# Patient Record
Sex: MALE | Race: WHITE | NOT HISPANIC OR LATINO | Employment: OTHER | URBAN - METROPOLITAN AREA
[De-identification: names, ages, dates, MRNs, and addresses within clinical notes are randomized per-mention and may not be internally consistent; named-entity substitution may affect disease eponyms.]

---

## 2017-01-02 ENCOUNTER — ANTI-COAG VISIT (OUTPATIENT)
Dept: CARDIOLOGY | Facility: CLINIC | Age: 82
End: 2017-01-02

## 2017-01-02 DIAGNOSIS — Z79.01 ANTICOAGULATION MONITORING BY PHARMACIST: ICD-10-CM

## 2017-01-02 LAB — INR PPP: 2.4

## 2017-01-16 ENCOUNTER — ANTI-COAG VISIT (OUTPATIENT)
Dept: CARDIOLOGY | Facility: CLINIC | Age: 82
End: 2017-01-16

## 2017-01-16 DIAGNOSIS — Z79.01 ANTICOAGULATION MONITORING BY PHARMACIST: ICD-10-CM

## 2017-01-16 LAB — INR PPP: 2.33

## 2017-01-26 ENCOUNTER — CLINICAL SUPPORT (OUTPATIENT)
Dept: ELECTROPHYSIOLOGY | Facility: CLINIC | Age: 82
End: 2017-01-26
Payer: MEDICARE

## 2017-01-26 DIAGNOSIS — Z95.0 CARDIAC PACEMAKER IN SITU: ICD-10-CM

## 2017-01-26 DIAGNOSIS — I48.91 ATRIAL FIBRILLATION: ICD-10-CM

## 2017-01-26 PROCEDURE — 93293 PM PHONE R-STRIP DEVICE EVAL: CPT | Mod: PBBFAC | Performed by: INTERNAL MEDICINE

## 2017-01-26 RX ORDER — WARFARIN 1 MG/1
TABLET ORAL
Qty: 60 TABLET | Refills: 4 | Status: SHIPPED | OUTPATIENT
Start: 2017-01-26 | End: 2017-01-31 | Stop reason: SDUPTHER

## 2017-01-31 DIAGNOSIS — I48.91 ATRIAL FIBRILLATION: ICD-10-CM

## 2017-02-01 RX ORDER — WARFARIN 1 MG/1
TABLET ORAL
Qty: 60 TABLET | Refills: 4 | Status: SHIPPED | OUTPATIENT
Start: 2017-02-01 | End: 2017-06-23 | Stop reason: SDUPTHER

## 2017-02-13 ENCOUNTER — ANTI-COAG VISIT (OUTPATIENT)
Dept: CARDIOLOGY | Facility: CLINIC | Age: 82
End: 2017-02-13

## 2017-02-13 DIAGNOSIS — Z79.01 ANTICOAGULATION MONITORING BY PHARMACIST: ICD-10-CM

## 2017-02-13 LAB — INR PPP: 2.08

## 2017-03-13 ENCOUNTER — ANTI-COAG VISIT (OUTPATIENT)
Dept: CARDIOLOGY | Facility: CLINIC | Age: 82
End: 2017-03-13

## 2017-03-13 DIAGNOSIS — Z79.01 ANTICOAGULATION MONITORING BY PHARMACIST: ICD-10-CM

## 2017-03-13 LAB — INR PPP: 2.08

## 2017-04-18 ENCOUNTER — ANTI-COAG VISIT (OUTPATIENT)
Dept: CARDIOLOGY | Facility: CLINIC | Age: 82
End: 2017-04-18

## 2017-04-18 DIAGNOSIS — Z79.01 ANTICOAGULATION MONITORING BY PHARMACIST: ICD-10-CM

## 2017-04-18 LAB — INR PPP: 1.84

## 2017-04-28 ENCOUNTER — CLINICAL SUPPORT (OUTPATIENT)
Dept: ELECTROPHYSIOLOGY | Facility: CLINIC | Age: 82
End: 2017-04-28
Payer: MEDICARE

## 2017-04-28 DIAGNOSIS — I48.91 ATRIAL FIBRILLATION: ICD-10-CM

## 2017-04-28 DIAGNOSIS — Z95.0 CARDIAC PACEMAKER IN SITU: ICD-10-CM

## 2017-04-28 PROCEDURE — 93293 PM PHONE R-STRIP DEVICE EVAL: CPT | Mod: S$GLB,,, | Performed by: INTERNAL MEDICINE

## 2017-05-17 DIAGNOSIS — E11.9 TYPE 2 DIABETES MELLITUS, CONTROLLED: ICD-10-CM

## 2017-05-17 RX ORDER — GLIMEPIRIDE 2 MG/1
TABLET ORAL
Qty: 30 TABLET | Refills: 4 | Status: SHIPPED | OUTPATIENT
Start: 2017-05-17 | End: 2018-07-09 | Stop reason: SDUPTHER

## 2017-06-01 ENCOUNTER — ANTI-COAG VISIT (OUTPATIENT)
Dept: CARDIOLOGY | Facility: CLINIC | Age: 82
End: 2017-06-01

## 2017-06-01 DIAGNOSIS — Z79.01 ANTICOAGULATION MONITORING BY PHARMACIST: ICD-10-CM

## 2017-06-01 RX ORDER — CLONIDINE HYDROCHLORIDE 0.1 MG/1
TABLET ORAL
Qty: 60 TABLET | Refills: 2 | Status: SHIPPED | OUTPATIENT
Start: 2017-06-01 | End: 2017-09-05 | Stop reason: SDUPTHER

## 2017-06-01 NOTE — PROGRESS NOTES
Patient reports he has transferred warfarin management to local cardiologist Dr. Rob Esposito in [facility] due to increasing difficulty with transportation as he ages. Discharged from our clinic at this time.

## 2017-06-22 ENCOUNTER — CLINICAL SUPPORT (OUTPATIENT)
Dept: INTERNAL MEDICINE | Facility: CLINIC | Age: 82
End: 2017-06-22
Payer: MEDICARE

## 2017-06-22 DIAGNOSIS — I10 ESSENTIAL HYPERTENSION: ICD-10-CM

## 2017-06-22 DIAGNOSIS — E11.40 TYPE 2 DIABETES MELLITUS WITH DIABETIC NEUROPATHY: ICD-10-CM

## 2017-06-22 DIAGNOSIS — E11.40 TYPE 2 DIABETES MELLITUS WITH DIABETIC NEUROPATHY, UNSPECIFIED LONG TERM INSULIN USE STATUS: ICD-10-CM

## 2017-06-22 DIAGNOSIS — I48.91 ATRIAL FIBRILLATION: ICD-10-CM

## 2017-06-22 DIAGNOSIS — E78.2 MIXED DYSLIPIDEMIA: ICD-10-CM

## 2017-06-22 DIAGNOSIS — E66.9 OBESITY (BMI 30.0-34.9): ICD-10-CM

## 2017-06-22 LAB
ALBUMIN SERPL BCP-MCNC: 3.6 G/DL
ALP SERPL-CCNC: 117 U/L
ALT SERPL W/O P-5'-P-CCNC: 12 U/L
ANION GAP SERPL CALC-SCNC: 8 MMOL/L
AST SERPL-CCNC: 31 U/L
BASOPHILS # BLD AUTO: 0.01 K/UL
BASOPHILS NFR BLD: 0.2 %
BILIRUB SERPL-MCNC: 0.7 MG/DL
BUN SERPL-MCNC: 33 MG/DL
CALCIUM SERPL-MCNC: 9.9 MG/DL
CHLORIDE SERPL-SCNC: 107 MMOL/L
CHOLEST/HDLC SERPL: 3.4 {RATIO}
CO2 SERPL-SCNC: 27 MMOL/L
CREAT SERPL-MCNC: 1.3 MG/DL
DIFFERENTIAL METHOD: ABNORMAL
EOSINOPHIL # BLD AUTO: 0.2 K/UL
EOSINOPHIL NFR BLD: 2.8 %
ERYTHROCYTE [DISTWIDTH] IN BLOOD BY AUTOMATED COUNT: 12.7 %
EST. GFR  (AFRICAN AMERICAN): 56 ML/MIN/1.73 M^2
EST. GFR  (NON AFRICAN AMERICAN): 49 ML/MIN/1.73 M^2
GLUCOSE SERPL-MCNC: 96 MG/DL
HCT VFR BLD AUTO: 37.1 %
HDL/CHOLESTEROL RATIO: 29 %
HDLC SERPL-MCNC: 124 MG/DL
HDLC SERPL-MCNC: 36 MG/DL
HGB BLD-MCNC: 12.1 G/DL
LDLC SERPL CALC-MCNC: 69.4 MG/DL
LYMPHOCYTES # BLD AUTO: 1.2 K/UL
LYMPHOCYTES NFR BLD: 21.3 %
MCH RBC QN AUTO: 33.3 PG
MCHC RBC AUTO-ENTMCNC: 32.6 %
MCV RBC AUTO: 102 FL
MONOCYTES # BLD AUTO: 0.7 K/UL
MONOCYTES NFR BLD: 12.4 %
NEUTROPHILS # BLD AUTO: 3.4 K/UL
NEUTROPHILS NFR BLD: 63.3 %
NONHDLC SERPL-MCNC: 88 MG/DL
PLATELET # BLD AUTO: 169 K/UL
PMV BLD AUTO: 9.9 FL
POTASSIUM SERPL-SCNC: 5.4 MMOL/L
PROT SERPL-MCNC: 7.4 G/DL
RBC # BLD AUTO: 3.63 M/UL
SODIUM SERPL-SCNC: 142 MMOL/L
TRIGL SERPL-MCNC: 93 MG/DL
TSH SERPL DL<=0.005 MIU/L-ACNC: 3.31 UIU/ML
WBC # BLD AUTO: 5.4 K/UL

## 2017-06-22 PROCEDURE — 83036 HEMOGLOBIN GLYCOSYLATED A1C: CPT

## 2017-06-22 PROCEDURE — 80061 LIPID PANEL: CPT

## 2017-06-22 PROCEDURE — 85025 COMPLETE CBC W/AUTO DIFF WBC: CPT

## 2017-06-22 PROCEDURE — 80053 COMPREHEN METABOLIC PANEL: CPT

## 2017-06-22 PROCEDURE — 36415 COLL VENOUS BLD VENIPUNCTURE: CPT | Mod: S$GLB,,, | Performed by: INTERNAL MEDICINE

## 2017-06-22 PROCEDURE — 84443 ASSAY THYROID STIM HORMONE: CPT

## 2017-06-22 RX ORDER — WARFARIN 1 MG/1
TABLET ORAL
Qty: 60 TABLET | Refills: 3 | Status: CANCELLED | OUTPATIENT
Start: 2017-06-22

## 2017-06-23 DIAGNOSIS — I48.21 PERMANENT ATRIAL FIBRILLATION: ICD-10-CM

## 2017-06-23 LAB
ESTIMATED AVG GLUCOSE: 128 MG/DL
HBA1C MFR BLD HPLC: 6.1 %

## 2017-06-23 RX ORDER — WARFARIN 1 MG/1
TABLET ORAL
Qty: 60 TABLET | Refills: 4 | Status: SHIPPED | OUTPATIENT
Start: 2017-06-23 | End: 2017-12-05

## 2017-06-29 ENCOUNTER — OFFICE VISIT (OUTPATIENT)
Dept: INTERNAL MEDICINE | Facility: CLINIC | Age: 82
End: 2017-06-29
Payer: MEDICARE

## 2017-06-29 VITALS
WEIGHT: 194.88 LBS | BODY MASS INDEX: 31.32 KG/M2 | HEIGHT: 66 IN | HEART RATE: 70 BPM | SYSTOLIC BLOOD PRESSURE: 102 MMHG | RESPIRATION RATE: 16 BRPM | DIASTOLIC BLOOD PRESSURE: 66 MMHG | OXYGEN SATURATION: 95 %

## 2017-06-29 DIAGNOSIS — E11.51 TYPE II DIABETES MELLITUS WITH PERIPHERAL CIRCULATORY DISORDER: Primary | ICD-10-CM

## 2017-06-29 DIAGNOSIS — I15.2 OBESITY, DIABETES, AND HYPERTENSION SYNDROME: ICD-10-CM

## 2017-06-29 DIAGNOSIS — I50.32 CHRONIC DIASTOLIC HEART FAILURE: ICD-10-CM

## 2017-06-29 DIAGNOSIS — E11.59 OBESITY, DIABETES, AND HYPERTENSION SYNDROME: ICD-10-CM

## 2017-06-29 DIAGNOSIS — I25.10 CORONARY ARTERY DISEASE INVOLVING NATIVE CORONARY ARTERY OF NATIVE HEART WITHOUT ANGINA PECTORIS: ICD-10-CM

## 2017-06-29 DIAGNOSIS — E66.9 OBESITY, DIABETES, AND HYPERTENSION SYNDROME: ICD-10-CM

## 2017-06-29 DIAGNOSIS — E11.69 OBESITY, DIABETES, AND HYPERTENSION SYNDROME: ICD-10-CM

## 2017-06-29 DIAGNOSIS — J43.9 PULMONARY EMPHYSEMA, UNSPECIFIED EMPHYSEMA TYPE: ICD-10-CM

## 2017-06-29 DIAGNOSIS — I10 ESSENTIAL HYPERTENSION: ICD-10-CM

## 2017-06-29 DIAGNOSIS — I45.2 RBBB (RIGHT BUNDLE BRANCH BLOCK WITH LEFT ANTERIOR FASCICULAR BLOCK): ICD-10-CM

## 2017-06-29 PROCEDURE — 99499 UNLISTED E&M SERVICE: CPT | Mod: S$GLB,,, | Performed by: INTERNAL MEDICINE

## 2017-06-29 PROCEDURE — 99999 PR PBB SHADOW E&M-EST. PATIENT-LVL III: CPT | Mod: PBBFAC,,, | Performed by: INTERNAL MEDICINE

## 2017-06-29 PROCEDURE — 99214 OFFICE O/P EST MOD 30 MIN: CPT | Mod: S$GLB,,, | Performed by: INTERNAL MEDICINE

## 2017-06-29 PROCEDURE — 1159F MED LIST DOCD IN RCRD: CPT | Mod: S$GLB,,, | Performed by: INTERNAL MEDICINE

## 2017-06-29 RX ORDER — AMLODIPINE BESYLATE 10 MG/1
10 TABLET ORAL DAILY
Qty: 30 TABLET | Refills: 6 | Status: SHIPPED | OUTPATIENT
Start: 2017-06-29 | End: 2018-05-08 | Stop reason: SDUPTHER

## 2017-06-29 RX ORDER — FUROSEMIDE 40 MG/1
TABLET ORAL
Qty: 30 TABLET | Refills: 6 | Status: ON HOLD | OUTPATIENT
Start: 2017-06-29 | End: 2017-12-04

## 2017-06-29 NOTE — PROGRESS NOTES
Subjective:       Patient ID: Eddie Parada Sr. is a 88 y.o. male.    Chief Complaint: Hyperlipidemia (follow up with lab review); Hypertension; COPD; Diabetes; Coronary Artery Disease; and Thyroid Problem    Eddie Parada Sr. is a 88 y.o. male who presents for Type II DM, Hypertension, and Hyperlipidemia follow up. Labs were reviewed with patient today.      Hyperlipidemia   This is a chronic problem. The current episode started more than 1 year ago. The problem is controlled. Recent lipid tests were reviewed and are low. Associated symptoms include shortness of breath. Current antihyperlipidemic treatment includes statins. The current treatment provides significant improvement of lipids.   Hypertension   This is a chronic problem. The current episode started more than 1 year ago. The problem is controlled. Associated symptoms include malaise/fatigue and shortness of breath. Pertinent negatives include no orthopnea. Risk factors for coronary artery disease include sedentary lifestyle, male gender, obesity, dyslipidemia and diabetes mellitus. Past treatments include beta blockers, calcium channel blockers and diuretics. The current treatment provides moderate improvement. Hypertensive end-organ damage includes kidney disease, CAD/MI and a thyroid problem.   COPD     Diabetes   He presents for his follow-up diabetic visit. He has type 2 diabetes mellitus. His disease course has been stable. Pertinent negatives for hypoglycemia include no dizziness, nervousness/anxiousness, pallor, seizures or speech difficulty. Associated symptoms include foot paresthesias. Pertinent negatives for hypoglycemia complications include no blackouts. Symptoms are improving. Pertinent negatives for diabetic complications include no autonomic neuropathy. Current diabetic treatment includes oral agent (dual therapy). His breakfast blood glucose range is generally 110-130 mg/dl. An ACE inhibitor/angiotensin II receptor blocker is  being taken.   Coronary Artery Disease   Symptoms include shortness of breath. Pertinent negatives include no chest tightness or dizziness. Risk factors include hyperlipidemia.   Thyroid Problem   Patient reports no anxiety. His past medical history is significant for hyperlipidemia.     Review of Systems   Constitutional: Positive for malaise/fatigue.   HENT: Negative for postnasal drip.    Eyes: Negative for photophobia.   Respiratory: Positive for shortness of breath. Negative for chest tightness.    Cardiovascular: Negative for orthopnea.   Gastrointestinal: Negative for abdominal distention and blood in stool.   Genitourinary: Negative for dysuria, flank pain and hematuria.   Musculoskeletal: Negative for back pain.        Foot neuropathic pain    Skin: Negative for pallor.   Neurological: Negative for dizziness, seizures, facial asymmetry and speech difficulty.   Hematological: Does not bruise/bleed easily.   Psychiatric/Behavioral: Negative for agitation and suicidal ideas. The patient is not nervous/anxious.        Objective:      Physical Exam   Constitutional: He is oriented to person, place, and time. He appears well-developed and well-nourished. No distress.   HENT:   Head: Normocephalic and atraumatic.   Right Ear: External ear normal.   Left Ear: External ear normal.   Eyes: EOM are normal. Right eye exhibits no discharge. Left eye exhibits no discharge.   Neck: Neck supple. No thyromegaly present.   Cardiovascular: Normal rate and regular rhythm.  Exam reveals no gallop and no friction rub.    No murmur heard.  Pulses:       Dorsalis pedis pulses are 1+ on the right side, and 1+ on the left side.        Posterior tibial pulses are 1+ on the right side, and 1+ on the left side.   Pulmonary/Chest: Effort normal and breath sounds normal. No respiratory distress. He has no wheezes. He has no rales.   Abdominal: Soft. Bowel sounds are normal. He exhibits no distension. There is no tenderness. There is no  rebound and no guarding.   Musculoskeletal: He exhibits no edema (but wearing compression stockings today).   Wearing compression stockings     Feet:   Right Foot:   Protective Sensation: 6 sites tested. 1 site sensed.  Skin Integrity: Negative for ulcer, skin breakdown, callus or dry skin.   Left Foot:   Protective Sensation: 6 sites tested. 2 sites sensed.   Skin Integrity: Negative for ulcer, skin breakdown, callus or dry skin.   Lymphadenopathy:     He has no cervical adenopathy.   Neurological: He is alert and oriented to person, place, and time. No cranial nerve deficit.   Skin: Skin is warm and dry.   Psychiatric: He has a normal mood and affect. His behavior is normal.   Vitals reviewed.      Assessment:       1. Type II diabetes mellitus with peripheral circulatory disorder    2. Pulmonary emphysema, unspecified emphysema type    3. Chronic diastolic heart failure    4. Coronary artery disease involving native coronary artery of native heart without angina pectoris    5. RBBB (right bundle branch block with left anterior fascicular block)    6. Obesity, diabetes, and hypertension syndrome        Plan:   Eddie was seen today for hyperlipidemia, hypertension, copd, diabetes, coronary artery disease and thyroid problem.    Diagnoses and all orders for this visit:    Type II diabetes mellitus with peripheral circulatory disorder  -     Microalbumin/creatinine urine ratio; Future  -     Hemoglobin A1c; Future  Patient has controlled Diabetes .  We discussed about diet ;low in calories. Avoid sweats, sodas.  Also increasing activity;walking 2-3 miles a day.   gave patient  instructions about adherence to plan.  Goal of  A1c  less than 7 % stressed.  Also goal of LDL less than 70 highlighted to patient.  Pulmonary emphysema, unspecified emphysema type  Well controlled.    Chronic diastolic heart failure  -     Brain natriuretic peptide; Future  We discussed about leg edema  And  weight gain issues with CHF.  I  educated patient to call  If he  Gains 5 lbs and more ,or legs start swelling or worsening shortness of breath , or inability to lie down.  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     # Eat  5 small meals a day.     # Cut out high carbohydrate  foods : bread, rice, pasta, potatoes.     # Exercise/walk 5x/week for at least 30-45  minutes.

## 2017-08-17 DIAGNOSIS — I10 ESSENTIAL HYPERTENSION: ICD-10-CM

## 2017-08-17 DIAGNOSIS — E78.2 MIXED DYSLIPIDEMIA: ICD-10-CM

## 2017-08-17 RX ORDER — CARVEDILOL 12.5 MG/1
12.5 TABLET ORAL 2 TIMES DAILY WITH MEALS
Qty: 60 TABLET | Refills: 5 | Status: SHIPPED | OUTPATIENT
Start: 2017-08-17 | End: 2018-10-22 | Stop reason: SDUPTHER

## 2017-08-17 RX ORDER — GEMFIBROZIL 600 MG/1
600 TABLET, FILM COATED ORAL
Qty: 60 TABLET | Refills: 4 | Status: SHIPPED | OUTPATIENT
Start: 2017-08-17 | End: 2018-02-28 | Stop reason: SDUPTHER

## 2017-09-05 RX ORDER — LISINOPRIL 40 MG/1
40 TABLET ORAL DAILY
Qty: 30 TABLET | Refills: 5 | Status: ON HOLD | OUTPATIENT
Start: 2017-09-05 | End: 2017-11-26

## 2017-09-05 RX ORDER — CLONIDINE HYDROCHLORIDE 0.1 MG/1
TABLET ORAL
Qty: 60 TABLET | Refills: 1 | Status: ON HOLD | OUTPATIENT
Start: 2017-09-05 | End: 2017-12-04 | Stop reason: HOSPADM

## 2017-09-25 ENCOUNTER — TELEPHONE (OUTPATIENT)
Dept: INTERNAL MEDICINE | Facility: CLINIC | Age: 82
End: 2017-09-25

## 2017-09-25 ENCOUNTER — OFFICE VISIT (OUTPATIENT)
Dept: INTERNAL MEDICINE | Facility: CLINIC | Age: 82
End: 2017-09-25
Payer: MEDICARE

## 2017-09-25 VITALS
HEIGHT: 66 IN | WEIGHT: 193.31 LBS | SYSTOLIC BLOOD PRESSURE: 86 MMHG | HEART RATE: 60 BPM | DIASTOLIC BLOOD PRESSURE: 52 MMHG | OXYGEN SATURATION: 95 % | BODY MASS INDEX: 31.07 KG/M2 | RESPIRATION RATE: 16 BRPM

## 2017-09-25 DIAGNOSIS — K29.70 GASTRITIS, PRESENCE OF BLEEDING UNSPECIFIED, UNSPECIFIED CHRONICITY, UNSPECIFIED GASTRITIS TYPE: ICD-10-CM

## 2017-09-25 DIAGNOSIS — L03.115 CELLULITIS OF RIGHT FOOT: Primary | ICD-10-CM

## 2017-09-25 PROCEDURE — 1159F MED LIST DOCD IN RCRD: CPT | Mod: S$GLB,,, | Performed by: INTERNAL MEDICINE

## 2017-09-25 PROCEDURE — 99999 PR PBB SHADOW E&M-EST. PATIENT-LVL III: CPT | Mod: PBBFAC,,, | Performed by: INTERNAL MEDICINE

## 2017-09-25 PROCEDURE — 3008F BODY MASS INDEX DOCD: CPT | Mod: S$GLB,,, | Performed by: INTERNAL MEDICINE

## 2017-09-25 PROCEDURE — 99499 UNLISTED E&M SERVICE: CPT | Mod: S$GLB,,, | Performed by: INTERNAL MEDICINE

## 2017-09-25 PROCEDURE — 99213 OFFICE O/P EST LOW 20 MIN: CPT | Mod: S$GLB,,, | Performed by: INTERNAL MEDICINE

## 2017-09-25 RX ORDER — CLINDAMYCIN HYDROCHLORIDE 300 MG/1
CAPSULE ORAL
Status: ON HOLD | COMMUNITY
Start: 2017-09-21 | End: 2017-11-26

## 2017-09-25 RX ORDER — CEPHALEXIN 500 MG/1
500 CAPSULE ORAL 3 TIMES DAILY
Qty: 30 CAPSULE | Refills: 0 | Status: SHIPPED | OUTPATIENT
Start: 2017-09-25 | End: 2017-10-02 | Stop reason: SDUPTHER

## 2017-09-25 NOTE — PROGRESS NOTES
Subjective:       Patient ID: Eddie Parada Sr. is a 88 y.o. male.    Chief Complaint: Contusion of left foot (ER FOLLOW UP); Heartburn; and Abdominal Pain    Eddie Parada Sr. is a 88 y.o. male  Here with multiple complaints .  He went to ER [facility] for R foot cellulitis .  And started on clindamycin . Since then getting a lot of side effects.  C/o stomach hurting ; heart burn.  Took nexium  Which helped.          Heartburn   He complains of abdominal pain and heartburn.   Abdominal Pain   Pertinent negatives include no dysuria or hematuria.     Review of Systems   HENT: Negative for postnasal drip.    Eyes: Negative for photophobia.   Respiratory: Negative for chest tightness.    Gastrointestinal: Positive for abdominal pain and heartburn. Negative for abdominal distention and blood in stool.   Genitourinary: Negative for dysuria, flank pain and hematuria.   Musculoskeletal: Negative for back pain.        Foot neuropathic pain    Skin: Positive for rash. Negative for pallor.   Neurological: Negative for dizziness, seizures, facial asymmetry and speech difficulty.   Hematological: Does not bruise/bleed easily.   Psychiatric/Behavioral: Negative for agitation and suicidal ideas. The patient is not nervous/anxious.        Objective:      Physical Exam   Constitutional: He is oriented to person, place, and time. He appears well-developed and well-nourished. No distress.   HENT:   Head: Normocephalic and atraumatic.   Right Ear: External ear normal.   Left Ear: External ear normal.   Eyes: EOM are normal. Right eye exhibits no discharge. Left eye exhibits no discharge.   Neck: Neck supple. No thyromegaly present.   Cardiovascular: Normal rate and regular rhythm.  Exam reveals no gallop and no friction rub.    No murmur heard.  Pulses:       Dorsalis pedis pulses are 1+ on the left side.   Pulmonary/Chest: Effort normal and breath sounds normal. No respiratory distress. He has no wheezes. He has no rales.  "  Abdominal: Soft. Bowel sounds are normal. He exhibits no distension. There is no tenderness. There is no rebound and no guarding.   Musculoskeletal: He exhibits no edema (but wearing compression stockings today).        Feet:    3 cm are which is hyperemic   And tender.     Lymphadenopathy:     He has no cervical adenopathy.   Neurological: He is alert and oriented to person, place, and time. No cranial nerve deficit.   Skin: Skin is warm and dry.   Psychiatric: He has a normal mood and affect. His behavior is normal.   Vitals reviewed.      Assessment:       1. Cellulitis of right foot    2. Gastritis, presence of bleeding unspecified, unspecified chronicity, unspecified gastritis type        Plan:   Eddie was seen today for contusion of left foot, heartburn and abdominal pain.    Diagnoses and all orders for this visit:    Cellulitis of right foot  -     cephALEXin (KEFLEX) 500 MG capsule; Take 1 capsule (500 mg total) by mouth 3 (three) times daily.    Gastritis, presence of bleeding unspecified, unspecified chronicity, unspecified gastritis type    take 2 OTC nexium   Daily  Tips to Control Acid Reflux  To control acid reflux, youll need to make some basic diet and lifestyle changes. The simple steps outlined below may be all youll need to relieve discomfort.  · Avoid fatty foods and spicy foods.  · Eat fewer acidic foods, such as citrus and tomato-based foods. These can increase symptoms.  · Limit drinking alcohol, caffeine, and fizzy beverages. All increase acid reflux.  · Try limiting chocolate, peppermint, and spearmint. These can worsen acid reflux in some people.  Watch When You Eat  · Avoid lying down for 3 hours after eating.  · Do not snack before going to bed.  Raise Your Head    Raising your head and upper body by 4" to 6" helps limit reflux when youre lying down. Put blocks under the head of the bed frame to raise it.    "

## 2017-09-25 NOTE — TELEPHONE ENCOUNTER
Patient requesting ER follow-up appt for cellulitis to right foot. Also c/o abdominal pain which he thinks is a side effect of the clindamycin he's been taking x 4 days. Appt scheduled for 1:00 pm today.

## 2017-09-25 NOTE — TELEPHONE ENCOUNTER
----- Message from Ellyn Barkley sent at 2017  8:04 AM CDT -----  Contact: self  Eddie Hannahlew .  MRN: [id number]  : 1929  PCP: Callum Roberts  Home Phone      [phone]  Work Phone      Not on file.  Mobile          Not on file.      MESSAGE: want appt for foot today-----[phone]

## 2017-10-02 ENCOUNTER — OFFICE VISIT (OUTPATIENT)
Dept: INTERNAL MEDICINE | Facility: CLINIC | Age: 82
End: 2017-10-02
Payer: MEDICARE

## 2017-10-02 VITALS
DIASTOLIC BLOOD PRESSURE: 60 MMHG | BODY MASS INDEX: 31.43 KG/M2 | HEIGHT: 66 IN | HEART RATE: 60 BPM | SYSTOLIC BLOOD PRESSURE: 108 MMHG | RESPIRATION RATE: 14 BRPM | OXYGEN SATURATION: 94 % | WEIGHT: 195.56 LBS

## 2017-10-02 DIAGNOSIS — L03.115 CELLULITIS OF RIGHT FOOT: ICD-10-CM

## 2017-10-02 PROCEDURE — 3008F BODY MASS INDEX DOCD: CPT | Mod: S$GLB,,, | Performed by: INTERNAL MEDICINE

## 2017-10-02 PROCEDURE — 1159F MED LIST DOCD IN RCRD: CPT | Mod: S$GLB,,, | Performed by: INTERNAL MEDICINE

## 2017-10-02 PROCEDURE — 99999 PR PBB SHADOW E&M-EST. PATIENT-LVL III: CPT | Mod: PBBFAC,,, | Performed by: INTERNAL MEDICINE

## 2017-10-02 PROCEDURE — 99213 OFFICE O/P EST LOW 20 MIN: CPT | Mod: S$GLB,,, | Performed by: INTERNAL MEDICINE

## 2017-10-02 RX ORDER — CEPHALEXIN 500 MG/1
500 CAPSULE ORAL 3 TIMES DAILY
Qty: 30 CAPSULE | Refills: 0 | Status: SHIPPED | OUTPATIENT
Start: 2017-10-02 | End: 2017-10-12

## 2017-10-02 NOTE — PROGRESS NOTES
Subjective:       Patient ID: Eddie Parada Sr. is a 88 y.o. male.    Chief Complaint: Cellulitis of right foot    Eddie Parada Sr. is a 88 y.o. male  Here with  Follow up for R foot cellulitis.  Reports no issues with keflex.  Foot looks better .        Heartburn   He complains of heartburn.   Abdominal Pain   Pertinent negatives include no dysuria or hematuria.     Review of Systems   HENT: Negative for postnasal drip.    Eyes: Negative for photophobia.   Respiratory: Negative for chest tightness.    Gastrointestinal: Positive for heartburn. Negative for abdominal distention and blood in stool.   Genitourinary: Negative for dysuria, flank pain and hematuria.   Musculoskeletal: Negative for back pain.        Foot neuropathic pain    Skin: Positive for rash. Negative for pallor.   Neurological: Negative for dizziness, seizures, facial asymmetry and speech difficulty.   Hematological: Does not bruise/bleed easily.   Psychiatric/Behavioral: Negative for agitation and suicidal ideas. The patient is not nervous/anxious.        Objective:      Physical Exam   Constitutional: He is oriented to person, place, and time. He appears well-developed and well-nourished. No distress.   HENT:   Head: Normocephalic and atraumatic.   Right Ear: External ear normal.   Left Ear: External ear normal.   Eyes: EOM are normal. Right eye exhibits no discharge. Left eye exhibits no discharge.   Neck: Neck supple. No thyromegaly present.   Cardiovascular: Normal rate and regular rhythm.  Exam reveals no gallop and no friction rub.    No murmur heard.  Pulses:       Dorsalis pedis pulses are 1+ on the left side.   Pulmonary/Chest: Effort normal and breath sounds normal. No respiratory distress. He has no wheezes. He has no rales.   Abdominal: Soft. Bowel sounds are normal. He exhibits no distension. There is no tenderness. There is no rebound and no guarding.   Musculoskeletal: He exhibits no edema (but wearing compression stockings  today).        Feet:    3 cm are which is hyperemic is lessening   less  Tender.  No blisters ; looks to be healing      Lymphadenopathy:     He has no cervical adenopathy.   Neurological: He is alert and oriented to person, place, and time. No cranial nerve deficit.   Skin: Skin is warm and dry.   Psychiatric: He has a normal mood and affect. His behavior is normal.   Vitals reviewed.      Assessment:       1. Cellulitis of right foot        Plan:   Eddie was seen today for cellulitis of right foot.    Diagnoses and all orders for this visit:    Cellulitis of right foot  -     cephALEXin (KEFLEX) 500 MG capsule; Take 1 capsule (500 mg total) by mouth 3 (three) times daily.    take aleeve as needed   If not better see me

## 2017-11-07 RX ORDER — LISINOPRIL 40 MG/1
40 TABLET ORAL DAILY
Qty: 30 TABLET | Refills: 5 | Status: ON HOLD | OUTPATIENT
Start: 2017-11-07 | End: 2017-11-30 | Stop reason: HOSPADM

## 2017-11-25 ENCOUNTER — LAB VISIT (OUTPATIENT)
Dept: LAB | Facility: HOSPITAL | Age: 82
End: 2017-11-25
Attending: INTERNAL MEDICINE
Payer: MEDICARE

## 2017-11-25 DIAGNOSIS — I10 HTN (HYPERTENSION): ICD-10-CM

## 2017-11-25 DIAGNOSIS — I48.91 ATRIAL FIBRILLATION: Primary | ICD-10-CM

## 2017-11-25 LAB
ANISOCYTOSIS BLD QL SMEAR: SLIGHT
BACTERIA #/AREA URNS HPF: ABNORMAL /HPF
BASOPHILS # BLD AUTO: ABNORMAL K/UL
BASOPHILS NFR BLD: 0 %
BILIRUB UR QL STRIP: NEGATIVE
CLARITY UR: ABNORMAL
COLOR UR: YELLOW
DIFFERENTIAL METHOD: ABNORMAL
EOSINOPHIL # BLD AUTO: ABNORMAL K/UL
EOSINOPHIL NFR BLD: 1 %
ERYTHROCYTE [DISTWIDTH] IN BLOOD BY AUTOMATED COUNT: 13.9 %
GLUCOSE UR QL STRIP: NEGATIVE
HCT VFR BLD AUTO: 28.9 %
HGB BLD-MCNC: 9.4 G/DL
HGB UR QL STRIP: ABNORMAL
HYALINE CASTS #/AREA URNS LPF: 0 /LPF
HYPOCHROMIA BLD QL SMEAR: ABNORMAL
INR PPP: 3.2
KETONES UR QL STRIP: NEGATIVE
LEUKOCYTE ESTERASE UR QL STRIP: ABNORMAL
LYMPHOCYTES # BLD AUTO: ABNORMAL K/UL
LYMPHOCYTES NFR BLD: 6 %
MCH RBC QN AUTO: 33.3 PG
MCHC RBC AUTO-ENTMCNC: 32.5 G/DL
MCV RBC AUTO: 103 FL
MICROSCOPIC COMMENT: ABNORMAL
MONOCYTES # BLD AUTO: ABNORMAL K/UL
MONOCYTES NFR BLD: 8 %
NEUTROPHILS # BLD AUTO: ABNORMAL K/UL
NEUTROPHILS NFR BLD: 84 %
NEUTS BAND NFR BLD MANUAL: 1 %
NITRITE UR QL STRIP: POSITIVE
PH UR STRIP: 5 [PH] (ref 5–8)
PLATELET # BLD AUTO: 280 K/UL
PLATELET BLD QL SMEAR: ABNORMAL
PMV BLD AUTO: 10.7 FL
PROT UR QL STRIP: ABNORMAL
PROTHROMBIN TIME: 33.3 SEC
RBC # BLD AUTO: 2.82 M/UL
RBC #/AREA URNS HPF: 0 /HPF (ref 0–4)
SP GR UR STRIP: 1.01 (ref 1–1.03)
URN SPEC COLLECT METH UR: ABNORMAL
UROBILINOGEN UR STRIP-ACNC: 1 EU/DL
WBC # BLD AUTO: 15.51 K/UL
WBC #/AREA URNS HPF: >100 /HPF (ref 0–5)

## 2017-11-25 PROCEDURE — 85007 BL SMEAR W/DIFF WBC COUNT: CPT

## 2017-11-25 PROCEDURE — 87086 URINE CULTURE/COLONY COUNT: CPT

## 2017-11-25 PROCEDURE — 85027 COMPLETE CBC AUTOMATED: CPT

## 2017-11-25 PROCEDURE — 81000 URINALYSIS NONAUTO W/SCOPE: CPT

## 2017-11-25 PROCEDURE — 87186 SC STD MICRODIL/AGAR DIL: CPT

## 2017-11-25 PROCEDURE — 87077 CULTURE AEROBIC IDENTIFY: CPT

## 2017-11-25 PROCEDURE — 85610 PROTHROMBIN TIME: CPT

## 2017-11-25 PROCEDURE — 87088 URINE BACTERIA CULTURE: CPT

## 2017-11-26 ENCOUNTER — HOSPITAL ENCOUNTER (INPATIENT)
Facility: HOSPITAL | Age: 82
LOS: 4 days | Discharge: NURSING, REHAB OR LONG-TERM CARE | DRG: 690 | End: 2017-12-04
Attending: SURGERY | Admitting: FAMILY MEDICINE
Payer: MEDICARE

## 2017-11-26 DIAGNOSIS — Z87.81 S/P ORIF (OPEN REDUCTION INTERNAL FIXATION) FRACTURE: ICD-10-CM

## 2017-11-26 DIAGNOSIS — Z98.890 S/P ORIF (OPEN REDUCTION INTERNAL FIXATION) FRACTURE: ICD-10-CM

## 2017-11-26 DIAGNOSIS — I49.9 ABNORMAL HEART RHYTHM: ICD-10-CM

## 2017-11-26 DIAGNOSIS — I10 ESSENTIAL HYPERTENSION: ICD-10-CM

## 2017-11-26 DIAGNOSIS — N39.0 UTI (URINARY TRACT INFECTION): ICD-10-CM

## 2017-11-26 DIAGNOSIS — B96.20 E. COLI UTI: ICD-10-CM

## 2017-11-26 DIAGNOSIS — N39.0 COMPLICATED UTI (URINARY TRACT INFECTION): Primary | ICD-10-CM

## 2017-11-26 DIAGNOSIS — I50.32 CHRONIC DIASTOLIC HEART FAILURE: ICD-10-CM

## 2017-11-26 DIAGNOSIS — R33.9 URINARY RETENTION: ICD-10-CM

## 2017-11-26 DIAGNOSIS — N39.0 E. COLI UTI: ICD-10-CM

## 2017-11-26 DIAGNOSIS — E86.0 DEHYDRATION: ICD-10-CM

## 2017-11-26 DIAGNOSIS — N30.01 ACUTE CYSTITIS WITH HEMATURIA: ICD-10-CM

## 2017-11-26 PROBLEM — T45.511A COUMADIN TOXICITY: Status: ACTIVE | Noted: 2017-11-26

## 2017-11-26 LAB
ALBUMIN SERPL BCP-MCNC: 2.5 G/DL
ALP SERPL-CCNC: 128 U/L
ALT SERPL W/O P-5'-P-CCNC: 18 U/L
ANION GAP SERPL CALC-SCNC: 10 MMOL/L
APTT BLDCRRT: 41.9 SEC
AST SERPL-CCNC: 26 U/L
BACTERIA #/AREA URNS HPF: ABNORMAL /HPF
BASOPHILS # BLD AUTO: 0.04 K/UL
BASOPHILS NFR BLD: 0.3 %
BILIRUB SERPL-MCNC: 0.8 MG/DL
BILIRUB UR QL STRIP: NEGATIVE
BUN SERPL-MCNC: 57 MG/DL
CALCIUM SERPL-MCNC: 9 MG/DL
CHLORIDE SERPL-SCNC: 103 MMOL/L
CLARITY UR: ABNORMAL
CO2 SERPL-SCNC: 23 MMOL/L
COLOR UR: YELLOW
CREAT SERPL-MCNC: 1.4 MG/DL
DIFFERENTIAL METHOD: ABNORMAL
EOSINOPHIL # BLD AUTO: 0 K/UL
EOSINOPHIL NFR BLD: 0.2 %
ERYTHROCYTE [DISTWIDTH] IN BLOOD BY AUTOMATED COUNT: 13.9 %
EST. GFR  (AFRICAN AMERICAN): 51 ML/MIN/1.73 M^2
EST. GFR  (NON AFRICAN AMERICAN): 45 ML/MIN/1.73 M^2
ESTIMATED AVG GLUCOSE: 114 MG/DL
GLUCOSE SERPL-MCNC: 210 MG/DL
GLUCOSE UR QL STRIP: NEGATIVE
HBA1C MFR BLD HPLC: 5.6 %
HCT VFR BLD AUTO: 30.6 %
HGB BLD-MCNC: 9.9 G/DL
HGB UR QL STRIP: ABNORMAL
HYALINE CASTS #/AREA URNS LPF: 0 /LPF
INR PPP: 3.9
KETONES UR QL STRIP: NEGATIVE
LACTATE SERPL-SCNC: 0.9 MMOL/L
LEUKOCYTE ESTERASE UR QL STRIP: ABNORMAL
LYMPHOCYTES # BLD AUTO: 1 K/UL
LYMPHOCYTES NFR BLD: 6.7 %
MCH RBC QN AUTO: 32.9 PG
MCHC RBC AUTO-ENTMCNC: 32.4 G/DL
MCV RBC AUTO: 102 FL
MICROSCOPIC COMMENT: ABNORMAL
MONOCYTES # BLD AUTO: 1.5 K/UL
MONOCYTES NFR BLD: 9.5 %
NEUTROPHILS # BLD AUTO: 13 K/UL
NEUTROPHILS NFR BLD: 83.3 %
NITRITE UR QL STRIP: POSITIVE
PH UR STRIP: 6 [PH] (ref 5–8)
PLATELET # BLD AUTO: 314 K/UL
PMV BLD AUTO: 10.3 FL
POCT GLUCOSE: 156 MG/DL (ref 70–110)
POCT GLUCOSE: 162 MG/DL (ref 70–110)
POCT GLUCOSE: 204 MG/DL (ref 70–110)
POTASSIUM SERPL-SCNC: 4.6 MMOL/L
PROT SERPL-MCNC: 6.4 G/DL
PROT UR QL STRIP: ABNORMAL
PROTHROMBIN TIME: 40.5 SEC
RBC # BLD AUTO: 3.01 M/UL
RBC #/AREA URNS HPF: 0 /HPF (ref 0–4)
SODIUM SERPL-SCNC: 136 MMOL/L
SP GR UR STRIP: 1.01 (ref 1–1.03)
URN SPEC COLLECT METH UR: ABNORMAL
UROBILINOGEN UR STRIP-ACNC: ABNORMAL EU/DL
WBC # BLD AUTO: 15.62 K/UL
WBC #/AREA URNS HPF: >100 /HPF (ref 0–5)

## 2017-11-26 PROCEDURE — 36415 COLL VENOUS BLD VENIPUNCTURE: CPT

## 2017-11-26 PROCEDURE — 99285 EMERGENCY DEPT VISIT HI MDM: CPT | Mod: 25

## 2017-11-26 PROCEDURE — 96361 HYDRATE IV INFUSION ADD-ON: CPT

## 2017-11-26 PROCEDURE — 94761 N-INVAS EAR/PLS OXIMETRY MLT: CPT

## 2017-11-26 PROCEDURE — 80053 COMPREHEN METABOLIC PANEL: CPT

## 2017-11-26 PROCEDURE — 63600175 PHARM REV CODE 636 W HCPCS: Performed by: SURGERY

## 2017-11-26 PROCEDURE — 81000 URINALYSIS NONAUTO W/SCOPE: CPT

## 2017-11-26 PROCEDURE — 87086 URINE CULTURE/COLONY COUNT: CPT

## 2017-11-26 PROCEDURE — 85025 COMPLETE CBC W/AUTO DIFF WBC: CPT

## 2017-11-26 PROCEDURE — 87186 SC STD MICRODIL/AGAR DIL: CPT

## 2017-11-26 PROCEDURE — 96365 THER/PROPH/DIAG IV INF INIT: CPT

## 2017-11-26 PROCEDURE — 11000001 HC ACUTE MED/SURG PRIVATE ROOM

## 2017-11-26 PROCEDURE — 83605 ASSAY OF LACTIC ACID: CPT

## 2017-11-26 PROCEDURE — 51702 INSERT TEMP BLADDER CATH: CPT

## 2017-11-26 PROCEDURE — G0378 HOSPITAL OBSERVATION PER HR: HCPCS

## 2017-11-26 PROCEDURE — 99219 PR INITIAL OBSERVATION CARE,LEVL II: CPT | Mod: ,,, | Performed by: FAMILY MEDICINE

## 2017-11-26 PROCEDURE — 87040 BLOOD CULTURE FOR BACTERIA: CPT

## 2017-11-26 PROCEDURE — 83036 HEMOGLOBIN GLYCOSYLATED A1C: CPT

## 2017-11-26 PROCEDURE — 85730 THROMBOPLASTIN TIME PARTIAL: CPT

## 2017-11-26 PROCEDURE — 94760 N-INVAS EAR/PLS OXIMETRY 1: CPT

## 2017-11-26 PROCEDURE — 85610 PROTHROMBIN TIME: CPT

## 2017-11-26 PROCEDURE — 96372 THER/PROPH/DIAG INJ SC/IM: CPT

## 2017-11-26 PROCEDURE — 96375 TX/PRO/DX INJ NEW DRUG ADDON: CPT

## 2017-11-26 PROCEDURE — 87088 URINE BACTERIA CULTURE: CPT

## 2017-11-26 PROCEDURE — 25000003 PHARM REV CODE 250: Performed by: SURGERY

## 2017-11-26 PROCEDURE — 87077 CULTURE AEROBIC IDENTIFY: CPT

## 2017-11-26 PROCEDURE — 25000003 PHARM REV CODE 250: Performed by: FAMILY MEDICINE

## 2017-11-26 RX ORDER — LEVOTHYROXINE SODIUM 50 UG/1
50 TABLET ORAL DAILY
Status: DISCONTINUED | OUTPATIENT
Start: 2017-11-26 | End: 2017-12-04 | Stop reason: HOSPADM

## 2017-11-26 RX ORDER — GLUCAGON 1 MG
1 KIT INJECTION
Status: DISCONTINUED | OUTPATIENT
Start: 2017-11-26 | End: 2017-12-04 | Stop reason: HOSPADM

## 2017-11-26 RX ORDER — INSULIN ASPART 100 [IU]/ML
7 INJECTION, SOLUTION INTRAVENOUS; SUBCUTANEOUS
Status: DISCONTINUED | OUTPATIENT
Start: 2017-11-26 | End: 2017-11-27

## 2017-11-26 RX ORDER — INSULIN ASPART 100 [IU]/ML
0-5 INJECTION, SOLUTION INTRAVENOUS; SUBCUTANEOUS
Status: DISCONTINUED | OUTPATIENT
Start: 2017-11-26 | End: 2017-12-04 | Stop reason: HOSPADM

## 2017-11-26 RX ORDER — IBUPROFEN 200 MG
24 TABLET ORAL
Status: DISCONTINUED | OUTPATIENT
Start: 2017-11-26 | End: 2017-12-04 | Stop reason: HOSPADM

## 2017-11-26 RX ORDER — ONDANSETRON 2 MG/ML
4 INJECTION INTRAMUSCULAR; INTRAVENOUS EVERY 8 HOURS PRN
Status: DISCONTINUED | OUTPATIENT
Start: 2017-11-26 | End: 2017-12-04 | Stop reason: HOSPADM

## 2017-11-26 RX ORDER — PANTOPRAZOLE SODIUM 40 MG/1
40 TABLET, DELAYED RELEASE ORAL DAILY
Status: DISCONTINUED | OUTPATIENT
Start: 2017-11-26 | End: 2017-12-04 | Stop reason: HOSPADM

## 2017-11-26 RX ORDER — IBUPROFEN 200 MG
16 TABLET ORAL
Status: DISCONTINUED | OUTPATIENT
Start: 2017-11-26 | End: 2017-12-04 | Stop reason: HOSPADM

## 2017-11-26 RX ORDER — CARVEDILOL 12.5 MG/1
12.5 TABLET ORAL 2 TIMES DAILY WITH MEALS
Status: DISCONTINUED | OUTPATIENT
Start: 2017-11-26 | End: 2017-12-04 | Stop reason: HOSPADM

## 2017-11-26 RX ORDER — SIMETHICONE 80 MG
1 TABLET,CHEWABLE ORAL 3 TIMES DAILY PRN
Status: DISCONTINUED | OUTPATIENT
Start: 2017-11-26 | End: 2017-12-04 | Stop reason: HOSPADM

## 2017-11-26 RX ORDER — ACETAMINOPHEN 325 MG/1
650 TABLET ORAL EVERY 8 HOURS PRN
Status: DISCONTINUED | OUTPATIENT
Start: 2017-11-26 | End: 2017-12-04 | Stop reason: HOSPADM

## 2017-11-26 RX ORDER — SODIUM CHLORIDE 9 MG/ML
INJECTION, SOLUTION INTRAVENOUS CONTINUOUS
Status: DISCONTINUED | OUTPATIENT
Start: 2017-11-26 | End: 2017-11-30

## 2017-11-26 RX ORDER — ASPIRIN 81 MG/1
81 TABLET ORAL DAILY
Status: DISCONTINUED | OUTPATIENT
Start: 2017-11-26 | End: 2017-12-04 | Stop reason: HOSPADM

## 2017-11-26 RX ADMIN — SODIUM CHLORIDE 500 ML: 900 INJECTION, SOLUTION INTRAVENOUS at 11:11

## 2017-11-26 RX ADMIN — PANTOPRAZOLE SODIUM 40 MG: 40 TABLET, DELAYED RELEASE ORAL at 01:11

## 2017-11-26 RX ADMIN — INSULIN DETEMIR 20 UNITS: 100 INJECTION, SOLUTION SUBCUTANEOUS at 08:11

## 2017-11-26 RX ADMIN — SODIUM CHLORIDE: 0.9 INJECTION, SOLUTION INTRAVENOUS at 12:11

## 2017-11-26 RX ADMIN — ACETAMINOPHEN 650 MG: 325 TABLET, FILM COATED ORAL at 01:11

## 2017-11-26 RX ADMIN — SODIUM CHLORIDE: 0.9 INJECTION, SOLUTION INTRAVENOUS at 08:11

## 2017-11-26 RX ADMIN — SIMETHICONE CHEW TAB 80 MG 80 MG: 80 TABLET ORAL at 06:11

## 2017-11-26 RX ADMIN — PROMETHAZINE HYDROCHLORIDE 12.5 MG: 25 INJECTION, SOLUTION INTRAMUSCULAR; INTRAVENOUS at 11:11

## 2017-11-26 RX ADMIN — ASPIRIN 81 MG: 81 TABLET, COATED ORAL at 01:11

## 2017-11-26 RX ADMIN — CEFTRIAXONE SODIUM 1 G: 1 INJECTION, POWDER, FOR SOLUTION INTRAMUSCULAR; INTRAVENOUS at 11:11

## 2017-11-26 RX ADMIN — INSULIN ASPART 1 UNITS: 100 INJECTION, SOLUTION INTRAVENOUS; SUBCUTANEOUS at 08:11

## 2017-11-26 RX ADMIN — CARVEDILOL 12.5 MG: 12.5 TABLET, FILM COATED ORAL at 05:11

## 2017-11-26 NOTE — ED PROVIDER NOTES
[facility]                                     November 26, 2017                   Chief Complaint  88 y.o. male with Dysuria and Urinary Retention    History of Present Illness  Eddie MILENA Parada Sr. presents to the emergency room with dysuria and urinary retention  Patient states that he had trouble urinating this week at the nursing home, Bishop placed   Patient had an in and out catheterization at the nursing home, culture sent by PCP then  Pt then had subjective fever, family brought him into the ER for urinary tract evaluation  Bishop placed in the ER with puslike urine, subjective fevers last night for staff at the NH    The history is provided by the patient    Past Medical History   -- Abdominal fibromatosis    -- Atrial fibrillation    -- Bradycardia    -- CAD (coronary artery disease)    -- Cancer    -- CHF (congestive heart failure)    -- COPD (chronic obstructive pulmonary disease)    -- Diabetes mellitus type II    -- Hyperlipidemia    -- Melanoma    -- Obesity (BMI 30.0-34.9)    -- Osteoporosis    -- Peripheral vascular disease    -- Stroke    -- TIA (transient ischemic attack)    -- Type II or unspecified type diabetes mellits    -- Unspecified essential hypertension      Past Surgical History   -- CARDIAC PACEMAKER PLACEMENT     -- CARDIAC VALVE REPLACEMENT     -- CHOLECYSTECTOMY     -- EYE SURGERY     -- HERNIA REPAIR        Review of patient's allergies   -- Levofloxacin    -- Lyrica [pregabalin]    -- Unable to assess       Review of Systems and Physical Exam     Review of Systems  -- Constitution - no fever, denies fatigue, no weakness, no chills  -- Eyes - no tearing or redness, no visual disturbance  -- Ear, Nose - no tinnitus or earache, no nasal congestion or discharge  -- Mouth,Throat - no sore throat, no toothache, normal voice, normal swallowing  -- Respiratory - denies cough and congestion, no shortness of breath, no AMAYA  -- Cardiovascular - denies chest pain, no  palpitations, denies claudication  -- Gastrointestinal - denies abdominal pain, nausea, vomiting, or diarrhea  -- Genitourinary - dysuria and hesitancy, no denies flank pain, no hematuria   -- Musculoskeletal - denies back pain, negative for myalgias and arthralgias   -- Neurological - no headache, denies weakness or seizure; no LOC  -- Skin - denies pallor, rash, or changes in skin. no hives or welts noted    Vital Signs  -- His blood pressure is 101/44 and his pulse is 62.   -- His respiration is 16 and oxygen saturation is 98%.      Physical Exam  -- Nursing note and vitals reviewed  -- Constitutional: Appears well-developed and well-nourished  -- Head: Atraumatic. Normocephalic. No obvious abnormality  -- Eyes: Pupils are equal and reactive to light. Normal conjunctiva and lids  -- Cardiac: Normal rate, regular rhythm and normal heart sounds  -- Pulmonary: Normal respiratory effort, breath sounds clear to auscultation  -- Abdominal: Soft, no tenderness. Normal bowel sounds. Normal liver edge  -- Genitourinary: no flank pain on exam, no suprapubic pain by palpation   -- Musculoskeletal: Normal range of motion, no effusions. Joints stable   -- Neurological: No focal deficits. Showed good interaction with staff  -- Vascular: Posterior tibial, dorsalis pedis and radial pulses 2+ bilaterally      Emergency Room Course     Labs  --    -- K 4.6   --    -- CO2 23   -- BUN 57 (H)   -- CREATININE 1.4   --  (H)   -- ALKPHOS 128   -- AST 26   -- ALT 18   -- BILITOT 0.8   -- ALBUMIN 2.5 (L)   -- PROT 6.4   -- WBC 15.62 (H)   -- HGB 9.9 (L)   -- HCT 30.6 (L)   --    -- INR 3.9 (H)   -- LACTATE 0.9   -- TSH 3.307     Urinalysis  -- Urinalysis performed during this ER visit showed signs of infection      Additional Work up  -- The urine today has been sent for lab culture, results pending  -- Blood cultures have also been drawn, results are pending    Medications Given  -- cefTRIAXone (ROCEPHIN) 1 g in  dextrose 5 % 50 mL IVPB    -- sodium chloride 0.9% bolus 500 mL (0 mLs Intravenous Stopped 11/26/17 1150)     Diagnosis  -- The primary encounter diagnosis was Complicated UTI (urinary tract infection).   -- A diagnosis of Dehydration was also pertinent to this visit.    Disposition and Plan  -- Disposition: observation  -- Condition: stable  -- Telemetry monitoring  -- Morning labs  -- SCD hoses  -- Home medications  -- Nausea medication when necessary  -- Pain medication when necessary  -- Intravenous fluids  -- Routine monitoring  -- Bed rest until otherwise stated  -- Low salt diet  -- Protonix for GERD prophylaxis  -- EKG in the morning  -- IV antibiotics  -- Blood cultures pending  -- Urine culture pending  -- 1800 diet  -- Hold home diabetes medications  -- [facility] insulin sliding scale     This note is dictated on Dragon Natural Speaking word recognition program.  There are word recognition mistakes that are occasionally missed on review.           Isauro Alcocer MD  11/26/17 5796

## 2017-11-27 ENCOUNTER — TELEPHONE (OUTPATIENT)
Dept: INTERNAL MEDICINE | Facility: CLINIC | Age: 82
End: 2017-11-27

## 2017-11-27 PROBLEM — T45.511A COUMADIN TOXICITY: Status: ACTIVE | Noted: 2017-11-27

## 2017-11-27 PROBLEM — Z98.890 S/P ORIF (OPEN REDUCTION INTERNAL FIXATION) FRACTURE: Status: ACTIVE | Noted: 2017-11-27

## 2017-11-27 PROBLEM — N30.01 ACUTE CYSTITIS WITH HEMATURIA: Status: ACTIVE | Noted: 2017-11-26

## 2017-11-27 PROBLEM — Z87.81 S/P ORIF (OPEN REDUCTION INTERNAL FIXATION) FRACTURE: Status: ACTIVE | Noted: 2017-11-27

## 2017-11-27 LAB
ALBUMIN SERPL BCP-MCNC: 2.2 G/DL
ALP SERPL-CCNC: 111 U/L
ALT SERPL W/O P-5'-P-CCNC: 14 U/L
ANION GAP SERPL CALC-SCNC: 8 MMOL/L
AST SERPL-CCNC: 20 U/L
BASOPHILS # BLD AUTO: 0.02 K/UL
BASOPHILS NFR BLD: 0.2 %
BILIRUB SERPL-MCNC: 0.6 MG/DL
BUN SERPL-MCNC: 42 MG/DL
CALCIUM SERPL-MCNC: 8.4 MG/DL
CHLORIDE SERPL-SCNC: 108 MMOL/L
CO2 SERPL-SCNC: 23 MMOL/L
CREAT SERPL-MCNC: 1 MG/DL
DIFFERENTIAL METHOD: ABNORMAL
EOSINOPHIL # BLD AUTO: 0.1 K/UL
EOSINOPHIL NFR BLD: 0.5 %
ERYTHROCYTE [DISTWIDTH] IN BLOOD BY AUTOMATED COUNT: 13.8 %
EST. GFR  (AFRICAN AMERICAN): >60 ML/MIN/1.73 M^2
EST. GFR  (NON AFRICAN AMERICAN): >60 ML/MIN/1.73 M^2
GLUCOSE SERPL-MCNC: 84 MG/DL
HCT VFR BLD AUTO: 29.1 %
HGB BLD-MCNC: 9.4 G/DL
INR PPP: 3.3
LYMPHOCYTES # BLD AUTO: 0.9 K/UL
LYMPHOCYTES NFR BLD: 6.6 %
MCH RBC QN AUTO: 32.8 PG
MCHC RBC AUTO-ENTMCNC: 32.3 G/DL
MCV RBC AUTO: 101 FL
MONOCYTES # BLD AUTO: 1.3 K/UL
MONOCYTES NFR BLD: 9.9 %
NEUTROPHILS # BLD AUTO: 10.8 K/UL
NEUTROPHILS NFR BLD: 82.8 %
PLATELET # BLD AUTO: 295 K/UL
PMV BLD AUTO: 10.3 FL
POCT GLUCOSE: 126 MG/DL (ref 70–110)
POCT GLUCOSE: 134 MG/DL (ref 70–110)
POCT GLUCOSE: 168 MG/DL (ref 70–110)
POCT GLUCOSE: 81 MG/DL (ref 70–110)
POTASSIUM SERPL-SCNC: 3.4 MMOL/L
PROT SERPL-MCNC: 5.7 G/DL
PROTHROMBIN TIME: 35.1 SEC
RBC # BLD AUTO: 2.87 M/UL
SODIUM SERPL-SCNC: 139 MMOL/L
WBC # BLD AUTO: 13.01 K/UL

## 2017-11-27 PROCEDURE — 25000003 PHARM REV CODE 250: Performed by: INTERNAL MEDICINE

## 2017-11-27 PROCEDURE — G8988 SELF CARE GOAL STATUS: HCPCS | Mod: CK

## 2017-11-27 PROCEDURE — 97161 PT EVAL LOW COMPLEX 20 MIN: CPT

## 2017-11-27 PROCEDURE — 97165 OT EVAL LOW COMPLEX 30 MIN: CPT

## 2017-11-27 PROCEDURE — 85025 COMPLETE CBC W/AUTO DIFF WBC: CPT

## 2017-11-27 PROCEDURE — 99232 SBSQ HOSP IP/OBS MODERATE 35: CPT | Mod: ,,, | Performed by: INTERNAL MEDICINE

## 2017-11-27 PROCEDURE — 25000003 PHARM REV CODE 250: Performed by: SURGERY

## 2017-11-27 PROCEDURE — 96376 TX/PRO/DX INJ SAME DRUG ADON: CPT

## 2017-11-27 PROCEDURE — 94760 N-INVAS EAR/PLS OXIMETRY 1: CPT

## 2017-11-27 PROCEDURE — G0378 HOSPITAL OBSERVATION PER HR: HCPCS

## 2017-11-27 PROCEDURE — 94761 N-INVAS EAR/PLS OXIMETRY MLT: CPT

## 2017-11-27 PROCEDURE — 96361 HYDRATE IV INFUSION ADD-ON: CPT

## 2017-11-27 PROCEDURE — 11000001 HC ACUTE MED/SURG PRIVATE ROOM

## 2017-11-27 PROCEDURE — 63600175 PHARM REV CODE 636 W HCPCS: Performed by: SURGERY

## 2017-11-27 PROCEDURE — G8979 MOBILITY GOAL STATUS: HCPCS | Mod: CJ

## 2017-11-27 PROCEDURE — 80053 COMPREHEN METABOLIC PANEL: CPT

## 2017-11-27 PROCEDURE — 85610 PROTHROMBIN TIME: CPT

## 2017-11-27 PROCEDURE — 36415 COLL VENOUS BLD VENIPUNCTURE: CPT

## 2017-11-27 PROCEDURE — 82962 GLUCOSE BLOOD TEST: CPT | Mod: 91

## 2017-11-27 PROCEDURE — G8978 MOBILITY CURRENT STATUS: HCPCS | Mod: CK

## 2017-11-27 PROCEDURE — 97116 GAIT TRAINING THERAPY: CPT

## 2017-11-27 PROCEDURE — G8987 SELF CARE CURRENT STATUS: HCPCS | Mod: CL

## 2017-11-27 PROCEDURE — 51798 US URINE CAPACITY MEASURE: CPT

## 2017-11-27 RX ORDER — GLIMEPIRIDE 2 MG/1
2 TABLET ORAL
Status: DISCONTINUED | OUTPATIENT
Start: 2017-11-28 | End: 2017-12-04 | Stop reason: HOSPADM

## 2017-11-27 RX ORDER — PIOGLITAZONEHYDROCHLORIDE 15 MG/1
15 TABLET ORAL DAILY
Status: DISCONTINUED | OUTPATIENT
Start: 2017-11-27 | End: 2017-12-02

## 2017-11-27 RX ORDER — PRAVASTATIN SODIUM 20 MG/1
20 TABLET ORAL DAILY
Status: DISCONTINUED | OUTPATIENT
Start: 2017-11-28 | End: 2017-12-04 | Stop reason: HOSPADM

## 2017-11-27 RX ADMIN — LEVOTHYROXINE SODIUM 50 MCG: 50 TABLET ORAL at 08:11

## 2017-11-27 RX ADMIN — CEFTRIAXONE SODIUM 1 G: 1 INJECTION, POWDER, FOR SOLUTION INTRAMUSCULAR; INTRAVENOUS at 11:11

## 2017-11-27 RX ADMIN — PANTOPRAZOLE SODIUM 40 MG: 40 TABLET, DELAYED RELEASE ORAL at 08:11

## 2017-11-27 RX ADMIN — SODIUM CHLORIDE: 0.9 INJECTION, SOLUTION INTRAVENOUS at 11:11

## 2017-11-27 RX ADMIN — CARVEDILOL 12.5 MG: 12.5 TABLET, FILM COATED ORAL at 08:11

## 2017-11-27 RX ADMIN — ASPIRIN 81 MG: 81 TABLET, COATED ORAL at 08:11

## 2017-11-27 RX ADMIN — CARVEDILOL 12.5 MG: 12.5 TABLET, FILM COATED ORAL at 04:11

## 2017-11-27 RX ADMIN — PIOGLITAZONE 15 MG: 15 TABLET ORAL at 11:11

## 2017-11-27 NOTE — H&P
[facility] Medicine  History & Physical    Patient Name: Eddie Parada Sr.  MRN: [id number]  Admission Date: 11/26/2017  Attending Physician: Jorge Luis Mccarty MD   Primary Care Provider: Callum Roberts MD         Patient information was obtained from patient and ER records.     Subjective:     Principal Problem:Complicated UTI (urinary tract infection)    Chief Complaint:   Chief Complaint   Patient presents with    Dysuria    Urinary Retention        HPI: Eddie Parada Sr. presents to the emergency room with dysuria and urinary retention  Patient states that he had trouble urinating this week at the nursing home, Bishop placed   Patient had an in and out catheterization at the nursing home, culture sent by PCP then  Pt then had subjective fever, family brought him into the ER for urinary tract evaluation  Bishop placed in the ER with puslike urine, subjective fevers last night for staff at the NH  Patient has a history of a recent right hip fracture requiring ORIF.  He is still working with therapy.  Patient has a history of heart valve transplant.    Past Medical History:   Diagnosis Date    Abdominal fibromatosis     Atrial fibrillation     Bradycardia     CAD (coronary artery disease)     Cancer     basal cell on nose and melanoma on back    CHF (congestive heart failure)     COPD (chronic obstructive pulmonary disease)     Diabetes mellitus type II     Hyperlipidemia     Melanoma     Obesity (BMI 30.0-34.9) 9/13/2016    Osteoporosis     Peripheral vascular disease     Stroke     TIA (transient ischemic attack)     Type II or unspecified type diabetes mellitus without mention of complication, not stated as uncontrolled     Unspecified essential hypertension        Past Surgical History:   Procedure Laterality Date    CARDIAC PACEMAKER PLACEMENT  9/28/2012    CARDIAC VALVE REPLACEMENT  11/17/2011    aortic valve-tissue    CHOLECYSTECTOMY      EYE  SURGERY Bilateral     cataract [facility] lens [facility]    HERNIA REPAIR      abdominal       Review of patient's allergies indicates:   Allergen Reactions    Levofloxacin Swelling    Lyrica [pregabalin] Swelling    Unable to assess Other (See Comments)     Soft shell crabs       No current facility-administered medications on file prior to encounter.      Current Outpatient Prescriptions on File Prior to Encounter   Medication Sig    amlodipine (NORVASC) 10 MG tablet Take 1 tablet (10 mg total) by mouth once daily.    aspirin (ECOTRIN) 81 MG EC tablet Take 81 mg by mouth. 1 Tablet, Delayed Release (E.C.) Oral Every day    carvedilol (COREG) 12.5 MG tablet TAKE 1 TABLET (12.5 MG TOTAL) BY MOUTH 2 (TWO) TIMES DAILY WITH MEALS.    cloNIDine (CATAPRES) 0.1 MG tablet TAKE 1 TABLET BY MOUTH TWICE DAILY    FOLIC ACID/MULTIVIT-MIN/LUTEIN (CENTRUM SILVER ORAL) Take by mouth.    furosemide (LASIX) 40 MG tablet Twice a week    gemfibrozil (LOPID) 600 MG tablet TAKE 1 TABLET (600 MG TOTAL) BY MOUTH 2 (TWO) TIMES DAILY BEFORE MEALS.    glimepiride (AMARYL) 2 MG tablet TAKE 1 TABLET BY MOUTH EVERY MORNING WITH BREAKFAST    levothyroxine (SYNTHROID) 50 MCG tablet Take 1 tablet (50 mcg total) by mouth once daily.    lisinopril (PRINIVIL,ZESTRIL) 40 MG tablet TAKE 1 TABLET (40 MG TOTAL) BY MOUTH ONCE DAILY.    pioglitazone (ACTOS) 15 MG tablet ONE TABLET ONE TIME A DAY    pravastatin (PRAVACHOL) 20 MG tablet Take 1 tablet (20 mg total) by mouth once daily.    warfarin (COUMADIN) 1 MG tablet 2 OR 3 TABLETS DAILY AS DIRECTED (Patient taking differently: 2 mg Daily. )    [DISCONTINUED] lisinopril (PRINIVIL,ZESTRIL) 40 MG tablet TAKE 1 TABLET (40 MG TOTAL) BY MOUTH ONCE DAILY.    blood sugar diagnostic (ONETOUCH ULTRA TEST) Strp Inject 1 strip into the skin 2 (two) times daily.    ONETOUCH DELICA LANCETS 33 gauge Misc     ONETOUCH ULTRA2 kit     [DISCONTINUED] clindamycin (CLEOCIN) 300 MG capsule       Family History     Problem Relation (Age of Onset)    Alcohol abuse Father    COPD Sister, Brother    Cancer Brother    Diabetes Daughter    Heart disease Brother    Hypertension Father    No Known Problems Son, Daughter, Son    Stroke Father        Social History Main Topics    Smoking status: Former Smoker     Quit date: 9/20/1996    Smokeless tobacco: Never Used      Comment: cigar smoker    Alcohol use No      Comment: none since 2006    Drug use: No    Sexual activity: Not Currently     Review of Systems   Constitutional: Negative for activity change, chills, fatigue, fever and unexpected weight change.   HENT: Negative for sore throat and trouble swallowing.    Respiratory: Positive for shortness of breath. Negative for cough, chest tightness and wheezing.    Cardiovascular: Negative for chest pain and leg swelling.   Gastrointestinal: Negative for abdominal pain.   Endocrine: Negative for cold intolerance and heat intolerance.   Genitourinary: Positive for decreased urine volume, difficulty urinating and dysuria.   Musculoskeletal: Negative for back pain and joint swelling.   Skin: Negative for rash.   Neurological: Negative for numbness.   Hematological: Negative for adenopathy.   Psychiatric/Behavioral: Negative for decreased concentration.     Objective:     Vital Signs (Most Recent):  Temp: 96.2 °F (35.7 °C) (11/26/17 1908)  Pulse: 60 (11/26/17 1908)  Resp: 16 (11/26/17 1908)  BP: (!) 101/54 (11/26/17 1908)  SpO2: 95 % (11/26/17 1908) Vital Signs (24h Range):  Temp:  [96.2 °F (35.7 °C)-98 °F (36.7 °C)] 96.2 °F (35.7 °C)  Pulse:  [58-63] 60  Resp:  [16-18] 16  SpO2:  [95 %-98 %] 95 %  BP: (101-124)/(44-59) 101/54     Weight: 87.5 kg (192 lb 14.4 oz)  Body mass index is 31.14 kg/m².    Physical Exam   Constitutional: He is oriented to person, place, and time. He appears well-developed and well-nourished.   HENT:   Head: Normocephalic and atraumatic.   Right Ear: Tympanic membrane, external ear and  ear canal normal.   Left Ear: Tympanic membrane, external ear and ear canal normal.   Nose: Nose normal.   Mouth/Throat: Oropharynx is clear and moist.   Eyes: Conjunctivae and EOM are normal. Pupils are equal, round, and reactive to light.   Neck: Normal range of motion. Neck supple. No tracheal deviation present. No thyromegaly present.   Cardiovascular: Normal rate, regular rhythm, normal heart sounds and intact distal pulses.  Exam reveals no gallop.    No murmur heard.  Pulmonary/Chest: Effort normal and breath sounds normal.   Abdominal: Soft. Bowel sounds are normal. He exhibits no distension and no mass. There is no tenderness. There is no rebound and no guarding. Hernia confirmed negative in the right inguinal area and confirmed negative in the left inguinal area.   Genitourinary: Rectum normal.   Genitourinary Comments: Bishop in place   Musculoskeletal: Normal range of motion.   Neurological: He is alert and oriented to person, place, and time. He has normal reflexes.   Skin: Skin is warm and dry.   Psychiatric: He has a normal mood and affect. His behavior is normal. Judgment and thought content normal.        Significant Labs:   A1C:   Recent Labs  Lab 06/22/17  0828   HGBA1C 6.1*     CBC:   Recent Labs  Lab 11/25/17  0815 11/26/17  1009   WBC 15.51* 15.62*   HGB 9.4* 9.9*   HCT 28.9* 30.6*    314     CMP:   Recent Labs  Lab 11/26/17  1009      K 4.6      CO2 23   *   BUN 57*   CREATININE 1.4   CALCIUM 9.0   PROT 6.4   ALBUMIN 2.5*   BILITOT 0.8   ALKPHOS 128   AST 26   ALT 18   ANIONGAP 10   EGFRNONAA 45*     Lab Results   Component Value Date    INR 3.9 (H) 11/26/2017    INR 3.2 (H) 11/25/2017    INR 1.84 04/18/2017       Significant Imaging: I have reviewed all pertinent imaging results/findings within the past 24 hours.    Assessment/Plan:     * Complicated UTI (urinary tract infection)    Bishop catheter in place  Rocephin 1 g IV piggyback every 24 hours  Urine culture and  blood culture in process          Coumadin toxicity    Hold Coumadin until INR is back to therapeutic range then restart.          (HFpEF) heart failure with preserved ejection fraction    Continue carvedilol 12.5 mg by mouth twice a day  Continue aspirin 81 mg daily          COPD (chronic obstructive pulmonary disease)    No treatment necessary for now          Type 2 diabetes mellitus with neurologic complication    Levemir 20 units at night  Humalog 70 units before each meal  Monitor blood sugar before each meal and bedtime  Insulin sliding scale ordered          Cardiac pacemaker in situ    Aspirin 81 mg daily          CAD (coronary artery disease)    Aspirin 81 mg daily          Atrial fibrillation    Hold Coumadin for now.  Restart when INR is in normal range            VTE Risk Mitigation         Ordered     Medium Risk of VTE  Once      11/26/17 1234     Place sequential compression device  Until discontinued      11/26/17 1234             Jorge Luis Mccarty MD  Department of Hospital Medicine   [facility]

## 2017-11-27 NOTE — HPI
Eddie Parada Sr. presents to the emergency room with dysuria and urinary retention  Patient states that he had trouble urinating this week at the nursing home, Bishop placed   Patient had an in and out catheterization at the nursing home, culture sent by PCP then  Pt then had subjective fever, family brought him into the ER for urinary tract evaluation  Bishop placed in the ER with puslike urine, subjective fevers last night for staff at the NH  Patient has a history of a recent right hip fracture requiring ORIF.  He is still working with therapy.  Patient has a history of heart valve transplant.

## 2017-11-27 NOTE — PT/OT/SLP EVAL
"Physical Therapy  Evaluation    Eddie Parada Sr.   MRN: [id number]   Admitting Diagnosis: Complicated UTI (urinary tract infection)    PT Received On: 11/27/17  PT Start Time: 0935     PT Stop Time: 1000    PT Total Time (min): 25 min       Billable Minutes:  Evaluation 15 minutes and Gait [id number] minutes    Diagnosis: Complicated UTI (urinary tract infection)    Past Medical History:   Diagnosis Date    Abdominal fibromatosis     Atrial fibrillation     Bradycardia     CAD (coronary artery disease)     Cancer     basal cell on nose and melanoma on back    CHF (congestive heart failure)     COPD (chronic obstructive pulmonary disease)     Diabetes mellitus type II     Hyperlipidemia     Melanoma     Obesity (BMI 30.0-34.9) 9/13/2016    Osteoporosis     Peripheral vascular disease     Stroke     TIA (transient ischemic attack)     Type II or unspecified type diabetes mellitus without mention of complication, not stated as uncontrolled     Unspecified essential hypertension       Past Surgical History:   Procedure Laterality Date    CARDIAC PACEMAKER PLACEMENT  9/28/2012    CARDIAC VALVE REPLACEMENT  11/17/2011    aortic valve-tissue    CHOLECYSTECTOMY      EYE SURGERY Bilateral     cataract with lens by  at [facility]    HERNIA REPAIR      abdominal       Referring physician: Dr. JOLEEN Ann  Date referred to PT: 11/27/2017    General Precautions: Standard, fall  Orthopedic Precautions: RLE weight bearing as tolerated     Patient History:  Lives With: spouse  Living Arrangements: house  Equipment Currently Used at Home: none  DME owned (not currently used): none    Previous Level of Function:  Ambulation Skills: independent  Transfer Skills: independent  ADL Skills: independent  Work/Leisure Activity: independent    Subjective:  Communicated with patient and spouse prior to session.    Chief Complaint: "I need to walk better"  Patient goals: "To get stronger"    Pain/Comfort  Pain " Rating 1: 0/10      Objective:   Patient found with: peripheral IV, bed alarm     Cognitive Exam:  Oriented to: Person, Place, Time and Situation    Follows Commands/attention: Follows multistep  commands  Communication: clear/fluent  Safety awareness/insight to disability: intact    Physical Exam:  Skin integrity: Visible skin intact  Edema: None noted     Sensation:   Intact    Upper Extremity Range of Motion:  Right Upper Extremity: WFL  Left Upper Extremity: WFL    Upper Extremity Strength:  Right Upper Extremity: WFL  Left Upper Extremity: WFL    Lower Extremity Range of Motion:  Right Lower Extremity: WFL except right hip following ORIF  Left Lower Extremity: WFL    Lower Extremity Strength:  Right Lower Extremity: WFL except RLE grossly 3+/5  Left Lower Extremity: WFL     Fine motor coordination:  Intact    Gross motor coordination: WFL    Functional Mobility:  Bed Mobility:  Rolling/Turning to Left: Stand by assistance  Rolling/Turning Right: Stand by assistance  Scooting/Bridging: Stand by Assistance  Supine to Sit: Stand by Assistance  Sit to Supine: Stand by Assistance    Transfers:  Sit <> Stand Assistance: Contact Guard Assistance  Sit <> Stand Assistive Device: Rolling Walker  Bed <> Chair Technique: Stand Pivot  Bed <> Chair Assistance: Contact Guard Assistance  Bed <> Chair Assistive Device: Rolling Walker    Gait:   Gait Distance: 15 feet x 2 trials with rest break in between  Assistance 1: Contact Guard Assistance  Gait Assistive Device: Rolling walker  Gait Pattern: swing-to gait  Gait Deviation(s): decreased velocity of limb motion, decreased step length, decreased toe-to-floor clearance, decreased weight-shifting ability    Balance:   Static Sit: GOOD-: Takes MODERATE challenges from all directions but inconsistently  Dynamic Sit: GOOD-: Maintains balance through MODERATE excursions of active trunk movement,     Static Stand: FAIR: Maintains without assist but unable to take challenges  Dynamic  stand: POOR: N/A    AM-PAC 6 CLICK MOBILITY  How much help from another person does this patient currently need?   1 = Unable, Total/Dependent Assistance  2 = A lot, Maximum/Moderate Assistance  3 = A little, Minimum/Contact Guard/Supervision  4 = None, Modified [place]/Independent    Turning over in bed (including adjusting bedclothes, sheets and blankets)?: 3  Sitting down on and standing up from a chair with arms (e.g., wheelchair, bedside commode, etc.): 3  Moving from lying on back to sitting on the side of the bed?: 3  Moving to and from a bed to a chair (including a wheelchair)?: 3  Need to walk in hospital room?: 2  Climbing 3-5 steps with a railing?: 1  Total Score: 15     AM-PAC Raw Score CMS G-Code Modifier Level of Impairment Assistance   6 % Total / Unable   7 - 9 CM 80 - 100% Maximal Assist   10 - 14 CL 60 - 80% Moderate Assist   15 - 19 CK 40 - 60% Moderate Assist   20 - 22 CJ 20 - 40% Minimal Assist   23 CI 1-20% SBA / CGA   24 CH 0% Independent/ Mod I     Patient left up in chair with all lines intact, call button in reach, NSG notified and spouse present.    Assessment:       History  Co-morbidities and personal factors that may impact the plan of care Examination  Body Structures and Functions, activity limitations and participation restrictions that may impact the plan of care Clinical Presentation   Decision Making/ Complexity Score   Co-morbidities:   [] Time since onset of injury / illness / exacerbation  [x] Status of current condition  []Patient's cognitive status and safety concerns    [] Multiple Medical Problems (see med hx)  Personal Factors:   [x] Patient's age  [] Prior Level of function   [] Patient's home situation (environment and family support)  [] Patient's level of motivation  [] Expected progression of patient      HISTORY:(criteria)    [] 99856 - no personal factors/history    [x] 68349 - has 1-2 personal factor/comorbidity     [] 74354 - has >3 personal  factor/comorbidity     Body Regions:  [] Objective examination findings  [] Head     []  Neck  [] Trunk   [] Upper Extremity  [x] Lower Extremity    Body Systems:  [] For communication ability, affect, cognition, language, and learning style: the assessment of the ability to make needs known, consciousness, orientation (person, place, and time), expected emotional /behavioral responses, and learning preferences (eg, learning barriers, education  needs)  [] For the neuromuscular system: a general assessment of gross coordinated movement (eg, balance, gait, locomotion, transfers, and transitions) and motor function  (motor control and motor learning)  [x] For the musculoskeletal system: the assessment of gross symmetry, gross range of motion, gross strength, height, and weight  [] For the integumentary system: the assessment of pliability(texture), presence of scar formation, skin color, and skin integrity  [] For cardiovascular/pulmonary system: the assessment of heart rate, respiratory rate, blood pressure, and edema     Activity limitations:    [] Patient's cognitive status and saf ety concerns          [x] Status of current condition      [] Weight bearing restriction  [] Cardiopulmunary Restriction    Participation Restrictions:   [] Goals and goal agreement with the patient     [] Rehab potential (prognosis) and probable outcome      Examination of Body System: (criteria)    [x] 03070 - addressing 1-2 elements    [] 86148 - addressing a total of 3 or more elements     [] 42798 -  Addressing a total of 4 or more elements         Clinical Presentation: (criteria)  Stable - 45180     On examination of body system using standardized tests and measures patient presents with 1-2 elements from any of the following: body structures and functions, activity limitations, and/or participation restrictions.  Leading to a clinical presentation that is considered stable and/or  uncomplicated                              Clinical Decision Making  (Eval Complexity):  Low- 78970       Eddie Parada Sr. is a 88 y.o. male with a medical diagnosis of Complicated UTI (urinary tract infection) and presents with fall x 2 weeks ago with hip fx and resulting ORIF of RLE. Pt currently in NH for skilled rehab. Pt with decline from independent PLOF. Pt requires increased assistance with all gross mobility skills. Pt with increased fall risk as well.    Rehab identified problem list/impairments: Rehab identified problem list/impairments: weakness, impaired self care skills, impaired functional mobilty, gait instability, impaired balance, impaired endurance, decreased lower extremity function    Rehab potential is good.    Activity tolerance: Good    Discharge recommendations: Discharge Facility/Level Of Care Needs: nursing facility, skilled     Barriers to discharge: Barriers to Discharge: None    Equipment recommendations: Equipment Needed After Discharge: none     GOALS:    Physical Therapy Goals        Problem: Physical Therapy Goal    Goal Priority Disciplines Outcome Goal Variances Interventions   Physical Therapy Goal     PT/OT, PT      Description:  Goals to be met by: upon D/C from facility     Patient will increase functional independence with mobility by performin. Supine to sit with [place]  2. Sit to supine with [place]  3. Sit to stand transfer with Standby Assistance with Rolling Walker  4. Bed to chair transfer with Supervision using Rolling Walker  5. Gait  x 50 feet with Contact Guard Assistance using Rolling Walker.                       PLAN:    Patient to be seen  (1-2x/day Monday-Friday; PRN Weekends/Holidays) to address the above listed problems via gait training, therapeutic activities, therapeutic exercises  Plan of Care expires:  (upon D/C from facility)  Plan of Care reviewed with: patient, spouse          Cindy Florentino, PT  2017

## 2017-11-27 NOTE — ASSESSMENT & PLAN NOTE
Continue carvedilol 12.5 mg by mouth twice a day  Continue aspirin 81 mg daily  No signs of acute exacerbation here

## 2017-11-27 NOTE — PLAN OF CARE
Problem: Patient Care Overview  Goal: Plan of Care Review  Outcome: Ongoing (interventions implemented as appropriate)  Patient remains stable. Fall precautions maintained. Patient remains free from falls/injuries. IV fluids and ABX administered. Patient working with PT and OT. Catheter removed. Dr Ann will remove staples this evening. Plan of care reviewed with patient and spouse; both agree with plan of care.

## 2017-11-27 NOTE — PT/OT/SLP EVAL
Occupational Therapy  Evaluation    Eddie Parada Sr.   MRN: [id number]   Admitting Diagnosis: Complicated UTI (urinary tract infection)    OT Date of Treatment: 11/27/17   OT Start Time: 1330  OT Stop Time: 1400  OT Total Time (min): 30 min    Billable Minutes:  Evaluation 30 min    Diagnosis: Complicated UTI (urinary tract infection) and previous right hip ORIF to which he was undergoing rehab at [facility] when he developed a UTI    Past Medical History:   Diagnosis Date    Abdominal fibromatosis     Atrial fibrillation     Bradycardia     CAD (coronary artery disease)     Cancer     basal cell on nose and melanoma on back    CHF (congestive heart failure)     COPD (chronic obstructive pulmonary disease)     Diabetes mellitus type II     Hyperlipidemia     Melanoma     Obesity (BMI 30.0-34.9) 9/13/2016    Osteoporosis     Peripheral vascular disease     Stroke     TIA (transient ischemic attack)     Type II or unspecified type diabetes mellitus without mention of complication, not stated as uncontrolled     Unspecified essential hypertension       Past Surgical History:   Procedure Laterality Date    CARDIAC PACEMAKER PLACEMENT  9/28/2012    CARDIAC VALVE REPLACEMENT  11/17/2011    aortic valve-tissue    CHOLECYSTECTOMY      EYE SURGERY Bilateral     cataract [facility] lens by  at [facility]    HERNIA REPAIR      abdominal       Referring physician: Georgia Ann MD   Date referred to OT: 11/27/2017    General Precautions: Standard  Orthopedic Precautions: none noted    Do you have any cultural, spiritual, Mu-ism conflicts, given your current situation?:  (none verbalized)     Patient History:  Living Environment  Lives With: spouse    Prior level of function:   Bed Mobility/Transfers:  (Patient reported was independent with all ADL's prior to fractruing right hip and having an ORIF, has been working with therapy in the nursing home)    Subjective:  Communicated with nursing,  "patient, and patient's spouse prior to session.  Nursing reported she just took his catheter out but he is alright to participate in therapy now.    Chief Complaint: "I was hurting, but I am feeling much better now" per pt  Patient/Family stated goals: "To be able to work to get home." per pt    Pain/Comfort  Pain Rating 1: 0/10    Objective:  Patient found with: peripheral IV, bed alarm    Cognitive Exam:  Oriented to: Person, Place, Time and Situation  Follows Commands/attention: Follows two-step commands  Patient hard of hearing    Physical Exam:  Postural examination/scapula alignment: WFL    Sensation:   Intact bilateral UE    Upper Extremity Range of Motion:  Right Upper Extremity: WFL  Left Upper Extremity: WFL    Upper Extremity Strength:  Right Upper Extremity: WFL  Left Upper Extremity: WFL   Strength: good    Functional Mobility:  Bed Mobility:  Sit to Supine: Minimum Assistance, With side rail    Transfers:  Side stepping to head of bed: CGA/min A with rolling walker    Activities of Daily Living:  UE Dressing Level of Assistance:  (simulated due to IV with patient performing with no difficulties)  LE Dressing Level of Assistance:  (simulated- needing much assistance- pt to have clothes tomorrow)  Grooming Level of Assistance:  (set up assistance)    AM-PAC 6 CLICK ADL  How much help from another person does this patient currently need?  1 = Unable, Total/Dependent Assistance  2 = A lot, Maximum/Moderate Assistance  3 = A little, Minimum/Contact Guard/Supervision  4 = None, Modified Williams/Independent    Putting on and taking off regular lower body clothing? : 1  Bathing (including washing, rinsing, drying)?: 1  Toileting, which includes using toilet, bedpan, or urinal? : 1  Putting on and taking off regular upper body clothing?: 3  Taking care of personal grooming such as brushing teeth?: 3  Eating meals?: 1 (not assessed)  Total Score: 10    AM-PAC Raw Score CMS "G-Code Modifier Level of " Impairment Assistance   6 % Total / Unable   7 - 9 CM 80 - 100% Maximal Assist   10-14 CL 60 - 80% Moderate Assist   15 - 19 CK 40 - 60% Moderate Assist   20 - 22 CJ 20 - 40% Minimal Assist   23 CI 1-20% SBA / CGA   24 CH 0% Independent/ Mod I       Patient left supine with all lines intact, call button in reach and bed alarm on    Assessment:  Eddie Parada Sr. is a 88 y.o. male with a medical diagnosis of Complicated UTI (urinary tract infection) and presents with decreased ability to complete ADL's independently.    Rehab identified problem list/impairments: Rehab identified problem list/impairments: impaired self care skills, impaired functional mobilty    Rehab potential is excellent.    Activity tolerance: Excellent    Discharge recommendations: Discharge Facility/Level Of Care Needs:  (continued therapy with skilled in nursing home)     Equipment recommendations: bedside commode (possible tub bench)     GOALS:    Occupational Therapy Goals        Problem: Occupational Therapy Goal    Goal Priority Disciplines Outcome Interventions   Occupational Therapy Goal     OT, PT/OT     Description:  Goals to be met by: discharge from facility     Patient will increase functional independence with ADLs by performing:    LE Dressing with Minimal Assistance and adaptive devices as needed  Toileting to be assessed  Toilet transfer to be assessed                      PLAN:  Patient to be seen  (3 to 5 x week) to address the above listed problems via self-care/home management, therapeutic activities, therapeutic exercises  Plan of Care expires:  (upon discharge from facility)  Plan of Care reviewed with: patient, spouse    Criteria for OT evaluation code:     Medical Background and History:    Brief History - 29422  Occupational Performance and Deficits:  identifies 1 - 3 performance deficits - 65417  Clinical Decision Making (Complexity):  limited # of tx options - 43303     Evaluation Code (Complexity):  Low -  92037 -       After brief  review of medical and/or therapy records relating to the presenting problem and on examination of body system using standardized tests and measures patient presents with 1 - 3 elements from any of the following: body structures and functions, activity limitations, and/or participation restrictions.  Leading to a clinical consideration of a  limited number of treatment options that may lead to minimal to moderate modification of tasks or assistance necessary to enable completion of evaluation component.      SHWETA Lopez  11/27/2017

## 2017-11-27 NOTE — ASSESSMENT & PLAN NOTE
Levemir 20 units at night  Humalog 70 units before each meal  Monitor blood sugar before each meal and bedtime  Insulin sliding scale ordered

## 2017-11-27 NOTE — ASSESSMENT & PLAN NOTE
Aspirin 81 mg daily  Coreg  Pravastatin    Was on lisinopril prior to admit. BP normal off lisinopril. Have not restarted

## 2017-11-27 NOTE — PLAN OF CARE
11/27/17 1411   Discharge Assessment   Assessment Type Discharge Planning Assessment   Confirmed/corrected address and phone number on facesheet? Yes   Assessment information obtained from? Patient;Caregiver;Medical Record   Expected Length of Stay (days) 2   Communicated expected length of stay with patient/caregiver yes   Prior to hospitilization cognitive status: Alert/Oriented   Prior to hospitalization functional status: Independent   Current cognitive status: Alert/Oriented   Current Functional Status: Independent   Lives With spouse   Able to Return to Prior Arrangements unable to determine at this time (comments)   Is patient able to care for self after discharge? Unable to determine at this time (comments)   Who are your caregiver(s) and their phone number(s)? Jessica Parada [phone]   Patient's perception of discharge disposition home or selfcare   Readmission Within The Last 30 Days no previous admission in last 30 days   Patient currently being followed by outpatient case management? No   Patient currently receives any other outside agency services? No   Equipment Currently Used at Home none   Do you have any problems affording any of your prescribed medications? No   Is the patient taking medications as prescribed? yes   Does the patient have transportation home? Yes   Transportation Available family or friend will provide   Does the patient receive services at the [facility]mad[facility] Clinic? No   Discharge Plan A Return to nursing home   Discharge Plan B Return to Nursing Home   Patient/Family In Agreement With Plan yes     The pt is a 88 year old male who was admitted with dehydration. The pt is SNF resident at the [facility]. Pt recently broke and had surgery two weeks ago. The pt has not gotten out of bed and was recently d/c to the nursing home. The pt does not have a living will but is interested in an advanced directive. The pt has no other SW needs noted at this time. SW will continue to follow and  offer support as needed.    Harlan Chaidez LMSW

## 2017-11-27 NOTE — PROGRESS NOTES
[facility] Hospital Medicine  Progress Note    Patient Name: Eddie Parada Sr.  MRN: [id number]  Patient Class: OP- Observation   Admission Date: 11/26/2017  Length of Stay: 0 days  Attending Physician: Jorge Luis Mccarty MD  Primary Care Provider: Callum Roberts MD        Subjective:     Principal Problem:Acute cystitis with hematuria    HPI:  Eddie Parada Sr. presents to the emergency room with dysuria and urinary retention  Patient states that he had trouble urinating this week at the nursing home, Shields placed   Patient had an in and out catheterization at the nursing home, culture sent by PCP then  Pt then had subjective fever, family brought him into the ER for urinary tract evaluation  Shields placed in the ER with puslike urine, subjective fevers last night for staff at the NH  Patient has a history of a recent right hip fracture requiring ORIF.  He is still working with therapy.  Patient has a history of heart valve transplant.    Hospital Course:  Patient admitted to the third floor on telemetry.  He received his first dose of Rocephin.  He has no complaints this evening.    11/27--feeling better. No fevers or leukocytosis. Still have shields in placed, placed due to urinary retention on admit. Remains on rocephin    Interval History: no acute events. Shields still in place. Feeling better    Review of Systems   Constitutional: Negative for activity change, fatigue, fever and unexpected weight change.   HENT: Negative for congestion, ear pain, hearing loss, rhinorrhea and sore throat.    Eyes: Negative for pain, redness and visual disturbance.   Respiratory: Negative for cough, shortness of breath and wheezing.    Cardiovascular: Negative for chest pain, palpitations and leg swelling.   Gastrointestinal: Positive for blood in stool. Negative for abdominal pain, constipation, diarrhea, nausea and vomiting.   Genitourinary: Negative for decreased urine volume, dysuria, frequency and  urgency.   Musculoskeletal: Positive for arthralgias (right hip pain s/p surgery). Negative for back pain, joint swelling and neck pain.   Skin: Negative for color change, rash and wound.   Neurological: Negative for dizziness, tremors, weakness, light-headedness and headaches.     Objective:     Vital Signs (Most Recent):  Temp: 96.4 °F (35.8 °C) (11/27/17 1512)  Pulse: 60 (11/27/17 1512)  Resp: 20 (11/27/17 0817)  BP: (!) 113/56 (11/27/17 1512)  SpO2: 96 % (11/27/17 1512) Vital Signs (24h Range):  Temp:  [96.2 °F (35.7 °C)-97.7 °F (36.5 °C)] 96.4 °F (35.8 °C)  Pulse:  [59-66] 60  Resp:  [16-20] 20  SpO2:  [94 %-97 %] 96 %  BP: (101-131)/(54-77) 113/56     Weight: 87.5 kg (192 lb 14.4 oz)  Body mass index is 31.14 kg/m².    Intake/Output Summary (Last 24 hours) at 11/27/17 1649  Last data filed at 11/27/17 1320   Gross per 24 hour   Intake          2214.17 ml   Output              775 ml   Net          1439.17 ml      Physical Exam   Constitutional: He is oriented to person, place, and time. He appears well-developed and well-nourished. No distress.   HENT:   Head: Normocephalic and atraumatic.   Right Ear: External ear normal.   Left Ear: External ear normal.   Eyes: Conjunctivae and EOM are normal. Pupils are equal, round, and reactive to light. Right eye exhibits no discharge. Left eye exhibits no discharge.   Neck: Neck supple. No tracheal deviation present.   Cardiovascular: Normal rate and regular rhythm.    No murmur heard.  Pulmonary/Chest: Effort normal and breath sounds normal. No respiratory distress. He has no wheezes.   Abdominal: Soft. Bowel sounds are normal. He exhibits no distension. There is no tenderness. There is no rebound and no guarding.   Genitourinary:   Genitourinary Comments: Bishop in place   Neurological: He is alert and oriented to person, place, and time. No cranial nerve deficit.   Skin: Skin is warm and dry.   Stapes in place to right hip surgical wounds, healing well. No signs of  infection     Psychiatric: He has a normal mood and affect. His behavior is normal.   Nursing note and vitals reviewed.      Significant Labs:   CBC:   Recent Labs  Lab 11/26/17  1009 11/27/17  0604   WBC 15.62* 13.01*   HGB 9.9* 9.4*   HCT 30.6* 29.1*    295     CMP:   Recent Labs  Lab 11/26/17  1009 11/27/17  0604    139   K 4.6 3.4*    108   CO2 23 23   * 84   BUN 57* 42*   CREATININE 1.4 1.0   CALCIUM 9.0 8.4*   PROT 6.4 5.7*   ALBUMIN 2.5* 2.2*   BILITOT 0.8 0.6   ALKPHOS 128 111   AST 26 20   ALT 18 14   ANIONGAP 10 8   EGFRNONAA 45* >60     All pertinent labs within the past 24 hours have been reviewed.    Significant Imaging: I have reviewed all pertinent imaging results/findings within the past 24 hours.    Assessment/Plan:      * Acute cystitis with hematuria    Bishop catheter in place due to urinary retention on admit. D/c Bishop 11/27 for voiding trial. This is what is keeping him inpatient today  Rocephin 1 g IV piggyback every 24 hours  Urine culture and blood culture in process--urine shows presumptive E. coli          S/P ORIF (open reduction internal fixation) fracture    On right  Surgery 2 weeks ago  Was rehabing at [facility], should return at discharge  PT/OR ordered here today    Staples removed 11/27          Coumadin toxicity    Hold Coumadin until INR is back to therapeutic range then restart.          (HFpEF) heart failure with preserved ejection fraction    Continue carvedilol 12.5 mg by mouth twice a day  Continue aspirin 81 mg daily  No signs of acute exacerbation here          COPD (chronic obstructive pulmonary disease)    No signs of acute exacerbation  Was not on maintenance inhalers prior to admit        Type 2 diabetes mellitus with neurologic complication    Restarted home amaryl and actos 11/27  amaryl  Insulin sliding scale ordered          Cardiac pacemaker in situ    Aspirin 81 mg daily          CAD (coronary artery disease)    Aspirin 81 mg  daily  Coreg  Pravastatin    Was on lisinopril prior to admit. BP normal off lisinopril. Have not restarted            Atrial fibrillation    Hold Coumadin for now.  Restart when INR is in normal range  Still > 3  No active bleeding            VTE Risk Mitigation         Ordered     Medium Risk of VTE  Once      11/26/17 1234     Place sequential compression device  Until discontinued      11/26/17 1234              Georgia Ann MD  Department of Hospital Medicine   [facility]

## 2017-11-27 NOTE — SUBJECTIVE & OBJECTIVE
Interval History: no acute events. Bishop still in place. Feeling better    Review of Systems   Constitutional: Negative for activity change, fatigue, fever and unexpected weight change.   HENT: Negative for congestion, ear pain, hearing loss, rhinorrhea and sore throat.    Eyes: Negative for pain, redness and visual disturbance.   Respiratory: Negative for cough, shortness of breath and wheezing.    Cardiovascular: Negative for chest pain, palpitations and leg swelling.   Gastrointestinal: Positive for blood in stool. Negative for abdominal pain, constipation, diarrhea, nausea and vomiting.   Genitourinary: Negative for decreased urine volume, dysuria, frequency and urgency.   Musculoskeletal: Positive for arthralgias (right hip pain s/p surgery). Negative for back pain, joint swelling and neck pain.   Skin: Negative for color change, rash and wound.   Neurological: Negative for dizziness, tremors, weakness, light-headedness and headaches.     Objective:     Vital Signs (Most Recent):  Temp: 96.4 °F (35.8 °C) (11/27/17 1512)  Pulse: 60 (11/27/17 1512)  Resp: 20 (11/27/17 0817)  BP: (!) 113/56 (11/27/17 1512)  SpO2: 96 % (11/27/17 1512) Vital Signs (24h Range):  Temp:  [96.2 °F (35.7 °C)-97.7 °F (36.5 °C)] 96.4 °F (35.8 °C)  Pulse:  [59-66] 60  Resp:  [16-20] 20  SpO2:  [94 %-97 %] 96 %  BP: (101-131)/(54-77) 113/56     Weight: 87.5 kg (192 lb 14.4 oz)  Body mass index is 31.14 kg/m².    Intake/Output Summary (Last 24 hours) at 11/27/17 1649  Last data filed at 11/27/17 1320   Gross per 24 hour   Intake          2214.17 ml   Output              775 ml   Net          1439.17 ml      Physical Exam   Constitutional: He is oriented to person, place, and time. He appears well-developed and well-nourished. No distress.   HENT:   Head: Normocephalic and atraumatic.   Right Ear: External ear normal.   Left Ear: External ear normal.   Eyes: Conjunctivae and EOM are normal. Pupils are equal, round, and reactive to light. Right  eye exhibits no discharge. Left eye exhibits no discharge.   Neck: Neck supple. No tracheal deviation present.   Cardiovascular: Normal rate and regular rhythm.    No murmur heard.  Pulmonary/Chest: Effort normal and breath sounds normal. No respiratory distress. He has no wheezes.   Abdominal: Soft. Bowel sounds are normal. He exhibits no distension. There is no tenderness. There is no rebound and no guarding.   Genitourinary:   Genitourinary Comments: Bishop in place   Neurological: He is alert and oriented to person, place, and time. No cranial nerve deficit.   Skin: Skin is warm and dry.   Stapes in place to right hip surgical wounds, healing well. No signs of infection     Psychiatric: He has a normal mood and affect. His behavior is normal.   Nursing note and vitals reviewed.      Significant Labs:   CBC:   Recent Labs  Lab 11/26/17  1009 11/27/17  0604   WBC 15.62* 13.01*   HGB 9.9* 9.4*   HCT 30.6* 29.1*    295     CMP:   Recent Labs  Lab 11/26/17  1009 11/27/17  0604    139   K 4.6 3.4*    108   CO2 23 23   * 84   BUN 57* 42*   CREATININE 1.4 1.0   CALCIUM 9.0 8.4*   PROT 6.4 5.7*   ALBUMIN 2.5* 2.2*   BILITOT 0.8 0.6   ALKPHOS 128 111   AST 26 20   ALT 18 14   ANIONGAP 10 8   EGFRNONAA 45* >60     All pertinent labs within the past 24 hours have been reviewed.    Significant Imaging: I have reviewed all pertinent imaging results/findings within the past 24 hours.

## 2017-11-27 NOTE — HOSPITAL COURSE
Patient admitted to the third floor on telemetry.  He received his first dose of Rocephin.  He has no complaints this evening.    11/27--feeling better. No fevers or leukocytosis. Still have shields in placed, placed due to urinary retention on admit. Remains on rocephin    11/28/17  Plan was to hopefully send him to NH   He ran 3 beat V tach   Asymptomatic   K borderline normal   Added KCL and magnesium  CIS consult done   Keep on telemetry ;  Hopefully home in am   Had to be place don foleys catheter ; unable to urinate   Will need to see urology outpatient       11/29/17  No fever   Urine culture pending   3 beat run of vtac o/b. Lytes and mg ok.  Coumadin resumed last night after being supratherapeutic upon admit      11/30  This morning, he has no complaints.  Shields still in place.  No burning.  His wife is most concerned about his bowels. He has had some incontinence, however, he says that he can feel his BMs now. He has a 'large BM' this morning reported.    Pt has now progressive SOB and is showing signs of fluid overload with leg edema nad groin scrotal edema with subjective SOB and  PND from appears to be CHF.    Breathing is improved this am, but is very constipated this am: SS enema orderd    12/4/17: doing great. Constipation resolved s/p disimpaction. No SOB, LE edema. Still with some groin edema. He is ambulating with PT. + bladder spasms from shields. Failed voiding trial. Today is day 7 of Cefepime for UTI. No fevers or leukocytosis.

## 2017-11-27 NOTE — NURSING
Report received. Patient resting in bed. Vitals stable.  Wife at bedside. Bed locked and low. Call bell within reach.

## 2017-11-27 NOTE — ASSESSMENT & PLAN NOTE
On right  Surgery 2 weeks ago  Was rehabing at [place], should return at discharge  PT/OR ordered here today    Staples removed 11/27

## 2017-11-27 NOTE — PLAN OF CARE
11/27/17 1100   STOLL Message   Medicare Outpatient and Observation Notification regarding financial responsibility Given to patient/caregiver;Explained to patient/caregiver;Signed/date by patient/caregiver   Date STOLL was signed 11/20/17   Time STOLL was signed 1100

## 2017-11-27 NOTE — SUBJECTIVE & OBJECTIVE
Past Medical History:   Diagnosis Date    Abdominal fibromatosis     Atrial fibrillation     Bradycardia     CAD (coronary artery disease)     Cancer     basal cell on nose and melanoma on back    CHF (congestive heart failure)     COPD (chronic obstructive pulmonary disease)     Diabetes mellitus type II     Hyperlipidemia     Melanoma     Obesity (BMI 30.0-34.9) 9/13/2016    Osteoporosis     Peripheral vascular disease     Stroke     TIA (transient ischemic attack)     Type II or unspecified type diabetes mellitus without mention of complication, not stated as uncontrolled     Unspecified essential hypertension        Past Surgical History:   Procedure Laterality Date    CARDIAC PACEMAKER PLACEMENT  9/28/2012    CARDIAC VALVE REPLACEMENT  11/17/2011    aortic valve-tissue    CHOLECYSTECTOMY      EYE SURGERY Bilateral     cataract [facility] lens by  at [facility]    HERNIA REPAIR      abdominal       Review of patient's allergies indicates:   Allergen Reactions    Levofloxacin Swelling    Lyrica [pregabalin] Swelling    Unable to assess Other (See Comments)     Soft shell crabs       No current facility-administered medications on file prior to encounter.      Current Outpatient Prescriptions on File Prior to Encounter   Medication Sig    amlodipine (NORVASC) 10 MG tablet Take 1 tablet (10 mg total) by mouth once daily.    aspirin (ECOTRIN) 81 MG EC tablet Take 81 mg by mouth. 1 Tablet, Delayed Release (E.C.) Oral Every day    carvedilol (COREG) 12.5 MG tablet TAKE 1 TABLET (12.5 MG TOTAL) BY MOUTH 2 (TWO) TIMES DAILY WITH MEALS.    cloNIDine (CATAPRES) 0.1 MG tablet TAKE 1 TABLET BY MOUTH TWICE DAILY    FOLIC ACID/MULTIVIT-MIN/LUTEIN (CENTRUM SILVER ORAL) Take by mouth.    furosemide (LASIX) 40 MG tablet Twice a week    gemfibrozil (LOPID) 600 MG tablet TAKE 1 TABLET (600 MG TOTAL) BY MOUTH 2 (TWO) TIMES DAILY BEFORE MEALS.    glimepiride (AMARYL) 2 MG tablet TAKE 1 TABLET BY  MOUTH EVERY MORNING WITH BREAKFAST    levothyroxine (SYNTHROID) 50 MCG tablet Take 1 tablet (50 mcg total) by mouth once daily.    lisinopril (PRINIVIL,ZESTRIL) 40 MG tablet TAKE 1 TABLET (40 MG TOTAL) BY MOUTH ONCE DAILY.    pioglitazone (ACTOS) 15 MG tablet ONE TABLET ONE TIME A DAY    pravastatin (PRAVACHOL) 20 MG tablet Take 1 tablet (20 mg total) by mouth once daily.    warfarin (COUMADIN) 1 MG tablet 2 OR 3 TABLETS DAILY AS DIRECTED (Patient taking differently: 2 mg Daily. )    [DISCONTINUED] lisinopril (PRINIVIL,ZESTRIL) 40 MG tablet TAKE 1 TABLET (40 MG TOTAL) BY MOUTH ONCE DAILY.    blood sugar diagnostic (ONETOUCH ULTRA TEST) Strp Inject 1 strip into the skin 2 (two) times daily.    ONETOUCH DELICA LANCETS 33 gauge Misc     ONETOUCH ULTRA2 kit     [DISCONTINUED] clindamycin (CLEOCIN) 300 MG capsule      Family History     Problem Relation (Age of Onset)    Alcohol abuse Father    COPD Sister, Brother    Cancer Brother    Diabetes Daughter    Heart disease Brother    Hypertension Father    No Known Problems Son, Daughter, Son    Stroke Father        Social History Main Topics    Smoking status: Former Smoker     Quit date: 9/20/1996    Smokeless tobacco: Never Used      Comment: cigar smoker    Alcohol use No      Comment: none since 2006    Drug use: No    Sexual activity: Not Currently     Review of Systems   Constitutional: Negative for activity change, chills, fatigue, fever and unexpected weight change.   HENT: Negative for sore throat and trouble swallowing.    Respiratory: Positive for shortness of breath. Negative for cough, chest tightness and wheezing.    Cardiovascular: Negative for chest pain and leg swelling.   Gastrointestinal: Negative for abdominal pain.   Endocrine: Negative for cold intolerance and heat intolerance.   Genitourinary: Positive for decreased urine volume, difficulty urinating and dysuria.   Musculoskeletal: Negative for back pain and joint swelling.   Skin:  Negative for rash.   Neurological: Negative for numbness.   Hematological: Negative for adenopathy.   Psychiatric/Behavioral: Negative for decreased concentration.     Objective:     Vital Signs (Most Recent):  Temp: 96.2 °F (35.7 °C) (11/26/17 1908)  Pulse: 60 (11/26/17 1908)  Resp: 16 (11/26/17 1908)  BP: (!) 101/54 (11/26/17 1908)  SpO2: 95 % (11/26/17 1908) Vital Signs (24h Range):  Temp:  [96.2 °F (35.7 °C)-98 °F (36.7 °C)] 96.2 °F (35.7 °C)  Pulse:  [58-63] 60  Resp:  [16-18] 16  SpO2:  [95 %-98 %] 95 %  BP: (101-124)/(44-59) 101/54     Weight: 87.5 kg (192 lb 14.4 oz)  Body mass index is 31.14 kg/m².    Physical Exam   Constitutional: He is oriented to person, place, and time. He appears well-developed and well-nourished.   HENT:   Head: Normocephalic and atraumatic.   Right Ear: Tympanic membrane, external ear and ear canal normal.   Left Ear: Tympanic membrane, external ear and ear canal normal.   Nose: Nose normal.   Mouth/Throat: Oropharynx is clear and moist.   Eyes: Conjunctivae and EOM are normal. Pupils are equal, round, and reactive to light.   Neck: Normal range of motion. Neck supple. No tracheal deviation present. No thyromegaly present.   Cardiovascular: Normal rate, regular rhythm, normal heart sounds and intact distal pulses.  Exam reveals no gallop.    No murmur heard.  Pulmonary/Chest: Effort normal and breath sounds normal.   Abdominal: Soft. Bowel sounds are normal. He exhibits no distension and no mass. There is no tenderness. There is no rebound and no guarding. Hernia confirmed negative in the right inguinal area and confirmed negative in the left inguinal area.   Genitourinary: Rectum normal.   Genitourinary Comments: Bishop in place   Musculoskeletal: Normal range of motion.   Neurological: He is alert and oriented to person, place, and time. He has normal reflexes.   Skin: Skin is warm and dry.   Psychiatric: He has a normal mood and affect. His behavior is normal. Judgment and thought  content normal.        Significant Labs:   A1C:   Recent Labs  Lab 06/22/17  0828   HGBA1C 6.1*     CBC:   Recent Labs  Lab 11/25/17  0815 11/26/17  1009   WBC 15.51* 15.62*   HGB 9.4* 9.9*   HCT 28.9* 30.6*    314     CMP:   Recent Labs  Lab 11/26/17  1009      K 4.6      CO2 23   *   BUN 57*   CREATININE 1.4   CALCIUM 9.0   PROT 6.4   ALBUMIN 2.5*   BILITOT 0.8   ALKPHOS 128   AST 26   ALT 18   ANIONGAP 10   EGFRNONAA 45*     Lab Results   Component Value Date    INR 3.9 (H) 11/26/2017    INR 3.2 (H) 11/25/2017    INR 1.84 04/18/2017       Significant Imaging: I have reviewed all pertinent imaging results/findings within the past 24 hours.

## 2017-11-27 NOTE — ASSESSMENT & PLAN NOTE
Bishop catheter in place due to urinary retention on admit. D/c Bishop 11/27 for voiding trial. This is what is keeping him inpatient today  Rocephin 1 g IV piggyback every 24 hours  Urine culture and blood culture in process--urine shows presumptive E. coli

## 2017-11-28 PROBLEM — I47.20 V-TACH: Status: ACTIVE | Noted: 2017-11-28

## 2017-11-28 LAB
ANION GAP SERPL CALC-SCNC: 8 MMOL/L
BACTERIA UR CULT: NORMAL
BASOPHILS # BLD AUTO: 0.03 K/UL
BASOPHILS NFR BLD: 0.3 %
BUN SERPL-MCNC: 29 MG/DL
CALCIUM SERPL-MCNC: 8.6 MG/DL
CHLORIDE SERPL-SCNC: 110 MMOL/L
CO2 SERPL-SCNC: 21 MMOL/L
CREAT SERPL-MCNC: 0.9 MG/DL
DIFFERENTIAL METHOD: ABNORMAL
EOSINOPHIL # BLD AUTO: 0.1 K/UL
EOSINOPHIL NFR BLD: 0.9 %
ERYTHROCYTE [DISTWIDTH] IN BLOOD BY AUTOMATED COUNT: 14.1 %
EST. GFR  (AFRICAN AMERICAN): >60 ML/MIN/1.73 M^2
EST. GFR  (NON AFRICAN AMERICAN): >60 ML/MIN/1.73 M^2
GLUCOSE SERPL-MCNC: 130 MG/DL
HCT VFR BLD AUTO: 29.1 %
HGB BLD-MCNC: 9.3 G/DL
INR PPP: 1.9
LYMPHOCYTES # BLD AUTO: 1.1 K/UL
LYMPHOCYTES NFR BLD: 11.5 %
MCH RBC QN AUTO: 32.9 PG
MCHC RBC AUTO-ENTMCNC: 32 G/DL
MCV RBC AUTO: 103 FL
MONOCYTES # BLD AUTO: 1 K/UL
MONOCYTES NFR BLD: 10.4 %
NEUTROPHILS # BLD AUTO: 7.1 K/UL
NEUTROPHILS NFR BLD: 76.9 %
PLATELET # BLD AUTO: 308 K/UL
PMV BLD AUTO: 9.8 FL
POCT GLUCOSE: 119 MG/DL (ref 70–110)
POCT GLUCOSE: 183 MG/DL (ref 70–110)
POCT GLUCOSE: 192 MG/DL (ref 70–110)
POCT GLUCOSE: 247 MG/DL (ref 70–110)
POTASSIUM SERPL-SCNC: 3.5 MMOL/L
PROTHROMBIN TIME: 19.9 SEC
RBC # BLD AUTO: 2.83 M/UL
SODIUM SERPL-SCNC: 139 MMOL/L
WBC # BLD AUTO: 9.2 K/UL

## 2017-11-28 PROCEDURE — 25000003 PHARM REV CODE 250: Performed by: INTERNAL MEDICINE

## 2017-11-28 PROCEDURE — 85610 PROTHROMBIN TIME: CPT

## 2017-11-28 PROCEDURE — 36415 COLL VENOUS BLD VENIPUNCTURE: CPT

## 2017-11-28 PROCEDURE — 25000003 PHARM REV CODE 250: Performed by: NURSE PRACTITIONER

## 2017-11-28 PROCEDURE — 82962 GLUCOSE BLOOD TEST: CPT

## 2017-11-28 PROCEDURE — 96376 TX/PRO/DX INJ SAME DRUG ADON: CPT

## 2017-11-28 PROCEDURE — 85025 COMPLETE CBC W/AUTO DIFF WBC: CPT

## 2017-11-28 PROCEDURE — 80048 BASIC METABOLIC PNL TOTAL CA: CPT

## 2017-11-28 PROCEDURE — 25000003 PHARM REV CODE 250: Performed by: SURGERY

## 2017-11-28 PROCEDURE — G0378 HOSPITAL OBSERVATION PER HR: HCPCS

## 2017-11-28 PROCEDURE — 99233 SBSQ HOSP IP/OBS HIGH 50: CPT | Mod: ,,, | Performed by: INTERNAL MEDICINE

## 2017-11-28 PROCEDURE — 63600175 PHARM REV CODE 636 W HCPCS: Performed by: INTERNAL MEDICINE

## 2017-11-28 PROCEDURE — 97530 THERAPEUTIC ACTIVITIES: CPT

## 2017-11-28 PROCEDURE — 97116 GAIT TRAINING THERAPY: CPT

## 2017-11-28 PROCEDURE — 11000001 HC ACUTE MED/SURG PRIVATE ROOM

## 2017-11-28 PROCEDURE — 96367 TX/PROPH/DG ADDL SEQ IV INF: CPT

## 2017-11-28 PROCEDURE — 96361 HYDRATE IV INFUSION ADD-ON: CPT

## 2017-11-28 PROCEDURE — 94761 N-INVAS EAR/PLS OXIMETRY MLT: CPT

## 2017-11-28 PROCEDURE — 94760 N-INVAS EAR/PLS OXIMETRY 1: CPT

## 2017-11-28 PROCEDURE — 63600175 PHARM REV CODE 636 W HCPCS: Performed by: SURGERY

## 2017-11-28 PROCEDURE — 96372 THER/PROPH/DIAG INJ SC/IM: CPT

## 2017-11-28 PROCEDURE — 97802 MEDICAL NUTRITION INDIV IN: CPT

## 2017-11-28 RX ORDER — POLYETHYLENE GLYCOL 3350 17 G/17G
17 POWDER, FOR SOLUTION ORAL DAILY PRN
Status: DISCONTINUED | OUTPATIENT
Start: 2017-11-28 | End: 2017-12-04 | Stop reason: HOSPADM

## 2017-11-28 RX ORDER — LANOLIN ALCOHOL/MO/W.PET/CERES
400 CREAM (GRAM) TOPICAL 2 TIMES DAILY
Status: DISCONTINUED | OUTPATIENT
Start: 2017-11-28 | End: 2017-12-04 | Stop reason: HOSPADM

## 2017-11-28 RX ORDER — DOCUSATE SODIUM 100 MG/1
100 CAPSULE, LIQUID FILLED ORAL DAILY
Status: DISCONTINUED | OUTPATIENT
Start: 2017-11-28 | End: 2017-11-28

## 2017-11-28 RX ORDER — POLYETHYLENE GLYCOL 3350 17 G/17G
17 POWDER, FOR SOLUTION ORAL DAILY
Status: DISCONTINUED | OUTPATIENT
Start: 2017-11-28 | End: 2017-11-28

## 2017-11-28 RX ORDER — LISINOPRIL 10 MG/1
10 TABLET ORAL DAILY
Status: DISCONTINUED | OUTPATIENT
Start: 2017-11-28 | End: 2017-12-04 | Stop reason: HOSPADM

## 2017-11-28 RX ORDER — WARFARIN 2 MG/1
2 TABLET ORAL DAILY
Status: DISCONTINUED | OUTPATIENT
Start: 2017-11-28 | End: 2017-12-04 | Stop reason: HOSPADM

## 2017-11-28 RX ORDER — POTASSIUM CHLORIDE 20 MEQ/1
40 TABLET, EXTENDED RELEASE ORAL 2 TIMES DAILY
Status: DISCONTINUED | OUTPATIENT
Start: 2017-11-28 | End: 2017-11-29

## 2017-11-28 RX ORDER — DOCUSATE SODIUM 100 MG/1
100 CAPSULE, LIQUID FILLED ORAL DAILY PRN
Status: DISCONTINUED | OUTPATIENT
Start: 2017-11-28 | End: 2017-12-04 | Stop reason: HOSPADM

## 2017-11-28 RX ADMIN — Medication 400 MG: at 08:11

## 2017-11-28 RX ADMIN — SODIUM CHLORIDE: 0.9 INJECTION, SOLUTION INTRAVENOUS at 07:11

## 2017-11-28 RX ADMIN — CEFEPIME 500 MG: 1 INJECTION, POWDER, FOR SOLUTION INTRAMUSCULAR; INTRAVENOUS at 01:11

## 2017-11-28 RX ADMIN — CARVEDILOL 12.5 MG: 12.5 TABLET, FILM COATED ORAL at 04:11

## 2017-11-28 RX ADMIN — LEVOTHYROXINE SODIUM 50 MCG: 50 TABLET ORAL at 08:11

## 2017-11-28 RX ADMIN — POTASSIUM CHLORIDE 40 MEQ: 1500 TABLET, EXTENDED RELEASE ORAL at 10:11

## 2017-11-28 RX ADMIN — INSULIN ASPART 2 UNITS: 100 INJECTION, SOLUTION INTRAVENOUS; SUBCUTANEOUS at 05:11

## 2017-11-28 RX ADMIN — PANTOPRAZOLE SODIUM 40 MG: 40 TABLET, DELAYED RELEASE ORAL at 08:11

## 2017-11-28 RX ADMIN — CARVEDILOL 12.5 MG: 12.5 TABLET, FILM COATED ORAL at 08:11

## 2017-11-28 RX ADMIN — PRAVASTATIN SODIUM 20 MG: 20 TABLET ORAL at 08:11

## 2017-11-28 RX ADMIN — CEFTRIAXONE SODIUM 1 G: 1 INJECTION, POWDER, FOR SOLUTION INTRAMUSCULAR; INTRAVENOUS at 10:11

## 2017-11-28 RX ADMIN — SODIUM CHLORIDE: 0.9 INJECTION, SOLUTION INTRAVENOUS at 06:11

## 2017-11-28 RX ADMIN — Medication 400 MG: at 10:11

## 2017-11-28 RX ADMIN — POTASSIUM CHLORIDE 40 MEQ: 1500 TABLET, EXTENDED RELEASE ORAL at 08:11

## 2017-11-28 RX ADMIN — LISINOPRIL 10 MG: 10 TABLET ORAL at 10:11

## 2017-11-28 RX ADMIN — PIOGLITAZONE 15 MG: 15 TABLET ORAL at 08:11

## 2017-11-28 RX ADMIN — ASPIRIN 81 MG: 81 TABLET, COATED ORAL at 08:11

## 2017-11-28 RX ADMIN — WARFARIN SODIUM 2 MG: 2 TABLET ORAL at 04:11

## 2017-11-28 RX ADMIN — GLIMEPIRIDE 2 MG: 2 TABLET ORAL at 08:11

## 2017-11-28 NOTE — PLAN OF CARE
Problem: Patient Care Overview  Goal: Plan of Care Review  Goals:   1. Cont. 1800 kcal diabetic diet   2. Increase PO intake to >75% to meet EEN/EPN   3. Cont. Boost Plus between meals to promote healing  4. Increase understanding of Diabetic diet and Food/Drug interaction   Nutrition Goal Status: new  Communication of RD Recs: reviewed with RN  1. Continue 1800 kcal Diabetic Diet     2. Increase PO intake to >75%   3. Encourage adhereance to Diabetic diet   4. Increase Understanding of diabetic diet and food and drug interactions w/warfarin

## 2017-11-28 NOTE — PLAN OF CARE
Problem: Diabetes, Type 2 (Adult)  Goal: Signs and Symptoms of Listed Potential Problems Will be Absent, Minimized or Managed (Diabetes, Type 2)  Signs and symptoms of listed potential problems will be absent, minimized or managed by discharge/transition of care (reference Diabetes, Type 2 (Adult) CPG).   Outcome: Ongoing (interventions implemented as appropriate)  Accuchecks continue ac&hs. No supplemental insulin given this shift.    Problem: Fall Risk (Adult)  Goal: Absence of Falls  Patient will demonstrate the desired outcomes by discharge/transition of care.   Outcome: Ongoing (interventions implemented as appropriate)  Fall precautions maintained. Bed alarm engaged at all times.     Problem: Patient Care Overview  Goal: Plan of Care Review  Outcome: Ongoing (interventions implemented as appropriate)  Plan of care reviewed with patient and he agrees with the plan of care. IV fluids continue. Telemetry monitoring continues. No acute distress noted.     Problem: Urinary Tract Infection (Adult)  Goal: Signs and Symptoms of Listed Potential Problems Will be Absent, Minimized or Managed (Urinary Tract Infection)  Signs and symptoms of listed potential problems will be absent, minimized or managed by discharge/transition of care (reference Urinary Tract Infection (Adult) CPG).   Outcome: Outcome(s) achieved Date Met: 11/28/17  Afebrile. IV antibiotics continue without any adverse reactions. Bishop catheter patency maintained. IV fluids continue.

## 2017-11-28 NOTE — PROGRESS NOTES
[facility]  Adult Nutrition  Consult Note    SUMMARY     Recommendations  Recommendation/Intervention:   1. Cont. 1800kcal diabetic diet.   2. Encourage PO intake to >75%   3. Provide Boost Plus between meals   4. Increase knowledge of diabetic diet and food/drug interation     Goals:   1. Increase PO intake to >75% to meet EEN/EPN   2. Provide Boost Plus between meals to promote healing  3. Increase understanding of Diabetic diet and Food/Drug interaction   Nutrition Goal Status: new  Communication of RD Recs: reviewed with RN    Discharge planning  1. Continue 1800 kcal Diabetic Diet     2. Increase PO intake to >75%   3. Encourage adhereance to Diabetic diet   4. Increase Understanding of diabetic diet and food and drug interactions w/warfarin   Reason for Assessment    Reason for Assessment: physician consult  Diagnosis: infection/sepsis (Complicated UTI)  Relevent Medical History: HTN, CAD, T2DM, COPD, CHF, HLD, Stroke, Cancer, SOB, Complicated UTI     General Information Comments: PO intake is poor at 50%, pt had recent ORIF for hip and admitted for complicated UTI, as per nurse patient is eating well   I spoke with pt about food/drug interaction in regard to Warfarin and provided diabetic diet education. Pt stated he understood nutrition information      Nutrition Prescription Ordered    Current Diet Order: 1800kcal Diabetic Diet     Evaluation of Received Nutrients/Fluid Intake  % Intake of Estimated Energy Needs: 50 - 75 %  % Meal Intake: 50%     Nutrition Risk Screen     Nutrition Risk Screen: no indicators present    Nutrition/Diet History    Food Preferences: No cultural/relgious pref at this time       Labs/Tests/Procedures/Meds    Diagnostic Test/Procedure Review: reviewed, pertinent (ORIF)  Pertinent Labs Reviewed: reviewed, pertinent  Pertinent Labs Comments: 29 BUN, Glucose 130, Ca 8.6, Coumadin 1.9, POCT glucose 119, RBC 2.83, Hemoglobin 9.3, A1C 6.1  Pertinent Medications  "Reviewed: reviewed, pertinent  Pertinent Medications Comments: Warfarin, Insulin aspart, insulin Detemir magnesium       Anthropometrics    Temp: 97.8 °F (36.6 °C)     Height: 5' 6" (167.6 cm)  Weight Method: Bed Scale  Weight: 87.5 kg (192 lb 14.4 oz)  Ideal Body Weight (IBW), Male: 142 lb     % Ideal Body Weight, Male (lb): 135.85 lb     BMI (Calculated): 31.2  BMI Grade: 30 - 34.9- obesity - grade I     Estimated/Assessed Needs    Weight Used For Calorie Calculations: 87.5 kg (192 lb 14.4 oz)   Height (cm): 167.6 cm  Energy Calorie Requirements (kcal): 2081 (AF 1.4)  Energy Need Method: [place]-St Jeor  RMR ([place]-St. Jeor Equation): 1487.75  Weight Used For Protein Calculations: 87.5 kg (192 lb 14.4 oz)  Protein Requirements:  (1.1-1.3g)  Fluid Requirements (mL): 2081  Fluid Need Method: RDA Method  RDA Method (mL): 2081      Assessment and Plan  Nutrition Problem  Increased protein/calorie needs    Related to (etiology):   Wound healing and UTI     Signs and Symptoms (as evidenced by):   RDA recommendations    Interventions/Recommendations (treatment strategy):  See RD recs above    Nutrition Diagnosis Status:   New      Nutrition Problem  Inadequate Oral Intake     Related to (etiology):   Inability to consume adequate nutrition     Signs and Symptoms (as evidenced by):   PO intake of 50%    Interventions/Recommendations (treatment strategy):  See RD recs above    Nutrition Diagnosis Status:   New        Monitor and Evaluation    Food and Nutrient Intake: energy intake, food and beverage intake        Physical Activity and Function: nutrition-related ADLs and IADLs  Anthropometric Measurements: weight, weight change, body mass index  Biochemical Data, Medical Tests and Procedures: electrolyte and renal panel, gastrointestinal profile, glucose/endocrine profile, inflammatory profile, lipid profile  Nutrition-Focused Physical Findings: overall appearance    Nutrition Risk    Level of Risk: " moderate    Nutrition Follow-Up    RD Follow-up?: Yes

## 2017-11-28 NOTE — PROGRESS NOTES
[facility] Hospital Medicine  Progress Note    Patient Name: Eddie Parada Sr.  MRN: [id number]  Patient Class: OP- Observation   Admission Date: 11/26/2017  Length of Stay: 0 days  Attending Physician: Jorge Luis Mccarty MD  Primary Care Provider: Callum Roberts MD        Subjective:     Principal Problem:Acute cystitis with hematuria    HPI:  Eddie Parada Sr. presents to the emergency room with dysuria and urinary retention  Patient states that he had trouble urinating this week at the nursing home, Shields placed   Patient had an in and out catheterization at the nursing home, culture sent by PCP then  Pt then had subjective fever, family brought him into the ER for urinary tract evaluation  Shields placed in the ER with puslike urine, subjective fevers last night for staff at the NH  Patient has a history of a recent right hip fracture requiring ORIF.  He is still working with therapy.  Patient has a history of heart valve transplant.    Hospital Course:  Patient admitted to the third floor on telemetry.  He received his first dose of Rocephin.  He has no complaints this evening.    11/27--feeling better. No fevers or leukocytosis. Still have shields in placed, placed due to urinary retention on admit. Remains on rocephin    11/28/17  Plan was to hopefully send him to NH   He ran 3 beat V tach   Asymptomatic   K borderline normal   Added KCL and magnesium  CIS consult done   Keep on telemetry ;  Hopefully home in am   Had to be place don foleys catheter ; unable to urinate   Will need to see urology outpatient       Interval History: see above    Review of Systems   Constitutional: Negative for activity change, fatigue, fever and unexpected weight change.   HENT: Negative for congestion, ear pain, hearing loss, rhinorrhea and sore throat.    Eyes: Negative for pain, redness and visual disturbance.   Respiratory: Negative for cough, shortness of breath and wheezing.    Cardiovascular:  Negative for chest pain, palpitations and leg swelling.   Gastrointestinal: Positive for blood in stool. Negative for abdominal pain, constipation, diarrhea, nausea and vomiting.   Genitourinary: Negative for decreased urine volume, dysuria, frequency and urgency.   Musculoskeletal: Positive for arthralgias (right hip pain s/p surgery). Negative for back pain, joint swelling and neck pain.   Skin: Negative for color change, rash and wound.   Neurological: Negative for dizziness, tremors, weakness, light-headedness and headaches.     Objective:     Vital Signs (Most Recent):  Temp: 97.8 °F (36.6 °C) (11/28/17 0653)  Pulse: (!) 59 (11/28/17 0809)  Resp: 20 (11/28/17 0653)  BP: 129/63 (11/28/17 0653)  SpO2: 96 % (11/28/17 0716) Vital Signs (24h Range):  Temp:  [96.4 °F (35.8 °C)-97.8 °F (36.6 °C)] 97.8 °F (36.6 °C)  Pulse:  [59-74] 59  Resp:  [20] 20  SpO2:  [93 %-96 %] 96 %  BP: (105-152)/(56-77) 129/63     Weight: 87.5 kg (192 lb 14.4 oz)  Body mass index is 31.14 kg/m².    Intake/Output Summary (Last 24 hours) at 11/28/17 1011  Last data filed at 11/28/17 0800   Gross per 24 hour   Intake             1980 ml   Output             1375 ml   Net              605 ml      Physical Exam   Constitutional: He is oriented to person, place, and time. He appears well-developed and well-nourished. No distress.   HENT:   Head: Normocephalic and atraumatic.   Right Ear: External ear normal.   Left Ear: External ear normal.   Eyes: Conjunctivae and EOM are normal. Pupils are equal, round, and reactive to light. Right eye exhibits no discharge. Left eye exhibits no discharge.   Neck: Neck supple. No tracheal deviation present.   Cardiovascular: Normal rate and regular rhythm.    No murmur heard.  Pulmonary/Chest: Effort normal and breath sounds normal. No respiratory distress. He has no wheezes.   Abdominal: Soft. Bowel sounds are normal. He exhibits no distension. There is no tenderness. There is no rebound and no guarding.    Genitourinary:   Genitourinary Comments: Bishop in place   Neurological: He is alert and oriented to person, place, and time. No cranial nerve deficit.   Skin: Skin is warm and dry.   Stapes in place to right hip surgical wounds, healing well. No signs of infection     Psychiatric: He has a normal mood and affect. His behavior is normal.   Nursing note and vitals reviewed.      Significant Labs:   CBC:   Recent Labs  Lab 11/27/17  0604 11/28/17  0503   WBC 13.01* 9.20   HGB 9.4* 9.3*   HCT 29.1* 29.1*    308     CMP:   Recent Labs  Lab 11/27/17  0604 11/28/17  0503    139   K 3.4* 3.5    110   CO2 23 21*   GLU 84 130*   BUN 42* 29*   CREATININE 1.0 0.9   CALCIUM 8.4* 8.6*   PROT 5.7*  --    ALBUMIN 2.2*  --    BILITOT 0.6  --    ALKPHOS 111  --    AST 20  --    ALT 14  --    ANIONGAP 8 8   EGFRNONAA >60 >60     Magnesium: No results for input(s): MG in the last 48 hours.        Assessment/Plan:      * Acute cystitis with hematuria    Bishop catheter in place due to urinary retention on admit. D/c Bishop 11/27 for voiding trial. This is what is keeping him inpatient today  Rocephin 1 g IV piggyback every 24 hours  Urine culture and blood culture in process--urine shows presumptive E. coli          S/P ORIF (open reduction internal fixation) fracture    On right  Surgery 2 weeks ago  Was rehabing at [facility], should return at discharge  PT/OR ordered here today    Staples removed 11/27          Coumadin toxicity    Held Coumadin until   INR is in range  Resumed coumadin today 11/28/17        (HFpEF) heart failure with preserved ejection fraction    Continue carvedilol 12.5 mg by mouth twice a day  Continue aspirin 81 mg daily  No signs of acute exacerbation here          COPD (chronic obstructive pulmonary disease)    No signs of acute exacerbation  Was not on maintenance inhalers prior to admit        Type 2 diabetes mellitus with neurologic complication    Restarted home amaryl and actos  11/27  amaryl  Insulin sliding scale ordered          Cardiac pacemaker in situ    Aspirin 81 mg daily  Continue telemetry; coreg  Resume coumadin          CAD (coronary artery disease)    Aspirin 81 mg daily  Coreg  Pravastatin    Was on lisinopril prior to admit. BP normal off lisinopril. Have not restarted            Atrial fibrillation    Resume Coumadin for now.  INR 1.9           VTE Risk Mitigation         Ordered     warfarin (COUMADIN) tablet 2 mg  Daily     Route:  Oral        11/28/17 0850     Medium Risk of VTE  Once      11/26/17 1234     Place sequential compression device  Until discontinued      11/26/17 1234              Callum Roberts MD  Department of Hospital Medicine   [facility]

## 2017-11-28 NOTE — NURSING
Report received. Patient resting in bed. Bed locked and low. Call bell within reach. Patient instructed to call with needs/concerns.

## 2017-11-28 NOTE — CONSULTS
[facility]  Cardiology  Consult Note    Patient Name: Eddie Parada Sr.  MRN: [id number]  Admission Date: 11/26/2017  Hospital Length of Stay: 0 days  Code Status: Full Code   Attending Provider: Jorge Luis Mccarty MD   Consulting Provider: Ebony Johnston NP  Primary Care Physician: Callum Roberts MD  Principal Problem:Acute cystitis with hematuria    Patient information was obtained from patient, past medical records and ER records.     Consults  Subjective:     Chief Complaint:  fever     HPI: 87 yo [facility] resident recently admitted to [facility] for hip fx, underwent ORIF. Hx CAD/bio-AVR/AF on coumadin, D-CHF.  Admitted with UTI.  He was preparing for DC today when he was noted to have three beat ventricular brandon.  He was asymptomatic.   Last EF 60% in 7/16  Last nuc 5/17 normal, patent old stents LAD/Cfx by [facility] 2008  K+ 3.5 today, mag 2.2  INR 3.9 on arrival now 1.9    Past Medical History:   Diagnosis Date    Abdominal fibromatosis     Atrial fibrillation     Bradycardia     CAD (coronary artery disease)     Cancer     basal cell on nose and melanoma on back    CHF (congestive heart failure)     COPD (chronic obstructive pulmonary disease)     Diabetes mellitus type II     Hyperlipidemia     Melanoma     Obesity (BMI 30.0-34.9) 9/13/2016    Osteoporosis     Peripheral vascular disease     Stroke     TIA (transient ischemic attack)     Type II or unspecified type diabetes mellitus without mention of complication, not stated as uncontrolled     Unspecified essential hypertension        Past Surgical History:   Procedure Laterality Date    CARDIAC PACEMAKER PLACEMENT  9/28/2012    CARDIAC VALVE REPLACEMENT  11/17/2011    aortic valve-tissue    CHOLECYSTECTOMY      EYE SURGERY Bilateral     cataract [facility] lens [facility]    HERNIA REPAIR      abdominal       Review of patient's allergies indicates:   Allergen Reactions    Levofloxacin Swelling     Lyrica [pregabalin] Swelling    Unable to assess Other (See Comments)     Soft shell crabs       No current facility-administered medications on file prior to encounter.      Current Outpatient Prescriptions on File Prior to Encounter   Medication Sig    amlodipine (NORVASC) 10 MG tablet Take 1 tablet (10 mg total) by mouth once daily.    carvedilol (COREG) 12.5 MG tablet TAKE 1 TABLET (12.5 MG TOTAL) BY MOUTH 2 (TWO) TIMES DAILY WITH MEALS.    cloNIDine (CATAPRES) 0.1 MG tablet TAKE 1 TABLET BY MOUTH TWICE DAILY    FOLIC ACID/MULTIVIT-MIN/LUTEIN (CENTRUM SILVER ORAL) Take by mouth.    furosemide (LASIX) 40 MG tablet Twice a week    gemfibrozil (LOPID) 600 MG tablet TAKE 1 TABLET (600 MG TOTAL) BY MOUTH 2 (TWO) TIMES DAILY BEFORE MEALS.    glimepiride (AMARYL) 2 MG tablet TAKE 1 TABLET BY MOUTH EVERY MORNING WITH BREAKFAST    levothyroxine (SYNTHROID) 50 MCG tablet Take 1 tablet (50 mcg total) by mouth once daily.    lisinopril (PRINIVIL,ZESTRIL) 40 MG tablet TAKE 1 TABLET (40 MG TOTAL) BY MOUTH ONCE DAILY.    pioglitazone (ACTOS) 15 MG tablet ONE TABLET ONE TIME A DAY    pravastatin (PRAVACHOL) 20 MG tablet Take 1 tablet (20 mg total) by mouth once daily.    warfarin (COUMADIN) 1 MG tablet 2 OR 3 TABLETS DAILY AS DIRECTED (Patient taking differently: 2 mg Daily. )    ONETOUCH DELICA LANCETS 33 gauge Misc     ONETOUCH ULTRA2 kit      Current Facility-Administered Medications   Medication    0.9%  NaCl infusion    acetaminophen tablet 650 mg    aspirin EC tablet 81 mg    carvedilol tablet 12.5 mg    cefTRIAXone (ROCEPHIN) 1 g in dextrose 5 % 50 mL IVPB    dextrose 50% injection 12.5 g    dextrose 50% injection 25 g    glimepiride tablet 2 mg    glucagon (human recombinant) injection 1 mg    glucose chewable tablet 16 g    glucose chewable tablet 24 g    influenza (FLUZONE HIGH-DOSE) vaccine 0.5 mL    insulin aspart pen 0-5 Units    levothyroxine tablet 50 mcg    magnesium oxide  tablet 400 mg    ondansetron injection 4 mg    pantoprazole EC tablet 40 mg    pioglitazone tablet 15 mg    pneumoc 13-meg conj-dip cr(PF) 0.5 mL    potassium chloride SA CR tablet 40 mEq    pravastatin tablet 20 mg    promethazine (PHENERGAN) 12.5 mg in dextrose 5 % 50 mL IVPB    simethicone chewable tablet 80 mg    warfarin (COUMADIN) tablet 2 mg     Family History     Problem Relation (Age of Onset)    Alcohol abuse Father    COPD Sister, Brother    Cancer Brother    Diabetes Daughter    Heart disease Brother    Hypertension Father    No Known Problems Son, Daughter, Son    Stroke Father        Social History Main Topics    Smoking status: Former Smoker     Quit date: 9/20/1996    Smokeless tobacco: Never Used      Comment: cigar smoker    Alcohol use No      Comment: none since 2006    Drug use: No    Sexual activity: Not Currently     Review of Systems   Constitution: Positive for fever and weakness.   HENT: Negative.    Eyes: Negative.    Cardiovascular: Negative.    Respiratory: Negative.    Endocrine: Negative.    Hematologic/Lymphatic: Negative.    Skin: Negative.    Musculoskeletal: Positive for muscle weakness.   Gastrointestinal: Negative.    Genitourinary: Positive for dysuria.   Psychiatric/Behavioral: Negative.    Allergic/Immunologic: Negative.      Objective:     Vital Signs (Most Recent):  Temp: 97.8 °F (36.6 °C) (11/28/17 0653)  Pulse: 60 (11/28/17 0653)  Resp: 20 (11/28/17 0653)  BP: 129/63 (11/28/17 0653)  SpO2: 96 % (11/28/17 0716) Vital Signs (24h Range):  Temp:  [96.4 °F (35.8 °C)-97.8 °F (36.6 °C)] 97.8 °F (36.6 °C)  Pulse:  [59-74] 60  Resp:  [20] 20  SpO2:  [93 %-96 %] 96 %  BP: (105-152)/(56-77) 129/63     Weight: 87.5 kg (192 lb 14.4 oz)  Body mass index is 31.14 kg/m².    SpO2: 96 %  O2 Device (Oxygen Therapy): room air      Intake/Output Summary (Last 24 hours) at 11/28/17 0903  Last data filed at 11/28/17 0800   Gross per 24 hour   Intake             1980 ml   Output              1375 ml   Net              605 ml       Lines/Drains/Airways     Peripheral Intravenous Line                 Peripheral IV - Single Lumen 11/27/17 0940 Right Forearm less than 1 day                Physical Exam   Constitutional: He is oriented to person, place, and time. He appears well-developed and well-nourished.   HENT:   Head: Normocephalic.   Cardiovascular: Normal rate.    Murmur heard.  irreg     Pulmonary/Chest: Effort normal and breath sounds normal.   Abdominal: Soft. Bowel sounds are normal.   Musculoskeletal: Normal range of motion.   Neurological: He is alert and oriented to person, place, and time.   Skin: Skin is warm and dry.   Psychiatric: He has a normal mood and affect. His behavior is normal.   Nursing note and vitals reviewed.      Significant Labs:   BMP:   Recent Labs  Lab 11/26/17  1009 11/27/17  0604 11/28/17  0503   * 84 130*    139 139   K 4.6 3.4* 3.5    108 110   CO2 23 23 21*   BUN 57* 42* 29*   CREATININE 1.4 1.0 0.9   CALCIUM 9.0 8.4* 8.6*   , CBC   Recent Labs  Lab 11/26/17  1009 11/27/17  0604 11/28/17  0503   WBC 15.62* 13.01* 9.20   HGB 9.9* 9.4* 9.3*   HCT 30.6* 29.1* 29.1*    295 308   , INR   Recent Labs  Lab 11/26/17  1009 11/27/17  0604 11/28/17  0503   INR 3.9* 3.3* 1.9*    and Troponin No results for input(s): TROPONINI in the last 48 hours.    Significant Imaging: EKG: atrial fib  Assessment and Plan:     Ventricular brandon/3 beat NSVT non sustained asymtomatic  Hypokalemia  UTI  Normal EF 7/16  Nl perf 5/17  Chronic AF on coumadin--INR 1.9 today  CAD hx remote PCI LAD/CFx  S/p bioprosthetic AVR  HTN  Hx TIA  DM  Chronic mild anemia    Plan:  Replace K+  Pt otherwise stable from CV standpoint without evidence of ischemia or ACS.   outpt HM monitoring  Check TSH  Resume coumadin, INR end of the week  Continue ASA/coreg 12.5, resume lisinopril/lasix/pravastatin. May hold amlodipine for now due to marginal BP at times  May go ahead with  discharge planning        Active Diagnoses:    Diagnosis Date Noted POA    PRINCIPAL PROBLEM:  Acute cystitis with hematuria [N30.01] 11/26/2017 Yes    Coumadin toxicity [T45.511A] 11/27/2017 Yes    S/P ORIF (open reduction internal fixation) fracture [Z96.7, Z87.81] 11/27/2017 Not Applicable    (HFpEF) heart failure with preserved ejection fraction [I50.30] 06/17/2015 Yes    Type 2 diabetes mellitus with neurologic complication [E11.49] 10/17/2012 Yes    COPD (chronic obstructive pulmonary disease) [J44.9] 10/17/2012 Yes    Cardiac pacemaker in situ [Z95.0] 10/12/2012 Yes    CAD (coronary artery disease) [I25.10] 09/20/2012 Yes    Atrial fibrillation [I48.91] 06/29/2012 Yes      Problems Resolved During this Admission:    Diagnosis Date Noted Date Resolved POA       VTE Risk Mitigation         Ordered     warfarin (COUMADIN) tablet 2 mg  Daily     Route:  Oral        11/28/17 0850     Medium Risk of VTE  Once      11/26/17 1234     Place sequential compression device  Until discontinued      11/26/17 1234          Thank you for your consult. I will follow-up with patient. Please contact us if you have any additional questions.    Ebony Johnston NP  Cardiology   [facility]    I attest that I have personally seen and examined this patient. I have reviewed and discussed the management in detail as outlined above.

## 2017-11-28 NOTE — PLAN OF CARE
Problem: Patient Care Overview  Goal: Plan of Care Review  Goals:   1. Increase PO intake to >75% to meet EEN/EPN   2. Provide Boost Plus between meals to promote healing  3. Increase understanding of Diabetic diet and Food/Drug interaction   Nutrition Goal Status: new  Communication of RD Recs: reviewed with RN    Discharge planning  1. Continue 1800 kcal Diabetic Diet     2. Increase PO intake to >75%   3. Encourage adhereance to Diabetic diet   4. Increase Understanding of diabetic diet and food and drug interactions w/warfarin

## 2017-11-28 NOTE — NURSING
Patient bladder scanned. Noted 390 ml urine on bladder scan. 16 fr shields catheter inserted using sterile technique. Noted 400ml norbert colored urine in  bag. Patient tolerated procedure well.

## 2017-11-28 NOTE — PLAN OF CARE
Problem: Patient Care Overview  Goal: Plan of Care Review  Outcome: Ongoing (interventions implemented as appropriate)  Fall precautions maintained. Patient remains free from falls/injuries. IV fluids and ABX administered. Catheter patent and draining. PT worked with patient. Glycemic monitoring. Plan of care reviewed with patient and spouse; both agree with plan of care.

## 2017-11-28 NOTE — ASSESSMENT & PLAN NOTE
On right  Surgery 2 weeks ago  Was rehabing at [place], should return at discharge  PT/OR ordered here today    Staples removed 11/27

## 2017-11-28 NOTE — PT/OT/SLP PROGRESS
Occupational Therapy      Eddie Parada Sr.  MRN: [id number]    Patient not seen today secondary to Nursing care, Other (Comment) (Pt getting a bath and afterwards, nursing to try and insert IV). Will follow-up 11/29/2017.    Zakiya Suresh OT  11/28/2017

## 2017-11-28 NOTE — ASSESSMENT & PLAN NOTE
Nutrition Problem  Increased protein/calorie needs     Related to (etiology):   Wound healing and UTI      Signs and Symptoms (as evidenced by):   RDA recommendations     Interventions/Recommendations (treatment strategy):  See RD recs 12/01/17     Nutrition Diagnosis Status:   Continues        Nutrition Problem  Inadequate Oral Intake      Related to (etiology):   Inability to consume adequate nutrition      Signs and Symptoms (as evidenced by):   PO intake of 50%     Interventions/Recommendations (treatment strategy):  See RD recs 12/01/17     Nutrition Diagnosis Status:   Continues

## 2017-11-28 NOTE — PT/OT/SLP PROGRESS
Physical Therapy  Treatment    Eddie Parada Sr.   MRN: [id number]   Admitting Diagnosis: Acute cystitis with hematuria    PT Received On: 11/28/17  PT Start Time: 1027     PT Stop Time: 1057    PT Total Time (min): 30 min       Billable Minutes:  Gait [id number] minutes and Therapeutic Activity 13 minutes    Treatment Type: Treatment  PT/PTA: PT             General Precautions: Standard, fall  Orthopedic Precautions: RLE weight bearing as tolerated   Braces:           Subjective:  Communicated with patient prior to session.      Pain/Comfort  Pain Rating 1: 0/10  Pain Rating Post-Intervention 1: 0/10    Objective:   Patient found with: shields catheter, peripheral IV    Functional Mobility:  Bed Mobility:   Rolling/Turning to Left: Stand by assistance  Rolling/Turning Right: Stand by assistance  Scooting/Bridging: Stand by Assistance  Supine to Sit: Stand by Assistance  Sit to Supine: Stand by Assistance    Transfers:  Sit <> Stand Assistance: Contact Guard Assistance, 3 bouts  Sit <> Stand Assistive Device: Rolling Walker  Bed <> Chair Technique: Stand Pivot  Bed <> Chair Assistance: Contact Guard Assistance  Bed <> Chair Assistive Device: Rolling Walker    Gait:   Gait Distance: 25 feet  Assistance 1: Contact Guard Assistance  Gait Assistive Device: Rolling walker  Gait Pattern: swing-to gait  Gait Deviation(s): decreased velocity of limb motion, decreased step length, decreased toe-to-floor clearance, decreased weight-shifting ability  Gait Training:  Patient ambulated 25 feet with Contact Guard Assistance with Rolling Walker.  Patient given verbal cues for step placement and postural reminders.      Balance:   Static Sit: FAIR+: Able to take MINIMAL challenges from all directions  Dynamic Sit: FAIR+: Maintains balance through MINIMAL excursions of active trunk motion  Static Stand: FAIR+: Takes MINIMAL challenges from all directions  Dynamic stand: FAIR: Needs CONTACT GUARD during gait     Therapeutic Activities  and Exercises:  Therapeutic Activity:   Transfer Training:  Pt. Transferred sit<>stand with RW, CGA, x 3 bouts.  Pt. Transferred EOB<>W/C with RW, CGA, stand pivot.  Verbal cues, manual guidance and tactile cues given to pt. To perform tasks.  Trunk control tasks performed with min. A. while pt. standing and seated EOB.    AM-PAC 6 CLICK MOBILITY  How much help from another person does this patient currently need?   1 = Unable, Total/Dependent Assistance  2 = A lot, Maximum/Moderate Assistance  3 = A little, Minimum/Contact Guard/Supervision  4 = None, Modified [place]/Independent    Turning over in bed (including adjusting bedclothes, sheets and blankets)?: 3  Sitting down on and standing up from a chair with arms (e.g., wheelchair, bedside commode, etc.): 3  Moving from lying on back to sitting on the side of the bed?: 3  Moving to and from a bed to a chair (including a wheelchair)?: 3  Need to walk in hospital room?: 2  Climbing 3-5 steps with a railing?: 1  Total Score: 15    AM-PAC Raw Score CMS G-Code Modifier Level of Impairment Assistance   6 % Total / Unable   7 - 9 CM 80 - 100% Maximal Assist   10 - 14 CL 60 - 80% Moderate Assist   15 - 19 CK 40 - 60% Moderate Assist   20 - 22 CJ 20 - 40% Minimal Assist   23 CI 1-20% SBA / CGA   24 CH 0% Independent/ Mod I     Patient left up in chair with all lines intact, call button in reach, RN notified and family present.    Assessment:  Eddie MILENA Parada SrMonse is a 88 y.o. male with a medical diagnosis of Acute cystitis with hematuria and presents with increased distance with gait training.  Pt. Would benefit from continued PT to increase strength and improve function.    Rehab identified problem list/impairments: Rehab identified problem list/impairments: weakness, impaired endurance, impaired self care skills, gait instability, impaired functional mobilty, impaired balance, decreased lower extremity function    Rehab potential is fair.    Activity  tolerance: Fair    Discharge recommendations: Discharge Facility/Level Of Care Needs: nursing facility, skilled     Barriers to discharge: Barriers to Discharge: None    Equipment recommendations: Equipment Needed After Discharge: none     GOALS:    Physical Therapy Goals        Problem: Physical Therapy Goal    Goal Priority Disciplines Outcome Goal Variances Interventions   Physical Therapy Goal     PT/OT, PT      Description:  Goals to be met by: upon D/C from facility     Patient will increase functional independence with mobility by performin. Supine to sit with [place]  2. Sit to supine with [place]  3. Sit to stand transfer with Standby Assistance with Rolling Walker  4. Bed to chair transfer with Supervision using Rolling Walker  5. Gait  x 50 feet with Contact Guard Assistance using Rolling Walker.                       PLAN:    Patient to be seen  (1-2x/day(Weekdays); PRN(Sat.,Sun., Holidays))  to address the above listed problems via gait training, therapeutic activities, therapeutic exercises  Plan of Care expires:  (Upon Discharge from facility)  Plan of Care reviewed with: patient, family         Indra Mena, PT  2017

## 2017-11-28 NOTE — SUBJECTIVE & OBJECTIVE
Interval History: see above    Review of Systems   Constitutional: Negative for activity change, fatigue, fever and unexpected weight change.   HENT: Negative for congestion, ear pain, hearing loss, rhinorrhea and sore throat.    Eyes: Negative for pain, redness and visual disturbance.   Respiratory: Negative for cough, shortness of breath and wheezing.    Cardiovascular: Negative for chest pain, palpitations and leg swelling.   Gastrointestinal: Positive for blood in stool. Negative for abdominal pain, constipation, diarrhea, nausea and vomiting.   Genitourinary: Negative for decreased urine volume, dysuria, frequency and urgency.   Musculoskeletal: Positive for arthralgias (right hip pain s/p surgery). Negative for back pain, joint swelling and neck pain.   Skin: Negative for color change, rash and wound.   Neurological: Negative for dizziness, tremors, weakness, light-headedness and headaches.     Objective:     Vital Signs (Most Recent):  Temp: 97.8 °F (36.6 °C) (11/28/17 0653)  Pulse: (!) 59 (11/28/17 0809)  Resp: 20 (11/28/17 0653)  BP: 129/63 (11/28/17 0653)  SpO2: 96 % (11/28/17 0716) Vital Signs (24h Range):  Temp:  [96.4 °F (35.8 °C)-97.8 °F (36.6 °C)] 97.8 °F (36.6 °C)  Pulse:  [59-74] 59  Resp:  [20] 20  SpO2:  [93 %-96 %] 96 %  BP: (105-152)/(56-77) 129/63     Weight: 87.5 kg (192 lb 14.4 oz)  Body mass index is 31.14 kg/m².    Intake/Output Summary (Last 24 hours) at 11/28/17 1011  Last data filed at 11/28/17 0800   Gross per 24 hour   Intake             1980 ml   Output             1375 ml   Net              605 ml      Physical Exam   Constitutional: He is oriented to person, place, and time. He appears well-developed and well-nourished. No distress.   HENT:   Head: Normocephalic and atraumatic.   Right Ear: External ear normal.   Left Ear: External ear normal.   Eyes: Conjunctivae and EOM are normal. Pupils are equal, round, and reactive to light. Right eye exhibits no discharge. Left eye exhibits  no discharge.   Neck: Neck supple. No tracheal deviation present.   Cardiovascular: Normal rate and regular rhythm.    No murmur heard.  Pulmonary/Chest: Effort normal and breath sounds normal. No respiratory distress. He has no wheezes.   Abdominal: Soft. Bowel sounds are normal. He exhibits no distension. There is no tenderness. There is no rebound and no guarding.   Genitourinary:   Genitourinary Comments: Bishop in place   Neurological: He is alert and oriented to person, place, and time. No cranial nerve deficit.   Skin: Skin is warm and dry.   Stapes in place to right hip surgical wounds, healing well. No signs of infection     Psychiatric: He has a normal mood and affect. His behavior is normal.   Nursing note and vitals reviewed.      Significant Labs:   CBC:   Recent Labs  Lab 11/27/17  0604 11/28/17  0503   WBC 13.01* 9.20   HGB 9.4* 9.3*   HCT 29.1* 29.1*    308     CMP:   Recent Labs  Lab 11/27/17 0604 11/28/17  0503    139   K 3.4* 3.5    110   CO2 23 21*   GLU 84 130*   BUN 42* 29*   CREATININE 1.0 0.9   CALCIUM 8.4* 8.6*   PROT 5.7*  --    ALBUMIN 2.2*  --    BILITOT 0.6  --    ALKPHOS 111  --    AST 20  --    ALT 14  --    ANIONGAP 8 8   EGFRNONAA >60 >60     Magnesium: No results for input(s): MG in the last 48 hours.

## 2017-11-28 NOTE — PT/OT/SLP PROGRESS
Physical Therapy      Eddie Parada Sr.  MRN: [id number]    Patient not seen 11/28/2017 p.m. secondary to Nursing care. Will follow-up 11/29/2017.  RN aware of pt. status.    Jamaal Mena, PT

## 2017-11-28 NOTE — PHARMACY MED REC
Mr. Morgan is knowledgable about his medications.  He wanted me to speak to his wife but she was not present. I advised him ot call when she arrived & we could go back & talk to her.    His main concern was that he was on statins (has been for several years. He has recently had aches & pains which he thinks may be associated with his pravastatin. (a retail pharmacist told him was this was a possiblity).    I told him that the weakness was probably due to his UTI. Still asked that  Discuss it with his doctor.  I spoke with his nurse. She will relay to his physician.

## 2017-11-29 LAB
ANION GAP SERPL CALC-SCNC: 9 MMOL/L
BACTERIA UR CULT: NORMAL
BASOPHILS # BLD AUTO: 0.03 K/UL
BASOPHILS NFR BLD: 0.4 %
BUN SERPL-MCNC: 19 MG/DL
CALCIUM SERPL-MCNC: 8.7 MG/DL
CHLORIDE SERPL-SCNC: 113 MMOL/L
CO2 SERPL-SCNC: 18 MMOL/L
CREAT SERPL-MCNC: 0.8 MG/DL
DIFFERENTIAL METHOD: ABNORMAL
EOSINOPHIL # BLD AUTO: 0.1 K/UL
EOSINOPHIL NFR BLD: 0.8 %
ERYTHROCYTE [DISTWIDTH] IN BLOOD BY AUTOMATED COUNT: 14 %
EST. GFR  (AFRICAN AMERICAN): >60 ML/MIN/1.73 M^2
EST. GFR  (NON AFRICAN AMERICAN): >60 ML/MIN/1.73 M^2
GLUCOSE SERPL-MCNC: 143 MG/DL
HCT VFR BLD AUTO: 29.6 %
HGB BLD-MCNC: 9.4 G/DL
INR PPP: 1.6
LYMPHOCYTES # BLD AUTO: 0.8 K/UL
LYMPHOCYTES NFR BLD: 10.1 %
MAGNESIUM SERPL-MCNC: 1.9 MG/DL
MCH RBC QN AUTO: 32.8 PG
MCHC RBC AUTO-ENTMCNC: 31.8 G/DL
MCV RBC AUTO: 103 FL
MONOCYTES # BLD AUTO: 0.9 K/UL
MONOCYTES NFR BLD: 11.2 %
NEUTROPHILS # BLD AUTO: 6.5 K/UL
NEUTROPHILS NFR BLD: 77.5 %
PLATELET # BLD AUTO: 324 K/UL
PMV BLD AUTO: 9.9 FL
POCT GLUCOSE: 151 MG/DL (ref 70–110)
POCT GLUCOSE: 154 MG/DL (ref 70–110)
POCT GLUCOSE: 155 MG/DL (ref 70–110)
POCT GLUCOSE: 221 MG/DL (ref 70–110)
POTASSIUM SERPL-SCNC: 4.7 MMOL/L
PROTHROMBIN TIME: 17.1 SEC
RBC # BLD AUTO: 2.87 M/UL
SODIUM SERPL-SCNC: 140 MMOL/L
WBC # BLD AUTO: 8.33 K/UL

## 2017-11-29 PROCEDURE — 83735 ASSAY OF MAGNESIUM: CPT

## 2017-11-29 PROCEDURE — 85025 COMPLETE CBC W/AUTO DIFF WBC: CPT

## 2017-11-29 PROCEDURE — 96376 TX/PRO/DX INJ SAME DRUG ADON: CPT

## 2017-11-29 PROCEDURE — 97530 THERAPEUTIC ACTIVITIES: CPT

## 2017-11-29 PROCEDURE — G0378 HOSPITAL OBSERVATION PER HR: HCPCS

## 2017-11-29 PROCEDURE — 25000003 PHARM REV CODE 250: Performed by: INTERNAL MEDICINE

## 2017-11-29 PROCEDURE — 94761 N-INVAS EAR/PLS OXIMETRY MLT: CPT

## 2017-11-29 PROCEDURE — 97110 THERAPEUTIC EXERCISES: CPT

## 2017-11-29 PROCEDURE — 82962 GLUCOSE BLOOD TEST: CPT

## 2017-11-29 PROCEDURE — 96361 HYDRATE IV INFUSION ADD-ON: CPT

## 2017-11-29 PROCEDURE — 96366 THER/PROPH/DIAG IV INF ADDON: CPT

## 2017-11-29 PROCEDURE — 25000003 PHARM REV CODE 250: Performed by: FAMILY MEDICINE

## 2017-11-29 PROCEDURE — 11000001 HC ACUTE MED/SURG PRIVATE ROOM

## 2017-11-29 PROCEDURE — 99226 PR SUBSEQUENT OBSERVATION CARE,LEVEL III: CPT | Mod: ,,, | Performed by: FAMILY MEDICINE

## 2017-11-29 PROCEDURE — 36415 COLL VENOUS BLD VENIPUNCTURE: CPT

## 2017-11-29 PROCEDURE — 80048 BASIC METABOLIC PNL TOTAL CA: CPT

## 2017-11-29 PROCEDURE — 25000003 PHARM REV CODE 250: Performed by: NURSE PRACTITIONER

## 2017-11-29 PROCEDURE — 85610 PROTHROMBIN TIME: CPT

## 2017-11-29 PROCEDURE — 25000003 PHARM REV CODE 250: Performed by: SURGERY

## 2017-11-29 PROCEDURE — 63600175 PHARM REV CODE 636 W HCPCS: Performed by: INTERNAL MEDICINE

## 2017-11-29 PROCEDURE — 97116 GAIT TRAINING THERAPY: CPT

## 2017-11-29 RX ORDER — RAMELTEON 8 MG/1
8 TABLET ORAL NIGHTLY PRN
Status: DISCONTINUED | OUTPATIENT
Start: 2017-11-29 | End: 2017-12-04 | Stop reason: HOSPADM

## 2017-11-29 RX ORDER — TAMSULOSIN HYDROCHLORIDE 0.4 MG/1
0.4 CAPSULE ORAL DAILY
Status: DISCONTINUED | OUTPATIENT
Start: 2017-11-29 | End: 2017-12-04 | Stop reason: HOSPADM

## 2017-11-29 RX ORDER — POTASSIUM CHLORIDE 20 MEQ/1
40 TABLET, EXTENDED RELEASE ORAL DAILY
Status: DISCONTINUED | OUTPATIENT
Start: 2017-11-30 | End: 2017-12-04 | Stop reason: HOSPADM

## 2017-11-29 RX ADMIN — CARVEDILOL 12.5 MG: 12.5 TABLET, FILM COATED ORAL at 05:11

## 2017-11-29 RX ADMIN — GLIMEPIRIDE 2 MG: 2 TABLET ORAL at 08:11

## 2017-11-29 RX ADMIN — CARVEDILOL 12.5 MG: 12.5 TABLET, FILM COATED ORAL at 08:11

## 2017-11-29 RX ADMIN — PIOGLITAZONE 15 MG: 15 TABLET ORAL at 09:11

## 2017-11-29 RX ADMIN — INSULIN ASPART 2 UNITS: 100 INJECTION, SOLUTION INTRAVENOUS; SUBCUTANEOUS at 12:11

## 2017-11-29 RX ADMIN — Medication 400 MG: at 09:11

## 2017-11-29 RX ADMIN — SODIUM CHLORIDE 1000 ML: 0.9 INJECTION, SOLUTION INTRAVENOUS at 09:11

## 2017-11-29 RX ADMIN — CEFEPIME 500 MG: 1 INJECTION, POWDER, FOR SOLUTION INTRAMUSCULAR; INTRAVENOUS at 12:11

## 2017-11-29 RX ADMIN — WARFARIN SODIUM 2 MG: 2 TABLET ORAL at 05:11

## 2017-11-29 RX ADMIN — ASPIRIN 81 MG: 81 TABLET, COATED ORAL at 09:11

## 2017-11-29 RX ADMIN — TAMSULOSIN HYDROCHLORIDE 0.4 MG: 0.4 CAPSULE ORAL at 12:11

## 2017-11-29 RX ADMIN — PRAVASTATIN SODIUM 20 MG: 20 TABLET ORAL at 09:11

## 2017-11-29 RX ADMIN — LEVOTHYROXINE SODIUM 50 MCG: 50 TABLET ORAL at 09:11

## 2017-11-29 RX ADMIN — RAMELTEON 8 MG: 8 TABLET, FILM COATED ORAL at 08:11

## 2017-11-29 RX ADMIN — POTASSIUM CHLORIDE 40 MEQ: 1500 TABLET, EXTENDED RELEASE ORAL at 09:11

## 2017-11-29 RX ADMIN — LISINOPRIL 10 MG: 10 TABLET ORAL at 09:11

## 2017-11-29 RX ADMIN — Medication 400 MG: at 08:11

## 2017-11-29 RX ADMIN — PANTOPRAZOLE SODIUM 40 MG: 40 TABLET, DELAYED RELEASE ORAL at 09:11

## 2017-11-29 NOTE — SUBJECTIVE & OBJECTIVE
Interval History: see      Review of Systems   Constitutional: Negative for activity change, fatigue, fever and unexpected weight change.   HENT: Negative for congestion, ear pain, hearing loss, rhinorrhea and sore throat.    Eyes: Negative for pain, redness and visual disturbance.   Respiratory: Negative for cough, shortness of breath and wheezing.    Cardiovascular: Negative for chest pain, palpitations and leg swelling.   Gastrointestinal: Negative for abdominal pain, blood in stool, constipation, diarrhea, nausea and vomiting.   Genitourinary: Negative for decreased urine volume, dysuria, frequency and urgency.   Musculoskeletal: Positive for arthralgias (right hip pain s/p surgery). Negative for back pain, joint swelling and neck pain.   Skin: Negative for color change, rash and wound.   Neurological: Negative for dizziness, tremors, weakness, light-headedness and headaches.     Objective:     Vital Signs (Most Recent):  Temp: 96.2 °F (35.7 °C) (11/29/17 0748)  Pulse: 73 (11/29/17 0748)  Resp: 18 (11/29/17 0748)  BP: (!) 149/70 (11/29/17 0748)  SpO2: 97 % (11/29/17 0748) Vital Signs (24h Range):  Temp:  [96.2 °F (35.7 °C)-98.6 °F (37 °C)] 96.2 °F (35.7 °C)  Pulse:  [59-73] 73  Resp:  [18-20] 18  SpO2:  [95 %-98 %] 97 %  BP: (113-149)/(58-79) 149/70     Weight: 87.5 kg (192 lb 14.4 oz)  Body mass index is 31.14 kg/m².    Intake/Output Summary (Last 24 hours) at 11/29/17 1025  Last data filed at 11/29/17 0900   Gross per 24 hour   Intake              480 ml   Output             2400 ml   Net            -1920 ml      Physical Exam   Constitutional: He is oriented to person, place, and time. He appears well-developed and well-nourished. No distress.   HENT:   Head: Normocephalic and atraumatic.   Right Ear: External ear normal.   Left Ear: External ear normal.   Eyes: Conjunctivae and EOM are normal. Pupils are equal, round, and reactive to light. Right eye exhibits no discharge. Left eye exhibits no discharge.    Neck: Neck supple. No tracheal deviation present.   Cardiovascular: Normal rate and regular rhythm.    No murmur heard.  Pulmonary/Chest: Effort normal and breath sounds normal. No respiratory distress. He has no wheezes.   Abdominal: Soft. Bowel sounds are normal. He exhibits no distension. There is no tenderness. There is no rebound and no guarding.   Genitourinary:   Genitourinary Comments: Bishop in place  Dark norbert urine    Neurological: He is alert and oriented to person, place, and time. No cranial nerve deficit.   Skin: Skin is warm and dry.   Stapes in place to right hip surgical wounds, healing well. No signs of infection     Psychiatric: He has a normal mood and affect. His behavior is normal.   Nursing note and vitals reviewed.      Significant Labs:   CBC:   Recent Labs  Lab 11/28/17 0503 11/29/17 0522   WBC 9.20 8.33   HGB 9.3* 9.4*   HCT 29.1* 29.6*    324     CMP:   Recent Labs  Lab 11/28/17 0503 11/29/17 0522    140   K 3.5 4.7    113*   CO2 21* 18*   * 143*   BUN 29* 19   CREATININE 0.9 0.8   CALCIUM 8.6* 8.7   ANIONGAP 8 9   EGFRNONAA >60 >60     Magnesium:   Recent Labs  Lab 11/29/17 0522   MG 1.9       Lab Results   Component Value Date    INR 1.6 (H) 11/29/2017    INR 1.9 (H) 11/28/2017    INR 3.3 (H) 11/27/2017         Significant Imaging: I have reviewed and interpreted all pertinent imaging results/findings within the past 24 hours.

## 2017-11-29 NOTE — PROGRESS NOTES
[facility]  Cardiology  Progress Note    Patient Name: Eddie Parada Sr.  MRN: [id number]  Admission Date: 11/26/2017  Hospital Length of Stay: 0 days  Code Status: Full Code   Attending Physician: Jorge Luis Mccarty MD   Primary Care Physician: Callum Roberts MD  Expected Discharge Date:   Principal Problem:Acute cystitis with hematuria    Subjective:     Interval History: no further [place].    BP within an acceptable range off of amlodipine  Rate is controlled  INR 1.6 and coumadin has been resumed  Noted plans for DC home    Review of Systems   Constitution: Negative.   HENT: Negative.    Eyes: Negative.    Cardiovascular: Negative.    Respiratory: Negative.    Endocrine: Negative.    Skin: Negative.    Musculoskeletal: Negative.    Gastrointestinal: Negative.    Genitourinary: Negative.    Neurological: Negative.    Psychiatric/Behavioral: Negative.    Allergic/Immunologic: Negative.      Objective:     Vital Signs (Most Recent):  Temp: 96.2 °F (35.7 °C) (11/29/17 0748)  Pulse: 73 (11/29/17 0748)  Resp: 18 (11/29/17 0748)  BP: (!) 149/70 (11/29/17 0748)  SpO2: 97 % (11/29/17 0748) Vital Signs (24h Range):  Temp:  [96.2 °F (35.7 °C)-98.6 °F (37 °C)] 96.2 °F (35.7 °C)  Pulse:  [59-73] 73  Resp:  [18-20] 18  SpO2:  [95 %-98 %] 97 %  BP: (113-149)/(58-79) 149/70     Weight: 87.5 kg (192 lb 14.4 oz)  Body mass index is 31.14 kg/m².    SpO2: 97 %  O2 Device (Oxygen Therapy): room air      Intake/Output Summary (Last 24 hours) at 11/29/17 0923  Last data filed at 11/29/17 0900   Gross per 24 hour   Intake              480 ml   Output             2400 ml   Net            -1920 ml       Lines/Drains/Airways     Drain                 Urethral Catheter 11/27/17 2200 1 day          Peripheral Intravenous Line                 Peripheral IV - Single Lumen 11/28/17 Right Hand 1 day              Current Facility-Administered Medications   Medication    0.9%  NaCl infusion    acetaminophen tablet 650 mg     aspirin EC tablet 81 mg    carvedilol tablet 12.5 mg    ceFEPIme (MAXIPIME) 500 mg in dextrose 5 % 50 mL IVPB    dextrose 50% injection 12.5 g    dextrose 50% injection 25 g    docusate sodium capsule 100 mg    glimepiride tablet 2 mg    glucagon (human recombinant) injection 1 mg    glucose chewable tablet 16 g    glucose chewable tablet 24 g    influenza (FLUZONE HIGH-DOSE) vaccine 0.5 mL    insulin aspart pen 0-5 Units    levothyroxine tablet 50 mcg    lisinopril tablet 10 mg    magnesium oxide tablet 400 mg    ondansetron injection 4 mg    pantoprazole EC tablet 40 mg    pioglitazone tablet 15 mg    pneumoc 13-meg conj-dip cr(PF) 0.5 mL    polyethylene glycol packet 17 g    potassium chloride SA CR tablet 40 mEq    pravastatin tablet 20 mg    promethazine (PHENERGAN) 12.5 mg in dextrose 5 % 50 mL IVPB    simethicone chewable tablet 80 mg    warfarin (COUMADIN) tablet 2 mg       Physical Exam   Constitutional: He is oriented to person, place, and time. He appears well-developed and well-nourished.   HENT:   Head: Normocephalic.   Cardiovascular:   irreg   Pulmonary/Chest: Effort normal and breath sounds normal.   Abdominal: Soft.   Musculoskeletal: Normal range of motion.   Neurological: He is alert and oriented to person, place, and time.   Psychiatric: He has a normal mood and affect.   Nursing note and vitals reviewed.      Significant Labs:   BMP:   Recent Labs  Lab 11/28/17 0503 11/29/17 0522   * 143*    140   K 3.5 4.7    113*   CO2 21* 18*   BUN 29* 19   CREATININE 0.9 0.8   CALCIUM 8.6* 8.7   MG  --  1.9   , CBC   Recent Labs  Lab 11/28/17 0503 11/29/17 0522   WBC 9.20 8.33   HGB 9.3* 9.4*   HCT 29.1* 29.6*    324    and INR   Recent Labs  Lab 11/28/17  0503 11/29/17  0522   INR 1.9* 1.6*       Significant Imaging: EKG   Assessment and Plan:     Ventricular brandon/3 beat NSVT non sustained asymtomatic  Hypokalemia  UTI  Normal EF 7/16  Nl perf  5/17  Chronic AF on coumadin--INR 1.9 today  CAD hx remote PCI LAD/CFx  S/p bioprosthetic AVR  HTN  Hx TIA  DM  Chronic mild anemia    Plan:  DC home today with f/u in the office with PT/INR 1 week  Continue ASA/coreg/lisinopril/KCL/pravastatin/warfarin     Active Diagnoses:    Diagnosis Date Noted POA    PRINCIPAL PROBLEM:  Acute cystitis with hematuria [N30.01] 11/26/2017 Yes    V-tach [I47.2] 11/28/2017 No    Coumadin toxicity [T45.511A] 11/27/2017 Yes    S/P ORIF (open reduction internal fixation) fracture [Z96.7, Z87.81] 11/27/2017 Not Applicable    (HFpEF) heart failure with preserved ejection fraction [I50.30] 06/17/2015 Yes    Type 2 diabetes mellitus with neurologic complication [E11.49] 10/17/2012 Yes    COPD (chronic obstructive pulmonary disease) [J44.9] 10/17/2012 Yes    Cardiac pacemaker in situ [Z95.0] 10/12/2012 Yes    CAD (coronary artery disease) [I25.10] 09/20/2012 Yes    Atrial fibrillation [I48.91] 06/29/2012 Yes      Problems Resolved During this Admission:    Diagnosis Date Noted Date Resolved POA       VTE Risk Mitigation         Ordered     warfarin (COUMADIN) tablet 2 mg  Daily     Route:  Oral        11/28/17 0850     Medium Risk of VTE  Once      11/26/17 1234     Place sequential compression device  Until discontinued      11/26/17 1234          Ebony Johnston NP  Cardiology  [facility]    I attest that I have personally seen and examined this patient. I have reviewed and discussed the management in detail as outlined above.

## 2017-11-29 NOTE — PLAN OF CARE
Problem: Patient Care Overview  Goal: Plan of Care Review  Outcome: Ongoing (interventions implemented as appropriate)  Pt doing well. No question/concerns at this time. Agrees with poc. No pain/falls. Iv abx and fluids. Should go back to the [facility] tomorrow if culture is back.

## 2017-11-29 NOTE — ASSESSMENT & PLAN NOTE
Held Coumadin until   INR is   Lab Results   Component Value Date    INR 1.6 (H) 11/29/2017    INR 1.9 (H) 11/28/2017    INR 3.3 (H) 11/27/2017     Resumed coumadin today 11/28/17

## 2017-11-29 NOTE — ASSESSMENT & PLAN NOTE
Bishop catheter in place due to urinary retention on admit. D/c Bishop 11/27 for voiding trial, failed   Starting flomax 11/29   Rocephin 1 g IV piggyback every 24 hours day 4  Urine culture and blood culture in process--urine shows presumptive E. Coli, but no sens yet.

## 2017-11-29 NOTE — PT/OT/SLP PROGRESS
Physical Therapy  Treatment    Eddie Parada Sr.   MRN: [id number]   Admitting Diagnosis: Acute cystitis with hematuria    PT Received On: 11/29/17  PT Start Time: 0800     PT Stop Time: 0825    PT Total Time (min): 25 min       Billable Minutes:  Gait [id number] minutes and Therapeutic Exercise 10 minutes    Treatment Type: Treatment  PT/PTA: PT             General Precautions: Standard, fall  Orthopedic Precautions: RLE weight bearing as tolerated   Braces:           Subjective:  Communicated with patient and spouse prior to session.    Pain/Comfort  Pain Rating 1: 0/10    Objective:   Patient found with: peripheral IV, shields catheter    Functional Mobility:  Bed Mobility:   Rolling/Turning to Left: Stand by assistance  Rolling/Turning Right: Stand by assistance  Scooting/Bridging: Stand by Assistance  Supine to Sit: Stand by Assistance  Sit to Supine: Stand by Assistance    Transfers:  Sit <> Stand Assistance: Contact Guard Assistance  Sit <> Stand Assistive Device: Rolling Walker  Bed <> Chair Technique: Stand Pivot  Bed <> Chair Assistance: Contact Guard Assistance  Bed <> Chair Assistive Device: Rolling Walker    Gait:   Gait Distance: 25 feet  Assistance 1: Contact Guard Assistance  Gait Assistive Device: Rolling walker  Gait Pattern: swing-to gait  Gait Deviation(s): decreased velocity of limb motion, decreased step length, decreased toe-to-floor clearance, decreased weight-shifting ability    Balance:   Static Sit: GOOD: Takes MODERATE challenges from all directions  Dynamic Sit: GOOD-: Maintains balance through MODERATE excursions of active trunk movement,     Static Stand: FAIR+: Takes MINIMAL challenges from all directions  Dynamic stand: FAIR: Needs CONTACT GUARD during gait     Therapeutic Activities and Exercises:  Pt performed BLE seated therapeutic exercises at all joints in available planes of motion with rest breaks as needed to increase strength and endurance with all gross mobility skills.       AM-PAC 6 CLICK MOBILITY  How much help from another person does this patient currently need?   1 = Unable, Total/Dependent Assistance  2 = A lot, Maximum/Moderate Assistance  3 = A little, Minimum/Contact Guard/Supervision  4 = None, Modified Nye/Independent    Turning over in bed (including adjusting bedclothes, sheets and blankets)?: 3  Sitting down on and standing up from a chair with arms (e.g., wheelchair, bedside commode, etc.): 3  Moving from lying on back to sitting on the side of the bed?: 3  Moving to and from a bed to a chair (including a wheelchair)?: 3  Need to walk in hospital room?: 3  Climbing 3-5 steps with a railing?: 1  Total Score: 16    AM-PAC Raw Score CMS G-Code Modifier Level of Impairment Assistance   6 % Total / Unable   7 - 9 CM 80 - 100% Maximal Assist   10 - 14 CL 60 - 80% Moderate Assist   15 - 19 CK 40 - 60% Moderate Assist   20 - 22 CJ 20 - 40% Minimal Assist   23 CI 1-20% SBA / CGA   24 CH 0% Independent/ Mod I     Patient left up in chair with all lines intact, call button in reach, NSG notified and spouse present.    Assessment:  Eddie Parada Sr. is a 88 y.o. male with a medical diagnosis of Acute cystitis with hematuria and presents with good progress with POC goals. Pt motivated with PT.    Rehab identified problem list/impairments: Rehab identified problem list/impairments: weakness, impaired endurance, impaired self care skills, impaired functional mobilty, gait instability, impaired balance, decreased lower extremity function, decreased safety awareness    Rehab potential is good.    Activity tolerance: Good    Discharge recommendations: Discharge Facility/Level Of Care Needs: nursing facility, skilled     Barriers to discharge: Barriers to Discharge: None    Equipment recommendations: Equipment Needed After Discharge: none     GOALS:    Physical Therapy Goals        Problem: Physical Therapy Goal    Goal Priority Disciplines Outcome Goal Variances  Interventions   Physical Therapy Goal     PT/OT, PT      Description:  Goals to be met by: upon D/C from facility     Patient will increase functional independence with mobility by performin. Supine to sit with [place]man  2. Sit to supine with Tillman  3. Sit to stand transfer with Standby Assistance with Rolling Walker  4. Bed to chair transfer with Supervision using Rolling Walker  5. Gait  x 50 feet with Contact Guard Assistance using Rolling Walker.                       PLAN:    Patient to be seen  (1-2x/day Monday-Friday; PRN Weekends/Holidays)  to address the above listed problems via gait training, therapeutic activities, therapeutic exercises  Plan of Care expires:  (upon D/C from facility)  Plan of Care reviewed with: patient, spouse         Cindy Mishel, PT  2017

## 2017-11-29 NOTE — PT/OT/SLP PROGRESS
"Occupational Therapy      Eddie MILENA Tal Sr.  MRN: [id number]    Patient not seen today secondary to  (Pt refused. Asking "can you come back tomorrow" pt reported he had physical therapy twice today and he just got back in bed and was tired from today. Reported he "is just now catching his breath" per pt.) OT attempted to offer other activities such as grooming and UE strengthening while in bed because he didn't want to get up after just getting back to bed, however patient continued to ask, "can you just come back tomorrow" per pt Will follow-up 11/30/2017.    SHWETA Lopez  11/29/2017  "

## 2017-11-29 NOTE — PROGRESS NOTES
[facility] Hospital Medicine  Progress Note    Patient Name: Eddie Parada Sr.  MRN: [id number]  Patient Class: OP- Observation   Admission Date: 11/26/2017  Length of Stay: 0 days  Attending Physician: Jorge Luis Mccarty MD  Primary Care Provider: Callum Roberts MD        Subjective:     Principal Problem:Acute cystitis with hematuria    HPI:  Eddie Parada Sr. presents to the emergency room with dysuria and urinary retention  Patient states that he had trouble urinating this week at the nursing home, Shields placed   Patient had an in and out catheterization at the nursing home, culture sent by PCP then  Pt then had subjective fever, family brought him into the ER for urinary tract evaluation  Shields placed in the ER with puslike urine, subjective fevers last night for staff at the NH  Patient has a history of a recent right hip fracture requiring ORIF.  He is still working with therapy.  Patient has a history of heart valve transplant.    Hospital Course:  Patient admitted to the third floor on telemetry.  He received his first dose of Rocephin.  He has no complaints this evening.    11/27--feeling better. No fevers or leukocytosis. Still have shields in placed, placed due to urinary retention on admit. Remains on rocephin    11/28/17  Plan was to hopefully send him to NH   He ran 3 beat V tach   Asymptomatic   K borderline normal   Added KCL and magnesium  CIS consult done   Keep on telemetry ;  Hopefully home in am   Had to be place don foleys catheter ; unable to urinate   Will need to see urology outpatient       11/29/17  No fever   Urine culture pending   3 beat run of [facility] o/b. Lytes and mg ok.  Coumadin resumed last night after being supratherapeutic upon admit        Interval History: see HC     Review of Systems   Constitutional: Negative for activity change, fatigue, fever and unexpected weight change.   HENT: Negative for congestion, ear pain, hearing loss, rhinorrhea and  sore throat.    Eyes: Negative for pain, redness and visual disturbance.   Respiratory: Negative for cough, shortness of breath and wheezing.    Cardiovascular: Negative for chest pain, palpitations and leg swelling.   Gastrointestinal: Negative for abdominal pain, blood in stool, constipation, diarrhea, nausea and vomiting.   Genitourinary: Negative for decreased urine volume, dysuria, frequency and urgency.   Musculoskeletal: Positive for arthralgias (right hip pain s/p surgery). Negative for back pain, joint swelling and neck pain.   Skin: Negative for color change, rash and wound.   Neurological: Negative for dizziness, tremors, weakness, light-headedness and headaches.     Objective:     Vital Signs (Most Recent):  Temp: 96.2 °F (35.7 °C) (11/29/17 0748)  Pulse: 73 (11/29/17 0748)  Resp: 18 (11/29/17 0748)  BP: (!) 149/70 (11/29/17 0748)  SpO2: 97 % (11/29/17 0748) Vital Signs (24h Range):  Temp:  [96.2 °F (35.7 °C)-98.6 °F (37 °C)] 96.2 °F (35.7 °C)  Pulse:  [59-73] 73  Resp:  [18-20] 18  SpO2:  [95 %-98 %] 97 %  BP: (113-149)/(58-79) 149/70     Weight: 87.5 kg (192 lb 14.4 oz)  Body mass index is 31.14 kg/m².    Intake/Output Summary (Last 24 hours) at 11/29/17 1025  Last data filed at 11/29/17 0900   Gross per 24 hour   Intake              480 ml   Output             2400 ml   Net            -1920 ml      Physical Exam   Constitutional: He is oriented to person, place, and time. He appears well-developed and well-nourished. No distress.   HENT:   Head: Normocephalic and atraumatic.   Right Ear: External ear normal.   Left Ear: External ear normal.   Eyes: Conjunctivae and EOM are normal. Pupils are equal, round, and reactive to light. Right eye exhibits no discharge. Left eye exhibits no discharge.   Neck: Neck supple. No tracheal deviation present.   Cardiovascular: Normal rate and regular rhythm.    No murmur heard.  Pulmonary/Chest: Effort normal and breath sounds normal. No respiratory distress. He has  no wheezes.   Abdominal: Soft. Bowel sounds are normal. He exhibits no distension. There is no tenderness. There is no rebound and no guarding.   Genitourinary:   Genitourinary Comments: Bishop in place  Dark norbert urine    Neurological: He is alert and oriented to person, place, and time. No cranial nerve deficit.   Skin: Skin is warm and dry.   Stapes in place to right hip surgical wounds, healing well. No signs of infection     Psychiatric: He has a normal mood and affect. His behavior is normal.   Nursing note and vitals reviewed.      Significant Labs:   CBC:   Recent Labs  Lab 11/28/17 0503 11/29/17 0522   WBC 9.20 8.33   HGB 9.3* 9.4*   HCT 29.1* 29.6*    324     CMP:   Recent Labs  Lab 11/28/17 0503 11/29/17 0522    140   K 3.5 4.7    113*   CO2 21* 18*   * 143*   BUN 29* 19   CREATININE 0.9 0.8   CALCIUM 8.6* 8.7   ANIONGAP 8 9   EGFRNONAA >60 >60     Magnesium:   Recent Labs  Lab 11/29/17 0522   MG 1.9       Lab Results   Component Value Date    INR 1.6 (H) 11/29/2017    INR 1.9 (H) 11/28/2017    INR 3.3 (H) 11/27/2017         Significant Imaging: I have reviewed and interpreted all pertinent imaging results/findings within the past 24 hours.    Assessment/Plan:      * Acute cystitis with hematuria    Bishop catheter in place due to urinary retention on admit. D/c Bishop 11/27 for voiding trial, failed   Starting flomax 11/29   Rocephin 1 g IV piggyback every 24 hours day 4  Urine culture and blood culture in process--urine shows presumptive E. Coli, but no sens yet.           V-tach    Isolated event. Lytes and mg normal           S/P ORIF (open reduction internal fixation) fracture    On right  Surgery 2 weeks ago  Was rehabing at [place], should return at discharge  PT/OR ordered here today    Staples removed 11/27          Coumadin toxicity    Held Coumadin until   INR is   Lab Results   Component Value Date    INR 1.6 (H) 11/29/2017    INR 1.9 (H) 11/28/2017    INR 3.3 (H)  11/27/2017     Resumed coumadin today 11/28/17        (HFpEF) heart failure with preserved ejection fraction    Continue carvedilol 12.5 mg by mouth twice a day  Continue aspirin 81 mg daily  No signs of acute exacerbation here          Angina pectoris              COPD (chronic obstructive pulmonary disease)    No signs of acute exacerbation  Was not on maintenance inhalers prior to admit        Type 2 diabetes mellitus with neurologic complication    Restarted home amaryl and actos 11/27  amaryl  Insulin sliding scale ordered          Cardiac pacemaker in situ    Aspirin 81 mg daily  Continue telemetry; coreg  Resume coumadin          CAD (coronary artery disease)    Aspirin 81 mg daily  Coreg  Pravastatin    Was on lisinopril prior to admit. BP normal off lisinopril. Have not restarted            Atrial fibrillation    Resume Coumadin for now.  INR 1.6  Resumed yesterday           VTE Risk Mitigation         Ordered     warfarin (COUMADIN) tablet 2 mg  Daily     Route:  Oral        11/28/17 0850     Medium Risk of VTE  Once      11/26/17 1234     Place sequential compression device  Until discontinued      11/26/17 1234              Joseph Redmond MD  Department of Hospital Medicine   [facility]

## 2017-11-29 NOTE — PLAN OF CARE
Problem: Fall Risk (Adult)  Goal: Absence of Falls  Patient will demonstrate the desired outcomes by discharge/transition of care.   Outcome: Ongoing (interventions implemented as appropriate)  Pt free of falls/incidents. Fall precautions maintained.    Problem: Patient Care Overview  Goal: Plan of Care Review  Outcome: Ongoing (interventions implemented as appropriate)  Pt aware of plan of care. No questions or concerns at this time.

## 2017-11-29 NOTE — ASSESSMENT & PLAN NOTE
On right  Surgery 2 weeks ago  Was rehabing at [place], should return at discharge  PT/OR ordered here today    Staples removed 11/27

## 2017-11-29 NOTE — PROGRESS NOTES
spoke with patient about abx and answered his questions about flomax. Patient didn't have questions about blood thinner or any uncontrolled pain. Patient has questions about his days at [place] being  covered. Told him I can get the  to speak with him.

## 2017-11-29 NOTE — PT/OT/SLP PROGRESS
Physical Therapy  Treatment    Eddie aPrada Sr.   MRN: [id number]   Admitting Diagnosis: Acute cystitis with hematuria    PT Received On: 11/29/17  PT Start Time: 1324     PT Stop Time: 1349    PT Total Time (min): 25 min       Billable Minutes:  Gait [id number] minutes and Therapeutic Activity 10 minutes    Treatment Type: Treatment  PT/PTA: PT             General Precautions: Standard, fall  Orthopedic Precautions: RLE weight bearing as tolerated   Braces:           Subjective:  Communicated with patient prior to session.    Pain/Comfort  Pain Rating 1: 0/10    Objective:   Patient found with: shields catheter, peripheral IV    Functional Mobility:  Bed Mobility:   Rolling/Turning to Left: Stand by assistance  Rolling/Turning Right: Stand by assistance  Scooting/Bridging: Stand by Assistance  Supine to Sit: Minimum Assistance  Sit to Supine: Stand by Assistance    Transfers:  Sit <> Stand Assistance: Contact Guard Assistance  Sit <> Stand Assistive Device: Rolling Walker  Bed <> Chair Technique: Stand Pivot  Bed <> Chair Assistance: Contact Guard Assistance  Bed <> Chair Assistive Device: Rolling Walker    Gait:   Gait Distance: 35 feet  Assistance 1: Contact Guard Assistance  Gait Assistive Device: Rolling walker  Gait Pattern: swing-to gait  Gait Deviation(s): decreased velocity of limb motion, decreased step length, decreased toe-to-floor clearance, decreased weight-shifting ability    Balance:   Static Sit: GOOD: Takes MODERATE challenges from all directions  Dynamic Sit: GOOD: Maintains balance through MODERATE excursions of active trunk movement  Static Stand: FAIR+: Takes MINIMAL challenges from all directions  Dynamic stand: FAIR: Needs CONTACT GUARD during gait     AM-PAC 6 CLICK MOBILITY  How much help from another person does this patient currently need?   1 = Unable, Total/Dependent Assistance  2 = A lot, Maximum/Moderate Assistance  3 = A little, Minimum/Contact Guard/Supervision  4 = None, Modified  [place]/Independent    Turning over in bed (including adjusting bedclothes, sheets and blankets)?: 3  Sitting down on and standing up from a chair with arms (e.g., wheelchair, bedside commode, etc.): 3  Moving from lying on back to sitting on the side of the bed?: 3  Moving to and from a bed to a chair (including a wheelchair)?: 3  Need to walk in hospital room?: 3  Climbing 3-5 steps with a railing?: 1  Total Score: 16    AM-PAC Raw Score CMS G-Code Modifier Level of Impairment Assistance   6 % Total / Unable   7 - 9 CM 80 - 100% Maximal Assist   10 - 14 CL 60 - 80% Moderate Assist   15 - 19 CK 40 - 60% Moderate Assist   20 - 22 CJ 20 - 40% Minimal Assist   23 CI 1-20% SBA / CGA   24 CH 0% Independent/ Mod I     Patient left supine with all lines intact, call button in reach and NSG notified.    Assessment:  Eddie Parada Sr. is a 88 y.o. male with a medical diagnosis of Acute cystitis with hematuria and presents with increased distance with ambulation. Progressing well with POC goals.    Rehab identified problem list/impairments: Rehab identified problem list/impairments: weakness, impaired endurance, impaired self care skills, impaired functional mobilty, gait instability, impaired balance, decreased lower extremity function, decreased safety awareness    Rehab potential is good.    Activity tolerance: Good    Discharge recommendations: Discharge Facility/Level Of Care Needs: nursing facility, skilled     Barriers to discharge: Barriers to Discharge: None    Equipment recommendations: Equipment Needed After Discharge: none     GOALS:    Physical Therapy Goals        Problem: Physical Therapy Goal    Goal Priority Disciplines Outcome Goal Variances Interventions   Physical Therapy Goal     PT/OT, PT      Description:  Goals to be met by: upon D/C from facility     Patient will increase functional independence with mobility by performin. Supine to sit with [place]  2. Sit to supine with  [place]  3. Sit to stand transfer with Standby Assistance with Rolling Walker  4. Bed to chair transfer with Supervision using Rolling Walker  5. Gait  x 50 feet with Contact Guard Assistance using Rolling Walker.                       PLAN:    Patient to be seen  (1-2x/day Monday-Friday; PRN Weekends/Holidays)  to address the above listed problems via gait training, therapeutic activities, therapeutic exercises  Plan of Care expires:  (upon D/C from facility\)  Plan of Care reviewed with: patient, spouse         Cindy Mishel, PT  11/29/2017

## 2017-11-30 PROBLEM — B96.20 E. COLI UTI: Status: ACTIVE | Noted: 2017-11-26

## 2017-11-30 LAB
ANION GAP SERPL CALC-SCNC: 8 MMOL/L
BASOPHILS # BLD AUTO: 0.04 K/UL
BASOPHILS NFR BLD: 0.5 %
BUN SERPL-MCNC: 16 MG/DL
CALCIUM SERPL-MCNC: 8.5 MG/DL
CHLORIDE SERPL-SCNC: 112 MMOL/L
CO2 SERPL-SCNC: 19 MMOL/L
CREAT SERPL-MCNC: 0.8 MG/DL
DIFFERENTIAL METHOD: ABNORMAL
EOSINOPHIL # BLD AUTO: 0.1 K/UL
EOSINOPHIL NFR BLD: 0.8 %
ERYTHROCYTE [DISTWIDTH] IN BLOOD BY AUTOMATED COUNT: 14 %
EST. GFR  (AFRICAN AMERICAN): >60 ML/MIN/1.73 M^2
EST. GFR  (NON AFRICAN AMERICAN): >60 ML/MIN/1.73 M^2
GLUCOSE SERPL-MCNC: 138 MG/DL
HCT VFR BLD AUTO: 28.4 %
HGB BLD-MCNC: 9.2 G/DL
INR PPP: 1.7
LYMPHOCYTES # BLD AUTO: 0.9 K/UL
LYMPHOCYTES NFR BLD: 11 %
MCH RBC QN AUTO: 33.2 PG
MCHC RBC AUTO-ENTMCNC: 32.4 G/DL
MCV RBC AUTO: 103 FL
MONOCYTES # BLD AUTO: 0.8 K/UL
MONOCYTES NFR BLD: 9.7 %
NEUTROPHILS # BLD AUTO: 6.5 K/UL
NEUTROPHILS NFR BLD: 78 %
PLATELET # BLD AUTO: 273 K/UL
PMV BLD AUTO: 9.8 FL
POCT GLUCOSE: 142 MG/DL (ref 70–110)
POCT GLUCOSE: 150 MG/DL (ref 70–110)
POCT GLUCOSE: 154 MG/DL (ref 70–110)
POCT GLUCOSE: 167 MG/DL (ref 70–110)
POTASSIUM SERPL-SCNC: 4.2 MMOL/L
PROTHROMBIN TIME: 18 SEC
RBC # BLD AUTO: 2.77 M/UL
SODIUM SERPL-SCNC: 139 MMOL/L
WBC # BLD AUTO: 8.37 K/UL

## 2017-11-30 PROCEDURE — 85610 PROTHROMBIN TIME: CPT

## 2017-11-30 PROCEDURE — 25000003 PHARM REV CODE 250: Performed by: INTERNAL MEDICINE

## 2017-11-30 PROCEDURE — 94760 N-INVAS EAR/PLS OXIMETRY 1: CPT

## 2017-11-30 PROCEDURE — G8979 MOBILITY GOAL STATUS: HCPCS | Mod: CJ

## 2017-11-30 PROCEDURE — 93005 ELECTROCARDIOGRAM TRACING: CPT

## 2017-11-30 PROCEDURE — 97530 THERAPEUTIC ACTIVITIES: CPT

## 2017-11-30 PROCEDURE — 25000003 PHARM REV CODE 250: Performed by: SURGERY

## 2017-11-30 PROCEDURE — 11000001 HC ACUTE MED/SURG PRIVATE ROOM

## 2017-11-30 PROCEDURE — G8978 MOBILITY CURRENT STATUS: HCPCS | Mod: CK

## 2017-11-30 PROCEDURE — 97116 GAIT TRAINING THERAPY: CPT

## 2017-11-30 PROCEDURE — 80048 BASIC METABOLIC PNL TOTAL CA: CPT

## 2017-11-30 PROCEDURE — 96361 HYDRATE IV INFUSION ADD-ON: CPT

## 2017-11-30 PROCEDURE — 82962 GLUCOSE BLOOD TEST: CPT

## 2017-11-30 PROCEDURE — 25000003 PHARM REV CODE 250: Performed by: NURSE PRACTITIONER

## 2017-11-30 PROCEDURE — 36415 COLL VENOUS BLD VENIPUNCTURE: CPT

## 2017-11-30 PROCEDURE — G8980 MOBILITY D/C STATUS: HCPCS | Mod: CK

## 2017-11-30 PROCEDURE — 25000003 PHARM REV CODE 250: Performed by: FAMILY MEDICINE

## 2017-11-30 PROCEDURE — 63600175 PHARM REV CODE 636 W HCPCS: Performed by: INTERNAL MEDICINE

## 2017-11-30 PROCEDURE — 96366 THER/PROPH/DIAG IV INF ADDON: CPT

## 2017-11-30 PROCEDURE — 85025 COMPLETE CBC W/AUTO DIFF WBC: CPT

## 2017-11-30 PROCEDURE — 94761 N-INVAS EAR/PLS OXIMETRY MLT: CPT

## 2017-11-30 PROCEDURE — 99239 HOSP IP/OBS DSCHRG MGMT >30: CPT | Mod: ,,, | Performed by: FAMILY MEDICINE

## 2017-11-30 RX ORDER — LISINOPRIL 10 MG/1
10 TABLET ORAL DAILY
Qty: 90 TABLET | Refills: 3 | Status: SHIPPED | OUTPATIENT
Start: 2017-12-01 | End: 2018-12-10 | Stop reason: SDUPTHER

## 2017-11-30 RX ORDER — SULFAMETHOXAZOLE AND TRIMETHOPRIM 800; 160 MG/1; MG/1
1 TABLET ORAL 2 TIMES DAILY
Qty: 8 TABLET | Refills: 0 | Status: SHIPPED | OUTPATIENT
Start: 2017-11-30 | End: 2017-12-04 | Stop reason: HOSPADM

## 2017-11-30 RX ORDER — TAMSULOSIN HYDROCHLORIDE 0.4 MG/1
0.4 CAPSULE ORAL DAILY
Qty: 30 CAPSULE | Refills: 11 | Status: SHIPPED | OUTPATIENT
Start: 2017-12-01 | End: 2019-01-21 | Stop reason: SDUPTHER

## 2017-11-30 RX ADMIN — Medication 400 MG: at 10:11

## 2017-11-30 RX ADMIN — WARFARIN SODIUM 2 MG: 2 TABLET ORAL at 06:11

## 2017-11-30 RX ADMIN — Medication 400 MG: at 09:11

## 2017-11-30 RX ADMIN — CEFEPIME 500 MG: 1 INJECTION, POWDER, FOR SOLUTION INTRAMUSCULAR; INTRAVENOUS at 02:11

## 2017-11-30 RX ADMIN — GLIMEPIRIDE 2 MG: 2 TABLET ORAL at 08:11

## 2017-11-30 RX ADMIN — PRAVASTATIN SODIUM 20 MG: 20 TABLET ORAL at 10:11

## 2017-11-30 RX ADMIN — TAMSULOSIN HYDROCHLORIDE 0.4 MG: 0.4 CAPSULE ORAL at 10:11

## 2017-11-30 RX ADMIN — SODIUM CHLORIDE: 0.9 INJECTION, SOLUTION INTRAVENOUS at 02:11

## 2017-11-30 RX ADMIN — LEVOTHYROXINE SODIUM 50 MCG: 50 TABLET ORAL at 10:11

## 2017-11-30 RX ADMIN — POTASSIUM CHLORIDE 40 MEQ: 1500 TABLET, EXTENDED RELEASE ORAL at 10:11

## 2017-11-30 RX ADMIN — LISINOPRIL 10 MG: 10 TABLET ORAL at 10:11

## 2017-11-30 RX ADMIN — CARVEDILOL 12.5 MG: 12.5 TABLET, FILM COATED ORAL at 10:11

## 2017-11-30 RX ADMIN — CARVEDILOL 12.5 MG: 12.5 TABLET, FILM COATED ORAL at 06:11

## 2017-11-30 RX ADMIN — PANTOPRAZOLE SODIUM 40 MG: 40 TABLET, DELAYED RELEASE ORAL at 10:11

## 2017-11-30 RX ADMIN — CEFEPIME 500 MG: 1 INJECTION, POWDER, FOR SOLUTION INTRAMUSCULAR; INTRAVENOUS at 01:11

## 2017-11-30 RX ADMIN — PIOGLITAZONE 15 MG: 15 TABLET ORAL at 10:11

## 2017-11-30 RX ADMIN — SODIUM CHLORIDE 1000 ML: 0.9 INJECTION, SOLUTION INTRAVENOUS at 06:11

## 2017-11-30 RX ADMIN — ASPIRIN 81 MG: 81 TABLET, COATED ORAL at 10:11

## 2017-11-30 NOTE — PROGRESS NOTES
[facility] Hospital Medicine  Progress Note    Patient Name: Eddie Parada Sr.  MRN: [id number]  Patient Class: OP- Observation   Admission Date: 11/26/2017  Length of Stay: 0 days  Attending Physician: Jorge Luis Mccarty MD  Primary Care Provider: Callum Roberts MD        Subjective:     Principal Problem:E. coli UTI    HPI:  Eddie Parada Sr. presents to the emergency room with dysuria and urinary retention  Patient states that he had trouble urinating this week at the nursing home, Shields placed   Patient had an in and out catheterization at the nursing home, culture sent by PCP then  Pt then had subjective fever, family brought him into the ER for urinary tract evaluation  Shields placed in the ER with puslike urine, subjective fevers last night for staff at the NH  Patient has a history of a recent right hip fracture requiring ORIF.  He is still working with therapy.  Patient has a history of heart valve transplant.    Hospital Course:  Patient admitted to the third floor on telemetry.  He received his first dose of Rocephin.  He has no complaints this evening.    11/27--feeling better. No fevers or leukocytosis. Still have shields in placed, placed due to urinary retention on admit. Remains on rocephin    11/28/17  Plan was to hopefully send him to NH   He ran 3 beat V tach   Asymptomatic   K borderline normal   Added KCL and magnesium  CIS consult done   Keep on telemetry ;  Hopefully home in am   Had to be place don foleys catheter ; unable to urinate   Will need to see urology outpatient       11/29/17  No fever   Urine culture pending   3 beat run of [facility] o/b. Lytes and mg ok.  Coumadin resumed last night after being supratherapeutic upon admit      11/30  This morning, he has no complaints.  Shields still in place.  No burning.  His wife is most concerned about his bowels. He has had some incontinence, however, he says that he can feel his BMs now. He has a 'large BM' this morning  reported.    Interval History: see      Review of Systems   Constitutional: Negative for activity change, fatigue, fever and unexpected weight change.   HENT: Negative for congestion, ear pain, hearing loss, rhinorrhea and sore throat.    Eyes: Negative for pain, redness and visual disturbance.   Respiratory: Negative for cough, shortness of breath and wheezing.    Cardiovascular: Negative for chest pain, palpitations and leg swelling.   Gastrointestinal: Positive for constipation and diarrhea. Negative for abdominal pain, blood in stool, nausea and vomiting.   Genitourinary: Negative for decreased urine volume, dysuria, frequency and urgency.        No more dysuria   Musculoskeletal: Positive for arthralgias (right hip pain s/p surgery). Negative for back pain, joint swelling and neck pain.   Skin: Negative for color change, rash and wound.   Neurological: Negative for dizziness, tremors, weakness, light-headedness and headaches.     Objective:     Vital Signs (Most Recent):  Temp: 96.2 °F (35.7 °C) (11/30/17 1139)  Pulse: 61 (11/30/17 1139)  Resp: 18 (11/30/17 1139)  BP: 115/60 (11/30/17 1139)  SpO2: 97 % (11/30/17 1139) Vital Signs (24h Range):  Temp:  [96.2 °F (35.7 °C)-97.5 °F (36.4 °C)] 96.2 °F (35.7 °C)  Pulse:  [59-71] 61  Resp:  [18-20] 18  SpO2:  [94 %-98 %] 97 %  BP: (115-150)/(60-72) 115/60     Weight: 87.5 kg (192 lb 14.4 oz)  Body mass index is 31.14 kg/m².    Intake/Output Summary (Last 24 hours) at 11/30/17 1217  Last data filed at 11/30/17 1000   Gross per 24 hour   Intake           6617.5 ml   Output             2501 ml   Net           4116.5 ml      Physical Exam   Constitutional: He is oriented to person, place, and time. He appears well-developed and well-nourished. No distress.   HENT:   Head: Normocephalic and atraumatic.   Right Ear: External ear normal.   Left Ear: External ear normal.   Eyes: Conjunctivae and EOM are normal. Pupils are equal, round, and reactive to light. Right eye  exhibits no discharge. Left eye exhibits no discharge.   Neck: Neck supple. No tracheal deviation present.   Cardiovascular: Normal rate and regular rhythm.    No murmur heard.  Pulmonary/Chest: Effort normal and breath sounds normal. No respiratory distress. He has no wheezes.   Abdominal: Soft. Bowel sounds are normal. He exhibits no distension. There is no tenderness. There is no rebound and no guarding.   Genitourinary:   Genitourinary Comments: Bishop in place  Dark norbert urine    Neurological: He is alert and oriented to person, place, and time. No cranial nerve deficit.   Skin: Skin is warm and dry.   Stapes in place to right hip surgical wounds, healing well. No signs of infection     Psychiatric: He has a normal mood and affect. His behavior is normal.   Nursing note and vitals reviewed.      Significant Labs:   CBC:     Recent Labs  Lab 11/29/17 0522 11/30/17  0640   WBC 8.33 8.37   HGB 9.4* 9.2*   HCT 29.6* 28.4*    273     CMP:     Recent Labs  Lab 11/29/17 0522 11/30/17  0640    139   K 4.7 4.2   * 112*   CO2 18* 19*   * 138*   BUN 19 16   CREATININE 0.8 0.8   CALCIUM 8.7 8.5*   ANIONGAP 9 8   EGFRNONAA >60 >60     Magnesium:     Recent Labs  Lab 11/29/17  0522   MG 1.9       Lab Results   Component Value Date    INR 1.7 (H) 11/30/2017    INR 1.6 (H) 11/29/2017    INR 1.9 (H) 11/28/2017         Significant Imaging: I have reviewed and interpreted all pertinent imaging results/findings within the past 24 hours.    Assessment/Plan:      * E. coli UTI    Bishop catheter in place due to urinary retention on admit. D/c Bishop 11/27 for voiding trial, failed   Starting flomax 11/29   Rocephin 1 g IV piggyback every 24 hours day 4  Urine culture and blood culture in process--urine shows e coli - bactrim sens.          V-tach    Isolated event. Lytes and mg normal           S/P ORIF (open reduction internal fixation) fracture    On right  Surgery 2 weeks ago  Was rehabing at [facility],  should return at discharge  PT/OR ordered here today    Staples removed 11/27          Coumadin toxicity    Held Coumadin until   INR is   Lab Results   Component Value Date    INR 1.7 (H) 11/30/2017    INR 1.6 (H) 11/29/2017    INR 1.9 (H) 11/28/2017     Resumed coumadin 11/28/17        (HFpEF) heart failure with preserved ejection fraction    Continue carvedilol 12.5 mg by mouth twice a day  Continue aspirin 81 mg daily  No signs of acute exacerbation here          Angina pectoris              COPD (chronic obstructive pulmonary disease)    No signs of acute exacerbation  Was not on maintenance inhalers prior to admit        Type 2 diabetes mellitus with neurologic complication    Restarted home amaryl and actos 11/27  amaryl  Insulin sliding scale ordered - bs very good.          Cardiac pacemaker in situ    Aspirin 81 mg daily  Continue telemetry; coreg  Resume coumadin    No further arrhyhtmias        CAD (coronary artery disease)    Aspirin 81 mg daily  Coreg  Pravastatin    Was on lisinopril prior to admit. BP normal off lisinopril. Have not restarted and will continue to hold.            Atrial fibrillation    Resume Coumadin for now.  INR subtherapeutic will need to be monitored.            VTE Risk Mitigation         Ordered     warfarin (COUMADIN) tablet 2 mg  Daily     Route:  Oral        11/28/17 0850     Medium Risk of VTE  Once      11/26/17 1234     Place sequential compression device  Until discontinued      11/26/17 1234              Linda Greco MD  Department of Hospital Medicine   [facility]

## 2017-11-30 NOTE — PT/OT/SLP PROGRESS
"Physical Therapy  Treatment    Eddie Parada Sr.   MRN: [id number]   Admitting Diagnosis: Acute cystitis with hematuria    PT Received On: 11/30/17  PT Start Time: 1053     PT Stop Time: 1123    PT Total Time (min): 30 min       Billable Minutes:  Gait [id number] minutes and Therapeutic Activity 12 minutes    Treatment Type: Treatment  PT/PTA: PT             General Precautions: Standard, fall  Orthopedic Precautions: RLE weight bearing as tolerated   Braces:           Subjective:  Communicated with patient prior to session.  "It would feel good to get out of bed."  C/O intermittent SOB.    Pain/Comfort  Pain Rating 1: 0/10  Pain Rating Post-Intervention 1: 0/10    Objective:   Patient found with: peripheral IV    Functional Mobility:  Bed Mobility:   Rolling/Turning to Left: Stand by assistance  Rolling/Turning Right: Stand by assistance  Scooting/Bridging: Stand by Assistance  Supine to Sit: Minimum Assistance  Sit to Supine: Stand by Assistance    Transfers:  Sit <> Stand Assistance: Contact Guard Assistance, 3 bouts  Sit <> Stand Assistive Device: Rolling Walker  Bed <> Chair Technique: Stand Pivot  Bed <> Chair Assistance: Contact Guard Assistance  Bed <> Chair Assistive Device: Rolling Walker    Gait:   Gait Distance: 35 feet x 1 bout, 15 feet x 1 bout  Assistance 1: Contact Guard Assistance  Gait Assistive Device: Rolling walker  Gait Pattern: swing-to gait  Gait Deviation(s): decreased velocity of limb motion, decreased step length, decreased toe-to-floor clearance, decreased weight-shifting ability  Gait Training:  Pt. amb. 35' x 1 bout, 15' x 1 bout, with RW, CGA.  VCs given to pt. for step placement and A.D. placement.      Balance:   Static Sit: FAIR+: Able to take MINIMAL challenges from all directions  Dynamic Sit: FAIR+: Maintains balance through MINIMAL excursions of active trunk motion  Static Stand: FAIR: Maintains without assist but unable to take challenges  Dynamic stand: FAIR: Needs CONTACT " GUARD during gait     Therapeutic Activities and Exercises:   There. Act.:  Transfer Training performed with assistance as noted.  VC's and manual guidance given to pt. for sequencing.  Weight shifting and weight acceptance tasks performed in standing with min. A.  Trunk control tasks performed with min. A. while pt. seated in bedside chair.  Seated scooting performed by pt. with CGA, in bedside chair, and EOB.    AM-PAC 6 CLICK MOBILITY  How much help from another person does this patient currently need?   1 = Unable, Total/Dependent Assistance  2 = A lot, Maximum/Moderate Assistance  3 = A little, Minimum/Contact Guard/Supervision  4 = None, Modified [place]/Independent    Turning over in bed (including adjusting bedclothes, sheets and blankets)?: 3  Sitting down on and standing up from a chair with arms (e.g., wheelchair, bedside commode, etc.): 3  Moving from lying on back to sitting on the side of the bed?: 3  Moving to and from a bed to a chair (including a wheelchair)?: 3  Need to walk in hospital room?: 3  Climbing 3-5 steps with a railing?: 1  Total Score: 16    AM-PAC Raw Score CMS G-Code Modifier Level of Impairment Assistance   6 % Total / Unable   7 - 9 CM 80 - 100% Maximal Assist   10 - 14 CL 60 - 80% Moderate Assist   15 - 19 CK 40 - 60% Moderate Assist   20 - 22 CJ 20 - 40% Minimal Assist   23 CI 1-20% SBA / CGA   24 CH 0% Independent/ Mod I     Patient left up in chair with all lines intact, call button in reach, RN notified and family present.    Assessment:  Eddie Parada Sr. is a 88 y.o. male with a medical diagnosis of Acute cystitis with hematuria and presents with increased endurance.  Pt. Anticipates D/C to Nursing Facility 11/30/2017.  Please see PT discharge note.    Rehab identified problem list/impairments: Rehab identified problem list/impairments: weakness, gait instability, impaired balance, impaired endurance, impaired self care skills, impaired functional mobilty,  decreased lower extremity function, decreased safety awareness    Rehab potential is fair.    Activity tolerance: Fair    Discharge recommendations: Discharge Facility/Level Of Care Needs: nursing facility, skilled     Barriers to discharge: Barriers to Discharge: None    Equipment recommendations: Equipment Needed After Discharge: none     GOALS:    Physical Therapy Goals        Problem: Physical Therapy Goal    Goal Priority Disciplines Outcome Goal Variances Interventions   Physical Therapy Goal     PT/OT, PT      Description:  Goals to be met by: upon D/C from facility     Patient will increase functional independence with mobility by performin. Supine to sit with [place]  2. Sit to supine with [place]  3. Sit to stand transfer with Standby Assistance with Rolling Walker  4. Bed to chair transfer with Supervision using Rolling Walker  5. Gait  x 50 feet with Contact Guard Assistance using Rolling Walker.                       PLAN:    Patient to be seen  (1-2x/day(Weekdays); PRN(Sat.,Sun., Holidays))  to address the above listed problems via gait training, therapeutic activities, therapeutic exercises  Plan of Care expires: 17 (following a.m. PT tx. session.)  Plan of Care reviewed with: patient, family         Jamaal Mena, PT  2017

## 2017-11-30 NOTE — PT/OT/SLP DISCHARGE
Physical Therapy Discharge Summary    Eddie Parada Sr.  MRN: [id number]   Acute cystitis with hematuria   Patient Discharged from acute Physical Therapy on 2017 following a.m. PT tx. session.  Please refer to prior PT noted date on 2017 for functional status.     Assessment:   Patient appropriate for care in another setting.  GOALS:    Physical Therapy Goals        Problem: Physical Therapy Goal    Goal Priority Disciplines Outcome Goal Variances Interventions   Physical Therapy Goal     PT/OT, PT      Description:  Goals to be met by: upon D/C from facility     Patient will increase functional independence with mobility by performin. Supine to sit with [place]  2. Sit to supine with [place]  3. Sit to stand transfer with Standby Assistance with Rolling Walker  4. Bed to chair transfer with Supervision using Rolling Walker  5. Gait  x 50 feet with Contact Guard Assistance using Rolling Walker.                     Reasons for Discontinuation of Therapy Services  Transfer to alternate level of care.      Plan:  Patient Discharged to: Skilled Nursing Facility.

## 2017-11-30 NOTE — ASSESSMENT & PLAN NOTE
Aspirin 81 mg daily  Coreg  Pravastatin    Was on lisinopril prior to admit. BP normal off lisinopril. Have not restarted and will continue to hold.

## 2017-11-30 NOTE — ASSESSMENT & PLAN NOTE
Held Coumadin until   INR is   Lab Results   Component Value Date    INR 1.7 (H) 11/30/2017    INR 1.6 (H) 11/29/2017    INR 1.9 (H) 11/28/2017     Resumed coumadin 11/28/17

## 2017-11-30 NOTE — PLAN OF CARE
Problem: Patient Care Overview  Goal: Plan of Care Review  Quiet shift. IV restarted X 2 (one for C/O pain). Afebrile. Tolerating IV fluids and antibiotics. Turning self. Legs elevated on pillows. Call bell in reach and plan of care discussed and verbalizes understanding. Healing wound to right lateral hip

## 2017-11-30 NOTE — SUBJECTIVE & OBJECTIVE
Interval History: see      Review of Systems   Constitutional: Negative for activity change, fatigue, fever and unexpected weight change.   HENT: Negative for congestion, ear pain, hearing loss, rhinorrhea and sore throat.    Eyes: Negative for pain, redness and visual disturbance.   Respiratory: Negative for cough, shortness of breath and wheezing.    Cardiovascular: Negative for chest pain, palpitations and leg swelling.   Gastrointestinal: Positive for constipation and diarrhea. Negative for abdominal pain, blood in stool, nausea and vomiting.   Genitourinary: Negative for decreased urine volume, dysuria, frequency and urgency.        No more dysuria   Musculoskeletal: Positive for arthralgias (right hip pain s/p surgery). Negative for back pain, joint swelling and neck pain.   Skin: Negative for color change, rash and wound.   Neurological: Negative for dizziness, tremors, weakness, light-headedness and headaches.     Objective:     Vital Signs (Most Recent):  Temp: 96.2 °F (35.7 °C) (11/30/17 1139)  Pulse: 61 (11/30/17 1139)  Resp: 18 (11/30/17 1139)  BP: 115/60 (11/30/17 1139)  SpO2: 97 % (11/30/17 1139) Vital Signs (24h Range):  Temp:  [96.2 °F (35.7 °C)-97.5 °F (36.4 °C)] 96.2 °F (35.7 °C)  Pulse:  [59-71] 61  Resp:  [18-20] 18  SpO2:  [94 %-98 %] 97 %  BP: (115-150)/(60-72) 115/60     Weight: 87.5 kg (192 lb 14.4 oz)  Body mass index is 31.14 kg/m².    Intake/Output Summary (Last 24 hours) at 11/30/17 1217  Last data filed at 11/30/17 1000   Gross per 24 hour   Intake           6617.5 ml   Output             2501 ml   Net           4116.5 ml      Physical Exam   Constitutional: He is oriented to person, place, and time. He appears well-developed and well-nourished. No distress.   HENT:   Head: Normocephalic and atraumatic.   Right Ear: External ear normal.   Left Ear: External ear normal.   Eyes: Conjunctivae and EOM are normal. Pupils are equal, round, and reactive to light. Right eye exhibits no  discharge. Left eye exhibits no discharge.   Neck: Neck supple. No tracheal deviation present.   Cardiovascular: Normal rate and regular rhythm.    No murmur heard.  Pulmonary/Chest: Effort normal and breath sounds normal. No respiratory distress. He has no wheezes.   Abdominal: Soft. Bowel sounds are normal. He exhibits no distension. There is no tenderness. There is no rebound and no guarding.   Genitourinary:   Genitourinary Comments: Bishop in place  Dark norbert urine    Neurological: He is alert and oriented to person, place, and time. No cranial nerve deficit.   Skin: Skin is warm and dry.   Stapes in place to right hip surgical wounds, healing well. No signs of infection     Psychiatric: He has a normal mood and affect. His behavior is normal.   Nursing note and vitals reviewed.      Significant Labs:   CBC:     Recent Labs  Lab 11/29/17 0522 11/30/17  0640   WBC 8.33 8.37   HGB 9.4* 9.2*   HCT 29.6* 28.4*    273     CMP:     Recent Labs  Lab 11/29/17 0522 11/30/17  0640    139   K 4.7 4.2   * 112*   CO2 18* 19*   * 138*   BUN 19 16   CREATININE 0.8 0.8   CALCIUM 8.7 8.5*   ANIONGAP 9 8   EGFRNONAA >60 >60     Magnesium:     Recent Labs  Lab 11/29/17  0522   MG 1.9       Lab Results   Component Value Date    INR 1.7 (H) 11/30/2017    INR 1.6 (H) 11/29/2017    INR 1.9 (H) 11/28/2017         Significant Imaging: I have reviewed and interpreted all pertinent imaging results/findings within the past 24 hours.

## 2017-11-30 NOTE — ASSESSMENT & PLAN NOTE
Bishop catheter in place due to urinary retention on admit. D/c Bishop 11/27 for voiding trial, failed   Starting flomax 11/29   Rocephin 1 g IV piggyback every 24 hours day 4  Urine culture and blood culture in process--urine shows e coli - bactrim sens.

## 2017-11-30 NOTE — ASSESSMENT & PLAN NOTE
On right  Surgery 2 weeks ago  Was rehabing at [place], should return at discharge  PT/OR ordered here today    Staples removed 11/27

## 2017-12-01 LAB
ANION GAP SERPL CALC-SCNC: 9 MMOL/L
BACTERIA BLD CULT: NORMAL
BACTERIA BLD CULT: NORMAL
BASOPHILS # BLD AUTO: 0.03 K/UL
BASOPHILS NFR BLD: 0.3 %
BUN SERPL-MCNC: 15 MG/DL
CALCIUM SERPL-MCNC: 8.3 MG/DL
CHLORIDE SERPL-SCNC: 111 MMOL/L
CO2 SERPL-SCNC: 17 MMOL/L
CREAT SERPL-MCNC: 0.8 MG/DL
DIFFERENTIAL METHOD: ABNORMAL
EOSINOPHIL # BLD AUTO: 0.1 K/UL
EOSINOPHIL NFR BLD: 1 %
ERYTHROCYTE [DISTWIDTH] IN BLOOD BY AUTOMATED COUNT: 13.9 %
EST. GFR  (AFRICAN AMERICAN): >60 ML/MIN/1.73 M^2
EST. GFR  (NON AFRICAN AMERICAN): >60 ML/MIN/1.73 M^2
GLUCOSE SERPL-MCNC: 146 MG/DL
HCT VFR BLD AUTO: 28.3 %
HGB BLD-MCNC: 8.9 G/DL
INR PPP: 1.7
LYMPHOCYTES # BLD AUTO: 0.9 K/UL
LYMPHOCYTES NFR BLD: 9 %
MCH RBC QN AUTO: 32.2 PG
MCHC RBC AUTO-ENTMCNC: 31.4 G/DL
MCV RBC AUTO: 103 FL
MONOCYTES # BLD AUTO: 0.8 K/UL
MONOCYTES NFR BLD: 8.4 %
NEUTROPHILS # BLD AUTO: 7.8 K/UL
NEUTROPHILS NFR BLD: 81.3 %
PLATELET # BLD AUTO: 284 K/UL
PMV BLD AUTO: 9.6 FL
POCT GLUCOSE: 108 MG/DL (ref 70–110)
POCT GLUCOSE: 129 MG/DL (ref 70–110)
POCT GLUCOSE: 168 MG/DL (ref 70–110)
POCT GLUCOSE: 214 MG/DL (ref 70–110)
POTASSIUM SERPL-SCNC: 4.3 MMOL/L
PROTHROMBIN TIME: 18.5 SEC
RBC # BLD AUTO: 2.76 M/UL
SODIUM SERPL-SCNC: 137 MMOL/L
WBC # BLD AUTO: 9.64 K/UL

## 2017-12-01 PROCEDURE — 36415 COLL VENOUS BLD VENIPUNCTURE: CPT

## 2017-12-01 PROCEDURE — 80048 BASIC METABOLIC PNL TOTAL CA: CPT

## 2017-12-01 PROCEDURE — 85025 COMPLETE CBC W/AUTO DIFF WBC: CPT

## 2017-12-01 PROCEDURE — 97803 MED NUTRITION INDIV SUBSEQ: CPT

## 2017-12-01 PROCEDURE — 25000003 PHARM REV CODE 250: Performed by: SURGERY

## 2017-12-01 PROCEDURE — 97530 THERAPEUTIC ACTIVITIES: CPT

## 2017-12-01 PROCEDURE — 63600175 PHARM REV CODE 636 W HCPCS: Performed by: INTERNAL MEDICINE

## 2017-12-01 PROCEDURE — 25000003 PHARM REV CODE 250: Performed by: INTERNAL MEDICINE

## 2017-12-01 PROCEDURE — 11000001 HC ACUTE MED/SURG PRIVATE ROOM

## 2017-12-01 PROCEDURE — 99233 SBSQ HOSP IP/OBS HIGH 50: CPT | Mod: ,,, | Performed by: FAMILY MEDICINE

## 2017-12-01 PROCEDURE — 82962 GLUCOSE BLOOD TEST: CPT

## 2017-12-01 PROCEDURE — 97116 GAIT TRAINING THERAPY: CPT

## 2017-12-01 PROCEDURE — 85610 PROTHROMBIN TIME: CPT

## 2017-12-01 PROCEDURE — 25000003 PHARM REV CODE 250: Performed by: NURSE PRACTITIONER

## 2017-12-01 PROCEDURE — 63600175 PHARM REV CODE 636 W HCPCS: Performed by: FAMILY MEDICINE

## 2017-12-01 PROCEDURE — 25000003 PHARM REV CODE 250: Performed by: FAMILY MEDICINE

## 2017-12-01 PROCEDURE — 94761 N-INVAS EAR/PLS OXIMETRY MLT: CPT

## 2017-12-01 RX ORDER — FUROSEMIDE 10 MG/ML
20 INJECTION INTRAMUSCULAR; INTRAVENOUS ONCE
Status: COMPLETED | OUTPATIENT
Start: 2017-12-01 | End: 2017-12-01

## 2017-12-01 RX ADMIN — CARVEDILOL 12.5 MG: 12.5 TABLET, FILM COATED ORAL at 05:12

## 2017-12-01 RX ADMIN — TAMSULOSIN HYDROCHLORIDE 0.4 MG: 0.4 CAPSULE ORAL at 08:12

## 2017-12-01 RX ADMIN — RAMELTEON 8 MG: 8 TABLET, FILM COATED ORAL at 01:12

## 2017-12-01 RX ADMIN — LISINOPRIL 10 MG: 10 TABLET ORAL at 08:12

## 2017-12-01 RX ADMIN — LEVOTHYROXINE SODIUM 50 MCG: 50 TABLET ORAL at 08:12

## 2017-12-01 RX ADMIN — PIOGLITAZONE 15 MG: 15 TABLET ORAL at 08:12

## 2017-12-01 RX ADMIN — FUROSEMIDE 20 MG: 10 INJECTION, SOLUTION INTRAMUSCULAR; INTRAVENOUS at 12:12

## 2017-12-01 RX ADMIN — Medication 400 MG: at 08:12

## 2017-12-01 RX ADMIN — Medication 400 MG: at 09:12

## 2017-12-01 RX ADMIN — GLIMEPIRIDE 2 MG: 2 TABLET ORAL at 08:12

## 2017-12-01 RX ADMIN — PANTOPRAZOLE SODIUM 40 MG: 40 TABLET, DELAYED RELEASE ORAL at 08:12

## 2017-12-01 RX ADMIN — POTASSIUM CHLORIDE 40 MEQ: 1500 TABLET, EXTENDED RELEASE ORAL at 08:12

## 2017-12-01 RX ADMIN — CARVEDILOL 12.5 MG: 12.5 TABLET, FILM COATED ORAL at 08:12

## 2017-12-01 RX ADMIN — CEFEPIME 500 MG: 1 INJECTION, POWDER, FOR SOLUTION INTRAMUSCULAR; INTRAVENOUS at 01:12

## 2017-12-01 RX ADMIN — CEFEPIME 500 MG: 1 INJECTION, POWDER, FOR SOLUTION INTRAMUSCULAR; INTRAVENOUS at 02:12

## 2017-12-01 RX ADMIN — PRAVASTATIN SODIUM 20 MG: 20 TABLET ORAL at 08:12

## 2017-12-01 RX ADMIN — WARFARIN SODIUM 2 MG: 2 TABLET ORAL at 05:12

## 2017-12-01 RX ADMIN — ASPIRIN 81 MG: 81 TABLET, COATED ORAL at 08:12

## 2017-12-01 NOTE — PLAN OF CARE
Problem: Patient Care Overview  Goal: Plan of Care Review  Outcome: Ongoing (interventions implemented as appropriate)  Restless evening with intermittent cat naps even after Rozerem administered. Call miracle rodgers reach and family at bedside. Legs elevated on pillows. Saline lock patent and intact. Plan of care discussed and verbalizes understanding. Bishop patent and clearing and tolerating well. Scrotum and penis edematous.

## 2017-12-01 NOTE — ASSESSMENT & PLAN NOTE
Pt needs further diuresing for his edema & CHF while recovering from his hip surgery and UTI issues

## 2017-12-01 NOTE — PLAN OF CARE
Call received from Olga Reynoso with [facility], stating the patient cannot return to the [facility] until the following takes place:  1. The [facility] must accept the patient again.  Upon his leaving and coming here is authorization was revoked by [facility].  2. Order for Skilled care must be written by the MD  3. A NEW authorization must be obtained from [facility].   4. A physician must be assigned to this case to follow him at the [facility].    THIS PATIENT WILL REMAIN HERE UNTIL Monday WHEN WE CAN WORK ON THIS .  Iveth Jules CM.

## 2017-12-01 NOTE — PROGRESS NOTES
After starting flomax and clearing patient's bowels, shields was attempted to be d/c'd. Cultures have returned. It was the plan to dc to nh today. However, after 6 hours, no urination. Cath replaced after 300 residual produced. D/c in morning to NH with shields in place and f/u with urology as well as abx to complete 1 week course. Continued PT.  mh

## 2017-12-01 NOTE — ASSESSMENT & PLAN NOTE
Continue carvedilol 12.5 mg by mouth twice a day  Continue aspirin 81 mg daily  Showing signs of acute exacerbation today

## 2017-12-01 NOTE — PROGRESS NOTES
[facility] Hospital Medicine  Progress Note    Patient Name: Eddie Parada Sr.  MRN: [id number]  Patient Class: IP- Inpatient   Admission Date: 11/26/2017  Length of Stay: 1 days  Attending Physician: Jorge Luis Mccarty MD  Primary Care Provider: Callum Roberts MD        Subjective:     Principal Problem:E. coli UTI & CHF now    HPI:  Eddie Parada Sr. presents to the emergency room with dysuria and urinary retention  Patient states that he had trouble urinating this week at the nursing home, Shields placed   Patient had an in and out catheterization at the nursing home, culture sent by PCP then  Pt then had subjective fever, family brought him into the ER for urinary tract evaluation  Shields placed in the ER with puslike urine, subjective fevers last night for staff at the NH  Patient has a history of a recent right hip fracture requiring ORIF.  He is still working with therapy.  Patient has a history of heart valve transplant.    Hospital Course:  Patient admitted to the third floor on telemetry.  He received his first dose of Rocephin.  He has no complaints this evening.    11/27--feeling better. No fevers or leukocytosis. Still have shields in placed, placed due to urinary retention on admit. Remains on rocephin    11/28/17  Plan was to hopefully send him to NH   He ran 3 beat V tach   Asymptomatic   K borderline normal   Added KCL and magnesium  CIS consult done   Keep on telemetry ;  Hopefully home in am   Had to be place don foleys catheter ; unable to urinate   Will need to see urology outpatient       11/29/17  No fever   Urine culture pending   3 beat run of [facility] o/b. Lytes and mg ok.  Coumadin resumed last night after being supratherapeutic upon admit      11/30  This morning, he has no complaints.  Shields still in place.  No burning.  His wife is most concerned about his bowels. He has had some incontinence, however, he says that he can feel his BMs now. He has a 'large BM' this  morning reported.    Pt has now progressive SOB and is showing signs of fluid overload with leg edema nad groin scrotal edema with subjective SOB and  PND from appears to be CHF.    Interval History: AMAYA/ SOB and CHF    Review of Systems   Constitutional: Positive for activity change and fatigue.        Bed confined   Respiratory: Positive for cough and shortness of breath.    Cardiovascular: Positive for leg swelling.   Genitourinary:        Scrotal edema and penile edema; urinary retention with needing and indwelling shields to relieve bladder retention; has  uti     Objective:     Vital Signs (Most Recent):  Temp: 96.3 °F (35.7 °C) (12/01/17 1557)  Pulse: 60 (12/01/17 1557)  Resp: 18 (12/01/17 1557)  BP: (!) 125/56 (12/01/17 1557)  SpO2: 95 % (12/01/17 1557) Vital Signs (24h Range):  Temp:  [96 °F (35.6 °C)-98.5 °F (36.9 °C)] 96.3 °F (35.7 °C)  Pulse:  [59-72] 60  Resp:  [18-20] 18  SpO2:  [93 %-98 %] 95 %  BP: (125-155)/(56-80) 125/56     Weight: 87.5 kg (192 lb 14.4 oz)  Body mass index is 31.14 kg/m².    Intake/Output Summary (Last 24 hours) at 12/01/17 1558  Last data filed at 12/01/17 1142   Gross per 24 hour   Intake          1518.75 ml   Output             1775 ml   Net          -256.25 ml      Physical Exam   Constitutional: He is oriented to person, place, and time. He appears well-developed and well-nourished.   HENT:   Head: Normocephalic.   Eyes: Pupils are equal, round, and reactive to light.   Neck: Normal range of motion. Neck supple. No thyromegaly present.   Cardiovascular: Normal rate and regular rhythm.  Exam reveals no friction rub.    No murmur heard.  Pulmonary/Chest: He is in respiratory distress. He has no wheezes. He has rales.   Abdominal: There is no tenderness. There is no rebound and no guarding.   Genitourinary:   Genitourinary Comments: Penile and scrotal edema 2++; indwelling shields cath inplace   Musculoskeletal: Normal range of motion. He exhibits edema. He exhibits no tenderness.    Lymphadenopathy:     He has no cervical adenopathy.   Neurological: He is alert and oriented to person, place, and time. He has normal reflexes. No cranial nerve deficit.   Skin: Skin is warm and dry.   Psychiatric: He has a normal mood and affect. Judgment and thought content normal.       Significant Labs:   BMP:   Recent Labs  Lab 12/01/17 0359   *      K 4.3   *   CO2 17*   BUN 15   CREATININE 0.8   CALCIUM 8.3*     CBC:   Recent Labs  Lab 11/30/17 0640 12/01/17 0359   WBC 8.37 9.64   HGB 9.2* 8.9*   HCT 28.4* 28.3*    284     CMP:   Recent Labs  Lab 11/30/17 0640 12/01/17 0359    137   K 4.2 4.3   * 111*   CO2 19* 17*   * 146*   BUN 16 15   CREATININE 0.8 0.8   CALCIUM 8.5* 8.3*   ANIONGAP 8 9   EGFRNONAA >60 >60         Assessment/Plan:      * E. coli UTI    Bishop catheter in place due to urinary retention on admit. D/c Bishop 11/27 for voiding trial, failed   Starting flomax 11/29   Rocephin 1 g IV piggyback every 24 hours day 4  Urine culture and blood culture in process--urine shows e coli - bactrim sens.          V-tach    Isolated event. Lytes and mg normal           S/P ORIF (open reduction internal fixation) fracture    On right  Surgery 2 weeks ago  Was rehabing at [facility], should return at discharge  PT/OR ordered here today    Staples removed 11/27          Coumadin toxicity    Held Coumadin until   INR is   Lab Results   Component Value Date    INR 1.7 (H) 12/01/2017    INR 1.7 (H) 11/30/2017    INR 1.6 (H) 11/29/2017     Resumed coumadin 11/28/17        (HFpEF) heart failure with preserved ejection fraction    Continue carvedilol 12.5 mg by mouth twice a day  Continue aspirin 81 mg daily  Showing signs of acute exacerbation today          Pedal edema    Pt needs further diuresing for his edema & CHF while recovering from his hip surgery and UTI issues        Angina pectoris              COPD (chronic obstructive pulmonary disease)    No signs of  acute exacerbation  Was not on maintenance inhalers prior to admit        Type 2 diabetes mellitus with neurologic complication    Restarted home amaryl and actos 11/27  amaryl  Insulin sliding scale ordered - bs very good.          Cardiac pacemaker in situ    Aspirin 81 mg daily  Continue telemetry; coreg  Resume coumadin    No further arrhyhtmias        CAD (coronary artery disease)    Aspirin 81 mg daily  Coreg  Pravastatin    Was on lisinopril prior to admit. BP normal off lisinopril. Have not restarted and will continue to hold.            Atrial fibrillation    Resume Coumadin for now.  INR subtherapeutic will need to be monitored.            VTE Risk Mitigation         Ordered     warfarin (COUMADIN) tablet 2 mg  Daily     Route:  Oral        11/28/17 0850     Medium Risk of VTE  Once      11/26/17 1234     Place sequential compression device  Until discontinued      11/26/17 1234              Montana Mondragon MD  Department of Hospital Medicine   [facility]

## 2017-12-01 NOTE — PLAN OF CARE
Problem: Patient Care Overview  Goal: Plan of Care Review  Outcome: Ongoing (interventions implemented as appropriate)  Pt doing well. No question/concerns at this time. Agrees with poc. No pain/falls. Iv fluid and abx on board.  Bishop taken out today only to be put back in after 6 hours of no urination. Pt was suppose to go back to the nursing home today but not urinating  Held him today. Flomax was started yesterday for patient  To be able  To urinate. This did not work yet. Should go back to NH tomorrow

## 2017-12-01 NOTE — PLAN OF CARE
Problem: Patient Care Overview  Goal: Plan of Care Review  Goals:   1. Increase PO intake to >75%  2. Prevent further wt. loss  Nutrition Goal Status: progressing towards goal  Communication of RD Recs: reviewed with RN     Nutrition Discharge Plannin. Liberlize diet to regular diet to promote increased PO intake, Provide Boost Plus BID only if PO intake drops to <75%   2. Increase PO intake to >75% to prevent malnutrition  3. Prevent further wt loss

## 2017-12-01 NOTE — PT/OT/SLP DISCHARGE
Occupational Therapy Discharge Summary    Eddie Parada Sr.  MRN: [id number]   E. coli UTI   Patient Discharged from acute Occupational Therapy on 11/30/2017.  Please refer to prior OT note dated on 11/29/2017 for functional status.     Assessment:   Patient appropriate for care in another setting.  GOALS:    Occupational Therapy Goals        Problem: Occupational Therapy Goal    Goal Priority Disciplines Outcome Interventions   Occupational Therapy Goal     OT, PT/OT     Description:  Goals to be met by: discharge from facility     Patient will increase functional independence with ADLs by performing:    LE Dressing with Minimal Assistance and adaptive devices as needed  Toileting to be assessed  Toilet transfer to be assessed                    Reasons for Discontinuation of Therapy Services  Transfer to alternate level of care.      Plan:  Patient Discharged to: Skilled Nursing Facility.    SHWTEA Lopez

## 2017-12-01 NOTE — ASSESSMENT & PLAN NOTE
Held Coumadin until   INR is   Lab Results   Component Value Date    INR 1.7 (H) 12/01/2017    INR 1.7 (H) 11/30/2017    INR 1.6 (H) 11/29/2017     Resumed coumadin 11/28/17

## 2017-12-01 NOTE — PLAN OF CARE
12/01/17 0939   Discharge Reassessment   Assessment Type Discharge Planning Reassessment     SW contacted the [facility] and informed the pt's nurse that the pt will be d/c today.

## 2017-12-01 NOTE — PT/OT/SLP PROGRESS
Physical Therapy  Treatment    Eddie Parada Sr.   MRN: [id number]   Admitting Diagnosis: E. coli UTI    PT Received On: 12/01/17  PT Start Time: 1417     PT Stop Time: 1440    PT Total Time (min): 23 min       Billable Minutes:  Gait [id number] minutes and Therapeutic Activity 10 minutes    Treatment Type: Treatment  PT/PTA: PT             General Precautions: Standard, fall  Orthopedic Precautions: RLE weight bearing as tolerated   Braces:           Subjective:  Communicated with patient prior to session.    Pain/Comfort  Pain Rating 1: 0/10    Objective:   Patient found with: shields catheter, peripheral IV    Functional Mobility:  Bed Mobility:   Rolling/Turning to Left: Stand by assistance  Rolling/Turning Right: Stand by assistance  Scooting/Bridging: Stand by Assistance  Supine to Sit: Stand by Assistance  Sit to Supine: Stand by Assistance    Transfers:  Sit <> Stand Assistance: Contact Guard Assistance  Sit <> Stand Assistive Device: Rolling Walker  Bed <> Chair Technique: Stand Pivot  Bed <> Chair Assistance: Contact Guard Assistance    Gait:   Gait Distance: 35 feet  Assistance 1: Contact Guard Assistance  Gait Assistive Device: Rolling walker  Gait Pattern: swing-to gait  Gait Deviation(s): decreased velocity of limb motion, decreased step length, decreased toe-to-floor clearance, decreased weight-shifting ability    Balance:   Static Sit: GOOD: Takes MODERATE challenges from all directions  Dynamic Sit: GOOD: Maintains balance through MODERATE excursions of active trunk movement  Static Stand: FAIR+: Takes MINIMAL challenges from all directions  Dynamic stand: FAIR: Needs CONTACT GUARD during gait       AM-PAC 6 CLICK MOBILITY  How much help from another person does this patient currently need?   1 = Unable, Total/Dependent Assistance  2 = A lot, Maximum/Moderate Assistance  3 = A little, Minimum/Contact Guard/Supervision  4 = None, Modified [place]/Independent    Turning over in bed (including  adjusting bedclothes, sheets and blankets)?: 3  Sitting down on and standing up from a chair with arms (e.g., wheelchair, bedside commode, etc.): 3  Moving from lying on back to sitting on the side of the bed?: 3  Moving to and from a bed to a chair (including a wheelchair)?: 3  Need to walk in hospital room?: 3  Climbing 3-5 steps with a railing?: 1  Total Score: 16    AM-PAC Raw Score CMS G-Code Modifier Level of Impairment Assistance   6 % Total / Unable   7 - 9 CM 80 - 100% Maximal Assist   10 - 14 CL 60 - 80% Moderate Assist   15 - 19 CK 40 - 60% Moderate Assist   20 - 22 CJ 20 - 40% Minimal Assist   23 CI 1-20% SBA / CGA   24 CH 0% Independent/ Mod I     Patient left supine with all lines intact, call button in reach, NSG notified and spouse present.    Assessment:  Eddie Parada Sr. is a 88 y.o. male with a medical diagnosis of E. coli UTI and presents with increased fatigue and SOB with activity today.    Rehab identified problem list/impairments: Rehab identified problem list/impairments: impaired endurance, impaired self care skills, impaired functional mobilty, weakness, gait instability, impaired balance, decreased lower extremity function    Rehab potential is good.    Activity tolerance: Fair    Discharge recommendations: Discharge Facility/Level Of Care Needs: rehabilitation facility, nursing facility, skilled     Barriers to discharge: Barriers to Discharge: None    Equipment recommendations: Equipment Needed After Discharge: none     GOALS:    Physical Therapy Goals        Problem: Physical Therapy Goal    Goal Priority Disciplines Outcome Goal Variances Interventions   Physical Therapy Goal     PT/OT, PT      Description:  Goals to be met by: upon D/C from facility     Patient will increase functional independence with mobility by performin. Supine to sit with [place]  2. Sit to supine with [place]  3. Sit to stand transfer with Standby Assistance with Rolling  Walker  4. Bed to chair transfer with Supervision using Rolling Walker  5. Gait  x 50 feet with Contact Guard Assistance using Rolling Walker.                       PLAN:    Patient to be seen  (1-2x/day Monday-Friday; PRN Weekends/Holidays)  to address the above listed problems via gait training, therapeutic activities, therapeutic exercises  Plan of Care expires:  (upon D/C from facility)  Plan of Care reviewed with: patient, spouse         Cindy Florentino, PT  12/01/2017

## 2017-12-01 NOTE — SUBJECTIVE & OBJECTIVE
Interval History: AMAYA/ SOB and CHF    Review of Systems   Constitutional: Positive for activity change and fatigue.        Bed confined   Respiratory: Positive for cough and shortness of breath.    Cardiovascular: Positive for leg swelling.   Genitourinary:        Scrotal edema and penile edema; urinary retention with needing and indwelling shields to relieve bladder retention; has  uti     Objective:     Vital Signs (Most Recent):  Temp: 96.3 °F (35.7 °C) (12/01/17 1557)  Pulse: 60 (12/01/17 1557)  Resp: 18 (12/01/17 1557)  BP: (!) 125/56 (12/01/17 1557)  SpO2: 95 % (12/01/17 1557) Vital Signs (24h Range):  Temp:  [96 °F (35.6 °C)-98.5 °F (36.9 °C)] 96.3 °F (35.7 °C)  Pulse:  [59-72] 60  Resp:  [18-20] 18  SpO2:  [93 %-98 %] 95 %  BP: (125-155)/(56-80) 125/56     Weight: 87.5 kg (192 lb 14.4 oz)  Body mass index is 31.14 kg/m².    Intake/Output Summary (Last 24 hours) at 12/01/17 1558  Last data filed at 12/01/17 1142   Gross per 24 hour   Intake          1518.75 ml   Output             1775 ml   Net          -256.25 ml      Physical Exam   Constitutional: He is oriented to person, place, and time. He appears well-developed and well-nourished.   HENT:   Head: Normocephalic.   Eyes: Pupils are equal, round, and reactive to light.   Neck: Normal range of motion. Neck supple. No thyromegaly present.   Cardiovascular: Normal rate and regular rhythm.  Exam reveals no friction rub.    No murmur heard.  Pulmonary/Chest: He is in respiratory distress. He has no wheezes. He has rales.   Abdominal: There is no tenderness. There is no rebound and no guarding.   Genitourinary:   Genitourinary Comments: Penile and scrotal edema 2++; indwelling shields cath inplace   Musculoskeletal: Normal range of motion. He exhibits edema. He exhibits no tenderness.   Lymphadenopathy:     He has no cervical adenopathy.   Neurological: He is alert and oriented to person, place, and time. He has normal reflexes. No cranial nerve deficit.   Skin:  Skin is warm and dry.   Psychiatric: He has a normal mood and affect. Judgment and thought content normal.       Significant Labs:   BMP:   Recent Labs  Lab 12/01/17  0359   *      K 4.3   *   CO2 17*   BUN 15   CREATININE 0.8   CALCIUM 8.3*     CBC:   Recent Labs  Lab 11/30/17  0640 12/01/17  0359   WBC 8.37 9.64   HGB 9.2* 8.9*   HCT 28.4* 28.3*    284     CMP:   Recent Labs  Lab 11/30/17  0640 12/01/17  0359    137   K 4.2 4.3   * 111*   CO2 19* 17*   * 146*   BUN 16 15   CREATININE 0.8 0.8   CALCIUM 8.5* 8.3*   ANIONGAP 8 9   EGFRNONAA >60 >60

## 2017-12-01 NOTE — PROGRESS NOTES
"[facility]  Adult Nutrition  Progress Note    SUMMARY     Recommendations    Recommendation/Intervention:  1. Liberalize diet to regular diet to increase PO intake w/Boost Plus BID  2. Encourage PO intake to >75% contsistently   3. Prevent further wt loss or malnutrition  4. Consisder appetite stimulant    Goals:   1. Increase PO intake to >75%  2. Prevent further wt. loss  Nutrition Goal Status: progressing towards goal  Communication of RD Recs: reviewed with RN    Nutrition Discharge Plannin. Liberlize diet to regular diet to promote increased PO intake, Provide Boost Plus BID only if PO intake drops to <75%   2. Increase PO intake to >75% to prevent malnutrition  3. Prevent further wt loss       Reason for Assessment    Reason for Assessment: RD follow-up  Diagnosis: infection/sepsis  Relevent Medical History: HTN, CAD, T2DM, COPD, CHF, HLD, Stroke, Cancer, SOB, Complicated UTI         General Information Comments: PO Intake as of 17 was 100%    Nutrition Prescription Ordered    Current Diet Order: 1800kcal Diet      Oral Nutrition Supplement: Boost Glucose control BID      Evaluation of Received Nutrients/Fluid Intake     % Intake of Estimated Energy Needs: 50-75 %  % Meal Intake: 50%     Nutrition Risk Screen     Nutrition Risk Screen: no indicators present    Nutrition/Diet History       Food Preferences: No cultural/relgious pref at this time       Labs/Tests/Procedures/Meds    Diagnostic Test/Procedure Review: reviewed, pertinent (ORIF)  Pertinent Labs Reviewed: reviewed, pertinent  Pertinent Labs Comments: 29 BUN, Glucose 130, Ca 8.6, Coumadin 1.9, POCT glucose 119, RBC 2.83, Hemoglobin 9.3, A1C 6.1  Pertinent Medications Reviewed: reviewed, pertinent  Pertinent Medications Comments: Warfarin, Insulin aspart, insulin Detemir magnesium     Physical Findings    Overall Physical Appearance: weak       Anthropometrics    Temp: 96.9 °F (36.1 °C)     Height: 5' 6" (167.6 " cm)  Weight Method: Bed Scale  Weight: 87.5 kg (192 lb 14.4 oz)  Ideal Body Weight (IBW), Male: 142 lb     % Ideal Body Weight, Male (lb): 135.85 lb     BMI (Calculated): 31.2  BMI Grade: 30 - 34.9- obesity - grade I       Estimated/Assessed Needs    Weight Used For Calorie Calculations: 87.5 kg (192 lb 14.4 oz)   Height (cm): 167.6 cm  Energy Calorie Requirements (kcal): 2081 (AF 1.4)  Energy Need Method: Fertile-St Jeor  RMR (Fertile-St. Jeor Equation): 1487.75  Weight Used For Protein Calculations: 87.5 kg (192 lb 14.4 oz)  Protein Requirements:  (1.1-1.3g)  Fluid Requirements (mL): 2081  Fluid Need Method: RDA Method     RDA Method (mL): 2081             Assessment and Plan  Nutrition Problem  Inadequate Oral Intake     Related to (etiology):   Inability to consume adequate Oral intake    Signs and Symptoms (as evidenced by):    PO intake range of %, pt reports 47lbs wt loss in five months    Interventions/Recommendations (treatment strategy):  See RD recs above    Nutrition Diagnosis Status:   Continues        Monitor and Evaluation    Food and Nutrient Intake: energy intake, food and beverage intake  Food and Nutrient Adminstration: diet order     Physical Activity and Function: nutrition-related ADLs and IADLs  Anthropometric Measurements: weight, weight change, body mass index  Biochemical Data, Medical Tests and Procedures: electrolyte and renal panel, gastrointestinal profile, glucose/endocrine profile, inflammatory profile, lipid profile  Nutrition-Focused Physical Findings: overall appearance    Nutrition Risk    Level of Risk: moderate    Nutrition Follow-Up    RD Follow-up?: Yes

## 2017-12-02 LAB
ANION GAP SERPL CALC-SCNC: 11 MMOL/L
BASOPHILS # BLD AUTO: 0.03 K/UL
BASOPHILS NFR BLD: 0.3 %
BUN SERPL-MCNC: 14 MG/DL
CALCIUM SERPL-MCNC: 8.5 MG/DL
CHLORIDE SERPL-SCNC: 107 MMOL/L
CO2 SERPL-SCNC: 19 MMOL/L
CREAT SERPL-MCNC: 0.8 MG/DL
DIFFERENTIAL METHOD: ABNORMAL
EOSINOPHIL # BLD AUTO: 0.1 K/UL
EOSINOPHIL NFR BLD: 0.5 %
ERYTHROCYTE [DISTWIDTH] IN BLOOD BY AUTOMATED COUNT: 14 %
EST. GFR  (AFRICAN AMERICAN): >60 ML/MIN/1.73 M^2
EST. GFR  (NON AFRICAN AMERICAN): >60 ML/MIN/1.73 M^2
GLUCOSE SERPL-MCNC: 156 MG/DL
HCT VFR BLD AUTO: 28.9 %
HGB BLD-MCNC: 9.4 G/DL
INR PPP: 1.9
LYMPHOCYTES # BLD AUTO: 0.9 K/UL
LYMPHOCYTES NFR BLD: 7.5 %
MCH RBC QN AUTO: 33.1 PG
MCHC RBC AUTO-ENTMCNC: 32.5 G/DL
MCV RBC AUTO: 102 FL
MONOCYTES # BLD AUTO: 1.2 K/UL
MONOCYTES NFR BLD: 9.7 %
NEUTROPHILS # BLD AUTO: 9.8 K/UL
NEUTROPHILS NFR BLD: 82 %
PLATELET # BLD AUTO: 271 K/UL
PMV BLD AUTO: 9.5 FL
POCT GLUCOSE: 146 MG/DL (ref 70–110)
POCT GLUCOSE: 147 MG/DL (ref 70–110)
POCT GLUCOSE: 160 MG/DL (ref 70–110)
POCT GLUCOSE: 203 MG/DL (ref 70–110)
POTASSIUM SERPL-SCNC: 4.1 MMOL/L
PROTHROMBIN TIME: 20 SEC
RBC # BLD AUTO: 2.84 M/UL
SODIUM SERPL-SCNC: 137 MMOL/L
WBC # BLD AUTO: 11.91 K/UL

## 2017-12-02 PROCEDURE — 25000003 PHARM REV CODE 250: Performed by: INTERNAL MEDICINE

## 2017-12-02 PROCEDURE — 97110 THERAPEUTIC EXERCISES: CPT

## 2017-12-02 PROCEDURE — 85025 COMPLETE CBC W/AUTO DIFF WBC: CPT

## 2017-12-02 PROCEDURE — 85610 PROTHROMBIN TIME: CPT

## 2017-12-02 PROCEDURE — 25000003 PHARM REV CODE 250: Performed by: SURGERY

## 2017-12-02 PROCEDURE — 97116 GAIT TRAINING THERAPY: CPT

## 2017-12-02 PROCEDURE — 99232 SBSQ HOSP IP/OBS MODERATE 35: CPT | Mod: ,,, | Performed by: FAMILY MEDICINE

## 2017-12-02 PROCEDURE — 94760 N-INVAS EAR/PLS OXIMETRY 1: CPT

## 2017-12-02 PROCEDURE — 36415 COLL VENOUS BLD VENIPUNCTURE: CPT

## 2017-12-02 PROCEDURE — 82962 GLUCOSE BLOOD TEST: CPT

## 2017-12-02 PROCEDURE — 80048 BASIC METABOLIC PNL TOTAL CA: CPT

## 2017-12-02 PROCEDURE — 25000003 PHARM REV CODE 250: Performed by: NURSE PRACTITIONER

## 2017-12-02 PROCEDURE — 63600175 PHARM REV CODE 636 W HCPCS: Performed by: INTERNAL MEDICINE

## 2017-12-02 PROCEDURE — 11000001 HC ACUTE MED/SURG PRIVATE ROOM

## 2017-12-02 PROCEDURE — 25000003 PHARM REV CODE 250: Performed by: FAMILY MEDICINE

## 2017-12-02 PROCEDURE — 63600175 PHARM REV CODE 636 W HCPCS: Performed by: FAMILY MEDICINE

## 2017-12-02 RX ORDER — FUROSEMIDE 10 MG/ML
20 INJECTION INTRAMUSCULAR; INTRAVENOUS DAILY
Status: DISCONTINUED | OUTPATIENT
Start: 2017-12-02 | End: 2017-12-04 | Stop reason: HOSPADM

## 2017-12-02 RX ADMIN — LISINOPRIL 10 MG: 10 TABLET ORAL at 08:12

## 2017-12-02 RX ADMIN — Medication 400 MG: at 08:12

## 2017-12-02 RX ADMIN — GLIMEPIRIDE 2 MG: 2 TABLET ORAL at 08:12

## 2017-12-02 RX ADMIN — TAMSULOSIN HYDROCHLORIDE 0.4 MG: 0.4 CAPSULE ORAL at 08:12

## 2017-12-02 RX ADMIN — PRAVASTATIN SODIUM 20 MG: 20 TABLET ORAL at 08:12

## 2017-12-02 RX ADMIN — ASPIRIN 81 MG: 81 TABLET, COATED ORAL at 08:12

## 2017-12-02 RX ADMIN — PIOGLITAZONE 15 MG: 15 TABLET ORAL at 08:12

## 2017-12-02 RX ADMIN — RAMELTEON 8 MG: 8 TABLET, FILM COATED ORAL at 08:12

## 2017-12-02 RX ADMIN — PANTOPRAZOLE SODIUM 40 MG: 40 TABLET, DELAYED RELEASE ORAL at 08:12

## 2017-12-02 RX ADMIN — LEVOTHYROXINE SODIUM 50 MCG: 50 TABLET ORAL at 08:12

## 2017-12-02 RX ADMIN — CARVEDILOL 12.5 MG: 12.5 TABLET, FILM COATED ORAL at 04:12

## 2017-12-02 RX ADMIN — POTASSIUM CHLORIDE 40 MEQ: 1500 TABLET, EXTENDED RELEASE ORAL at 08:12

## 2017-12-02 RX ADMIN — CEFEPIME 500 MG: 1 INJECTION, POWDER, FOR SOLUTION INTRAMUSCULAR; INTRAVENOUS at 02:12

## 2017-12-02 RX ADMIN — FUROSEMIDE 20 MG: 10 INJECTION, SOLUTION INTRAMUSCULAR; INTRAVENOUS at 03:12

## 2017-12-02 RX ADMIN — ACETAMINOPHEN 650 MG: 325 TABLET, FILM COATED ORAL at 08:12

## 2017-12-02 RX ADMIN — CARVEDILOL 12.5 MG: 12.5 TABLET, FILM COATED ORAL at 08:12

## 2017-12-02 RX ADMIN — WARFARIN SODIUM 2 MG: 2 TABLET ORAL at 04:12

## 2017-12-02 RX ADMIN — CEFEPIME 500 MG: 1 INJECTION, POWDER, FOR SOLUTION INTRAMUSCULAR; INTRAVENOUS at 01:12

## 2017-12-02 NOTE — PLAN OF CARE
Problem: Diabetes, Type 2 (Adult)  Goal: Signs and Symptoms of Listed Potential Problems Will be Absent, Minimized or Managed (Diabetes, Type 2)  Signs and symptoms of listed potential problems will be absent, minimized or managed by discharge/transition of care (reference Diabetes, Type 2 (Adult) CPG).   Outcome: Ongoing (interventions implemented as appropriate)  Accuchecks continue ac&hs. No supplemental insulin given this shift.      Problem: Fall Risk (Adult)  Goal: Absence of Falls  Patient will demonstrate the desired outcomes by discharge/transition of care.   Outcome: Ongoing (interventions implemented as appropriate)  Fall precautions maintained. Bed alarm engaged at all times. Family at bedside.     Problem: Patient Care Overview  Goal: Plan of Care Review  Outcome: Ongoing (interventions implemented as appropriate)  Plan of care reviewed with patient and he agrees with the plan of care. Telemetry monitoring continues; however, patient removed telemetry several times this shift.  No acute distress noted.       Problem: Urinary Tract Infection (Adult)  Goal: Signs and Symptoms of Listed Potential Problems Will be Absent, Minimized or Managed (Urinary Tract Infection)  Signs and symptoms of listed potential problems will be absent, minimized or managed by discharge/transition of care (reference Urinary Tract Infection (Adult) CPG).   Outcome: Ongoing (interventions implemented as appropriate)  Afebrile this shift. IV antibiotics continue without any adverse reactions. Catheter care given. Catheter patency maintained.     Problem: Pressure Ulcer Risk (Ronaldo Scale) (Adult,Obstetrics,Pediatric)  Goal: Skin Integrity  Patient will demonstrate the desired outcomes by discharge/transition of care.   Outcome: Ongoing (interventions implemented as appropriate)  Patient turned and repositioned every 2 hours.

## 2017-12-02 NOTE — ASSESSMENT & PLAN NOTE
Pt needs further diuresing for his edema & CHF while recovering from his hip surgery and UTI issues-continue the same for today

## 2017-12-02 NOTE — PROGRESS NOTES
[facility] Medicine  Progress Note    Patient Name: Eddie Parada Sr.  MRN: [id number]  Patient Class: IP- Inpatient   Admission Date: 11/26/2017  Length of Stay: 2 days  Attending Physician: Jorge Luis Mccarty MD  Primary Care Provider: Callum Roberts MD        Subjective:     Principal Problem:E. coli UTI    HPI:  Eddie Parada Sr. presents to the emergency room with dysuria and urinary retention  Patient states that he had trouble urinating this week at the nursing home, Shields placed   Patient had an in and out catheterization at the nursing home, culture sent by PCP then  Pt then had subjective fever, family brought him into the ER for urinary tract evaluation  Shields placed in the ER with puslike urine, subjective fevers last night for staff at the NH  Patient has a history of a recent right hip fracture requiring ORIF.  He is still working with therapy.  Patient has a history of heart valve transplant.    Hospital Course:  Patient admitted to the third floor on telemetry.  He received his first dose of Rocephin.  He has no complaints this evening.    11/27--feeling better. No fevers or leukocytosis. Still have shields in placed, placed due to urinary retention on admit. Remains on rocephin    11/28/17  Plan was to hopefully send him to NH   He ran 3 beat V tach   Asymptomatic   K borderline normal   Added KCL and magnesium  CIS consult done   Keep on telemetry ;  Hopefully home in am   Had to be place don foleys catheter ; unable to urinate   Will need to see urology outpatient       11/29/17  No fever   Urine culture pending   3 beat run of [facility] o/b. Lytes and mg ok.  Coumadin resumed last night after being supratherapeutic upon admit      11/30  This morning, he has no complaints.  Shields still in place.  No burning.  His wife is most concerned about his bowels. He has had some incontinence, however, he says that he can feel his BMs now. He has a 'large BM' this morning  reported.    Pt has now progressive SOB and is showing signs of fluid overload with leg edema nad groin scrotal edema with subjective SOB and  PND from appears to be CHF.    Interval History: Fluid retention from CHF and being Rxed fro a refractory UTI-has an indwelling Shields at this time    Review of Systems   Constitutional: Positive for activity change. Negative for appetite change.   HENT: Negative for congestion, ear pain, sneezing and sore throat.    Eyes: Negative for redness and visual disturbance.   Respiratory: Negative for cough, chest tightness and stridor.    Cardiovascular: Negative for chest pain.   Gastrointestinal: Negative for abdominal pain, blood in stool, diarrhea, nausea and vomiting.   Genitourinary: Negative for difficulty urinating, dysuria and hematuria.   Musculoskeletal: Negative for arthralgias, back pain, joint swelling, myalgias and neck pain.   Skin: Negative for rash.   Neurological: Negative for dizziness.   Psychiatric/Behavioral: Negative for agitation. The patient is not nervous/anxious.      Objective:     Vital Signs (Most Recent):  Temp: 99.5 °F (37.5 °C) (12/02/17 0839)  Pulse: 60 (12/02/17 1204)  Resp: 18 (12/02/17 0839)  BP: 136/65 (12/02/17 0839)  SpO2: 96 % (12/02/17 0839) Vital Signs (24h Range):  Temp:  [96.3 °F (35.7 °C)-99.5 °F (37.5 °C)] 99.5 °F (37.5 °C)  Pulse:  [59-84] 60  Resp:  [18] 18  SpO2:  [94 %-97 %] 96 %  BP: (122-153)/(56-73) 136/65     Weight: 87.5 kg (192 lb 14.4 oz)  Body mass index is 31.14 kg/m².    Intake/Output Summary (Last 24 hours) at 12/02/17 1320  Last data filed at 12/02/17 0839   Gross per 24 hour   Intake              500 ml   Output             1600 ml   Net            -1100 ml      Physical Exam   Constitutional: He is oriented to person, place, and time. He appears distressed.   Large 87 y/o W M in bed with a shields cath   HENT:   Head: Normocephalic.   Eyes: Pupils are equal, round, and reactive to light.   Neck: Normal range of motion.  Neck supple. No thyromegaly present.   Cardiovascular: Normal rate and regular rhythm.  Exam reveals no friction rub.    No murmur heard.  Pulmonary/Chest: Effort normal. No respiratory distress. He has no wheezes.   Abdominal: There is no tenderness. There is no rebound and no guarding.   Musculoskeletal: He exhibits edema and tenderness.   1+ edema; was on Rehab and developed some edema issues in his groin and lower legs   Lymphadenopathy:     He has no cervical adenopathy.   Neurological: He is alert and oriented to person, place, and time. He has normal reflexes. No cranial nerve deficit.   Skin: Skin is warm and dry.   Psychiatric: He has a normal mood and affect. Judgment and thought content normal.       Significant Labs:   CBC:   Recent Labs  Lab 12/01/17 0359 12/02/17  0325   WBC 9.64 11.91   HGB 8.9* 9.4*   HCT 28.3* 28.9*    271     CMP:   Recent Labs  Lab 12/01/17 0359 12/02/17  0325    137   K 4.3 4.1   * 107   CO2 17* 19*   * 156*   BUN 15 14   CREATININE 0.8 0.8   CALCIUM 8.3* 8.5*   ANIONGAP 9 11   EGFRNONAA >60 >60       Significant Imaging: Being treated for CHF    Assessment/Plan:      * E. coli UTI    Bishop catheter in place due to urinary retention on admit. D/c Bishop 11/27 for voiding trial, failed   Starting flomax 11/29   Rocephin 1 g IV piggyback every 24 hours day 4  Urine culture and blood culture in process--urine shows e coli - bactrim sens.          V-tach    Isolated event. Lytes and mg normal           S/P ORIF (open reduction internal fixation) fracture    On right  Surgery 2 weeks ago  Was rehabing at [place], should return at discharge  PT/OR ordered here today    Staples removed 11/27          Coumadin toxicity    Held Coumadin until   INR is   Lab Results   Component Value Date    INR 1.7 (H) 12/01/2017    INR 1.7 (H) 11/30/2017    INR 1.6 (H) 11/29/2017     Resumed coumadin 11/28/17        (HFpEF) heart failure with preserved ejection fraction     Continue carvedilol 12.5 mg by mouth twice a day  Continue aspirin 81 mg daily  Showing signs of acute exacerbation today          Pedal edema    Pt needs further diuresing for his edema & CHF while recovering from his hip surgery and UTI issues-continue the same for today        Angina pectoris              COPD (chronic obstructive pulmonary disease)    No signs of acute exacerbation  Was not on maintenance inhalers prior to admit        Type 2 diabetes mellitus with neurologic complication    Restarted home amaryl and actos 11/27  amaryl  Insulin sliding scale ordered - bs very good.          Cardiac pacemaker in situ    Aspirin 81 mg daily  Continue telemetry; coreg  Resume coumadin    No further arrhyhtmias        CAD (coronary artery disease)    Aspirin 81 mg daily  Coreg  Pravastatin    Was on lisinopril prior to admit. BP normal off lisinopril. Have not restarted and will continue to hold.            Atrial fibrillation    Resume Coumadin for now.  INR subtherapeutic will need to be monitored.            VTE Risk Mitigation         Ordered     warfarin (COUMADIN) tablet 2 mg  Daily     Route:  Oral        11/28/17 0850     Medium Risk of VTE  Once      11/26/17 1234     Place sequential compression device  Until discontinued      11/26/17 1234              Montana Mondragon MD  Department of Hospital Medicine   [facility]

## 2017-12-02 NOTE — PT/OT/SLP PROGRESS
Physical Therapy  Treatment    Eddie Parada .   MRN: [id number]   Admitting Diagnosis: E. coli UTI    PT Received On: 12/02/17  PT Start Time: 0915     PT Stop Time: 0940    PT Total Time (min): 25 min       Billable Minutes:  Gait [id number] min and Therapeutic Exercise 10 min    Treatment Type: Treatment  PT/PTA: PT             General Precautions: Standard, fall  Orthopedic Precautions: RLE weight bearing as tolerated   Braces:           Subjective:  Communicated with patient and daughter prior to session.  He has been given lasix ... Usually uses that at home as well         Objective:        Functional Mobility:  Bed Mobility:        Transfers:       Gait:   Gait Distance: 35 ft    Stairs:      Balance:   Static Sit: GOOD+: Takes MAXIMAL challenges from all directions.    Dynamic Sit: GOOD+: Maintains balance through MAXIMAL excursions of active trunk motion  Static Stand: GOOD-: Takes MODERATE challenges from all directions inconsistently  Dynamic stand: FAIR+: Needs CLOSE SUPERVISION during gait and is able to right self with minor LOB     Therapeutic Activities and Exercises:  Multiple exercises for all four extremities designed to strengthen and  increase available Motoin of both hips and legs     AM-PAC 6 CLICK MOBILITY  How much help from another person does this patient currently need?   1 = Unable, Total/Dependent Assistance  2 = A lot, Maximum/Moderate Assistance  3 = A little, Minimum/Contact Guard/Supervision  4 = None, Modified [place]/Independent         AM-PAC Raw Score CMS G-Code Modifier Level of Impairment Assistance   6 % Total / Unable   7 - 9 CM 80 - 100% Maximal Assist   10 - 14 CL 60 - 80% Moderate Assist   15 - 19 CK 40 - 60% Moderate Assist   20 - 22 CJ 20 - 40% Minimal Assist   23 CI 1-20% SBA / CGA   24 CH 0% Independent/ Mod I     Patient left up in chair with all lines intact, call button in reach, RN staff notified and Daughter present.    Assessment:  Eddie ABBOTT  Tal Jernigan is a 88 y.o. male with a medical diagnosis of E. coli UTI and presents with weakness and decrease ability to walk efficently.    Rehab identified problem list/impairments: Rehab identified problem list/impairments: weakness, impaired endurance, impaired self care skills, gait instability, impaired functional mobilty, impaired balance, decreased lower extremity function    Rehab potential is fair.    Activity tolerance: Fair    Discharge recommendations: Discharge Facility/Level Of Care Needs: nursing facility, skilled     Barriers to discharge: Barriers to Discharge: None    Equipment recommendations: Equipment Needed After Discharge: none     GOALS:    Physical Therapy Goals        Problem: Physical Therapy Goal    Goal Priority Disciplines Outcome Goal Variances Interventions   Physical Therapy Goal     PT/OT, PT      Description:  Goals to be met by: upon D/C from facility     Patient will increase functional independence with mobility by performin. Supine to sit with [place]  2. Sit to supine with [place]  3. Sit to stand transfer with Standby Assistance with Rolling Walker  4. Bed to chair transfer with Supervision using Rolling Walker  5. Gait  x 50 feet with Contact Guard Assistance using Rolling Walker.                       PLAN:    Patient to be seen  (1-2x/day(Weekdays); PRN(Sat.,Sun., Holidays))  to address the above listed problems via gait training, therapeutic activities, therapeutic exercises  Plan of Care expires: 17 (following a.m. PT tx. session.)  Plan of Care reviewed with: patient, daughter         Indra Mena, PT  2017

## 2017-12-02 NOTE — SUBJECTIVE & OBJECTIVE
Interval History: Fluid retention from CHF and being Rxed fro a refractory UTI-has an indwelling Shields at this time    Review of Systems   Constitutional: Positive for activity change. Negative for appetite change.   HENT: Negative for congestion, ear pain, sneezing and sore throat.    Eyes: Negative for redness and visual disturbance.   Respiratory: Negative for cough, chest tightness and stridor.    Cardiovascular: Negative for chest pain.   Gastrointestinal: Negative for abdominal pain, blood in stool, diarrhea, nausea and vomiting.   Genitourinary: Negative for difficulty urinating, dysuria and hematuria.   Musculoskeletal: Negative for arthralgias, back pain, joint swelling, myalgias and neck pain.   Skin: Negative for rash.   Neurological: Negative for dizziness.   Psychiatric/Behavioral: Negative for agitation. The patient is not nervous/anxious.      Objective:     Vital Signs (Most Recent):  Temp: 99.5 °F (37.5 °C) (12/02/17 0839)  Pulse: 60 (12/02/17 1204)  Resp: 18 (12/02/17 0839)  BP: 136/65 (12/02/17 0839)  SpO2: 96 % (12/02/17 0839) Vital Signs (24h Range):  Temp:  [96.3 °F (35.7 °C)-99.5 °F (37.5 °C)] 99.5 °F (37.5 °C)  Pulse:  [59-84] 60  Resp:  [18] 18  SpO2:  [94 %-97 %] 96 %  BP: (122-153)/(56-73) 136/65     Weight: 87.5 kg (192 lb 14.4 oz)  Body mass index is 31.14 kg/m².    Intake/Output Summary (Last 24 hours) at 12/02/17 1320  Last data filed at 12/02/17 0839   Gross per 24 hour   Intake              500 ml   Output             1600 ml   Net            -1100 ml      Physical Exam   Constitutional: He is oriented to person, place, and time. He appears distressed.   Large 87 y/o W M in bed with a shields cath   HENT:   Head: Normocephalic.   Eyes: Pupils are equal, round, and reactive to light.   Neck: Normal range of motion. Neck supple. No thyromegaly present.   Cardiovascular: Normal rate and regular rhythm.  Exam reveals no friction rub.    No murmur heard.  Pulmonary/Chest: Effort normal. No  respiratory distress. He has no wheezes.   Abdominal: There is no tenderness. There is no rebound and no guarding.   Musculoskeletal: He exhibits edema and tenderness.   1+ edema; was on Rehab and developed some edema issues in his groin and lower legs   Lymphadenopathy:     He has no cervical adenopathy.   Neurological: He is alert and oriented to person, place, and time. He has normal reflexes. No cranial nerve deficit.   Skin: Skin is warm and dry.   Psychiatric: He has a normal mood and affect. Judgment and thought content normal.       Significant Labs:   CBC:   Recent Labs  Lab 12/01/17 0359 12/02/17  0325   WBC 9.64 11.91   HGB 8.9* 9.4*   HCT 28.3* 28.9*    271     CMP:   Recent Labs  Lab 12/01/17 0359 12/02/17 0325    137   K 4.3 4.1   * 107   CO2 17* 19*   * 156*   BUN 15 14   CREATININE 0.8 0.8   CALCIUM 8.3* 8.5*   ANIONGAP 9 11   EGFRNONAA >60 >60       Significant Imaging: Being treated for CHF

## 2017-12-02 NOTE — PLAN OF CARE
Problem: Fall Risk (Adult)  Goal: Absence of Falls  Patient will demonstrate the desired outcomes by discharge/transition of care.   Outcome: Ongoing (interventions implemented as appropriate)  Pt free of falls/incidents. Fall precautions maintained.    Problem: Patient Care Overview  Goal: Plan of Care Review  Outcome: Ongoing (interventions implemented as appropriate)  IV abx maintained. Pt & SO aware of plan of care. No questions or concerns at this time.

## 2017-12-02 NOTE — PLAN OF CARE
Problem: Fall Risk (Adult)  Goal: Absence of Falls  Patient will demonstrate the desired outcomes by discharge/transition of care.   Outcome: Ongoing (interventions implemented as appropriate)  Pt free of falls/incidents. Fall precautions maintained.    Problem: Patient Care Overview  Goal: Plan of Care Review  Outcome: Ongoing (interventions implemented as appropriate)  IV abx maintained. Bishop to gravity maintained. IV lasix Daily added. Pt & spouse aware of plan of care. No questions or concerns at this time.

## 2017-12-03 LAB
ANION GAP SERPL CALC-SCNC: 6 MMOL/L
BASOPHILS # BLD AUTO: 0.02 K/UL
BASOPHILS NFR BLD: 0.2 %
BUN SERPL-MCNC: 16 MG/DL
CALCIUM SERPL-MCNC: 8.7 MG/DL
CHLORIDE SERPL-SCNC: 105 MMOL/L
CO2 SERPL-SCNC: 27 MMOL/L
CREAT SERPL-MCNC: 0.9 MG/DL
DIFFERENTIAL METHOD: ABNORMAL
EOSINOPHIL # BLD AUTO: 0.1 K/UL
EOSINOPHIL NFR BLD: 1.3 %
ERYTHROCYTE [DISTWIDTH] IN BLOOD BY AUTOMATED COUNT: 14.3 %
EST. GFR  (AFRICAN AMERICAN): >60 ML/MIN/1.73 M^2
EST. GFR  (NON AFRICAN AMERICAN): >60 ML/MIN/1.73 M^2
GLUCOSE SERPL-MCNC: 131 MG/DL
HCT VFR BLD AUTO: 29.8 %
HGB BLD-MCNC: 9.7 G/DL
INR PPP: 1.7
LYMPHOCYTES # BLD AUTO: 1.1 K/UL
LYMPHOCYTES NFR BLD: 10.8 %
MCH RBC QN AUTO: 33.3 PG
MCHC RBC AUTO-ENTMCNC: 32.6 G/DL
MCV RBC AUTO: 102 FL
MONOCYTES # BLD AUTO: 1.2 K/UL
MONOCYTES NFR BLD: 11.6 %
NEUTROPHILS # BLD AUTO: 8 K/UL
NEUTROPHILS NFR BLD: 76.1 %
PLATELET # BLD AUTO: 238 K/UL
PMV BLD AUTO: 9.6 FL
POCT GLUCOSE: 101 MG/DL (ref 70–110)
POCT GLUCOSE: 143 MG/DL (ref 70–110)
POCT GLUCOSE: 155 MG/DL (ref 70–110)
POCT GLUCOSE: 183 MG/DL (ref 70–110)
POTASSIUM SERPL-SCNC: 4.5 MMOL/L
PROTHROMBIN TIME: 18.1 SEC
RBC # BLD AUTO: 2.91 M/UL
SODIUM SERPL-SCNC: 138 MMOL/L
WBC # BLD AUTO: 10.54 K/UL

## 2017-12-03 PROCEDURE — 25000003 PHARM REV CODE 250: Performed by: FAMILY MEDICINE

## 2017-12-03 PROCEDURE — 11000001 HC ACUTE MED/SURG PRIVATE ROOM

## 2017-12-03 PROCEDURE — 99232 SBSQ HOSP IP/OBS MODERATE 35: CPT | Mod: ,,, | Performed by: FAMILY MEDICINE

## 2017-12-03 PROCEDURE — 85610 PROTHROMBIN TIME: CPT

## 2017-12-03 PROCEDURE — 25000003 PHARM REV CODE 250: Performed by: INTERNAL MEDICINE

## 2017-12-03 PROCEDURE — 25000003 PHARM REV CODE 250: Performed by: NURSE PRACTITIONER

## 2017-12-03 PROCEDURE — 80048 BASIC METABOLIC PNL TOTAL CA: CPT

## 2017-12-03 PROCEDURE — 97116 GAIT TRAINING THERAPY: CPT

## 2017-12-03 PROCEDURE — 36415 COLL VENOUS BLD VENIPUNCTURE: CPT

## 2017-12-03 PROCEDURE — 82962 GLUCOSE BLOOD TEST: CPT

## 2017-12-03 PROCEDURE — 97110 THERAPEUTIC EXERCISES: CPT

## 2017-12-03 PROCEDURE — 25000003 PHARM REV CODE 250: Performed by: SURGERY

## 2017-12-03 PROCEDURE — 94760 N-INVAS EAR/PLS OXIMETRY 1: CPT

## 2017-12-03 PROCEDURE — 63600175 PHARM REV CODE 636 W HCPCS: Performed by: FAMILY MEDICINE

## 2017-12-03 PROCEDURE — 85025 COMPLETE CBC W/AUTO DIFF WBC: CPT

## 2017-12-03 PROCEDURE — 63600175 PHARM REV CODE 636 W HCPCS: Performed by: INTERNAL MEDICINE

## 2017-12-03 RX ORDER — HYDROCORTISONE 25 MG/G
CREAM TOPICAL 2 TIMES DAILY
Status: DISCONTINUED | OUTPATIENT
Start: 2017-12-03 | End: 2017-12-04 | Stop reason: HOSPADM

## 2017-12-03 RX ADMIN — CARVEDILOL 12.5 MG: 12.5 TABLET, FILM COATED ORAL at 04:12

## 2017-12-03 RX ADMIN — LEVOTHYROXINE SODIUM 50 MCG: 50 TABLET ORAL at 08:12

## 2017-12-03 RX ADMIN — CEFEPIME 500 MG: 1 INJECTION, POWDER, FOR SOLUTION INTRAMUSCULAR; INTRAVENOUS at 01:12

## 2017-12-03 RX ADMIN — ENEMA 1 ENEMA: 19; 7 ENEMA RECTAL at 10:12

## 2017-12-03 RX ADMIN — CARVEDILOL 12.5 MG: 12.5 TABLET, FILM COATED ORAL at 08:12

## 2017-12-03 RX ADMIN — LISINOPRIL 10 MG: 10 TABLET ORAL at 08:12

## 2017-12-03 RX ADMIN — ACETAMINOPHEN 650 MG: 325 TABLET, FILM COATED ORAL at 08:12

## 2017-12-03 RX ADMIN — RAMELTEON 8 MG: 8 TABLET, FILM COATED ORAL at 08:12

## 2017-12-03 RX ADMIN — PRAVASTATIN SODIUM 20 MG: 20 TABLET ORAL at 08:12

## 2017-12-03 RX ADMIN — PANTOPRAZOLE SODIUM 40 MG: 40 TABLET, DELAYED RELEASE ORAL at 08:12

## 2017-12-03 RX ADMIN — TAMSULOSIN HYDROCHLORIDE 0.4 MG: 0.4 CAPSULE ORAL at 08:12

## 2017-12-03 RX ADMIN — ASPIRIN 81 MG: 81 TABLET, COATED ORAL at 08:12

## 2017-12-03 RX ADMIN — Medication 400 MG: at 08:12

## 2017-12-03 RX ADMIN — GLIMEPIRIDE 2 MG: 2 TABLET ORAL at 08:12

## 2017-12-03 RX ADMIN — FUROSEMIDE 20 MG: 10 INJECTION, SOLUTION INTRAMUSCULAR; INTRAVENOUS at 08:12

## 2017-12-03 RX ADMIN — WARFARIN SODIUM 2 MG: 2 TABLET ORAL at 04:12

## 2017-12-03 RX ADMIN — HYDROCORTISONE 2.5%: 25 CREAM TOPICAL at 08:12

## 2017-12-03 RX ADMIN — POLYETHYLENE GLYCOL 3350 17 G: 17 POWDER, FOR SOLUTION ORAL at 08:12

## 2017-12-03 RX ADMIN — DOCUSATE SODIUM 100 MG: 100 CAPSULE, LIQUID FILLED ORAL at 08:12

## 2017-12-03 RX ADMIN — POTASSIUM CHLORIDE 40 MEQ: 1500 TABLET, EXTENDED RELEASE ORAL at 08:12

## 2017-12-03 NOTE — PT/OT/SLP PROGRESS
Physical Therapy  Treatment    Eddie Parada Sr.   MRN: [id number]   Admitting Diagnosis: E. coli UTI    PT Received On: 12/03/17  PT Start Time: 0820     PT Stop Time: 0845    PT Total Time (min): 25 min       Billable Minutes:  Gait [id number] min and Therapeutic Exercise 10 min    Treatment Type: Treatment  PT/PTA: PT             General Precautions: Standard, fall  Orthopedic Precautions: RLE weight bearing as tolerated   Braces:           Subjective:  Communicated with patient and spouse prior to session.  Awaits enema no BM for greater than  21 days  Lasix decreased swelling  I am ready to try to walk             Objective:   Patient found with: telemetry, shields catheter    Functional Mobility:  Bed Mobility:        Transfers:       Gait:   Gait Distance: 60 ft about room to tolerance  Assistance 1: Contact Guard Assistance  Gait Assistive Device: Rolling walker  Gait Pattern: swing-to gait  Gait Deviation(s): decreased velocity of limb motion, decreased step length, decreased stride length, decreased weight-shifting ability    Stairs:    Balance:   Static Sit: GOOD+: Takes MAXIMAL challenges from all directions.    Dynamic Sit: GOOD+: Maintains balance through MAXIMAL excursions of active trunk motion  Static Stand: GOOD-: Takes MODERATE challenges from all directions inconsistently  Dynamic stand: FAIR+: Needs CLOSE SUPERVISION during gait and is able to right self with minor LOB     Therapeutic Activities and Exercises:  Repetitive active and assistive exercises for bilateral lower  extremities to promote  motoin, strength and functionality following MAYURI  Bed mobility tasks for increased self help and positioning  Transfer training to sit -stand and vice versa  Wt bearing activities to promote increased wt bearing onto bilateral lower extremities      AM-PAC 6 CLICK MOBILITY  How much help from another person does this patient currently need?   1 = Unable, Total/Dependent Assistance  2 = A lot,  Maximum/Moderate Assistance  3 = A little, Minimum/Contact Guard/Supervision  4 = None, Modified [place]/Independent         AM-PAC Raw Score CMS G-Code Modifier Level of Impairment Assistance   6 % Total / Unable   7 - 9 CM 80 - 100% Maximal Assist   10 - 14 CL 60 - 80% Moderate Assist   15 - 19 CK 40 - 60% Moderate Assist   20 - 22 CJ 20 - 40% Minimal Assist   23 CI 1-20% SBA / CGA   24 CH 0% Independent/ Mod I     Patient left supine with all lines intact, call button in reach, RN staff notified and spouse present.    Assessment:  Eddie Parada Sr. is a 88 y.o. male with a medical diagnosis of E. coli UTI and presents with weakness and decreased walking tolerance with RW.    Rehab identified problem list/impairments: Rehab identified problem list/impairments: weakness, impaired endurance, impaired self care skills, gait instability, impaired functional mobilty, impaired balance, decreased lower extremity function    Rehab potential is good.    Activity tolerance: Good    Discharge recommendations: Discharge Facility/Level Of Care Needs: nursing facility, skilled     Barriers to discharge: Barriers to Discharge: None    Equipment recommendations: Equipment Needed After Discharge: none     GOALS:    Physical Therapy Goals        Problem: Physical Therapy Goal    Goal Priority Disciplines Outcome Goal Variances Interventions   Physical Therapy Goal     PT/OT, PT      Description:  Goals to be met by: upon D/C from facility     Patient will increase functional independence with mobility by performin. Supine to sit with [place]  2. Sit to supine with [place]  3. Sit to stand transfer with Standby Assistance with Rolling Walker  4. Bed to chair transfer with Supervision using Rolling Walker  5. Gait  x 50 feet with Contact Guard Assistance using Rolling Walker.                       PLAN:    Patient to be seen  (1-2x/day(Weekdays); PRN(Sat.,Sun., Holidays))  to address the above listed  problems via gait training, therapeutic activities, therapeutic exercises  Plan of Care expires: 11/30/17 (following a.m. PT tx. session.)  Plan of Care reviewed with: patient, spouse         Indra Mena, PT  12/03/2017

## 2017-12-03 NOTE — SUBJECTIVE & OBJECTIVE
Interval History: some scrotal edema and constipation this am; Rehab for some hip surgery    Review of Systems   Constitutional: Negative for appetite change.   HENT: Negative for congestion, ear pain, sneezing and sore throat.    Eyes: Negative for redness and visual disturbance.   Respiratory: Negative for cough, chest tightness and stridor.    Cardiovascular: Negative for chest pain.   Gastrointestinal: Positive for constipation. Negative for abdominal pain, blood in stool, diarrhea, nausea and vomiting.        Some anal bright red blood   Genitourinary: Negative for difficulty urinating, dysuria and hematuria.        Scrotal and penile edema   Musculoskeletal: Negative for arthralgias, back pain, joint swelling, myalgias and neck pain.   Skin: Negative for rash.   Neurological: Negative for dizziness.   Psychiatric/Behavioral: Negative for agitation. The patient is not nervous/anxious.      Objective:     Vital Signs (Most Recent):  Temp: 97.3 °F (36.3 °C) (12/03/17 1127)  Pulse: 60 (12/03/17 1127)  Resp: 16 (12/03/17 0738)  BP: 125/66 (12/03/17 1127)  SpO2: 95 % (12/03/17 1127) Vital Signs (24h Range):  Temp:  [96.1 °F (35.6 °C)-98.8 °F (37.1 °C)] 97.3 °F (36.3 °C)  Pulse:  [59-76] 60  Resp:  [16-19] 16  SpO2:  [94 %-97 %] 95 %  BP: (108-153)/(55-75) 125/66     Weight: 87.5 kg (192 lb 14.4 oz)  Body mass index is 31.14 kg/m².    Intake/Output Summary (Last 24 hours) at 12/03/17 1143  Last data filed at 12/03/17 0600   Gross per 24 hour   Intake               50 ml   Output             2550 ml   Net            -2500 ml      Physical Exam   Constitutional: He is oriented to person, place, and time. He appears well-developed and well-nourished. He appears distressed.   Elderly 89y/o W M  With less edema this am   HENT:   Head: Normocephalic.   Eyes: Pupils are equal, round, and reactive to light.   Neck: Normal range of motion. Neck supple. No thyromegaly present.   Cardiovascular: Normal rate and regular rhythm.   Exam reveals no friction rub.    No murmur heard.  Pulmonary/Chest: Effort normal. No respiratory distress. He has no wheezes.   Abdominal: There is no tenderness. There is no rebound and no guarding.   Musculoskeletal: Normal range of motion. He exhibits no edema or tenderness.   Lymphadenopathy:     He has no cervical adenopathy.   Neurological: He is alert and oriented to person, place, and time. He has normal reflexes. No cranial nerve deficit.   Skin: Skin is warm and dry.   Psychiatric: He has a normal mood and affect. Judgment and thought content normal.       Significant Labs:   CBC:   Recent Labs  Lab 12/02/17  0325 12/03/17  0553   WBC 11.91 10.54   HGB 9.4* 9.7*   HCT 28.9* 29.8*    238     CMP:   Recent Labs  Lab 12/02/17  0325 12/03/17  0553    138   K 4.1 4.5    105   CO2 19* 27   * 131*   BUN 14 16   CREATININE 0.8 0.9   CALCIUM 8.5* 8.7   ANIONGAP 11 6*   EGFRNONAA >60 >60       Significant Imaging: I have reviewed all pertinent imaging results/findings within the past 24 hours.

## 2017-12-03 NOTE — ASSESSMENT & PLAN NOTE
Continue carvedilol 12.5 mg by mouth twice a day  Continue aspirin 81 mg daily  Showing signs of acute exacerbation today   Less edema and breathing is improved

## 2017-12-03 NOTE — PROGRESS NOTES
[facility] Hospital Medicine  Progress Note    Patient Name: Eddie Parada Sr.  MRN: [id number]  Patient Class: IP- Inpatient   Admission Date: 11/26/2017  Length of Stay: 3 days  Attending Physician: Jorge Luis Mccarty MD  Primary Care Provider: Callum Roberts MD        Subjective:     Principal Problem:E. coli UTI    HPI:  Eddie Parada Sr. presents to the emergency room with dysuria and urinary retention  Patient states that he had trouble urinating this week at the nursing home, Shields placed   Patient had an in and out catheterization at the nursing home, culture sent by PCP then  Pt then had subjective fever, family brought him into the ER for urinary tract evaluation  Shields placed in the ER with puslike urine, subjective fevers last night for staff at the NH  Patient has a history of a recent right hip fracture requiring ORIF.  He is still working with therapy.  Patient has a history of heart valve transplant.    Hospital Course:  Patient admitted to the third floor on telemetry.  He received his first dose of Rocephin.  He has no complaints this evening.    11/27--feeling better. No fevers or leukocytosis. Still have shields in placed, placed due to urinary retention on admit. Remains on rocephin    11/28/17  Plan was to hopefully send him to NH   He ran 3 beat V tach   Asymptomatic   K borderline normal   Added KCL and magnesium  CIS consult done   Keep on telemetry ;  Hopefully home in am   Had to be place don foleys catheter ; unable to urinate   Will need to see urology outpatient       11/29/17  No fever   Urine culture pending   3 beat run of [facility] o/b. Lytes and mg ok.  Coumadin resumed last night after being supratherapeutic upon admit      11/30  This morning, he has no complaints.  Shields still in place.  No burning.  His wife is most concerned about his bowels. He has had some incontinence, however, he says that he can feel his BMs now. He has a 'large BM' this morning  reported.    Pt has now progressive SOB and is showing signs of fluid overload with leg edema nad groin scrotal edema with subjective SOB and  PND from appears to be CHF.    Breathing is improved this am, but is very constipated this am: SS enema orderd    Interval History: some scrotal edema and constipation this am; Rehab for some hip surgery    Review of Systems   Constitutional: Negative for appetite change.   HENT: Negative for congestion, ear pain, sneezing and sore throat.    Eyes: Negative for redness and visual disturbance.   Respiratory: Negative for cough, chest tightness and stridor.    Cardiovascular: Negative for chest pain.   Gastrointestinal: Positive for constipation. Negative for abdominal pain, blood in stool, diarrhea, nausea and vomiting.        Some anal bright red blood   Genitourinary: Negative for difficulty urinating, dysuria and hematuria.        Scrotal and penile edema   Musculoskeletal: Negative for arthralgias, back pain, joint swelling, myalgias and neck pain.   Skin: Negative for rash.   Neurological: Negative for dizziness.   Psychiatric/Behavioral: Negative for agitation. The patient is not nervous/anxious.      Objective:     Vital Signs (Most Recent):  Temp: 97.3 °F (36.3 °C) (12/03/17 1127)  Pulse: 60 (12/03/17 1127)  Resp: 16 (12/03/17 0738)  BP: 125/66 (12/03/17 1127)  SpO2: 95 % (12/03/17 1127) Vital Signs (24h Range):  Temp:  [96.1 °F (35.6 °C)-98.8 °F (37.1 °C)] 97.3 °F (36.3 °C)  Pulse:  [59-76] 60  Resp:  [16-19] 16  SpO2:  [94 %-97 %] 95 %  BP: (108-153)/(55-75) 125/66     Weight: 87.5 kg (192 lb 14.4 oz)  Body mass index is 31.14 kg/m².    Intake/Output Summary (Last 24 hours) at 12/03/17 1143  Last data filed at 12/03/17 0600   Gross per 24 hour   Intake               50 ml   Output             2550 ml   Net            -2500 ml      Physical Exam   Constitutional: He is oriented to person, place, and time. He appears well-developed and well-nourished. He appears  distressed.   Elderly 87y/o W M  With less edema this am   HENT:   Head: Normocephalic.   Eyes: Pupils are equal, round, and reactive to light.   Neck: Normal range of motion. Neck supple. No thyromegaly present.   Cardiovascular: Normal rate and regular rhythm.  Exam reveals no friction rub.    No murmur heard.  Pulmonary/Chest: Effort normal. No respiratory distress. He has no wheezes.   Abdominal: There is no tenderness. There is no rebound and no guarding.   Musculoskeletal: Normal range of motion. He exhibits no edema or tenderness.   Lymphadenopathy:     He has no cervical adenopathy.   Neurological: He is alert and oriented to person, place, and time. He has normal reflexes. No cranial nerve deficit.   Skin: Skin is warm and dry.   Psychiatric: He has a normal mood and affect. Judgment and thought content normal.       Significant Labs:   CBC:   Recent Labs  Lab 12/02/17 0325 12/03/17  0553   WBC 11.91 10.54   HGB 9.4* 9.7*   HCT 28.9* 29.8*    238     CMP:   Recent Labs  Lab 12/02/17 0325 12/03/17  0553    138   K 4.1 4.5    105   CO2 19* 27   * 131*   BUN 14 16   CREATININE 0.8 0.9   CALCIUM 8.5* 8.7   ANIONGAP 11 6*   EGFRNONAA >60 >60       Significant Imaging: I have reviewed all pertinent imaging results/findings within the past 24 hours.    Assessment/Plan:      * E. coli UTI    Bishop catheter in place due to urinary retention on admit. D/c Bishop 11/27 for voiding trial, failed   Starting flomax 11/29   Rocephin 1 g IV piggyback every 24 hours day 4  Urine culture and blood culture in process--urine shows e coli - bactrim sens.          V-tach    Isolated event. Lytes and mg normal           S/P ORIF (open reduction internal fixation) fracture    On right  Surgery 2 weeks ago  Was rehabing at [place], should return at discharge  PT/OR ordered here today    Staples removed 11/27          Coumadin toxicity    Held Coumadin until   INR is   Lab Results   Component Value Date     INR 1.7 (H) 12/01/2017    INR 1.7 (H) 11/30/2017    INR 1.6 (H) 11/29/2017     Resumed coumadin 11/28/17        (HFpEF) heart failure with preserved ejection fraction    Continue carvedilol 12.5 mg by mouth twice a day  Continue aspirin 81 mg daily  Showing signs of acute exacerbation today   Less edema and breathing is improved        Pedal edema    Pt needs further diuresing for his edema & CHF while recovering from his hip surgery and UTI issues-continue the same for today        Angina pectoris              COPD (chronic obstructive pulmonary disease)    No signs of acute exacerbation  Was not on maintenance inhalers prior to admit        Type 2 diabetes mellitus with neurologic complication    Restarted home amaryl and actos 11/27  amaryl  Insulin sliding scale ordered - bs very good.          Cardiac pacemaker in situ    Aspirin 81 mg daily  Continue telemetry; coreg  Resume coumadin    No further arrhyhtmias        CAD (coronary artery disease)    Aspirin 81 mg daily  Coreg  Pravastatin    Was on lisinopril prior to admit. BP normal off lisinopril. Have not restarted and will continue to hold.            HTN (hypertension)              Atrial fibrillation    Resume Coumadin for now.  INR subtherapeutic will need to be monitored.            VTE Risk Mitigation         Ordered     warfarin (COUMADIN) tablet 2 mg  Daily     Route:  Oral        11/28/17 0850     Medium Risk of VTE  Once      11/26/17 1234     Place sequential compression device  Until discontinued      11/26/17 1234              Montana Mondragon MD  Department of Hospital Medicine   [facility]

## 2017-12-03 NOTE — ASSESSMENT & PLAN NOTE
On right  Surgery 2 weeks ago  Was rehabing at [place], should return at discharge  PT/OR ordered here today    Staples removed 11/27

## 2017-12-03 NOTE — PLAN OF CARE
Problem: Diabetes, Type 2 (Adult)  Goal: Signs and Symptoms of Listed Potential Problems Will be Absent, Minimized or Managed (Diabetes, Type 2)  Signs and symptoms of listed potential problems will be absent, minimized or managed by discharge/transition of care (reference Diabetes, Type 2 (Adult) CPG).   Outcome: Ongoing (interventions implemented as appropriate)  accuchecks continue ac&hs. No supplemental insulin given this shift.     Problem: Fall Risk (Adult)  Goal: Absence of Falls  Patient will demonstrate the desired outcomes by discharge/transition of care.   Outcome: Ongoing (interventions implemented as appropriate)  Fall precautions maintained. Bed alarm enagaged at all times. Daughter at bedside.     Problem: Patient Care Overview  Goal: Plan of Care Review  Outcome: Ongoing (interventions implemented as appropriate)  Plan of care reviewed with patient and he agrees with the plan of care. Telemetry monitoring continues. C/o back pain which was relieved by acetaminophen. C/o insomnia Rozerem given and was effective. No acute distress noted.     Problem: Urinary Tract Infection (Adult)  Goal: Signs and Symptoms of Listed Potential Problems Will be Absent, Minimized or Managed (Urinary Tract Infection)  Signs and symptoms of listed potential problems will be absent, minimized or managed by discharge/transition of care (reference Urinary Tract Infection (Adult) CPG).    Outcome: Ongoing (interventions implemented as appropriate)  Afebrile. Catheter intact. IV antibiotics continue without any adverse reactions.     Problem: Pressure Ulcer Risk (Ronaldo Scale) (Adult,Obstetrics,Pediatric)  Goal: Skin Integrity  Patient will demonstrate the desired outcomes by discharge/transition of care.   Outcome: Ongoing (interventions implemented as appropriate)  Patient turned and repositioned every 2 hours.

## 2017-12-04 VITALS
HEIGHT: 66 IN | OXYGEN SATURATION: 96 % | RESPIRATION RATE: 18 BRPM | BODY MASS INDEX: 31 KG/M2 | HEART RATE: 60 BPM | WEIGHT: 192.88 LBS | SYSTOLIC BLOOD PRESSURE: 138 MMHG | DIASTOLIC BLOOD PRESSURE: 69 MMHG | TEMPERATURE: 98 F

## 2017-12-04 PROBLEM — R33.9 URINARY RETENTION: Status: ACTIVE | Noted: 2017-12-04

## 2017-12-04 LAB
ANION GAP SERPL CALC-SCNC: 9 MMOL/L
BASOPHILS # BLD AUTO: 0.03 K/UL
BASOPHILS # BLD AUTO: 0.03 K/UL
BASOPHILS NFR BLD: 0.3 %
BASOPHILS NFR BLD: 0.3 %
BUN SERPL-MCNC: 15 MG/DL
CALCIUM SERPL-MCNC: 9 MG/DL
CHLORIDE SERPL-SCNC: 102 MMOL/L
CO2 SERPL-SCNC: 28 MMOL/L
CREAT SERPL-MCNC: 0.9 MG/DL
DIFFERENTIAL METHOD: ABNORMAL
DIFFERENTIAL METHOD: ABNORMAL
EOSINOPHIL # BLD AUTO: 0.1 K/UL
EOSINOPHIL # BLD AUTO: 0.1 K/UL
EOSINOPHIL NFR BLD: 1.1 %
EOSINOPHIL NFR BLD: 1.1 %
ERYTHROCYTE [DISTWIDTH] IN BLOOD BY AUTOMATED COUNT: 14.5 %
ERYTHROCYTE [DISTWIDTH] IN BLOOD BY AUTOMATED COUNT: 14.5 %
EST. GFR  (AFRICAN AMERICAN): >60 ML/MIN/1.73 M^2
EST. GFR  (NON AFRICAN AMERICAN): >60 ML/MIN/1.73 M^2
GLUCOSE SERPL-MCNC: 154 MG/DL
HCT VFR BLD AUTO: 32 %
HCT VFR BLD AUTO: 32 %
HGB BLD-MCNC: 10.1 G/DL
HGB BLD-MCNC: 10.1 G/DL
INR PPP: 1.7
LYMPHOCYTES # BLD AUTO: 1.1 K/UL
LYMPHOCYTES # BLD AUTO: 1.1 K/UL
LYMPHOCYTES NFR BLD: 9.9 %
LYMPHOCYTES NFR BLD: 9.9 %
MCH RBC QN AUTO: 32.7 PG
MCH RBC QN AUTO: 32.7 PG
MCHC RBC AUTO-ENTMCNC: 31.6 G/DL
MCHC RBC AUTO-ENTMCNC: 31.6 G/DL
MCV RBC AUTO: 104 FL
MCV RBC AUTO: 104 FL
MONOCYTES # BLD AUTO: 1.1 K/UL
MONOCYTES # BLD AUTO: 1.1 K/UL
MONOCYTES NFR BLD: 10.2 %
MONOCYTES NFR BLD: 10.2 %
NEUTROPHILS # BLD AUTO: 8.5 K/UL
NEUTROPHILS # BLD AUTO: 8.5 K/UL
NEUTROPHILS NFR BLD: 78.5 %
NEUTROPHILS NFR BLD: 78.5 %
PLATELET # BLD AUTO: 253 K/UL
PLATELET # BLD AUTO: 253 K/UL
PMV BLD AUTO: 9.9 FL
PMV BLD AUTO: 9.9 FL
POCT GLUCOSE: 158 MG/DL (ref 70–110)
POCT GLUCOSE: 185 MG/DL (ref 70–110)
POTASSIUM SERPL-SCNC: 4.9 MMOL/L
PROTHROMBIN TIME: 18.5 SEC
RBC # BLD AUTO: 3.09 M/UL
RBC # BLD AUTO: 3.09 M/UL
SODIUM SERPL-SCNC: 139 MMOL/L
WBC # BLD AUTO: 10.83 K/UL
WBC # BLD AUTO: 10.83 K/UL

## 2017-12-04 PROCEDURE — 25000003 PHARM REV CODE 250: Performed by: INTERNAL MEDICINE

## 2017-12-04 PROCEDURE — 99233 SBSQ HOSP IP/OBS HIGH 50: CPT | Mod: ,,, | Performed by: INTERNAL MEDICINE

## 2017-12-04 PROCEDURE — 25000003 PHARM REV CODE 250: Performed by: FAMILY MEDICINE

## 2017-12-04 PROCEDURE — 90670 PCV13 VACCINE IM: CPT | Performed by: FAMILY MEDICINE

## 2017-12-04 PROCEDURE — 63600175 PHARM REV CODE 636 W HCPCS: Performed by: FAMILY MEDICINE

## 2017-12-04 PROCEDURE — 97116 GAIT TRAINING THERAPY: CPT | Mod: 59

## 2017-12-04 PROCEDURE — 94760 N-INVAS EAR/PLS OXIMETRY 1: CPT

## 2017-12-04 PROCEDURE — 25000003 PHARM REV CODE 250: Performed by: SURGERY

## 2017-12-04 PROCEDURE — G0009 ADMIN PNEUMOCOCCAL VACCINE: HCPCS | Performed by: FAMILY MEDICINE

## 2017-12-04 PROCEDURE — 90662 IIV NO PRSV INCREASED AG IM: CPT | Performed by: FAMILY MEDICINE

## 2017-12-04 PROCEDURE — 85025 COMPLETE CBC W/AUTO DIFF WBC: CPT

## 2017-12-04 PROCEDURE — G8980 MOBILITY D/C STATUS: HCPCS | Mod: CK

## 2017-12-04 PROCEDURE — 25000003 PHARM REV CODE 250: Performed by: NURSE PRACTITIONER

## 2017-12-04 PROCEDURE — 36415 COLL VENOUS BLD VENIPUNCTURE: CPT

## 2017-12-04 PROCEDURE — G0008 ADMIN INFLUENZA VIRUS VAC: HCPCS | Performed by: FAMILY MEDICINE

## 2017-12-04 PROCEDURE — 97530 THERAPEUTIC ACTIVITIES: CPT

## 2017-12-04 PROCEDURE — 90472 IMMUNIZATION ADMIN EACH ADD: CPT | Performed by: FAMILY MEDICINE

## 2017-12-04 PROCEDURE — 63600175 PHARM REV CODE 636 W HCPCS: Performed by: INTERNAL MEDICINE

## 2017-12-04 PROCEDURE — 90471 IMMUNIZATION ADMIN: CPT | Performed by: FAMILY MEDICINE

## 2017-12-04 PROCEDURE — 85610 PROTHROMBIN TIME: CPT

## 2017-12-04 PROCEDURE — 80048 BASIC METABOLIC PNL TOTAL CA: CPT

## 2017-12-04 PROCEDURE — G8979 MOBILITY GOAL STATUS: HCPCS | Mod: CJ

## 2017-12-04 RX ORDER — OXYBUTYNIN CHLORIDE 5 MG/1
5 TABLET ORAL 3 TIMES DAILY
Qty: 90 TABLET | Refills: 11 | Status: SHIPPED | OUTPATIENT
Start: 2017-12-04 | End: 2018-01-16 | Stop reason: ALTCHOICE

## 2017-12-04 RX ORDER — FUROSEMIDE 40 MG/1
TABLET ORAL
Qty: 30 TABLET | Refills: 6 | Status: SHIPPED | OUTPATIENT
Start: 2017-12-04 | End: 2019-01-10 | Stop reason: SDUPTHER

## 2017-12-04 RX ORDER — OXYBUTYNIN CHLORIDE 5 MG/1
5 TABLET ORAL 3 TIMES DAILY
Status: DISCONTINUED | OUTPATIENT
Start: 2017-12-04 | End: 2017-12-04 | Stop reason: HOSPADM

## 2017-12-04 RX ADMIN — LISINOPRIL 10 MG: 10 TABLET ORAL at 09:12

## 2017-12-04 RX ADMIN — CEFEPIME 500 MG: 1 INJECTION, POWDER, FOR SOLUTION INTRAMUSCULAR; INTRAVENOUS at 01:12

## 2017-12-04 RX ADMIN — WARFARIN SODIUM 2 MG: 2 TABLET ORAL at 04:12

## 2017-12-04 RX ADMIN — POTASSIUM CHLORIDE 40 MEQ: 1500 TABLET, EXTENDED RELEASE ORAL at 09:12

## 2017-12-04 RX ADMIN — LEVOTHYROXINE SODIUM 50 MCG: 50 TABLET ORAL at 09:12

## 2017-12-04 RX ADMIN — CARVEDILOL 12.5 MG: 12.5 TABLET, FILM COATED ORAL at 08:12

## 2017-12-04 RX ADMIN — INFLUENZA A VIRUSA/MICHIGAN/45/2015 X-275 (H1N1) ANTIGEN (FORMALDEHYDE INACTIVATED), INFLUENZA A VIRUS A/HONG KONG/4801/2014 X-263B (H3N2) ANTIGEN (FORMALDEHYDE INACTIVATED), AND INFLUENZA B VIRUS B/BRISBANE/60/2008 ANTIGEN (FORMALDEHYDE INACTIVATED) 0.5 ML: 60; 60; 60 INJECTION, SUSPENSION INTRAMUSCULAR at 04:12

## 2017-12-04 RX ADMIN — Medication 400 MG: at 09:12

## 2017-12-04 RX ADMIN — ASPIRIN 81 MG: 81 TABLET, COATED ORAL at 09:12

## 2017-12-04 RX ADMIN — CARVEDILOL 12.5 MG: 12.5 TABLET, FILM COATED ORAL at 04:12

## 2017-12-04 RX ADMIN — SIMETHICONE CHEW TAB 80 MG 80 MG: 80 TABLET ORAL at 01:12

## 2017-12-04 RX ADMIN — OXYBUTYNIN CHLORIDE 5 MG: 5 TABLET ORAL at 12:12

## 2017-12-04 RX ADMIN — PRAVASTATIN SODIUM 20 MG: 20 TABLET ORAL at 09:12

## 2017-12-04 RX ADMIN — GLIMEPIRIDE 2 MG: 2 TABLET ORAL at 08:12

## 2017-12-04 RX ADMIN — PANTOPRAZOLE SODIUM 40 MG: 40 TABLET, DELAYED RELEASE ORAL at 09:12

## 2017-12-04 RX ADMIN — HYDROCORTISONE 2.5%: 25 CREAM TOPICAL at 09:12

## 2017-12-04 RX ADMIN — TAMSULOSIN HYDROCHLORIDE 0.4 MG: 0.4 CAPSULE ORAL at 09:12

## 2017-12-04 RX ADMIN — PNEUMOCOCCAL 13-VALENT CONJUGATE VACCINE 0.5 ML: 2.2; 2.2; 2.2; 2.2; 2.2; 4.4; 2.2; 2.2; 2.2; 2.2; 2.2; 2.2; 2.2 INJECTION, SUSPENSION INTRAMUSCULAR at 04:12

## 2017-12-04 RX ADMIN — FUROSEMIDE 20 MG: 10 INJECTION, SOLUTION INTRAMUSCULAR; INTRAVENOUS at 09:12

## 2017-12-04 NOTE — PLAN OF CARE
12/04/17 1548   Discharge Reassessment   Assessment Type Final Discharge Note     SW contacted [facility] and the pt will be transported back via nursing home van.

## 2017-12-04 NOTE — PLAN OF CARE
12/04/17 1112   Discharge Assessment   Assessment Type Discharge Planning Reassessment   spoke to Hermelinda at [facility] to inform her of patient's return today after auth for skilled is secured.

## 2017-12-04 NOTE — DISCHARGE SUMMARY
[facility] Medicine  Discharge Summary      Patient Name: Eddie Parada Sr.  MRN: [id number]  Admission Date: 11/26/2017  Hospital Length of Stay: 4 days  Discharge Date and Time:  12/04/2017 11:06 AM  Attending Physician: Jorge Luis Mccarty MD   Discharging Provider: Georgia Ann MD  Primary Care Provider: Callum Roberts MD      HPI:   Eddie Parada Sr. presents to the emergency room with dysuria and urinary retention  Patient states that he had trouble urinating this week at the nursing home, Shields placed   Patient had an in and out catheterization at the nursing home, culture sent by PCP then  Pt then had subjective fever, family brought him into the ER for urinary tract evaluation  Shields placed in the ER with puslike urine, subjective fevers last night for staff at the NH  Patient has a history of a recent right hip fracture requiring ORIF.  He is still working with therapy.  Patient has a history of heart valve transplant.    * No surgery found *      Hospital Course:   Patient admitted to the third floor on telemetry.  He received his first dose of Rocephin.  He has no complaints this evening.    11/27--feeling better. No fevers or leukocytosis. Still have shields in placed, placed due to urinary retention on admit. Remains on rocephin    11/28/17  Plan was to hopefully send him to NH   He ran 3 beat V tach   Asymptomatic   K borderline normal   Added KCL and magnesium  CIS consult done   Keep on telemetry ;  Hopefully home in am   Had to be place don foleys catheter ; unable to urinate   Will need to see urology outpatient       11/29/17  No fever   Urine culture pending   3 beat run of [facility] o/b. Lytes and mg ok.  Coumadin resumed last night after being supratherapeutic upon admit      11/30  This morning, he has no complaints.  Shields still in place.  No burning.  His wife is most concerned about his bowels. He has had some incontinence, however, he says that he  can feel his BMs now. He has a 'large BM' this morning reported.    Pt has now progressive SOB and is showing signs of fluid overload with leg edema nad groin scrotal edema with subjective SOB and  PND from appears to be CHF.    Breathing is improved this am, but is very constipated this am: SS enema orderd    12/4/17: doing great. Constipation resolved s/p disimpaction. No SOB, LE edema. Still with some groin edema. He is ambulating with PT. + bladder spasms from shields. Failed voiding trial. Today is day 7 of Cefepime for UTI. No fevers or leukocytosis.      Consults:   Consults         Status Ordering Provider     Inpatient consult to Cardiology-CIS  Once     Provider:  (Not yet assigned)    Acknowledged GABRIEL STALEY     Nutrition Services Referral  Once     Provider:  (Not yet assigned)    Completed VAL MICHAELS          * E. coli UTI    Shields catheter in place due to urinary retention on admit. D/c Shields 11/27 for voiding trial, failed   Starting flomax 11/29   Cefepime 1 g IV piggyback every 24 hours day 7  Urine culture and blood culture in process--urine shows e coli - cefepime sensitive  Today is day 7 of cefepime, no further antibx needed          Urinary retention    Likely due to UTI  Failed voiding trial  Will d/c with shields to see urology as outpatient'  Start oxybutynin for bladder aspsms, may resolve once shields removed  folmax started this admisison    Started ditropan for bladder spasms          V-tach    Isolated event. Lytes and mg normal   Telemetry has been normal overnight. Stable for discharge. Cont BB          S/P ORIF (open reduction internal fixation) fracture    On right  Surgery 2 weeks ago  Was rehabing at [place], should return at discharge  PT/OR ordered here today    Staples removed 11/27          Coumadin toxicity    Held Coumadin until   INR is   Lab Results   Component Value Date    INR 1.7 (H) 12/04/2017    INR 1.7 (H) 12/03/2017    INR 1.9 (H) 12/02/2017     Resumed  coumadin 11/28/17. Will need good outpatient follow up. Near therapeutic range at discharge today        (HFpEF) heart failure with preserved ejection fraction    Continue carvedilol 12.5 mg by mouth twice a day  Continue aspirin 81 mg daily  Lasix PRN  No signs of exacerbation        Pedal edema    Resolved. Was on IVF Lasix, will resume home PRN dosing at discharge  Likely more due to immobility due to ORIF        COPD (chronic obstructive pulmonary disease)    No signs of acute exacerbation  Was not on maintenance inhalers prior to admit        Type 2 diabetes mellitus with neurologic complication    Restarted home amaryl and actos 11/27  amaryl  Insulin sliding scale ordered - bs controlled.          Cardiac pacemaker in situ    Aspirin 81 mg daily  Continue telemetry; coreg  Resume coumadin, will need outpatient monitoring    No further arrhyhtmias        CAD (coronary artery disease)    Aspirin 81 mg daily  Coreg, lisinopril  Pravastatin              HTN (hypertension)    Cont coreg, lisinopril  Clonidine stopped at discharge as not requiring here  BP stable          Anticoagulation monitoring by pharmacist              Atrial fibrillation    Resumed Coumadin; INR 1.7. Was supratherapeutic on admit.    Will need outpatient INR monitoring. No need to bridge.           Final Active Diagnoses:    Diagnosis Date Noted POA    PRINCIPAL PROBLEM:  E. coli UTI [N39.0, B96.20] 11/26/2017 Yes    Urinary retention [R33.9] 12/04/2017 Yes    V-tach [I47.2] 11/28/2017 No    Coumadin toxicity [T45.511A] 11/27/2017 Yes    S/P ORIF (open reduction internal fixation) fracture [Z96.7, Z87.81] 11/27/2017 Not Applicable    (HFpEF) heart failure with preserved ejection fraction [I50.30] 06/17/2015 Yes    Pedal edema [R60.0] 05/21/2014 No    Type 2 diabetes mellitus with neurologic complication [E11.49] 10/17/2012 Yes    COPD (chronic obstructive pulmonary disease) [J44.9] 10/17/2012 Yes    Cardiac pacemaker in situ  [Z95.0] 10/12/2012 Yes    CAD (coronary artery disease) [I25.10] 09/20/2012 Yes    HTN (hypertension) [I10] 09/20/2012 Yes    Atrial fibrillation [I48.91] 06/29/2012 Yes      Problems Resolved During this Admission:    Diagnosis Date Noted Date Resolved POA       Discharged Condition: good    Disposition: Skilled Nursing Facility    Follow Up:  Follow-up Information     Callum Roberts MD In 1 week.    Specialty:  Internal Medicine  Why:  outpatient services  Contact information:  [address] [address]  [phone]             Abdirahman Orozco MD In 3 days.    Specialty:  Urology  Contact information:  [facility] [address]  [phone]                 Patient Instructions:     Ambulatory Referral to Physical/Occupational Therapy   Referral Priority: Routine Referral Type: Physical Medicine   Referral Reason: Specialty Services Required    Requested Specialty: Physical Therapy    Number of Visits Requested: 1      Diet Diabetic 1800 Calories     Activity as tolerated         Significant Diagnostic Studies: Labs:   CMP   Recent Labs  Lab 12/03/17  0553 12/04/17  0535    139   K 4.5 4.9    102   CO2 27 28   * 154*   BUN 16 15   CREATININE 0.9 0.9   CALCIUM 8.7 9.0   ANIONGAP 6* 9   ESTGFRAFRICA >60 >60   EGFRNONAA >60 >60       Pending Diagnostic Studies:     None         Medications:  Reconciled Home Medications:   Current Discharge Medication List      START taking these medications    Details   oxybutynin (DITROPAN) 5 MG Tab Take 1 tablet (5 mg total) by mouth 3 (three) times daily.  Qty: 90 tablet, Refills: 11      tamsulosin (FLOMAX) 0.4 mg Cp24 Take 1 capsule (0.4 mg total) by mouth once daily.  Qty: 30 capsule, Refills: 11         CONTINUE these medications which have CHANGED    Details   furosemide (LASIX) 40 MG tablet Three times a week PRN swelling  Qty: 30 tablet, Refills: 6    Associated Diagnoses: Chronic diastolic heart failure; Essential hypertension       lisinopril 10 MG tablet Take 1 tablet (10 mg total) by mouth once daily.  Qty: 90 tablet, Refills: 3         CONTINUE these medications which have NOT CHANGED    Details   amlodipine (NORVASC) 10 MG tablet Take 1 tablet (10 mg total) by mouth once daily.  Qty: 30 tablet, Refills: 6    Associated Diagnoses: Essential hypertension      carvedilol (COREG) 12.5 MG tablet TAKE 1 TABLET (12.5 MG TOTAL) BY MOUTH 2 (TWO) TIMES DAILY WITH MEALS.  Qty: 60 tablet, Refills: 5    Associated Diagnoses: Essential hypertension      FOLIC ACID/MULTIVIT-MIN/LUTEIN (CENTRUM SILVER ORAL) Take by mouth.      gemfibrozil (LOPID) 600 MG tablet TAKE 1 TABLET (600 MG TOTAL) BY MOUTH 2 (TWO) TIMES DAILY BEFORE MEALS.  Qty: 60 tablet, Refills: 4    Associated Diagnoses: Mixed dyslipidemia      glimepiride (AMARYL) 2 MG tablet TAKE 1 TABLET BY MOUTH EVERY MORNING WITH BREAKFAST  Qty: 30 tablet, Refills: 4    Associated Diagnoses: Type 2 diabetes mellitus, controlled      levothyroxine (SYNTHROID) 50 MCG tablet Take 1 tablet (50 mcg total) by mouth once daily.  Qty: 30 tablet, Refills: 11      pioglitazone (ACTOS) 15 MG tablet ONE TABLET ONE TIME A DAY  Qty: 30 tablet, Refills: 11      pravastatin (PRAVACHOL) 20 MG tablet Take 1 tablet (20 mg total) by mouth once daily.  Qty: 30 tablet, Refills: 5    Associated Diagnoses: Mixed dyslipidemia      warfarin (COUMADIN) 1 MG tablet 2 OR 3 TABLETS DAILY AS DIRECTED  Qty: 60 tablet, Refills: 4    Associated Diagnoses: Permanent atrial fibrillation      ONETOUCH DELICA LANCETS 33 gauge Misc       ONETOUCH ULTRA2 kit          STOP taking these medications       aspirin (ECOTRIN) 81 MG EC tablet Comments:   Reason for Stopping:         cloNIDine (CATAPRES) 0.1 MG tablet Comments:   Reason for Stopping:         clindamycin (CLEOCIN) 300 MG capsule Comments:   Reason for Stopping:               Indwelling Lines/Drains at time of discharge:   Lines/Drains/Airways     Drain                 Urethral  Catheter 11/30/17 1758 Latex 3 days                Time spent on the discharge of patient: 35 minutes  Patient was seen and examined on the date of discharge and determined to be suitable for discharge.         Georgia Ann MD  Department of Hospital Medicine  [facility]

## 2017-12-04 NOTE — ASSESSMENT & PLAN NOTE
Bishop catheter in place due to urinary retention on admit. D/c Bishop 11/27 for voiding trial, failed   Starting flomax 11/29   Cefepime 1 g IV piggyback every 24 hours day 7  Urine culture and blood culture in process--urine shows e coli - cefepime sensitive  Today is day 7 of cefepime, no further antibx needed

## 2017-12-04 NOTE — ASSESSMENT & PLAN NOTE
Resumed Coumadin; INR 1.7. Was supratherapeutic on admit.    Will need outpatient INR monitoring. No need to bridge.

## 2017-12-04 NOTE — ASSESSMENT & PLAN NOTE
Likely due to UTI  Failed voiding trial  Will d/c with shields to see urology as outpatient'  Start oxybutynin for bladder aspsms, may resolve once shields removed  folmax started this admisison    Started ditropan for bladder spasms

## 2017-12-04 NOTE — SUBJECTIVE & OBJECTIVE
Interval History: no acute events. Overall doing well today other than bladder spasms from Bishop    Review of Systems   Constitutional: Negative for activity change, fatigue, fever and unexpected weight change.   HENT: Negative for congestion, ear pain, hearing loss, rhinorrhea and sore throat.    Eyes: Negative for pain, redness and visual disturbance.   Respiratory: Negative for cough, shortness of breath and wheezing.    Cardiovascular: Negative for chest pain, palpitations and leg swelling.   Gastrointestinal: Negative for abdominal pain, constipation, diarrhea, nausea and vomiting.   Genitourinary: Negative for decreased urine volume, dysuria, frequency and urgency.        Bladder spasms   Musculoskeletal: Negative for back pain, joint swelling and neck pain.   Skin: Negative for color change, rash and wound.   Neurological: Negative for dizziness, tremors, weakness (s/p right ORIF, slowly improving), light-headedness and headaches.     Objective:     Vital Signs (Most Recent):  Temp: 98.8 °F (37.1 °C) (12/04/17 0715)  Pulse: 60 (12/04/17 1011)  Resp: 18 (12/04/17 0715)  BP: (!) 115/56 (12/04/17 0715)  SpO2: 97 % (12/04/17 0728) Vital Signs (24h Range):  Temp:  [96.4 °F (35.8 °C)-98.8 °F (37.1 °C)] 98.8 °F (37.1 °C)  Pulse:  [60-63] 60  Resp:  [16-18] 18  SpO2:  [94 %-97 %] 97 %  BP: (115-148)/(55-66) 115/56     Weight: 87.5 kg (192 lb 14.4 oz)  Body mass index is 31.14 kg/m².    Intake/Output Summary (Last 24 hours) at 12/04/17 1039  Last data filed at 12/04/17 0700   Gross per 24 hour   Intake               50 ml   Output             2400 ml   Net            -2350 ml      Physical Exam   Constitutional: He is oriented to person, place, and time. He appears well-developed and well-nourished. No distress.   HENT:   Head: Normocephalic and atraumatic.   Right Ear: External ear normal.   Left Ear: External ear normal.   Eyes: Conjunctivae and EOM are normal. Pupils are equal, round, and reactive to light. Right  eye exhibits no discharge. Left eye exhibits no discharge.   Neck: Neck supple. No tracheal deviation present.   Cardiovascular: Normal rate and regular rhythm.  Exam reveals no friction rub.    No murmur heard.  Pulmonary/Chest: Effort normal and breath sounds normal. No respiratory distress. He has no wheezes. He has no rales.   Abdominal: Soft. Bowel sounds are normal. He exhibits no distension. There is no tenderness.   Musculoskeletal: He exhibits edema (right shin, trace. this is the ORIF leg).   Neurological: He is alert and oriented to person, place, and time. No cranial nerve deficit.   Skin: Skin is warm and dry.   Psychiatric: He has a normal mood and affect. His behavior is normal.   Nursing note and vitals reviewed.      Significant Labs:   CBC:   Recent Labs  Lab 12/03/17  0553 12/04/17  0536   WBC 10.54 10.83  10.83   HGB 9.7* 10.1*  10.1*   HCT 29.8* 32.0*  32.0*    253  253     CMP:   Recent Labs  Lab 12/03/17  0553 12/04/17  0535    139   K 4.5 4.9    102   CO2 27 28   * 154*   BUN 16 15   CREATININE 0.9 0.9   CALCIUM 8.7 9.0   ANIONGAP 6* 9   EGFRNONAA >60 >60     All pertinent labs within the past 24 hours have been reviewed.    Significant Imaging: I have reviewed all pertinent imaging results/findings within the past 24 hours.

## 2017-12-04 NOTE — ASSESSMENT & PLAN NOTE
Aspirin 81 mg daily  Continue telemetry; coreg  Resume coumadin, will need outpatient monitoring    No further arrhyhtmias

## 2017-12-04 NOTE — ASSESSMENT & PLAN NOTE
Resolved. Was on IVF Lasix, will resume home PRN dosing at discharge  Likely more due to immobility due to ORIF

## 2017-12-04 NOTE — ASSESSMENT & PLAN NOTE
Continue carvedilol 12.5 mg by mouth twice a day  Continue aspirin 81 mg daily  Lasix PRN  No signs of exacerbation

## 2017-12-04 NOTE — NURSING
Patient complained of some pressure in bladder area, states feels like a burning sensation at times, states increased discomfort with palpation. Bishop catheter is draining well. No distention noted to area. While examining patient he stated relief and felt like he had to have a bowel movement, assisted to bedside commode, wife at side.

## 2017-12-04 NOTE — PLAN OF CARE
12/04/17 1548   Discharge Assessment   Assessment Type Discharge Planning Reassessment   call received from [facility] with approved authorization for skilled nursing at [facility] today. Authorization number is  [id number].

## 2017-12-04 NOTE — PT/OT/SLP PROGRESS
"Physical Therapy Treatment    Patient Name:  Eddie Parada Sr.   MRN:  [id number]    Recommendations:     Discharge Recommendations:  nursing facility, skilled   Discharge Equipment Recommendations: none   Barriers to discharge: None    Assessment:     Eddie Parada Sr. is a 88 y.o. male admitted with a medical diagnosis of E. coli UTI.  He presents with the following impairments/functional limitations:  weakness, gait instability, impaired balance, impaired endurance, impaired functional mobilty, decreased lower extremity function .  Pt. Has achived PT POC goals and anticipates D/C to SNF today.  Please see PT discharge note.    Rehab Prognosis:  Fair; patient would benefit from acute skilled PT services to address these deficits and reach maximum level of function.      Recent Surgery: * No surgery found *      Plan:     During this hospitalization, patient to be seen  (anticipated D/C to SNF 12/4/2017) to address the above listed problems via gait training, therapeutic activities, therapeutic exercises  · Plan of Care Expires:   (following a.m. PT tx. session.)   Plan of Care Reviewed with: daughter, patient    Subjective     Communicated with patient prior to session.  Patient found supine in bed, awake, upon PT entry to room, agreeable to treatment.      Chief Complaint: B/B concerns  Patient comments/goals: "I want to get a little stronger."  Pain/Comfort:  · Pain Rating 1: 0/10  · Pain Rating Post-Intervention 1: 0/10    Patients cultural, spiritual, Roman Catholic conflicts given the current situation: none voiced    Objective:     Patient found with: telemetry, shields catheter     General Precautions: Standard, fall   Orthopedic Precautions:RLE weight bearing as tolerated   Braces: N/A     Functional Mobility:  · Bed Mobility:     · Rolling Left:  independence  · Rolling Right: independence  · Scooting: independence  · Bridging: independence  · Supine to Sit: independence  · Sit to Supine: " independence  · Transfers:     · Sit to Stand:  stand by assistance with rolling walker, x 4 bouts  · Gait: Gait Training: Pt. amb. 50' x 1 bout, 20 feet x 3 bouts, with RW, CGA.  VC's given to pt. for step placement and A.D. placement.  Pt. demonstrated decreased velocity of gait mechanics.  · Balance: Good:  Static and Dynamic Sitting.   Fair:  Dynamic Standing.  Fair + :  Static Standing.      AM-PAC 6 CLICK MOBILITY  Turning over in bed (including adjusting bedclothes, sheets and blankets)?: 4  Sitting down on and standing up from a chair with arms (e.g., wheelchair, bedside commode, etc.): 3  Moving from lying on back to sitting on the side of the bed?: 4  Moving to and from a bed to a chair (including a wheelchair)?: 3  Need to walk in hospital room?: 3  Climbing 3-5 steps with a railing?: 1  Total Score: 18       Therapeutic Activities and Exercises:   There. Act.:  Transfer Training performed with assistance as noted.  VC's and manual guidance given to pt. for sequencing.  Weight shifting and weight acceptance tasks performed in standing with min. A.  Trunk control tasks performed with min. A. while pt. seated in bedside chair.  Seated scooting performed by pt. with SBA, while pt. seated EOB.       Patient left HOB elevated with all lines intact, call button in reach, RN notified and nursing staff present..    GOALS:    Physical Therapy Goals     Not on file          Multidisciplinary Problems (Resolved)        Problem: Physical Therapy Goal    Goal Priority Disciplines Outcome Goal Variances Interventions   Physical Therapy Goal   (Resolved)     PT/OT, PT Outcome(s) achieved     Description:  Goals to be met by: upon D/C from facility     Patient will increase functional independence with mobility by performin. Supine to sit with [place]  2. Sit to supine with [place]  3. Sit to stand transfer with Standby Assistance with Rolling Walker  4. Bed to chair transfer with Supervision using  Rolling Walker  5. Gait  x 50 feet with Contact Guard Assistance using Rolling Walker.                       Time Tracking:     PT Received On: 12/04/17  PT Start Time: 1020     PT Stop Time: 1050  PT Total Time (min): 30 min     Billable Minutes: Gait Training 20 minutes and Therapeutic Activity 10 minutes    Treatment Type: Treatment  PT/PTA: PT           Jamaal Mena, PT  12/04/2017

## 2017-12-04 NOTE — ASSESSMENT & PLAN NOTE
On right  Surgery 2 weeks ago  Was rehabing at [place], should return at discharge  PT/OR ordered here today    Staples removed 11/27

## 2017-12-04 NOTE — PT/OT/SLP DISCHARGE
Physical Therapy Discharge Summary    Name: Eddie Parada Sr.  MRN: [id number]   Principal Problem: E. coli UTI     Patient Discharged from acute Physical Therapy on 2017 following a.m. PT tx. session.  Please refer to prior PT noted date on 2017 for functional status.     Assessment:     Patient appropriate for care in another setting. Pt. has met PT POC goals.    Objective:     GOALS:    Physical Therapy Goals     Not on file          Multidisciplinary Problems (Resolved)        Problem: Physical Therapy Goal    Goal Priority Disciplines Outcome Goal Variances Interventions   Physical Therapy Goal   (Resolved)     PT/OT, PT Outcome(s) achieved     Description:  Goals to be met by: upon D/C from facility     Patient will increase functional independence with mobility by performin. Supine to sit with [place]  2. Sit to supine with [place]  3. Sit to stand transfer with Standby Assistance with Rolling Walker  4. Bed to chair transfer with Supervision using Rolling Walker  5. Gait  x 50 feet with Contact Guard Assistance using Rolling Walker.                       Reasons for Discontinuation of Therapy Services  Transfer to alternate level of care. and Satisfactory goal achievement.      Plan:     Patient Discharged to: Skilled Nursing Facility.    Jamaal Mena, PT  2017

## 2017-12-04 NOTE — PROGRESS NOTES
[facility] Hospital Medicine  Progress Note    Patient Name: Eddie Parada Sr.  MRN: [id number]  Patient Class: IP- Inpatient   Admission Date: 11/26/2017  Length of Stay: 4 days  Attending Physician: Jorge Luis Mccarty MD  Primary Care Provider: Callum Roberts MD        Subjective:     Principal Problem:E. coli UTI    HPI:  Eddie Parada Sr. presents to the emergency room with dysuria and urinary retention  Patient states that he had trouble urinating this week at the nursing home, Shields placed   Patient had an in and out catheterization at the nursing home, culture sent by PCP then  Pt then had subjective fever, family brought him into the ER for urinary tract evaluation  Shields placed in the ER with puslike urine, subjective fevers last night for staff at the NH  Patient has a history of a recent right hip fracture requiring ORIF.  He is still working with therapy.  Patient has a history of heart valve transplant.    Hospital Course:  Patient admitted to the third floor on telemetry.  He received his first dose of Rocephin.  He has no complaints this evening.    11/27--feeling better. No fevers or leukocytosis. Still have shields in placed, placed due to urinary retention on admit. Remains on rocephin    11/28/17  Plan was to hopefully send him to NH   He ran 3 beat V tach   Asymptomatic   K borderline normal   Added KCL and magnesium  CIS consult done   Keep on telemetry ;  Hopefully home in am   Had to be place don foleys catheter ; unable to urinate   Will need to see urology outpatient       11/29/17  No fever   Urine culture pending   3 beat run of [facility] o/b. Lytes and mg ok.  Coumadin resumed last night after being supratherapeutic upon admit      11/30  This morning, he has no complaints.  Shields still in place.  No burning.  His wife is most concerned about his bowels. He has had some incontinence, however, he says that he can feel his BMs now. He has a 'large BM' this morning  reported.    Pt has now progressive SOB and is showing signs of fluid overload with leg edema nad groin scrotal edema with subjective SOB and  PND from appears to be CHF.    Breathing is improved this am, but is very constipated this am: SS enema orderd    12/4/17: doing great. Constipation resolved s/p disimpaction. No SOB, LE edema. Still with some groin edema. He is ambulating with PT. + bladder spasms from shields. Failed voiding trial. Today is day 7 of Cefepime for UTI. No fevers or leukocytosis.     Interval History: no acute events. Overall doing well today other than bladder spasms from Shields    Review of Systems   Constitutional: Negative for activity change, fatigue, fever and unexpected weight change.   HENT: Negative for congestion, ear pain, hearing loss, rhinorrhea and sore throat.    Eyes: Negative for pain, redness and visual disturbance.   Respiratory: Negative for cough, shortness of breath and wheezing.    Cardiovascular: Negative for chest pain, palpitations and leg swelling.   Gastrointestinal: Negative for abdominal pain, constipation, diarrhea, nausea and vomiting.   Genitourinary: Negative for decreased urine volume, dysuria, frequency and urgency.        Bladder spasms   Musculoskeletal: Negative for back pain, joint swelling and neck pain.   Skin: Negative for color change, rash and wound.   Neurological: Negative for dizziness, tremors, weakness (s/p right ORIF, slowly improving), light-headedness and headaches.     Objective:     Vital Signs (Most Recent):  Temp: 98.8 °F (37.1 °C) (12/04/17 0715)  Pulse: 60 (12/04/17 1011)  Resp: 18 (12/04/17 0715)  BP: (!) 115/56 (12/04/17 0715)  SpO2: 97 % (12/04/17 0728) Vital Signs (24h Range):  Temp:  [96.4 °F (35.8 °C)-98.8 °F (37.1 °C)] 98.8 °F (37.1 °C)  Pulse:  [60-63] 60  Resp:  [16-18] 18  SpO2:  [94 %-97 %] 97 %  BP: (115-148)/(55-66) 115/56     Weight: 87.5 kg (192 lb 14.4 oz)  Body mass index is 31.14 kg/m².    Intake/Output Summary (Last 24  hours) at 12/04/17 1039  Last data filed at 12/04/17 0700   Gross per 24 hour   Intake               50 ml   Output             2400 ml   Net            -2350 ml      Physical Exam   Constitutional: He is oriented to person, place, and time. He appears well-developed and well-nourished. No distress.   HENT:   Head: Normocephalic and atraumatic.   Right Ear: External ear normal.   Left Ear: External ear normal.   Eyes: Conjunctivae and EOM are normal. Pupils are equal, round, and reactive to light. Right eye exhibits no discharge. Left eye exhibits no discharge.   Neck: Neck supple. No tracheal deviation present.   Cardiovascular: Normal rate and regular rhythm.  Exam reveals no friction rub.    No murmur heard.  Pulmonary/Chest: Effort normal and breath sounds normal. No respiratory distress. He has no wheezes. He has no rales.   Abdominal: Soft. Bowel sounds are normal. He exhibits no distension. There is no tenderness.   Musculoskeletal: He exhibits edema (right shin, trace. this is the ORIF leg).   Neurological: He is alert and oriented to person, place, and time. No cranial nerve deficit.   Skin: Skin is warm and dry.   Psychiatric: He has a normal mood and affect. His behavior is normal.   Nursing note and vitals reviewed.      Significant Labs:   CBC:   Recent Labs  Lab 12/03/17  0553 12/04/17  0536   WBC 10.54 10.83  10.83   HGB 9.7* 10.1*  10.1*   HCT 29.8* 32.0*  32.0*    253  253     CMP:   Recent Labs  Lab 12/03/17  0553 12/04/17  0535    139   K 4.5 4.9    102   CO2 27 28   * 154*   BUN 16 15   CREATININE 0.9 0.9   CALCIUM 8.7 9.0   ANIONGAP 6* 9   EGFRNONAA >60 >60     All pertinent labs within the past 24 hours have been reviewed.    Significant Imaging: I have reviewed all pertinent imaging results/findings within the past 24 hours.    Assessment/Plan:      * E. coli UTI    Bishop catheter in place due to urinary retention on admit. D/c Bishop 11/27 for voiding trial,  failed   Starting flomax 11/29   Cefepime 1 g IV piggyback every 24 hours day 7  Urine culture and blood culture in process--urine shows e coli - cefepime sensitive  Today is day 7 of cefepime, no further antibx needed          Urinary retention    Likely due to UTI  Failed voiding trial  Will d/c with shields to see urology as outpatient'  Start oxybutynin for bladder aspsms, may resolve once shields removed  folmax started this admisison    Started for bladder spasms          V-tach    Isolated event. Lytes and mg normal   Telemetry has been normal overnight. Stable for discharge. Cont BB          S/P ORIF (open reduction internal fixation) fracture    On right  Surgery 2 weeks ago  Was rehabing at [place], should return at discharge  PT/OR ordered here today    Staples removed 11/27          Coumadin toxicity    Held Coumadin until   INR is   Lab Results   Component Value Date    INR 1.7 (H) 12/04/2017    INR 1.7 (H) 12/03/2017    INR 1.9 (H) 12/02/2017     Resumed coumadin 11/28/17. Will need good outpatient follow up. Near therapeutic range at discharge today        (HFpEF) heart failure with preserved ejection fraction    Continue carvedilol 12.5 mg by mouth twice a day  Continue aspirin 81 mg daily  Lasix PRN  No signs of exacerbation        Pedal edema    Resolved. Was on IVF Lasix, will resume home PRN dosing at discharge  Likely more due to immobility due to ORIF        Angina pectoris              COPD (chronic obstructive pulmonary disease)    No signs of acute exacerbation  Was not on maintenance inhalers prior to admit        Type 2 diabetes mellitus with neurologic complication    Restarted home amaryl and actos 11/27  amaryl  Insulin sliding scale ordered - bs controlled.          Cardiac pacemaker in situ    Aspirin 81 mg daily  Continue telemetry; coreg  Resume coumadin, will need outpatient monitoring    No further arrhyhtmias        CAD (coronary artery disease)    Aspirin 81 mg daily  Coreg,  lisinopril  Pravastatin              HTN (hypertension)    Cont coreg, lisinopril  BP stable          Atrial fibrillation    Resumed Coumadin; INR 1.7. Was supratherapeutic on admit.    Will need outpatient INR monitoring. No need to bridge.           VTE Risk Mitigation         Ordered     warfarin (COUMADIN) tablet 2 mg  Daily     Route:  Oral        11/28/17 0850     Medium Risk of VTE  Once      11/26/17 1234     Place sequential compression device  Until discontinued      11/26/17 1234              Georgia Ann MD  Department of Hospital Medicine   [facility]

## 2017-12-04 NOTE — ASSESSMENT & PLAN NOTE
Isolated event. Lytes and mg normal   Telemetry has been normal overnight. Stable for discharge. Cont BB

## 2017-12-04 NOTE — ASSESSMENT & PLAN NOTE
On right  Surgery 2 weeks ago  Was rehabing at [place], should return at discharge  PT/OR ordered here today    Staples removed 11/27

## 2017-12-04 NOTE — NURSING
Patient and wife educated on discharge instructions, awaiting [facility] for transportation at this time. Blood Glucose 184mg/dl. Denies current needs.

## 2017-12-04 NOTE — PLAN OF CARE
Problem: Physical Therapy Goal  Goal: Physical Therapy Goal  Goals to be met by: upon D/C from facility     Patient will increase functional independence with mobility by performin. Supine to sit with [place]  2. Sit to supine with [place]  3. Sit to stand transfer with Standby Assistance with Rolling Walker  4. Bed to chair transfer with Supervision using Rolling Walker  5. Gait  x 50 feet with Contact Guard Assistance using Rolling Walker.      Outcome: Outcome(s) achieved Date Met: 17  PT goals achieved.  Anticipated pt. D/C 2017 following a.m. PT tx. session.    Comments: PT goals achieved.  Anticipated pt. D/C 2017 following a.m. PT tx. session.

## 2017-12-04 NOTE — PLAN OF CARE
Problem: Diabetes, Type 2 (Adult)  Goal: Signs and Symptoms of Listed Potential Problems Will be Absent, Minimized or Managed (Diabetes, Type 2)  Signs and symptoms of listed potential problems will be absent, minimized or managed by discharge/transition of care (reference Diabetes, Type 2 (Adult) CPG).   Outcome: Ongoing (interventions implemented as appropriate)  Accuchecks continue ac&hs. No supplemental insulin given this shift.     Problem: Fall Risk (Adult)  Goal: Absence of Falls  Patient will demonstrate the desired outcomes by discharge/transition of care.   Outcome: Ongoing (interventions implemented as appropriate)  Fall precautions maintained. Bed alarm engaged at all times. Family at bedside.     Problem: Patient Care Overview  Goal: Plan of Care Review  Outcome: Ongoing (interventions implemented as appropriate)  Plan of care reviewed with patient and he agrees with the plan of care.  Telemetry monitoring continues. C/o pain to back. Acetaminophen effective. C/o insomnia Rozerem given and was effecitive. Daughter at bedside.     Problem: Urinary Tract Infection (Adult)  Goal: Signs and Symptoms of Listed Potential Problems Will be Absent, Minimized or Managed (Urinary Tract Infection)  Signs and symptoms of listed potential problems will be absent, minimized or managed by discharge/transition of care (reference Urinary Tract Infection (Adult) CPG).    Outcome: Ongoing (interventions implemented as appropriate)  Afebrile. Cefepime continues without any adverse reactions. Catheter patency maintained.     Problem: Pressure Ulcer Risk (Ronaldo Scale) (Adult,Obstetrics,Pediatric)  Goal: Skin Integrity  Patient will demonstrate the desired outcomes by discharge/transition of care.   Outcome: Ongoing (interventions implemented as appropriate)  Patient turned and repositioned every 2 hours.

## 2017-12-04 NOTE — PLAN OF CARE
12/04/17 1112   Discharge Assessment   Assessment Type Discharge Planning Reassessment   spoke to Mary Ann at [facility] to secure authorization for patient to return to [facility] for continuation of skilled nursing for PT and OT. Order to resume skilled with PT and OT faxed to [facility] along with therapy notes, discharge summary and [facility] form.

## 2017-12-04 NOTE — ASSESSMENT & PLAN NOTE
Held Coumadin until   INR is   Lab Results   Component Value Date    INR 1.7 (H) 12/04/2017    INR 1.7 (H) 12/03/2017    INR 1.9 (H) 12/02/2017     Resumed coumadin 11/28/17. Will need good outpatient follow up. Near therapeutic range at discharge today

## 2017-12-04 NOTE — ASSESSMENT & PLAN NOTE
Likely due to UTI  Failed voiding trial  Will d/c with shields to see urology as outpatient'  Start oxybutynin for bladder aspsms, may resolve once shields removed  folmax started this admisison    Started for bladder spasms

## 2017-12-05 ENCOUNTER — OFFICE VISIT (OUTPATIENT)
Dept: INTERNAL MEDICINE | Facility: CLINIC | Age: 82
End: 2017-12-05
Payer: MEDICARE

## 2017-12-05 ENCOUNTER — PATIENT OUTREACH (OUTPATIENT)
Dept: ADMINISTRATIVE | Facility: CLINIC | Age: 82
End: 2017-12-05

## 2017-12-05 ENCOUNTER — TELEPHONE (OUTPATIENT)
Dept: INTERNAL MEDICINE | Facility: CLINIC | Age: 82
End: 2017-12-05

## 2017-12-05 DIAGNOSIS — Z86.73 HISTORY OF CVA (CEREBROVASCULAR ACCIDENT): ICD-10-CM

## 2017-12-05 DIAGNOSIS — E66.9 OBESITY (BMI 30.0-34.9): ICD-10-CM

## 2017-12-05 DIAGNOSIS — E11.59 OBESITY, DIABETES, AND HYPERTENSION SYNDROME: ICD-10-CM

## 2017-12-05 DIAGNOSIS — J43.9 PULMONARY EMPHYSEMA, UNSPECIFIED EMPHYSEMA TYPE: ICD-10-CM

## 2017-12-05 DIAGNOSIS — E11.69 OBESITY, DIABETES, AND HYPERTENSION SYNDROME: ICD-10-CM

## 2017-12-05 DIAGNOSIS — I45.2 RBBB (RIGHT BUNDLE BRANCH BLOCK WITH LEFT ANTERIOR FASCICULAR BLOCK): ICD-10-CM

## 2017-12-05 DIAGNOSIS — Z87.81 S/P ORIF (OPEN REDUCTION INTERNAL FIXATION) FRACTURE: ICD-10-CM

## 2017-12-05 DIAGNOSIS — E66.9 OBESITY, DIABETES, AND HYPERTENSION SYNDROME: ICD-10-CM

## 2017-12-05 DIAGNOSIS — I25.10 CORONARY ARTERY DISEASE INVOLVING NATIVE CORONARY ARTERY OF NATIVE HEART WITHOUT ANGINA PECTORIS: ICD-10-CM

## 2017-12-05 DIAGNOSIS — Z79.01 LONG TERM CURRENT USE OF ANTICOAGULANT THERAPY: ICD-10-CM

## 2017-12-05 DIAGNOSIS — I10 ESSENTIAL HYPERTENSION: ICD-10-CM

## 2017-12-05 DIAGNOSIS — R00.1 BRADYCARDIA: ICD-10-CM

## 2017-12-05 DIAGNOSIS — Z79.4 TYPE 2 DIABETES MELLITUS WITH OTHER NEUROLOGIC COMPLICATION, WITH LONG-TERM CURRENT USE OF INSULIN: ICD-10-CM

## 2017-12-05 DIAGNOSIS — I48.20 CHRONIC ATRIAL FIBRILLATION: ICD-10-CM

## 2017-12-05 DIAGNOSIS — I15.2 OBESITY, DIABETES, AND HYPERTENSION SYNDROME: ICD-10-CM

## 2017-12-05 DIAGNOSIS — B96.20 E-COLI UTI: Primary | ICD-10-CM

## 2017-12-05 DIAGNOSIS — E11.49 TYPE 2 DIABETES MELLITUS WITH OTHER NEUROLOGIC COMPLICATION, WITH LONG-TERM CURRENT USE OF INSULIN: ICD-10-CM

## 2017-12-05 DIAGNOSIS — N39.0 E-COLI UTI: Primary | ICD-10-CM

## 2017-12-05 DIAGNOSIS — I35.0 AORTIC VALVE STENOSIS, ETIOLOGY OF CARDIAC VALVE DISEASE UNSPECIFIED: ICD-10-CM

## 2017-12-05 DIAGNOSIS — I73.9 PERIPHERAL VASCULAR DISEASE: ICD-10-CM

## 2017-12-05 DIAGNOSIS — E78.2 MIXED DYSLIPIDEMIA: ICD-10-CM

## 2017-12-05 DIAGNOSIS — Z95.0 CARDIAC PACEMAKER IN SITU: ICD-10-CM

## 2017-12-05 DIAGNOSIS — I50.42 CHRONIC COMBINED SYSTOLIC AND DIASTOLIC CONGESTIVE HEART FAILURE: ICD-10-CM

## 2017-12-05 DIAGNOSIS — E11.51 TYPE II DIABETES MELLITUS WITH PERIPHERAL CIRCULATORY DISORDER: ICD-10-CM

## 2017-12-05 DIAGNOSIS — M81.0 OSTEOPOROSIS, UNSPECIFIED OSTEOPOROSIS TYPE, UNSPECIFIED PATHOLOGICAL FRACTURE PRESENCE: ICD-10-CM

## 2017-12-05 DIAGNOSIS — Z98.890 S/P ORIF (OPEN REDUCTION INTERNAL FIXATION) FRACTURE: ICD-10-CM

## 2017-12-05 DIAGNOSIS — R33.9 URINARY RETENTION: ICD-10-CM

## 2017-12-05 DIAGNOSIS — G60.9 HEREDITARY AND IDIOPATHIC PERIPHERAL NEUROPATHY: ICD-10-CM

## 2017-12-05 PROCEDURE — 99309 SBSQ NF CARE MODERATE MDM 30: CPT | Mod: ,,, | Performed by: NURSE PRACTITIONER

## 2017-12-05 PROCEDURE — 99999 PR PBB SHADOW E&M-EST. PATIENT-LVL III: CPT | Mod: PBBFAC,,, | Performed by: NURSE PRACTITIONER

## 2017-12-05 PROCEDURE — 99499 UNLISTED E&M SERVICE: CPT | Mod: S$GLB,,, | Performed by: NURSE PRACTITIONER

## 2017-12-05 RX ORDER — WARFARIN 2 MG/1
2 TABLET ORAL NIGHTLY
Status: ON HOLD | COMMUNITY
End: 2018-01-15 | Stop reason: HOSPADM

## 2017-12-05 NOTE — PLAN OF CARE
12/05/17 1533   Final Note   Assessment Type Final Discharge Note   Discharge Disposition SNF   What phone number can be called within the next 1-3 days to see how you are doing after discharge? [phone]   Hospital Follow Up  Appt(s) scheduled? Yes   Discharge plans and expectations educations in teach back method with documentation complete? Yes   Right Care Referral Info   Post Acute Recommendation SNF / Sub-Acute Rehab   Referral Type SNF   Facility Name [address]

## 2017-12-05 NOTE — PLAN OF CARE
12/04/17 1500   Medicare Message   Important Message from Medicare regarding Discharge Appeal Rights Given to patient/caregiver;Explained to patient/caregiver;Signed/date by patient/caregiver   Date IMM was signed 12/04/17   Time IMM was signed 1500

## 2017-12-06 ENCOUNTER — LAB VISIT (OUTPATIENT)
Dept: LAB | Facility: HOSPITAL | Age: 82
End: 2017-12-06
Attending: INTERNAL MEDICINE
Payer: MEDICARE

## 2017-12-06 DIAGNOSIS — I48.20 CHRONIC ATRIAL FIBRILLATION: Primary | ICD-10-CM

## 2017-12-06 LAB
INR PPP: 2.1
PROTHROMBIN TIME: 22.3 SEC

## 2017-12-06 PROCEDURE — 36415 COLL VENOUS BLD VENIPUNCTURE: CPT

## 2017-12-06 PROCEDURE — 85610 PROTHROMBIN TIME: CPT

## 2017-12-13 ENCOUNTER — OFFICE VISIT (OUTPATIENT)
Dept: UROLOGY | Facility: CLINIC | Age: 82
End: 2017-12-13
Payer: MEDICARE

## 2017-12-13 ENCOUNTER — TELEPHONE (OUTPATIENT)
Dept: UROLOGY | Facility: CLINIC | Age: 82
End: 2017-12-13

## 2017-12-13 VITALS
HEART RATE: 66 BPM | WEIGHT: 192 LBS | SYSTOLIC BLOOD PRESSURE: 86 MMHG | DIASTOLIC BLOOD PRESSURE: 54 MMHG | HEIGHT: 66 IN | BODY MASS INDEX: 30.86 KG/M2

## 2017-12-13 DIAGNOSIS — N40.1 BPH WITH OBSTRUCTION/LOWER URINARY TRACT SYMPTOMS: ICD-10-CM

## 2017-12-13 DIAGNOSIS — R39.89 ABNORMAL PROSTATE EXAM: ICD-10-CM

## 2017-12-13 DIAGNOSIS — I95.9 HYPOTENSION, UNSPECIFIED HYPOTENSION TYPE: ICD-10-CM

## 2017-12-13 DIAGNOSIS — N13.8 BPH WITH OBSTRUCTION/LOWER URINARY TRACT SYMPTOMS: ICD-10-CM

## 2017-12-13 DIAGNOSIS — Z86.73 HISTORY OF CVA (CEREBROVASCULAR ACCIDENT): ICD-10-CM

## 2017-12-13 DIAGNOSIS — R33.8 ACUTE URINARY RETENTION: Primary | ICD-10-CM

## 2017-12-13 PROCEDURE — 99999 PR PBB SHADOW E&M-EST. PATIENT-LVL III: CPT | Mod: PBBFAC,,, | Performed by: UROLOGY

## 2017-12-13 PROCEDURE — 51702 INSERT TEMP BLADDER CATH: CPT | Mod: S$GLB,,, | Performed by: UROLOGY

## 2017-12-13 PROCEDURE — 99215 OFFICE O/P EST HI 40 MIN: CPT | Mod: 25,S$GLB,, | Performed by: UROLOGY

## 2017-12-13 NOTE — LETTER
December 13, 2017      Flor Blanca, KOREY  [address] [address]           [facility]  [address] [address]  Phone: [phone]          Patient: Eddie Parada Sr.   MR Number: [id number]   YOB: 1929   Date of Visit: 12/13/2017       Dear Flor Blanca:    Thank you for referring Eddie Parada to me for evaluation. Attached you will find relevant portions of my assessment and plan of care.    If you have questions, please do not hesitate to call me. I look forward to following Eddie Parada along with you.    Sincerely,    Cristian Montoya MD    Enclosure  CC:  No Recipients    If you would like to receive this communication electronically, please contact [email or web address] or [phone] to request more information on [facility] Link access.    For providers and/or their staff who would like to refer a patient to [facility], please contact us through our one-stop-shop provider referral line, [facility], at [phone].    If you feel you have received this communication in error or would no longer like to receive these types of communications, please e-mail [email or web address]

## 2017-12-13 NOTE — TELEPHONE ENCOUNTER
Returned call to , spoke with daughter, Diana.  Confirmed patient's appointment for today with Dr Montoya for further evaluation.

## 2017-12-13 NOTE — PROGRESS NOTES
HPI:  Eddie Parada Sr. is a 88 y.o. year old male that  presents with No chief complaint on file.  .  Patient referred by his PCP for urinary retention.  Patient is new to me however [facility] December 2015 was note is reviewed.  This shows a history of an abnormal digital rectal exam with prior history of 3 or 4 negative prostate biopsies.  Plan was for follow-up in 1 year    Patient underwent hip surgery mid November of this year and had postoperative urinary retention.  He was admitted to the hospital a week or so after hip surgery with retention in urinary tract infection.  According to the wife patient failed a couple of voiding trials.     his voiding pattern is in okay strength of stream with nocturia ×1-2 and no straining to void.  Previously had not been on prostate medication.  Patient is in a Facility and was started on Flomax as well as oxybutynin for bladder spasms.  He states he is not currently taking the oxybutynin and has no problems with bladder spasms.  Patient voices no specific complaints concerning the indwelling catheter    The chart list the history of stroke over patient was recently prior to his surgery was ambulating without difficulty and was having no voiding issues    There is no family history of prostate cancer and the patient is never had urologic surgery other than the prostate biopsies.  There is no family history of kidney or bladder cancer    Further chart review shows no from his PCP from October this year showing cellulitis right foot.  In December of last year patient was seen by cardiology showing history of severe aortic stenosis status post aortic valve replacement, patient in atrial fibrillation on chronic anticoagulation.  Note also made of tachybradycardia syndrome    Patient has no dizziness and is able to stand with assistance      Past Medical History:   Diagnosis Date    Abdominal fibromatosis     Atrial fibrillation     Bradycardia     CAD  (coronary artery disease)     Cancer     basal cell on nose and melanoma on back    CHF (congestive heart failure)     COPD (chronic obstructive pulmonary disease)     Diabetes mellitus type II     Hyperlipidemia     Melanoma     Obesity (BMI 30.0-34.9) 9/13/2016    Osteoporosis     Peripheral vascular disease     Stroke     TIA (transient ischemic attack)     Type II or unspecified type diabetes mellitus without mention of complication, not stated as uncontrolled     Unspecified essential hypertension      Social History     Social History    Marital status:      Spouse name: N/A    Number of children: N/A    Years of education: N/A     Occupational History    Not on file.     Social History Main Topics    Smoking status: Former Smoker     Quit date: 9/20/1996    Smokeless tobacco: Never Used      Comment: cigar smoker    Alcohol use No      Comment: none since 2006    Drug use: No    Sexual activity: Not Currently     Other Topics Concern    Not on file     Social History Narrative    No narrative on file     Past Surgical History:   Procedure Laterality Date    CARDIAC PACEMAKER PLACEMENT  9/28/2012    CARDIAC VALVE REPLACEMENT  11/17/2011    aortic valve-tissue    CHOLECYSTECTOMY      EYE SURGERY Bilateral     cataract with lens by  at [facility]    HERNIA REPAIR      abdominal     Family History   Problem Relation Age of Onset    Hypertension Father     Stroke Father     Alcohol abuse Father     Heart disease Brother     COPD Sister     Cancer Brother      Lung    Diabetes Daughter     No Known Problems Son     COPD Brother     No Known Problems Daughter     No Known Problems Son     Prostate cancer Neg Hx     Kidney disease Neg Hx            Review of Systems  The patient has no chest pains.  The patient has no shortness of breath  Patient wears        glasses.  All other review of systems are negative.      Physical Exam:  BP (!) 86/54   Pulse 66   Ht  "5' 6" (1.676 m)   Wt 87.1 kg (192 lb)   BMI 30.99 kg/m²   General appearance: alert, cooperative, no distress.  Patient wheelchair  Constitutional:Oriented to person, place, and time.appears well-developed and well-nourished.   HEENT: Normocephalic, atraumatic, neck symmetrical, no nasal discharge   Eyes: conjunctivae/corneas clear, PERRL, EOM's intact  Lungs: clear to auscultation bilaterally, no dullness to percussion bilaterally  Heart: regular rate and rhythm without rub; no displacement of the PMI   Abdomen: soft, non-tender; bowel sounds normoactive; no organomegaly  :Penis/perineum without lesions, scrotum without rash/cysts, epididymis nontender bilaterally, urethral meatus in normal location normal size, no penile plaques palpated, prostate:   Enlarged with firm areas on left consistent with prior documented exams                    seminal vesicles not palpated.  No rectal masses, sphincter tone normal.  Testes equal in size without masses.  Bishop catheter draining clear urine  Extremities: extremities symmetric; no clubbing, cyanosis, or edema  Integument: Skin color, texture, turgor normal; no rashes; hair distrubution normal  Neurologic: Alert and oriented X 3, normal strength, normal coordination and gait  Psychiatric: no pressured speech; normal affect; no evidence of impaired cognition     LABS:    Complete Blood Count  Lab Results   Component Value Date    RBC 3.09 (L) 12/04/2017    RBC 3.09 (L) 12/04/2017    HGB 10.1 (L) 12/04/2017    HGB 10.1 (L) 12/04/2017    HCT 32.0 (L) 12/04/2017    HCT 32.0 (L) 12/04/2017     (H) 12/04/2017     (H) 12/04/2017    MCH 32.7 (H) 12/04/2017    MCH 32.7 (H) 12/04/2017    MCHC 31.6 (L) 12/04/2017    MCHC 31.6 (L) 12/04/2017    RDW 14.5 12/04/2017    RDW 14.5 12/04/2017     12/04/2017     12/04/2017    MPV 9.9 12/04/2017    MPV 9.9 12/04/2017    GRAN 8.5 (H) 12/04/2017    GRAN 78.5 (H) 12/04/2017    GRAN 8.5 (H) 12/04/2017    GRAN 78.5 " (H) 12/04/2017    LYMPH 1.1 12/04/2017    LYMPH 9.9 (L) 12/04/2017    LYMPH 1.1 12/04/2017    LYMPH 9.9 (L) 12/04/2017    MONO 1.1 (H) 12/04/2017    MONO 10.2 12/04/2017    MONO 1.1 (H) 12/04/2017    MONO 10.2 12/04/2017    EOS 0.1 12/04/2017    EOS 0.1 12/04/2017    BASO 0.03 12/04/2017    BASO 0.03 12/04/2017    EOSINOPHIL 1.1 12/04/2017    EOSINOPHIL 1.1 12/04/2017    BASOPHIL 0.3 12/04/2017    BASOPHIL 0.3 12/04/2017    DIFFMETHOD Automated 12/04/2017    DIFFMETHOD Automated 12/04/2017       Comprehensive Metabolic Panel  Lab Results   Component Value Date     (H) 12/04/2017    BUN 15 12/04/2017    CREATININE 0.9 12/04/2017     12/04/2017    K 4.9 12/04/2017     12/04/2017    PROT 5.7 (L) 11/27/2017    ALBUMIN 2.2 (L) 11/27/2017    BILITOT 0.6 11/27/2017    AST 20 11/27/2017    ALKPHOS 111 11/27/2017    CO2 28 12/04/2017    ALT 14 11/27/2017    ANIONGAP 9 12/04/2017    EGFRNONAA >60 12/04/2017    ESTGFRAFRICA >60 12/04/2017       PSA  Lab Results   Component Value Date    PSA 2.1 12/29/2016         Assessment:    ICD-10-CM ICD-9-CM    1. Acute urinary retention R33.8 788.29    2. BPH with obstruction/lower urinary tract symptoms N40.1 600.01     N13.8 599.69    3. Abnormal prostate exam R39.89 793.5    4. History of CVA (cerebrovascular accident) Z86.73 V12.54    5. Hypotension, unspecified hypotension type I95.9 458.9      The primary encounter diagnosis was Acute urinary retention. Diagnoses of BPH with obstruction/lower urinary tract symptoms, Abnormal prostate exam, History of CVA (cerebrovascular accident), and Hypotension, unspecified hypotension type were also pertinent to this visit.      Plan: 1 and 2.  Acute urinary retention and BPH.  Plan.  I discussed at length in detail with the patient his wife and daughter that most likely his urinary retention is related to immobility due to recent hip surgery.  I recommend continuing the Flomax and stopping the oxybutynin.  Bishop catheter is  changed today under sterile technique.  Nursing staff unable to pass a catheter however I am able to pass a 16 Somali coudé.  This is attached to a leg bag and family is instructed on its use.  Note written for home health to change the catheter in 1 month.  Patient to follow-up with me in 2 months at which time we will give him a voiding trial in the office.  Hopefully by that time The patient will be fully ambulatory.    3.  Abnormal prostate exam.  Plan.  Once current episode has resolved, patient will need repeat serum PSA.    4.  History of stroke.  Plan.  Patient appears to have no residual from this prior stroke and he states he was voiding satisfactorily prior to his hip surgery, therefore I do not feel that this history will impact his ability to void    5.  Hypotension. The patient asymptomatic.  Referred back to PCP concerning this.  If patient develops dizziness he should go to the emergency room  No orders of the defined types were placed in this encounter.          Cristian Montoya MD    PLEASE NOTE:  Please be advised that portions of this note were dictated using voice recognition software and may contain dictation related errors in spelling/grammar/appropriate pronouns/syntax or other errors that might have not been found and or corrected on text review.

## 2017-12-13 NOTE — TELEPHONE ENCOUNTER
----- Message from Deja Cortes sent at 12/13/2017  9:35 AM CST -----  Contact: Diana (daughter)/ [phone]  Patients daughter called in to speak with you about patients catheter. She said is it ok to leave a catheter in for long time and patient not able to use the restroom. Patients next appointment is until 12/22.     Please call and advise.

## 2017-12-15 VITALS
HEART RATE: 69 BPM | SYSTOLIC BLOOD PRESSURE: 137 MMHG | TEMPERATURE: 98 F | DIASTOLIC BLOOD PRESSURE: 72 MMHG | RESPIRATION RATE: 20 BRPM

## 2017-12-15 NOTE — PROGRESS NOTES
Eddie Parada Monse 5/12/1929 Seen at Nursing home today.    Standing orders:  12/4/2017 wbc 10, h.h 10/32, plt 253   Na 139, k 4.9, BUN/creat 15/0.9, glucose 154   INR 1.7    Chief complaint: Nursing home follow-up    Vitals:    12/05/17 1036   BP: 137/72   Pulse: 69   Resp: 20   Temp: 98 °F (36.7 °C)       Past Medical History:   Diagnosis Date    Abdominal fibromatosis     Atrial fibrillation     Bradycardia     CAD (coronary artery disease)     Cancer     basal cell on nose and melanoma on back    CHF (congestive heart failure)     COPD (chronic obstructive pulmonary disease)     Diabetes mellitus type II     Hyperlipidemia     Melanoma     Obesity (BMI 30.0-34.9) 9/13/2016    Osteoporosis     Peripheral vascular disease     Stroke     TIA (transient ischemic attack)     Type II or unspecified type diabetes mellitus without mention of complication, not stated as uncontrolled     Unspecified essential hypertension        Past Surgical History:   Procedure Laterality Date    CARDIAC PACEMAKER PLACEMENT  9/28/2012    CARDIAC VALVE REPLACEMENT  11/17/2011    aortic valve-tissue    CHOLECYSTECTOMY      EYE SURGERY Bilateral     cataract with lens by  at [facility]    HERNIA REPAIR      abdominal       Review of patient's allergies indicates:   Allergen Reactions    Levofloxacin Swelling    Lyrica [pregabalin] Swelling    Unable to assess Other (See Comments)     Soft shell crabs       Current Outpatient Prescriptions on File Prior to Visit   Medication Sig Dispense Refill    amlodipine (NORVASC) 10 MG tablet Take 1 tablet (10 mg total) by mouth once daily. 30 tablet 6    carvedilol (COREG) 12.5 MG tablet TAKE 1 TABLET (12.5 MG TOTAL) BY MOUTH 2 (TWO) TIMES DAILY WITH MEALS. 60 tablet 5    furosemide (LASIX) 40 MG tablet Three times a week PRN swelling 30 tablet 6    gemfibrozil (LOPID) 600 MG tablet TAKE 1 TABLET (600 MG TOTAL) BY MOUTH 2 (TWO) TIMES DAILY BEFORE MEALS. 60 tablet  4    glimepiride (AMARYL) 2 MG tablet TAKE 1 TABLET BY MOUTH EVERY MORNING WITH BREAKFAST 30 tablet 4    levothyroxine (SYNTHROID) 50 MCG tablet Take 1 tablet (50 mcg total) by mouth once daily. 30 tablet 11    lisinopril 10 MG tablet Take 1 tablet (10 mg total) by mouth once daily. 90 tablet 3    ONETOUCH DELICA LANCETS 33 gauge Misc       ONETOUCH ULTRA2 kit       oxybutynin (DITROPAN) 5 MG Tab Take 1 tablet (5 mg total) by mouth 3 (three) times daily. 90 tablet 11    pioglitazone (ACTOS) 15 MG tablet ONE TABLET ONE TIME A DAY 30 tablet 11    pravastatin (PRAVACHOL) 20 MG tablet Take 1 tablet (20 mg total) by mouth once daily. 30 tablet 5    tamsulosin (FLOMAX) 0.4 mg Cp24 Take 1 capsule (0.4 mg total) by mouth once daily. 30 capsule 11     No current facility-administered medications on file prior to visit.        HPI: 88 y.o. male resident of nursing home. He is new to me here. He is a patient of Dr Roberts that was treated for fall and s/p ORIF right hip sent here for SKILL. He was admitted a week or so ago but was sent back to ER due to urinary retension. He was d/ervin from the UTI/urinary retension 12/4. The shields was removed after rocephin treatment but he was still unable to urinate. He now still has shields, is on flomax, s/p cefepime x 7 days for  ecoli sensitive UTI.     ROS: Significant for no complaints . Pt denies any chest pain, SOB, bowel or bladder discomfort. Pt reports eating and drinking well. Sleeping well. No complaints of pain.    PE:   APPEARANCE: Well nourished, well developed, in no acute distress.   HEAD: Normocephalic, atraumatic.  EENT: PERRLA & EOMI bilaterally. TM's intact and non-hyperemic. Nasal mucosa pink.   MOUTH & THROAT: No tonsillar enlargement. No pharyngeal erythema or exudate. No stridor.  CHEST: Lungs clear to auscultation.  CARDIOVASCULAR: reg rate, click audible   ABDOMEN: Bowel sounds normal. Not distended. Soft. No tenderness or masses. Shields draining clear yellow  urine to gravity  MUSCULOSKELETAL: Unremarkable. No edema  SKIN: Warm and dry.      DX:  1. E-coli UTI  Ambulatory Referral to Urology   2. Urinary retention  Ambulatory Referral to Urology   3. Chronic atrial fibrillation     4. RBBB (right bundle branch block with left anterior fascicular block)     5. Essential hypertension     6. Coronary artery disease involving native coronary artery of native heart without angina pectoris     7. History of CVA (cerebrovascular accident)     8. Bradycardia     9. Cardiac pacemaker in situ     10. Type 2 diabetes mellitus with other neurologic complication, with long-term current use of insulin     11. Hereditary and idiopathic peripheral neuropathy     12. Chronic combined systolic and diastolic congestive heart failure     13. Pulmonary emphysema, unspecified emphysema type     14. Type II diabetes mellitus with peripheral circulatory disorder     15. Mixed dyslipidemia     16. Aortic valve stenosis, etiology of cardiac valve disease unspecified     17. Peripheral vascular disease     18. Osteoporosis, unspecified osteoporosis type, unspecified pathological fracture presence     19. Long term current use of anticoagulant therapy     20. Obesity (BMI 30.0-34.9)     21. Obesity, diabetes, and hypertension syndrome     22. S/P ORIF (open reduction internal fixation) fracture       Past Medical History:   Diagnosis Date    Abdominal fibromatosis     Atrial fibrillation     Bradycardia     CAD (coronary artery disease)     Cancer     basal cell on nose and melanoma on back    CHF (congestive heart failure)     COPD (chronic obstructive pulmonary disease)     Diabetes mellitus type II     Hyperlipidemia     Melanoma     Obesity (BMI 30.0-34.9) 9/13/2016    Osteoporosis     Peripheral vascular disease     Stroke     TIA (transient ischemic attack)     Type II or unspecified type diabetes mellitus without mention of complication, not stated as uncontrolled      Unspecified essential hypertension        Medical decision making and plan:  I placed referral today for Dr Breaux f/u. .   Will need to monitor INR, too low today but just returning back from hospital. He reorts that he is on what he usual dosing was at home    Cont same other meds and orders

## 2017-12-19 ENCOUNTER — LAB VISIT (OUTPATIENT)
Dept: LAB | Facility: HOSPITAL | Age: 82
End: 2017-12-19
Attending: INTERNAL MEDICINE
Payer: MEDICARE

## 2017-12-19 DIAGNOSIS — I50.32 CHRONIC DIASTOLIC HEART FAILURE: ICD-10-CM

## 2017-12-19 DIAGNOSIS — E11.59 OBESITY, DIABETES, AND HYPERTENSION SYNDROME: ICD-10-CM

## 2017-12-19 DIAGNOSIS — E66.9 OBESITY, DIABETES, AND HYPERTENSION SYNDROME: ICD-10-CM

## 2017-12-19 DIAGNOSIS — E11.69 OBESITY, DIABETES, AND HYPERTENSION SYNDROME: ICD-10-CM

## 2017-12-19 DIAGNOSIS — I25.10 CORONARY ARTERY DISEASE INVOLVING NATIVE CORONARY ARTERY OF NATIVE HEART WITHOUT ANGINA PECTORIS: ICD-10-CM

## 2017-12-19 DIAGNOSIS — I15.2 OBESITY, DIABETES, AND HYPERTENSION SYNDROME: ICD-10-CM

## 2017-12-19 DIAGNOSIS — E11.51 TYPE II DIABETES MELLITUS WITH PERIPHERAL CIRCULATORY DISORDER: ICD-10-CM

## 2017-12-19 LAB
ALBUMIN SERPL BCP-MCNC: 2.8 G/DL
ALP SERPL-CCNC: 134 U/L
ALT SERPL W/O P-5'-P-CCNC: 11 U/L
ANION GAP SERPL CALC-SCNC: 9 MMOL/L
AST SERPL-CCNC: 22 U/L
BASOPHILS # BLD AUTO: 0.02 K/UL
BASOPHILS NFR BLD: 0.2 %
BILIRUB SERPL-MCNC: 0.8 MG/DL
BNP SERPL-MCNC: 301 PG/ML
BUN SERPL-MCNC: 17 MG/DL
CALCIUM SERPL-MCNC: 9.2 MG/DL
CHLORIDE SERPL-SCNC: 104 MMOL/L
CHOLEST SERPL-MCNC: 124 MG/DL
CHOLEST/HDLC SERPL: 4.6 {RATIO}
CO2 SERPL-SCNC: 26 MMOL/L
CREAT SERPL-MCNC: 0.9 MG/DL
CREAT UR-MCNC: 82 MG/DL
DIFFERENTIAL METHOD: ABNORMAL
EOSINOPHIL # BLD AUTO: 0.1 K/UL
EOSINOPHIL NFR BLD: 1 %
ERYTHROCYTE [DISTWIDTH] IN BLOOD BY AUTOMATED COUNT: 14.3 %
EST. GFR  (AFRICAN AMERICAN): >60 ML/MIN/1.73 M^2
EST. GFR  (NON AFRICAN AMERICAN): >60 ML/MIN/1.73 M^2
ESTIMATED AVG GLUCOSE: 114 MG/DL
GLUCOSE SERPL-MCNC: 140 MG/DL
HBA1C MFR BLD HPLC: 5.6 %
HCT VFR BLD AUTO: 35.3 %
HDLC SERPL-MCNC: 27 MG/DL
HDLC SERPL: 21.8 %
HGB BLD-MCNC: 11.4 G/DL
LDLC SERPL CALC-MCNC: 70 MG/DL
LYMPHOCYTES # BLD AUTO: 1.5 K/UL
LYMPHOCYTES NFR BLD: 18.1 %
MCH RBC QN AUTO: 32.8 PG
MCHC RBC AUTO-ENTMCNC: 32.3 G/DL
MCV RBC AUTO: 101 FL
MICROALBUMIN UR DL<=1MG/L-MCNC: 263 UG/ML
MICROALBUMIN/CREATININE RATIO: 320.7 UG/MG
MONOCYTES # BLD AUTO: 0.9 K/UL
MONOCYTES NFR BLD: 10.4 %
NEUTROPHILS # BLD AUTO: 5.8 K/UL
NEUTROPHILS NFR BLD: 70.3 %
NONHDLC SERPL-MCNC: 97 MG/DL
PLATELET # BLD AUTO: 307 K/UL
PMV BLD AUTO: 9.3 FL
POTASSIUM SERPL-SCNC: 4.6 MMOL/L
PROT SERPL-MCNC: 6.6 G/DL
RBC # BLD AUTO: 3.48 M/UL
SODIUM SERPL-SCNC: 139 MMOL/L
TRIGL SERPL-MCNC: 135 MG/DL
TSH SERPL DL<=0.005 MIU/L-ACNC: 3.45 UIU/ML
WBC # BLD AUTO: 8.21 K/UL

## 2017-12-19 PROCEDURE — 82570 ASSAY OF URINE CREATININE: CPT

## 2017-12-19 PROCEDURE — 80053 COMPREHEN METABOLIC PANEL: CPT

## 2017-12-19 PROCEDURE — 80061 LIPID PANEL: CPT

## 2017-12-19 PROCEDURE — 83880 ASSAY OF NATRIURETIC PEPTIDE: CPT

## 2017-12-19 PROCEDURE — 85025 COMPLETE CBC W/AUTO DIFF WBC: CPT

## 2017-12-19 PROCEDURE — 84443 ASSAY THYROID STIM HORMONE: CPT

## 2017-12-19 PROCEDURE — 36415 COLL VENOUS BLD VENIPUNCTURE: CPT

## 2017-12-19 PROCEDURE — 83036 HEMOGLOBIN GLYCOSYLATED A1C: CPT

## 2017-12-20 ENCOUNTER — TELEPHONE (OUTPATIENT)
Dept: INTERNAL MEDICINE | Facility: CLINIC | Age: 82
End: 2017-12-20

## 2017-12-20 ENCOUNTER — PATIENT OUTREACH (OUTPATIENT)
Dept: ADMINISTRATIVE | Facility: CLINIC | Age: 82
End: 2017-12-20

## 2017-12-20 DIAGNOSIS — R39.89 URINE DISCOLORATION: Primary | ICD-10-CM

## 2017-12-20 NOTE — TELEPHONE ENCOUNTER
----- Message from Rayne Melo sent at 2017  2:18 PM CST -----  Contact: [facility]  Eddie Parada Sr.  MRN: [id number]  : 1929  PCP: Callum Roberts  Home Phone      [phone]  Work Phone      Not on file.  Mobile          [phone]      MESSAGE: STATED THAT PT WAS RECENTLY AT THE [facility] FOR REHAB AND HAS BEEN RELEASED. STILL HAS CATHETER IN AND WILL HAVE UNTIL February. THERE IS A DARK COLOR AND ODOR TO URINE. SAID THAT HE WAS GIVEN A RX OF BACTRIM BACK IN November. CAN HE TAKE THIS NOW? PLEASE CALL    PHONE: [phone]

## 2017-12-20 NOTE — TELEPHONE ENCOUNTER
Nurse Pati states that patient was discharged to home health from [facility] yesterday and that U/A was done there prior to discharge. I spoke to the nurse @ [facility] and she denies that a U/A was done there. I instructed Pati to do U/A and C/S tomorrow. Also that patient may start Bactrim

## 2017-12-21 ENCOUNTER — PATIENT OUTREACH (OUTPATIENT)
Dept: ADMINISTRATIVE | Facility: CLINIC | Age: 82
End: 2017-12-21

## 2017-12-21 ENCOUNTER — TELEPHONE (OUTPATIENT)
Dept: UROLOGY | Facility: CLINIC | Age: 82
End: 2017-12-21

## 2017-12-21 RX ORDER — CEPHALEXIN 500 MG/1
500 CAPSULE ORAL 3 TIMES DAILY
COMMUNITY
End: 2017-12-27

## 2017-12-21 NOTE — TELEPHONE ENCOUNTER
Returned call, spoke with Pati.  Informed catheter is 16 Bishop coudee.  Questioning if patient is to start/resume oxybutynin.  Informed on date of visit with Dr Montoya 12/13/17, patient informed him he was not taking oxybutynin at current time as he was not experiencing bladder spasms, deferred to nursing facility MD or NP.  Voiced understanding.

## 2017-12-21 NOTE — PATIENT INSTRUCTIONS
Bishop Catheter Care    A Bishop catheter is a rubber tube that is placed through the urethra (opening where urine comes out) and into the bladder. This helps drain urine from the bladder. There is a small balloon on the end of the tube that is inflated after insertion. This keeps the catheter from sliding out of the bladder.  A Bishop catheter is used to treat urinary retention (unable to pass urine). It is also used when there is incontinence (loss of bladder control).  Home care  · Finish taking any prescribed antibiotic even if you are feeling better before then.  · It is important to keep bacteria from getting into the collection bag. Do not disconnect the catheter from the collection bag.  · Use a leg band to secure the drainage tube, so it does not pull on the catheter. Drain the collection bag when it becomes full using the drain spout at the bottom of the bag.  · Do not try to pull or remove your catheter. This will injure your urethra. It must be removed by your healthcare provider or nurse.  Follow-up care  Follow up with your healthcare provider as advised for repeat urine testing and catheter removal or replacement.  When to seek medical advice  Call your healthcare provider right away if any of these occur:  · Fever of 100.4ºF (38ºC) or higher, or as directed by your healthcare provider  · Bladder pain or fullness  · Abdominal swelling, nausea or vomiting, or back pain  · Blood or urine leakage around the catheter  · Bloody urine coming from the catheter (if a new symptom)  · Catheter falls out  · Catheter stops draining for 6 hours  · Weakness, dizziness, or fainting  Date Last Reviewed: 10/1/2016  © 4803-7436 [employer]. [address] [place] [address], [place] [address]. All rights reserved. This information is not intended as a substitute for professional medical care. Always follow your healthcare professional's instructions.

## 2017-12-21 NOTE — TELEPHONE ENCOUNTER
----- Message from Yajaira Evangelista sent at 12/21/2017 10:13 AM CST -----  Pati Carrasco of the [facility] called.   No. [phone]    Is the catheter a coudee.   Please call.

## 2017-12-23 ENCOUNTER — HOSPITAL ENCOUNTER (EMERGENCY)
Facility: HOSPITAL | Age: 82
Discharge: HOME | End: 2017-12-23
Attending: SURGERY
Payer: MEDICARE

## 2017-12-23 VITALS
HEIGHT: 66 IN | OXYGEN SATURATION: 100 % | SYSTOLIC BLOOD PRESSURE: 101 MMHG | WEIGHT: 186 LBS | HEART RATE: 67 BPM | TEMPERATURE: 96 F | RESPIRATION RATE: 16 BRPM | DIASTOLIC BLOOD PRESSURE: 60 MMHG | BODY MASS INDEX: 29.89 KG/M2

## 2017-12-23 DIAGNOSIS — T83.511A URINARY TRACT INFECTION ASSOCIATED WITH INDWELLING URETHRAL CATHETER, INITIAL ENCOUNTER: Primary | ICD-10-CM

## 2017-12-23 DIAGNOSIS — N39.0 URINARY TRACT INFECTION ASSOCIATED WITH INDWELLING URETHRAL CATHETER, INITIAL ENCOUNTER: Primary | ICD-10-CM

## 2017-12-23 LAB
ALBUMIN SERPL BCP-MCNC: 2.7 G/DL
ALP SERPL-CCNC: 123 U/L
ALT SERPL W/O P-5'-P-CCNC: 8 U/L
ANION GAP SERPL CALC-SCNC: 9 MMOL/L
AST SERPL-CCNC: 20 U/L
BACTERIA #/AREA URNS HPF: ABNORMAL /HPF
BASOPHILS # BLD AUTO: 0.02 K/UL
BASOPHILS NFR BLD: 0.3 %
BILIRUB SERPL-MCNC: 0.7 MG/DL
BILIRUB UR QL STRIP: ABNORMAL
BUN SERPL-MCNC: 21 MG/DL
CALCIUM SERPL-MCNC: 8.9 MG/DL
CAOX CRY URNS QL MICRO: ABNORMAL
CHLORIDE SERPL-SCNC: 104 MMOL/L
CLARITY UR: CLEAR
CO2 SERPL-SCNC: 23 MMOL/L
COLOR UR: YELLOW
CREAT SERPL-MCNC: 1.6 MG/DL
DIFFERENTIAL METHOD: ABNORMAL
EOSINOPHIL # BLD AUTO: 0 K/UL
EOSINOPHIL NFR BLD: 0.5 %
ERYTHROCYTE [DISTWIDTH] IN BLOOD BY AUTOMATED COUNT: 14.6 %
EST. GFR  (AFRICAN AMERICAN): 44 ML/MIN/1.73 M^2
EST. GFR  (NON AFRICAN AMERICAN): 38 ML/MIN/1.73 M^2
GLUCOSE SERPL-MCNC: 135 MG/DL
GLUCOSE UR QL STRIP: NEGATIVE
HCT VFR BLD AUTO: 32.3 %
HGB BLD-MCNC: 10.5 G/DL
HGB UR QL STRIP: ABNORMAL
HYALINE CASTS #/AREA URNS LPF: ABNORMAL /LPF
KETONES UR QL STRIP: ABNORMAL
LEUKOCYTE ESTERASE UR QL STRIP: ABNORMAL
LYMPHOCYTES # BLD AUTO: 1.6 K/UL
LYMPHOCYTES NFR BLD: 21.8 %
MCH RBC QN AUTO: 32.3 PG
MCHC RBC AUTO-ENTMCNC: 32.5 G/DL
MCV RBC AUTO: 99 FL
MICROSCOPIC COMMENT: ABNORMAL
MONOCYTES # BLD AUTO: 1 K/UL
MONOCYTES NFR BLD: 13.4 %
NEUTROPHILS # BLD AUTO: 4.8 K/UL
NEUTROPHILS NFR BLD: 64 %
NITRITE UR QL STRIP: NEGATIVE
PH UR STRIP: 7 [PH] (ref 5–8)
PLATELET # BLD AUTO: 264 K/UL
PMV BLD AUTO: 9.4 FL
POTASSIUM SERPL-SCNC: 5.2 MMOL/L
PROT SERPL-MCNC: 6.4 G/DL
PROT UR QL STRIP: ABNORMAL
RBC # BLD AUTO: 3.25 M/UL
RBC #/AREA URNS HPF: 10 /HPF (ref 0–4)
SODIUM SERPL-SCNC: 136 MMOL/L
SP GR UR STRIP: 1.02 (ref 1–1.03)
URN SPEC COLLECT METH UR: ABNORMAL
UROBILINOGEN UR STRIP-ACNC: >=8 EU/DL
WBC # BLD AUTO: 7.47 K/UL
WBC #/AREA URNS HPF: >100 /HPF (ref 0–5)
YEAST URNS QL MICRO: ABNORMAL

## 2017-12-23 PROCEDURE — 87186 SC STD MICRODIL/AGAR DIL: CPT

## 2017-12-23 PROCEDURE — 87086 URINE CULTURE/COLONY COUNT: CPT

## 2017-12-23 PROCEDURE — 99283 EMERGENCY DEPT VISIT LOW MDM: CPT

## 2017-12-23 PROCEDURE — 36415 COLL VENOUS BLD VENIPUNCTURE: CPT

## 2017-12-23 PROCEDURE — 87077 CULTURE AEROBIC IDENTIFY: CPT

## 2017-12-23 PROCEDURE — 85025 COMPLETE CBC W/AUTO DIFF WBC: CPT

## 2017-12-23 PROCEDURE — 87088 URINE BACTERIA CULTURE: CPT

## 2017-12-23 PROCEDURE — 80053 COMPREHEN METABOLIC PANEL: CPT

## 2017-12-23 PROCEDURE — 81000 URINALYSIS NONAUTO W/SCOPE: CPT

## 2017-12-23 RX ORDER — NITROFURANTOIN 25; 75 MG/1; MG/1
100 CAPSULE ORAL 2 TIMES DAILY
Qty: 14 CAPSULE | Refills: 0 | Status: SHIPPED | OUTPATIENT
Start: 2017-12-23 | End: 2017-12-23

## 2017-12-23 RX ORDER — AMOXICILLIN AND CLAVULANATE POTASSIUM 875; 125 MG/1; MG/1
1 TABLET, FILM COATED ORAL 2 TIMES DAILY
Qty: 20 TABLET | Refills: 0 | Status: SHIPPED | OUTPATIENT
Start: 2017-12-23 | End: 2018-01-02

## 2017-12-23 RX ORDER — NITROFURANTOIN 25; 75 MG/1; MG/1
100 CAPSULE ORAL 2 TIMES DAILY
Qty: 14 CAPSULE | Refills: 0 | Status: SHIPPED | OUTPATIENT
Start: 2017-12-23 | End: 2017-12-30

## 2017-12-23 RX ORDER — AMOXICILLIN AND CLAVULANATE POTASSIUM 875; 125 MG/1; MG/1
1 TABLET, FILM COATED ORAL 2 TIMES DAILY
Qty: 20 TABLET | Refills: 0 | Status: SHIPPED | OUTPATIENT
Start: 2017-12-23 | End: 2017-12-23

## 2017-12-23 NOTE — ED NOTES
Discharge instructions/escript instructions given to patient and family, they voiced understanding.  Discharged to home in stable condition/transported out of ER to POV via wheelchair, NAD.

## 2017-12-24 NOTE — ED PROVIDER NOTES
[facility]                                       December 23, 2017                   Chief Complaint  88 y.o. male with Urinary Tract Infection (called to come in )    History of Present Illness  Eddie Parada Sr. presents to the emergency room with a urinary tract infection  Patient was on Bactrim, patient had urine cultures performed by home health this week  Urine culture showed 3 different bacterias, Bactrim resistance was noted on the culture  Patient has a Bishop catheter, came in for a change of antibiotics by the ER M.D. today  After reviewing culture reports, patient to be on Macrobid and Augmentin for treatment    The history is provided by the patient     Past Medical History   -- Abdominal fibromatosis     -- Atrial fibrillation     -- Bradycardia     -- CAD (coronary artery disease)     -- Cancer     -- CHF (congestive heart failure)     -- COPD (chronic obstructive pulmonary disease)     -- Diabetes mellitus type II     -- Hyperlipidemia     -- Melanoma     -- Obesity (BMI 30.0-34.9)     -- Osteoporosis     -- Peripheral vascular disease     -- Stroke     -- TIA (transient ischemic attack)     -- Type II or unspecified type diabetes mellits     -- Unspecified essential hypertension        Past Surgical History   -- CARDIAC PACEMAKER PLACEMENT       -- CARDIAC VALVE REPLACEMENT       -- CHOLECYSTECTOMY       -- EYE SURGERY       -- HERNIA REPAIR          Review of patient's allergies   -- Levofloxacin     -- Lyrica [pregabalin]     -- Unable to assess      Review of Systems and Physical Exam     Review of Systems  -- Constitution - no fever, denies fatigue, no weakness, no chills  -- Eyes - no tearing or redness, no visual disturbance  -- Ear, Nose - no tinnitus or earache, no nasal congestion or discharge  -- Mouth,Throat - no sore throat, no toothache, normal voice, normal swallowing  -- Respiratory - denies cough and congestion, no shortness of breath, no AMAYA  --  Cardiovascular - denies chest pain, no palpitations, denies claudication  -- Gastrointestinal - denies abdominal pain, nausea, vomiting, or diarrhea  -- Genitourinary - UTI, no dysuria, no denies flank pain, no hematuria or frequency   -- Musculoskeletal - denies back pain, negative for myalgias and arthralgias   -- Neurological - no headache, denies weakness or seizure; no LOC  -- Skin - denies pallor, rash, or changes in skin. no hives or welts noted    Vital Signs  -- His temperature is 96.1 °F (35.6 °C).   -- His blood pressure is 101/60 and his pulse is 67.   -- His respiration is 16 and oxygen saturation is 100%.      Physical Exam  -- Nursing note and vitals reviewed  -- Head: Atraumatic. Normocephalic. No obvious abnormality  -- Eyes: Pupils are equal and reactive to light. Normal conjunctiva and lids  -- Neck: Normal range of motion. Neck supple. No masses, trachea midline  -- Cardiac: Normal rate, regular rhythm and normal heart sounds  -- Pulmonary: Normal respiratory effort, breath sounds clear to auscultation  -- Abdominal: Soft, no tenderness. Normal bowel sounds. Normal liver edge  -- Genitourinary:   -- Musculoskeletal: Normal range of motion, no effusions. Joints stable   -- Neurological: No focal deficits. Showed good interaction with staff  -- Vascular: Posterior tibial, dorsalis pedis and radial pulses 2+ bilaterally      Emergency Room Course     Labs  --    -- K 5.2 (H)   --    -- CO2 23   -- BUN 21   -- CREATININE 1.6 (H)   --  (H)   -- ALKPHOS 123   -- AST 20   -- ALT 8 (L)   -- BILITOT 0.7   -- ALBUMIN 2.7 (L)   -- PROT 6.4   -- WBC 7.47   -- HGB 10.5 (L)   -- HCT 32.3 (L)   --    -- INR 2.1 (H)   --  (H)   -- LACTATE 0.9   -- MG 1.9   -- TSH 3.451     Urinalysis  -- Urinalysis performed during this ER visit showed signs of infection   -- The urine today has been sent for lab culture, results pending     Diagnosis  -- Urinary tract infection associated with  indwelling urethral catheter, initial encounter.    Disposition and Plan  -- Disposition: home  -- Condition: stable  -- Follow-up: Patient to follow up with Callum Roberts MD in 1-2 days.  -- I advised the patient that we have found no life threatening condition today  -- At this time, I believe the patient is clinically stable for discharge.   -- The patient acknowledges that close follow up with a MD is required   -- Patient agrees to comply with all instruction and direction given in the ER    This note is dictated on Dragon Natural Speaking word recognition program.  There are word recognition mistakes that are occasionally missed on review.           Isauro Alcocer MD  12/23/17 4323

## 2017-12-26 LAB — BACTERIA UR CULT: NORMAL

## 2017-12-27 ENCOUNTER — OFFICE VISIT (OUTPATIENT)
Dept: INTERNAL MEDICINE | Facility: CLINIC | Age: 82
End: 2017-12-27
Payer: MEDICARE

## 2017-12-27 VITALS
BODY MASS INDEX: 29.89 KG/M2 | WEIGHT: 186 LBS | DIASTOLIC BLOOD PRESSURE: 64 MMHG | TEMPERATURE: 97 F | RESPIRATION RATE: 16 BRPM | HEIGHT: 66 IN | HEART RATE: 78 BPM | SYSTOLIC BLOOD PRESSURE: 102 MMHG

## 2017-12-27 DIAGNOSIS — E78.2 MIXED DYSLIPIDEMIA: ICD-10-CM

## 2017-12-27 DIAGNOSIS — N30.01 ACUTE CYSTITIS WITH HEMATURIA: Primary | ICD-10-CM

## 2017-12-27 LAB
ALBUMIN SERPL BCP-MCNC: 3.1 G/DL
ALP SERPL-CCNC: 138 U/L
ALT SERPL W/O P-5'-P-CCNC: 10 U/L
ANION GAP SERPL CALC-SCNC: 10 MMOL/L
AST SERPL-CCNC: 30 U/L
BASOPHILS # BLD AUTO: 0.03 K/UL
BASOPHILS NFR BLD: 0.3 %
BILIRUB SERPL-MCNC: 0.9 MG/DL
BUN SERPL-MCNC: 21 MG/DL
CALCIUM SERPL-MCNC: 9.4 MG/DL
CHLORIDE SERPL-SCNC: 104 MMOL/L
CO2 SERPL-SCNC: 23 MMOL/L
CREAT SERPL-MCNC: 1.4 MG/DL
DIFFERENTIAL METHOD: ABNORMAL
EOSINOPHIL # BLD AUTO: 0.1 K/UL
EOSINOPHIL NFR BLD: 0.7 %
ERYTHROCYTE [DISTWIDTH] IN BLOOD BY AUTOMATED COUNT: 14.6 %
EST. GFR  (AFRICAN AMERICAN): 51 ML/MIN/1.73 M^2
EST. GFR  (NON AFRICAN AMERICAN): 45 ML/MIN/1.73 M^2
GLUCOSE SERPL-MCNC: 158 MG/DL
HCT VFR BLD AUTO: 35.5 %
HGB BLD-MCNC: 11.5 G/DL
LYMPHOCYTES # BLD AUTO: 1.8 K/UL
LYMPHOCYTES NFR BLD: 20.3 %
MCH RBC QN AUTO: 32.3 PG
MCHC RBC AUTO-ENTMCNC: 32.4 G/DL
MCV RBC AUTO: 100 FL
MONOCYTES # BLD AUTO: 1 K/UL
MONOCYTES NFR BLD: 10.9 %
NEUTROPHILS # BLD AUTO: 5.9 K/UL
NEUTROPHILS NFR BLD: 67.8 %
PLATELET # BLD AUTO: 242 K/UL
PMV BLD AUTO: 9.4 FL
POTASSIUM SERPL-SCNC: 5.4 MMOL/L
PROT SERPL-MCNC: 7 G/DL
RBC # BLD AUTO: 3.56 M/UL
SODIUM SERPL-SCNC: 137 MMOL/L
WBC # BLD AUTO: 8.77 K/UL

## 2017-12-27 PROCEDURE — 87086 URINE CULTURE/COLONY COUNT: CPT

## 2017-12-27 PROCEDURE — 99214 OFFICE O/P EST MOD 30 MIN: CPT | Mod: S$GLB,,, | Performed by: NURSE PRACTITIONER

## 2017-12-27 PROCEDURE — 87088 URINE BACTERIA CULTURE: CPT

## 2017-12-27 PROCEDURE — 80053 COMPREHEN METABOLIC PANEL: CPT

## 2017-12-27 PROCEDURE — 99499 UNLISTED E&M SERVICE: CPT | Mod: S$GLB,,, | Performed by: NURSE PRACTITIONER

## 2017-12-27 PROCEDURE — 85025 COMPLETE CBC W/AUTO DIFF WBC: CPT

## 2017-12-27 PROCEDURE — 87077 CULTURE AEROBIC IDENTIFY: CPT

## 2017-12-27 PROCEDURE — 99999 PR PBB SHADOW E&M-EST. PATIENT-LVL IV: CPT | Mod: PBBFAC,,, | Performed by: NURSE PRACTITIONER

## 2017-12-27 PROCEDURE — 87186 SC STD MICRODIL/AGAR DIL: CPT

## 2017-12-27 RX ORDER — GEMFIBROZIL 600 MG/1
600 TABLET, FILM COATED ORAL
Qty: 60 TABLET | Refills: 4 | Status: CANCELLED | OUTPATIENT
Start: 2017-12-27

## 2017-12-27 RX ORDER — PRAVASTATIN SODIUM 20 MG/1
20 TABLET ORAL DAILY
Qty: 30 TABLET | Refills: 5 | Status: CANCELLED | OUTPATIENT
Start: 2017-12-27

## 2017-12-27 RX ORDER — PIOGLITAZONEHYDROCHLORIDE 15 MG/1
TABLET ORAL
Qty: 30 TABLET | Refills: 11 | Status: CANCELLED | OUTPATIENT
Start: 2017-12-27

## 2017-12-27 NOTE — PROGRESS NOTES
"Subjective:       Patient ID: Eddie Parada Sr. is a 88 y.o. male.    Chief Complaint: Follow-up (UTI)    HPI: Pt presents to clinic today known to me with c/o UTI. He is here with his wife. She reports that she was sent from [place] 11/26 for complicated UTI. He was d/ervin from the hospital with  UTI/urinary retension 12/4. The shields was removed after rocephin treatment but he was still unable to urinate prior to d/c. He was d/ervin with the shields and still has it on flomax, s/p cefepime x 7 days for  ecoli sensitive UTI.  He had a f/u with Dr Montoya urology 12/13- he changed the catheter and did prostate exam. D/ervin the oxybutynin and cont the flomax. Went back to ER for "dark colored urine from home and diagnosed with UTI again 12/23 and treated with macrobid and augmentin. He is now home with home health. Has orders to change the shields 1/13. No fever    BMP  Lab Results   Component Value Date     12/23/2017    K 5.2 (H) 12/23/2017     12/23/2017    CO2 23 12/23/2017    BUN 21 12/23/2017    CREATININE 1.6 (H) 12/23/2017    CALCIUM 8.9 12/23/2017    ANIONGAP 9 12/23/2017    ESTGFRAFRICA 44 (A) 12/23/2017    EGFRNONAA 38 (A) 12/23/2017   U/a >100WBC 3+ leuko  Urine Culture, Routine PSEUDOMONAS AERUGINOSA   >100,000 cfu/ml        Resulting Agency [facility]   Susceptibility      Pseudomonas aeruginosa     CULTURE, URINE     Amikacin <=16 "><=16  Sensitive     Cefepime <=8 "><=8  Sensitive     Ciprofloxacin <=1 "><=1  Sensitive     Gentamicin <=4 "><=4  Sensitive     Meropenem <=4 "><=4  Sensitive     Piperacillin/Tazo <=16 "><=16  Sensitive     Tobramycin <=4 "><=4  Sensitive         He has reaction to levaquin. It causes extreme leg weakness and pain     Review of Systems   Constitutional: Negative for chills and fever.   HENT: Negative for congestion, postnasal drip and sore throat.    Eyes: Negative for photophobia.   Respiratory: Negative for chest tightness and shortness of breath.    Cardiovascular: " Negative for chest pain.   Gastrointestinal: Negative for abdominal distention, abdominal pain, blood in stool and vomiting.   Genitourinary: Positive for difficulty urinating. Negative for dysuria, flank pain and hematuria.        Has shields with dark yellow urine   Musculoskeletal: Negative for back pain.   Skin: Negative for pallor.   Neurological: Negative for dizziness, seizures, facial asymmetry, speech difficulty and numbness.   Hematological: Does not bruise/bleed easily.   Psychiatric/Behavioral: Negative for agitation and suicidal ideas. The patient is not nervous/anxious.        Objective:      Physical Exam   Constitutional: He is oriented to person, place, and time. He appears well-developed and well-nourished.   HENT:   Head: Normocephalic and atraumatic.   Neck: No JVD present.   Cardiovascular: Normal rate, regular rhythm and normal heart sounds.    No murmur heard.  Pulmonary/Chest: Effort normal and breath sounds normal. No respiratory distress. He has no wheezes. He has no rales.   Abdominal: Soft. Bowel sounds are normal. There is no tenderness.   Musculoskeletal: He exhibits no edema.   Neurological: He is alert and oriented to person, place, and time.   Skin: Skin is warm and dry.   Psychiatric: He has a normal mood and affect. His behavior is normal. Judgment and thought content normal.   Nursing note and vitals reviewed.      Assessment:       1. Acute cystitis with hematuria    2. Mixed dyslipidemia        Plan:   Eddie was seen today for follow-up.    Diagnoses and all orders for this visit:    Acute cystitis with hematuria  -     Urine culture  -     Urinalysis; Future  -     CBC auto differential; Future  -     Comprehensive metabolic panel; Future  I would like to give him cipro because it is listed as sensitive but with his hx oif reaction to levaquin unsure if we wan to go that route Will check the urine and culture to see if macrobid and augmentin did anything. If not he will need  cipro or IV abx in patient.

## 2017-12-29 LAB — BACTERIA UR CULT: NORMAL

## 2017-12-29 RX ORDER — PIOGLITAZONEHYDROCHLORIDE 15 MG/1
TABLET ORAL
Qty: 30 TABLET | Refills: 10 | Status: SHIPPED | OUTPATIENT
Start: 2017-12-29 | End: 2018-12-10 | Stop reason: SDUPTHER

## 2017-12-29 RX ORDER — LEVOTHYROXINE SODIUM 50 UG/1
50 TABLET ORAL DAILY
Qty: 30 TABLET | Refills: 10 | Status: SHIPPED | OUTPATIENT
Start: 2017-12-29 | End: 2018-12-28 | Stop reason: SDUPTHER

## 2018-01-05 ENCOUNTER — NURSE TRIAGE (OUTPATIENT)
Dept: ADMINISTRATIVE | Facility: CLINIC | Age: 83
End: 2018-01-05

## 2018-01-05 ENCOUNTER — TELEPHONE (OUTPATIENT)
Dept: INTERNAL MEDICINE | Facility: CLINIC | Age: 83
End: 2018-01-05

## 2018-01-05 ENCOUNTER — HOSPITAL ENCOUNTER (EMERGENCY)
Facility: HOSPITAL | Age: 83
Discharge: HOME | End: 2018-01-05
Attending: SURGERY
Payer: MEDICARE

## 2018-01-05 VITALS
SYSTOLIC BLOOD PRESSURE: 95 MMHG | RESPIRATION RATE: 20 BRPM | HEART RATE: 60 BPM | DIASTOLIC BLOOD PRESSURE: 50 MMHG | WEIGHT: 186 LBS | BODY MASS INDEX: 30.02 KG/M2 | TEMPERATURE: 96 F

## 2018-01-05 DIAGNOSIS — N39.0 URINARY TRACT INFECTION ASSOCIATED WITH INDWELLING URETHRAL CATHETER, INITIAL ENCOUNTER: Primary | ICD-10-CM

## 2018-01-05 DIAGNOSIS — T83.511A URINARY TRACT INFECTION ASSOCIATED WITH INDWELLING URETHRAL CATHETER, INITIAL ENCOUNTER: Primary | ICD-10-CM

## 2018-01-05 LAB
ALBUMIN SERPL BCP-MCNC: 2.8 G/DL
ALP SERPL-CCNC: 135 U/L
ALT SERPL W/O P-5'-P-CCNC: 10 U/L
ANION GAP SERPL CALC-SCNC: 12 MMOL/L
AST SERPL-CCNC: 25 U/L
BACTERIA #/AREA URNS HPF: ABNORMAL /HPF
BASOPHILS # BLD AUTO: 0.01 K/UL
BASOPHILS NFR BLD: 0.1 %
BILIRUB SERPL-MCNC: 0.4 MG/DL
BILIRUB UR QL STRIP: NEGATIVE
BUN SERPL-MCNC: 35 MG/DL
CALCIUM SERPL-MCNC: 9.2 MG/DL
CAOX CRY URNS QL MICRO: ABNORMAL
CHLORIDE SERPL-SCNC: 103 MMOL/L
CLARITY UR: ABNORMAL
CO2 SERPL-SCNC: 21 MMOL/L
COLOR UR: YELLOW
CREAT SERPL-MCNC: 1.4 MG/DL
DIFFERENTIAL METHOD: ABNORMAL
EOSINOPHIL # BLD AUTO: 0.1 K/UL
EOSINOPHIL NFR BLD: 1.5 %
ERYTHROCYTE [DISTWIDTH] IN BLOOD BY AUTOMATED COUNT: 14.9 %
EST. GFR  (AFRICAN AMERICAN): 51 ML/MIN/1.73 M^2
EST. GFR  (NON AFRICAN AMERICAN): 45 ML/MIN/1.73 M^2
GLUCOSE SERPL-MCNC: 188 MG/DL
GLUCOSE UR QL STRIP: NEGATIVE
HCT VFR BLD AUTO: 33.1 %
HGB BLD-MCNC: 10.6 G/DL
HGB UR QL STRIP: ABNORMAL
HYALINE CASTS #/AREA URNS LPF: 0 /LPF
KETONES UR QL STRIP: ABNORMAL
LACTATE SERPL-SCNC: 1.1 MMOL/L
LEUKOCYTE ESTERASE UR QL STRIP: ABNORMAL
LYMPHOCYTES # BLD AUTO: 1.7 K/UL
LYMPHOCYTES NFR BLD: 25.4 %
MCH RBC QN AUTO: 31.7 PG
MCHC RBC AUTO-ENTMCNC: 32 G/DL
MCV RBC AUTO: 99 FL
MICROSCOPIC COMMENT: ABNORMAL
MONOCYTES # BLD AUTO: 0.8 K/UL
MONOCYTES NFR BLD: 11.6 %
NEUTROPHILS # BLD AUTO: 4.1 K/UL
NEUTROPHILS NFR BLD: 61.4 %
NITRITE UR QL STRIP: POSITIVE
PH UR STRIP: 6 [PH] (ref 5–8)
PLATELET # BLD AUTO: 187 K/UL
PMV BLD AUTO: 9.1 FL
POTASSIUM SERPL-SCNC: 4.2 MMOL/L
PROT SERPL-MCNC: 6.5 G/DL
PROT UR QL STRIP: ABNORMAL
RBC # BLD AUTO: 3.34 M/UL
RBC #/AREA URNS HPF: >100 /HPF (ref 0–4)
SODIUM SERPL-SCNC: 136 MMOL/L
SP GR UR STRIP: >=1.03 (ref 1–1.03)
URN SPEC COLLECT METH UR: ABNORMAL
UROBILINOGEN UR STRIP-ACNC: NEGATIVE EU/DL
WBC # BLD AUTO: 6.74 K/UL
WBC #/AREA URNS HPF: >100 /HPF (ref 0–5)
YEAST URNS QL MICRO: ABNORMAL

## 2018-01-05 PROCEDURE — 87086 URINE CULTURE/COLONY COUNT: CPT

## 2018-01-05 PROCEDURE — 25000003 PHARM REV CODE 250: Performed by: SURGERY

## 2018-01-05 PROCEDURE — 85025 COMPLETE CBC W/AUTO DIFF WBC: CPT

## 2018-01-05 PROCEDURE — 87088 URINE BACTERIA CULTURE: CPT

## 2018-01-05 PROCEDURE — 87040 BLOOD CULTURE FOR BACTERIA: CPT | Mod: 59

## 2018-01-05 PROCEDURE — 36415 COLL VENOUS BLD VENIPUNCTURE: CPT

## 2018-01-05 PROCEDURE — 83605 ASSAY OF LACTIC ACID: CPT

## 2018-01-05 PROCEDURE — 80053 COMPREHEN METABOLIC PANEL: CPT

## 2018-01-05 PROCEDURE — 87186 SC STD MICRODIL/AGAR DIL: CPT

## 2018-01-05 PROCEDURE — 87077 CULTURE AEROBIC IDENTIFY: CPT

## 2018-01-05 PROCEDURE — 63600175 PHARM REV CODE 636 W HCPCS: Performed by: SURGERY

## 2018-01-05 PROCEDURE — 96372 THER/PROPH/DIAG INJ SC/IM: CPT

## 2018-01-05 PROCEDURE — 99283 EMERGENCY DEPT VISIT LOW MDM: CPT | Mod: 25

## 2018-01-05 PROCEDURE — 81000 URINALYSIS NONAUTO W/SCOPE: CPT

## 2018-01-05 RX ORDER — AMOXICILLIN AND CLAVULANATE POTASSIUM 875; 125 MG/1; MG/1
1 TABLET, FILM COATED ORAL 2 TIMES DAILY
Qty: 20 TABLET | Refills: 0 | Status: ON HOLD | OUTPATIENT
Start: 2018-01-05 | End: 2018-01-15 | Stop reason: HOSPADM

## 2018-01-05 RX ORDER — CIPROFLOXACIN 500 MG/1
500 TABLET ORAL EVERY 12 HOURS
Qty: 20 TABLET | Refills: 0 | Status: SHIPPED | OUTPATIENT
Start: 2018-01-05 | End: 2018-01-08

## 2018-01-05 RX ORDER — NITROFURANTOIN 25; 75 MG/1; MG/1
100 CAPSULE ORAL 2 TIMES DAILY
Qty: 14 CAPSULE | Refills: 0 | Status: ON HOLD | OUTPATIENT
Start: 2018-01-05 | End: 2018-01-15 | Stop reason: HOSPADM

## 2018-01-05 RX ORDER — CEFTRIAXONE 1 G/1
1 INJECTION, POWDER, FOR SOLUTION INTRAMUSCULAR; INTRAVENOUS ONCE
Status: COMPLETED | OUTPATIENT
Start: 2018-01-05 | End: 2018-01-05

## 2018-01-05 RX ORDER — LIDOCAINE HYDROCHLORIDE 10 MG/ML
2.1 INJECTION INFILTRATION; PERINEURAL ONCE
Status: COMPLETED | OUTPATIENT
Start: 2018-01-05 | End: 2018-01-05

## 2018-01-05 RX ADMIN — LIDOCAINE HYDROCHLORIDE 2.1 ML: 10 INJECTION, SOLUTION INFILTRATION; PERINEURAL at 08:01

## 2018-01-05 RX ADMIN — CEFTRIAXONE 1 G: 1 INJECTION, POWDER, FOR SOLUTION INTRAMUSCULAR; INTRAVENOUS at 08:01

## 2018-01-05 NOTE — TELEPHONE ENCOUNTER
NP his culture is growing pseudomonas bacteria ;only two PO medications he is sensitive :  he is allergic to Levaquin : we can try cipro  500 mg  BID ; if any swelling or allergy  To cipro ; then only option be IV antibiotics ; he will have to go to ER and hospital treatment .  Schedule to see me on Monday

## 2018-01-05 NOTE — TELEPHONE ENCOUNTER
Reason for Disposition   Caller has NON-URGENT medication question about med that PCP prescribed and triager unable to answer question    Protocols used: ST MEDICATION QUESTION CALL-A-AH    The home health nurse Tiesha called for a replacement antibiotic for the cipro because he has an allergy. Dr. Roberts's nurse spoke to Tiesha's supervisior Ms. Jenna today and made appt with the patient this upcoming Monday. However, Tiesha was informed to have the patient go to the ED for IV antibiotics per Dr. Roberts's notes. Tiesha verbalized understanding.

## 2018-01-05 NOTE — TELEPHONE ENCOUNTER
Orders called to Jenna @ [facility]. Appt scheduled with Dr. Roberts for 2:45 pm. She will notify patient.

## 2018-01-06 ENCOUNTER — NURSE TRIAGE (OUTPATIENT)
Dept: ADMINISTRATIVE | Facility: CLINIC | Age: 83
End: 2018-01-06

## 2018-01-06 NOTE — TELEPHONE ENCOUNTER
Reason for Disposition   Tea-colored or red-tinged urine    Protocols used: ST URINARY CATHETER - [place] - COUDE-A-

## 2018-01-06 NOTE — ED NOTES
Discharged to home/self care.    - Condition at discharge: Good  - Mode of Discharge: Wheel Chair  - The patient left the ED accompanied by a family member.  - The discharge instructions were discussed with the patient.  - They state an understanding of the discharge instructions.  - Walked pt to the discharge station.

## 2018-01-06 NOTE — ED TRIAGE NOTES
88 y.o. male presents to ER   Chief Complaint   Patient presents with    Urinary Tract Infection   Pt sent to ER by PCP for admit for antibiotics for recurrent UTI. No acute distress noted.

## 2018-01-06 NOTE — ED PROVIDER NOTES
[facility]                                         January 5, 2018                   Chief Complaint  88 y.o. male with Urinary Tract Infection    History of Present Illness  Eddie MILENA Parada Sr. presents to the emergency room with a recurrent UTI  Patient has been on antibiotics with an indwelling Bishop catheter for months  Patient had a recent urine culture results that showed a Pseudomonas UTI  Was prescribed Cipro but is ALLERGIC to Cipro, came to the ER tonight  Was previously on Augmentin and Macrobid, wife wife says it worked well  Wife wants another prescription of this antibiotic, culture sensitivity unknown    The history is provided by the patient     Past Medical History   -- Abdominal fibromatosis     -- Atrial fibrillation     -- Bradycardia     -- CAD (coronary artery disease)     -- Cancer     -- CHF (congestive heart failure)     -- COPD (chronic obstructive pulmonary disease)     -- Diabetes mellitus type II     -- Hyperlipidemia     -- Melanoma     -- Obesity (BMI 30.0-34.9)     -- Osteoporosis     -- Peripheral vascular disease     -- Stroke     -- TIA (transient ischemic attack)     -- Type II or unspecified type diabetes mellits     -- Unspecified essential hypertension        Past Surgical History   -- CARDIAC PACEMAKER PLACEMENT       -- CARDIAC VALVE REPLACEMENT       -- CHOLECYSTECTOMY       -- EYE SURGERY       -- HERNIA REPAIR          Review of patient's allergies   -- Levofloxacin     -- Lyrica [pregabalin]     -- Unable to assess      Review of Systems and Physical Exam     Review of Systems  -- Constitution - no fever, denies fatigue, no weakness, no chills  -- Eyes - no tearing or redness, no visual disturbance  -- Ear, Nose - no tinnitus or earache, no nasal congestion or discharge  -- Mouth,Throat - no sore throat, no toothache, normal voice, normal swallowing  -- Respiratory - denies cough and congestion, no shortness of breath, no AMAYA  -- Cardiovascular -  denies chest pain, no palpitations, denies claudication  -- Gastrointestinal - denies abdominal pain, nausea, vomiting, or diarrhea  -- Genitourinary - dysuria, no denies flank pain, no hematuria or frequency   -- Musculoskeletal - denies back pain, negative for myalgias and arthralgias   -- Neurological - no headache, denies weakness or seizure; no LOC  -- Skin - denies pallor, rash, or changes in skin. no hives or welts noted    Vital Signs  -- His oral temperature is 96.3 °F (35.7 °C).   -- His blood pressure is 95/50 and his pulse is 60.  -- His respiration is 20.      Physical Exam  -- Nursing note and vitals reviewed  -- Head: Atraumatic. Normocephalic. No obvious abnormality  -- Eyes: Pupils are equal and reactive to light. Normal conjunctiva and lids  -- Cardiac: Normal rate, regular rhythm and normal heart sounds  -- Pulmonary: Normal respiratory effort, breath sounds clear to auscultation  -- Abdominal: Soft, no tenderness. Normal bowel sounds. Normal liver edge  -- Genitourinary: Chronic indwelling Bishop noted in the ER  -- Musculoskeletal: Normal range of motion, no effusions. Joints stable   -- Neurological: No focal deficits. Showed good interaction with staff  -- Vascular: Posterior tibial, dorsalis pedis and radial pulses 2+ bilaterally      Emergency Room Course     Labs     K 4.2      CO2 21 (L)   BUN 35 (H)   CREATININE 1.4    (H)   ALKPHOS 135   AST 25   ALT 10   BILITOT 0.4   ALBUMIN 2.8 (L)   PROT 6.5   WBC 6.74   HGB 10.6 (L)   HCT 33.1 (L)      INR 2.1 (H)    (H)   LACTATE 1.1   MG 1.9   TSH 3.451     Urinalysis  -- Urinalysis performed during this ER visit showed no signs of infection   -- The urine today has been sent for lab culture, results pending   -- Bishop was changed in the ER by the RN      Medications Given  -- cefTRIAXone injection 1 g (1 g Intramuscular Given 1/5/18 2033)   -- lidocaine HCL 10 mg/ml (1%) injection 2.1 mL (2.1 mLs Intramuscular  Given 1/5/18 2033)     ED Physician Notes  -- 9:18 PM: Bishop change in the emergency room, patient is afebrile with a normal lactate  -- Does not want to stay in the hospital, wife wants a prescription of Augmentin and Macrobid  -- IM Rocephin given in the ER, patient will follow-up with his PCP on Monday morning    Diagnosis  -- Urinary tract infection associated with indwelling urethral catheter, initial encounter.    Disposition and Plan  -- Disposition: home  -- Condition: stable  -- Follow-up: Patient to follow up with Callum Roberts MD in 1-2 days.  -- I advised the patient that we have found no life threatening condition today  -- At this time, I believe the patient is clinically stable for discharge.   -- The patient acknowledges that close follow up with a MD is required   -- Patient agrees to comply with all instruction and direction given in the ER    This note is dictated on Dragon Natural Speaking word recognition program.  There are word recognition mistakes that are occasionally missed on review.           Isauro Alcocer MD  01/05/18 2120

## 2018-01-06 NOTE — TELEPHONE ENCOUNTER
"  Answer Assessment - Initial Assessment Questions  1. SYMPTOMS: "What symptoms are you concerned about?"      Red colored urine    2. ONSET:  "When did the symptoms start?"      This morning   3. FEVER: "Is there a fever?" If so, ask: "What is the temperature, how was it measured, and when did it start?"      No   4. ABDOMINAL PAIN: "Is there any abdominal pain?" (e.g., Scale 1-10; or mild, moderate, severe)      No   5. URINE COLOR: "What color is the urine?"  "Is there blood present in the urine?" (e.g., clear, yellow, cloudy, tea-colored, blood streaks, bright red)      Red   6. ONSET: "When was the catheter inserted?"      This morning   7. OTHER SYMPTOMS: "Do you have any other symptoms?" (e.g., back pain, bad urine odor)       No   8. PREGNANCY: "Is there any chance you are pregnant?" "When was your last menstrual period?"      N/A    Protocols used:  [facility]    "

## 2018-01-08 ENCOUNTER — TELEPHONE (OUTPATIENT)
Dept: INTERNAL MEDICINE | Facility: CLINIC | Age: 83
End: 2018-01-08

## 2018-01-08 ENCOUNTER — HOSPITAL ENCOUNTER (INPATIENT)
Facility: HOSPITAL | Age: 83
LOS: 5 days | Discharge: HOME HEALTH | DRG: 699 | End: 2018-01-15
Attending: SURGERY | Admitting: INTERNAL MEDICINE
Payer: MEDICARE

## 2018-01-08 ENCOUNTER — OFFICE VISIT (OUTPATIENT)
Dept: INTERNAL MEDICINE | Facility: CLINIC | Age: 83
End: 2018-01-08
Payer: MEDICARE

## 2018-01-08 VITALS
SYSTOLIC BLOOD PRESSURE: 82 MMHG | DIASTOLIC BLOOD PRESSURE: 52 MMHG | HEART RATE: 61 BPM | OXYGEN SATURATION: 96 % | BODY MASS INDEX: 29.9 KG/M2 | HEIGHT: 66 IN | WEIGHT: 186.06 LBS | RESPIRATION RATE: 16 BRPM

## 2018-01-08 DIAGNOSIS — B96.5 PSEUDOMONAS URINARY TRACT INFECTION: ICD-10-CM

## 2018-01-08 DIAGNOSIS — T83.511S URINARY TRACT INFECTION ASSOCIATED WITH INDWELLING URETHRAL CATHETER, SEQUELA: Primary | ICD-10-CM

## 2018-01-08 DIAGNOSIS — N39.0 PSEUDOMONAS URINARY TRACT INFECTION: ICD-10-CM

## 2018-01-08 DIAGNOSIS — I48.91 A-FIB: ICD-10-CM

## 2018-01-08 DIAGNOSIS — N39.0 COMPLICATED UTI (URINARY TRACT INFECTION): Primary | ICD-10-CM

## 2018-01-08 DIAGNOSIS — A49.8 PSEUDOMONAS AERUGINOSA INFECTION: ICD-10-CM

## 2018-01-08 DIAGNOSIS — N39.0 URINARY TRACT INFECTION ASSOCIATED WITH INDWELLING URETHRAL CATHETER, SEQUELA: Primary | ICD-10-CM

## 2018-01-08 DIAGNOSIS — R30.9 PAINFUL MICTURITION: ICD-10-CM

## 2018-01-08 LAB
ALBUMIN SERPL BCP-MCNC: 2.9 G/DL
ALP SERPL-CCNC: 115 U/L
ALT SERPL W/O P-5'-P-CCNC: 9 U/L
AMORPH CRY URNS QL MICRO: ABNORMAL
ANION GAP SERPL CALC-SCNC: 13 MMOL/L
AST SERPL-CCNC: 26 U/L
BACTERIA #/AREA URNS HPF: ABNORMAL /HPF
BACTERIA UR CULT: NORMAL
BASOPHILS # BLD AUTO: 0.01 K/UL
BASOPHILS NFR BLD: 0.1 %
BILIRUB SERPL-MCNC: 0.6 MG/DL
BILIRUB UR QL STRIP: ABNORMAL
BUN SERPL-MCNC: 54 MG/DL
CALCIUM SERPL-MCNC: 9.7 MG/DL
CHLORIDE SERPL-SCNC: 100 MMOL/L
CLARITY UR: ABNORMAL
CO2 SERPL-SCNC: 22 MMOL/L
COLOR UR: ABNORMAL
CREAT SERPL-MCNC: 1.4 MG/DL
DIFFERENTIAL METHOD: ABNORMAL
EOSINOPHIL # BLD AUTO: 0.1 K/UL
EOSINOPHIL NFR BLD: 0.4 %
ERYTHROCYTE [DISTWIDTH] IN BLOOD BY AUTOMATED COUNT: 14.8 %
EST. GFR  (AFRICAN AMERICAN): 51 ML/MIN/1.73 M^2
EST. GFR  (NON AFRICAN AMERICAN): 45 ML/MIN/1.73 M^2
GLUCOSE SERPL-MCNC: 118 MG/DL
GLUCOSE UR QL STRIP: NEGATIVE
HCT VFR BLD AUTO: 28.1 %
HGB BLD-MCNC: 9.3 G/DL
HGB UR QL STRIP: ABNORMAL
HYALINE CASTS #/AREA URNS LPF: 0 /LPF
KETONES UR QL STRIP: NEGATIVE
LACTATE SERPL-SCNC: 1.4 MMOL/L
LEUKOCYTE ESTERASE UR QL STRIP: ABNORMAL
LYMPHOCYTES # BLD AUTO: 1.2 K/UL
LYMPHOCYTES NFR BLD: 9.2 %
MCH RBC QN AUTO: 32.7 PG
MCHC RBC AUTO-ENTMCNC: 33.1 G/DL
MCV RBC AUTO: 99 FL
MICROSCOPIC COMMENT: ABNORMAL
MONOCYTES # BLD AUTO: 1.1 K/UL
MONOCYTES NFR BLD: 8.2 %
NEUTROPHILS # BLD AUTO: 11 K/UL
NEUTROPHILS NFR BLD: 82.1 %
NITRITE UR QL STRIP: POSITIVE
PH UR STRIP: 6 [PH] (ref 5–8)
PLATELET # BLD AUTO: 174 K/UL
PMV BLD AUTO: 10.1 FL
POTASSIUM SERPL-SCNC: 4.5 MMOL/L
PROT SERPL-MCNC: 7 G/DL
PROT UR QL STRIP: ABNORMAL
RBC # BLD AUTO: 2.84 M/UL
RBC #/AREA URNS HPF: 100 /HPF (ref 0–4)
SODIUM SERPL-SCNC: 135 MMOL/L
SP GR UR STRIP: 1.01 (ref 1–1.03)
URN SPEC COLLECT METH UR: ABNORMAL
UROBILINOGEN UR STRIP-ACNC: NEGATIVE EU/DL
WBC # BLD AUTO: 13.41 K/UL
WBC #/AREA URNS HPF: 75 /HPF (ref 0–5)
YEAST URNS QL MICRO: ABNORMAL

## 2018-01-08 PROCEDURE — G0378 HOSPITAL OBSERVATION PER HR: HCPCS

## 2018-01-08 PROCEDURE — 99213 OFFICE O/P EST LOW 20 MIN: CPT | Mod: 25,S$GLB,, | Performed by: INTERNAL MEDICINE

## 2018-01-08 PROCEDURE — 96376 TX/PRO/DX INJ SAME DRUG ADON: CPT

## 2018-01-08 PROCEDURE — 96366 THER/PROPH/DIAG IV INF ADDON: CPT

## 2018-01-08 PROCEDURE — 96365 THER/PROPH/DIAG IV INF INIT: CPT

## 2018-01-08 PROCEDURE — 25000003 PHARM REV CODE 250: Performed by: SURGERY

## 2018-01-08 PROCEDURE — 36415 COLL VENOUS BLD VENIPUNCTURE: CPT

## 2018-01-08 PROCEDURE — 83605 ASSAY OF LACTIC ACID: CPT

## 2018-01-08 PROCEDURE — 99284 EMERGENCY DEPT VISIT MOD MDM: CPT

## 2018-01-08 PROCEDURE — 87040 BLOOD CULTURE FOR BACTERIA: CPT

## 2018-01-08 PROCEDURE — 96361 HYDRATE IV INFUSION ADD-ON: CPT

## 2018-01-08 PROCEDURE — 96372 THER/PROPH/DIAG INJ SC/IM: CPT | Mod: S$GLB,,, | Performed by: INTERNAL MEDICINE

## 2018-01-08 PROCEDURE — 99999 PR PBB SHADOW E&M-EST. PATIENT-LVL IV: CPT | Mod: PBBFAC,,, | Performed by: INTERNAL MEDICINE

## 2018-01-08 PROCEDURE — 96375 TX/PRO/DX INJ NEW DRUG ADDON: CPT

## 2018-01-08 PROCEDURE — 85025 COMPLETE CBC W/AUTO DIFF WBC: CPT

## 2018-01-08 PROCEDURE — 81000 URINALYSIS NONAUTO W/SCOPE: CPT

## 2018-01-08 PROCEDURE — 11000001 HC ACUTE MED/SURG PRIVATE ROOM

## 2018-01-08 PROCEDURE — 80053 COMPREHEN METABOLIC PANEL: CPT

## 2018-01-08 PROCEDURE — 63600175 PHARM REV CODE 636 W HCPCS: Performed by: SURGERY

## 2018-01-08 RX ORDER — LEVOTHYROXINE SODIUM 50 UG/1
50 TABLET ORAL DAILY
Status: DISCONTINUED | OUTPATIENT
Start: 2018-01-09 | End: 2018-01-15 | Stop reason: HOSPADM

## 2018-01-08 RX ORDER — CARVEDILOL 12.5 MG/1
12.5 TABLET ORAL 2 TIMES DAILY WITH MEALS
Status: DISCONTINUED | OUTPATIENT
Start: 2018-01-08 | End: 2018-01-09

## 2018-01-08 RX ORDER — HYDROCODONE BITARTRATE AND ACETAMINOPHEN 5; 325 MG/1; MG/1
1 TABLET ORAL EVERY 4 HOURS PRN
Status: DISCONTINUED | OUTPATIENT
Start: 2018-01-08 | End: 2018-01-15 | Stop reason: HOSPADM

## 2018-01-08 RX ORDER — TAMSULOSIN HYDROCHLORIDE 0.4 MG/1
0.4 CAPSULE ORAL DAILY
Status: DISCONTINUED | OUTPATIENT
Start: 2018-01-09 | End: 2018-01-15 | Stop reason: HOSPADM

## 2018-01-08 RX ORDER — PANTOPRAZOLE SODIUM 40 MG/1
40 TABLET, DELAYED RELEASE ORAL DAILY
Status: DISCONTINUED | OUTPATIENT
Start: 2018-01-09 | End: 2018-01-15 | Stop reason: HOSPADM

## 2018-01-08 RX ORDER — ONDANSETRON 2 MG/ML
4 INJECTION INTRAMUSCULAR; INTRAVENOUS EVERY 8 HOURS PRN
Status: DISCONTINUED | OUTPATIENT
Start: 2018-01-08 | End: 2018-01-15 | Stop reason: HOSPADM

## 2018-01-08 RX ORDER — ACETAMINOPHEN 325 MG/1
650 TABLET ORAL EVERY 8 HOURS PRN
Status: DISCONTINUED | OUTPATIENT
Start: 2018-01-08 | End: 2018-01-15 | Stop reason: HOSPADM

## 2018-01-08 RX ORDER — WARFARIN 2 MG/1
2 TABLET ORAL DAILY
Status: DISCONTINUED | OUTPATIENT
Start: 2018-01-09 | End: 2018-01-10

## 2018-01-08 RX ORDER — KETOROLAC TROMETHAMINE 30 MG/ML
15 INJECTION, SOLUTION INTRAMUSCULAR; INTRAVENOUS
Status: DISCONTINUED | OUTPATIENT
Start: 2018-01-08 | End: 2018-01-08 | Stop reason: HOSPADM

## 2018-01-08 RX ORDER — PIOGLITAZONEHYDROCHLORIDE 15 MG/1
15 TABLET ORAL DAILY
Status: DISCONTINUED | OUTPATIENT
Start: 2018-01-09 | End: 2018-01-09

## 2018-01-08 RX ORDER — GLIMEPIRIDE 2 MG/1
2 TABLET ORAL
Status: DISCONTINUED | OUTPATIENT
Start: 2018-01-09 | End: 2018-01-09

## 2018-01-08 RX ORDER — KETOROLAC TROMETHAMINE 30 MG/ML
15 INJECTION, SOLUTION INTRAMUSCULAR; INTRAVENOUS
Status: DISCONTINUED | OUTPATIENT
Start: 2018-01-08 | End: 2018-01-08

## 2018-01-08 RX ADMIN — KETOROLAC TROMETHAMINE 15 MG: 30 INJECTION, SOLUTION INTRAMUSCULAR; INTRAVENOUS at 03:01

## 2018-01-08 RX ADMIN — PIPERACILLIN AND TAZOBACTAM 4.5 G: 4; .5 INJECTION, POWDER, FOR SOLUTION INTRAVENOUS at 07:01

## 2018-01-08 NOTE — TELEPHONE ENCOUNTER
----- Message from Diana Mccarty sent at 2018  8:09 AM CST -----  Contact: Pt's wife Jessica Parada Sr.  MRN: [id number]  : 1929  PCP: Callum Roberts  Home Phone      [phone]  Work Phone      Not on file.  Mobile          [phone]      MESSAGE:  Pt went to hospital Saturday and  for unresponsiveness. Yesterday his blood sugar was down to 45. Jessica wants to know if she should give pt his diabetic medication this morning. Please advise.  PHONE:  [phone]

## 2018-01-08 NOTE — TELEPHONE ENCOUNTER
Patient scheduled to see Dr. Roberts today. Will advise that patient is allergic to Cipro so he can discuss alternatives with patient.

## 2018-01-08 NOTE — TELEPHONE ENCOUNTER
----- Message from Diana Mccarty sent at 2018  9:23 AM CST -----  Contact: Nuris from \[facility]\"" pharmacy  Eddie MILENA Tal Toussaint.  MRN: [id number]  : 1929  PCP: Callum Roberts  Home Phone      [phone]  Work Phone      Not on file.  Mobile          [phone]      MESSAGE:  Pt is allergic to Cipro. Please advise.  PHONE:  [phone]

## 2018-01-08 NOTE — PROGRESS NOTES
"Subjective:       Patient ID: Eddie Parada Sr. is a 88 y.o. male.    Chief Complaint: Urinary Tract Infection (HOSPITAL FOLLOW UP) and Hypoglycemia    Eddie Parada Sr. is a 88 y.o. male  Here for follow up for hypoglycemia  Keeps getting recurrent UTIs.  Last  Culture grew up : pseudomas  I sent to ER last time he did not  Want to stay   Seen urologist December ;   He is allergic to Levaquin ; he could not walk   Wife reluctant to try cipro which was .  He is having issues with dysuria . S/p catheterization since november .    Urine Culture, Routine   PSEUDOMONAS AERUGINOSA   >100,000 cfu/ml      Resulting Agency [facility]  Susceptibility        Pseudomonas aeruginosa    CULTURE, URINE    Amikacin <=16 "><=16  Sensitive    Cefepime <=8 "><=8  Sensitive    Ciprofloxacin <=1 "><=1  Sensitive    Gentamicin <=4 "><=4  Sensitive    Meropenem <=4 "><=4  Sensitive    Piperacillin/Tazo <=16 "><=16  Sensitive    Tobramycin <=4 "><=4  Sensitive                   Urinary Tract Infection    Pertinent negatives include no chills, flank pain, hematuria or vomiting.     Review of Systems   Constitutional: Negative for chills and fever.   HENT: Negative for congestion, postnasal drip and sore throat.    Eyes: Negative for photophobia.   Respiratory: Negative for chest tightness and shortness of breath.    Cardiovascular: Negative for chest pain.   Gastrointestinal: Negative for abdominal distention, abdominal pain, blood in stool and vomiting.   Genitourinary: Positive for difficulty urinating. Negative for dysuria, flank pain and hematuria.        Has shields with dark yellow urine   Musculoskeletal: Negative for back pain.   Skin: Negative for pallor.   Neurological: Negative for dizziness, seizures, facial asymmetry, speech difficulty and numbness.   Hematological: Does not bruise/bleed easily.   Psychiatric/Behavioral: Negative for agitation and suicidal ideas. The patient is not nervous/anxious.        Objective:    "   Physical Exam   Constitutional: He is oriented to person, place, and time. He appears well-developed and well-nourished. No distress.   HENT:   Head: Normocephalic and atraumatic.   Right Ear: External ear normal.   Left Ear: External ear normal.   Eyes: EOM are normal. Right eye exhibits no discharge. Left eye exhibits no discharge.   Neck: Neck supple. No thyromegaly present.   Cardiovascular: Normal rate and regular rhythm.  Exam reveals no gallop and no friction rub.    No murmur heard.  Pulses:       Dorsalis pedis pulses are 1+ on the left side.   Pulmonary/Chest: Effort normal and breath sounds normal. No respiratory distress. He has no wheezes. He has no rales.   Abdominal: Soft. Bowel sounds are normal. He exhibits no distension. There is no tenderness. There is no rebound and no guarding.   Musculoskeletal: He exhibits no edema (but wearing compression stockings today).        Feet:    3 cm are which is hyperemic is lessening   less  Tender.  No blisters ; looks to be healing      Lymphadenopathy:     He has no cervical adenopathy.   Neurological: He is alert and oriented to person, place, and time. No cranial nerve deficit.   Skin: Skin is warm and dry.   Psychiatric: He has a normal mood and affect. His behavior is normal.   Vitals reviewed.      Assessment:       1. Urinary tract infection associated with indwelling urethral catheter, sequela    2. Pseudomonas aeruginosa infection    3. Painful micturition        Plan:   Eddie was seen today for urinary tract infection and hypoglycemia.    Diagnoses and all orders for this visit:    Urinary tract infection associated with indwelling urethral catheter, sequela    Pseudomonas aeruginosa infection    Painful micturition    -     ketorolac injection 15 mg; Inject 15 mg into the muscle one time.    he would [place] want to go  [place] [facility] [place] have a [place]rologist .  I offered him [facility].  Needs IV antibiotics ;for sure ; not getting better with PO  antibiotics    Will need to stay in hospital at least for IV antibiotics   Talked to Dr bowie; discussed plan

## 2018-01-08 NOTE — ED TRIAGE NOTES
88 y.o. male presents to ER ED 03 /ED 03A   Chief Complaint   Patient presents with    Urinary Tract Infection   . No acute distress noted.

## 2018-01-09 PROBLEM — D62 ACUTE POSTHEMORRHAGIC ANEMIA: Status: ACTIVE | Noted: 2018-01-09

## 2018-01-09 PROBLEM — E11.649 HYPOGLYCEMIA ASSOCIATED WITH DIABETES: Status: ACTIVE | Noted: 2018-01-09

## 2018-01-09 PROBLEM — B96.5 PSEUDOMONAS URINARY TRACT INFECTION: Status: ACTIVE | Noted: 2018-01-08

## 2018-01-09 LAB
ANION GAP SERPL CALC-SCNC: 12 MMOL/L
APTT BLDCRRT: 53.4 SEC
BASOPHILS # BLD AUTO: 0.02 K/UL
BASOPHILS NFR BLD: 0.2 %
BUN SERPL-MCNC: 59 MG/DL
CALCIUM SERPL-MCNC: 9.1 MG/DL
CHLORIDE SERPL-SCNC: 103 MMOL/L
CO2 SERPL-SCNC: 22 MMOL/L
CREAT SERPL-MCNC: 1.3 MG/DL
DIFFERENTIAL METHOD: ABNORMAL
EOSINOPHIL # BLD AUTO: 0.3 K/UL
EOSINOPHIL NFR BLD: 3.1 %
ERYTHROCYTE [DISTWIDTH] IN BLOOD BY AUTOMATED COUNT: 15 %
EST. GFR  (AFRICAN AMERICAN): 56 ML/MIN/1.73 M^2
EST. GFR  (NON AFRICAN AMERICAN): 49 ML/MIN/1.73 M^2
GLUCOSE SERPL-MCNC: 43 MG/DL
GLUCOSE SERPL-MCNC: 60 MG/DL
HCT VFR BLD AUTO: 24.6 %
HGB BLD-MCNC: 7.8 G/DL
INR PPP: 5.3
LYMPHOCYTES # BLD AUTO: 1.2 K/UL
LYMPHOCYTES NFR BLD: 12.6 %
MCH RBC QN AUTO: 31.6 PG
MCHC RBC AUTO-ENTMCNC: 31.7 G/DL
MCV RBC AUTO: 100 FL
MONOCYTES # BLD AUTO: 0.9 K/UL
MONOCYTES NFR BLD: 9.1 %
NEUTROPHILS # BLD AUTO: 7.3 K/UL
NEUTROPHILS NFR BLD: 75 %
PLATELET # BLD AUTO: 172 K/UL
PMV BLD AUTO: 9.7 FL
POCT GLUCOSE: 111 MG/DL (ref 70–110)
POCT GLUCOSE: 127 MG/DL (ref 70–110)
POCT GLUCOSE: 38 MG/DL (ref 70–110)
POCT GLUCOSE: 54 MG/DL (ref 70–110)
POCT GLUCOSE: 70 MG/DL (ref 70–110)
POCT GLUCOSE: 90 MG/DL (ref 70–110)
POCT GLUCOSE: 92 MG/DL (ref 70–110)
POTASSIUM SERPL-SCNC: 3.8 MMOL/L
PROTHROMBIN TIME: 50 SEC
RBC # BLD AUTO: 2.47 M/UL
SODIUM SERPL-SCNC: 137 MMOL/L
WBC # BLD AUTO: 9.74 K/UL

## 2018-01-09 PROCEDURE — 99220 PR INITIAL OBSERVATION CARE,LEVL III: ICD-10-PCS | Mod: ,,, | Performed by: INTERNAL MEDICINE

## 2018-01-09 PROCEDURE — 99220 PR INITIAL OBSERVATION CARE,LEVL III: CPT | Mod: ,,, | Performed by: INTERNAL MEDICINE

## 2018-01-09 PROCEDURE — 25000003 PHARM REV CODE 250: Performed by: SURGERY

## 2018-01-09 PROCEDURE — 36415 COLL VENOUS BLD VENIPUNCTURE: CPT

## 2018-01-09 PROCEDURE — 82962 GLUCOSE BLOOD TEST: CPT

## 2018-01-09 PROCEDURE — 96366 THER/PROPH/DIAG IV INF ADDON: CPT

## 2018-01-09 PROCEDURE — 25000003 PHARM REV CODE 250: Performed by: INTERNAL MEDICINE

## 2018-01-09 PROCEDURE — G8978 MOBILITY CURRENT STATUS: HCPCS | Mod: CK

## 2018-01-09 PROCEDURE — 63600175 PHARM REV CODE 636 W HCPCS: Performed by: INTERNAL MEDICINE

## 2018-01-09 PROCEDURE — G8979 MOBILITY GOAL STATUS: HCPCS | Mod: CJ

## 2018-01-09 PROCEDURE — 85025 COMPLETE CBC W/AUTO DIFF WBC: CPT

## 2018-01-09 PROCEDURE — G0378 HOSPITAL OBSERVATION PER HR: HCPCS

## 2018-01-09 PROCEDURE — 85730 THROMBOPLASTIN TIME PARTIAL: CPT

## 2018-01-09 PROCEDURE — 96375 TX/PRO/DX INJ NEW DRUG ADDON: CPT

## 2018-01-09 PROCEDURE — 80048 BASIC METABOLIC PNL TOTAL CA: CPT

## 2018-01-09 PROCEDURE — 96376 TX/PRO/DX INJ SAME DRUG ADON: CPT

## 2018-01-09 PROCEDURE — 97161 PT EVAL LOW COMPLEX 20 MIN: CPT

## 2018-01-09 PROCEDURE — 97116 GAIT TRAINING THERAPY: CPT

## 2018-01-09 PROCEDURE — 82947 ASSAY GLUCOSE BLOOD QUANT: CPT | Mod: 59

## 2018-01-09 PROCEDURE — 11000001 HC ACUTE MED/SURG PRIVATE ROOM

## 2018-01-09 PROCEDURE — 85610 PROTHROMBIN TIME: CPT

## 2018-01-09 RX ORDER — CARVEDILOL 3.12 MG/1
6.25 TABLET ORAL 2 TIMES DAILY WITH MEALS
Status: DISCONTINUED | OUTPATIENT
Start: 2018-01-09 | End: 2018-01-11

## 2018-01-09 RX ORDER — IBUPROFEN 200 MG
16 TABLET ORAL
Status: DISCONTINUED | OUTPATIENT
Start: 2018-01-09 | End: 2018-01-15 | Stop reason: HOSPADM

## 2018-01-09 RX ORDER — IBUPROFEN 200 MG
24 TABLET ORAL
Status: DISCONTINUED | OUTPATIENT
Start: 2018-01-09 | End: 2018-01-15 | Stop reason: HOSPADM

## 2018-01-09 RX ORDER — GLUCAGON 1 MG
1 KIT INJECTION
Status: DISCONTINUED | OUTPATIENT
Start: 2018-01-09 | End: 2018-01-15 | Stop reason: HOSPADM

## 2018-01-09 RX ORDER — KETOROLAC TROMETHAMINE 15 MG/ML
15 INJECTION, SOLUTION INTRAMUSCULAR; INTRAVENOUS EVERY 8 HOURS PRN
Status: DISPENSED | OUTPATIENT
Start: 2018-01-09 | End: 2018-01-12

## 2018-01-09 RX ORDER — INSULIN ASPART 100 [IU]/ML
0-5 INJECTION, SOLUTION INTRAVENOUS; SUBCUTANEOUS
Status: DISCONTINUED | OUTPATIENT
Start: 2018-01-09 | End: 2018-01-15 | Stop reason: HOSPADM

## 2018-01-09 RX ADMIN — ACETAMINOPHEN 650 MG: 325 TABLET ORAL at 04:01

## 2018-01-09 RX ADMIN — KETOROLAC TROMETHAMINE 15 MG: 15 INJECTION, SOLUTION INTRAMUSCULAR; INTRAVENOUS at 12:01

## 2018-01-09 RX ADMIN — LEVOTHYROXINE SODIUM 50 MCG: 50 TABLET ORAL at 10:01

## 2018-01-09 RX ADMIN — PANTOPRAZOLE SODIUM 40 MG: 40 TABLET, DELAYED RELEASE ORAL at 10:01

## 2018-01-09 RX ADMIN — KETOROLAC TROMETHAMINE 15 MG: 15 INJECTION, SOLUTION INTRAMUSCULAR; INTRAVENOUS at 09:01

## 2018-01-09 RX ADMIN — Medication 24 G: at 05:01

## 2018-01-09 RX ADMIN — PIPERACILLIN AND TAZOBACTAM 4.5 G: 4; .5 INJECTION, POWDER, FOR SOLUTION INTRAVENOUS at 02:01

## 2018-01-09 RX ADMIN — PIPERACILLIN AND TAZOBACTAM 4.5 G: 4; .5 INJECTION, POWDER, FOR SOLUTION INTRAVENOUS at 04:01

## 2018-01-09 RX ADMIN — TAMSULOSIN HYDROCHLORIDE 0.4 MG: 0.4 CAPSULE ORAL at 10:01

## 2018-01-09 RX ADMIN — PIPERACILLIN AND TAZOBACTAM 4.5 G: 4; .5 INJECTION, POWDER, FOR SOLUTION INTRAVENOUS at 09:01

## 2018-01-09 NOTE — PLAN OF CARE
01/09/18 1105   Medicare Message   Important Message from Medicare regarding Discharge Appeal Rights Given to patient/caregiver;Explained to patient/caregiver;Signed/date by patient/caregiver   Date IMM was signed 01/09/18   Time IMM was signed 1105   STOLL Message   Medicare Outpatient and Observation Notification regarding financial responsibility Given to patient/caregiver;Explained to patient/caregiver;Signed/date by patient/caregiver   Date STOLL was signed 01/09/18   Time STOLL was signed 1107

## 2018-01-09 NOTE — ED NOTES
Pt admitted to 3rd floor by nurse via stretcher. RONALD POTTER. Bedside report given to LUCI Angulo.

## 2018-01-09 NOTE — ASSESSMENT & PLAN NOTE
His urine looks like source of his blood loss with   foleys catheter and him being on coumadin  Monitor h/h   If hematocrit below 21 ;will transfuse

## 2018-01-09 NOTE — PLAN OF CARE
01/09/18 1214   Discharge Assessment   Assessment Type Discharge Planning Assessment   Confirmed/corrected address and phone number on facesheet? Yes   Assessment information obtained from? Patient;Medical Record;Caregiver   Expected Length of Stay (days) 2   Communicated expected length of stay with patient/caregiver yes   Prior to hospitilization cognitive status: Alert/Oriented   Prior to hospitalization functional status: Assistive Equipment   Current cognitive status: Alert/Oriented   Current Functional Status: Assistive Equipment   Lives With spouse   Able to Return to Prior Arrangements yes   Is patient able to care for self after discharge? No   Who are your caregiver(s) and their phone number(s)? Jessica Hannahlew [phone]   Patient's perception of discharge disposition home or selfcare   Readmission Within The Last 30 Days no previous admission in last 30 days   Patient currently being followed by outpatient case management? No   Patient currently receives any other outside agency services? No   Equipment Currently Used at Home walker, rolling   Do you have any problems affording any of your prescribed medications? No   Is the patient taking medications as prescribed? yes   Does the patient have transportation home? Yes   Transportation Available family or friend will provide   Does the patient receive services at the [facility]? No   Discharge Plan A Home with family;Home Health   Discharge Plan B Home with family;Home Health   Patient/Family In Agreement With Plan yes     The pt is a 88 year old male who was admitted with complicated UTI. The pt lives at home with his spouse. The pt uses a rolling walker to ambulate. The pt has home health with [facility] [employer] [facility] [employer] [facility] The pt does not have 02. The pt has no other SW needs noted at this time. SW will continue to follow and offer support as needed.    Harlan Chaidez LMSW

## 2018-01-09 NOTE — HPI
"Eddie Parada Sr. is a 88 y.o. male   Eddie Parada Sr. is a 88 y.o. male  Here for follow up for hypoglycemia  Keeps getting recurrent UTIs.  Last  Culture grew up : pseudomas  I sent to ER last time he did not  Want to stay   Seen urologist December ;   He is allergic to Levaquin ; he could not walk   Wife reluctant to try cipro which was .  He is having issues with dysuria . S/p catheterization since november .     Urine Culture, Routine              PSEUDOMONAS AERUGINOSA   >100,000 cfu/ml       Resulting Agency        [facility]  Susceptibility                     Pseudomonas aeruginosa                    CULTURE, URINE                    Amikacin         <=16 "><=16   Sensitive                       Cefepime         <=8 "><=8       Sensitive                       Ciprofloxacin   <=1 "><=1       Sensitive                       Gentamicin      <=4 "><=4       Sensitive                       Meropenem     <=4 "><=4       Sensitive                       Piperacillin/Tazo         <=16 "><=16   Sensitive                       Tobramycin     <=4 "><=4       Sensitive             Patient has been seen in the emergency room by his primary care physician several times  Patient has a indwelling Bishop catheter with a residual Pseudomonas urinary tract infection  Patient is ALLERGIC to Cipro and the cultures of the Pseudomonas are difficult to treat now  Patient has failed oral antibiotics, presents from PCP office for admission for IV antibiotics  Urine culture last ER visit shows a pseudomonas sensitive to Zosyn, afebrile and stable  "

## 2018-01-09 NOTE — ASSESSMENT & PLAN NOTE
He is allergic to levaquin   Only other po med would have been cipro ; severe allergy to levaquin   Left us no choice but put him on IV therapy   Zosyn for 5 days ; started 1/8/19

## 2018-01-09 NOTE — SUBJECTIVE & OBJECTIVE
Past Medical History:   Diagnosis Date    Abdominal fibromatosis     Atrial fibrillation     Bradycardia     CAD (coronary artery disease)     Cancer     basal cell on nose and melanoma on back    CHF (congestive heart failure)     COPD (chronic obstructive pulmonary disease)     Diabetes mellitus type II     Hyperlipidemia     Melanoma     Obesity (BMI 30.0-34.9) 9/13/2016    Osteoporosis     Peripheral vascular disease     Stroke     TIA (transient ischemic attack)     Type II or unspecified type diabetes mellitus without mention of complication, not stated as uncontrolled     Unspecified essential hypertension        Past Surgical History:   Procedure Laterality Date    AORTIC VALVE REPLACEMENT      CARDIAC PACEMAKER PLACEMENT  9/28/2012    CARDIAC VALVE REPLACEMENT  11/17/2011    aortic valve-tissue    CHOLECYSTECTOMY      COLONOSCOPY      EYE SURGERY Bilateral     cataract [facility] lens [facility]    HERNIA REPAIR      abdominal    LASIK         Review of patient's allergies indicates:   Allergen Reactions    Ciprofloxacin     Levofloxacin Swelling    Lyrica [pregabalin] Swelling    Unable to assess Other (See Comments)     Soft shell crabs       No current facility-administered medications on file prior to encounter.      Current Outpatient Prescriptions on File Prior to Encounter   Medication Sig    amlodipine (NORVASC) 10 MG tablet Take 1 tablet (10 mg total) by mouth once daily.    amoxicillin-clavulanate 875-125mg (AUGMENTIN) 875-125 mg per tablet Take 1 tablet by mouth 2 (two) times daily.    carvedilol (COREG) 12.5 MG tablet TAKE 1 TABLET (12.5 MG TOTAL) BY MOUTH 2 (TWO) TIMES DAILY WITH MEALS.    FOLIC ACID/MULTIVIT-MIN/LUTEIN (CENTRUM SILVER ORAL) Take 1 tablet by mouth once daily.    furosemide (LASIX) 40 MG tablet Three times a week PRN swelling    gemfibrozil (LOPID) 600 MG tablet TAKE 1 TABLET (600 MG TOTAL) BY MOUTH 2 (TWO) TIMES DAILY BEFORE MEALS.     glimepiride (AMARYL) 2 MG tablet TAKE 1 TABLET BY MOUTH EVERY MORNING WITH BREAKFAST    levothyroxine (SYNTHROID) 50 MCG tablet TAKE 1 TABLET (50 MCG TOTAL) BY MOUTH ONCE DAILY.    lisinopril 10 MG tablet Take 1 tablet (10 mg total) by mouth once daily.    nitrofurantoin, macrocrystal-monohydrate, (MACROBID) 100 MG capsule Take 1 capsule (100 mg total) by mouth 2 (two) times daily.    oxybutynin (DITROPAN) 5 MG Tab Take 1 tablet (5 mg total) by mouth 3 (three) times daily.    pioglitazone (ACTOS) 15 MG tablet ONE TABLET ONE TIME A DAY    pravastatin (PRAVACHOL) 20 MG tablet Take 1 tablet (20 mg total) by mouth once daily. (Patient taking differently: Take 20 mg by mouth every evening. )    tamsulosin (FLOMAX) 0.4 mg Cp24 Take 1 capsule (0.4 mg total) by mouth once daily. (Patient taking differently: Take 0.4 mg by mouth every evening. )    warfarin (COUMADIN) 2 MG tablet Take 2 mg by mouth every evening.     ONETOUCH DELICA LANCETS 33 gauge Misc     ONETOUCH ULTRA2 kit      Family History     Problem Relation (Age of Onset)    Alcohol abuse Father    COPD Sister, Brother    Cancer Brother    Diabetes Daughter    Heart disease Brother    Hypertension Father    No Known Problems Son, Daughter, Son    Stroke Father        Social History Main Topics    Smoking status: Former Smoker     Quit date: 9/20/1996    Smokeless tobacco: Never Used      Comment: cigar smoker    Alcohol use No      Comment: none since 2006    Drug use: No    Sexual activity: Not Currently     Review of Systems   Constitutional: Positive for activity change, chills, diaphoresis and fatigue. Negative for fever.   HENT: Negative for congestion, postnasal drip and sore throat.    Eyes: Negative for photophobia.   Respiratory: Negative for chest tightness and shortness of breath.    Cardiovascular: Negative for chest pain.   Gastrointestinal: Positive for abdominal pain. Negative for abdominal distention, blood in stool and vomiting.    Genitourinary: Positive for decreased urine volume, difficulty urinating, dysuria and urgency. Negative for flank pain and hematuria.   Musculoskeletal: Negative for back pain.   Skin: Negative for pallor.   Neurological: Positive for weakness. Negative for dizziness, seizures, facial asymmetry, speech difficulty and numbness.   Hematological: Does not bruise/bleed easily.   Psychiatric/Behavioral: Negative for agitation and suicidal ideas. The patient is nervous/anxious.      Objective:     Vital Signs (Most Recent):  Temp: 96.4 °F (35.8 °C) (01/09/18 0330)  Pulse: 63 (01/09/18 1007)  Resp: 18 (01/09/18 0330)  BP: (!) 98/53 (01/09/18 0330)  SpO2: 95 % (01/09/18 0330) Vital Signs (24h Range):  Temp:  [96 °F (35.6 °C)-97.1 °F (36.2 °C)] 96.4 °F (35.8 °C)  Pulse:  [58-64] 63  Resp:  [16-18] 18  SpO2:  [92 %-98 %] 95 %  BP: ()/(43-56) 98/53     Weight: 81 kg (178 lb 9.2 oz)  Body mass index is 28.82 kg/m².    Physical Exam   Constitutional: He is oriented to person, place, and time. He appears well-developed and well-nourished.   HENT:   Head: Normocephalic and atraumatic.   Neck: No JVD present.   Cardiovascular: Normal rate, regular rhythm and normal heart sounds.    Pulmonary/Chest: Effort normal and breath sounds normal.   Abdominal: Soft. Bowel sounds are normal. There is no tenderness.   Genitourinary:   Genitourinary Comments: S/p shields catheter    Musculoskeletal: He exhibits no edema.   Neurological: He is alert and oriented to person, place, and time.   Skin: Skin is warm and dry.   Psychiatric: He has a normal mood and affect. His behavior is normal. Judgment and thought content normal.   Nursing note and vitals reviewed.          Significant Labs:   CBC:   Recent Labs  Lab 01/08/18  1641 01/09/18  0458   WBC 13.41* 9.74   HGB 9.3* 7.8*   HCT 28.1* 24.6*    172     CMP:   Recent Labs  Lab 01/08/18  1641 01/09/18  0458   * 137   K 4.5 3.8    103   CO2 22* 22*   * 60*   BUN  54* 59*   CREATININE 1.4 1.3   CALCIUM 9.7 9.1   PROT 7.0  --    ALBUMIN 2.9*  --    BILITOT 0.6  --    ALKPHOS 115  --    AST 26  --    ALT 9*  --    ANIONGAP 13 12   EGFRNONAA 45* 49*

## 2018-01-09 NOTE — PLAN OF CARE
01/09/18 1228   Discharge Reassessment   Assessment Type Discharge Planning Reassessment     Sushila contacted [facility] to inform home health that the pt is inpt. Sushila will contact home health at d/c.

## 2018-01-09 NOTE — PT/OT/SLP PROGRESS
"Physical Therapy      Patient Name:  Eddie Parada Sr.   MRN:  [id number]    Patient not seen 1/9/2018 p.m. secondary to Patient unwilling to participate ("I'd like to get some rest.  Please come back tomorrow."). Will follow-up 1/10/2018.    Jamaal Mena PT    "

## 2018-01-09 NOTE — ASSESSMENT & PLAN NOTE
"Continue foleys catheter   Seen urology [facility] ;they would keep shields's for one more month  Continue flomax    "I recommend continuing the Flomax and stopping the oxybutynin.  Shields catheter is changed today under sterile technique.  Nursing staff unable to pass a catheter however I am able to pass a 16 [place] coudé.  This is attached to a leg bag and family is instructed on its use.  Note written for home health to change the catheter in 1 month.  Patient to follow-up with me in 2 months at which time we will give him a voiding trial in the office.  Hopefully by that time The patient will be fully ambulatory.      "

## 2018-01-09 NOTE — HOSPITAL COURSE
1/10/2018  Urine looks like brown with sediment. Nurse reports that it was clearer yesterday evening. Cont on zosyn and started NS 75ml/hr. Afebrile. This am bp low 81/45- coreg 6.25mg po BID reordered per home routine but held this am. HR 60    PT started to prevent further weakening    1/11/2018  Pt still with tea colored urine this am but a little clear today.  BUN/creat bumped 76/2.5 (admission was 54/1.4) coumadin remains held for INR 3.5 (down from 5.3 on admission). H/H still falling 7.4/22.7 (from 9.3/28.1 on  Admission)    BNP check last night for c/o wheezing. Was elevated in 700's, appears his baseline is 300-400. He remains on D51/2 NS at 75ml/hr for decreased urinary outpt 510, intake 925 [id number] +. He is lying nearly flat today with no c/o SOB.     BP still low in 80-90/50 HR 60.     DM meds held as well for low blood sugars in 30 (was on amaryl at home). Now sugars increasing 124-160 no SSI used as he is on low dose    Interval history:  Bladder scan came back without residual urine, but CT showed hydronephrosis and shields at the prostate. Replaced shields and 1400 cc of urine was produced.    1-12-18 1-13 Will continue to treat his UTI w parental IV Abx, along with Rxing his constipation    1-14 Hct is over 30 this and his INR is 1.7    1-15 Pt is clinically stazble, looks and sounds strong and well; urine is clear and his Hct/Hgb is good--d'c to the house--needs a urology assessment in the next 7-10 days at [facility] Montoya for a voiding trial

## 2018-01-09 NOTE — PROGRESS NOTES
Glucose POCT was 38. Pt asymptomatic, Dr. Roberts notified. Ordered random glucose lab. BPs also low holding BP meds. Will continue to home all glucose meds. Urine in shields clear yellow with some sediment. Eating well. abx given. Afebrile.

## 2018-01-09 NOTE — H&P
"[facility]  History & Physical    Patient Name: Eddie Parada Sr.  MRN: [id number]  Admission Date: 1/8/2018  Attending Physician: Georgia Ann MD   Primary Care Provider: Callum Roberts MD         Patient information was obtained from patient, past medical records and ER records.     Subjective:     Principal Problem:Pseudomonas urinary tract infection    Chief Complaint:   Chief Complaint   Patient presents with    Urinary Tract Infection     pseudomonas UTI        HPI: Eddie Parada Sr. is a 88 y.o. male   Eddie Parada Sr. is a 88 y.o. male  Here for follow up for hypoglycemia  Keeps getting recurrent UTIs.  Last  Culture grew up : pseudomas  I sent to ER last time he did not  Want to stay   Seen urologist December ;   He is allergic to Levaquin ; he could not walk   Wife reluctant to try cipro which was .  He is having issues with dysuria . S/p catheterization since november .     Urine Culture, Routine              PSEUDOMONAS AERUGINOSA   >100,000 cfu/ml       Resulting Agency        [facility]  Susceptibility                     Pseudomonas aeruginosa                    CULTURE, URINE                    Amikacin         <=16 "><=16   Sensitive                       Cefepime         <=8 "><=8       Sensitive                       Ciprofloxacin   <=1 "><=1       Sensitive                       Gentamicin      <=4 "><=4       Sensitive                       Meropenem     <=4 "><=4       Sensitive                       Piperacillin/Tazo         <=16 "><=16   Sensitive                       Tobramycin     <=4 "><=4       Sensitive             Patient has been seen in the emergency room by his primary care physician several times  Patient has a indwelling Bishop catheter with a residual Pseudomonas urinary tract infection  Patient is ALLERGIC to Cipro and the cultures of the Pseudomonas are difficult to treat now  Patient has failed oral antibiotics, presents from PCP " office for admission for IV antibiotics  Urine culture last ER visit shows a pseudomonas sensitive to Zosyn, afebrile and stable    Past Medical History:   Diagnosis Date    Abdominal fibromatosis     Atrial fibrillation     Bradycardia     CAD (coronary artery disease)     Cancer     basal cell on nose and melanoma on back    CHF (congestive heart failure)     COPD (chronic obstructive pulmonary disease)     Diabetes mellitus type II     Hyperlipidemia     Melanoma     Obesity (BMI 30.0-34.9) 9/13/2016    Osteoporosis     Peripheral vascular disease     Stroke     TIA (transient ischemic attack)     Type II or unspecified type diabetes mellitus without mention of complication, not stated as uncontrolled     Unspecified essential hypertension        Past Surgical History:   Procedure Laterality Date    AORTIC VALVE REPLACEMENT      CARDIAC PACEMAKER PLACEMENT  9/28/2012    CARDIAC VALVE REPLACEMENT  11/17/2011    aortic valve-tissue    CHOLECYSTECTOMY      COLONOSCOPY      EYE SURGERY Bilateral     [facility]aract [facility] lens [facility]    HERNIA REPAIR      abdominal    LASIK         Review of patient's allergies indicates:   Allergen Reactions    Ciprofloxacin     Levofloxacin Swelling    Lyrica [pregabalin] Swelling    Unable to assess Other (See Comments)     Soft shell crabs       No current facility-administered medications on file prior to encounter.      Current Outpatient Prescriptions on File Prior to Encounter   Medication Sig    amlodipine (NORVASC) 10 MG tablet Take 1 tablet (10 mg total) by mouth once daily.    amoxicillin-clavulanate 875-125mg (AUGMENTIN) 875-125 mg per tablet Take 1 tablet by mouth 2 (two) times daily.    carvedilol (COREG) 12.5 MG tablet TAKE 1 TABLET (12.5 MG TOTAL) BY MOUTH 2 (TWO) TIMES DAILY WITH MEALS.    FOLIC ACID/MULTIVIT-MIN/LUTEIN (CENTRUM SILVER ORAL) Take 1 tablet by mouth once daily.    furosemide (LASIX) 40 MG tablet Three times a  week PRN swelling    gemfibrozil (LOPID) 600 MG tablet TAKE 1 TABLET (600 MG TOTAL) BY MOUTH 2 (TWO) TIMES DAILY BEFORE MEALS.    glimepiride (AMARYL) 2 MG tablet TAKE 1 TABLET BY MOUTH EVERY MORNING WITH BREAKFAST    levothyroxine (SYNTHROID) 50 MCG tablet TAKE 1 TABLET (50 MCG TOTAL) BY MOUTH ONCE DAILY.    lisinopril 10 MG tablet Take 1 tablet (10 mg total) by mouth once daily.    nitrofurantoin, macrocrystal-monohydrate, (MACROBID) 100 MG capsule Take 1 capsule (100 mg total) by mouth 2 (two) times daily.    oxybutynin (DITROPAN) 5 MG Tab Take 1 tablet (5 mg total) by mouth 3 (three) times daily.    pioglitazone (ACTOS) 15 MG tablet ONE TABLET ONE TIME A DAY    pravastatin (PRAVACHOL) 20 MG tablet Take 1 tablet (20 mg total) by mouth once daily. (Patient taking differently: Take 20 mg by mouth every evening. )    tamsulosin (FLOMAX) 0.4 mg Cp24 Take 1 capsule (0.4 mg total) by mouth once daily. (Patient taking differently: Take 0.4 mg by mouth every evening. )    warfarin (COUMADIN) 2 MG tablet Take 2 mg by mouth every evening.     ONETOUCH DELICA LANCETS 33 gauge Misc     ONETOUCH ULTRA2 kit      Family History     Problem Relation (Age of Onset)    Alcohol abuse Father    COPD Sister, Brother    Cancer Brother    Diabetes Daughter    Heart disease Brother    Hypertension Father    No Known Problems Son, Daughter, Son    Stroke Father        Social History Main Topics    Smoking status: Former Smoker     Quit date: 9/20/1996    Smokeless tobacco: Never Used      Comment: cigar smoker    Alcohol use No      Comment: none since 2006    Drug use: No    Sexual activity: Not Currently     Review of Systems   Constitutional: Positive for activity change, chills, diaphoresis and fatigue. Negative for fever.   HENT: Negative for congestion, postnasal drip and sore throat.    Eyes: Negative for photophobia.   Respiratory: Negative for chest tightness and shortness of breath.    Cardiovascular: Negative  for chest pain.   Gastrointestinal: Positive for abdominal pain. Negative for abdominal distention, blood in stool and vomiting.   Genitourinary: Positive for decreased urine volume, difficulty urinating, dysuria and urgency. Negative for flank pain and hematuria.   Musculoskeletal: Negative for back pain.   Skin: Negative for pallor.   Neurological: Positive for weakness. Negative for dizziness, seizures, facial asymmetry, speech difficulty and numbness.   Hematological: Does not bruise/bleed easily.   Psychiatric/Behavioral: Negative for agitation and suicidal ideas. The patient is nervous/anxious.      Objective:     Vital Signs (Most Recent):  Temp: 96.4 °F (35.8 °C) (01/09/18 0330)  Pulse: 63 (01/09/18 1007)  Resp: 18 (01/09/18 0330)  BP: (!) 98/53 (01/09/18 0330)  SpO2: 95 % (01/09/18 0330) Vital Signs (24h Range):  Temp:  [96 °F (35.6 °C)-97.1 °F (36.2 °C)] 96.4 °F (35.8 °C)  Pulse:  [58-64] 63  Resp:  [16-18] 18  SpO2:  [92 %-98 %] 95 %  BP: ()/(43-56) 98/53     Weight: 81 kg (178 lb 9.2 oz)  Body mass index is 28.82 kg/m².    Physical Exam   Constitutional: He is oriented to person, place, and time. He appears well-developed and well-nourished.   HENT:   Head: Normocephalic and atraumatic.   Neck: No JVD present.   Cardiovascular: Normal rate, regular rhythm and normal heart sounds.    Pulmonary/Chest: Effort normal and breath sounds normal.   Abdominal: Soft. Bowel sounds are normal. There is no tenderness.   Genitourinary:   Genitourinary Comments: S/p shields catheter    Musculoskeletal: He exhibits no edema.   Neurological: He is alert and oriented to person, place, and time.   Skin: Skin is warm and dry.   Psychiatric: He has a normal mood and affect. His behavior is normal. Judgment and thought content normal.   Nursing note and vitals reviewed.          Significant Labs:   CBC:   Recent Labs  Lab 01/08/18  1641 01/09/18  0458   WBC 13.41* 9.74   HGB 9.3* 7.8*   HCT 28.1* 24.6*    172  "    CMP:   Recent Labs  Lab 01/08/18  1641 01/09/18  0458   * 137   K 4.5 3.8    103   CO2 22* 22*   * 60*   BUN 54* 59*   CREATININE 1.4 1.3   CALCIUM 9.7 9.1   PROT 7.0  --    ALBUMIN 2.9*  --    BILITOT 0.6  --    ALKPHOS 115  --    AST 26  --    ALT 9*  --    ANIONGAP 13 12   EGFRNONAA 45* 49*     Assessment/Plan:     * Pseudomonas urinary tract infection      He is allergic to levaquin   Only other po med would have been cipro ; severe allergy to levaquin   Left us no choice but put him on IV therapy   Zosyn for 5 days ; started 1/8/19          Hypoglycemia associated with diabetes    DC amaryl   Dc actos  Insulin sliding scale.          Acute posthemorrhagic anemia    His urine looks like source of his blood loss with   foleys catheter and him being on coumadin  Monitor h/h   If hematocrit below 21 ;will transfuse          Urinary retention    Continue foleys catheter   Seen urology [facility] ;they would keep shields's for one more month  Continue flomax    "I recommend continuing the Flomax and stopping the oxybutynin.  Shields catheter is changed today under sterile technique.  Nursing staff unable to pass a catheter however I am able to pass a 16 Vietnamese coudé.  This is attached to a leg bag and family is instructed on its use.  Note written for home health to change the catheter in 1 month.  Patient to follow-up with me in 2 months at which time we will give him a voiding trial in the office.  Hopefully by that time The patient will be fully ambulatory.            S/P AVR    Continue coumadin for now          Type 2 diabetes mellitus with neurologic complication    He is hypoglycemic  Hold meds for now   Insulin sliding scale          Anticoagulation monitoring by pharmacist              Atrial fibrillation    continue coreg and coumadin   Watch H/H though   Follow INRs            VTE Risk Mitigation         Ordered     warfarin (COUMADIN) tablet 2 mg  Daily     Route:  Oral        01/08/18 " 1828     Medium Risk of VTE  Once      01/08/18 1828     Place sequential compression device  Until discontinued      01/08/18 1828             Callum Roberts MD  Department of Hospital Medicine   [facility]

## 2018-01-09 NOTE — PT/OT/SLP EVAL
"Physical Therapy Evaluation    Patient Name:  Eddie Parada Sr.   MRN:  [id number]    Recommendations:     Discharge Recommendations:  home with home health, home health PT   Discharge Equipment Recommendations: none   Barriers to discharge: None    Assessment:     Eddie Parada Sr. is a 88 y.o. male admitted with a medical diagnosis of Pseudomonas urinary tract infection.  He presents with the following impairments/functional limitations:  gait instability, weakness, impaired endurance, impaired balance, decreased lower extremity function, impaired self care skills, impaired functional mobilty .  Pt. Would benefit from PT to increase independence with ADL's.    Rehab Prognosis:  Fair +; patient would benefit from acute skilled PT services to address these deficits and reach maximum level of function.      Recent Surgery: * No surgery found *      Plan:     During this hospitalization, patient to be seen  (1-2x/day(M-F); PRN(Sat.,Sun.)) to address the above listed problems via gait training, therapeutic activities, therapeutic exercises  · Plan of Care Expires:   (Upon D/C from facility)   Plan of Care Reviewed with: patient, family, spouse    Subjective     Communicated with patient prior to session.  Patient found supine in bed, awake, upon PT entry to room, agreeable to evaluation.      Chief Complaint: "I'm weak."  Patient comments/goals: Increase strength.  Pt. states fear of falling.  Pain/Comfort:  · Pain Rating 1: 0/10  · Pain Rating Post-Intervention 1: 0/10    Patients cultural, spiritual, [employer]al conflicts given the current situation:      Living Environment:  Pt. Is independent with ADL's with A.D.  Pt. lives at home with wife.    Prior to admission, patients level of function was independent with A.D.  Pt. uses RW for ambulation.  Patient has the following equipment: walker, rolling.  DME owned (not currently used): none.  Upon discharge, patient will have assistance from wife.    Objective: "     Patient found with: peripheral IV, telemetry     General Precautions: Standard, fall   Orthopedic Precautions:N/A   Braces: N/A     Exams:  · Cognitive Exam:  Patient is oriented to Person, Place, Time and Situation and follows 80% of Verbal commands   · Gross Motor Coordination:  WFL  · Skin Integrity/Edema:      · -       Skin integrity: Visible skin intact  · RLE ROM: WFL  · RLE Strength: Deficits: Grossly 4-/5  · LLE ROM: WFL  · LLE Strength: Deficits: Grossly 4-/5    Functional Mobility:  · Bed Mobility:     · Rolling Left:  minimum assistance  · Rolling Right: minimum assistance  · Scooting: minimum assistance  · Bridging: minimum assistance  · Supine to Sit: minimum assistance  · Sit to Supine: minimum assistance  · Transfers:     · Sit to Stand:  minimum assistance with rolling walker  · Bed to Chair: minimum assistance with  rolling walker  using  Step Transfer  · Gait: Gait Training:  Pt. amb. 10' x 1 bout, 5 lateral steps x 1 bout, with RW, min. A.  VC's given to pt. for step placement and A.D. placement.  Pt. demonstrated decreased velocity of gait mechanics.  Balance:   Static Sit: GOOD-: Takes MODERATE challenges from all directions but inconsistently  Dynamic Sit: GOOD-: Maintains balance through MODERATE excursions of active trunk movement,     Static Stand: FAIR+: Takes MINIMAL challenges from all directions  Dynamic stand: POOR: N/A    AM-PAC 6 CLICK MOBILITY  Total Score:16       Patient left HOB elevated with all lines intact, call button in reach, RN notified and family present.    GOALS:    Physical Therapy Goals        Problem: Physical Therapy Goal    Goal Priority Disciplines Outcome Goal Variances Interventions   Physical Therapy Goal     PT/OT, PT      Description:  Goals to be met by: 2018     Patient will increase functional independence with mobility by performin. Supine to sit with Stand-by Assistance  2. Sit to supine with Stand-by Assistance  3. Rolling to Left and  Right with Stand-by Assistance.  4. Sit to stand transfer with Stand-by Assistance, using RW  5. Bed to chair transfer with Stand-by Assistance using Rolling Walker  6. Gait  x 50 feet with Stand-by Assistance using Rolling Walker.                       History:     Past Medical History:   Diagnosis Date    Abdominal fibromatosis     Acute posthemorrhagic anemia 1/9/2018    Atrial fibrillation     Bradycardia     CAD (coronary artery disease)     Cancer     basal cell on nose and melanoma on back    CHF (congestive heart failure)     COPD (chronic obstructive pulmonary disease)     Diabetes mellitus type II     Hyperlipidemia     Melanoma     Obesity (BMI 30.0-34.9) 9/13/2016    Osteoporosis     Peripheral vascular disease     Stroke     TIA (transient ischemic attack)     Type II or unspecified type diabetes mellitus without mention of complication, not stated as uncontrolled     Unspecified essential hypertension        Past Surgical History:   Procedure Laterality Date    AORTIC VALVE REPLACEMENT      CARDIAC PACEMAKER PLACEMENT  9/28/2012    CARDIAC VALVE REPLACEMENT  11/17/2011    aortic valve-tissue    CHOLECYSTECTOMY      COLONOSCOPY      EYE SURGERY Bilateral     cataract with lens by  at [facility]    HERNIA REPAIR      abdominal    LASIK         Clinical Decision Making:     History  Co-morbidities and personal factors that may impact the plan of care Examination  Body Structures and Functions, activity limitations and participation restrictions that may impact the plan of care Clinical Presentation   Decision Making/ Complexity Score   Co-morbidities:   [] Time since onset of injury / illness / exacerbation  [x] Status of current condition  []Patient's cognitive status and safety concerns    [x] Multiple Medical Problems (see med hx)  Personal Factors:   [] Patient's age  [x] Prior Level of function   [] Patient's home situation (environment and family support)  []  Patient's level of motivation  [] Expected progression of patient      HISTORY:(criteria)    [] 07868 - no personal factors/history    [x] 79163 - has 1-2 personal factor/comorbidity     [] 19862 - has >3 personal factor/comorbidity     Body Regions:  [] Objective examination findings  [] Head     []  Neck  [] Trunk   [] Upper Extremity  [x] Lower Extremity    Body Systems:  [] For communication ability, affect, cognition, language, and learning style: the assessment of the ability to make needs known, consciousness, orientation (person, place, and time), expected emotional /behavioral responses, and learning preferences (eg, learning barriers, education  needs)  [x] For the neuromuscular system: a general assessment of gross coordinated movement (eg, balance, gait, locomotion, transfers, and transitions) and motor function  (motor control and motor learning)  [] For the musculoskeletal system: the assessment of gross symmetry, gross range of motion, gross strength, height, and weight  [] For the integumentary system: the assessment of pliability(texture), presence of scar formation, skin color, and skin integrity  [] For cardiovascular/pulmonary system: the assessment of heart rate, respiratory rate, blood pressure, and edema     Activity limitations:    [] Patient's cognitive status and saf ety concerns          [] Status of current condition      [] Weight bearing restriction  [] Cardiopulmunary Restriction    Participation Restrictions:   [] Goals and goal agreement with the patient     [] Rehab potential (prognosis) and probable outcome      Examination of Body System: (criteria)    [x] 97347 - addressing 1-2 elements    [] 57003 - addressing a total of 3 or more elements     [] 84434 -  Addressing a total of 4 or more elements         Clinical Presentation: (criteria)  Stable - 64593     On examination of body system using standardized tests and measures patient presents with 1-2 elements from any of the  following: body structures and functions, activity limitations, and/or participation restrictions.  Leading to a clinical presentation that is considered stable and/or uncomplicated                              Clinical Decision Making  (Eval Complexity):  Low- 29449     Time Tracking:     PT Received On: 01/09/18  PT Start Time: 0930     PT Stop Time: 1000  PT Total Time (min): 30 min     Billable Minutes: Evaluation 15 minutes and Gait Training 15 minutes      Jamaal Mena, PT  01/09/2018

## 2018-01-09 NOTE — ED PROVIDER NOTES
[facility]                                         January 9, 2018                   Chief Complaint  88 y.o. male with Urinary Tract Infection    History of Present Illness  Eddie ABBOTT Tal Jernigan presents to the emergency room with a chronic urinary tract infection  Patient has been seen in the emergency room by his primary care physician several times  Patient has a indwelling Bishop catheter with a residual Pseudomonas urinary tract infection  Patient is ALLERGIC to Cipro and the cultures of the Pseudomonas are difficult to treat now  Patient has failed oral antibiotics, presents from PCP office for admission for IV antibiotics  Urine culture last ER visit shows a pseudomonas sensitive to Zosyn, afebrile and stable    The history is provided by the patient     Past Medical History   -- Abdominal fibromatosis     -- Atrial fibrillation     -- Bradycardia     -- CAD (coronary artery disease)     -- Cancer     -- CHF (congestive heart failure)     -- COPD (chronic obstructive pulmonary disease)     -- Diabetes mellitus type II     -- Hyperlipidemia     -- Melanoma     -- Obesity (BMI 30.0-34.9)     -- Osteoporosis     -- Peripheral vascular disease     -- Stroke     -- TIA (transient ischemic attack)     -- Type II or unspecified type diabetes mellits     -- Unspecified essential hypertension        Past Surgical History   -- CARDIAC PACEMAKER PLACEMENT       -- CARDIAC VALVE REPLACEMENT       -- CHOLECYSTECTOMY       -- EYE SURGERY       -- HERNIA REPAIR          Review of patient's allergies   -- Levofloxacin     -- Lyrica [pregabalin]     -- Unable to assess      Review of Systems and Physical Exam     Review of Systems  -- Constitution - no fever, denies fatigue, no weakness, no chills  -- Eyes - no tearing or redness, no visual disturbance  -- Ear, Nose - no tinnitus or earache, no nasal congestion or discharge  -- Mouth,Throat - no sore throat, no toothache, normal voice, normal  swallowing  -- Respiratory - denies cough and congestion, no shortness of breath, no AMAYA  -- Cardiovascular - denies chest pain, no palpitations, denies claudication  -- Gastrointestinal - denies abdominal pain, nausea, vomiting, or diarrhea  -- Genitourinary - continued urinary tract infection  -- Musculoskeletal - denies back pain, negative for myalgias and arthralgias   -- Neurological - no headache, denies weakness or seizure; no LOC  -- Skin - denies pallor, rash, or changes in skin. no hives or welts noted    Vital Signs  -- His oral temperature is 96.4 °F (35.8 °C).   -- His blood pressure is 98/53 and his pulse is 64.   -- His respiration is 18 and oxygen saturation is 95%.      Physical Exam  -- Nursing note and vitals reviewed  -- Head: Atraumatic. Normocephalic. No obvious abnormality  -- Eyes: Pupils are equal and reactive to light. Normal conjunctiva and lids  -- Cardiac: Normal rate, regular rhythm and normal heart sounds  -- Pulmonary: Normal respiratory effort, breath sounds clear to auscultation  -- Abdominal: Soft, no tenderness. Normal bowel sounds. Normal liver edge  -- Genitourinary: Chronic indwelling Bishop noted   -- Musculoskeletal: Normal range of motion, no effusions. Joints stable   -- Neurological: No focal deficits. Showed good interaction with staff  -- Vascular: Posterior tibial, dorsalis pedis and radial pulses 2+ bilaterally      Emergency Room Course     Labs   (L)   K 4.5      CO2 22 (L)   BUN 54 (H)   CREATININE 1.4    (H)   ALKPHOS 115   AST 26   ALT 9 (L)   BILITOT 0.6   ALBUMIN 2.9 (L)   PROT 7.0   WBC 9.74   HGB 7.8 (L)   HCT 24.6 (L)      INR 2.1 (H)    (H)   LACTATE 1.4   MG 1.9   TSH 3.451     Urinalysis  -- Urinalysis performed during this ER visit showed signs of infection      Additional Work up  -- Blood cultures have also been drawn, results are pending  -- The urine today has been sent for lab culture, results pending    Medications  Given  -- piperacillin-tazobactam 4.5 g in dextrose 5 % 100 mL IVPB (ready to mix system)      Diagnosis  -- The encounter diagnosis was Complicated UTI (urinary tract infection).    Disposition and Plan  -- Disposition: observation  -- Condition: stable  -- Telemetry monitoring  -- Morning labs  -- SCD hoses  -- Home medications  -- Nausea medication when necessary  -- Pain medication when necessary  -- Intravenous fluids  -- Routine monitoring  -- Bed rest until otherwise stated  -- Low salt diet  -- Protonix for GERD prophylaxis  -- IV antibiotics  -- Blood cultures pending  -- Urine culture pending     This note is dictated on Dragon Natural Speaking word recognition program.  There are word recognition mistakes that are occasionally missed on review.           Isauro Alcocer MD  01/09/18 0700

## 2018-01-09 NOTE — PLAN OF CARE
01/09/18 1105   Medicare Message   Important Message from Medicare regarding Discharge Appeal Rights Given to patient/caregiver;Explained to patient/caregiver;Signed/date by patient/caregiver   Date IMM was signed 01/09/18   Time IMM was signed 1102

## 2018-01-10 LAB
ANION GAP SERPL CALC-SCNC: 12 MMOL/L
APTT BLDCRRT: 52.8 SEC
BASOPHILS # BLD AUTO: 0.02 K/UL
BASOPHILS NFR BLD: 0.2 %
BUN SERPL-MCNC: 68 MG/DL
CALCIUM SERPL-MCNC: 9 MG/DL
CHLORIDE SERPL-SCNC: 103 MMOL/L
CO2 SERPL-SCNC: 21 MMOL/L
CREAT SERPL-MCNC: 1.6 MG/DL
DIFFERENTIAL METHOD: ABNORMAL
EOSINOPHIL # BLD AUTO: 0.3 K/UL
EOSINOPHIL NFR BLD: 3.2 %
ERYTHROCYTE [DISTWIDTH] IN BLOOD BY AUTOMATED COUNT: 14.9 %
EST. GFR  (AFRICAN AMERICAN): 44 ML/MIN/1.73 M^2
EST. GFR  (NON AFRICAN AMERICAN): 38 ML/MIN/1.73 M^2
GLUCOSE SERPL-MCNC: 124 MG/DL (ref 70–110)
GLUCOSE SERPL-MCNC: 34 MG/DL
HCT VFR BLD AUTO: 24.4 %
HGB BLD-MCNC: 7.9 G/DL
INR PPP: 4
LYMPHOCYTES # BLD AUTO: 1.5 K/UL
LYMPHOCYTES NFR BLD: 14.3 %
MCH RBC QN AUTO: 32.2 PG
MCHC RBC AUTO-ENTMCNC: 32.4 G/DL
MCV RBC AUTO: 100 FL
MONOCYTES # BLD AUTO: 1.1 K/UL
MONOCYTES NFR BLD: 10.5 %
NEUTROPHILS # BLD AUTO: 7.7 K/UL
NEUTROPHILS NFR BLD: 71.8 %
PLATELET # BLD AUTO: 183 K/UL
PMV BLD AUTO: 9.8 FL
POCT GLUCOSE: 102 MG/DL (ref 70–110)
POCT GLUCOSE: 56 MG/DL (ref 70–110)
POCT GLUCOSE: 91 MG/DL (ref 70–110)
POTASSIUM SERPL-SCNC: 3.7 MMOL/L
PROTHROMBIN TIME: 37.7 SEC
RBC # BLD AUTO: 2.45 M/UL
SODIUM SERPL-SCNC: 136 MMOL/L
WBC # BLD AUTO: 10.69 K/UL

## 2018-01-10 PROCEDURE — 63600175 PHARM REV CODE 636 W HCPCS: Performed by: INTERNAL MEDICINE

## 2018-01-10 PROCEDURE — 99232 PR SUBSEQUENT HOSPITAL CARE,LEVL II: ICD-10-PCS | Mod: ,,, | Performed by: FAMILY MEDICINE

## 2018-01-10 PROCEDURE — 96365 THER/PROPH/DIAG IV INF INIT: CPT

## 2018-01-10 PROCEDURE — 96366 THER/PROPH/DIAG IV INF ADDON: CPT

## 2018-01-10 PROCEDURE — 96376 TX/PRO/DX INJ SAME DRUG ADON: CPT

## 2018-01-10 PROCEDURE — 99232 SBSQ HOSP IP/OBS MODERATE 35: CPT | Mod: ,,, | Performed by: FAMILY MEDICINE

## 2018-01-10 PROCEDURE — 25000003 PHARM REV CODE 250: Performed by: SURGERY

## 2018-01-10 PROCEDURE — 25000003 PHARM REV CODE 250: Performed by: NURSE PRACTITIONER

## 2018-01-10 PROCEDURE — S5010 5% DEXTROSE AND 0.45% SALINE: HCPCS | Performed by: NURSE PRACTITIONER

## 2018-01-10 PROCEDURE — 80048 BASIC METABOLIC PNL TOTAL CA: CPT

## 2018-01-10 PROCEDURE — 85730 THROMBOPLASTIN TIME PARTIAL: CPT

## 2018-01-10 PROCEDURE — 96361 HYDRATE IV INFUSION ADD-ON: CPT

## 2018-01-10 PROCEDURE — 36415 COLL VENOUS BLD VENIPUNCTURE: CPT

## 2018-01-10 PROCEDURE — 11000001 HC ACUTE MED/SURG PRIVATE ROOM

## 2018-01-10 PROCEDURE — 85610 PROTHROMBIN TIME: CPT

## 2018-01-10 PROCEDURE — 97116 GAIT TRAINING THERAPY: CPT

## 2018-01-10 PROCEDURE — 97530 THERAPEUTIC ACTIVITIES: CPT

## 2018-01-10 PROCEDURE — 85025 COMPLETE CBC W/AUTO DIFF WBC: CPT

## 2018-01-10 RX ORDER — SODIUM CHLORIDE 9 MG/ML
INJECTION, SOLUTION INTRAVENOUS CONTINUOUS
Status: DISCONTINUED | OUTPATIENT
Start: 2018-01-10 | End: 2018-01-10

## 2018-01-10 RX ORDER — DEXTROSE MONOHYDRATE AND SODIUM CHLORIDE 5; .45 G/100ML; G/100ML
INJECTION, SOLUTION INTRAVENOUS CONTINUOUS
Status: DISCONTINUED | OUTPATIENT
Start: 2018-01-10 | End: 2018-01-11

## 2018-01-10 RX ADMIN — PIPERACILLIN AND TAZOBACTAM 4.5 G: 4; .5 INJECTION, POWDER, FOR SOLUTION INTRAVENOUS at 12:01

## 2018-01-10 RX ADMIN — TAMSULOSIN HYDROCHLORIDE 0.4 MG: 0.4 CAPSULE ORAL at 10:01

## 2018-01-10 RX ADMIN — PANTOPRAZOLE SODIUM 40 MG: 40 TABLET, DELAYED RELEASE ORAL at 10:01

## 2018-01-10 RX ADMIN — KETOROLAC TROMETHAMINE 15 MG: 15 INJECTION, SOLUTION INTRAMUSCULAR; INTRAVENOUS at 11:01

## 2018-01-10 RX ADMIN — SODIUM CHLORIDE: 0.9 INJECTION, SOLUTION INTRAVENOUS at 10:01

## 2018-01-10 RX ADMIN — PIPERACILLIN AND TAZOBACTAM 4.5 G: 4; .5 INJECTION, POWDER, FOR SOLUTION INTRAVENOUS at 08:01

## 2018-01-10 RX ADMIN — DEXTROSE MONOHYDRATE AND SODIUM CHLORIDE: 5; .45 INJECTION, SOLUTION INTRAVENOUS at 11:01

## 2018-01-10 RX ADMIN — PIPERACILLIN AND TAZOBACTAM 4.5 G: 4; .5 INJECTION, POWDER, FOR SOLUTION INTRAVENOUS at 04:01

## 2018-01-10 RX ADMIN — LEVOTHYROXINE SODIUM 50 MCG: 50 TABLET ORAL at 10:01

## 2018-01-10 NOTE — PROGRESS NOTES
Blood sugar 56. Patient asymptomatic. Orange juice with 4 packets of sugar given. Early breakfast tray ordered. Patient encouraged to eat. States understanding.

## 2018-01-10 NOTE — ASSESSMENT & PLAN NOTE
"Continue foleys catheter   Seen urology [facility] ;they would keep shields's for one more month  Continue flomax    "I recommend continuing the Flomax and stopping the oxybutynin.  Shields catheter is changed today under sterile technique.  Nursing staff unable to pass a catheter however I am able to pass a 16 Ukrainian coudé.  This is attached to a leg bag and family is instructed on its use.  Note written for home health to change the catheter in 1 month.  Patient to follow-up with me in 2 months at which time we will give him a voiding trial in the office.  Hopefully by that time The patient will be fully ambulatory.      "

## 2018-01-10 NOTE — PT/OT/SLP PROGRESS
"Physical Therapy      Patient Name:  Eddie Parada Sr.   MRN:  [id number]    Patient not seen 1/10/2018 p.m. secondary to Patient unwilling to participate ("I'd like to try to sleep.  Please come back again tomorrow."). Will follow-up 1/11/2018.  RN aware of pt. status.  Family present.    Jamaal Mena, PT    "

## 2018-01-10 NOTE — PROGRESS NOTES
Report received from LUCI Angulo. Spouse at side. Dario continued. Denies needs. No distress noted.

## 2018-01-10 NOTE — PROGRESS NOTES
"[facility]  Progress Note    Patient Name: Eddie Parada Sr.  MRN: [id number]  Patient Class: OP- Observation   Admission Date: 1/8/2018  Length of Stay: 0 days  Attending Physician: Georgia Ann MD  Primary Care Provider: Callum Roberts MD        Subjective:     Principal Problem:Pseudomonas urinary tract infection    HPI:  Eddie Parada Sr. is a 88 y.o. male   Eddie Parada Sr. is a 88 y.o. male  Here for follow up for hypoglycemia  Keeps getting recurrent UTIs.  Last  Culture grew up : pseudomas  I sent to ER last time he did not  Want to stay   Seen urologist December ;   He is allergic to Levaquin ; he could not walk   Wife reluctant to try cipro which was .  He is having issues with dysuria . S/p catheterization since november .     Urine Culture, Routine              PSEUDOMONAS AERUGINOSA   >100,000 cfu/ml       Resulting Agency        [facility]  Susceptibility                     Pseudomonas aeruginosa                    CULTURE, URINE                    Amikacin         <=16 "><=16   Sensitive                       Cefepime         <=8 "><=8       Sensitive                       Ciprofloxacin   <=1 "><=1       Sensitive                       Gentamicin      <=4 "><=4       Sensitive                       Meropenem     <=4 "><=4       Sensitive                       Piperacillin/Tazo         <=16 "><=16   Sensitive                       Tobramycin     <=4 "><=4       Sensitive             Patient has been seen in the emergency room by his primary care physician several times  Patient has a indwelling Bishop catheter with a residual Pseudomonas urinary tract infection  Patient is ALLERGIC to Cipro and the cultures of the Pseudomonas are difficult to treat now  Patient has failed oral antibiotics, presents from PCP office for admission for IV antibiotics  Urine culture last ER visit shows a pseudomonas sensitive to Zosyn, afebrile and stable    Hospital " Course:  Urine looks like brown with sediment. Nurse reports that it was clearer yesterday evening. Cont on zosyn and started NS 75ml/hr. Afebrile. This am bp low 81/45- coreg 6.25mg po BID reordered per home routine but held this am. HR 60    Pt started to prevent further weakening    Review of Systems   Constitutional: Positive for activity change, chills, diaphoresis and fatigue. Negative for fever.   HENT: Negative for congestion, postnasal drip and sore throat.    Eyes: Negative for photophobia.   Respiratory: Negative for chest tightness and shortness of breath.    Cardiovascular: Negative for chest pain.   Gastrointestinal: Positive for abdominal pain. Negative for abdominal distention, blood in stool and vomiting.   Genitourinary: Positive for decreased urine volume, difficulty urinating, dysuria and urgency. Negative for flank pain and hematuria.   Musculoskeletal: Negative for back pain.   Skin: Negative for pallor.   Neurological: Positive for weakness. Negative for dizziness, seizures, facial asymmetry, speech difficulty and numbness.   Hematological: Does not bruise/bleed easily.   Psychiatric/Behavioral: Negative for agitation and suicidal ideas. The patient is nervous/anxious.      Objective:     Vital Signs (Most Recent):  Temp: 97.1 °F (36.2 °C) (01/10/18 0805)  Pulse: 60 (01/10/18 1016)  Resp: 16 (01/10/18 0805)  BP: (!) 81/45 (01/10/18 0805)  SpO2: 96 % (01/10/18 0805) Vital Signs (24h Range):  Temp:  [96.5 °F (35.8 °C)-98.6 °F (37 °C)] 97.1 °F (36.2 °C)  Pulse:  [59-87] 60  Resp:  [16-20] 16  SpO2:  [93 %-99 %] 96 %  BP: ()/(40-56) 81/45     Weight: 81 kg (178 lb 9.2 oz)  Body mass index is 28.82 kg/m².    Physical Exam   Constitutional: He is oriented to person, place, and time. He appears well-developed and well-nourished.   HENT:   Head: Normocephalic and atraumatic.   Neck: No JVD present.   Cardiovascular: Normal rate, regular rhythm and normal heart sounds.    Pulmonary/Chest: Effort  "normal and breath sounds normal.   Abdominal: Soft. Bowel sounds are normal. There is no tenderness.   Genitourinary:   Genitourinary Comments: S/p shields catheter  Milky tea colored urine in bag     Musculoskeletal: He exhibits no edema.   Neurological: He is alert and oriented to person, place, and time.   Skin: Skin is warm and dry.   Psychiatric: He has a normal mood and affect. His behavior is normal. Judgment and thought content normal.   Nursing note and vitals reviewed.          Significant Labs:   CBC:     Recent Labs  Lab 01/08/18  1641 01/09/18  0458 01/10/18  0511   WBC 13.41* 9.74 10.69   HGB 9.3* 7.8* 7.9*   HCT 28.1* 24.6* 24.4*    172 183     CMP:     Recent Labs  Lab 01/08/18  1641 01/09/18 0458 01/09/18  1737 01/10/18  0511   * 137  --  136   K 4.5 3.8  --  3.7    103  --  103   CO2 22* 22*  --  21*   * 60* 43* 34*   BUN 54* 59*  --  68*   CREATININE 1.4 1.3  --  1.6*   CALCIUM 9.7 9.1  --  9.0   PROT 7.0  --   --   --    ALBUMIN 2.9*  --   --   --    BILITOT 0.6  --   --   --    ALKPHOS 115  --   --   --    AST 26  --   --   --    ALT 9*  --   --   --    ANIONGAP 13 12  --  12   EGFRNONAA 45* 49*  --  38*     Lactate 1.4  INR 5.3>>4.0  Blood culture NGTD x 1    Urine culture from 1/5/18  Urine Culture, Routine PSEUDOMONAS AERUGINOSA   >100,000 cfu/ml        Resulting Agency [facility]   Susceptibility      Pseudomonas aeruginosa     CULTURE, URINE     Amikacin <=16 "><=16  Sensitive     Cefepime <=8 "><=8  Sensitive     Ciprofloxacin <=1 "><=1  Sensitive     Gentamicin <=4 "><=4  Sensitive     Meropenem <=4 "><=4  Sensitive     Piperacillin/Tazo <=16 "><=16  Sensitive     Tobramycin <=4 "><=4  Sensitive             Urinalysis brown and cloudy 3+ leuko + nitrite and 1+ leuko, 100RBC, 75 WBC    Assessment/Plan:      * Pseudomonas urinary tract infection      He is allergic to levaquin   Only other po med would have been cipro ; severe allergy to levaquin   Left us no choice but " "put him on IV therapy   Zosyn for 5 days ; started 1/8/19          Hypoglycemia associated with diabetes    DC amaryl   Dc actos  Insulin sliding scale.  Change fluids to D5          Acute posthemorrhagic anemia    His urine looks like source of his blood loss with   foleys catheter and him being on coumadin with elevated INR Monitor h/h   If hematocrit below 21 ;will transfuse          Urinary retention    Continue foleys catheter   Seen [facility]rology [facility] ;they would keep shields's for one more month  Continue flomax    "I recommend continuing the Flomax and stopping the oxybutynin.  Shields catheter is changed today under sterile technique.  Nursing staff unable to pass a catheter however I am able to pass a 16 Amharic coudé.  This is attached to a leg bag and family is instructed on its use.  Note written for home health to change the catheter in 1 month.  Patient to follow-up with me in 2 months at which time we will give him a voiding trial in the office.  Hopefully by that time The patient will be fully ambulatory.            S/P AVR    Continue coumadin once INR down          Type 2 diabetes mellitus with neurologic complication    He is hypoglycemic- changhed NS to D51/2 cont at 75ml/hr  Hold meds for now (sulfonuria at home, none here)  Insulin sliding scale          Anticoagulation monitoring by pharmacist              Atrial fibrillation    continue coreg and coumadin - hold coreg today  Watch H/H daily though type and screen today   Follow INRs- holding coumadin (INR 5.3>>4.0)            VTE Risk Mitigation         Ordered     warfarin (COUMADIN) tablet 2 mg  Daily     Route:  Oral        01/08/18 1828     Medium Risk of VTE  Once      01/08/18 1828     Place sequential compression device  Until discontinued      01/08/18 1828              Joseph Redmond MD  Department of Hospital Medicine   [facility]  "

## 2018-01-10 NOTE — ASSESSMENT & PLAN NOTE
continue coreg and coumadin - hold coreg today  Watch H/H daily though type and screen today   Follow INRs- holding coumadin (INR 5.3>>4.0)

## 2018-01-10 NOTE — SUBJECTIVE & OBJECTIVE
Review of Systems   Constitutional: Positive for activity change, chills, diaphoresis and fatigue. Negative for fever.   HENT: Negative for congestion, postnasal drip and sore throat.    Eyes: Negative for photophobia.   Respiratory: Negative for chest tightness and shortness of breath.    Cardiovascular: Negative for chest pain.   Gastrointestinal: Positive for abdominal pain. Negative for abdominal distention, blood in stool and vomiting.   Genitourinary: Positive for decreased urine volume, difficulty urinating, dysuria and urgency. Negative for flank pain and hematuria.   Musculoskeletal: Negative for back pain.   Skin: Negative for pallor.   Neurological: Positive for weakness. Negative for dizziness, seizures, facial asymmetry, speech difficulty and numbness.   Hematological: Does not bruise/bleed easily.   Psychiatric/Behavioral: Negative for agitation and suicidal ideas. The patient is nervous/anxious.      Objective:     Vital Signs (Most Recent):  Temp: 97.1 °F (36.2 °C) (01/10/18 0805)  Pulse: 60 (01/10/18 1016)  Resp: 16 (01/10/18 0805)  BP: (!) 81/45 (01/10/18 0805)  SpO2: 96 % (01/10/18 0805) Vital Signs (24h Range):  Temp:  [96.5 °F (35.8 °C)-98.6 °F (37 °C)] 97.1 °F (36.2 °C)  Pulse:  [59-87] 60  Resp:  [16-20] 16  SpO2:  [93 %-99 %] 96 %  BP: ()/(40-56) 81/45     Weight: 81 kg (178 lb 9.2 oz)  Body mass index is 28.82 kg/m².    Physical Exam   Constitutional: He is oriented to person, place, and time. He appears well-developed and well-nourished.   HENT:   Head: Normocephalic and atraumatic.   Neck: No JVD present.   Cardiovascular: Normal rate, regular rhythm and normal heart sounds.    Pulmonary/Chest: Effort normal and breath sounds normal.   Abdominal: Soft. Bowel sounds are normal. There is no tenderness.   Genitourinary:   Genitourinary Comments: S/p shields catheter  Milky tea colored urine in bag     Musculoskeletal: He exhibits no edema.   Neurological: He is alert and oriented to  "person, place, and time.   Skin: Skin is warm and dry.   Psychiatric: He has a normal mood and affect. His behavior is normal. Judgment and thought content normal.   Nursing note and vitals reviewed.          Significant Labs:   CBC:     Recent Labs  Lab 01/08/18  1641 01/09/18 0458 01/10/18  0511   WBC 13.41* 9.74 10.69   HGB 9.3* 7.8* 7.9*   HCT 28.1* 24.6* 24.4*    172 183     CMP:     Recent Labs  Lab 01/08/18  1641 01/09/18 0458 01/09/18  1737 01/10/18  0511   * 137  --  136   K 4.5 3.8  --  3.7    103  --  103   CO2 22* 22*  --  21*   * 60* 43* 34*   BUN 54* 59*  --  68*   CREATININE 1.4 1.3  --  1.6*   CALCIUM 9.7 9.1  --  9.0   PROT 7.0  --   --   --    ALBUMIN 2.9*  --   --   --    BILITOT 0.6  --   --   --    ALKPHOS 115  --   --   --    AST 26  --   --   --    ALT 9*  --   --   --    ANIONGAP 13 12  --  12   EGFRNONAA 45* 49*  --  38*     Lactate 1.4  INR 5.3>>4.0  Blood culture NGTD x 1    Urine culture from 1/5/18  Urine Culture, Routine PSEUDOMONAS AERUGINOSA   >100,000 cfu/ml        Resulting Agency [facility]   Susceptibility      Pseudomonas aeruginosa     CULTURE, URINE     Amikacin <=16 "><=16  Sensitive     Cefepime <=8 "><=8  Sensitive     Ciprofloxacin <=1 "><=1  Sensitive     Gentamicin <=4 "><=4  Sensitive     Meropenem <=4 "><=4  Sensitive     Piperacillin/Tazo <=16 "><=16  Sensitive     Tobramycin <=4 "><=4  Sensitive             Urinalysis brown and cloudy 3+ leuko + nitrite and 1+ leuko, 100RBC, 75 WBC  "

## 2018-01-10 NOTE — ASSESSMENT & PLAN NOTE
His urine looks like source of his blood loss with   foleys catheter and him being on coumadin with elevated INR Monitor h/h   If hematocrit below 21 ;will transfuse

## 2018-01-10 NOTE — PLAN OF CARE
Problem: Patient Care Overview  Goal: Plan of Care Review  Outcome: Ongoing (interventions implemented as appropriate)  Afebrile. IV zosyn continued. Bishop intact. Bishop care complete. Accu checks. Telemetry, Paced. Hypotension, asymptomatic. Bed alarm engaged. Free of falls/injury. Rested well. Frequent weight shift encouraged. Patient turned independently. Spouse at side. Patient and spouse understand plan of care and agrees.

## 2018-01-10 NOTE — ASSESSMENT & PLAN NOTE
He is hypoglycemic- changhed NS to D51/2 cont at 75ml/hr  Hold meds for now (sulfonuria at home, none here)  Insulin sliding scale

## 2018-01-10 NOTE — PT/OT/SLP PROGRESS
"Physical Therapy Treatment    Patient Name:  Eddie Parada Sr.   MRN:  [id number]    Recommendations:     Discharge Recommendations:  home with home health, home health PT   Discharge Equipment Recommendations: none   Barriers to discharge: None    Assessment:     Eddie Parada Sr. is a 88 y.o. male admitted with a medical diagnosis of Pseudomonas urinary tract infection.  He presents with the following impairments/functional limitations:  weakness, gait instability, impaired balance, impaired endurance, impaired self care skills, impaired functional mobilty, decreased safety awareness .  Pt. Demonstrates increased distance with gait training.  Pt. is progressing towards PT POC goals.    Rehab Prognosis:  Fair +; patient would benefit from acute skilled PT services to address these deficits and reach maximum level of function.      Recent Surgery: * No surgery found *      Plan:     During this hospitalization, patient to be seen  (1-2x/day(M-F); PRN(Sat.,Sun.)) to address the above listed problems via therapeutic exercises, therapeutic activities, gait training  · Plan of Care Expires:   (Upon D/C from facility)   Plan of Care Reviewed with: patient    Subjective     Communicated with patient prior to session.  Patient found supine in bed, awake, upon PT entry to room, agreeable to treatment.        Patient comments/goals: "I'd like to try to walk a little."  Pain/Comfort:  · Pain Rating 1: 0/10  · Pain Rating Post-Intervention 1: 0/10    Patients cultural, spiritual, Faith conflicts given the current situation:      Objective:     Patient found with: peripheral IV, telemetry     General Precautions: Standard, fall   Orthopedic Precautions:N/A   Braces: N/A     Functional Mobility:  · Bed Mobility:     · Rolling Left:  minimum assistance  · Rolling Right: minimum assistance  · Scooting: minimum assistance  · Bridging: minimum assistance  · Supine to Sit: minimum assistance  · Sit to Supine: minimum " assistance  · Transfers:     · Sit to Stand:  minimum assistance with rolling walker, x 4 bouts  · Bed to Chair: minimum assistance with  rolling walker  using  Step Transfer  · Gait: Gait Training:  Pt. amb. 30' x 1 bout, 20' x 1 bout, with RW, min. A.  VC's given to pt. for step placement and A.D. placement.  Pt. demonstrated decreased velocity of gait mechanics.  Balance:   Static Sit: GOOD-: Takes MODERATE challenges from all directions but inconsistently  Dynamic Sit: GOOD-: Maintains balance through MODERATE excursions of active trunk movement,     Static Stand: FAIR+: Takes MINIMAL challenges from all directions  · Dynamic stand: POOR: N/A  ·       AM-PAC 6 CLICK MOBILITY  Turning over in bed (including adjusting bedclothes, sheets and blankets)?: 3  Sitting down on and standing up from a chair with arms (e.g., wheelchair, bedside commode, etc.): 3  Moving from lying on back to sitting on the side of the bed?: 3  Moving to and from a bed to a chair (including a wheelchair)?: 3  Need to walk in hospital room?: 3  Climbing 3-5 steps with a railing?: 1  Total Score: 16       Therapeutic Activities and Exercises:   There. Act.:  Weight shifting and weight acceptance tasks performed with min. A. while pt. standing.  Trunk control tasks performed with pt. seated EOB with min. A.  Seated scooting performed with pt. seated EOB with min. A.  Transfer Training performed with assistance as noted.  VC's given to pt. for sequencing.    Patient left HOB elevated with all lines intact, call button in reach, RN notified and family present..    GOALS:    Physical Therapy Goals        Problem: Physical Therapy Goal    Goal Priority Disciplines Outcome Goal Variances Interventions   Physical Therapy Goal     PT/OT, PT Ongoing (interventions implemented as appropriate)     Description:  Goals to be met by: 2018     Patient will increase functional independence with mobility by performin. Supine to sit with Stand-by  Assistance  2. Sit to supine with Stand-by Assistance  3. Rolling to Left and Right with Stand-by Assistance.  4. Sit to stand transfer with Stand-by Assistance, using RW  5. Bed to chair transfer with Stand-by Assistance using Rolling Walker  6. Gait  x 50 feet with Stand-by Assistance using Rolling Walker.                       Time Tracking:     PT Received On: 01/10/18  PT Start Time: 0945     PT Stop Time: 1015  PT Total Time (min): 30 min     Billable Minutes: Gait Training 18 minutes and Therapeutic Activity 12 minutes    Treatment Type: Treatment  PT/PTA: PT           Jamaal Mena, PT  01/10/2018

## 2018-01-11 PROBLEM — B96.20 E. COLI UTI: Status: RESOLVED | Noted: 2017-11-26 | Resolved: 2018-01-11

## 2018-01-11 PROBLEM — N17.9 AKI (ACUTE KIDNEY INJURY): Status: ACTIVE | Noted: 2018-01-11

## 2018-01-11 PROBLEM — I95.9 HYPOTENSION: Status: ACTIVE | Noted: 2018-01-11

## 2018-01-11 PROBLEM — T45.511A COUMADIN TOXICITY: Status: RESOLVED | Noted: 2017-11-27 | Resolved: 2018-01-11

## 2018-01-11 PROBLEM — N39.0 E. COLI UTI: Status: RESOLVED | Noted: 2017-11-26 | Resolved: 2018-01-11

## 2018-01-11 LAB
ANION GAP SERPL CALC-SCNC: 14 MMOL/L
APTT BLDCRRT: 51 SEC
BACTERIA BLD CULT: NORMAL
BACTERIA BLD CULT: NORMAL
BASOPHILS # BLD AUTO: 0.01 K/UL
BASOPHILS NFR BLD: 0.1 %
BNP SERPL-MCNC: 783 PG/ML
BUN SERPL-MCNC: 76 MG/DL
CALCIUM SERPL-MCNC: 8.3 MG/DL
CHLORIDE SERPL-SCNC: 99 MMOL/L
CO2 SERPL-SCNC: 18 MMOL/L
CREAT SERPL-MCNC: 2.5 MG/DL
DIFFERENTIAL METHOD: ABNORMAL
EOSINOPHIL # BLD AUTO: 0.1 K/UL
EOSINOPHIL NFR BLD: 1 %
ERYTHROCYTE [DISTWIDTH] IN BLOOD BY AUTOMATED COUNT: 14.9 %
EST. GFR  (AFRICAN AMERICAN): 26 ML/MIN/1.73 M^2
EST. GFR  (NON AFRICAN AMERICAN): 22 ML/MIN/1.73 M^2
GLUCOSE SERPL-MCNC: 138 MG/DL
HCT VFR BLD AUTO: 22.7 %
HGB BLD-MCNC: 7.4 G/DL
INR PPP: 3.5
LYMPHOCYTES # BLD AUTO: 1.8 K/UL
LYMPHOCYTES NFR BLD: 13.9 %
MCH RBC QN AUTO: 32.2 PG
MCHC RBC AUTO-ENTMCNC: 32.6 G/DL
MCV RBC AUTO: 99 FL
MONOCYTES # BLD AUTO: 1.3 K/UL
MONOCYTES NFR BLD: 10 %
NEUTROPHILS # BLD AUTO: 9.8 K/UL
NEUTROPHILS NFR BLD: 75 %
PLATELET # BLD AUTO: 190 K/UL
PMV BLD AUTO: 10.3 FL
POCT GLUCOSE: 160 MG/DL (ref 70–110)
POCT GLUCOSE: 175 MG/DL (ref 70–110)
POCT GLUCOSE: 199 MG/DL (ref 70–110)
POCT GLUCOSE: 202 MG/DL (ref 70–110)
POTASSIUM SERPL-SCNC: 3.5 MMOL/L
PROTHROMBIN TIME: 33.7 SEC
RBC # BLD AUTO: 2.3 M/UL
SODIUM SERPL-SCNC: 131 MMOL/L
WBC # BLD AUTO: 13.04 K/UL

## 2018-01-11 PROCEDURE — 83880 ASSAY OF NATRIURETIC PEPTIDE: CPT

## 2018-01-11 PROCEDURE — 25000003 PHARM REV CODE 250: Performed by: SURGERY

## 2018-01-11 PROCEDURE — 96376 TX/PRO/DX INJ SAME DRUG ADON: CPT

## 2018-01-11 PROCEDURE — 25000003 PHARM REV CODE 250: Performed by: FAMILY MEDICINE

## 2018-01-11 PROCEDURE — 25000003 PHARM REV CODE 250: Performed by: NURSE PRACTITIONER

## 2018-01-11 PROCEDURE — 63600175 PHARM REV CODE 636 W HCPCS: Performed by: INTERNAL MEDICINE

## 2018-01-11 PROCEDURE — 96366 THER/PROPH/DIAG IV INF ADDON: CPT

## 2018-01-11 PROCEDURE — 99232 PR SUBSEQUENT HOSPITAL CARE,LEVL II: ICD-10-PCS | Mod: ,,, | Performed by: FAMILY MEDICINE

## 2018-01-11 PROCEDURE — 85610 PROTHROMBIN TIME: CPT

## 2018-01-11 PROCEDURE — 96372 THER/PROPH/DIAG INJ SC/IM: CPT

## 2018-01-11 PROCEDURE — 97116 GAIT TRAINING THERAPY: CPT

## 2018-01-11 PROCEDURE — 97530 THERAPEUTIC ACTIVITIES: CPT

## 2018-01-11 PROCEDURE — 63600175 PHARM REV CODE 636 W HCPCS: Performed by: NURSE PRACTITIONER

## 2018-01-11 PROCEDURE — 85025 COMPLETE CBC W/AUTO DIFF WBC: CPT

## 2018-01-11 PROCEDURE — 11000001 HC ACUTE MED/SURG PRIVATE ROOM

## 2018-01-11 PROCEDURE — 80048 BASIC METABOLIC PNL TOTAL CA: CPT

## 2018-01-11 PROCEDURE — 96361 HYDRATE IV INFUSION ADD-ON: CPT

## 2018-01-11 PROCEDURE — 99232 SBSQ HOSP IP/OBS MODERATE 35: CPT | Mod: ,,, | Performed by: FAMILY MEDICINE

## 2018-01-11 PROCEDURE — 36415 COLL VENOUS BLD VENIPUNCTURE: CPT

## 2018-01-11 PROCEDURE — 85730 THROMBOPLASTIN TIME PARTIAL: CPT

## 2018-01-11 PROCEDURE — S5010 5% DEXTROSE AND 0.45% SALINE: HCPCS | Performed by: FAMILY MEDICINE

## 2018-01-11 RX ORDER — SODIUM CHLORIDE 9 MG/ML
INJECTION, SOLUTION INTRAVENOUS CONTINUOUS
Status: DISCONTINUED | OUTPATIENT
Start: 2018-01-11 | End: 2018-01-11

## 2018-01-11 RX ADMIN — PIPERACILLIN AND TAZOBACTAM 2.25 G: 2; .25 INJECTION, POWDER, LYOPHILIZED, FOR SOLUTION INTRAVENOUS; PARENTERAL at 11:01

## 2018-01-11 RX ADMIN — SODIUM CHLORIDE: 0.9 INJECTION, SOLUTION INTRAVENOUS at 09:01

## 2018-01-11 RX ADMIN — LEVOTHYROXINE SODIUM 50 MCG: 50 TABLET ORAL at 08:01

## 2018-01-11 RX ADMIN — INSULIN ASPART 2 UNITS: 100 INJECTION, SOLUTION INTRAVENOUS; SUBCUTANEOUS at 11:01

## 2018-01-11 RX ADMIN — TAMSULOSIN HYDROCHLORIDE 0.4 MG: 0.4 CAPSULE ORAL at 08:01

## 2018-01-11 RX ADMIN — PIPERACILLIN AND TAZOBACTAM 4.5 G: 4; .5 INJECTION, POWDER, FOR SOLUTION INTRAVENOUS at 12:01

## 2018-01-11 RX ADMIN — HYDROCODONE BITARTRATE AND ACETAMINOPHEN 1 TABLET: 5; 325 TABLET ORAL at 11:01

## 2018-01-11 RX ADMIN — PANTOPRAZOLE SODIUM 40 MG: 40 TABLET, DELAYED RELEASE ORAL at 08:01

## 2018-01-11 RX ADMIN — PIPERACILLIN AND TAZOBACTAM 4.5 G: 4; .5 INJECTION, POWDER, FOR SOLUTION INTRAVENOUS at 03:01

## 2018-01-11 RX ADMIN — DEXTROSE MONOHYDRATE AND SODIUM CHLORIDE 1000 ML: 5; .45 INJECTION, SOLUTION INTRAVENOUS at 02:01

## 2018-01-11 NOTE — ASSESSMENT & PLAN NOTE
Thought to be from UTI, treating with abx. Check CT pelvis today to r/o obstructive  Uropathy.  This is an outlet obstruction NIK. Should resolve with correction of shields placement.  BMP in am.

## 2018-01-11 NOTE — PT/OT/SLP PROGRESS
Physical Therapy      Patient Name:  Eddie Maysamson Jernigan   MRN:  [id number]    Patient not seen 1/11/2018 p.m. secondary to Nursing care. Will follow-up 1/12/2018.    Jamaal Mena, PT

## 2018-01-11 NOTE — ASSESSMENT & PLAN NOTE
"Continue foleys catheter --> this was removed and replaced on 1/11. See above h/c  Seen urology [facility] ;they would keep shields's for one more month  Continue flomax    "I recommend continuing the Flomax and stopping the oxybutynin.  Shields catheter is changed today under sterile technique.  Nursing staff unable to pass a catheter however I am able to pass a 16 [place] coudé.  This is attached to a leg bag and family is instructed on its use.  Note written for home health to change the catheter in 1 month.  Patient to follow-up with me in 2 months at which time we will give him a voiding trial in the office.  Hopefully by that time The patient will be fully ambulatory.      "

## 2018-01-11 NOTE — ASSESSMENT & PLAN NOTE
Was on fluids last couple days. BP stable although low, holding this am  To r/o obstructive uropathy this am.   BNP elevated but no wheezing or c/o SOB

## 2018-01-11 NOTE — ASSESSMENT & PLAN NOTE
He is hypoglycemic- changhed NS to D51/2 cont at 75ml/hr- now bs elevating, will change back to NS this am, slow due to elevated BNP and wheezing  Hold meds for now (sulfonuria at home, none here)  Insulin sliding scale low dose

## 2018-01-11 NOTE — PLAN OF CARE
Problem: Patient Care Overview  Goal: Plan of Care Review  Outcome: Ongoing (interventions implemented as appropriate)  Quiet shift. Family at bedside. SR up X 3. HOB elevated and IV patent and tolerating well. Bishop patent with sediment and tea colored urine in small amount. BNP elevated during night and MD notified. Call bell in reach and use. Plan of care discussed and verbalizes understanding.

## 2018-01-11 NOTE — ASSESSMENT & PLAN NOTE
"  He is allergic to levaquin and cipro  Only other po med would have been cipro ; severe allergy to levaquin   Left us no choice but put him on IV therapy   Zosyn for 5 days ; started 1/8/19 but now with elevated creat and cont h/h drop, check renal stone study today - shields not in place  Susceptibility      Pseudomonas aeruginosa     CULTURE, URINE     Amikacin <=16 "><=16  Sensitive     Cefepime <=8 "><=8  Sensitive     Ciprofloxacin <=1 "><=1  Sensitive     Gentamicin <=4 "><=4  Sensitive     Meropenem <=4 "><=4  Sensitive     Piperacillin/Tazo <=16 "><=16  Sensitive     Tobramycin <=4 "><=4  Sensitive          "

## 2018-01-11 NOTE — PT/OT/SLP PROGRESS
"Physical Therapy Treatment    Patient Name:  Eddie Parada Sr.   MRN:  [id number]    Recommendations:     Discharge Recommendations:  home with home health, home health PT   Discharge Equipment Recommendations: none   Barriers to discharge: None    Assessment:     Eddie Parada Sr. is a 88 y.o. male admitted with a medical diagnosis of Pseudomonas urinary tract infection.  He presents with the following impairments/functional limitations:  weakness, gait instability, impaired balance, impaired endurance, impaired functional mobilty, decreased safety awareness, decreased lower extremity function .  Pt. Is progressing towards PT POC goals.  Pt. Demonstrates increased distance with gait training.    Rehab Prognosis:  Fair; patient would benefit from acute skilled PT services to address these deficits and reach maximum level of function.      Recent Surgery: * No surgery found *      Plan:     During this hospitalization, patient to be seen  (1-2x/day(M-F); PRN(Sat.,Sun.)) to address the above listed problems via gait training, therapeutic activities, therapeutic exercises  · Plan of Care Expires:   (Upon D/C from facility)   Plan of Care Reviewed with: patient    Subjective     Communicated with patient prior to session.  Patient found supine in bed, awake, upon PT entry to room, agreeable to treatment.      Chief Complaint: Pt. Had c/o fatigue.  Patient comments/goals: "I'm supposed to be going down for a test soon."  Pain/Comfort:  · Pain Rating 1: 0/10  · Pain Rating Post-Intervention 1: 0/10    Patients cultural, spiritual, Baptism conflicts given the current situation:      Objective:     Patient found with: peripheral IV, telemetry     General Precautions: Standard, fall   Orthopedic Precautions:N/A   Braces: N/A     Functional Mobility:  · Bed Mobility:     · Rolling Left:  minimum assistance  · Rolling Right: minimum assistance  · Scooting: minimum assistance  · Bridging: minimum assistance  · Supine " to Sit: minimum assistance  · Sit to Supine: minimum assistance  · Transfers:     · Sit to Stand:  minimum assistance with rolling walker, x 2 bouts  · Bed to Chair: minimum assistance with  rolling walker  using  Step Transfer  · Gait: Gait Training:  Pt. amb. 40' x 1 bout, 30' x 1 bout, with RW, min. A.  VC's given to pt. for step placement and A.D. placement.  Pt. demonstrated decreased velocity of gait mechanics.  Balance: Balance:   Static Sit: GOOD-: Takes MODERATE challenges from all directions but inconsistently  Dynamic Sit: GOOD-: Maintains balance through MODERATE excursions of active trunk movement,     Static Stand: FAIR+: Takes MINIMAL challenges from all directions  · Dynamic stand: POOR: N/A  ·       AM-PAC 6 CLICK MOBILITY  Turning over in bed (including adjusting bedclothes, sheets and blankets)?: 3  Sitting down on and standing up from a chair with arms (e.g., wheelchair, bedside commode, etc.): 3  Moving from lying on back to sitting on the side of the bed?: 3  Moving to and from a bed to a chair (including a wheelchair)?: 3  Need to walk in hospital room?: 3  Climbing 3-5 steps with a railing?: 1  Total Score: 16       Therapeutic Activities and Exercises:  There. Act.:  Weight shifting and weight acceptance tasks performed with min. A. while pt. standing.  Trunk control tasks performed with pt. seated EOB and in bedside chair, with min. A.  Seated scooting performed with pt. seated EOB with CGA.  Transfer Training performed with assistance as noted.  VC's given to pt. for sequencing.       Patient left up in chair with all lines intact, call button in reach, RN notified and multiple family members present..    GOALS:    Physical Therapy Goals        Problem: Physical Therapy Goal    Goal Priority Disciplines Outcome Goal Variances Interventions   Physical Therapy Goal     PT/OT, PT Ongoing (interventions implemented as appropriate)     Description:  Goals to be met by: 1/23/2018     Patient  will increase functional independence with mobility by performin. Supine to sit with Stand-by Assistance  2. Sit to supine with Stand-by Assistance  3. Rolling to Left and Right with Stand-by Assistance.  4. Sit to stand transfer with Stand-by Assistance, using RW  5. Bed to chair transfer with Stand-by Assistance using Rolling Walker  6. Gait  x 50 feet with Stand-by Assistance using Rolling Walker.                       Time Tracking:     PT Received On: 18  PT Start Time: 1052     PT Stop Time: 1119  PT Total Time (min): 27 min     Billable Minutes: Gait Training 17 minutes and Therapeutic Activity 10 minutes    Treatment Type: Treatment  PT/PTA: PT           Jamaal Mena, PT  2018

## 2018-01-11 NOTE — SUBJECTIVE & OBJECTIVE
Review of Systems   Constitutional: Positive for fatigue. Negative for activity change, chills, diaphoresis and fever.   HENT: Negative for congestion, postnasal drip and sore throat.    Eyes: Negative for photophobia.   Respiratory: Negative for chest tightness and shortness of breath.    Cardiovascular: Negative for chest pain.   Gastrointestinal: Positive for abdominal pain and constipation. Negative for abdominal distention, blood in stool and vomiting.   Genitourinary: Positive for decreased urine volume, difficulty urinating, dysuria and urgency. Negative for flank pain and hematuria.        Bishop in place   Musculoskeletal: Negative for back pain.   Skin: Negative for pallor.   Neurological: Positive for weakness. Negative for dizziness, seizures, facial asymmetry, speech difficulty and numbness.   Hematological: Does not bruise/bleed easily.   Psychiatric/Behavioral: Negative for agitation and suicidal ideas. The patient is nervous/anxious.      Objective:     Vital Signs (Most Recent):  Temp: 97.2 °F (36.2 °C) (01/11/18 0348)  Pulse: 60 (01/11/18 0600)  Resp: 16 (01/11/18 0348)  BP: (!) 98/52 (01/11/18 0348)  SpO2: (!) 81 % (01/11/18 0348) Vital Signs (24h Range):  Temp:  [96 °F (35.6 °C)-99.3 °F (37.4 °C)] 97.2 °F (36.2 °C)  Pulse:  [58-87] 60  Resp:  [16-20] 16  SpO2:  [81 %-96 %] 81 %  BP: (81-98)/(45-52) 98/52     Weight: 81 kg (178 lb 9.2 oz)  Body mass index is 28.82 kg/m².    Physical Exam   Constitutional: He is oriented to person, place, and time. He appears well-developed and well-nourished.   HENT:   Head: Normocephalic and atraumatic.   Neck: No JVD present.   Cardiovascular: Normal rate and normal heart sounds.    Pulmonary/Chest: Effort normal and breath sounds normal.   Abdominal: Soft. Bowel sounds are normal. There is tenderness (+suprapubic tenderness with percussion of bladder to umbilicus).   Genitourinary:   Genitourinary Comments: Bishop in place  Milky tea colored urine in bag    "  Musculoskeletal: He exhibits no edema.   Neurological: He is alert and oriented to person, place, and time.   Skin: Skin is warm and dry.   Psychiatric: He has a normal mood and affect. His behavior is normal. Judgment and thought content normal.   Nursing note and vitals reviewed.          Significant Labs:   CBC:     Recent Labs  Lab 01/10/18  0511 01/11/18  0515   WBC 10.69 13.04*   HGB 7.9* 7.4*   HCT 24.4* 22.7*    190     CMP:     Recent Labs  Lab 01/09/18  1737 01/10/18  0511 01/11/18  0515   NA  --  136 131*   K  --  3.7 3.5   CL  --  103 99   CO2  --  21* 18*   GLU 43* 34* 138*   BUN  --  68* 76*   CREATININE  --  1.6* 2.5*   CALCIUM  --  9.0 8.3*   ANIONGAP  --  12 14   EGFRNONAA  --  38* 22*     Lactate 1.4  INR 5.3>>4.0>>3.5  Blood culture NGTD x 1    Urine culture from 1/5/18  Urine Culture, Routine PSEUDOMONAS AERUGINOSA   >100,000 cfu/ml        Resulting Agency [facility]   Susceptibility      Pseudomonas aeruginosa     CULTURE, URINE     Amikacin <=16 "><=16  Sensitive     Cefepime <=8 "><=8  Sensitive     Ciprofloxacin <=1 "><=1  Sensitive     Gentamicin <=4 "><=4  Sensitive     Meropenem <=4 "><=4  Sensitive     Piperacillin/Tazo <=16 "><=16  Sensitive     Tobramycin <=4 "><=4  Sensitive             Urinalysis brown and cloudy 3+ leuko + nitrite and 1+ leuko, 100RBC, 75 WBC  "

## 2018-01-11 NOTE — PLAN OF CARE
Problem: Patient Care Overview  Goal: Plan of Care Review  Outcome: Ongoing (interventions implemented as appropriate)  Vitals remained stable, afebrile. Complained of abd cramping, gave Toradol. Ambulated to bathroom this AM.urinary output decreased. Added fluids for elevated kidney function.  BP still low and glucose is better today but was low this AM. Held all BP and glucose meds.

## 2018-01-11 NOTE — ASSESSMENT & PLAN NOTE
continue coreg and coumadin - holding coreg again today  Watch H/H daily though type and screen today   Follow INRs- holding coumadin (INR 5.3>>4.0>>3.5)

## 2018-01-11 NOTE — ASSESSMENT & PLAN NOTE
His urine looks like source of his blood loss with   foleys catheter and him being on coumadin with elevated INR Monitor h/h   If hematocrit below 21 ;will transfuse type and matched yesterday

## 2018-01-11 NOTE — PROGRESS NOTES
The patient, his wife and I discussed his current meds and his home meds. We reviewed what they are for, how they are given, and side effects of each. He reports no side effects or pain at this time, but reports he's constipated. I notified the MD.

## 2018-01-12 LAB
ABO + RH BLD: NORMAL
ANION GAP SERPL CALC-SCNC: 12 MMOL/L
APTT BLDCRRT: 49.9 SEC
BASOPHILS # BLD AUTO: 0.02 K/UL
BASOPHILS NFR BLD: 0.2 %
BLD GP AB SCN CELLS X3 SERPL QL: NORMAL
BLD PROD TYP BPU: NORMAL
BLD PROD TYP BPU: NORMAL
BLOOD UNIT EXPIRATION DATE: NORMAL
BLOOD UNIT EXPIRATION DATE: NORMAL
BLOOD UNIT TYPE CODE: 6200
BLOOD UNIT TYPE CODE: 6200
BLOOD UNIT TYPE: NORMAL
BLOOD UNIT TYPE: NORMAL
BUN SERPL-MCNC: 72 MG/DL
CALCIUM SERPL-MCNC: 8.4 MG/DL
CHLORIDE SERPL-SCNC: 100 MMOL/L
CO2 SERPL-SCNC: 20 MMOL/L
CODING SYSTEM: NORMAL
CODING SYSTEM: NORMAL
CREAT SERPL-MCNC: 2.2 MG/DL
DIFFERENTIAL METHOD: ABNORMAL
DISPENSE STATUS: NORMAL
DISPENSE STATUS: NORMAL
EOSINOPHIL # BLD AUTO: 0.2 K/UL
EOSINOPHIL NFR BLD: 2 %
ERYTHROCYTE [DISTWIDTH] IN BLOOD BY AUTOMATED COUNT: 14.8 %
EST. GFR  (AFRICAN AMERICAN): 30 ML/MIN/1.73 M^2
EST. GFR  (NON AFRICAN AMERICAN): 26 ML/MIN/1.73 M^2
GLUCOSE SERPL-MCNC: 104 MG/DL
HCT VFR BLD AUTO: 21.4 %
HGB BLD-MCNC: 7 G/DL
INR PPP: 3.2
LYMPHOCYTES # BLD AUTO: 1.8 K/UL
LYMPHOCYTES NFR BLD: 15.8 %
MCH RBC QN AUTO: 32.1 PG
MCHC RBC AUTO-ENTMCNC: 32.7 G/DL
MCV RBC AUTO: 98 FL
MONOCYTES # BLD AUTO: 1.2 K/UL
MONOCYTES NFR BLD: 10.9 %
NEUTROPHILS # BLD AUTO: 8 K/UL
NEUTROPHILS NFR BLD: 71.1 %
PLATELET # BLD AUTO: 205 K/UL
PMV BLD AUTO: 10.1 FL
POCT GLUCOSE: 131 MG/DL (ref 70–110)
POCT GLUCOSE: 132 MG/DL (ref 70–110)
POCT GLUCOSE: 145 MG/DL (ref 70–110)
POCT GLUCOSE: 94 MG/DL (ref 70–110)
POTASSIUM SERPL-SCNC: 3.5 MMOL/L
PROTHROMBIN TIME: 30.8 SEC
RBC # BLD AUTO: 2.18 M/UL
SODIUM SERPL-SCNC: 132 MMOL/L
TRANS ERYTHROCYTES VOL PATIENT: NORMAL ML
TRANS ERYTHROCYTES VOL PATIENT: NORMAL ML
WBC # BLD AUTO: 11.19 K/UL

## 2018-01-12 PROCEDURE — 85025 COMPLETE CBC W/AUTO DIFF WBC: CPT

## 2018-01-12 PROCEDURE — 36415 COLL VENOUS BLD VENIPUNCTURE: CPT

## 2018-01-12 PROCEDURE — 82962 GLUCOSE BLOOD TEST: CPT

## 2018-01-12 PROCEDURE — 36430 TRANSFUSION BLD/BLD COMPNT: CPT

## 2018-01-12 PROCEDURE — 85730 THROMBOPLASTIN TIME PARTIAL: CPT

## 2018-01-12 PROCEDURE — 25000003 PHARM REV CODE 250: Performed by: FAMILY MEDICINE

## 2018-01-12 PROCEDURE — 25000003 PHARM REV CODE 250: Performed by: NURSE PRACTITIONER

## 2018-01-12 PROCEDURE — 86901 BLOOD TYPING SEROLOGIC RH(D): CPT

## 2018-01-12 PROCEDURE — 99232 SBSQ HOSP IP/OBS MODERATE 35: CPT | Mod: ,,, | Performed by: FAMILY MEDICINE

## 2018-01-12 PROCEDURE — 86920 COMPATIBILITY TEST SPIN: CPT

## 2018-01-12 PROCEDURE — 97110 THERAPEUTIC EXERCISES: CPT

## 2018-01-12 PROCEDURE — 63600175 PHARM REV CODE 636 W HCPCS: Performed by: NURSE PRACTITIONER

## 2018-01-12 PROCEDURE — 11000001 HC ACUTE MED/SURG PRIVATE ROOM

## 2018-01-12 PROCEDURE — 85610 PROTHROMBIN TIME: CPT

## 2018-01-12 PROCEDURE — P9021 RED BLOOD CELLS UNIT: HCPCS

## 2018-01-12 PROCEDURE — 25000003 PHARM REV CODE 250: Performed by: SURGERY

## 2018-01-12 PROCEDURE — 96376 TX/PRO/DX INJ SAME DRUG ADON: CPT

## 2018-01-12 PROCEDURE — 80048 BASIC METABOLIC PNL TOTAL CA: CPT

## 2018-01-12 PROCEDURE — 99232 PR SUBSEQUENT HOSPITAL CARE,LEVL II: ICD-10-PCS | Mod: ,,, | Performed by: FAMILY MEDICINE

## 2018-01-12 RX ORDER — HYDROCODONE BITARTRATE AND ACETAMINOPHEN 500; 5 MG/1; MG/1
TABLET ORAL
Status: DISCONTINUED | OUTPATIENT
Start: 2018-01-12 | End: 2018-01-15 | Stop reason: HOSPADM

## 2018-01-12 RX ORDER — LUBIPROSTONE 24 UG/1
24 CAPSULE ORAL 2 TIMES DAILY WITH MEALS
Status: DISCONTINUED | OUTPATIENT
Start: 2018-01-12 | End: 2018-01-15 | Stop reason: HOSPADM

## 2018-01-12 RX ADMIN — PIPERACILLIN AND TAZOBACTAM 2.25 G: 2; .25 INJECTION, POWDER, LYOPHILIZED, FOR SOLUTION INTRAVENOUS; PARENTERAL at 04:01

## 2018-01-12 RX ADMIN — TAMSULOSIN HYDROCHLORIDE 0.4 MG: 0.4 CAPSULE ORAL at 09:01

## 2018-01-12 RX ADMIN — PIPERACILLIN AND TAZOBACTAM 2.25 G: 2; .25 INJECTION, POWDER, LYOPHILIZED, FOR SOLUTION INTRAVENOUS; PARENTERAL at 11:01

## 2018-01-12 RX ADMIN — LUBIPROSTONE 24 MCG: 24 CAPSULE, GELATIN COATED ORAL at 05:01

## 2018-01-12 RX ADMIN — LEVOTHYROXINE SODIUM 50 MCG: 50 TABLET ORAL at 09:01

## 2018-01-12 RX ADMIN — PIPERACILLIN AND TAZOBACTAM 2.25 G: 2; .25 INJECTION, POWDER, LYOPHILIZED, FOR SOLUTION INTRAVENOUS; PARENTERAL at 05:01

## 2018-01-12 RX ADMIN — PIPERACILLIN AND TAZOBACTAM 2.25 G: 2; .25 INJECTION, POWDER, LYOPHILIZED, FOR SOLUTION INTRAVENOUS; PARENTERAL at 10:01

## 2018-01-12 RX ADMIN — PANTOPRAZOLE SODIUM 40 MG: 40 TABLET, DELAYED RELEASE ORAL at 09:01

## 2018-01-12 NOTE — PT/OT/SLP PROGRESS
"Physical Therapy Treatment    Patient Name:  Eddie Parada Sr.   MRN:  [id number]    Recommendations:     Discharge Recommendations:  home with home health, home health PT   Discharge Equipment Recommendations: none   Barriers to discharge: None    Assessment:     Eddie Parada Sr. is a 88 y.o. male admitted with a medical diagnosis of Pseudomonas urinary tract infection.  He presents with the following impairments/functional limitations:  weakness, impaired endurance, impaired self care skills, impaired functional mobilty, gait instability, impaired balance, decreased lower extremity function, decreased safety awareness . Pt c/o not feeling well this AM. Pt refused to attempt to get OOB. Pt awaiting 2 units of blood today. Pt tolerated supine therapeutic exercises well with rest breaks as needed.    Rehab Prognosis:  Good; patient would benefit from acute skilled PT services to address these deficits and reach maximum level of function.      Recent Surgery: * No surgery found *      Plan:     During this hospitalization, patient to be seen  (1-2x/day Monday-Friday; PRN Weekends/Holidays) to address the above listed problems via gait training, therapeutic activities, therapeutic exercises  · Plan of Care Expires:   (upon D/C from facility)   Plan of Care Reviewed with: patient    Subjective     Communicated with patient and NSG prior to session.  Patient found supine in bed upon PT entry to room, agreeable to treatment.      Chief Complaint: Pt c/o not feeling well.  Patient comments/goals: "Get stronger"  Pain/Comfort:  · Pain Rating 1: 0/10    Patients cultural, spiritual, Nondenominational conflicts given the current situation:      Objective:     Patient found with: telemetry, shields catheter     General Precautions: Standard, fall   Orthopedic Precautions:N/A     AM-PAC 6 CLICK MOBILITY  Turning over in bed (including adjusting bedclothes, sheets and blankets)?: 1  Sitting down on and standing up from a chair " with arms (e.g., wheelchair, bedside commode, etc.): 1  Moving from lying on back to sitting on the side of the bed?: 1  Moving to and from a bed to a chair (including a wheelchair)?: 1  Need to walk in hospital room?: 1  Climbing 3-5 steps with a railing?: 1  Total Score: 6       Therapeutic Activities and Exercises:   Therapeutic exercises performed on BLE supine in bed at all joints in available planes of motion with rest breaks as needed to increase strength and endurance with all gross mobility skills.    Patient left supine with all lines intact, call button in reach and NSG notified..    GOALS:    Physical Therapy Goals        Problem: Physical Therapy Goal    Goal Priority Disciplines Outcome Goal Variances Interventions   Physical Therapy Goal     PT/OT, PT Ongoing (interventions implemented as appropriate)     Description:  Goals to be met by: 2018     Patient will increase functional independence with mobility by performin. Supine to sit with Stand-by Assistance  2. Sit to supine with Stand-by Assistance  3. Rolling to Left and Right with Stand-by Assistance.  4. Sit to stand transfer with Stand-by Assistance, using RW  5. Bed to chair transfer with Stand-by Assistance using Rolling Walker  6. Gait  x 50 feet with Stand-by Assistance using Rolling Walker.                       Time Tracking:     PT Received On: 18  PT Start Time: 1030     PT Stop Time: 1045  PT Total Time (min): 15 min     Billable Minutes: Therapeutic Exercise 15 minutes    Treatment Type: Treatment  PT/PTA: PT           Cindy Florentino, PT  2018

## 2018-01-12 NOTE — PLAN OF CARE
Problem: Patient Care Overview  Goal: Plan of Care Review  Outcome: Ongoing (interventions implemented as appropriate)  CT renal stone study showed patient's shields was placed in the prostatic urethra, causing obstruction. A new shields was placed via Ann GORDON RN. Patient tolerated well. 1400 UOP obtained from new shields. Patient stable. No reports of pain since new shields placement. IV Zosyn. Telemetry monitoring. Accuchecks AC/HS. Plan of care reviewed with patient & family. They voiced understanding.

## 2018-01-12 NOTE — ASSESSMENT & PLAN NOTE
His urine looks like source of his blood loss with   foleys catheter and him being on coumadin with elevated INR Monitor h/h   If hematocrit below 21, so I will transfuse with 2 units PRBCs on 1-12-18

## 2018-01-12 NOTE — PT/OT/SLP PROGRESS
Physical Therapy      Patient Name:  Eddie Maysamson Toussaint.   MRN:  [id number]    Patient not seen today secondary to Patient unwilling to participate. Pt c/o not feeling well. Will follow-up 01/13/2018.    Cindy Floerntino, PT  /

## 2018-01-12 NOTE — PLAN OF CARE
Problem: Patient Care Overview  Goal: Plan of Care Review  Outcome: Ongoing (interventions implemented as appropriate)  Quiet shift. Afebrile. Denies pain. Able to turn self. HOB elevated. Saline lock intact and patent. Tolerating IV antibiotics well. Bishop patent to urimeter draining bloody urine. Spouse at bedside. Call bell vishal reach and use. Plan of care discussed with wife and patient and verbalizes understanding.

## 2018-01-12 NOTE — SUBJECTIVE & OBJECTIVE
Review of Systems   Constitutional: Positive for fatigue. Negative for activity change, appetite change, chills, diaphoresis and fever.   HENT: Negative for congestion, ear pain, postnasal drip, sneezing and sore throat.    Eyes: Negative for photophobia, redness and visual disturbance.   Respiratory: Negative for cough, chest tightness, shortness of breath and stridor.    Cardiovascular: Negative for chest pain.   Gastrointestinal: Positive for constipation. Negative for abdominal distention, abdominal pain, blood in stool, diarrhea, nausea and vomiting.   Genitourinary: Positive for decreased urine volume and urgency. Negative for difficulty urinating, dysuria, flank pain and hematuria.        INdwellling shields in place and draining well   Musculoskeletal: Negative for arthralgias, back pain, joint swelling, myalgias and neck pain.   Skin: Negative for pallor and rash.   Neurological: Positive for weakness. Negative for dizziness, seizures, facial asymmetry, speech difficulty and numbness.   Hematological: Does not bruise/bleed easily.   Psychiatric/Behavioral: Negative for agitation and suicidal ideas. The patient is not nervous/anxious.      Objective:     Vital Signs (Most Recent):  Temp: 96.2 °F (35.7 °C) (01/12/18 0815)  Pulse: 60 (01/12/18 0815)  Resp: 18 (01/12/18 0815)  BP: (!) 113/56 (01/12/18 0815)  SpO2: 96 % (01/12/18 0815) Vital Signs (24h Range):  Temp:  [96.1 °F (35.6 °C)-97.8 °F (36.6 °C)] 96.2 °F (35.7 °C)  Pulse:  [58-67] 60  Resp:  [18-20] 18  SpO2:  [95 %-97 %] 96 %  BP: ()/(49-56) 113/56     Weight: 81 kg (178 lb 9.2 oz)  Body mass index is 28.82 kg/m².    Physical Exam   Constitutional: He is oriented to person, place, and time. He appears well-developed and well-nourished.   HENT:   Head: Normocephalic and atraumatic.   Neck: No JVD present.   Cardiovascular: Normal rate.  Exam reveals friction rub.    Pulmonary/Chest: Effort normal and breath sounds normal.   Abdominal: Soft. Bowel  "sounds are normal. There is tenderness (+suprapubic tenderness with percussion of bladder to umbilicus).   Genitourinary:   Genitourinary Comments: Bishop in place  Milky tea colored urine in bag     Musculoskeletal: He exhibits no edema.   Neurological: He is alert and oriented to person, place, and time.   Skin: Skin is warm and dry.   Psychiatric: He has a normal mood and affect. His behavior is normal. Judgment and thought content normal.   Nursing note and vitals reviewed.          Significant Labs:   CBC:     Recent Labs  Lab 01/11/18 0515 01/12/18  0517   WBC 13.04* 11.19   HGB 7.4* 7.0*   HCT 22.7* 21.4*    205     CMP:     Recent Labs  Lab 01/11/18 0515 01/12/18 0517   * 132*   K 3.5 3.5   CL 99 100   CO2 18* 20*   * 104   BUN 76* 72*   CREATININE 2.5* 2.2*   CALCIUM 8.3* 8.4*   ANIONGAP 14 12   EGFRNONAA 22* 26*     Lactate 1.4  INR 5.3>>4.0>>3.5  Blood culture NGTD x 1    Urine culture from 1/5/18  Urine Culture, Routine PSEUDOMONAS AERUGINOSA   >100,000 cfu/ml        Resulting Agency [facility]   Susceptibility      Pseudomonas aeruginosa     CULTURE, URINE     Amikacin <=16 "><=16  Sensitive     Cefepime <=8 "><=8  Sensitive     Ciprofloxacin <=1 "><=1  Sensitive     Gentamicin <=4 "><=4  Sensitive     Meropenem <=4 "><=4  Sensitive     Piperacillin/Tazo <=16 "><=16  Sensitive     Tobramycin <=4 "><=4  Sensitive             Urinalysis brown and cloudy 3+ leuko + nitrite and 1+ leuko, 100RBC, 75 WBC  "

## 2018-01-12 NOTE — PLAN OF CARE
Discussed plan of care with patient and Dr Mondragon. Two more days IV antibiotic therapy needed. All agree with this plan,

## 2018-01-12 NOTE — PROGRESS NOTES
Report received from LUCI Johnson. Patient lying in bed. Spouse at side. Hematuria noted to catheter. Patient denies pain or pressure. No distress noted.

## 2018-01-12 NOTE — ASSESSMENT & PLAN NOTE
"Continue foleys catheter --> this was removed and replaced on 1/11. See above h/c  Seen urology [facility] ;they would keep shields's for one more month  Continue flomax.   Pt's shields is draining well now.    "I recommend continuing the Flomax and stopping the oxybutynin.  Shields catheter is changed today under sterile technique.  Nursing staff unable to pass a catheter however I am able to pass a 16 Indian coudé.  This is attached to a leg bag and family is instructed on its use.  Note written for home health to change the catheter in 1 month.  Patient to follow-up with me in 2 months at which time we will give him a voiding trial in the office.  Hopefully by that time The patient will be fully ambulatory.      "

## 2018-01-12 NOTE — PROGRESS NOTES
"[facility] Hospital Medicine  Progress Note    Patient Name: Eddie Parada Sr.  MRN: [id number]  Patient Class: IP- Inpatient   Admission Date: 1/8/2018  Length of Stay: 2 days  Attending Physician: Georgia Ann MD  Primary Care Provider: Callum Roberts MD        Subjective:     Principal Problem:Pseudomonas urinary tract infection    HPI:  Eddie Parada Sr. is a 88 y.o. male   Eddie Parada Sr. is a 88 y.o. male  Here for follow up for hypoglycemia  Keeps getting recurrent UTIs.  Last  Culture grew up : pseudomas  I sent to ER last time he did not  Want to stay   Seen urologist December ;   He is allergic to Levaquin ; he could not walk   Wife reluctant to try cipro which was .  He is having issues with dysuria . S/p catheterization since november .     Urine Culture, Routine              PSEUDOMONAS AERUGINOSA   >100,000 cfu/ml       Resulting Agency        [facility]  Susceptibility                     Pseudomonas aeruginosa                    CULTURE, URINE                    Amikacin         <=16 "><=16   Sensitive                       Cefepime         <=8 "><=8       Sensitive                       Ciprofloxacin   <=1 "><=1       Sensitive                       Gentamicin      <=4 "><=4       Sensitive                       Meropenem     <=4 "><=4       Sensitive                       Piperacillin/Tazo         <=16 "><=16   Sensitive                       Tobramycin     <=4 "><=4       Sensitive             Patient has been seen in the emergency room by his primary care physician several times  Patient has a indwelling Bishop catheter with a residual Pseudomonas urinary tract infection  Patient is ALLERGIC to Cipro and the cultures of the Pseudomonas are difficult to treat now  Patient has failed oral antibiotics, presents from PCP office for admission for IV antibiotics  Urine culture last ER visit shows a pseudomonas sensitive to Zosyn, afebrile and stable    Hospital " Course:  1/10/2018  Urine looks like brown with sediment. Nurse reports that it was clearer yesterday evening. Cont on zosyn and started NS 75ml/hr. Afebrile. This am bp low 81/45- coreg 6.25mg po BID reordered per home routine but held this am. HR 60    PT started to prevent further weakening    1/11/2018  Pt still with tea colored urine this am but a little clear today.  BUN/creat bumped 76/2.5 (admission was 54/1.4) coumadin remains held for INR 3.5 (down from 5.3 on admission). H/H still falling 7.4/22.7 (from 9.3/28.1 on  Admission)    BNP check last night for c/o wheezing. Was elevated in 700's, appears his baseline is 300-400. He remains on D51/2 NS at 75ml/hr for decreased urinary outpt 510, intake 925 [id number]=270 +. He is lying nearly flat today with no c/o SOB.     BP still low in 80-90/50 HR 60.     DM meds held as well for low blood sugars in 30 (was on amaryl at home). Now sugars increasing 124-160 no SSI used as he is on low dose    Interval history:  Bladder scan came back without residual urine, but CT showed hydronephrosis and shields at the prostate. Replaced shields and 1400 cc of urine was produced.    1-12-18 Will continue to treat his UTI w parental IV Abx    Review of Systems   Constitutional: Positive for fatigue. Negative for activity change, appetite change, chills, diaphoresis and fever.   HENT: Negative for congestion, ear pain, postnasal drip, sneezing and sore throat.    Eyes: Negative for photophobia, redness and visual disturbance.   Respiratory: Negative for cough, chest tightness, shortness of breath and stridor.    Cardiovascular: Negative for chest pain.   Gastrointestinal: Positive for constipation. Negative for abdominal distention, abdominal pain, blood in stool, diarrhea, nausea and vomiting.   Genitourinary: Positive for decreased urine volume and urgency. Negative for difficulty urinating, dysuria, flank pain and hematuria.        INdwellling shields in place and draining well    Musculoskeletal: Negative for arthralgias, back pain, joint swelling, myalgias and neck pain.   Skin: Negative for pallor and rash.   Neurological: Positive for weakness. Negative for dizziness, seizures, facial asymmetry, speech difficulty and numbness.   Hematological: Does not bruise/bleed easily.   Psychiatric/Behavioral: Negative for agitation and suicidal ideas. The patient is not nervous/anxious.      Objective:     Vital Signs (Most Recent):  Temp: 96.2 °F (35.7 °C) (01/12/18 0815)  Pulse: 60 (01/12/18 0815)  Resp: 18 (01/12/18 0815)  BP: (!) 113/56 (01/12/18 0815)  SpO2: 96 % (01/12/18 0815) Vital Signs (24h Range):  Temp:  [96.1 °F (35.6 °C)-97.8 °F (36.6 °C)] 96.2 °F (35.7 °C)  Pulse:  [58-67] 60  Resp:  [18-20] 18  SpO2:  [95 %-97 %] 96 %  BP: ()/(49-56) 113/56     Weight: 81 kg (178 lb 9.2 oz)  Body mass index is 28.82 kg/m².    Physical Exam   Constitutional: He is oriented to person, place, and time. He appears well-developed and well-nourished.   HENT:   Head: Normocephalic and atraumatic.   Neck: No JVD present.   Cardiovascular: Normal rate.  Exam reveals friction rub.    Pulmonary/Chest: Effort normal and breath sounds normal.   Abdominal: Soft. Bowel sounds are normal. There is tenderness (+suprapubic tenderness with percussion of bladder to umbilicus).   Genitourinary:   Genitourinary Comments: Bishop in place  Milky tea colored urine in bag     Musculoskeletal: He exhibits no edema.   Neurological: He is alert and oriented to person, place, and time.   Skin: Skin is warm and dry.   Psychiatric: He has a normal mood and affect. His behavior is normal. Judgment and thought content normal.   Nursing note and vitals reviewed.          Significant Labs:   CBC:     Recent Labs  Lab 01/11/18  0515 01/12/18  0517   WBC 13.04* 11.19   HGB 7.4* 7.0*   HCT 22.7* 21.4*    205     CMP:     Recent Labs  Lab 01/11/18  0515 01/12/18  0517   * 132*   K 3.5 3.5   CL 99 100   CO2 18* 20*   GLU  "138* 104   BUN 76* 72*   CREATININE 2.5* 2.2*   CALCIUM 8.3* 8.4*   ANIONGAP 14 12   EGFRNONAA 22* 26*     Lactate 1.4  INR 5.3>>4.0>>3.5  Blood culture NGTD x 1    Urine culture from 1/5/18  Urine Culture, Routine PSEUDOMONAS AERUGINOSA   >100,000 cfu/ml        Resulting Agency [facility]   Susceptibility      Pseudomonas aeruginosa     CULTURE, URINE     Amikacin <=16 "><=16  Sensitive     Cefepime <=8 "><=8  Sensitive     Ciprofloxacin <=1 "><=1  Sensitive     Gentamicin <=4 "><=4  Sensitive     Meropenem <=4 "><=4  Sensitive     Piperacillin/Tazo <=16 "><=16  Sensitive     Tobramycin <=4 "><=4  Sensitive             Urinalysis brown and cloudy 3+ leuko + nitrite and 1+ leuko, 100RBC, 75 WBC    Assessment/Plan:      * Pseudomonas urinary tract infection      He is allergic to levaquin and cipro  Only other po med would have been cipro ; severe allergy to levaquin   Left us no choice but put him on IV therapy   Zosyn for 5 days ; started 1/8/19 but now with elevated creat and cont h/h drop, check renal stone study today - shields not in place  Susceptibility     Cr is improving, and will continue present Rx     Pseudomonas aeruginosa     CULTURE, URINE     Amikacin <=16 "><=16  Sensitive     Cefepime <=8 "><=8  Sensitive     Ciprofloxacin <=1 "><=1  Sensitive     Gentamicin <=4 "><=4  Sensitive     Meropenem <=4 "><=4  Sensitive     Piperacillin/Tazo <=16 "><=16  Sensitive     Tobramycin <=4 "><=4  Sensitive                Hypotension      Was on fluids last couple days. BP stable although low, holding this am  To r/o obstructive uropathy this am.   BNP elevated but no wheezing or c/o SOB        NIK (acute kidney injury)    Thought to be from UTI, treating with abx. Check CT pelvis today to r/o obstructive  Uropathy.  This is an outlet obstruction NIK. Should resolve with correction of shields placement.  BMP in am.        Hypoglycemia associated with diabetes    DC amaryl   Dc actos  Insulin sliding scale.  Change fluids " "to D5- hold today as blood sugars better          Acute posthemorrhagic anemia    His urine looks like source of his blood loss with   foleys catheter and him being on coumadin with elevated INR Monitor h/h   If hematocrit below 21, so I will transfuse with 2 units PRBCs on 1-12-18          Urinary retention    Continue foleys catheter --> this was removed and replaced on 1/11. See above h/c  Seen urology [facility] ;they would keep shields's for one more month  Continue flomax.   Pt's shields is draining well now.    "I recommend continuing the Flomax and stopping the oxybutynin.  Shields catheter is changed today under sterile technique.  Nursing staff unable to pass a catheter however I am able to pass a 16 Maltese coudé.  This is attached to a leg bag and family is instructed on its use.  Note written for home health to change the catheter in 1 month.  Patient to follow-up with me in 2 months at which time we will give him a voiding trial in the office.  Hopefully by that time The patient will be fully ambulatory.            S/P AVR    Continue coumadin once INR down          Type 2 diabetes mellitus with neurologic complication    He is hypoglycemic- changhed NS to D51/2 cont at 75ml/hr- now bs elevating, will change back to NS this am, slow due to elevated BNP and wheezing  Hold meds for now (sulfonuria at home, none here)  Insulin sliding scale low dose          Anticoagulation monitoring by pharmacist              Atrial fibrillation    continue coreg and coumadin - holding coreg again today  Watch H/H daily though type and screen today   Follow INRs- holding coumadin (INR 5.3>>4.0>>3.5)            VTE Risk Mitigation         Ordered     Medium Risk of VTE  Once      01/08/18 1828     Place sequential compression device  Until discontinued      01/08/18 1828              Montana Mondragon MD  Department of Hospital Medicine   [facility]  "

## 2018-01-12 NOTE — PROGRESS NOTES
"[facility] Medicine  Progress Note    Patient Name: Eddie Parada Sr.  MRN: [id number]  Patient Class: IP- Inpatient   Admission Date: 1/8/2018  Length of Stay: 1 days  Attending Physician: Georgia Ann MD  Primary Care Provider: Callum Roberts MD        Subjective:     Principal Problem:Pseudomonas urinary tract infection    HPI:  Eddie Parada Sr. is a 88 y.o. male   Eddie Parada Sr. is a 88 y.o. male  Here for follow up for hypoglycemia  Keeps getting recurrent UTIs.  Last  Culture grew up : pseudomas  I sent to ER last time he did not  Want to stay   Seen urologist December ;   He is allergic to Levaquin ; he could not walk   Wife reluctant to try cipro which was .  He is having issues with dysuria . S/p catheterization since november .     Urine Culture, Routine              PSEUDOMONAS AERUGINOSA   >100,000 cfu/ml       Resulting Agency        [facility]  Susceptibility                     Pseudomonas aeruginosa                    CULTURE, URINE                    Amikacin         <=16 "><=16   Sensitive                       Cefepime         <=8 "><=8       Sensitive                       Ciprofloxacin   <=1 "><=1       Sensitive                       Gentamicin      <=4 "><=4       Sensitive                       Meropenem     <=4 "><=4       Sensitive                       Piperacillin/Tazo         <=16 "><=16   Sensitive                       Tobramycin     <=4 "><=4       Sensitive             Patient has been seen in the emergency room by his primary care physician several times  Patient has a indwelling Bishop catheter with a residual Pseudomonas urinary tract infection  Patient is ALLERGIC to Cipro and the cultures of the Pseudomonas are difficult to treat now  Patient has failed oral antibiotics, presents from PCP office for admission for IV antibiotics  Urine culture last ER visit shows a pseudomonas sensitive to Zosyn, afebrile and stable    Hospital " Course:  1/10/2018  Urine looks like brown with sediment. Nurse reports that it was clearer yesterday evening. Cont on zosyn and started NS 75ml/hr. Afebrile. This am bp low 81/45- coreg 6.25mg po BID reordered per home routine but held this am. HR 60    PT started to prevent further weakening    1/11/2018  Pt still with tea colored urine this am but a little clear today.  BUN/creat bumped 76/2.5 (admission was 54/1.4) coumadin remains held for INR 3.5 (down from 5.3 on admission). H/H still falling 7.4/22.7 (from 9.3/28.1 on  Admission)    BNP check last night for c/o wheezing. Was elevated in 700's, appears his baseline is 300-400. He remains on D51/2 NS at 75ml/hr for decreased urinary outpt 510, intake 925 [id number] +. He is lying nearly flat today with no c/o SOB.     BP still low in 80-90/50 HR 60.     DM meds held as well for low blood sugars in 30 (was on amaryl at home). Now sugars increasing 124-160 no SSI used as he is on low dose    Interval history:  Bladder scan came back without residual urine, but CT showed hydronephrosis and shields at the prostate. Replaced shields and 1400 cc of urine was produced.    Review of Systems   Constitutional: Positive for fatigue. Negative for activity change, chills, diaphoresis and fever.   HENT: Negative for congestion, postnasal drip and sore throat.    Eyes: Negative for photophobia.   Respiratory: Negative for chest tightness and shortness of breath.    Cardiovascular: Negative for chest pain.   Gastrointestinal: Positive for abdominal pain and constipation. Negative for abdominal distention, blood in stool and vomiting.   Genitourinary: Positive for decreased urine volume, difficulty urinating, dysuria and urgency. Negative for flank pain and hematuria.        Shields in place   Musculoskeletal: Negative for back pain.   Skin: Negative for pallor.   Neurological: Positive for weakness. Negative for dizziness, seizures, facial asymmetry, speech difficulty and  numbness.   Hematological: Does not bruise/bleed easily.   Psychiatric/Behavioral: Negative for agitation and suicidal ideas. The patient is nervous/anxious.      Objective:     Vital Signs (Most Recent):  Temp: 97.2 °F (36.2 °C) (01/11/18 0348)  Pulse: 60 (01/11/18 0600)  Resp: 16 (01/11/18 0348)  BP: (!) 98/52 (01/11/18 0348)  SpO2: (!) 81 % (01/11/18 0348) Vital Signs (24h Range):  Temp:  [96 °F (35.6 °C)-99.3 °F (37.4 °C)] 97.2 °F (36.2 °C)  Pulse:  [58-87] 60  Resp:  [16-20] 16  SpO2:  [81 %-96 %] 81 %  BP: (81-98)/(45-52) 98/52     Weight: 81 kg (178 lb 9.2 oz)  Body mass index is 28.82 kg/m².    Physical Exam   Constitutional: He is oriented to person, place, and time. He appears well-developed and well-nourished.   HENT:   Head: Normocephalic and atraumatic.   Neck: No JVD present.   Cardiovascular: Normal rate and normal heart sounds.    Pulmonary/Chest: Effort normal and breath sounds normal.   Abdominal: Soft. Bowel sounds are normal. There is tenderness (+suprapubic tenderness with percussion of bladder to umbilicus).   Genitourinary:   Genitourinary Comments: Bishop in place  Milky tea colored urine in bag     Musculoskeletal: He exhibits no edema.   Neurological: He is alert and oriented to person, place, and time.   Skin: Skin is warm and dry.   Psychiatric: He has a normal mood and affect. His behavior is normal. Judgment and thought content normal.   Nursing note and vitals reviewed.          Significant Labs:   CBC:     Recent Labs  Lab 01/10/18  0511 01/11/18  0515   WBC 10.69 13.04*   HGB 7.9* 7.4*   HCT 24.4* 22.7*    190     CMP:     Recent Labs  Lab 01/09/18  1737 01/10/18  0511 01/11/18  0515   NA  --  136 131*   K  --  3.7 3.5   CL  --  103 99   CO2  --  21* 18*   GLU 43* 34* 138*   BUN  --  68* 76*   CREATININE  --  1.6* 2.5*   CALCIUM  --  9.0 8.3*   ANIONGAP  --  12 14   EGFRNONAA  --  38* 22*     Lactate 1.4  INR 5.3>>4.0>>3.5  Blood culture NGTD x 1    Urine culture from  "1/5/18  Urine Culture, Routine PSEUDOMONAS AERUGINOSA   >100,000 cfu/ml        Resulting Agency [facility]   Susceptibility      Pseudomonas aeruginosa     CULTURE, URINE     Amikacin <=16 "><=16  Sensitive     Cefepime <=8 "><=8  Sensitive     Ciprofloxacin <=1 "><=1  Sensitive     Gentamicin <=4 "><=4  Sensitive     Meropenem <=4 "><=4  Sensitive     Piperacillin/Tazo <=16 "><=16  Sensitive     Tobramycin <=4 "><=4  Sensitive             Urinalysis brown and cloudy 3+ leuko + nitrite and 1+ leuko, 100RBC, 75 WBC    Assessment/Plan:      * Pseudomonas urinary tract infection      He is allergic to levaquin and cipro  Only other po med would have been cipro ; severe allergy to levaquin   Left us no choice but put him on IV therapy   Zosyn for 5 days ; started 1/8/19 but now with elevated creat and cont h/h drop, check renal stone study today - shields not in place  Susceptibility      Pseudomonas aeruginosa     CULTURE, URINE     Amikacin <=16 "><=16  Sensitive     Cefepime <=8 "><=8  Sensitive     Ciprofloxacin <=1 "><=1  Sensitive     Gentamicin <=4 "><=4  Sensitive     Meropenem <=4 "><=4  Sensitive     Piperacillin/Tazo <=16 "><=16  Sensitive     Tobramycin <=4 "><=4  Sensitive                Hypotension      Was on fluids last couple days. BP stable although low, holding this am  To r/o obstructive uropathy this am.   BNP elevated but no wheezing or c/o SOB        NIK (acute kidney injury)    Thought to be from UTI, treating with abx. Check CT pelvis today to r/o obstructive  Uropathy.  This is an outlet obstruction NIK. Should resolve with correction of shields placement.  BMP in am.        Hypoglycemia associated with diabetes    DC amaryl   Dc actos  Insulin sliding scale.  Change fluids to D5- hold today as blood sugars better          Acute posthemorrhagic anemia    His urine looks like source of his blood loss with   foleys catheter and him being on coumadin with elevated INR Monitor h/h   If hematocrit below 21 " ";will transfuse type and matched yesterday          Urinary retention    Continue foleys catheter --> this was removed and replaced on 1/11. See above h/c  Seen urology [facility] ;they would keep shields's for one more month  Continue flomax    "I recommend continuing the Flomax and stopping the oxybutynin.  Shields catheter is changed today under sterile technique.  Nursing staff unable to pass a catheter however I am able to pass a 16 Divehi coudé.  This is attached to a leg bag and family is instructed on its use.  Note written for home health to change the catheter in 1 month.  Patient to follow-up with me in 2 months at which time we will give him a voiding trial in the office.  Hopefully by that time The patient will be fully ambulatory.            S/P AVR    Continue coumadin once INR down          Type 2 diabetes mellitus with neurologic complication    He is hypoglycemic- changhed NS to D51/2 cont at 75ml/hr- now bs elevating, will change back to NS this am, slow due to elevated BNP and wheezing  Hold meds for now (sulfonuria at home, none here)  Insulin sliding scale low dose          Anticoagulation monitoring by pharmacist              Atrial fibrillation    continue coreg and coumadin - holding coreg again today  Watch H/H daily though type and screen today   Follow INRs- holding coumadin (INR 5.3>>4.0>>3.5)            VTE Risk Mitigation         Ordered     Medium Risk of VTE  Once      01/08/18 1828     Place sequential compression device  Until discontinued      01/08/18 1828              Linda Greco MD  Department of Hospital Medicine   [facility]  "

## 2018-01-12 NOTE — ASSESSMENT & PLAN NOTE
"  He is allergic to levaquin and cipro  Only other po med would have been cipro ; severe allergy to levaquin   Left us no choice but put him on IV therapy   Zosyn for 5 days ; started 1/8/19 but now with elevated creat and cont h/h drop, check renal stone study today - shields not in place  Susceptibility     Cr is improving, and will continue present Rx     Pseudomonas aeruginosa     CULTURE, URINE     Amikacin <=16 "><=16  Sensitive     Cefepime <=8 "><=8  Sensitive     Ciprofloxacin <=1 "><=1  Sensitive     Gentamicin <=4 "><=4  Sensitive     Meropenem <=4 "><=4  Sensitive     Piperacillin/Tazo <=16 "><=16  Sensitive     Tobramycin <=4 "><=4  Sensitive          "

## 2018-01-13 LAB
ANION GAP SERPL CALC-SCNC: 12 MMOL/L
APTT BLDCRRT: 40.2 SEC
BASOPHILS # BLD AUTO: ABNORMAL K/UL
BASOPHILS NFR BLD: 0 %
BUN SERPL-MCNC: 48 MG/DL
CALCIUM SERPL-MCNC: 8.6 MG/DL
CHLORIDE SERPL-SCNC: 103 MMOL/L
CO2 SERPL-SCNC: 21 MMOL/L
CREAT SERPL-MCNC: 1.3 MG/DL
DIFFERENTIAL METHOD: ABNORMAL
EOSINOPHIL # BLD AUTO: ABNORMAL K/UL
EOSINOPHIL NFR BLD: 1 %
ERYTHROCYTE [DISTWIDTH] IN BLOOD BY AUTOMATED COUNT: 15.4 %
EST. GFR  (AFRICAN AMERICAN): 56 ML/MIN/1.73 M^2
EST. GFR  (NON AFRICAN AMERICAN): 49 ML/MIN/1.73 M^2
GLUCOSE SERPL-MCNC: 124 MG/DL
HCT VFR BLD AUTO: 28.5 %
HGB BLD-MCNC: 9.5 G/DL
INR PPP: 2.1
LYMPHOCYTES # BLD AUTO: ABNORMAL K/UL
LYMPHOCYTES NFR BLD: 16 %
MAGNESIUM SERPL-MCNC: 1.5 MG/DL
MCH RBC QN AUTO: 31.4 PG
MCHC RBC AUTO-ENTMCNC: 33.3 G/DL
MCV RBC AUTO: 94 FL
METAMYELOCYTES NFR BLD MANUAL: 2 %
MONOCYTES # BLD AUTO: ABNORMAL K/UL
MONOCYTES NFR BLD: 3 %
NEUTROPHILS # BLD AUTO: ABNORMAL K/UL
NEUTROPHILS NFR BLD: 75 %
NEUTS BAND NFR BLD MANUAL: 3 %
PLATELET # BLD AUTO: 212 K/UL
PMV BLD AUTO: 10 FL
POCT GLUCOSE: 129 MG/DL (ref 70–110)
POCT GLUCOSE: 141 MG/DL (ref 70–110)
POCT GLUCOSE: 163 MG/DL (ref 70–110)
POCT GLUCOSE: 169 MG/DL (ref 70–110)
POIKILOCYTOSIS BLD QL SMEAR: SLIGHT
POTASSIUM SERPL-SCNC: 3.5 MMOL/L
PROTHROMBIN TIME: 20.7 SEC
RBC # BLD AUTO: 3.03 M/UL
SODIUM SERPL-SCNC: 136 MMOL/L
WBC # BLD AUTO: 8.76 K/UL

## 2018-01-13 PROCEDURE — 99232 PR SUBSEQUENT HOSPITAL CARE,LEVL II: ICD-10-PCS | Mod: ,,, | Performed by: FAMILY MEDICINE

## 2018-01-13 PROCEDURE — 93005 ELECTROCARDIOGRAM TRACING: CPT

## 2018-01-13 PROCEDURE — 25000003 PHARM REV CODE 250: Performed by: INTERNAL MEDICINE

## 2018-01-13 PROCEDURE — 85027 COMPLETE CBC AUTOMATED: CPT

## 2018-01-13 PROCEDURE — 63600175 PHARM REV CODE 636 W HCPCS: Performed by: NURSE PRACTITIONER

## 2018-01-13 PROCEDURE — 93010 EKG 12-LEAD: ICD-10-PCS | Mod: ,,, | Performed by: INTERNAL MEDICINE

## 2018-01-13 PROCEDURE — 96375 TX/PRO/DX INJ NEW DRUG ADDON: CPT

## 2018-01-13 PROCEDURE — 99232 SBSQ HOSP IP/OBS MODERATE 35: CPT | Mod: ,,, | Performed by: FAMILY MEDICINE

## 2018-01-13 PROCEDURE — 36415 COLL VENOUS BLD VENIPUNCTURE: CPT

## 2018-01-13 PROCEDURE — 96376 TX/PRO/DX INJ SAME DRUG ADON: CPT

## 2018-01-13 PROCEDURE — 25000003 PHARM REV CODE 250: Performed by: SURGERY

## 2018-01-13 PROCEDURE — 85007 BL SMEAR W/DIFF WBC COUNT: CPT

## 2018-01-13 PROCEDURE — 82962 GLUCOSE BLOOD TEST: CPT

## 2018-01-13 PROCEDURE — 63600175 PHARM REV CODE 636 W HCPCS: Performed by: INTERNAL MEDICINE

## 2018-01-13 PROCEDURE — 25000003 PHARM REV CODE 250: Performed by: NURSE PRACTITIONER

## 2018-01-13 PROCEDURE — 80048 BASIC METABOLIC PNL TOTAL CA: CPT

## 2018-01-13 PROCEDURE — 93010 ELECTROCARDIOGRAM REPORT: CPT | Mod: ,,, | Performed by: INTERNAL MEDICINE

## 2018-01-13 PROCEDURE — 97116 GAIT TRAINING THERAPY: CPT

## 2018-01-13 PROCEDURE — 25000003 PHARM REV CODE 250: Performed by: FAMILY MEDICINE

## 2018-01-13 PROCEDURE — 85730 THROMBOPLASTIN TIME PARTIAL: CPT

## 2018-01-13 PROCEDURE — 11000001 HC ACUTE MED/SURG PRIVATE ROOM

## 2018-01-13 PROCEDURE — 97110 THERAPEUTIC EXERCISES: CPT

## 2018-01-13 PROCEDURE — 83735 ASSAY OF MAGNESIUM: CPT

## 2018-01-13 PROCEDURE — 85610 PROTHROMBIN TIME: CPT

## 2018-01-13 RX ORDER — POTASSIUM CHLORIDE 7.45 MG/ML
20 INJECTION INTRAVENOUS ONCE
Status: COMPLETED | OUTPATIENT
Start: 2018-01-13 | End: 2018-01-13

## 2018-01-13 RX ORDER — POTASSIUM CHLORIDE 14.9 MG/ML
20 INJECTION INTRAVENOUS ONCE
Status: DISCONTINUED | OUTPATIENT
Start: 2018-01-13 | End: 2018-01-13

## 2018-01-13 RX ORDER — METOPROLOL SUCCINATE 25 MG/1
25 TABLET, EXTENDED RELEASE ORAL DAILY
Status: DISCONTINUED | OUTPATIENT
Start: 2018-01-13 | End: 2018-01-15 | Stop reason: HOSPADM

## 2018-01-13 RX ORDER — PRAVASTATIN SODIUM 20 MG/1
20 TABLET ORAL NIGHTLY
Status: DISCONTINUED | OUTPATIENT
Start: 2018-01-13 | End: 2018-01-15 | Stop reason: HOSPADM

## 2018-01-13 RX ADMIN — LUBIPROSTONE 24 MCG: 24 CAPSULE, GELATIN COATED ORAL at 09:01

## 2018-01-13 RX ADMIN — PRAVASTATIN SODIUM 20 MG: 20 TABLET ORAL at 09:01

## 2018-01-13 RX ADMIN — LEVOTHYROXINE SODIUM 50 MCG: 50 TABLET ORAL at 09:01

## 2018-01-13 RX ADMIN — PANTOPRAZOLE SODIUM 40 MG: 40 TABLET, DELAYED RELEASE ORAL at 09:01

## 2018-01-13 RX ADMIN — TAMSULOSIN HYDROCHLORIDE 0.4 MG: 0.4 CAPSULE ORAL at 09:01

## 2018-01-13 RX ADMIN — METOPROLOL SUCCINATE 25 MG: 25 TABLET, EXTENDED RELEASE ORAL at 09:01

## 2018-01-13 RX ADMIN — POTASSIUM CHLORIDE 20 MEQ: 7.46 INJECTION, SOLUTION INTRAVENOUS at 03:01

## 2018-01-13 RX ADMIN — PIPERACILLIN AND TAZOBACTAM 2.25 G: 2; .25 INJECTION, POWDER, LYOPHILIZED, FOR SOLUTION INTRAVENOUS; PARENTERAL at 05:01

## 2018-01-13 RX ADMIN — MAGNESIUM SULFATE HEPTAHYDRATE 3 G: 500 INJECTION, SOLUTION INTRAMUSCULAR; INTRAVENOUS at 10:01

## 2018-01-13 RX ADMIN — PIPERACILLIN AND TAZOBACTAM 2.25 G: 2; .25 INJECTION, POWDER, LYOPHILIZED, FOR SOLUTION INTRAVENOUS; PARENTERAL at 10:01

## 2018-01-13 RX ADMIN — PIPERACILLIN AND TAZOBACTAM 2.25 G: 2; .25 INJECTION, POWDER, LYOPHILIZED, FOR SOLUTION INTRAVENOUS; PARENTERAL at 09:01

## 2018-01-13 NOTE — PLAN OF CARE
Problem: Diabetes, Type 2 (Adult)  Goal: Signs and Symptoms of Listed Potential Problems Will be Absent, Minimized or Managed (Diabetes, Type 2)  Signs and symptoms of listed potential problems will be absent, minimized or managed by discharge/transition of care (reference Diabetes, Type 2 (Adult) CPG).   Outcome: Ongoing (interventions implemented as appropriate)  Accuchecks ac&Hs no supplemental insulin given this shift.     Problem: Patient Care Overview  Goal: Plan of Care Review  Outcome: Ongoing (interventions implemented as appropriate)  Plan of care reviewed with patient and he agrees with the plan of care. Telemetry monitoring continues. 1 unit of blood infused this shift. Patient tolerated well. SCDs in use. Wife at bedside. No acute distress noted.     Problem: Fall Risk (Adult)  Goal: Absence of Falls  Patient will demonstrate the desired outcomes by discharge/transition of care.   Fall precautions maintained. Bed alarm engaged at all times. Wife at bedside.     Problem: Pressure Ulcer Risk (Ronaldo Scale) (Adult,Obstetrics,Pediatric)  Goal: Skin Integrity  Patient will demonstrate the desired outcomes by discharge/transition of care.   Outcome: Ongoing (interventions implemented as appropriate)  Patient encouraged to turn and reposition himself every two hours.     Problem: Urinary Tract Infection (Adult)  Goal: Signs and Symptoms of Listed Potential Problems Will be Absent, Minimized or Managed (Urinary Tract Infection)  Signs and symptoms of listed potential problems will be absent, minimized or managed by discharge/transition of care (reference Urinary Tract Infection (Adult) CPG).   Outcome: Ongoing (interventions implemented as appropriate)  Catheter patent draining bloody urine to gu bag. Afebrile. Zosyn continues without any adverse reactions.

## 2018-01-13 NOTE — PROGRESS NOTES
Decrease urinary output noted to shields bag. Patient feels no urge to urinate and denies bladder pressure. Shields irrigated with normal saline with removal of a visible blood clot. 350mL red urine flowed into bag following. Will continue to monitor.

## 2018-01-13 NOTE — PROGRESS NOTES
"[facility] Medicine  Progress Note    Patient Name: Eddie Parada Sr.  MRN: [id number]  Patient Class: IP- Inpatient   Admission Date: 1/8/2018  Length of Stay: 3 days  Attending Physician: Georgia Ann MD  Primary Care Provider: Callum Roberts MD        Subjective:     Principal Problem:Pseudomonas urinary tract infection    HPI:  Eddie Parada Sr. is a 88 y.o. male   Eddie Parada Sr. is a 88 y.o. male  Here for follow up for hypoglycemia  Keeps getting recurrent UTIs.  Last  Culture grew up : pseudomas  I sent to ER last time he did not  Want to stay   Seen urologist December ;   He is allergic to Levaquin ; he could not walk   Wife reluctant to try cipro which was .  He is having issues with dysuria . S/p catheterization since november .     Urine Culture, Routine              PSEUDOMONAS AERUGINOSA   >100,000 cfu/ml       Resulting Agency        [facility]  Susceptibility                     Pseudomonas aeruginosa                    CULTURE, URINE                    Amikacin         <=16 "><=16   Sensitive                       Cefepime         <=8 "><=8       Sensitive                       Ciprofloxacin   <=1 "><=1       Sensitive                       Gentamicin      <=4 "><=4       Sensitive                       Meropenem     <=4 "><=4       Sensitive                       Piperacillin/Tazo         <=16 "><=16   Sensitive                       Tobramycin     <=4 "><=4       Sensitive             Patient has been seen in the emergency room by his primary care physician several times  Patient has a indwelling Bishop catheter with a residual Pseudomonas urinary tract infection  Patient is ALLERGIC to Cipro and the cultures of the Pseudomonas are difficult to treat now  Patient has failed oral antibiotics, presents from PCP office for admission for IV antibiotics  Urine culture last ER visit shows a pseudomonas sensitive to Zosyn, afebrile and stable    Hospital " Course:  1/10/2018  Urine looks like brown with sediment. Nurse reports that it was clearer yesterday evening. Cont on zosyn and started NS 75ml/hr. Afebrile. This am bp low 81/45- coreg 6.25mg po BID reordered per home routine but held this am. HR 60    PT started to prevent further weakening    1/11/2018  Pt still with tea colored urine this am but a little clear today.  BUN/creat bumped 76/2.5 (admission was 54/1.4) coumadin remains held for INR 3.5 (down from 5.3 on admission). H/H still falling 7.4/22.7 (from 9.3/28.1 on  Admission)    BNP check last night for c/o wheezing. Was elevated in 700's, appears his baseline is 300-400. He remains on D51/2 NS at 75ml/hr for decreased urinary outpt 510, intake 925 [id number] +. He is lying nearly flat today with no c/o SOB.     BP still low in 80-90/50 HR 60.     DM meds held as well for low blood sugars in 30 (was on amaryl at home). Now sugars increasing 124-160 no SSI used as he is on low dose    Interval history:  Bladder scan came back without residual urine, but CT showed hydronephrosis and shields at the prostate. Replaced shields and 1400 cc of urine was produced.    1-12-18 1-13 Will continue to treat his UTI w parental IV Abx, along with Rxing his constipation    Review of Systems   Constitutional: Positive for fatigue. Negative for activity change, appetite change, chills, diaphoresis and fever.   HENT: Negative for congestion, ear pain, postnasal drip, sneezing and sore throat.    Eyes: Negative for photophobia, redness and visual disturbance.   Respiratory: Negative for cough, chest tightness, shortness of breath and stridor.    Cardiovascular: Negative for chest pain.   Gastrointestinal: Positive for constipation. Negative for abdominal distention, abdominal pain, blood in stool, diarrhea, nausea and vomiting.        Given amitiza last evening   Genitourinary: Positive for decreased urine volume and urgency. Negative for difficulty urinating, dysuria,  flank pain and hematuria.        INdwellling shields in place and draining well   Musculoskeletal: Negative for arthralgias, back pain, joint swelling, myalgias and neck pain.   Skin: Negative for pallor and rash.   Neurological: Positive for weakness. Negative for dizziness, seizures, facial asymmetry, speech difficulty and numbness.   Hematological: Does not bruise/bleed easily.   Psychiatric/Behavioral: Negative for agitation and suicidal ideas. The patient is not nervous/anxious.      Objective:     Vital Signs (Most Recent):  Temp: 96.5 °F (35.8 °C) (01/13/18 0312)  Pulse: 60 (01/13/18 0600)  Resp: 18 (01/13/18 0312)  BP: 129/63 (01/13/18 0312)  SpO2: 96 % (01/13/18 0312) Vital Signs (24h Range):  Temp:  [96.2 °F (35.7 °C)-98.7 °F (37.1 °C)] 96.5 °F (35.8 °C)  Pulse:  [57-70] 60  Resp:  [18] 18  SpO2:  [94 %-97 %] 96 %  BP: (101-129)/(51-63) 129/63     Weight: 81 kg (178 lb 9.2 oz)  Body mass index is 28.82 kg/m².    Physical Exam   Constitutional: He is oriented to person, place, and time. He appears well-developed and well-nourished.   HENT:   Head: Normocephalic and atraumatic.   Neck: No JVD present.   Cardiovascular: Normal rate.  Exam reveals friction rub.    Pulmonary/Chest: Effort normal and breath sounds normal.   Abdominal: Soft. Bowel sounds are normal. There is tenderness (+suprapubic tenderness with percussion of bladder to umbilicus).   Genitourinary:   Genitourinary Comments: Shields in place  Milky tea colored urine in bag     Musculoskeletal: He exhibits no edema.   Neurological: He is alert and oriented to person, place, and time.   Skin: Skin is warm and dry.   Psychiatric: He has a normal mood and affect. His behavior is normal. Judgment and thought content normal.   Nursing note and vitals reviewed.          Significant Labs:   CBC:     Recent Labs  Lab 01/12/18  0517 01/13/18  0611   WBC 11.19 8.76   HGB 7.0* 9.5*   HCT 21.4* 28.5*    212     CMP:     Recent Labs  Lab 01/12/18  0517  "01/13/18  0612   * 136   K 3.5 3.5    103   CO2 20* 21*    124*   BUN 72* 48*   CREATININE 2.2* 1.3   CALCIUM 8.4* 8.6*   ANIONGAP 12 12   EGFRNONAA 26* 49*     Lactate 1.4  INR 5.3>>4.0>>3.5  Blood culture NGTD x 1    Urine culture from 1/5/18  Urine Culture, Routine PSEUDOMONAS AERUGINOSA   >100,000 cfu/ml        Resulting Agency [facility]   Susceptibility      Pseudomonas aeruginosa     CULTURE, URINE     Amikacin <=16 "><=16  Sensitive     Cefepime <=8 "><=8  Sensitive     Ciprofloxacin <=1 "><=1  Sensitive     Gentamicin <=4 "><=4  Sensitive     Meropenem <=4 "><=4  Sensitive     Piperacillin/Tazo <=16 "><=16  Sensitive     Tobramycin <=4 "><=4  Sensitive             Urinalysis brown and cloudy 3+ leuko + nitrite and 1+ leuko, 100RBC, 75 WBC  Review of Systems   Constitutional: Positive for fatigue. Negative for activity change, appetite change, chills, diaphoresis and fever.   HENT: Negative for congestion, ear pain, postnasal drip, sneezing and sore throat.    Eyes: Negative for photophobia, redness and visual disturbance.   Respiratory: Negative for cough, chest tightness, shortness of breath and stridor.    Cardiovascular: Negative for chest pain.   Gastrointestinal: Positive for constipation. Negative for abdominal distention, abdominal pain, blood in stool, diarrhea, nausea and vomiting.   Genitourinary: Positive for decreased urine volume and urgency. Negative for difficulty urinating, dysuria, flank pain and hematuria.        INdwellling shields in place and draining well   Musculoskeletal: Negative for arthralgias, back pain, joint swelling, myalgias and neck pain.   Skin: Negative for pallor and rash.   Neurological: Positive for weakness. Negative for dizziness, seizures, facial asymmetry, speech difficulty and numbness.   Hematological: Does not bruise/bleed easily.   Psychiatric/Behavioral: Negative for agitation and suicidal ideas. The patient is not nervous/anxious.      Objective: " "    Vital Signs (Most Recent):  Temp: 96.5 °F (35.8 °C) (01/13/18 0312)  Pulse: 60 (01/13/18 0600)  Resp: 18 (01/13/18 0312)  BP: 129/63 (01/13/18 0312)  SpO2: 96 % (01/13/18 0312) Vital Signs (24h Range):  Temp:  [96.2 °F (35.7 °C)-98.7 °F (37.1 °C)] 96.5 °F (35.8 °C)  Pulse:  [57-70] 60  Resp:  [18] 18  SpO2:  [94 %-97 %] 96 %  BP: (101-129)/(51-63) 129/63     Weight: 81 kg (178 lb 9.2 oz)  Body mass index is 28.82 kg/m².    Physical Exam   Constitutional: He is oriented to person, place, and time. He appears well-developed and well-nourished.   HENT:   Head: Normocephalic and atraumatic.   Neck: No JVD present.   Cardiovascular: Normal rate.  Exam reveals friction rub.    Pulmonary/Chest: Effort normal and breath sounds normal.   Abdominal: Soft. Bowel sounds are normal. There is tenderness (+suprapubic tenderness with percussion of bladder to umbilicus).   Genitourinary:   Genitourinary Comments: Bishop in place  Milky tea colored urine in bag     Musculoskeletal: He exhibits no edema.   Neurological: He is alert and oriented to person, place, and time.   Skin: Skin is warm and dry.   Psychiatric: He has a normal mood and affect. His behavior is normal. Judgment and thought content normal.   Nursing note and vitals reviewed.          Significant Labs:   CBC:     Recent Labs  Lab 01/12/18  0517 01/13/18  0611   WBC 11.19 8.76   HGB 7.0* 9.5*   HCT 21.4* 28.5*    212     CMP:     Recent Labs  Lab 01/12/18  0517 01/13/18  0612   * 136   K 3.5 3.5    103   CO2 20* 21*    124*   BUN 72* 48*   CREATININE 2.2* 1.3   CALCIUM 8.4* 8.6*   ANIONGAP 12 12   EGFRNONAA 26* 49*     Lactate 1.4  INR 5.3>>4.0>>3.5  Blood culture NGTD x 1    Urine culture from 1/5/18  Urine Culture, Routine PSEUDOMONAS AERUGINOSA   >100,000 cfu/ml        Resulting Agency [facility]   Susceptibility      Pseudomonas aeruginosa     CULTURE, URINE     Amikacin <=16 "><=16  Sensitive     Cefepime <=8 "><=8  Sensitive     " "Ciprofloxacin <=1 "><=1  Sensitive     Gentamicin <=4 "><=4  Sensitive     Meropenem <=4 "><=4  Sensitive     Piperacillin/Tazo <=16 "><=16  Sensitive     Tobramycin <=4 "><=4  Sensitive             Urinalysis brown and cloudy 3+ leuko + nitrite and 1+ leuko, 100RBC, 75 WBC    Assessment/Plan:      * Pseudomonas urinary tract infection      He is allergic to levaquin and cipro  Only other po med would have been cipro ; severe allergy to levaquin   Left us no choice but put him on IV therapy   Zosyn for 5 days ; started 1/8/19 but now with elevated creat and cont h/h drop, check renal stone study today - shields not in place  Susceptibility     Cr is improving, and will continue present Rx    1-13 IV Pipercillin     Pseudomonas aeruginosa     CULTURE, URINE     Amikacin <=16 "><=16  Sensitive     Cefepime <=8 "><=8  Sensitive     Ciprofloxacin <=1 "><=1  Sensitive     Gentamicin <=4 "><=4  Sensitive     Meropenem <=4 "><=4  Sensitive     Piperacillin/Tazo <=16 "><=16  Sensitive     Tobramycin <=4 "><=4  Sensitive                Hypotension      Was on fluids last couple days. BP stable although low, holding this am  To r/o obstructive uropathy this am.   BNP elevated but no wheezing or c/o SOB        NIK (acute kidney injury)    Thought to be from UTI, treating with abx. Check CT pelvis today to r/o obstructive  Uropathy.  This is an outlet obstruction NIK. Should resolve with correction of shields placement.  BMP in am.        Hypoglycemia associated with diabetes    DC amaryl   Dc actos  Insulin sliding scale.  Change fluids to D5- hold today as blood sugars better          Acute posthemorrhagic anemia    His urine looks like source of his blood loss with   foleys catheter and him being on coumadin with elevated INR Monitor h/h   If hematocrit below 21, so I will transfuse with 2 units PRBCs on 1-12-18          Urinary retention    Continue foleys catheter --> this was removed and replaced on 1/11. See above h/c  Seen " "urology [facility] ;they would keep shields's for one more month  Continue flomax.   Pt's shields is draining well now.    "I recommend continuing the Flomax and stopping the oxybutynin.  Shields catheter is changed today under sterile technique.  Nursing staff unable to pass a catheter however I am able to pass a 16 [place] coudé.  This is attached to a leg bag and family is instructed on its use.  Note written for home health to change the catheter in 1 month.  Patient to follow-up with me in 2 months at which time we will give him a voiding trial in the office.  Hopefully by that time The patient will be fully ambulatory.            S/P AVR    Continue coumadin once INR down          Type 2 diabetes mellitus with neurologic complication    He is hypoglycemic- changhed NS to D51/2 cont at 75ml/hr- now bs elevating, will change back to NS this am, slow due to elevated BNP and wheezing  Hold meds for now (sulfonuria at home, none here)  Insulin sliding scale low dose          Anticoagulation monitoring by pharmacist              Atrial fibrillation    continue coreg and coumadin - holding coreg again today  Watch H/H daily though type and screen today   Follow INRs- holding coumadin (INR 5.3>>4.0>>3.5)            VTE Risk Mitigation         Ordered     Medium Risk of VTE  Once      01/08/18 1828     Place sequential compression device  Until discontinued      01/08/18 1828              Montana Mondragon MD  Department of Hospital Medicine   [facility]  "

## 2018-01-13 NOTE — SUBJECTIVE & OBJECTIVE
Review of Systems   Constitutional: Positive for fatigue. Negative for activity change, appetite change, chills, diaphoresis and fever.   HENT: Negative for congestion, ear pain, postnasal drip, sneezing and sore throat.    Eyes: Negative for photophobia, redness and visual disturbance.   Respiratory: Negative for cough, chest tightness, shortness of breath and stridor.    Cardiovascular: Negative for chest pain.   Gastrointestinal: Positive for constipation. Negative for abdominal distention, abdominal pain, blood in stool, diarrhea, nausea and vomiting.        Given amitiza last evening   Genitourinary: Positive for decreased urine volume and urgency. Negative for difficulty urinating, dysuria, flank pain and hematuria.        INdwellling shields in place and draining well   Musculoskeletal: Negative for arthralgias, back pain, joint swelling, myalgias and neck pain.   Skin: Negative for pallor and rash.   Neurological: Positive for weakness. Negative for dizziness, seizures, facial asymmetry, speech difficulty and numbness.   Hematological: Does not bruise/bleed easily.   Psychiatric/Behavioral: Negative for agitation and suicidal ideas. The patient is not nervous/anxious.      Objective:     Vital Signs (Most Recent):  Temp: 96.5 °F (35.8 °C) (01/13/18 0312)  Pulse: 60 (01/13/18 0600)  Resp: 18 (01/13/18 0312)  BP: 129/63 (01/13/18 0312)  SpO2: 96 % (01/13/18 0312) Vital Signs (24h Range):  Temp:  [96.2 °F (35.7 °C)-98.7 °F (37.1 °C)] 96.5 °F (35.8 °C)  Pulse:  [57-70] 60  Resp:  [18] 18  SpO2:  [94 %-97 %] 96 %  BP: (101-129)/(51-63) 129/63     Weight: 81 kg (178 lb 9.2 oz)  Body mass index is 28.82 kg/m².    Physical Exam   Constitutional: He is oriented to person, place, and time. He appears well-developed and well-nourished.   HENT:   Head: Normocephalic and atraumatic.   Neck: No JVD present.   Cardiovascular: Normal rate.  Exam reveals friction rub.    Pulmonary/Chest: Effort normal and breath sounds normal.  "  Abdominal: Soft. Bowel sounds are normal. There is tenderness (+suprapubic tenderness with percussion of bladder to umbilicus).   Genitourinary:   Genitourinary Comments: Bishop in place  Milky tea colored urine in bag     Musculoskeletal: He exhibits no edema.   Neurological: He is alert and oriented to person, place, and time.   Skin: Skin is warm and dry.   Psychiatric: He has a normal mood and affect. His behavior is normal. Judgment and thought content normal.   Nursing note and vitals reviewed.          Significant Labs:   CBC:     Recent Labs  Lab 01/12/18  0517 01/13/18  0611   WBC 11.19 8.76   HGB 7.0* 9.5*   HCT 21.4* 28.5*    212     CMP:     Recent Labs  Lab 01/12/18  0517 01/13/18  0612   * 136   K 3.5 3.5    103   CO2 20* 21*    124*   BUN 72* 48*   CREATININE 2.2* 1.3   CALCIUM 8.4* 8.6*   ANIONGAP 12 12   EGFRNONAA 26* 49*     Lactate 1.4  INR 5.3>>4.0>>3.5  Blood culture NGTD x 1    Urine culture from 1/5/18  Urine Culture, Routine PSEUDOMONAS AERUGINOSA   >100,000 cfu/ml        Resulting Agency [facility]   Susceptibility      Pseudomonas aeruginosa     CULTURE, URINE     Amikacin <=16 "><=16  Sensitive     Cefepime <=8 "><=8  Sensitive     Ciprofloxacin <=1 "><=1  Sensitive     Gentamicin <=4 "><=4  Sensitive     Meropenem <=4 "><=4  Sensitive     Piperacillin/Tazo <=16 "><=16  Sensitive     Tobramycin <=4 "><=4  Sensitive             Urinalysis brown and cloudy 3+ leuko + nitrite and 1+ leuko, 100RBC, 75 WBC  Review of Systems   Constitutional: Positive for fatigue. Negative for activity change, appetite change, chills, diaphoresis and fever.   HENT: Negative for congestion, ear pain, postnasal drip, sneezing and sore throat.    Eyes: Negative for photophobia, redness and visual disturbance.   Respiratory: Negative for cough, chest tightness, shortness of breath and stridor.    Cardiovascular: Negative for chest pain.   Gastrointestinal: Positive for constipation. Negative " for abdominal distention, abdominal pain, blood in stool, diarrhea, nausea and vomiting.   Genitourinary: Positive for decreased urine volume and urgency. Negative for difficulty urinating, dysuria, flank pain and hematuria.        INdwellling shields in place and draining well   Musculoskeletal: Negative for arthralgias, back pain, joint swelling, myalgias and neck pain.   Skin: Negative for pallor and rash.   Neurological: Positive for weakness. Negative for dizziness, seizures, facial asymmetry, speech difficulty and numbness.   Hematological: Does not bruise/bleed easily.   Psychiatric/Behavioral: Negative for agitation and suicidal ideas. The patient is not nervous/anxious.      Objective:     Vital Signs (Most Recent):  Temp: 96.5 °F (35.8 °C) (01/13/18 0312)  Pulse: 60 (01/13/18 0600)  Resp: 18 (01/13/18 0312)  BP: 129/63 (01/13/18 0312)  SpO2: 96 % (01/13/18 0312) Vital Signs (24h Range):  Temp:  [96.2 °F (35.7 °C)-98.7 °F (37.1 °C)] 96.5 °F (35.8 °C)  Pulse:  [57-70] 60  Resp:  [18] 18  SpO2:  [94 %-97 %] 96 %  BP: (101-129)/(51-63) 129/63     Weight: 81 kg (178 lb 9.2 oz)  Body mass index is 28.82 kg/m².    Physical Exam   Constitutional: He is oriented to person, place, and time. He appears well-developed and well-nourished.   HENT:   Head: Normocephalic and atraumatic.   Neck: No JVD present.   Cardiovascular: Normal rate.  Exam reveals friction rub.    Pulmonary/Chest: Effort normal and breath sounds normal.   Abdominal: Soft. Bowel sounds are normal. There is tenderness (+suprapubic tenderness with percussion of bladder to umbilicus).   Genitourinary:   Genitourinary Comments: Shields in place  Milky tea colored urine in bag     Musculoskeletal: He exhibits no edema.   Neurological: He is alert and oriented to person, place, and time.   Skin: Skin is warm and dry.   Psychiatric: He has a normal mood and affect. His behavior is normal. Judgment and thought content normal.   Nursing note and vitals  "reviewed.          Significant Labs:   CBC:     Recent Labs  Lab 01/12/18  0517 01/13/18  0611   WBC 11.19 8.76   HGB 7.0* 9.5*   HCT 21.4* 28.5*    212     CMP:     Recent Labs  Lab 01/12/18  0517 01/13/18  0612   * 136   K 3.5 3.5    103   CO2 20* 21*    124*   BUN 72* 48*   CREATININE 2.2* 1.3   CALCIUM 8.4* 8.6*   ANIONGAP 12 12   EGFRNONAA 26* 49*     Lactate 1.4  INR 5.3>>4.0>>3.5  Blood culture NGTD x 1    Urine culture from 1/5/18  Urine Culture, Routine PSEUDOMONAS AERUGINOSA   >100,000 cfu/ml        Resulting Agency [facility]   Susceptibility      Pseudomonas aeruginosa     CULTURE, URINE     Amikacin <=16 "><=16  Sensitive     Cefepime <=8 "><=8  Sensitive     Ciprofloxacin <=1 "><=1  Sensitive     Gentamicin <=4 "><=4  Sensitive     Meropenem <=4 "><=4  Sensitive     Piperacillin/Tazo <=16 "><=16  Sensitive     Tobramycin <=4 "><=4  Sensitive             Urinalysis brown and cloudy 3+ leuko + nitrite and 1+ leuko, 100RBC, 75 WBC  "

## 2018-01-13 NOTE — PROGRESS NOTES
Report received from LUCI Rdz. Patient lying in bed. Bishop intact draining red urine. Patient denies needs. Denies sob. Spouse  At side. No distress noted.

## 2018-01-13 NOTE — PT/OT/SLP PROGRESS
Physical Therapy Treatment    Patient Name:  Eddie Parada Sr.   MRN:  [id number]    Recommendations:     Discharge Recommendations:      Discharge Equipment Recommendations:     Barriers to discharge: None    Assessment:     Eddie Parada Sr. is a 88 y.o. male admitted with a medical diagnosis of Pseudomonas urinary tract infection.  He presents with the following impairments/functional limitations:    weakness, UTI.    Rehab Prognosis:  good; patient would benefit from acute skilled PT services to address these deficits and reach maximum level of function.      Recent Surgery: * No surgery found *      Plan:     During this hospitalization, patient to be seen  (1-2x/day(M-F); PRN(Sat.,Sun.)) to address the above listed problems via gait training, therapeutic activities, therapeutic exercises  · Plan of Care Expires:   (Upon D/C from facility)   Plan of Care Reviewed with: patient, spouse    Subjective     Communicated with patient and spouse  prior to session.  Patient found spine upon PT entry to room, agreeable to treatment.      Chief Complaint  UTI  Patient comments/goals: to get better and return home as soon as able  Pain/Comfort:  · Pain Rating 1: 0/10  · Pain Rating Post-Intervention 1: 0/10    Patients cultural, spiritual, Restorationist conflicts given the current situation:      Objective:     Patient found with:       General Precautions: Standard, fall   Orthopedic Precautions:N/A   Braces:       Functional Mobility:  · Bed Mobility:     · Rolling Left:  contact guard assistance  · Rolling Right: contact guard assistance  · Scooting: contact guard assistance  · Bridging: contact guard assistance  · Supine to Sit: contact guard assistance  · Sit to Supine: contact guard assistance  · Gait: Walker use to complete 100 ft of gait training this date: RW and contact guard assist. Decreased velocity of halie      AM-PAC 6 CLICK MOBILITY  Turning over in bed (including adjusting bedclothes, sheets and  blankets)?: 3  Sitting down on and standing up from a chair with arms (e.g., wheelchair, bedside commode, etc.): 3  Moving from lying on back to sitting on the side of the bed?: 3  Moving to and from a bed to a chair (including a wheelchair)?: 3  Need to walk in hospital room?: 3  Climbing 3-5 steps with a railing?: 1  Total Score: 16       Therapeutic Activities and Exercises:   Multiple repetitive active and assistive exercises for all four extremities performed supine as well as seated at bed side.to all joints in available planes of motion- to increase strength and endurance     Patient left supine with all lines intact, call button in reach, RN notified and spouse present..    GOALS:    Physical Therapy Goals        Problem: Physical Therapy Goal    Goal Priority Disciplines Outcome Goal Variances Interventions   Physical Therapy Goal     PT/OT, PT Ongoing (interventions implemented as appropriate)     Description:  Goals to be met by: 2018     Patient will increase functional independence with mobility by performin. Supine to sit with Stand-by Assistance  2. Sit to supine with Stand-by Assistance  3. Rolling to Left and Right with Stand-by Assistance.  4. Sit to stand transfer with Stand-by Assistance, using RW  5. Bed to chair transfer with Stand-by Assistance using Rolling Walker  6. Gait  x 50 feet with Stand-by Assistance using Rolling Walker.              Problem: Physical Therapy Goal    Goal Priority Disciplines Outcome Goal Variances Interventions   Physical Therapy Goal     PT/OT, PT      Description:  1. Continuing  Plan of care to achieve anticipated goals                    Time Tracking:     PT Received On: 18  PT Start Time: 0830     PT Stop Time: 0855  PT Total Time (min): 25 min     Billable Minutes: Gait Training 15 min, Therapeutic Exercise 10 min and Total Time 25 min    Treatment Type: Treatment  PT/PTA: PT           Indra Mena, PT  2018

## 2018-01-13 NOTE — ASSESSMENT & PLAN NOTE
"  He is allergic to levaquin and cipro  Only other po med would have been cipro ; severe allergy to levaquin   Left us no choice but put him on IV therapy   Zosyn for 5 days ; started 1/8/19 but now with elevated creat and cont h/h drop, check renal stone study today - shields not in place  Susceptibility     Cr is improving, and will continue present Rx    1-13 IV Pipercillin     Pseudomonas aeruginosa     CULTURE, URINE     Amikacin <=16 "><=16  Sensitive     Cefepime <=8 "><=8  Sensitive     Ciprofloxacin <=1 "><=1  Sensitive     Gentamicin <=4 "><=4  Sensitive     Meropenem <=4 "><=4  Sensitive     Piperacillin/Tazo <=16 "><=16  Sensitive     Tobramycin <=4 "><=4  Sensitive          "

## 2018-01-13 NOTE — ASSESSMENT & PLAN NOTE
His urine looks like source of his blood loss with   foleys catheter and him being on coumadin with elevated INR Monitor h/h   If hematocrit below 21, so I will transfuse with 2 units PRBCs on 1-12-18 1-13 Given 2 units PRBCs and his Hct is 28 this AM   1-14 Hct is over 30 today

## 2018-01-13 NOTE — PLAN OF CARE
Problem: Patient Care Overview  Goal: Plan of Care Review  Outcome: Ongoing (interventions implemented as appropriate)  Bishop patency maintained. Red urine continued with some small blood clots. H&H improved. BP improved. CIS consulted for frequent PVC's. Potassium and magnesium replaced. Metoprolol added. Telemetry maintained. Ambulating in ceron with PT. IV zosyn continued. Accu checks. No insulin needed. Patient and wife understand plan of care and agree.

## 2018-01-13 NOTE — PLAN OF CARE
Problem: Patient Care Overview  Goal: Plan of Care Review  Outcome: Ongoing (interventions implemented as appropriate)  Hematuria to catheter. Patency maintained. INR improved. 1 unit of blood transfused. 1 more to go. Repeat labs in AM. Turned and repositioned independently. Free of falls/injury. Accu checks. No insulin needed. IV zosyn continued. Afebrile. PT consulted. Spouse at side. Patient and spouse understand plan of care and agree.

## 2018-01-14 LAB
ANION GAP SERPL CALC-SCNC: 11 MMOL/L
ANISOCYTOSIS BLD QL SMEAR: SLIGHT
APTT BLDCRRT: 27.3 SEC
BACTERIA BLD CULT: NORMAL
BASOPHILS # BLD AUTO: ABNORMAL K/UL
BASOPHILS NFR BLD: 0 %
BUN SERPL-MCNC: 30 MG/DL
CALCIUM SERPL-MCNC: 8.8 MG/DL
CHLORIDE SERPL-SCNC: 106 MMOL/L
CO2 SERPL-SCNC: 23 MMOL/L
CREAT SERPL-MCNC: 0.9 MG/DL
DIFFERENTIAL METHOD: ABNORMAL
EOSINOPHIL # BLD AUTO: ABNORMAL K/UL
EOSINOPHIL NFR BLD: 5 %
ERYTHROCYTE [DISTWIDTH] IN BLOOD BY AUTOMATED COUNT: 15.5 %
EST. GFR  (AFRICAN AMERICAN): >60 ML/MIN/1.73 M^2
EST. GFR  (NON AFRICAN AMERICAN): >60 ML/MIN/1.73 M^2
GIANT PLATELETS BLD QL SMEAR: PRESENT
GLUCOSE SERPL-MCNC: 122 MG/DL
HCT VFR BLD AUTO: 30.7 %
HGB BLD-MCNC: 10 G/DL
INR PPP: 1.7
LYMPHOCYTES # BLD AUTO: ABNORMAL K/UL
LYMPHOCYTES NFR BLD: 22 %
MAGNESIUM SERPL-MCNC: 1.8 MG/DL
MCH RBC QN AUTO: 31 PG
MCHC RBC AUTO-ENTMCNC: 32.6 G/DL
MCV RBC AUTO: 95 FL
MONOCYTES # BLD AUTO: ABNORMAL K/UL
MONOCYTES NFR BLD: 8 %
NEUTROPHILS NFR BLD: 63 %
NEUTS BAND NFR BLD MANUAL: 2 %
OVALOCYTES BLD QL SMEAR: ABNORMAL
PLATELET # BLD AUTO: 267 K/UL
PMV BLD AUTO: 9.7 FL
POCT GLUCOSE: 123 MG/DL (ref 70–110)
POCT GLUCOSE: 145 MG/DL (ref 70–110)
POCT GLUCOSE: 158 MG/DL (ref 70–110)
POCT GLUCOSE: 223 MG/DL (ref 70–110)
POIKILOCYTOSIS BLD QL SMEAR: SLIGHT
POLYCHROMASIA BLD QL SMEAR: ABNORMAL
POTASSIUM SERPL-SCNC: 3.7 MMOL/L
PROTHROMBIN TIME: 16.7 SEC
RBC # BLD AUTO: 3.23 M/UL
SODIUM SERPL-SCNC: 140 MMOL/L
WBC # BLD AUTO: 8.87 K/UL

## 2018-01-14 PROCEDURE — 85610 PROTHROMBIN TIME: CPT

## 2018-01-14 PROCEDURE — 99232 SBSQ HOSP IP/OBS MODERATE 35: CPT | Mod: ,,, | Performed by: FAMILY MEDICINE

## 2018-01-14 PROCEDURE — 85007 BL SMEAR W/DIFF WBC COUNT: CPT

## 2018-01-14 PROCEDURE — 96376 TX/PRO/DX INJ SAME DRUG ADON: CPT

## 2018-01-14 PROCEDURE — 25000003 PHARM REV CODE 250: Performed by: SURGERY

## 2018-01-14 PROCEDURE — 85730 THROMBOPLASTIN TIME PARTIAL: CPT

## 2018-01-14 PROCEDURE — 25000003 PHARM REV CODE 250: Performed by: FAMILY MEDICINE

## 2018-01-14 PROCEDURE — 97116 GAIT TRAINING THERAPY: CPT

## 2018-01-14 PROCEDURE — 82962 GLUCOSE BLOOD TEST: CPT

## 2018-01-14 PROCEDURE — 25000003 PHARM REV CODE 250: Performed by: INTERNAL MEDICINE

## 2018-01-14 PROCEDURE — 96372 THER/PROPH/DIAG INJ SC/IM: CPT | Mod: 59

## 2018-01-14 PROCEDURE — 97110 THERAPEUTIC EXERCISES: CPT

## 2018-01-14 PROCEDURE — 11000001 HC ACUTE MED/SURG PRIVATE ROOM

## 2018-01-14 PROCEDURE — 83735 ASSAY OF MAGNESIUM: CPT

## 2018-01-14 PROCEDURE — 99232 PR SUBSEQUENT HOSPITAL CARE,LEVL II: ICD-10-PCS | Mod: ,,, | Performed by: FAMILY MEDICINE

## 2018-01-14 PROCEDURE — 63600175 PHARM REV CODE 636 W HCPCS: Performed by: NURSE PRACTITIONER

## 2018-01-14 PROCEDURE — 85027 COMPLETE CBC AUTOMATED: CPT

## 2018-01-14 PROCEDURE — 25000003 PHARM REV CODE 250: Performed by: NURSE PRACTITIONER

## 2018-01-14 PROCEDURE — 36415 COLL VENOUS BLD VENIPUNCTURE: CPT

## 2018-01-14 PROCEDURE — 80048 BASIC METABOLIC PNL TOTAL CA: CPT

## 2018-01-14 RX ORDER — WARFARIN 2.5 MG/1
5 TABLET ORAL DAILY
Status: DISCONTINUED | OUTPATIENT
Start: 2018-01-14 | End: 2018-01-15 | Stop reason: HOSPADM

## 2018-01-14 RX ADMIN — PIPERACILLIN AND TAZOBACTAM 2.25 G: 2; .25 INJECTION, POWDER, LYOPHILIZED, FOR SOLUTION INTRAVENOUS; PARENTERAL at 11:01

## 2018-01-14 RX ADMIN — PANTOPRAZOLE SODIUM 40 MG: 40 TABLET, DELAYED RELEASE ORAL at 08:01

## 2018-01-14 RX ADMIN — TAMSULOSIN HYDROCHLORIDE 0.4 MG: 0.4 CAPSULE ORAL at 08:01

## 2018-01-14 RX ADMIN — METOPROLOL SUCCINATE 25 MG: 25 TABLET, EXTENDED RELEASE ORAL at 08:01

## 2018-01-14 RX ADMIN — LEVOTHYROXINE SODIUM 50 MCG: 50 TABLET ORAL at 08:01

## 2018-01-14 RX ADMIN — PIPERACILLIN AND TAZOBACTAM 2.25 G: 2; .25 INJECTION, POWDER, LYOPHILIZED, FOR SOLUTION INTRAVENOUS; PARENTERAL at 04:01

## 2018-01-14 RX ADMIN — INSULIN ASPART 2 UNITS: 100 INJECTION, SOLUTION INTRAVENOUS; SUBCUTANEOUS at 11:01

## 2018-01-14 RX ADMIN — PRAVASTATIN SODIUM 20 MG: 20 TABLET ORAL at 09:01

## 2018-01-14 RX ADMIN — WARFARIN SODIUM 5 MG: 2.5 TABLET ORAL at 04:01

## 2018-01-14 RX ADMIN — PIPERACILLIN AND TAZOBACTAM 2.25 G: 2; .25 INJECTION, POWDER, LYOPHILIZED, FOR SOLUTION INTRAVENOUS; PARENTERAL at 09:01

## 2018-01-14 RX ADMIN — PIPERACILLIN AND TAZOBACTAM 2.25 G: 2; .25 INJECTION, POWDER, LYOPHILIZED, FOR SOLUTION INTRAVENOUS; PARENTERAL at 05:01

## 2018-01-14 NOTE — PLAN OF CARE
Problem: Diabetes, Type 2 (Adult)  Goal: Signs and Symptoms of Listed Potential Problems Will be Absent, Minimized or Managed (Diabetes, Type 2)  Signs and symptoms of listed potential problems will be absent, minimized or managed by discharge/transition of care (reference Diabetes, Type 2 (Adult) CPG).   Outcome: Ongoing (interventions implemented as appropriate)  accuchecks continue ac&hs. No supplemental insulin given this shift.     Problem: Patient Care Overview  Goal: Plan of Care Review  Outcome: Ongoing (interventions implemented as appropriate)  Plan of care reviewed with patient and he agrees with the plan of care. Telemetry monitoring continues. Uneventful night patient rested well. Wife at bedside.     Problem: Urinary Tract Infection (Adult)  Goal: Signs and Symptoms of Listed Potential Problems Will be Absent, Minimized or Managed (Urinary Tract Infection)  Signs and symptoms of listed potential problems will be absent, minimized or managed by discharge/transition of care (reference Urinary Tract Infection (Adult) CPG).   Outcome: Ongoing (interventions implemented as appropriate)  Afebrile. Denies pain. Catheter patency maintained. Brown urine noted. No foul odor noted. Zosyn continues without any adverse reactions.

## 2018-01-14 NOTE — ASSESSMENT & PLAN NOTE
He is hypoglycemic- changhed NS to D51/2 cont at 75ml/hr- now bs elevating, will change back to NS this am, slow due to elevated BNP and wheezing  Hold meds for now (sulfonuria at home, none here)  Insulin sliding scale low dose    1-14 good FBS this ma

## 2018-01-14 NOTE — PROGRESS NOTES
"[facility] Medicine  Progress Note    Patient Name: Eddie Parada Sr.  MRN: [id number]  Patient Class: IP- Inpatient   Admission Date: 1/8/2018  Length of Stay: 4 days  Attending Physician: Georgia Ann MD  Primary Care Provider: Callum Roberts MD        Subjective:     Principal Problem:Pseudomonas urinary tract infection    HPI:  Eddie Parada Sr. is a 88 y.o. male   Eddie Parada Sr. is a 88 y.o. male  Here for follow up for hypoglycemia  Keeps getting recurrent UTIs.  Last  Culture grew up : pseudomas  I sent to ER last time he did not  Want to stay   Seen urologist December ;   He is allergic to Levaquin ; he could not walk   Wife reluctant to try cipro which was .  He is having issues with dysuria . S/p catheterization since november .     Urine Culture, Routine              PSEUDOMONAS AERUGINOSA   >100,000 cfu/ml       Resulting Agency        [facility]  Susceptibility                     Pseudomonas aeruginosa                    CULTURE, URINE                    Amikacin         <=16 "><=16   Sensitive                       Cefepime         <=8 "><=8       Sensitive                       Ciprofloxacin   <=1 "><=1       Sensitive                       Gentamicin      <=4 "><=4       Sensitive                       Meropenem     <=4 "><=4       Sensitive                       Piperacillin/Tazo         <=16 "><=16   Sensitive                       Tobramycin     <=4 "><=4       Sensitive             Patient has been seen in the emergency room by his primary care physician several times  Patient has a indwelling Bishop catheter with a residual Pseudomonas urinary tract infection  Patient is ALLERGIC to Cipro and the cultures of the Pseudomonas are difficult to treat now  Patient has failed oral antibiotics, presents from PCP office for admission for IV antibiotics  Urine culture last ER visit shows a pseudomonas sensitive to Zosyn, afebrile and stable    Hospital " Course:  1/10/2018  Urine looks like brown with sediment. Nurse reports that it was clearer yesterday evening. Cont on zosyn and started NS 75ml/hr. Afebrile. This am bp low 81/45- coreg 6.25mg po BID reordered per home routine but held this am. HR 60    PT started to prevent further weakening    1/11/2018  Pt still with tea colored urine this am but a little clear today.  BUN/creat bumped 76/2.5 (admission was 54/1.4) coumadin remains held for INR 3.5 (down from 5.3 on admission). H/H still falling 7.4/22.7 (from 9.3/28.1 on  Admission)    BNP check last night for c/o wheezing. Was elevated in 700's, appears his baseline is 300-400. He remains on D51/2 NS at 75ml/hr for decreased urinary outpt 510, intake 925 [id number] +. He is lying nearly flat today with no c/o SOB.     BP still low in 80-90/50 HR 60.     DM meds held as well for low blood sugars in 30 (was on amaryl at home). Now sugars increasing 124-160 no SSI used as he is on low dose    Interval history:  Bladder scan came back without residual urine, but CT showed hydronephrosis and shields at the prostate. Replaced shields and 1400 cc of urine was produced.    1-12-18 1-13 Will continue to treat his UTI w parental IV Abx, along with Rxing his constipation    1-14 Hct is over 30 this and his INR is 1.7    Review of Systems   Constitutional: Positive for fatigue. Negative for activity change, appetite change, chills, diaphoresis and fever.   HENT: Negative for congestion, ear pain, postnasal drip, sneezing and sore throat.    Eyes: Negative for photophobia, redness and visual disturbance.   Respiratory: Negative for cough, chest tightness, shortness of breath and stridor.    Cardiovascular: Negative for chest pain.        CIS handling his cardiac irreg   Gastrointestinal: Positive for constipation. Negative for abdominal distention, abdominal pain, blood in stool, diarrhea, nausea and vomiting.        Given amitiza last evening, and having good eBMs    Genitourinary: Positive for decreased urine volume and urgency. Negative for difficulty urinating, dysuria, flank pain and hematuria.        Indwellling shields in place and draining well; less blood    Musculoskeletal: Negative for arthralgias, back pain, joint swelling, myalgias and neck pain.   Skin: Negative for pallor and rash.   Neurological: Positive for weakness. Negative for dizziness, seizures, facial asymmetry, speech difficulty and numbness.   Hematological: Does not bruise/bleed easily.        Hct is over 30 this am   Psychiatric/Behavioral: Negative for agitation and suicidal ideas. The patient is not nervous/anxious.      Objective:     Vital Signs (Most Recent):  Temp: 96.5 °F (35.8 °C) (01/14/18 0710)  Pulse: 72 (01/14/18 1004)  Resp: 16 (01/14/18 0710)  BP: (!) 156/74 (01/14/18 0710)  SpO2: 95 % (01/14/18 0710) Vital Signs (24h Range):  Temp:  [96 °F (35.6 °C)-97.7 °F (36.5 °C)] 96.5 °F (35.8 °C)  Pulse:  [60-76] 72  Resp:  [16-18] 16  SpO2:  [93 %-97 %] 95 %  BP: (118-156)/(59-74) 156/74     Weight: 81 kg (178 lb 9.2 oz)  Body mass index is 28.82 kg/m².    Physical Exam   Constitutional: He is oriented to person, place, and time. He appears well-developed and well-nourished.   HENT:   Head: Normocephalic and atraumatic.   Neck: No JVD present.   Cardiovascular: Normal rate.  Exam reveals friction rub.    Pulmonary/Chest: Effort normal and breath sounds normal.   Abdominal: Soft. Bowel sounds are normal. There is no tenderness (+suprapubic tenderness with percussion of bladder to umbilicus).   Genitourinary:   Genitourinary Comments: Shields in place  Milky tea colored urine in bag     Musculoskeletal: He exhibits no edema.   Neurological: He is alert and oriented to person, place, and time.   Skin: Skin is warm and dry.   Psychiatric: He has a normal mood and affect. His behavior is normal. Judgment and thought content normal.   Nursing note and vitals reviewed.          Significant Labs:   CBC:  "    Recent Labs  Lab 01/13/18  0611 01/14/18  0600   WBC 8.76 8.87   HGB 9.5* 10.0*   HCT 28.5* 30.7*    267     CMP:     Recent Labs  Lab 01/13/18  0612 01/14/18  0600    140   K 3.5 3.7    106   CO2 21* 23   * 122*   BUN 48* 30*   CREATININE 1.3 0.9   CALCIUM 8.6* 8.8   ANIONGAP 12 11   EGFRNONAA 49* >60     Lactate 1.4  INR 5.3>>4.0>>3.5  Blood culture NGTD x 1    Urine culture from 1/5/18  Urine Culture, Routine PSEUDOMONAS AERUGINOSA   >100,000 cfu/ml        Resulting Agency [facility]   Susceptibility      Pseudomonas aeruginosa     CULTURE, URINE     Amikacin <=16 "><=16  Sensitive     Cefepime <=8 "><=8  Sensitive     Ciprofloxacin <=1 "><=1  Sensitive     Gentamicin <=4 "><=4  Sensitive     Meropenem <=4 "><=4  Sensitive     Piperacillin/Tazo <=16 "><=16  Sensitive     Tobramycin <=4 "><=4  Sensitive             Urinalysis brown and cloudy 3+ leuko + nitrite and 1+ leuko, 100RBC, 75 WBC  Review of Systems   Constitutional: Positive for fatigue. Negative for activity change, appetite change, chills, diaphoresis and fever.   HENT: Negative for congestion, ear pain, postnasal drip, sneezing and sore throat.    Eyes: Negative for photophobia, redness and visual disturbance.   Respiratory: Negative for cough, chest tightness, shortness of breath and stridor.    Cardiovascular: Negative for chest pain.   Gastrointestinal: Positive for constipation. Negative for abdominal distention, abdominal pain, blood in stool, diarrhea, nausea and vomiting.   Genitourinary: Positive for decreased urine volume and urgency. Negative for difficulty urinating, dysuria, flank pain and hematuria.        INdwellling shields in place and draining well   Musculoskeletal: Negative for arthralgias, back pain, joint swelling, myalgias and neck pain.   Skin: Negative for pallor and rash.   Neurological: Positive for weakness. Negative for dizziness, seizures, facial asymmetry, speech difficulty and numbness. "   Hematological: Does not bruise/bleed easily.   Psychiatric/Behavioral: Negative for agitation and suicidal ideas. The patient is not nervous/anxious.      Objective:     Vital Signs (Most Recent):  Temp: 96.5 °F (35.8 °C) (01/14/18 0710)  Pulse: 72 (01/14/18 1004)  Resp: 16 (01/14/18 0710)  BP: (!) 156/74 (01/14/18 0710)  SpO2: 95 % (01/14/18 0710) Vital Signs (24h Range):  Temp:  [96 °F (35.6 °C)-97.7 °F (36.5 °C)] 96.5 °F (35.8 °C)  Pulse:  [60-76] 72  Resp:  [16-18] 16  SpO2:  [93 %-97 %] 95 %  BP: (118-156)/(59-74) 156/74     Weight: 81 kg (178 lb 9.2 oz)  Body mass index is 28.82 kg/m².    Physical Exam   Constitutional: He is oriented to person, place, and time. He appears well-developed and well-nourished.   HENT:   Head: Normocephalic and atraumatic.   Neck: No JVD present.   Cardiovascular: Normal rate.  Exam reveals friction rub.    Pulmonary/Chest: Effort normal and breath sounds normal.   Abdominal: Soft. Bowel sounds are normal. There is tenderness (+suprapubic tenderness with percussion of bladder to umbilicus).   Genitourinary:   Genitourinary Comments: Bishop in place  Milky tea colored urine in bag     Musculoskeletal: He exhibits no edema.   Neurological: He is alert and oriented to person, place, and time.   Skin: Skin is warm and dry.   Psychiatric: He has a normal mood and affect. His behavior is normal. Judgment and thought content normal.   Nursing note and vitals reviewed.          Significant Labs:   CBC:     Recent Labs  Lab 01/13/18  0611 01/14/18  0600   WBC 8.76 8.87   HGB 9.5* 10.0*   HCT 28.5* 30.7*    267     CMP:     Recent Labs  Lab 01/13/18  0612 01/14/18  0600    140   K 3.5 3.7    106   CO2 21* 23   * 122*   BUN 48* 30*   CREATININE 1.3 0.9   CALCIUM 8.6* 8.8   ANIONGAP 12 11   EGFRNONAA 49* >60     Lactate 1.4  INR 5.3>>4.0>>3.5  Blood culture NGTD x 1    Urine culture from 1/5/18  Urine Culture, Routine PSEUDOMONAS AERUGINOSA   >100,000 cfu/ml       "  Resulting Agency [facility]   Susceptibility      Pseudomonas aeruginosa     CULTURE, URINE     Amikacin <=16 "><=16  Sensitive     Cefepime <=8 "><=8  Sensitive     Ciprofloxacin <=1 "><=1  Sensitive     Gentamicin <=4 "><=4  Sensitive     Meropenem <=4 "><=4  Sensitive     Piperacillin/Tazo <=16 "><=16  Sensitive     Tobramycin <=4 "><=4  Sensitive             Urinalysis brown and cloudy 3+ leuko + nitrite and 1+ leuko, 100RBC, 75 WBCReview of Systems   Constitutional: Positive for fatigue. Negative for activity change, appetite change, chills, diaphoresis and fever.   HENT: Negative for congestion, ear pain, postnasal drip, sneezing and sore throat.    Eyes: Negative for photophobia, redness and visual disturbance.   Respiratory: Negative for cough, chest tightness, shortness of breath and stridor.    Cardiovascular: Negative for chest pain.   Gastrointestinal: Positive for constipation. Negative for abdominal distention, abdominal pain, blood in stool, diarrhea, nausea and vomiting.        Given amitiza last evening   Genitourinary: Positive for decreased urine volume and urgency. Negative for difficulty urinating, dysuria, flank pain and hematuria.        INdwellling shields in place and draining well   Musculoskeletal: Negative for arthralgias, back pain, joint swelling, myalgias and neck pain.   Skin: Negative for pallor and rash.   Neurological: Positive for weakness. Negative for dizziness, seizures, facial asymmetry, speech difficulty and numbness.   Hematological: Does not bruise/bleed easily.   Psychiatric/Behavioral: Negative for agitation and suicidal ideas. The patient is not nervous/anxious.      Objective:     Vital Signs (Most Recent):  Temp: 96.5 °F (35.8 °C) (01/13/18 0312)  Pulse: 60 (01/13/18 0600)  Resp: 18 (01/13/18 0312)  BP: 129/63 (01/13/18 0312)  SpO2: 96 % (01/13/18 0312) Vital Signs (24h Range):  Temp:  [96.2 °F (35.7 °C)-98.7 °F (37.1 °C)] 96.5 °F (35.8 °C)  Pulse:  [57-70] 60  Resp:  [18] " "18  SpO2:  [94 %-97 %] 96 %  BP: (101-129)/(51-63) 129/63     Weight: 81 kg (178 lb 9.2 oz)  Body mass index is 28.82 kg/m².    Physical Exam   Constitutional: He is oriented to person, place, and time. He appears well-developed and well-nourished.   HENT:   Head: Normocephalic and atraumatic.   Neck: No JVD present.   Cardiovascular: Normal rate.  Exam reveals friction rub.    Pulmonary/Chest: Effort normal and breath sounds normal.   Abdominal: Soft. Bowel sounds are normal. There is tenderness (+suprapubic tenderness with percussion of bladder to umbilicus).   Genitourinary:   Genitourinary Comments: Bishop in place  Milky tea colored urine in bag     Musculoskeletal: He exhibits no edema.   Neurological: He is alert and oriented to person, place, and time.   Skin: Skin is warm and dry.   Psychiatric: He has a normal mood and affect. His behavior is normal. Judgment and thought content normal.   Nursing note and vitals reviewed.          Significant Labs:   CBC:     Recent Labs  Lab 01/12/18  0517 01/13/18  0611   WBC 11.19 8.76   HGB 7.0* 9.5*   HCT 21.4* 28.5*    212     CMP:     Recent Labs  Lab 01/12/18  0517 01/13/18  0612   * 136   K 3.5 3.5    103   CO2 20* 21*    124*   BUN 72* 48*   CREATININE 2.2* 1.3   CALCIUM 8.4* 8.6*   ANIONGAP 12 12   EGFRNONAA 26* 49*     Lactate 1.4  INR 5.3>>4.0>>3.5  Blood culture NGTD x 1    Urine culture from 1/5/18  Urine Culture, Routine PSEUDOMONAS AERUGINOSA   >100,000 cfu/ml        Resulting Agency [facility]   Susceptibility      Pseudomonas aeruginosa     CULTURE, URINE     Amikacin <=16 "><=16  Sensitive     Cefepime <=8 "><=8  Sensitive     Ciprofloxacin <=1 "><=1  Sensitive     Gentamicin <=4 "><=4  Sensitive     Meropenem <=4 "><=4  Sensitive     Piperacillin/Tazo <=16 "><=16  Sensitive     Tobramycin <=4 "><=4  Sensitive             Urinalysis brown and cloudy 3+ leuko + nitrite and 1+ leuko, 100RBC, 75 WBC  Review of Systems   Constitutional: " Positive for fatigue. Negative for activity change, appetite change, chills, diaphoresis and fever.   HENT: Negative for congestion, ear pain, postnasal drip, sneezing and sore throat.    Eyes: Negative for photophobia, redness and visual disturbance.   Respiratory: Negative for cough, chest tightness, shortness of breath and stridor.    Cardiovascular: Negative for chest pain.   Gastrointestinal: Positive for constipation. Negative for abdominal distention, abdominal pain, blood in stool, diarrhea, nausea and vomiting.   Genitourinary: Positive for decreased urine volume and urgency. Negative for difficulty urinating, dysuria, flank pain and hematuria.        INdwellling shields in place and draining well   Musculoskeletal: Negative for arthralgias, back pain, joint swelling, myalgias and neck pain.   Skin: Negative for pallor and rash.   Neurological: Positive for weakness. Negative for dizziness, seizures, facial asymmetry, speech difficulty and numbness.   Hematological: Does not bruise/bleed easily.   Psychiatric/Behavioral: Negative for agitation and suicidal ideas. The patient is not nervous/anxious.      Objective:     Vital Signs (Most Recent):  Temp: 96.5 °F (35.8 °C) (01/13/18 0312)  Pulse: 60 (01/13/18 0600)  Resp: 18 (01/13/18 0312)  BP: 129/63 (01/13/18 0312)  SpO2: 96 % (01/13/18 0312) Vital Signs (24h Range):  Temp:  [96.2 °F (35.7 °C)-98.7 °F (37.1 °C)] 96.5 °F (35.8 °C)  Pulse:  [57-70] 60  Resp:  [18] 18  SpO2:  [94 %-97 %] 96 %  BP: (101-129)/(51-63) 129/63     Weight: 81 kg (178 lb 9.2 oz)  Body mass index is 28.82 kg/m².    Physical Exam   Constitutional: He is oriented to person, place, and time. He appears well-developed and well-nourished.   HENT:   Head: Normocephalic and atraumatic.   Neck: No JVD present.   Cardiovascular: Normal rate.  Exam reveals friction rub.    Pulmonary/Chest: Effort normal and breath sounds normal.   Abdominal: Soft. Bowel sounds are normal. There is tenderness  "(+suprapubic tenderness with percussion of bladder to umbilicus).   Genitourinary:   Genitourinary Comments: Shields in place  Milky tea colored urine in bag     Musculoskeletal: He exhibits no edema.   Neurological: He is alert and oriented to person, place, and time.   Skin: Skin is warm and dry.   Psychiatric: He has a normal mood and affect. His behavior is normal. Judgment and thought content normal.   Nursing note and vitals reviewed.          Significant Labs:   CBC:     Recent Labs  Lab 01/12/18  0517 01/13/18  0611   WBC 11.19 8.76   HGB 7.0* 9.5*   HCT 21.4* 28.5*    212     CMP:     Recent Labs  Lab 01/12/18  0517 01/13/18  0612   * 136   K 3.5 3.5    103   CO2 20* 21*    124*   BUN 72* 48*   CREATININE 2.2* 1.3   CALCIUM 8.4* 8.6*   ANIONGAP 12 12   EGFRNONAA 26* 49*     Lactate 1.4  INR 5.3>>4.0>>3.5  Blood culture NGTD x 1    Urine culture from 1/5/18  Urine Culture, Routine PSEUDOMONAS AERUGINOSA   >100,000 cfu/ml        Resulting Agency [facility]   Susceptibility      Pseudomonas aeruginosa     CULTURE, URINE     Amikacin <=16 "><=16  Sensitive     Cefepime <=8 "><=8  Sensitive     Ciprofloxacin <=1 "><=1  Sensitive     Gentamicin <=4 "><=4  Sensitive     Meropenem <=4 "><=4  Sensitive     Piperacillin/Tazo <=16 "><=16  Sensitive     Tobramycin <=4 "><=4  Sensitive             Urinalysis brown and cloudy 3+ leuko + nitrite and 1+ leuko, 100RBC, 75 WBC    Assessment/Plan:      * Pseudomonas urinary tract infection      He is allergic to levaquin and cipro  Only other po med would have been cipro ; severe allergy to levaquin   Left us no choice but put him on IV therapy   Zosyn for 5 days ; started 1/8/19 but now with elevated creat and cont h/h drop, check renal stone study today - shields not in place  Susceptibility     Cr is improving, and will continue present Rx    1-13 IV Pipercillin    1-14 Cr is wnl and WBC s in wnl range     Pseudomonas aeruginosa     CULTURE, URINE     " "Amikacin <=16 "><=16  Sensitive     Cefepime <=8 "><=8  Sensitive     Ciprofloxacin <=1 "><=1  Sensitive     Gentamicin <=4 "><=4  Sensitive     Meropenem <=4 "><=4  Sensitive     Piperacillin/Tazo <=16 "><=16  Sensitive     Tobramycin <=4 "><=4  Sensitive                Hypotension      Was on fluids last couple days. BP stable although low, holding this am  To r/o obstructive uropathy this am.   BNP elevated but no wheezing or c/o SOB        NIK (acute kidney injury)    Thought to be from UTI, treating with abx. Check CT pelvis today to r/o obstructive  Uropathy.  This is an outlet obstruction NIK. Should resolve with correction of shields placement.  BMP in am.        Hypoglycemia associated with diabetes    DC amaryl   Dc actos  Insulin sliding scale.  Change fluids to D5- hold today as blood sugars better          Acute blood loss anemia    His urine looks like source of his blood loss with   foleys catheter and him being on coumadin with elevated INR Monitor h/h   If hematocrit below 21, so I will transfuse with 2 units PRBCs on 1-12-18 1-13 Given 2 units PRBCs and his Hct is 28 this AM   1-14 Hct is over 30 today        Urinary retention    Continue foleys catheter --> this was removed and replaced on 1/11. See above h/c  Seen urology [facility] ;they would keep shields's for one more month  Continue flomax.   Pt's shields is draining well now.    "I recommend continuing the Flomax and stopping the oxybutynin.  Shields catheter is changed today under sterile technique.  Nursing staff unable to pass a catheter however I am able to pass a 16 Citizen of [place] coudé.  This is attached to a leg bag and family is instructed on its use.  Note written for home health to change the catheter in 1 month.  Patient to follow-up with me in 2 months at which time we will give him a voiding trial in the office.  Hopefully by that time The patient will be fully ambulatory.            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    He is hypoglycemic- changhed NS to D51/2 cont at 75ml/hr- now bs elevating, will change back to NS this am, slow due to elevated BNP and wheezing  Hold meds for now (sulfonuria at home, none here)  Insulin sliding scale low dose    1-14 good FBS this ma          Anticoagulation monitoring by pharmacist              Atrial fibrillation    continue coreg and coumadin - holding coreg again today  Watch H/H daily though type and screen today   Follow INRs- holding coumadin (INR 5.3>>4.0>>3.5)            VTE Risk Mitigation         Ordered     Medium Risk of VTE  Once      01/08/18 1828     Place sequential compression device  Until discontinued      01/08/18 1828              Montana Mondragon MD  Department of Hospital Medicine   [facility]

## 2018-01-14 NOTE — ASSESSMENT & PLAN NOTE
"  He is allergic to levaquin and cipro  Only other po med would have been cipro ; severe allergy to levaquin   Left us no choice but put him on IV therapy   Zosyn for 5 days ; started 1/8/19 but now with elevated creat and cont h/h drop, check renal stone study today - shields not in place  Susceptibility     Cr is improving, and will continue present Rx    1-13 IV Pipercillin    1-14 Cr is wnl and WBC s in wnl range     Pseudomonas aeruginosa     CULTURE, URINE     Amikacin <=16 "><=16  Sensitive     Cefepime <=8 "><=8  Sensitive     Ciprofloxacin <=1 "><=1  Sensitive     Gentamicin <=4 "><=4  Sensitive     Meropenem <=4 "><=4  Sensitive     Piperacillin/Tazo <=16 "><=16  Sensitive     Tobramycin <=4 "><=4  Sensitive          "

## 2018-01-14 NOTE — PLAN OF CARE
Problem: Fall Risk (Adult)  Goal: Absence of Falls  Patient will demonstrate the desired outcomes by discharge/transition of care.    Outcome: Ongoing (interventions implemented as appropriate)  Fall precautions maintained. Bed alarm engaged at all times. Family at bedside.

## 2018-01-14 NOTE — SUBJECTIVE & OBJECTIVE
Review of Systems   Constitutional: Positive for fatigue. Negative for activity change, appetite change, chills, diaphoresis and fever.   HENT: Negative for congestion, ear pain, postnasal drip, sneezing and sore throat.    Eyes: Negative for photophobia, redness and visual disturbance.   Respiratory: Negative for cough, chest tightness, shortness of breath and stridor.    Cardiovascular: Negative for chest pain.        CIS handling his cardiac irreg   Gastrointestinal: Positive for constipation. Negative for abdominal distention, abdominal pain, blood in stool, diarrhea, nausea and vomiting.        Given amitiza last evening, and having good eBMs   Genitourinary: Positive for decreased urine volume and urgency. Negative for difficulty urinating, dysuria, flank pain and hematuria.        Indwellling shields in place and draining well; less blood    Musculoskeletal: Negative for arthralgias, back pain, joint swelling, myalgias and neck pain.   Skin: Negative for pallor and rash.   Neurological: Positive for weakness. Negative for dizziness, seizures, facial asymmetry, speech difficulty and numbness.   Hematological: Does not bruise/bleed easily.        Hct is over 30 this am   Psychiatric/Behavioral: Negative for agitation and suicidal ideas. The patient is not nervous/anxious.      Objective:     Vital Signs (Most Recent):  Temp: 96.5 °F (35.8 °C) (01/14/18 0710)  Pulse: 72 (01/14/18 1004)  Resp: 16 (01/14/18 0710)  BP: (!) 156/74 (01/14/18 0710)  SpO2: 95 % (01/14/18 0710) Vital Signs (24h Range):  Temp:  [96 °F (35.6 °C)-97.7 °F (36.5 °C)] 96.5 °F (35.8 °C)  Pulse:  [60-76] 72  Resp:  [16-18] 16  SpO2:  [93 %-97 %] 95 %  BP: (118-156)/(59-74) 156/74     Weight: 81 kg (178 lb 9.2 oz)  Body mass index is 28.82 kg/m².    Physical Exam   Constitutional: He is oriented to person, place, and time. He appears well-developed and well-nourished.   HENT:   Head: Normocephalic and atraumatic.   Neck: No JVD present.  "  Cardiovascular: Normal rate.  Exam reveals friction rub.    Pulmonary/Chest: Effort normal and breath sounds normal.   Abdominal: Soft. Bowel sounds are normal. There is no tenderness (+suprapubic tenderness with percussion of bladder to umbilicus).   Genitourinary:   Genitourinary Comments: Bishop in place  Milky tea colored urine in bag     Musculoskeletal: He exhibits no edema.   Neurological: He is alert and oriented to person, place, and time.   Skin: Skin is warm and dry.   Psychiatric: He has a normal mood and affect. His behavior is normal. Judgment and thought content normal.   Nursing note and vitals reviewed.          Significant Labs:   CBC:     Recent Labs  Lab 01/13/18  0611 01/14/18  0600   WBC 8.76 8.87   HGB 9.5* 10.0*   HCT 28.5* 30.7*    267     CMP:     Recent Labs  Lab 01/13/18  0612 01/14/18  0600    140   K 3.5 3.7    106   CO2 21* 23   * 122*   BUN 48* 30*   CREATININE 1.3 0.9   CALCIUM 8.6* 8.8   ANIONGAP 12 11   EGFRNONAA 49* >60     Lactate 1.4  INR 5.3>>4.0>>3.5  Blood culture NGTD x 1    Urine culture from 1/5/18  Urine Culture, Routine PSEUDOMONAS AERUGINOSA   >100,000 cfu/ml        Resulting Agency [facility]   Susceptibility      Pseudomonas aeruginosa     CULTURE, URINE     Amikacin <=16 "><=16  Sensitive     Cefepime <=8 "><=8  Sensitive     Ciprofloxacin <=1 "><=1  Sensitive     Gentamicin <=4 "><=4  Sensitive     Meropenem <=4 "><=4  Sensitive     Piperacillin/Tazo <=16 "><=16  Sensitive     Tobramycin <=4 "><=4  Sensitive             Urinalysis brown and cloudy 3+ leuko + nitrite and 1+ leuko, 100RBC, 75 WBC  Review of Systems   Constitutional: Positive for fatigue. Negative for activity change, appetite change, chills, diaphoresis and fever.   HENT: Negative for congestion, ear pain, postnasal drip, sneezing and sore throat.    Eyes: Negative for photophobia, redness and visual disturbance.   Respiratory: Negative for cough, chest tightness, shortness of " breath and stridor.    Cardiovascular: Negative for chest pain.   Gastrointestinal: Positive for constipation. Negative for abdominal distention, abdominal pain, blood in stool, diarrhea, nausea and vomiting.   Genitourinary: Positive for decreased urine volume and urgency. Negative for difficulty urinating, dysuria, flank pain and hematuria.        INdwellling shields in place and draining well   Musculoskeletal: Negative for arthralgias, back pain, joint swelling, myalgias and neck pain.   Skin: Negative for pallor and rash.   Neurological: Positive for weakness. Negative for dizziness, seizures, facial asymmetry, speech difficulty and numbness.   Hematological: Does not bruise/bleed easily.   Psychiatric/Behavioral: Negative for agitation and suicidal ideas. The patient is not nervous/anxious.      Objective:     Vital Signs (Most Recent):  Temp: 96.5 °F (35.8 °C) (01/14/18 0710)  Pulse: 72 (01/14/18 1004)  Resp: 16 (01/14/18 0710)  BP: (!) 156/74 (01/14/18 0710)  SpO2: 95 % (01/14/18 0710) Vital Signs (24h Range):  Temp:  [96 °F (35.6 °C)-97.7 °F (36.5 °C)] 96.5 °F (35.8 °C)  Pulse:  [60-76] 72  Resp:  [16-18] 16  SpO2:  [93 %-97 %] 95 %  BP: (118-156)/(59-74) 156/74     Weight: 81 kg (178 lb 9.2 oz)  Body mass index is 28.82 kg/m².    Physical Exam   Constitutional: He is oriented to person, place, and time. He appears well-developed and well-nourished.   HENT:   Head: Normocephalic and atraumatic.   Neck: No JVD present.   Cardiovascular: Normal rate.  Exam reveals friction rub.    Pulmonary/Chest: Effort normal and breath sounds normal.   Abdominal: Soft. Bowel sounds are normal. There is tenderness (+suprapubic tenderness with percussion of bladder to umbilicus).   Genitourinary:   Genitourinary Comments: Shields in place  Milky tea colored urine in bag     Musculoskeletal: He exhibits no edema.   Neurological: He is alert and oriented to person, place, and time.   Skin: Skin is warm and dry.   Psychiatric: He  "has a normal mood and affect. His behavior is normal. Judgment and thought content normal.   Nursing note and vitals reviewed.          Significant Labs:   CBC:     Recent Labs  Lab 01/13/18  0611 01/14/18  0600   WBC 8.76 8.87   HGB 9.5* 10.0*   HCT 28.5* 30.7*    267     CMP:     Recent Labs  Lab 01/13/18  0612 01/14/18  0600    140   K 3.5 3.7    106   CO2 21* 23   * 122*   BUN 48* 30*   CREATININE 1.3 0.9   CALCIUM 8.6* 8.8   ANIONGAP 12 11   EGFRNONAA 49* >60     Lactate 1.4  INR 5.3>>4.0>>3.5  Blood culture NGTD x 1    Urine culture from 1/5/18  Urine Culture, Routine PSEUDOMONAS AERUGINOSA   >100,000 cfu/ml        Resulting Agency [facility]   Susceptibility      Pseudomonas aeruginosa     CULTURE, URINE     Amikacin <=16 "><=16  Sensitive     Cefepime <=8 "><=8  Sensitive     Ciprofloxacin <=1 "><=1  Sensitive     Gentamicin <=4 "><=4  Sensitive     Meropenem <=4 "><=4  Sensitive     Piperacillin/Tazo <=16 "><=16  Sensitive     Tobramycin <=4 "><=4  Sensitive             Urinalysis brown and cloudy 3+ leuko + nitrite and 1+ leuko, 100RBC, 75 WBCReview of Systems   Constitutional: Positive for fatigue. Negative for activity change, appetite change, chills, diaphoresis and fever.   HENT: Negative for congestion, ear pain, postnasal drip, sneezing and sore throat.    Eyes: Negative for photophobia, redness and visual disturbance.   Respiratory: Negative for cough, chest tightness, shortness of breath and stridor.    Cardiovascular: Negative for chest pain.   Gastrointestinal: Positive for constipation. Negative for abdominal distention, abdominal pain, blood in stool, diarrhea, nausea and vomiting.        Given amitiza last evening   Genitourinary: Positive for decreased urine volume and urgency. Negative for difficulty urinating, dysuria, flank pain and hematuria.        INdwellling shields in place and draining well   Musculoskeletal: Negative for arthralgias, back pain, joint swelling, " myalgias and neck pain.   Skin: Negative for pallor and rash.   Neurological: Positive for weakness. Negative for dizziness, seizures, facial asymmetry, speech difficulty and numbness.   Hematological: Does not bruise/bleed easily.   Psychiatric/Behavioral: Negative for agitation and suicidal ideas. The patient is not nervous/anxious.      Objective:     Vital Signs (Most Recent):  Temp: 96.5 °F (35.8 °C) (01/13/18 0312)  Pulse: 60 (01/13/18 0600)  Resp: 18 (01/13/18 0312)  BP: 129/63 (01/13/18 0312)  SpO2: 96 % (01/13/18 0312) Vital Signs (24h Range):  Temp:  [96.2 °F (35.7 °C)-98.7 °F (37.1 °C)] 96.5 °F (35.8 °C)  Pulse:  [57-70] 60  Resp:  [18] 18  SpO2:  [94 %-97 %] 96 %  BP: (101-129)/(51-63) 129/63     Weight: 81 kg (178 lb 9.2 oz)  Body mass index is 28.82 kg/m².    Physical Exam   Constitutional: He is oriented to person, place, and time. He appears well-developed and well-nourished.   HENT:   Head: Normocephalic and atraumatic.   Neck: No JVD present.   Cardiovascular: Normal rate.  Exam reveals friction rub.    Pulmonary/Chest: Effort normal and breath sounds normal.   Abdominal: Soft. Bowel sounds are normal. There is tenderness (+suprapubic tenderness with percussion of bladder to umbilicus).   Genitourinary:   Genitourinary Comments: Bishop in place  Milky tea colored urine in bag     Musculoskeletal: He exhibits no edema.   Neurological: He is alert and oriented to person, place, and time.   Skin: Skin is warm and dry.   Psychiatric: He has a normal mood and affect. His behavior is normal. Judgment and thought content normal.   Nursing note and vitals reviewed.          Significant Labs:   CBC:     Recent Labs  Lab 01/12/18  0517 01/13/18  0611   WBC 11.19 8.76   HGB 7.0* 9.5*   HCT 21.4* 28.5*    212     CMP:     Recent Labs  Lab 01/12/18  0517 01/13/18  0612   * 136   K 3.5 3.5    103   CO2 20* 21*    124*   BUN 72* 48*   CREATININE 2.2* 1.3   CALCIUM 8.4* 8.6*   ANIONGAP 12  "12   EGFRNONAA 26* 49*     Lactate 1.4  INR 5.3>>4.0>>3.5  Blood culture NGTD x 1    Urine culture from 1/5/18  Urine Culture, Routine PSEUDOMONAS AERUGINOSA   >100,000 cfu/ml        Resulting Agency [facility]   Susceptibility      Pseudomonas aeruginosa     CULTURE, URINE     Amikacin <=16 "><=16  Sensitive     Cefepime <=8 "><=8  Sensitive     Ciprofloxacin <=1 "><=1  Sensitive     Gentamicin <=4 "><=4  Sensitive     Meropenem <=4 "><=4  Sensitive     Piperacillin/Tazo <=16 "><=16  Sensitive     Tobramycin <=4 "><=4  Sensitive             Urinalysis brown and cloudy 3+ leuko + nitrite and 1+ leuko, 100RBC, 75 WBC  Review of Systems   Constitutional: Positive for fatigue. Negative for activity change, appetite change, chills, diaphoresis and fever.   HENT: Negative for congestion, ear pain, postnasal drip, sneezing and sore throat.    Eyes: Negative for photophobia, redness and visual disturbance.   Respiratory: Negative for cough, chest tightness, shortness of breath and stridor.    Cardiovascular: Negative for chest pain.   Gastrointestinal: Positive for constipation. Negative for abdominal distention, abdominal pain, blood in stool, diarrhea, nausea and vomiting.   Genitourinary: Positive for decreased urine volume and urgency. Negative for difficulty urinating, dysuria, flank pain and hematuria.        INdwellling shields in place and draining well   Musculoskeletal: Negative for arthralgias, back pain, joint swelling, myalgias and neck pain.   Skin: Negative for pallor and rash.   Neurological: Positive for weakness. Negative for dizziness, seizures, facial asymmetry, speech difficulty and numbness.   Hematological: Does not bruise/bleed easily.   Psychiatric/Behavioral: Negative for agitation and suicidal ideas. The patient is not nervous/anxious.      Objective:     Vital Signs (Most Recent):  Temp: 96.5 °F (35.8 °C) (01/13/18 0312)  Pulse: 60 (01/13/18 0600)  Resp: 18 (01/13/18 0312)  BP: 129/63 (01/13/18 " "0312)  SpO2: 96 % (01/13/18 0312) Vital Signs (24h Range):  Temp:  [96.2 °F (35.7 °C)-98.7 °F (37.1 °C)] 96.5 °F (35.8 °C)  Pulse:  [57-70] 60  Resp:  [18] 18  SpO2:  [94 %-97 %] 96 %  BP: (101-129)/(51-63) 129/63     Weight: 81 kg (178 lb 9.2 oz)  Body mass index is 28.82 kg/m².    Physical Exam   Constitutional: He is oriented to person, place, and time. He appears well-developed and well-nourished.   HENT:   Head: Normocephalic and atraumatic.   Neck: No JVD present.   Cardiovascular: Normal rate.  Exam reveals friction rub.    Pulmonary/Chest: Effort normal and breath sounds normal.   Abdominal: Soft. Bowel sounds are normal. There is tenderness (+suprapubic tenderness with percussion of bladder to umbilicus).   Genitourinary:   Genitourinary Comments: Bishop in place  Milky tea colored urine in bag     Musculoskeletal: He exhibits no edema.   Neurological: He is alert and oriented to person, place, and time.   Skin: Skin is warm and dry.   Psychiatric: He has a normal mood and affect. His behavior is normal. Judgment and thought content normal.   Nursing note and vitals reviewed.          Significant Labs:   CBC:     Recent Labs  Lab 01/12/18  0517 01/13/18  0611   WBC 11.19 8.76   HGB 7.0* 9.5*   HCT 21.4* 28.5*    212     CMP:     Recent Labs  Lab 01/12/18  0517 01/13/18  0612   * 136   K 3.5 3.5    103   CO2 20* 21*    124*   BUN 72* 48*   CREATININE 2.2* 1.3   CALCIUM 8.4* 8.6*   ANIONGAP 12 12   EGFRNONAA 26* 49*     Lactate 1.4  INR 5.3>>4.0>>3.5  Blood culture NGTD x 1    Urine culture from 1/5/18  Urine Culture, Routine PSEUDOMONAS AERUGINOSA   >100,000 cfu/ml        Resulting Agency [facility]   Susceptibility      Pseudomonas aeruginosa     CULTURE, URINE     Amikacin <=16 "><=16  Sensitive     Cefepime <=8 "><=8  Sensitive     Ciprofloxacin <=1 "><=1  Sensitive     Gentamicin <=4 "><=4  Sensitive     Meropenem <=4 "><=4  Sensitive     Piperacillin/Tazo <=16 "><=16  Sensitive     " "Tobramycin <=4 "><=4  Sensitive             Urinalysis brown and cloudy 3+ leuko + nitrite and 1+ leuko, 100RBC, 75 WBC  "

## 2018-01-14 NOTE — PLAN OF CARE
Problem: Patient Care Overview  Goal: Plan of Care Review  Outcome: Ongoing (interventions implemented as appropriate)  Bishop intact. Draining clearer urine still with some blood clots. IV zosyn continued. Coumadin restarted. Ambulated in ceron with PT. Free of falls/injury. Afebrile. Accu checks continued.No distress this shift. Patient and spouse understand plan of care and agree.

## 2018-01-14 NOTE — PROGRESS NOTES
Bedside report received from LUCI Rdz. Patient lying in bed. Bishop intact. Denies needs. Spouse at side. No distress noted.

## 2018-01-14 NOTE — PT/OT/SLP PROGRESS
Physical Therapy Treatment    Patient Name:  Eddie Parada Sr.   MRN:  [id number]    Recommendations:     Discharge Recommendations:      Discharge Equipment Recommendations:     Barriers to discharge: None    Assessment:     Eddie Parada Sr. is a 88 y.o. male admitted with a medical diagnosis of Pseudomonas urinary tract infection.  He presents with the following impairments/functional limitations:  impaired endurance, gait instability .    Rehab Prognosis:  good; patient would benefit from acute skilled PT services to address these deficits and reach maximum level of function.      Recent Surgery: * No surgery found *      Plan:     During this hospitalization, patient to be seen  (1-2x/day(M-F); PRN(Sat.,Sun.)) to address the above listed problems via gait training, therapeutic activities, therapeutic exercises  · Plan of Care Expires:   (Upon D/C from facility)   Plan of Care Reviewed with: patient, spouse    Subjective     Communicated with patient and spouse prior to session.  Patient found supine upon PT entry to room, agreeable to treatment.      Chief Complaint:   Urine discoloration     Patient comments/goals:  To be able to go home soon  Pain/Comfort:  · Pain Rating 1: 0/10  · Pain Rating Post-Intervention 1: 0/10    Patients cultural, spiritual, Taoist conflicts given the current situation:      Objective:     Patient found with: shields catheter     General Precautions: Standard, fall   Orthopedic Precautions:N/A   Braces:       Functional Mobility:  · Gait: 110 ft with RW and conrtacrt guard assistance      AM-PAC 6 CLICK MOBILITY  Turning over in bed (including adjusting bedclothes, sheets and blankets)?: 4  Sitting down on and standing up from a chair with arms (e.g., wheelchair, bedside commode, etc.): 3  Moving from lying on back to sitting on the side of the bed?: 4  Moving to and from a bed to a chair (including a wheelchair)?: 3  Need to walk in hospital room?: 3  Climbing 3-5 steps  with a railing?: 1  Total Score: 18       Therapeutic Activities and Exercises:   Initiated assistive Hamstring flexibility tasks to assist with upright posture and gait traiining.  Continuing active and assistive exercises to all four extremities in all major planes of motion for strengthening as tolerated by patient  Encouraging increased out of bed sitting durations    Patient left supine with all lines intact, call button in reach, NSG  notified and spouse present..    GOALS:    Physical Therapy Goals        Problem: Physical Therapy Goal    Goal Priority Disciplines Outcome Goal Variances Interventions   Physical Therapy Goal     PT/OT, PT Ongoing (interventions implemented as appropriate)     Description:  Goals to be met by: 2018     Patient will increase functional independence with mobility by performin. Supine to sit with Stand-by Assistance  2. Sit to supine with Stand-by Assistance  3. Rolling to Left and Right with Stand-by Assistance.  4. Sit to stand transfer with Stand-by Assistance, using RW  5. Bed to chair transfer with Stand-by Assistance using Rolling Walker  6. Gait  x 50 feet with Stand-by Assistance using Rolling Walker.              Problem: Physical Therapy Goal    Goal Priority Disciplines Outcome Goal Variances Interventions   Physical Therapy Goal     PT/OT, PT      Description:  1. Continuing  Plan of care to achieve anticipated goals           Problem: Physical Therapy Goal    Goal Priority Disciplines Outcome Goal Variances Interventions   Physical Therapy Goal     PT/OT, PT      Description:  1.contunuing efforts to achieve anticipated goals                    Time Tracking:     PT Received On: 18  PT Start Time: 0815     PT Stop Time: 0840  PT Total Time (min): 25 min     Billable Minutes: Gait Training 15 min, Therapeutic Exercise 10 min and Total Time 25 min    Treatment Type: Treatment  PT/PTA: PT           Indra Mena, PT  2018

## 2018-01-15 VITALS
RESPIRATION RATE: 18 BRPM | BODY MASS INDEX: 28.7 KG/M2 | HEART RATE: 73 BPM | HEIGHT: 66 IN | SYSTOLIC BLOOD PRESSURE: 161 MMHG | WEIGHT: 178.56 LBS | OXYGEN SATURATION: 97 % | TEMPERATURE: 96 F | DIASTOLIC BLOOD PRESSURE: 74 MMHG

## 2018-01-15 LAB
ANION GAP SERPL CALC-SCNC: 10 MMOL/L
ANISOCYTOSIS BLD QL SMEAR: SLIGHT
APTT BLDCRRT: 33.6 SEC
BASOPHILS # BLD AUTO: ABNORMAL K/UL
BASOPHILS NFR BLD: 2 %
BUN SERPL-MCNC: 18 MG/DL
CALCIUM SERPL-MCNC: 8.6 MG/DL
CHLORIDE SERPL-SCNC: 107 MMOL/L
CO2 SERPL-SCNC: 23 MMOL/L
CREAT SERPL-MCNC: 0.8 MG/DL
DIFFERENTIAL METHOD: ABNORMAL
EOSINOPHIL # BLD AUTO: ABNORMAL K/UL
EOSINOPHIL NFR BLD: 4 %
ERYTHROCYTE [DISTWIDTH] IN BLOOD BY AUTOMATED COUNT: 15.4 %
EST. GFR  (AFRICAN AMERICAN): >60 ML/MIN/1.73 M^2
EST. GFR  (NON AFRICAN AMERICAN): >60 ML/MIN/1.73 M^2
GIANT PLATELETS BLD QL SMEAR: PRESENT
GLUCOSE SERPL-MCNC: 121 MG/DL
HCT VFR BLD AUTO: 30.9 %
HGB BLD-MCNC: 10.1 G/DL
INR PPP: 1.6
LYMPHOCYTES # BLD AUTO: ABNORMAL K/UL
LYMPHOCYTES NFR BLD: 17 %
MCH RBC QN AUTO: 31.5 PG
MCHC RBC AUTO-ENTMCNC: 32.7 G/DL
MCV RBC AUTO: 96 FL
MONOCYTES # BLD AUTO: ABNORMAL K/UL
MONOCYTES NFR BLD: 6 %
MYELOCYTES NFR BLD MANUAL: 2 %
NEUTROPHILS NFR BLD: 67 %
NEUTS BAND NFR BLD MANUAL: 2 %
OVALOCYTES BLD QL SMEAR: ABNORMAL
PLATELET # BLD AUTO: 268 K/UL
PMV BLD AUTO: 9.6 FL
POCT GLUCOSE: 114 MG/DL (ref 70–110)
POIKILOCYTOSIS BLD QL SMEAR: SLIGHT
POLYCHROMASIA BLD QL SMEAR: ABNORMAL
POTASSIUM SERPL-SCNC: 3.8 MMOL/L
PROTHROMBIN TIME: 16 SEC
RBC # BLD AUTO: 3.21 M/UL
SODIUM SERPL-SCNC: 140 MMOL/L
WBC # BLD AUTO: 9.03 K/UL

## 2018-01-15 PROCEDURE — G8979 MOBILITY GOAL STATUS: HCPCS | Mod: CJ

## 2018-01-15 PROCEDURE — 63600175 PHARM REV CODE 636 W HCPCS: Performed by: NURSE PRACTITIONER

## 2018-01-15 PROCEDURE — 80048 BASIC METABOLIC PNL TOTAL CA: CPT

## 2018-01-15 PROCEDURE — 36415 COLL VENOUS BLD VENIPUNCTURE: CPT

## 2018-01-15 PROCEDURE — 99239 HOSP IP/OBS DSCHRG MGMT >30: CPT | Mod: ,,, | Performed by: FAMILY MEDICINE

## 2018-01-15 PROCEDURE — 85027 COMPLETE CBC AUTOMATED: CPT

## 2018-01-15 PROCEDURE — 85007 BL SMEAR W/DIFF WBC COUNT: CPT

## 2018-01-15 PROCEDURE — 96376 TX/PRO/DX INJ SAME DRUG ADON: CPT

## 2018-01-15 PROCEDURE — G8980 MOBILITY D/C STATUS: HCPCS | Mod: CK

## 2018-01-15 PROCEDURE — 99239 PR HOSPITAL DISCHARGE DAY,>30 MIN: ICD-10-PCS | Mod: ,,, | Performed by: FAMILY MEDICINE

## 2018-01-15 PROCEDURE — 85730 THROMBOPLASTIN TIME PARTIAL: CPT

## 2018-01-15 PROCEDURE — 85610 PROTHROMBIN TIME: CPT

## 2018-01-15 PROCEDURE — 25000003 PHARM REV CODE 250: Performed by: NURSE PRACTITIONER

## 2018-01-15 PROCEDURE — 82962 GLUCOSE BLOOD TEST: CPT

## 2018-01-15 RX ORDER — PANTOPRAZOLE SODIUM 40 MG/1
40 TABLET, DELAYED RELEASE ORAL DAILY
Qty: 30 TABLET | Refills: 11 | Status: SHIPPED | OUTPATIENT
Start: 2018-01-15 | End: 2019-01-10 | Stop reason: SDUPTHER

## 2018-01-15 RX ORDER — LUBIPROSTONE 24 UG/1
24 CAPSULE ORAL
Qty: 30 CAPSULE | Refills: 5 | Status: SHIPPED | OUTPATIENT
Start: 2018-01-15 | End: 2021-03-02

## 2018-01-15 RX ORDER — WARFARIN SODIUM 5 MG/1
5 TABLET ORAL DAILY
Qty: 30 TABLET | Refills: 11 | Status: SHIPPED | OUTPATIENT
Start: 2018-01-15 | End: 2018-04-11 | Stop reason: DRUGHIGH

## 2018-01-15 RX ORDER — CLINDAMYCIN HYDROCHLORIDE 150 MG/1
150 CAPSULE ORAL 2 TIMES DAILY
Qty: 30 CAPSULE | Refills: 0 | Status: SHIPPED | OUTPATIENT
Start: 2018-01-15 | End: 2018-01-25

## 2018-01-15 RX ADMIN — PIPERACILLIN AND TAZOBACTAM 2.25 G: 2; .25 INJECTION, POWDER, LYOPHILIZED, FOR SOLUTION INTRAVENOUS; PARENTERAL at 05:01

## 2018-01-15 NOTE — SUBJECTIVE & OBJECTIVE
Review of Systems   Constitutional: Positive for fatigue. Negative for activity change, appetite change, chills, diaphoresis and fever.   HENT: Negative for congestion, ear pain, postnasal drip, sneezing and sore throat.    Eyes: Negative for photophobia, redness and visual disturbance.   Respiratory: Negative for cough, chest tightness, shortness of breath and stridor (Cardiac arrhythemia).    Cardiovascular: Negative for chest pain.        CIS handling his cardiac irreg   Gastrointestinal: Positive for constipation. Negative for abdominal distention, abdominal pain, blood in stool, diarrhea, nausea and vomiting.        Given amitiza last evening, and having good eBMs   Genitourinary: Positive for decreased urine volume, difficulty urinating, hematuria and urgency. Negative for dysuria and flank pain.        Chronic indwelling shields catheter   Musculoskeletal: Negative for arthralgias, back pain, joint swelling, myalgias and neck pain.   Skin: Negative for pallor and rash.   Neurological: Positive for weakness. Negative for dizziness, seizures, facial asymmetry, speech difficulty and numbness.   Hematological: Does not bruise/bleed easily.        Hct is 30.9 this am   Psychiatric/Behavioral: Negative for agitation and suicidal ideas. The patient is not nervous/anxious.      Objective:     Vital Signs (Most Recent):  Temp: 96.4 °F (35.8 °C) (01/15/18 0834)  Pulse: 82 (01/15/18 0901)  Resp: 18 (01/15/18 0834)  BP: (!) 161/74 (01/15/18 0834)  SpO2: 97 % (01/15/18 0834) Vital Signs (24h Range):  Temp:  [96.1 °F (35.6 °C)-96.5 °F (35.8 °C)] 96.4 °F (35.8 °C)  Pulse:  [60-86] 82  Resp:  [18] 18  SpO2:  [95 %-97 %] 97 %  BP: (134-162)/(65-76) 161/74     Weight: 81 kg (178 lb 9.2 oz)  Body mass index is 28.82 kg/m².    Physical Exam   Constitutional: He is oriented to person, place, and time. He appears well-developed and well-nourished.   HENT:   Head: Normocephalic and atraumatic.   Neck: No JVD present.  "  Cardiovascular: Normal rate.  Exam reveals friction rub.    Pulmonary/Chest: Effort normal and breath sounds normal.   Abdominal: Soft. Bowel sounds are normal. There is no tenderness (+suprapubic tenderness with percussion of bladder to umbilicus).   Genitourinary:   Genitourinary Comments: Bishop in place  Urine is yellow & clear this am   Musculoskeletal: He exhibits no edema.   Neurological: He is alert and oriented to person, place, and time.   Skin: Skin is warm and dry.   Psychiatric: He has a normal mood and affect. His behavior is normal. Judgment and thought content normal.   Nursing note and vitals reviewed.          Significant Labs:   CBC:     Recent Labs  Lab 01/14/18  0600 01/15/18  0521   WBC 8.87 9.03   HGB 10.0* 10.1*   HCT 30.7* 30.9*    268     CMP:     Recent Labs  Lab 01/14/18  0600 01/15/18  0521    140   K 3.7 3.8    107   CO2 23 23   * 121*   BUN 30* 18   CREATININE 0.9 0.8   CALCIUM 8.8 8.6*   ANIONGAP 11 10   EGFRNONAA >60 >60     Lactate 1.4  INR 5.3>>4.0>>3.5  Blood culture NGTD x 1    Urine culture from 1/5/18  Urine Culture, Routine PSEUDOMONAS AERUGINOSA   >100,000 cfu/ml        Resulting Agency [facility]   Susceptibility      Pseudomonas aeruginosa     CULTURE, URINE     Amikacin <=16 "><=16  Sensitive     Cefepime <=8 "><=8  Sensitive     Ciprofloxacin <=1 "><=1  Sensitive     Gentamicin <=4 "><=4  Sensitive     Meropenem <=4 "><=4  Sensitive     Piperacillin/Tazo <=16 "><=16  Sensitive     Tobramycin <=4 "><=4  Sensitive             Urinalysis brown and cloudy 3+ leuko + nitrite and 1+ leuko, 100RBC, 75 WBC  Review of Systems   Constitutional: Positive for fatigue. Negative for activity change, appetite change, chills, diaphoresis and fever.   HENT: Negative for congestion, ear pain, postnasal drip, sneezing and sore throat.    Eyes: Negative for photophobia, redness and visual disturbance.   Respiratory: Negative for cough, chest tightness, shortness of " breath and stridor.    Cardiovascular: Negative for chest pain.   Gastrointestinal: Positive for constipation. Negative for abdominal distention, abdominal pain, blood in stool, diarrhea, nausea and vomiting.   Genitourinary: Positive for decreased urine volume and urgency. Negative for difficulty urinating, dysuria, flank pain and hematuria.        INdwellling shields in place and draining well   Musculoskeletal: Negative for arthralgias, back pain, joint swelling, myalgias and neck pain.   Skin: Negative for pallor and rash.   Neurological: Positive for weakness. Negative for dizziness, seizures, facial asymmetry, speech difficulty and numbness.   Hematological: Does not bruise/bleed easily.   Psychiatric/Behavioral: Negative for agitation and suicidal ideas. The patient is not nervous/anxious.      Objective:     Vital Signs (Most Recent):  Temp: 96.4 °F (35.8 °C) (01/15/18 0834)  Pulse: 82 (01/15/18 0901)  Resp: 18 (01/15/18 0834)  BP: (!) 161/74 (01/15/18 0834)  SpO2: 97 % (01/15/18 0834) Vital Signs (24h Range):  Temp:  [96.1 °F (35.6 °C)-96.5 °F (35.8 °C)] 96.4 °F (35.8 °C)  Pulse:  [60-86] 82  Resp:  [18] 18  SpO2:  [95 %-97 %] 97 %  BP: (134-162)/(65-76) 161/74     Weight: 81 kg (178 lb 9.2 oz)  Body mass index is 28.82 kg/m².    Physical Exam   Constitutional: He is oriented to person, place, and time. He appears well-developed and well-nourished.   HENT:   Head: Normocephalic and atraumatic.   Neck: No JVD present.   Cardiovascular: Normal rate.  Exam reveals friction rub.    Pulmonary/Chest: Effort normal and breath sounds normal.   Abdominal: Soft. Bowel sounds are normal. There is tenderness (+suprapubic tenderness with percussion of bladder to umbilicus).   Genitourinary:   Genitourinary Comments: Shields in place  Milky tea colored urine in bag     Musculoskeletal: He exhibits no edema.   Neurological: He is alert and oriented to person, place, and time.   Skin: Skin is warm and dry.   Psychiatric: He  "has a normal mood and affect. His behavior is normal. Judgment and thought content normal.   Nursing note and vitals reviewed.          Significant Labs:   CBC:     Recent Labs  Lab 01/14/18  0600 01/15/18  0521   WBC 8.87 9.03   HGB 10.0* 10.1*   HCT 30.7* 30.9*    268     CMP:     Recent Labs  Lab 01/14/18  0600 01/15/18  0521    140   K 3.7 3.8    107   CO2 23 23   * 121*   BUN 30* 18   CREATININE 0.9 0.8   CALCIUM 8.8 8.6*   ANIONGAP 11 10   EGFRNONAA >60 >60     Lactate 1.4  INR 5.3>>4.0>>3.5  Blood culture NGTD x 1    Urine culture from 1/5/18  Urine Culture, Routine PSEUDOMONAS AERUGINOSA   >100,000 cfu/ml        Resulting Agency [facility]   Susceptibility      Pseudomonas aeruginosa     CULTURE, URINE     Amikacin <=16 "><=16  Sensitive     Cefepime <=8 "><=8  Sensitive     Ciprofloxacin <=1 "><=1  Sensitive     Gentamicin <=4 "><=4  Sensitive     Meropenem <=4 "><=4  Sensitive     Piperacillin/Tazo <=16 "><=16  Sensitive     Tobramycin <=4 "><=4  Sensitive             Urinalysis brown and cloudy 3+ leuko + nitrite and 1+ leuko, 100RBC, 75 WBCReview of Systems   Constitutional: Positive for fatigue. Negative for activity change, appetite change, chills, diaphoresis and fever.   HENT: Negative for congestion, ear pain, postnasal drip, sneezing and sore throat.    Eyes: Negative for photophobia, redness and visual disturbance.   Respiratory: Negative for cough, chest tightness, shortness of breath and stridor.    Cardiovascular: Negative for chest pain.   Gastrointestinal: Positive for constipation. Negative for abdominal distention, abdominal pain, blood in stool, diarrhea, nausea and vomiting.        Given amitiza last evening   Genitourinary: Positive for decreased urine volume and urgency. Negative for difficulty urinating, dysuria, flank pain and hematuria.        INdwellling shields in place and draining well   Musculoskeletal: Negative for arthralgias, back pain, joint swelling, " myalgias and neck pain.   Skin: Negative for pallor and rash.   Neurological: Positive for weakness. Negative for dizziness, seizures, facial asymmetry, speech difficulty and numbness.   Hematological: Does not bruise/bleed easily.   Psychiatric/Behavioral: Negative for agitation and suicidal ideas. The patient is not nervous/anxious.      Objective:     Vital Signs (Most Recent):  Temp: 96.5 °F (35.8 °C) (01/13/18 0312)  Pulse: 60 (01/13/18 0600)  Resp: 18 (01/13/18 0312)  BP: 129/63 (01/13/18 0312)  SpO2: 96 % (01/13/18 0312) Vital Signs (24h Range):  Temp:  [96.2 °F (35.7 °C)-98.7 °F (37.1 °C)] 96.5 °F (35.8 °C)  Pulse:  [57-70] 60  Resp:  [18] 18  SpO2:  [94 %-97 %] 96 %  BP: (101-129)/(51-63) 129/63     Weight: 81 kg (178 lb 9.2 oz)  Body mass index is 28.82 kg/m².    Physical Exam   Constitutional: He is oriented to person, place, and time. He appears well-developed and well-nourished.   HENT:   Head: Normocephalic and atraumatic.   Neck: No JVD present.   Cardiovascular: Normal rate.  Exam reveals friction rub.    Pulmonary/Chest: Effort normal and breath sounds normal.   Abdominal: Soft. Bowel sounds are normal. There is tenderness (+suprapubic tenderness with percussion of bladder to umbilicus).   Genitourinary:   Genitourinary Comments: Bishop in place  Milky tea colored urine in bag     Musculoskeletal: He exhibits no edema.   Neurological: He is alert and oriented to person, place, and time.   Skin: Skin is warm and dry.   Psychiatric: He has a normal mood and affect. His behavior is normal. Judgment and thought content normal.   Nursing note and vitals reviewed.          Significant Labs:   CBC:     Recent Labs  Lab 01/12/18  0517 01/13/18  0611   WBC 11.19 8.76   HGB 7.0* 9.5*   HCT 21.4* 28.5*    212     CMP:     Recent Labs  Lab 01/12/18  0517 01/13/18  0612   * 136   K 3.5 3.5    103   CO2 20* 21*    124*   BUN 72* 48*   CREATININE 2.2* 1.3   CALCIUM 8.4* 8.6*   ANIONGAP 12  "12   EGFRNONAA 26* 49*     Lactate 1.4  INR 5.3>>4.0>>3.5  Blood culture NGTD x 1    Urine culture from 1/5/18  Urine Culture, Routine PSEUDOMONAS AERUGINOSA   >100,000 cfu/ml        Resulting Agency [facility]   Susceptibility      Pseudomonas aeruginosa     CULTURE, URINE     Amikacin <=16 "><=16  Sensitive     Cefepime <=8 "><=8  Sensitive     Ciprofloxacin <=1 "><=1  Sensitive     Gentamicin <=4 "><=4  Sensitive     Meropenem <=4 "><=4  Sensitive     Piperacillin/Tazo <=16 "><=16  Sensitive     Tobramycin <=4 "><=4  Sensitive             Urinalysis brown and cloudy 3+ leuko + nitrite and 1+ leuko, 100RBC, 75 WBC  Review of Systems   Constitutional: Positive for fatigue. Negative for activity change, appetite change, chills, diaphoresis and fever.   HENT: Negative for congestion, ear pain, postnasal drip, sneezing and sore throat.    Eyes: Negative for photophobia, redness and visual disturbance.   Respiratory: Negative for cough, chest tightness, shortness of breath and stridor.    Cardiovascular: Negative for chest pain.   Gastrointestinal: Positive for constipation. Negative for abdominal distention, abdominal pain, blood in stool, diarrhea, nausea and vomiting.   Genitourinary: Positive for decreased urine volume and urgency. Negative for difficulty urinating, dysuria, flank pain and hematuria.        INdwellling shields in place and draining well   Musculoskeletal: Negative for arthralgias, back pain, joint swelling, myalgias and neck pain.   Skin: Negative for pallor and rash.   Neurological: Positive for weakness. Negative for dizziness, seizures, facial asymmetry, speech difficulty and numbness.   Hematological: Does not bruise/bleed easily.   Psychiatric/Behavioral: Negative for agitation and suicidal ideas. The patient is not nervous/anxious.      Objective:     Vital Signs (Most Recent):  Temp: 96.5 °F (35.8 °C) (01/13/18 0312)  Pulse: 60 (01/13/18 0600)  Resp: 18 (01/13/18 0312)  BP: 129/63 (01/13/18 " "0312)  SpO2: 96 % (01/13/18 0312) Vital Signs (24h Range):  Temp:  [96.2 °F (35.7 °C)-98.7 °F (37.1 °C)] 96.5 °F (35.8 °C)  Pulse:  [57-70] 60  Resp:  [18] 18  SpO2:  [94 %-97 %] 96 %  BP: (101-129)/(51-63) 129/63     Weight: 81 kg (178 lb 9.2 oz)  Body mass index is 28.82 kg/m².    Physical Exam   Constitutional: He is oriented to person, place, and time. He appears well-developed and well-nourished.   HENT:   Head: Normocephalic and atraumatic.   Neck: No JVD present.   Cardiovascular: Normal rate.  Exam reveals friction rub.    Pulmonary/Chest: Effort normal and breath sounds normal.   Abdominal: Soft. Bowel sounds are normal. There is tenderness (+suprapubic tenderness with percussion of bladder to umbilicus).   Genitourinary:   Genitourinary Comments: Bishop in place  Milky tea colored urine in bag     Musculoskeletal: He exhibits no edema.   Neurological: He is alert and oriented to person, place, and time.   Skin: Skin is warm and dry.   Psychiatric: He has a normal mood and affect. His behavior is normal. Judgment and thought content normal.   Nursing note and vitals reviewed.          Significant Labs:   CBC:     Recent Labs  Lab 01/12/18  0517 01/13/18  0611   WBC 11.19 8.76   HGB 7.0* 9.5*   HCT 21.4* 28.5*    212     CMP:     Recent Labs  Lab 01/12/18  0517 01/13/18  0612   * 136   K 3.5 3.5    103   CO2 20* 21*    124*   BUN 72* 48*   CREATININE 2.2* 1.3   CALCIUM 8.4* 8.6*   ANIONGAP 12 12   EGFRNONAA 26* 49*     Lactate 1.4  INR 5.3>>4.0>>3.5  Blood culture NGTD x 1    Urine culture from 1/5/18  Urine Culture, Routine PSEUDOMONAS AERUGINOSA   >100,000 cfu/ml        Resulting Agency [facility]   Susceptibility      Pseudomonas aeruginosa     CULTURE, URINE     Amikacin <=16 "><=16  Sensitive     Cefepime <=8 "><=8  Sensitive     Ciprofloxacin <=1 "><=1  Sensitive     Gentamicin <=4 "><=4  Sensitive     Meropenem <=4 "><=4  Sensitive     Piperacillin/Tazo <=16 "><=16  Sensitive     " "Tobramycin <=4 "><=4  Sensitive             Urinalysis brown and cloudy 3+ leuko + nitrite and 1+ leuko, 100RBC, 75 WBC  "

## 2018-01-15 NOTE — SUBJECTIVE & OBJECTIVE
Past Medical History:   Diagnosis Date    Abdominal fibromatosis     Acute posthemorrhagic anemia 1/9/2018    NIK (acute kidney injury) 1/11/2018    Atrial fibrillation     Bradycardia     CAD (coronary artery disease)     Cancer     basal cell on nose and melanoma on back    CHF (congestive heart failure)     COPD (chronic obstructive pulmonary disease)     Diabetes mellitus type II     Hyperlipidemia     Melanoma     Obesity (BMI 30.0-34.9) 9/13/2016    Osteoporosis     Peripheral vascular disease     Stroke     TIA (transient ischemic attack)     Type II or unspecified type diabetes mellitus without mention of complication, not stated as uncontrolled     Unspecified essential hypertension        Past Surgical History:   Procedure Laterality Date    AORTIC VALVE REPLACEMENT      CARDIAC PACEMAKER PLACEMENT  9/28/2012    CARDIAC VALVE REPLACEMENT  11/17/2011    aortic valve-tissue    CHOLECYSTECTOMY      COLONOSCOPY      EYE SURGERY Bilateral     catar[facility]    HERNIA REPAIR      abdominal    LASIK         Review of patient's allergies indicates:   Allergen Reactions    Ciprofloxacin     Levofloxacin Swelling    Lyrica [pregabalin] Swelling    Unable to assess Other (See Comments)     Soft shell crabs       No current facility-administered medications on file prior to encounter.      Current Outpatient Prescriptions on File Prior to Encounter   Medication Sig    amlodipine (NORVASC) 10 MG tablet Take 1 tablet (10 mg total) by mouth once daily.    carvedilol (COREG) 12.5 MG tablet TAKE 1 TABLET (12.5 MG TOTAL) BY MOUTH 2 (TWO) TIMES DAILY WITH MEALS.    FOLIC ACID/MULTIVIT-MIN/LUTEIN (CENTRUM SILVER ORAL) Take 1 tablet by mouth once daily.    furosemide (LASIX) 40 MG tablet Three times a week PRN swelling    gemfibrozil (LOPID) 600 MG tablet TAKE 1 TABLET (600 MG TOTAL) BY MOUTH 2 (TWO) TIMES DAILY BEFORE MEALS.    glimepiride (AMARYL) 2 MG tablet  TAKE 1 TABLET BY MOUTH EVERY MORNING WITH BREAKFAST    levothyroxine (SYNTHROID) 50 MCG tablet TAKE 1 TABLET (50 MCG TOTAL) BY MOUTH ONCE DAILY.    lisinopril 10 MG tablet Take 1 tablet (10 mg total) by mouth once daily.    oxybutynin (DITROPAN) 5 MG Tab Take 1 tablet (5 mg total) by mouth 3 (three) times daily.    pioglitazone (ACTOS) 15 MG tablet ONE TABLET ONE TIME A DAY    pravastatin (PRAVACHOL) 20 MG tablet Take 1 tablet (20 mg total) by mouth once daily. (Patient taking differently: Take 20 mg by mouth every evening. )    tamsulosin (FLOMAX) 0.4 mg Cp24 Take 1 capsule (0.4 mg total) by mouth once daily. (Patient taking differently: Take 0.4 mg by mouth every evening. )    [DISCONTINUED] amoxicillin-clavulanate 875-125mg (AUGMENTIN) 875-125 mg per tablet Take 1 tablet by mouth 2 (two) times daily.    [DISCONTINUED] nitrofurantoin, macrocrystal-monohydrate, (MACROBID) 100 MG capsule Take 1 capsule (100 mg total) by mouth 2 (two) times daily.    [DISCONTINUED] warfarin (COUMADIN) 2 MG tablet Take 2 mg by mouth every evening.     ONETOUCH DELICA LANCETS 33 gauge Misc     ONETOUCH ULTRA2 kit      Family History     Problem Relation (Age of Onset)    Alcohol abuse Father    COPD Sister, Brother    Cancer Brother    Diabetes Daughter    Heart disease Brother    Hypertension Father    No Known Problems Son, Daughter, Son    Stroke Father        Social History Main Topics    Smoking status: Former Smoker     Quit date: 9/20/1996    Smokeless tobacco: Never Used      Comment: cigar smoker    Alcohol use No      Comment: none since 2006    Drug use: No    Sexual activity: Not Currently     Review of Systems   Constitution: Negative.   HENT: Negative.    Eyes: Negative.    Cardiovascular: Positive for palpitations. Negative for chest pain, claudication, cyanosis, dyspnea on exertion, irregular heartbeat, leg swelling, near-syncope, orthopnea, paroxysmal nocturnal dyspnea and syncope.   Endocrine:  Negative.    Hematologic/Lymphatic: Negative.    Skin: Negative.    Musculoskeletal: Negative.    Gastrointestinal: Negative.    Genitourinary:        Chronic UTIs   Neurological: Negative.    Psychiatric/Behavioral: Negative.    Allergic/Immunologic: Negative.      Objective:     Vital Signs (Most Recent):  Temp: 96.4 °F (35.8 °C) (01/15/18 0834)  Pulse: 82 (01/15/18 0901)  Resp: 18 (01/15/18 0834)  BP: (!) 161/74 (01/15/18 0834)  SpO2: 97 % (01/15/18 0834) Vital Signs (24h Range):  Temp:  [96.1 °F (35.6 °C)-96.5 °F (35.8 °C)] 96.4 °F (35.8 °C)  Pulse:  [60-86] 82  Resp:  [18] 18  SpO2:  [95 %-97 %] 97 %  BP: (134-162)/(65-76) 161/74     Weight: 81 kg (178 lb 9.2 oz)  Body mass index is 28.82 kg/m².    SpO2: 97 %  O2 Device (Oxygen Therapy): nasal cannula      Intake/Output Summary (Last 24 hours) at 01/15/18 0953  Last data filed at 01/15/18 0900   Gross per 24 hour   Intake              920 ml   Output             1300 ml   Net             -380 ml       Lines/Drains/Airways     Drain                 Urethral Catheter 01/11/18 1353 16 Fr. 3 days          Peripheral Intravenous Line                 Peripheral IV - Single Lumen 01/08/18 1645 Right Antecubital 6 days                Physical Exam   Constitutional: He is oriented to person, place, and time. He appears well-developed and well-nourished. No distress.   HENT:   Head: Normocephalic and atraumatic.   Eyes: Conjunctivae and EOM are normal. Pupils are equal, round, and reactive to light.   Neck: Normal range of motion. Neck supple. No JVD present. No tracheal deviation present. No thyromegaly present.   Cardiovascular: Normal rate, regular rhythm, normal heart sounds and intact distal pulses.  Exam reveals no gallop and no friction rub.    No murmur heard.  Pulmonary/Chest: Effort normal and breath sounds normal. No stridor. No respiratory distress. He has no wheezes. He has no rales. He exhibits no tenderness.   Abdominal: Soft. Bowel sounds are normal. He  exhibits no distension and no mass. There is no tenderness. There is no rebound and no guarding.   Musculoskeletal: Normal range of motion. He exhibits no edema or tenderness.   chronic indwelling shields   Neurological: He is alert and oriented to person, place, and time.   Skin: Skin is warm and dry. No rash noted. He is not diaphoretic. No erythema. No pallor.   Psychiatric: He has a normal mood and affect. His behavior is normal. Judgment and thought content normal.       Significant Labs:   ABG: No results for input(s): PH, PCO2, HCO3, POCSATURATED, BE in the last 48 hours., Blood Culture: No results for input(s): LABBLOO in the last 48 hours., BMP:   Recent Labs  Lab 01/14/18  0600 01/15/18  0521   * 121*    140   K 3.7 3.8    107   CO2 23 23   BUN 30* 18   CREATININE 0.9 0.8   CALCIUM 8.8 8.6*   MG 1.8  --    , CMP   Recent Labs  Lab 01/14/18  0600 01/15/18  0521    140   K 3.7 3.8    107   CO2 23 23   * 121*   BUN 30* 18   CREATININE 0.9 0.8   CALCIUM 8.8 8.6*   ANIONGAP 11 10   ESTGFRAFRICA >60 >60   EGFRNONAA >60 >60   , CBC   Recent Labs  Lab 01/14/18  0600 01/15/18  0521   WBC 8.87 9.03   HGB 10.0* 10.1*   HCT 30.7* 30.9*    268   , INR   Recent Labs  Lab 01/14/18  0600 01/15/18  0521   INR 1.7* 1.6*   , Lipid Panel No results for input(s): CHOL, HDL, LDLCALC, TRIG, CHOLHDL in the last 48 hours.,   Pathology Results  (Last 10 years)    None      , Troponin No results for input(s): TROPONINI in the last 48 hours., All pertinent lab results from the last 24 hours have been reviewed. and   Recent Lab Results       01/15/18  0551 01/15/18  0521 01/14/18  2150 01/14/18  1631 01/14/18  1121      Anion Gap  10        Aniso  Slight        aPTT  33.6  Comment:  aPTT therapeutic range = 39-69 seconds(H)        BANDS  2.0        Baso #  CANCELED  Comment:  Result canceled by the ancillary        Basophil%  2.0(H)        BUN, Bld  18        Calcium  8.6(L)        Chloride   107        CO2  23        Creatinine  0.8        Differential Method  Manual        eGFR if   >60        eGFR if non   >60  Comment:  Calculation used to obtain the estimated glomerular filtration  rate (eGFR) is the CKD-EPI equation.           Eos #  CANCELED  Comment:  Result canceled by the ancillary        Eosinophil%  4.0        Large/Giant Platelets  Present        Glucose  121(H)        Gran%  67.0        Hematocrit  30.9(L)        Hemoglobin  10.1(L)        Coumadin Monitoring INR  1.6  Comment:  Coumadin Therapy:  2.0 - 3.0 for INR for all indicators except mechanical heart valves  and antiphospholipid syndromes which should use 2.5 - 3.5.  (H)        Lymph #  CANCELED  Comment:  Result canceled by the ancillary        Lymph%  17.0(L)        MCH  31.5(H)        MCHC  32.7        MCV  96        Mono #  CANCELED  Comment:  Result canceled by the ancillary        Mono%  6.0        MPV  9.6        Myelocytes  2.0        Ovalocytes  Occasional        Platelets  268        POCT Glucose 114(H)  145(H) 158(H) 223(H)     Poik  Slight        Poly  Occasional        Potassium  3.8        Protime  16.0(H)        RBC  3.21(L)        RDW  15.4(H)        Sodium  140        WBC  9.03                        Significant Imaging: CT scan: CT ABDOMEN PELVIS WITH CONTRAST: No results found for this visit on 01/08/18. and CT ABDOMEN PELVIS WITHOUT CONTRAST: No results found for this visit on 01/08/18., Echocardiogram:   2D echo with color flow doppler:   Results for orders placed or performed in visit on 07/06/16   2D Echo w/ Color Flow Doppler   Result Value Ref Range    EF 60 55 - 65    Mitral Valve Regurgitation TRIVIAL     Diastolic Dysfunction Yes (A)     Aortic Valve Regurgitation TRIVIAL     Est. PA Systolic Pressure 45 (A)     Tricuspid Valve Regurgitation MILD TO MODERATE     and X-Ray: CXR: X-Ray Chest 1 View (CXR): No results found for this visit on 01/08/18. and X-Ray Chest PA and  Lateral (CXR): No results found for this visit on 01/08/18. and KUB: X-Ray Abdomen AP 1 View (KUB): No results found for this visit on 01/08/18.

## 2018-01-15 NOTE — PLAN OF CARE
Problem: Diabetes, Type 2 (Adult)  Goal: Signs and Symptoms of Listed Potential Problems Will be Absent, Minimized or Managed (Diabetes, Type 2)  Signs and symptoms of listed potential problems will be absent, minimized or managed by discharge/transition of care (reference Diabetes, Type 2 (Adult) CPG).   Outcome: Ongoing (interventions implemented as appropriate)  Accuchecks continue ac&hs. No supplemental insulin given this shift.     Problem: Patient Care Overview  Goal: Plan of Care Review  Outcome: Ongoing (interventions implemented as appropriate)  Plan of care reviewed with patient and he agrees with the plan of care. Telemetry monitoring continues. Uneventful night patient rested well.     Problem: Fall Risk (Adult)  Goal: Absence of Falls  Patient will demonstrate the desired outcomes by discharge/transition of care.    Outcome: Ongoing (interventions implemented as appropriate)  Fall precautions maintained. Bed alarm engaged at all times. Wife at bedside.     Problem: Urinary Tract Infection (Adult)  Goal: Signs and Symptoms of Listed Potential Problems Will be Absent, Minimized or Managed (Urinary Tract Infection)  Signs and symptoms of listed potential problems will be absent, minimized or managed by discharge/transition of care (reference Urinary Tract Infection (Adult) CPG).   Outcome: Ongoing (interventions implemented as appropriate)  Afebrile. Zosyn continues without any adverse reactions. Catheter patency maintained. Brown urine noted.

## 2018-01-15 NOTE — ASSESSMENT & PLAN NOTE
His urine looks like source of his blood loss with   foleys catheter and him being on coumadin with elevated INR Monitor h/h   If hematocrit below 21, so I will transfuse with 2 units PRBCs on 1-12-18 1-13 Given 2 units PRBCs and his Hct is 28 this AM   1-14 Hct is over 30 today    1-15 Hct Has been stable >30

## 2018-01-15 NOTE — PROGRESS NOTES
"[facility] Medicine  Progress Note    Patient Name: Eddie Parada Sr.  MRN: [id number]  Patient Class: IP- Inpatient   Admission Date: 1/8/2018  Length of Stay: 5 days  Attending Physician: Georgia Ann MD  Primary Care Provider: Callum Roberts MD        Subjective:     Principal Problem:Pseudomonas urinary tract infection    HPI:  Eddie Parada Sr. is a 88 y.o. male   Eddie Parada Sr. is a 88 y.o. male  Here for follow up for hypoglycemia  Keeps getting recurrent UTIs.  Last  Culture grew up : pseudomas  I sent to ER last time he did not  Want to stay   Seen urologist December ;   He is allergic to Levaquin ; he could not walk   Wife reluctant to try cipro which was .  He is having issues with dysuria . S/p catheterization since november .     Urine Culture, Routine              PSEUDOMONAS AERUGINOSA   >100,000 cfu/ml       Resulting Agency        [facility]  Susceptibility                     Pseudomonas aeruginosa                    CULTURE, URINE                    Amikacin         <=16 "><=16   Sensitive                       Cefepime         <=8 "><=8       Sensitive                       Ciprofloxacin   <=1 "><=1       Sensitive                       Gentamicin      <=4 "><=4       Sensitive                       Meropenem     <=4 "><=4       Sensitive                       Piperacillin/Tazo         <=16 "><=16   Sensitive                       Tobramycin     <=4 "><=4       Sensitive             Patient has been seen in the emergency room by his primary care physician several times  Patient has a indwelling Bishop catheter with a residual Pseudomonas urinary tract infection  Patient is ALLERGIC to Cipro and the cultures of the Pseudomonas are difficult to treat now  Patient has failed oral antibiotics, presents from PCP office for admission for IV antibiotics  Urine culture last ER visit shows a pseudomonas sensitive to Zosyn, afebrile and stable    Hospital " Course:  1/10/2018  Urine looks like brown with sediment. Nurse reports that it was clearer yesterday evening. Cont on zosyn and started NS 75ml/hr. Afebrile. This am bp low 81/45- coreg 6.25mg po BID reordered per home routine but held this am. HR 60    PT started to prevent further weakening    1/11/2018  Pt still with tea colored urine this am but a little clear today.  BUN/creat bumped 76/2.5 (admission was 54/1.4) coumadin remains held for INR 3.5 (down from 5.3 on admission). H/H still falling 7.4/22.7 (from 9.3/28.1 on  Admission)    BNP check last night for c/o wheezing. Was elevated in 700's, appears his baseline is 300-400. He remains on D51/2 NS at 75ml/hr for decreased urinary outpt 510, intake 925 [id number] +. He is lying nearly flat today with no c/o SOB.     BP still low in 80-90/50 HR 60.     DM meds held as well for low blood sugars in 30 (was on amaryl at home). Now sugars increasing 124-160 no SSI used as he is on low dose    Interval history:  Bladder scan came back without residual urine, but CT showed hydronephrosis and shields at the prostate. Replaced shields and 1400 cc of urine was produced.    1-12-18 1-13 Will continue to treat his UTI w parental IV Abx, along with Rxing his constipation    1-14 Hct is over 30 this and his INR is 1.7    1-15 Pt is clinically stazble, looks and sounds strong and well; urine is clear and his Hct/Hgb is good--d'c to the house--needs a urology assessment in the next 7-10 days at [facility]    Review of Systems   Constitutional: Positive for fatigue. Negative for activity change, appetite change, chills, diaphoresis and fever.   HENT: Negative for congestion, ear pain, postnasal drip, sneezing and sore throat.    Eyes: Negative for photophobia, redness and visual disturbance.   Respiratory: Negative for cough, chest tightness, shortness of breath and stridor (Cardiac arrhythemia).    Cardiovascular: Negative for chest pain.        CIS handling his cardiac  irreg   Gastrointestinal: Positive for constipation. Negative for abdominal distention, abdominal pain, blood in stool, diarrhea, nausea and vomiting.        Given amitiza last evening, and having good eBMs   Genitourinary: Positive for decreased urine volume, difficulty urinating, hematuria and urgency. Negative for dysuria and flank pain.        Chronic indwelling shields catheter   Musculoskeletal: Negative for arthralgias, back pain, joint swelling, myalgias and neck pain.   Skin: Negative for pallor and rash.   Neurological: Positive for weakness. Negative for dizziness, seizures, facial asymmetry, speech difficulty and numbness.   Hematological: Does not bruise/bleed easily.        Hct is 30.9 this am   Psychiatric/Behavioral: Negative for agitation and suicidal ideas. The patient is not nervous/anxious.      Objective:     Vital Signs (Most Recent):  Temp: 96.4 °F (35.8 °C) (01/15/18 0834)  Pulse: 82 (01/15/18 0901)  Resp: 18 (01/15/18 0834)  BP: (!) 161/74 (01/15/18 0834)  SpO2: 97 % (01/15/18 0834) Vital Signs (24h Range):  Temp:  [96.1 °F (35.6 °C)-96.5 °F (35.8 °C)] 96.4 °F (35.8 °C)  Pulse:  [60-86] 82  Resp:  [18] 18  SpO2:  [95 %-97 %] 97 %  BP: (134-162)/(65-76) 161/74     Weight: 81 kg (178 lb 9.2 oz)  Body mass index is 28.82 kg/m².    Physical Exam   Constitutional: He is oriented to person, place, and time. He appears well-developed and well-nourished.   HENT:   Head: Normocephalic and atraumatic.   Neck: No JVD present.   Cardiovascular: Normal rate.  Exam reveals friction rub.    Pulmonary/Chest: Effort normal and breath sounds normal.   Abdominal: Soft. Bowel sounds are normal. There is no tenderness (+suprapubic tenderness with percussion of bladder to umbilicus).   Genitourinary:   Genitourinary Comments: Shields in place  Urine is yellow & clear this am   Musculoskeletal: He exhibits no edema.   Neurological: He is alert and oriented to person, place, and time.   Skin: Skin is warm and dry.  "  Psychiatric: He has a normal mood and affect. His behavior is normal. Judgment and thought content normal.   Nursing note and vitals reviewed.          Significant Labs:   CBC:     Recent Labs  Lab 01/14/18  0600 01/15/18  0521   WBC 8.87 9.03   HGB 10.0* 10.1*   HCT 30.7* 30.9*    268     CMP:     Recent Labs  Lab 01/14/18  0600 01/15/18  0521    140   K 3.7 3.8    107   CO2 23 23   * 121*   BUN 30* 18   CREATININE 0.9 0.8   CALCIUM 8.8 8.6*   ANIONGAP 11 10   EGFRNONAA >60 >60     Lactate 1.4  INR 5.3>>4.0>>3.5  Blood culture NGTD x 1    Urine culture from 1/5/18  Urine Culture, Routine PSEUDOMONAS AERUGINOSA   >100,000 cfu/ml        Resulting Agency [facility]   Susceptibility      Pseudomonas aeruginosa     CULTURE, URINE     Amikacin <=16 "><=16  Sensitive     Cefepime <=8 "><=8  Sensitive     Ciprofloxacin <=1 "><=1  Sensitive     Gentamicin <=4 "><=4  Sensitive     Meropenem <=4 "><=4  Sensitive     Piperacillin/Tazo <=16 "><=16  Sensitive     Tobramycin <=4 "><=4  Sensitive             Urinalysis brown and cloudy 3+ leuko + nitrite and 1+ leuko, 100RBC, 75 WBC  Review of Systems   Constitutional: Positive for fatigue. Negative for activity change, appetite change, chills, diaphoresis and fever.   HENT: Negative for congestion, ear pain, postnasal drip, sneezing and sore throat.    Eyes: Negative for photophobia, redness and visual disturbance.   Respiratory: Negative for cough, chest tightness, shortness of breath and stridor.    Cardiovascular: Negative for chest pain.   Gastrointestinal: Positive for constipation. Negative for abdominal distention, abdominal pain, blood in stool, diarrhea, nausea and vomiting.   Genitourinary: Positive for decreased urine volume and urgency. Negative for difficulty urinating, dysuria, flank pain and hematuria.        INdwellling shields in place and draining well   Musculoskeletal: Negative for arthralgias, back pain, joint swelling, myalgias and neck " pain.   Skin: Negative for pallor and rash.   Neurological: Positive for weakness. Negative for dizziness, seizures, facial asymmetry, speech difficulty and numbness.   Hematological: Does not bruise/bleed easily.   Psychiatric/Behavioral: Negative for agitation and suicidal ideas. The patient is not nervous/anxious.      Objective:     Vital Signs (Most Recent):  Temp: 96.4 °F (35.8 °C) (01/15/18 0834)  Pulse: 82 (01/15/18 0901)  Resp: 18 (01/15/18 0834)  BP: (!) 161/74 (01/15/18 0834)  SpO2: 97 % (01/15/18 0834) Vital Signs (24h Range):  Temp:  [96.1 °F (35.6 °C)-96.5 °F (35.8 °C)] 96.4 °F (35.8 °C)  Pulse:  [60-86] 82  Resp:  [18] 18  SpO2:  [95 %-97 %] 97 %  BP: (134-162)/(65-76) 161/74     Weight: 81 kg (178 lb 9.2 oz)  Body mass index is 28.82 kg/m².    Physical Exam   Constitutional: He is oriented to person, place, and time. He appears well-developed and well-nourished.   HENT:   Head: Normocephalic and atraumatic.   Neck: No JVD present.   Cardiovascular: Normal rate.  Exam reveals friction rub.    Pulmonary/Chest: Effort normal and breath sounds normal.   Abdominal: Soft. Bowel sounds are normal. There is tenderness (+suprapubic tenderness with percussion of bladder to umbilicus).   Genitourinary:   Genitourinary Comments: Bishop in place  Milky tea colored urine in bag     Musculoskeletal: He exhibits no edema.   Neurological: He is alert and oriented to person, place, and time.   Skin: Skin is warm and dry.   Psychiatric: He has a normal mood and affect. His behavior is normal. Judgment and thought content normal.   Nursing note and vitals reviewed.          Significant Labs:   CBC:     Recent Labs  Lab 01/14/18  0600 01/15/18  0521   WBC 8.87 9.03   HGB 10.0* 10.1*   HCT 30.7* 30.9*    268     CMP:     Recent Labs  Lab 01/14/18  0600 01/15/18  0521    140   K 3.7 3.8    107   CO2 23 23   * 121*   BUN 30* 18   CREATININE 0.9 0.8   CALCIUM 8.8 8.6*   ANIONGAP 11 10   EGFRNONAA >60  ">60     Lactate 1.4  INR 5.3>>4.0>>3.5  Blood culture NGTD x 1    Urine culture from 1/5/18  Urine Culture, Routine PSEUDOMONAS AERUGINOSA   >100,000 cfu/ml        Resulting Agency [facility]   Susceptibility      Pseudomonas aeruginosa     CULTURE, URINE     Amikacin <=16 "><=16  Sensitive     Cefepime <=8 "><=8  Sensitive     Ciprofloxacin <=1 "><=1  Sensitive     Gentamicin <=4 "><=4  Sensitive     Meropenem <=4 "><=4  Sensitive     Piperacillin/Tazo <=16 "><=16  Sensitive     Tobramycin <=4 "><=4  Sensitive             Urinalysis brown and cloudy 3+ leuko + nitrite and 1+ leuko, 100RBC, 75 WBCReview of Systems   Constitutional: Positive for fatigue. Negative for activity change, appetite change, chills, diaphoresis and fever.   HENT: Negative for congestion, ear pain, postnasal drip, sneezing and sore throat.    Eyes: Negative for photophobia, redness and visual disturbance.   Respiratory: Negative for cough, chest tightness, shortness of breath and stridor.    Cardiovascular: Negative for chest pain.   Gastrointestinal: Positive for constipation. Negative for abdominal distention, abdominal pain, blood in stool, diarrhea, nausea and vomiting.        Given amitiza last evening   Genitourinary: Positive for decreased urine volume and urgency. Negative for difficulty urinating, dysuria, flank pain and hematuria.        INdwellling shields in place and draining well   Musculoskeletal: Negative for arthralgias, back pain, joint swelling, myalgias and neck pain.   Skin: Negative for pallor and rash.   Neurological: Positive for weakness. Negative for dizziness, seizures, facial asymmetry, speech difficulty and numbness.   Hematological: Does not bruise/bleed easily.   Psychiatric/Behavioral: Negative for agitation and suicidal ideas. The patient is not nervous/anxious.      Objective:     Vital Signs (Most Recent):  Temp: 96.5 °F (35.8 °C) (01/13/18 0312)  Pulse: 60 (01/13/18 0600)  Resp: 18 (01/13/18 0312)  BP: 129/63 " "(01/13/18 0312)  SpO2: 96 % (01/13/18 0312) Vital Signs (24h Range):  Temp:  [96.2 °F (35.7 °C)-98.7 °F (37.1 °C)] 96.5 °F (35.8 °C)  Pulse:  [57-70] 60  Resp:  [18] 18  SpO2:  [94 %-97 %] 96 %  BP: (101-129)/(51-63) 129/63     Weight: 81 kg (178 lb 9.2 oz)  Body mass index is 28.82 kg/m².    Physical Exam   Constitutional: He is oriented to person, place, and time. He appears well-developed and well-nourished.   HENT:   Head: Normocephalic and atraumatic.   Neck: No JVD present.   Cardiovascular: Normal rate.  Exam reveals friction rub.    Pulmonary/Chest: Effort normal and breath sounds normal.   Abdominal: Soft. Bowel sounds are normal. There is tenderness (+suprapubic tenderness with percussion of bladder to umbilicus).   Genitourinary:   Genitourinary Comments: Bishop in place  Milky tea colored urine in bag     Musculoskeletal: He exhibits no edema.   Neurological: He is alert and oriented to person, place, and time.   Skin: Skin is warm and dry.   Psychiatric: He has a normal mood and affect. His behavior is normal. Judgment and thought content normal.   Nursing note and vitals reviewed.          Significant Labs:   CBC:     Recent Labs  Lab 01/12/18  0517 01/13/18  0611   WBC 11.19 8.76   HGB 7.0* 9.5*   HCT 21.4* 28.5*    212     CMP:     Recent Labs  Lab 01/12/18  0517 01/13/18  0612   * 136   K 3.5 3.5    103   CO2 20* 21*    124*   BUN 72* 48*   CREATININE 2.2* 1.3   CALCIUM 8.4* 8.6*   ANIONGAP 12 12   EGFRNONAA 26* 49*     Lactate 1.4  INR 5.3>>4.0>>3.5  Blood culture NGTD x 1    Urine culture from 1/5/18  Urine Culture, Routine PSEUDOMONAS AERUGINOSA   >100,000 cfu/ml        Resulting Agency [facility]   Susceptibility      Pseudomonas aeruginosa     CULTURE, URINE     Amikacin <=16 "><=16  Sensitive     Cefepime <=8 "><=8  Sensitive     Ciprofloxacin <=1 "><=1  Sensitive     Gentamicin <=4 "><=4  Sensitive     Meropenem <=4 "><=4  Sensitive     Piperacillin/Tazo <=16 "><=16  " "Sensitive     Tobramycin <=4 "><=4  Sensitive             Urinalysis brown and cloudy 3+ leuko + nitrite and 1+ leuko, 100RBC, 75 WBC  Review of Systems   Constitutional: Positive for fatigue. Negative for activity change, appetite change, chills, diaphoresis and fever.   HENT: Negative for congestion, ear pain, postnasal drip, sneezing and sore throat.    Eyes: Negative for photophobia, redness and visual disturbance.   Respiratory: Negative for cough, chest tightness, shortness of breath and stridor.    Cardiovascular: Negative for chest pain.   Gastrointestinal: Positive for constipation. Negative for abdominal distention, abdominal pain, blood in stool, diarrhea, nausea and vomiting.   Genitourinary: Positive for decreased urine volume and urgency. Negative for difficulty urinating, dysuria, flank pain and hematuria.        INdwellling shields in place and draining well   Musculoskeletal: Negative for arthralgias, back pain, joint swelling, myalgias and neck pain.   Skin: Negative for pallor and rash.   Neurological: Positive for weakness. Negative for dizziness, seizures, facial asymmetry, speech difficulty and numbness.   Hematological: Does not bruise/bleed easily.   Psychiatric/Behavioral: Negative for agitation and suicidal ideas. The patient is not nervous/anxious.      Objective:     Vital Signs (Most Recent):  Temp: 96.5 °F (35.8 °C) (01/13/18 0312)  Pulse: 60 (01/13/18 0600)  Resp: 18 (01/13/18 0312)  BP: 129/63 (01/13/18 0312)  SpO2: 96 % (01/13/18 0312) Vital Signs (24h Range):  Temp:  [96.2 °F (35.7 °C)-98.7 °F (37.1 °C)] 96.5 °F (35.8 °C)  Pulse:  [57-70] 60  Resp:  [18] 18  SpO2:  [94 %-97 %] 96 %  BP: (101-129)/(51-63) 129/63     Weight: 81 kg (178 lb 9.2 oz)  Body mass index is 28.82 kg/m².    Physical Exam   Constitutional: He is oriented to person, place, and time. He appears well-developed and well-nourished.   HENT:   Head: Normocephalic and atraumatic.   Neck: No JVD present.   Cardiovascular: " "Normal rate.  Exam reveals friction rub.    Pulmonary/Chest: Effort normal and breath sounds normal.   Abdominal: Soft. Bowel sounds are normal. There is tenderness (+suprapubic tenderness with percussion of bladder to umbilicus).   Genitourinary:   Genitourinary Comments: Shields in place  Milky tea colored urine in bag     Musculoskeletal: He exhibits no edema.   Neurological: He is alert and oriented to person, place, and time.   Skin: Skin is warm and dry.   Psychiatric: He has a normal mood and affect. His behavior is normal. Judgment and thought content normal.   Nursing note and vitals reviewed.          Significant Labs:   CBC:     Recent Labs  Lab 01/12/18  0517 01/13/18  0611   WBC 11.19 8.76   HGB 7.0* 9.5*   HCT 21.4* 28.5*    212     CMP:     Recent Labs  Lab 01/12/18  0517 01/13/18  0612   * 136   K 3.5 3.5    103   CO2 20* 21*    124*   BUN 72* 48*   CREATININE 2.2* 1.3   CALCIUM 8.4* 8.6*   ANIONGAP 12 12   EGFRNONAA 26* 49*     Lactate 1.4  INR 5.3>>4.0>>3.5  Blood culture NGTD x 1    Urine culture from 1/5/18  Urine Culture, Routine PSEUDOMONAS AERUGINOSA   >100,000 cfu/ml        Resulting Agency [facility]   Susceptibility      Pseudomonas aeruginosa     CULTURE, URINE     Amikacin <=16 "><=16  Sensitive     Cefepime <=8 "><=8  Sensitive     Ciprofloxacin <=1 "><=1  Sensitive     Gentamicin <=4 "><=4  Sensitive     Meropenem <=4 "><=4  Sensitive     Piperacillin/Tazo <=16 "><=16  Sensitive     Tobramycin <=4 "><=4  Sensitive             Urinalysis brown and cloudy 3+ leuko + nitrite and 1+ leuko, 100RBC, 75 WBC    Assessment/Plan:      * Pseudomonas urinary tract infection      He is allergic to levaquin and cipro  Only other po med would have been cipro ; severe allergy to levaquin   Left us no choice but put him on IV therapy   Zosyn for 5 days ; started 1/8/19 but now with elevated creat and cont h/h drop, check renal stone study today - shields not in place  Susceptibility " "    Cr is improving, and will continue present Rx    1-13 IV Pipercillin    1-14 Cr is wnl and WBC s in wnl range    1-15 Will d'c home on oral Cleocin 150 mg bid, chronic cath crainage and needs to be seen by Urology [facility] in the next 7-1- days;     Pseudomonas aeruginosa     CULTURE, URINE     Amikacin <=16 "><=16  Sensitive     Cefepime <=8 "><=8  Sensitive     Ciprofloxacin <=1 "><=1  Sensitive     Gentamicin <=4 "><=4  Sensitive     Meropenem <=4 "><=4  Sensitive     Piperacillin/Tazo <=16 "><=16  Sensitive     Tobramycin <=4 "><=4  Sensitive                Hypotension      Was on fluids last couple days. BP stable although low, holding this am  To r/o obstructive uropathy this am.   BNP elevated but no wheezing or c/o SOB        NIK (acute kidney injury)    Thought to be from UTI, treating with abx. Check CT pelvis today to r/o obstructive  Uropathy.  This is an outlet obstruction NIK. Should resolve with correction of shields placement.  BMP in am.        Hypoglycemia associated with diabetes    DC amaryl   Dc actos  Insulin sliding scale.  Change fluids to D5- hold today as blood sugars better          Acute blood loss anemia    His urine looks like source of his blood loss with   foleys catheter and him being on coumadin with elevated INR Monitor h/h   If hematocrit below 21, so I will transfuse with 2 units PRBCs on 1-12-18    1-13 Given 2 units PRBCs and his Hct is 28 this AM   1-14 Hct is over 30 today    1-15 Hct Has been stable >30        Urinary retention    Continue foleys catheter --> this was removed and replaced on 1/11. See above h/c  Seen urology [facility] ;they would keep shields's for one more month  Continue flomax.   Pt's shields is draining well now.    "I recommend continuing the Flomax and stopping the oxybutynin.  Shields catheter is changed today under sterile technique.  Nursing staff unable to pass a catheter however I am able to pass a 16 Swedish coudé.  This is attached to a leg bag " and family is instructed on its use.  Note written for home health to change the catheter in 1 month.  Patient to follow-up with me in 2 months at which time we will give him a voiding trial in the office.  Hopefully by that time The patient will be fully ambulatory.            S/P AVR    Continue coumadin once INR down          Type 2 diabetes mellitus with neurologic complication    He is hypoglycemic- changhed NS to D51/2 cont at 75ml/hr- now bs elevating, will change back to NS this am, slow due to elevated BNP and wheezing  Hold meds for now (sulfonuria at home, none here)  Insulin sliding scale low dose    1-14 good FBS this am    1-15 Coumadin 5 mg q d (low dose)          Anticoagulation monitoring by pharmacist    Home on coumadin 5 mg qd        Atrial fibrillation    continue coreg and coumadin - holding coreg again today  Watch H/H daily though type and screen today   Follow INRs- holding coumadin (INR 5.3>>4.0>>3.5)            VTE Risk Mitigation         Ordered     warfarin (COUMADIN) tablet 5 mg  Daily     Route:  Oral        01/14/18 1446     Medium Risk of VTE  Once      01/08/18 1828     Place sequential compression device  Until discontinued      01/08/18 1828              Montana Mondragon MD  Department of Hospital Medicine   [facility]

## 2018-01-15 NOTE — PT/OT/SLP DISCHARGE
Physical Therapy Discharge Summary    Name: Eddie Parada Sr.  MRN: [id number]   Principal Problem: Pseudomonas urinary tract infection     Patient Discharged from acute Physical Therapy on 01/15/2018.  Please refer to prior PT noted date on 2018 for functional status.     Assessment:     Goals partially met.    Objective:     GOALS:    Physical Therapy Goals     Not on file          Multidisciplinary Problems (Resolved)        Problem: Physical Therapy Goal    Goal Priority Disciplines Outcome Goal Variances Interventions   Physical Therapy Goal   (Resolved)     PT/OT, PT Outcome(s) achieved     Description:  Goals to be met by: 2018     Patient will increase functional independence with mobility by performin. Supine to sit with Stand-by Assistance-- NOT MET  2. Sit to supine with Stand-by Assistance -- NOT MET  3. Rolling to Left and Right with Stand-by Assistance. GOAL MET  4. Sit to stand transfer with Stand-by Assistance, using RW -- NOT MET  5. Bed to chair transfer with Stand-by Assistance using Rolling Walker -- NOT MET  6. Gait  x 50 feet with Stand-by Assistance using Rolling Walker.  -- NOT MET              Problem: Physical Therapy Goal    Goal Priority Disciplines Outcome Goal Variances Interventions   Physical Therapy Goal   (Resolved)     PT/OT, PT Outcome(s) achieved     Description:  1. Continuing  Plan of care to achieve anticipated goals -- GOAL MET             Problem: Physical Therapy Goal    Goal Priority Disciplines Outcome Goal Variances Interventions   Physical Therapy Goal   (Resolved)     PT/OT, PT Outcome(s) achieved     Description:  1.contunuing efforts to achieve anticipated goals -- GOAL MET                    Reasons for Discontinuation of Therapy Services  Transfer to alternate level of care.      Plan:     Patient Discharged to: Home with Home Health Service.    Cindy Florentino, PT  1/15/2018

## 2018-01-15 NOTE — ASSESSMENT & PLAN NOTE
"  He is allergic to levaquin and cipro  Only other po med would have been cipro ; severe allergy to levaquin   Left us no choice but put him on IV therapy   Zosyn for 5 days ; started 1/8/19 but now with elevated creat and cont h/h drop, check renal stone study today - shields not in place  Susceptibility     Cr is improving, and will continue present Rx    1-13 IV Pipercillin    1-14 Cr is wnl and WBC s in wnl range    1-15 Will d'c home on oral Cleocin 150 mg bid, chronic cath crainage and needs to be seen by [facility]rology [facility] in the next 7-1- days;     Pseudomonas aeruginosa     CULTURE, URINE     Amikacin <=16 "><=16  Sensitive     Cefepime <=8 "><=8  Sensitive     Ciprofloxacin <=1 "><=1  Sensitive     Gentamicin <=4 "><=4  Sensitive     Meropenem <=4 "><=4  Sensitive     Piperacillin/Tazo <=16 "><=16  Sensitive     Tobramycin <=4 "><=4  Sensitive          "

## 2018-01-15 NOTE — PLAN OF CARE
01/15/18 1020   Medicare Message   Important Message from Medicare regarding Discharge Appeal Rights Given to patient/caregiver;Explained to patient/caregiver;Signed/date by patient/caregiver   Date IMM was signed 01/15/18   Time IMM was signed 1020

## 2018-01-15 NOTE — HPI
Eddie Parada Sr. presented to the emergency room with a chronic urinary tract infection. Patient has been seen in the emergency room by his primary care physician several times. Patient has a indwelling Bishop catheter with a residual Pseudomonas urinary tract infection  Patient is ALLERGIC to Cipro and the cultures of the Pseudomonas are difficult to treat now. Patient has failed oral antibiotics, presents from PCP office for admission for IV antibiotics. Urine culture last ER visit shows a pseudomonas sensitive to Zosyn, afebrile and stable. CIS consulted for frequent PVCs.

## 2018-01-15 NOTE — CONSULTS
[facility]  Cardiology  Consult Note    Patient Name: Eddie Parada Sr.  MRN: [id number]  Admission Date: 1/8/2018  Hospital Length of Stay: 5 days  Code Status: Full Code   Attending Provider: Georgia Ann MD   Consulting Provider: Sadi Sosa NP  Primary Care Physician: Callum Roberts MD  Principal Problem:Pseudomonas urinary tract infection    Patient information was obtained from patient, past medical records and ER records.     Consults  Subjective:     Chief Complaint:  UTI     HPI:   Eddie Parada Sr. present ed to the emergency room with a chronic urinary tract infection. Patient has been seen in the emergency room by his primary care physician several times. Patient has a indwelling Bishop catheter with a residual Pseudomonas urinary tract infection  Patient is ALLERGIC to Cipro and the cultures of the Pseudomonas are difficult to treat now. Patient has failed oral antibiotics, presents from PCP office for admission for IV antibiotics. Urine culture last ER visit shows a pseudomonas sensitive to Zosyn, afebrile and stable. CIS consulted for frequent PVCs.     Past Medical History:   Diagnosis Date    Abdominal fibromatosis     Acute posthemorrhagic anemia 1/9/2018    NIK (acute kidney injury) 1/11/2018    Atrial fibrillation     Bradycardia     CAD (coronary artery disease)     Cancer     basal cell on nose and melanoma on back    CHF (congestive heart failure)     COPD (chronic obstructive pulmonary disease)     Diabetes mellitus type II     Hyperlipidemia     Melanoma     Obesity (BMI 30.0-34.9) 9/13/2016    Osteoporosis     Peripheral vascular disease     Stroke     TIA (transient ischemic attack)     Type II or unspecified type diabetes mellitus without mention of complication, not stated as uncontrolled     Unspecified essential hypertension        Past Surgical History:   Procedure Laterality Date    AORTIC VALVE REPLACEMENT      CARDIAC PACEMAKER  PLACEMENT  9/28/2012    CARDIAC VALVE REPLACEMENT  11/17/2011    aortic valve-tissue    CHOLECYSTECTOMY      COLONOSCOPY      EYE SURGERY Bilateral     cataract [facility] lens [facility]    HERNIA REPAIR      abdominal    LASIK         Review of patient's allergies indicates:   Allergen Reactions    Ciprofloxacin     Levofloxacin Swelling    Lyrica [pregabalin] Swelling    Unable to assess Other (See Comments)     Soft shell crabs       No current facility-administered medications on file prior to encounter.      Current Outpatient Prescriptions on File Prior to Encounter   Medication Sig    amlodipine (NORVASC) 10 MG tablet Take 1 tablet (10 mg total) by mouth once daily.    carvedilol (COREG) 12.5 MG tablet TAKE 1 TABLET (12.5 MG TOTAL) BY MOUTH 2 (TWO) TIMES DAILY WITH MEALS.    FOLIC ACID/MULTIVIT-MIN/LUTEIN (CENTRUM SILVER ORAL) Take 1 tablet by mouth once daily.    furosemide (LASIX) 40 MG tablet Three times a week PRN swelling    gemfibrozil (LOPID) 600 MG tablet TAKE 1 TABLET (600 MG TOTAL) BY MOUTH 2 (TWO) TIMES DAILY BEFORE MEALS.    glimepiride (AMARYL) 2 MG tablet TAKE 1 TABLET BY MOUTH EVERY MORNING WITH BREAKFAST    levothyroxine (SYNTHROID) 50 MCG tablet TAKE 1 TABLET (50 MCG TOTAL) BY MOUTH ONCE DAILY.    lisinopril 10 MG tablet Take 1 tablet (10 mg total) by mouth once daily.    oxybutynin (DITROPAN) 5 MG Tab Take 1 tablet (5 mg total) by mouth 3 (three) times daily.    pioglitazone (ACTOS) 15 MG tablet ONE TABLET ONE TIME A DAY    pravastatin (PRAVACHOL) 20 MG tablet Take 1 tablet (20 mg total) by mouth once daily. (Patient taking differently: Take 20 mg by mouth every evening. )    tamsulosin (FLOMAX) 0.4 mg Cp24 Take 1 capsule (0.4 mg total) by mouth once daily. (Patient taking differently: Take 0.4 mg by mouth every evening. )    [DISCONTINUED] amoxicillin-clavulanate 875-125mg (AUGMENTIN) 875-125 mg per tablet Take 1 tablet by mouth 2 (two) times daily.     [DISCONTINUED] nitrofurantoin, macrocrystal-monohydrate, (MACROBID) 100 MG capsule Take 1 capsule (100 mg total) by mouth 2 (two) times daily.    [DISCONTINUED] warfarin (COUMADIN) 2 MG tablet Take 2 mg by mouth every evening.     ONETOUCH DELICA LANCETS 33 gauge Misc     ONETOUCH ULTRA2 kit      Family History     Problem Relation (Age of Onset)    Alcohol abuse Father    COPD Sister, Brother    Cancer Brother    Diabetes Daughter    Heart disease Brother    Hypertension Father    No Known Problems Son, Daughter, Son    Stroke Father        Social History Main Topics    Smoking status: Former Smoker     Quit date: 9/20/1996    Smokeless tobacco: Never Used      Comment: cigar smoker    Alcohol use No      Comment: none since 2006    Drug use: No    Sexual activity: Not Currently     Review of Systems   Constitution: Negative.   HENT: Negative.    Eyes: Negative.    Cardiovascular: Positive for palpitations. Negative for chest pain, claudication, cyanosis, dyspnea on exertion, irregular heartbeat, leg swelling, near-syncope, orthopnea, paroxysmal nocturnal dyspnea and syncope.   Endocrine: Negative.    Hematologic/Lymphatic: Negative.    Skin: Negative.    Musculoskeletal: Negative.    Gastrointestinal: Negative.    Genitourinary:        Chronic UTIs   Neurological: Negative.    Psychiatric/Behavioral: Negative.    Allergic/Immunologic: Negative.      Objective:     Vital Signs (Most Recent):  Temp: 96.4 °F (35.8 °C) (01/15/18 0834)  Pulse: 82 (01/15/18 0901)  Resp: 18 (01/15/18 0834)  BP: (!) 161/74 (01/15/18 0834)  SpO2: 97 % (01/15/18 0834) Vital Signs (24h Range):  Temp:  [96.1 °F (35.6 °C)-96.5 °F (35.8 °C)] 96.4 °F (35.8 °C)  Pulse:  [60-86] 82  Resp:  [18] 18  SpO2:  [95 %-97 %] 97 %  BP: (134-162)/(65-76) 161/74     Weight: 81 kg (178 lb 9.2 oz)  Body mass index is 28.82 kg/m².    SpO2: 97 %  O2 Device (Oxygen Therapy): nasal cannula      Intake/Output Summary (Last 24 hours) at 01/15/18 0953  Last  data filed at 01/15/18 0900   Gross per 24 hour   Intake              920 ml   Output             1300 ml   Net             -380 ml       Lines/Drains/Airways     Drain                 Urethral Catheter 01/11/18 1353 16 Fr. 3 days          Peripheral Intravenous Line                 Peripheral IV - Single Lumen 01/08/18 1645 Right Antecubital 6 days                Physical Exam   Constitutional: He is oriented to person, place, and time. He appears well-developed and well-nourished. No distress.   HENT:   Head: Normocephalic and atraumatic.   Eyes: Conjunctivae and EOM are normal. Pupils are equal, round, and reactive to light.   Neck: Normal range of motion. Neck supple. No JVD present. No tracheal deviation present. No thyromegaly present.   Cardiovascular: Normal rate, regular rhythm, normal heart sounds and intact distal pulses.  Exam reveals no gallop and no friction rub.    No murmur heard.  Pulmonary/Chest: Effort normal and breath sounds normal. No stridor. No respiratory distress. He has no wheezes. He has no rales. He exhibits no tenderness.   Abdominal: Soft. Bowel sounds are normal. He exhibits no distension and no mass. There is no tenderness. There is no rebound and no guarding.   Musculoskeletal: Normal range of motion. He exhibits no edema or tenderness.   chronic indwelling shields   Neurological: He is alert and oriented to person, place, and time.   Skin: Skin is warm and dry. No rash noted. He is not diaphoretic. No erythema. No pallor.   Psychiatric: He has a normal mood and affect. His behavior is normal. Judgment and thought content normal.       Significant Labs:   ABG: No results for input(s): PH, PCO2, HCO3, POCSATURATED, BE in the last 48 hours., Blood Culture: No results for input(s): LABBLOO in the last 48 hours., BMP:   Recent Labs  Lab 01/14/18  0600 01/15/18  0521   * 121*    140   K 3.7 3.8    107   CO2 23 23   BUN 30* 18   CREATININE 0.9 0.8   CALCIUM 8.8 8.6*   MG  1.8  --    , CMP   Recent Labs  Lab 01/14/18  0600 01/15/18  0521    140   K 3.7 3.8    107   CO2 23 23   * 121*   BUN 30* 18   CREATININE 0.9 0.8   CALCIUM 8.8 8.6*   ANIONGAP 11 10   ESTGFRAFRICA >60 >60   EGFRNONAA >60 >60   , CBC   Recent Labs  Lab 01/14/18  0600 01/15/18  0521   WBC 8.87 9.03   HGB 10.0* 10.1*   HCT 30.7* 30.9*    268   , INR   Recent Labs  Lab 01/14/18  0600 01/15/18  0521   INR 1.7* 1.6*   , Lipid Panel No results for input(s): CHOL, HDL, LDLCALC, TRIG, CHOLHDL in the last 48 hours.,   Pathology Results  (Last 10 years)    None      , Troponin No results for input(s): TROPONINI in the last 48 hours., All pertinent lab results from the last 24 hours have been reviewed. and   Recent Lab Results       01/15/18  0551 01/15/18  0521 01/14/18  2150 01/14/18  1631 01/14/18  1121      Anion Gap  10        Aniso  Slight        aPTT  33.6  Comment:  aPTT therapeutic range = 39-69 seconds(H)        BANDS  2.0        Baso #  CANCELED  Comment:  Result canceled by the ancillary        Basophil%  2.0(H)        BUN, Bld  18        Calcium  8.6(L)        Chloride  107        CO2  23        Creatinine  0.8        Differential Method  Manual        eGFR if   >60        eGFR if non   >60  Comment:  Calculation used to obtain the estimated glomerular filtration  rate (eGFR) is the CKD-EPI equation.           Eos #  CANCELED  Comment:  Result canceled by the ancillary        Eosinophil%  4.0        Large/Giant Platelets  Present        Glucose  121(H)        Gran%  67.0        Hematocrit  30.9(L)        Hemoglobin  10.1(L)        Coumadin Monitoring INR  1.6  Comment:  Coumadin Therapy:  2.0 - 3.0 for INR for all indicators except mechanical heart valves  and antiphospholipid syndromes which should use 2.5 - 3.5.  (H)        Lymph #  CANCELED  Comment:  Result canceled by the ancillary        Lymph%  17.0(L)        MCH  31.5(H)        MCHC  32.7        MCV   96        Mono #  CANCELED  Comment:  Result canceled by the ancillary        Mono%  6.0        MPV  9.6        Myelocytes  2.0        Ovalocytes  Occasional        Platelets  268        POCT Glucose 114(H)  145(H) 158(H) 223(H)     Poik  Slight        Poly  Occasional        Potassium  3.8        Protime  16.0(H)        RBC  3.21(L)        RDW  15.4(H)        Sodium  140        WBC  9.03                        Significant Imaging: CT scan: CT ABDOMEN PELVIS WITH CONTRAST: No results found for this visit on 01/08/18. and CT ABDOMEN PELVIS WITHOUT CONTRAST: No results found for this visit on 01/08/18., Echocardiogram:   2D echo with color flow doppler:   Results for orders placed or performed in visit on 07/06/16   2D Echo w/ Color Flow Doppler   Result Value Ref Range    EF 60 55 - 65    Mitral Valve Regurgitation TRIVIAL     Diastolic Dysfunction Yes (A)     Aortic Valve Regurgitation TRIVIAL     Est. PA Systolic Pressure 45 (A)     Tricuspid Valve Regurgitation MILD TO MODERATE     and X-Ray: CXR: X-Ray Chest 1 View (CXR): No results found for this visit on 01/08/18. and X-Ray Chest PA and Lateral (CXR): No results found for this visit on 01/08/18. and KUB: X-Ray Abdomen AP 1 View (KUB): No results found for this visit on 01/08/18.    Assessment and Plan:     No notes have been filed under this hospital service.  Service: Cardiology      VTE Risk Mitigation         Ordered     warfarin (COUMADIN) tablet 5 mg  Daily     Route:  Oral        01/14/18 1446     Medium Risk of VTE  Once      01/08/18 1828     Place sequential compression device  Until discontinued      01/08/18 1828        Acute cystits, recurrent with oc hematuria  CAF s/p PPM 2012, currently AFIB with frequent PVCs, LAD, RBBB  AS s/p bioprosthetic AVR 2011, chronic anticoagulation with warfarin  Ectatic CAD with prior PCI, No evidence of ischemia at this time, MPI 5/2017, Normal perfusion study with no perfusion defects noted.   Chronic diastolic  CHF, Echo 7/2016 LVEF 60%, trivial AR,MR, mild to moderate MR, PA systolic pressure 45 mmHg  DM2    Plan: Adjust warfarin for goal INR 2.5-3.5  May DC from the CV standpoint and f/u in clinic  Current Facility-Administered Medications   Medication    0.9%  NaCl infusion (for blood administration)    acetaminophen tablet 650 mg    dextrose 50% injection 12.5 g    dextrose 50% injection 25 g    glucagon (human recombinant) injection 1 mg    glucose chewable tablet 16 g    glucose chewable tablet 24 g    hydrocodone-acetaminophen 5-325mg per tablet 1 tablet    insulin aspart pen 0-5 Units    levothyroxine tablet 50 mcg    lubiprostone capsule 24 mcg    metoprolol succinate (TOPROL-XL) 24 hr tablet 25 mg    ondansetron injection 4 mg    pantoprazole EC tablet 40 mg    piperacillin-tazobactam 2.25 g in dextrose 5 % 50 mL IVPB (ready to mix system)    pravastatin tablet 20 mg    promethazine (PHENERGAN) 12.5 mg in dextrose 5 % 50 mL IVPB    tamsulosin 24 hr capsule 0.4 mg    warfarin (COUMADIN) tablet 5 mg     Current Outpatient Prescriptions   Medication Sig    amlodipine (NORVASC) 10 MG tablet Take 1 tablet (10 mg total) by mouth once daily.    carvedilol (COREG) 12.5 MG tablet TAKE 1 TABLET (12.5 MG TOTAL) BY MOUTH 2 (TWO) TIMES DAILY WITH MEALS.    FOLIC ACID/MULTIVIT-MIN/LUTEIN (CENTRUM SILVER ORAL) Take 1 tablet by mouth once daily.    furosemide (LASIX) 40 MG tablet Three times a week PRN swelling    gemfibrozil (LOPID) 600 MG tablet TAKE 1 TABLET (600 MG TOTAL) BY MOUTH 2 (TWO) TIMES DAILY BEFORE MEALS.    glimepiride (AMARYL) 2 MG tablet TAKE 1 TABLET BY MOUTH EVERY MORNING WITH BREAKFAST    levothyroxine (SYNTHROID) 50 MCG tablet TAKE 1 TABLET (50 MCG TOTAL) BY MOUTH ONCE DAILY.    lisinopril 10 MG tablet Take 1 tablet (10 mg total) by mouth once daily.    oxybutynin (DITROPAN) 5 MG Tab Take 1 tablet (5 mg total) by mouth 3 (three) times daily.    pioglitazone (ACTOS) 15 MG tablet  ONE TABLET ONE TIME A DAY    pravastatin (PRAVACHOL) 20 MG tablet Take 1 tablet (20 mg total) by mouth once daily. (Patient taking differently: Take 20 mg by mouth every evening. )    tamsulosin (FLOMAX) 0.4 mg Cp24 Take 1 capsule (0.4 mg total) by mouth once daily. (Patient taking differently: Take 0.4 mg by mouth every evening. )    blood sugar diagnostic Strp Check blood sugar twice a day    clindamycin (CLEOCIN) 150 MG capsule Take 1 capsule (150 mg total) by mouth 2 (two) times daily.    lubiprostone (AMITIZA) 24 MCG Cap Take 1 capsule (24 mcg total) by mouth daily with breakfast.    ONETOUCH DELICA LANCETS 33 gauge Misc     ONETOUCH ULTRA2 kit     pantoprazole (PROTONIX) 40 MG tablet Take 1 tablet (40 mg total) by mouth once daily.    warfarin (COUMADIN) 5 MG tablet Take 1 tablet (5 mg total) by mouth Daily.           Thank you for your consult. I will follow-up with patient. Please contact us if you have any additional questions.    Transcribed by  Sadi Sosa NP for Dr JUVENAL Monterroso  Cardiology   [facility]  I attest that I have personally seen and examined this patient. I have reviewed and discussed the management in detail as outlined above.

## 2018-01-15 NOTE — ASSESSMENT & PLAN NOTE
He is hypoglycemic- changhed NS to D51/2 cont at 75ml/hr- now bs elevating, will change back to NS this am, slow due to elevated BNP and wheezing  Hold meds for now (sulfonuria at home, none here)  Insulin sliding scale low dose    1-14 good FBS this am    1-15 Coumadin 5 mg q d (low dose)

## 2018-01-15 NOTE — PROGRESS NOTES
Pt is discharged instructions and education done. Scripts are  sent. appt made. Home health ordered

## 2018-01-15 NOTE — DISCHARGE SUMMARY
"[facility] Medicine  Discharge Summary      Patient Name: Eddie Parada Sr.  MRN: [id number]  Admission Date: 1/8/2018  Hospital Length of Stay: 5 days  Discharge Date and Time:  01/15/2018 9:41 AM  Attending Physician: Georgia Ann MD   Discharging Provider: Montana Mondragon MD  Primary Care Provider: Callum Roberts MD      HPI:   Eddie Parada Sr. is a 88 y.o. male   Eddie Parada Sr. is a 88 y.o. male  Here for follow up for hypoglycemia  Keeps getting recurrent UTIs.  Last  Culture grew up : pseudomas  I sent to ER last time he did not  Want to stay   Seen urologist December ;   He is allergic to Levaquin ; he could not walk   Wife reluctant to try cipro which was .  He is having issues with dysuria . S/p catheterization since november .     Urine Culture, Routine              PSEUDOMONAS AERUGINOSA   >100,000 cfu/ml       Resulting Agency        [facility]  Susceptibility                     Pseudomonas aeruginosa                    CULTURE, URINE                    Amikacin         <=16 "><=16   Sensitive                       Cefepime         <=8 "><=8       Sensitive                       Ciprofloxacin   <=1 "><=1       Sensitive                       Gentamicin      <=4 "><=4       Sensitive                       Meropenem     <=4 "><=4       Sensitive                       Piperacillin/Tazo         <=16 "><=16   Sensitive                       Tobramycin     <=4 "><=4       Sensitive             Patient has been seen in the emergency room by his primary care physician several times  Patient has a indwelling Bishop catheter with a residual Pseudomonas urinary tract infection  Patient is ALLERGIC to Cipro and the cultures of the Pseudomonas are difficult to treat now  Patient has failed oral antibiotics, presents from PCP office for admission for IV antibiotics  Urine culture last ER visit shows a pseudomonas sensitive to Zosyn, afebrile and stable    * No surgery found " *      Hospital Course:   1/10/2018  Urine looks like brown with sediment. Nurse reports that it was clearer yesterday evening. Cont on zosyn and started NS 75ml/hr. Afebrile. This am bp low 81/45- coreg 6.25mg po BID reordered per home routine but held this am. HR 60    PT started to prevent further weakening    1/11/2018  Pt still with tea colored urine this am but a little clear today.  BUN/creat bumped 76/2.5 (admission was 54/1.4) coumadin remains held for INR 3.5 (down from 5.3 on admission). H/H still falling 7.4/22.7 (from 9.3/28.1 on  Admission)    BNP check last night for c/o wheezing. Was elevated in 700's, appears his baseline is 300-400. He remains on D51/2 NS at 75ml/hr for decreased urinary outpt 510, intake 925 gkjaqgdby=558 +. He is lying nearly flat today with no c/o SOB.     BP still low in 80-90/50 HR 60.     DM meds held as well for low blood sugars in 30 (was on amaryl at home). Now sugars increasing 124-160 no SSI used as he is on low dose    Interval history:  Bladder scan came back without residual urine, but CT showed hydronephrosis and shields at the prostate. Replaced shields and 1400 cc of urine was produced.    1-12-18 1-13 Will continue to treat his UTI w parental IV Abx, along with Rxing his constipation    1-14 Hct is over 30 this and his INR is 1.7    1-15 Pt is clinically stazble, looks and sounds strong and well; urine is clear and his Hct/Hgb is good--d'c to the house--needs a urology assessment in the next 7-10 days at [facility] for a voiding trial     Consults:   Consults         Status Ordering Provider     Inpatient consult to [facility]  Once     Provider:  Ramin Monterroso MD    Acknowledged FLORES HENDERSON          No new Assessment & Plan notes have been filed under this hospital service since the last note was generated.  Service: Hospital Medicine    Final Active Diagnoses:    Diagnosis Date Noted POA    PRINCIPAL PROBLEM:  Pseudomonas urinary  tract infection [N39.0, B96.5] 01/08/2018 Yes    INK (acute kidney injury) [N17.9] 01/11/2018 Yes    Hypotension [I95.9] 01/11/2018 Yes    Acute blood loss anemia [D62] 01/09/2018 Yes    Hypoglycemia associated with diabetes [E11.649] 01/09/2018 Yes    Urinary retention [R33.9] 12/04/2017 Yes    S/P AVR [Z95.2] 02/25/2014 Not Applicable    Type 2 diabetes mellitus with neurologic complication [E11.49] 10/17/2012 Yes    Atrial fibrillation [I48.91] 06/29/2012 Yes      Problems Resolved During this Admission:    Diagnosis Date Noted Date Resolved POA    Melanoma [C43.9] 12/14/2015 01/09/2018 Yes       Discharged Condition: stable    Disposition:     Follow Up:  Follow-up Information     Callum Roberts MD.    Specialty:  Internal Medicine  Why:  outpatient services  Contact information:  [address] [facility] [address]  [facility] [address]  [phone]             Callum Roberts MD In 4 days.    Specialty:  Internal Medicine  Contact information:  [address] [address]  [phone]             Cristian Montoya MD In 1 week.    Specialty:  Urology  Contact information:  [address] [place] [address] [phone]                 Patient Instructions:   No discharge procedures on file.    Significant Diagnostic Studies: Labs:   BMP:   Recent Labs  Lab 01/14/18  0600 01/15/18  0521   * 121*    140   K 3.7 3.8    107   CO2 23 23   BUN 30* 18   CREATININE 0.9 0.8   CALCIUM 8.8 8.6*   MG 1.8  --    , CMP   Recent Labs  Lab 01/14/18  0600 01/15/18  0521    140   K 3.7 3.8    107   CO2 23 23   * 121*   BUN 30* 18   CREATININE 0.9 0.8   CALCIUM 8.8 8.6*   ANIONGAP 11 10   ESTGFRAFRICA >60 >60   EGFRNONAA >60 >60    and CBC   Recent Labs  Lab 01/14/18  0600 01/15/18  0521   WBC 8.87 9.03   HGB 10.0* 10.1*   HCT 30.7* 30.9*    268     Microbiology:   Blood Culture   Lab Results   Component Value Date    LABBLOO No growth after 5 days. 01/08/2018   , Sputum  Culture No results found for: GSRESP, RESPIRATORYC and Urine Culture    Lab Results   Component Value Date    LABURIN PSEUDOMONAS AERUGINOSA  >100,000 cfu/ml   01/05/2018     Radiology: X-Ray: CXR: X-Ray Chest PA and Lateral (CXR): No results found for this visit on 01/08/18.    Pending Diagnostic Studies:     None         Medications:  Reconciled Home Medications:   Current Discharge Medication List      START taking these medications    Details   clindamycin (CLEOCIN) 150 MG capsule Take 1 capsule (150 mg total) by mouth 2 (two) times daily.  Qty: 30 capsule, Refills: 0      lubiprostone (AMITIZA) 24 MCG Cap Take 1 capsule (24 mcg total) by mouth daily with breakfast.  Qty: 30 capsule, Refills: 5      pantoprazole (PROTONIX) 40 MG tablet Take 1 tablet (40 mg total) by mouth once daily.  Qty: 30 tablet, Refills: 11         CONTINUE these medications which have CHANGED    Details   warfarin (COUMADIN) 5 MG tablet Take 1 tablet (5 mg total) by mouth Daily.  Qty: 30 tablet, Refills: 11         CONTINUE these medications which have NOT CHANGED    Details   amlodipine (NORVASC) 10 MG tablet Take 1 tablet (10 mg total) by mouth once daily.  Qty: 30 tablet, Refills: 6    Associated Diagnoses: Essential hypertension      carvedilol (COREG) 12.5 MG tablet TAKE 1 TABLET (12.5 MG TOTAL) BY MOUTH 2 (TWO) TIMES DAILY WITH MEALS.  Qty: 60 tablet, Refills: 5    Associated Diagnoses: Essential hypertension      FOLIC ACID/MULTIVIT-MIN/LUTEIN (CENTRUM SILVER ORAL) Take 1 tablet by mouth once daily.      furosemide (LASIX) 40 MG tablet Three times a week PRN swelling  Qty: 30 tablet, Refills: 6    Associated Diagnoses: Chronic diastolic heart failure; Essential hypertension      gemfibrozil (LOPID) 600 MG tablet TAKE 1 TABLET (600 MG TOTAL) BY MOUTH 2 (TWO) TIMES DAILY BEFORE MEALS.  Qty: 60 tablet, Refills: 4    Associated Diagnoses: Mixed dyslipidemia      glimepiride (AMARYL) 2 MG tablet TAKE 1 TABLET BY MOUTH EVERY MORNING WITH  BREAKFAST  Qty: 30 tablet, Refills: 4    Associated Diagnoses: Type 2 diabetes mellitus, controlled      levothyroxine (SYNTHROID) 50 MCG tablet TAKE 1 TABLET (50 MCG TOTAL) BY MOUTH ONCE DAILY.  Qty: 30 tablet, Refills: 10      lisinopril 10 MG tablet Take 1 tablet (10 mg total) by mouth once daily.  Qty: 90 tablet, Refills: 3      oxybutynin (DITROPAN) 5 MG Tab Take 1 tablet (5 mg total) by mouth 3 (three) times daily.  Qty: 90 tablet, Refills: 11      pioglitazone (ACTOS) 15 MG tablet ONE TABLET ONE TIME A DAY  Qty: 30 tablet, Refills: 10      pravastatin (PRAVACHOL) 20 MG tablet Take 1 tablet (20 mg total) by mouth once daily.  Qty: 30 tablet, Refills: 5    Associated Diagnoses: Mixed dyslipidemia      tamsulosin (FLOMAX) 0.4 mg Cp24 Take 1 capsule (0.4 mg total) by mouth once daily.  Qty: 30 capsule, Refills: 11      ONETOUCH DELICA LANCETS 33 gauge [facility]       ONETOUCH ULTRA2 kit          STOP taking these medications       amoxicillin-clavulanate 875-125mg (AUGMENTIN) 875-125 mg per tablet Comments:   Reason for Stopping:         nitrofurantoin, macrocrystal-monohydrate, (MACROBID) 100 MG capsule Comments:   Reason for Stopping:               Indwelling Lines/Drains at time of discharge:   Lines/Drains/Airways     Drain                 Urethral Catheter 01/11/18 1353 16 Fr. 3 days                Time spent on the discharge of patient: 35 minutes  Patient was seen and examined on the date of discharge and determined to be suitable for discharge.         Montana Mondragon MD  Department of Hospital Medicine  [facility]

## 2018-01-15 NOTE — PLAN OF CARE
01/15/18 1101   Discharge Reassessment   Assessment Type Discharge Planning Reassessment     Sushila [facility]ed [facility].

## 2018-01-16 ENCOUNTER — OFFICE VISIT (OUTPATIENT)
Dept: UROLOGY | Facility: CLINIC | Age: 83
End: 2018-01-16
Payer: MEDICARE

## 2018-01-16 ENCOUNTER — TELEPHONE (OUTPATIENT)
Dept: UROLOGY | Facility: CLINIC | Age: 83
End: 2018-01-16

## 2018-01-16 VITALS
HEIGHT: 66 IN | SYSTOLIC BLOOD PRESSURE: 111 MMHG | HEART RATE: 78 BPM | DIASTOLIC BLOOD PRESSURE: 68 MMHG | WEIGHT: 178 LBS | BODY MASS INDEX: 28.61 KG/M2

## 2018-01-16 DIAGNOSIS — N40.1 BPH WITH OBSTRUCTION/LOWER URINARY TRACT SYMPTOMS: ICD-10-CM

## 2018-01-16 DIAGNOSIS — R33.8 ACUTE URINARY RETENTION: Primary | ICD-10-CM

## 2018-01-16 DIAGNOSIS — N13.8 BPH WITH OBSTRUCTION/LOWER URINARY TRACT SYMPTOMS: ICD-10-CM

## 2018-01-16 DIAGNOSIS — R39.89 ABNORMAL PROSTATE EXAM: ICD-10-CM

## 2018-01-16 PROCEDURE — 99999 PR PBB SHADOW E&M-EST. PATIENT-LVL III: CPT | Mod: PBBFAC,,, | Performed by: UROLOGY

## 2018-01-16 PROCEDURE — 99214 OFFICE O/P EST MOD 30 MIN: CPT | Mod: S$GLB,,, | Performed by: UROLOGY

## 2018-01-16 NOTE — TELEPHONE ENCOUNTER
Returned call, spoke with Pati.  Patient discharged from hospital last night.  Last urine output was 500 cc.  No output overnight.  Pati irrigated catheter with 100 cc of sterile water, with no output.  She states patient is passing a good amount of blood clots, while on the phone, she was able to get 700 cc of urine out.  Requesting an appointment sooner than Friday.  Appointment scheduled for today, wife can bring patient in at 1345.  Confirmed.

## 2018-01-16 NOTE — PROGRESS NOTES
"This patient was last seen by me December 13, 2017 in follow-up for acute urinary retention felt due to be caused by recent hip surgery.  Plan was to continue indwelling catheter for couple months and follow-up for voiding trial.    Patient also has history of abnormal prostate exam and is status post 4 negative prostate biopsies in the past.    She now comes in follow-up having recently been admitted to [facility] for urinary tract infection for which she was treated with IV antibiotics.  Patient still has an indwelling catheter and has had recent gross hematuria which is resolving.  Urine in the Bishop catheter is clear today.  Patient is on anticoagulation due to aortic valve replacement    Patient is currently ambulating much better.  He lives at home and uses a walker.    Physical exam reveals reveals a well-developed well-nourished patient  in no acute distress.  Patient is alert and oriented ×3 with normal mood and affect.  Respiratory effort is normal and there is no peripheral edema.  Skin is normal to inspection and palpation.  Bishop catheter draining clear urine    /68   Pulse 78   Ht 5' 6" (1.676 m)   Wt 80.7 kg (178 lb)   BMI 28.73 kg/m²   Review of Systems  General ROS: negative for chills, fever or weight loss  Respiratory ROS: no cough, shortness of breath, or wheezing  Cardiovascular ROS: no chest pain or dyspnea on exertion  Musculoskeletal ROS: negative for gait disturbance or muscular weakness    Family History   Problem Relation Age of Onset    Hypertension Father     Stroke Father     Alcohol abuse Father     Heart disease Brother     COPD Sister     Cancer Brother      Lung    Diabetes Daughter     No Known Problems Son     COPD Brother     No Known Problems Daughter     No Known Problems Son     Prostate cancer Neg Hx     Kidney disease Neg Hx      Past Medical History:   Diagnosis Date    Abdominal fibromatosis     Acute posthemorrhagic anemia 1/9/2018    NIK " (acute kidney injury) 1/11/2018    Atrial fibrillation     Bradycardia     CAD (coronary artery disease)     Cancer     basal cell on nose and melanoma on back    CHF (congestive heart failure)     COPD (chronic obstructive pulmonary disease)     Diabetes mellitus type II     Hyperlipidemia     Melanoma     Obesity (BMI 30.0-34.9) 9/13/2016    Osteoporosis     Peripheral vascular disease     Stroke     TIA (transient ischemic attack)     Type II or unspecified type diabetes mellitus without mention of complication, not stated as uncontrolled     Unspecified essential hypertension      Family History   Problem Relation Age of Onset    Hypertension Father     Stroke Father     Alcohol abuse Father     Heart disease Brother     COPD Sister     Cancer Brother      Lung    Diabetes Daughter     No Known Problems Son     COPD Brother     No Known Problems Daughter     No Known Problems Son     Prostate cancer Neg Hx     Kidney disease Neg Hx      Social History   Substance Use Topics    Smoking status: Former Smoker     Quit date: 9/20/1996    Smokeless tobacco: Never Used      Comment: cigar smoker    Alcohol use No      Comment: none since 2006       Impression:      ICD-10-CM ICD-9-CM    1. Acute urinary retention R33.8 788.29    2. BPH with obstruction/lower urinary tract symptoms N40.1 600.01     N13.8 599.69    3. Abnormal prostate exam R39.89 793.5        Plan:    #1 and 2.  Acute urinary retention and BPH.  Plan.  Patient instructed to continue the Flomax which I explained is an indefinite medication.  Patient instructed to stop his oxybutynin.  Bishop catheter is removed and patient instructed to return to emergency room if unable to void for catheter reinsertion.  Note also given for home health to insert catheter if needed.  Follow-up one week for recheck    #3.  Abnormal prostate exam status post 4 negative prostate biopsies.  Plan.  Once current episode is resolved, patient will  need recheck of serum PSA    Today I spent 25 minutes with the patient, more than 50% of which was spent counseling and coordinating care concerning treatment of issues as detailed above.    PLEASE NOTE:  Please be advised that portions of this note were dictated using voice recognition software and may contain dictation related errors in spelling/grammar/appropriate pronouns/syntax or other errors that might have not been found and or corrected on text review.

## 2018-01-16 NOTE — TELEPHONE ENCOUNTER
----- Message from Jinny Tinoco sent at 1/16/2018  9:16 AM CST -----  Contact[facility] [phone]  Pati hobbs [facility] is requesting to speak with you. She is at the patient's home and he has no urine draining from his catheter. She flushed him with 100 cc's of sterile water and she isn't getting the water back either. Please advise.

## 2018-01-16 NOTE — LETTER
January 16, 2018      Georgia Ann MD  [address] [address]           [facility]  [address] [place] [id number]  Phone: [phone]          Patient: Eddie Parada Sr.   MR Number: [id number]   YOB: 1929   Date of Visit: 1/16/2018       Dear Dr. Georgia Ann:    Thank you for referring Eddie Parada to me for evaluation. Attached you will find relevant portions of my assessment and plan of care.    If you have questions, please do not hesitate to call me. I look forward to following Eddie Parada along with you.    Sincerely,    Cristian Montoya MD    Enclosure  CC:  No Recipients    If you would like to receive this communication electronically, please contact [email or web address] or [phone] to request more information on [employer] [facility] [employer] access.    For providers and/or their staff who would like to refer a patient to [facility], please contact us through our one-stop-shop provider referral line, [facility] [place], at [phone].    If you feel you have received this communication in error or would no longer like to receive these types of communications, please e-mail [email or web address]

## 2018-01-17 ENCOUNTER — PATIENT OUTREACH (OUTPATIENT)
Dept: ADMINISTRATIVE | Facility: CLINIC | Age: 83
End: 2018-01-17

## 2018-01-17 NOTE — PATIENT INSTRUCTIONS
Bladder Infection, Male (Adult)    You have a bladder infection.  Urine is normally free of bacteria. But bacteria can get into the urinary tract from the skin around the rectum or it may travel in the blood from elsewhere in the body.  This is called a urinary tract infection (UTI). An infection can occur anywhere in the urinary tract. It could be in a kidney (pyelonephritis)or in the bladder (cystitis) and urethra (urethritis). The urethra is the tube that drains the urine from the bladder through the tip of the penis.  The most common place for a UTI is in the bladder. This is called a bladder infection. Most bladder infections are easily treated. They are not serious unless the infection spreads up to the kidney.  The terms bladder infection, UTI, and cystitis are often used to describe the same thing, but they arent always the same. Cystitis is an inflammation of the bladder. The most common cause of cystitis is an infection.   Keep in mind:  · Infections in the urine are called UTIs.  · Cystitis is usually caused by a UTI.  · Not all UTIs and cases of cystitis are bladder infections.  · Bladder infections are the most common type of cystitis.  Symptoms of a bladder infection  The infection causes inflammation in the urethra and bladder. This inflammation causes many of the symptoms. The most common symptoms of a bladder infection are:  · Pain or burning when urinating  · Having to go more often than usual  · Feeling like you need to go right away  · Only a small amount comes out  · Blood in urine  · Discomfort in your belly (abdomen), usually in the lower abdomen, above the pubic bone  · Cloudy, strong, or bad smelling urine  · Unable to urinate (retention)  · Urinary incontinence  · Fever  · Loss of appetite  Older adults may also feel confused.  Causes of a bladder infection  Bladder infections are not contagious. You can't get one from someone else, from a toilet seat, or from sharing a bath.  The most  common cause of bladder infections is bacteria from the bowels. The bacteria get onto the skin around the opening of the urethra. From there they can get into the urine and travel up to the bladder. This causes inflammation and an infection. This usually happens because of:  · An enlarged prostate  · Poor cleaning of the genitals  · Procedures that put a tube in your bladder, like a Bishop catheter  · Bowel incontinence  · Older age  · Not emptying your bladder (The urine stays there, giving the bacteria a chance to grow.)  · Dehydration (This allows urine to stay in the bladder longer.)  · Constipation (This can cause the bowels to push on the bladder or urethra and keep the bladder from emptying.)  Treatment  Bladder infections are treated with antibiotics. They usually clear up quickly without complications. Treatment helps prevent a more serious kidney infection.  Medicines  Medicines can help in the treatment of a bladder infection:  · You may have been given phenazopyridine to ease burning when you urinate. It will cause your urine to be bright orange. It can stain clothing.  · You may have been prescribed antibiotics. Take this medicine until you have finished it, even if you feel better. Taking all of the medicine will make sure the infection has cleared.  You can use acetaminophen or ibuprofen for pain, fever, or discomfort, unless another medicine was prescribed. You can also alternate them, or use both together. They work differently and are a different class of medicines, so taking them together is not an overdose. If you have chronic liver or kidney disease, talk with your healthcare provider before using these medicines. Also talk with your provider if youve had a stomach ulcer or GI bleeding or are taking blood thinner medicines.  Home care  Here are some guidelines to help you care for yourself at home:  · Drink plenty of fluids, unless your healthcare provider told you not to. Fluids will prevent  dehydration and flush out your bladder.  · Use good personal hygiene. Wipe from front to back after using the toilet, and clean your penis regularly. If you arent circumcised, retract the foreskin when cleaning.  · Urinate more frequently, and dont try to hold it in for long periods of time, if possible.  · Wear loose-fitting clothes and cotton underwear. Avoid tight-fitting pants. This helps keep you clean and dry.  · Change your diet to prevent constipation. This means eating more fresh foods and more fiber, and less junk and fatty foods.  · Avoid sex until your symptoms are gone.  · Avoid caffeine, alcohol, and spicy foods. These can irritate the bladder.  Follow-up care  Follow up with your healthcare provider, or as advised if all symptoms have not cleared up within 5 days. It is important to keep your follow-up appointment. You can talk with your provider to see if you need more tests of the urinary tract. This is especially important if you have infections that keep coming back.  If a culture was done, you will be told if your treatment needs to be changed. If directed, you can call to find out the results.  If X-rays were taken, you will be told of any findings that may affect your care.  Call 911  Call 911 if any of these occur:  · Trouble breathing  · Difficulty waking up  · Feeling confused  · Fainting or loss of consciousness  · Rapid heart rate  When to seek medical advice  Call your healthcare provider right away if any of these occur:  · Fever of 100.4ºF (38ºC) or higher, or as directed by your healthcare provider  · Your symptoms dont improve after 2 days of treatment  · Back or abdominal pain that gets worse  · Repeated vomiting, or you arent able to keep medicine down  · Weakness or dizziness  Date Last Reviewed: 10/1/2016  © 5792-7223 [employer]. [address] [place] [address], [place] [address]. All rights reserved. This information is not intended as a substitute for professional  medical care. Always follow your healthcare professional's instructions.

## 2018-01-17 NOTE — PLAN OF CARE
01/17/18 1358   Final Note   Assessment Type Final Discharge Note   Discharge Disposition Home   What phone number can be called within the next 1-3 days to see how you are doing after discharge? [phone]   Hospital Follow Up  Appt(s) scheduled? Yes   Discharge plans and expectations educations in teach back method with documentation complete? Yes   Right Care Referral Info   Post Acute Recommendation Home-care   Referral Type Home-Care   Facility Name [facility]

## 2018-01-18 ENCOUNTER — OFFICE VISIT (OUTPATIENT)
Dept: INTERNAL MEDICINE | Facility: CLINIC | Age: 83
End: 2018-01-18
Payer: MEDICARE

## 2018-01-18 VITALS
SYSTOLIC BLOOD PRESSURE: 104 MMHG | RESPIRATION RATE: 14 BRPM | WEIGHT: 182.13 LBS | DIASTOLIC BLOOD PRESSURE: 72 MMHG | HEART RATE: 60 BPM | HEIGHT: 66 IN | BODY MASS INDEX: 29.27 KG/M2 | OXYGEN SATURATION: 96 %

## 2018-01-18 DIAGNOSIS — B96.5 PSEUDOMONAS URINARY TRACT INFECTION: Primary | ICD-10-CM

## 2018-01-18 DIAGNOSIS — N39.0 PSEUDOMONAS URINARY TRACT INFECTION: Primary | ICD-10-CM

## 2018-01-18 PROBLEM — R33.9 URINARY RETENTION: Status: RESOLVED | Noted: 2017-12-04 | Resolved: 2018-01-18

## 2018-01-18 PROBLEM — D62 ACUTE BLOOD LOSS ANEMIA: Status: RESOLVED | Noted: 2018-01-09 | Resolved: 2018-01-18

## 2018-01-18 PROBLEM — E11.649 HYPOGLYCEMIA ASSOCIATED WITH DIABETES: Status: RESOLVED | Noted: 2018-01-09 | Resolved: 2018-01-18

## 2018-01-18 PROCEDURE — 99999 PR PBB SHADOW E&M-EST. PATIENT-LVL III: CPT | Mod: PBBFAC,,, | Performed by: INTERNAL MEDICINE

## 2018-01-18 PROCEDURE — 99213 OFFICE O/P EST LOW 20 MIN: CPT | Mod: S$GLB,,, | Performed by: INTERNAL MEDICINE

## 2018-01-18 NOTE — PROGRESS NOTES
Subjective:       Patient ID: Eddie Parada Sr. is a 88 y.o. male.    Chief Complaint: Complicated UTI (HOSPITAL FOLLOW UP)    Eddie Parada Sr. is a 88 y.o. male  Here for follow up for recent admit for pseudomonas UTI.  Treated with IV zosyn. His catheter was obstructing in hospital was changed;   Dr Montoya  Took out cathter ;doing well.  Eating well. No issues.      Review of Systems   Constitutional: Negative for chills and fever.   HENT: Negative for congestion, postnasal drip and sore throat.    Eyes: Negative for photophobia.   Respiratory: Negative for chest tightness and shortness of breath.    Cardiovascular: Negative for chest pain.   Gastrointestinal: Negative for abdominal distention, abdominal pain, blood in stool and vomiting.   Genitourinary: Negative for dysuria, flank pain and hematuria.   Musculoskeletal: Negative for back pain.   Skin: Negative for pallor.   Neurological: Negative for dizziness, seizures, facial asymmetry, speech difficulty and numbness.   Hematological: Does not bruise/bleed easily.   Psychiatric/Behavioral: Negative for agitation and suicidal ideas. The patient is not nervous/anxious.        Objective:      Physical Exam   Constitutional: He is oriented to person, place, and time. He appears well-developed and well-nourished. No distress.   HENT:   Head: Normocephalic and atraumatic.   Right Ear: External ear normal.   Left Ear: External ear normal.   Eyes: EOM are normal. Right eye exhibits no discharge. Left eye exhibits no discharge.   Neck: Neck supple. No thyromegaly present.   Cardiovascular: Normal rate and regular rhythm.  Exam reveals no gallop and no friction rub.    No murmur heard.  Pulses:       Dorsalis pedis pulses are 1+ on the left side.   Pulmonary/Chest: Effort normal and breath sounds normal. No respiratory distress. He has no wheezes. He has no rales.   Abdominal: Soft. Bowel sounds are normal. He exhibits no distension. There is no tenderness.  There is no rebound and no guarding.   Musculoskeletal: He exhibits no edema (but wearing compression stockings today).   Lymphadenopathy:     He has no cervical adenopathy.   Neurological: He is alert and oriented to person, place, and time. No cranial nerve deficit.   Skin: Skin is warm and dry.   Psychiatric: He has a normal mood and affect. His behavior is normal.   Vitals reviewed.      Assessment:       1. Pseudomonas urinary tract infection        Plan:   Eddie was seen today for complicated uti.    Diagnoses and all orders for this visit:    Pseudomonas urinary tract infection    urinary retention resolved.  Finish cleocin  Received 2 units of PRBcs ;most likely bleeding in urine ;he is  On coumadin

## 2018-01-19 ENCOUNTER — TELEPHONE (OUTPATIENT)
Dept: UROLOGY | Facility: CLINIC | Age: 83
End: 2018-01-19

## 2018-01-19 NOTE — PHYSICIAN QUERY
PT Name: Eddie Parada Sr.  MR #: [id number]    Physician Query Form - Consultant Diagnosis Clarification     CDS/: DAVID Collins, RN, CCDS               Contact information: [phone]  This form is a permanent document in the medical record.     Query Date: January 19, 2018      By submitting this query, we are merely seeking further clarification of documentation.  Please utilize your independent clinical judgment when addressing the question(s) below.      The Medical record contains the following:   Diagnosis Supporting Clinical Information Location in Medical Record     Acute cystits, recurrent with oc hematuria         Pseudomonas urinary tract infection    Keeps getting recurrent UTIs.  Last  Culture grew up : pseudomas    Bladder scan came back without residual urine, but CT showed hydronephrosis and shields at the prostate. Replaced shields and 1400 cc of urine was produced.   Cards Consult: 1/15      DC Summary: 1/15         Do you agree with the Consultants diagnosis of ___Acute cystits_______________________?                 [ x  ]   Yes               [   ]   Yes, but it resolved prior to my assessment of the patient               [   ]   No                [   ]   Clinically insignificant               [   ]   Clinically undetermined               [   ]   Other/Clarification of findings: ___________________________________________

## 2018-01-19 NOTE — TELEPHONE ENCOUNTER
Update on patient.  Spoke with Mrs. Lopez patient's wife, patient has been voiding on his own since 1/17/18 at 0400.  She wanted to say thanks for treating patient.

## 2018-01-19 NOTE — PHYSICIAN QUERY
PT Name: Eddie Parada Sr.  MR #: [id number]    Physician Query Form -Present on Admission (POA) Diagnosis Clarification     CDS/: DAVID Collins, RN, CCDS               Contact information:[phone]     This form is a permanent document in the medical record.     Query Date: January 19, 2018    By submitting this query, we are merely seeking further clarification of documentation. Please utilize your independent clinical judgment when addressing the question(s) below.       The Medical record contains the following:    Diagnosis      Supporting Clinical Information   Location in Medical Record     NIK (acute kidney injury)           still with tea colored urine this am but a little clear today.  BUN/creat bumped 76/2.5 (admission was 54/1.4) coumadin remains held for INR 3.5 (down from 5.3 on admission).     This is an outlet obstruction NIK. Should resolve with correction of shields placement.    Urinary retention---keep shields's for one more month  Continue flomax    BUN: 54, 59, 68, 76  CR: 1.4, 1.3, 1.6, 2.5   GFR: 45, 49, 38, 22          Progress Note: 1/11                          H & P: 1/9        Lab: 1/8, 1/9, 1/10, 1/11          Doctor, Please specify Present On Admission (POA) status of ___AKI (acute kidney injury)_____________________.    [ x ] Present on Admission   [  ] Not Present on Admission  [  ] Clinically undetermined

## 2018-01-22 ENCOUNTER — LAB VISIT (OUTPATIENT)
Dept: LAB | Facility: HOSPITAL | Age: 83
End: 2018-01-22
Attending: INTERNAL MEDICINE
Payer: MEDICARE

## 2018-01-22 DIAGNOSIS — I48.91 ATRIAL FIBRILLATION: Primary | ICD-10-CM

## 2018-01-22 LAB
INR PPP: >10
PROTHROMBIN TIME: >100 SEC

## 2018-01-22 PROCEDURE — 85610 PROTHROMBIN TIME: CPT

## 2018-01-22 PROCEDURE — 36415 COLL VENOUS BLD VENIPUNCTURE: CPT

## 2018-01-23 ENCOUNTER — OFFICE VISIT (OUTPATIENT)
Dept: UROLOGY | Facility: CLINIC | Age: 83
End: 2018-01-23
Payer: MEDICARE

## 2018-01-23 VITALS
BODY MASS INDEX: 29.25 KG/M2 | WEIGHT: 182 LBS | SYSTOLIC BLOOD PRESSURE: 116 MMHG | HEIGHT: 66 IN | HEART RATE: 60 BPM | DIASTOLIC BLOOD PRESSURE: 65 MMHG

## 2018-01-23 DIAGNOSIS — N13.8 BPH WITH OBSTRUCTION/LOWER URINARY TRACT SYMPTOMS: ICD-10-CM

## 2018-01-23 DIAGNOSIS — R82.81 PYURIA: ICD-10-CM

## 2018-01-23 DIAGNOSIS — N40.1 BPH WITH OBSTRUCTION/LOWER URINARY TRACT SYMPTOMS: ICD-10-CM

## 2018-01-23 DIAGNOSIS — R33.9 URINARY RETENTION: Primary | ICD-10-CM

## 2018-01-23 DIAGNOSIS — R39.89 ABNORMAL PROSTATE EXAM: ICD-10-CM

## 2018-01-23 LAB
BILIRUB SERPL-MCNC: NORMAL MG/DL
BLOOD URINE, POC: 250
COLOR, POC UA: NORMAL
GLUCOSE UR QL STRIP: NORMAL
KETONES UR QL STRIP: NORMAL
LEUKOCYTE ESTERASE URINE, POC: NORMAL
NITRITE, POC UA: NORMAL
PH, POC UA: 5
POC RESIDUAL URINE VOLUME: 200 ML (ref 0–100)
PROTEIN, POC: NORMAL
SPECIFIC GRAVITY, POC UA: 1.01
UROBILINOGEN, POC UA: NORMAL

## 2018-01-23 PROCEDURE — 87086 URINE CULTURE/COLONY COUNT: CPT

## 2018-01-23 PROCEDURE — 99999 PR PBB SHADOW E&M-EST. PATIENT-LVL III: CPT | Mod: PBBFAC,,, | Performed by: UROLOGY

## 2018-01-23 PROCEDURE — 81002 URINALYSIS NONAUTO W/O SCOPE: CPT | Mod: S$GLB,,, | Performed by: UROLOGY

## 2018-01-23 PROCEDURE — 87077 CULTURE AEROBIC IDENTIFY: CPT

## 2018-01-23 PROCEDURE — 87088 URINE BACTERIA CULTURE: CPT

## 2018-01-23 PROCEDURE — 51798 US URINE CAPACITY MEASURE: CPT | Mod: S$GLB,,, | Performed by: UROLOGY

## 2018-01-23 PROCEDURE — 87186 SC STD MICRODIL/AGAR DIL: CPT

## 2018-01-23 PROCEDURE — 99214 OFFICE O/P EST MOD 30 MIN: CPT | Mod: 25,S$GLB,, | Performed by: UROLOGY

## 2018-01-23 NOTE — PROGRESS NOTES
"This patient was last seen by me January 16, 2018 in follow-up for urinary retention status post hip surgery.  A catheter is removed and he now comes in follow-up.  Continues on his Flomax and is not taking oxybutynin anymore.  The graft he has an okay strength of stream and does not strain to void.  He has no dysuria.  He is currently on a course of antibiotics    Bladder scan postvoid residual in the office today is okay at approximately 200 cc    Physical exam reveals reveals a well-developed well-nourished patient  in no acute distress.  Patient is alert and oriented ×3 with normal mood and affect.  Respiratory effort is normal and there is no peripheral edema.  Skin is normal to inspection and palpation    /65   Pulse 60   Ht 5' 6" (1.676 m)   Wt 82.6 kg (182 lb)   BMI 29.38 kg/m²   Review of Systems  General ROS: negative for chills, fever or weight loss  Respiratory ROS: no cough, shortness of breath, or wheezing  Cardiovascular ROS: no chest pain or dyspnea on exertion  Musculoskeletal ROS: negative for gait disturbance or muscular weakness    Family History   Problem Relation Age of Onset    Hypertension Father     Stroke Father     Alcohol abuse Father     Heart disease Brother     COPD Sister     Cancer Brother      Lung    Diabetes Daughter     No Known Problems Son     COPD Brother     No Known Problems Daughter     No Known Problems Son     Prostate cancer Neg Hx     Kidney disease Neg Hx      Past Medical History:   Diagnosis Date    Abdominal fibromatosis     Acute posthemorrhagic anemia 1/9/2018    NIK (acute kidney injury) 1/11/2018    Atrial fibrillation     Bradycardia     CAD (coronary artery disease)     Cancer     basal cell on nose and melanoma on back    CHF (congestive heart failure)     COPD (chronic obstructive pulmonary disease)     Diabetes mellitus type II     Hyperlipidemia     Melanoma     Obesity (BMI 30.0-34.9) 9/13/2016    Osteoporosis     " Peripheral vascular disease     Stroke     TIA (transient ischemic attack)     Type II or unspecified type diabetes mellitus without mention of complication, not stated as uncontrolled     Unspecified essential hypertension      Family History   Problem Relation Age of Onset    Hypertension Father     Stroke Father     Alcohol abuse Father     Heart disease Brother     COPD Sister     Cancer Brother      Lung    Diabetes Daughter     No Known Problems Son     COPD Brother     No Known Problems Daughter     No Known Problems Son     Prostate cancer Neg Hx     Kidney disease Neg Hx      Social History   Substance Use Topics    Smoking status: Former Smoker     Quit date: 9/20/1996    Smokeless tobacco: Never Used      Comment: cigar smoker    Alcohol use No      Comment: none since 2006       Impression:      ICD-10-CM ICD-9-CM    1. Urinary retention R33.9 788.20 POCT URINE DIPSTICK WITHOUT MICROSCOPE   2. BPH with obstruction/lower urinary tract symptoms N40.1 600.01 POCT Bladder Scan    N13.8 599.69    3. Abnormal prostate exam R39.89 793.5    4. Pyuria N39.0 791.9 Urine culture       Plan:    #1 and 2.  Postop urinary retention which has resolved and BPH.  Plan.  Patient voiding satisfactorily with an okay postvoid residual.  Continue Flomax.  Follow-up 3 months     #3.  Abnormal prostate exam status post 4 negative prostate biopsies.  Plan.  At some point we will recheck PSA    #4.Pyuria.  Urine will be cultured and we'll contact the patient if antibiotics are indicated    Today I spent 25 minutes with the patient, more than 50% of which was spent counseling and coordinating care concerning treatment of issues as detailed above.    PLEASE NOTE:  Please be advised that portions of this note were dictated using voice recognition software and may contain dictation related errors in spelling/grammar/appropriate pronouns/syntax or other errors that might have not been found and or corrected on text  review.

## 2018-01-24 NOTE — TELEPHONE ENCOUNTER
----- Message from Rayne Melo sent at 2018  9:07 AM CST -----  Contact: FABRICE / WIFE   Eddie ABBOTT Tal Toussaint.  MRN: [id number]  : 1929  PCP: Callum Roberts  Home Phone      [phone]  Work Phone      Not on file.  Mobile          [phone]      MESSAGE: NEEDS TEST STRIPS CALLED IN. ONE TOUCH ULTRA TWO.     PHONE: [phone]    [facility]Y: [facility]

## 2018-01-26 LAB — BACTERIA UR CULT: NORMAL

## 2018-01-26 RX ORDER — CEFDINIR 300 MG/1
300 CAPSULE ORAL 2 TIMES DAILY
Qty: 20 CAPSULE | Refills: 0 | Status: SHIPPED | OUTPATIENT
Start: 2018-01-26 | End: 2018-02-05

## 2018-01-29 ENCOUNTER — TELEPHONE (OUTPATIENT)
Dept: UROLOGY | Facility: CLINIC | Age: 83
End: 2018-01-29

## 2018-01-29 NOTE — TELEPHONE ENCOUNTER
----- Message from Rayne Melo sent at 2018  9:03 AM CST -----  Contact: FABRICE / WIFE  Eddie Parada Sr.  MRN: [id number]  : 1929  PCP: Callum Roberts  Home Phone      [phone]  Work Phone      Not on file.  Mobile          [phone]      MESSAGE: SAID THEY CALLED LAST WEEK AND REQUESTED PT'S TEST STRIPS. STATED THAT FOR INSURANCE TO COVER, IT MUST SAY ONE TOUCH ULTRA TWO. PLEASE RE-SEND.     PHONE: [phone]    PHARMACY: [facility]

## 2018-01-29 NOTE — TELEPHONE ENCOUNTER
----- Message from Cristian Montoya MD sent at 1/26/2018  5:02 PM CST -----  Please inform patient that the urine culture came back positive and that I have written a prescription for Cefdinir.  Keep scheduled follow-up appointment.

## 2018-02-19 NOTE — TELEPHONE ENCOUNTER
Requested Prescriptions     Pending Prescriptions Disp Refills    blood sugar diagnostic Strp 100 strip 6     Sig: ONETOUCH ULTRA TWO TESTING STRIPS. USE TO TEST BLOOD GLUCOSE TWICE A DAY.   Patient requesting refill on test strips to be sent to [facility]. Denies need for any other medication.

## 2018-02-19 NOTE — TELEPHONE ENCOUNTER
----- Message from Rayne Melo sent at 2018  8:41 AM CST -----  Contact: SELF  Eddie Parada Sr.  MRN: [id number]  : 1929  PCP: Callum Roberts  Home Phone      [phone]  Work Phone      Not on file.  Mobile          [phone]      MESSAGE: WANTS TO SPEAK WITH NURSE REGARDING MEDICATIONS. SAYS HE'S BEEN OUT OF THE HOSPITAL FOR A MONTH AND STILL HAS NOT GOTTEN ALL MEDICATIONS THAT HE'S NEEDED. SAID THEY HAVE TRIED ON MULTIPLE ATTEMPTS.     PHONE: [phone]

## 2018-02-28 DIAGNOSIS — E78.2 MIXED DYSLIPIDEMIA: ICD-10-CM

## 2018-02-28 RX ORDER — GEMFIBROZIL 600 MG/1
600 TABLET, FILM COATED ORAL
Qty: 60 TABLET | Refills: 4 | Status: SHIPPED | OUTPATIENT
Start: 2018-02-28 | End: 2018-07-09 | Stop reason: SDUPTHER

## 2018-03-06 DIAGNOSIS — E78.2 MIXED DYSLIPIDEMIA: ICD-10-CM

## 2018-03-06 RX ORDER — PRAVASTATIN SODIUM 20 MG/1
20 TABLET ORAL DAILY
Qty: 30 TABLET | Refills: 5 | Status: SHIPPED | OUTPATIENT
Start: 2018-03-06 | End: 2018-09-06 | Stop reason: SDUPTHER

## 2018-04-11 ENCOUNTER — OFFICE VISIT (OUTPATIENT)
Dept: INTERNAL MEDICINE | Facility: CLINIC | Age: 83
End: 2018-04-11
Payer: MEDICARE

## 2018-04-11 VITALS
HEART RATE: 60 BPM | WEIGHT: 179.44 LBS | RESPIRATION RATE: 16 BRPM | BODY MASS INDEX: 28.84 KG/M2 | DIASTOLIC BLOOD PRESSURE: 68 MMHG | OXYGEN SATURATION: 98 % | HEIGHT: 66 IN | SYSTOLIC BLOOD PRESSURE: 118 MMHG

## 2018-04-11 DIAGNOSIS — E11.49 CONTROLLED TYPE 2 DIABETES MELLITUS WITH OTHER NEUROLOGIC COMPLICATION, WITHOUT LONG-TERM CURRENT USE OF INSULIN: ICD-10-CM

## 2018-04-11 DIAGNOSIS — I10 ESSENTIAL HYPERTENSION: Primary | ICD-10-CM

## 2018-04-11 DIAGNOSIS — E78.2 MIXED DYSLIPIDEMIA: ICD-10-CM

## 2018-04-11 PROCEDURE — 99999 PR PBB SHADOW E&M-EST. PATIENT-LVL III: CPT | Mod: PBBFAC,,, | Performed by: INTERNAL MEDICINE

## 2018-04-11 PROCEDURE — 99214 OFFICE O/P EST MOD 30 MIN: CPT | Mod: S$GLB,,, | Performed by: INTERNAL MEDICINE

## 2018-04-11 RX ORDER — WARFARIN 1 MG/1
TABLET ORAL
COMMUNITY
Start: 2018-04-02 | End: 2018-07-09 | Stop reason: SDUPTHER

## 2018-04-11 NOTE — PROGRESS NOTES
Subjective:       Patient ID: Eddie Parada Sr. is a 88 y.o. male.    Chief Complaint: Follow-up (3 month follow up); Hypertension; Hyperlipidemia; and Diabetes    Eddie Parada Sr. is a 88 y.o. male who presents for Type II DM, Hypertension, and Hyperlipidemia follow up. Labs were reviewed with patient today.      Hypertension   This is a chronic problem. The current episode started more than 1 year ago. The problem is controlled. Associated symptoms include malaise/fatigue and shortness of breath. Pertinent negatives include no orthopnea. Risk factors for coronary artery disease include sedentary lifestyle, male gender, obesity, dyslipidemia and diabetes mellitus. Past treatments include beta blockers, calcium channel blockers and diuretics. The current treatment provides moderate improvement. Hypertensive end-organ damage includes kidney disease and CAD/MI. Identifiable causes of hypertension include a thyroid problem.   Hyperlipidemia   This is a chronic problem. The current episode started more than 1 year ago. The problem is controlled. Recent lipid tests were reviewed and are low. Associated symptoms include shortness of breath. Current antihyperlipidemic treatment includes statins. The current treatment provides significant improvement of lipids.   Diabetes   He presents for his follow-up diabetic visit. He has type 2 diabetes mellitus. His disease course has been stable. Pertinent negatives for hypoglycemia include no dizziness, nervousness/anxiousness, pallor, seizures or speech difficulty. Associated symptoms include foot paresthesias. Pertinent negatives for hypoglycemia complications include no blackouts. Symptoms are improving. Pertinent negatives for diabetic complications include no autonomic neuropathy. Current diabetic treatment includes oral agent (dual therapy). His breakfast blood glucose range is generally 110-130 mg/dl. An ACE inhibitor/angiotensin II receptor blocker is being taken.    COPD     Coronary Artery Disease   Symptoms include shortness of breath. Pertinent negatives include no chest tightness or dizziness. Risk factors include hyperlipidemia.   Thyroid Problem   Patient reports no anxiety. His past medical history is significant for hyperlipidemia.     Review of Systems   Constitutional: Positive for malaise/fatigue.   HENT: Negative for postnasal drip.    Eyes: Negative for photophobia.   Respiratory: Positive for shortness of breath. Negative for chest tightness.    Cardiovascular: Negative for orthopnea.   Gastrointestinal: Negative for abdominal distention and blood in stool.   Genitourinary: Negative for dysuria, flank pain and hematuria.   Musculoskeletal: Negative for back pain.        Foot neuropathic pain    Skin: Negative for pallor.   Neurological: Negative for dizziness, seizures, facial asymmetry and speech difficulty.   Hematological: Does not bruise/bleed easily.   Psychiatric/Behavioral: Negative for agitation and suicidal ideas. The patient is not nervous/anxious.        Objective:      Physical Exam   Constitutional: He is oriented to person, place, and time. He appears well-developed and well-nourished. No distress.   HENT:   Head: Normocephalic and atraumatic.   Right Ear: External ear normal.   Left Ear: External ear normal.   Eyes: EOM are normal. Right eye exhibits no discharge. Left eye exhibits no discharge.   Neck: Neck supple. No thyromegaly present.   Cardiovascular: Normal rate and regular rhythm.  Exam reveals no gallop and no friction rub.    No murmur heard.  Pulmonary/Chest: Effort normal and breath sounds normal. No respiratory distress. He has no wheezes. He has no rales.   Abdominal: Soft. Bowel sounds are normal. He exhibits no distension. There is no tenderness. There is no rebound and no guarding.   Musculoskeletal: He exhibits no edema (but wearing compression stockings today).   Wearing compression stockings     Lymphadenopathy:     He has no  cervical adenopathy.   Neurological: He is alert and oriented to person, place, and time. No cranial nerve deficit.   Skin: Skin is warm and dry.   Psychiatric: He has a normal mood and affect. His behavior is normal.   Vitals reviewed.      Assessment:       1. Essential hypertension    2. Mixed dyslipidemia    3. 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    Diagnoses and all orders for this visit:    Essential hypertension  -     Lipid panel; Future  -     CBC auto differential; Future    Well controlled.  Continue same medication and dose.  1. Keep weight close to ideal body weight.   2.   Avoid high salt foods (olives, pickles, smoked meats, salted potato chips, etc.).   Do not add salt to your food at the table.   Use only small amounts of salt when cooking.   3. Begin an exercise program. Discuss with your doctor what type of exercise program would be best for you. It doesn't have to be difficult. Even brisk walking for 20 minutes three times a week is a good form of exercise.   4. Avoid medicines which contain heart stimulants. This includes many cold and sinus decongestant pills and sprays as well as diet pills. Check the warnings about hypertension on the label. Stimulants such as amphetamine or cocaine could be lethal for someone with hypertension. Never take these.    Mixed dyslipidemia  -     Comprehensive metabolic panel; Future  -     TSH; Future  NP cholesterol is high. Low cholesterol diet is suggested. 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  We discussed about diet ;low in calories. Avoid sweats, sodas.  Also increasing  activity;walking 2-3 miles a day.  I also adjusted medications and gave patient  instructions about adherence to plan.  Goal of  A1c  less than 7 % stressed.  Also goal of LDL less than 70 highlighted to patient.

## 2018-04-24 ENCOUNTER — OFFICE VISIT (OUTPATIENT)
Dept: UROLOGY | Facility: CLINIC | Age: 83
End: 2018-04-24
Payer: MEDICARE

## 2018-04-24 VITALS
WEIGHT: 179 LBS | HEART RATE: 59 BPM | SYSTOLIC BLOOD PRESSURE: 139 MMHG | HEIGHT: 66 IN | DIASTOLIC BLOOD PRESSURE: 60 MMHG | BODY MASS INDEX: 28.77 KG/M2

## 2018-04-24 DIAGNOSIS — N13.8 BPH WITH OBSTRUCTION/LOWER URINARY TRACT SYMPTOMS: Primary | ICD-10-CM

## 2018-04-24 DIAGNOSIS — R30.0 DYSURIA: ICD-10-CM

## 2018-04-24 DIAGNOSIS — R39.89 ABNORMAL PROSTATE EXAM: ICD-10-CM

## 2018-04-24 DIAGNOSIS — N40.1 BPH WITH OBSTRUCTION/LOWER URINARY TRACT SYMPTOMS: Primary | ICD-10-CM

## 2018-04-24 DIAGNOSIS — Z87.440 HISTORY OF UTI: ICD-10-CM

## 2018-04-24 LAB
BILIRUB SERPL-MCNC: NORMAL MG/DL
BLOOD URINE, POC: NORMAL
COLOR, POC UA: YELLOW
GLUCOSE UR QL STRIP: NORMAL
KETONES UR QL STRIP: NORMAL
LEUKOCYTE ESTERASE URINE, POC: NORMAL
NITRITE, POC UA: NORMAL
PH, POC UA: 5
POC RESIDUAL URINE VOLUME: 30 ML (ref 0–100)
PROTEIN, POC: NORMAL
SPECIFIC GRAVITY, POC UA: 1.01
UROBILINOGEN, POC UA: NORMAL

## 2018-04-24 PROCEDURE — 99999 PR PBB SHADOW E&M-EST. PATIENT-LVL III: CPT | Mod: PBBFAC,,, | Performed by: UROLOGY

## 2018-04-24 PROCEDURE — 99214 OFFICE O/P EST MOD 30 MIN: CPT | Mod: 25,S$GLB,, | Performed by: UROLOGY

## 2018-04-24 PROCEDURE — 87086 URINE CULTURE/COLONY COUNT: CPT

## 2018-04-24 PROCEDURE — 51798 US URINE CAPACITY MEASURE: CPT | Mod: S$GLB,,, | Performed by: UROLOGY

## 2018-04-24 PROCEDURE — 81002 URINALYSIS NONAUTO W/O SCOPE: CPT | Mod: S$GLB,,, | Performed by: UROLOGY

## 2018-04-24 NOTE — PROGRESS NOTES
"This patient was last seen by me January 2018 in follow-up for postop urinary retention which resolved on medical management 9 patient has a history of suspicious prostate with 4 prior negative prostate biopsies    Since states he has no K strength of stream with nocturia ×1-2 and no straining to void and no dysuria.  Bladder scan postvoid residualokay at approximately 30 cc    Physical exam reveals reveals a well-developed well-nourished patient  in no acute distress.  Patient is alert and oriented ×3 with normal mood and affect.  Respiratory effort is normal and there is no peripheral edema.  Skin is normal to inspection and palpation. Penis/perineum without lesions, scrotum without rash/cysts, epididymis nontender bilaterally, urethral meatus in normal location normal size, no penile plaques palpated, prostate:    Somewhat firm on left unchanged from before                   seminal vesicles not palpated.  No rectal masses, sphincter tone normal.  Testes equal in size without masses    /60 (BP Location: Left arm, Patient Position: Sitting, BP Method: Large (Automatic))   Pulse (!) 59   Ht 5' 6" (1.676 m)   Wt 81.2 kg (179 lb)   BMI 28.89 kg/m²   Review of Systems  General ROS: negative for chills, fever or weight loss  Respiratory ROS: no cough, shortness of breath, or wheezing  Cardiovascular ROS: no chest pain or dyspnea on exertion  Musculoskeletal ROS: negative for gait disturbance or muscular weakness    Family History   Problem Relation Age of Onset    Hypertension Father     Stroke Father     Alcohol abuse Father     Heart disease Brother     COPD Sister     Cancer Brother      Lung    Diabetes Daughter     No Known Problems Son     COPD Brother     No Known Problems Daughter     No Known Problems Son     Prostate cancer Neg Hx     Kidney disease Neg Hx      Past Medical History:   Diagnosis Date    Abdominal fibromatosis     Acute posthemorrhagic anemia 1/9/2018    NIK (acute " kidney injury) 1/11/2018    Atrial fibrillation     Bradycardia     CAD (coronary artery disease)     Cancer     basal cell on nose and melanoma on back    CHF (congestive heart failure)     COPD (chronic obstructive pulmonary disease)     Diabetes mellitus type II     Hyperlipidemia     Melanoma     Obesity (BMI 30.0-34.9) 9/13/2016    Osteoporosis     Peripheral vascular disease     Stroke     TIA (transient ischemic attack)     Type II or unspecified type diabetes mellitus without mention of complication, not stated as uncontrolled     Unspecified essential hypertension      Family History   Problem Relation Age of Onset    Hypertension Father     Stroke Father     Alcohol abuse Father     Heart disease Brother     COPD Sister     Cancer Brother      Lung    Diabetes Daughter     No Known Problems Son     COPD Brother     No Known Problems Daughter     No Known Problems Son     Prostate cancer Neg Hx     Kidney disease Neg Hx      Social History   Substance Use Topics    Smoking status: Former Smoker     Quit date: 9/20/1996    Smokeless tobacco: Never Used      Comment: cigar smoker    Alcohol use No      Comment: none since 2006       Impression:      ICD-10-CM ICD-9-CM    1. BPH with obstruction/lower urinary tract symptoms N40.1 600.01 POCT URINE DIPSTICK WITHOUT MICROSCOPE    N13.8 599.69 POCT Bladder Scan   2. Abnormal prostate exam R39.89 793.5 Prostate Specific Antigen, Diagnostic   3. Dysuria R30.0 788.1 Urine culture   4. History of UTI Z87.440 V13.02        Plan:    #1 1.  BPH.  Plan.  Patient voiding satisfactorily on Flomax which I discussed is an indefinite medication    #2.  Firm prostate with 4 prior negative prostate biopsies.  Plan.  Repeat biopsy not needed.  We'll check PSA today and plan on recheck of PSA on a every 6 month basis    Numbers 3 and 4.  Minimal dysuria and history of urinary tract infection.  As patient is currently minimally symptomatic, we will  obtain urine for informational purposes only.  Patient to contact the office if he has significant dysuria or fever or chills.  Patient wife voice understanding    Follow-up 3 months for recheck of urine and voiding pattern    PLEASE NOTE:  Please be advised that portions of this note were dictated using voice recognition software and may contain dictation related errors in spelling/grammar/appropriate pronouns/syntax or other errors that might have not been found and or corrected on text review.

## 2018-04-25 LAB — BACTERIA UR CULT: NORMAL

## 2018-04-26 ENCOUNTER — TELEPHONE (OUTPATIENT)
Dept: UROLOGY | Facility: CLINIC | Age: 83
End: 2018-04-26

## 2018-04-26 NOTE — TELEPHONE ENCOUNTER
----- Message from Cristian Montoya MD sent at 4/26/2018  1:53 PM CDT -----  Please contact the patient and let the patient know that urine culture showed no infection.

## 2018-05-02 ENCOUNTER — TELEPHONE (OUTPATIENT)
Dept: INTERNAL MEDICINE | Facility: CLINIC | Age: 83
End: 2018-05-02

## 2018-05-02 NOTE — TELEPHONE ENCOUNTER
----- Message from Diana Mccarty sent at 2018  7:58 AM CDT -----  Contact: ZURDO with [employer] in [place]  Eddie MILENA Tal Toussaint.  MRN: [id number]  : 1929  PCP: Callum Roberts  Home Phone      [phone]  Work Phone      Not on file.  Mobile          [phone]      MESSAGE:  ZURDO left message on out voicemail. Says we didn't fill out pt's [facility] form completely and they cannot add or fill anything out on their end. Asking if we can fill it out all the way and fax it back to them. Please advise.  PHONE:  [phone]

## 2018-05-08 DIAGNOSIS — I10 ESSENTIAL HYPERTENSION: ICD-10-CM

## 2018-05-08 RX ORDER — AMLODIPINE BESYLATE 10 MG/1
TABLET ORAL
Qty: 30 TABLET | Refills: 6 | Status: SHIPPED | OUTPATIENT
Start: 2018-05-08 | End: 2019-01-10 | Stop reason: SDUPTHER

## 2018-07-09 DIAGNOSIS — E11.9 TYPE 2 DIABETES MELLITUS, CONTROLLED: ICD-10-CM

## 2018-07-09 DIAGNOSIS — E78.2 MIXED DYSLIPIDEMIA: ICD-10-CM

## 2018-07-09 RX ORDER — WARFARIN 1 MG/1
TABLET ORAL
Qty: 60 TABLET | Refills: 5 | Status: SHIPPED | OUTPATIENT
Start: 2018-07-09 | End: 2018-12-28 | Stop reason: SDUPTHER

## 2018-07-09 RX ORDER — GLIMEPIRIDE 2 MG/1
TABLET ORAL
Qty: 30 TABLET | Refills: 5 | Status: SHIPPED | OUTPATIENT
Start: 2018-07-09 | End: 2019-07-29 | Stop reason: SDUPTHER

## 2018-07-09 RX ORDER — GEMFIBROZIL 600 MG/1
TABLET, FILM COATED ORAL
Qty: 60 TABLET | Refills: 4 | Status: SHIPPED | OUTPATIENT
Start: 2018-07-09 | End: 2018-10-29

## 2018-07-13 ENCOUNTER — CLINICAL SUPPORT (OUTPATIENT)
Dept: INTERNAL MEDICINE | Facility: CLINIC | Age: 83
End: 2018-07-13
Payer: MEDICARE

## 2018-07-13 DIAGNOSIS — I10 ESSENTIAL HYPERTENSION: ICD-10-CM

## 2018-07-13 DIAGNOSIS — E11.49 CONTROLLED TYPE 2 DIABETES MELLITUS WITH OTHER NEUROLOGIC COMPLICATION, WITHOUT LONG-TERM CURRENT USE OF INSULIN: ICD-10-CM

## 2018-07-13 DIAGNOSIS — E78.2 MIXED DYSLIPIDEMIA: ICD-10-CM

## 2018-07-13 DIAGNOSIS — R39.89 ABNORMAL PROSTATE EXAM: ICD-10-CM

## 2018-07-13 LAB
ALBUMIN SERPL BCP-MCNC: 3.6 G/DL
ALP SERPL-CCNC: 98 U/L
ALT SERPL W/O P-5'-P-CCNC: 8 U/L
ANION GAP SERPL CALC-SCNC: 9 MMOL/L
AST SERPL-CCNC: 24 U/L
BASOPHILS # BLD AUTO: 0.01 K/UL
BASOPHILS NFR BLD: 0.2 %
BILIRUB SERPL-MCNC: 0.7 MG/DL
BUN SERPL-MCNC: 50 MG/DL
CALCIUM SERPL-MCNC: 10 MG/DL
CHLORIDE SERPL-SCNC: 102 MMOL/L
CHOLEST SERPL-MCNC: 137 MG/DL
CHOLEST/HDLC SERPL: 3.5 {RATIO}
CO2 SERPL-SCNC: 31 MMOL/L
COMPLEXED PSA SERPL-MCNC: 2.2 NG/ML
CREAT SERPL-MCNC: 1.8 MG/DL
CREAT UR-MCNC: 63.1 MG/DL
DIFFERENTIAL METHOD: ABNORMAL
EOSINOPHIL # BLD AUTO: 0.1 K/UL
EOSINOPHIL NFR BLD: 1 %
ERYTHROCYTE [DISTWIDTH] IN BLOOD BY AUTOMATED COUNT: 13.7 %
EST. GFR  (AFRICAN AMERICAN): 38 ML/MIN/1.73 M^2
EST. GFR  (NON AFRICAN AMERICAN): 33 ML/MIN/1.73 M^2
ESTIMATED AVG GLUCOSE: 146 MG/DL
GLUCOSE SERPL-MCNC: 115 MG/DL
HBA1C MFR BLD HPLC: 6.7 %
HCT VFR BLD AUTO: 37.2 %
HDLC SERPL-MCNC: 39 MG/DL
HDLC SERPL: 28.5 %
HGB BLD-MCNC: 12 G/DL
LDLC SERPL CALC-MCNC: 80 MG/DL
LYMPHOCYTES # BLD AUTO: 1.3 K/UL
LYMPHOCYTES NFR BLD: 26.3 %
MCH RBC QN AUTO: 32.3 PG
MCHC RBC AUTO-ENTMCNC: 32.3 G/DL
MCV RBC AUTO: 100 FL
MICROALBUMIN UR DL<=1MG/L-MCNC: 32 UG/ML
MICROALBUMIN/CREATININE RATIO: 50.7 UG/MG
MONOCYTES # BLD AUTO: 0.7 K/UL
MONOCYTES NFR BLD: 15.1 %
NEUTROPHILS # BLD AUTO: 2.8 K/UL
NEUTROPHILS NFR BLD: 57.4 %
NONHDLC SERPL-MCNC: 98 MG/DL
PLATELET # BLD AUTO: 166 K/UL
PMV BLD AUTO: 10 FL
POTASSIUM SERPL-SCNC: 5 MMOL/L
PROT SERPL-MCNC: 7.6 G/DL
RBC # BLD AUTO: 3.71 M/UL
SODIUM SERPL-SCNC: 142 MMOL/L
TRIGL SERPL-MCNC: 90 MG/DL
TSH SERPL DL<=0.005 MIU/L-ACNC: 3.42 UIU/ML
WBC # BLD AUTO: 4.83 K/UL

## 2018-07-13 PROCEDURE — 80061 LIPID PANEL: CPT

## 2018-07-13 PROCEDURE — 84153 ASSAY OF PSA TOTAL: CPT

## 2018-07-13 PROCEDURE — 83036 HEMOGLOBIN GLYCOSYLATED A1C: CPT

## 2018-07-13 PROCEDURE — 84443 ASSAY THYROID STIM HORMONE: CPT

## 2018-07-13 PROCEDURE — 82043 UR ALBUMIN QUANTITATIVE: CPT

## 2018-07-13 PROCEDURE — 99999 PR PBB SHADOW E&M-EST. PATIENT-LVL I: CPT | Mod: PBBFAC,,,

## 2018-07-13 PROCEDURE — 85025 COMPLETE CBC W/AUTO DIFF WBC: CPT

## 2018-07-13 PROCEDURE — 36415 COLL VENOUS BLD VENIPUNCTURE: CPT | Mod: S$GLB,,, | Performed by: INTERNAL MEDICINE

## 2018-07-13 PROCEDURE — 80053 COMPREHEN METABOLIC PANEL: CPT

## 2018-07-19 ENCOUNTER — OFFICE VISIT (OUTPATIENT)
Dept: INTERNAL MEDICINE | Facility: CLINIC | Age: 83
End: 2018-07-19
Payer: MEDICARE

## 2018-07-19 VITALS
SYSTOLIC BLOOD PRESSURE: 116 MMHG | OXYGEN SATURATION: 96 % | HEIGHT: 66 IN | WEIGHT: 179.88 LBS | RESPIRATION RATE: 16 BRPM | HEART RATE: 60 BPM | DIASTOLIC BLOOD PRESSURE: 68 MMHG | BODY MASS INDEX: 28.91 KG/M2

## 2018-07-19 DIAGNOSIS — I10 ESSENTIAL HYPERTENSION: ICD-10-CM

## 2018-07-19 DIAGNOSIS — E11.51 TYPE II DIABETES MELLITUS WITH PERIPHERAL CIRCULATORY DISORDER: ICD-10-CM

## 2018-07-19 DIAGNOSIS — I50.42 CHRONIC COMBINED SYSTOLIC AND DIASTOLIC CONGESTIVE HEART FAILURE: Primary | ICD-10-CM

## 2018-07-19 DIAGNOSIS — E78.2 MIXED DYSLIPIDEMIA: ICD-10-CM

## 2018-07-19 PROCEDURE — 99214 OFFICE O/P EST MOD 30 MIN: CPT | Mod: S$GLB,,, | Performed by: INTERNAL MEDICINE

## 2018-07-19 PROCEDURE — 99499 UNLISTED E&M SERVICE: CPT | Mod: S$GLB,,, | Performed by: INTERNAL MEDICINE

## 2018-07-19 PROCEDURE — 99999 PR PBB SHADOW E&M-EST. PATIENT-LVL III: CPT | Mod: PBBFAC,,, | Performed by: INTERNAL MEDICINE

## 2018-07-19 NOTE — PROGRESS NOTES
Subjective:       Patient ID: Eddie Parada Sr. is a 89 y.o. male.    Chief Complaint: Hypertension (FOLLOW UP WITH LAB REVIEW); Hyperlipidemia; COPD; Anemia; and Diabetes    Eddie Parada Sr. is a 89 y.o. male who presents for Type II DM, Hypertension, and Hyperlipidemia follow up. Labs were reviewed with patient today.      Hypertension   This is a chronic problem. The current episode started more than 1 year ago. The problem is controlled. Associated symptoms include malaise/fatigue and shortness of breath. Pertinent negatives include no orthopnea. Risk factors for coronary artery disease include sedentary lifestyle, male gender, obesity, dyslipidemia and diabetes mellitus. Past treatments include beta blockers, calcium channel blockers and diuretics. The current treatment provides moderate improvement. Hypertensive end-organ damage includes kidney disease and CAD/MI. Identifiable causes of hypertension include a thyroid problem.   Hyperlipidemia   This is a chronic problem. The current episode started more than 1 year ago. The problem is controlled. Recent lipid tests were reviewed and are low. Associated symptoms include shortness of breath. Current antihyperlipidemic treatment includes statins. The current treatment provides significant improvement of lipids.   COPD     Anemia   Symptoms include malaise/fatigue. There has been no bruising/bleeding easily or pallor.   Diabetes   He presents for his follow-up diabetic visit. He has type 2 diabetes mellitus. His disease course has been stable. Pertinent negatives for hypoglycemia include no dizziness, nervousness/anxiousness, pallor, seizures or speech difficulty. Associated symptoms include foot paresthesias. Pertinent negatives for hypoglycemia complications include no blackouts. Symptoms are improving. Pertinent negatives for diabetic complications include no autonomic neuropathy. Current diabetic treatment includes oral agent (dual therapy). His  breakfast blood glucose range is generally 110-130 mg/dl. An ACE inhibitor/angiotensin II receptor blocker is being taken.   Coronary Artery Disease   Symptoms include shortness of breath. Pertinent negatives include no chest tightness or dizziness. Risk factors include hyperlipidemia.   Thyroid Problem   Patient reports no anxiety. His past medical history is significant for hyperlipidemia.     Review of Systems   Constitutional: Positive for malaise/fatigue.   HENT: Negative for postnasal drip.    Eyes: Negative for photophobia.   Respiratory: Positive for shortness of breath. Negative for chest tightness.    Cardiovascular: Negative for orthopnea.   Gastrointestinal: Negative for abdominal distention and blood in stool.   Genitourinary: Negative for dysuria, flank pain and hematuria.   Musculoskeletal: Negative for back pain.        Foot neuropathic pain    Skin: Negative for pallor.   Neurological: Negative for dizziness, seizures, facial asymmetry and speech difficulty.   Hematological: Does not bruise/bleed easily.   Psychiatric/Behavioral: Negative for agitation and suicidal ideas. The patient is not nervous/anxious.        Objective:      Physical Exam   Constitutional: He is oriented to person, place, and time. He appears well-developed and well-nourished. No distress.   HENT:   Head: Normocephalic and atraumatic.   Right Ear: External ear normal.   Left Ear: External ear normal.   Eyes: EOM are normal. Right eye exhibits no discharge. Left eye exhibits no discharge.   Neck: Neck supple. No thyromegaly present.   Cardiovascular: Normal rate and regular rhythm.  Exam reveals no gallop and no friction rub.    No murmur heard.  Pulses:       Dorsalis pedis pulses are 1+ on the right side, and 1+ on the left side.        Posterior tibial pulses are 1+ on the right side, and 1+ on the left side.   Pulmonary/Chest: Effort normal and breath sounds normal. No respiratory distress. He has no wheezes. He has no  rales.   Abdominal: Soft. Bowel sounds are normal. He exhibits no distension. There is no tenderness. There is no rebound and no guarding.   Musculoskeletal: He exhibits no edema (but wearing compression stockings today).   Wearing compression stockings     Feet:   Right Foot:   Protective Sensation: 6 sites tested. 1 site sensed.  Skin Integrity: Negative for ulcer, skin breakdown, callus or dry skin.   Left Foot:   Protective Sensation: 6 sites tested. 2 sites sensed.   Skin Integrity: Negative for ulcer, skin breakdown, callus or dry skin.   Lymphadenopathy:     He has no cervical adenopathy.   Neurological: He is alert and oriented to person, place, and time. No cranial nerve deficit.   Skin: Skin is warm and dry.   Psychiatric: He has a normal mood and affect. His behavior is normal.   Vitals reviewed.      Assessment:       1. Chronic combined systolic and diastolic congestive heart failure    2. Essential hypertension    3. Mixed dyslipidemia    4. Type II diabetes mellitus with peripheral circulatory disorder        Plan:   Eddie was seen today for hypertension, hyperlipidemia, copd, anemia and diabetes.    Diagnoses and all orders for this visit:    Chronic combined systolic and diastolic congestive heart failure  His creatinine is getting better .  Decrease lasix from daily to 4 times a week.    Essential hypertension  -     CBC auto differential; Future  -     Comprehensive metabolic panel; Future    Well controlled.  Continue same medication and dose.  1. Keep weight close to ideal body weight.   2.   Avoid high salt foods (olives, pickles, smoked meats, salted potato chips, etc.).   Do not add salt to your food at the table.   Use only small amounts of salt when cooking.   3. Begin an exercise program. Discuss with your doctor what type of exercise program would be best for you. It doesn't have to be difficult. Even brisk walking for 20 minutes three times a week is a good form of exercise.   4. Avoid  medicines which contain heart stimulants. This includes many cold and sinus decongestant pills and sprays as well as diet pills. Check the warnings about hypertension on the label. Stimulants such as amphetamine or cocaine could be lethal for someone with hypertension. Never take these.    Mixed dyslipidemia  -     Lipid panel; Future  -     TSH; Future  Limit the cholesterol in your diet to less than 300 mg per day.   Fats should contribute no more than 20 to 35% of your daily calories.   Less than 7 to 10% of your calories should come from saturated fat.   Avoid saturated fat products e.g., Butter, some oils, meat, and poultry fat contain a lot of saturated fat.   Check food labels for fat and cholesterol content. Choose the foods with less fat per serving.   Limit the amount of butter and margarine you eat.   Use salad dressings and margarine made with polyunsaturated and monounsaturated fats.   Use egg whites or egg substitutes rather than whole eggs.   Replace whole-milk dairy products with nonfat or low-fat milk, cheese, spreads, and yogurt.   Eat skinless chicken, turkey, fish, and meatless entrees more often than red meat.   Choose lean cuts of meat and trim off all visible fat. Keep portion sizes moderate.   Avoid fatty desserts such as ice cream, cream-filled cakes, and cheesecakes. Choose fresh fruits, nonfat frozen yogurt, Popsicles, etc.   Reduce the amount of fried foods, vending machine food, and fast food you eat.   Eat fruits and vegetables (especially fresh fruits and leafy vegetables), beans, and whole grains daily. The fiber in these foods helps lower cholesterol.   Look for low-fat or nonfat varieties of the foods you like to eat, or look for substitutes.   You may need to exercise 60 minutes a day to prevent weight gain and 90 minutes a day to lose weight.  Type II diabetes mellitus with peripheral circulatory disorder  -     Hemoglobin A1c; Future  -     Microalbumin/creatinine urine ratio;  Future  Patient has controlled Diabetes .  We discussed about diet ;low in calories. Avoid sweats, sodas.  Also increasing activity;walking 2-3 miles a day.  gave patient  instructions about adherence to plan.  Goal of  A1c  less than 7 % stressed.  Also goal of LDL less than 70 highlighted to patient.

## 2018-07-25 ENCOUNTER — OFFICE VISIT (OUTPATIENT)
Dept: UROLOGY | Facility: CLINIC | Age: 83
End: 2018-07-25
Payer: MEDICARE

## 2018-07-25 VITALS
HEIGHT: 66 IN | SYSTOLIC BLOOD PRESSURE: 131 MMHG | DIASTOLIC BLOOD PRESSURE: 65 MMHG | TEMPERATURE: 98 F | HEART RATE: 60 BPM

## 2018-07-25 DIAGNOSIS — N40.1 BPH WITH OBSTRUCTION/LOWER URINARY TRACT SYMPTOMS: ICD-10-CM

## 2018-07-25 DIAGNOSIS — N39.0 URINARY TRACT INFECTION WITHOUT HEMATURIA, SITE UNSPECIFIED: ICD-10-CM

## 2018-07-25 DIAGNOSIS — R39.89 ABNORMAL PROSTATE EXAM: Primary | ICD-10-CM

## 2018-07-25 DIAGNOSIS — N13.8 BPH WITH OBSTRUCTION/LOWER URINARY TRACT SYMPTOMS: ICD-10-CM

## 2018-07-25 DIAGNOSIS — Z12.5 PROSTATE CANCER SCREENING: ICD-10-CM

## 2018-07-25 LAB
BILIRUB SERPL-MCNC: NORMAL MG/DL
BLOOD URINE, POC: NORMAL
COLOR, POC UA: YELLOW
GLUCOSE UR QL STRIP: NORMAL
KETONES UR QL STRIP: NORMAL
LEUKOCYTE ESTERASE URINE, POC: NORMAL
NITRITE, POC UA: NORMAL
PH, POC UA: 5
PROTEIN, POC: NORMAL
SPECIFIC GRAVITY, POC UA: 1
UROBILINOGEN, POC UA: NORMAL

## 2018-07-25 PROCEDURE — 99214 OFFICE O/P EST MOD 30 MIN: CPT | Mod: 25,S$GLB,, | Performed by: UROLOGY

## 2018-07-25 PROCEDURE — 87086 URINE CULTURE/COLONY COUNT: CPT

## 2018-07-25 PROCEDURE — 99999 PR PBB SHADOW E&M-EST. PATIENT-LVL IV: CPT | Mod: PBBFAC,,, | Performed by: UROLOGY

## 2018-07-25 PROCEDURE — 81002 URINALYSIS NONAUTO W/O SCOPE: CPT | Mod: S$GLB,,, | Performed by: UROLOGY

## 2018-07-26 NOTE — PROGRESS NOTES
"This patient was last seen by me April of this year in follow-up for BPH for which the patient was continued on Flomax.    Patient has history of from prostate with normal PSAs and 4 prior negative biopsies.  PSA this month is stable at 2.2    As last office visit patient had minimal dysuria and urine culture obtained which came back negative.  Patient currently having no dysuria.  There is a prior history of a symptomatic urinary tract infection    Patient states that he is voiding with a good stream with minimal nocturia and no straining to void    Physical exam reveals reveals a well-developed well-nourished patient  in no acute distress.  Patient is alert and oriented ×3 with normal mood and affect.  Respiratory effort is normal and there is no peripheral edema.  Skin is normal to inspection and palpation    /65 (BP Location: Right arm, Patient Position: Sitting)   Pulse 60   Temp 97.9 °F (36.6 °C) (Oral)   Ht 5' 6" (1.676 m)   Review of Systems  General ROS: negative for chills, fever or weight loss  Respiratory ROS: no cough, shortness of breath, or wheezing  Cardiovascular ROS: no chest pain or dyspnea on exertion  Musculoskeletal ROS: negative for gait disturbance or muscular weakness    Family History   Problem Relation Age of Onset    Hypertension Father     Stroke Father     Alcohol abuse Father     Heart disease Brother     COPD Sister     Cancer Brother         Lung    Diabetes Daughter     No Known Problems Son     COPD Brother     No Known Problems Daughter     No Known Problems Son     Prostate cancer Neg Hx     Kidney disease Neg Hx      Past Medical History:   Diagnosis Date    Abdominal fibromatosis     Acute posthemorrhagic anemia 1/9/2018    NIK (acute kidney injury) 1/11/2018    Atrial fibrillation     Bradycardia     CAD (coronary artery disease)     Cancer     basal cell on nose and melanoma on back    CHF (congestive heart failure)     COPD (chronic obstructive " pulmonary disease)     Diabetes mellitus type II     Hyperlipidemia     Melanoma     Obesity (BMI 30.0-34.9) 9/13/2016    Osteoporosis     Peripheral vascular disease     Stroke     TIA (transient ischemic attack)     Type II or unspecified type diabetes mellitus without mention of complication, not stated as uncontrolled     Unspecified essential hypertension      Family History   Problem Relation Age of Onset    Hypertension Father     Stroke Father     Alcohol abuse Father     Heart disease Brother     COPD Sister     Cancer Brother         Lung    Diabetes Daughter     No Known Problems Son     COPD Brother     No Known Problems Daughter     No Known Problems Son     Prostate cancer Neg Hx     Kidney disease Neg Hx      Social History   Substance Use Topics    Smoking status: Former Smoker     Quit date: 9/20/1996    Smokeless tobacco: Never Used      Comment: cigar smoker    Alcohol use No      Comment: none since 2006       Impression:      ICD-10-CM ICD-9-CM    1. Abnormal prostate exam R39.89 793.5    2. BPH with obstruction/lower urinary tract symptoms N40.1 600.01 POCT URINE DIPSTICK WITHOUT MICROSCOPE    N13.8 599.69    3. Urinary tract infection without hematuria, site unspecified N39.0 599.0 Urine culture      POCT URINE DIPSTICK WITHOUT MICROSCOPE   4. Prostate cancer screening Z12.5 V76.44 PSA, Screening       Plan:    #1.  Firm prostate with normal PSA.  Plan.  Patient has had multiple negative biopsies and repeat biopsy not indicated.  Follow-up 6 months with recheck PSA    2.  BPH.  Plan.  Continue Flomax    3.  Asymptomatic urinary tract infection.  Urine will be sent for culture and sensitivity for informational purposes only.  Therapy will be instituted only if the patient develops symptoms consistent with urinary tract infection.  Patient instructed to contact the office if she develops such symptoms.    4.  Prostate cancer screening.  As per 1.    Today I spent 25  minutes with the patient, more than 50% of which was spent counseling and coordinating care concerning treatment of issues as detailed above.    PLEASE NOTE:  Please be advised that portions of this note were dictated using voice recognition software and may contain dictation related errors in spelling/grammar/appropriate pronouns/syntax or other errors that might have not been found and or corrected on text review.

## 2018-07-27 ENCOUNTER — TELEPHONE (OUTPATIENT)
Dept: UROLOGY | Facility: CLINIC | Age: 83
End: 2018-07-27

## 2018-07-27 LAB — BACTERIA UR CULT: NO GROWTH

## 2018-07-27 NOTE — TELEPHONE ENCOUNTER
----- Message from Cristian Montoya MD sent at 7/27/2018 11:21 AM CDT -----  Please contact the patient and let the patient know that urine culture showed no infection.

## 2018-08-07 ENCOUNTER — TELEPHONE (OUTPATIENT)
Dept: INTERNAL MEDICINE | Facility: CLINIC | Age: 83
End: 2018-08-07

## 2018-08-07 NOTE — TELEPHONE ENCOUNTER
Patient's wife states that patient is being charged excessive amounts per [facility] and Dr. Monterroso for coumadin monitoring. She would like to have his labs done per [facility] due to cheaper cost. I advised that she discuss this with Dr. Monterroso

## 2018-08-07 NOTE — TELEPHONE ENCOUNTER
----- Message from Rayne Melo sent at 2018  9:42 AM CDT -----  Contact: FABRICE / WIFE   Eddie ABBOTT Tal Toussaint.  MRN: [id number]  : 1929  PCP: Callum Staley  Home Phone      [phone]  Work Phone      Not on file.  Mobile          [phone]      MESSAGE: WANTS TO SPEAK WITH DR STALEY NURSE REGARDING PT'S COUMADIN. PLEASE CALL    PHONE: [phone]

## 2018-09-06 DIAGNOSIS — E78.2 MIXED DYSLIPIDEMIA: ICD-10-CM

## 2018-09-06 RX ORDER — PRAVASTATIN SODIUM 20 MG/1
TABLET ORAL
Qty: 30 TABLET | Refills: 5 | Status: SHIPPED | OUTPATIENT
Start: 2018-09-06 | End: 2019-04-03

## 2018-10-22 DIAGNOSIS — I10 ESSENTIAL HYPERTENSION: ICD-10-CM

## 2018-10-22 RX ORDER — CARVEDILOL 12.5 MG/1
12.5 TABLET ORAL 2 TIMES DAILY WITH MEALS
Qty: 60 TABLET | Refills: 5 | Status: SHIPPED | OUTPATIENT
Start: 2018-10-22 | End: 2019-04-23 | Stop reason: SDUPTHER

## 2018-10-25 ENCOUNTER — TELEPHONE (OUTPATIENT)
Dept: INTERNAL MEDICINE | Facility: CLINIC | Age: 83
End: 2018-10-25

## 2018-10-25 NOTE — TELEPHONE ENCOUNTER
Fax received from [facility] pharmacy dept indicating that Gemfibrozil is not covered. Please prescribe an alternative. Thanks.

## 2018-10-29 RX ORDER — FENOFIBRATE 160 MG/1
160 TABLET ORAL DAILY
Qty: 90 TABLET | Refills: 3 | Status: SHIPPED | OUTPATIENT
Start: 2018-10-29 | End: 2019-04-03

## 2018-11-01 ENCOUNTER — TELEPHONE (OUTPATIENT)
Dept: INTERNAL MEDICINE | Facility: CLINIC | Age: 83
End: 2018-11-01

## 2018-11-01 NOTE — TELEPHONE ENCOUNTER
----- Message from Shira Pittman sent at 2018 10:59 AM CDT -----  Contact: [facility]  Eddie ABBOTT Tal Toussaint.  MRN: [id number]  : 1929  PCP: Callum Roberts  Home Phone      [phone]  Work Phone      Not on file.  Mobile          [phone]     Message received from voicemail:    MESSAGE:   Would like to speak to nurse regarding RX fenofibrate 160 MG Tab that was ordered for patient on 10-29-18.  Patient stated that he was unaware of any medication changes and he has not taken this before. Please call.    Phone: [phone]

## 2018-11-01 NOTE — TELEPHONE ENCOUNTER
Discussed medication change with patient and Brian at [facility] pharmacy. Patient and Pharmacy verbalized understanding.

## 2018-12-06 NOTE — TELEPHONE ENCOUNTER
----- Message from Shira Pittman sent at 2018 10:10 AM CST -----  Contact: Self  Eddie Parada Sr.  MRN: [id number]  : 1929  PCP: Callum Roberts  Home Phone      [phone]  Work Phone      Not on file.  Mobile          [phone]    MESSAGE:     Would like to speak to nurse about his diabetes test strips.   His insurance was previously not paying for these, and he is at [facility] and they are telling him that his insurance should cover this. Please call patient to discuss and advise.    Pharmacy: [facility] in [place]    Phone: [phone]

## 2018-12-07 ENCOUNTER — TELEPHONE (OUTPATIENT)
Dept: INTERNAL MEDICINE | Facility: CLINIC | Age: 83
End: 2018-12-07

## 2018-12-07 NOTE — TELEPHONE ENCOUNTER
Notified Nhung that the pended order has not been signed yet, but to inform the patient that he can come over to the clinic and get a sample meter to hold him over until the order is complete.

## 2018-12-07 NOTE — TELEPHONE ENCOUNTER
----- Message from Jinny Montes sent at 12/7/2018 10:40 AM CST -----  Contact: Nhung with [facility]   Rx refill for testing strips for glucometer. One Touch Ultra Machine.   Pt is at the pharmacy at this time.     Phone # [phone]    [facility] - [place], [place]

## 2018-12-10 RX ORDER — INSULIN PUMP SYRINGE, 3 ML
EACH MISCELLANEOUS
Qty: 1 EACH | Refills: 0 | Status: SHIPPED | OUTPATIENT
Start: 2018-12-10 | End: 2018-12-17 | Stop reason: CLARIF

## 2018-12-10 RX ORDER — LANCETS
EACH MISCELLANEOUS
Qty: 200 EACH | Refills: 3 | Status: SHIPPED | OUTPATIENT
Start: 2018-12-10 | End: 2018-12-17 | Stop reason: CLARIF

## 2018-12-11 RX ORDER — LISINOPRIL 10 MG/1
10 TABLET ORAL DAILY
Qty: 90 TABLET | Refills: 3 | Status: SHIPPED | OUTPATIENT
Start: 2018-12-11 | End: 2019-12-09 | Stop reason: SDUPTHER

## 2018-12-12 RX ORDER — PIOGLITAZONEHYDROCHLORIDE 15 MG/1
TABLET ORAL
Qty: 30 TABLET | Refills: 10 | Status: SHIPPED | OUTPATIENT
Start: 2018-12-12 | End: 2019-11-11 | Stop reason: SDUPTHER

## 2018-12-21 DIAGNOSIS — Z79.4 TYPE 2 DIABETES MELLITUS WITH OTHER NEUROLOGIC COMPLICATION, WITH LONG-TERM CURRENT USE OF INSULIN: Primary | ICD-10-CM

## 2018-12-21 DIAGNOSIS — E11.49 TYPE 2 DIABETES MELLITUS WITH OTHER NEUROLOGIC COMPLICATION, WITH LONG-TERM CURRENT USE OF INSULIN: Primary | ICD-10-CM

## 2018-12-21 RX ORDER — DEXTROSE 4 G
TABLET,CHEWABLE ORAL
COMMUNITY
End: 2018-12-21 | Stop reason: SDUPTHER

## 2018-12-21 RX ORDER — LANCETS
EACH MISCELLANEOUS
Qty: 200 EACH | Refills: 3 | Status: SHIPPED | OUTPATIENT
Start: 2018-12-21 | End: 2020-09-02 | Stop reason: SDUPTHER

## 2018-12-21 RX ORDER — DEXTROSE 4 G
TABLET,CHEWABLE ORAL
Qty: 1 EACH | Refills: 0 | Status: SHIPPED | OUTPATIENT
Start: 2018-12-21 | End: 2020-09-02 | Stop reason: SDUPTHER

## 2018-12-27 RX ORDER — WARFARIN 1 MG/1
TABLET ORAL
Qty: 60 TABLET | Refills: 5 | Status: CANCELLED | OUTPATIENT
Start: 2018-12-27

## 2018-12-27 RX ORDER — LEVOTHYROXINE SODIUM 50 UG/1
TABLET ORAL
Qty: 30 TABLET | Refills: 10 | Status: CANCELLED | OUTPATIENT
Start: 2018-12-27

## 2018-12-28 NOTE — TELEPHONE ENCOUNTER
Requested Prescriptions     Pending Prescriptions Disp Refills    levothyroxine (SYNTHROID) 50 MCG tablet 90 tablet 1     Sig: Take 1 tablet (50 mcg total) by mouth once daily.    warfarin (COUMADIN) 1 MG tablet 180 tablet 1     Sig: TAKE 2 TO 3 TABLETS BY MOUTH DAILY AS DIRECTED   90 day supply of both meds phoned in to [facility] as patient needs refill today. Scripts pended for documentation only. Thanks.

## 2018-12-28 NOTE — TELEPHONE ENCOUNTER
----- Message from Montse Pereira MA sent at 2018 10:00 AM CST -----  Contact: Self  Eddie Parada Sr.  MRN: [id number]  : 1929  PCP: Callum Roberts  Home Phone      [phone]  Work Phone      Not on file.  Mobile          [phone]      MESSAGE: Patient is completely out of levothyroxine (SYNTHROID) 50 MCG tablet and warfarin (COUMADIN) 1 MG tablet. Requesting a medication refill to be sent to [facility] [address]. Pharmacy advise that 90 tablets be dispensed for the Warfarin to prevent the patient from running out of medication. Last office visit on 2018. Please advise.    Phone number: [phone]

## 2018-12-31 RX ORDER — WARFARIN 1 MG/1
TABLET ORAL
Qty: 180 TABLET | Refills: 1 | Status: SHIPPED | OUTPATIENT
Start: 2018-12-31 | End: 2019-09-17

## 2018-12-31 RX ORDER — LEVOTHYROXINE SODIUM 50 UG/1
50 TABLET ORAL DAILY
Qty: 90 TABLET | Refills: 1 | Status: SHIPPED | OUTPATIENT
Start: 2018-12-31 | End: 2019-12-09 | Stop reason: SDUPTHER

## 2019-01-08 ENCOUNTER — CLINICAL SUPPORT (OUTPATIENT)
Dept: INTERNAL MEDICINE | Facility: CLINIC | Age: 84
End: 2019-01-08
Payer: MEDICARE

## 2019-01-08 DIAGNOSIS — E78.2 MIXED DYSLIPIDEMIA: ICD-10-CM

## 2019-01-08 DIAGNOSIS — E11.51 TYPE II DIABETES MELLITUS WITH PERIPHERAL CIRCULATORY DISORDER: ICD-10-CM

## 2019-01-08 DIAGNOSIS — I10 ESSENTIAL HYPERTENSION: ICD-10-CM

## 2019-01-08 LAB
ALBUMIN SERPL BCP-MCNC: 4.1 G/DL
ALBUMIN/CREAT UR: 108.1 UG/MG
ALP SERPL-CCNC: 82 U/L
ALT SERPL W/O P-5'-P-CCNC: 11 U/L
ANION GAP SERPL CALC-SCNC: 9 MMOL/L
AST SERPL-CCNC: 27 U/L
BASOPHILS # BLD AUTO: 0.02 K/UL
BASOPHILS NFR BLD: 0.3 %
BILIRUB SERPL-MCNC: 0.9 MG/DL
BUN SERPL-MCNC: 28 MG/DL
CALCIUM SERPL-MCNC: 9.7 MG/DL
CHLORIDE SERPL-SCNC: 107 MMOL/L
CHOLEST SERPL-MCNC: 126 MG/DL
CHOLEST/HDLC SERPL: 3.3 {RATIO}
CO2 SERPL-SCNC: 26 MMOL/L
CREAT SERPL-MCNC: 1.6 MG/DL
CREAT UR-MCNC: 77.7 MG/DL
DIFFERENTIAL METHOD: ABNORMAL
EOSINOPHIL # BLD AUTO: 0.1 K/UL
EOSINOPHIL NFR BLD: 1.6 %
ERYTHROCYTE [DISTWIDTH] IN BLOOD BY AUTOMATED COUNT: 12.8 %
EST. GFR  (AFRICAN AMERICAN): 44 ML/MIN/1.73 M^2
EST. GFR  (NON AFRICAN AMERICAN): 38 ML/MIN/1.73 M^2
ESTIMATED AVG GLUCOSE: 128 MG/DL
GLUCOSE SERPL-MCNC: 119 MG/DL
HBA1C MFR BLD HPLC: 6.1 %
HCT VFR BLD AUTO: 39 %
HDLC SERPL-MCNC: 38 MG/DL
HDLC SERPL: 30.2 %
HGB BLD-MCNC: 12.7 G/DL
LDLC SERPL CALC-MCNC: 68.4 MG/DL
LYMPHOCYTES # BLD AUTO: 1.2 K/UL
LYMPHOCYTES NFR BLD: 19 %
MCH RBC QN AUTO: 33.2 PG
MCHC RBC AUTO-ENTMCNC: 32.6 G/DL
MCV RBC AUTO: 102 FL
MICROALBUMIN UR DL<=1MG/L-MCNC: 84 UG/ML
MONOCYTES # BLD AUTO: 0.8 K/UL
MONOCYTES NFR BLD: 13 %
NEUTROPHILS # BLD AUTO: 4.1 K/UL
NEUTROPHILS NFR BLD: 66.1 %
NONHDLC SERPL-MCNC: 88 MG/DL
PLATELET # BLD AUTO: 168 K/UL
PMV BLD AUTO: 10.4 FL
POTASSIUM SERPL-SCNC: 4.2 MMOL/L
PROT SERPL-MCNC: 7.4 G/DL
RBC # BLD AUTO: 3.83 M/UL
SODIUM SERPL-SCNC: 142 MMOL/L
T4 FREE SERPL-MCNC: 1.15 NG/DL
TRIGL SERPL-MCNC: 98 MG/DL
TSH SERPL DL<=0.005 MIU/L-ACNC: 4.01 UIU/ML
WBC # BLD AUTO: 6.17 K/UL

## 2019-01-08 PROCEDURE — 85025 COMPLETE CBC W/AUTO DIFF WBC: CPT

## 2019-01-08 PROCEDURE — 84439 ASSAY OF FREE THYROXINE: CPT

## 2019-01-08 PROCEDURE — 80053 COMPREHEN METABOLIC PANEL: CPT

## 2019-01-08 PROCEDURE — 36415 PR COLLECTION VENOUS BLOOD,VENIPUNCTURE: ICD-10-PCS | Mod: S$GLB,,, | Performed by: INTERNAL MEDICINE

## 2019-01-08 PROCEDURE — 82043 UR ALBUMIN QUANTITATIVE: CPT

## 2019-01-08 PROCEDURE — 84443 ASSAY THYROID STIM HORMONE: CPT

## 2019-01-08 PROCEDURE — 80061 LIPID PANEL: CPT

## 2019-01-08 PROCEDURE — 99999 PR PBB SHADOW E&M-EST. PATIENT-LVL I: ICD-10-PCS | Mod: PBBFAC,,,

## 2019-01-08 PROCEDURE — 83036 HEMOGLOBIN GLYCOSYLATED A1C: CPT

## 2019-01-08 PROCEDURE — 99999 PR PBB SHADOW E&M-EST. PATIENT-LVL I: CPT | Mod: PBBFAC,,,

## 2019-01-08 PROCEDURE — 36415 COLL VENOUS BLD VENIPUNCTURE: CPT | Mod: S$GLB,,, | Performed by: INTERNAL MEDICINE

## 2019-01-10 DIAGNOSIS — I10 ESSENTIAL HYPERTENSION: ICD-10-CM

## 2019-01-10 DIAGNOSIS — I50.32 CHRONIC DIASTOLIC HEART FAILURE: ICD-10-CM

## 2019-01-10 RX ORDER — PANTOPRAZOLE SODIUM 40 MG/1
TABLET, DELAYED RELEASE ORAL
Qty: 30 TABLET | Refills: 5 | Status: SHIPPED | OUTPATIENT
Start: 2019-01-10 | End: 2019-07-18 | Stop reason: SDUPTHER

## 2019-01-10 RX ORDER — AMLODIPINE BESYLATE 10 MG/1
TABLET ORAL
Qty: 30 TABLET | Refills: 6 | Status: SHIPPED | OUTPATIENT
Start: 2019-01-10 | End: 2019-08-12 | Stop reason: SDUPTHER

## 2019-01-16 RX ORDER — FUROSEMIDE 40 MG/1
TABLET ORAL
Qty: 30 TABLET | Refills: 6 | Status: SHIPPED | OUTPATIENT
Start: 2019-01-16 | End: 2020-03-03

## 2019-01-17 ENCOUNTER — OFFICE VISIT (OUTPATIENT)
Dept: INTERNAL MEDICINE | Facility: CLINIC | Age: 84
End: 2019-01-17
Payer: MEDICARE

## 2019-01-17 VITALS
SYSTOLIC BLOOD PRESSURE: 110 MMHG | HEART RATE: 60 BPM | WEIGHT: 187.63 LBS | DIASTOLIC BLOOD PRESSURE: 68 MMHG | OXYGEN SATURATION: 97 % | RESPIRATION RATE: 14 BRPM | BODY MASS INDEX: 30.16 KG/M2 | HEIGHT: 66 IN

## 2019-01-17 DIAGNOSIS — I10 ESSENTIAL HYPERTENSION: Primary | ICD-10-CM

## 2019-01-17 DIAGNOSIS — E11.49 TYPE 2 DIABETES MELLITUS WITH OTHER NEUROLOGIC COMPLICATION, WITH LONG-TERM CURRENT USE OF INSULIN: ICD-10-CM

## 2019-01-17 DIAGNOSIS — E66.9 OBESITY, DIABETES, AND HYPERTENSION SYNDROME: ICD-10-CM

## 2019-01-17 DIAGNOSIS — I25.10 CORONARY ARTERY DISEASE INVOLVING NATIVE CORONARY ARTERY OF NATIVE HEART WITHOUT ANGINA PECTORIS: ICD-10-CM

## 2019-01-17 DIAGNOSIS — E11.69 OBESITY, DIABETES, AND HYPERTENSION SYNDROME: ICD-10-CM

## 2019-01-17 DIAGNOSIS — E78.2 MIXED DYSLIPIDEMIA: ICD-10-CM

## 2019-01-17 DIAGNOSIS — Z79.4 TYPE 2 DIABETES MELLITUS WITH OTHER NEUROLOGIC COMPLICATION, WITH LONG-TERM CURRENT USE OF INSULIN: ICD-10-CM

## 2019-01-17 DIAGNOSIS — E11.59 OBESITY, DIABETES, AND HYPERTENSION SYNDROME: ICD-10-CM

## 2019-01-17 DIAGNOSIS — I50.42 CHRONIC COMBINED SYSTOLIC AND DIASTOLIC CONGESTIVE HEART FAILURE: ICD-10-CM

## 2019-01-17 DIAGNOSIS — I15.2 OBESITY, DIABETES, AND HYPERTENSION SYNDROME: ICD-10-CM

## 2019-01-17 PROBLEM — N17.9 AKI (ACUTE KIDNEY INJURY): Status: RESOLVED | Noted: 2018-01-11 | Resolved: 2019-01-17

## 2019-01-17 PROCEDURE — 99999 PR PBB SHADOW E&M-EST. PATIENT-LVL III: ICD-10-PCS | Mod: PBBFAC,,, | Performed by: INTERNAL MEDICINE

## 2019-01-17 PROCEDURE — 99499 UNLISTED E&M SERVICE: CPT | Mod: S$GLB,,, | Performed by: INTERNAL MEDICINE

## 2019-01-17 PROCEDURE — 99214 OFFICE O/P EST MOD 30 MIN: CPT | Mod: S$GLB,,, | Performed by: INTERNAL MEDICINE

## 2019-01-17 PROCEDURE — 99499 RISK ADDL DX/OHS AUDIT: ICD-10-PCS | Mod: S$GLB,,, | Performed by: INTERNAL MEDICINE

## 2019-01-17 PROCEDURE — 99999 PR PBB SHADOW E&M-EST. PATIENT-LVL III: CPT | Mod: PBBFAC,,, | Performed by: INTERNAL MEDICINE

## 2019-01-17 PROCEDURE — 99214 PR OFFICE/OUTPT VISIT, EST, LEVL IV, 30-39 MIN: ICD-10-PCS | Mod: S$GLB,,, | Performed by: INTERNAL MEDICINE

## 2019-01-17 PROCEDURE — 1101F PR PT FALLS ASSESS DOC 0-1 FALLS W/OUT INJ PAST YR: ICD-10-PCS | Mod: CPTII,S$GLB,, | Performed by: INTERNAL MEDICINE

## 2019-01-17 PROCEDURE — 1101F PT FALLS ASSESS-DOCD LE1/YR: CPT | Mod: CPTII,S$GLB,, | Performed by: INTERNAL MEDICINE

## 2019-01-17 NOTE — PROGRESS NOTES
Subjective:       Patient ID: Eddie Parada Sr. is a 89 y.o. male.    Chief Complaint: Hyperlipidemia (follow up with lab review); Hypertension; Diabetes; Anemia; and COPD    Eddie Parada Sr. is a 89 y.o. male who presents for Type II DM,  CHF, Hypertension, and Hyperlipidemia follow up. Labs were reviewed with patient today.      Hypertension   This is a chronic problem. The current episode started more than 1 year ago. The problem is controlled. Associated symptoms include malaise/fatigue and shortness of breath. Pertinent negatives include no orthopnea. Risk factors for coronary artery disease include sedentary lifestyle, male gender, obesity, dyslipidemia and diabetes mellitus. Past treatments include beta blockers, calcium channel blockers and diuretics. The current treatment provides moderate improvement. Hypertensive end-organ damage includes kidney disease and CAD/MI. Identifiable causes of hypertension include a thyroid problem.   Hyperlipidemia   This is a chronic problem. The current episode started more than 1 year ago. The problem is controlled. Recent lipid tests were reviewed and are low. Associated symptoms include shortness of breath. Current antihyperlipidemic treatment includes statins. The current treatment provides significant improvement of lipids.   COPD     Anemia   Symptoms include malaise/fatigue. There has been no bruising/bleeding easily or pallor.   Diabetes   He presents for his follow-up diabetic visit. He has type 2 diabetes mellitus. His disease course has been stable. Pertinent negatives for hypoglycemia include no dizziness, nervousness/anxiousness, pallor, seizures or speech difficulty. Associated symptoms include foot paresthesias. Pertinent negatives for hypoglycemia complications include no blackouts. Symptoms are improving. Pertinent negatives for diabetic complications include no autonomic neuropathy. Current diabetic treatment includes oral agent (dual therapy). His  breakfast blood glucose range is generally 110-130 mg/dl. An ACE inhibitor/angiotensin II receptor blocker is being taken.   Coronary Artery Disease   Symptoms include shortness of breath. Pertinent negatives include no chest tightness or dizziness. Risk factors include hyperlipidemia.   Thyroid Problem   Patient reports no anxiety. His past medical history is significant for hyperlipidemia.     Review of Systems   Constitutional: Positive for malaise/fatigue.   HENT: Negative for postnasal drip.    Eyes: Negative for photophobia.   Respiratory: Positive for shortness of breath. Negative for chest tightness.         SOB about the same    Cardiovascular: Negative for orthopnea.   Gastrointestinal: Negative for abdominal distention and blood in stool.   Genitourinary: Negative for dysuria, flank pain and hematuria.   Musculoskeletal: Negative for back pain.        Foot neuropathic pain    Skin: Negative for pallor.   Neurological: Negative for dizziness, seizures, facial asymmetry and speech difficulty.   Hematological: Does not bruise/bleed easily.   Psychiatric/Behavioral: Negative for agitation and suicidal ideas. The patient is not nervous/anxious.        Objective:      Physical Exam   Constitutional: He is oriented to person, place, and time. He appears well-developed and well-nourished. No distress.   HENT:   Head: Normocephalic and atraumatic.   Right Ear: External ear normal.   Left Ear: External ear normal.   Eyes: EOM are normal. Right eye exhibits no discharge. Left eye exhibits no discharge.   Neck: Neck supple. No thyromegaly present.   Cardiovascular: Normal rate and regular rhythm. Exam reveals no gallop and no friction rub.   No murmur heard.  Pulses:       Dorsalis pedis pulses are 1+ on the right side, and 1+ on the left side.        Posterior tibial pulses are 1+ on the right side, and 1+ on the left side.   Pulmonary/Chest: Effort normal and breath sounds normal. No respiratory distress. He has no  wheezes. He has no rales.   Abdominal: Soft. Bowel sounds are normal. He exhibits no distension. There is no tenderness. There is no rebound and no guarding.   Musculoskeletal: He exhibits no edema (but wearing compression stockings today).   Wearing compression stockings     Feet:   Right Foot:   Protective Sensation: 6 sites tested. 1 site sensed.  Skin Integrity: Negative for ulcer, skin breakdown, callus or dry skin.   Left Foot:   Protective Sensation: 6 sites tested. 2 sites sensed.   Skin Integrity: Negative for ulcer, skin breakdown, callus or dry skin.   Lymphadenopathy:     He has no cervical adenopathy.   Neurological: He is alert and oriented to person, place, and time. No cranial nerve deficit.   Skin: Skin is warm and dry.   Psychiatric: He has a normal mood and affect. His behavior is normal.   Vitals reviewed.      Assessment:       1. Essential hypertension    2. Type 2 diabetes mellitus with other neurologic complication, with long-term current use of insulin    3. Obesity, diabetes, and hypertension syndrome    4. Coronary artery disease involving native coronary artery of native heart without angina pectoris    5. Chronic combined systolic and diastolic congestive heart failure    6. Mixed dyslipidemia        Plan:   Eddie was seen today for hyperlipidemia, hypertension, diabetes, anemia and copd.    Diagnoses and all orders for this visit:    Essential hypertension  -     CBC auto differential; Future  -     Comprehensive metabolic panel; Future    Well controlled.  Continue same medication and dose.  1. Keep weight close to ideal body weight.   2.   Avoid high salt foods (olives, pickles, smoked meats, salted potato chips, etc.).   Do not add salt to your food at the table.   Use only small amounts of salt when cooking.   3. Begin an exercise program. Discuss with your doctor what type of exercise program would be best for you. It doesn't have to be difficult. Even brisk walking for 20 minutes  three times a week is a good form of exercise.   4. Avoid medicines which contain heart stimulants. This includes many cold and sinus decongestant pills and sprays as well as diet pills. Check the warnings about hypertension on the label. Stimulants such as amphetamine or cocaine could be lethal for someone with hypertension. Never take these.    Type 2 diabetes mellitus with other neurologic complication, with long-term current use of insulin  -     Hemoglobin A1c; Future  -     Microalbumin/creatinine urine ratio; Future  Patient has controlled Diabetes .  We discussed about diet ;low in calories. Avoid sweats, sodas.  Also increasing activity;walking 2-3 miles a day.  I also adjusted medications and gave patient  instructions about adherence to plan.  Goal of  A1c  less than 7 % stressed.  Also goal of LDL less than 70 highlighted to patient.  Obesity, diabetes, and hypertension syndrome  -     TSH; Future    # The patient is asked to make an attempt to improve diet and exercise patterns to aid in medical management of this problem.     # Eat  5 small meals a day.     # Cut out high carbohydrate  foods : bread, rice, pasta, potatoes.     # Exercise/walk 5x/week for at least 30-45  minutes.        Coronary artery disease involving native coronary artery of native heart without angina pectoris  -     Lipid panel; Future    # The patient is asked to make an attempt to improve diet and exercise patterns to aid in medical management of this problem.     # Eat  5 small meals a day.     # Cut out high carbohydrate  foods : bread, rice, pasta, potatoes.     # Exercise/walk 5x/week for at least 30-45  minutes.        Chronic combined systolic and diastolic congestive heart failure  -     Comprehensive metabolic panel; Future  We discussed about leg edema  And  weight gain issues with CHF.  I educated patient to call  If he  Gains 5 lbs and more ,or legs start swelling or worsening shortness of breath , or inability to lie  down.  If breathing is worse go to emergency Room      Mixed dyslipidemia  -     Lipid panel; Future    Continue pravastatin and fenofibrate     Problem List Items Addressed This Visit     HTN (hypertension) - Primary    Relevant Orders    CBC auto differential    Comprehensive metabolic panel    CAD (coronary artery disease)    Relevant Orders    Lipid panel    Type 2 diabetes mellitus with neurologic complication    Relevant Orders    Hemoglobin A1c    Microalbumin/creatinine urine ratio    Chronic combined systolic and diastolic congestive heart failure    Relevant Orders    Comprehensive metabolic panel    Mixed dyslipidemia    Relevant Orders    Lipid panel    Obesity, diabetes, and hypertension syndrome    Relevant Orders    TSH

## 2019-01-17 NOTE — PROGRESS NOTES
Patient, Eddie Parada Sr. (MRN #[id number]), presented with a recent Estimated Glumerular Filtration Rate (EGFR) between 30 and 245 consistent with the definition of chronic kidney disease stage 4 (ICD10 - N18.3).    eGFR if non    Date Value Ref Range Status   01/08/2019 38 (A) >60 mL/min/1.73 m^2 Final     Comment:     Calculation used to obtain the estimated glomerular filtration  rate (eGFR) is the CKD-EPI equation.          The patient's chronic kidney disease stage 4 was monitored, evaluated, addressed and/or treated. This addendum to the medical record is made on 01/17/2019.

## 2019-01-22 RX ORDER — TAMSULOSIN HYDROCHLORIDE 0.4 MG/1
0.4 CAPSULE ORAL DAILY
Qty: 30 CAPSULE | Refills: 11 | Status: SHIPPED | OUTPATIENT
Start: 2019-01-22 | End: 2020-02-17

## 2019-02-18 ENCOUNTER — TELEPHONE (OUTPATIENT)
Dept: INTERNAL MEDICINE | Facility: CLINIC | Age: 84
End: 2019-02-18

## 2019-02-18 NOTE — TELEPHONE ENCOUNTER
----- Message from Shira Pittman sent at 2019  8:30 AM CST -----  Contact: Jessica/Wife  Eddie Parada Sr.  MRN: [id number]  : 1929  PCP: Callum Roberts  Home Phone      [phone]  Work Phone      Not on file.  Mobile          [phone]    MESSAGE:   Would like to speak to nurse about RX pravastatin (PRAVACHOL) 20 MG tablet and the side effects of it.  He is having a lot of problems with pain in his bones, and she believes that this is being caused by medications.  Please call to advise.      Phone: [phone]

## 2019-02-19 NOTE — TELEPHONE ENCOUNTER
----- Message from Jinny Montes sent at 2019  8:14 AM CST -----  Contact: Jessica (wife)  Eddie Parada Sr.  MRN: [id number]  : 1929  PCP: Callum Roberts  Home Phone      [phone]  Work Phone      Not on file.  Mobile          [phone]    MESSAGE:   She is requesting to speak to nurse about the pt's cholesterol medications.   pravastatin (PRAVACHOL) 20 MG tablet  senofibrate 160 mg  The pt is having pain to left leg. Pt pain became so severe he could not walk on Saturday.   The symptoms began one week ago.   The pt discontinued the medication  - pravastatin on Friday. She contacted the [facility] which instructed to contact the PCP and also notified the medication could cause the leg pain.    The pt dc the senofibrate 160 mg on Monday.   The pt stated the symptoms are improving since dc both medications. Please advise.     Phone # [phone]    [facility] - [place], [place] - [address] [facility]

## 2019-02-19 NOTE — TELEPHONE ENCOUNTER
Wife called stating patient discontinued Pravastatin and Fenofibrate due to severe leg cramps. Patient was unable to walk this weekend and since stopping the medication, he's doing better.

## 2019-02-19 NOTE — TELEPHONE ENCOUNTER
Wait for one week and resume pravastatin ;hold off fenfibrtae and see if he can tolerate at least one medication . If he becomes week the he should stop

## 2019-03-12 ENCOUNTER — TELEPHONE (OUTPATIENT)
Dept: INTERNAL MEDICINE | Facility: CLINIC | Age: 84
End: 2019-03-12

## 2019-03-12 NOTE — TELEPHONE ENCOUNTER
----- Message from Jinny Montes sent at 3/12/2019 12:22 PM CDT -----  Contact: Jessica (wife)  Eddie Parada Sr.  MRN: [id number]  : 1929  PCP: Callum Roberts  Home Phone      [phone]  Work Phone      Not on file.  Mobile          [phone]    MESSAGE:   Pt c/o pain to left leg. The pharmacist stated the cholesterol mediation could be causing the symptoms. She spoke to nurse and dc both medications.  The pt restated the pravastatin (PRAVACHOL) 20 MG tablet for two weeks, and has no pain.   Please advise.     Phone # [phone]    [facility] [address] [facility]

## 2019-03-25 ENCOUNTER — TELEPHONE (OUTPATIENT)
Dept: INTERNAL MEDICINE | Facility: CLINIC | Age: 84
End: 2019-03-25

## 2019-03-25 ENCOUNTER — OFFICE VISIT (OUTPATIENT)
Dept: INTERNAL MEDICINE | Facility: CLINIC | Age: 84
End: 2019-03-25
Payer: MEDICARE

## 2019-03-25 VITALS
SYSTOLIC BLOOD PRESSURE: 122 MMHG | BODY MASS INDEX: 30.18 KG/M2 | RESPIRATION RATE: 14 BRPM | WEIGHT: 187.81 LBS | DIASTOLIC BLOOD PRESSURE: 72 MMHG | OXYGEN SATURATION: 98 % | HEIGHT: 66 IN | HEART RATE: 60 BPM

## 2019-03-25 DIAGNOSIS — M79.662 PAIN OF LEFT LOWER LEG: Primary | ICD-10-CM

## 2019-03-25 PROCEDURE — 1100F PTFALLS ASSESS-DOCD GE2>/YR: CPT | Mod: CPTII,S$GLB,, | Performed by: INTERNAL MEDICINE

## 2019-03-25 PROCEDURE — 3288F PR FALLS RISK ASSESSMENT DOCUMENTED: ICD-10-PCS | Mod: CPTII,S$GLB,, | Performed by: INTERNAL MEDICINE

## 2019-03-25 PROCEDURE — 99213 PR OFFICE/OUTPT VISIT, EST, LEVL III, 20-29 MIN: ICD-10-PCS | Mod: S$GLB,,, | Performed by: INTERNAL MEDICINE

## 2019-03-25 PROCEDURE — 99999 PR PBB SHADOW E&M-EST. PATIENT-LVL IV: ICD-10-PCS | Mod: PBBFAC,,, | Performed by: INTERNAL MEDICINE

## 2019-03-25 PROCEDURE — 3288F FALL RISK ASSESSMENT DOCD: CPT | Mod: CPTII,S$GLB,, | Performed by: INTERNAL MEDICINE

## 2019-03-25 PROCEDURE — 1100F PR PT FALLS ASSESS DOC 2+ FALLS/FALL W/INJURY/YR: ICD-10-PCS | Mod: CPTII,S$GLB,, | Performed by: INTERNAL MEDICINE

## 2019-03-25 PROCEDURE — 99999 PR PBB SHADOW E&M-EST. PATIENT-LVL IV: CPT | Mod: PBBFAC,,, | Performed by: INTERNAL MEDICINE

## 2019-03-25 PROCEDURE — 99213 OFFICE O/P EST LOW 20 MIN: CPT | Mod: S$GLB,,, | Performed by: INTERNAL MEDICINE

## 2019-03-25 NOTE — TELEPHONE ENCOUNTER
----- Message from Shira Pittman sent at 3/25/2019  8:06 AM CDT -----  Contact: Self  Eddie Parada Sr.  MRN: [id number]  : 1929  PCP: Callum Roberts  Home Phone      [phone]  Work Phone      Not on file.  Mobile          [phone]    MESSAGE:   Was attacked by two dogs about a week ago and was seen at [facility].  He hasn't been able to take his Coumadin blood work since and he needs to get this done.  Please call to advise.    Phone: [phone]

## 2019-03-25 NOTE — PROGRESS NOTES
Subjective:       Patient ID: Eddie Parada Sr. is a 89 y.o. male.    Chief Complaint: Leg Pain (URGENT CARE FOLLOW UP) and Fall    Eddie Parada Sr. Is a 89yo man who presents with L leg pain.  He got knocked over by 2 pitbulls last week; started having pain in L leg and knee; went to [facility] ER and XR was negative.  He is taking Tylenol every 4 hours for the pain.  The pain is traveling down the lateral aspect of the L leg to the ankle, the pain feels like a tightness.  He has noticed tingling and numbness in the L foot.  There has been chronic weakness in both legs.  He is 2 weeks behind in his coumadin monitoring.  Last measure was 1.9 (goal 2-3).  He previously had to stop statins due to pain and weakness.    Review of Systems   HENT: Negative for postnasal drip.    Eyes: Negative for photophobia.   Respiratory: Positive for shortness of breath. Negative for chest tightness.         SOB about the same    Gastrointestinal: Negative for abdominal distention and blood in stool.   Genitourinary: Negative for dysuria, flank pain and hematuria.   Musculoskeletal: Positive for arthralgias, gait problem and myalgias. Negative for back pain.        Foot neuropathic pain    Skin: Negative for pallor.   Neurological: Positive for weakness and numbness. Negative for dizziness, seizures, facial asymmetry and speech difficulty.   Hematological: Does not bruise/bleed easily.   Psychiatric/Behavioral: Negative for agitation and suicidal ideas. The patient is not nervous/anxious.        Objective:      Physical Exam   Constitutional: He is oriented to person, place, and time. No distress.   HENT:   Head: Normocephalic and atraumatic.   Eyes: Pupils are equal, round, and reactive to light.   Neck: Normal range of motion. Neck supple.   Cardiovascular: Normal rate. An irregularly irregular rhythm present.   Pulmonary/Chest: Effort normal and breath sounds normal.   Abdominal: Soft. Bowel sounds are normal. He exhibits no  distension. There is no tenderness.   Musculoskeletal: He exhibits edema.        Left knee: He exhibits no swelling, no ecchymosis, no erythema and no bony tenderness. No tenderness found.        Left lower leg: He exhibits tenderness, swelling and edema.        Legs:  Left calf Orders done contusion but calf feels firm   And tender on palpation   He is walking with difficulty    Neurological: He is alert and oriented to person, place, and time.   Skin: Skin is warm and dry. No erythema.       Assessment:       1. Pain of left lower leg        Plan:   Eddie was seen today for leg pain and fall.    Diagnoses and all orders for this visit:    Pain of left lower leg  Calf pain and swelling .  On coumadin .  unkown INR ; will check inr       -     US Lower Extremity Veins Left; Future  -     Protime-INR; Future  - Will do US to rule out DVT  -  Advised of signs to watch for and to return if symptoms worsen  Tylenol for pain   Avoid nsaids    RTC 1 week         Problem List Items Addressed This Visit     None      Visit Diagnoses     Pain of left lower leg    -  Primary    Relevant Orders    US Lower Extremity Veins Left    Protime-INR

## 2019-03-25 NOTE — TELEPHONE ENCOUNTER
Patient c/o sevre left leg pain since being knocked down by dogs 2 weeks ago. He states that he was seen @ [facility] ER and x-rays were normal. I suggested that he see Dr. Roberts for evaluation. Appt scheduled for today @ 10:00 am. I also instructed him to contact his cardiologist (Dr. Monterroso) for orders for PT/INR monitoring.

## 2019-03-26 ENCOUNTER — TELEPHONE (OUTPATIENT)
Dept: INTERNAL MEDICINE | Facility: CLINIC | Age: 84
End: 2019-03-26

## 2019-04-03 ENCOUNTER — OFFICE VISIT (OUTPATIENT)
Dept: INTERNAL MEDICINE | Facility: CLINIC | Age: 84
End: 2019-04-03
Payer: MEDICARE

## 2019-04-03 VITALS
BODY MASS INDEX: 29.87 KG/M2 | HEART RATE: 60 BPM | HEIGHT: 66 IN | RESPIRATION RATE: 12 BRPM | WEIGHT: 185.88 LBS | SYSTOLIC BLOOD PRESSURE: 110 MMHG | OXYGEN SATURATION: 95 % | DIASTOLIC BLOOD PRESSURE: 66 MMHG

## 2019-04-03 DIAGNOSIS — M79.662 PAIN OF LEFT LOWER LEG: Primary | ICD-10-CM

## 2019-04-03 PROCEDURE — 1101F PR PT FALLS ASSESS DOC 0-1 FALLS W/OUT INJ PAST YR: ICD-10-PCS | Mod: CPTII,S$GLB,, | Performed by: INTERNAL MEDICINE

## 2019-04-03 PROCEDURE — 1101F PT FALLS ASSESS-DOCD LE1/YR: CPT | Mod: CPTII,S$GLB,, | Performed by: INTERNAL MEDICINE

## 2019-04-03 PROCEDURE — 99213 OFFICE O/P EST LOW 20 MIN: CPT | Mod: S$GLB,,, | Performed by: INTERNAL MEDICINE

## 2019-04-03 PROCEDURE — 99999 PR PBB SHADOW E&M-EST. PATIENT-LVL III: ICD-10-PCS | Mod: PBBFAC,,, | Performed by: INTERNAL MEDICINE

## 2019-04-03 PROCEDURE — 99999 PR PBB SHADOW E&M-EST. PATIENT-LVL III: CPT | Mod: PBBFAC,,, | Performed by: INTERNAL MEDICINE

## 2019-04-03 PROCEDURE — 99213 PR OFFICE/OUTPT VISIT, EST, LEVL III, 20-29 MIN: ICD-10-PCS | Mod: S$GLB,,, | Performed by: INTERNAL MEDICINE

## 2019-04-03 NOTE — PROGRESS NOTES
Subjective:       Patient ID: Eddie Parada Sr. is a 89 y.o. male.    Chief Complaint: Leg Pain (follow up)    Eddie Parada Sr. Is a 88yo man who presents with L leg pain follow-up.  He got knocked over by 2 pitbulls last week; started having pain in L leg and knee; went to [facility] ER and XR was negative.  He is taking Tylenol every 4 hours for the pain.  The pain is traveling down the lateral aspect of the L leg to the ankle, the pain feels like a tightness.  He has noticed tingling and numbness in the L foot.  There has been chronic weakness in both legs.  His last INR was 1.9.  4/3 He is walking better.  Pain is controlled with Tylenol.  U/S showed no clots.  Lasix is working well to removed leg edema.  Patient stopped taking his statin on Friday PM.    Leg Pain    Associated symptoms include numbness.     Review of Systems   HENT: Negative for postnasal drip.    Eyes: Negative for photophobia.   Respiratory: Positive for shortness of breath. Negative for chest tightness.         SOB about the same    Gastrointestinal: Negative for abdominal distention and blood in stool.   Genitourinary: Negative for dysuria, flank pain and hematuria.   Musculoskeletal: Positive for arthralgias, gait problem and myalgias. Negative for back pain.        Foot neuropathic pain    Skin: Negative for pallor.   Neurological: Positive for weakness and numbness. Negative for dizziness, seizures, facial asymmetry and speech difficulty.   Hematological: Does not bruise/bleed easily.   Psychiatric/Behavioral: Negative for agitation and suicidal ideas. The patient is not nervous/anxious.        Objective:      Physical Exam   Constitutional: He is oriented to person, place, and time. He appears well-developed and well-nourished.   HENT:   Head: Normocephalic and atraumatic.   Nose: Nose normal.   Mouth/Throat: Oropharynx is clear and moist.   Eyes: Pupils are equal, round, and reactive to light. Conjunctivae and EOM are normal.   Neck:  Normal range of motion. Neck supple. No JVD present. No thyromegaly present.   Cardiovascular: Normal rate, normal heart sounds and intact distal pulses. An irregularly irregular rhythm present.   Pulmonary/Chest: Effort normal and breath sounds normal.   Abdominal: Soft. Bowel sounds are normal. He exhibits no distension. There is no tenderness.   Musculoskeletal: Normal range of motion. He exhibits edema.   Lymphadenopathy:     He has no cervical adenopathy.   Neurological: He is alert and oriented to person, place, and time. He has normal reflexes. No cranial nerve deficit.   Skin: Skin is warm and dry. No rash noted. No pallor.   Psychiatric: He has a normal mood and affect.   Nursing note and vitals reviewed.      Assessment:       1. Pain of left lower leg        Plan:   Eddie was seen today for leg pain.    Diagnoses and all orders for this visit:    Pain of left lower leg  - Continue Tylenol PRN  - Continuing coumadin; followed by Dr. Monterroso  - Advised of signs to watch for and to return if symptoms worsen    Problem List Items Addressed This Visit     None      Visit Diagnoses     Pain of left lower leg    -  Primary

## 2019-04-23 DIAGNOSIS — I10 ESSENTIAL HYPERTENSION: ICD-10-CM

## 2019-04-23 RX ORDER — CARVEDILOL 12.5 MG/1
12.5 TABLET ORAL 2 TIMES DAILY WITH MEALS
Qty: 60 TABLET | Refills: 5 | Status: SHIPPED | OUTPATIENT
Start: 2019-04-23 | End: 2019-10-21 | Stop reason: SDUPTHER

## 2019-05-02 DIAGNOSIS — E78.2 MIXED DYSLIPIDEMIA: ICD-10-CM

## 2019-05-03 RX ORDER — PRAVASTATIN SODIUM 20 MG/1
TABLET ORAL
Qty: 30 TABLET | Refills: 5 | Status: SHIPPED | OUTPATIENT
Start: 2019-05-03 | End: 2019-11-11 | Stop reason: SDUPTHER

## 2019-07-15 RX ORDER — PANTOPRAZOLE SODIUM 40 MG/1
TABLET, DELAYED RELEASE ORAL
Qty: 30 TABLET | Refills: 5 | OUTPATIENT
Start: 2019-07-15

## 2019-07-18 ENCOUNTER — CLINICAL SUPPORT (OUTPATIENT)
Dept: INTERNAL MEDICINE | Facility: CLINIC | Age: 84
End: 2019-07-18
Payer: MEDICARE

## 2019-07-18 ENCOUNTER — TELEPHONE (OUTPATIENT)
Dept: INTERNAL MEDICINE | Facility: CLINIC | Age: 84
End: 2019-07-18

## 2019-07-18 ENCOUNTER — OFFICE VISIT (OUTPATIENT)
Dept: INTERNAL MEDICINE | Facility: CLINIC | Age: 84
End: 2019-07-18
Payer: MEDICARE

## 2019-07-18 VITALS
WEIGHT: 185 LBS | DIASTOLIC BLOOD PRESSURE: 70 MMHG | HEIGHT: 66 IN | HEART RATE: 60 BPM | BODY MASS INDEX: 29.73 KG/M2 | RESPIRATION RATE: 16 BRPM | SYSTOLIC BLOOD PRESSURE: 136 MMHG | OXYGEN SATURATION: 97 %

## 2019-07-18 DIAGNOSIS — E11.59 OBESITY, DIABETES, AND HYPERTENSION SYNDROME: ICD-10-CM

## 2019-07-18 DIAGNOSIS — Z79.4 TYPE 2 DIABETES MELLITUS WITH OTHER NEUROLOGIC COMPLICATION, WITH LONG-TERM CURRENT USE OF INSULIN: ICD-10-CM

## 2019-07-18 DIAGNOSIS — I15.2 OBESITY, DIABETES, AND HYPERTENSION SYNDROME: ICD-10-CM

## 2019-07-18 DIAGNOSIS — E11.69 OBESITY, DIABETES, AND HYPERTENSION SYNDROME: ICD-10-CM

## 2019-07-18 DIAGNOSIS — E11.49 TYPE 2 DIABETES MELLITUS WITH OTHER NEUROLOGIC COMPLICATION, WITH LONG-TERM CURRENT USE OF INSULIN: ICD-10-CM

## 2019-07-18 DIAGNOSIS — I10 ESSENTIAL HYPERTENSION: ICD-10-CM

## 2019-07-18 DIAGNOSIS — E78.2 MIXED DYSLIPIDEMIA: ICD-10-CM

## 2019-07-18 DIAGNOSIS — I25.10 CORONARY ARTERY DISEASE INVOLVING NATIVE CORONARY ARTERY OF NATIVE HEART WITHOUT ANGINA PECTORIS: ICD-10-CM

## 2019-07-18 DIAGNOSIS — M20.62 ACQUIRED DEFORMITY OF JOINT OF BIG TOE OF LEFT FOOT: ICD-10-CM

## 2019-07-18 DIAGNOSIS — I50.42 CHRONIC COMBINED SYSTOLIC AND DIASTOLIC CONGESTIVE HEART FAILURE: ICD-10-CM

## 2019-07-18 DIAGNOSIS — E66.9 OBESITY, DIABETES, AND HYPERTENSION SYNDROME: ICD-10-CM

## 2019-07-18 DIAGNOSIS — M20.62 ACQUIRED DEFORMITY OF JOINT OF BIG TOE OF LEFT FOOT: Primary | ICD-10-CM

## 2019-07-18 LAB
ALBUMIN SERPL BCP-MCNC: 3.7 G/DL (ref 3.5–5.2)
ALBUMIN/CREAT UR: 89.4 UG/MG (ref 0–30)
ALP SERPL-CCNC: 142 U/L (ref 55–135)
ALT SERPL W/O P-5'-P-CCNC: 12 U/L (ref 10–44)
ANION GAP SERPL CALC-SCNC: 10 MMOL/L (ref 8–16)
AST SERPL-CCNC: 24 U/L (ref 10–40)
BASOPHILS # BLD AUTO: 0.04 K/UL (ref 0–0.2)
BASOPHILS NFR BLD: 0.6 % (ref 0–1.9)
BILIRUB SERPL-MCNC: 0.7 MG/DL (ref 0.1–1)
BUN SERPL-MCNC: 24 MG/DL (ref 8–23)
CALCIUM SERPL-MCNC: 9.7 MG/DL (ref 8.7–10.5)
CHLORIDE SERPL-SCNC: 105 MMOL/L (ref 95–110)
CHOLEST SERPL-MCNC: 138 MG/DL (ref 120–199)
CHOLEST/HDLC SERPL: 4.2 {RATIO} (ref 2–5)
CO2 SERPL-SCNC: 26 MMOL/L (ref 23–29)
CREAT SERPL-MCNC: 1.2 MG/DL (ref 0.5–1.4)
CREAT UR-MCNC: 90.6 MG/DL (ref 23–375)
DIFFERENTIAL METHOD: ABNORMAL
EOSINOPHIL # BLD AUTO: 0.2 K/UL (ref 0–0.5)
EOSINOPHIL NFR BLD: 2.9 % (ref 0–8)
ERYTHROCYTE [DISTWIDTH] IN BLOOD BY AUTOMATED COUNT: 12 % (ref 11.5–14.5)
EST. GFR  (AFRICAN AMERICAN): >60 ML/MIN/1.73 M^2
EST. GFR  (NON AFRICAN AMERICAN): 53 ML/MIN/1.73 M^2
ESTIMATED AVG GLUCOSE: 143 MG/DL (ref 68–131)
GLUCOSE SERPL-MCNC: 134 MG/DL (ref 70–110)
HBA1C MFR BLD HPLC: 6.6 % (ref 4–5.6)
HCT VFR BLD AUTO: 40.1 % (ref 40–54)
HDLC SERPL-MCNC: 33 MG/DL (ref 40–75)
HDLC SERPL: 23.9 % (ref 20–50)
HGB BLD-MCNC: 13.6 G/DL (ref 14–18)
IMM GRANULOCYTES # BLD AUTO: 0.06 K/UL (ref 0–0.04)
IMM GRANULOCYTES NFR BLD AUTO: 0.8 % (ref 0–0.5)
LDLC SERPL CALC-MCNC: 83.6 MG/DL (ref 63–159)
LYMPHOCYTES # BLD AUTO: 1.3 K/UL (ref 1–4.8)
LYMPHOCYTES NFR BLD: 18.2 % (ref 18–48)
MCH RBC QN AUTO: 32.2 PG (ref 27–31)
MCHC RBC AUTO-ENTMCNC: 33.9 G/DL (ref 32–36)
MCV RBC AUTO: 95 FL (ref 82–98)
MICROALBUMIN UR DL<=1MG/L-MCNC: 81 UG/ML
MONOCYTES # BLD AUTO: 0.8 K/UL (ref 0.3–1)
MONOCYTES NFR BLD: 11.7 % (ref 4–15)
NEUTROPHILS # BLD AUTO: 4.7 K/UL (ref 1.8–7.7)
NEUTROPHILS NFR BLD: 65.8 % (ref 38–73)
NONHDLC SERPL-MCNC: 105 MG/DL
NRBC BLD-RTO: 0 /100 WBC
PLATELET # BLD AUTO: 162 K/UL (ref 150–350)
PMV BLD AUTO: 10 FL (ref 9.2–12.9)
POTASSIUM SERPL-SCNC: 4.3 MMOL/L (ref 3.5–5.1)
PROT SERPL-MCNC: 7.4 G/DL (ref 6–8.4)
RBC # BLD AUTO: 4.22 M/UL (ref 4.6–6.2)
SODIUM SERPL-SCNC: 141 MMOL/L (ref 136–145)
TRIGL SERPL-MCNC: 107 MG/DL (ref 30–150)
TSH SERPL DL<=0.005 MIU/L-ACNC: 3.39 UIU/ML (ref 0.4–4)
URATE SERPL-MCNC: 7.8 MG/DL (ref 3.4–7)
WBC # BLD AUTO: 7.19 K/UL (ref 3.9–12.7)

## 2019-07-18 PROCEDURE — 84550 ASSAY OF BLOOD/URIC ACID: CPT

## 2019-07-18 PROCEDURE — 1101F PT FALLS ASSESS-DOCD LE1/YR: CPT | Mod: CPTII,S$GLB,, | Performed by: INTERNAL MEDICINE

## 2019-07-18 PROCEDURE — 99213 OFFICE O/P EST LOW 20 MIN: CPT | Mod: S$GLB,,, | Performed by: INTERNAL MEDICINE

## 2019-07-18 PROCEDURE — 80053 COMPREHEN METABOLIC PANEL: CPT

## 2019-07-18 PROCEDURE — 1101F PR PT FALLS ASSESS DOC 0-1 FALLS W/OUT INJ PAST YR: ICD-10-PCS | Mod: CPTII,S$GLB,, | Performed by: INTERNAL MEDICINE

## 2019-07-18 PROCEDURE — 84443 ASSAY THYROID STIM HORMONE: CPT

## 2019-07-18 PROCEDURE — 99213 PR OFFICE/OUTPT VISIT, EST, LEVL III, 20-29 MIN: ICD-10-PCS | Mod: S$GLB,,, | Performed by: INTERNAL MEDICINE

## 2019-07-18 PROCEDURE — 99999 PR PBB SHADOW E&M-EST. PATIENT-LVL III: ICD-10-PCS | Mod: PBBFAC,,, | Performed by: INTERNAL MEDICINE

## 2019-07-18 PROCEDURE — 82043 UR ALBUMIN QUANTITATIVE: CPT

## 2019-07-18 PROCEDURE — 99999 PR PBB SHADOW E&M-EST. PATIENT-LVL III: CPT | Mod: PBBFAC,,, | Performed by: INTERNAL MEDICINE

## 2019-07-18 PROCEDURE — 85025 COMPLETE CBC W/AUTO DIFF WBC: CPT

## 2019-07-18 PROCEDURE — 36415 PR COLLECTION VENOUS BLOOD,VENIPUNCTURE: ICD-10-PCS | Mod: S$GLB,,, | Performed by: INTERNAL MEDICINE

## 2019-07-18 PROCEDURE — 80061 LIPID PANEL: CPT

## 2019-07-18 PROCEDURE — 83036 HEMOGLOBIN GLYCOSYLATED A1C: CPT

## 2019-07-18 PROCEDURE — 36415 COLL VENOUS BLD VENIPUNCTURE: CPT | Mod: S$GLB,,, | Performed by: INTERNAL MEDICINE

## 2019-07-18 RX ORDER — PANTOPRAZOLE SODIUM 40 MG/1
TABLET, DELAYED RELEASE ORAL
Qty: 30 TABLET | Refills: 5 | Status: SHIPPED | OUTPATIENT
Start: 2019-07-18 | End: 2019-07-22 | Stop reason: SDUPTHER

## 2019-07-18 RX ORDER — METHYLPREDNISOLONE 4 MG/1
TABLET ORAL
Qty: 1 PACKAGE | Refills: 0 | Status: SHIPPED | OUTPATIENT
Start: 2019-07-18 | End: 2019-07-31

## 2019-07-18 RX ORDER — CEPHALEXIN 500 MG/1
500 CAPSULE ORAL 3 TIMES DAILY
Qty: 30 CAPSULE | Refills: 0 | Status: SHIPPED | OUTPATIENT
Start: 2019-07-18 | End: 2019-07-28

## 2019-07-18 NOTE — PROGRESS NOTES
Subjective:       Patient ID: Eddie Parada Sr. is a 89 y.o. male.    Chief Complaint: Toe Redness (Redness to left great toe x 3 days)    Eddie Parada Sr. is a 89 y.o. male  With 3 days history of redness of left big toe.  Reports no pain ;no tenderness, no trauma   He has oncho mycotic nails and has CKD .      Review of Systems   Skin: Positive for color change and rash.       Objective:      Physical Exam   Musculoskeletal:        Feet:    Big toe nail oncho mycotic.  Hyperemia of ist toe till MTP joint   Non tender .          Assessment:       1. 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

## 2019-07-18 NOTE — TELEPHONE ENCOUNTER
Eddie desire refill of Protonix   Patient Active Problem List   Diagnosis    Atrial fibrillation    Anticoagulation monitoring by pharmacist    Atrial flutter    RBBB (right bundle branch block with left anterior fascicular block)    HTN (hypertension)    CAD (coronary artery disease)    History of CVA (cerebrovascular accident)    Sinoatrial node dysfunction    Cardiac pacemaker in situ    Type 2 diabetes mellitus with neurologic complication    Hereditary and idiopathic peripheral neuropathy    Chronic combined systolic and diastolic congestive heart failure    COPD (chronic obstructive pulmonary disease)    Type II diabetes mellitus with peripheral circulatory disorder    Hereditary and idiopathic peripheral neuropathy    Angina pectoris    Mixed dyslipidemia    Rectal bleeding    Aortic stenosis    S/P AVR    Tachy-ann syndrome    Pedal edema    Type 2 diabetes mellitus, controlled    Peripheral vascular disease    (HFpEF) heart failure with preserved ejection fraction    Osteoporosis    Non-melanoma skin cancer    Cancer    Long term current use of anticoagulant therapy    Obesity, diabetes, and hypertension syndrome    S/P ORIF (open reduction internal fixation) fracture    V-tach    Pseudomonas urinary tract infection    Hypotension     Prior to Admission medications    Medication Sig Start Date End Date Taking? Authorizing Provider   amLODIPine (NORVASC) 10 MG tablet TAKE 1 TABLET (10 MG TOTAL) BY MOUTH ONCE DAILY. FOR BLOOD PRESSURE 1/10/19   Callum Roberts MD   blood sugar diagnostic (BLOOD GLUCOSE TEST) [facility] [place] ULTRA TESTING STRIPS. USE TO TEST BLOOD GLUCOSE TWICE DAILY. DX CODE: E11.51 12/21/18   Callum Roberts MD   blood-glucose meter Misc ONETOUCH ULTRA GLUCOSE METER. USE AS DIRECTED TO TEST BLOOD GLUCOSE. DX CODE: E11.51 12/21/18   Callum Roberts MD   carvedilol (COREG) 12.5 MG tablet TAKE 1 TABLET (12.5 MG TOTAL) BY MOUTH 2 (TWO) TIMES DAILY WITH  MEALS. 4/23/19   Callum Roberts MD   cephALEXin (KEFLEX) 500 MG capsule Take 1 capsule (500 mg total) by mouth 3 (three) times daily. for 10 days 7/18/19 7/28/19  Callum Roberts MD   FOLIC ACID/MULTIVIT-MIN/LUTEIN (CENTRUM SILVER ORAL) Take 1 tablet by mouth once daily.    Historical Provider, MD   furosemide (LASIX) 40 MG tablet ONE TABLET 3 TIMES A WEEK AS NEEDED FOR SWELLING 1/16/19   Callum Roberts MD   glimepiride (AMARYL) 2 MG tablet TAKE 1 TABLET BY MOUTH EVERY MORNING WITH BREAKFAST FOR DIABETES 7/9/18   Callum Roberts MD   lancets (ONETOUCH ULTRASOFT LANCETS) Misc USE TO TEST BLOOD GLUCOSE TWICE DAILY. DX CODE: E11.51 12/21/18   Callum Roberts MD   levothyroxine (SYNTHROID) 50 MCG tablet Take 1 tablet (50 mcg total) by mouth once daily. 12/31/18 12/31/19  Callum Roberts MD   lisinopril 10 MG tablet TAKE 1 TABLET (10 MG TOTAL) BY MOUTH ONCE DAILY. 12/11/18 12/11/19  Linda Greco MD   lubiprostone (AMITIZA) 24 MCG Cap Take 1 capsule (24 mcg total) by mouth daily with breakfast. 1/15/18   Montana Mondragon MD   methylPREDNISolone (MEDROL DOSEPACK) 4 mg tablet use as directed 7/18/19   Callum Roberts MD   pantoprazole (PROTONIX) 40 MG tablet ONE TABLET ONE TIME A DAY FOR STOMACH PROBLEMS 1/10/19   Montana Mondragon MD   pioglitazone (ACTOS) 15 MG tablet ONE TABLET ONE TIME A DAY FOR DIABETES 12/12/18   Callum Roberts MD   pravastatin (PRAVACHOL) 20 MG tablet TAKE 1 TABLET BY MOUTH DAILY FOR CHOLESTROL 5/3/19   Callum Roberts MD   tamsulosin (FLOMAX) 0.4 mg Cap TAKE 1 CAPSULE (0.4 MG TOTAL) BY MOUTH ONCE DAILY. 1/22/19 1/22/20  Callum Roberts MD   warfarin (COUMADIN) 1 MG tablet TAKE 2 TO 3 TABLETS BY MOUTH DAILY AS DIRECTED 12/31/18   Callum Roberts MD

## 2019-07-18 NOTE — TELEPHONE ENCOUNTER
----- Message from Jinny Montes sent at 2019  7:42 AM CDT -----  Contact: self   Eddie Parada Sr.  MRN: [id number]  : 1929  PCP: Callum Roberts  Home Phone      [phone]  Work Phone      Not on file.  Mobile          [phone]    MESSAGE:   Pt requests same day appointment with Dr. Roberts due to redness to greater toe left foot. He stated he is a diabetic patient and is concerned about his symptoms. Pt declined appointment with another provider.  Pt stated he would also like to speak to the nurse regarding medication prescribed per another physician.     Phone # [phone]    [facility] ([place], [place]) - [facility] [address] [facility] (formerly [facility])

## 2019-07-19 ENCOUNTER — TELEPHONE (OUTPATIENT)
Dept: INTERNAL MEDICINE | Facility: CLINIC | Age: 84
End: 2019-07-19

## 2019-07-19 NOTE — TELEPHONE ENCOUNTER
Advised patient that uric level is elevated. He is not scheduled to see Dr. Armando green 7/31, yet he is concerned about his lab results. Appt R/S to Monday, 7/22 @ 1:00 pm. I reassured the patient that he should not worry about this over the weekend; OK to wait until Monday.

## 2019-07-19 NOTE — TELEPHONE ENCOUNTER
----- Message from Shira Pittman sent at 2019  1:17 PM CDT -----  Contact: Self  Eddie Parada Sr.  MRN: [id number]  : 1929  PCP: Callum Roberts  Home Phone      [phone]  Work Phone      Not on file.  Mobile          [phone]    MESSAGE:     Calling to see if results were in yet on the labs that were taken yesterday at time of his visit concerning problems with his toe.  He is concerned about this and wanted to make sure everything is okay.  Please call to advise.    Phone: [phone]

## 2019-07-22 ENCOUNTER — OFFICE VISIT (OUTPATIENT)
Dept: INTERNAL MEDICINE | Facility: CLINIC | Age: 84
End: 2019-07-22
Payer: MEDICARE

## 2019-07-22 VITALS
BODY MASS INDEX: 30.82 KG/M2 | HEART RATE: 56 BPM | DIASTOLIC BLOOD PRESSURE: 80 MMHG | SYSTOLIC BLOOD PRESSURE: 128 MMHG | RESPIRATION RATE: 20 BRPM | WEIGHT: 191.81 LBS | HEIGHT: 66 IN

## 2019-07-22 DIAGNOSIS — E11.49 TYPE 2 DIABETES MELLITUS WITH OTHER NEUROLOGIC COMPLICATION, WITH LONG-TERM CURRENT USE OF INSULIN: ICD-10-CM

## 2019-07-22 DIAGNOSIS — I10 ESSENTIAL HYPERTENSION: Primary | ICD-10-CM

## 2019-07-22 DIAGNOSIS — I50.30 (HFPEF) HEART FAILURE WITH PRESERVED EJECTION FRACTION: ICD-10-CM

## 2019-07-22 DIAGNOSIS — K21.9 GASTROESOPHAGEAL REFLUX DISEASE, ESOPHAGITIS PRESENCE NOT SPECIFIED: ICD-10-CM

## 2019-07-22 DIAGNOSIS — Z79.4 TYPE 2 DIABETES MELLITUS WITH OTHER NEUROLOGIC COMPLICATION, WITH LONG-TERM CURRENT USE OF INSULIN: ICD-10-CM

## 2019-07-22 DIAGNOSIS — E78.2 MIXED DYSLIPIDEMIA: ICD-10-CM

## 2019-07-22 PROCEDURE — 99214 PR OFFICE/OUTPT VISIT, EST, LEVL IV, 30-39 MIN: ICD-10-PCS | Mod: S$GLB,,, | Performed by: INTERNAL MEDICINE

## 2019-07-22 PROCEDURE — 99999 PR PBB SHADOW E&M-EST. PATIENT-LVL III: CPT | Mod: PBBFAC,,, | Performed by: INTERNAL MEDICINE

## 2019-07-22 PROCEDURE — 99214 OFFICE O/P EST MOD 30 MIN: CPT | Mod: S$GLB,,, | Performed by: INTERNAL MEDICINE

## 2019-07-22 PROCEDURE — 1101F PR PT FALLS ASSESS DOC 0-1 FALLS W/OUT INJ PAST YR: ICD-10-PCS | Mod: CPTII,S$GLB,, | Performed by: INTERNAL MEDICINE

## 2019-07-22 PROCEDURE — 99999 PR PBB SHADOW E&M-EST. PATIENT-LVL III: ICD-10-PCS | Mod: PBBFAC,,, | Performed by: INTERNAL MEDICINE

## 2019-07-22 PROCEDURE — 1101F PT FALLS ASSESS-DOCD LE1/YR: CPT | Mod: CPTII,S$GLB,, | Performed by: INTERNAL MEDICINE

## 2019-07-22 RX ORDER — PANTOPRAZOLE SODIUM 40 MG/1
TABLET, DELAYED RELEASE ORAL
Qty: 30 TABLET | Refills: 5 | Status: SHIPPED | OUTPATIENT
Start: 2019-07-22 | End: 2020-01-28

## 2019-07-22 NOTE — PROGRESS NOTES
Subjective:       Patient ID: Eddie Parada Sr. is a 89 y.o. male.    Chief Complaint: Follow-up; discolored toe; Hypertension; Hyperlipidemia; and Diabetes    Eddie Parada Sr. is a  89 y.o. male who presents for Type II DM,  CHF, Hypertension, and Hyperlipidemia follow up. Labs were reviewed with patient today.    Hypertension   This is a chronic problem. The current episode started more than 1 year ago. The problem is controlled. Associated symptoms include shortness of breath. Pertinent negatives include no orthopnea. Risk factors for coronary artery disease include sedentary lifestyle, male gender, obesity, dyslipidemia and diabetes mellitus. Past treatments include beta blockers, calcium channel blockers and diuretics. The current treatment provides moderate improvement. Hypertensive end-organ damage includes kidney disease and CAD/MI. Identifiable causes of hypertension include a thyroid problem.   Hyperlipidemia   This is a chronic problem. The current episode started more than 1 year ago. The problem is controlled. Recent lipid tests were reviewed and are low. Associated symptoms include shortness of breath. Current antihyperlipidemic treatment includes statins. The current treatment provides significant improvement of lipids.   Diabetes   He presents for his follow-up diabetic visit. He has type 2 diabetes mellitus. His disease course has been stable. Pertinent negatives for hypoglycemia include no dizziness, nervousness/anxiousness, pallor, seizures or speech difficulty. Associated symptoms include foot paresthesias. Pertinent negatives for hypoglycemia complications include no blackouts. Symptoms are improving. Pertinent negatives for diabetic complications include no autonomic neuropathy. Current diabetic treatment includes oral agent (dual therapy). His breakfast blood glucose range is generally 110-130 mg/dl. An ACE inhibitor/angiotensin II receptor blocker is being taken.   Thyroid Problem    Patient reports no anxiety.     Review of Systems   HENT: Negative for postnasal drip.    Eyes: Negative for photophobia.   Respiratory: Positive for shortness of breath. Negative for chest tightness.         SOB about the same    Cardiovascular: Negative for orthopnea.   Gastrointestinal: Negative for abdominal distention and blood in stool.   Genitourinary: Negative for dysuria, flank pain and hematuria.   Musculoskeletal: Negative for back pain.        Foot neuropathic pain    Skin: Positive for color change. Negative for pallor.   Neurological: Negative for dizziness, seizures, facial asymmetry and speech difficulty.   Hematological: Does not bruise/bleed easily.   Psychiatric/Behavioral: Negative for agitation and suicidal ideas. The patient is not nervous/anxious.        Objective:      Physical Exam   Constitutional: He is oriented to person, place, and time. No distress.   HENT:   Head: Normocephalic and atraumatic.   Eyes: Pupils are equal, round, and reactive to light.   Neck: Normal range of motion. Neck supple.   Cardiovascular: Normal rate. An irregularly irregular rhythm present.   Pulmonary/Chest: Effort normal and breath sounds normal.   Abdominal: Soft. Bowel sounds are normal. He exhibits no distension. There is no tenderness.   Musculoskeletal: He exhibits edema.        Left knee: He exhibits no swelling, no ecchymosis, no erythema and no bony tenderness. No tenderness found.        Left lower leg: He exhibits tenderness, swelling and edema.        Legs:       Feet:    Big toe nail oncho mycotic.  Hyperemia of ist toe till MTP joint   Non tender .      Neurological: He is alert and oriented to person, place, and time.   Skin: Skin is warm and dry. No erythema.       Assessment:       1. Essential hypertension    2. Mixed dyslipidemia    3. Type 2 diabetes mellitus with other neurologic complication, with long-term current use of insulin    4. (HFpEF) heart failure with preserved ejection  fraction    5. Gastroesophageal reflux disease, esophagitis presence not specified        Plan:   Eddie was seen today for follow-up, discolored toe, hypertension, hyperlipidemia and diabetes.    Diagnoses and all orders for this visit:    Essential hypertension  -     CBC auto differential; Future  -     Comprehensive metabolic panel; Future    Well controlled.  Continue same medication and dose.  1. Keep weight close to ideal body weight.   2.   Avoid high salt foods (olives, pickles, smoked meats, salted potato chips, etc.).   Do not add salt to your food at the table.   Use only small amounts of salt when cooking.   3. Begin an exercise program. Discuss with your doctor what type of exercise program would be best for you. It doesn't have to be difficult. Even brisk walking for 20 minutes three times a week is a good form of exercise.   4. Avoid medicines which contain heart stimulants. This includes many cold and sinus decongestant pills and sprays as well as diet pills. Check the warnings about hypertension on the label. Stimulants such as amphetamine or cocaine could be lethal for someone with hypertension. Never take these.    Mixed dyslipidemia  -     Lipid panel; Future  -     TSH; Future  Well controlled.  Continue same medication and dose.  Type 2 diabetes mellitus with other neurologic complication, with long-term current use of insulin  -     Hemoglobin A1c; Future  -     Microalbumin/creatinine urine ratio; Future  Patient has controlled Diabetes .  We discussed about diet ;low in calories. Avoid sweats, sodas.  Also increasing activity;walking 2-3 miles a day.  Continue  medications and gave patient  instructions about adherence to plan.  Goal of  A1c  less than 7 % stressed.  Also goal of LDL less than 70 highlighted to patient.  (HFpEF) heart failure with preserved ejection fraction  -     Brain natriuretic peptide; Future  We discussed about leg edema  And  weight gain issues with CHF.  I educated  "patient to call  If he  Gains 5 lbs and more ,or legs start swelling or worsening shortness of breath , or inability to lie down.  If breathing is worse go to emergency Room      Gastroesophageal reflux disease, esophagitis presence not specified  -     pantoprazole (PROTONIX) 40 MG tablet; ONE TABLET ONE TIME A DAY FOR STOMACH PROBLEMS    Tips to Control Acid Reflux  To control acid reflux, youll need to make some basic diet and lifestyle changes. The simple steps outlined below may be all youll need to relieve discomfort.  · Avoid fatty foods and spicy foods.  · Eat fewer acidic foods, such as citrus and tomato-based foods. These can increase symptoms.  · Limit drinking alcohol, caffeine, and fizzy beverages. All increase acid reflux.  · Try limiting chocolate, peppermint, and spearmint. These can worsen acid reflux in some people.  Watch When You Eat  · Avoid lying down for 3 hours after eating.  · Do not snack before going to bed.  Raise Your Head    Raising your head and upper body by 4" to 6" helps limit reflux when youre lying down. Put blocks under the head of the bed frame to raise it.    Problem List Items Addressed This Visit     HTN (hypertension) - Primary    Relevant Orders    CBC auto differential    Comprehensive metabolic panel    Type 2 diabetes mellitus with neurologic complication    Relevant Orders    Hemoglobin A1c    Microalbumin/creatinine urine ratio    Mixed dyslipidemia    Relevant Orders    Lipid panel    TSH    (HFpEF) heart failure with preserved ejection fraction    Relevant Orders    Brain natriuretic peptide      Other Visit Diagnoses     Gastroesophageal reflux disease, esophagitis presence not specified        Relevant Medications    pantoprazole (PROTONIX) 40 MG tablet        "

## 2019-07-29 DIAGNOSIS — E11.9 TYPE 2 DIABETES MELLITUS, CONTROLLED: ICD-10-CM

## 2019-07-29 RX ORDER — GLIMEPIRIDE 2 MG/1
TABLET ORAL
Qty: 30 TABLET | Refills: 5 | Status: ON HOLD | OUTPATIENT
Start: 2019-07-29 | End: 2019-09-21 | Stop reason: HOSPADM

## 2019-07-30 ENCOUNTER — TELEPHONE (OUTPATIENT)
Dept: INTERNAL MEDICINE | Facility: CLINIC | Age: 84
End: 2019-07-30

## 2019-07-30 NOTE — TELEPHONE ENCOUNTER
Please see message from patient and advise. I noticed that his uric acid level was elevated, but didn't see any meds prescribed for gout. Please advise. Thanks.

## 2019-07-30 NOTE — TELEPHONE ENCOUNTER
----- Message from Shira Pittman sent at 2019  9:20 AM CDT -----  Contact: Self  Eddie Parada Sr.  MRN: [id number]  : 1929  PCP: Callum Roberts  Home Phone      [phone]  Work Phone      Not on file.  Mobile          [phone]    MESSAGE:   Big toe on left foot is no better and he has finished medications that were given on his last visit. Please call to advise.    Phone: [phone]

## 2019-07-31 ENCOUNTER — OFFICE VISIT (OUTPATIENT)
Dept: INTERNAL MEDICINE | Facility: CLINIC | Age: 84
End: 2019-07-31
Payer: MEDICARE

## 2019-07-31 VITALS
DIASTOLIC BLOOD PRESSURE: 70 MMHG | RESPIRATION RATE: 16 BRPM | HEIGHT: 66 IN | SYSTOLIC BLOOD PRESSURE: 118 MMHG | BODY MASS INDEX: 30.47 KG/M2 | OXYGEN SATURATION: 96 % | HEART RATE: 60 BPM | WEIGHT: 189.63 LBS

## 2019-07-31 DIAGNOSIS — M1A.3720 CHRONIC GOUT OF LEFT FOOT DUE TO RENAL IMPAIRMENT WITHOUT TOPHUS: Primary | ICD-10-CM

## 2019-07-31 PROCEDURE — 99213 OFFICE O/P EST LOW 20 MIN: CPT | Mod: 25,S$GLB,, | Performed by: INTERNAL MEDICINE

## 2019-07-31 PROCEDURE — 96372 PR INJECTION,THERAP/PROPH/DIAG2ST, IM OR SUBCUT: ICD-10-PCS | Mod: S$GLB,,, | Performed by: INTERNAL MEDICINE

## 2019-07-31 PROCEDURE — 1101F PR PT FALLS ASSESS DOC 0-1 FALLS W/OUT INJ PAST YR: ICD-10-PCS | Mod: CPTII,S$GLB,, | Performed by: INTERNAL MEDICINE

## 2019-07-31 PROCEDURE — 96372 THER/PROPH/DIAG INJ SC/IM: CPT | Mod: S$GLB,,, | Performed by: INTERNAL MEDICINE

## 2019-07-31 PROCEDURE — 99213 PR OFFICE/OUTPT VISIT, EST, LEVL III, 20-29 MIN: ICD-10-PCS | Mod: 25,S$GLB,, | Performed by: INTERNAL MEDICINE

## 2019-07-31 PROCEDURE — 99999 PR PBB SHADOW E&M-EST. PATIENT-LVL III: ICD-10-PCS | Mod: PBBFAC,,, | Performed by: INTERNAL MEDICINE

## 2019-07-31 PROCEDURE — 99999 PR PBB SHADOW E&M-EST. PATIENT-LVL III: CPT | Mod: PBBFAC,,, | Performed by: INTERNAL MEDICINE

## 2019-07-31 PROCEDURE — 1101F PT FALLS ASSESS-DOCD LE1/YR: CPT | Mod: CPTII,S$GLB,, | Performed by: INTERNAL MEDICINE

## 2019-07-31 RX ORDER — METHYLPREDNISOLONE ACETATE 80 MG/ML
80 INJECTION, SUSPENSION INTRA-ARTICULAR; INTRALESIONAL; INTRAMUSCULAR; SOFT TISSUE ONCE
Status: COMPLETED | OUTPATIENT
Start: 2019-07-31 | End: 2019-07-31

## 2019-07-31 RX ORDER — METHYLPREDNISOLONE 4 MG/1
TABLET ORAL
Qty: 1 PACKAGE | Refills: 0 | Status: SHIPPED | OUTPATIENT
Start: 2019-07-31 | End: 2019-09-17

## 2019-07-31 RX ADMIN — METHYLPREDNISOLONE ACETATE 80 MG: 80 INJECTION, SUSPENSION INTRA-ARTICULAR; INTRALESIONAL; INTRAMUSCULAR; SOFT TISSUE at 08:07

## 2019-07-31 NOTE — PROGRESS NOTES
Subjective:       Patient ID: Eddie Parada Sr. is a 89 y.o. male.    Chief Complaint: Follow-up (left big toe.)    Eddie Parada Sr. is a 89 y.o. male  Here with follow up for left big toe swelling .  His uric acid is high .   I treated him with medrol and po antibiotics  Still some swelling remians.    Review of Systems   HENT: Negative for postnasal drip.    Eyes: Negative for photophobia.   Respiratory: Positive for shortness of breath. Negative for chest tightness.         SOB about the same    Gastrointestinal: Negative for abdominal distention and blood in stool.   Genitourinary: Negative for dysuria, flank pain and hematuria.   Musculoskeletal: Negative for back pain.        Foot neuropathic pain    Skin: Positive for color change and rash. Negative for pallor and wound.   Neurological: Negative for dizziness, seizures, facial asymmetry and speech difficulty.   Hematological: Does not bruise/bleed easily.   Psychiatric/Behavioral: Negative for agitation and suicidal ideas. The patient is not nervous/anxious.        Objective:      Physical Exam   Constitutional: He is oriented to person, place, and time. No distress.   HENT:   Head: Normocephalic and atraumatic.   Eyes: Pupils are equal, round, and reactive to light.   Neck: Normal range of motion. Neck supple.   Cardiovascular: Normal rate. An irregularly irregular rhythm present.   Pulmonary/Chest: Effort normal and breath sounds normal.   Abdominal: Soft. Bowel sounds are normal. He exhibits no distension. There is no tenderness.   Musculoskeletal: He exhibits edema.        Left knee: He exhibits no swelling, no ecchymosis, no erythema and no bony tenderness. No tenderness found.        Left lower leg: He exhibits tenderness, swelling and edema.        Legs:       Feet:    Big toe nail oncho mycotic.  Hyperemia of ist toe till MTP joint   Non tender .      Neurological: He is alert and oriented to person, place, and time.   Skin: Skin is warm and  dry. No erythema.       Assessment:       1. 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    Problem List Items Addressed This Visit     None

## 2019-08-12 DIAGNOSIS — I10 ESSENTIAL HYPERTENSION: ICD-10-CM

## 2019-08-12 RX ORDER — AMLODIPINE BESYLATE 10 MG/1
TABLET ORAL
Qty: 30 TABLET | Refills: 6 | Status: SHIPPED | OUTPATIENT
Start: 2019-08-12 | End: 2020-03-10

## 2019-09-17 ENCOUNTER — OFFICE VISIT (OUTPATIENT)
Dept: INTERNAL MEDICINE | Facility: CLINIC | Age: 84
End: 2019-09-17
Payer: MEDICARE

## 2019-09-17 VITALS
WEIGHT: 181.69 LBS | BODY MASS INDEX: 29.2 KG/M2 | OXYGEN SATURATION: 95 % | HEIGHT: 66 IN | DIASTOLIC BLOOD PRESSURE: 62 MMHG | RESPIRATION RATE: 18 BRPM | SYSTOLIC BLOOD PRESSURE: 110 MMHG | HEART RATE: 60 BPM

## 2019-09-17 DIAGNOSIS — Z79.4 TYPE 2 DIABETES MELLITUS WITH OTHER NEUROLOGIC COMPLICATION, WITH LONG-TERM CURRENT USE OF INSULIN: ICD-10-CM

## 2019-09-17 DIAGNOSIS — E11.49 TYPE 2 DIABETES MELLITUS WITH OTHER NEUROLOGIC COMPLICATION, WITH LONG-TERM CURRENT USE OF INSULIN: ICD-10-CM

## 2019-09-17 DIAGNOSIS — T14.8XXA FRICTION BLISTER: ICD-10-CM

## 2019-09-17 DIAGNOSIS — E86.0 DEHYDRATION: ICD-10-CM

## 2019-09-17 DIAGNOSIS — I10 ESSENTIAL HYPERTENSION: Primary | ICD-10-CM
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
## 2019-09-17 DIAGNOSIS — R39.89 ABNORMAL URINE COLOR: ICD-10-CM

## 2019-09-17 DIAGNOSIS — I50.30 (HFPEF) HEART FAILURE WITH PRESERVED EJECTION FRACTION: ICD-10-CM

## 2019-09-17 DIAGNOSIS — L89.622 PRESSURE INJURY OF LEFT HEEL, STAGE 2: ICD-10-CM

## 2019-09-17 LAB
BILIRUB SERPL-MCNC: ABNORMAL MG/DL
BLOOD URINE, POC: 250
COLOR, POC UA: ABNORMAL
GLUCOSE UR QL STRIP: ABNORMAL
KETONES UR QL STRIP: ABNORMAL
LEUKOCYTE ESTERASE URINE, POC: ABNORMAL
NITRITE, POC UA: ABNORMAL
PH, POC UA: 5
PROTEIN, POC: 1
SPECIFIC GRAVITY, POC UA: 1.02
UROBILINOGEN, POC UA: 12

## 2019-09-17 PROCEDURE — 99999 PR PBB SHADOW E&M-EST. PATIENT-LVL V: CPT | Mod: PBBFAC,,, | Performed by: NURSE PRACTITIONER

## 2019-09-17 PROCEDURE — 99214 OFFICE O/P EST MOD 30 MIN: CPT | Mod: 25,S$GLB,, | Performed by: NURSE PRACTITIONER

## 2019-09-17 PROCEDURE — 81002 POCT URINE DIPSTICK WITHOUT MICROSCOPE: ICD-10-PCS | Mod: S$GLB,,, | Performed by: NURSE PRACTITIONER

## 2019-09-17 PROCEDURE — 99999 PR PBB SHADOW E&M-EST. PATIENT-LVL V: ICD-10-PCS | Mod: PBBFAC,,, | Performed by: NURSE PRACTITIONER

## 2019-09-17 PROCEDURE — 99214 PR OFFICE/OUTPT VISIT, EST, LEVL IV, 30-39 MIN: ICD-10-PCS | Mod: 25,S$GLB,, | Performed by: NURSE PRACTITIONER

## 2019-09-17 PROCEDURE — 1101F PT FALLS ASSESS-DOCD LE1/YR: CPT | Mod: CPTII,S$GLB,, | Performed by: NURSE PRACTITIONER

## 2019-09-17 PROCEDURE — 1101F PR PT FALLS ASSESS DOC 0-1 FALLS W/OUT INJ PAST YR: ICD-10-PCS | Mod: CPTII,S$GLB,, | Performed by: NURSE PRACTITIONER

## 2019-09-17 PROCEDURE — 81002 URINALYSIS NONAUTO W/O SCOPE: CPT | Mod: S$GLB,,, | Performed by: NURSE PRACTITIONER

## 2019-09-17 RX ORDER — GEMFIBROZIL 600 MG/1
TABLET, FILM COATED ORAL
Status: ON HOLD | COMMUNITY
End: 2022-04-30 | Stop reason: CLARIF

## 2019-09-17 RX ORDER — MUPIROCIN 20 MG/G
OINTMENT TOPICAL 2 TIMES DAILY
Qty: 30 G | Refills: 0 | Status: SHIPPED | OUTPATIENT
Start: 2019-09-17 | End: 2020-07-20

## 2019-09-17 NOTE — PROGRESS NOTES
[facility] Note    Chief Complaint      Chief Complaint   Patient presents with    blister on heel     History of Present Illness      Eddie Parada Sr. is a 89 y.o. male who presents today with left heel blister.  Patient comes to appointment for ER follow up; noting he fell last Monday and was seen at [facility] ER; told he was dehydrated; family notes urine was brown;      Problem List Items Addressed This Visit     None          Health Maintenance   Topic Date Due    Pneumococcal Vaccine (65+ High/Highest Risk) (2 of 2 - PPSV23) 01/29/2018    Eye Exam  05/27/2018    Foot Exam  01/17/2020    Hemoglobin A1c  01/18/2020    Lipid Panel  07/18/2020    TETANUS VACCINE  09/16/2028       Past Medical History:   Diagnosis Date    Abdominal fibromatosis     Acute posthemorrhagic anemia 1/9/2018    NIK (acute kidney injury) 1/11/2018    Atrial fibrillation     Bradycardia     Broken arm     CAD (coronary artery disease)     Cancer     basal cell on nose and melanoma on back    CHF (congestive heart failure)     COPD (chronic obstructive pulmonary disease)     Diabetes mellitus type II     Hyperlipidemia     Melanoma     Obesity (BMI 30.0-34.9) 9/13/2016    Osteoporosis     Peripheral vascular disease     Stroke     TIA (transient ischemic attack)     Type II diabetes mellitus with peripheral circulatory disorder     Type II or unspecified type diabetes mellitus without mention of complication, not stated as uncontrolled     Unspecified essential hypertension        Past Surgical History:   Procedure Laterality Date    AORTIC VALVE REPLACEMENT      CARDIAC PACEMAKER PLACEMENT  9/28/2012    CARDIAC VALVE REPLACEMENT  11/17/2011    aortic valve-tissue    CHOLECYSTECTOMY      COLONOSCOPY      EYE SURGERY Bilateral     cataract [facility] lens [facility]    HERNIA REPAIR      abdominal    LASIK         family history includes Alcohol abuse in his  father; COPD in his brother and sister; Cancer in his brother; Diabetes in his daughter; Heart disease in his brother; Hypertension in his father; No Known Problems in his daughter, son, and son; Stroke in his father.    Social History     Tobacco Use    Smoking status: Former Smoker     Last attempt to quit: 1996     Years since quittin.0    Smokeless tobacco: Never Used    Tobacco comment: cigar smoker   Substance Use Topics    Alcohol use: No     Comment: none since     Drug use: No       HPI    Review of Systems     Outpatient Encounter Medications as of 2019   Medication Sig Dispense Refill    amLODIPine (NORVASC) 10 MG tablet TAKE 1 TABLET BY MOUTH DAILY FOR BLOOD PRESSURE 30 tablet 6    blood sugar diagnostic (BLOOD GLUCOSE TEST) [facility] [employer]TOUCH ULTRA TESTING STRIPS. USE TO TEST BLOOD GLUCOSE TWICE DAILY. DX CODE: E11.51 200 strip 3    blood-glucose meter Misc ONETOUCH ULTRA GLUCOSE METER. USE AS DIRECTED TO TEST BLOOD GLUCOSE. DX CODE: E11.51 1 each 0    carvedilol (COREG) 12.5 MG tablet TAKE 1 TABLET (12.5 MG TOTAL) BY MOUTH 2 (TWO) TIMES DAILY WITH MEALS. 60 tablet 5    furosemide (LASIX) 40 MG tablet ONE TABLET 3 TIMES A WEEK AS NEEDED FOR SWELLING 30 tablet 6    gemfibrozil (LOPID) 600 MG tablet gemfibrozil 600 mg tablet      glimepiride (AMARYL) 2 MG tablet TAKE 1 TABLET BY MOUTH EVERY MORNING WITH BREAKFAST FOR DIABETES 30 tablet 5    lancets (ONETOUCH ULTRASOFT LANCETS) Misc USE TO TEST BLOOD GLUCOSE TWICE DAILY. DX CODE: E11.51 200 each 3    levothyroxine (SYNTHROID) 50 MCG tablet Take 1 tablet (50 mcg total) by mouth once daily. 90 tablet 1    lisinopril 10 MG tablet TAKE 1 TABLET (10 MG TOTAL) BY MOUTH ONCE DAILY. 90 tablet 3    lubiprostone (AMITIZA) 24 MCG Cap Take 1 capsule (24 mcg total) by mouth daily with breakfast. 30 capsule 5    pantoprazole (PROTONIX) 40 MG tablet ONE TABLET ONE TIME A DAY FOR STOMACH PROBLEMS 30 tablet 5    pioglitazone (ACTOS) 15 MG  "tablet ONE TABLET ONE TIME A DAY FOR DIABETES 30 tablet 10    pravastatin (PRAVACHOL) 20 MG tablet TAKE 1 TABLET BY MOUTH DAILY FOR CHOLESTROL 30 tablet 5    rivaroxaban (XARELTO) 20 mg Tab Xarelto 20 mg tablet      tamsulosin (FLOMAX) 0.4 mg Cap TAKE 1 CAPSULE (0.4 MG TOTAL) BY MOUTH ONCE DAILY. 30 capsule 11    [DISCONTINUED] FOLIC ACID/MULTIVIT-MIN/LUTEIN (CENTRUM SILVER ORAL) Take 1 tablet by mouth once daily.      [DISCONTINUED] methylPREDNISolone (MEDROL DOSEPACK) 4 mg tablet use as directed 1 Package 0    [DISCONTINUED] warfarin (COUMADIN) 1 MG tablet TAKE 2 TO 3 TABLETS BY MOUTH DAILY AS DIRECTED 180 tablet 1     No facility-administered encounter medications on file as of 9/17/2019.         Review of patient's allergies indicates:   Allergen Reactions    Ciprofloxacin     Levofloxacin Swelling    Lyrica [pregabalin] Swelling    Unable to assess Other (See Comments)     Soft shell crabs       Physical Exam      Vital Signs  Pulse: 60  Resp: 18  SpO2: 95 %  BP: 110/62  Pain Score: 0-No pain  Height and Weight  Height: 5' 6" (167.6 cm)  Weight: 82.4 kg (181 lb 10.5 oz)  BSA (Calculated - sq m): 1.96 sq meters  BMI (Calculated): 29.4  Weight in (lb) to have BMI = 25: 154.6]    @Physical Exam            Laboratory:  CBC:  Recent Labs   Lab Result Units 07/18/19  0859   WBC K/uL 7.19   RBC M/uL 4.22*   Hemoglobin g/dL 13.6*   Hematocrit % 40.1   Platelets K/uL 162   Mean Corpuscular Volume fL 95   Mean Corpuscular Hemoglobin pg 32.2*   Mean Corpuscular Hemoglobin Conc g/dL 33.9     CMP:  Recent Labs   Lab Result Units 07/18/19  0859   Glucose mg/dL 134*   Calcium mg/dL 9.7   Albumin g/dL 3.7   Total Protein g/dL 7.4   Sodium mmol/L 141   Potassium mmol/L 4.3   CO2 mmol/L 26   Chloride mmol/L 105   BUN, Bld mg/dL 24*   Alkaline Phosphatase U/L 142*   ALT U/L 12   AST U/L 24   Total Bilirubin mg/dL 0.7     URINALYSIS:  No results for input(s): COLORU, CLARITYU, SPECGRAV, PHUR, PROTEINUA, GLUCOSEU, " BILIRUBINCON, BLOODU, WBCU, RBCU, BACTERIA, MUCUS, NITRITE, LEUKOCYTESUR, UROBILINOGEN, HYALINECASTS in the last 2160 hours.   LIPIDS:  Recent Labs   Lab Result Units 07/18/19  0859   TSH uIU/mL 3.394   HDL mg/dL 33*   Cholesterol mg/dL 138   Triglycerides mg/dL 107   LDL Cholesterol mg/dL 83.6   Hdl/Cholesterol Ratio % 23.9   Non-HDL Cholesterol mg/dL 105   Total Cholesterol/HDL Ratio  4.2     TSH:  Recent Labs   Lab Result Units 07/18/19  0859   TSH uIU/mL 3.394     A1C:  Recent Labs   Lab Result Units 07/18/19  0859   Hemoglobin A1C % 6.6*       Radiology:      Assessment/Plan     Eddie Parada Sr. is a 89 y.o.male with:    There are no diagnoses linked to this encounter.    -Continue current medications and maintain follow up with specialists.  Return to clinic in 7-10 days        Sandy Resendez MD  [facility] [place]

## 2019-09-17 NOTE — PATIENT INSTRUCTIONS
Treating Pressure Ulcers: Cleaning and Dressing  Its important that pressure ulcers be kept clean, moist, and covered. This helps reduce the risk of infection and speeds up the healing process. To promote healing, clean pressure ulcers at each dressing change. Take care to choose the most appropriate type of cleanser and dressing.    Caution  · Do not use heat lamps or drying agents, such as alcohol. They dry out wounds and can kill fragile new tissue.  · Do not use antiseptic agents, such as povidone-iodine and hydrogen peroxide, because they are toxic to new cells.  · Be aware that if dressings become dry, they may fuse with new cells, causing loss of new tissue when dressings are removed. Remove or change dressings before they dry out.  · Silver-based dressings are very effective for killing bacteria, and they are safe for newly developing tissues.   Wound irrigation  An irrigating catheter or syringe and saline may be used to flush the ulcer free of debris. Wound cleansers may also be used to loosen up and clean out debris. The amount of pressure used during irrigation should be enough to clean the wound without damaging it. Follow your facilitys guidelines regarding irrigation.  Adding moisture  Maintaining a clean, moist wound bed is essential for promoting healing. Certain dressings help keep ulcers moist. Be sure to fill spaces loosely with dressings to prevent fluid and bacteria from building up. Hydrogels can also help retain moisture.  Types of dressings  Many kinds of dressings are available. Be sure to follow the s instructions for the specific dressing used. If a wound doesnt respond to one type of dressing, consider changing the treatment plan.  · Moist gauze helps keep the wound moist and absorbs excess fluid. Gauze should be damp--not wet--with saline. Too-wet gauze can weaken surrounding tissue.  · Transparent films are thin and flexible and help protect wounds from water and  bacteria.   · Hydrocolloids absorb exudate, forming a nonadhesive gel. This helps maintain a moist wound environment. Hydrocolloids also protect the wound from water and bacteria.  · Hydrogels are water-based gels and dressing sheets that keep wounds moist. They are also soothing and can help ease pain.  · Alginates are highly absorptive dressings made from seaweed. When combined with wound exudate the dressing may form a gel that helps maintain a moist wound bed.  · Foams absorb exudate and keep the wound moist. They are used to cover or fill wounds.  · Collagens absorb exudate and help maintain a moist wound environment. They may also promote new tissue growth.  · Antimicrobials help prevent and treat infection. These dressings come in many forms.  Negative pressure wound therapy  Negative pressure wound therapy (NPWT)--also called vacuum-assisted closure--removes exudate, helps reduce bacterial growth, and promotes blood flow and granulation formation. First, a foam dressing is placed in the wound and the wound is covered with an occlusive dressing. Then tubing is attached to a pump, which creates subatmospheric pressure in the wound. Keep in mind that patients may require analgesia as dressing changes can be painful.  Date Last Reviewed: 1/24/2016  © 7460-4449 [employer] [address] [place] [address], [place] [address]. All rights reserved. This information is not intended as a substitute for professional medical care. Always follow your healthcare professional's instructions.        HOLD Lasix x 3 days- then resume - half dose if urine still dark

## 2019-09-20 ENCOUNTER — TELEPHONE (OUTPATIENT)
Dept: INTERNAL MEDICINE | Facility: CLINIC | Age: 84
End: 2019-09-20

## 2019-09-20 PROBLEM — T14.8XXA BRUISING: Status: ACTIVE | Noted: 2019-09-20

## 2019-09-20 NOTE — TELEPHONE ENCOUNTER
Refaxed order to fax number given . Jessica states wound care says it may be awhile until he can be seen since insurance has to approve it first and advised going to ER. Jessica states she will bring him to ER.

## 2019-09-20 NOTE — TELEPHONE ENCOUNTER
----- Message from Shira Pittman sent at 2019  8:15 AM CDT -----  Contact: Jessica/Wife  Eddie Parada Sr.  MRN: [id number]  : 1929  PCP: Callum Roberts  Home Phone      [phone]  Work Phone      Not on file.  Mobile          [phone]    MESSAGE LEFT ON VOICEMAIL:    MESSAGE:   Calling to speak to nurse about getting the number to the wound clinic.  States that someone was supposed to contact them to set up appointment but they have not heard anything yet. Would like to call herself to set up. Please call to advise.    Phone: [phone]

## 2019-09-20 NOTE — TELEPHONE ENCOUNTER
I phoned wound clinic to verify that they received the referral faxed on 9/17/19. LM for someone to return call.

## 2019-09-20 NOTE — TELEPHONE ENCOUNTER
----- Message from Shira Pittman sent at 2019 10:36 AM CDT -----  Contact: Jessica/Wife  Eddie Parada Sr.  MRN: [id number]  : 1929  PCP: Callum Roberts  Home Phone      [phone]  Work Phone      Not on file.  Mobile          [phone]    MESSAGE:   She spoke to someone at [facility]ound care and they need a signed doctors order and clinicals on patient sent to them along with referral faxed to [phone] Attn: Rox.    Phone: [phone]

## 2019-09-30 ENCOUNTER — OFFICE VISIT (OUTPATIENT)
Dept: WOUND CARE | Facility: HOSPITAL | Age: 84
End: 2019-09-30
Attending: SURGERY
Payer: MEDICARE

## 2019-09-30 VITALS
TEMPERATURE: 98 F | RESPIRATION RATE: 20 BRPM | DIASTOLIC BLOOD PRESSURE: 65 MMHG | HEART RATE: 60 BPM | SYSTOLIC BLOOD PRESSURE: 142 MMHG

## 2019-09-30 DIAGNOSIS — E11.49 CONTROLLED TYPE 2 DIABETES MELLITUS WITH OTHER NEUROLOGIC COMPLICATION, WITHOUT LONG-TERM CURRENT USE OF INSULIN: Primary | ICD-10-CM

## 2019-09-30 DIAGNOSIS — L89.620 PRESSURE ULCER OF LEFT HEEL, UNSTAGEABLE: ICD-10-CM

## 2019-09-30 PROCEDURE — 97598 DBRDMT OPN WND ADDL 20CM/<: CPT

## 2019-09-30 PROCEDURE — 99214 OFFICE O/P EST MOD 30 MIN: CPT

## 2019-09-30 PROCEDURE — 99499 UNLISTED E&M SERVICE: CPT | Mod: ,,, | Performed by: SURGERY

## 2019-09-30 PROCEDURE — 97597 DBRDMT OPN WND 1ST 20 CM/<: CPT

## 2019-09-30 PROCEDURE — 99499 NO LOS: ICD-10-PCS | Mod: ,,, | Performed by: SURGERY

## 2019-09-30 NOTE — H&P
[facility]  Wound Care  History and Physical    Problem List Items Addressed This Visit        Derm    Pressure ulcer of left medial heel, unstageable    Overview     Patient complains of a 3 to four-week history of a left heel wound. Patient thinks it occurred when he struck his left heel on a [place] knee 3-4 weeks prior to his presentation to wound clinic on 09/30/2019.  Following that episode.  The patient did fall and developed a fracture of his left arm in an unrelated event.  He has not had any extended periods laying in bed. He complains of pain at the left heel with walking.  He has not been able to have his normal activity.  He has primarily been staying off of his foot.  Normally he is very active.  He was seen in the emergency room at [facility].  He denies any fever or chills.         Current Assessment & Plan     Pressure ulcer of left heel that is currently unstageable.  Offloading with heel wedge healing shoe.  Patient was given instruction to obtain the offloading shoe.  Initiate Santyl.  Sharp debridement when tissue softens.            Endocrine    Type 2 diabetes mellitus, controlled - Primary            History:    Past Medical History:   Diagnosis Date    Abdominal fibromatosis     Acute posthemorrhagic anemia 1/9/2018    NIK (acute kidney injury) 1/11/2018    Atrial fibrillation     Bradycardia     Broken arm     CAD (coronary artery disease)     Cancer     basal cell on nose and melanoma on back    CHF (congestive heart failure)     COPD (chronic obstructive pulmonary disease)     Diabetes mellitus type II     Hyperlipidemia     Melanoma     Obesity (BMI 30.0-34.9) 9/13/2016    Osteoporosis     Peripheral vascular disease     Stroke     TIA (transient ischemic attack)     Type II diabetes mellitus with peripheral circulatory disorder     Type II or unspecified type diabetes mellitus without mention of complication, not stated as  uncontrolled     Unspecified essential hypertension        Past Surgical History:   Procedure Laterality Date    AORTIC VALVE REPLACEMENT      CARDIAC PACEMAKER PLACEMENT  9/28/2012    CARDIAC VALVE REPLACEMENT  11/17/2011    aortic valve-tissue    CHOLECYSTECTOMY      COLONOSCOPY      EYE SURGERY Bilateral     catar[facility]    HERNIA REPAIR      abdominal    LASIK         Family History   Problem Relation Age of Onset    Hypertension Father     Stroke Father     Alcohol abuse Father     Heart disease Brother     COPD Sister     Cancer Brother         Lung    Diabetes Daughter     No Known Problems Son     COPD Brother     No Known Problems Daughter     No Known Problems Son     Prostate cancer Neg Hx     Kidney disease Neg Hx         reports that he quit smoking about 23 years ago. He has never used smokeless tobacco. He reports that he does not drink alcohol or use drugs.    has a current medication list which includes the following prescription(s): amlodipine, blood sugar diagnostic, blood-glucose meter, carvedilol, furosemide, gemfibrozil, hydrocolloid dressing, lancets, levothyroxine, lisinopril, lubiprostone, mupirocin, pantoprazole, pioglitazone, pravastatin, rivaroxaban, and tamsulosin.    Allergies:  Ciprofloxacin; Levofloxacin; Lyrica [pregabalin]; and Unable to assess    Review of Systems:  Review of Systems   Constitutional: Negative for chills, diaphoresis, fever, malaise/fatigue and weight loss.   HENT: Negative for ear pain, nosebleeds and tinnitus.    Eyes: Negative for blurred vision and photophobia.   Respiratory: Negative for cough, hemoptysis and stridor.    Cardiovascular: Negative for chest pain, palpitations, orthopnea and leg swelling.   Gastrointestinal: Negative for heartburn, nausea and vomiting.   Genitourinary: Negative for dysuria and hematuria.   Musculoskeletal: Negative for joint pain and neck pain.   Skin: Negative for itching and  rash.   Neurological: Negative for speech change, focal weakness and seizures.   Endo/Heme/Allergies: Negative for polydipsia. Bruises/bleeds easily.   Psychiatric/Behavioral: Negative for depression and suicidal ideas.         There were no vitals filed for this visit.      BMI:  There is no height or weight on file to calculate BMI.    Physical Exam:  Physical Exam   Constitutional: He is oriented to person, place, and time. He appears well-developed and well-nourished. No distress.   Appears younger than stated age   HENT:   Head: Normocephalic and atraumatic.   Eyes: Pupils are equal, round, and reactive to light. Conjunctivae and EOM are normal.   Neck: Normal range of motion. Neck supple. No JVD present. No tracheal deviation present. No thyromegaly present.   Cardiovascular: Normal rate and normal heart sounds.   Median sternotomy scar   Pulmonary/Chest: Effort normal and breath sounds normal. No stridor. No respiratory distress.   Abdominal: Soft. He exhibits no distension. There is no tenderness.   Musculoskeletal: He exhibits deformity (Left arm in sling). He exhibits no edema.   See wound progress note for detailed wound description.     Lymphadenopathy:     He has no cervical adenopathy.   Neurological: He is alert and oriented to person, place, and time.   Skin: Skin is warm and dry.       A1C:  Recent Labs   Lab Result Units 07/18/19  0859 09/21/19  0544   Hemoglobin A1C % 6.6* 7.1*     BMP:  Recent Labs   Lab Result Units 09/21/19  0637   Glucose mg/dL 143*   Sodium mmol/L 136   Potassium mmol/L 4.0   Chloride mmol/L 104   CO2 mmol/L 28   BUN, Bld mg/dL 15   Creatinine mg/dL 0.90   Calcium mg/dL 8.9      CBC:  Recent Labs   Lab Result Units 07/18/19  0859 09/20/19  1513 09/21/19  0544   WBC K/uL 7.19 10.90 8.40   RBC M/uL 4.22* 3.85* 3.63*   Hemoglobin g/dL 13.6* 12.9* 12.0*   Hematocrit % 40.1 37.7* 36.9*   Platelets K/uL 162 281 248   Mean Corpuscular Volume fL 95 98 102*   Mean Corpuscular  Hemoglobin pg 32.2* 33.5* 33.1*   Mean Corpuscular Hemoglobin Conc g/dL 33.9 34.2 32.6     CMP:  Recent Labs   Lab Result Units 07/18/19  0859 09/20/19  1513 09/21/19  0637   Glucose mg/dL 134* 226* 143*   Calcium mg/dL 9.7 9.1 8.9   Albumin g/dL 3.7 3.9 3.4*   Total Protein g/dL 7.4 7.4 6.5   Sodium mmol/L 141 137 136   Potassium mmol/L 4.3 4.1 4.0   CO2 mmol/L 26 22* 28   Chloride mmol/L 105 104 104   BUN, Bld mg/dL 24* 19 15   Alkaline Phosphatase U/L 142* 253* 215*   ALT U/L 12 28 26   AST U/L 24 36 39   Total Bilirubin mg/dL 0.7 1.4* 1.1     PREALBUMIN:  No results for input(s): PREALBUMIN in the last 2160 hours.  WOUND CULTURES:  No results for input(s): LABAERO in the last 2160 hours.        Plan:  See Wound Docs note for plan and follow up.        Terrance SCHAFER Hebert [facility]

## 2019-09-30 NOTE — PROGRESS NOTES
[facility]  Wound Care  Progress Note    Problem List Items Addressed This Visit        Derm    Pressure ulcer of left medial heel, unstageable    Overview     Patient complains of a 3 to four-week history of a left heel wound. Patient thinks it occurred when he struck his left heel on a [place] knee 3-4 weeks prior to his presentation to wound clinic on 09/30/2019.  Following that episode.  The patient did fall and developed a fracture of his left arm in an unrelated event.  He has not had any extended periods laying in bed. He complains of pain at the left heel with walking.  He has not been able to have his normal activity.  He has primarily been staying off of his foot.  Normally he is very active.  He was seen in the emergency room at [facility].  He denies any fever or chills.         Current Assessment & Plan     Pressure ulcer of left heel that is currently unstageable.  Offloading with heel wedge healing shoe.  Patient was given instruction to obtain the offloading shoe.  Initiate Santyl.  Sharp debridement when tissue softens.            Endocrine    Type 2 diabetes mellitus, controlled - Primary          See wound doc progress notes. Documents will be scanned.        Terrance Sosa  [facility]

## 2019-09-30 NOTE — ASSESSMENT & PLAN NOTE
Pressure ulcer of left heel that is currently unstageable.  Offloading with heel wedge healing shoe.  Patient was given instruction to obtain the offloading shoe.  Initiate Santyl.  Sharp debridement when tissue softens.

## 2019-10-07 ENCOUNTER — OFFICE VISIT (OUTPATIENT)
Dept: WOUND CARE | Facility: HOSPITAL | Age: 84
End: 2019-10-07
Attending: SURGERY
Payer: MEDICARE

## 2019-10-07 VITALS
RESPIRATION RATE: 18 BRPM | DIASTOLIC BLOOD PRESSURE: 72 MMHG | HEART RATE: 64 BPM | TEMPERATURE: 98 F | SYSTOLIC BLOOD PRESSURE: 140 MMHG

## 2019-10-07 DIAGNOSIS — L89.620 PRESSURE ULCER OF LEFT HEEL, UNSTAGEABLE: ICD-10-CM

## 2019-10-07 PROCEDURE — 99499 RISK ADDL DX/OHS AUDIT: ICD-10-PCS | Mod: ,,, | Performed by: INTERNAL MEDICINE

## 2019-10-07 PROCEDURE — 99499 UNLISTED E&M SERVICE: CPT | Mod: ,,, | Performed by: SURGERY

## 2019-10-07 PROCEDURE — 11042 DBRDMT SUBQ TIS 1ST 20SQCM/<: CPT

## 2019-10-07 PROCEDURE — 99499 UNLISTED E&M SERVICE: CPT | Mod: ,,, | Performed by: INTERNAL MEDICINE

## 2019-10-07 PROCEDURE — 99499 NO LOS: ICD-10-PCS | Mod: ,,, | Performed by: SURGERY

## 2019-10-07 PROCEDURE — 27201912 HC WOUND CARE DEBRIDEMENT SUPPLIES

## 2019-10-07 NOTE — PROGRESS NOTES
[facility]  Wound Care  Progress Note    Problem List Items Addressed This Visit     Pressure ulcer of left medial heel, unstageable    Overview     Patient complains of a 3 to four-week history of a left heel wound. Patient thinks it occurred when he struck his left heel on a [place] knee 3-4 weeks prior to his presentation to wound clinic on 09/30/2019.  Following that episode.  The patient did fall and developed a fracture of his left arm in an unrelated event.  He has not had any extended periods laying in bed. He complains of pain at the left heel with walking.  He has not been able to have his normal activity.  He has primarily been staying off of his foot.  Normally he is very active.  He was seen in the emergency room at [facility].  He denies any fever or chills.  Patient was prescribed Santyl.  Patient unable to obtain Santyl.  Patient is applying Betadine.  The area around the eschar is clean and granulating.  There remains the eschar at the center of the heel ulcer.  There is no sign of infection.  The eschar has been cross-lawson.  Patient given Santyl today.         Current Assessment & Plan     Continue offloading and Santyl.  Debridement is needed  Pre albumin planned for today.               See wound doc progress notes. Documents will be scanned.        Aneesh Fraga  [facility]

## 2019-10-14 ENCOUNTER — OFFICE VISIT (OUTPATIENT)
Dept: WOUND CARE | Facility: HOSPITAL | Age: 84
End: 2019-10-14
Attending: SURGERY
Payer: MEDICARE

## 2019-10-14 VITALS — SYSTOLIC BLOOD PRESSURE: 152 MMHG | DIASTOLIC BLOOD PRESSURE: 70 MMHG | HEART RATE: 63 BPM

## 2019-10-14 DIAGNOSIS — L89.623 PRESSURE ULCER OF LEFT HEEL, STAGE 3: ICD-10-CM

## 2019-10-14 PROCEDURE — 11042 DBRDMT SUBQ TIS 1ST 20SQCM/<: CPT

## 2019-10-14 PROCEDURE — 99499 NO LOS: ICD-10-PCS | Mod: ,,, | Performed by: SURGERY

## 2019-10-14 PROCEDURE — 27201912 HC WOUND CARE DEBRIDEMENT SUPPLIES

## 2019-10-14 PROCEDURE — 99499 UNLISTED E&M SERVICE: CPT | Mod: ,,, | Performed by: SURGERY

## 2019-10-14 NOTE — ASSESSMENT & PLAN NOTE
Level of tissue necrosis now identifiable to the subcutaneous tissue. Necrotic dermis and subcutaneous tissue was removed with a scalpel.  Continue Santyl and offloading.

## 2019-10-14 NOTE — PROGRESS NOTES
[facility]  Wound Care  Progress Note    Problem List Items Addressed This Visit        Derm    Pressure ulcer of left heel, stage 3    Overview     Patient complains of a 3 to four-week history of a left heel wound. Patient thinks it occurred when he struck his left heel on a [place] knee 3-4 weeks prior to his presentation to wound clinic on 09/30/2019.  Following that episode.  The patient did fall and developed a fracture of his left arm in an unrelated event.  He has not had any extended periods laying in bed. He complains of pain at the left heel with walking.  He has not been able to have his normal activity.  He has primarily been staying off of his foot.  Normally he is very active.  He was seen in the emergency room at [facility].  He denies any fever or chills.  Patient was prescribed Santyl.  Patient initially was unable to obtain Santyl. Patient given Santyl for use on 09/07/2019.  Patient denies any pain.         Current Assessment & Plan     Level of tissue necrosis now identifiable to the subcutaneous tissue. Necrotic dermis and subcutaneous tissue was removed with a scalpel.  Continue Santyl and offloading.               See wound doc progress notes. Documents will be scanned.        Terrance Sosa  [facility]

## 2019-10-21 ENCOUNTER — TELEPHONE (OUTPATIENT)
Dept: INTERNAL MEDICINE | Facility: CLINIC | Age: 84
End: 2019-10-21

## 2019-10-21 ENCOUNTER — OFFICE VISIT (OUTPATIENT)
Dept: WOUND CARE | Facility: HOSPITAL | Age: 84
End: 2019-10-21
Attending: SURGERY
Payer: MEDICARE

## 2019-10-21 VITALS
RESPIRATION RATE: 20 BRPM | DIASTOLIC BLOOD PRESSURE: 80 MMHG | TEMPERATURE: 98 F | HEART RATE: 60 BPM | SYSTOLIC BLOOD PRESSURE: 137 MMHG

## 2019-10-21 DIAGNOSIS — L89.623 PRESSURE ULCER OF LEFT HEEL, STAGE 3: ICD-10-CM

## 2019-10-21 DIAGNOSIS — I10 ESSENTIAL HYPERTENSION: ICD-10-CM

## 2019-10-21 PROCEDURE — 99499 UNLISTED E&M SERVICE: CPT | Mod: ,,, | Performed by: SURGERY

## 2019-10-21 PROCEDURE — 11042 DBRDMT SUBQ TIS 1ST 20SQCM/<: CPT

## 2019-10-21 PROCEDURE — 99499 NO LOS: ICD-10-PCS | Mod: ,,, | Performed by: SURGERY

## 2019-10-21 RX ORDER — CARVEDILOL 12.5 MG/1
12.5 TABLET ORAL 2 TIMES DAILY WITH MEALS
Qty: 60 TABLET | Refills: 5 | Status: SHIPPED | OUTPATIENT
Start: 2019-10-21 | End: 2020-04-27

## 2019-10-21 NOTE — ASSESSMENT & PLAN NOTE
Wound remains with significant nonviable tissue. There is some granulation tissue at the wound edges.  There is no sign of infection.  Continue sharp debridement is needed.  Continue Santyl for enzymatic debridement.  Continue offloading.

## 2019-10-21 NOTE — TELEPHONE ENCOUNTER
----- Message from Jinny Montes sent at 10/21/2019  1:01 PM CDT -----  Contact: Jessica (wife)  Eddie Parada Sr.  MRN: [id number]  : 1929  PCP: Callum Roberts  Home Phone      [phone]  Work Phone      Not on file.  Mobile          [phone]    MESSAGE:   Request date of last influenza vaccine.     Phone # [phone]    Pharmacy - [facility] - [place], [place] - [address] [facility]]

## 2019-10-22 ENCOUNTER — IMMUNIZATION (OUTPATIENT)
Dept: INTERNAL MEDICINE | Facility: CLINIC | Age: 84
End: 2019-10-22
Payer: MEDICARE

## 2019-10-22 PROCEDURE — 90662 FLU VACCINE - HIGH DOSE (65+) PRESERVATIVE FREE IM: ICD-10-PCS | Mod: S$GLB,,, | Performed by: INTERNAL MEDICINE

## 2019-10-22 PROCEDURE — G0008 FLU VACCINE - HIGH DOSE (65+) PRESERVATIVE FREE IM: ICD-10-PCS | Mod: S$GLB,,, | Performed by: INTERNAL MEDICINE

## 2019-10-22 PROCEDURE — 90662 IIV NO PRSV INCREASED AG IM: CPT | Mod: S$GLB,,, | Performed by: INTERNAL MEDICINE

## 2019-10-22 PROCEDURE — G0008 ADMIN INFLUENZA VIRUS VAC: HCPCS | Mod: S$GLB,,, | Performed by: INTERNAL MEDICINE

## 2019-10-28 ENCOUNTER — OFFICE VISIT (OUTPATIENT)
Dept: WOUND CARE | Facility: HOSPITAL | Age: 84
End: 2019-10-28
Attending: SURGERY
Payer: MEDICARE

## 2019-10-28 VITALS
HEART RATE: 74 BPM | DIASTOLIC BLOOD PRESSURE: 92 MMHG | SYSTOLIC BLOOD PRESSURE: 128 MMHG | TEMPERATURE: 97 F | RESPIRATION RATE: 18 BRPM

## 2019-10-28 DIAGNOSIS — L89.623 PRESSURE ULCER OF LEFT HEEL, STAGE 3: ICD-10-CM

## 2019-10-28 PROCEDURE — 11042 DBRDMT SUBQ TIS 1ST 20SQCM/<: CPT

## 2019-10-28 PROCEDURE — 99499 UNLISTED E&M SERVICE: CPT | Mod: ,,, | Performed by: SURGERY

## 2019-10-28 PROCEDURE — 27201912 HC WOUND CARE DEBRIDEMENT SUPPLIES

## 2019-10-28 PROCEDURE — 99499 NO LOS: ICD-10-PCS | Mod: ,,, | Performed by: SURGERY

## 2019-10-28 NOTE — PROGRESS NOTES
[facility]  Wound Care  Progress Note    Problem List Items Addressed This Visit        Derm    Pressure ulcer of left heel, stage 3    Overview     Patient complains of a 3 to four-week history of a left heel wound. Patient thinks it occurred when he struck his left heel on a [place] knee 3-4 weeks prior to his presentation to wound clinic on 09/30/2019.  Following that episode.  The patient did fall and developed a fracture of his left arm in an unrelated event.  He has not had any extended periods laying in bed. He complains of pain at the left heel with walking.  He has not been able to have his normal activity.  He has primarily been staying off of his foot.  Normally he is very active.  He was seen in the emergency room at [facility].  He denies any fever or chills.  Patient was prescribed Santyl.  Patient initially was unable to obtain Santyl. Patient given Santyl for use on 09/07/2019.  Patient denies any pain.         Current Assessment & Plan     Wound is improving.  Continue debridement to remove nonviable tissue.  Continue Santyl.  Limit walking to only necessary steps for essential activities of daily living.  Patient is utilizing heel offloading shoe.               See wound doc progress notes. Documents will be scanned.        Terrance Sosa  [facility]

## 2019-10-28 NOTE — ASSESSMENT & PLAN NOTE
Wound is improving.  Continue debridement to remove nonviable tissue.  Continue Santyl.  Limit walking to only necessary steps for essential activities of daily living.  Patient is utilizing heel offloading shoe.

## 2019-10-30 ENCOUNTER — TELEPHONE (OUTPATIENT)
Dept: INTERNAL MEDICINE | Facility: CLINIC | Age: 84
End: 2019-10-30

## 2019-10-30 NOTE — TELEPHONE ENCOUNTER
Jessica states that the patient had 2 near syncopal episodes during the night and is c/o extreme weakness today. She denies hypoglycemia or hypotension. No fever or other c/o voiced. I advised ER since symptoms are not resolving. She will take him to LOS.

## 2019-10-30 NOTE — TELEPHONE ENCOUNTER
----- Message from Shira Pittman sent at 10/30/2019  1:10 PM CDT -----  Contact: Jessica/Wife  Eddie Parada Sr.  MRN: [id number]  : 1929  PCP: Callum Roberts  Home Phone      [phone]  Work Phone      Not on file.  Mobile          [phone]    MESSAGE:     Having problems with weakness since the middle of the night last night.  Stated that he's not dizzy, but feels weak when he stands. Please call to advise.      Phone: [phone]

## 2019-11-04 ENCOUNTER — OFFICE VISIT (OUTPATIENT)
Dept: WOUND CARE | Facility: HOSPITAL | Age: 84
End: 2019-11-04
Attending: SURGERY
Payer: MEDICARE

## 2019-11-04 VITALS
DIASTOLIC BLOOD PRESSURE: 70 MMHG | RESPIRATION RATE: 20 BRPM | TEMPERATURE: 97 F | SYSTOLIC BLOOD PRESSURE: 155 MMHG | HEART RATE: 88 BPM

## 2019-11-04 DIAGNOSIS — L89.623 PRESSURE ULCER OF LEFT HEEL, STAGE 3: ICD-10-CM

## 2019-11-04 PROCEDURE — 99499 NO LOS: ICD-10-PCS | Mod: ,,, | Performed by: SURGERY

## 2019-11-04 PROCEDURE — 27201912 HC WOUND CARE DEBRIDEMENT SUPPLIES

## 2019-11-04 PROCEDURE — 11042 DBRDMT SUBQ TIS 1ST 20SQCM/<: CPT

## 2019-11-04 PROCEDURE — 99499 UNLISTED E&M SERVICE: CPT | Mod: ,,, | Performed by: SURGERY

## 2019-11-04 NOTE — PROGRESS NOTES
[facility]  Wound Care  Progress Note    Problem List Items Addressed This Visit     Pressure ulcer of left heel, stage 3    Overview     Patient complains of a 3 to four-week history of a left heel wound. Patient thinks it occurred when he struck his left heel on a [place] knee 3-4 weeks prior to his presentation to wound clinic on 09/30/2019.  Following that episode.  The patient did fall and developed a fracture of his left arm in an unrelated event.  He has not had any extended periods laying in bed. He complains of pain at the left heel with walking.  He has not been able to have his normal activity.  He has primarily been staying off of his foot.  Normally he is very active.  He was seen in the emergency room at [facility].  He denies any fever or chills.  Patient was prescribed Santyl.  Patient initially was unable to obtain Santyl. Patient given Santyl for use on 09/07/2019.  Patient denies any pain.         Current Assessment & Plan     Wounds with significantly less necrotic tissue.  There are islands of granulation tissue.  There is no sign of soft tissue infection.  Patient cannot afford a refill on Santyl.  We will change to Mesalt.  Continue sharp debridement is needed.  Continue offloading.               See wound doc progress notes. Documents will be scanned.        Aneesh Fraga  [facility]

## 2019-11-04 NOTE — ASSESSMENT & PLAN NOTE
Wounds with significantly less necrotic tissue.  There are islands of granulation tissue.  There is no sign of soft tissue infection.  Patient cannot afford a refill on Santyl.  We will change to Mesalt.  Continue sharp debridement is needed.  Continue offloading.

## 2019-11-11 ENCOUNTER — OFFICE VISIT (OUTPATIENT)
Dept: WOUND CARE | Facility: HOSPITAL | Age: 84
End: 2019-11-11
Attending: SURGERY
Payer: MEDICARE

## 2019-11-11 VITALS — SYSTOLIC BLOOD PRESSURE: 126 MMHG | DIASTOLIC BLOOD PRESSURE: 66 MMHG | RESPIRATION RATE: 20 BRPM | HEART RATE: 84 BPM

## 2019-11-11 DIAGNOSIS — L89.623 PRESSURE ULCER OF LEFT HEEL, STAGE 3: Primary | ICD-10-CM

## 2019-11-11 DIAGNOSIS — E78.2 MIXED DYSLIPIDEMIA: ICD-10-CM

## 2019-11-11 PROCEDURE — 99499 RISK ADDL DX/OHS AUDIT: ICD-10-PCS | Mod: ,,, | Performed by: SURGERY

## 2019-11-11 PROCEDURE — 11042 DBRDMT SUBQ TIS 1ST 20SQCM/<: CPT

## 2019-11-11 PROCEDURE — 99499 UNLISTED E&M SERVICE: CPT | Mod: ,,, | Performed by: SURGERY

## 2019-11-11 PROCEDURE — 27201912 HC WOUND CARE DEBRIDEMENT SUPPLIES

## 2019-11-11 RX ORDER — PRAVASTATIN SODIUM 20 MG/1
TABLET ORAL
Qty: 30 TABLET | Refills: 5 | Status: SHIPPED | OUTPATIENT
Start: 2019-11-11 | End: 2020-05-11

## 2019-11-11 RX ORDER — PIOGLITAZONEHYDROCHLORIDE 15 MG/1
TABLET ORAL
Qty: 30 TABLET | Refills: 10 | Status: SHIPPED | OUTPATIENT
Start: 2019-11-11 | End: 2020-09-25

## 2019-11-11 NOTE — ASSESSMENT & PLAN NOTE
Wound improving.  Additional nonviable tissue was excised.  Continue offloading with heel wedge offloading shoe.

## 2019-11-11 NOTE — PROGRESS NOTES
[facility]  Wound Care  Progress Note    Problem List Items Addressed This Visit        Derm    Pressure ulcer of left heel, stage 3 - Primary    Overview     Patient complains of a 3 to four-week history of a left heel wound. Patient thinks it occurred when he struck his left heel on a [place] knee 3-4 weeks prior to his presentation to wound clinic on 09/30/2019.  Following that episode.  The patient did fall and developed a fracture of his left arm in an unrelated event.  He has not had any extended periods laying in bed. He complains of pain at the left heel with walking.  He has not been able to have his normal activity.  He has primarily been staying off of his foot.  Normally he is very active.  He was seen in the emergency room at [facility].  He denies any fever or chills.  Patient was prescribed Santyl.  Patient was unable to obtain Santyl due to cost.  Patient denies any pain.         Current Assessment & Plan     Wound improving.  Additional nonviable tissue was excised.  Continue offloading with heel wedge offloading shoe.               See wound doc progress notes. Documents will be scanned.        Terrance Sosa  [facility]

## 2019-11-18 ENCOUNTER — OFFICE VISIT (OUTPATIENT)
Dept: WOUND CARE | Facility: HOSPITAL | Age: 84
End: 2019-11-18
Attending: SURGERY
Payer: MEDICARE

## 2019-11-18 VITALS
HEART RATE: 62 BPM | RESPIRATION RATE: 18 BRPM | TEMPERATURE: 98 F | SYSTOLIC BLOOD PRESSURE: 167 MMHG | DIASTOLIC BLOOD PRESSURE: 88 MMHG

## 2019-11-18 DIAGNOSIS — L89.623 PRESSURE ULCER OF LEFT HEEL, STAGE 3: ICD-10-CM

## 2019-11-18 PROCEDURE — 99499 NO LOS: ICD-10-PCS | Mod: ,,, | Performed by: SURGERY

## 2019-11-18 PROCEDURE — 99499 UNLISTED E&M SERVICE: CPT | Mod: ,,, | Performed by: SURGERY

## 2019-11-18 PROCEDURE — 17250 CHEM CAUT OF GRANLTJ TISSUE: CPT

## 2019-11-18 NOTE — PROGRESS NOTES
[facility]  Wound Care  Progress Note    Problem List Items Addressed This Visit     Pressure ulcer of left heel, stage 3    Overview     Patient complains of a 3 to four-week history of a left heel wound. Patient thinks it occurred when he struck his left heel on a [place] knee 3-4 weeks prior to his presentation to wound clinic on 09/30/2019.  Following that episode.  The patient did fall and developed a fracture of his left arm in an unrelated event.  He has not had any extended periods laying in bed. He complains of pain at the left heel with walking.  He has not been able to have his normal activity.  He has primarily been staying off of his foot.  Normally he is very active.  He was seen in the emergency room at [facility].  He denies any fever or chills.  Patient was prescribed Santyl.  Patient was unable to obtain Santyl due to cost.  Patient denies any pain.         Current Assessment & Plan     Wound is hypergranulating.  There is decreased amount of slough present.  There is no sign of infection.  Continue debridement is needed.  Continue offloading               See wound doc progress notes. Documents will be scanned.        Aneesh Fraga  [facility]

## 2019-11-18 NOTE — PROGRESS NOTES
[facility]  Wound Care  Progress Note    Problem List Items Addressed This Visit     Pressure ulcer of left heel, stage 3    Overview     Patient complains of a 3 to four-week history of a left heel wound. Patient thinks it occurred when he struck his left heel on a [place] knee 3-4 weeks prior to his presentation to wound clinic on 09/30/2019.  Following that episode.  The patient did fall and developed a fracture of his left arm in an unrelated event.  He has not had any extended periods laying in bed. He complains of pain at the left heel with walking.  He has not been able to have his normal activity.  He has primarily been staying off of his foot.  Normally he is very active.  He was seen in the emergency room at [facility].  He denies any fever or chills.  Patient was prescribed Santyl.  Patient was unable to obtain Santyl due to cost.  Patient denies any pain.         Current Assessment & Plan     Wound remains with significant nonviable tissue. There is some granulation tissue at the wound edges.  There is no sign of infection.  Continue sharp debridement is needed.  Continue Santyl for enzymatic debridement.  Continue offloading.               See wound doc progress notes. Documents will be scanned.        Aneesh Fraga  [facility]

## 2019-11-18 NOTE — ASSESSMENT & PLAN NOTE
Wound is hypergranulating.  There is decreased amount of slough present.  There is no sign of infection.  Continue debridement is needed.  Continue offloading

## 2019-11-25 ENCOUNTER — OFFICE VISIT (OUTPATIENT)
Dept: WOUND CARE | Facility: HOSPITAL | Age: 84
End: 2019-11-25
Attending: SURGERY
Payer: MEDICARE

## 2019-11-25 VITALS
RESPIRATION RATE: 20 BRPM | SYSTOLIC BLOOD PRESSURE: 122 MMHG | TEMPERATURE: 98 F | HEART RATE: 60 BPM | DIASTOLIC BLOOD PRESSURE: 78 MMHG

## 2019-11-25 DIAGNOSIS — L89.623 PRESSURE ULCER OF LEFT HEEL, STAGE 3: Primary | ICD-10-CM

## 2019-11-25 PROCEDURE — 99499 NO LOS: ICD-10-PCS | Mod: ,,, | Performed by: SURGERY

## 2019-11-25 PROCEDURE — 11042 DBRDMT SUBQ TIS 1ST 20SQCM/<: CPT

## 2019-11-25 PROCEDURE — 27201912 HC WOUND CARE DEBRIDEMENT SUPPLIES

## 2019-11-25 PROCEDURE — 99499 UNLISTED E&M SERVICE: CPT | Mod: ,,, | Performed by: SURGERY

## 2019-11-25 NOTE — PROGRESS NOTES
[facility]  Wound Care  Progress Note    Problem List Items Addressed This Visit        Derm    Pressure ulcer of left heel, stage 3 - Primary    Overview     Patient complains of a 3 to four-week history of a left heel wound. Patient thinks it occurred when he struck his left heel on a [place] knee 3-4 weeks prior to his presentation to wound clinic on 09/30/2019.  Following that episode.  The patient did fall and developed a fracture of his left arm in an unrelated event.  He has not had any extended periods laying in bed. He complains of pain at the left heel with walking.  He has not been able to have his normal activity.  He has primarily been staying off of his foot.  Normally he is very active.  He was seen in the emergency room at [facility].  He denies any fever or chills.  Patient was prescribed Santyl.  Patient was unable to obtain Santyl due to cost.  Mesalt was utilized.  Patient denies any pain.  As of 11/25/2019, promogran was initiated.         Current Assessment & Plan     Wound is doing extremely well.  Continue debridement to remove nonviable tissue and maintain active phase wound healing.  Initiate promogran.  Offload.               See wound doc progress notes. Documents will be scanned.        Terrance Sosa  [facility]

## 2019-11-25 NOTE — ASSESSMENT & PLAN NOTE
Wound is doing extremely well.  Continue debridement to remove nonviable tissue and maintain active phase wound healing.  Initiate promogran.  Offload.

## 2019-12-02 ENCOUNTER — OFFICE VISIT (OUTPATIENT)
Dept: WOUND CARE | Facility: HOSPITAL | Age: 84
End: 2019-12-02
Attending: SURGERY
Payer: MEDICARE

## 2019-12-02 VITALS
HEART RATE: 60 BPM | TEMPERATURE: 97 F | SYSTOLIC BLOOD PRESSURE: 121 MMHG | DIASTOLIC BLOOD PRESSURE: 71 MMHG | RESPIRATION RATE: 20 BRPM

## 2019-12-02 DIAGNOSIS — L89.623 PRESSURE ULCER OF LEFT HEEL, STAGE 3: ICD-10-CM

## 2019-12-02 PROCEDURE — 99499 NO LOS: ICD-10-PCS | Mod: ,,, | Performed by: SURGERY

## 2019-12-02 PROCEDURE — 27201912 HC WOUND CARE DEBRIDEMENT SUPPLIES

## 2019-12-02 PROCEDURE — 99499 UNLISTED E&M SERVICE: CPT | Mod: ,,, | Performed by: SURGERY

## 2019-12-02 PROCEDURE — 11042 DBRDMT SUBQ TIS 1ST 20SQCM/<: CPT

## 2019-12-02 NOTE — PROGRESS NOTES
[facility]  Wound Care  Progress Note    Problem List Items Addressed This Visit     Pressure ulcer of left heel, stage 3    Overview     Patient complains of a 3 to four-week history of a left heel wound. Patient thinks it occurred when he struck his left heel on a [place] knee 3-4 weeks prior to his presentation to wound clinic on 09/30/2019.  Following that episode.  The patient did fall and developed a fracture of his left arm in an unrelated event.  He has not had any extended periods laying in bed. He complains of pain at the left heel with walking.  He has not been able to have his normal activity.  He has primarily been staying off of his foot.  Normally he is very active.  He was seen in the emergency room at [facility].  He denies any fever or chills.  Patient was prescribed Santyl.  Patient was unable to obtain Santyl due to cost.  Mesalt was utilized.  Patient denies any pain.  As of 11/25/2019, promogran was initiated.         Current Assessment & Plan     Wound remains clean and granulating.  There is no sign of soft tissue infection.  Patient was switched to collagen but unable to obtain.  He is still using Mesalt.  Awaiting delivery of collagen dressing               See wound doc progress notes. Documents will be scanned.        Aneesh Fraga  [facility]

## 2019-12-09 ENCOUNTER — OFFICE VISIT (OUTPATIENT)
Dept: WOUND CARE | Facility: HOSPITAL | Age: 84
End: 2019-12-09
Attending: SURGERY
Payer: MEDICARE

## 2019-12-09 VITALS — HEART RATE: 80 BPM | RESPIRATION RATE: 20 BRPM | DIASTOLIC BLOOD PRESSURE: 67 MMHG | SYSTOLIC BLOOD PRESSURE: 131 MMHG

## 2019-12-09 DIAGNOSIS — L89.623 PRESSURE ULCER OF LEFT HEEL, STAGE 3: Primary | ICD-10-CM

## 2019-12-09 PROCEDURE — 11042 DBRDMT SUBQ TIS 1ST 20SQCM/<: CPT

## 2019-12-09 PROCEDURE — 99499 NO LOS: ICD-10-PCS | Mod: ,,, | Performed by: SURGERY

## 2019-12-09 PROCEDURE — 27201912 HC WOUND CARE DEBRIDEMENT SUPPLIES

## 2019-12-09 PROCEDURE — 99499 UNLISTED E&M SERVICE: CPT | Mod: ,,, | Performed by: SURGERY

## 2019-12-09 RX ORDER — LEVOTHYROXINE SODIUM 50 UG/1
TABLET ORAL
Qty: 30 TABLET | Refills: 10 | Status: SHIPPED | OUTPATIENT
Start: 2019-12-09 | End: 2020-01-20

## 2019-12-09 NOTE — ASSESSMENT & PLAN NOTE
Wound is improving significantly.  Continue debridement to maintain active phase wound healing.  Continue offloading.

## 2019-12-09 NOTE — PROGRESS NOTES
[facility]  Wound Care  Progress Note    Problem List Items Addressed This Visit        Derm    Pressure ulcer of left heel, stage 3 - Primary    Overview     Patient complains of a 3 to four-week history of a left heel wound. Patient thinks it occurred when he struck his left heel on a [place] knee 3-4 weeks prior to his presentation to wound clinic on 09/30/2019.  Following that episode.  The patient did fall and developed a fracture of his left arm in an unrelated event.  He has not had any extended periods laying in bed. He complains of pain at the left heel with walking.  He has not been able to have his normal activity.  He has primarily been staying off of his foot.  Normally he is very active.  He was seen in the emergency room at [facility].  He denies any fever or chills.  Patient was prescribed Santyl.  Patient was unable to obtain Santyl due to cost.  Mesalt was utilized.  Patient denies any pain.  As of 11/25/2019, promogran was initiated.         Current Assessment & Plan     Wound is improving significantly.  Continue debridement to maintain active phase wound healing.  Continue offloading.               See wound doc progress notes. Documents will be scanned.        Terrance Sosa  [facility]

## 2019-12-10 RX ORDER — LISINOPRIL 10 MG/1
10 TABLET ORAL DAILY
Qty: 90 TABLET | Refills: 3 | Status: SHIPPED | OUTPATIENT
Start: 2019-12-10 | End: 2021-03-02

## 2019-12-23 ENCOUNTER — OFFICE VISIT (OUTPATIENT)
Dept: WOUND CARE | Facility: HOSPITAL | Age: 84
End: 2019-12-23
Attending: SURGERY
Payer: MEDICARE

## 2019-12-23 VITALS
RESPIRATION RATE: 18 BRPM | SYSTOLIC BLOOD PRESSURE: 116 MMHG | HEART RATE: 60 BPM | DIASTOLIC BLOOD PRESSURE: 66 MMHG | TEMPERATURE: 98 F

## 2019-12-23 DIAGNOSIS — L89.623 PRESSURE ULCER OF LEFT HEEL, STAGE 3: Primary | ICD-10-CM

## 2019-12-23 PROCEDURE — 27201912 HC WOUND CARE DEBRIDEMENT SUPPLIES

## 2019-12-23 PROCEDURE — 99499 NO LOS: ICD-10-PCS | Mod: ,,, | Performed by: SURGERY

## 2019-12-23 PROCEDURE — 11042 DBRDMT SUBQ TIS 1ST 20SQCM/<: CPT

## 2019-12-23 PROCEDURE — 99499 UNLISTED E&M SERVICE: CPT | Mod: ,,, | Performed by: SURGERY

## 2019-12-23 NOTE — ASSESSMENT & PLAN NOTE
Wound is making excellent progress.  Continue debridement to maintain active phase wound healing.  Continue offloading.

## 2019-12-23 NOTE — PROGRESS NOTES
[facility]  Wound Care  Progress Note    Problem List Items Addressed This Visit        Derm    Pressure ulcer of left heel, stage 3 - Primary    Overview     Patient complains of a 3 to four-week history of a left heel wound. Patient thinks it occurred when he struck his left heel on a [place] knee 3-4 weeks prior to his presentation to wound clinic on 09/30/2019.  Following that episode.  The patient did fall and developed a fracture of his left arm in an unrelated event.  He has not had any extended periods laying in bed. He complains of pain at the left heel with walking.  He has not been able to have his normal activity.  He has primarily been staying off of his foot.  Normally he is very active.  He was seen in the emergency room at [facility].  He denies any fever or chills.  Patient was prescribed Santyl.  Patient was unable to obtain Santyl due to cost.  Mesalt was utilized.  Patient denies any pain.  As of 11/25/2019, promogran was initiated.         Current Assessment & Plan     Wound is making excellent progress.  Continue debridement to maintain active phase wound healing.  Continue offloading.               See wound doc progress notes. Documents will be scanned.        Terrance Sosa  [facility]

## 2020-01-06 ENCOUNTER — OFFICE VISIT (OUTPATIENT)
Dept: WOUND CARE | Facility: HOSPITAL | Age: 85
End: 2020-01-06
Attending: SURGERY
Payer: MEDICARE

## 2020-01-06 VITALS — HEART RATE: 60 BPM | DIASTOLIC BLOOD PRESSURE: 63 MMHG | SYSTOLIC BLOOD PRESSURE: 144 MMHG

## 2020-01-06 DIAGNOSIS — L89.623 PRESSURE ULCER OF LEFT HEEL, STAGE 3: Primary | ICD-10-CM

## 2020-01-06 PROCEDURE — 99499 NO LOS: ICD-10-PCS | Mod: ,,, | Performed by: SURGERY

## 2020-01-06 PROCEDURE — 11042 DBRDMT SUBQ TIS 1ST 20SQCM/<: CPT

## 2020-01-06 PROCEDURE — 99499 UNLISTED E&M SERVICE: CPT | Mod: ,,, | Performed by: SURGERY

## 2020-01-06 PROCEDURE — 27201912 HC WOUND CARE DEBRIDEMENT SUPPLIES

## 2020-01-06 NOTE — PROGRESS NOTES
[facility]  Wound Care  Progress Note    Problem List Items Addressed This Visit        Derm    Pressure ulcer of left heel, stage 3 - Primary    Overview     Patient complains of a 3 to four-week history of a left heel wound. Patient thinks it occurred when he struck his left heel on a [place] knee 3-4 weeks prior to his presentation to wound clinic on 09/30/2019.  Following that episode.  The patient did fall and developed a fracture of his left arm in an unrelated event.  He has not had any extended periods laying in bed. He complains of pain at the left heel with walking.  He has not been able to have his normal activity.  He has primarily been staying off of his foot.  Normally he is very active.  He was seen in the emergency room at [facility].  He denies any fever or chills.  Patient was prescribed Santyl.  Patient was unable to obtain Santyl due to cost.  Mesalt was utilized.  Patient denies any pain.  As of 11/25/2019, promogran was initiated.         Current Assessment & Plan     Wound is closing rapidly.  Continue debridement to maintain active phase wound healing.  Continue offloading.  Patient continues to utilize a wheelchair.               See wound doc progress notes. Documents will be scanned.        Terrance Sosa  [facility]

## 2020-01-06 NOTE — ASSESSMENT & PLAN NOTE
Wound is closing rapidly.  Continue debridement to maintain active phase wound healing.  Continue offloading.  Patient continues to utilize a wheelchair.

## 2020-01-13 ENCOUNTER — CLINICAL SUPPORT (OUTPATIENT)
Dept: INTERNAL MEDICINE | Facility: CLINIC | Age: 85
End: 2020-01-13
Payer: MEDICARE

## 2020-01-13 DIAGNOSIS — E78.2 MIXED DYSLIPIDEMIA: ICD-10-CM

## 2020-01-13 DIAGNOSIS — I50.30 HEART FAILURE WITH PRESERVED EJECTION FRACTION, UNSPECIFIED HF CHRONICITY: ICD-10-CM

## 2020-01-13 DIAGNOSIS — Z79.4 TYPE 2 DIABETES MELLITUS WITH OTHER NEUROLOGIC COMPLICATION, WITH LONG-TERM CURRENT USE OF INSULIN: ICD-10-CM

## 2020-01-13 DIAGNOSIS — I50.30 (HFPEF) HEART FAILURE WITH PRESERVED EJECTION FRACTION: ICD-10-CM

## 2020-01-13 DIAGNOSIS — E11.49 TYPE 2 DIABETES MELLITUS WITH OTHER NEUROLOGIC COMPLICATION, WITH LONG-TERM CURRENT USE OF INSULIN: ICD-10-CM

## 2020-01-13 DIAGNOSIS — I10 ESSENTIAL HYPERTENSION: ICD-10-CM

## 2020-01-13 LAB
ALBUMIN SERPL BCP-MCNC: 3.9 G/DL (ref 3.5–5.2)
ALBUMIN/CREAT UR: 105.2 UG/MG (ref 0–30)
ALP SERPL-CCNC: 148 U/L (ref 55–135)
ALT SERPL W/O P-5'-P-CCNC: 9 U/L (ref 10–44)
ANION GAP SERPL CALC-SCNC: 10 MMOL/L (ref 8–16)
AST SERPL-CCNC: 18 U/L (ref 10–40)
BASOPHILS # BLD AUTO: 0.03 K/UL (ref 0–0.2)
BASOPHILS NFR BLD: 0.4 % (ref 0–1.9)
BILIRUB SERPL-MCNC: 0.9 MG/DL (ref 0.1–1)
BNP SERPL-MCNC: 696 PG/ML (ref 0–99)
BUN SERPL-MCNC: 15 MG/DL (ref 8–23)
CALCIUM SERPL-MCNC: 9.6 MG/DL (ref 8.7–10.5)
CHLORIDE SERPL-SCNC: 106 MMOL/L (ref 95–110)
CHOLEST SERPL-MCNC: 120 MG/DL (ref 120–199)
CHOLEST/HDLC SERPL: 3.9 {RATIO} (ref 2–5)
CO2 SERPL-SCNC: 27 MMOL/L (ref 23–29)
CREAT SERPL-MCNC: 1.1 MG/DL (ref 0.5–1.4)
CREAT UR-MCNC: 129.3 MG/DL (ref 23–375)
DIFFERENTIAL METHOD: ABNORMAL
EOSINOPHIL # BLD AUTO: 0.1 K/UL (ref 0–0.5)
EOSINOPHIL NFR BLD: 1.3 % (ref 0–8)
ERYTHROCYTE [DISTWIDTH] IN BLOOD BY AUTOMATED COUNT: 12.6 % (ref 11.5–14.5)
EST. GFR  (AFRICAN AMERICAN): >60 ML/MIN/1.73 M^2
EST. GFR  (NON AFRICAN AMERICAN): 59 ML/MIN/1.73 M^2
ESTIMATED AVG GLUCOSE: 140 MG/DL (ref 68–131)
GLUCOSE SERPL-MCNC: 139 MG/DL (ref 70–110)
HBA1C MFR BLD HPLC: 6.5 % (ref 4–5.6)
HCT VFR BLD AUTO: 38.8 % (ref 40–54)
HDLC SERPL-MCNC: 31 MG/DL (ref 40–75)
HDLC SERPL: 25.8 % (ref 20–50)
HGB BLD-MCNC: 12.7 G/DL (ref 14–18)
IMM GRANULOCYTES # BLD AUTO: 0.03 K/UL (ref 0–0.04)
IMM GRANULOCYTES NFR BLD AUTO: 0.4 % (ref 0–0.5)
LDLC SERPL CALC-MCNC: 68 MG/DL (ref 63–159)
LYMPHOCYTES # BLD AUTO: 1.4 K/UL (ref 1–4.8)
LYMPHOCYTES NFR BLD: 19.4 % (ref 18–48)
MCH RBC QN AUTO: 32.6 PG (ref 27–31)
MCHC RBC AUTO-ENTMCNC: 32.7 G/DL (ref 32–36)
MCV RBC AUTO: 100 FL (ref 82–98)
MICROALBUMIN UR DL<=1MG/L-MCNC: 136 UG/ML
MONOCYTES # BLD AUTO: 0.8 K/UL (ref 0.3–1)
MONOCYTES NFR BLD: 10.5 % (ref 4–15)
NEUTROPHILS # BLD AUTO: 4.8 K/UL (ref 1.8–7.7)
NEUTROPHILS NFR BLD: 68 % (ref 38–73)
NONHDLC SERPL-MCNC: 89 MG/DL
NRBC BLD-RTO: 0 /100 WBC
PLATELET # BLD AUTO: 185 K/UL (ref 150–350)
PMV BLD AUTO: 9.9 FL (ref 9.2–12.9)
POTASSIUM SERPL-SCNC: 4.1 MMOL/L (ref 3.5–5.1)
PROT SERPL-MCNC: 7.5 G/DL (ref 6–8.4)
RBC # BLD AUTO: 3.9 M/UL (ref 4.6–6.2)
SODIUM SERPL-SCNC: 143 MMOL/L (ref 136–145)
T4 FREE SERPL-MCNC: 1.14 NG/DL (ref 0.71–1.51)
TRIGL SERPL-MCNC: 105 MG/DL (ref 30–150)
TSH SERPL DL<=0.005 MIU/L-ACNC: 5.62 UIU/ML (ref 0.4–4)
WBC # BLD AUTO: 7.12 K/UL (ref 3.9–12.7)

## 2020-01-13 PROCEDURE — 80053 COMPREHEN METABOLIC PANEL: CPT

## 2020-01-13 PROCEDURE — 83880 ASSAY OF NATRIURETIC PEPTIDE: CPT

## 2020-01-13 PROCEDURE — 80061 LIPID PANEL: CPT

## 2020-01-13 PROCEDURE — 84439 ASSAY OF FREE THYROXINE: CPT

## 2020-01-13 PROCEDURE — 83036 HEMOGLOBIN GLYCOSYLATED A1C: CPT

## 2020-01-13 PROCEDURE — 82043 UR ALBUMIN QUANTITATIVE: CPT

## 2020-01-13 PROCEDURE — 85025 COMPLETE CBC W/AUTO DIFF WBC: CPT

## 2020-01-13 PROCEDURE — 84443 ASSAY THYROID STIM HORMONE: CPT

## 2020-01-13 PROCEDURE — 36415 COLL VENOUS BLD VENIPUNCTURE: CPT | Mod: S$GLB,,, | Performed by: INTERNAL MEDICINE

## 2020-01-13 PROCEDURE — 36415 PR COLLECTION VENOUS BLOOD,VENIPUNCTURE: ICD-10-PCS | Mod: S$GLB,,, | Performed by: INTERNAL MEDICINE

## 2020-01-20 ENCOUNTER — OFFICE VISIT (OUTPATIENT)
Dept: WOUND CARE | Facility: HOSPITAL | Age: 85
End: 2020-01-20
Attending: SURGERY
Payer: MEDICARE

## 2020-01-20 ENCOUNTER — OFFICE VISIT (OUTPATIENT)
Dept: INTERNAL MEDICINE | Facility: CLINIC | Age: 85
End: 2020-01-20
Payer: MEDICARE

## 2020-01-20 VITALS
SYSTOLIC BLOOD PRESSURE: 138 MMHG | RESPIRATION RATE: 18 BRPM | BODY MASS INDEX: 29.58 KG/M2 | HEIGHT: 66 IN | OXYGEN SATURATION: 98 % | WEIGHT: 184.06 LBS | DIASTOLIC BLOOD PRESSURE: 68 MMHG | HEART RATE: 60 BPM

## 2020-01-20 VITALS
TEMPERATURE: 98 F | SYSTOLIC BLOOD PRESSURE: 147 MMHG | HEART RATE: 60 BPM | RESPIRATION RATE: 20 BRPM | DIASTOLIC BLOOD PRESSURE: 78 MMHG

## 2020-01-20 DIAGNOSIS — E11.51 TYPE II DIABETES MELLITUS WITH PERIPHERAL CIRCULATORY DISORDER: ICD-10-CM

## 2020-01-20 DIAGNOSIS — L89.623 PRESSURE ULCER OF LEFT HEEL, STAGE 3: Primary | ICD-10-CM

## 2020-01-20 DIAGNOSIS — E03.4 HYPOTHYROIDISM DUE TO ACQUIRED ATROPHY OF THYROID: ICD-10-CM

## 2020-01-20 DIAGNOSIS — I50.30 HEART FAILURE WITH PRESERVED EJECTION FRACTION, UNSPECIFIED HF CHRONICITY: ICD-10-CM

## 2020-01-20 DIAGNOSIS — E11.49 CONTROLLED TYPE 2 DIABETES MELLITUS WITH OTHER NEUROLOGIC COMPLICATION, WITHOUT LONG-TERM CURRENT USE OF INSULIN: ICD-10-CM

## 2020-01-20 DIAGNOSIS — E11.49 TYPE 2 DIABETES MELLITUS WITH OTHER NEUROLOGIC COMPLICATION, WITHOUT LONG-TERM CURRENT USE OF INSULIN: ICD-10-CM

## 2020-01-20 DIAGNOSIS — I48.92 ATRIAL FLUTTER, UNSPECIFIED TYPE: ICD-10-CM

## 2020-01-20 DIAGNOSIS — I10 ESSENTIAL HYPERTENSION: Primary | ICD-10-CM

## 2020-01-20 DIAGNOSIS — I48.21 PERMANENT ATRIAL FIBRILLATION: ICD-10-CM

## 2020-01-20 DIAGNOSIS — E78.2 MIXED DYSLIPIDEMIA: ICD-10-CM

## 2020-01-20 PROBLEM — I47.20 V-TACH: Status: RESOLVED | Noted: 2017-11-28 | Resolved: 2020-01-20

## 2020-01-20 PROCEDURE — 1159F PR MEDICATION LIST DOCUMENTED IN MEDICAL RECORD: ICD-10-PCS | Mod: S$GLB,,, | Performed by: INTERNAL MEDICINE

## 2020-01-20 PROCEDURE — 99999 PR PBB SHADOW E&M-EST. PATIENT-LVL III: CPT | Mod: PBBFAC,,, | Performed by: INTERNAL MEDICINE

## 2020-01-20 PROCEDURE — 1101F PR PT FALLS ASSESS DOC 0-1 FALLS W/OUT INJ PAST YR: ICD-10-PCS | Mod: CPTII,S$GLB,, | Performed by: INTERNAL MEDICINE

## 2020-01-20 PROCEDURE — 99499 RISK ADDL DX/OHS AUDIT: ICD-10-PCS | Mod: ,,, | Performed by: INTERNAL MEDICINE

## 2020-01-20 PROCEDURE — 99999 PR PBB SHADOW E&M-EST. PATIENT-LVL III: ICD-10-PCS | Mod: PBBFAC,,, | Performed by: INTERNAL MEDICINE

## 2020-01-20 PROCEDURE — 99499 UNLISTED E&M SERVICE: CPT | Mod: ,,, | Performed by: INTERNAL MEDICINE

## 2020-01-20 PROCEDURE — 1101F PT FALLS ASSESS-DOCD LE1/YR: CPT | Mod: CPTII,S$GLB,, | Performed by: INTERNAL MEDICINE

## 2020-01-20 PROCEDURE — 99499 UNLISTED E&M SERVICE: CPT | Mod: S$GLB,,, | Performed by: INTERNAL MEDICINE

## 2020-01-20 PROCEDURE — 1126F AMNT PAIN NOTED NONE PRSNT: CPT | Mod: S$GLB,,, | Performed by: INTERNAL MEDICINE

## 2020-01-20 PROCEDURE — 99499 NO LOS: ICD-10-PCS | Mod: ,,, | Performed by: SURGERY

## 2020-01-20 PROCEDURE — 99214 OFFICE O/P EST MOD 30 MIN: CPT | Mod: S$GLB,,, | Performed by: INTERNAL MEDICINE

## 2020-01-20 PROCEDURE — 99214 OFFICE O/P EST MOD 30 MIN: CPT

## 2020-01-20 PROCEDURE — 1126F PR PAIN SEVERITY QUANTIFIED, NO PAIN PRESENT: ICD-10-PCS | Mod: S$GLB,,, | Performed by: INTERNAL MEDICINE

## 2020-01-20 PROCEDURE — 99214 PR OFFICE/OUTPT VISIT, EST, LEVL IV, 30-39 MIN: ICD-10-PCS | Mod: S$GLB,,, | Performed by: INTERNAL MEDICINE

## 2020-01-20 PROCEDURE — 1159F MED LIST DOCD IN RCRD: CPT | Mod: S$GLB,,, | Performed by: INTERNAL MEDICINE

## 2020-01-20 PROCEDURE — 99499 UNLISTED E&M SERVICE: CPT | Mod: ,,, | Performed by: SURGERY

## 2020-01-20 PROCEDURE — 99499 RISK ADDL DX/OHS AUDIT: ICD-10-PCS | Mod: S$GLB,,, | Performed by: INTERNAL MEDICINE

## 2020-01-20 RX ORDER — LEVOTHYROXINE SODIUM 75 UG/1
75 TABLET ORAL
Qty: 30 TABLET | Refills: 11 | Status: SHIPPED | OUTPATIENT
Start: 2020-01-20 | End: 2020-07-20 | Stop reason: SDUPTHER

## 2020-01-20 NOTE — PROGRESS NOTES
[facility]  Wound Care  Progress Note    Problem List Items Addressed This Visit        Derm    Pressure ulcer of left heel, stage 3 - Primary    Overview     Patient complains of a 3 to four-week history of a left heel wound. Patient thinks it occurred when he struck his left heel on a [place] knee 3-4 weeks prior to his presentation to wound clinic on 09/30/2019.  Following that episode.  The patient did fall and developed a fracture of his left arm in an unrelated event.  He has not had any extended periods laying in bed. He complains of pain at the left heel with walking.  He has not been able to have his normal activity.  He has primarily been staying off of his foot.  Normally he is very active.  He was seen in the emergency room at [facility].  He denies any fever or chills.  Patient was prescribed Santyl.  Patient was unable to obtain Santyl due to cost.  Mesalt was utilized.  Patient denies any pain.  As of 11/25/2019, promogran was initiated.  01/20/2019, wound is healed.         Current Assessment & Plan     Wound appears re-epithelialization.  Wound debrided of nonviable epidermis.  Discharged from wound clinic.  Vaseline daily for 4 more weeks.               See wound doc progress notes. Documents will be scanned.        Terrance Sosa  [facility]

## 2020-01-20 NOTE — PROGRESS NOTES
Subjective:       Patient ID: Eddie Parada Sr. is a 89 y.o. male.    Chief Complaint: Follow-up (no complaints at present ); Hypertension (a fib); Hyperlipidemia; and Diabetes    Eddie Parada Sr. is a  89 y.o. male who presents for Type II DM,  CHF, Hypertension, and Hyperlipidemia follow up. Labs were reviewed with patient today.    Left foot ulcer healing nicely     Hypertension   This is a chronic problem. The current episode started more than 1 year ago. The problem is controlled. Associated symptoms include shortness of breath. Pertinent negatives include no orthopnea. Risk factors for coronary artery disease include sedentary lifestyle, male gender, obesity, dyslipidemia and diabetes mellitus. Past treatments include beta blockers, calcium channel blockers and diuretics. The current treatment provides moderate improvement. Hypertensive end-organ damage includes kidney disease and CAD/MI. Identifiable causes of hypertension include a thyroid problem.   Hyperlipidemia   This is a chronic problem. The current episode started more than 1 year ago. The problem is controlled. Recent lipid tests were reviewed and are low. Associated symptoms include shortness of breath. Current antihyperlipidemic treatment includes statins. The current treatment provides significant improvement of lipids.   Diabetes   He presents for his follow-up diabetic visit. He has type 2 diabetes mellitus. His disease course has been stable. Pertinent negatives for hypoglycemia include no dizziness, nervousness/anxiousness, pallor, seizures or speech difficulty. Associated symptoms include foot paresthesias. Pertinent negatives for hypoglycemia complications include no blackouts. Symptoms are improving. Pertinent negatives for diabetic complications include no autonomic neuropathy. Current diabetic treatment includes oral agent (dual therapy). His breakfast blood glucose range is generally 110-130 mg/dl. An ACE inhibitor/angiotensin  II receptor blocker is being taken.   Thyroid Problem   Patient reports no anxiety. His past medical history is significant for hyperlipidemia.     Review of Systems   HENT: Negative for postnasal drip.    Eyes: Negative for photophobia.   Respiratory: Positive for shortness of breath. Negative for chest tightness.         SOB about the same    Cardiovascular: Negative for orthopnea.   Gastrointestinal: Negative for abdominal distention and blood in stool.   Genitourinary: Negative for dysuria, flank pain and hematuria.   Musculoskeletal: Negative for back pain.        Foot neuropathic pain    Skin: Negative for pallor.   Neurological: Negative for dizziness, seizures, facial asymmetry and speech difficulty.   Hematological: Does not bruise/bleed easily.   Psychiatric/Behavioral: Negative for agitation and suicidal ideas. The patient is not nervous/anxious.        Objective:      Physical Exam   Constitutional: He is oriented to person, place, and time. He appears well-developed and well-nourished. No distress.   HENT:   Head: Normocephalic and atraumatic.   Right Ear: External ear normal.   Left Ear: External ear normal.   Eyes: EOM are normal. Right eye exhibits no discharge. Left eye exhibits no discharge.   Neck: Neck supple. No thyromegaly present.   Cardiovascular: Normal rate and regular rhythm. Exam reveals no gallop and no friction rub.   No murmur heard.  Pulmonary/Chest: Effort normal and breath sounds normal. No respiratory distress. He has no wheezes. He has no rales.   Abdominal: Soft. Bowel sounds are normal. He exhibits no distension. There is no tenderness. There is no rebound and no guarding.   Musculoskeletal: He exhibits no edema (but wearing compression stockings today).   Wearing compression stockings     Lymphadenopathy:     He has no cervical adenopathy.   Neurological: He is alert and oriented to person, place, and time. No cranial nerve deficit.   Skin: Skin is warm and dry.   Psychiatric:  He has a normal mood and affect. His behavior is normal.   Vitals reviewed.      Assessment:       1. Essential hypertension    2. Mixed dyslipidemia    3. Permanent atrial fibrillation    4. Heart failure with preserved ejection fraction, unspecified HF chronicity    5. 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 Atrial flutter, unspecified type    7. Type II diabetes mellitus with peripheral circulatory disorder    8. Type 2 diabetes mellitus with other neurologic complication, without long-term current use of insulin    9. Hypothyroidism due to acquired atrophy of thyroid        Plan:   Eddie was seen today for follow-up, hypertension, hyperlipidemia and diabetes.    Diagnoses and all orders for this visit:    Essential hypertension  -     CBC auto differential; Future  -     Comprehensive metabolic panel; Future    Well controlled.  Continue same medication and dose.  1. Keep weight close to ideal body weight.   2.   Avoid high salt foods (olives, pickles, smoked meats, salted potato chips, etc.).   Do not add salt to your food at the table.   Use only small amounts of salt when cooking.   3. Begin an exercise program. Discuss with your doctor what type of exercise program would be best for you. It doesn't have to be difficult. Even brisk walking for 20 minutes three times a week is a good form of exercise.   4. Avoid medicines which contain heart stimulants. This includes many cold and sinus decongestant pills and sprays as well as diet pills. Check the warnings about hypertension on the label. Stimulants such as amphetamine or cocaine could be lethal for someone with hypertension. Never take these.    Mixed dyslipidemia  -     Lipid panel; Future  Well controlled.  Continue same medication and dose.  Permanent atrial fibrillation  Stable    Heart failure with preserved ejection fraction, unspecified HF chronicity  -     TSH; Future  -      Brain natriuretic peptide; Future  Continue lasix.    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  Well controlled.  Continue same medication and dose.    Problem List Items Addressed This Visit     Atrial fibrillation    Atrial flutter    HTN (hypertension) - Primary    Relevant Orders    CBC auto differential    Comprehensive metabolic panel    Type 2 diabetes mellitus with neurologic complication    Relevant Orders    Hemoglobin A1c    Microalbumin/creatinine urine ratio    Type II diabetes mellitus with peripheral circulatory disorder    Relevant Orders    Hemoglobin A1c    Microalbumin/creatinine urine ratio    Mixed dyslipidemia    Relevant Orders    Lipid panel    Type 2 diabetes mellitus, controlled    Relevant Orders    TSH    (HFpEF) heart failure with preserved ejection fraction    Relevant Orders    TSH    Brain natriuretic peptide

## 2020-01-20 NOTE — ASSESSMENT & PLAN NOTE
Wound appears re-epithelialization.  Wound debrided of nonviable epidermis.  Discharged from wound clinic.  Vaseline daily for 4 more weeks.

## 2020-01-27 DIAGNOSIS — K21.9 GASTROESOPHAGEAL REFLUX DISEASE, ESOPHAGITIS PRESENCE NOT SPECIFIED: ICD-10-CM

## 2020-01-28 RX ORDER — PANTOPRAZOLE SODIUM 40 MG/1
40 TABLET, DELAYED RELEASE ORAL DAILY
Qty: 90 TABLET | Refills: 1 | Status: SHIPPED | OUTPATIENT
Start: 2020-01-28 | End: 2020-07-30

## 2020-02-03 DIAGNOSIS — E11.9 TYPE 2 DIABETES MELLITUS, CONTROLLED: ICD-10-CM

## 2020-02-03 RX ORDER — GLIMEPIRIDE 2 MG/1
TABLET ORAL
Qty: 30 TABLET | Refills: 5 | Status: SHIPPED | OUTPATIENT
Start: 2020-02-03 | End: 2020-07-30

## 2020-02-17 RX ORDER — TAMSULOSIN HYDROCHLORIDE 0.4 MG/1
0.4 CAPSULE ORAL DAILY
Qty: 30 CAPSULE | Refills: 11 | Status: SHIPPED | OUTPATIENT
Start: 2020-02-17 | End: 2021-02-08

## 2020-03-03 DIAGNOSIS — I10 ESSENTIAL HYPERTENSION: ICD-10-CM

## 2020-03-03 DIAGNOSIS — I50.32 CHRONIC DIASTOLIC HEART FAILURE: ICD-10-CM

## 2020-03-03 RX ORDER — FUROSEMIDE 40 MG/1
TABLET ORAL
Qty: 30 TABLET | Refills: 6 | Status: SHIPPED | OUTPATIENT
Start: 2020-03-03 | End: 2020-07-29 | Stop reason: DRUGHIGH

## 2020-03-09 DIAGNOSIS — I10 ESSENTIAL HYPERTENSION: ICD-10-CM

## 2020-03-10 RX ORDER — AMLODIPINE BESYLATE 10 MG/1
TABLET ORAL
Qty: 30 TABLET | Refills: 6 | Status: SHIPPED | OUTPATIENT
Start: 2020-03-10 | End: 2020-10-05

## 2020-03-12 ENCOUNTER — TELEPHONE (OUTPATIENT)
Dept: INTERNAL MEDICINE | Facility: CLINIC | Age: 85
End: 2020-03-12

## 2020-03-12 NOTE — TELEPHONE ENCOUNTER
----- Message from Linda Fontaine sent at 3/12/2020 12:25 PM CDT -----  Contact: wife  Eddie Parada Sr.  MRN: [id number]  : 1929  PCP: Callum Roberts  Home Phone      [phone]  Work Phone      Not on file.  Mobile          [phone]      MESSAGE:     pt had a nose bleed this morning, he woke up to it bleeding, BP is 134/57. He said he feel earlier this week outside, hit his face. Worried he may have did something to his nose.    [phone]

## 2020-03-12 NOTE — TELEPHONE ENCOUNTER
Patient's nose has been bleeding for 30 minutes without stopping. Patient already on his way to ER

## 2020-04-27 DIAGNOSIS — I10 ESSENTIAL HYPERTENSION: ICD-10-CM

## 2020-04-27 RX ORDER — CARVEDILOL 12.5 MG/1
12.5 TABLET ORAL 2 TIMES DAILY WITH MEALS
Qty: 60 TABLET | Refills: 5 | Status: SHIPPED | OUTPATIENT
Start: 2020-04-27 | End: 2020-11-23

## 2020-05-11 DIAGNOSIS — E78.2 MIXED DYSLIPIDEMIA: ICD-10-CM

## 2020-05-11 RX ORDER — PRAVASTATIN SODIUM 20 MG/1
TABLET ORAL
Qty: 30 TABLET | Refills: 5 | Status: SHIPPED | OUTPATIENT
Start: 2020-05-11 | End: 2020-11-04

## 2020-07-17 ENCOUNTER — CLINICAL SUPPORT (OUTPATIENT)
Dept: INTERNAL MEDICINE | Facility: CLINIC | Age: 85
End: 2020-07-17
Payer: MEDICARE

## 2020-07-17 DIAGNOSIS — E11.49 TYPE 2 DIABETES MELLITUS WITH OTHER NEUROLOGIC COMPLICATION, WITHOUT LONG-TERM CURRENT USE OF INSULIN: ICD-10-CM

## 2020-07-17 DIAGNOSIS — I50.30 HEART FAILURE WITH PRESERVED EJECTION FRACTION, UNSPECIFIED HF CHRONICITY: ICD-10-CM

## 2020-07-17 DIAGNOSIS — I10 ESSENTIAL HYPERTENSION: ICD-10-CM

## 2020-07-17 DIAGNOSIS — E11.49 CONTROLLED TYPE 2 DIABETES MELLITUS WITH OTHER NEUROLOGIC COMPLICATION, WITHOUT LONG-TERM CURRENT USE OF INSULIN: ICD-10-CM

## 2020-07-17 DIAGNOSIS — E11.51 TYPE II DIABETES MELLITUS WITH PERIPHERAL CIRCULATORY DISORDER: ICD-10-CM

## 2020-07-17 DIAGNOSIS — E78.2 MIXED DYSLIPIDEMIA: ICD-10-CM

## 2020-07-17 LAB
ALBUMIN SERPL BCP-MCNC: 4.2 G/DL (ref 3.5–5.2)
ALBUMIN/CREAT UR: 15.5 UG/MG (ref 0–30)
ALP SERPL-CCNC: 126 U/L (ref 55–135)
ALT SERPL W/O P-5'-P-CCNC: 14 U/L (ref 10–44)
ANION GAP SERPL CALC-SCNC: 13 MMOL/L (ref 8–16)
AST SERPL-CCNC: 24 U/L (ref 10–40)
BASOPHILS # BLD AUTO: 0.03 K/UL (ref 0–0.2)
BASOPHILS NFR BLD: 0.4 % (ref 0–1.9)
BILIRUB SERPL-MCNC: 0.8 MG/DL (ref 0.1–1)
BNP SERPL-MCNC: 230 PG/ML (ref 0–99)
BUN SERPL-MCNC: 43 MG/DL (ref 10–30)
CALCIUM SERPL-MCNC: 10.1 MG/DL (ref 8.7–10.5)
CHLORIDE SERPL-SCNC: 102 MMOL/L (ref 95–110)
CHOLEST SERPL-MCNC: 136 MG/DL (ref 120–199)
CHOLEST/HDLC SERPL: 3.8 {RATIO} (ref 2–5)
CO2 SERPL-SCNC: 28 MMOL/L (ref 23–29)
CREAT SERPL-MCNC: 1.9 MG/DL (ref 0.5–1.4)
CREAT UR-MCNC: 58 MG/DL (ref 23–375)
DIFFERENTIAL METHOD: ABNORMAL
EOSINOPHIL # BLD AUTO: 0.1 K/UL (ref 0–0.5)
EOSINOPHIL NFR BLD: 1.3 % (ref 0–8)
ERYTHROCYTE [DISTWIDTH] IN BLOOD BY AUTOMATED COUNT: 12.2 % (ref 11.5–14.5)
EST. GFR  (AFRICAN AMERICAN): 35 ML/MIN/1.73 M^2
EST. GFR  (NON AFRICAN AMERICAN): 30 ML/MIN/1.73 M^2
GLUCOSE SERPL-MCNC: 134 MG/DL (ref 70–110)
HCT VFR BLD AUTO: 39.5 % (ref 40–54)
HDLC SERPL-MCNC: 36 MG/DL (ref 40–75)
HDLC SERPL: 26.5 % (ref 20–50)
HGB BLD-MCNC: 12.8 G/DL (ref 14–18)
IMM GRANULOCYTES # BLD AUTO: 0.07 K/UL (ref 0–0.04)
IMM GRANULOCYTES NFR BLD AUTO: 0.8 % (ref 0–0.5)
LDLC SERPL CALC-MCNC: 73.6 MG/DL (ref 63–159)
LYMPHOCYTES # BLD AUTO: 1.4 K/UL (ref 1–4.8)
LYMPHOCYTES NFR BLD: 16.1 % (ref 18–48)
MCH RBC QN AUTO: 31.8 PG (ref 27–31)
MCHC RBC AUTO-ENTMCNC: 32.4 G/DL (ref 32–36)
MCV RBC AUTO: 98 FL (ref 82–98)
MICROALBUMIN UR DL<=1MG/L-MCNC: 9 UG/ML
MONOCYTES # BLD AUTO: 0.9 K/UL (ref 0.3–1)
MONOCYTES NFR BLD: 10.3 % (ref 4–15)
NEUTROPHILS # BLD AUTO: 6.1 K/UL (ref 1.8–7.7)
NEUTROPHILS NFR BLD: 71.1 % (ref 38–73)
NONHDLC SERPL-MCNC: 100 MG/DL
NRBC BLD-RTO: 0 /100 WBC
PLATELET # BLD AUTO: 162 K/UL (ref 150–350)
PMV BLD AUTO: 10.1 FL (ref 9.2–12.9)
POTASSIUM SERPL-SCNC: 5.7 MMOL/L (ref 3.5–5.1)
PROT SERPL-MCNC: 7.9 G/DL (ref 6–8.4)
RBC # BLD AUTO: 4.02 M/UL (ref 4.6–6.2)
SODIUM SERPL-SCNC: 143 MMOL/L (ref 136–145)
T4 FREE SERPL-MCNC: 0.93 NG/DL (ref 0.71–1.51)
TRIGL SERPL-MCNC: 132 MG/DL (ref 30–150)
TSH SERPL DL<=0.005 MIU/L-ACNC: 8.33 UIU/ML (ref 0.4–4)
WBC # BLD AUTO: 8.55 K/UL (ref 3.9–12.7)

## 2020-07-17 PROCEDURE — 82043 UR ALBUMIN QUANTITATIVE: CPT

## 2020-07-17 PROCEDURE — 84439 ASSAY OF FREE THYROXINE: CPT

## 2020-07-17 PROCEDURE — 80061 LIPID PANEL: CPT

## 2020-07-17 PROCEDURE — 83036 HEMOGLOBIN GLYCOSYLATED A1C: CPT

## 2020-07-17 PROCEDURE — 36415 PR COLLECTION VENOUS BLOOD,VENIPUNCTURE: ICD-10-PCS | Mod: S$GLB,,, | Performed by: INTERNAL MEDICINE

## 2020-07-17 PROCEDURE — 80053 COMPREHEN METABOLIC PANEL: CPT

## 2020-07-17 PROCEDURE — 85025 COMPLETE CBC W/AUTO DIFF WBC: CPT

## 2020-07-17 PROCEDURE — 83880 ASSAY OF NATRIURETIC PEPTIDE: CPT

## 2020-07-17 PROCEDURE — 36415 COLL VENOUS BLD VENIPUNCTURE: CPT | Mod: S$GLB,,, | Performed by: INTERNAL MEDICINE

## 2020-07-17 PROCEDURE — 84443 ASSAY THYROID STIM HORMONE: CPT

## 2020-07-17 NOTE — PROGRESS NOTES
Venipuncture Collected.     Number of Sticks: 2  Site #1: Right Antecubital  Site #2: Left Antecubital  Site #3: N/A    Complications? Blown  Additional Comments:

## 2020-07-18 LAB
ESTIMATED AVG GLUCOSE: 137 MG/DL (ref 68–131)
HBA1C MFR BLD HPLC: 6.4 % (ref 4–5.6)

## 2020-07-20 ENCOUNTER — OFFICE VISIT (OUTPATIENT)
Dept: INTERNAL MEDICINE | Facility: CLINIC | Age: 85
End: 2020-07-20
Payer: MEDICARE

## 2020-07-20 VITALS
TEMPERATURE: 98 F | HEIGHT: 66 IN | DIASTOLIC BLOOD PRESSURE: 60 MMHG | BODY MASS INDEX: 30.44 KG/M2 | OXYGEN SATURATION: 98 % | WEIGHT: 189.38 LBS | HEART RATE: 60 BPM | RESPIRATION RATE: 16 BRPM | SYSTOLIC BLOOD PRESSURE: 138 MMHG

## 2020-07-20 DIAGNOSIS — E87.5 HYPERKALEMIA: Primary | ICD-10-CM

## 2020-07-20 DIAGNOSIS — N18.4 CHRONIC KIDNEY DISEASE, STAGE 4 (SEVERE): ICD-10-CM

## 2020-07-20 DIAGNOSIS — I48.21 PERMANENT ATRIAL FIBRILLATION: ICD-10-CM

## 2020-07-20 DIAGNOSIS — J44.9 CHRONIC OBSTRUCTIVE PULMONARY DISEASE, UNSPECIFIED COPD TYPE: ICD-10-CM

## 2020-07-20 DIAGNOSIS — E03.4 HYPOTHYROIDISM DUE TO ACQUIRED ATROPHY OF THYROID: ICD-10-CM

## 2020-07-20 PROCEDURE — 99214 PR OFFICE/OUTPT VISIT, EST, LEVL IV, 30-39 MIN: ICD-10-PCS | Mod: S$GLB,,, | Performed by: INTERNAL MEDICINE

## 2020-07-20 PROCEDURE — 99499 UNLISTED E&M SERVICE: CPT | Mod: S$GLB,,, | Performed by: INTERNAL MEDICINE

## 2020-07-20 PROCEDURE — 1159F PR MEDICATION LIST DOCUMENTED IN MEDICAL RECORD: ICD-10-PCS | Mod: S$GLB,,, | Performed by: INTERNAL MEDICINE

## 2020-07-20 PROCEDURE — 1126F PR PAIN SEVERITY QUANTIFIED, NO PAIN PRESENT: ICD-10-PCS | Mod: S$GLB,,, | Performed by: INTERNAL MEDICINE

## 2020-07-20 PROCEDURE — 99999 PR PBB SHADOW E&M-EST. PATIENT-LVL IV: ICD-10-PCS | Mod: PBBFAC,,, | Performed by: INTERNAL MEDICINE

## 2020-07-20 PROCEDURE — 1159F MED LIST DOCD IN RCRD: CPT | Mod: S$GLB,,, | Performed by: INTERNAL MEDICINE

## 2020-07-20 PROCEDURE — 99999 PR PBB SHADOW E&M-EST. PATIENT-LVL IV: CPT | Mod: PBBFAC,,, | Performed by: INTERNAL MEDICINE

## 2020-07-20 PROCEDURE — 1126F AMNT PAIN NOTED NONE PRSNT: CPT | Mod: S$GLB,,, | Performed by: INTERNAL MEDICINE

## 2020-07-20 PROCEDURE — 1101F PR PT FALLS ASSESS DOC 0-1 FALLS W/OUT INJ PAST YR: ICD-10-PCS | Mod: CPTII,S$GLB,, | Performed by: INTERNAL MEDICINE

## 2020-07-20 PROCEDURE — 99499 RISK ADDL DX/OHS AUDIT: ICD-10-PCS | Mod: S$GLB,,, | Performed by: INTERNAL MEDICINE

## 2020-07-20 PROCEDURE — 99214 OFFICE O/P EST MOD 30 MIN: CPT | Mod: S$GLB,,, | Performed by: INTERNAL MEDICINE

## 2020-07-20 PROCEDURE — 1101F PT FALLS ASSESS-DOCD LE1/YR: CPT | Mod: CPTII,S$GLB,, | Performed by: INTERNAL MEDICINE

## 2020-07-20 RX ORDER — LEVOTHYROXINE SODIUM 75 UG/1
75 TABLET ORAL
Qty: 30 TABLET | Refills: 11 | Status: SHIPPED | OUTPATIENT
Start: 2020-07-20 | End: 2021-07-26

## 2020-07-20 RX ORDER — GABAPENTIN 100 MG/1
CAPSULE ORAL
COMMUNITY
Start: 2020-06-30 | End: 2021-01-05 | Stop reason: SDUPTHER

## 2020-07-20 RX ORDER — LEVOTHYROXINE SODIUM 88 UG/1
88 TABLET ORAL
Qty: 30 TABLET | Refills: 11 | Status: CANCELLED | OUTPATIENT
Start: 2020-07-20 | End: 2021-07-20

## 2020-07-20 NOTE — PROGRESS NOTES
Subjective:       Patient ID: Eddie Parada Sr. is a 89 y.o. male.    Chief Complaint: Follow-up, Hypertension, Hyperlipidemia, and Diabetes    Eddie Parada Sr. is a  89 y.o. male who presents for Type II DM,  CHF, Hypertension, and Hyperlipidemia follow up. Labs were reviewed with patient today.        Hypertension  This is a chronic problem. The current episode started more than 1 year ago. The problem is controlled. Associated symptoms include shortness of breath. Pertinent negatives include no orthopnea. Risk factors for coronary artery disease include sedentary lifestyle, male gender, obesity, dyslipidemia and diabetes mellitus. Past treatments include beta blockers, calcium channel blockers and diuretics. The current treatment provides moderate improvement. Hypertensive end-organ damage includes kidney disease and CAD/MI. Identifiable causes of hypertension include a thyroid problem.   Hyperlipidemia  This is a chronic problem. The current episode started more than 1 year ago. The problem is controlled. Recent lipid tests were reviewed and are low. Associated symptoms include shortness of breath. Current antihyperlipidemic treatment includes statins. The current treatment provides significant improvement of lipids.   Diabetes  He presents for his follow-up diabetic visit. He has type 2 diabetes mellitus. His disease course has been stable. Pertinent negatives for hypoglycemia include no dizziness, nervousness/anxiousness, pallor, seizures or speech difficulty. Associated symptoms include foot paresthesias. Pertinent negatives for hypoglycemia complications include no blackouts. Symptoms are improving. Pertinent negatives for diabetic complications include no autonomic neuropathy. Current diabetic treatment includes oral agent (dual therapy). His breakfast blood glucose range is generally 110-130 mg/dl. An ACE inhibitor/angiotensin II receptor blocker is being taken.   Thyroid Problem  Patient reports  no anxiety. His past medical history is significant for hyperlipidemia.     Review of Systems   HENT: Negative for postnasal drip.    Eyes: Negative for photophobia.   Respiratory: Positive for shortness of breath. Negative for chest tightness.         SOB about the same    Cardiovascular: Negative for orthopnea.   Gastrointestinal: Negative for abdominal distention and blood in stool.   Genitourinary: Negative for dysuria, flank pain and hematuria.   Musculoskeletal: Negative for back pain.        Foot neuropathic pain    Skin: Negative for pallor.   Neurological: Negative for dizziness, seizures, facial asymmetry and speech difficulty.   Hematological: Does not bruise/bleed easily.   Psychiatric/Behavioral: Negative for agitation and suicidal ideas. The patient is not nervous/anxious.        Objective:      Physical Exam  Vitals signs reviewed.   Constitutional:       General: He is not in acute distress.     Appearance: He is well-developed.   HENT:      Head: Normocephalic and atraumatic.      Right Ear: External ear normal.      Left Ear: External ear normal.   Eyes:      General:         Right eye: No discharge.         Left eye: No discharge.   Neck:      Musculoskeletal: Neck supple.      Thyroid: No thyromegaly.   Cardiovascular:      Rate and Rhythm: Normal rate and regular rhythm.      Heart sounds: No murmur. No friction rub. No gallop.    Pulmonary:      Effort: Pulmonary effort is normal. No respiratory distress.      Breath sounds: Normal breath sounds. No wheezing or rales.   Abdominal:      General: Bowel sounds are normal. There is no distension.      Palpations: Abdomen is soft.      Tenderness: There is no abdominal tenderness. There is no guarding or rebound.   Musculoskeletal:      Comments: Wearing compression stockings     Lymphadenopathy:      Cervical: No cervical adenopathy.   Skin:     General: Skin is warm and dry.   Neurological:      Mental Status: He is alert and oriented to person,  place, and time.      Cranial Nerves: No cranial nerve deficit.   Psychiatric:         Behavior: Behavior normal.         Assessment:       1. Hyperkalemia    2. Chronic obstructive pulmonary disease, unspecified COPD type    3. Chronic kidney disease, stage 4 (severe)    4. Permanent atrial fibrillation    5. Hypothyroidism due to acquired atrophy of thyroid        Plan:   Eddie was seen today for follow-up, hypertension, hyperlipidemia and diabetes.    Diagnoses and all orders for this visit:    Hyperkalemia  -     Basic metabolic panel; Future  Hold off lasix for one week   check BMP in 1 week .  No OJ ; no Banana       Chronic obstructive pulmonary disease, unspecified COPD type  Well controlled.  Continue same medication and dose.  Chronic kidney disease, stage 4 (severe)  -     Basic metabolic panel; Future    Permanent atrial fibrillation  Well controlled.  Continue same medication and dose.  Hypothyroidism due to acquired atrophy of thyroid  -     levothyroxine (SYNTHROID) 75 MCG tablet; Take 1 tablet (75 mcg total) by mouth before breakfast.  He is not taking any levothyroxine         Problem List Items Addressed This Visit     Atrial fibrillation    COPD (chronic obstructive pulmonary disease)    Hypothyroidism due to acquired atrophy of thyroid    Relevant Medications    levothyroxine (SYNTHROID) 75 MCG tablet    Chronic kidney disease, stage 4 (severe)    Relevant Orders    Basic metabolic panel      Other Visit Diagnoses     Hyperkalemia    -  Primary    Relevant Orders    Basic metabolic panel

## 2020-07-27 ENCOUNTER — CLINICAL SUPPORT (OUTPATIENT)
Dept: INTERNAL MEDICINE | Facility: CLINIC | Age: 85
End: 2020-07-27
Payer: MEDICARE

## 2020-07-27 DIAGNOSIS — N18.4 CHRONIC KIDNEY DISEASE, STAGE 4 (SEVERE): ICD-10-CM

## 2020-07-27 DIAGNOSIS — E87.5 HYPERKALEMIA: ICD-10-CM

## 2020-07-27 LAB
ANION GAP SERPL CALC-SCNC: 9 MMOL/L (ref 8–16)
BUN SERPL-MCNC: 24 MG/DL (ref 10–30)
CALCIUM SERPL-MCNC: 9.4 MG/DL (ref 8.7–10.5)
CHLORIDE SERPL-SCNC: 106 MMOL/L (ref 95–110)
CO2 SERPL-SCNC: 26 MMOL/L (ref 23–29)
CREAT SERPL-MCNC: 1.4 MG/DL (ref 0.5–1.4)
EST. GFR  (AFRICAN AMERICAN): 50 ML/MIN/1.73 M^2
EST. GFR  (NON AFRICAN AMERICAN): 44 ML/MIN/1.73 M^2
GLUCOSE SERPL-MCNC: 111 MG/DL (ref 70–110)
POTASSIUM SERPL-SCNC: 4.7 MMOL/L (ref 3.5–5.1)
SODIUM SERPL-SCNC: 141 MMOL/L (ref 136–145)

## 2020-07-27 PROCEDURE — 80048 BASIC METABOLIC PNL TOTAL CA: CPT

## 2020-07-27 PROCEDURE — 36415 COLL VENOUS BLD VENIPUNCTURE: CPT | Mod: S$GLB,,, | Performed by: INTERNAL MEDICINE

## 2020-07-27 PROCEDURE — 36415 PR COLLECTION VENOUS BLOOD,VENIPUNCTURE: ICD-10-PCS | Mod: S$GLB,,, | Performed by: INTERNAL MEDICINE

## 2020-07-28 ENCOUNTER — OFFICE VISIT (OUTPATIENT)
Dept: INTERNAL MEDICINE | Facility: CLINIC | Age: 85
End: 2020-07-28
Payer: MEDICARE

## 2020-07-28 VITALS
SYSTOLIC BLOOD PRESSURE: 128 MMHG | RESPIRATION RATE: 16 BRPM | HEART RATE: 60 BPM | DIASTOLIC BLOOD PRESSURE: 64 MMHG | WEIGHT: 189 LBS | OXYGEN SATURATION: 97 % | HEIGHT: 66 IN | BODY MASS INDEX: 30.37 KG/M2

## 2020-07-28 DIAGNOSIS — N18.4 CHRONIC KIDNEY DISEASE, STAGE 4 (SEVERE): Primary | ICD-10-CM

## 2020-07-28 DIAGNOSIS — I50.42 CHRONIC COMBINED SYSTOLIC AND DIASTOLIC CONGESTIVE HEART FAILURE: ICD-10-CM

## 2020-07-28 DIAGNOSIS — E03.4 HYPOTHYROIDISM DUE TO ACQUIRED ATROPHY OF THYROID: ICD-10-CM

## 2020-07-28 PROCEDURE — 99999 PR PBB SHADOW E&M-EST. PATIENT-LVL IV: ICD-10-PCS | Mod: PBBFAC,,, | Performed by: INTERNAL MEDICINE

## 2020-07-28 PROCEDURE — 1159F MED LIST DOCD IN RCRD: CPT | Mod: S$GLB,,, | Performed by: INTERNAL MEDICINE

## 2020-07-28 PROCEDURE — 1101F PT FALLS ASSESS-DOCD LE1/YR: CPT | Mod: CPTII,S$GLB,, | Performed by: INTERNAL MEDICINE

## 2020-07-28 PROCEDURE — 1126F AMNT PAIN NOTED NONE PRSNT: CPT | Mod: S$GLB,,, | Performed by: INTERNAL MEDICINE

## 2020-07-28 PROCEDURE — 1126F PR PAIN SEVERITY QUANTIFIED, NO PAIN PRESENT: ICD-10-PCS | Mod: S$GLB,,, | Performed by: INTERNAL MEDICINE

## 2020-07-28 PROCEDURE — 99999 PR PBB SHADOW E&M-EST. PATIENT-LVL IV: CPT | Mod: PBBFAC,,, | Performed by: INTERNAL MEDICINE

## 2020-07-28 PROCEDURE — 1159F PR MEDICATION LIST DOCUMENTED IN MEDICAL RECORD: ICD-10-PCS | Mod: S$GLB,,, | Performed by: INTERNAL MEDICINE

## 2020-07-28 PROCEDURE — 99213 PR OFFICE/OUTPT VISIT, EST, LEVL III, 20-29 MIN: ICD-10-PCS | Mod: S$GLB,,, | Performed by: INTERNAL MEDICINE

## 2020-07-28 PROCEDURE — 1101F PR PT FALLS ASSESS DOC 0-1 FALLS W/OUT INJ PAST YR: ICD-10-PCS | Mod: CPTII,S$GLB,, | Performed by: INTERNAL MEDICINE

## 2020-07-28 PROCEDURE — 99213 OFFICE O/P EST LOW 20 MIN: CPT | Mod: S$GLB,,, | Performed by: INTERNAL MEDICINE

## 2020-07-28 NOTE — PROGRESS NOTES
Subjective:       Patient ID: Eddie Parada Sr. is a 89 y.o. male.    Chief Complaint: Follow-up (renal insufficiency )    Eddie Parada Sr. is a 89 y.o. male  Here with concern for renal insufficiency   He was seen 1 week ago and his creatinine was higher than normal ; he was taking lasix every other day .  It was stopped and BMP was repeated.  BMP  Lab Results       Component                Value               Date                       NA                       141                 07/27/2020                 K                        4.7                 07/27/2020                 CL                       106                 07/27/2020                 CO2                      26                  07/27/2020                 BUN                      24                  07/27/2020                 CREATININE               1.4                 07/27/2020                 CALCIUM                  9.4                 07/27/2020                 ANIONGAP                 9                   07/27/2020                 ESTGFRAFRICA             50 (A)              07/27/2020                 EGFRNONAA                44 (A)              07/27/2020                Review of Systems   HENT: Negative for postnasal drip.    Eyes: Negative for photophobia.   Respiratory: Positive for shortness of breath. Negative for chest tightness.         SOB about the same    Gastrointestinal: Negative for abdominal distention and blood in stool.   Genitourinary: Negative for dysuria, flank pain and hematuria.   Musculoskeletal: Negative for back pain.        Foot neuropathic pain    Skin: Negative for pallor.   Neurological: Negative for dizziness, seizures, facial asymmetry and speech difficulty.   Hematological: Does not bruise/bleed easily.   Psychiatric/Behavioral: Negative for agitation and suicidal ideas. The patient is not nervous/anxious.        Objective:      Physical Exam  Vitals signs reviewed.   Constitutional:       General: He is  not in acute distress.     Appearance: He is well-developed.   HENT:      Head: Normocephalic and atraumatic.      Right Ear: External ear normal.      Left Ear: External ear normal.   Eyes:      General:         Right eye: No discharge.         Left eye: No discharge.   Neck:      Musculoskeletal: Neck supple.      Thyroid: No thyromegaly.   Cardiovascular:      Rate and Rhythm: Normal rate and regular rhythm.      Pulses:           Dorsalis pedis pulses are 1+ on the right side and 1+ on the left side.        Posterior tibial pulses are 1+ on the right side and 1+ on the left side.      Heart sounds: No murmur. No friction rub. No gallop.    Pulmonary:      Effort: Pulmonary effort is normal. No respiratory distress.      Breath sounds: Normal breath sounds. No wheezing or rales.   Abdominal:      General: Bowel sounds are normal. There is no distension.      Palpations: Abdomen is soft.      Tenderness: There is no abdominal tenderness. There is no guarding or rebound.   Musculoskeletal:      Comments: Wearing compression stockings     Feet:      Right foot:      Protective Sensation: 6 sites tested. 1 site sensed.     Skin integrity: No ulcer, skin breakdown, callus or dry skin.      Left foot:      Protective Sensation: 6 sites tested. 2 sites sensed.      Skin integrity: No ulcer, skin breakdown, callus or dry skin.   Lymphadenopathy:      Cervical: No cervical adenopathy.   Skin:     General: Skin is warm and dry.   Neurological:      Mental Status: He is alert and oriented to person, place, and time.      Cranial Nerves: No cranial nerve deficit.   Psychiatric:         Behavior: Behavior normal.         Assessment:       1. Chronic kidney disease, stage 4 (severe)    2. Chronic combined systolic and diastolic congestive heart failure    3. Hypothyroidism due to acquired atrophy of thyroid        Plan:   Eddie was seen today for follow-up.    Diagnoses and all orders for this visit:    Chronic kidney disease,  stage 4 (severe)  -     Basic metabolic panel; Future  Improved with decreasing lasix .    Chronic combined systolic and diastolic congestive heart failure  -     Basic metabolic panel; Future    Hypothyroidism due to acquired atrophy of thyroid  -     TSH; Future    Wear compression stocking.  He has been taking lasix 40 mg every other day .  Will reduce to 20 mg every other day .    We discussed about leg edema  And  weight gain issues with CHF.  I educated patient to call  If he  Gains 5 lbs and more ,or legs start swelling or worsening shortness of breath , or inability to lie down.  If breathing is worse go to emergency Room.    Problem List Items Addressed This Visit     None

## 2020-07-29 DIAGNOSIS — I10 ESSENTIAL HYPERTENSION: ICD-10-CM

## 2020-07-29 DIAGNOSIS — I50.32 CHRONIC DIASTOLIC HEART FAILURE: ICD-10-CM

## 2020-07-29 RX ORDER — FUROSEMIDE 20 MG/1
TABLET ORAL
Qty: 45 TABLET | Refills: 1 | Status: SHIPPED | OUTPATIENT
Start: 2020-07-29 | End: 2021-03-01

## 2020-07-29 NOTE — TELEPHONE ENCOUNTER
Requested Prescriptions     Pending Prescriptions Disp Refills    furosemide (LASIX) 20 MG tablet 45 tablet 1     Si mg every other day   Rx pended. Thanks.

## 2020-07-29 NOTE — TELEPHONE ENCOUNTER
----- Message from Shira Pittman sent at 2020  9:51 AM CDT -----  Contact: Jessica/Wife  Eddie Parada Sr.  MRN: [id number]  : 1929  PCP: Callum Roberts  Home Phone      [phone]  Work Phone      Not on file.  Mobile          [phone]      MESSAGE:   Was seen yesterday and Dr. Roberts was supposed to decrease RX furosemide (LASIX) 40 MG tablet to 20 mg and call in to pharmacy. Please send to pharmacy with the new dosage.    [facility] [address] [facility]    Phone: [phone]

## 2020-08-24 ENCOUNTER — CLINICAL SUPPORT (OUTPATIENT)
Dept: INTERNAL MEDICINE | Facility: CLINIC | Age: 85
End: 2020-08-24
Payer: MEDICARE

## 2020-08-24 DIAGNOSIS — N18.4 CHRONIC KIDNEY DISEASE, STAGE 4 (SEVERE): ICD-10-CM

## 2020-08-24 DIAGNOSIS — I50.42 CHRONIC COMBINED SYSTOLIC AND DIASTOLIC CONGESTIVE HEART FAILURE: ICD-10-CM

## 2020-08-24 DIAGNOSIS — E03.4 HYPOTHYROIDISM DUE TO ACQUIRED ATROPHY OF THYROID: ICD-10-CM

## 2020-08-24 LAB
ANION GAP SERPL CALC-SCNC: 8 MMOL/L (ref 8–16)
BUN SERPL-MCNC: 22 MG/DL (ref 10–30)
CALCIUM SERPL-MCNC: 9.5 MG/DL (ref 8.7–10.5)
CHLORIDE SERPL-SCNC: 106 MMOL/L (ref 95–110)
CO2 SERPL-SCNC: 27 MMOL/L (ref 23–29)
CREAT SERPL-MCNC: 1.3 MG/DL (ref 0.5–1.4)
EST. GFR  (AFRICAN AMERICAN): 55 ML/MIN/1.73 M^2
EST. GFR  (NON AFRICAN AMERICAN): 48 ML/MIN/1.73 M^2
GLUCOSE SERPL-MCNC: 121 MG/DL (ref 70–110)
POTASSIUM SERPL-SCNC: 5.2 MMOL/L (ref 3.5–5.1)
SODIUM SERPL-SCNC: 141 MMOL/L (ref 136–145)
TSH SERPL DL<=0.005 MIU/L-ACNC: 3.4 UIU/ML (ref 0.4–4)

## 2020-08-24 PROCEDURE — 84443 ASSAY THYROID STIM HORMONE: CPT

## 2020-08-24 PROCEDURE — 80048 BASIC METABOLIC PNL TOTAL CA: CPT

## 2020-08-24 PROCEDURE — 36415 COLL VENOUS BLD VENIPUNCTURE: CPT | Mod: S$GLB,,, | Performed by: INTERNAL MEDICINE

## 2020-08-24 PROCEDURE — 36415 PR COLLECTION VENOUS BLOOD,VENIPUNCTURE: ICD-10-PCS | Mod: S$GLB,,, | Performed by: INTERNAL MEDICINE

## 2020-08-25 ENCOUNTER — OFFICE VISIT (OUTPATIENT)
Dept: INTERNAL MEDICINE | Facility: CLINIC | Age: 85
End: 2020-08-25
Payer: MEDICARE

## 2020-08-25 VITALS
WEIGHT: 189 LBS | RESPIRATION RATE: 16 BRPM | DIASTOLIC BLOOD PRESSURE: 60 MMHG | HEIGHT: 66 IN | SYSTOLIC BLOOD PRESSURE: 110 MMHG | OXYGEN SATURATION: 96 % | HEART RATE: 58 BPM | BODY MASS INDEX: 30.37 KG/M2

## 2020-08-25 DIAGNOSIS — E11.49 CONTROLLED TYPE 2 DIABETES MELLITUS WITH OTHER NEUROLOGIC COMPLICATION, WITHOUT LONG-TERM CURRENT USE OF INSULIN: ICD-10-CM

## 2020-08-25 DIAGNOSIS — N18.30 CKD (CHRONIC KIDNEY DISEASE) STAGE 3, GFR 30-59 ML/MIN: Primary | ICD-10-CM

## 2020-08-25 DIAGNOSIS — I50.30 HEART FAILURE WITH PRESERVED EJECTION FRACTION, UNSPECIFIED HF CHRONICITY: ICD-10-CM

## 2020-08-25 DIAGNOSIS — E03.4 HYPOTHYROIDISM DUE TO ACQUIRED ATROPHY OF THYROID: ICD-10-CM

## 2020-08-25 PROCEDURE — 1159F MED LIST DOCD IN RCRD: CPT | Mod: S$GLB,,, | Performed by: INTERNAL MEDICINE

## 2020-08-25 PROCEDURE — 1126F AMNT PAIN NOTED NONE PRSNT: CPT | Mod: S$GLB,,, | Performed by: INTERNAL MEDICINE

## 2020-08-25 PROCEDURE — 99499 RISK ADDL DX/OHS AUDIT: ICD-10-PCS | Mod: S$GLB,,, | Performed by: INTERNAL MEDICINE

## 2020-08-25 PROCEDURE — 99499 UNLISTED E&M SERVICE: CPT | Mod: S$GLB,,, | Performed by: INTERNAL MEDICINE

## 2020-08-25 PROCEDURE — 1101F PR PT FALLS ASSESS DOC 0-1 FALLS W/OUT INJ PAST YR: ICD-10-PCS | Mod: CPTII,S$GLB,, | Performed by: INTERNAL MEDICINE

## 2020-08-25 PROCEDURE — 99214 PR OFFICE/OUTPT VISIT, EST, LEVL IV, 30-39 MIN: ICD-10-PCS | Mod: S$GLB,,, | Performed by: INTERNAL MEDICINE

## 2020-08-25 PROCEDURE — 99999 PR PBB SHADOW E&M-EST. PATIENT-LVL IV: CPT | Mod: PBBFAC,,, | Performed by: INTERNAL MEDICINE

## 2020-08-25 PROCEDURE — 1101F PT FALLS ASSESS-DOCD LE1/YR: CPT | Mod: CPTII,S$GLB,, | Performed by: INTERNAL MEDICINE

## 2020-08-25 PROCEDURE — 1126F PR PAIN SEVERITY QUANTIFIED, NO PAIN PRESENT: ICD-10-PCS | Mod: S$GLB,,, | Performed by: INTERNAL MEDICINE

## 2020-08-25 PROCEDURE — 99214 OFFICE O/P EST MOD 30 MIN: CPT | Mod: S$GLB,,, | Performed by: INTERNAL MEDICINE

## 2020-08-25 PROCEDURE — 99999 PR PBB SHADOW E&M-EST. PATIENT-LVL IV: ICD-10-PCS | Mod: PBBFAC,,, | Performed by: INTERNAL MEDICINE

## 2020-08-25 PROCEDURE — 1159F PR MEDICATION LIST DOCUMENTED IN MEDICAL RECORD: ICD-10-PCS | Mod: S$GLB,,, | Performed by: INTERNAL MEDICINE

## 2020-08-25 NOTE — PROGRESS NOTES
Subjective:       Patient ID: Eddie Parada Sr. is a 89 y.o. male.    Chief Complaint: Follow-up (CHF; CKD; hypothyroidism )    Eddie Parada Sr. is a 89 y.o. male  Here for  Follow up .  4 weeks ago his creatinine had bumped  And his tsh was high   So I lowered his lasix and resumed his thyroid meds.       His bmp is improved   His TSH is normal now     Follow-up      Review of Systems   HENT: Negative for postnasal drip.    Eyes: Negative for photophobia.   Respiratory: Positive for shortness of breath. Negative for chest tightness.         SOB about the same    Gastrointestinal: Negative for abdominal distention and blood in stool.   Genitourinary: Negative for dysuria, flank pain and hematuria.   Musculoskeletal: Negative for back pain.        Foot neuropathic pain    Skin: Negative for pallor.   Neurological: Negative for dizziness, seizures, facial asymmetry and speech difficulty.   Hematological: Does not bruise/bleed easily.   Psychiatric/Behavioral: Negative for agitation and suicidal ideas. The patient is not nervous/anxious.        Objective:      Physical Exam  Vitals signs reviewed.   Constitutional:       General: He is not in acute distress.     Appearance: He is well-developed.   HENT:      Head: Normocephalic and atraumatic.      Right Ear: External ear normal.      Left Ear: External ear normal.   Eyes:      General:         Right eye: No discharge.         Left eye: No discharge.   Neck:      Musculoskeletal: Neck supple.      Thyroid: No thyromegaly.   Cardiovascular:      Rate and Rhythm: Normal rate and regular rhythm.      Pulses:           Dorsalis pedis pulses are 1+ on the right side and 1+ on the left side.        Posterior tibial pulses are 1+ on the right side and 1+ on the left side.      Heart sounds: No murmur. No friction rub. No gallop.    Pulmonary:      Effort: Pulmonary effort is normal. No respiratory distress.      Breath sounds: Normal breath sounds. No wheezing or  rales.   Abdominal:      General: Bowel sounds are normal. There is no distension.      Palpations: Abdomen is soft.      Tenderness: There is no abdominal tenderness. There is no guarding or rebound.   Musculoskeletal:      Comments: Wearing compression stockings     Feet:      Right foot:      Protective Sensation: 6 sites tested. 1 site sensed.     Skin integrity: No ulcer, skin breakdown, callus or dry skin.      Left foot:      Protective Sensation: 6 sites tested. 2 sites sensed.      Skin integrity: No ulcer, skin breakdown, callus or dry skin.   Lymphadenopathy:      Cervical: No cervical adenopathy.   Skin:     General: Skin is warm and dry.   Neurological:      Mental Status: He is alert and oriented to person, place, and time.      Cranial Nerves: No cranial nerve deficit.   Psychiatric:         Behavior: Behavior normal.         Assessment:       1. CKD (chronic kidney disease) stage 3, GFR 30-59 ml/min    2. Hypothyroidism due to acquired atrophy of thyroid    3. Heart failure with preserved ejection fraction, unspecified HF chronicity    4. 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    Diagnoses and all orders for this visit:    CKD (chronic kidney disease) stage 3, GFR 30-59 ml/min  -     Comprehensive metabolic panel; Future  Continue reduced dose of lasix     Hypothyroidism due to acquired atrophy of thyroid  -     TSH; Future  Well controlled.  Continue same medication and dose.  Heart failure with preserved ejection fraction, unspecified HF chronicity  -     CBC auto differential; Future  -     Lipid Panel; Future  We discussed about leg edema  And  weight gain issues with CHF.  I educated patient to call  If he  Gains 5 lbs and more ,or legs start swelling or worsening shortness of breath , or inability to lie down.  If breathing is worse go to emergency Room      Controlled  type 2 diabetes mellitus with other neurologic complication, without long-term current use of insulin  -     Hemoglobin A1C; Future  -     Microalbumin/creatinine urine ratio; Future  Lab Results   Component Value Date    HGBA1C 6.4 (H) 07/17/2020     Patient has controlled Diabetes .  We discussed about diet ;low in calories. Avoid sweats, sodas.  Also increasing activity;walking 2-3 miles a day.  Goal of  A1c  less than 7 % stressed.  Also goal of LDL less than 70 highlighted to patient.    Problem List Items Addressed This Visit     Type 2 diabetes mellitus, controlled    Relevant Orders    Hemoglobin A1C    Microalbumin/creatinine urine ratio    (HFpEF) heart failure with preserved ejection fraction    Relevant Orders    CBC auto differential    Lipid Panel    Hypothyroidism due to acquired atrophy of thyroid    Relevant Orders    TSH      Other Visit Diagnoses     CKD (chronic kidney disease) stage 3, GFR 30-59 ml/min    -  Primary    Relevant Orders    Comprehensive metabolic panel

## 2020-09-02 DIAGNOSIS — Z79.4 TYPE 2 DIABETES MELLITUS WITH OTHER NEUROLOGIC COMPLICATION, WITH LONG-TERM CURRENT USE OF INSULIN: ICD-10-CM

## 2020-09-02 DIAGNOSIS — E11.49 TYPE 2 DIABETES MELLITUS WITH OTHER NEUROLOGIC COMPLICATION, WITH LONG-TERM CURRENT USE OF INSULIN: ICD-10-CM

## 2020-09-02 RX ORDER — LANCETS
EACH MISCELLANEOUS
Qty: 200 EACH | Refills: 3 | Status: ON HOLD | OUTPATIENT
Start: 2020-09-02 | End: 2022-04-30 | Stop reason: CLARIF

## 2020-09-02 RX ORDER — DEXTROSE 4 G
TABLET,CHEWABLE ORAL
Qty: 1 EACH | Refills: 0 | Status: ON HOLD | OUTPATIENT
Start: 2020-09-02 | End: 2022-04-30 | Stop reason: CLARIF

## 2020-09-02 RX ORDER — IBUPROFEN 200 MG
CAPSULE ORAL
Qty: 200 STRIP | Refills: 3 | Status: ON HOLD | OUTPATIENT
Start: 2020-09-02 | End: 2022-04-30 | Stop reason: CLARIF

## 2020-09-02 NOTE — TELEPHONE ENCOUNTER
----- Message from Jinny Montes sent at 2020 12:37 PM CDT -----  Regarding: Rx Requested  Contact: Lorin (daughter)  Eddie Parada Sr.  MRN: [id number]  : 1929  PCP: Callum Roberts  Home Phone      [phone]  Work Phone      Not on file.  Mobile          [phone]      MESSAGE:    Request Rx for a new glucometer, test strips, and lancets. The patient may receive supplies at no cost thru his insurance with the mail off pharmacy.   90 day Rx supply requested     Phone # [phone]    Pharmacy - [facility]   Phone # [phone]   Fax # [phone]

## 2020-11-02 ENCOUNTER — OFFICE VISIT (OUTPATIENT)
Dept: INTERNAL MEDICINE | Facility: CLINIC | Age: 85
End: 2020-11-02
Payer: MEDICARE

## 2020-11-02 ENCOUNTER — HOSPITAL ENCOUNTER (OUTPATIENT)
Dept: RADIOLOGY | Facility: HOSPITAL | Age: 85
Discharge: HOME | End: 2020-11-02
Attending: INTERNAL MEDICINE
Payer: MEDICARE

## 2020-11-02 ENCOUNTER — TELEPHONE (OUTPATIENT)
Dept: INTERNAL MEDICINE | Facility: CLINIC | Age: 85
End: 2020-11-02

## 2020-11-02 VITALS
BODY MASS INDEX: 30.37 KG/M2 | DIASTOLIC BLOOD PRESSURE: 78 MMHG | WEIGHT: 189 LBS | HEIGHT: 66 IN | RESPIRATION RATE: 16 BRPM | HEART RATE: 60 BPM | OXYGEN SATURATION: 98 % | SYSTOLIC BLOOD PRESSURE: 108 MMHG

## 2020-11-02 DIAGNOSIS — M25.552 PAIN OF LEFT HIP JOINT: ICD-10-CM

## 2020-11-02 DIAGNOSIS — M25.552 PAIN OF LEFT HIP JOINT: Primary | ICD-10-CM

## 2020-11-02 DIAGNOSIS — M25.562 ACUTE PAIN OF LEFT KNEE: ICD-10-CM

## 2020-11-02 DIAGNOSIS — M25.559 HIP PAIN: Primary | ICD-10-CM

## 2020-11-02 PROCEDURE — 1100F PR PT FALLS ASSESS DOC 2+ FALLS/FALL W/INJURY/YR: ICD-10-PCS | Mod: CPTII,S$GLB,, | Performed by: INTERNAL MEDICINE

## 2020-11-02 PROCEDURE — 73502 X-RAY EXAM HIP UNI 2-3 VIEWS: CPT | Mod: TC,LT

## 2020-11-02 PROCEDURE — 3288F PR FALLS RISK ASSESSMENT DOCUMENTED: ICD-10-PCS | Mod: CPTII,S$GLB,, | Performed by: INTERNAL MEDICINE

## 2020-11-02 PROCEDURE — 99999 PR PBB SHADOW E&M-EST. PATIENT-LVL V: CPT | Mod: PBBFAC,,, | Performed by: INTERNAL MEDICINE

## 2020-11-02 PROCEDURE — 73502 X-RAY EXAM HIP UNI 2-3 VIEWS: CPT | Mod: 26,LT,, | Performed by: RADIOLOGY

## 2020-11-02 PROCEDURE — 1125F AMNT PAIN NOTED PAIN PRSNT: CPT | Mod: S$GLB,,, | Performed by: INTERNAL MEDICINE

## 2020-11-02 PROCEDURE — 1100F PTFALLS ASSESS-DOCD GE2>/YR: CPT | Mod: CPTII,S$GLB,, | Performed by: INTERNAL MEDICINE

## 2020-11-02 PROCEDURE — 99213 OFFICE O/P EST LOW 20 MIN: CPT | Mod: 25,S$GLB,, | Performed by: INTERNAL MEDICINE

## 2020-11-02 PROCEDURE — 96372 PR INJECTION,THERAP/PROPH/DIAG2ST, IM OR SUBCUT: ICD-10-PCS | Mod: S$GLB,,, | Performed by: INTERNAL MEDICINE

## 2020-11-02 PROCEDURE — 3288F FALL RISK ASSESSMENT DOCD: CPT | Mod: CPTII,S$GLB,, | Performed by: INTERNAL MEDICINE

## 2020-11-02 PROCEDURE — 1159F PR MEDICATION LIST DOCUMENTED IN MEDICAL RECORD: ICD-10-PCS | Mod: S$GLB,,, | Performed by: INTERNAL MEDICINE

## 2020-11-02 PROCEDURE — 99213 PR OFFICE/OUTPT VISIT, EST, LEVL III, 20-29 MIN: ICD-10-PCS | Mod: 25,S$GLB,, | Performed by: INTERNAL MEDICINE

## 2020-11-02 PROCEDURE — 96372 THER/PROPH/DIAG INJ SC/IM: CPT | Mod: S$GLB,,, | Performed by: INTERNAL MEDICINE

## 2020-11-02 PROCEDURE — 99499 RISK ADDL DX/OHS AUDIT: ICD-10-PCS | Mod: S$GLB,,, | Performed by: INTERNAL MEDICINE

## 2020-11-02 PROCEDURE — 99499 UNLISTED E&M SERVICE: CPT | Mod: S$GLB,,, | Performed by: INTERNAL MEDICINE

## 2020-11-02 PROCEDURE — 99999 PR PBB SHADOW E&M-EST. PATIENT-LVL V: ICD-10-PCS | Mod: PBBFAC,,, | Performed by: INTERNAL MEDICINE

## 2020-11-02 PROCEDURE — 1159F MED LIST DOCD IN RCRD: CPT | Mod: S$GLB,,, | Performed by: INTERNAL MEDICINE

## 2020-11-02 PROCEDURE — 73502 XR HIP 2 VIEW LEFT: ICD-10-PCS | Mod: 26,LT,, | Performed by: RADIOLOGY

## 2020-11-02 PROCEDURE — 1125F PR PAIN SEVERITY QUANTIFIED, PAIN PRESENT: ICD-10-PCS | Mod: S$GLB,,, | Performed by: INTERNAL MEDICINE

## 2020-11-02 RX ORDER — METHYLPREDNISOLONE ACETATE 80 MG/ML
80 INJECTION, SUSPENSION INTRA-ARTICULAR; INTRALESIONAL; INTRAMUSCULAR; SOFT TISSUE
Status: COMPLETED | OUTPATIENT
Start: 2020-11-02 | End: 2020-11-02

## 2020-11-02 RX ORDER — MELOXICAM 7.5 MG/1
7.5 TABLET ORAL DAILY
Qty: 30 TABLET | Refills: 0 | Status: SHIPPED | OUTPATIENT
Start: 2020-11-02 | End: 2021-03-02

## 2020-11-02 RX ADMIN — METHYLPREDNISOLONE ACETATE 80 MG: 80 INJECTION, SUSPENSION INTRA-ARTICULAR; INTRALESIONAL; INTRAMUSCULAR; SOFT TISSUE at 01:11

## 2020-11-02 NOTE — TELEPHONE ENCOUNTER
I sent him meloxicam 7.5 mg . Only take if severe pain   But watch for leg swelling /CHF . If any swelling worsening stop this medication

## 2020-11-02 NOTE — TELEPHONE ENCOUNTER
----- Message from Dayanna Ramos MA sent at 11/2/2020  3:12 PM CST -----  Patient's wife is asking if you're going to send an anti inflammatory in for him so he is not taking tylenol all the time. Please advise

## 2020-11-02 NOTE — PROGRESS NOTES
Subjective:       Patient ID: Eddie Parada Sr. is a 89 y.o. male.    Chief Complaint: Hip Pain, Knee Pain, and Foot Pain    Eddie Parada Sr. is a 89 y.o. male  Started with left hip ; left knee and left foot and ankle.      Hip Pain   Incident onset: left hip pain ; s/p surgery in past  There was no injury mechanism. The pain is present in the left hip and left ankle (left knee ). The pain is at a severity of 5/10. The pain is moderate. The pain has been improving since onset. He has tried acetaminophen for the symptoms. The treatment provided moderate relief.   Knee Pain     Foot Pain  Associated symptoms include arthralgias.     Review of Systems   HENT: Negative for postnasal drip.    Eyes: Negative for photophobia.   Respiratory: Positive for shortness of breath. Negative for chest tightness.         SOB about the same    Gastrointestinal: Negative for abdominal distention and blood in stool.   Genitourinary: Negative for dysuria, flank pain and hematuria.   Musculoskeletal: Positive for arthralgias. Negative for back pain.        Foot neuropathic pain    Skin: Negative for pallor.   Neurological: Negative for dizziness, seizures, facial asymmetry and speech difficulty.   Hematological: Does not bruise/bleed easily.   Psychiatric/Behavioral: Negative for agitation and suicidal ideas. The patient is not nervous/anxious.        Objective:      Physical Exam  Musculoskeletal:      Left hip: He exhibits tenderness and bony tenderness. He exhibits normal range of motion and no swelling.      Left knee: He exhibits normal range of motion and no swelling. No tenderness found. No medial joint line tenderness noted.      Left ankle: He exhibits normal range of motion, no swelling and no ecchymosis. Tenderness.         Assessment:       1. Pain of left hip joint    2. Acute pain of left knee        Plan:   Eddie was seen today for hip pain, knee pain and foot pain.    Diagnoses and all orders for this  visit:    Pain of left hip joint  -     methylPREDNISolone acetate injection 80 mg  -     X-Ray Hip 2 or 3 views Left; Future    Acute pain of left knee  -     methylPREDNISolone acetate injection 80 mg    with CKD stay away from NSAIDS   Tylenol ok ; no more than 4 a day   If pain not better will send to orthopedics.    Problem List Items Addressed This Visit     None

## 2020-12-14 ENCOUNTER — TELEPHONE (OUTPATIENT)
Dept: INTERNAL MEDICINE | Facility: CLINIC | Age: 85
End: 2020-12-14

## 2020-12-14 DIAGNOSIS — M25.562 LEFT KNEE PAIN, UNSPECIFIED CHRONICITY: Primary | ICD-10-CM

## 2020-12-14 NOTE — TELEPHONE ENCOUNTER
Wife notified of Dr. Roberts' recommendations. Appt scheduled with GUILLERMINA Thomson for tomorrow @ 2:30 pm.

## 2020-12-14 NOTE — TELEPHONE ENCOUNTER
Spoke with pt's wife and she states that he gets the pain in the back of the knee and that it moves. That it is not always in the same place. Pt said that the steroid shot he was given helped for a month. Pt wants to know if he can get another one. Did see that you put in the last clinic note that if pt was still experiencing the pain that you would send him to ortho.     Please advise.

## 2020-12-14 NOTE — TELEPHONE ENCOUNTER
----- Message from Jinny Montes sent at 2020  8:38 AM CST -----  Regarding: Request to speak to a nurse  Contact: self  Eddie Parada Sr.  MRN: [id number]  : 1929  PCP: Callum Roberts  Home Phone      [phone]  Work Phone      Not on file.  Mobile          [phone]      MESSAGE:   Patient request to speak to a nurse regarding symptoms c/o pain to left leg, and questions about a steroid injection.   Declined appointment     Phone # [phone] / [phone]    Pharmacy - [facility] [address] [facility]

## 2020-12-15 ENCOUNTER — OFFICE VISIT (OUTPATIENT)
Dept: ORTHOPEDICS | Facility: CLINIC | Age: 85
End: 2020-12-15
Payer: MEDICARE

## 2020-12-15 ENCOUNTER — PATIENT OUTREACH (OUTPATIENT)
Dept: ADMINISTRATIVE | Facility: OTHER | Age: 85
End: 2020-12-15

## 2020-12-15 VITALS
DIASTOLIC BLOOD PRESSURE: 62 MMHG | BODY MASS INDEX: 33.27 KG/M2 | WEIGHT: 194.88 LBS | HEART RATE: 70 BPM | SYSTOLIC BLOOD PRESSURE: 108 MMHG | HEIGHT: 64 IN | TEMPERATURE: 98 F | RESPIRATION RATE: 18 BRPM

## 2020-12-15 DIAGNOSIS — M25.562 LEFT KNEE PAIN, UNSPECIFIED CHRONICITY: ICD-10-CM

## 2020-12-15 DIAGNOSIS — M25.562 LEFT KNEE PAIN, UNSPECIFIED CHRONICITY: Primary | ICD-10-CM

## 2020-12-15 DIAGNOSIS — G89.29 CHRONIC MIDLINE LOW BACK PAIN WITH LEFT-SIDED SCIATICA: Primary | ICD-10-CM

## 2020-12-15 DIAGNOSIS — M54.42 CHRONIC MIDLINE LOW BACK PAIN WITH LEFT-SIDED SCIATICA: Primary | ICD-10-CM

## 2020-12-15 DIAGNOSIS — E11.9 DIABETES MELLITUS WITHOUT COMPLICATION: Primary | ICD-10-CM

## 2020-12-15 PROCEDURE — 3288F FALL RISK ASSESSMENT DOCD: CPT | Mod: CPTII,S$GLB,, | Performed by: PHYSICIAN ASSISTANT

## 2020-12-15 PROCEDURE — 1159F MED LIST DOCD IN RCRD: CPT | Mod: S$GLB,,, | Performed by: PHYSICIAN ASSISTANT

## 2020-12-15 PROCEDURE — 1101F PT FALLS ASSESS-DOCD LE1/YR: CPT | Mod: CPTII,S$GLB,, | Performed by: PHYSICIAN ASSISTANT

## 2020-12-15 PROCEDURE — 99999 PR PBB SHADOW E&M-EST. PATIENT-LVL V: CPT | Mod: PBBFAC,,, | Performed by: PHYSICIAN ASSISTANT

## 2020-12-15 PROCEDURE — 99999 PR PBB SHADOW E&M-EST. PATIENT-LVL V: ICD-10-PCS | Mod: PBBFAC,,, | Performed by: PHYSICIAN ASSISTANT

## 2020-12-15 PROCEDURE — 1159F PR MEDICATION LIST DOCUMENTED IN MEDICAL RECORD: ICD-10-PCS | Mod: S$GLB,,, | Performed by: PHYSICIAN ASSISTANT

## 2020-12-15 PROCEDURE — 99203 PR OFFICE/OUTPT VISIT, NEW, LEVL III, 30-44 MIN: ICD-10-PCS | Mod: S$GLB,,, | Performed by: PHYSICIAN ASSISTANT

## 2020-12-15 PROCEDURE — 3288F PR FALLS RISK ASSESSMENT DOCUMENTED: ICD-10-PCS | Mod: CPTII,S$GLB,, | Performed by: PHYSICIAN ASSISTANT

## 2020-12-15 PROCEDURE — 1125F AMNT PAIN NOTED PAIN PRSNT: CPT | Mod: S$GLB,,, | Performed by: PHYSICIAN ASSISTANT

## 2020-12-15 PROCEDURE — 1101F PR PT FALLS ASSESS DOC 0-1 FALLS W/OUT INJ PAST YR: ICD-10-PCS | Mod: CPTII,S$GLB,, | Performed by: PHYSICIAN ASSISTANT

## 2020-12-15 PROCEDURE — 99203 OFFICE O/P NEW LOW 30 MIN: CPT | Mod: S$GLB,,, | Performed by: PHYSICIAN ASSISTANT

## 2020-12-15 PROCEDURE — 1125F PR PAIN SEVERITY QUANTIFIED, PAIN PRESENT: ICD-10-PCS | Mod: S$GLB,,, | Performed by: PHYSICIAN ASSISTANT

## 2020-12-15 NOTE — LETTER
December 17, 2020      Callum Roberts MD  [address]  [facility] [address]           [facility]  [address] [facility] [address]  Phone: [phone]          Patient: Eddie Parada Sr.   MR Number: [id number]   YOB: 1929   Date of Visit: 12/15/2020       Dear Dr. Callum Roberts:    Thank you for referring Eddie Parada to me for evaluation. Attached you will find relevant portions of my assessment and plan of care.    If you have questions, please do not hesitate to call me. I look forward to following Eddie Parada along with you.    Sincerely,    Cydney Thomson PA-C    Enclosure  CC:  No Recipients    If you would like to receive this communication electronically, please contact [email or web address] or [phone] to request more information on [employer] access.    For providers and/or their staff who would like to refer a patient to [facility], please contact us through our one-stop-shop provider referral line, [facility] [place], at [phone].    If you feel you have received this communication in error or would no longer like to receive these types of communications, please e-mail [email or web address]

## 2020-12-15 NOTE — PROGRESS NOTES
Updates were requested from care everywhere.  Chart was reviewed for overdue Proactive [facility] Encounters (KHADRA) topics (CRS, Breast Cancer Screening, Eye exam)  Health Maintenance has been updated.  LINKS not responding.  Order placed for diabetic eye screening photo.

## 2020-12-17 NOTE — PROGRESS NOTES
Subjective:      Patient ID: Eddie Parada Sr. is a 89 y.o. male.    Chief Complaint: Leg Problem (pain back of left leg)    Review of patient's allergies indicates:   Allergen Reactions    Ciprofloxacin     Levofloxacin Swelling    Lyrica [pregabalin] Swelling    Unable to assess Other (See Comments)     Soft shell crabs      89 yo M presents to clinic with c/o left leg pain x few months.  He states that he had similar symptoms over a year ago when he was pushed down by a dog in his neighborhood.  Pain starts to posterior hip and radiates down back of his leg into foot, described as sharp.  He saw Dr. Roberts for this about a month ago and was prescribed Mobic with complete relief of pain.  He states that as long as he takes Mobic he does not have any pain, he is worried about side effects of Mobic and does not want to take it regularly.  He does have history of left hip arthritis but denies any groin pain today.  He also c/o chronic low back pain.  No numbness or tingling.        Review of Systems   Constitution: Negative for chills, diaphoresis and fever.   HENT: Negative for congestion, ear discharge and ear pain.    Eyes: Negative for blurred vision, discharge, double vision and pain.   Cardiovascular: Negative for chest pain, claudication and cyanosis.   Respiratory: Negative for cough, hemoptysis and shortness of breath.    Endocrine: Negative for cold intolerance and heat intolerance.   Skin: Negative for color change, dry skin, itching and rash.   Musculoskeletal: Positive for joint pain. Negative for arthritis, back pain, falls, gout, joint swelling, muscle weakness and neck pain.   Gastrointestinal: Negative for abdominal pain and change in bowel habit.   Neurological: Negative for brief paralysis, disturbances in coordination and dizziness.   Psychiatric/Behavioral: Negative for altered mental status and depression.         Objective:          General    Constitutional: He is oriented to person,  place, and time. He appears well-developed and well-nourished. No distress.   HENT:   Head: Atraumatic.   Eyes: EOM are normal. Right eye exhibits no discharge. Left eye exhibits no discharge.   Cardiovascular: Normal rate.    Pulmonary/Chest: Effort normal. No respiratory distress.   Abdominal: Soft.   Neurological: He is alert and oriented to person, place, and time.   Psychiatric: He has a normal mood and affect. His behavior is normal.           Right Knee Exam     Inspection   Erythema: absent  Scars: absent  Swelling: absent  Effusion: absent  Deformity: absent  Bruising: absent    Range of Motion   Extension: 0   Flexion: 130     Tests   Ligament Examination   MCL - Valgus: normal (0 to 2mm)  LCL - Varus: normal    Comments:  No tenderness    Left Knee Exam     Inspection   Erythema: absent  Scars: absent  Swelling: absent  Effusion: absent  Deformity: absent  Bruising: absent    Range of Motion   Extension: 0   Flexion: 130     Tests   Stability   MCL - Valgus: normal (0 to 2mm)  LCL - Varus: normal (0 to 2mm)    Comments:  No tenderness    SLR positive    Vascular Exam     Right Pulses  Dorsalis Pedis:      2+          Left Pulses  Dorsalis Pedis:      2+          Edema  Right Lower Leg: absent  Left Lower Leg: absent              Assessment:         Xray Left Hip 11/2/20:  The bones are osteopenic.  There are moderate degenerative changes of the bilateral sacroiliac joints, hip joints and pubic symphysis.  No fracture or dislocation is seen.  Right femoral intramedullary lyudmila with a dynamic compression screw through the femoral neck.  No hardware complication visualized.  Extensive vascular calcifications.    Encounter Diagnoses   Name Primary?    Left knee pain, unspecified chronicity     Chronic midline low back pain with left-sided sciatica Yes    Chronic midline low back pain with left-sided sciatica  -     Ambulatory referral/consult to Pain Clinic; Future; Expected date: 12/22/2020    Left knee  pain, unspecified chronicity  -     Ambulatory referral/consult to Orthopedics               Plan:         Discussed possible diagnoses with patient. He would like referral to pain management for further evaluation of back pain.    1. Xray left knee today.  2. Continue Mobic as prescribed by PCP as needed (if ok with PCP).  3. Referral to pain management.  4. Ice compresses as directed as needed.  5. RTC as needed    Patient voices understanding of and agreement with treatment plan. All of the patient's questions were answered and the patient will contact us if he has any questions or concerns in the interim.

## 2020-12-18 ENCOUNTER — TELEPHONE (OUTPATIENT)
Dept: PAIN MEDICINE | Facility: CLINIC | Age: 85
End: 2020-12-18

## 2020-12-18 NOTE — TELEPHONE ENCOUNTER
----- Message from Smiley Jackson LPN sent at 12/18/2020  1:18 PM CST -----  Contact: [phone]  No, we are booked here.   Thank you  ----- Message -----  From: Kaia Naidu LPN  Sent: 12/18/2020   1:04 PM CST  To: [facility] Staff Pain Mgmt    Does Babar have anything sooner? If not let me know I can get him in [place].  ----- Message -----  From: Jyoti Sandoval  Sent: 12/18/2020  12:00 PM CST  To: [facility] Staff    Who Called: PT  Regarding: appt    Would the patient rather a call back or a response via [facility]? Call back  Best Call Back Number: [phone]  Additional Information: pt has an appt but really needs to be seen before 2/1/21 at any location

## 2021-01-05 ENCOUNTER — TELEPHONE (OUTPATIENT)
Dept: PAIN MEDICINE | Facility: CLINIC | Age: 86
End: 2021-01-05

## 2021-01-05 ENCOUNTER — OFFICE VISIT (OUTPATIENT)
Dept: PAIN MEDICINE | Facility: CLINIC | Age: 86
End: 2021-01-05
Payer: MEDICARE

## 2021-01-05 VITALS
SYSTOLIC BLOOD PRESSURE: 155 MMHG | BODY MASS INDEX: 33.45 KG/M2 | HEART RATE: 60 BPM | DIASTOLIC BLOOD PRESSURE: 68 MMHG | WEIGHT: 194.88 LBS

## 2021-01-05 DIAGNOSIS — M53.86 DECREASED RANGE OF MOTION OF LUMBAR SPINE: ICD-10-CM

## 2021-01-05 DIAGNOSIS — M54.16 LUMBAR RADICULOPATHY: Primary | ICD-10-CM

## 2021-01-05 DIAGNOSIS — M51.36 DDD (DEGENERATIVE DISC DISEASE), LUMBAR: ICD-10-CM

## 2021-01-05 DIAGNOSIS — R26.89 IMPAIRED GAIT AND MOBILITY: ICD-10-CM

## 2021-01-05 DIAGNOSIS — M48.062 SPINAL STENOSIS OF LUMBAR REGION WITH NEUROGENIC CLAUDICATION: ICD-10-CM

## 2021-01-05 DIAGNOSIS — M47.816 LUMBAR SPONDYLOSIS: ICD-10-CM

## 2021-01-05 PROCEDURE — 99999 PR PBB SHADOW E&M-EST. PATIENT-LVL IV: ICD-10-PCS | Mod: PBBFAC,,, | Performed by: PHYSICAL MEDICINE & REHABILITATION

## 2021-01-05 PROCEDURE — 99999 PR PBB SHADOW E&M-EST. PATIENT-LVL IV: CPT | Mod: PBBFAC,,, | Performed by: PHYSICAL MEDICINE & REHABILITATION

## 2021-01-05 PROCEDURE — 99204 PR OFFICE/OUTPT VISIT, NEW, LEVL IV, 45-59 MIN: ICD-10-PCS | Mod: S$GLB,,, | Performed by: PHYSICAL MEDICINE & REHABILITATION

## 2021-01-05 PROCEDURE — 1159F PR MEDICATION LIST DOCUMENTED IN MEDICAL RECORD: ICD-10-PCS | Mod: S$GLB,,, | Performed by: PHYSICAL MEDICINE & REHABILITATION

## 2021-01-05 PROCEDURE — 1125F AMNT PAIN NOTED PAIN PRSNT: CPT | Mod: S$GLB,,, | Performed by: PHYSICAL MEDICINE & REHABILITATION

## 2021-01-05 PROCEDURE — 1125F PR PAIN SEVERITY QUANTIFIED, PAIN PRESENT: ICD-10-PCS | Mod: S$GLB,,, | Performed by: PHYSICAL MEDICINE & REHABILITATION

## 2021-01-05 PROCEDURE — 99204 OFFICE O/P NEW MOD 45 MIN: CPT | Mod: S$GLB,,, | Performed by: PHYSICAL MEDICINE & REHABILITATION

## 2021-01-05 PROCEDURE — 1159F MED LIST DOCD IN RCRD: CPT | Mod: S$GLB,,, | Performed by: PHYSICAL MEDICINE & REHABILITATION

## 2021-01-05 RX ORDER — GABAPENTIN 100 MG/1
200 CAPSULE ORAL NIGHTLY
Qty: 60 CAPSULE | Refills: 11 | Status: SHIPPED | OUTPATIENT
Start: 2021-01-05 | End: 2021-05-24 | Stop reason: SDUPTHER

## 2021-01-05 RX ORDER — ACETAMINOPHEN 500 MG
500 TABLET ORAL EVERY 6 HOURS PRN
COMMUNITY

## 2021-01-06 ENCOUNTER — HOSPITAL ENCOUNTER (OUTPATIENT)
Dept: RADIOLOGY | Facility: HOSPITAL | Age: 86
Discharge: HOME | End: 2021-01-06
Attending: PHYSICAL MEDICINE & REHABILITATION
Payer: MEDICARE

## 2021-01-06 DIAGNOSIS — M54.16 LUMBAR RADICULOPATHY: ICD-10-CM

## 2021-01-06 PROCEDURE — 72110 X-RAY EXAM L-2 SPINE 4/>VWS: CPT | Mod: TC

## 2021-01-06 PROCEDURE — 72110 XR LUMBAR SPINE 5 VIEW WITH FLEX AND EXT: ICD-10-PCS | Mod: 26,,, | Performed by: RADIOLOGY

## 2021-01-06 PROCEDURE — 72110 X-RAY EXAM L-2 SPINE 4/>VWS: CPT | Mod: 26,,, | Performed by: RADIOLOGY

## 2021-01-19 ENCOUNTER — HOSPITAL ENCOUNTER (OUTPATIENT)
Dept: PREADMISSION TESTING | Facility: HOSPITAL | Age: 86
Discharge: HOME | End: 2021-01-19
Attending: PHYSICAL MEDICINE & REHABILITATION
Payer: MEDICARE

## 2021-01-19 DIAGNOSIS — Z01.818 PRE-OP TESTING: ICD-10-CM

## 2021-01-19 LAB — SARS-COV-2 RNA RESP QL NAA+PROBE: NOT DETECTED

## 2021-01-19 PROCEDURE — U0003 INFECTIOUS AGENT DETECTION BY NUCLEIC ACID (DNA OR RNA); SEVERE ACUTE RESPIRATORY SYNDROME CORONAVIRUS 2 (SARS-COV-2) (CORONAVIRUS DISEASE [COVID-19]), AMPLIFIED PROBE TECHNIQUE, MAKING USE OF HIGH THROUGHPUT TECHNOLOGIES AS DESCRIBED BY CMS-2020-01-R: HCPCS

## 2021-01-28 ENCOUNTER — TELEPHONE (OUTPATIENT)
Dept: INTERNAL MEDICINE | Facility: CLINIC | Age: 86
End: 2021-01-28

## 2021-01-28 ENCOUNTER — TELEPHONE (OUTPATIENT)
Dept: PAIN MEDICINE | Facility: CLINIC | Age: 86
End: 2021-01-28

## 2021-02-12 ENCOUNTER — HOSPITAL ENCOUNTER (OUTPATIENT)
Facility: HOSPITAL | Age: 86
Discharge: HOME | End: 2021-02-12
Attending: PHYSICAL MEDICINE & REHABILITATION | Admitting: PHYSICAL MEDICINE & REHABILITATION
Payer: MEDICARE

## 2021-02-12 VITALS
OXYGEN SATURATION: 97 % | HEART RATE: 75 BPM | DIASTOLIC BLOOD PRESSURE: 65 MMHG | SYSTOLIC BLOOD PRESSURE: 155 MMHG | RESPIRATION RATE: 18 BRPM

## 2021-02-12 DIAGNOSIS — G89.29 CHRONIC PAIN: ICD-10-CM

## 2021-02-12 DIAGNOSIS — M54.16 LUMBAR RADICULOPATHY: Primary | ICD-10-CM

## 2021-02-12 LAB — SARS-COV-2 RDRP RESP QL NAA+PROBE: NEGATIVE

## 2021-02-12 PROCEDURE — U0002 COVID-19 LAB TEST NON-CDC: HCPCS

## 2021-02-12 RX ORDER — DEXAMETHASONE SODIUM PHOSPHATE 10 MG/ML
INJECTION INTRAMUSCULAR; INTRAVENOUS
Status: DISCONTINUED
Start: 2021-02-12 | End: 2021-02-12 | Stop reason: WASHOUT

## 2021-02-12 RX ORDER — LIDOCAINE HYDROCHLORIDE 10 MG/ML
INJECTION, SOLUTION EPIDURAL; INFILTRATION; INTRACAUDAL; PERINEURAL
Status: DISCONTINUED
Start: 2021-02-12 | End: 2021-02-12 | Stop reason: WASHOUT

## 2021-02-12 RX ORDER — LIDOCAINE HYDROCHLORIDE 10 MG/ML
INJECTION INFILTRATION; PERINEURAL
Status: DISCONTINUED
Start: 2021-02-12 | End: 2021-02-12 | Stop reason: WASHOUT

## 2021-02-17 ENCOUNTER — PATIENT OUTREACH (OUTPATIENT)
Dept: ADMINISTRATIVE | Facility: OTHER | Age: 86
End: 2021-02-17

## 2021-02-18 ENCOUNTER — LAB VISIT (OUTPATIENT)
Dept: INTERNAL MEDICINE | Facility: CLINIC | Age: 86
End: 2021-02-18
Payer: MEDICARE

## 2021-02-18 DIAGNOSIS — Z12.5 SCREENING FOR PROSTATE CANCER: Primary | ICD-10-CM

## 2021-02-18 DIAGNOSIS — E03.4 HYPOTHYROIDISM DUE TO ACQUIRED ATROPHY OF THYROID: ICD-10-CM

## 2021-02-18 DIAGNOSIS — E11.49 CONTROLLED TYPE 2 DIABETES MELLITUS WITH OTHER NEUROLOGIC COMPLICATION, WITHOUT LONG-TERM CURRENT USE OF INSULIN: ICD-10-CM

## 2021-02-18 DIAGNOSIS — I50.30 HEART FAILURE WITH PRESERVED EJECTION FRACTION, UNSPECIFIED HF CHRONICITY: ICD-10-CM

## 2021-02-18 DIAGNOSIS — N18.30 CKD (CHRONIC KIDNEY DISEASE) STAGE 3, GFR 30-59 ML/MIN: ICD-10-CM

## 2021-02-18 LAB
ALBUMIN SERPL BCP-MCNC: 4.2 G/DL (ref 3.5–5.2)
ALBUMIN/CREAT UR: 31.4 UG/MG (ref 0–30)
ALP SERPL-CCNC: 113 U/L (ref 55–135)
ALT SERPL W/O P-5'-P-CCNC: 12 U/L (ref 10–44)
ANION GAP SERPL CALC-SCNC: 10 MMOL/L (ref 8–16)
AST SERPL-CCNC: 20 U/L (ref 10–40)
BASOPHILS # BLD AUTO: 0.03 K/UL (ref 0–0.2)
BASOPHILS NFR BLD: 0.3 % (ref 0–1.9)
BILIRUB SERPL-MCNC: 0.7 MG/DL (ref 0.1–1)
BUN SERPL-MCNC: 28 MG/DL (ref 10–30)
CALCIUM SERPL-MCNC: 9.4 MG/DL (ref 8.7–10.5)
CHLORIDE SERPL-SCNC: 106 MMOL/L (ref 95–110)
CHOLEST SERPL-MCNC: 128 MG/DL (ref 120–199)
CHOLEST/HDLC SERPL: 3.8 {RATIO} (ref 2–5)
CO2 SERPL-SCNC: 25 MMOL/L (ref 23–29)
COMPLEXED PSA SERPL-MCNC: 2.3 NG/ML (ref 0–4)
CREAT SERPL-MCNC: 1.4 MG/DL (ref 0.5–1.4)
CREAT UR-MCNC: 165.4 MG/DL (ref 23–375)
DIFFERENTIAL METHOD: ABNORMAL
EOSINOPHIL # BLD AUTO: 0.1 K/UL (ref 0–0.5)
EOSINOPHIL NFR BLD: 1.2 % (ref 0–8)
ERYTHROCYTE [DISTWIDTH] IN BLOOD BY AUTOMATED COUNT: 12.2 % (ref 11.5–14.5)
EST. GFR  (AFRICAN AMERICAN): 50 ML/MIN/1.73 M^2
EST. GFR  (NON AFRICAN AMERICAN): 44 ML/MIN/1.73 M^2
ESTIMATED AVG GLUCOSE: 140 MG/DL (ref 68–131)
GLUCOSE SERPL-MCNC: 153 MG/DL (ref 70–110)
HBA1C MFR BLD: 6.5 % (ref 4–5.6)
HCT VFR BLD AUTO: 36.5 % (ref 40–54)
HDLC SERPL-MCNC: 34 MG/DL (ref 40–75)
HDLC SERPL: 26.6 % (ref 20–50)
HGB BLD-MCNC: 11.9 G/DL (ref 14–18)
IMM GRANULOCYTES # BLD AUTO: 0.06 K/UL (ref 0–0.04)
IMM GRANULOCYTES NFR BLD AUTO: 0.7 % (ref 0–0.5)
LDLC SERPL CALC-MCNC: 68.8 MG/DL (ref 63–159)
LYMPHOCYTES # BLD AUTO: 1.3 K/UL (ref 1–4.8)
LYMPHOCYTES NFR BLD: 14.8 % (ref 18–48)
MCH RBC QN AUTO: 32.3 PG (ref 27–31)
MCHC RBC AUTO-ENTMCNC: 32.6 G/DL (ref 32–36)
MCV RBC AUTO: 99 FL (ref 82–98)
MICROALBUMIN UR DL<=1MG/L-MCNC: 52 UG/ML
MONOCYTES # BLD AUTO: 0.8 K/UL (ref 0.3–1)
MONOCYTES NFR BLD: 9 % (ref 4–15)
NEUTROPHILS # BLD AUTO: 6.7 K/UL (ref 1.8–7.7)
NEUTROPHILS NFR BLD: 74 % (ref 38–73)
NONHDLC SERPL-MCNC: 94 MG/DL
NRBC BLD-RTO: 0 /100 WBC
PLATELET # BLD AUTO: 167 K/UL (ref 150–350)
PMV BLD AUTO: 9.8 FL (ref 9.2–12.9)
POTASSIUM SERPL-SCNC: 4.4 MMOL/L (ref 3.5–5.1)
PROT SERPL-MCNC: 7.8 G/DL (ref 6–8.4)
RBC # BLD AUTO: 3.68 M/UL (ref 4.6–6.2)
SODIUM SERPL-SCNC: 141 MMOL/L (ref 136–145)
T4 FREE SERPL-MCNC: 1.16 NG/DL (ref 0.71–1.51)
TRIGL SERPL-MCNC: 126 MG/DL (ref 30–150)
TSH SERPL DL<=0.005 MIU/L-ACNC: 4.31 UIU/ML (ref 0.4–4)
WBC # BLD AUTO: 9.05 K/UL (ref 3.9–12.7)

## 2021-02-18 PROCEDURE — 82570 ASSAY OF URINE CREATININE: CPT

## 2021-02-18 PROCEDURE — 83036 HEMOGLOBIN GLYCOSYLATED A1C: CPT

## 2021-02-18 PROCEDURE — 82043 UR ALBUMIN QUANTITATIVE: CPT

## 2021-02-18 PROCEDURE — 80053 COMPREHEN METABOLIC PANEL: CPT

## 2021-02-18 PROCEDURE — 36415 PR COLLECTION VENOUS BLOOD,VENIPUNCTURE: ICD-10-PCS | Mod: S$GLB,,, | Performed by: INTERNAL MEDICINE

## 2021-02-18 PROCEDURE — 84443 ASSAY THYROID STIM HORMONE: CPT

## 2021-02-18 PROCEDURE — 36415 COLL VENOUS BLD VENIPUNCTURE: CPT | Mod: S$GLB,,, | Performed by: INTERNAL MEDICINE

## 2021-02-18 PROCEDURE — 85025 COMPLETE CBC W/AUTO DIFF WBC: CPT

## 2021-02-18 PROCEDURE — 80061 LIPID PANEL: CPT

## 2021-02-18 PROCEDURE — 84439 ASSAY OF FREE THYROXINE: CPT

## 2021-02-18 PROCEDURE — 84153 ASSAY OF PSA TOTAL: CPT

## 2021-03-02 ENCOUNTER — OFFICE VISIT (OUTPATIENT)
Dept: INTERNAL MEDICINE | Facility: CLINIC | Age: 86
End: 2021-03-02
Payer: MEDICARE

## 2021-03-02 VITALS
RESPIRATION RATE: 16 BRPM | DIASTOLIC BLOOD PRESSURE: 60 MMHG | BODY MASS INDEX: 31.24 KG/M2 | SYSTOLIC BLOOD PRESSURE: 110 MMHG | HEART RATE: 60 BPM | WEIGHT: 183 LBS | HEIGHT: 64 IN | OXYGEN SATURATION: 98 %

## 2021-03-02 DIAGNOSIS — E11.49 CONTROLLED TYPE 2 DIABETES MELLITUS WITH OTHER NEUROLOGIC COMPLICATION, WITHOUT LONG-TERM CURRENT USE OF INSULIN: Primary | ICD-10-CM

## 2021-03-02 DIAGNOSIS — I15.2 OBESITY, DIABETES, AND HYPERTENSION SYNDROME: ICD-10-CM

## 2021-03-02 DIAGNOSIS — E78.2 MIXED DYSLIPIDEMIA: ICD-10-CM

## 2021-03-02 DIAGNOSIS — I11.0 HYPERTENSIVE HEART DISEASE WITH HEART FAILURE: ICD-10-CM

## 2021-03-02 DIAGNOSIS — I48.20 CHRONIC ATRIAL FIBRILLATION, UNSPECIFIED: ICD-10-CM

## 2021-03-02 DIAGNOSIS — E11.59 OBESITY, DIABETES, AND HYPERTENSION SYNDROME: ICD-10-CM

## 2021-03-02 DIAGNOSIS — E03.4 HYPOTHYROIDISM DUE TO ACQUIRED ATROPHY OF THYROID: ICD-10-CM

## 2021-03-02 DIAGNOSIS — E66.9 OBESITY, DIABETES, AND HYPERTENSION SYNDROME: ICD-10-CM

## 2021-03-02 DIAGNOSIS — I10 ESSENTIAL HYPERTENSION: ICD-10-CM

## 2021-03-02 DIAGNOSIS — I73.9 PERIPHERAL VASCULAR DISEASE, UNSPECIFIED: ICD-10-CM

## 2021-03-02 DIAGNOSIS — I73.9 PERIPHERAL VASCULAR DISEASE: ICD-10-CM

## 2021-03-02 DIAGNOSIS — E11.69 OBESITY, DIABETES, AND HYPERTENSION SYNDROME: ICD-10-CM

## 2021-03-02 PROBLEM — B96.5 PSEUDOMONAS URINARY TRACT INFECTION: Status: RESOLVED | Noted: 2018-01-08 | Resolved: 2021-03-02

## 2021-03-02 PROBLEM — G40.109 LOCALIZATION-RELATED FOCAL EPILEPSY WITH SIMPLE PARTIAL SEIZURES: Status: ACTIVE | Noted: 2021-03-02

## 2021-03-02 PROBLEM — N39.0 PSEUDOMONAS URINARY TRACT INFECTION: Status: RESOLVED | Noted: 2018-01-08 | Resolved: 2021-03-02

## 2021-03-02 PROBLEM — L89.623 PRESSURE ULCER OF LEFT HEEL, STAGE 3: Status: RESOLVED | Noted: 2019-09-30 | Resolved: 2021-03-02

## 2021-03-02 PROBLEM — G40.109 LOCALIZATION-RELATED FOCAL EPILEPSY WITH SIMPLE PARTIAL SEIZURES: Status: RESOLVED | Noted: 2021-03-02 | Resolved: 2021-03-02

## 2021-03-02 PROBLEM — N18.4 CHRONIC KIDNEY DISEASE, STAGE 4 (SEVERE): Status: RESOLVED | Noted: 2020-07-20 | Resolved: 2021-03-02

## 2021-03-02 PROCEDURE — 99499 UNLISTED E&M SERVICE: CPT | Mod: S$GLB,,, | Performed by: INTERNAL MEDICINE

## 2021-03-02 PROCEDURE — 1101F PT FALLS ASSESS-DOCD LE1/YR: CPT | Mod: CPTII,S$GLB,, | Performed by: INTERNAL MEDICINE

## 2021-03-02 PROCEDURE — 3288F PR FALLS RISK ASSESSMENT DOCUMENTED: ICD-10-PCS | Mod: CPTII,S$GLB,, | Performed by: INTERNAL MEDICINE

## 2021-03-02 PROCEDURE — 1159F PR MEDICATION LIST DOCUMENTED IN MEDICAL RECORD: ICD-10-PCS | Mod: S$GLB,,, | Performed by: INTERNAL MEDICINE

## 2021-03-02 PROCEDURE — 1159F MED LIST DOCD IN RCRD: CPT | Mod: S$GLB,,, | Performed by: INTERNAL MEDICINE

## 2021-03-02 PROCEDURE — 99499 RISK ADDL DX/OHS AUDIT: ICD-10-PCS | Mod: S$GLB,,, | Performed by: INTERNAL MEDICINE

## 2021-03-02 PROCEDURE — 99214 OFFICE O/P EST MOD 30 MIN: CPT | Mod: S$GLB,,, | Performed by: INTERNAL MEDICINE

## 2021-03-02 PROCEDURE — 1101F PR PT FALLS ASSESS DOC 0-1 FALLS W/OUT INJ PAST YR: ICD-10-PCS | Mod: CPTII,S$GLB,, | Performed by: INTERNAL MEDICINE

## 2021-03-02 PROCEDURE — 1126F AMNT PAIN NOTED NONE PRSNT: CPT | Mod: S$GLB,,, | Performed by: INTERNAL MEDICINE

## 2021-03-02 PROCEDURE — 99999 PR PBB SHADOW E&M-EST. PATIENT-LVL IV: CPT | Mod: PBBFAC,,, | Performed by: INTERNAL MEDICINE

## 2021-03-02 PROCEDURE — 1126F PR PAIN SEVERITY QUANTIFIED, NO PAIN PRESENT: ICD-10-PCS | Mod: S$GLB,,, | Performed by: INTERNAL MEDICINE

## 2021-03-02 PROCEDURE — 99214 PR OFFICE/OUTPT VISIT, EST, LEVL IV, 30-39 MIN: ICD-10-PCS | Mod: S$GLB,,, | Performed by: INTERNAL MEDICINE

## 2021-03-02 PROCEDURE — 3288F FALL RISK ASSESSMENT DOCD: CPT | Mod: CPTII,S$GLB,, | Performed by: INTERNAL MEDICINE

## 2021-03-02 PROCEDURE — 99999 PR PBB SHADOW E&M-EST. PATIENT-LVL IV: ICD-10-PCS | Mod: PBBFAC,,, | Performed by: INTERNAL MEDICINE

## 2021-03-02 RX ORDER — FENOFIBRATE 160 MG/1
TABLET ORAL
COMMUNITY
End: 2022-04-22

## 2021-03-02 RX ORDER — LISINOPRIL 20 MG/1
TABLET ORAL
Status: ON HOLD | COMMUNITY
Start: 2021-02-23 | End: 2022-03-26 | Stop reason: SDUPTHER

## 2021-05-21 ENCOUNTER — PATIENT OUTREACH (OUTPATIENT)
Dept: ADMINISTRATIVE | Facility: OTHER | Age: 86
End: 2021-05-21

## 2021-05-24 ENCOUNTER — HOSPITAL ENCOUNTER (EMERGENCY)
Facility: HOSPITAL | Age: 86
Discharge: HOME | End: 2021-05-24
Attending: SURGERY
Payer: MEDICARE

## 2021-05-24 ENCOUNTER — OFFICE VISIT (OUTPATIENT)
Dept: PAIN MEDICINE | Facility: CLINIC | Age: 86
End: 2021-05-24
Payer: MEDICARE

## 2021-05-24 VITALS
HEIGHT: 64 IN | OXYGEN SATURATION: 96 % | DIASTOLIC BLOOD PRESSURE: 80 MMHG | SYSTOLIC BLOOD PRESSURE: 136 MMHG | BODY MASS INDEX: 32.46 KG/M2 | WEIGHT: 190.13 LBS | RESPIRATION RATE: 16 BRPM | HEART RATE: 61 BPM

## 2021-05-24 VITALS
HEART RATE: 61 BPM | OXYGEN SATURATION: 96 % | DIASTOLIC BLOOD PRESSURE: 70 MMHG | RESPIRATION RATE: 20 BRPM | SYSTOLIC BLOOD PRESSURE: 161 MMHG | TEMPERATURE: 97 F

## 2021-05-24 DIAGNOSIS — M79.604 RIGHT LEG PAIN: ICD-10-CM

## 2021-05-24 DIAGNOSIS — S70.11XA CONTUSION OF RIGHT THIGH, INITIAL ENCOUNTER: Primary | ICD-10-CM

## 2021-05-24 DIAGNOSIS — M51.36 DDD (DEGENERATIVE DISC DISEASE), LUMBAR: ICD-10-CM

## 2021-05-24 DIAGNOSIS — M53.86 DECREASED RANGE OF MOTION OF LUMBAR SPINE: ICD-10-CM

## 2021-05-24 DIAGNOSIS — M48.062 SPINAL STENOSIS OF LUMBAR REGION WITH NEUROGENIC CLAUDICATION: ICD-10-CM

## 2021-05-24 DIAGNOSIS — R26.89 IMPAIRED GAIT AND MOBILITY: ICD-10-CM

## 2021-05-24 DIAGNOSIS — M79.651 RIGHT THIGH PAIN: Primary | ICD-10-CM

## 2021-05-24 DIAGNOSIS — M79.89 RIGHT LEG SWELLING: ICD-10-CM

## 2021-05-24 DIAGNOSIS — M47.816 LUMBAR SPONDYLOSIS: ICD-10-CM

## 2021-05-24 DIAGNOSIS — M54.16 LUMBAR RADICULOPATHY: ICD-10-CM

## 2021-05-24 DIAGNOSIS — S70.11XA HEMATOMA OF RIGHT THIGH, INITIAL ENCOUNTER: ICD-10-CM

## 2021-05-24 DIAGNOSIS — M25.561 ACUTE PAIN OF RIGHT KNEE: ICD-10-CM

## 2021-05-24 PROCEDURE — 1125F AMNT PAIN NOTED PAIN PRSNT: CPT | Mod: S$GLB,,, | Performed by: PHYSICAL MEDICINE & REHABILITATION

## 2021-05-24 PROCEDURE — 1101F PR PT FALLS ASSESS DOC 0-1 FALLS W/OUT INJ PAST YR: ICD-10-PCS | Mod: CPTII,S$GLB,, | Performed by: PHYSICAL MEDICINE & REHABILITATION

## 2021-05-24 PROCEDURE — 99499 RISK ADDL DX/OHS AUDIT: ICD-10-PCS | Mod: S$GLB,,, | Performed by: PHYSICAL MEDICINE & REHABILITATION

## 2021-05-24 PROCEDURE — 25000003 PHARM REV CODE 250: Performed by: SURGERY

## 2021-05-24 PROCEDURE — 99284 EMERGENCY DEPT VISIT MOD MDM: CPT | Mod: 25

## 2021-05-24 PROCEDURE — 99999 PR PBB SHADOW E&M-EST. PATIENT-LVL IV: ICD-10-PCS | Mod: PBBFAC,,, | Performed by: PHYSICAL MEDICINE & REHABILITATION

## 2021-05-24 PROCEDURE — 1159F MED LIST DOCD IN RCRD: CPT | Mod: S$GLB,,, | Performed by: PHYSICAL MEDICINE & REHABILITATION

## 2021-05-24 PROCEDURE — 3288F PR FALLS RISK ASSESSMENT DOCUMENTED: ICD-10-PCS | Mod: CPTII,S$GLB,, | Performed by: PHYSICAL MEDICINE & REHABILITATION

## 2021-05-24 PROCEDURE — 1125F PR PAIN SEVERITY QUANTIFIED, PAIN PRESENT: ICD-10-PCS | Mod: S$GLB,,, | Performed by: PHYSICAL MEDICINE & REHABILITATION

## 2021-05-24 PROCEDURE — 99214 PR OFFICE/OUTPT VISIT, EST, LEVL IV, 30-39 MIN: ICD-10-PCS | Mod: S$GLB,,, | Performed by: PHYSICAL MEDICINE & REHABILITATION

## 2021-05-24 PROCEDURE — 1159F PR MEDICATION LIST DOCUMENTED IN MEDICAL RECORD: ICD-10-PCS | Mod: S$GLB,,, | Performed by: PHYSICAL MEDICINE & REHABILITATION

## 2021-05-24 PROCEDURE — 3288F FALL RISK ASSESSMENT DOCD: CPT | Mod: CPTII,S$GLB,, | Performed by: PHYSICAL MEDICINE & REHABILITATION

## 2021-05-24 PROCEDURE — 99999 PR PBB SHADOW E&M-EST. PATIENT-LVL IV: CPT | Mod: PBBFAC,,, | Performed by: PHYSICAL MEDICINE & REHABILITATION

## 2021-05-24 PROCEDURE — 99499 UNLISTED E&M SERVICE: CPT | Mod: S$GLB,,, | Performed by: PHYSICAL MEDICINE & REHABILITATION

## 2021-05-24 PROCEDURE — 99214 OFFICE O/P EST MOD 30 MIN: CPT | Mod: S$GLB,,, | Performed by: PHYSICAL MEDICINE & REHABILITATION

## 2021-05-24 PROCEDURE — 1101F PT FALLS ASSESS-DOCD LE1/YR: CPT | Mod: CPTII,S$GLB,, | Performed by: PHYSICAL MEDICINE & REHABILITATION

## 2021-05-24 RX ORDER — ACETAMINOPHEN 500 MG
1000 TABLET ORAL
Status: COMPLETED | OUTPATIENT
Start: 2021-05-24 | End: 2021-05-24

## 2021-05-24 RX ORDER — TRAMADOL HYDROCHLORIDE 50 MG/1
50 TABLET ORAL EVERY 8 HOURS PRN
Qty: 21 TABLET | Refills: 0 | Status: SHIPPED | OUTPATIENT
Start: 2021-05-24 | End: 2021-05-31

## 2021-05-24 RX ORDER — GABAPENTIN 100 MG/1
200 CAPSULE ORAL NIGHTLY
Qty: 60 CAPSULE | Refills: 11 | Status: SHIPPED | OUTPATIENT
Start: 2021-05-24 | End: 2022-05-19

## 2021-05-24 RX ADMIN — ACETAMINOPHEN 1000 MG: 500 TABLET ORAL at 04:05

## 2021-05-26 ENCOUNTER — OFFICE VISIT (OUTPATIENT)
Dept: INTERNAL MEDICINE | Facility: CLINIC | Age: 86
End: 2021-05-26
Payer: MEDICARE

## 2021-05-26 ENCOUNTER — TELEPHONE (OUTPATIENT)
Dept: INTERNAL MEDICINE | Facility: CLINIC | Age: 86
End: 2021-05-26

## 2021-05-26 ENCOUNTER — LAB VISIT (OUTPATIENT)
Dept: LAB | Facility: HOSPITAL | Age: 86
End: 2021-05-26
Attending: INTERNAL MEDICINE
Payer: MEDICARE

## 2021-05-26 VITALS
OXYGEN SATURATION: 98 % | HEIGHT: 64 IN | DIASTOLIC BLOOD PRESSURE: 68 MMHG | WEIGHT: 190.06 LBS | SYSTOLIC BLOOD PRESSURE: 104 MMHG | RESPIRATION RATE: 94 BRPM | BODY MASS INDEX: 32.45 KG/M2 | HEART RATE: 60 BPM

## 2021-05-26 DIAGNOSIS — R13.10 DYSPHAGIA, UNSPECIFIED TYPE: ICD-10-CM
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
## 2021-05-26 LAB
ALBUMIN SERPL BCP-MCNC: 4 G/DL (ref 3.5–5.2)
ALP SERPL-CCNC: 130 U/L (ref 55–135)
ALT SERPL W/O P-5'-P-CCNC: 16 U/L (ref 10–44)
ANION GAP SERPL CALC-SCNC: 13 MMOL/L (ref 8–16)
AST SERPL-CCNC: 25 U/L (ref 10–40)
BASOPHILS # BLD AUTO: 0.02 K/UL (ref 0–0.2)
BASOPHILS NFR BLD: 0.3 % (ref 0–1.9)
BILIRUB SERPL-MCNC: 1.1 MG/DL (ref 0.1–1)
BUN SERPL-MCNC: 22 MG/DL (ref 10–30)
CALCIUM SERPL-MCNC: 9.6 MG/DL (ref 8.7–10.5)
CHLORIDE SERPL-SCNC: 106 MMOL/L (ref 95–110)
CO2 SERPL-SCNC: 22 MMOL/L (ref 23–29)
CREAT SERPL-MCNC: 1.3 MG/DL (ref 0.5–1.4)
DIFFERENTIAL METHOD: ABNORMAL
EOSINOPHIL # BLD AUTO: 0.1 K/UL (ref 0–0.5)
EOSINOPHIL NFR BLD: 0.8 % (ref 0–8)
ERYTHROCYTE [DISTWIDTH] IN BLOOD BY AUTOMATED COUNT: 13 % (ref 11.5–14.5)
EST. GFR  (AFRICAN AMERICAN): 55 ML/MIN/1.73 M^2
EST. GFR  (NON AFRICAN AMERICAN): 47 ML/MIN/1.73 M^2
GLUCOSE SERPL-MCNC: 144 MG/DL (ref 70–110)
HCT VFR BLD AUTO: 37.1 % (ref 40–54)
HGB BLD-MCNC: 12.2 G/DL (ref 14–18)
IMM GRANULOCYTES # BLD AUTO: 0.06 K/UL (ref 0–0.04)
IMM GRANULOCYTES NFR BLD AUTO: 0.8 % (ref 0–0.5)
LYMPHOCYTES # BLD AUTO: 1.5 K/UL (ref 1–4.8)
LYMPHOCYTES NFR BLD: 20.1 % (ref 18–48)
MCH RBC QN AUTO: 33.1 PG (ref 27–31)
MCHC RBC AUTO-ENTMCNC: 32.9 G/DL (ref 32–36)
MCV RBC AUTO: 101 FL (ref 82–98)
MONOCYTES # BLD AUTO: 0.8 K/UL (ref 0.3–1)
MONOCYTES NFR BLD: 10.8 % (ref 4–15)
NEUTROPHILS # BLD AUTO: 4.9 K/UL (ref 1.8–7.7)
NEUTROPHILS NFR BLD: 67.2 % (ref 38–73)
NRBC BLD-RTO: 0 /100 WBC
PLATELET # BLD AUTO: 180 K/UL (ref 150–450)
PMV BLD AUTO: 9 FL (ref 9.2–12.9)
POTASSIUM SERPL-SCNC: 4.2 MMOL/L (ref 3.5–5.1)
PROT SERPL-MCNC: 7.6 G/DL (ref 6–8.4)
RBC # BLD AUTO: 3.69 M/UL (ref 4.6–6.2)
SODIUM SERPL-SCNC: 141 MMOL/L (ref 136–145)
WBC # BLD AUTO: 7.22 K/UL (ref 3.9–12.7)

## 2021-05-26 PROCEDURE — 1159F PR MEDICATION LIST DOCUMENTED IN MEDICAL RECORD: ICD-10-PCS | Mod: S$GLB,,, | Performed by: INTERNAL MEDICINE

## 2021-05-26 PROCEDURE — 99999 PR PBB SHADOW E&M-EST. PATIENT-LVL V: ICD-10-PCS | Mod: PBBFAC,,, | Performed by: INTERNAL MEDICINE

## 2021-05-26 PROCEDURE — 36415 COLL VENOUS BLD VENIPUNCTURE: CPT | Performed by: INTERNAL MEDICINE

## 2021-05-26 PROCEDURE — 99214 PR OFFICE/OUTPT VISIT, EST, LEVL IV, 30-39 MIN: ICD-10-PCS | Mod: S$GLB,,, | Performed by: INTERNAL MEDICINE

## 2021-05-26 PROCEDURE — 1101F PT FALLS ASSESS-DOCD LE1/YR: CPT | Mod: CPTII,S$GLB,, | Performed by: INTERNAL MEDICINE

## 2021-05-26 PROCEDURE — 1126F PR PAIN SEVERITY QUANTIFIED, NO PAIN PRESENT: ICD-10-PCS | Mod: S$GLB,,, | Performed by: INTERNAL MEDICINE

## 2021-05-26 PROCEDURE — 99999 PR PBB SHADOW E&M-EST. PATIENT-LVL V: CPT | Mod: PBBFAC,,, | Performed by: INTERNAL MEDICINE

## 2021-05-26 PROCEDURE — 85025 COMPLETE CBC W/AUTO DIFF WBC: CPT | Performed by: INTERNAL MEDICINE

## 2021-05-26 PROCEDURE — 99214 OFFICE O/P EST MOD 30 MIN: CPT | Mod: S$GLB,,, | Performed by: INTERNAL MEDICINE

## 2021-05-26 PROCEDURE — 1126F AMNT PAIN NOTED NONE PRSNT: CPT | Mod: S$GLB,,, | Performed by: INTERNAL MEDICINE

## 2021-05-26 PROCEDURE — 1159F MED LIST DOCD IN RCRD: CPT | Mod: S$GLB,,, | Performed by: INTERNAL MEDICINE

## 2021-05-26 PROCEDURE — 80053 COMPREHEN METABOLIC PANEL: CPT | Performed by: INTERNAL MEDICINE

## 2021-05-26 PROCEDURE — 1101F PR PT FALLS ASSESS DOC 0-1 FALLS W/OUT INJ PAST YR: ICD-10-PCS | Mod: CPTII,S$GLB,, | Performed by: INTERNAL MEDICINE

## 2021-05-26 PROCEDURE — 3288F PR FALLS RISK ASSESSMENT DOCUMENTED: ICD-10-PCS | Mod: CPTII,S$GLB,, | Performed by: INTERNAL MEDICINE

## 2021-05-26 PROCEDURE — 3288F FALL RISK ASSESSMENT DOCD: CPT | Mod: CPTII,S$GLB,, | Performed by: INTERNAL MEDICINE

## 2021-05-26 RX ORDER — DOXAZOSIN 1 MG/1
1 TABLET ORAL NIGHTLY
Status: ON HOLD | COMMUNITY
Start: 2021-05-11 | End: 2022-05-24 | Stop reason: HOSPADM

## 2021-05-26 RX ORDER — PROMETHAZINE HYDROCHLORIDE 12.5 MG/1
12.5 TABLET ORAL EVERY 6 HOURS PRN
Qty: 30 TABLET | Refills: 3 | Status: ON HOLD | OUTPATIENT
Start: 2021-05-26 | End: 2021-06-22 | Stop reason: CLARIF

## 2021-05-27 ENCOUNTER — ANESTHESIA EVENT (OUTPATIENT)
Dept: ENDOSCOPY | Facility: HOSPITAL | Age: 86
End: 2021-05-27
Payer: MEDICARE

## 2021-05-27 ENCOUNTER — TELEPHONE (OUTPATIENT)
Dept: INTERNAL MEDICINE | Facility: CLINIC | Age: 86
End: 2021-05-27

## 2021-05-27 ENCOUNTER — ANESTHESIA (OUTPATIENT)
Dept: ENDOSCOPY | Facility: HOSPITAL | Age: 86
End: 2021-05-27
Payer: MEDICARE

## 2021-05-27 ENCOUNTER — HOSPITAL ENCOUNTER (OUTPATIENT)
Dept: RADIOLOGY | Facility: HOSPITAL | Age: 86
Discharge: HOME | End: 2021-05-27
Attending: INTERNAL MEDICINE
Payer: MEDICARE

## 2021-05-27 ENCOUNTER — HOSPITAL ENCOUNTER (EMERGENCY)
Facility: HOSPITAL | Age: 86
Discharge: HOME | End: 2021-05-28
Attending: EMERGENCY MEDICINE
Payer: MEDICARE

## 2021-05-27 DIAGNOSIS — S30.1XXA ABDOMINAL WALL HEMATOMA, INITIAL ENCOUNTER: ICD-10-CM
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
## 2021-05-27 DIAGNOSIS — R13.10 DYSPHAGIA, UNSPECIFIED TYPE: ICD-10-CM

## 2021-05-27 DIAGNOSIS — T18.128A FOOD IMPACTION OF ESOPHAGUS, INITIAL ENCOUNTER: ICD-10-CM

## 2021-05-27 DIAGNOSIS — T18.108A FOREIGN BODY IN ESOPHAGUS, INITIAL ENCOUNTER: Primary | ICD-10-CM
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
## 2021-05-27 PROBLEM — R13.19 OTHER DYSPHAGIA: Status: ACTIVE | Noted: 2021-05-27

## 2021-05-27 LAB — POCT GLUCOSE: 133 MG/DL (ref 70–110)

## 2021-05-27 PROCEDURE — 82962 GLUCOSE BLOOD TEST: CPT | Performed by: INTERNAL MEDICINE

## 2021-05-27 PROCEDURE — 43247 EGD REMOVE FOREIGN BODY: CPT | Mod: ,,, | Performed by: INTERNAL MEDICINE

## 2021-05-27 PROCEDURE — 43247 PR EGD, FLEX, W/REMOVAL, FOREIGN BODY: ICD-10-PCS | Mod: ,,, | Performed by: INTERNAL MEDICINE

## 2021-05-27 PROCEDURE — 37000009 HC ANESTHESIA EA ADD 15 MINS: Performed by: INTERNAL MEDICINE

## 2021-05-27 PROCEDURE — 74176 CT CHEST ABDOMEN PELVIS WITHOUT CONTRAST(XPD): ICD-10-PCS | Mod: 26,,, | Performed by: RADIOLOGY

## 2021-05-27 PROCEDURE — 71250 CT THORAX DX C-: CPT | Mod: TC

## 2021-05-27 PROCEDURE — 99281 EMR DPT VST MAYX REQ PHY/QHP: CPT | Mod: 25

## 2021-05-27 PROCEDURE — 37000008 HC ANESTHESIA 1ST 15 MINUTES: Performed by: INTERNAL MEDICINE

## 2021-05-27 PROCEDURE — 63600175 PHARM REV CODE 636 W HCPCS: Performed by: STUDENT IN AN ORGANIZED HEALTH CARE EDUCATION/TRAINING PROGRAM

## 2021-05-27 PROCEDURE — 25500020 PHARM REV CODE 255: Performed by: INTERNAL MEDICINE

## 2021-05-27 PROCEDURE — 99284 EMERGENCY DEPT VISIT MOD MDM: CPT | Mod: 25,,, | Performed by: INTERNAL MEDICINE

## 2021-05-27 PROCEDURE — 99283 EMERGENCY DEPT VISIT LOW MDM: CPT | Mod: 25

## 2021-05-27 PROCEDURE — 74176 CT ABD & PELVIS W/O CONTRAST: CPT | Mod: TC

## 2021-05-27 PROCEDURE — 43247 EGD REMOVE FOREIGN BODY: CPT | Performed by: INTERNAL MEDICINE

## 2021-05-27 PROCEDURE — 74176 CT ABD & PELVIS W/O CONTRAST: CPT | Mod: 26,,, | Performed by: RADIOLOGY

## 2021-05-27 PROCEDURE — 99284 PR EMERGENCY DEPT VISIT,LEVEL IV: ICD-10-PCS | Mod: 25,,, | Performed by: INTERNAL MEDICINE

## 2021-05-27 PROCEDURE — 71250 CT CHEST ABDOMEN PELVIS WITHOUT CONTRAST(XPD): ICD-10-PCS | Mod: 26,,, | Performed by: RADIOLOGY

## 2021-05-27 PROCEDURE — 71250 CT THORAX DX C-: CPT | Mod: 26,,, | Performed by: RADIOLOGY

## 2021-05-27 RX ORDER — SODIUM CHLORIDE 0.9 % (FLUSH) 0.9 %
10 SYRINGE (ML) INJECTION
Status: DISCONTINUED | OUTPATIENT
Start: 2021-05-28 | End: 2021-05-28 | Stop reason: HOSPADM

## 2021-05-27 RX ORDER — SODIUM CHLORIDE 9 MG/ML
INJECTION, SOLUTION INTRAVENOUS CONTINUOUS
Status: DISCONTINUED | OUTPATIENT
Start: 2021-05-28 | End: 2021-05-28 | Stop reason: HOSPADM

## 2021-05-27 RX ADMIN — IOHEXOL 15 ML: 350 INJECTION, SOLUTION INTRAVENOUS at 07:05

## 2021-05-27 RX ADMIN — SODIUM CHLORIDE, SODIUM LACTATE, POTASSIUM CHLORIDE, AND CALCIUM CHLORIDE: .6; .31; .03; .02 INJECTION, SOLUTION INTRAVENOUS at 11:05

## 2021-05-28 VITALS
TEMPERATURE: 98 F | HEIGHT: 64 IN | WEIGHT: 185 LBS | SYSTOLIC BLOOD PRESSURE: 149 MMHG | RESPIRATION RATE: 18 BRPM | OXYGEN SATURATION: 93 % | HEART RATE: 60 BPM | DIASTOLIC BLOOD PRESSURE: 68 MMHG | BODY MASS INDEX: 31.58 KG/M2

## 2021-05-28 LAB — GLUCOSE SERPL-MCNC: 133 MG/DL (ref 70–110)

## 2021-05-28 PROCEDURE — 63600175 PHARM REV CODE 636 W HCPCS: Performed by: STUDENT IN AN ORGANIZED HEALTH CARE EDUCATION/TRAINING PROGRAM

## 2021-05-28 PROCEDURE — 25000003 PHARM REV CODE 250: Performed by: INTERNAL MEDICINE

## 2021-05-28 RX ORDER — PROPOFOL 10 MG/ML
VIAL (ML) INTRAVENOUS
Status: DISCONTINUED | OUTPATIENT
Start: 2021-05-28 | End: 2021-05-28

## 2021-05-28 RX ORDER — HYDROMORPHONE HYDROCHLORIDE 2 MG/ML
0.5 INJECTION, SOLUTION INTRAMUSCULAR; INTRAVENOUS; SUBCUTANEOUS EVERY 5 MIN PRN
Status: DISCONTINUED | OUTPATIENT
Start: 2021-05-28 | End: 2021-05-28 | Stop reason: HOSPADM

## 2021-05-28 RX ORDER — ONDANSETRON 2 MG/ML
4 INJECTION INTRAMUSCULAR; INTRAVENOUS DAILY PRN
Status: DISCONTINUED | OUTPATIENT
Start: 2021-05-28 | End: 2021-05-28 | Stop reason: HOSPADM

## 2021-05-28 RX ADMIN — PROPOFOL 30 MG: 10 INJECTION, EMULSION INTRAVENOUS at 12:05

## 2021-05-28 RX ADMIN — PROPOFOL 20 MG: 10 INJECTION, EMULSION INTRAVENOUS at 12:05

## 2021-05-28 RX ADMIN — SODIUM CHLORIDE 20 ML/HR: 0.9 INJECTION, SOLUTION INTRAVENOUS at 12:05

## 2021-05-28 RX ADMIN — PROPOFOL 10 MG: 10 INJECTION, EMULSION INTRAVENOUS at 12:05

## 2021-06-04 ENCOUNTER — TELEPHONE (OUTPATIENT)
Dept: GASTROENTEROLOGY | Facility: CLINIC | Age: 86
End: 2021-06-04

## 2021-06-18 ENCOUNTER — OFFICE VISIT (OUTPATIENT)
Dept: GASTROENTEROLOGY | Facility: CLINIC | Age: 86
End: 2021-06-18
Payer: MEDICARE

## 2021-06-18 VITALS — HEIGHT: 64 IN | BODY MASS INDEX: 32.9 KG/M2 | WEIGHT: 192.69 LBS

## 2021-06-18 DIAGNOSIS — R13.10 DYSPHAGIA, UNSPECIFIED TYPE: Primary | ICD-10-CM

## 2021-06-18 PROCEDURE — 3288F PR FALLS RISK ASSESSMENT DOCUMENTED: ICD-10-PCS | Mod: CPTII,S$GLB,, | Performed by: INTERNAL MEDICINE

## 2021-06-18 PROCEDURE — 99214 PR OFFICE/OUTPT VISIT, EST, LEVL IV, 30-39 MIN: ICD-10-PCS | Mod: S$GLB,,, | Performed by: INTERNAL MEDICINE

## 2021-06-18 PROCEDURE — 1101F PT FALLS ASSESS-DOCD LE1/YR: CPT | Mod: CPTII,S$GLB,, | Performed by: INTERNAL MEDICINE

## 2021-06-18 PROCEDURE — 99999 PR PBB SHADOW E&M-EST. PATIENT-LVL III: ICD-10-PCS | Mod: PBBFAC,,, | Performed by: INTERNAL MEDICINE

## 2021-06-18 PROCEDURE — 1159F PR MEDICATION LIST DOCUMENTED IN MEDICAL RECORD: ICD-10-PCS | Mod: S$GLB,,, | Performed by: INTERNAL MEDICINE

## 2021-06-18 PROCEDURE — 3288F FALL RISK ASSESSMENT DOCD: CPT | Mod: CPTII,S$GLB,, | Performed by: INTERNAL MEDICINE

## 2021-06-18 PROCEDURE — 1126F PR PAIN SEVERITY QUANTIFIED, NO PAIN PRESENT: ICD-10-PCS | Mod: S$GLB,,, | Performed by: INTERNAL MEDICINE

## 2021-06-18 PROCEDURE — 99214 OFFICE O/P EST MOD 30 MIN: CPT | Mod: S$GLB,,, | Performed by: INTERNAL MEDICINE

## 2021-06-18 PROCEDURE — 1126F AMNT PAIN NOTED NONE PRSNT: CPT | Mod: S$GLB,,, | Performed by: INTERNAL MEDICINE

## 2021-06-18 PROCEDURE — 99999 PR PBB SHADOW E&M-EST. PATIENT-LVL III: CPT | Mod: PBBFAC,,, | Performed by: INTERNAL MEDICINE

## 2021-06-18 PROCEDURE — 1101F PR PT FALLS ASSESS DOC 0-1 FALLS W/OUT INJ PAST YR: ICD-10-PCS | Mod: CPTII,S$GLB,, | Performed by: INTERNAL MEDICINE

## 2021-06-18 PROCEDURE — 1159F MED LIST DOCD IN RCRD: CPT | Mod: S$GLB,,, | Performed by: INTERNAL MEDICINE

## 2021-06-21 ENCOUNTER — TELEPHONE (OUTPATIENT)
Dept: ENDOSCOPY | Facility: HOSPITAL | Age: 86
End: 2021-06-21

## 2021-06-22 ENCOUNTER — ANESTHESIA (OUTPATIENT)
Dept: ENDOSCOPY | Facility: HOSPITAL | Age: 86
End: 2021-06-22
Payer: MEDICARE

## 2021-06-22 ENCOUNTER — ANESTHESIA EVENT (OUTPATIENT)
Dept: ENDOSCOPY | Facility: HOSPITAL | Age: 86
End: 2021-06-22
Payer: MEDICARE

## 2021-06-22 ENCOUNTER — HOSPITAL ENCOUNTER (OUTPATIENT)
Facility: HOSPITAL | Age: 86
Discharge: HOME | End: 2021-06-22
Attending: INTERNAL MEDICINE | Admitting: INTERNAL MEDICINE
Payer: MEDICARE

## 2021-06-22 VITALS
SYSTOLIC BLOOD PRESSURE: 160 MMHG | HEIGHT: 65 IN | RESPIRATION RATE: 18 BRPM | TEMPERATURE: 98 F | WEIGHT: 190 LBS | HEART RATE: 63 BPM | DIASTOLIC BLOOD PRESSURE: 65 MMHG | BODY MASS INDEX: 31.65 KG/M2 | OXYGEN SATURATION: 95 %

## 2021-06-22 DIAGNOSIS — R13.10 DYSPHAGIA: ICD-10-CM

## 2021-06-22 LAB
GLUCOSE SERPL-MCNC: 92 MG/DL (ref 70–110)
POCT GLUCOSE: 92 MG/DL (ref 70–110)

## 2021-06-22 PROCEDURE — 63600175 PHARM REV CODE 636 W HCPCS: Performed by: NURSE ANESTHETIST, CERTIFIED REGISTERED

## 2021-06-22 PROCEDURE — 43249 PR EGD, FLEX, W/BALL DILATION, < 30MM: ICD-10-PCS | Mod: ,,, | Performed by: INTERNAL MEDICINE

## 2021-06-22 PROCEDURE — 37000009 HC ANESTHESIA EA ADD 15 MINS: Performed by: INTERNAL MEDICINE

## 2021-06-22 PROCEDURE — 25000003 PHARM REV CODE 250: Performed by: NURSE ANESTHETIST, CERTIFIED REGISTERED

## 2021-06-22 PROCEDURE — 43249 ESOPH EGD DILATION <30 MM: CPT | Performed by: INTERNAL MEDICINE

## 2021-06-22 PROCEDURE — 37000008 HC ANESTHESIA 1ST 15 MINUTES: Performed by: INTERNAL MEDICINE

## 2021-06-22 PROCEDURE — 43249 ESOPH EGD DILATION <30 MM: CPT | Mod: ,,, | Performed by: INTERNAL MEDICINE

## 2021-06-22 PROCEDURE — C1726 CATH, BAL DIL, NON-VASCULAR: HCPCS | Performed by: INTERNAL MEDICINE

## 2021-06-22 RX ORDER — SODIUM CHLORIDE 0.9 % (FLUSH) 0.9 %
10 SYRINGE (ML) INJECTION
Status: DISCONTINUED | OUTPATIENT
Start: 2021-06-22 | End: 2021-06-22 | Stop reason: HOSPADM

## 2021-06-22 RX ORDER — PROPOFOL 10 MG/ML
INJECTION, EMULSION INTRAVENOUS
Status: DISCONTINUED | OUTPATIENT
Start: 2021-06-22 | End: 2021-06-22

## 2021-06-22 RX ORDER — SODIUM CHLORIDE 9 MG/ML
INJECTION, SOLUTION INTRAVENOUS CONTINUOUS
Status: DISCONTINUED | OUTPATIENT
Start: 2021-06-22 | End: 2021-06-22 | Stop reason: HOSPADM

## 2021-06-22 RX ORDER — LIDOCAINE HCL/PF 100 MG/5ML
SYRINGE (ML) INTRAVENOUS
Status: DISCONTINUED | OUTPATIENT
Start: 2021-06-22 | End: 2021-06-22

## 2021-06-22 RX ORDER — ETOMIDATE 2 MG/ML
INJECTION INTRAVENOUS
Status: DISCONTINUED | OUTPATIENT
Start: 2021-06-22 | End: 2021-06-22

## 2021-06-22 RX ADMIN — PROPOFOL 20 MG: 10 INJECTION, EMULSION INTRAVENOUS at 03:06

## 2021-06-22 RX ADMIN — GLYCOPYRROLATE 0.2 MG: 0.2 INJECTION, SOLUTION INTRAMUSCULAR; INTRAVITREAL at 03:06

## 2021-06-22 RX ADMIN — ETOMIDATE 2 MG: 2 INJECTION, SOLUTION INTRAVENOUS at 03:06

## 2021-06-22 RX ADMIN — ETOMIDATE 4 MG: 2 INJECTION, SOLUTION INTRAVENOUS at 03:06

## 2021-06-22 RX ADMIN — LIDOCAINE HYDROCHLORIDE 100 MG: 20 INJECTION, SOLUTION INTRAVENOUS at 03:06

## 2021-06-23 ENCOUNTER — TELEPHONE (OUTPATIENT)
Dept: ENDOSCOPY | Facility: HOSPITAL | Age: 86
End: 2021-06-23

## 2021-08-19 ENCOUNTER — PATIENT OUTREACH (OUTPATIENT)
Dept: ADMINISTRATIVE | Facility: HOSPITAL | Age: 86
End: 2021-08-19

## 2021-08-26 ENCOUNTER — LAB VISIT (OUTPATIENT)
Dept: INTERNAL MEDICINE | Facility: CLINIC | Age: 86
End: 2021-08-26
Payer: MEDICARE

## 2021-08-26 DIAGNOSIS — E11.69 OBESITY, DIABETES, AND HYPERTENSION SYNDROME: ICD-10-CM

## 2021-08-26 DIAGNOSIS — I73.9 PERIPHERAL VASCULAR DISEASE: ICD-10-CM

## 2021-08-26 DIAGNOSIS — I10 ESSENTIAL HYPERTENSION: ICD-10-CM

## 2021-08-26 DIAGNOSIS — E11.49 CONTROLLED TYPE 2 DIABETES MELLITUS WITH OTHER NEUROLOGIC COMPLICATION, WITHOUT LONG-TERM CURRENT USE OF INSULIN: ICD-10-CM

## 2021-08-26 DIAGNOSIS — E03.4 HYPOTHYROIDISM DUE TO ACQUIRED ATROPHY OF THYROID: ICD-10-CM

## 2021-08-26 DIAGNOSIS — E11.59 OBESITY, DIABETES, AND HYPERTENSION SYNDROME: ICD-10-CM

## 2021-08-26 DIAGNOSIS — E66.9 OBESITY, DIABETES, AND HYPERTENSION SYNDROME: ICD-10-CM

## 2021-08-26 DIAGNOSIS — I11.0 HYPERTENSIVE HEART DISEASE WITH HEART FAILURE: ICD-10-CM

## 2021-08-26 DIAGNOSIS — I15.2 OBESITY, DIABETES, AND HYPERTENSION SYNDROME: ICD-10-CM

## 2021-08-26 DIAGNOSIS — E78.2 MIXED DYSLIPIDEMIA: ICD-10-CM

## 2021-08-26 LAB
ALBUMIN SERPL BCP-MCNC: 4 G/DL (ref 3.5–5.2)
ALBUMIN/CREAT UR: 50.2 UG/MG (ref 0–30)
ALP SERPL-CCNC: 120 U/L (ref 55–135)
ALT SERPL W/O P-5'-P-CCNC: 10 U/L (ref 10–44)
ANION GAP SERPL CALC-SCNC: 12 MMOL/L (ref 8–16)
AST SERPL-CCNC: 19 U/L (ref 10–40)
BASOPHILS # BLD AUTO: 0.03 K/UL (ref 0–0.2)
BASOPHILS NFR BLD: 0.5 % (ref 0–1.9)
BILIRUB SERPL-MCNC: 0.8 MG/DL (ref 0.1–1)
BUN SERPL-MCNC: 22 MG/DL (ref 10–30)
CALCIUM SERPL-MCNC: 9.9 MG/DL (ref 8.7–10.5)
CHLORIDE SERPL-SCNC: 105 MMOL/L (ref 95–110)
CHOLEST SERPL-MCNC: 131 MG/DL (ref 120–199)
CHOLEST/HDLC SERPL: 4.4 {RATIO} (ref 2–5)
CO2 SERPL-SCNC: 23 MMOL/L (ref 23–29)
CREAT SERPL-MCNC: 1.3 MG/DL (ref 0.5–1.4)
CREAT UR-MCNC: 107.6 MG/DL (ref 23–375)
DIFFERENTIAL METHOD: ABNORMAL
EOSINOPHIL # BLD AUTO: 0.1 K/UL (ref 0–0.5)
EOSINOPHIL NFR BLD: 1.8 % (ref 0–8)
ERYTHROCYTE [DISTWIDTH] IN BLOOD BY AUTOMATED COUNT: 12.1 % (ref 11.5–14.5)
EST. GFR  (AFRICAN AMERICAN): 55 ML/MIN/1.73 M^2
EST. GFR  (NON AFRICAN AMERICAN): 47 ML/MIN/1.73 M^2
ESTIMATED AVG GLUCOSE: 120 MG/DL (ref 68–131)
GLUCOSE SERPL-MCNC: 128 MG/DL (ref 70–110)
HBA1C MFR BLD: 5.8 % (ref 4–5.6)
HCT VFR BLD AUTO: 38.6 % (ref 40–54)
HDLC SERPL-MCNC: 30 MG/DL (ref 40–75)
HDLC SERPL: 22.9 % (ref 20–50)
HGB BLD-MCNC: 12.7 G/DL (ref 14–18)
IMM GRANULOCYTES # BLD AUTO: 0.05 K/UL (ref 0–0.04)
IMM GRANULOCYTES NFR BLD AUTO: 0.8 % (ref 0–0.5)
LDLC SERPL CALC-MCNC: 76 MG/DL (ref 63–159)
LYMPHOCYTES # BLD AUTO: 1.2 K/UL (ref 1–4.8)
LYMPHOCYTES NFR BLD: 18.5 % (ref 18–48)
MCH RBC QN AUTO: 33.5 PG (ref 27–31)
MCHC RBC AUTO-ENTMCNC: 32.9 G/DL (ref 32–36)
MCV RBC AUTO: 102 FL (ref 82–98)
MICROALBUMIN UR DL<=1MG/L-MCNC: 54 UG/ML
MONOCYTES # BLD AUTO: 0.7 K/UL (ref 0.3–1)
MONOCYTES NFR BLD: 10.9 % (ref 4–15)
NEUTROPHILS # BLD AUTO: 4.4 K/UL (ref 1.8–7.7)
NEUTROPHILS NFR BLD: 67.5 % (ref 38–73)
NONHDLC SERPL-MCNC: 101 MG/DL
NRBC BLD-RTO: 0 /100 WBC
PLATELET # BLD AUTO: 157 K/UL (ref 150–450)
PMV BLD AUTO: 9.9 FL (ref 9.2–12.9)
POTASSIUM SERPL-SCNC: 4.3 MMOL/L (ref 3.5–5.1)
PROT SERPL-MCNC: 7.6 G/DL (ref 6–8.4)
RBC # BLD AUTO: 3.79 M/UL (ref 4.6–6.2)
SODIUM SERPL-SCNC: 140 MMOL/L (ref 136–145)
T4 FREE SERPL-MCNC: 1.09 NG/DL (ref 0.71–1.51)
TRIGL SERPL-MCNC: 125 MG/DL (ref 30–150)
TSH SERPL DL<=0.005 MIU/L-ACNC: 4.29 UIU/ML (ref 0.4–4)
WBC # BLD AUTO: 6.54 K/UL (ref 3.9–12.7)

## 2021-08-26 PROCEDURE — 83036 HEMOGLOBIN GLYCOSYLATED A1C: CPT | Performed by: INTERNAL MEDICINE

## 2021-08-26 PROCEDURE — 36415 PR COLLECTION VENOUS BLOOD,VENIPUNCTURE: ICD-10-PCS | Mod: S$GLB,,, | Performed by: INTERNAL MEDICINE

## 2021-08-26 PROCEDURE — 80061 LIPID PANEL: CPT | Performed by: INTERNAL MEDICINE

## 2021-08-26 PROCEDURE — 85025 COMPLETE CBC W/AUTO DIFF WBC: CPT | Performed by: INTERNAL MEDICINE

## 2021-08-26 PROCEDURE — 82570 ASSAY OF URINE CREATININE: CPT | Performed by: INTERNAL MEDICINE

## 2021-08-26 PROCEDURE — 80053 COMPREHEN METABOLIC PANEL: CPT | Performed by: INTERNAL MEDICINE

## 2021-08-26 PROCEDURE — 84443 ASSAY THYROID STIM HORMONE: CPT | Performed by: INTERNAL MEDICINE

## 2021-08-26 PROCEDURE — 36415 COLL VENOUS BLD VENIPUNCTURE: CPT | Mod: S$GLB,,, | Performed by: INTERNAL MEDICINE

## 2021-08-26 PROCEDURE — 84439 ASSAY OF FREE THYROXINE: CPT | Performed by: INTERNAL MEDICINE

## 2021-09-18 ENCOUNTER — HOSPITAL ENCOUNTER (EMERGENCY)
Facility: HOSPITAL | Age: 86
Discharge: HOME | End: 2021-09-18
Attending: SURGERY
Payer: MEDICARE

## 2021-09-18 VITALS
RESPIRATION RATE: 20 BRPM | DIASTOLIC BLOOD PRESSURE: 62 MMHG | TEMPERATURE: 98 F | OXYGEN SATURATION: 98 % | SYSTOLIC BLOOD PRESSURE: 134 MMHG | HEART RATE: 60 BPM

## 2021-09-18 DIAGNOSIS — I83.92 VARICOSE VEINS OF LEFT LOWER EXTREMITY, UNSPECIFIED WHETHER COMPLICATED: Primary | ICD-10-CM

## 2021-09-18 PROCEDURE — 99282 EMERGENCY DEPT VISIT SF MDM: CPT

## 2021-09-20 ENCOUNTER — PATIENT MESSAGE (OUTPATIENT)
Dept: INTERNAL MEDICINE | Facility: CLINIC | Age: 86
End: 2021-09-20

## 2022-01-18 DIAGNOSIS — I10 ESSENTIAL HYPERTENSION: ICD-10-CM

## 2022-01-18 NOTE — TELEPHONE ENCOUNTER
No new care gaps identified.  Powered by [employer]. Reference number: [id number].   1/18/2022 9:53:56 AM CST

## 2022-01-20 NOTE — TELEPHONE ENCOUNTER
Refill Routing Note   Medication(s) are not appropriate for processing by [facility] for the following reason(s):      - Patient has been seen in the Emergency Room/Department since the last PCP visit    [facility] action(s):  Defer       Medication Therapy Plan: Recent ED visit for Varicose veins of left lower extremity (9/18/21); Defer to PCP  --->Care Gap information included in message below if applicable.   Medication reconciliation completed: No   Automatic Epic Generated Protocol Data:        Requested Prescriptions   Pending Prescriptions Disp Refills    amLODIPine (NORVASC) 10 MG tablet [Pharmacy Med Name: AMLODIPINE BESYLATE 10 MG T 10 Tablet] 90 tablet 1     Sig: TAKE 1 TABLET BY MOUTH DAILY FOR BLOOD PRESSURE       Cardiovascular:  Calcium Channel Blockers Passed - 1/18/2022  9:53 AM        Passed - Patient is at least 18 years old        Passed - Last BP in normal range within 360 days     BP Readings from Last 1 Encounters:   09/18/21 134/62               Passed - Valid encounter within last 15 months     Recent Visits  Date Type Provider Dept   05/26/21 Office Visit Callum Roberts MD [facility] Internal Medicine II   03/02/21 Office Visit Callum Roberts MD [facility] Internal Medicine II   11/02/20 Office Visit Callum Roberts MD [facility] Internal Medicine II   08/25/20 Office Visit Callum Roberts MD [facility] Internal Medicine II   07/28/20 Office Visit Callum Roberts MD [facility] Internal Medicine II   07/20/20 Office Visit Callum Roberts MD [facility] Internal Medicine II   Showing recent visits within past 720 days and meeting all other requirements  Future Appointments  No visits were found meeting these conditions.  Showing future appointments within next 150 days and meeting all other requirements                      Appointments  past 12m or future 3m with PCP    Date Provider   Last Visit   5/26/2021 Callum Roberts MD   Next Visit   Visit date not found Callum Roberts MD   ED visits in  past 90 days: 0        Note composed:10:28 AM 01/20/2022

## 2022-01-21 RX ORDER — AMLODIPINE BESYLATE 10 MG/1
TABLET ORAL
Qty: 90 TABLET | Refills: 1 | Status: ON HOLD | OUTPATIENT
Start: 2022-01-21 | End: 2022-05-24 | Stop reason: HOSPADM

## 2022-02-08 ENCOUNTER — TELEPHONE (OUTPATIENT)
Dept: INTERNAL MEDICINE | Facility: CLINIC | Age: 87
End: 2022-02-08
Payer: MEDICARE

## 2022-02-08 DIAGNOSIS — E11.49 TYPE 2 DIABETES MELLITUS WITH OTHER NEUROLOGIC COMPLICATION, WITHOUT LONG-TERM CURRENT USE OF INSULIN: Primary | ICD-10-CM

## 2022-02-08 DIAGNOSIS — E78.2 MIXED DYSLIPIDEMIA: ICD-10-CM

## 2022-02-08 DIAGNOSIS — I10 PRIMARY HYPERTENSION: ICD-10-CM

## 2022-02-08 DIAGNOSIS — I45.2 RBBB (RIGHT BUNDLE BRANCH BLOCK WITH LEFT ANTERIOR FASCICULAR BLOCK): ICD-10-CM

## 2022-02-18 DIAGNOSIS — K21.9 GASTROESOPHAGEAL REFLUX DISEASE: ICD-10-CM

## 2022-02-18 NOTE — TELEPHONE ENCOUNTER
No new care gaps identified.  Powered by [email or web address] by [employer]. Reference number: [id number].   2/18/2022 10:00:23 AM CST

## 2022-02-21 RX ORDER — PANTOPRAZOLE SODIUM 40 MG/1
TABLET, DELAYED RELEASE ORAL
Qty: 30 TABLET | Refills: 0 | Status: SHIPPED | OUTPATIENT
Start: 2022-02-21 | End: 2022-04-13

## 2022-02-25 NOTE — TELEPHONE ENCOUNTER
This Rx Request does not qualify for assessment with the OR.    Please review protocol details and the Care Due Message for extra clinical information    Reasons Rx Request may be deferred:  1. Labs/Vitals overdue  2. Labs/Vitals abnormal  3. Patient has been seen in the ED/Hospital since the last PCP visit  4. Medication is non-delegated  5. Provider is a non-participating provider    36.7

## 2022-02-25 NOTE — TELEPHONE ENCOUNTER
Care Due:                  Date            Visit Type   Department     Provider  --------------------------------------------------------------------------------                                EP -                              PRIMARY      [facility] INTERNAL  Last Visit: 05-      CARE ([facility])   MEDICINE II    Callum Roberts                              EP -                              PRIMARY      [facility] INTERNAL  Next Visit: 03-      CARE ([facility])   MEDICINE II    Callum Roberts                                                            Last  Test          Frequency    Reason                     Performed    Due Date  --------------------------------------------------------------------------------    HBA1C.......  6 months...  glimepiride, pioglitazone  08- 02-    Powered by [employer] by [employer]. Reference number: [id number].   2/25/2022 4:16:54 PM CST

## 2022-02-28 RX ORDER — PIOGLITAZONEHYDROCHLORIDE 15 MG/1
TABLET ORAL
Qty: 30 TABLET | Refills: 0 | Status: SHIPPED | OUTPATIENT
Start: 2022-02-28 | End: 2022-04-07

## 2022-03-02 ENCOUNTER — HOSPITAL ENCOUNTER (EMERGENCY)
Facility: HOSPITAL | Age: 87
Discharge: ANOTHER HOSPITAL | End: 2022-03-03
Attending: SURGERY
Payer: MEDICARE

## 2022-03-02 DIAGNOSIS — T85.79XA INFECTED HERNIOPLASTY MESH, INITIAL ENCOUNTER: ICD-10-CM

## 2022-03-02 DIAGNOSIS — K65.1 INTRA-ABDOMINAL ABSCESS: Primary | ICD-10-CM

## 2022-03-02 DIAGNOSIS — R50.9 FEVER: ICD-10-CM

## 2022-03-02 LAB
ALBUMIN SERPL BCP-MCNC: 4.1 G/DL (ref 3.5–5.2)
ALP SERPL-CCNC: 135 U/L (ref 55–135)
ALT SERPL W/O P-5'-P-CCNC: 14 U/L (ref 10–44)
ANION GAP SERPL CALC-SCNC: 14 MMOL/L (ref 8–16)
AST SERPL-CCNC: 24 U/L (ref 10–40)
BASOPHILS # BLD AUTO: 0.03 K/UL (ref 0–0.2)
BASOPHILS NFR BLD: 0.2 % (ref 0–1.9)
BILIRUB SERPL-MCNC: 1.3 MG/DL (ref 0.1–1)
BILIRUB UR QL STRIP: NEGATIVE
BNP SERPL-MCNC: 355 PG/ML (ref 0–99)
BNP SERPL-MCNC: 355 PG/ML (ref 0–99)
BUN SERPL-MCNC: 26 MG/DL (ref 10–30)
CALCIUM SERPL-MCNC: 9.9 MG/DL (ref 8.7–10.5)
CHLORIDE SERPL-SCNC: 101 MMOL/L (ref 95–110)
CK MB SERPL-MCNC: 0.9 NG/ML (ref 0.1–6.5)
CK MB SERPL-RTO: 1.1 % (ref 0–5)
CK SERPL-CCNC: 85 U/L (ref 20–200)
CK SERPL-CCNC: 85 U/L (ref 20–200)
CLARITY UR: CLEAR
CO2 SERPL-SCNC: 23 MMOL/L (ref 23–29)
COLOR UR: YELLOW
CREAT SERPL-MCNC: 1.9 MG/DL (ref 0.5–1.4)
DIFFERENTIAL METHOD: ABNORMAL
EOSINOPHIL # BLD AUTO: 0 K/UL (ref 0–0.5)
EOSINOPHIL NFR BLD: 0.1 % (ref 0–8)
ERYTHROCYTE [DISTWIDTH] IN BLOOD BY AUTOMATED COUNT: 12.1 % (ref 11.5–14.5)
EST. GFR  (AFRICAN AMERICAN): 35 ML/MIN/1.73 M^2
EST. GFR  (NON AFRICAN AMERICAN): 30 ML/MIN/1.73 M^2
GLUCOSE SERPL-MCNC: 212 MG/DL (ref 70–110)
GLUCOSE UR QL STRIP: NEGATIVE
HCT VFR BLD AUTO: 38.8 % (ref 40–54)
HGB BLD-MCNC: 12.8 G/DL (ref 14–18)
HGB UR QL STRIP: ABNORMAL
IMM GRANULOCYTES # BLD AUTO: 0.19 K/UL (ref 0–0.04)
IMM GRANULOCYTES NFR BLD AUTO: 1.2 % (ref 0–0.5)
INFLUENZA A, MOLECULAR: NEGATIVE
INFLUENZA B, MOLECULAR: NEGATIVE
KETONES UR QL STRIP: NEGATIVE
LACTATE SERPL-SCNC: 1.1 MMOL/L (ref 0.5–2.2)
LACTATE SERPL-SCNC: 2 MMOL/L (ref 0.5–2.2)
LEUKOCYTE ESTERASE UR QL STRIP: NEGATIVE
LIPASE SERPL-CCNC: 37 U/L (ref 4–60)
LYMPHOCYTES # BLD AUTO: 0.5 K/UL (ref 1–4.8)
LYMPHOCYTES NFR BLD: 3.1 % (ref 18–48)
MAGNESIUM SERPL-MCNC: 1.7 MG/DL (ref 1.6–2.6)
MCH RBC QN AUTO: 33.3 PG (ref 27–31)
MCHC RBC AUTO-ENTMCNC: 33 G/DL (ref 32–36)
MCV RBC AUTO: 101 FL (ref 82–98)
MONOCYTES # BLD AUTO: 0.7 K/UL (ref 0.3–1)
MONOCYTES NFR BLD: 4.4 % (ref 4–15)
NEUTROPHILS # BLD AUTO: 14.4 K/UL (ref 1.8–7.7)
NEUTROPHILS NFR BLD: 91 % (ref 38–73)
NITRITE UR QL STRIP: NEGATIVE
NRBC BLD-RTO: 0 /100 WBC
PH UR STRIP: 5 [PH] (ref 5–8)
PHOSPHATE SERPL-MCNC: 2.2 MG/DL (ref 2.7–4.5)
PLATELET # BLD AUTO: 159 K/UL (ref 150–450)
PMV BLD AUTO: 9.3 FL (ref 9.2–12.9)
POTASSIUM SERPL-SCNC: 4.2 MMOL/L (ref 3.5–5.1)
PROCALCITONIN SERPL IA-MCNC: 0.98 NG/ML
PROT SERPL-MCNC: 7.9 G/DL (ref 6–8.4)
PROT UR QL STRIP: NEGATIVE
RBC # BLD AUTO: 3.84 M/UL (ref 4.6–6.2)
SARS-COV-2 RDRP RESP QL NAA+PROBE: NEGATIVE
SODIUM SERPL-SCNC: 138 MMOL/L (ref 136–145)
SP GR UR STRIP: 1.01 (ref 1–1.03)
SPECIMEN SOURCE: NORMAL
TROPONIN I SERPL DL<=0.01 NG/ML-MCNC: 0.06 NG/ML (ref 0–0.03)
URN SPEC COLLECT METH UR: ABNORMAL
UROBILINOGEN UR STRIP-ACNC: NEGATIVE EU/DL
WBC # BLD AUTO: 15.82 K/UL (ref 3.9–12.7)

## 2022-03-02 PROCEDURE — 83880 ASSAY OF NATRIURETIC PEPTIDE: CPT | Performed by: SURGERY

## 2022-03-02 PROCEDURE — 82553 CREATINE MB FRACTION: CPT | Performed by: SURGERY

## 2022-03-02 PROCEDURE — 87502 INFLUENZA DNA AMP PROBE: CPT | Performed by: SURGERY

## 2022-03-02 PROCEDURE — 83735 ASSAY OF MAGNESIUM: CPT | Performed by: SURGERY

## 2022-03-02 PROCEDURE — 63600175 PHARM REV CODE 636 W HCPCS: Performed by: SURGERY

## 2022-03-02 PROCEDURE — 36415 COLL VENOUS BLD VENIPUNCTURE: CPT | Performed by: SURGERY

## 2022-03-02 PROCEDURE — 25000003 PHARM REV CODE 250: Performed by: SURGERY

## 2022-03-02 PROCEDURE — 84145 PROCALCITONIN (PCT): CPT | Performed by: SURGERY

## 2022-03-02 PROCEDURE — 96367 TX/PROPH/DG ADDL SEQ IV INF: CPT

## 2022-03-02 PROCEDURE — 83605 ASSAY OF LACTIC ACID: CPT | Performed by: SURGERY

## 2022-03-02 PROCEDURE — 96365 THER/PROPH/DIAG IV INF INIT: CPT

## 2022-03-02 PROCEDURE — 93005 ELECTROCARDIOGRAM TRACING: CPT

## 2022-03-02 PROCEDURE — 80053 COMPREHEN METABOLIC PANEL: CPT | Mod: 91 | Performed by: SURGERY

## 2022-03-02 PROCEDURE — 81003 URINALYSIS AUTO W/O SCOPE: CPT | Performed by: SURGERY

## 2022-03-02 PROCEDURE — 36556 INSERT NON-TUNNEL CV CATH: CPT

## 2022-03-02 PROCEDURE — 25500020 PHARM REV CODE 255: Performed by: SURGERY

## 2022-03-02 PROCEDURE — 99291 CRITICAL CARE FIRST HOUR: CPT | Mod: 25

## 2022-03-02 PROCEDURE — 96361 HYDRATE IV INFUSION ADD-ON: CPT | Mod: 59

## 2022-03-02 PROCEDURE — 94760 N-INVAS EAR/PLS OXIMETRY 1: CPT

## 2022-03-02 PROCEDURE — 93010 ELECTROCARDIOGRAM REPORT: CPT | Mod: ,,, | Performed by: INTERNAL MEDICINE

## 2022-03-02 PROCEDURE — 87040 BLOOD CULTURE FOR BACTERIA: CPT | Mod: 59 | Performed by: SURGERY

## 2022-03-02 PROCEDURE — 93010 EKG 12-LEAD: ICD-10-PCS | Mod: ,,, | Performed by: INTERNAL MEDICINE

## 2022-03-02 PROCEDURE — 96366 THER/PROPH/DIAG IV INF ADDON: CPT | Mod: 59

## 2022-03-02 PROCEDURE — 84484 ASSAY OF TROPONIN QUANT: CPT | Performed by: SURGERY

## 2022-03-02 PROCEDURE — 84100 ASSAY OF PHOSPHORUS: CPT | Performed by: SURGERY

## 2022-03-02 PROCEDURE — 96374 THER/PROPH/DIAG INJ IV PUSH: CPT | Mod: 59

## 2022-03-02 PROCEDURE — P9612 CATHETERIZE FOR URINE SPEC: HCPCS

## 2022-03-02 PROCEDURE — 85025 COMPLETE CBC W/AUTO DIFF WBC: CPT | Mod: 91 | Performed by: SURGERY

## 2022-03-02 PROCEDURE — U0002 COVID-19 LAB TEST NON-CDC: HCPCS | Performed by: SURGERY

## 2022-03-02 PROCEDURE — 27000221 HC OXYGEN, UP TO 24 HOURS

## 2022-03-02 PROCEDURE — 83690 ASSAY OF LIPASE: CPT | Performed by: SURGERY

## 2022-03-02 RX ORDER — ACETAMINOPHEN 500 MG
1000 TABLET ORAL
Status: COMPLETED | OUTPATIENT
Start: 2022-03-02 | End: 2022-03-02

## 2022-03-02 RX ORDER — SODIUM CHLORIDE 9 MG/ML
1000 INJECTION, SOLUTION INTRAVENOUS
Status: COMPLETED | OUTPATIENT
Start: 2022-03-02 | End: 2022-03-02

## 2022-03-02 RX ORDER — NOREPINEPHRINE BITARTRATE/D5W 4MG/250ML
0-3 PLASTIC BAG, INJECTION (ML) INTRAVENOUS
Status: COMPLETED | OUTPATIENT
Start: 2022-03-03 | End: 2022-03-03

## 2022-03-02 RX ORDER — VANCOMYCIN 2 GRAM/500 ML IN 0.9 % SODIUM CHLORIDE INTRAVENOUS
2000 ONCE
Status: COMPLETED | OUTPATIENT
Start: 2022-03-02 | End: 2022-03-02

## 2022-03-02 RX ORDER — IBUPROFEN 800 MG/1
800 TABLET ORAL
Status: COMPLETED | OUTPATIENT
Start: 2022-03-02 | End: 2022-03-02

## 2022-03-02 RX ADMIN — PIPERACILLIN AND TAZOBACTAM 4.5 G: 4; .5 INJECTION, POWDER, LYOPHILIZED, FOR SOLUTION INTRAVENOUS; PARENTERAL at 06:03

## 2022-03-02 RX ADMIN — IBUPROFEN 800 MG: 800 TABLET, FILM COATED ORAL at 05:03

## 2022-03-02 RX ADMIN — SODIUM CHLORIDE 2000 ML: 0.9 INJECTION, SOLUTION INTRAVENOUS at 05:03

## 2022-03-02 RX ADMIN — SODIUM CHLORIDE 1000 ML: 0.9 INJECTION, SOLUTION INTRAVENOUS at 10:03

## 2022-03-02 RX ADMIN — ACETAMINOPHEN 1000 MG: 500 TABLET ORAL at 05:03

## 2022-03-02 RX ADMIN — IOHEXOL 75 ML: 350 INJECTION, SOLUTION INTRAVENOUS at 07:03

## 2022-03-02 RX ADMIN — VANCOMYCIN 2 GRAM/500 ML IN 0.9 % SODIUM CHLORIDE INTRAVENOUS 2000 MG: at 07:03

## 2022-03-03 ENCOUNTER — HOSPITAL ENCOUNTER (INPATIENT)
Facility: HOSPITAL | Age: 87
LOS: 5 days | Discharge: HOME HEALTH | DRG: 919 | End: 2022-03-08
Attending: HOSPITALIST | Admitting: HOSPITALIST
Payer: MEDICARE

## 2022-03-03 VITALS
TEMPERATURE: 98 F | SYSTOLIC BLOOD PRESSURE: 104 MMHG | HEART RATE: 60 BPM | BODY MASS INDEX: 30.99 KG/M2 | DIASTOLIC BLOOD PRESSURE: 54 MMHG | RESPIRATION RATE: 22 BRPM | OXYGEN SATURATION: 98 % | HEIGHT: 65 IN | WEIGHT: 186 LBS

## 2022-03-03 DIAGNOSIS — I95.9 HYPOTENSION, UNSPECIFIED HYPOTENSION TYPE: ICD-10-CM

## 2022-03-03 DIAGNOSIS — I48.20 CHRONIC ATRIAL FIBRILLATION: ICD-10-CM

## 2022-03-03 DIAGNOSIS — R65.21 SEPTIC SHOCK: ICD-10-CM

## 2022-03-03 DIAGNOSIS — L02.211 ABDOMINAL WALL ABSCESS: ICD-10-CM

## 2022-03-03 DIAGNOSIS — N18.30 ACUTE RENAL FAILURE WITH ACUTE TUBULAR NECROSIS SUPERIMPOSED ON STAGE 3 CHRONIC KIDNEY DISEASE, UNSPECIFIED WHETHER STAGE 3A OR 3B CKD: ICD-10-CM

## 2022-03-03 DIAGNOSIS — K65.1 INTRA-ABDOMINAL ABSCESS: Primary | ICD-10-CM

## 2022-03-03 DIAGNOSIS — N17.0 ACUTE RENAL FAILURE WITH ACUTE TUBULAR NECROSIS SUPERIMPOSED ON STAGE 3 CHRONIC KIDNEY DISEASE, UNSPECIFIED WHETHER STAGE 3A OR 3B CKD: ICD-10-CM

## 2022-03-03 DIAGNOSIS — A41.9 SEPTIC SHOCK: ICD-10-CM

## 2022-03-03 DIAGNOSIS — R79.89 ELEVATED TROPONIN: ICD-10-CM

## 2022-03-03 PROBLEM — T18.128A FOOD IMPACTION OF ESOPHAGUS: Status: ACTIVE | Noted: 2022-03-03

## 2022-03-03 PROBLEM — R19.00 ABDOMINAL MASS: Status: ACTIVE | Noted: 2022-03-03

## 2022-03-03 PROBLEM — N42.9 DISORDER OF PROSTATE: Status: ACTIVE | Noted: 2022-03-03

## 2022-03-03 PROBLEM — E83.42 HYPOMAGNESEMIA: Status: ACTIVE | Noted: 2022-03-03
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
## 2022-03-03 PROBLEM — C61 PRIMARY MALIGNANT NEOPLASM OF PROSTATE: Status: ACTIVE | Noted: 2022-03-03

## 2022-03-03 PROBLEM — I35.9 AORTIC VALVE DISORDER: Status: ACTIVE | Noted: 2018-09-16

## 2022-03-03 LAB
ALBUMIN SERPL BCP-MCNC: 3.1 G/DL (ref 3.5–5.2)
ALP SERPL-CCNC: 93 U/L (ref 55–135)
ALT SERPL W/O P-5'-P-CCNC: 8 U/L (ref 10–44)
ANION GAP SERPL CALC-SCNC: 9 MMOL/L (ref 8–16)
AORTIC ROOT ANNULUS: 3.4 CM
APTT BLDCRRT: 42.4 SEC (ref 21–32)
AST SERPL-CCNC: 23 U/L (ref 10–40)
AV INDEX (PROSTH): 0.61
AV MEAN GRADIENT: 13 MMHG
AV PEAK GRADIENT: 22 MMHG
AV VALVE AREA: 2.53 CM2
AV VELOCITY RATIO: 0.49
BASOPHILS # BLD AUTO: 0.04 K/UL (ref 0–0.2)
BASOPHILS NFR BLD: 0.2 % (ref 0–1.9)
BILIRUB SERPL-MCNC: 1.1 MG/DL (ref 0.1–1)
BSA FOR ECHO PROCEDURE: 1.96 M2
BUN SERPL-MCNC: 29 MG/DL (ref 10–30)
CALCIUM SERPL-MCNC: 8.1 MG/DL (ref 8.7–10.5)
CHLORIDE SERPL-SCNC: 107 MMOL/L (ref 95–110)
CO2 SERPL-SCNC: 22 MMOL/L (ref 23–29)
CREAT SERPL-MCNC: 1.7 MG/DL (ref 0.5–1.4)
CV ECHO LV RWT: 0.41 CM
DIFFERENTIAL METHOD: ABNORMAL
DOP CALC AO PEAK VEL: 2.37 M/S
DOP CALC AO VTI: 47.06 CM
DOP CALC LVOT AREA: 4.2 CM2
DOP CALC LVOT DIAMETER: 2.3 CM
DOP CALC LVOT PEAK VEL: 1.15 M/S
DOP CALC LVOT STROKE VOLUME: 119.18 CM3
DOP CALC MV VTI: 20.02 CM
DOP CALCLVOT PEAK VEL VTI: 28.7 CM
ECHO LV POSTERIOR WALL: 0.9 CM (ref 0.6–1.1)
EJECTION FRACTION: 55 %
EOSINOPHIL # BLD AUTO: 0 K/UL (ref 0–0.5)
EOSINOPHIL NFR BLD: 0 % (ref 0–8)
ERYTHROCYTE [DISTWIDTH] IN BLOOD BY AUTOMATED COUNT: 12.6 % (ref 11.5–14.5)
EST. GFR  (AFRICAN AMERICAN): 40 ML/MIN/1.73 M^2
EST. GFR  (NON AFRICAN AMERICAN): 34 ML/MIN/1.73 M^2
FRACTIONAL SHORTENING: 55 % (ref 28–44)
GLUCOSE SERPL-MCNC: 164 MG/DL (ref 70–110)
HCT VFR BLD AUTO: 29.4 % (ref 40–54)
HGB BLD-MCNC: 10 G/DL (ref 14–18)
IMM GRANULOCYTES # BLD AUTO: 0.17 K/UL (ref 0–0.04)
IMM GRANULOCYTES NFR BLD AUTO: 0.8 % (ref 0–0.5)
INR PPP: 2.4 (ref 0.8–1.2)
INTERVENTRICULAR SEPTUM: 0.9 CM (ref 0.6–1.1)
LA MAJOR: 7.67 CM
LA MINOR: 6.5 CM
LA WIDTH: 4 CM
LACTATE SERPL-SCNC: 1.2 MMOL/L (ref 0.5–2.2)
LEFT ATRIUM SIZE: 4.61 CM
LEFT ATRIUM VOLUME INDEX MOD: 57.6 ML/M2
LEFT ATRIUM VOLUME INDEX: 57.7 ML/M2
LEFT ATRIUM VOLUME MOD: 110.03 CM3
LEFT ATRIUM VOLUME: 110.29 CM3
LEFT INTERNAL DIMENSION IN SYSTOLE: 2 CM (ref 2.1–4)
LEFT VENTRICLE DIASTOLIC VOLUME INDEX: 42.17 ML/M2
LEFT VENTRICLE DIASTOLIC VOLUME: 80.55 ML
LEFT VENTRICLE MASS INDEX: 67 G/M2
LEFT VENTRICLE SYSTOLIC VOLUME INDEX: 16.2 ML/M2
LEFT VENTRICLE SYSTOLIC VOLUME: 30.91 ML
LEFT VENTRICULAR INTERNAL DIMENSION IN DIASTOLE: 4.4 CM (ref 3.5–6)
LEFT VENTRICULAR MASS: 128.02 G
LYMPHOCYTES # BLD AUTO: 0.9 K/UL (ref 1–4.8)
LYMPHOCYTES NFR BLD: 4.3 % (ref 18–48)
MAGNESIUM SERPL-MCNC: 1.4 MG/DL (ref 1.6–2.6)
MCH RBC QN AUTO: 34.2 PG (ref 27–31)
MCHC RBC AUTO-ENTMCNC: 34 G/DL (ref 32–36)
MCV RBC AUTO: 101 FL (ref 82–98)
MONOCYTES # BLD AUTO: 1.3 K/UL (ref 0.3–1)
MONOCYTES NFR BLD: 6.1 % (ref 4–15)
MV MEAN GRADIENT: 1 MMHG
MV PEAK GRADIENT: 6 MMHG
MV VALVE AREA BY CONTINUITY EQUATION: 5.95 CM2
NEUTROPHILS # BLD AUTO: 19.2 K/UL (ref 1.8–7.7)
NEUTROPHILS NFR BLD: 88.6 % (ref 38–73)
NRBC BLD-RTO: 0 /100 WBC
PHOSPHATE SERPL-MCNC: 3.3 MG/DL (ref 2.7–4.5)
PISA MRMAX VEL: 0.04 M/S
PISA TR MAX VEL: 3.58 M/S
PLATELET # BLD AUTO: 121 K/UL (ref 150–450)
PMV BLD AUTO: 9.7 FL (ref 9.2–12.9)
POCT GLUCOSE: 117 MG/DL (ref 70–110)
POCT GLUCOSE: 95 MG/DL (ref 70–110)
POTASSIUM SERPL-SCNC: 3.7 MMOL/L (ref 3.5–5.1)
PROT SERPL-MCNC: 6.1 G/DL (ref 6–8.4)
PROTHROMBIN TIME: 23.5 SEC (ref 9–12.5)
PV PEAK VELOCITY: 1.45 CM/S
RA MAJOR: 6.1 CM
RA WIDTH: 4.5 CM
RBC # BLD AUTO: 2.92 M/UL (ref 4.6–6.2)
RIGHT VENTRICULAR END-DIASTOLIC DIMENSION: 3.2 CM
RIGHT VENTRICULAR LENGTH IN DIASTOLE (APICAL 4-CHAMBER VIEW): 5.3 CM
SODIUM SERPL-SCNC: 138 MMOL/L (ref 136–145)
TR MAX PG: 51 MMHG
TRICUSPID ANNULAR PLANE SYSTOLIC EXCURSION: 2.1 CM
TROPONIN I SERPL DL<=0.01 NG/ML-MCNC: 0.07 NG/ML (ref 0–0.03)
TROPONIN I SERPL DL<=0.01 NG/ML-MCNC: 0.1 NG/ML (ref 0–0.03)
VANCOMYCIN SERPL-MCNC: 14.3 UG/ML
WBC # BLD AUTO: 21.7 K/UL (ref 3.9–12.7)

## 2022-03-03 PROCEDURE — 27000221 HC OXYGEN, UP TO 24 HOURS

## 2022-03-03 PROCEDURE — 25000003 PHARM REV CODE 250: Performed by: INTERNAL MEDICINE

## 2022-03-03 PROCEDURE — 85730 THROMBOPLASTIN TIME PARTIAL: CPT | Performed by: NURSE PRACTITIONER

## 2022-03-03 PROCEDURE — 20000000 HC ICU ROOM

## 2022-03-03 PROCEDURE — 36415 COLL VENOUS BLD VENIPUNCTURE: CPT | Performed by: NURSE PRACTITIONER

## 2022-03-03 PROCEDURE — 25000003 PHARM REV CODE 250: Performed by: NURSE PRACTITIONER

## 2022-03-03 PROCEDURE — 80053 COMPREHEN METABOLIC PANEL: CPT | Performed by: NURSE PRACTITIONER

## 2022-03-03 PROCEDURE — 87102 FUNGUS ISOLATION CULTURE: CPT | Performed by: HOSPITALIST

## 2022-03-03 PROCEDURE — 80202 ASSAY OF VANCOMYCIN: CPT | Performed by: HOSPITALIST

## 2022-03-03 PROCEDURE — 85610 PROTHROMBIN TIME: CPT | Performed by: NURSE PRACTITIONER

## 2022-03-03 PROCEDURE — 87070 CULTURE OTHR SPECIMN AEROBIC: CPT | Performed by: HOSPITALIST

## 2022-03-03 PROCEDURE — 85025 COMPLETE CBC W/AUTO DIFF WBC: CPT | Performed by: NURSE PRACTITIONER

## 2022-03-03 PROCEDURE — 25500020 PHARM REV CODE 255: Performed by: INTERNAL MEDICINE

## 2022-03-03 PROCEDURE — 96374 THER/PROPH/DIAG INJ IV PUSH: CPT | Mod: 59

## 2022-03-03 PROCEDURE — 63600175 PHARM REV CODE 636 W HCPCS: Performed by: NURSE PRACTITIONER

## 2022-03-03 PROCEDURE — 36415 COLL VENOUS BLD VENIPUNCTURE: CPT | Performed by: HOSPITALIST

## 2022-03-03 PROCEDURE — 94761 N-INVAS EAR/PLS OXIMETRY MLT: CPT

## 2022-03-03 PROCEDURE — 25000003 PHARM REV CODE 250: Performed by: HOSPITALIST

## 2022-03-03 PROCEDURE — 83605 ASSAY OF LACTIC ACID: CPT | Performed by: NURSE PRACTITIONER

## 2022-03-03 PROCEDURE — 25000003 PHARM REV CODE 250: Performed by: RADIOLOGY

## 2022-03-03 PROCEDURE — 83735 ASSAY OF MAGNESIUM: CPT | Performed by: NURSE PRACTITIONER

## 2022-03-03 PROCEDURE — 87205 SMEAR GRAM STAIN: CPT | Performed by: HOSPITALIST

## 2022-03-03 PROCEDURE — 84484 ASSAY OF TROPONIN QUANT: CPT | Mod: 91 | Performed by: NURSE PRACTITIONER

## 2022-03-03 PROCEDURE — 87075 CULTR BACTERIA EXCEPT BLOOD: CPT | Performed by: HOSPITALIST

## 2022-03-03 PROCEDURE — 84100 ASSAY OF PHOSPHORUS: CPT | Performed by: NURSE PRACTITIONER

## 2022-03-03 PROCEDURE — 25000003 PHARM REV CODE 250: Performed by: SURGERY

## 2022-03-03 RX ORDER — FAMOTIDINE 10 MG/ML
20 INJECTION INTRAVENOUS EVERY 12 HOURS
Status: DISCONTINUED | OUTPATIENT
Start: 2022-03-03 | End: 2022-03-03

## 2022-03-03 RX ORDER — SODIUM CHLORIDE 0.9 % (FLUSH) 0.9 %
10 SYRINGE (ML) INJECTION
Status: DISCONTINUED | OUTPATIENT
Start: 2022-03-03 | End: 2022-03-08 | Stop reason: HOSPADM

## 2022-03-03 RX ORDER — SODIUM CHLORIDE 9 MG/ML
INJECTION, SOLUTION INTRAVENOUS CONTINUOUS
Status: DISCONTINUED | OUTPATIENT
Start: 2022-03-03 | End: 2022-03-08 | Stop reason: HOSPADM

## 2022-03-03 RX ORDER — FAMOTIDINE 10 MG/ML
20 INJECTION INTRAVENOUS DAILY
Status: DISCONTINUED | OUTPATIENT
Start: 2022-03-03 | End: 2022-03-05

## 2022-03-03 RX ORDER — MUPIROCIN 20 MG/G
OINTMENT TOPICAL 2 TIMES DAILY
Status: COMPLETED | OUTPATIENT
Start: 2022-03-03 | End: 2022-03-08

## 2022-03-03 RX ORDER — SODIUM CHLORIDE 9 MG/ML
INJECTION, SOLUTION INTRAVENOUS CONTINUOUS
Status: DISCONTINUED | OUTPATIENT
Start: 2022-03-03 | End: 2022-03-05

## 2022-03-03 RX ORDER — MAGNESIUM SULFATE HEPTAHYDRATE 40 MG/ML
2 INJECTION, SOLUTION INTRAVENOUS ONCE
Status: COMPLETED | OUTPATIENT
Start: 2022-03-03 | End: 2022-03-03

## 2022-03-03 RX ORDER — VANCOMYCIN HCL IN 5 % DEXTROSE 1G/250ML
1000 PLASTIC BAG, INJECTION (ML) INTRAVENOUS ONCE
Status: COMPLETED | OUTPATIENT
Start: 2022-03-03 | End: 2022-03-04

## 2022-03-03 RX ORDER — HEPARIN SODIUM,PORCINE/D5W 25000/250
0-40 INTRAVENOUS SOLUTION INTRAVENOUS CONTINUOUS
Status: DISCONTINUED | OUTPATIENT
Start: 2022-03-03 | End: 2022-03-03

## 2022-03-03 RX ORDER — LIDOCAINE HYDROCHLORIDE 10 MG/ML
INJECTION INFILTRATION; PERINEURAL CODE/TRAUMA/SEDATION MEDICATION
Status: COMPLETED | OUTPATIENT
Start: 2022-03-03 | End: 2022-03-03

## 2022-03-03 RX ORDER — LEVOTHYROXINE SODIUM 75 UG/1
75 TABLET ORAL
Status: DISCONTINUED | OUTPATIENT
Start: 2022-03-03 | End: 2022-03-08 | Stop reason: HOSPADM

## 2022-03-03 RX ORDER — PRAVASTATIN SODIUM 10 MG/1
20 TABLET ORAL NIGHTLY
Status: DISCONTINUED | OUTPATIENT
Start: 2022-03-03 | End: 2022-03-08 | Stop reason: HOSPADM

## 2022-03-03 RX ORDER — ACETAMINOPHEN 325 MG/1
650 TABLET ORAL EVERY 4 HOURS PRN
Status: DISCONTINUED | OUTPATIENT
Start: 2022-03-03 | End: 2022-03-08 | Stop reason: HOSPADM

## 2022-03-03 RX ORDER — ONDANSETRON 2 MG/ML
4 INJECTION INTRAMUSCULAR; INTRAVENOUS EVERY 8 HOURS PRN
Status: DISCONTINUED | OUTPATIENT
Start: 2022-03-03 | End: 2022-03-08 | Stop reason: HOSPADM

## 2022-03-03 RX ORDER — TAMSULOSIN HYDROCHLORIDE 0.4 MG/1
0.4 CAPSULE ORAL DAILY
Status: DISCONTINUED | OUTPATIENT
Start: 2022-03-03 | End: 2022-03-08 | Stop reason: HOSPADM

## 2022-03-03 RX ORDER — TALC
6 POWDER (GRAM) TOPICAL NIGHTLY PRN
Status: DISCONTINUED | OUTPATIENT
Start: 2022-03-03 | End: 2022-03-08 | Stop reason: HOSPADM

## 2022-03-03 RX ADMIN — ACETAMINOPHEN 650 MG: 325 TABLET ORAL at 02:03

## 2022-03-03 RX ADMIN — PIPERACILLIN AND TAZOBACTAM 4.5 G: 4; .5 INJECTION, POWDER, LYOPHILIZED, FOR SOLUTION INTRAVENOUS; PARENTERAL at 08:03

## 2022-03-03 RX ADMIN — PRAVASTATIN SODIUM 20 MG: 20 TABLET ORAL at 08:03

## 2022-03-03 RX ADMIN — TAMSULOSIN HYDROCHLORIDE 0.4 MG: 0.4 CAPSULE ORAL at 08:03

## 2022-03-03 RX ADMIN — MUPIROCIN: 20 OINTMENT TOPICAL at 08:03

## 2022-03-03 RX ADMIN — MAGNESIUM SULFATE IN WATER 2 G: 40 INJECTION, SOLUTION INTRAVENOUS at 06:03

## 2022-03-03 RX ADMIN — FAMOTIDINE 20 MG: 10 INJECTION, SOLUTION INTRAVENOUS at 08:03

## 2022-03-03 RX ADMIN — LEVOTHYROXINE SODIUM 75 MCG: 75 TABLET ORAL at 05:03

## 2022-03-03 RX ADMIN — SODIUM CHLORIDE: 0.9 INJECTION, SOLUTION INTRAVENOUS at 05:03

## 2022-03-03 RX ADMIN — HUMAN ALBUMIN MICROSPHERES AND PERFLUTREN 0.44 MG: 10; .22 INJECTION, SOLUTION INTRAVENOUS at 12:03

## 2022-03-03 RX ADMIN — Medication 0.03 MCG/KG/MIN: at 12:03

## 2022-03-03 RX ADMIN — PIPERACILLIN AND TAZOBACTAM 4.5 G: 4; .5 INJECTION, POWDER, LYOPHILIZED, FOR SOLUTION INTRAVENOUS; PARENTERAL at 01:03

## 2022-03-03 RX ADMIN — PIPERACILLIN AND TAZOBACTAM 4.5 G: 4; .5 INJECTION, POWDER, LYOPHILIZED, FOR SOLUTION INTRAVENOUS; PARENTERAL at 05:03

## 2022-03-03 RX ADMIN — LIDOCAINE HYDROCHLORIDE 5 ML: 10 INJECTION, SOLUTION INFILTRATION; PERINEURAL at 03:03

## 2022-03-03 NOTE — ASSESSMENT & PLAN NOTE
Hold BB for now in setting of septic shock  Hold xarelto  -plan to start heparin drip, on hold due to oozing from central line site

## 2022-03-03 NOTE — EICU
89 yo male w/ PMHx AD, AF on xarelto, S/D CHF, CKD 4, COPD, CVA, HTN, PVD, DM2, pacemaker (2 weeks ago) and PSH of abd wall hernia s/p repair with mesh (20 years ago) now p/w AMS noted to have fever, abd pain, N/V x 1 day.      Febrile to 103  WBC 15.8  LA x 2 negative    PCT 0.98  Cr 1.9    Minimal hypoxia.   COVID and flu negative.     S/p blood cx x 2 sets    CT abdomen notable for 17.1 x 9.1 x 13.4 cm abscess in the anterior abd wall at the level of the prior mesh repair with intra-abd extension.      On Vancomycin and Zosyn.    Awaiting surgical drainage.  In the ICU for pt is on levo gtt, now weaned off.   S/p TLC in the ER.     Coags pending.   Pt was to be started on Hep gtt for elevated troponin and also for Hx AF for which he was xeralto at home.   RN reporting oozing around TLC, so will discontinue this.   Trend Troponin, may be demand ischemia.   Cardio evalve and ECHO in am.     Elevated TSH noted, may need replacement later.     VTE prophy at all times.     NPO  Hypoglycemia protocol  IVF NS @ 75ml/hr  Watch for s/s fluid overload.   Please call us for further assistance.     D/w bedside RN in detail.

## 2022-03-03 NOTE — ED NOTES
Assumed care of pt. Pt in no acute distress. Bed in locked and low position. Pt family at bedside. Updated pt and family on POC. VU. Will continue to monitor.

## 2022-03-03 NOTE — ASSESSMENT & PLAN NOTE
CT abdomen with large 17.1 x 9.1 x 13.4 cm abscess in the anterior abdominal wall at the level of prior mesh repair    -cont vanc and zosyn  -IV fluids  -consult surgery for eval  -NPO

## 2022-03-03 NOTE — CONSULTS
[facility]/General Surgery  Consultation Note    SUBJECTIVE:     Chief Complaint:   AMS/Fever    History of Present Illness:  Patient is a 89 year old male with PMH of a fib (On Xarelto), HTN, COPD, prior CVA, T2DM who presented yesterday with fever (Tmax 104F) and altered mental status. Patient's family states that he was complaining of chills and fevers yesterday and was confused so they brought him in to the hospital. Patient was noted to have leukocytosis and a CT AP was obtained. This showed a large anterior abdominal wall abscess with air fluid levels and surgery was consulted. Patient was started on broad spectrum antibiotics and admitted to the ICU and surgery was consulted for further evaluation. Patient states that he has had a prior paraesophageal hernia repair as well as ventral hernia repair performed many years ago on the [place].     Allergies:  Review of patient's allergies indicates:   Allergen Reactions    Ciprofloxacin     Levofloxacin Swelling    Lyrica [pregabalin] Swelling    Unable to assess Other (See Comments)     Soft shell crabs       Home Medications:  Current Facility-Administered Medications on File Prior to Encounter   Medication    [COMPLETED] 0.9%  NaCl infusion    [COMPLETED] acetaminophen tablet 1,000 mg    [COMPLETED] ibuprofen tablet 800 mg    [COMPLETED] iohexoL (OMNIPAQUE 350) injection 75 mL    [COMPLETED] NORepinephrine 4 mg in dextrose 5% 250 mL infusion (premix) (titrating)    [COMPLETED] piperacillin-tazobactam 4.5 g in dextrose 5 % 100 mL IVPB (ready to mix system)    [COMPLETED] sodium chloride 0.9% bolus 2,000 mL    [COMPLETED] vancomycin 2 g in 0.9% sodium chloride 500 mL IVPB    [DISCONTINUED] vancomycin - pharmacy to dose     Current Outpatient Medications on File Prior to Encounter   Medication Sig    amLODIPine (NORVASC) 10 MG tablet TAKE 1 TABLET BY MOUTH DAILY FOR BLOOD PRESSURE    carvediloL (COREG) 12.5 MG tablet TAKE 1 TABLET  (12.5 MG TOTAL) BY MOUTH 2 (TWO) TIMES DAILY WITH MEALS.    doxazosin (CARDURA) 1 MG tablet Take 1 mg by mouth nightly.    furosemide (LASIX) 20 MG tablet TAKE ONE TABLET BY MOUTH EVERY OTHER DAY    gabapentin (NEURONTIN) 100 MG capsule Take 2 capsules (200 mg total) by mouth every evening.    glimepiride (AMARYL) 2 MG tablet TAKE 1 TABLET BY MOUTH EVERY MORNING WITH BREAKFAST FOR DIABETES    levothyroxine (SYNTHROID) 75 MCG tablet Take 1 tablet (75 mcg total) by mouth before breakfast.    lisinopriL (PRINIVIL,ZESTRIL) 20 MG tablet     pantoprazole (PROTONIX) 40 MG tablet TAKE ONE TABLET BY MOUTH ONCE A DAY FOR STOMACH PROBLEMS    pioglitazone (ACTOS) 15 MG tablet ONE TABLET ONE TIME A DAY FOR DIABETES    pravastatin (PRAVACHOL) 20 MG tablet TAKE 1 TABLET BY MOUTH DAILY FOR CHOLESTROL    rivaroxaban (XARELTO) 20 mg Tab Take one tablet(20mg) by mouth once daily    tamsulosin (FLOMAX) 0.4 mg Cap TAKE 1 CAPSULE (0.4 MG TOTAL) BY MOUTH ONCE DAILY.    acetaminophen (TYLENOL) 500 MG tablet Take 500 mg by mouth every 6 (six) hours as needed for Pain.    blood sugar diagnostic (BLOOD GLUCOSE TEST) [facility] ONETOUCH ULTRA TESTING STRIPS. USE TO TEST BLOOD GLUCOSE TWICE DAILY. DX CODE: E11.51    blood-glucose meter Misc ONETOUCH ULTRA GLUCOSE METER. USE AS DIRECTED TO TEST BLOOD GLUCOSE. DX CODE: E11.51    fenofibrate 160 MG Tab fenofibrate 160 mg tablet    gemfibrozil (LOPID) 600 MG tablet Take one tablet(600mg) by mouth once daily    lancets (ONETOUCH ULTRASOFT LANCETS) Misc USE TO TEST BLOOD GLUCOSE TWICE DAILY. DX CODE: E11.51       Past Medical History:   Diagnosis Date    Abdominal fibromatosis     Acute posthemorrhagic anemia 1/9/2018    NIK (acute kidney injury) 1/11/2018    Atrial fibrillation     Bradycardia     Broken arm     CAD (coronary artery disease)     Cancer     basal cell on nose and melanoma on back    CHF (congestive heart failure)     COPD (chronic obstructive pulmonary disease)      Diabetes mellitus type II     Hyperlipidemia     Localization-related focal epilepsy with simple partial seizures 3/2/2021    Melanoma     Obesity (BMI 30.0-34.9) 9/13/2016    Osteoporosis     Peripheral vascular disease     Primary malignant neoplasm of prostate 3/3/2022    Septic shock 3/3/2022    Stroke     TIA (transient ischemic attack)     Type II diabetes mellitus with peripheral circulatory disorder     Type II or unspecified type diabetes mellitus without mention of complication, not stated as uncontrolled     Unspecified essential hypertension      Past Surgical History:   Procedure Laterality Date    AORTIC VALVE REPLACEMENT      CARDIAC PACEMAKER PLACEMENT  9/28/2012    CARDIAC VALVE REPLACEMENT  11/17/2011    aortic valve-tissue    CHOLECYSTECTOMY      COLONOSCOPY      ESOPHAGOGASTRODUODENOSCOPY N/A 5/27/2021    Procedure: EGD (ESOPHAGOGASTRODUODENOSCOPY);  Surgeon: Gualberto Medrano MD;  Location: [facility];  Service: Endoscopy;  Laterality: N/A;    ESOPHAGOGASTRODUODENOSCOPY N/A 6/22/2021    Procedure: EGD (ESOPHAGOGASTRODUODENOSCOPY);  Surgeon: Gualberto Medrano MD;  Location: [facility];  Service: Endoscopy;  Laterality: N/A;  please call wife(Jessica) with instructions/arrival time.    ESOPHAGOGASTRODUODENOSCOPY (EGD) WITH DILATION  06/22/2021    EYE SURGERY Bilateral     cataract [facility] lens [facility]    HERNIA REPAIR      abdominal    LASIK      UPPER GASTROINTESTINAL ENDOSCOPY  05/28/2021     Family History   Problem Relation Age of Onset    Hypertension Father     Stroke Father     Alcohol abuse Father     Heart disease Brother     COPD Sister     Cancer Brother         Lung    Diabetes Daughter     No Known Problems Son     COPD Brother     No Known Problems Daughter     No Known Problems Son     Prostate cancer Neg Hx     Kidney disease Neg Hx      Social History     Tobacco Use    Smoking status: Former Smoker     Quit date:  1996     Years since quittin.4    Smokeless tobacco: Never Used    Tobacco comment: cigar smoker   Substance Use Topics    Alcohol use: No     Comment: none since     Drug use: No        Review of Systems:  As Per HPI    OBJECTIVE:     Vital Signs:  Temp: 98 °F (36.7 °C) (22 0745)  Pulse: 61 (22 0816)  Resp: (!) 22 (22 0816)  BP: (!) 95/46 (22 0600)  SpO2: 99 % (22 08)    Physical Exam:  General: Patient resting in bed, in NAD  HEENT: normocephalic, atraumatic, hearing grossly normal bilaterally, mucous membranes moist, EOM intact, no scleral icterus  Neck: supple, symmetrical, trachea midline, no JVD  Lungs:  Sating well on nasal canula   Cardiovascular: regular rate and rhythm, normotensive--no pressor requirements at this time.  Extremities: no cyanosis or edema, or clubbing, distal pulses palpable and symmetric  Abdomen: Well healed midline abdominal laparotomy incision. Abdomen is distended but non-tender without signs of peritonitis.   Skin: Skin color, texture, turgor normal. No rashes or lesions  Musculoskeletal:no clubbing, cyanosis, no deformities  Neurologic: No focal numbness or weakness  Psych/Behavioral:  Alert and oriented, appropriate affect.    Laboratory:  Labs Reviewed   COMPREHENSIVE METABOLIC PANEL - Abnormal; Notable for the following components:       Result Value    CO2 22 (*)     Glucose 164 (*)     Creatinine 1.7 (*)     Calcium 8.1 (*)     Albumin 3.1 (*)     Total Bilirubin 1.1 (*)     ALT 8 (*)     eGFR if  40 (*)     eGFR if non  34 (*)     All other components within normal limits   MAGNESIUM - Abnormal; Notable for the following components:    Magnesium 1.4 (*)     All other components within normal limits   TROPONIN I - Abnormal; Notable for the following components:    Troponin I 0.100 (*)     All other components within normal limits    Narrative:     (if patient is on warfarin prior to heparin  therapy)   APTT - Abnormal; Notable for the following components:    aPTT 42.4 (*)     All other components within normal limits    Narrative:     (if patient is on warfarin prior to heparin therapy)   PROTIME-INR - Abnormal; Notable for the following components:    Prothrombin Time 23.5 (*)     INR 2.4 (*)     All other components within normal limits    Narrative:     (if patient is on warfarin prior to heparin therapy)   CBC W/ AUTO DIFFERENTIAL - Abnormal; Notable for the following components:    WBC 21.70 (*)     RBC 2.92 (*)     Hemoglobin 10.0 (*)     Hematocrit 29.4 (*)      (*)     MCH 34.2 (*)     Platelets 121 (*)     Immature Granulocytes 0.8 (*)     Gran # (ANC) 19.2 (*)     Immature Grans (Abs) 0.17 (*)     Lymph # 0.9 (*)     Mono # 1.3 (*)     Gran % 88.6 (*)     Lymph % 4.3 (*)     All other components within normal limits    Narrative:     (if patient is on warfarin prior to heparin therapy)   LACTIC ACID, PLASMA    Narrative:     (if patient is on warfarin prior to heparin therapy)   PHOSPHORUS   TROPONIN I       Diagnostic Results:  CT AP:     Impression:     CT chest:     No acute intrathoracic process.     Changes of pulmonary emphysema.     CT abdomen pelvis:     Large 17.1 x 9.1 x 13.4 cm abscess in the anterior abdominal wall at the level of prior mesh repair with intra-abdominal extension.  Surgical consultation is recommended.     High attenuation components within the collection may represent blood products.  Correlation with laboratory findings is suggested.     Additional findings as above.     This report was flagged in Epic as abnormal.    ASSESSMENT:   89M with PMH of a fib (On Xarelto), HTN, COPD, prior CVA, T2DM who was admitted to the ICU for management of AMS/sepsis. Surgery was consulted for management of anterior abdominal wall abscess associated with prior ventral hernia repair with mesh.    PLAN:   - Continued IV antibiotics  - Recommend IR consultation for drain  placement  - Recommend drain culture once placed  - At this time no immediate plans for surgical intervention  - Surgery will continue to follow the patient       Erin Archer MD  [facility] General Surgery, PGY2  9:24 AM

## 2022-03-03 NOTE — PLAN OF CARE
The sw met with the pt and his wife Jessica Parada [phone] who was at bedside during the assessment. The couple live in [place]([address] [place] [address]. [id number]) The pt's independent with his adl's and he uses a tubbench at home. The pt has a r walker but he doesn't use it. The pt still drives but his wife wife will transport him home at d/c. 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 The pt's still very active. The sw completed the white board in the pt's room and gave them a d/c brochure with her name and contact info on it. The sw will continue to follow the pt throughout his transitions of care and will assist with any d/c needs. The pt has a very supportive family.        03/03/22 1409   Discharge Assessment   Assessment Type Discharge Planning Assessment   Confirmed/corrected address, phone number and insurance Yes   Confirmed Demographics Correct on Facesheet  ([address] [place] [id number])   Source of Information patient;family   When was your last doctors appointment? 02/22/22   Communicated KANDICE with patient/caregiver Date not available/Unable to determine   Reason For Admission Septic Shock   Lives With spouse   Do you expect to return to your current living situation? Yes   Do you have help at home or someone to help you manage your care at home? Yes   Who are your caregiver(s) and their phone number(s)? Jessica Parada(wife)[phone]   Prior to hospitilization cognitive status: Alert/Oriented   Current cognitive status: Alert/Oriented   Walking or Climbing Stairs Difficulty none  (pt has a r walker at home but he doesn't use it)   Dressing/Bathing Difficulty none   Home Accessibility wheelchair accessible  (pt has a ramp)   Home Layout Able to live on 1st floor   Equipment Currently Used at Home bath bench   Readmission within 30 days? No   Patient currently being followed by outpatient case management? No   Do you currently have  service(s) that help you manage your care at home? No   Do you take prescription medications? Yes   Do you have prescription coverage? Yes   Coverage PHN   Do you have any problems affording any of your prescribed medications? No  (the pt receives his meds affordably at [facility] in [place])   Is the patient taking medications as prescribed? yes   Who is going to help you get home at discharge? Jessica Parada(wife)[phone]   How do you get to doctors appointments? car, drives self   Are you on dialysis? No   Do you take coumadin? No   Discharge Plan A Home Health   Discharge Plan B Skilled Nursing Facility   DME Needed Upon Discharge  other (see comments)  (TBD)   Discharge Plan discussed with: Patient;Spouse/sig other   Name(s) and Number(s) Jessica Parada(wife)[phone]   Discharge Barriers Identified None   Relationship/Environment   Name(s) of Who Lives With Patient Jessica Parada(wife)[phone]

## 2022-03-03 NOTE — PROGRESS NOTES
Pharmacokinetic Initial Assessment: IV Vancomycin    Assessment/Plan:    Initiate intravenous vancomycin with loading dose of 2000 mg once with subsequent doses when random concentrations are less than 20 mcg/mL  Desired empiric serum trough concentration is 15 to 20 mcg/mL  Draw vancomycin random level on 3-3 at 19:00.  Pharmacy will continue to follow and monitor vancomycin.      Please contact pharmacy at extension [phone] with any questions regarding this assessment.     Thank you for the consult,   Carlos JIMENEZ Clyde       Patient brief summary:  Eddie Parada Sr. is a 89 y.o. male initiated on antimicrobial therapy with IV Vancomycin for treatment of suspected bacteremia    Drug Allergies:   Review of patient's allergies indicates:   Allergen Reactions    Ciprofloxacin     Levofloxacin Swelling    Lyrica [pregabalin] Swelling    Unable to assess Other (See Comments)     Soft shell crabs       Actual Body Weight:   83.8 kg    Renal Function:   Estimated Creatinine Clearance: 24.7 mL/min (A) (based on SCr of 1.9 mg/dL (H)).,     Dialysis Method (if applicable):  N/A    CBC (last 72 hours):  Recent Labs   Lab Result Units 03/02/22  0744 03/02/22  1728 03/03/22  0413   WBC K/uL 7.31 15.82* 21.70*   Hemoglobin g/dL 12.7* 12.8* 10.0*   Hemoglobin A1C % 5.9*  --   --    Hematocrit % 38.4* 38.8* 29.4*   Platelets K/uL 162 159 121*   Gran % % 69.5 91.0* 88.6*   Lymph % % 17.8* 3.1* 4.3*   Mono % % 9.8 4.4 6.1   Eosinophil % % 1.4 0.1 0.0   Basophil % % 0.7 0.2 0.2   Differential Method  Automated Automated Automated       Metabolic Panel (last 72 hours):  Recent Labs   Lab Result Units 03/02/22  0744 03/02/22  1715 03/02/22  1728   Sodium mmol/L 143  --  138   Potassium mmol/L 4.9  --  4.2   Chloride mmol/L 103  --  101   CO2 mmol/L 25  --  23   Glucose mg/dL 143*  --  212*   Glucose, UA   --  Negative  --    BUN mg/dL 22  --  26   Creatinine mg/dL 1.4  --  1.9*   Albumin g/dL 3.9  --  4.1   Total Bilirubin mg/dL 0.9  --   1.3*   Alkaline Phosphatase U/L 112  --  135   AST U/L 20  --  24   ALT U/L 11  --  14   Magnesium mg/dL  --   --  1.7   Phosphorus mg/dL  --   --  2.2*       Drug levels (last 3 results):  No results for input(s): VANCOMYCINRA, VANCOMYCINPE, VANCOMYCINTR in the last 72 hours.    Microbiologic Results:  Microbiology Results (last 7 days)       ** No results found for the last 168 hours. **

## 2022-03-03 NOTE — PLAN OF CARE
Problem: Adult Inpatient Plan of Care  Goal: Plan of Care Review  Outcome: Ongoing, Progressing  Goal: Patient-Specific Goal (Individualized)  Outcome: Ongoing, Progressing  Goal: Absence of Hospital-Acquired Illness or Injury  Outcome: Ongoing, Progressing  Goal: Optimal Comfort and Wellbeing  Outcome: Ongoing, Progressing     Problem: Diabetes Comorbidity  Goal: Blood Glucose Level Within Targeted Range  Outcome: Ongoing, Progressing     Problem: Adjustment to Illness (Sepsis/Septic Shock)  Goal: Optimal Coping  Outcome: Ongoing, Progressing     Plan of care updated and reviewed with pt. VS stable. Levophed weaned off at admission. Blood noted to be oozing from RIJ TLC, sterile dressing change performed. All questions and concerns addressed. Continue to monitor.

## 2022-03-03 NOTE — ASSESSMENT & PLAN NOTE
Presented with hypotension, fevers   Leukocytosis, Large abdominal abscess noted on CT  Initiated on levophed after 2L NS bolus in ED  Weaned off levophed on arrival to ICU  -cont vanc and zosyn  -levophed as needed  -IV fluids  -blood cx pending

## 2022-03-03 NOTE — CONSULTS
Interventional Radiology Consult/Pre-Procedure Note      Chief Complaint/Reason for Consult: Abscess    History of Present Illness:  Eddie Parada Sr. is a 89 y.o. male with the PMH listed below who presents with a large anterior abd well abscess. D/w Dr Thompson. C/f infected mesh with communication with adjacent bowel. Requests drain placement.    Admission H&P reviewed.    Past Medical History:   Diagnosis Date    Abdominal fibromatosis     Acute posthemorrhagic anemia 1/9/2018    NIK (acute kidney injury) 1/11/2018    Atrial fibrillation     Bradycardia     Broken arm     CAD (coronary artery disease)     Cancer     basal cell on nose and melanoma on back    CHF (congestive heart failure)     COPD (chronic obstructive pulmonary disease)     Diabetes mellitus type II     Hyperlipidemia     Localization-related focal epilepsy with simple partial seizures 3/2/2021    Melanoma     Obesity (BMI 30.0-34.9) 9/13/2016    Osteoporosis     Peripheral vascular disease     Primary malignant neoplasm of prostate 3/3/2022    Septic shock 3/3/2022    Stroke     TIA (transient ischemic attack)     Type II diabetes mellitus with peripheral circulatory disorder     Type II or unspecified type diabetes mellitus without mention of complication, not stated as uncontrolled     Unspecified essential hypertension      Past Surgical History:   Procedure Laterality Date    AORTIC VALVE REPLACEMENT      CARDIAC PACEMAKER PLACEMENT  9/28/2012    CARDIAC VALVE REPLACEMENT  11/17/2011    aortic valve-tissue    CHOLECYSTECTOMY      COLONOSCOPY      ESOPHAGOGASTRODUODENOSCOPY N/A 5/27/2021    Procedure: EGD (ESOPHAGOGASTRODUODENOSCOPY);  Surgeon: Gualberto Medrano MD;  Location: [facility];  Service: Endoscopy;  Laterality: N/A;    ESOPHAGOGASTRODUODENOSCOPY N/A 6/22/2021    Procedure: EGD (ESOPHAGOGASTRODUODENOSCOPY);  Surgeon: Gualberto Medrano MD;  Location: [facility];  Service: Endoscopy;   Laterality: N/A;  please call wife(Jessica) with instructions/arrival time.    ESOPHAGOGASTRODUODENOSCOPY (EGD) WITH DILATION  06/22/2021    EYE SURGERY Bilateral     cataract with lens by  at [facility]    HERNIA REPAIR      abdominal    LASIK      UPPER GASTROINTESTINAL ENDOSCOPY  05/28/2021       Allergies:   Review of patient's allergies indicates:   Allergen Reactions    Ciprofloxacin     Levofloxacin Swelling    Lyrica [pregabalin] Swelling    Unable to assess Other (See Comments)     Soft shell crabs       Scheduled Meds:    famotidine (PF)  20 mg Intravenous Daily    levothyroxine  75 mcg Oral Before breakfast    mupirocin   Nasal BID    piperacillin-tazobactam (ZOSYN) IVPB  4.5 g Intravenous Q8H    pravastatin  20 mg Oral QHS    tamsulosin  0.4 mg Oral Daily     Continuous Infusions:    sodium chloride 0.9% 75 mL/hr at 03/03/22 0546    sodium chloride 0.9% 5 mL/hr at 03/03/22 0554    NORepinephrine bitartrate-D5W Stopped (03/03/22 0430)     PRN Meds:acetaminophen, melatonin, ondansetron, sodium chloride 0.9%, Pharmacy to dose Vancomycin consult **AND** vancomycin - pharmacy to dose    Anticoagulation/Antiplatelet Meds: no anticoagulation    Review of Systems:   As documented in admission H&P.    Physical Exam:  Temp: 98 °F (36.7 °C) (03/03/22 1145)  Pulse: 64 (03/03/22 1430)  Resp: (!) 35 (03/03/22 1430)  BP: (!) 168/72 (03/03/22 1430)  SpO2: 95 % (03/03/22 1430)     General: NAD  HEENT: Normocephalic, sclera anicteric, oropharynx clear  Heart: RRR  Lungs: Symmetric excursions, breathing unlabored  Abd: Mmoderately distended, NT, soft  Extremities: MILLIGAN  Neuro: Gross nonfocal    Labs:  Recent Labs   Lab 03/03/22 0414   INR 2.4*       Recent Labs   Lab 03/03/22 0413   WBC 21.70*   HGB 10.0*   HCT 29.4*   *   *      Recent Labs   Lab 03/03/22 0414   *      K 3.7      CO2 22*   BUN 29   CREATININE 1.7*   CALCIUM 8.1*   MG 1.4*   ALT 8*   AST 23   ALBUMIN  3.1*   BILITOT 1.1*       Imaging:  CT AP 3/2/22 and 5/27/21 reviewed.    Assessment/Plan:   Large anterior wall abscess which may contain stool or feculent material. Will place large per drain today.    Sedation:  Sedation history: have not been any systemic reactions  ASA: 3 / Mallampati: 3  Sedation plan: Up to moderate (Versed, fentanyl)     Risks (including, but not limited to, pain, bleeding, infection, damage to nearby structures, treatment failure/recurrence, and the need for additional procedures), potential benefits, and alternatives were discussed with the patient and his wife. All questions were answered to the best of my abilities. Written informed consent was obtained.      Cody Reynolds MD  [facility]  Pager [phone]

## 2022-03-03 NOTE — ED PROVIDER NOTES
[facility]                                                 I reviewed the ER triage nurse's note before evaluating the patient    It was a pleasure seeing Eddie Parada Sr. in the ED at [facility] in [place]:    Chief Complaint  89 y.o. male with Altered Mental Status   -- Pt to ED via [facility]. EMS reports family called due to AMS.   -- Pt took a nap around 2 pm and woke up confused.   -- Pt had recent surgery, PCM placed 2 weeks ago.   -- Pt altered in triage, unable to follow commands.     History of Present Illness  Eddie Parada Sr. presents to the emergency room with fever today  Patient woke up today febrile and confused, not feeling well per wife  Patient had a pacemaker placed 2 weeks ago without complication  Patient states that he just does not feel well, firm abdominal exam  Wife states the patient has not had a bowel movement 3 days now  Fever 103° Fahrenheit, patient answers all questions appropriate now    The history is provided by the patient  Previous medical records were obtained from [facility]  Previous records are summarized from prior ER visits and hospitalizations    Past Medical History   -- Abdominal fibromatosis     -- Acute posthemorrhagic anemia     -- NIK (acute kidney injury)     -- Atrial fibrillation     -- Bradycardia     -- Broken arm     -- CAD (coronary artery disease)     -- Cancer     -- CHF (congestive heart failure)     -- COPD (chronic obstructive pulmonary disease)     -- Diabetes mellitus type II     -- Hyperlipidemia     -- Localization-related focal epilepsy with simple partial seizures     -- Melanoma     -- Obesity (BMI 30.0-34.9)     -- Osteoporosis     -- Peripheral vascular disease     -- Stroke     -- TIA (transient ischemic attack)     -- Type II diabetes mellitus with peripheral circulatory disorder     -- Unspecified essential hypertension      Past Surgical History   -- AORTIC VALVE REPLACEMENT     -- CARDIAC PACEMAKER PLACEMENT     --  CARDIAC VALVE REPLACEMENT     -- CHOLECYSTECTOMY     -- COLONOSCOPY     -- ESOPHAGOGASTRODUODENOSCOPY     -- EYE SURGERY     -- HERNIA REPAIR     -- LASIK     -- UPPER GASTROINTESTINAL ENDOSCOPY        Review of patient's allergies    -- Ciprofloxacin    -- Levofloxacin    -- Lyrica [pregabalin]    -- Unable to assess       No significant family history  No significant social history, nonsmoker    I have reviewed all of this patient's past medical, surgical, family, and social   histories as well as active allergies and medications documented in the  electronic medical record    Review of Systems and Physical Exam      Review of Systems (all other ROS are otherwise negative)  -- Constitution - fever, denies fatigue, no weakness, no chills, night sweats  -- Eyes - no tearing or redness, no visual disturbance  -- Ear, Nose - no tinnitus or earache, no nasal congestion or discharge  -- Mouth,Throat - no sore throat, no toothache, normal voice, normal swallowing  -- Respiratory - denies cough and congestion, no shortness of breath, no AMAYA  -- Cardiovascular - denies chest pain, no palpitations, denies claudication  -- Gastrointestinal - abdominal pain, nausea, vomiting, no diarrhea  -- Genitourinary - no dysuria, no hematuria, no flank pain, no bladder pain  -- Musculoskeletal - denies back pain, negative for trauma or injury  -- Neurological - no headache, no numbness, tingling, seizure, balance issues  -- Hematologic- no bruising, no bleeding, nose bleed, bleeding disorders  -- Lymphatics - no leg swelling, no tender nodes, no swollen nodes or LAD    Vital Signs (reviewed by the physician)  His oral temperature is 100.1 °F (37.8 °C).   His blood pressure is 112/51 and his pulse is 69.   His respiration is 27 and oxygen saturation is 98%.     Physical Exam  -- Nursing note and vitals reviewed  -- Constitutional: Appears well-developed and well-nourished  -- Head: Atraumatic. Normocephalic. No obvious abnormality  --  Eyes: Pupils are equal and reactive to light. Normal conjunctiva and lids  -- Cardiac: Normal rate, regular rhythm and normal heart sounds  -- Respiratory: Normal respiratory effort, breath sounds clear to auscultation  -- Gastrointestinal: Hardness in the epigastric area with decreased bowel sounds  -- Musculoskeletal: Normal range of motion, no effusions. Joints stable   -- Neurological: No focal deficits. Showed good interaction with staff  -- Vascular: Posterior tibial, dorsalis pedis and radial pulses 2+ bilaterally    -- Lymphatic/hematologic: No cervical or peripheral LAD. No edema noted  -- Integument: Warm and dry. No evidence of rash or cellulitis    Emergency Room Course      Lab Results (reviewed by the physician)     K 4.2      CO2 23   BUN 26   CREATININE 1.9 (H)    (H)   ALKPHOS 135   AST 24   ALT 14   BILITOT 1.3 (H)   ALBUMIN 4.1   PROT 7.9   WBC 15.82 (H)   HGB 12.8 (L)   HCT 38.8 (L)      CPK 85   CPK 85   CPKMB 0.9   TROPONINI 0.056 (H)    (H)    (H)   LACTATE 2.0   MG 1.7   TSH 5.943 (H)     Urinalysis (reviewed by the physician)  -- Urinalysis performed during this ER visit showed no signs of infection      EKG (interpreted by the physician)  Overall Interpretation: normal rate and rhythm with no obvious ischemic changes  Normal sinus rhythm @ 69 bpm  No ST elevation or depression  No arrhythmia or QRS change noted  Similar when compared to previous EKG    CT Chest Abdomen Pelvis With Contrast   No acute intrathoracic process.   Changes of pulmonary emphysema.   Large 17.1 x 9.1 x 13.4 cm abscess in the anterior abdominal wall at the level of prior mesh repair with intra-abdominal extension.  Surgical consultation is recommended.   High attenuation components within the collection may represent blood products.  Correlation with laboratory findings is suggested.      X-Ray Chest AP Portable   There is left basilar atelectasis although pulmonary aeration at  the left lung base has improved from what was seen in 2019.     Additional Work up (reviewed by the physician)  -- rapid Coronavirus PCR was negative  -- Blood cultures have also been drawn, results are pending     Medications Given  vancomycin - pharmacy to dose (has no administration in time range)   vancomycin 2 g in 0.9% sodium chloride 500 mL IVPB    sodium chloride 0.9% bolus 2,000 mL (2,000 mLs Intravenous New Bag 3/2/22 1748)   acetaminophen tablet 1,000 mg (1,000 mg Oral Given 3/2/22 1748)   ibuprofen tablet 800 mg (800 mg Oral Given 3/2/22 1750)   piperacillin-tazobactam 4.5 g in dextrose 5 % 100 mL IVPB (ready to mix system)    iohexoL (OMNIPAQUE 350) injection 75 mL (75 mLs Intravenous Given 3/2/22 1911)     Central Line  -- Performed by: Isauro Alcocer MD  -- Date/Time: 11:45 PM 3/2/2022   -- Time out at 11:45 PM (correct patient, site, procedure, position)  -- Consent Done: Emergent Situation  -- Indications: vascular access  -- Anesthesia: local infiltration  -- Local anesthetic: lidocaine 1% without epinephrine  -- Anesthetic total: 5 ml  -- Preparation: skin prepped with ChloraPrep  -- Location details: right IJ  -- Catheter type: triple lumen  -- Catheter size: 7.5 Fr  -- Number of attempts: 1  -- Post-procedure: line sutured  -- Complications: none     Critical Care ED Physician Time (minutes):  -- Performed by: Isauro Alcocer M.D.  -- Date/Time: 8:02 PM 3/2/2022   -- Direct Patient Care (Face Time): 5  -- Additional History from Records or Taking Additional History: 5  -- Ordering, Reviewing, and Interpreting Diagnostic Studies: 5  -- Total Time in Documentation: 11  -- Consultation with Other Physicians: 5  -- Consultation with Family Related to Condition: 0  -- Total Critical Care Time: 31  -- Critical care was necessary to treat sepsis/abdominal abscess  -- Critical care was time spent personally by me on the following activities:   -- blood draw for specimens discussions with consultants,   --  development of treatment plan with patient or surrogate,   -- examination of patient, ordering and performing treatments   -- review of radiographic studies, re-evaluation of pt's condition  -- review of labs and evaluation of response to treatment    Medical Decision Making     History  -- History was obtained from the patient  -- Previous medical records were obtained from [facility]  -- Previous records are summarized from prior ER visits and hospitalizations    Initial Assessment  -- patient with fever and confusion at home  -- patient has not had a bowel movement 3 to 4 days  -- abdomen is firm, specially in the epigastric area    Differential Diagnosis  -- sepsis, UTI, bowel obstruction, pneumonia, sepsis unknown origin    Clinical Tests  -- Lab tests were ordered, interpreted, and reviewed in the ER today   -- EKG was ordered, visualized, interpreted, reviewed in the ER today  -- Radiology tests were ordered, visualized, interpreted, reviewed in the ER today    ED Course/ED Management  -- patient with a elevated white count with fever, sepsis criteria met  -- CT scan shows large abdominal abscess with infected hernia mesh  -- IV Zosyn and vancomycin administered immediately in the ER today  -- blood culture sent, patient be transferred immediately for evaluation  -- patient needs higher level care with advanced surgery consult ASAP    Tissue Perfusion Assessment for Sepsis (performed by ER MD)      Cardiovascular Exam:  -- Normal rate  -- Regular rhythm  -- Normal heart sounds    Pulses:  -- Carotid pulses are 2+ on the right side, and 2+ on the left side  -- Radial pulses are 2+ on the right side, and 2+ on the left side   -- Dorsalis pedis pulses are 2+ on the right side, and 2+ on the left side  -- Posterior tibial pulses are 2+ on the right side, and 2+ on the left side    Pulmonary Exam:  -- Effort normal   -- Breath sounds normal    Skin Exam:  -- Skin is warm and dry  -- Capillary refill <3 sec    This  tissue perfusion assessment was performed within the initial 3 hours      Assessment, Disposition, & Plan      Diagnosis  [R50.9] Fever  [K65.1] Intra-abdominal abscess (Primary)  [T85.79XA] Infected hernioplasty mesh, initial encounter    Disposition and Plan  -- Disposition: transfer  -- Condition: stable    This note is dictated on M*Modal word recognition program.  There are word recognition mistakes that are occasionally missed on review.             Isauro Alcocer MD  03/02/22 1700

## 2022-03-03 NOTE — PROGRESS NOTES
Pharmacokinetic Initial Assessment: IV Vancomycin    Assessment/Plan:    Initiate intravenous vancomycin with loading dose of 2000 mg (23.7 mg/kg) once with subsequent doses when random concentrations are less than 20 mcg/mL  Desired empiric serum trough concentration is 10 to 20 mcg/mL  Draw vancomycin random level on 3/3 at 1830.  Pharmacy will continue to follow and monitor vancomycin.      Please contact pharmacy at extension [phone] with any questions regarding this assessment.     Thank you for the consult,   Mike Hanley, PharmD, BCCCP       Patient brief summary:  Eddie Parada Sr. is a 89 y.o. male initiated on antimicrobial therapy with IV Vancomycin for treatment of suspected sepsis    Drug Allergies:   Review of patient's allergies indicates:   Allergen Reactions    Ciprofloxacin     Levofloxacin Swelling    Lyrica [pregabalin] Swelling    Unable to assess Other (See Comments)     Soft shell crabs       Actual Body Weight:   84.4 kg    Renal Function:   Estimated Creatinine Clearance: 24.8 mL/min (A) (based on SCr of 1.9 mg/dL (H)).    Dialysis Method (if applicable):  N/A    CBC (last 72 hours):  Recent Labs   Lab Result Units 03/02/22  0744 03/02/22  1728   WBC K/uL 7.31 15.82*   Hemoglobin g/dL 12.7* 12.8*   Hemoglobin A1C % 5.9*  --    Hematocrit % 38.4* 38.8*   Platelets K/uL 162 159   Gran % % 69.5 91.0*   Lymph % % 17.8* 3.1*   Mono % % 9.8 4.4   Eosinophil % % 1.4 0.1   Basophil % % 0.7 0.2   Differential Method  Automated Automated       Metabolic Panel (last 72 hours):  Recent Labs   Lab Result Units 03/02/22  0744 03/02/22  1715 03/02/22  1728   Sodium mmol/L 143  --  138   Potassium mmol/L 4.9  --  4.2   Chloride mmol/L 103  --  101   CO2 mmol/L 25  --  23   Glucose mg/dL 143*  --  212*   Glucose, UA   --  Negative  --    BUN mg/dL 22  --  26   Creatinine mg/dL 1.4  --  1.9*   Albumin g/dL 3.9  --  4.1   Total Bilirubin mg/dL 0.9  --  1.3*   Alkaline Phosphatase U/L 112  --  135   AST  U/L 20  --  24   ALT U/L 11  --  14   Magnesium mg/dL  --   --  1.7   Phosphorus mg/dL  --   --  2.2*       Drug levels (last 3 results):  No results for input(s): VANCOMYCINRA, VANCOMYCINPE, VANCOMYCINTR in the last 72 hours.    Microbiologic Results:  Microbiology Results (last 7 days)       Procedure Component Value Units Date/Time    Influenza A & B by Molecular [[id number]] Collected: 03/02/22 1721    Order Status: Completed Specimen: Nasopharyngeal Swab Updated: 03/02/22 1759     Influenza A, Molecular Negative     Influenza B, Molecular Negative     Flu A & B Source Nasal swab    Blood culture x two cultures. Draw prior to antibiotics. [[id number]] Collected: 03/02/22 1728    Order Status: Sent Specimen: Blood from Antecubital, Left Updated: 03/02/22 1728    Blood culture x two cultures. Draw prior to antibiotics. [[id number]] Collected: 03/02/22 1728    Order Status: Sent Specimen: Blood from Antecubital, Right Updated: 03/02/22 1728

## 2022-03-03 NOTE — PROVIDER TRANSFER
Outside Transfer Acceptance Note / Regional Referral Center    Referring facility: [facility]   Referring provider: MARIO MIRANDA  Accepting facility: [facility]  Accepting provider: ANGIE POLK  Admitting provider: CARMEN WALTON  Reason for transfer: Higher Level of Care   Transfer diagnosis: abdominal wall abscess  Transfer specialty requested: General Surgery  Transfer specialty notified: yes  Transfer level: NUMBER 1-5: 2  Isolation status: No active isolations   Admission class or status: IP- Inpatient      Narrative      89-year-old m with PMH CAD, AF, anticoagulation (Xarelto), S/D HF, CKD 4, COPD, CVA, HTN, PVD, DM2, pacemaker (2 weeks ago) and PSH of abd wall hernia s/p repair with mesh (20 years ago) presented to [facility] with fever, abd pain N/V starting today.      On exam there was hardness in the epigastric area with decreased bowel sounds.  VS on presentation T 103.3°, HR 96, RR 30, /67, SpO2 97% (2 L).  Labs WBC 15.8, Hb 12.8 Plts 159, Na 138, K 4.2,  CO2 23, AG 14, BUN 26, Cr 1.9, Glu 212, T bili 1.3, , troponin 0.056, LA 2.0, U/A neg, CT abdomen notable for 17.1 x 9.1 x 13.4 cm abscess in the anterior abd wall at the level of the prior mesh repair with intra-abd extension.      Patient given IVF (2 L Na Cl) and started on vancomycin and Zosyn.  Transfer diagnosis:  abd wall abscess. Transfer requested for surgical drainage.  OHLENY HERNANDEZ (Francie) has agreed to drain the abscess.  He requested admission to  due to multiple comorbidities.    Instructions      [facility]  Admit to Hospital Medicine  Upon patient arrival to floor, please contact Hospital Medicine on call.

## 2022-03-03 NOTE — NURSING
MD Marta. Notified about pt voiding 100cc since admit to ICU at 3am. 274 post void residual on bladder scan. Will cont to monitor.

## 2022-03-03 NOTE — ASSESSMENT & PLAN NOTE
BNP at baseline  No signs of volume overload   No recent echo on file   -echo pending  -hold lasix, BB for now   -caution with IV fluids

## 2022-03-03 NOTE — ASSESSMENT & PLAN NOTE
Troponin 0.056--0.100   In setting of septic shock  Denies chest pain  No ischemic changes on EKG  -echo pending  -trend troponin

## 2022-03-03 NOTE — AI DETERIORATION ALERT
IP Sepsis Screen (most recent)     Sepsis Screen - 03/03/22 0856     Is the patient's history or complaint suggestive of a possible infection? Yes  -AD    Are there at least two of the following signs and symptoms present? Yes  -AD    Sepsis signs/symptoms - Tachypnea Tachypnea     >20  -AD    Sepsis signs/symptoms - WBC WBC < 4,000 or WBC > 12,000  -AD    Are any of the following organ dysfunction criteria present and not considered to be due to a chronic condition? Yes  -AD    Organ Dysfunction Criteria INR > 1.5 or aPTT > 60  -AD    Start Sepsis Timer No   ABX scheduled -AD          User Key  (r) = Recorded By, (t) = Taken By, (c) = Cosigned By    Initials Name    HUNTER Beauchamp RN

## 2022-03-03 NOTE — PROCEDURES
Interventional Radiology Immediate Post-Procedure Note    Pre-Op Diagnosis: Abscess  Post-Op Diagnosis: Same     Procedure: US-guided drain placement    Procedure performed by: Cody Reynolds MD  Assistants: None    Estimated Blood Loss: Minimal  Specimen Removed: Yes    Findings/description of procedure:  Large fluid and gas collection in the anterior abd well. Unfortunately, appears to be deep to the mesh, not superficial too it, and it was difficult to puncture with a needle and difficult to dilate up to even 10-Fr. Not able to place a larger drain. 275 mL foul-smelling bloody fluid removed.    No immediate complications. Patient tolerated procedure well. Please see full dictated procedure report for additional details and recommendations.      Cody Reynolds MD  [facility]  Pager [phone]

## 2022-03-03 NOTE — SUBJECTIVE & OBJECTIVE
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    Melanoma     Obesity (BMI 30.0-34.9) 9/13/2016    Osteoporosis     Peripheral vascular disease     Primary malignant neoplasm of prostate 3/3/2022    Septic shock 3/3/2022    Stroke     TIA (transient ischemic attack)     Type II diabetes mellitus with peripheral circulatory disorder     Type II or unspecified type diabetes mellitus without mention of complication, not stated as uncontrolled     Unspecified essential hypertension        Past Surgical History:   Procedure Laterality Date    AORTIC VALVE REPLACEMENT      CARDIAC PACEMAKER PLACEMENT  9/28/2012    CARDIAC VALVE REPLACEMENT  11/17/2011    aortic valve-tissue    CHOLECYSTECTOMY      COLONOSCOPY      ESOPHAGOGASTRODUODENOSCOPY N/A 5/27/2021    Procedure: EGD (ESOPHAGOGASTRODUODENOSCOPY);  Surgeon: Gualberto Medrano MD;  Location: [facility];  Service: Endoscopy;  Laterality: N/A;    ESOPHAGOGASTRODUODENOSCOPY N/A 6/22/2021    Procedure: EGD (ESOPHAGOGASTRODUODENOSCOPY);  Surgeon: Gualberto Medrano MD;  Location: [facility];  Service: Endoscopy;  Laterality: N/A;  please call wife(Jessica) with instructions/arrival time.    ESOPHAGOGASTRODUODENOSCOPY (EGD) WITH DILATION  06/22/2021    EYE SURGERY Bilateral     cataract with lens by  at [facility], [place]    HERNIA REPAIR      abdominal    LASIK      UPPER GASTROINTESTINAL ENDOSCOPY  05/28/2021       Review of patient's allergies indicates:   Allergen Reactions    Ciprofloxacin     Levofloxacin Swelling     Lyrica [pregabalin] Swelling    Unable to assess Other (See Comments)     Soft shell crabs       Current Facility-Administered Medications on File Prior to Encounter   Medication    [COMPLETED] 0.9%  NaCl infusion    [COMPLETED] acetaminophen tablet 1,000 mg    [COMPLETED] ibuprofen tablet 800 mg    [COMPLETED] iohexoL (OMNIPAQUE 350) injection 75 mL    [COMPLETED] NORepinephrine 4 mg in dextrose 5% 250 mL infusion (premix) (titrating)    [COMPLETED] piperacillin-tazobactam 4.5 g in dextrose 5 % 100 mL IVPB (ready to mix system)    [COMPLETED] sodium chloride 0.9% bolus 2,000 mL    [COMPLETED] vancomycin 2 g in 0.9% sodium chloride 500 mL IVPB    [DISCONTINUED] vancomycin - pharmacy to dose     Current Outpatient Medications on File Prior to Encounter   Medication Sig    amLODIPine (NORVASC) 10 MG tablet TAKE 1 TABLET BY MOUTH DAILY FOR BLOOD PRESSURE    carvediloL (COREG) 12.5 MG tablet TAKE 1 TABLET (12.5 MG TOTAL) BY MOUTH 2 (TWO) TIMES DAILY WITH MEALS.    doxazosin (CARDURA) 1 MG tablet Take 1 mg by mouth nightly.    furosemide (LASIX) 20 MG tablet TAKE ONE TABLET BY MOUTH EVERY OTHER DAY    gabapentin (NEURONTIN) 100 MG capsule Take 2 capsules (200 mg total) by mouth every evening.    glimepiride (AMARYL) 2 MG tablet TAKE 1 TABLET BY MOUTH EVERY MORNING WITH BREAKFAST FOR DIABETES    levothyroxine (SYNTHROID) 75 MCG tablet Take 1 tablet (75 mcg total) by mouth before breakfast.    lisinopriL (PRINIVIL,ZESTRIL) 20 MG tablet     pantoprazole (PROTONIX) 40 MG tablet TAKE ONE TABLET BY MOUTH ONCE A DAY FOR STOMACH PROBLEMS    pioglitazone (ACTOS) 15 MG tablet ONE TABLET ONE TIME A DAY FOR DIABETES    pravastatin (PRAVACHOL) 20 MG tablet TAKE 1 TABLET BY MOUTH DAILY FOR CHOLESTROL    rivaroxaban (XARELTO) 20 mg Tab Take one tablet(20mg) by mouth once daily    tamsulosin (FLOMAX) 0.4 mg Cap TAKE 1 CAPSULE (0.4 MG TOTAL) BY MOUTH ONCE DAILY.    acetaminophen (TYLENOL) 500 MG tablet Take 500 mg by mouth every 6 (six)  hours as needed for Pain.    blood sugar diagnostic (BLOOD GLUCOSE TEST) Strp ONETOUCH ULTRA TESTING STRIPS. USE TO TEST BLOOD GLUCOSE TWICE DAILY. DX CODE: E11.51    blood-glucose meter Misc ONETOUCH ULTRA GLUCOSE METER. USE AS DIRECTED TO TEST BLOOD GLUCOSE. DX CODE: E11.51    fenofibrate 160 MG Tab fenofibrate 160 mg tablet    gemfibrozil (LOPID) 600 MG tablet Take one tablet(600mg) by mouth once daily    lancets (ONETOUCH ULTRASOFT LANCETS) Misc USE TO TEST BLOOD GLUCOSE TWICE DAILY. DX CODE: E11.51     Family History       Problem Relation (Age of Onset)    Alcohol abuse Father    COPD Sister, Brother    Cancer Brother    Diabetes Daughter    Heart disease Brother    Hypertension Father    No Known Problems Son, Daughter, Son    Stroke Father          Tobacco Use    Smoking status: Former Smoker     Quit date: 1996     Years since quittin.4    Smokeless tobacco: Never Used    Tobacco comment: cigar smoker   Substance and Sexual Activity    Alcohol use: No     Comment: none since     Drug use: No    Sexual activity: Not Currently     Review of Systems   Constitutional:  Positive for chills and fever. Negative for appetite change.   HENT:  Positive for hearing loss. Negative for congestion.    Eyes:  Negative for visual disturbance.   Respiratory:  Negative for cough and shortness of breath.    Cardiovascular:  Negative for chest pain and palpitations.   Gastrointestinal:  Positive for abdominal distention and constipation. Negative for abdominal pain, nausea and vomiting.   Genitourinary:  Negative for difficulty urinating.   Musculoskeletal:  Negative for arthralgias and myalgias.   Skin:  Negative for wound.   Neurological:  Negative for weakness and headaches.   Psychiatric/Behavioral:  Positive for confusion.    Objective:     Vital Signs (Most Recent):  Temp: 97.8 °F (36.6 °C) (22)  Pulse: 61 (22)  Resp: (!) 28 (22)  BP: (!) 101/54 (22)  SpO2: 98 %  (03/03/22 8743)   Vital Signs (24h Range):  Temp:  [97.8 °F (36.6 °C)-103.3 °F (39.6 °C)] 97.8 °F (36.6 °C)  Pulse:  [60-97] 61  Resp:  [14-40] 28  SpO2:  [95 %-100 %] 98 %  BP: ()/(46-67) 101/54     Weight: 83.8 kg (184 lb 11.9 oz)  Body mass index is 30.74 kg/m².    Physical Exam  Vitals and nursing note reviewed.   Constitutional:       General: He is not in acute distress.     Appearance: Normal appearance. He is not toxic-appearing.   HENT:      Head: Normocephalic and atraumatic.      Nose: Nose normal.      Mouth/Throat:      Mouth: Mucous membranes are moist.   Eyes:      Pupils: Pupils are equal, round, and reactive to light.   Cardiovascular:      Rate and Rhythm: Normal rate and regular rhythm.      Pulses: Normal pulses.   Pulmonary:      Effort: Pulmonary effort is normal.      Breath sounds: Normal breath sounds.   Abdominal:      General: Bowel sounds are normal. There is distension.      Tenderness: There is no abdominal tenderness.   Musculoskeletal:         General: No swelling. Normal range of motion.      Cervical back: Normal range of motion.   Skin:     General: Skin is warm and dry.   Neurological:      Mental Status: He is alert and oriented to person, place, and time.   Psychiatric:         Behavior: Behavior normal.         Thought Content: Thought content normal.         CRANIAL NERVES     CN III, IV, VI   Pupils are equal, round, and reactive to light.     Significant Labs: All pertinent labs within the past 24 hours have been reviewed.    Significant Imaging: I have reviewed all pertinent imaging results/findings within the past 24 hours.

## 2022-03-03 NOTE — H&P
[facility]ensive [facility]  [facility] Medicine  History & Physical    Patient Name: Eddie Parada Sr.  MRN: [id number]  Patient Class: IP- Inpatient  Admission Date: 3/3/2022  Attending Physician: Arvin Holguin, *   Primary Care Provider: Callum Roberts MD         Patient information was obtained from patient, spouse/SO, past medical records and ER records.     Subjective:     Principal Problem:Septic shock    Chief Complaint: No chief complaint on file.       HPI: Eddie Parada is a 89 yo male with a pmh of CAD, Afib on xarelto, heart failure, pacemaker insertion, CKD, COPD, former smoker, hernia repair, CVA, HTN, PVD, DM2. He presented to [facility] ED with AMS and fever yesterday. He was brought in by EMS. He was in septic shock. Given 2L NS vancomycin, zosyn, tylenol, ibuprofen, and started on levophed while in the ED. CT abd with large abscess noted in the anterior abdominal wall at the level of prior mesh repair. He was transferred to [facility] for surgery evaluation for abscess drainage. He is alert and oriented on arrival to [facility]. BP stable off levophed. He reports he woke up feeling fine and had routine labs done yesterday morning. He then began feeling cold and his body was shaking. His wife reports she took his temp and noted 97.6F. He took a nap and woke up confused. His wife reports his temp was then 104 and he was short of breath. She called EMS at that time. His wife noticed his upper abdomen was distended while he was in the ED. He denies ever having abdominal pain or nausea. Had been eating well. He had not had a bowel movement in 3 days so he took a laxative yesterday morning.  He had an abdominal hernia repair with mesh placement about 20 years ago in [place]. Has not had any issues since then. He still drives and runs a business. He is followed by [facility] in [place] and had his pacemaker replaced about 2 weeks ago. He took antibiotics as prescribed at that time.       Past Medical  History:   Diagnosis Date    Abdominal fibromatosis     Acute posthemorrhagic anemia 1/9/2018    NIK (acute kidney injury) 1/11/2018    Atrial fibrillation     Bradycardia     Broken arm     CAD (coronary artery disease)     Cancer     basal cell on nose and melanoma on back    CHF (congestive heart failure)     COPD (chronic obstructive pulmonary disease)     Diabetes mellitus type II     Hyperlipidemia     Localization-related focal epilepsy with simple partial seizures 3/2/2021    Melanoma     Obesity (BMI 30.0-34.9) 9/13/2016    Osteoporosis     Peripheral vascular disease     Primary malignant neoplasm of prostate 3/3/2022    Septic shock 3/3/2022    Stroke     TIA (transient ischemic attack)     Type II diabetes mellitus with peripheral circulatory disorder     Type II or unspecified type diabetes mellitus without mention of complication, not stated as uncontrolled     Unspecified essential hypertension        Past Surgical History:   Procedure Laterality Date    AORTIC VALVE REPLACEMENT      CARDIAC PACEMAKER PLACEMENT  9/28/2012    CARDIAC VALVE REPLACEMENT  11/17/2011    aortic valve-tissue    CHOLECYSTECTOMY      COLONOSCOPY      ESOPHAGOGASTRODUODENOSCOPY N/A 5/27/2021    Procedure: EGD (ESOPHAGOGASTRODUODENOSCOPY);  Surgeon: Gualberto Medrano MD;  Location: [facility];  Service: Endoscopy;  Laterality: N/A;    ESOPHAGOGASTRODUODENOSCOPY N/A 6/22/2021    Procedure: EGD (ESOPHAGOGASTRODUODENOSCOPY);  Surgeon: Gualberto Medrano MD;  Location: [facility];  Service: Endoscopy;  Laterality: N/A;  please call wife(Jessica) with instructions/arrival time.    ESOPHAGOGASTRODUODENOSCOPY (EGD) WITH DILATION  06/22/2021    EYE SURGERY Bilateral     cataract [facility] lens [facility]  at [facility]    HERNIA REPAIR      abdominal    LASIK      UPPER GASTROINTESTINAL ENDOSCOPY  05/28/2021       Review of patient's allergies indicates:   Allergen Reactions    Ciprofloxacin      Levofloxacin Swelling    Lyrica [pregabalin] Swelling    Unable to assess Other (See Comments)     Soft shell crabs       Current Facility-Administered Medications on File Prior to Encounter   Medication    [COMPLETED] 0.9%  NaCl infusion    [COMPLETED] acetaminophen tablet 1,000 mg    [COMPLETED] ibuprofen tablet 800 mg    [COMPLETED] iohexoL (OMNIPAQUE 350) injection 75 mL    [COMPLETED] NORepinephrine 4 mg in dextrose 5% 250 mL infusion (premix) (titrating)    [COMPLETED] piperacillin-tazobactam 4.5 g in dextrose 5 % 100 mL IVPB (ready to mix system)    [COMPLETED] sodium chloride 0.9% bolus 2,000 mL    [COMPLETED] vancomycin 2 g in 0.9% sodium chloride 500 mL IVPB    [DISCONTINUED] vancomycin - pharmacy to dose     Current Outpatient Medications on File Prior to Encounter   Medication Sig    amLODIPine (NORVASC) 10 MG tablet TAKE 1 TABLET BY MOUTH DAILY FOR BLOOD PRESSURE    carvediloL (COREG) 12.5 MG tablet TAKE 1 TABLET (12.5 MG TOTAL) BY MOUTH 2 (TWO) TIMES DAILY WITH MEALS.    doxazosin (CARDURA) 1 MG tablet Take 1 mg by mouth nightly.    furosemide (LASIX) 20 MG tablet TAKE ONE TABLET BY MOUTH EVERY OTHER DAY    gabapentin (NEURONTIN) 100 MG capsule Take 2 capsules (200 mg total) by mouth every evening.    glimepiride (AMARYL) 2 MG tablet TAKE 1 TABLET BY MOUTH EVERY MORNING WITH BREAKFAST FOR DIABETES    levothyroxine (SYNTHROID) 75 MCG tablet Take 1 tablet (75 mcg total) by mouth before breakfast.    lisinopriL (PRINIVIL,ZESTRIL) 20 MG tablet     pantoprazole (PROTONIX) 40 MG tablet TAKE ONE TABLET BY MOUTH ONCE A DAY FOR STOMACH PROBLEMS    pioglitazone (ACTOS) 15 MG tablet ONE TABLET ONE TIME A DAY FOR DIABETES    pravastatin (PRAVACHOL) 20 MG tablet TAKE 1 TABLET BY MOUTH DAILY FOR CHOLESTROL    rivaroxaban (XARELTO) 20 mg Tab Take one tablet(20mg) by mouth once daily    tamsulosin (FLOMAX) 0.4 mg Cap TAKE 1 CAPSULE (0.4 MG TOTAL) BY MOUTH ONCE DAILY.    acetaminophen (TYLENOL)  500 MG tablet Take 500 mg by mouth every 6 (six) hours as needed for Pain.    blood sugar diagnostic (BLOOD GLUCOSE TEST) Strp ONETOUCH ULTRA TESTING STRIPS. USE TO TEST BLOOD GLUCOSE TWICE DAILY. DX CODE: E11.51    blood-glucose meter Misc ONETOUCH ULTRA GLUCOSE METER. USE AS DIRECTED TO TEST BLOOD GLUCOSE. DX CODE: E11.51    fenofibrate 160 MG Tab fenofibrate 160 mg tablet    gemfibrozil (LOPID) 600 MG tablet Take one tablet(600mg) by mouth once daily    lancets (ONETOUCH ULTRASOFT LANCETS) Misc USE TO TEST BLOOD GLUCOSE TWICE DAILY. DX CODE: E11.51     Family History       Problem Relation (Age of Onset)    Alcohol abuse Father    COPD Sister, Brother    Cancer Brother    Diabetes Daughter    Heart disease Brother    Hypertension Father    No Known Problems Son, Daughter, Son    Stroke Father          Tobacco Use    Smoking status: Former Smoker     Quit date: 1996     Years since quittin.4    Smokeless tobacco: Never Used    Tobacco comment: cigar smoker   Substance and Sexual Activity    Alcohol use: No     Comment: none since     Drug use: No    Sexual activity: Not Currently     Review of Systems   Constitutional:  Positive for chills and fever. Negative for appetite change.   HENT:  Positive for hearing loss. Negative for congestion.    Eyes:  Negative for visual disturbance.   Respiratory:  Negative for cough and shortness of breath.    Cardiovascular:  Negative for chest pain and palpitations.   Gastrointestinal:  Positive for abdominal distention and constipation. Negative for abdominal pain, nausea and vomiting.   Genitourinary:  Negative for difficulty urinating.   Musculoskeletal:  Negative for arthralgias and myalgias.   Skin:  Negative for wound.   Neurological:  Negative for weakness and headaches.   Psychiatric/Behavioral:  Positive for confusion.    Objective:     Vital Signs (Most Recent):  Temp: 97.8 °F (36.6 °C) (22 0319)  Pulse: 61 (22 0445)  Resp: (!) 28  (03/03/22 0445)  BP: (!) 101/54 (03/03/22 0445)  SpO2: 98 % (03/03/22 0445)   Vital Signs (24h Range):  Temp:  [97.8 °F (36.6 °C)-103.3 °F (39.6 °C)] 97.8 °F (36.6 °C)  Pulse:  [60-97] 61  Resp:  [14-40] 28  SpO2:  [95 %-100 %] 98 %  BP: ()/(46-67) 101/54     Weight: 83.8 kg (184 lb 11.9 oz)  Body mass index is 30.74 kg/m².    Physical Exam  Vitals and nursing note reviewed.   Constitutional:       General: He is not in acute distress.     Appearance: Normal appearance. He is not toxic-appearing.   HENT:      Head: Normocephalic and atraumatic.      Nose: Nose normal.      Mouth/Throat:      Mouth: Mucous membranes are moist.   Eyes:      Pupils: Pupils are equal, round, and reactive to light.   Cardiovascular:      Rate and Rhythm: Normal rate and regular rhythm.      Pulses: Normal pulses.   Pulmonary:      Effort: Pulmonary effort is normal.      Breath sounds: Normal breath sounds.   Abdominal:      General: Bowel sounds are normal. There is distension.      Tenderness: There is no abdominal tenderness.   Musculoskeletal:         General: No swelling. Normal range of motion.      Cervical back: Normal range of motion.   Skin:     General: Skin is warm and dry.   Neurological:      Mental Status: He is alert and oriented to person, place, and time.   Psychiatric:         Behavior: Behavior normal.         Thought Content: Thought content normal.         CRANIAL NERVES     CN III, IV, VI   Pupils are equal, round, and reactive to light.     Significant Labs: All pertinent labs within the past 24 hours have been reviewed.    Significant Imaging: I have reviewed all pertinent imaging results/findings within the past 24 hours.    Assessment/Plan:     * Septic shock  Presented with hypotension, fevers   Leukocytosis, Large abdominal abscess noted on CT  Initiated on levophed after 2L NS bolus in ED  Weaned off levophed on arrival to ICU  -cont vanc and zosyn  -levophed as needed  -IV fluids  -blood cx  pending          Hypomagnesemia  -repleted      Elevated troponin  Troponin 0.056--0.100   In setting of septic shock  Denies chest pain  No ischemic changes on EKG  -echo pending  -trend troponin          Abdominal wall abscess  CT abdomen with large 17.1 x 9.1 x 13.4 cm abscess in the anterior abdominal wall at the level of prior mesh repair    -cont vanc and zosyn  -IV fluids  -consult surgery for eval  -NPO      Dysphagia  Patient denies trouble eating, takes small bites  Hx of food bolus last year  -NPO for now      Hypothyroidism due to acquired atrophy of thyroid  TSH elevated  -cont synthroid      Acute renal failure superimposed on stage 3 chronic kidney disease  Baseline creatinine 1.4, presented with creatinine 1.9 in setting of septic shock  Creatinine improved to 1.7 after IV fluids  -renally dose meds  -cont IV fluids  -hold lasix and ACE  -avoid hypotension      Long term current use of anticoagulant therapy  Hold xarelto      (HFpEF) heart failure with preserved ejection fraction  BNP at baseline  No signs of volume overload   No recent echo on file   -echo pending  -hold lasix, BB for now   -caution with IV fluids      Type 2 diabetes mellitus with neurologic complication  Hold amaryl and actos  -accuchecks and SSI  -A1C        Cardiac pacemaker in situ  Changed a few weeks ago      CAD (coronary artery disease)  Cont statin      HTN (hypertension)  Hold BP meds due to septic shock      Chronic atrial fibrillation  Hold BB for now in setting of septic shock  Hold xarelto  -plan to start heparin drip, on hold due to oozing from central line site        VTE Risk Mitigation (From admission, onward)         Ordered     IP VTE HIGH RISK PATIENT  Once         03/03/22 0400     Place sequential compression device  Until discontinued         03/03/22 0400              Critical care time spent on the evaluation and treatment of severe organ dysfunction, review of pertinent labs and imaging studies,  discussions with consulting providers and discussions with patient/family: 90 minutes.     Missy Thompson NP  Department of [facility] Medicine   [place] - [facility]

## 2022-03-03 NOTE — HPI
Eddie Parada is a 89 yo male with a pmh of CAD, Afib on xarelto, heart failure, pacemaker insertion, CKD, COPD, former smoker, hernia repair, CVA, HTN, PVD, DM2. He presented to [facility] ED with AMS and fever yesterday. He was brought in by EMS. He was in septic shock. Given 2L NS vancomycin, zosyn, tylenol, ibuprofen, and started on levophed while in the ED. CT abd with large abscess noted in the anterior abdominal wall at the level of prior mesh repair. He was transferred to [facility] for surgery evaluation for abscess drainage. He is alert and oriented on arrival to [facility]. BP stable off levophed. He reports he woke up feeling fine and had routine labs done yesterday morning. He then began feeling cold and his body was shaking. His wife reports she took his temp and noted 97.6F. He took a nap and woke up confused. His wife reports his temp was then 104 and he was short of breath. She called EMS at that time. His wife noticed his upper abdomen was distended while he was in the ED. He denies ever having abdominal pain or nausea. Had been eating well. He had not had a bowel movement in 3 days so he took a laxative yesterday morning.  He had an abdominal hernia repair with mesh placement about 20 years ago in [place]. Has not had any issues since then. He still drives and runs a business. He is followed by [facility] in [place] and had his pacemaker replaced about 2 weeks ago. He took antibiotics as prescribed at that time.

## 2022-03-03 NOTE — SEDATION DOCUMENTATION
Catheter inserted, draining thick, foul odor bloody fluid. 100ml drained. MD to attempt to place drain, although patient has mesh from a previous hernia repair

## 2022-03-04 LAB
ALBUMIN SERPL BCP-MCNC: 3.1 G/DL (ref 3.5–5.2)
ALP SERPL-CCNC: 85 U/L (ref 55–135)
ALT SERPL W/O P-5'-P-CCNC: 8 U/L (ref 10–44)
ANION GAP SERPL CALC-SCNC: 12 MMOL/L (ref 8–16)
AST SERPL-CCNC: 25 U/L (ref 10–40)
BASOPHILS # BLD AUTO: 0.02 K/UL (ref 0–0.2)
BASOPHILS NFR BLD: 0.1 % (ref 0–1.9)
BILIRUB SERPL-MCNC: 1 MG/DL (ref 0.1–1)
BUN SERPL-MCNC: 27 MG/DL (ref 10–30)
CALCIUM SERPL-MCNC: 8.4 MG/DL (ref 8.7–10.5)
CHLORIDE SERPL-SCNC: 107 MMOL/L (ref 95–110)
CO2 SERPL-SCNC: 19 MMOL/L (ref 23–29)
CREAT SERPL-MCNC: 1.4 MG/DL (ref 0.5–1.4)
DIFFERENTIAL METHOD: ABNORMAL
EOSINOPHIL # BLD AUTO: 0 K/UL (ref 0–0.5)
EOSINOPHIL NFR BLD: 0.1 % (ref 0–8)
ERYTHROCYTE [DISTWIDTH] IN BLOOD BY AUTOMATED COUNT: 12.8 % (ref 11.5–14.5)
EST. GFR  (AFRICAN AMERICAN): 50 ML/MIN/1.73 M^2
EST. GFR  (NON AFRICAN AMERICAN): 43 ML/MIN/1.73 M^2
GLUCOSE SERPL-MCNC: 111 MG/DL (ref 70–110)
GRAM STN SPEC: NORMAL
HCT VFR BLD AUTO: 30.3 % (ref 40–54)
HGB BLD-MCNC: 9.9 G/DL (ref 14–18)
IMM GRANULOCYTES # BLD AUTO: 0.11 K/UL (ref 0–0.04)
IMM GRANULOCYTES NFR BLD AUTO: 0.8 % (ref 0–0.5)
LYMPHOCYTES # BLD AUTO: 0.7 K/UL (ref 1–4.8)
LYMPHOCYTES NFR BLD: 4.8 % (ref 18–48)
MAGNESIUM SERPL-MCNC: 2.1 MG/DL (ref 1.6–2.6)
MCH RBC QN AUTO: 34 PG (ref 27–31)
MCHC RBC AUTO-ENTMCNC: 32.7 G/DL (ref 32–36)
MCV RBC AUTO: 104 FL (ref 82–98)
MONOCYTES # BLD AUTO: 0.8 K/UL (ref 0.3–1)
MONOCYTES NFR BLD: 5.6 % (ref 4–15)
NEUTROPHILS # BLD AUTO: 12.6 K/UL (ref 1.8–7.7)
NEUTROPHILS NFR BLD: 88.6 % (ref 38–73)
NRBC BLD-RTO: 0 /100 WBC
PHOSPHATE SERPL-MCNC: 3.3 MG/DL (ref 2.7–4.5)
PLATELET # BLD AUTO: 81 K/UL (ref 150–450)
PMV BLD AUTO: 10.9 FL (ref 9.2–12.9)
POCT GLUCOSE: 123 MG/DL (ref 70–110)
POTASSIUM SERPL-SCNC: 3.5 MMOL/L (ref 3.5–5.1)
PROT SERPL-MCNC: 6.4 G/DL (ref 6–8.4)
RBC # BLD AUTO: 2.91 M/UL (ref 4.6–6.2)
SODIUM SERPL-SCNC: 138 MMOL/L (ref 136–145)
VANCOMYCIN SERPL-MCNC: 12.7 UG/ML
WBC # BLD AUTO: 14.22 K/UL (ref 3.9–12.7)

## 2022-03-04 PROCEDURE — 80202 ASSAY OF VANCOMYCIN: CPT | Performed by: HOSPITALIST

## 2022-03-04 PROCEDURE — 63600175 PHARM REV CODE 636 W HCPCS: Mod: JG | Performed by: STUDENT IN AN ORGANIZED HEALTH CARE EDUCATION/TRAINING PROGRAM

## 2022-03-04 PROCEDURE — 25000003 PHARM REV CODE 250: Performed by: NURSE PRACTITIONER

## 2022-03-04 PROCEDURE — 63600175 PHARM REV CODE 636 W HCPCS: Performed by: HOSPITALIST

## 2022-03-04 PROCEDURE — 36415 COLL VENOUS BLD VENIPUNCTURE: CPT | Performed by: NURSE PRACTITIONER

## 2022-03-04 PROCEDURE — 25000003 PHARM REV CODE 250: Performed by: STUDENT IN AN ORGANIZED HEALTH CARE EDUCATION/TRAINING PROGRAM

## 2022-03-04 PROCEDURE — 85025 COMPLETE CBC W/AUTO DIFF WBC: CPT | Performed by: NURSE PRACTITIONER

## 2022-03-04 PROCEDURE — 94761 N-INVAS EAR/PLS OXIMETRY MLT: CPT

## 2022-03-04 PROCEDURE — 25000003 PHARM REV CODE 250: Performed by: HOSPITALIST

## 2022-03-04 PROCEDURE — 83735 ASSAY OF MAGNESIUM: CPT | Performed by: NURSE PRACTITIONER

## 2022-03-04 PROCEDURE — 63600175 PHARM REV CODE 636 W HCPCS: Performed by: NURSE PRACTITIONER

## 2022-03-04 PROCEDURE — 84100 ASSAY OF PHOSPHORUS: CPT | Performed by: NURSE PRACTITIONER

## 2022-03-04 PROCEDURE — 11000001 HC ACUTE MED/SURG PRIVATE ROOM

## 2022-03-04 PROCEDURE — 36415 COLL VENOUS BLD VENIPUNCTURE: CPT | Performed by: HOSPITALIST

## 2022-03-04 PROCEDURE — 80053 COMPREHEN METABOLIC PANEL: CPT | Performed by: NURSE PRACTITIONER

## 2022-03-04 RX ADMIN — TAMSULOSIN HYDROCHLORIDE 0.4 MG: 0.4 CAPSULE ORAL at 09:03

## 2022-03-04 RX ADMIN — PRAVASTATIN SODIUM 20 MG: 20 TABLET ORAL at 09:03

## 2022-03-04 RX ADMIN — ALTEPLASE 10 MG: 2.2 INJECTION, POWDER, LYOPHILIZED, FOR SOLUTION INTRAVENOUS at 04:03

## 2022-03-04 RX ADMIN — FAMOTIDINE 20 MG: 10 INJECTION, SOLUTION INTRAVENOUS at 11:03

## 2022-03-04 RX ADMIN — SODIUM CHLORIDE: 0.9 INJECTION, SOLUTION INTRAVENOUS at 05:03

## 2022-03-04 RX ADMIN — MUPIROCIN: 20 OINTMENT TOPICAL at 09:03

## 2022-03-04 RX ADMIN — PIPERACILLIN AND TAZOBACTAM 4.5 G: 4; .5 INJECTION, POWDER, LYOPHILIZED, FOR SOLUTION INTRAVENOUS; PARENTERAL at 09:03

## 2022-03-04 RX ADMIN — LEVOTHYROXINE SODIUM 75 MCG: 75 TABLET ORAL at 05:03

## 2022-03-04 RX ADMIN — VANCOMYCIN HYDROCHLORIDE 1000 MG: 1 INJECTION, POWDER, LYOPHILIZED, FOR SOLUTION INTRAVENOUS at 02:03

## 2022-03-04 RX ADMIN — PIPERACILLIN AND TAZOBACTAM 4.5 G: 4; .5 INJECTION, POWDER, LYOPHILIZED, FOR SOLUTION INTRAVENOUS; PARENTERAL at 05:03

## 2022-03-04 RX ADMIN — PIPERACILLIN AND TAZOBACTAM 4.5 G: 4; .5 INJECTION, POWDER, LYOPHILIZED, FOR SOLUTION INTRAVENOUS; PARENTERAL at 01:03

## 2022-03-04 NOTE — ASSESSMENT & PLAN NOTE
Abdominal wall abscess   Leukocytosis   - Presented with hypotension, fevers, and confusion  - Large abdominal abscess noted on CT  - Initiated on levophed after 2L NS bolus in ED, but was weaned off levophed on arrival to ICU  - General surgery is following with no recommendation for surgery at this time  - Status post drain placement by IR on 03/03/2022  - Continue vancomycin and Zosyn   - Blood cultures no growth to date, and abscess culture pending  - Will plan on de-escalation with stopping vancomycin tomorrow if blood cultures remain negative  - Advanced diet and case discussed with general surgery

## 2022-03-04 NOTE — ASSESSMENT & PLAN NOTE
- Troponin 0.056--0.100 and reported chest pain  - In setting of septic shock  - No ischemic changes on EKG  - Troponins trended and echo done, not consistent with ACS and no further workup is indicated at this time

## 2022-03-04 NOTE — ASSESSMENT & PLAN NOTE
- Patient denies trouble eating, takes small bites  - Diet advanced and he is eating without difficulty

## 2022-03-04 NOTE — HOSPITAL COURSE
Mr. Parada presented with fever with altered mental status. Admitted in septic shock to the ICU due to intra-abdominal abscess identified on CT scan. Empiric treatment with Zosyn and vancomycin given.  Patient required short time with Levophed, but was able to be stepped down to the floor on 03/03/2022. General surgery consulted with no plans for surgery and with recommendation to consult IR. Drain was placed by IR on 03/03/2022.  ID consulted, started on zosyn transitioned to rocephin and flagyl with 2 week course ending 3/17/2022. PICC team consulted for placement. Family counseled on drain care instructions to flush with 10 cc saline 3x per day and to measure output every 12 hours by RN. Patient discharged with HH OT/PT as well as daily nursing care for IV antibiotics. Will follow up with PCP and general surgery outpatient. Return precautions discussed. Patient and spouse voiced understanding. All questions and concerns addressed at this time.

## 2022-03-04 NOTE — ASSESSMENT & PLAN NOTE
- BNP at baseline and no signs of volume overload   - No recent echo on file   - Echo done on 3/03/2022 shows EF is 55% with indeterminate diastolic function  - Continue to hold Hold lasix and beta-blocker for now  - Advance diet today and will plan on stopping IV fluids

## 2022-03-04 NOTE — PLAN OF CARE
SW met with pt and pts daughter Diana [phone] via [employer] to discuss dc planning.     SW introduced herself as pts SW. SW provided pts daughter with contact information. Pt and/or daughter did not have any questions or concerns for sw.     SW will continue to follow pt throughout his transitions of care and assist with any dc needs.     Future Appointments   Date Time Provider Department Center   3/9/2022  8:00 AM Callum Roberts MD [facility]

## 2022-03-04 NOTE — PLAN OF CARE
0420 Pt appeared to be in no distress. Reported no pain. Very pleasant.  Wife Jessica at bed side.    0420 Accu Ck: [phone]    Tele: Paced, HR 60, No alarms.     Bed in lowest position, wheels locked, non skid socks, ID band worn, personal items and call bell with in reach, bed alarm set.

## 2022-03-04 NOTE — ASSESSMENT & PLAN NOTE
- Baseline creatinine 1.4, presented with creatinine 1.9 in setting of septic shock  - Creatinine improved back to baseline today  - Will stop IV fluids and continue to monitor

## 2022-03-04 NOTE — ASSESSMENT & PLAN NOTE
Lab Results   Component Value Date    HGBA1C 5.9 (H) 03/02/2022   - Hold amaryl and actos  - Glucose levels were reviewed, currently in target range  - Monitor with Accu-Cheks and continue sliding scale insulin  - Continue to monitor levels and make adjustments as necessary to achieve levels of 140-180 during hospitalization

## 2022-03-04 NOTE — ASSESSMENT & PLAN NOTE
- CT abdomen with large 17.1 x 9.1 x 13.4 cm abscess in the anterior abdominal wall at the level of prior mesh repair    - As discussed above under sepsis

## 2022-03-04 NOTE — CONSULTS
IR consult for consideration of additional drain placement. Repeat CT AP 3/4/22 reviewed. Drain in satisfactory position and anterior abd wall fluid collection a little smaller, but still filled with a fluid, gas and solid-appearing material.    Unfortunately, the fluid collection is deep to the mesh, not superficial too it. It was difficult to puncture with a needle and difficult to dilate up to even 10-Fr. A second drain is unlikely to help since the material inside is semi-solid. Unable to place a larger catheter unless I avoid the mesh entirely. This would require entering the abdomen and then re-directing the catheter back anteriorly and into the collection. This would risk spillage of material into the abdomen.    Above d/w Surgery team. They're ok holding off for now.    Thank you for the opportunity to assist in Mr. Parada's care.      Andrew Marsala MD [facility]  Pager [phone]

## 2022-03-04 NOTE — ASSESSMENT & PLAN NOTE
- Hold beta-blocker for now in setting of septic shock  - Hold rivaroxaban for anticoagulation  - Will resume these medication when blood pressure and clinical situation allows

## 2022-03-04 NOTE — PROGRESS NOTES
Pharmacokinetic Assessment Follow Up: IV Vancomycin    Vancomycin serum concentration assessment(s):    The random level was drawn correctly and can be used to guide therapy at this time. The measurement is below the desired definitive target range of 15 to 20 mcg/mL.    Vancomycin Regimen Plan:    Re-dose when the random level is less than 20 mcg/mL, next level to be drawn at 3/4/22 on 2230    Drug levels (last 3 results):  Recent Labs   Lab Result Units 03/03/22  2200   Vancomycin, Random ug/mL 14.3       Pharmacy will continue to follow and monitor vancomycin.    Please contact pharmacy at extension [phone] for questions regarding this assessment.    Thank you for the consult,   Tammie Carmona       Patient brief summary:  Edide Parada Sr. is a 89 y.o. male initiated on antimicrobial therapy with IV Vancomycin for treatment of bacteremia    The patient's current regimen is pulse dosing give vanco 1g     Drug Allergies:   Review of patient's allergies indicates:   Allergen Reactions    Ciprofloxacin     Levofloxacin Swelling    Lyrica [pregabalin] Swelling    Unable to assess Other (See Comments)     Soft shell crabs       Actual Body Weight:   83kg    Renal Function:   Estimated Creatinine Clearance: 27.6 mL/min (A) (based on SCr of 1.7 mg/dL (H)).,     Dialysis Method (if applicable):  N/A    CBC (last 72 hours):  Recent Labs   Lab Result Units 03/02/22  0744 03/02/22  1728 03/03/22  0413   WBC K/uL 7.31 15.82* 21.70*   Hemoglobin g/dL 12.7* 12.8* 10.0*   Hemoglobin A1C % 5.9*  --   --    Hematocrit % 38.4* 38.8* 29.4*   Platelets K/uL 162 159 121*   Gran % % 69.5 91.0* 88.6*   Lymph % % 17.8* 3.1* 4.3*   Mono % % 9.8 4.4 6.1   Eosinophil % % 1.4 0.1 0.0   Basophil % % 0.7 0.2 0.2   Differential Method  Automated Automated Automated       Metabolic Panel (last 72 hours):  Recent Labs   Lab Result Units 03/02/22  0744 03/02/22  1715 03/02/22  1728 03/03/22  0414   Sodium mmol/L 143  --  138 138   Potassium  mmol/L 4.9  --  4.2 3.7   Chloride mmol/L 103  --  101 107   CO2 mmol/L 25  --  23 22*   Glucose mg/dL 143*  --  212* 164*   Glucose, UA   --  Negative  --   --    BUN mg/dL 22  --  26 29   Creatinine mg/dL 1.4  --  1.9* 1.7*   Albumin g/dL 3.9  --  4.1 3.1*   Total Bilirubin mg/dL 0.9  --  1.3* 1.1*   Alkaline Phosphatase U/L 112  --  135 93   AST U/L 20  --  24 23   ALT U/L 11  --  14 8*   Magnesium mg/dL  --   --  1.7 1.4*   Phosphorus mg/dL  --   --  2.2* 3.3       Vancomycin Administrations:  vancomycin given in the last 96 hours                     vancomycin 2 g in 0.9% sodium chloride 500 mL IVPB (mg) 2,000 mg New Bag 03/02/22 1911                    Microbiologic Results:  Microbiology Results (last 7 days)       Procedure Component Value Units Date/Time    Fungus culture [[id number]] Collected: 03/03/22 1625    Order Status: Sent Specimen: Abscess from Abdomen Updated: 03/03/22 1822    Aerobic culture [[id number]] Collected: 03/03/22 1625    Order Status: Sent Specimen: Abscess from Abdomen Updated: 03/03/22 1821    Culture, Anaerobic [[id number]] Collected: 03/03/22 1625    Order Status: Sent Specimen: Abscess from Abdomen Updated: 03/03/22 1821    Gram stain [[id number]] Collected: 03/03/22 1625    Order Status: Sent Specimen: Abscess from Abdomen Updated: 03/03/22 1820

## 2022-03-04 NOTE — SUBJECTIVE & OBJECTIVE
Interval History:  No acute events, patient had drain placed by IR are yesterday and was able to be stepped down to regular floor bed after he was maintained off pressor support.  Patient reports he is feeling much better and is hungry.    Review of Systems   Constitutional:  Negative for chills and fever.   Respiratory:  Negative for shortness of breath.    Gastrointestinal:  Positive for abdominal distention and abdominal pain. Negative for nausea and vomiting.   Objective:     Vital Signs (Most Recent):  Temp: 96.3 °F (35.7 °C) (03/04/22 1612)  Pulse: 60 (03/04/22 1612)  Resp: 18 (03/04/22 1612)  BP: (!) 118/56 (03/04/22 1612)  SpO2: 97 % (03/04/22 1612)   Vital Signs (24h Range):  Temp:  [96.3 °F (35.7 °C)-99.5 °F (37.5 °C)] 96.3 °F (35.7 °C)  Pulse:  [54-70] 60  Resp:  [17-36] 18  SpO2:  [94 %-99 %] 97 %  BP: (101-140)/(53-99) 118/56     Weight: 83.5 kg (184 lb 1.4 oz)  Body mass index is 30.63 kg/m².    Intake/Output Summary (Last 24 hours) at 3/4/2022 1717  Last data filed at 3/4/2022 0528  Gross per 24 hour   Intake 1637.61 ml   Output 525 ml   Net 1112.61 ml      Physical Exam  Vitals and nursing note reviewed.   Constitutional:       General: He is not in acute distress.     Appearance: Normal appearance. He is not toxic-appearing.   HENT:      Head: Normocephalic and atraumatic.      Nose: Nose normal.      Mouth/Throat:      Mouth: Mucous membranes are moist.   Eyes:      Conjunctiva/sclera: Conjunctivae normal.      Pupils: Pupils are equal, round, and reactive to light.   Cardiovascular:      Rate and Rhythm: Normal rate and regular rhythm.      Pulses: Normal pulses.   Pulmonary:      Effort: Pulmonary effort is normal.      Breath sounds: Normal breath sounds.   Abdominal:      General: Bowel sounds are normal. There is distension.      Tenderness: There is no abdominal tenderness. There is no guarding or rebound.      Comments: Firm nodularity noted mid abdomen superior to the umbilicus, drain  placed by IR slightly lateral to this nodularity, no rebound or guarding   Musculoskeletal:         General: No swelling. Normal range of motion.      Cervical back: Normal range of motion.   Skin:     General: Skin is warm and dry.   Neurological:      Mental Status: He is alert and oriented to person, place, and time. Mental status is at baseline.   Psychiatric:         Mood and Affect: Mood normal.         Behavior: Behavior normal.         Thought Content: Thought content normal.       Significant Labs: All pertinent labs within the past 24 hours have been reviewed.    Significant Imaging: I have reviewed all pertinent imaging results/findings within the past 24 hours.

## 2022-03-04 NOTE — PROGRESS NOTES
[facility] Medicine  Progress Note    Patient Name: Eddie Parada Sr.  MRN: [id number]  Patient Class: IP- Inpatient   Admission Date: 3/3/2022  Length of Stay: 1 days  Attending Physician: Olga Lozano MD  Primary Care Provider: Callum Roberts MD        Subjective:     Principal Problem:Septic shock        HPI:  Eddie Parada is a 89 yo male with a pmh of CAD, Afib on xarelto, heart failure, pacemaker insertion, CKD, COPD, former smoker, hernia repair, CVA, HTN, PVD, DM2. He presented to [facility] ED with AMS and fever yesterday. He was brought in by EMS. He was in septic shock. Given 2L NS vancomycin, zosyn, tylenol, ibuprofen, and started on levophed while in the ED. CT abd with large abscess noted in the anterior abdominal wall at the level of prior mesh repair. He was transferred to [facility] for surgery evaluation for abscess drainage. He is alert and oriented on arrival to [place]. BP stable off levophed. He reports he woke up feeling fine and had routine labs done yesterday morning. He then began feeling cold and his body was shaking. His wife reports she took his temp and noted 97.6F. He took a nap and woke up confused. His wife reports his temp was then 104 and he was short of breath. She called EMS at that time. His wife noticed his upper abdomen was distended while he was in the ED. He denies ever having abdominal pain or nausea. Had been eating well. He had not had a bowel movement in 3 days so he took a laxative yesterday morning.  He had an abdominal hernia repair with mesh placement about 20 years ago in [place]. Has not had any issues since then. He still drives and runs a business. He is followed by [facility] in [place] and had his pacemaker replaced about 2 weeks ago. He took antibiotics as prescribed at that time.       Overview/Hospital Course:  Mr. Parada presented with fever with altered mental status.  Admitted in septic shock to the ICU due to intra-abdominal  abscess identified on CT scan.  Empiric treatment with Zosyn and vancomycin given.  Patient required short time with Levophed, but was able to be stepped down to the floor on 03/03/2022. General surgery consulted with no plans for surgery and with recommendation to consult IR.  Drain was placed by IR on 03/03/2022.       Interval History:  No acute events, patient had drain placed by IR are yesterday and was able to be stepped down to regular floor bed after he was maintained off pressor support.  Patient reports he is feeling much better and is hungry.    Review of Systems   Constitutional:  Negative for chills and fever.   Respiratory:  Negative for shortness of breath.    Gastrointestinal:  Positive for abdominal distention and abdominal pain. Negative for nausea and vomiting.   Objective:     Vital Signs (Most Recent):  Temp: 96.3 °F (35.7 °C) (03/04/22 1612)  Pulse: 60 (03/04/22 1612)  Resp: 18 (03/04/22 1612)  BP: (!) 118/56 (03/04/22 1612)  SpO2: 97 % (03/04/22 1612)   Vital Signs (24h Range):  Temp:  [96.3 °F (35.7 °C)-99.5 °F (37.5 °C)] 96.3 °F (35.7 °C)  Pulse:  [54-70] 60  Resp:  [17-36] 18  SpO2:  [94 %-99 %] 97 %  BP: (101-140)/(53-99) 118/56     Weight: 83.5 kg (184 lb 1.4 oz)  Body mass index is 30.63 kg/m².    Intake/Output Summary (Last 24 hours) at 3/4/2022 1717  Last data filed at 3/4/2022 0528  Gross per 24 hour   Intake 1637.61 ml   Output 525 ml   Net 1112.61 ml      Physical Exam  Vitals and nursing note reviewed.   Constitutional:       General: He is not in acute distress.     Appearance: Normal appearance. He is not toxic-appearing.   HENT:      Head: Normocephalic and atraumatic.      Nose: Nose normal.      Mouth/Throat:      Mouth: Mucous membranes are moist.   Eyes:      Conjunctiva/sclera: Conjunctivae normal.      Pupils: Pupils are equal, round, and reactive to light.   Cardiovascular:      Rate and Rhythm: Normal rate and regular rhythm.      Pulses: Normal pulses.   Pulmonary:       Effort: Pulmonary effort is normal.      Breath sounds: Normal breath sounds.   Abdominal:      General: Bowel sounds are normal. There is distension.      Tenderness: There is no abdominal tenderness. There is no guarding or rebound.      Comments: Firm nodularity noted mid abdomen superior to the umbilicus, drain placed by IR slightly lateral to this nodularity, no rebound or guarding   Musculoskeletal:         General: No swelling. Normal range of motion.      Cervical back: Normal range of motion.   Skin:     General: Skin is warm and dry.   Neurological:      Mental Status: He is alert and oriented to person, place, and time. Mental status is at baseline.   Psychiatric:         Mood and Affect: Mood normal.         Behavior: Behavior normal.         Thought Content: Thought content normal.       Significant Labs: All pertinent labs within the past 24 hours have been reviewed.    Significant Imaging: I have reviewed all pertinent imaging results/findings within the past 24 hours.      Assessment/Plan:      * Septic shock  Abdominal wall abscess   Leukocytosis   - Presented with hypotension, fevers, and confusion  - Large abdominal abscess noted on CT  - Initiated on levophed after 2L NS bolus in ED, but was weaned off levophed on arrival to ICU  - General surgery is following with no recommendation for surgery at this time  - Status post drain placement by IR on 03/03/2022  - Continue vancomycin and Zosyn   - Blood cultures no growth to date, and abscess culture pending  - Will plan on de-escalation with stopping vancomycin tomorrow if blood cultures remain negative  - Advanced diet and case discussed with general surgery    Hypomagnesemia  - Repleted, monitor    Elevated troponin  - Troponin 0.056--0.100 and reported chest pain  - In setting of septic shock  - No ischemic changes on EKG  - Troponins trended and echo done, not consistent with ACS and no further workup is indicated at this time    Abdominal wall  abscess  - CT abdomen with large 17.1 x 9.1 x 13.4 cm abscess in the anterior abdominal wall at the level of prior mesh repair    - As discussed above under sepsis    Dysphagia  - Patient denies trouble eating, takes small bites  - Diet advanced and he is eating without difficulty    Hypothyroidism due to acquired atrophy of thyroid  - Continue Synthroid    Acute renal failure superimposed on stage 3 chronic kidney disease  - Baseline creatinine 1.4, presented with creatinine 1.9 in setting of septic shock  - Creatinine improved back to baseline today  - Will stop IV fluids and continue to monitor    Long term current use of anticoagulant therapy  - Hold Xarelto (rivaroxaban)    (HFpEF) heart failure with preserved ejection fraction  - BNP at baseline and no signs of volume overload   - No recent echo on file   - Echo done on 3/03/2022 shows EF is 55% with indeterminate diastolic function  - Continue to hold Hold lasix and beta-blocker for now  - Advance diet today and will plan on stopping IV fluids    Type 2 diabetes mellitus with neurologic complication  Lab Results   Component Value Date    HGBA1C 5.9 (H) 03/02/2022   - Hold amaryl and actos  - Glucose levels were reviewed, currently in target range  - Monitor with Accu-Cheks and continue sliding scale insulin  - Continue to monitor levels and make adjustments as necessary to achieve levels of 140-180 during hospitalization    Cardiac pacemaker in situ  - Changed a few weeks ago, noted    CAD (coronary artery disease)  - Continue pravastatin, but beta-blocker on hold for now    HTN (hypertension)  - Hold BP meds due to septic shock  - Will resume medication as indicated    Chronic atrial fibrillation  - Hold beta-blocker for now in setting of septic shock  - Hold rivaroxaban for anticoagulation  - Will resume these medication when blood pressure and clinical situation allows      VTE Risk Mitigation (From admission, onward)         Ordered     IP VTE HIGH RISK  PATIENT  Once         03/03/22 0400     Place sequential compression device  Until discontinued         03/03/22 0400                    Olga Lozano MD  Department of [facility] Medicine   [facility]

## 2022-03-05 PROBLEM — K65.1 INTRA-ABDOMINAL ABSCESS: Status: ACTIVE | Noted: 2022-03-05

## 2022-03-05 LAB
ALBUMIN SERPL BCP-MCNC: 3.1 G/DL (ref 3.5–5.2)
ALP SERPL-CCNC: 115 U/L (ref 55–135)
ALT SERPL W/O P-5'-P-CCNC: 12 U/L (ref 10–44)
ANION GAP SERPL CALC-SCNC: 13 MMOL/L (ref 8–16)
AST SERPL-CCNC: 29 U/L (ref 10–40)
BASOPHILS # BLD AUTO: 0.02 K/UL (ref 0–0.2)
BASOPHILS NFR BLD: 0.2 % (ref 0–1.9)
BILIRUB SERPL-MCNC: 1.1 MG/DL (ref 0.1–1)
BUN SERPL-MCNC: 26 MG/DL (ref 10–30)
CALCIUM SERPL-MCNC: 8.3 MG/DL (ref 8.7–10.5)
CHLORIDE SERPL-SCNC: 108 MMOL/L (ref 95–110)
CO2 SERPL-SCNC: 19 MMOL/L (ref 23–29)
CREAT SERPL-MCNC: 1.2 MG/DL (ref 0.5–1.4)
DIFFERENTIAL METHOD: ABNORMAL
EOSINOPHIL # BLD AUTO: 0 K/UL (ref 0–0.5)
EOSINOPHIL NFR BLD: 0.3 % (ref 0–8)
ERYTHROCYTE [DISTWIDTH] IN BLOOD BY AUTOMATED COUNT: 12.6 % (ref 11.5–14.5)
EST. GFR  (AFRICAN AMERICAN): >60 ML/MIN/1.73 M^2
EST. GFR  (NON AFRICAN AMERICAN): 52 ML/MIN/1.73 M^2
GLUCOSE SERPL-MCNC: 110 MG/DL (ref 70–110)
HCT VFR BLD AUTO: 27.5 % (ref 40–54)
HGB BLD-MCNC: 9.2 G/DL (ref 14–18)
IMM GRANULOCYTES # BLD AUTO: 0.14 K/UL (ref 0–0.04)
IMM GRANULOCYTES NFR BLD AUTO: 1.5 % (ref 0–0.5)
LYMPHOCYTES # BLD AUTO: 0.8 K/UL (ref 1–4.8)
LYMPHOCYTES NFR BLD: 8.1 % (ref 18–48)
MAGNESIUM SERPL-MCNC: 2.2 MG/DL (ref 1.6–2.6)
MCH RBC QN AUTO: 34.1 PG (ref 27–31)
MCHC RBC AUTO-ENTMCNC: 33.5 G/DL (ref 32–36)
MCV RBC AUTO: 102 FL (ref 82–98)
MONOCYTES # BLD AUTO: 0.6 K/UL (ref 0.3–1)
MONOCYTES NFR BLD: 6.2 % (ref 4–15)
NEUTROPHILS # BLD AUTO: 8 K/UL (ref 1.8–7.7)
NEUTROPHILS NFR BLD: 83.7 % (ref 38–73)
NRBC BLD-RTO: 0 /100 WBC
PHOSPHATE SERPL-MCNC: 2.6 MG/DL (ref 2.7–4.5)
PLATELET # BLD AUTO: 101 K/UL (ref 150–450)
PMV BLD AUTO: 9.8 FL (ref 9.2–12.9)
POTASSIUM SERPL-SCNC: 4 MMOL/L (ref 3.5–5.1)
PROT SERPL-MCNC: 6.2 G/DL (ref 6–8.4)
RBC # BLD AUTO: 2.7 M/UL (ref 4.6–6.2)
SODIUM SERPL-SCNC: 140 MMOL/L (ref 136–145)
WBC # BLD AUTO: 9.51 K/UL (ref 3.9–12.7)

## 2022-03-05 PROCEDURE — 84100 ASSAY OF PHOSPHORUS: CPT | Performed by: NURSE PRACTITIONER

## 2022-03-05 PROCEDURE — 25000003 PHARM REV CODE 250: Performed by: NURSE PRACTITIONER

## 2022-03-05 PROCEDURE — 99222 1ST HOSP IP/OBS MODERATE 55: CPT | Mod: ,,, | Performed by: INTERNAL MEDICINE

## 2022-03-05 PROCEDURE — 99222 PR INITIAL HOSPITAL CARE,LEVL II: ICD-10-PCS | Mod: ,,, | Performed by: INTERNAL MEDICINE

## 2022-03-05 PROCEDURE — 83735 ASSAY OF MAGNESIUM: CPT | Performed by: NURSE PRACTITIONER

## 2022-03-05 PROCEDURE — 85025 COMPLETE CBC W/AUTO DIFF WBC: CPT | Performed by: HOSPITALIST

## 2022-03-05 PROCEDURE — 36415 COLL VENOUS BLD VENIPUNCTURE: CPT | Performed by: HOSPITALIST

## 2022-03-05 PROCEDURE — 63600175 PHARM REV CODE 636 W HCPCS: Mod: JG | Performed by: STUDENT IN AN ORGANIZED HEALTH CARE EDUCATION/TRAINING PROGRAM

## 2022-03-05 PROCEDURE — 25000003 PHARM REV CODE 250: Performed by: STUDENT IN AN ORGANIZED HEALTH CARE EDUCATION/TRAINING PROGRAM

## 2022-03-05 PROCEDURE — 25000003 PHARM REV CODE 250: Performed by: HOSPITALIST

## 2022-03-05 PROCEDURE — 11000001 HC ACUTE MED/SURG PRIVATE ROOM

## 2022-03-05 PROCEDURE — 36415 COLL VENOUS BLD VENIPUNCTURE: CPT | Performed by: NURSE PRACTITIONER

## 2022-03-05 PROCEDURE — 80053 COMPREHEN METABOLIC PANEL: CPT | Performed by: NURSE PRACTITIONER

## 2022-03-05 PROCEDURE — 63600175 PHARM REV CODE 636 W HCPCS: Performed by: HOSPITALIST

## 2022-03-05 PROCEDURE — 63600175 PHARM REV CODE 636 W HCPCS: Performed by: NURSE PRACTITIONER

## 2022-03-05 RX ORDER — DOXAZOSIN 1 MG/1
1 TABLET ORAL NIGHTLY
Status: DISCONTINUED | OUTPATIENT
Start: 2022-03-05 | End: 2022-03-08 | Stop reason: HOSPADM

## 2022-03-05 RX ORDER — PANTOPRAZOLE SODIUM 40 MG/1
40 TABLET, DELAYED RELEASE ORAL DAILY
Status: DISCONTINUED | OUTPATIENT
Start: 2022-03-05 | End: 2022-03-08 | Stop reason: HOSPADM

## 2022-03-05 RX ORDER — CARVEDILOL 12.5 MG/1
12.5 TABLET ORAL 2 TIMES DAILY WITH MEALS
Status: DISCONTINUED | OUTPATIENT
Start: 2022-03-05 | End: 2022-03-08 | Stop reason: HOSPADM

## 2022-03-05 RX ADMIN — MUPIROCIN: 20 OINTMENT TOPICAL at 08:03

## 2022-03-05 RX ADMIN — DOXAZOSIN 1 MG: 1 TABLET ORAL at 08:03

## 2022-03-05 RX ADMIN — VANCOMYCIN HYDROCHLORIDE 1250 MG: 1.25 INJECTION, POWDER, LYOPHILIZED, FOR SOLUTION INTRAVENOUS at 01:03

## 2022-03-05 RX ADMIN — PRAVASTATIN SODIUM 20 MG: 20 TABLET ORAL at 08:03

## 2022-03-05 RX ADMIN — TAMSULOSIN HYDROCHLORIDE 0.4 MG: 0.4 CAPSULE ORAL at 09:03

## 2022-03-05 RX ADMIN — CARVEDILOL 12.5 MG: 12.5 TABLET, FILM COATED ORAL at 05:03

## 2022-03-05 RX ADMIN — FAMOTIDINE 20 MG: 10 INJECTION, SOLUTION INTRAVENOUS at 11:03

## 2022-03-05 RX ADMIN — MUPIROCIN: 20 OINTMENT TOPICAL at 09:03

## 2022-03-05 RX ADMIN — PIPERACILLIN AND TAZOBACTAM 4.5 G: 4; .5 INJECTION, POWDER, LYOPHILIZED, FOR SOLUTION INTRAVENOUS; PARENTERAL at 04:03

## 2022-03-05 RX ADMIN — PIPERACILLIN AND TAZOBACTAM 4.5 G: 4; .5 INJECTION, POWDER, LYOPHILIZED, FOR SOLUTION INTRAVENOUS; PARENTERAL at 09:03

## 2022-03-05 RX ADMIN — ALTEPLASE 10 MG: 2.2 INJECTION, POWDER, LYOPHILIZED, FOR SOLUTION INTRAVENOUS at 12:03

## 2022-03-05 RX ADMIN — PANTOPRAZOLE SODIUM 40 MG: 40 TABLET, DELAYED RELEASE ORAL at 05:03

## 2022-03-05 RX ADMIN — LEVOTHYROXINE SODIUM 75 MCG: 75 TABLET ORAL at 06:03

## 2022-03-05 RX ADMIN — PIPERACILLIN AND TAZOBACTAM 4.5 G: 4; .5 INJECTION, POWDER, LYOPHILIZED, FOR SOLUTION INTRAVENOUS; PARENTERAL at 01:03

## 2022-03-05 NOTE — ASSESSMENT & PLAN NOTE
- Hold BP meds due to septic shock  - Will resume Coreg today and add additional agents as blood pressure can tolerate

## 2022-03-05 NOTE — ASSESSMENT & PLAN NOTE
Abdominal wall abscess   Leukocytosis   - Presented with hypotension, fevers, and confusion  - Large abdominal abscess noted on CT  - Initiated on levophed after 2L NS bolus in ED, but was weaned off levophed on arrival to ICU  - General surgery is following with no recommendation for surgery at this time  - Status post drain placement by IR on 3/3/2022  - Continue Zosyn   - Blood cultures no growth to date, and abscess culture pending  - De-escalation with stopping vancomycin today  - Advanced diet and case discussed with general surgery, currently with no surgical intervention planned at this time  - Difficult case given abscess involves the mesh  - Will consult ID for input

## 2022-03-05 NOTE — ASSESSMENT & PLAN NOTE
- Held beta-blocker for now in setting of septic shock  - Hold rivaroxaban for anticoagulation  - Will resume Coreg today

## 2022-03-05 NOTE — ASSESSMENT & PLAN NOTE
- BNP at baseline and no signs of volume overload   - No recent echo on file   - Echo done on 3/03/2022 shows EF is 55% with indeterminate diastolic function  - Continue to hold Hold lasix  - Beta-blocker to restart today  - Advanced diet and stopped IV fluids

## 2022-03-05 NOTE — CONSULTS
[place] [facility]  Infectious Disease  Consult Note    Patient Name: Eddie Parada Sr.  MRN: [id number]  Admission Date: 3/3/2022  Hospital Length of Stay: 2 days  Attending Physician: Olga Lozano MD  Primary Care Provider: Callum Roberts MD     Isolation Status: No active isolations    Patient information was obtained from patient, spouse/SO and past medical records.      Inpatient consult to Infectious Diseases  Consult performed by: Vivek Arredondo MD  Consult ordered by: Olga Lozano MD  Reason for consult: abd abscess        Assessment/Plan:     Intra-abdominal abscess  89 yo male with a pmh of CAD, Afib on xarelto, heart failure, pacemaker insertion, CKD, COPD, former smoker, hernia repair, CVA, HTN, PVD, and DM2 who is admitted for abd abscess. Now s/p drianage    -await pending cx  -continue zosyn  -if mesh is involved in infection, clearance may be difficult  -ID will follow along         Thank you for your consult. I will follow-up with patient. Please contact us if you have any additional questions.    Vivek Arredondo MD  Infectious Disease  [place] - [facility]    Subjective:     Principal Problem: Septic shock    HPI: 89 yo male with a pmh of CAD, Afib on xarelto, heart failure, pacemaker insertion, CKD, COPD, former smoker, hernia repair, CVA, HTN, PVD, DM2. He presented to [place] ED with AMS and fever 2 days prior. He was brought in by EMS. He was in septic shock. Given 2L NS vancomycin, zosyn, tylenol, ibuprofen, and started on levophed while in the ED. CT abd with large abscess noted in the anterior abdominal wall at the level of prior mesh repair. He was transferred to [facility] for surgery evaluation for abscess drainage. He was alert and oriented on arrival to [place]. BP stable off levophed. He reports he woke up feeling fine and had routine labs done yesterday morning. He then began feeling cold and his body was shaking. His wife reports she took his temp and noted 97.6F. He took a  nap and woke up confused. His wife reports his temp was then 104 and he was short of breath. She called EMS at that time. His wife noticed his upper abdomen was distended while he was in the ED. He denies ever having abdominal pain or nausea. Had been eating well. He had not had a bowel movement in 3 days so he took a laxative yesterday morning. He had an abdominal hernia repair with mesh placement about 20 years ago in [place]. Has not had any issues since then. He still drives and runs a business. He is followed by [facility] in [place] and had his pacemaker replaced about 2 weeks ago. He took antibiotics as prescribed at that time.     ID is consulted for antibiotics for his abd abscess. Drain was placed by IR. He has no complaints today and only has pain with deep palpation. On zosyn. Cxs from abscess pending      Past Medical History:   Diagnosis Date    Abdominal fibromatosis     Acute posthemorrhagic anemia 1/9/2018    NIK (acute kidney injury) 1/11/2018    Atrial fibrillation     Bradycardia     Broken arm     CAD (coronary artery disease)     Cancer     basal cell on nose and melanoma on back    CHF (congestive heart failure)     COPD (chronic obstructive pulmonary disease)     Diabetes mellitus type II     Hyperlipidemia     Localization-related focal epilepsy with simple partial seizures 3/2/2021    Melanoma     Obesity (BMI 30.0-34.9) 9/13/2016    Osteoporosis     Peripheral vascular disease     Primary malignant neoplasm of prostate 3/3/2022    Septic shock 3/3/2022    Stroke     TIA (transient ischemic attack)     Type II diabetes mellitus with peripheral circulatory disorder     Type II or unspecified type diabetes mellitus without mention of complication, not stated as uncontrolled     Unspecified essential hypertension        Past Surgical History:   Procedure Laterality Date    AORTIC VALVE REPLACEMENT      CARDIAC PACEMAKER PLACEMENT  9/28/2012    CARDIAC VALVE REPLACEMENT   11/17/2011    aortic valve-tissue    CHOLECYSTECTOMY      COLONOSCOPY      ESOPHAGOGASTRODUODENOSCOPY N/A 5/27/2021    Procedure: EGD (ESOPHAGOGASTRODUODENOSCOPY);  Surgeon: Gualberto Medrano MD;  Location: [facility];  Service: Endoscopy;  Laterality: N/A;    ESOPHAGOGASTRODUODENOSCOPY N/A 6/22/2021    Procedure: EGD (ESOPHAGOGASTRODUODENOSCOPY);  Surgeon: Gualberto Medrano MD;  Location: [facility];  Service: Endoscopy;  Laterality: N/A;  please call wife(Jessica) with instructions/arrival time.    ESOPHAGOGASTRODUODENOSCOPY (EGD) WITH DILATION  06/22/2021    EYE SURGERY Bilateral     cataract with lens by  at [facility]    HERNIA REPAIR      abdominal    LASIK      UPPER GASTROINTESTINAL ENDOSCOPY  05/28/2021       Review of patient's allergies indicates:   Allergen Reactions    Ciprofloxacin     Levofloxacin Swelling    Lyrica [pregabalin] Swelling    Unable to assess Other (See Comments)     Soft shell crabs       Medications:  Medications Prior to Admission   Medication Sig    amLODIPine (NORVASC) 10 MG tablet TAKE 1 TABLET BY MOUTH DAILY FOR BLOOD PRESSURE    carvediloL (COREG) 12.5 MG tablet TAKE 1 TABLET (12.5 MG TOTAL) BY MOUTH 2 (TWO) TIMES DAILY WITH MEALS.    doxazosin (CARDURA) 1 MG tablet Take 1 mg by mouth nightly.    furosemide (LASIX) 20 MG tablet TAKE ONE TABLET BY MOUTH EVERY OTHER DAY    gabapentin (NEURONTIN) 100 MG capsule Take 2 capsules (200 mg total) by mouth every evening.    glimepiride (AMARYL) 2 MG tablet TAKE 1 TABLET BY MOUTH EVERY MORNING WITH BREAKFAST FOR DIABETES    levothyroxine (SYNTHROID) 75 MCG tablet Take 1 tablet (75 mcg total) by mouth before breakfast.    lisinopriL (PRINIVIL,ZESTRIL) 20 MG tablet     pantoprazole (PROTONIX) 40 MG tablet TAKE ONE TABLET BY MOUTH ONCE A DAY FOR STOMACH PROBLEMS    pioglitazone (ACTOS) 15 MG tablet ONE TABLET ONE TIME A DAY FOR DIABETES    pravastatin (PRAVACHOL) 20 MG tablet TAKE 1 TABLET BY MOUTH  DAILY FOR CHOLESTROL    rivaroxaban (XARELTO) 20 mg Tab Take one tablet(20mg) by mouth once daily    tamsulosin (FLOMAX) 0.4 mg Cap TAKE 1 CAPSULE (0.4 MG TOTAL) BY MOUTH ONCE DAILY.    acetaminophen (TYLENOL) 500 MG tablet Take 500 mg by mouth every 6 (six) hours as needed for Pain.    blood sugar diagnostic (BLOOD GLUCOSE TEST) [address] [place] ULTRA TESTING STRIPS. USE TO TEST BLOOD GLUCOSE TWICE DAILY. DX CODE: E11.51    blood-glucose meter Misc ONETOUCH ULTRA GLUCOSE METER. USE AS DIRECTED TO TEST BLOOD GLUCOSE. DX CODE: E11.51    fenofibrate 160 MG Tab fenofibrate 160 mg tablet    gemfibrozil (LOPID) 600 MG tablet Take one tablet(600mg) by mouth once daily    lancets (ONETOUCH ULTRASOFT LANCETS) Misc USE TO TEST BLOOD GLUCOSE TWICE DAILY. DX CODE: E11.51     Antibiotics (From admission, onward)                Start     Stop Route Frequency Ordered    03/03/22 2100  mupirocin 2 % ointment         03/08 2059 Nasl 2 times daily 03/03/22 1216    03/03/22 0530  piperacillin-tazobactam 4.5 g in dextrose 5 % 100 mL IVPB (ready to mix system)         -- IV Every 8 hours (non-standard times) 03/03/22 0425          Antifungals (From admission, onward)                None          Antivirals (From admission, onward)      None             Immunization History   Administered Date(s) Administered    COVID-19, MRNA, LN-S, PF (Pfizer) (Purple Cap) 01/21/2021, 02/11/2021    Influenza - High Dose - PF (65 years and older) 11/12/2003, 11/18/2010, 02/13/2013, 11/20/2014, 10/13/2016, 12/04/2017, 11/05/2018, 10/22/2019    Influenza - Quadrivalent - High Dose - PF (65 years and older) 09/28/2020    Influenza - Trivalent (ADULT) 11/12/2003, 02/13/2013    Influenza A (H1N1) 2009 Monovalent - IM 01/18/2010    Influenza Split 02/13/2013    Pneumococcal Conjugate - 13 Valent 12/04/2017    Pneumococcal Polysaccharide - 23 Valent 01/01/2001, 09/01/2006    Tdap 09/16/2018       Family History       Problem Relation (Age of  Onset)    Alcohol abuse Father    COPD Sister, Brother    Cancer Brother    Diabetes Daughter    Heart disease Brother    Hypertension Father    No Known Problems Son, Daughter, Son    Stroke Father          Social History     Socioeconomic History    Marital status:    Tobacco Use    Smoking status: Former Smoker     Quit date: 1996     Years since quittin.4    Smokeless tobacco: Never Used    Tobacco comment: cigar smoker   Substance and Sexual Activity    Alcohol use: No     Comment: none since     Drug use: No    Sexual activity: Not Currently     Review of Systems   Constitutional:  Negative for chills and fever.   Respiratory:  Negative for shortness of breath.    Gastrointestinal:  Positive for abdominal distention and abdominal pain. Negative for nausea and vomiting.   Objective:     Vital Signs (Most Recent):  Temp: 97.8 °F (36.6 °C) (22 1135)  Pulse: (!) 59 (22 1148)  Resp: 18 (22 1135)  BP: (!) 168/68 (22 1135)  SpO2: 96 % (22 1135)   Vital Signs (24h Range):  Temp:  [96.3 °F (35.7 °C)-98.4 °F (36.9 °C)] 97.8 °F (36.6 °C)  Pulse:  [54-98] 59  Resp:  [18-22] 18  SpO2:  [95 %-98 %] 96 %  BP: (118-168)/(56-69) 168/68     Weight: 83.5 kg (184 lb 1.4 oz)  Body mass index is 30.63 kg/m².    Estimated Creatinine Clearance: 39.1 mL/min (based on SCr of 1.2 mg/dL).    Physical Exam  Vitals and nursing note reviewed.   Constitutional:       General: He is not in acute distress.     Appearance: Normal appearance. He is not toxic-appearing.   HENT:      Head: Normocephalic and atraumatic.      Nose: Nose normal.      Mouth/Throat:      Mouth: Mucous membranes are moist.   Eyes:      Conjunctiva/sclera: Conjunctivae normal.      Pupils: Pupils are equal, round, and reactive to light.   Cardiovascular:      Rate and Rhythm: Normal rate and regular rhythm.      Pulses: Normal pulses.   Pulmonary:      Effort: Pulmonary effort is normal.      Breath sounds: Normal  breath sounds.   Abdominal:      General: Bowel sounds are normal. There is distension.      Tenderness: There is no abdominal tenderness. There is no guarding or rebound.      Comments: drain present, no rebound or guarding   Musculoskeletal:         General: No swelling. Normal range of motion.      Cervical back: Normal range of motion.   Skin:     General: Skin is warm and dry.   Neurological:      Mental Status: He is alert and oriented to person, place, and time. Mental status is at baseline.   Psychiatric:         Mood and Affect: Mood normal.         Behavior: Behavior normal.         Thought Content: Thought content normal.       Significant Labs: All pertinent labs within the past 24 hours have been reviewed.    Significant Imaging: I have reviewed all pertinent imaging results/findings within the past 24 hours.

## 2022-03-05 NOTE — PROGRESS NOTES
[facility] Medicine  Progress Note    Patient Name: Eddie Parada Sr.  MRN: [id number]  Patient Class: IP- Inpatient   Admission Date: 3/3/2022  Length of Stay: 2 days  Attending Physician: Olga Lozano MD  Primary Care Provider: Callum Roberts MD        Subjective:     Principal Problem:Septic shock        HPI:  Eddie Parada is a 89 yo male with a pmh of CAD, Afib on xarelto, heart failure, pacemaker insertion, CKD, COPD, former smoker, hernia repair, CVA, HTN, PVD, DM2. He presented to [place] ED with AMS and fever yesterday. He was brought in by EMS. He was in septic shock. Given 2L NS vancomycin, zosyn, tylenol, ibuprofen, and started on levophed while in the ED. CT abd with large abscess noted in the anterior abdominal wall at the level of prior mesh repair. He was transferred to [facility] for surgery evaluation for abscess drainage. He is alert and oriented on arrival to [place]. BP stable off levophed. He reports he woke up feeling fine and had routine labs done yesterday morning. He then began feeling cold and his body was shaking. His wife reports she took his temp and noted 97.6F. He took a nap and woke up confused. His wife reports his temp was then 104 and he was short of breath. She called EMS at that time. His wife noticed his upper abdomen was distended while he was in the ED. He denies ever having abdominal pain or nausea. Had been eating well. He had not had a bowel movement in 3 days so he took a laxative yesterday morning.  He had an abdominal hernia repair with mesh placement about 20 years ago in [place]. Has not had any issues since then. He still drives and runs a business. He is followed by [facility] in [place] and had his pacemaker replaced about 2 weeks ago. He took antibiotics as prescribed at that time.       Overview/Hospital Course:  Mr. Parada presented with fever with altered mental status.  Admitted in septic shock to the ICU due to intra-abdominal  abscess identified on CT scan.  Empiric treatment with Zosyn and vancomycin given.  Patient required short time with Levophed, but was able to be stepped down to the floor on 03/03/2022. General surgery consulted with no plans for surgery and with recommendation to consult IR.  Drain was placed by IR on 03/03/2022.       Interval History:  No acute events, patient reported he feels good and is tired of being in the bed.  Minimal pain in the abdomen, only with pressure.  Wife at the bedside.    Review of Systems   Constitutional:  Negative for chills and fever.   Respiratory:  Negative for shortness of breath.    Gastrointestinal:  Positive for abdominal distention and abdominal pain. Negative for nausea and vomiting.   Objective:     Vital Signs (Most Recent):  Temp: 97.8 °F (36.6 °C) (03/05/22 1135)  Pulse: (!) 59 (03/05/22 1148)  Resp: 18 (03/05/22 1135)  BP: (!) 168/68 (03/05/22 1135)  SpO2: 96 % (03/05/22 1135)   Vital Signs (24h Range):  Temp:  [96.3 °F (35.7 °C)-98.4 °F (36.9 °C)] 97.8 °F (36.6 °C)  Pulse:  [59-98] 59  Resp:  [18-22] 18  SpO2:  [95 %-98 %] 96 %  BP: (118-168)/(56-69) 168/68     Weight: 83.5 kg (184 lb 1.4 oz)  Body mass index is 30.63 kg/m².    Intake/Output Summary (Last 24 hours) at 3/5/2022 1445  Last data filed at 3/5/2022 0600  Gross per 24 hour   Intake 368 ml   Output 552 ml   Net -184 ml        Physical Exam  Vitals and nursing note reviewed.   Constitutional:       General: He is not in acute distress.     Appearance: Normal appearance. He is not toxic-appearing.   HENT:      Head: Normocephalic and atraumatic.      Nose: Nose normal.      Mouth/Throat:      Mouth: Mucous membranes are moist.   Eyes:      Conjunctiva/sclera: Conjunctivae normal.      Pupils: Pupils are equal, round, and reactive to light.   Cardiovascular:      Rate and Rhythm: Normal rate and regular rhythm.      Pulses: Normal pulses.   Pulmonary:      Effort: Pulmonary effort is normal.      Breath sounds: Normal  breath sounds.   Abdominal:      General: Bowel sounds are normal. There is distension.      Tenderness: There is no abdominal tenderness. There is no guarding or rebound.      Comments: Firm nodularity noted mid abdomen superior to the umbilicus, drain placed by IR slightly lateral to this nodularity, no rebound or guarding   Musculoskeletal:         General: No swelling. Normal range of motion.      Cervical back: Normal range of motion.   Skin:     General: Skin is warm and dry.   Neurological:      Mental Status: He is alert and oriented to person, place, and time. Mental status is at baseline.   Psychiatric:         Mood and Affect: Mood normal.         Behavior: Behavior normal.         Thought Content: Thought content normal.       Significant Labs: All pertinent labs within the past 24 hours have been reviewed.    Significant Imaging: I have reviewed all pertinent imaging results/findings within the past 24 hours.      Assessment/Plan:      * Septic shock  Abdominal wall abscess   Leukocytosis   - Presented with hypotension, fevers, and confusion  - Large abdominal abscess noted on CT  - Initiated on levophed after 2L NS bolus in ED, but was weaned off levophed on arrival to ICU  - General surgery is following with no recommendation for surgery at this time  - Status post drain placement by IR on 3/3/2022  - Continue Zosyn   - Blood cultures no growth to date, and abscess culture pending  - De-escalation with stopping vancomycin today  - Advanced diet and case discussed with general surgery, currently with no surgical intervention planned at this time  - Difficult case given abscess involves the mesh  - Will consult ID for input    Hypomagnesemia  - Repleted, monitor    Elevated troponin  - Troponin 0.056--0.100 and reported chest pain  - In setting of septic shock  - No ischemic changes on EKG  - Troponins trended and echo done, not consistent with ACS and no further workup is indicated at this  time    Abdominal wall abscess  - CT abdomen with large 17.1 x 9.1 x 13.4 cm abscess in the anterior abdominal wall at the level of prior mesh repair  - As discussed above under sepsis    Dysphagia  - Patient denies trouble eating, takes small bites  - Diet advanced and he is eating without difficulty    Hypothyroidism due to acquired atrophy of thyroid  - Continue Synthroid    Acute renal failure superimposed on stage 3 chronic kidney disease  - Baseline creatinine 1.4, presented with creatinine 1.9 in setting of septic shock  - Creatinine improved back to baseline   - Will stop IV fluids and continue to monitor    Long term current use of anticoagulant therapy  - Hold Xarelto (rivaroxaban)    (HFpEF) heart failure with preserved ejection fraction  - BNP at baseline and no signs of volume overload   - No recent echo on file   - Echo done on 3/03/2022 shows EF is 55% with indeterminate diastolic function  - Continue to hold Hold lasix  - Beta-blocker to restart today  - Advanced diet and stopped IV fluids    Type 2 diabetes mellitus with neurologic complication  Lab Results   Component Value Date    HGBA1C 5.9 (H) 03/02/2022   - Hold amaryl and actos  - Glucose levels were reviewed, currently in target range  - Monitor with Accu-Cheks and continue sliding scale insulin  - Continue to monitor levels and make adjustments as necessary to achieve levels of 140-180 during hospitalization    Cardiac pacemaker in situ  - Changed a few weeks ago, noted    CAD (coronary artery disease)  - Continue pravastatin, but beta-blocker was held due to low BP  - Resume Coreg today    HTN (hypertension)  - Hold BP meds due to septic shock  - Will resume Coreg today and add additional agents as blood pressure can tolerate    Chronic atrial fibrillation  - Held beta-blocker for now in setting of septic shock  - Hold rivaroxaban for anticoagulation  - Will resume Coreg today    VTE Risk Mitigation (From admission, onward)         Ordered      IP VTE HIGH RISK PATIENT  Once         03/03/22 0400     Place sequential compression device  Until discontinued         03/03/22 0400                    Olga Lozano MD  Department of [facility] Medicine   [facility]

## 2022-03-05 NOTE — ASSESSMENT & PLAN NOTE
89 yo male with a pmh of CAD, Afib on xarelto, heart failure, pacemaker insertion, CKD, COPD, former smoker, hernia repair, CVA, HTN, PVD, and DM2 who is admitted for abd abscess. Now s/p drianage    -await pending cx  -continue zosyn  -if mesh is involved in infection, clearance may be difficult  -ID will follow along

## 2022-03-05 NOTE — PLAN OF CARE
1900 Pt appeared to be in no distress. Reported no pain.     2100 Flushed RLQ drain w/10 cc NS, returned 37 cc bloody drainage.     Tele: Paced, HR  60, No alarms.     Bed in lowest position, wheels locked, non skid socks, ID band worn, personal items and call bell with in reach, bed alarm set.  Wife Jessica at Bedside.

## 2022-03-05 NOTE — SUBJECTIVE & OBJECTIVE
Interval History:  No acute events, patient reported he feels good and is tired of being in the bed.  Minimal pain in the abdomen, only with pressure.  Wife at the bedside.    Review of Systems   Constitutional:  Negative for chills and fever.   Respiratory:  Negative for shortness of breath.    Gastrointestinal:  Positive for abdominal distention and abdominal pain. Negative for nausea and vomiting.   Objective:     Vital Signs (Most Recent):  Temp: 97.8 °F (36.6 °C) (03/05/22 1135)  Pulse: (!) 59 (03/05/22 1148)  Resp: 18 (03/05/22 1135)  BP: (!) 168/68 (03/05/22 1135)  SpO2: 96 % (03/05/22 1135)   Vital Signs (24h Range):  Temp:  [96.3 °F (35.7 °C)-98.4 °F (36.9 °C)] 97.8 °F (36.6 °C)  Pulse:  [59-98] 59  Resp:  [18-22] 18  SpO2:  [95 %-98 %] 96 %  BP: (118-168)/(56-69) 168/68     Weight: 83.5 kg (184 lb 1.4 oz)  Body mass index is 30.63 kg/m².    Intake/Output Summary (Last 24 hours) at 3/5/2022 1445  Last data filed at 3/5/2022 0600  Gross per 24 hour   Intake 368 ml   Output 552 ml   Net -184 ml        Physical Exam  Vitals and nursing note reviewed.   Constitutional:       General: He is not in acute distress.     Appearance: Normal appearance. He is not toxic-appearing.   HENT:      Head: Normocephalic and atraumatic.      Nose: Nose normal.      Mouth/Throat:      Mouth: Mucous membranes are moist.   Eyes:      Conjunctiva/sclera: Conjunctivae normal.      Pupils: Pupils are equal, round, and reactive to light.   Cardiovascular:      Rate and Rhythm: Normal rate and regular rhythm.      Pulses: Normal pulses.   Pulmonary:      Effort: Pulmonary effort is normal.      Breath sounds: Normal breath sounds.   Abdominal:      General: Bowel sounds are normal. There is distension.      Tenderness: There is no abdominal tenderness. There is no guarding or rebound.      Comments: Firm nodularity noted mid abdomen superior to the umbilicus, drain placed by IR slightly lateral to this nodularity, no rebound or  guarding   Musculoskeletal:         General: No swelling. Normal range of motion.      Cervical back: Normal range of motion.   Skin:     General: Skin is warm and dry.   Neurological:      Mental Status: He is alert and oriented to person, place, and time. Mental status is at baseline.   Psychiatric:         Mood and Affect: Mood normal.         Behavior: Behavior normal.         Thought Content: Thought content normal.       Significant Labs: All pertinent labs within the past 24 hours have been reviewed.    Significant Imaging: I have reviewed all pertinent imaging results/findings within the past 24 hours.

## 2022-03-05 NOTE — SUBJECTIVE & OBJECTIVE
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    Melanoma     Obesity (BMI 30.0-34.9) 9/13/2016    Osteoporosis     Peripheral vascular disease     Primary malignant neoplasm of prostate 3/3/2022    Septic shock 3/3/2022    Stroke     TIA (transient ischemic attack)     Type II diabetes mellitus with peripheral circulatory disorder     Type II or unspecified type diabetes mellitus without mention of complication, not stated as uncontrolled     Unspecified essential hypertension        Past Surgical History:   Procedure Laterality Date    AORTIC VALVE REPLACEMENT      CARDIAC PACEMAKER PLACEMENT  9/28/2012    CARDIAC VALVE REPLACEMENT  11/17/2011    aortic valve-tissue    CHOLECYSTECTOMY      COLONOSCOPY      ESOPHAGOGASTRODUODENOSCOPY N/A 5/27/2021    Procedure: EGD (ESOPHAGOGASTRODUODENOSCOPY);  Surgeon: Gualberto Medrano MD;  Location: [facility];  Service: Endoscopy;  Laterality: N/A;    ESOPHAGOGASTRODUODENOSCOPY N/A 6/22/2021    Procedure: EGD (ESOPHAGOGASTRODUODENOSCOPY);  Surgeon: Gualberto Medrano MD;  Location: [facility];  Service: Endoscopy;  Laterality: N/A;  please call wife(Jessica) with instructions/arrival time.    ESOPHAGOGASTRODUODENOSCOPY (EGD) WITH DILATION  06/22/2021    EYE SURGERY Bilateral     cataract with lens by  at [facility]    HERNIA REPAIR      abdominal    LASIK      UPPER GASTROINTESTINAL ENDOSCOPY  05/28/2021       Review of patient's allergies indicates:   Allergen Reactions    Ciprofloxacin     Levofloxacin Swelling     Lyrica [pregabalin] Swelling    Unable to assess Other (See Comments)     Soft shell crabs       Medications:  Medications Prior to Admission   Medication Sig    amLODIPine (NORVASC) 10 MG tablet TAKE 1 TABLET BY MOUTH DAILY FOR BLOOD PRESSURE    carvediloL (COREG) 12.5 MG tablet TAKE 1 TABLET (12.5 MG TOTAL) BY MOUTH 2 (TWO) TIMES DAILY WITH MEALS.    doxazosin (CARDURA) 1 MG tablet Take 1 mg by mouth nightly.    furosemide (LASIX) 20 MG tablet TAKE ONE TABLET BY MOUTH EVERY OTHER DAY    gabapentin (NEURONTIN) 100 MG capsule Take 2 capsules (200 mg total) by mouth every evening.    glimepiride (AMARYL) 2 MG tablet TAKE 1 TABLET BY MOUTH EVERY MORNING WITH BREAKFAST FOR DIABETES    levothyroxine (SYNTHROID) 75 MCG tablet Take 1 tablet (75 mcg total) by mouth before breakfast.    lisinopriL (PRINIVIL,ZESTRIL) 20 MG tablet     pantoprazole (PROTONIX) 40 MG tablet TAKE ONE TABLET BY MOUTH ONCE A DAY FOR STOMACH PROBLEMS    pioglitazone (ACTOS) 15 MG tablet ONE TABLET ONE TIME A DAY FOR DIABETES    pravastatin (PRAVACHOL) 20 MG tablet TAKE 1 TABLET BY MOUTH DAILY FOR CHOLESTROL    rivaroxaban (XARELTO) 20 mg Tab Take one tablet(20mg) by mouth once daily    tamsulosin (FLOMAX) 0.4 mg Cap TAKE 1 CAPSULE (0.4 MG TOTAL) BY MOUTH ONCE DAILY.    acetaminophen (TYLENOL) 500 MG tablet Take 500 mg by mouth every 6 (six) hours as needed for Pain.    blood sugar diagnostic (BLOOD GLUCOSE TEST) [facility] [employer] Corporation[employer] ULTRA TESTING STRIPS. USE TO TEST BLOOD GLUCOSE TWICE DAILY. DX CODE: E11.51    blood-glucose meter Misc ONETOUCH ULTRA GLUCOSE METER. USE AS DIRECTED TO TEST BLOOD GLUCOSE. DX CODE: E11.51    fenofibrate 160 MG Tab fenofibrate 160 mg tablet    gemfibrozil (LOPID) 600 MG tablet Take one tablet(600mg) by mouth once daily    lancets (ONETOUCH ULTRASOFT LANCETS) Misc USE TO TEST BLOOD GLUCOSE TWICE DAILY. DX CODE: E11.51     Antibiotics (From admission, onward)                Start     Stop Route Frequency Ordered    03/03/22 2100   mupirocin 2 % ointment         2059 Nasl 2 times daily 22 1216    22 0530  piperacillin-tazobactam 4.5 g in dextrose 5 % 100 mL IVPB (ready to mix system)         -- IV Every 8 hours (non-standard times) 22 0425          Antifungals (From admission, onward)                None          Antivirals (From admission, onward)      None             Immunization History   Administered Date(s) Administered    COVID-19, MRNA, LN-S, PF (Pfizer) (Purple Cap) 2021, 2021    Influenza - High Dose - PF (65 years and older) 2003, 2010, 2013, 2014, 10/13/2016, 2017, 2018, 10/22/2019    Influenza - Quadrivalent - High Dose - PF (65 years and older) 2020    Influenza - Trivalent (ADULT) 2003, 2013    Influenza A (H1N1) 2009 Monovalent - IM 2010    Influenza Split 2013    Pneumococcal Conjugate - 13 Valent 2017    Pneumococcal Polysaccharide - 23 Valent 2001, 2006    Tdap 2018       Family History       Problem Relation (Age of Onset)    Alcohol abuse Father    COPD Sister, Brother    Cancer Brother    Diabetes Daughter    Heart disease Brother    Hypertension Father    No Known Problems Son, Daughter, Son    Stroke Father          Social History     Socioeconomic History    Marital status:    Tobacco Use    Smoking status: Former Smoker     Quit date: 1996     Years since quittin.4    Smokeless tobacco: Never Used    Tobacco comment: cigar smoker   Substance and Sexual Activity    Alcohol use: No     Comment: none since     Drug use: No    Sexual activity: Not Currently     Review of Systems   Constitutional:  Negative for chills and fever.   Respiratory:  Negative for shortness of breath.    Gastrointestinal:  Positive for abdominal distention and abdominal pain. Negative for nausea and vomiting.   Objective:     Vital Signs (Most Recent):  Temp: 97.8 °F (36.6 °C) (22 1135)  Pulse:  (!) 59 (03/05/22 1148)  Resp: 18 (03/05/22 1135)  BP: (!) 168/68 (03/05/22 1135)  SpO2: 96 % (03/05/22 1135)   Vital Signs (24h Range):  Temp:  [96.3 °F (35.7 °C)-98.4 °F (36.9 °C)] 97.8 °F (36.6 °C)  Pulse:  [54-98] 59  Resp:  [18-22] 18  SpO2:  [95 %-98 %] 96 %  BP: (118-168)/(56-69) 168/68     Weight: 83.5 kg (184 lb 1.4 oz)  Body mass index is 30.63 kg/m².    Estimated Creatinine Clearance: 39.1 mL/min (based on SCr of 1.2 mg/dL).    Physical Exam  Vitals and nursing note reviewed.   Constitutional:       General: He is not in acute distress.     Appearance: Normal appearance. He is not toxic-appearing.   HENT:      Head: Normocephalic and atraumatic.      Nose: Nose normal.      Mouth/Throat:      Mouth: Mucous membranes are moist.   Eyes:      Conjunctiva/sclera: Conjunctivae normal.      Pupils: Pupils are equal, round, and reactive to light.   Cardiovascular:      Rate and Rhythm: Normal rate and regular rhythm.      Pulses: Normal pulses.   Pulmonary:      Effort: Pulmonary effort is normal.      Breath sounds: Normal breath sounds.   Abdominal:      General: Bowel sounds are normal. There is distension.      Tenderness: There is no abdominal tenderness. There is no guarding or rebound.      Comments: drain present, no rebound or guarding   Musculoskeletal:         General: No swelling. Normal range of motion.      Cervical back: Normal range of motion.   Skin:     General: Skin is warm and dry.   Neurological:      Mental Status: He is alert and oriented to person, place, and time. Mental status is at baseline.   Psychiatric:         Mood and Affect: Mood normal.         Behavior: Behavior normal.         Thought Content: Thought content normal.       Significant Labs: All pertinent labs within the past 24 hours have been reviewed.    Significant Imaging: I have reviewed all pertinent imaging results/findings within the past 24 hours.

## 2022-03-05 NOTE — PROGRESS NOTES
Pharmacokinetic Assessment Follow Up: IV Vancomycin    Vancomycin serum concentration assessment(s):    The random level was drawn correctly and can be used to guide therapy at this time. The measurement is below the desired definitive target range of 15 to 20 mcg/mL.    Vancomycin Regimen Plan:    Re-dose when the random level is less than 20 mcg/mL, next level to be drawn at 3/5/22 on 2100    Drug levels (last 3 results):  Recent Labs   Lab Result Units 03/03/22  2200 03/04/22  2240   Vancomycin, Random ug/mL 14.3 12.7       Pharmacy will continue to follow and monitor vancomycin.    Please contact pharmacy at extension [phone] for questions regarding this assessment.    Thank you for the consult,   Tammie Carmona       Patient brief summary:  Eddie Parada Sr. is a 89 y.o. male initiated on antimicrobial therapy with IV Vancomycin for treatment of bacteremia    The patient's current regimen is vano 1250mg pulse dosing    Drug Allergies:   Review of patient's allergies indicates:   Allergen Reactions    Ciprofloxacin     Levofloxacin Swelling    Lyrica [pregabalin] Swelling    Unable to assess Other (See Comments)     Soft shell crabs       Actual Body Weight:   83kg    Renal Function:   Estimated Creatinine Clearance: 33.5 mL/min (based on SCr of 1.4 mg/dL).,     Dialysis Method (if applicable):  N/A    CBC (last 72 hours):  Recent Labs   Lab Result Units 03/02/22  0744 03/02/22  1728 03/03/22  0413 03/04/22  0333   WBC K/uL 7.31 15.82* 21.70* 14.22*   Hemoglobin g/dL 12.7* 12.8* 10.0* 9.9*   Hemoglobin A1C % 5.9*  --   --   --    Hematocrit % 38.4* 38.8* 29.4* 30.3*   Platelets K/uL 162 159 121* 81*   Gran % % 69.5 91.0* 88.6* 88.6*   Lymph % % 17.8* 3.1* 4.3* 4.8*   Mono % % 9.8 4.4 6.1 5.6   Eosinophil % % 1.4 0.1 0.0 0.1   Basophil % % 0.7 0.2 0.2 0.1   Differential Method  Automated Automated Automated Automated       Metabolic Panel (last 72 hours):  Recent Labs   Lab Result Units 03/02/22  0744  03/02/22  1715 03/02/22  1728 03/03/22  0414 03/04/22  0333   Sodium mmol/L 143  --  138 138 138   Potassium mmol/L 4.9  --  4.2 3.7 3.5   Chloride mmol/L 103  --  101 107 107   CO2 mmol/L 25  --  23 22* 19*   Glucose mg/dL 143*  --  212* 164* 111*   Glucose, UA   --  Negative  --   --   --    BUN mg/dL 22  --  26 29 27   Creatinine mg/dL 1.4  --  1.9* 1.7* 1.4   Albumin g/dL 3.9  --  4.1 3.1* 3.1*   Total Bilirubin mg/dL 0.9  --  1.3* 1.1* 1.0   Alkaline Phosphatase U/L 112  --  135 93 85   AST U/L 20  --  24 23 25   ALT U/L 11  --  14 8* 8*   Magnesium mg/dL  --   --  1.7 1.4* 2.1   Phosphorus mg/dL  --   --  2.2* 3.3 3.3       Vancomycin Administrations:  vancomycin given in the last 96 hours                     vancomycin in dextrose 5 % 1 gram/250 mL IVPB 1,000 mg (mg) 1,000 mg New Bag 03/04/22 0213    vancomycin 2 g in 0.9% sodium chloride 500 mL IVPB (mg) 2,000 mg New Bag 03/02/22 1911                    Microbiologic Results:  Microbiology Results (last 7 days)       Procedure Component Value Units Date/Time    Gram stain [[id number]] Collected: 03/03/22 1625    Order Status: Completed Specimen: Abscess from Abdomen Updated: 03/04/22 0214     Gram Stain Result Rare WBC's      Few Gram positive rods      Rare Gram negative rods    Aerobic culture [[id number]] Collected: 03/03/22 1625    Order Status: Sent Specimen: Abscess from Abdomen Updated: 03/04/22 0103    Culture, Anaerobic [[id number]] Collected: 03/03/22 1625    Order Status: Sent Specimen: Abscess from Abdomen Updated: 03/04/22 0101    Fungus culture [[id number]] Collected: 03/03/22 1625    Order Status: Sent Specimen: Abscess from Abdomen Updated: 03/04/22 0101

## 2022-03-05 NOTE — ASSESSMENT & PLAN NOTE
- Baseline creatinine 1.4, presented with creatinine 1.9 in setting of septic shock  - Creatinine improved back to baseline   - Will stop IV fluids and continue to monitor

## 2022-03-05 NOTE — PROGRESS NOTES
[facility]/General Surgery  Progress Note    Admit Date: 3/3/2022  Hospital Day: 2  Procedure:   IR drain    Subjective:  NAEO. AFVSS  Drain 255 after TPA admin  denies n/v  denies f/c, cp/sob  Reports both flatus/BM  Patient is ambulating/voiding spontaneously      Physical Exam:  Gen: no acute distress. Alert and oriented x3.  HEENT: normocephalic and atraumatic. EOMI.   Resp: unlabored respirations  CV: regular rate, distal pulses intact  Abd: soft, non-tender, non-distended, palpable mesh in midline, Drain in the R with old hematoma  Ext: warm and well perfused  MSK: strength grossly intact  Neuro: no focal deficits, normal sensation    Labs reviewed below- cbc pending, cr down 1.2 from 1.4  I/O (last 24 hours):  UOP: 350 recorded      Assessment/Plan: 89 y.o. male admitted 3/3/2022 with sepsis from abdominal fluid collection below mesh placed 15 years ago at OSH    - will repeat TPA today  - continue drain  - abx per primary  - continue diet from surgical standpoint    Vaibhav Gomez MD   [facility] General Surgery, PGY4  03/05/2022       Inpatient Data     Vitals:   Temp:  [96.3 °F (35.7 °C)-98.4 °F (36.9 °C)] 96.7 °F (35.9 °C)  Pulse:  [54-69] 69  Resp:  [18-22] 18  SpO2:  [95 %-98 %] 98 %  BP: (118-155)/(56-69) 151/67 Intake/Output:  03/04 0701 - 03/05 0700  In: 368 [P.O.:255; I.V.:3]  Out: 552 [Urine:350; Drains:202]       Recent Labs     03/03/22  0413 03/03/22  0414 03/04/22  0333 03/05/22  0342 03/05/22  0710   WBC 21.70*  --  14.22*  --  9.51   HGB 10.0*  --  9.9*  --  9.2*   HCT 29.4*  --  30.3*  --  27.5*   *  --  81*  --  101*   NA  --  138 138 140  --    K  --  3.7 3.5 4.0  --    CL  --  107 107 108  --    CO2  --  22* 19* 19*  --    BUN  --  29 27 26  --    CREATININE  --  1.7* 1.4 1.2  --    BILITOT  --  1.1* 1.0 1.1*  --    AST  --  23 25 29  --    ALT  --  8* 8* 12  --    ALKPHOS  --  93 85 115  --    CALCIUM  --  8.1* 8.4* 8.3*  --    ALBUMIN  --  3.1* 3.1* 3.1*  --    PROT  --   6.1 6.4 6.2  --    MG  --  1.4* 2.1 2.2  --    PHOS  --  3.3 3.3 2.6*  --    INR  --  2.4*  --   --   --         Scheduled Meds:   famotidine (PF)  20 mg Intravenous Daily    levothyroxine  75 mcg Oral Before breakfast    mupirocin   Nasal BID    piperacillin-tazobactam (ZOSYN) IVPB  4.5 g Intravenous Q8H    pravastatin  20 mg Oral QHS    tamsulosin  0.4 mg Oral Daily     Continuous Infusions:   sodium chloride 0.9% Stopped (03/03/22 2039)    sodium chloride 0.9% 5 mL/hr at 03/04/22 0515     PRN Meds:acetaminophen, melatonin, ondansetron, sodium chloride 0.9%

## 2022-03-06 LAB
ALBUMIN SERPL BCP-MCNC: 2.8 G/DL (ref 3.5–5.2)
ALP SERPL-CCNC: 116 U/L (ref 55–135)
ALT SERPL W/O P-5'-P-CCNC: 12 U/L (ref 10–44)
ANION GAP SERPL CALC-SCNC: 10 MMOL/L (ref 8–16)
AST SERPL-CCNC: 22 U/L (ref 10–40)
BILIRUB SERPL-MCNC: 1.4 MG/DL (ref 0.1–1)
BUN SERPL-MCNC: 18 MG/DL (ref 10–30)
CALCIUM SERPL-MCNC: 8.5 MG/DL (ref 8.7–10.5)
CHLORIDE SERPL-SCNC: 108 MMOL/L (ref 95–110)
CO2 SERPL-SCNC: 22 MMOL/L (ref 23–29)
CREAT SERPL-MCNC: 0.9 MG/DL (ref 0.5–1.4)
EST. GFR  (AFRICAN AMERICAN): >60 ML/MIN/1.73 M^2
EST. GFR  (NON AFRICAN AMERICAN): >60 ML/MIN/1.73 M^2
GLUCOSE SERPL-MCNC: 133 MG/DL (ref 70–110)
MAGNESIUM SERPL-MCNC: 2 MG/DL (ref 1.6–2.6)
PHOSPHATE SERPL-MCNC: 2 MG/DL (ref 2.7–4.5)
POTASSIUM SERPL-SCNC: 3.4 MMOL/L (ref 3.5–5.1)
PROT SERPL-MCNC: 6 G/DL (ref 6–8.4)
SODIUM SERPL-SCNC: 140 MMOL/L (ref 136–145)

## 2022-03-06 PROCEDURE — 36415 COLL VENOUS BLD VENIPUNCTURE: CPT | Performed by: NURSE PRACTITIONER

## 2022-03-06 PROCEDURE — 83735 ASSAY OF MAGNESIUM: CPT | Performed by: NURSE PRACTITIONER

## 2022-03-06 PROCEDURE — 80053 COMPREHEN METABOLIC PANEL: CPT | Performed by: NURSE PRACTITIONER

## 2022-03-06 PROCEDURE — 25000003 PHARM REV CODE 250: Performed by: STUDENT IN AN ORGANIZED HEALTH CARE EDUCATION/TRAINING PROGRAM

## 2022-03-06 PROCEDURE — 63600175 PHARM REV CODE 636 W HCPCS: Performed by: NURSE PRACTITIONER

## 2022-03-06 PROCEDURE — 63600175 PHARM REV CODE 636 W HCPCS: Mod: JG | Performed by: STUDENT IN AN ORGANIZED HEALTH CARE EDUCATION/TRAINING PROGRAM

## 2022-03-06 PROCEDURE — 99232 PR SUBSEQUENT HOSPITAL CARE,LEVL II: ICD-10-PCS | Mod: GC,,, | Performed by: INTERNAL MEDICINE

## 2022-03-06 PROCEDURE — 94761 N-INVAS EAR/PLS OXIMETRY MLT: CPT

## 2022-03-06 PROCEDURE — 97161 PT EVAL LOW COMPLEX 20 MIN: CPT

## 2022-03-06 PROCEDURE — 25000003 PHARM REV CODE 250: Performed by: HOSPITALIST

## 2022-03-06 PROCEDURE — 25000003 PHARM REV CODE 250: Performed by: NURSE PRACTITIONER

## 2022-03-06 PROCEDURE — 27000221 HC OXYGEN, UP TO 24 HOURS

## 2022-03-06 PROCEDURE — 84100 ASSAY OF PHOSPHORUS: CPT | Performed by: NURSE PRACTITIONER

## 2022-03-06 PROCEDURE — 11000001 HC ACUTE MED/SURG PRIVATE ROOM

## 2022-03-06 PROCEDURE — 97530 THERAPEUTIC ACTIVITIES: CPT

## 2022-03-06 PROCEDURE — 99232 SBSQ HOSP IP/OBS MODERATE 35: CPT | Mod: GC,,, | Performed by: INTERNAL MEDICINE

## 2022-03-06 RX ORDER — LISINOPRIL 20 MG/1
20 TABLET ORAL DAILY
Status: DISCONTINUED | OUTPATIENT
Start: 2022-03-06 | End: 2022-03-08 | Stop reason: HOSPADM

## 2022-03-06 RX ADMIN — CARVEDILOL 12.5 MG: 12.5 TABLET, FILM COATED ORAL at 08:03

## 2022-03-06 RX ADMIN — DOXAZOSIN 1 MG: 1 TABLET ORAL at 08:03

## 2022-03-06 RX ADMIN — PIPERACILLIN AND TAZOBACTAM 4.5 G: 4; .5 INJECTION, POWDER, LYOPHILIZED, FOR SOLUTION INTRAVENOUS; PARENTERAL at 06:03

## 2022-03-06 RX ADMIN — PRAVASTATIN SODIUM 20 MG: 20 TABLET ORAL at 08:03

## 2022-03-06 RX ADMIN — TAMSULOSIN HYDROCHLORIDE 0.4 MG: 0.4 CAPSULE ORAL at 10:03

## 2022-03-06 RX ADMIN — PIPERACILLIN AND TAZOBACTAM 4.5 G: 4; .5 INJECTION, POWDER, LYOPHILIZED, FOR SOLUTION INTRAVENOUS; PARENTERAL at 01:03

## 2022-03-06 RX ADMIN — ALTEPLASE 10 MG: 2.2 INJECTION, POWDER, LYOPHILIZED, FOR SOLUTION INTRAVENOUS at 10:03

## 2022-03-06 RX ADMIN — PANTOPRAZOLE SODIUM 40 MG: 40 TABLET, DELAYED RELEASE ORAL at 10:03

## 2022-03-06 RX ADMIN — LEVOTHYROXINE SODIUM 75 MCG: 75 TABLET ORAL at 06:03

## 2022-03-06 RX ADMIN — CARVEDILOL 12.5 MG: 12.5 TABLET, FILM COATED ORAL at 05:03

## 2022-03-06 RX ADMIN — MUPIROCIN: 20 OINTMENT TOPICAL at 08:03

## 2022-03-06 RX ADMIN — LISINOPRIL 20 MG: 20 TABLET ORAL at 12:03

## 2022-03-06 RX ADMIN — MUPIROCIN: 20 OINTMENT TOPICAL at 10:03

## 2022-03-06 RX ADMIN — PIPERACILLIN AND TAZOBACTAM 4.5 G: 4; .5 INJECTION, POWDER, LYOPHILIZED, FOR SOLUTION INTRAVENOUS; PARENTERAL at 09:03

## 2022-03-06 NOTE — PROGRESS NOTES
[facility] Medicine  Progress Note    Patient Name: Eddie Parada Sr.  MRN: [id number]  Patient Class: IP- Inpatient   Admission Date: 3/3/2022  Length of Stay: 3 days  Attending Physician: Olga Lozano MD  Primary Care Provider: Callum Roberts MD        Subjective:     Principal Problem:Septic shock        HPI:  Eddie Parada is a 89 yo male with a pmh of CAD, Afib on xarelto, heart failure, pacemaker insertion, CKD, COPD, former smoker, hernia repair, CVA, HTN, PVD, DM2. He presented to [place] ED with AMS and fever yesterday. He was brought in by EMS. He was in septic shock. Given 2L NS vancomycin, zosyn, tylenol, ibuprofen, and started on levophed while in the ED. CT abd with large abscess noted in the anterior abdominal wall at the level of prior mesh repair. He was transferred to [facility] for surgery evaluation for abscess drainage. He is alert and oriented on arrival to [place]. BP stable off levophed. He reports he woke up feeling fine and had routine labs done yesterday morning. He then began feeling cold and his body was shaking. His wife reports she took his temp and noted 97.6F. He took a nap and woke up confused. His wife reports his temp was then 104 and he was short of breath. She called EMS at that time. His wife noticed his upper abdomen was distended while he was in the ED. He denies ever having abdominal pain or nausea. Had been eating well. He had not had a bowel movement in 3 days so he took a laxative yesterday morning.  He had an abdominal hernia repair with mesh placement about 20 years ago in [place]. Has not had any issues since then. He still drives and runs a business. He is followed by [facility] in [place] and had his pacemaker replaced about 2 weeks ago. He took antibiotics as prescribed at that time.       Overview/Hospital Course:  Mr. Parada presented with fever with altered mental status.  Admitted in septic shock to the ICU due to intra-abdominal  abscess identified on CT scan.  Empiric treatment with Zosyn and vancomycin given.  Patient required short time with Levophed, but was able to be stepped down to the floor on 03/03/2022. General surgery consulted with no plans for surgery and with recommendation to consult IR.  Drain was placed by IR on 03/03/2022.  ID consulted.      Interval History:  No acute events, patient reported he feels good and is sitting up in a chair.  Minimal pain in the abdomen, only with pressure.  Wife at the bedside.    Review of Systems   Constitutional:  Negative for chills and fever.   Respiratory:  Negative for shortness of breath.    Gastrointestinal:  Positive for abdominal distention and abdominal pain. Negative for nausea and vomiting.   Objective:     Vital Signs (Most Recent):  Temp: 96.2 °F (35.7 °C) (03/06/22 1147)  Pulse: 61 (03/06/22 1147)  Resp: 18 (03/06/22 1147)  BP: (!) 152/67 (03/06/22 1147)  SpO2: 99 % (03/06/22 1147)   Vital Signs (24h Range):  Temp:  [96 °F (35.6 °C)-98.1 °F (36.7 °C)] 96.2 °F (35.7 °C)  Pulse:  [58-76] 61  Resp:  [18-20] 18  SpO2:  [95 %-99 %] 99 %  BP: (136-188)/(63-80) 152/67     Weight: 83.5 kg (184 lb 1.4 oz)  Body mass index is 30.63 kg/m².    Intake/Output Summary (Last 24 hours) at 3/6/2022 1449  Last data filed at 3/6/2022 0622  Gross per 24 hour   Intake 280 ml   Output 1375 ml   Net -1095 ml        Physical Exam  Vitals and nursing note reviewed.   Constitutional:       General: He is not in acute distress.     Appearance: Normal appearance. He is not toxic-appearing.   HENT:      Head: Normocephalic and atraumatic.      Nose: Nose normal.      Mouth/Throat:      Mouth: Mucous membranes are moist.   Eyes:      Conjunctiva/sclera: Conjunctivae normal.      Pupils: Pupils are equal, round, and reactive to light.   Cardiovascular:      Rate and Rhythm: Normal rate and regular rhythm.      Pulses: Normal pulses.   Pulmonary:      Effort: Pulmonary effort is normal.      Breath sounds:  Normal breath sounds.   Abdominal:      General: Bowel sounds are normal. There is distension.      Tenderness: There is no abdominal tenderness. There is no guarding or rebound.      Comments: Firm nodularity noted mid abdomen superior to the umbilicus, drain placed by IR slightly lateral to this nodularity, no rebound or guarding   Musculoskeletal:         General: No swelling. Normal range of motion.      Cervical back: Normal range of motion.   Skin:     General: Skin is warm and dry.   Neurological:      Mental Status: He is alert and oriented to person, place, and time. Mental status is at baseline.   Psychiatric:         Mood and Affect: Mood normal.         Behavior: Behavior normal.         Thought Content: Thought content normal.       Significant Labs: All pertinent labs within the past 24 hours have been reviewed.    Significant Imaging: I have reviewed all pertinent imaging results/findings within the past 24 hours.      Assessment/Plan:      * Septic shock  Abdominal wall abscess   Leukocytosis   - Presented with hypotension, fevers, and confusion  - Large abdominal abscess noted on CT  - Initiated on levophed after 2L NS bolus in ED, but was weaned off levophed on arrival to ICU  - General surgery is following with no recommendation for surgery at this time  - Status post drain placement by IR on 3/3/2022  - Continue Zosyn for now  - Blood cultures no growth to date, and abscess culture pending  - De-escalation with stopping vancomycin on 3/5/22  - Advanced diet and case discussed with general surgery, currently with no surgical intervention planned at this time  - Difficult case given abscess involves the mesh  - Appreciate ID input, waiting abscess culture results  - Progressing well with PT and OT    Hypomagnesemia  - Repleted, monitor    Elevated troponin  - Troponin 0.056--0.100 and reported chest pain  - In setting of septic shock  - No ischemic changes on EKG  - Troponins trended and echo done,  not consistent with ACS and no further workup is indicated at this time    Abdominal wall abscess  - CT abdomen with large 17.1 x 9.1 x 13.4 cm abscess in the anterior abdominal wall at the level of prior mesh repair  - As discussed above under sepsis    Dysphagia  - Patient denies trouble eating, takes small bites  - Diet advanced and he is eating without difficulty    Hypothyroidism due to acquired atrophy of thyroid  - Continue Synthroid    Acute renal failure superimposed on stage 3 chronic kidney disease  - Baseline creatinine 1.4, presented with creatinine 1.9 in setting of septic shock  - Creatinine improved back to baseline and currently is 0.9  - Stopped IV fluids on 3/5/22   - Continue to monitor    Long term current use of anticoagulant therapy  - Hold Xarelto (rivaroxaban)    (HFpEF) heart failure with preserved ejection fraction  - BNP at baseline and no signs of volume overload   - No recent echo on file   - Echo done on 3/03/2022 shows EF is 55% with indeterminate diastolic function  - Continue to hold Hold lasix  - Beta-blocker to restart on 3/5/22  - Advanced diet and stopped IV fluids on 3/5/22    Type 2 diabetes mellitus with neurologic complication  Lab Results   Component Value Date    HGBA1C 5.9 (H) 03/02/2022   - Hold amaryl and actos  - Glucose levels were reviewed, currently in target range  - Monitor with Accu-Cheks and continue sliding scale insulin  - Continue to monitor levels and make adjustments as necessary to achieve levels of 140-180 during hospitalization    Cardiac pacemaker in situ  - Changed a few weeks ago, noted    CAD (coronary artery disease)  - Continue pravastatin, but beta-blocker was held due to low BP  - Resumed Coreg on 3/5/22 and restart lisinopril today    HTN (hypertension)  - Hold BP meds due to septic shock  - Resumed Coreg on 3/5/22  - Will resume lisinopril today 20 mg and will consider adding amlodipine blood pressure can tolerate    Chronic atrial  fibrillation  - Held beta-blocker for now in setting of septic shock  - Hold rivaroxaban for anticoagulation  - Resumed Coreg on 3/5/22      VTE Risk Mitigation (From admission, onward)         Ordered     IP VTE HIGH RISK PATIENT  Once         03/03/22 0400     Place sequential compression device  Until discontinued         03/03/22 0400                  Olga Lozano MD  Department of Hospital Medicine   [facility]

## 2022-03-06 NOTE — PROGRESS NOTES
[facility] Progress Note    Assessment/Plan:     Intra-abdominal abscess  89 yo male with a pmh of CAD, Afib on xarelto, heart failure, pacemaker insertion, CKD, COPD, former smoker, hernia repair, CVA, HTN, PVD, and DM2 who is admitted for abd abscess. Now s/p drianage. Patient is well appearing, white count improving.     -await pending cx  -continue zosyn  -if mesh is involved in infection, clearance may be difficult  -ID will follow along     Bijal Valles MD  [facility] PGY-2  [facility] Consult Service  Cell: [phone]    Thank you for allowing us to participate in the care of this patient. We will continue to follow along. Case has been discussed with consult staff, who is in agreement with assessment and plan. ID will continue to follow.     Subjective:      No acute events overnight. Denies any fevers, chills. Patient was awake and alert and interactive this morning. Denies abdominal pain, nausea, and vomiting. Wife at bedside.     Objective:   Last 24 Hour Vital Signs:  BP  Min: 136/64  Max: 188/80  Temp  Av.5 °F (36.4 °C)  Min: 96 °F (35.6 °C)  Max: 98.1 °F (36.7 °C)  Pulse  Av.6  Min: 58  Max: 76  Resp  Av  Min: 18  Max: 20  SpO2  Av %  Min: 95 %  Max: 99 %  I/O last 3 completed shifts:  In: 648 [P.O.:535; I.V.:3; Other:10; IV Piggyback:100]  Out: 1752 [Urine:1625; Drains:127]    Physical Exam  General: sitting up in chair, well appearing  HEENT: NC/AT, EOM intact, normal sclera, NC in place, moist mucous membranes  CV: regular rate and rhythm, no murmurs  Lungs: clear to auscultation bilaterally  Abd: soft, non-tender, non-distended, BS present, midline drain in place with bloody drainage in collection bag    Laboratory:  Laboratory Data Reviewed: yes  Pertinent Findings:  Recent Labs   Lab 22  0413 22  0414 22  0333 22  0342 22  0710 22  0316   WBC 21.70*  --  14.22*  --  9.51  --    HGB 10.0*  --  9.9*  --  9.2*  --     HCT 29.4*  --  30.3*  --  27.5*  --    *  --  81*  --  101*  --    *  --  104*  --  102*  --    RDW 12.6  --  12.8  --  12.6  --    NA  --    < > 138 140  --  140   K  --    < > 3.5 4.0  --  3.4*   CL  --    < > 107 108  --  108   CO2  --    < > 19* 19*  --  22*   BUN  --    < > 27 26  --  18   CREATININE  --    < > 1.4 1.2  --  0.9   GLU  --    < > 111* 110  --  133*   PROT  --    < > 6.4 6.2  --  6.0   ALBUMIN  --    < > 3.1* 3.1*  --  2.8*   BILITOT  --    < > 1.0 1.1*  --  1.4*   AST  --    < > 25 29  --  22   ALKPHOS  --    < > 85 115  --  116   ALT  --    < > 8* 12  --  12    < > = values in this interval not displayed.         Microbiology Data:  Microbiology Results (last 7 days)     Procedure Component Value Units Date/Time    Culture, Anaerobic [[id number]] Collected: 03/03/22 1625    Order Status: Completed Specimen: Abscess from Abdomen Updated: 03/05/22 1334     Anaerobic Culture Culture in progress    Aerobic culture [[id number]] Collected: 03/03/22 1625    Order Status: Completed Specimen: Abscess from Abdomen Updated: 03/05/22 0737     Aerobic Bacterial Culture No growth    Gram stain [[id number]] Collected: 03/03/22 1625    Order Status: Completed Specimen: Abscess from Abdomen Updated: 03/04/22 0214     Gram Stain Result Rare WBC's      Few Gram positive rods      Rare Gram negative rods    Fungus culture [[id number]] Collected: 03/03/22 1625    Order Status: Sent Specimen: Abscess from Abdomen Updated: 03/04/22 0101          Antimicrobials:  Antibiotics (From admission, onward)            Start     Stop Route Frequency Ordered    03/03/22 2100  mupirocin 2 % ointment         03/08 2059 Nasl 2 times daily 03/03/22 1216    03/03/22 0530  piperacillin-tazobactam 4.5 g in dextrose 5 % 100 mL IVPB (ready to mix system)         -- IV Every 8 hours (non-standard times) 03/03/22 6465            Other Results:  Radiology Results:  X-Ray Chest 1 View    Result Date: 3/3/2022  EXAMINATION: XR  CHEST 1 VIEW CLINICAL HISTORY: Right IJ placement; TECHNIQUE: Single frontal view of the chest was performed. COMPARISON: 03/02/2022 FINDINGS: The heart is mildly enlarged.  Left-sided pacemaker device is in place.  Right IJ catheter terminates in the region of the proximal SVC.  No pneumothorax.  The lungs are clear.  Skeletal structures are intact.     As above. Electronically signed by: Sherri Suarez MD Date:    03/03/2022 Time:    07:52    IR Abscess Drainage With Tube Placement    Result Date: 3/5/2022  EXAMINATION: Drainage catheter placement Procedural Personnel Attending physician(s): Cody Reynolds MD Fellow physician(s): None Resident physician(s): None Advanced practice provider(s): None Pre-procedure diagnosis: Abscess Post-procedure diagnosis: Same Indication: Leukocytosis, abscess Additional clinical history: None Complications: No immediate complications. PROCEDURAL SUMMARY: Drainage catheter placement under ultrasound guidance PROCEDURE: Pre-procedure: Consent: Informed consent for the procedure was obtained and time-out was performed prior to the procedure. Preparation: The site was prepared and draped using maximal sterile barrier technique including cutaneous antisepsis. Antibiotic administered: None Anesthesia/sedation: Level of anesthesia/sedation: No sedation Anesthesia/sedation administered by: Not applicable Total intra-service sedation time (minutes): 0 Drainage catheter placement: The patient was positioned supine.  Initial ultrasound was performed.  Lidocaine 1% local anesthesia was administered.  Under real-time ultrasound guidance, an access needle was advanced into the collection.  An 035 Amplatz wire was coiled within the collection.  The tract was dilated the tract was dilated and a 10 [place] drain was placed.  This was challenging due to the presence of mesh on top of the abscess.  Proximally 275 mL foul-smelling bloody material was removed. - Initial imaging findings: Large  anterior abdominal wall fluid and gas collection - Drainage catheter placed: 10 Upper sor[place] APD - External catheter securement: 2 0 silk - Post-drainage imaging findings: Catheter in satisfactory position Contrast: None Radiation Dose: None Additional Details: Additional description of procedure: None Equipment details: None Specimens removed: Yes, specimen was sent Estimated blood loss (mL): Less than 10 Standardized report: [email or web address]     Large fluid and gas collection in the anterior abd well. Unfortunately, appears to be deep to the mesh, not superficial too it, and it was difficult to puncture with a needle and difficult to dilate up to even 10 Upper sor[place].  Not able to place a larger drain directly through the mesh.  275 mL foul-smelling bloody fluid removed. Plan: Catheter management per primary team. Attestation: Signer name: Cody Reynolds MD I attest that I was present for the entire procedure. I reviewed the stored images and agree with the report as written. Electronically signed by: Cody Reynolds Date:    03/05/2022 Time:    11:17    X-Ray Chest AP Portable    Result Date: 3/2/2022  EXAMINATION: XR CHEST AP PORTABLE CLINICAL HISTORY: Sepsis; TECHNIQUE: Single frontal view of the chest was performed. COMPARISON: 09/20/2019 FINDINGS: There is a left-sided pacer with leads in the right atrium and right ventricle.  The patient is status post sternotomy.  There is no pneumothorax or pleural effusion.  There is left basilar atelectasis.     There is left basilar atelectasis although pulmonary aeration at the left lung base has improved from what was seen in 2019. Electronically signed by: Livia Rueda MD Date:    03/02/2022 Time:    17:34    Echo    Result Date: 3/3/2022  · The left ventricle is normal in size with normal systolic function. · The estimated ejection fraction is 55%. · Indeterminate left ventricular diastolic function. · There is abnormal septal wall motion consistent with right  ventricular pacemaker. · Normal right ventricular size with normal right ventricular systolic function. · Severe left atrial enlargement. · Severe right atrial enlargement. · Mild to moderate tricuspid regurgitation. 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 There is no aortic insufficiency present. Prosthetic aortic valve is normal. · The aortic valve mean gradient is 13 mmHg with a dimensionless index of 0.61.      CT Chest Abdomen Pelvis With Contrast    Result Date: 3/2/2022  EXAMINATION: CT CHEST ABDOMEN PELVIS WITH CONTRAST (XPD) CLINICAL HISTORY: Sepsis; TECHNIQUE: Low dose axial images, sagittal and coronal reformations were obtained from the thoracic inlet to the pubic symphysis following the IV administration of 75 mL of Omnipaque 350 .  No oral contrast was administered. COMPARISON: CT scan chest abdomen pelvis dated 05/27/2021 and chest x-ray dated 03/02/2022. FINDINGS: CT chest: The thyroid gland is diminutive.  The base of the neck is within normal limits.  The supraclavicular regions are unremarkable. The trachea is unremarkable.  The central airways are within normal limits.  There is no evidence of bronchiectasis.  No endobronchial lesion is identified. The heart is enlarged.  There are no pericardial effusions.  There is no evidence of intracardiac thrombus.  There are extensive coronary artery calcifications.  There are pacing wires in place. The thoracic aorta is normal in caliber.  There is no intimal flap to suggest dissection.  There are extensive calcifications along the course of the thoracic aorta.  The great vessels arising for aortic arch are within normal limits.  There are no filling defects within the pulmonary tree. There are subcentimeter lymph nodes within the hilar and mediastinal lymph node stations.  The axillary regions are unremarkable. There are no pleural effusions.  There is no evidence of a pneumothorax.  There is no evidence of pneumomediastinum.  Evaluation for  pulmonary nodules is difficult given extensive motion limitations.  There are changes suggestive of pulmonary emphysema. There are postoperative changes involving the distal portions of the esophagus..  Please see the abdomen findings for additional findings regarding the upper abdomen. There are chronic left-sided rib deformities.  There are changes of median sternotomy.  There is diffuse osteopenia.  There is no evidence of a fracture. CT abdomen pelvis: There is normal tapering of the abdominal aorta.  There are extensive calcifications along the course of the abdominal aorta and its branch vessels.  The celiac trunk, SMA, and JUAN remain patent.  The portal veins and mesenteric veins are within normal limits.  The IVC and the remainder of the venous structures are unremarkable. There is no evidence of lymphadenopathy in the abdomen or pelvis. There is a small hiatal hernia.  There is mild distention of the stomach.  The duodenum is unremarkable.  The small bowel loops are within normal limits.  The appendix is unremarkable.  There is extensive colonic diverticula without evidence of acute diverticulitis.  There is no CT evidence of bowel obstruction.  There are bilateral inguinal hernias with a right-sided inguinal hernia containing loops of large bowel.  The left-sided inguinal hernia contains fat. There is nodular contour of the liver.  No discrete hepatic lesion is identified.  The patient is status post cholecystectomy.  There is intrahepatic biliary dilatation.  The spleen is unremarkable.  The pancreas is atrophic.  The adrenal glands are unremarkable. There is unchanged appearance of multiple low-attenuation lesions in both kidneys the majority measuring the range of simple fluid.  The ureters are unremarkable.  There is circumferential wall thickening of the bladder.  The prostate gland is enlarged. There are postop changes of prior mesh repair involving the anterior abdominal wall hernia.  There is  increase in size prior collection in the anterior abdominal wall at the level of the mesh now measuring 17.1 x 9.1 x 13.4 cm.  There is air component within the collection along with dorsal extension into the abdominal cavity.  There are attenuation components within the collection measuring up to 50 Hounsfield units. The psoas margins are unremarkable.  There are bilateral inguinal hernias as described above.  There are degenerative changes in the osseous structures.  There is no evidence of a fracture.     CT chest: No acute intrathoracic process. Changes of pulmonary emphysema. CT abdomen pelvis: Large 17.1 x 9.1 x 13.4 cm abscess in the anterior abdominal wall at the level of prior mesh repair with intra-abdominal extension.  Surgical consultation is recommended. High attenuation components within the collection may represent blood products.  Correlation with laboratory findings is suggested. Additional findings as above. This report was flagged in Epic as abnormal. Electronically signed by: Abisai Maynard MD Date:    03/02/2022 Time:    19:55    CT Abdomen Pelvis  Without Contrast    Result Date: 3/4/2022  EXAMINATION: CT ABDOMEN PELVIS WITHOUT CONTRAST CLINICAL HISTORY: Abdominal abscess/infection suspected; Cutaneous abscess of abdominal wall TECHNIQUE: Low dose axial images, sagittal and coronal reformations were obtained from the lung bases to the pubic symphysis.  30 mL of oral Omnipaque 350 was administered. COMPARISON: CT chest abdomen pelvis dated 03/02/2021 and CT renal stone study dated 01/11/2018 FINDINGS: Limited images through the lower chest demonstrate partial visualized cardiac pacer/AICD lead.  Stable punctate peripheral right lung nodule (series 2, image 11). Abdomen and pelvis: The liver, spleen, and adrenal glands demonstrate unremarkable noncontrast appearance.  Few punctate pancreatic parenchymal calcifications.  Nonspecific bilateral perinephric stranding with scattered cortical hypodense  lesions, some in keeping with cysts with others too small to characterize.  Partial exophytic right renal lesion, not definitively simple by criteria measuring 1.2 cm (series 2, image 60), though stable from 2018.  Contrast retention within the urinary bladder with mild wall trabeculation.  Prostate is enlarged. The GI tract is normal in caliber with colonic diverticulosis.  Appendix is normal.  Prior postprocedural change at the gastroesophageal level.  Large bilateral fat containing inguinal hernias containing portion of nonobstructed bowel on the right.  No lymphadenopathy.  Similar mild presacral stranding.  Multifocal aortoiliac atherosclerosis.  Nonspecific induration change and stranding of the anterior right upper thigh. Redemonstration of mixed air and fluid collection at the anterior abdominal wall at site of previous surgical mash.  Collection measures 17.7 x 6.1 cm TR by AP (series 2, image 69) and up to 14.0 cm cc (series 602, image 127).  Percutaneous drain in place.  Collection previously measuring 16.1 x 8.5 cm TR by AP and 13.5 cm craniocaudal. No aggressive osseous lesion.  Right femoral intramedullary nail.  Multilevel spine DJD and hip degenerative change.     Redemonstration of mixed air and fluid collection of the anterior abdominal wall at site of hernia mesh with percutaneous drain in place.  Collection appears slightly more elongated in the transverse dimension with reduction of AP diameter from prior. Renal cortical lesions, possibly cysts though many too small to characterize.  This can be further evaluated with nonemergent renal ultrasound. Delayed contrast retention within the urinary bladder from prior administration.  Mild bladder wall trabeculation with enlarged prostate. Nonspecific induration change and subcutaneous stranding of the right anterior thigh, of uncertain etiology.  Suggest correlation with physical exam. Additional findings as above. Electronically signed by: Bud  Abdelrahman Date:    03/04/2022 Time:    14:16      Current Medications:     Infusions:   sodium chloride 0.9% 5 mL/hr at 03/04/22 0515        Scheduled:   alteplase 10 mg in 0.9% NaCl 30 mL  10 mg Chest Tube Once    carvediloL  12.5 mg Oral BID WM    doxazosin  1 mg Oral Nightly    levothyroxine  75 mcg Oral Before breakfast    mupirocin   Nasal BID    pantoprazole  40 mg Oral Daily    piperacillin-tazobactam (ZOSYN) IVPB  4.5 g Intravenous Q8H    pravastatin  20 mg Oral QHS    tamsulosin  0.4 mg Oral Daily        PRN:  acetaminophen, melatonin, ondansetron, sodium chloride 0.9%

## 2022-03-06 NOTE — ASSESSMENT & PLAN NOTE
- Baseline creatinine 1.4, presented with creatinine 1.9 in setting of septic shock  - Creatinine improved back to baseline and currently is 0.9  - Stopped IV fluids on 3/5/22   - Continue to monitor

## 2022-03-06 NOTE — ASSESSMENT & PLAN NOTE
Abdominal wall abscess   Leukocytosis   - Presented with hypotension, fevers, and confusion  - Large abdominal abscess noted on CT  - Initiated on levophed after 2L NS bolus in ED, but was weaned off levophed on arrival to ICU  - General surgery is following with no recommendation for surgery at this time  - Status post drain placement by IR on 3/3/2022  - Continue Zosyn for now  - Blood cultures no growth to date, and abscess culture pending  - De-escalation with stopping vancomycin on 3/5/22  - Advanced diet and case discussed with general surgery, currently with no surgical intervention planned at this time  - Difficult case given abscess involves the mesh  - Appreciate ID input, waiting abscess culture results  - Progressing well with PT and OT

## 2022-03-06 NOTE — ASSESSMENT & PLAN NOTE
- BNP at baseline and no signs of volume overload   - No recent echo on file   - Echo done on 3/03/2022 shows EF is 55% with indeterminate diastolic function  - Continue to hold Hold lasix  - Beta-blocker to restart on 3/5/22  - Advanced diet and stopped IV fluids on 3/5/22

## 2022-03-06 NOTE — PLAN OF CARE
Problem: Adult Inpatient Plan of Care  Goal: Plan of Care Review  Outcome: Ongoing, Progressing     Plan of care reviewed with patient and spouse, understanding verbalized. Pt is AAO x 4, denies SOB and pain, on 2  L NC, no distress noted. Pt is paced on Tele, no true red alarms. Pt has a drain on right side of abdomen, site is clean, dry and intact; dark red blood draining. All medications administered as prescribed. No acute events overnight. Pt is resting, bed in lowest position, HOB lowered, side rails up x 2, bed alarm activated, call light and bedside table within reach. Pt instructed to call if assistance is needed.

## 2022-03-06 NOTE — ASSESSMENT & PLAN NOTE
- Held beta-blocker for now in setting of septic shock  - Hold rivaroxaban for anticoagulation  - Resumed Coreg on 3/5/22

## 2022-03-06 NOTE — PLAN OF CARE
Problem: Physical Therapy Goal  Goal: Physical Therapy Goal  Description: Goals to be met by: 2022    Patient will increase functional independence with mobility by performin) Supine<>Sit with independence   2) Sit <>Stand with Mod I with use of RW.   3) Bed <>Chair with Mod I with use of RW.   4) Pt to ambulate 150 feet with use of RW Mod I.   5) Pt to perform BLE X10 mod I with handout.     Outcome: Ongoing, Progressing

## 2022-03-06 NOTE — SUBJECTIVE & OBJECTIVE
Interval History:  No acute events, patient reported he feels good and is sitting up in a chair.  Minimal pain in the abdomen, only with pressure.  Wife at the bedside.    Review of Systems   Constitutional:  Negative for chills and fever.   Respiratory:  Negative for shortness of breath.    Gastrointestinal:  Positive for abdominal distention and abdominal pain. Negative for nausea and vomiting.   Objective:     Vital Signs (Most Recent):  Temp: 96.2 °F (35.7 °C) (03/06/22 1147)  Pulse: 61 (03/06/22 1147)  Resp: 18 (03/06/22 1147)  BP: (!) 152/67 (03/06/22 1147)  SpO2: 99 % (03/06/22 1147)   Vital Signs (24h Range):  Temp:  [96 °F (35.6 °C)-98.1 °F (36.7 °C)] 96.2 °F (35.7 °C)  Pulse:  [58-76] 61  Resp:  [18-20] 18  SpO2:  [95 %-99 %] 99 %  BP: (136-188)/(63-80) 152/67     Weight: 83.5 kg (184 lb 1.4 oz)  Body mass index is 30.63 kg/m².    Intake/Output Summary (Last 24 hours) at 3/6/2022 1449  Last data filed at 3/6/2022 0622  Gross per 24 hour   Intake 280 ml   Output 1375 ml   Net -1095 ml        Physical Exam  Vitals and nursing note reviewed.   Constitutional:       General: He is not in acute distress.     Appearance: Normal appearance. He is not toxic-appearing.   HENT:      Head: Normocephalic and atraumatic.      Nose: Nose normal.      Mouth/Throat:      Mouth: Mucous membranes are moist.   Eyes:      Conjunctiva/sclera: Conjunctivae normal.      Pupils: Pupils are equal, round, and reactive to light.   Cardiovascular:      Rate and Rhythm: Normal rate and regular rhythm.      Pulses: Normal pulses.   Pulmonary:      Effort: Pulmonary effort is normal.      Breath sounds: Normal breath sounds.   Abdominal:      General: Bowel sounds are normal. There is distension.      Tenderness: There is no abdominal tenderness. There is no guarding or rebound.      Comments: Firm nodularity noted mid abdomen superior to the umbilicus, drain placed by IR slightly lateral to this nodularity, no rebound or guarding    Musculoskeletal:         General: No swelling. Normal range of motion.      Cervical back: Normal range of motion.   Skin:     General: Skin is warm and dry.   Neurological:      Mental Status: He is alert and oriented to person, place, and time. Mental status is at baseline.   Psychiatric:         Mood and Affect: Mood normal.         Behavior: Behavior normal.         Thought Content: Thought content normal.       Significant Labs: All pertinent labs within the past 24 hours have been reviewed.    Significant Imaging: I have reviewed all pertinent imaging results/findings within the past 24 hours.

## 2022-03-06 NOTE — ASSESSMENT & PLAN NOTE
- Continue pravastatin, but beta-blocker was held due to low BP  - Resumed Coreg on 3/5/22 and restart lisinopril today

## 2022-03-06 NOTE — PT/OT/SLP EVAL
Physical Therapy Evaluation    Patient Name:  Eddie Parada Sr.   MRN:  [id number]    Recommendations:     Discharge Recommendations:  home health PT   Discharge Equipment Recommendations: none   Barriers to discharge: None    Assessment:     Eddie Parada Sr. is a 89 y.o. male admitted with a medical diagnosis of Septic shock.  He presents with the following impairments/functional limitations:  weakness, impaired endurance, impaired self care skills, impaired functional mobilty, impaired balance. Pt ambulated ~5 feet without AD and Min A, pt with minor episodes of LOB which required external assistance to prevent fall. Significant improvements noted when ambulating with RW, pt did not requires external assistance or demonstrate any episodes of LOB when ambulating with RW. Pt would benefit from continued skilled acute care PT services as well as Home Health Physical Therapy upon hospital discharge to improve current impairments and return safely to a more independent functional mobility level.      Rehab Prognosis: Good; patient would benefit from acute skilled PT services to address these deficits and reach maximum level of function.    Recent Surgery: * No surgery found *      Plan:     During this hospitalization, patient to be seen 3 x/week to address the identified rehab impairments via gait training, therapeutic activities, therapeutic exercises and progress toward the following goals:    · Plan of Care Expires:  04/06/22    Subjective     Chief Complaint: Not being able to get out of bed.   Patient/Family Comments/goals: to get out of bed and go home.  Pain/Comfort:  · Pain Rating 1: 0/10  · Pain Rating Post-Intervention 1: 0/10    Patients cultural, spiritual, Episcopal conflicts given the current situation: no    Living Environment:  Pt lives with wife in a single story home with 5 [address] as well as a ramp.   Prior to admission, patients level of function was independent.  Equipment used at home: bath  bench.  DME owned (not currently used): rolling walker.  Upon discharge, patient will have assistance from wife.    Objective:     Communicated with RN prior to session.  Patient found HOB elevated with bed alarm, telemetry, peripheral IV, oxygen  upon PT entry to room.    General Precautions: Standard, fall   Orthopedic Precautions:N/A   Braces: N/A  Respiratory Status: Nasal cannula, flow 3 L/min    Exams:  · Cognitive Exam:  Patient is oriented to Person, Place, Time and Situation  · RLE ROM: WFL  · RLE Strength: WFL  · LLE ROM: WFL  · LLE Strength: WFL    Functional Mobility:  · Bed Mobility:     · Supine to Sit: stand by assistance  · Transfers:  Sit to Stand:  contact guard assistance with no AD  · Bed to Chair: contact guard assistance with  rolling walker  using  ambulation   · Gait: Pt ambulated ~5 steps forwards and backwards without AD and CGA. Pt with one episode of LOB which required Mod A to prevent fall. Pt ambulated ~20 feet around the bed to bedside chair with CGA and use of RW, pt without any episodes of LOB during ambulation.   · Balance: Good sitting. Fair- standing without AD, Good with AD.     Therapeutic Activities and Exercises:  · Pt found and remained on 3L O2 for duration of evaluation. Pt without complaints or s/s of SOB.   · Bed mobility and transfers as listed above.   · Pt was educated on role of PT, POC and HHPT recommendations. Discussed with pt the importance of calling for assistance to decrease risk of falls as well as use of RW at all times at home to improve balance. Pt and wife verbalized understanding.     Patient left seated in bedside chair with all lines intact, call button in reach, chair alarm on, RN notified and wife present.     AM-PAC 6 CLICK MOBILITY  Total Score:20     GOALS:   Multidisciplinary Problems     Physical Therapy Goals        Problem: Physical Therapy Goal    Goal Priority Disciplines Outcome Goal Variances Interventions   Physical Therapy Goal     PT,  PT/OT Ongoing, Progressing     Description: Goals to be met by: 2022    Patient will increase functional independence with mobility by performin) Supine<>Sit with independence   2) Sit <>Stand with Mod I with use of RW.   3) Bed <>Chair with Mod I with use of RW.   4) Pt to ambulate 150 feet with use of RW Mod I.   5) Pt to perform BLE X10 mod I with handout.                      History:     Past Medical History:   Diagnosis Date    Abdominal fibromatosis     Acute posthemorrhagic anemia 2018    NIK (acute kidney injury) 2018    Atrial fibrillation     Bradycardia     Broken arm     CAD (coronary artery disease)     Cancer     basal cell on nose and melanoma on back    CHF (congestive heart failure)     COPD (chronic obstructive pulmonary disease)     Diabetes mellitus type II     Hyperlipidemia     Localization-related focal epilepsy with simple partial seizures 3/2/2021    Melanoma     Obesity (BMI 30.0-34.9) 2016    Osteoporosis     Peripheral vascular disease     Primary malignant neoplasm of prostate 3/3/2022    Septic shock 3/3/2022    Stroke     TIA (transient ischemic attack)     Type II diabetes mellitus with peripheral circulatory disorder     Type II or unspecified type diabetes mellitus without mention of complication, not stated as uncontrolled     Unspecified essential hypertension        Past Surgical History:   Procedure Laterality Date    AORTIC VALVE REPLACEMENT      CARDIAC PACEMAKER PLACEMENT  2012    CARDIAC VALVE REPLACEMENT  2011    aortic valve-tissue    CHOLECYSTECTOMY      COLONOSCOPY      ESOPHAGOGASTRODUODENOSCOPY N/A 2021    Procedure: EGD (ESOPHAGOGASTRODUODENOSCOPY);  Surgeon: Gualberto Medrano MD;  Location: [facility];  Service: Endoscopy;  Laterality: N/A;    ESOPHAGOGASTRODUODENOSCOPY N/A 2021    Procedure: EGD (ESOPHAGOGASTRODUODENOSCOPY);  Surgeon: Gualberto Medrano MD;  Location: [facility]  ENDO;  Service: Endoscopy;  Laterality: N/A;  please call wife(Jessica) with instructions/arrival time.    ESOPHAGOGASTRODUODENOSCOPY (EGD) WITH DILATION  06/22/2021    EYE SURGERY Bilateral     cataract with lens by  at [facility]    HERNIA REPAIR      abdominal    LASIK      UPPER GASTROINTESTINAL ENDOSCOPY  05/28/2021       Time Tracking:     PT Received On: 03/06/22  PT Start Time: 0905     PT Stop Time: 0946  PT Total Time (min): 41 min     Billable Minutes: Evaluation 15 and Therapeutic Activity 26      03/06/2022

## 2022-03-06 NOTE — PROGRESS NOTES
[facility]/General Surgery  Progress Note    Admit Date: 3/3/2022  Hospital Day: 3  Procedure:       Subjective:  NAEO. MARCOVSS  Feels well  Drain stripped for about an extra 70 cc this am  Tolerating diet      Physical Exam:  Gen: no acute distress. Alert and oriented x3.  HEENT: normocephalic and atraumatic. EOMI.   Resp: unlabored respirations  CV: regular rate, distal pulses intact  Abd: soft, non-tender, palpable hematoma in midline, R sided drain in palce  Ext: warm and well perfused  MSK: strength grossly intact  Neuro: no focal deficits, normal sensation    Labs reviewed below- cbc pending  I/O (last 24 hours):  UOP: 1275      Assessment/Plan: 89 y.o. male admitted 3/3/2022 with infected mesh hematoma    - will repeat TPA again today  - continue drain care  abx per primary  - trying to avoid large mesh excision surgery in 89 year old patient    Vaibhav Gomez MD   [facility] General [facility], PGY4  03/06/2022       Inpatient Data     Vitals:   Temp:  [96 °F (35.6 °C)-98.1 °F (36.7 °C)] 96 °F (35.6 °C)  Pulse:  [58-76] 64  Resp:  [18-20] 18  SpO2:  [95 %-99 %] 99 %  BP: (136-188)/(63-80) 171/72 Intake/Output:  03/05 0701 - 03/06 0700  In: 280 [P.O.:280]  Out: 1375 [Urine:1275; Drains:100]       Recent Labs     03/04/22  0333 03/05/22  0342 03/05/22  0710 03/06/22  0316   WBC 14.22*  --  9.51  --    HGB 9.9*  --  9.2*  --    HCT 30.3*  --  27.5*  --    PLT 81*  --  101*  --     140  --  140   K 3.5 4.0  --  3.4*    108  --  108   CO2 19* 19*  --  22*   BUN 27 26  --  18   CREATININE 1.4 1.2  --  0.9   BILITOT 1.0 1.1*  --  1.4*   AST 25 29  --  22   ALT 8* 12  --  12   ALKPHOS 85 115  --  116   CALCIUM 8.4* 8.3*  --  8.5*   ALBUMIN 3.1* 3.1*  --  2.8*   PROT 6.4 6.2  --  6.0   MG 2.1 2.2  --  2.0   PHOS 3.3 2.6*  --  2.0*        Scheduled Meds:   carvediloL  12.5 mg Oral BID WM    doxazosin  1 mg Oral Nightly    levothyroxine  75 mcg Oral Before breakfast    mupirocin   Nasal BID     pantoprazole  40 mg Oral Daily    piperacillin-tazobactam (ZOSYN) IVPB  4.5 g Intravenous Q8H    pravastatin  20 mg Oral QHS    tamsulosin  0.4 mg Oral Daily     Continuous Infusions:   sodium chloride 0.9% 5 mL/hr at 03/04/22 0515     PRN Meds:acetaminophen, melatonin, ondansetron, sodium chloride 0.9%

## 2022-03-06 NOTE — ASSESSMENT & PLAN NOTE
- Hold BP meds due to septic shock  - Resumed Coreg on 3/5/22  - Will resume lisinopril today 20 mg and will consider adding amlodipine blood pressure can tolerate

## 2022-03-07 LAB
ALBUMIN SERPL BCP-MCNC: 2.8 G/DL (ref 3.5–5.2)
ALP SERPL-CCNC: 109 U/L (ref 55–135)
ALT SERPL W/O P-5'-P-CCNC: 13 U/L (ref 10–44)
ANION GAP SERPL CALC-SCNC: 10 MMOL/L (ref 8–16)
AST SERPL-CCNC: 19 U/L (ref 10–40)
BACTERIA BLD CULT: NORMAL
BACTERIA BLD CULT: NORMAL
BACTERIA SPEC AEROBE CULT: NO GROWTH
BASOPHILS # BLD AUTO: 0.03 K/UL (ref 0–0.2)
BASOPHILS NFR BLD: 0.4 % (ref 0–1.9)
BILIRUB SERPL-MCNC: 1.3 MG/DL (ref 0.1–1)
BUN SERPL-MCNC: 19 MG/DL (ref 10–30)
CALCIUM SERPL-MCNC: 8.8 MG/DL (ref 8.7–10.5)
CHLORIDE SERPL-SCNC: 105 MMOL/L (ref 95–110)
CO2 SERPL-SCNC: 27 MMOL/L (ref 23–29)
CREAT SERPL-MCNC: 1 MG/DL (ref 0.5–1.4)
DIFFERENTIAL METHOD: ABNORMAL
EOSINOPHIL # BLD AUTO: 0.1 K/UL (ref 0–0.5)
EOSINOPHIL NFR BLD: 1.8 % (ref 0–8)
ERYTHROCYTE [DISTWIDTH] IN BLOOD BY AUTOMATED COUNT: 12.2 % (ref 11.5–14.5)
EST. GFR  (AFRICAN AMERICAN): >60 ML/MIN/1.73 M^2
EST. GFR  (NON AFRICAN AMERICAN): >60 ML/MIN/1.73 M^2
GLUCOSE SERPL-MCNC: 129 MG/DL (ref 70–110)
HCT VFR BLD AUTO: 28.5 % (ref 40–54)
HGB BLD-MCNC: 9.2 G/DL (ref 14–18)
IMM GRANULOCYTES # BLD AUTO: 0.29 K/UL (ref 0–0.04)
IMM GRANULOCYTES NFR BLD AUTO: 4.2 % (ref 0–0.5)
LYMPHOCYTES # BLD AUTO: 0.7 K/UL (ref 1–4.8)
LYMPHOCYTES NFR BLD: 10.2 % (ref 18–48)
MAGNESIUM SERPL-MCNC: 1.9 MG/DL (ref 1.6–2.6)
MCH RBC QN AUTO: 33.1 PG (ref 27–31)
MCHC RBC AUTO-ENTMCNC: 32.3 G/DL (ref 32–36)
MCV RBC AUTO: 103 FL (ref 82–98)
MONOCYTES # BLD AUTO: 1 K/UL (ref 0.3–1)
MONOCYTES NFR BLD: 14.7 % (ref 4–15)
NEUTROPHILS # BLD AUTO: 4.7 K/UL (ref 1.8–7.7)
NEUTROPHILS NFR BLD: 68.7 % (ref 38–73)
NRBC BLD-RTO: 0 /100 WBC
PHOSPHATE SERPL-MCNC: 2.4 MG/DL (ref 2.7–4.5)
PLATELET # BLD AUTO: 113 K/UL (ref 150–450)
PMV BLD AUTO: 10.2 FL (ref 9.2–12.9)
POCT GLUCOSE: 191 MG/DL (ref 70–110)
POTASSIUM SERPL-SCNC: 3.5 MMOL/L (ref 3.5–5.1)
PROT SERPL-MCNC: 6.1 G/DL (ref 6–8.4)
RBC # BLD AUTO: 2.78 M/UL (ref 4.6–6.2)
SODIUM SERPL-SCNC: 142 MMOL/L (ref 136–145)
WBC # BLD AUTO: 6.85 K/UL (ref 3.9–12.7)

## 2022-03-07 PROCEDURE — 25000003 PHARM REV CODE 250: Performed by: NURSE PRACTITIONER

## 2022-03-07 PROCEDURE — 63600175 PHARM REV CODE 636 W HCPCS: Performed by: NURSE PRACTITIONER

## 2022-03-07 PROCEDURE — 99900035 HC TECH TIME PER 15 MIN (STAT)

## 2022-03-07 PROCEDURE — 99232 SBSQ HOSP IP/OBS MODERATE 35: CPT | Mod: GC,,, | Performed by: INTERNAL MEDICINE

## 2022-03-07 PROCEDURE — 11000001 HC ACUTE MED/SURG PRIVATE ROOM

## 2022-03-07 PROCEDURE — 63600175 PHARM REV CODE 636 W HCPCS: Mod: JG | Performed by: STUDENT IN AN ORGANIZED HEALTH CARE EDUCATION/TRAINING PROGRAM

## 2022-03-07 PROCEDURE — 84100 ASSAY OF PHOSPHORUS: CPT | Performed by: NURSE PRACTITIONER

## 2022-03-07 PROCEDURE — 97530 THERAPEUTIC ACTIVITIES: CPT

## 2022-03-07 PROCEDURE — 25000003 PHARM REV CODE 250

## 2022-03-07 PROCEDURE — 94760 N-INVAS EAR/PLS OXIMETRY 1: CPT

## 2022-03-07 PROCEDURE — 25000242 PHARM REV CODE 250 ALT 637 W/ HCPCS: Performed by: STUDENT IN AN ORGANIZED HEALTH CARE EDUCATION/TRAINING PROGRAM

## 2022-03-07 PROCEDURE — A4217 STERILE WATER/SALINE, 500 ML: HCPCS | Performed by: STUDENT IN AN ORGANIZED HEALTH CARE EDUCATION/TRAINING PROGRAM

## 2022-03-07 PROCEDURE — 63600175 PHARM REV CODE 636 W HCPCS

## 2022-03-07 PROCEDURE — 25000003 PHARM REV CODE 250: Performed by: HOSPITALIST

## 2022-03-07 PROCEDURE — 25000003 PHARM REV CODE 250: Performed by: STUDENT IN AN ORGANIZED HEALTH CARE EDUCATION/TRAINING PROGRAM

## 2022-03-07 PROCEDURE — 36415 COLL VENOUS BLD VENIPUNCTURE: CPT | Performed by: NURSE PRACTITIONER

## 2022-03-07 PROCEDURE — 99232 PR SUBSEQUENT HOSPITAL CARE,LEVL II: ICD-10-PCS | Mod: GC,,, | Performed by: INTERNAL MEDICINE

## 2022-03-07 PROCEDURE — 36569 INSJ PICC 5 YR+ W/O IMAGING: CPT

## 2022-03-07 PROCEDURE — 80053 COMPREHEN METABOLIC PANEL: CPT | Performed by: NURSE PRACTITIONER

## 2022-03-07 PROCEDURE — 85025 COMPLETE CBC W/AUTO DIFF WBC: CPT | Performed by: HOSPITALIST

## 2022-03-07 PROCEDURE — 94761 N-INVAS EAR/PLS OXIMETRY MLT: CPT

## 2022-03-07 PROCEDURE — 97165 OT EVAL LOW COMPLEX 30 MIN: CPT

## 2022-03-07 PROCEDURE — 27000221 HC OXYGEN, UP TO 24 HOURS

## 2022-03-07 PROCEDURE — C1751 CATH, INF, PER/CENT/MIDLINE: HCPCS

## 2022-03-07 PROCEDURE — 83735 ASSAY OF MAGNESIUM: CPT | Performed by: NURSE PRACTITIONER

## 2022-03-07 PROCEDURE — 36415 COLL VENOUS BLD VENIPUNCTURE: CPT | Performed by: HOSPITALIST

## 2022-03-07 RX ORDER — SODIUM CHLORIDE 0.9 % (FLUSH) 0.9 %
10 SYRINGE (ML) INJECTION EVERY 6 HOURS
Status: DISCONTINUED | OUTPATIENT
Start: 2022-03-08 | End: 2022-03-08 | Stop reason: HOSPADM

## 2022-03-07 RX ORDER — METRONIDAZOLE 500 MG/1
500 TABLET ORAL EVERY 8 HOURS
Status: DISCONTINUED | OUTPATIENT
Start: 2022-03-07 | End: 2022-03-08 | Stop reason: HOSPADM

## 2022-03-07 RX ORDER — SODIUM CHLORIDE 0.9 % (FLUSH) 0.9 %
10 SYRINGE (ML) INJECTION
Status: DISCONTINUED | OUTPATIENT
Start: 2022-03-07 | End: 2022-03-08 | Stop reason: HOSPADM

## 2022-03-07 RX ADMIN — CARVEDILOL 12.5 MG: 12.5 TABLET, FILM COATED ORAL at 10:03

## 2022-03-07 RX ADMIN — ALTEPLASE 10 MG: 2.2 INJECTION, POWDER, LYOPHILIZED, FOR SOLUTION INTRAVENOUS at 12:03

## 2022-03-07 RX ADMIN — PIPERACILLIN AND TAZOBACTAM 4.5 G: 4; .5 INJECTION, POWDER, LYOPHILIZED, FOR SOLUTION INTRAVENOUS; PARENTERAL at 06:03

## 2022-03-07 RX ADMIN — CEFTRIAXONE 1 G: 1 INJECTION, SOLUTION INTRAVENOUS at 06:03

## 2022-03-07 RX ADMIN — PRAVASTATIN SODIUM 20 MG: 20 TABLET ORAL at 09:03

## 2022-03-07 RX ADMIN — TAMSULOSIN HYDROCHLORIDE 0.4 MG: 0.4 CAPSULE ORAL at 09:03

## 2022-03-07 RX ADMIN — MUPIROCIN: 20 OINTMENT TOPICAL at 10:03

## 2022-03-07 RX ADMIN — DORNASE ALFA 5 MG: 1 SOLUTION RESPIRATORY (INHALATION) at 12:03

## 2022-03-07 RX ADMIN — LISINOPRIL 20 MG: 20 TABLET ORAL at 09:03

## 2022-03-07 RX ADMIN — PANTOPRAZOLE SODIUM 40 MG: 40 TABLET, DELAYED RELEASE ORAL at 09:03

## 2022-03-07 RX ADMIN — METRONIDAZOLE 500 MG: 500 TABLET ORAL at 09:03

## 2022-03-07 RX ADMIN — DOXAZOSIN 1 MG: 1 TABLET ORAL at 09:03

## 2022-03-07 RX ADMIN — LEVOTHYROXINE SODIUM 75 MCG: 75 TABLET ORAL at 05:03

## 2022-03-07 RX ADMIN — MUPIROCIN: 20 OINTMENT TOPICAL at 09:03

## 2022-03-07 RX ADMIN — CARVEDILOL 12.5 MG: 12.5 TABLET, FILM COATED ORAL at 06:03

## 2022-03-07 NOTE — PT/OT/SLP EVAL
Occupational Therapy   Evaluation    Name: Eddie Parada Sr.  MRN: [id number]  Admitting Diagnosis:  Septic shock  Recent Surgery: * No surgery found *      Recommendations:     Discharge Recommendations: home health PT, home health OT  Discharge Equipment Recommendations:  none  Barriers to discharge:  None    Assessment:     Eddie Parada Sr. is a 89 y.o. male with a medical diagnosis of Septic shock.  He presents with The primary encounter diagnosis was Hypotension, unspecified hypotension type. Diagnoses of Abdominal wall abscess, Elevated troponin, Septic shock, Acute renal failure with acute tubular necrosis superimposed on stage 3 chronic kidney disease, unspecified whether stage 3a or 3b CKD, and Chronic atrial fibrillation were also pertinent to this visit.  . Performance deficits affecting function: weakness, gait instability, impaired balance, impaired endurance, impaired self care skills, impaired functional mobilty, impaired skin.      Pt would benefit from cont OT services in order to maximize functional independence. Recommending HHOT/PT at d/c. Owns recommended DME     Rehab Prognosis: Good; patient would benefit from acute skilled OT services to address these deficits and reach maximum level of function.       Plan:     Patient to be seen 3 x/week to address the above listed problems via self-care/home management, therapeutic activities, therapeutic exercises  · Plan of Care Expires: 04/07/22  · Plan of Care Reviewed with: patient    Subjective     Chief Complaint: pt without complaints at this time   Patient/Family Comments/goals: return home     Occupational Profile:  Living Environment: with spouse in H, ramp to enter, t/s combo  Previous level of function: independent; drives  Equipment Used at Home:  bath bench, walker, rolling  Assistance upon Discharge: from spouse     Pain/Comfort:  · Pain Rating 1: 0/10    Patients cultural, spiritual, [employer]ian conflicts given the current  situation:      Objective:     Communicated with: nsg prior to session.  General Precautions: Standard, fall   Orthopedic Precautions:N/A   Braces: N/A    Occupational Performance:    Bed Mobility:    · Patient completed Scooting/Bridging with stand by assistance  · Patient completed Supine to Sit with stand by assistance  · Patient completed Sit to Supine with stand by assistance    Functional Mobility/Transfers:  · Patient completed Sit <> Stand Transfer with stand by assistance and contact guard assistance  with  rolling walker   · Functional Mobility: CGA - SBA with RW     Activities of Daily Living:  Simulated- max A     Cognitive/Visual Perceptual:  WFL     Physical Exam:  Balance:    -       fair/fair + sitting/standing   Postural examination/scapula alignment:    -       Rounded shoulders  -       Forward head  -       Kyphosis  Skin integrity: Wound abdomen   Upper Extremity Range of Motion:   BUE WFL For pt's needs   Upper Extremity Strength:  BUE WFL for pt's needs    Strength:  WFL     AMPAC 6 Click ADL:  AMPAC Total Score: 21    Treatment & Education:  Pt educated on log rolling for bed mobility   Functional mobility performed in room and hallway with RW   Educated on home safety   Adaptive LBD education provided while seated EOB     Education:    Patient left supine with all lines intact, call button in reach, bed alarm on, nsg notified and spouse  present    GOALS:   Multidisciplinary Problems     Occupational Therapy Goals        Problem: Occupational Therapy Goal    Goal Priority Disciplines Outcome Interventions   Occupational Therapy Goal     OT, PT/OT Ongoing, Progressing    Description: Goals to be met by: 4/7/22     Patient will increase functional independence with ADLs by performing:    LE Dressing with Modified [place].  Grooming while standing with Modified [place].  Toileting from toilet with Modified [place] for hygiene and clothing management.   Supine to sit with  Modified [place].  Step transfer with Modified [place]  Toilet transfer to toilet with Modified [place].  Increased functional strength to WFL for self care skills and functional mobility .  Upper extremity exercise program x10 reps per handout, with independence.                     History:     Past Medical History:   Diagnosis Date    Abdominal fibromatosis     Acute posthemorrhagic anemia 1/9/2018    NIK (acute kidney injury) 1/11/2018    Atrial fibrillation     Bradycardia     Broken arm     CAD (coronary artery disease)     Cancer     basal cell on nose and melanoma on back    CHF (congestive heart failure)     COPD (chronic obstructive pulmonary disease)     Diabetes mellitus type II     Hyperlipidemia     Localization-related focal epilepsy with simple partial seizures 3/2/2021    Melanoma     Obesity (BMI 30.0-34.9) 9/13/2016    Osteoporosis     Peripheral vascular disease     Primary malignant neoplasm of prostate 3/3/2022    Septic shock 3/3/2022    Stroke     TIA (transient ischemic attack)     Type II diabetes mellitus with peripheral circulatory disorder     Type II or unspecified type diabetes mellitus without mention of complication, not stated as uncontrolled     Unspecified essential hypertension        Past Surgical History:   Procedure Laterality Date    AORTIC VALVE REPLACEMENT      CARDIAC PACEMAKER PLACEMENT  9/28/2012    CARDIAC VALVE REPLACEMENT  11/17/2011    aortic valve-tissue    CHOLECYSTECTOMY      COLONOSCOPY      ESOPHAGOGASTRODUODENOSCOPY N/A 5/27/2021    Procedure: EGD (ESOPHAGOGASTRODUODENOSCOPY);  Surgeon: Gualberto Medrano MD;  Location: [facility];  Service: Endoscopy;  Laterality: N/A;    ESOPHAGOGASTRODUODENOSCOPY N/A 6/22/2021    Procedure: EGD (ESOPHAGOGASTRODUODENOSCOPY);  Surgeon: Gualberto Medrano MD;  Location: [facility];  Service: Endoscopy;  Laterality: N/A;  please call wife(Jessica) with instructions/arrival  time.    ESOPHAGOGASTRODUODENOSCOPY (EGD) WITH DILATION  06/22/2021    EYE SURGERY Bilateral     cataract [facility] lens [facility]    HERNIA REPAIR      abdominal    LASIK      UPPER GASTROINTESTINAL ENDOSCOPY  05/28/2021       Time Tracking:     OT Date of Treatment: 03/07/22  OT Start Time: 1108  OT Stop Time: 1139  OT Total Time (min): 31 min    Billable Minutes:Evaluation 8  Therapeutic Activity 23    3/7/2022

## 2022-03-07 NOTE — ASSESSMENT & PLAN NOTE
- Continue pravastatin, but beta-blocker initially held due to low BP  - Resumed Coreg on 3/5/22 and Lisinopril 3/6/22

## 2022-03-07 NOTE — PLAN OF CARE
Problem: Adult Inpatient Plan of Care  Goal: Plan of Care Review  Outcome: Ongoing, Progressing     Plan of care reviewed with patient and spouse, understanding verbalized. Pt is AAO x 4, denies SOB and pain, on 2.5  L NC, no distress noted. Pt is paced on Tele, no true red alarms. Drain intact, draining dark red blood. All medications administered as prescribed. Pt is resting, bed in lowest position, HOB lowered, side rails up x 2, bed alarm activated, call light and bedside table within reach. Pt instructed to call if assistance is needed.

## 2022-03-07 NOTE — PROGRESS NOTES
[facility] Progress Note    Assessment/Plan:     Intra-abdominal abscess  89 yo male with a pmh of CAD, Afib on xarelto, heart failure, pacemaker insertion, CKD, COPD, former smoker, hernia repair, CVA, HTN, PVD, and DM2 who is admitted for abd abscess. Now s/p drianage. Patient is well appearing, white count improving. Cultures NGTD. Anaerobic cultures pending. if mesh is involved in infection, clearance may be difficult.     - recommend transition to rocephin and flagyl for 2 weeks, start date   22 - end date: 22    Bijal Valles MD  [facility] PGY-2  [facility] Consult Service  Cell: [phone]    Thank you for allowing us to participate in the care of this patient.  Case has been discussed with consult staff, who is in agreement with assessment and plan. ID will sign off. Please call with further questions or if culture results change.     Subjective:      No acute events overnight. Denies any fevers, chills, abdominal pain, nausea, and vomiting. Patient awake and in good spirits this morning.      Objective:   Last 24 Hour Vital Signs:  BP  Min: 131/60  Max: 159/66  Temp  Av.7 °F (36.5 °C)  Min: 96.2 °F (35.7 °C)  Max: 98.5 °F (36.9 °C)  Pulse  Av.8  Min: 59  Max: 64  Resp  Av  Min: 18  Max: 18  SpO2  Av.9 %  Min: 94 %  Max: 100 %  I/O last 3 completed shifts:  In: 1172.6 [P.O.:480; IV Piggyback:692.6]  Out: 1925 [Urine:1850; Drains:75]    Physical Exam  General: sitting up in bed, well appearing  HEENT: NC/AT, EOM intact, normal sclera, NC in place, moist mucous membranes  CV: regular rate and rhythm, no murmurs  Lungs: clear to auscultation bilaterally  Abd: soft, non-tender, non-distended, BS present, midline drain in place with bloody drainage in collection bag    Laboratory:  Laboratory Data Reviewed: yes  Pertinent Findings:  Recent Labs   Lab 22  0333 22  0342 22  0710 22  0316 22  0337 22  0649   WBC 14.22*   --  9.51  --   --  6.85   HGB 9.9*  --  9.2*  --   --  9.2*   HCT 30.3*  --  27.5*  --   --  28.5*   PLT 81*  --  101*  --   --  113*   *  --  102*  --   --  103*   RDW 12.8  --  12.6  --   --  12.2    140  --  140 142  --    K 3.5 4.0  --  3.4* 3.5  --     108  --  108 105  --    CO2 19* 19*  --  22* 27  --    BUN 27 26  --  18 19  --    CREATININE 1.4 1.2  --  0.9 1.0  --    * 110  --  133* 129*  --    PROT 6.4 6.2  --  6.0 6.1  --    ALBUMIN 3.1* 3.1*  --  2.8* 2.8*  --    BILITOT 1.0 1.1*  --  1.4* 1.3*  --    AST 25 29  --  22 19  --    ALKPHOS 85 115  --  116 109  --    ALT 8* 12  --  12 13  --          Microbiology Data:  Microbiology Results (last 7 days)     Procedure Component Value Units Date/Time    Aerobic culture [[id number]] Collected: 03/03/22 1625    Order Status: Completed Specimen: Abscess from Abdomen Updated: 03/07/22 0711     Aerobic Bacterial Culture No growth    Culture, Anaerobic [[id number]] Collected: 03/03/22 1625    Order Status: Completed Specimen: Abscess from Abdomen Updated: 03/05/22 1334     Anaerobic Culture Culture in progress    Gram stain [[id number]] Collected: 03/03/22 1625    Order Status: Completed Specimen: Abscess from Abdomen Updated: 03/04/22 0214     Gram Stain Result Rare WBC's      Few Gram positive rods      Rare Gram negative rods    Fungus culture [[id number]] Collected: 03/03/22 1625    Order Status: Sent Specimen: Abscess from Abdomen Updated: 03/04/22 0101          Antimicrobials:  Antibiotics (From admission, onward)            Start     Stop Route Frequency Ordered    03/03/22 2100  mupirocin 2 % ointment         03/08 2059 Nasl 2 times daily 03/03/22 1216    03/03/22 0530  piperacillin-tazobactam 4.5 g in dextrose 5 % 100 mL IVPB (ready to mix system)         -- IV Every 8 hours (non-standard times) 03/03/22 8381            Other Results:  Radiology Results:  X-Ray Chest 1 View    Result Date: 3/3/2022  EXAMINATION: XR CHEST 1 VIEW  CLINICAL HISTORY: Right IJ placement; TECHNIQUE: Single frontal view of the chest was performed. COMPARISON: 03/02/2022 FINDINGS: The heart is mildly enlarged.  Left-sided pacemaker device is in place.  Right IJ catheter terminates in the region of the proximal SVC.  No pneumothorax.  The lungs are clear.  Skeletal structures are intact.     As above. Electronically signed by: Sherri Suarez MD Date:    03/03/2022 Time:    07:52    IR Abscess Drainage With Tube Placement    Result Date: 3/5/2022  EXAMINATION: Drainage catheter placement Procedural Personnel Attending physician(s): Cody Reynolds MD Fellow physician(s): None Resident physician(s): None Advanced practice provider(s): None Pre-procedure diagnosis: Abscess Post-procedure diagnosis: Same Indication: Leukocytosis, abscess Additional clinical history: None Complications: No immediate complications. PROCEDURAL SUMMARY: Drainage catheter placement under ultrasound guidance PROCEDURE: Pre-procedure: Consent: Informed consent for the procedure was obtained and time-out was performed prior to the procedure. Preparation: The site was prepared and draped using maximal sterile barrier technique including cutaneous antisepsis. Antibiotic administered: None Anesthesia/sedation: Level of anesthesia/sedation: No sedation Anesthesia/sedation administered by: Not applicable Total intra-service sedation time (minutes): 0 Drainage catheter placement: The patient was positioned supine.  Initial ultrasound was performed.  Lidocaine 1% local anesthesia was administered.  Under real-time ultrasound guidance, an access needle was advanced into the collection.  An 035 Amplatz wire was coiled within the collection.  The tract was dilated the tract was dilated and a 10 Romanian drain was placed.  This was challenging due to the presence of mesh on top of the abscess.  Proximally 275 mL foul-smelling bloody material was removed. - Initial imaging findings: Large anterior abdominal  wall fluid and gas collection - Drainage catheter placed: 10 Scottish APD - External catheter securement: 2 0 silk - Post-drainage imaging findings: Catheter in satisfactory position Contrast: None Radiation Dose: None Additional Details: Additional description of procedure: None Equipment details: None Specimens removed: Yes, specimen was sent Estimated blood loss (mL): Less than 10 Standardized report: [email or web address]     Large fluid and gas collection in the anterior abd well. Unfortunately, appears to be deep to the mesh, not superficial too it, and it was difficult to puncture with a needle and difficult to dilate up to even 10 Scottish.  Not able to place a larger drain directly through the mesh.  275 mL foul-smelling bloody fluid removed. Plan: Catheter management per primary team. Attestation: Signer name: oCdy Reynolds MD I attest that I was present for the entire procedure. I reviewed the stored images and agree with the report as written. Electronically signed by: Cody Reynolds Date:    03/05/2022 Time:    11:17    X-Ray Chest AP Portable    Result Date: 3/2/2022  EXAMINATION: XR CHEST AP PORTABLE CLINICAL HISTORY: Sepsis; TECHNIQUE: Single frontal view of the chest was performed. COMPARISON: 09/20/2019 FINDINGS: There is a left-sided pacer with leads in the right atrium and right ventricle.  The patient is status post sternotomy.  There is no pneumothorax or pleural effusion.  There is left basilar atelectasis.     There is left basilar atelectasis although pulmonary aeration at the left lung base has improved from what was seen in 2019. Electronically signed by: Livia Rueda MD Date:    03/02/2022 Time:    17:34    Echo    Result Date: 3/3/2022  · The left ventricle is normal in size with normal systolic function. · The estimated ejection fraction is 55%. · Indeterminate left ventricular diastolic function. 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 ·  Normal right ventricular size with normal right ventricular systolic function. · Severe left atrial enlargement. · Severe right atrial enlargement. · Mild to moderate tricuspid regurgitation. 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 There is no aortic insufficiency present. Prosthetic aortic valve is normal. · The aortic valve mean gradient is 13 mmHg with a dimensionless index of 0.61.      CT Chest Abdomen Pelvis With Contrast    Result Date: 3/2/2022  EXAMINATION: CT CHEST ABDOMEN PELVIS WITH CONTRAST (XPD) CLINICAL HISTORY: Sepsis; TECHNIQUE: Low dose axial images, sagittal and coronal reformations were obtained from the thoracic inlet to the pubic symphysis following the IV administration of 75 mL of Omnipaque 350 .  No oral contrast was administered. COMPARISON: CT scan chest abdomen pelvis dated 05/27/2021 and chest x-ray dated 03/02/2022. FINDINGS: CT chest: The thyroid gland is diminutive.  The base of the neck is within normal limits.  The supraclavicular regions are unremarkable. The trachea is unremarkable.  The central airways are within normal limits.  There is no evidence of bronchiectasis.  No endobronchial lesion is identified. The heart is enlarged.  There are no pericardial effusions.  There is no evidence of intracardiac thrombus.  There are extensive coronary artery calcifications.  There are pacing wires in place. The thoracic aorta is normal in caliber.  There is no intimal flap to suggest dissection.  There are extensive calcifications along the course of the thoracic aorta.  The great vessels arising for aortic arch are within normal limits.  There are no filling defects within the pulmonary tree. There are subcentimeter lymph nodes within the hilar and mediastinal lymph node stations.  The axillary regions are unremarkable. There are no pleural effusions.  There is no evidence of a pneumothorax.  There is no evidence of pneumomediastinum.  Evaluation for pulmonary nodules is  difficult given extensive motion limitations.  There are changes suggestive of pulmonary emphysema. There are postoperative changes involving the distal portions of the esophagus..  Please see the abdomen findings for additional findings regarding the upper abdomen. There are chronic left-sided rib deformities.  There are changes of median sternotomy.  There is diffuse osteopenia.  There is no evidence of a fracture. CT abdomen pelvis: There is normal tapering of the abdominal aorta.  There are extensive calcifications along the course of the abdominal aorta and its branch vessels.  The celiac trunk, SMA, and JUAN remain patent.  The portal veins and mesenteric veins are within normal limits.  The IVC and the remainder of the venous structures are unremarkable. There is no evidence of lymphadenopathy in the abdomen or pelvis. There is a small hiatal hernia.  There is mild distention of the stomach.  The duodenum is unremarkable.  The small bowel loops are within normal limits.  The appendix is unremarkable.  There is extensive colonic diverticula without evidence of acute diverticulitis.  There is no CT evidence of bowel obstruction.  There are bilateral inguinal hernias with a right-sided inguinal hernia containing loops of large bowel.  The left-sided inguinal hernia contains fat. There is nodular contour of the liver.  No discrete hepatic lesion is identified.  The patient is status post cholecystectomy.  There is intrahepatic biliary dilatation.  The spleen is unremarkable.  The pancreas is atrophic.  The adrenal glands are unremarkable. There is unchanged appearance of multiple low-attenuation lesions in both kidneys the majority measuring the range of simple fluid.  The ureters are unremarkable.  There is circumferential wall thickening of the bladder.  The prostate gland is enlarged. There are postop changes of prior mesh repair involving the anterior abdominal wall hernia.  There is increase in size prior  collection in the anterior abdominal wall at the level of the mesh now measuring 17.1 x 9.1 x 13.4 cm.  There is air component within the collection along with dorsal extension into the abdominal cavity.  There are attenuation components within the collection measuring up to 50 Hounsfield units. The psoas margins are unremarkable.  There are bilateral inguinal hernias as described above.  There are degenerative changes in the osseous structures.  There is no evidence of a fracture.     CT chest: No acute intrathoracic process. Changes of pulmonary emphysema. CT abdomen pelvis: Large 17.1 x 9.1 x 13.4 cm abscess in the anterior abdominal wall at the level of prior mesh repair with intra-abdominal extension.  Surgical consultation is recommended. High attenuation components within the collection may represent blood products.  Correlation with laboratory findings is suggested. Additional findings as above. This report was flagged in Epic as abnormal. Electronically signed by: Abisai Maynard MD Date:    03/02/2022 Time:    19:55    CT Abdomen Pelvis  Without Contrast    Result Date: 3/4/2022  EXAMINATION: CT ABDOMEN PELVIS WITHOUT CONTRAST CLINICAL HISTORY: Abdominal abscess/infection suspected; Cutaneous abscess of abdominal wall TECHNIQUE: Low dose axial images, sagittal and coronal reformations were obtained from the lung bases to the pubic symphysis.  30 mL of oral Omnipaque 350 was administered. COMPARISON: CT chest abdomen pelvis dated 03/02/2021 and CT renal stone study dated 01/11/2018 FINDINGS: Limited images through the lower chest demonstrate partial visualized cardiac pacer/AICD lead.  Stable punctate peripheral right lung nodule (series 2, image 11). Abdomen and pelvis: The liver, spleen, and adrenal glands demonstrate unremarkable noncontrast appearance.  Few punctate pancreatic parenchymal calcifications.  Nonspecific bilateral perinephric stranding with scattered cortical hypodense lesions, some in keeping  with cysts with others too small to characterize.  Partial exophytic right renal lesion, not definitively simple by criteria measuring 1.2 cm (series 2, image 60), though stable from 2018.  Contrast retention within the urinary bladder with mild wall trabeculation.  Prostate is enlarged. The GI tract is normal in caliber with colonic diverticulosis.  Appendix is normal.  Prior postprocedural change at the gastroesophageal level.  Large bilateral fat containing inguinal hernias containing portion of nonobstructed bowel on the right.  No lymphadenopathy.  Similar mild presacral stranding.  Multifocal aortoiliac atherosclerosis.  Nonspecific induration change and stranding of the anterior right upper thigh. Redemonstration of mixed air and fluid collection at the anterior abdominal wall at site of previous surgical mash.  Collection measures 17.7 x 6.1 cm TR by AP (series 2, image 69) and up to 14.0 cm cc (series 602, image 127).  Percutaneous drain in place.  Collection previously measuring 16.1 x 8.5 cm TR by AP and 13.5 cm craniocaudal. No aggressive osseous lesion.  Right femoral intramedullary nail.  Multilevel spine DJD and hip degenerative change.     Redemonstration of mixed air and fluid collection of the anterior abdominal wall at site of hernia mesh with percutaneous drain in place.  Collection appears slightly more elongated in the transverse dimension with reduction of AP diameter from prior. Renal cortical lesions, possibly cysts though many too small to characterize.  This can be further evaluated with nonemergent renal ultrasound. Delayed contrast retention within the urinary bladder from prior administration.  Mild bladder wall trabeculation with enlarged prostate. Nonspecific induration change and subcutaneous stranding of the right anterior thigh, of uncertain etiology.  Suggest correlation with physical exam. Additional findings as above. Electronically signed by: Bud Quick  Date:    03/04/2022 Time:    14:16      Current Medications:     Infusions:   sodium chloride 0.9% 5 mL/hr at 03/04/22 0515        Scheduled:   carvediloL  12.5 mg Oral BID WM    doxazosin  1 mg Oral Nightly    levothyroxine  75 mcg Oral Before breakfast    lisinopriL  20 mg Oral Daily    mupirocin   Nasal BID    pantoprazole  40 mg Oral Daily    piperacillin-tazobactam (ZOSYN) IVPB  4.5 g Intravenous Q8H    pravastatin  20 mg Oral QHS    tamsulosin  0.4 mg Oral Daily        PRN:  acetaminophen, melatonin, ondansetron, sodium chloride 0.9%

## 2022-03-07 NOTE — ASSESSMENT & PLAN NOTE
- Baseline creatinine 1.4, presented with creatinine 1.9 in setting of septic shock  - Creatinine improved back to baseline and currently is 1.0  - Stopped IV fluids on 3/5/22   - Continue to monitor

## 2022-03-07 NOTE — ASSESSMENT & PLAN NOTE
Abdominal wall abscess   Leukocytosis   - Presented with hypotension, fevers, and confusion  - Large abdominal abscess noted on CT  - Initiated on levophed after 2L NS bolus in ED, but was weaned off levophed on arrival to ICU  - General surgery is following with no recommendation for surgery at this time  - Status post drain placement by IR on 3/3/2022  - Blood cultures no growth to date, and abscess culture pending  - De-escalation with stopping vancomycin on 3/5/22  - Continue Zosyn for now  - Advanced diet and case discussed with general surgery, currently with no surgical intervention planned at this time  - Difficult case given abscess involves the mesh  - Appreciate ID input, waiting abscess culture results  - Progressing well with PT and OT, recommend HH

## 2022-03-07 NOTE — PLAN OF CARE
Problem: Occupational Therapy Goal  Goal: Occupational Therapy Goal  Description: Goals to be met by: 4/7/22     Patient will increase functional independence with ADLs by performing:    LE Dressing with Modified [place].  Grooming while standing with Modified [place].  Toileting from toilet with Modified [place] for hygiene and clothing management.   Supine to sit with Modified [place].  Step transfer with Modified [place]  Toilet transfer to toilet with Modified [place].  Increased functional strength to WFL for self care skills and functional mobility .  Upper extremity exercise program x10 reps per handout, with independence.    Outcome: Ongoing, Progressing     Pt would benefit from cont OT services in order to maximize functional independence. Recommending HHOT/PT at d/c. Owns recommended DME

## 2022-03-07 NOTE — ASSESSMENT & PLAN NOTE
- Initially held BP meds due to septic shock  - Resumed Coreg on 3/5/22  - Resumed Lisinopril 3/6/22  - Continue to hold amlodipine today and continue to monitor

## 2022-03-07 NOTE — SUBJECTIVE & OBJECTIVE
Interval History: VSS. On 2.5 L NC. Started on IV zosyn. Antibiotic recommendations per ID pending result of cultures.     Review of Systems   Constitutional:  Positive for appetite change (decreased). Negative for fatigue and fever.   HENT:  Negative for congestion, rhinorrhea, sore throat and trouble swallowing.    Eyes:  Negative for visual disturbance.   Respiratory:  Negative for chest tightness and shortness of breath.    Cardiovascular:  Negative for chest pain.   Gastrointestinal:  Negative for abdominal pain, constipation, diarrhea, nausea and vomiting.   Genitourinary:  Negative for difficulty urinating, dysuria, hematuria and urgency.   Skin:  Negative for color change and pallor.   Neurological:  Negative for dizziness, speech difficulty, light-headedness and headaches.   Psychiatric/Behavioral:  Negative for agitation, confusion, dysphoric mood and sleep disturbance. The patient is not nervous/anxious.    Objective:     Vital Signs (Most Recent):  Temp: 97.5 °F (36.4 °C) (03/07/22 1200)  Pulse: (!) 59 (03/07/22 1200)  Resp: 18 (03/07/22 1200)  BP: (!) 131/59 (03/07/22 1200)  SpO2: (!) 85 % (03/07/22 1200)   Vital Signs (24h Range):  Temp:  [96.2 °F (35.7 °C)-98.5 °F (36.9 °C)] 97.1 °F (36.2 °C)  Pulse:  [59-64] 61  Resp:  [18] 18  SpO2:  [94 %-100 %] 97 %  BP: (131-159)/(60-67) 159/66     Weight: 83.5 kg (184 lb 1.4 oz)  Body mass index is 30.63 kg/m².    Intake/Output Summary (Last 24 hours) at 3/7/2022 1106  Last data filed at 3/7/2022 0610  Gross per 24 hour   Intake 892.62 ml   Output 650 ml   Net 242.62 ml      Physical Exam  Constitutional:       General: He is not in acute distress.     Appearance: Normal appearance. He is normal weight. He is not ill-appearing or toxic-appearing.   HENT:      Head: Normocephalic and atraumatic.      Mouth/Throat:      Mouth: Mucous membranes are dry.      Pharynx: Oropharynx is clear.   Eyes:      Extraocular Movements: Extraocular movements intact.      Pupils:  Pupils are equal, round, and reactive to light.   Cardiovascular:      Rate and Rhythm: Normal rate and regular rhythm.      Comments: Pacemaker present  Pulmonary:      Effort: Pulmonary effort is normal.      Breath sounds: Normal breath sounds.   Abdominal:      General: Abdomen is flat. There is no distension.      Palpations: Abdomen is soft.      Tenderness: There is no abdominal tenderness.   Musculoskeletal:      Right lower leg: No edema.      Left lower leg: No edema.   Skin:     General: Skin is warm and dry.   Neurological:      Mental Status: He is alert and oriented to person, place, and time. Mental status is at baseline.   Psychiatric:         Mood and Affect: Mood normal.         Behavior: Behavior normal.         Thought Content: Thought content normal.         Judgment: Judgment normal.       Significant Labs: All pertinent labs within the past 24 hours have been reviewed.  BMP:   Recent Labs   Lab 03/07/22  0337   *      K 3.5      CO2 27   BUN 19   CREATININE 1.0   CALCIUM 8.8   MG 1.9     CBC:   Recent Labs   Lab 03/07/22  0649   WBC 6.85   HGB 9.2*   HCT 28.5*   *     CMP:   Recent Labs   Lab 03/06/22  0316 03/07/22  0337    142   K 3.4* 3.5    105   CO2 22* 27   * 129*   BUN 18 19   CREATININE 0.9 1.0   CALCIUM 8.5* 8.8   PROT 6.0 6.1   ALBUMIN 2.8* 2.8*   BILITOT 1.4* 1.3*   ALKPHOS 116 109   AST 22 19   ALT 12 13   ANIONGAP 10 10   EGFRNONAA >60 >60       Significant Imaging: I have reviewed all pertinent imaging results/findings within the past 24 hours.

## 2022-03-07 NOTE — PROGRESS NOTES
[facility] Medicine  Progress Note    Patient Name: Eddie Parada Sr.  MRN: [id number]  Patient Class: IP- Inpatient   Admission Date: 3/3/2022  Length of Stay: 4 days  Attending Physician: Olga Lozano MD  Primary Care Provider: Callum Roberts MD        Subjective:     Principal Problem:Septic shock        HPI:  Eddie Parada is a 89 yo male with a pmh of CAD, Afib on xarelto, heart failure, pacemaker insertion, CKD, COPD, former smoker, hernia repair, CVA, HTN, PVD, DM2. He presented to [facility] ED with AMS and fever yesterday. He was brought in by EMS. He was in septic shock. Given 2L NS vancomycin, zosyn, tylenol, ibuprofen, and started on levophed while in the ED. CT abd with large abscess noted in the anterior abdominal wall at the level of prior mesh repair. He was transferred to [facility] for surgery evaluation for abscess drainage. He is alert and oriented on arrival to [place]. BP stable off levophed. He reports he woke up feeling fine and had routine labs done yesterday morning. He then began feeling cold and his body was shaking. His wife reports she took his temp and noted 97.6F. He took a nap and woke up confused. His wife reports his temp was then 104 and he was short of breath. She called EMS at that time. His wife noticed his upper abdomen was distended while he was in the ED. He denies ever having abdominal pain or nausea. Had been eating well. He had not had a bowel movement in 3 days so he took a laxative yesterday morning.  He had an abdominal hernia repair with mesh placement about 20 years ago in [place]. Has not had any issues since then. He still drives and runs a business. He is followed by [facility] in [place] and had his pacemaker replaced about 2 weeks ago. He took antibiotics as prescribed at that time.       Overview/Hospital Course:  Mr. Parada presented with fever with altered mental status.  Admitted in septic shock to the ICU due to intra-abdominal  abscess identified on CT scan.  Empiric treatment with Zosyn and vancomycin given.  Patient required short time with Levophed, but was able to be stepped down to the floor on 03/03/2022. General surgery consulted with no plans for surgery and with recommendation to consult IR.  Drain was placed by IR on 03/03/2022.  ID consulted, started on IV zosyn, awaiting final antibiotic recommendations pending culture results.       Interval History: VSS. On 2.5 L NC. Started on IV zosyn. Antibiotic recommendations per ID pending result of cultures.     Review of Systems   Constitutional:  Positive for appetite change (decreased). Negative for fatigue and fever.   HENT:  Negative for congestion, rhinorrhea, sore throat and trouble swallowing.    Eyes:  Negative for visual disturbance.   Respiratory:  Negative for chest tightness and shortness of breath.    Cardiovascular:  Negative for chest pain.   Gastrointestinal:  Negative for abdominal pain, constipation, diarrhea, nausea and vomiting.   Genitourinary:  Negative for difficulty urinating, dysuria, hematuria and urgency.   Skin:  Negative for color change and pallor.   Neurological:  Negative for dizziness, speech difficulty, light-headedness and headaches.   Psychiatric/Behavioral:  Negative for agitation, confusion, dysphoric mood and sleep disturbance. The patient is not nervous/anxious.    Objective:     Vital Signs (Most Recent):  Temp: 97.5 °F (36.4 °C) (03/07/22 1200)  Pulse: (!) 59 (03/07/22 1200)  Resp: 18 (03/07/22 1200)  BP: (!) 131/59 (03/07/22 1200)  SpO2: (!) 85 % (03/07/22 1200)   Vital Signs (24h Range):  Temp:  [96.2 °F (35.7 °C)-98.5 °F (36.9 °C)] 97.1 °F (36.2 °C)  Pulse:  [59-64] 61  Resp:  [18] 18  SpO2:  [94 %-100 %] 97 %  BP: (131-159)/(60-67) 159/66     Weight: 83.5 kg (184 lb 1.4 oz)  Body mass index is 30.63 kg/m².    Intake/Output Summary (Last 24 hours) at 3/7/2022 1106  Last data filed at 3/7/2022 0610  Gross per 24 hour   Intake 892.62 ml    Output 650 ml   Net 242.62 ml      Physical Exam  Constitutional:       General: He is not in acute distress.     Appearance: Normal appearance. He is normal weight. He is not ill-appearing or toxic-appearing.   HENT:      Head: Normocephalic and atraumatic.      Mouth/Throat:      Mouth: Mucous membranes are dry.      Pharynx: Oropharynx is clear.   Eyes:      Extraocular Movements: Extraocular movements intact.      Pupils: Pupils are equal, round, and reactive to light.   Cardiovascular:      Rate and Rhythm: Normal rate and regular rhythm.      Comments: Pacemaker present  Pulmonary:      Effort: Pulmonary effort is normal.      Breath sounds: Normal breath sounds.   Abdominal:      General: Abdomen is flat. There is no distension.      Palpations: Abdomen is soft.      Tenderness: There is no abdominal tenderness.   Musculoskeletal:      Right lower leg: No edema.      Left lower leg: No edema.   Skin:     General: Skin is warm and dry.   Neurological:      Mental Status: He is alert and oriented to person, place, and time. Mental status is at baseline.   Psychiatric:         Mood and Affect: Mood normal.         Behavior: Behavior normal.         Thought Content: Thought content normal.         Judgment: Judgment normal.       Significant Labs: All pertinent labs within the past 24 hours have been reviewed.  BMP:   Recent Labs   Lab 03/07/22  0337   *      K 3.5      CO2 27   BUN 19   CREATININE 1.0   CALCIUM 8.8   MG 1.9     CBC:   Recent Labs   Lab 03/07/22  0649   WBC 6.85   HGB 9.2*   HCT 28.5*   *     CMP:   Recent Labs   Lab 03/06/22  0316 03/07/22  0337    142   K 3.4* 3.5    105   CO2 22* 27   * 129*   BUN 18 19   CREATININE 0.9 1.0   CALCIUM 8.5* 8.8   PROT 6.0 6.1   ALBUMIN 2.8* 2.8*   BILITOT 1.4* 1.3*   ALKPHOS 116 109   AST 22 19   ALT 12 13   ANIONGAP 10 10   EGFRNONAA >60 >60       Significant Imaging: I have reviewed all pertinent imaging  results/findings within the past 24 hours.      Assessment/Plan:      * Septic shock  Abdominal wall abscess   Leukocytosis   - Presented with hypotension, fevers, and confusion  - Large abdominal abscess noted on CT  - Initiated on levophed after 2L NS bolus in ED, but was weaned off levophed on arrival to ICU  - General surgery is following with no recommendation for surgery at this time  - Status post drain placement by IR on 3/3/2022  - Blood cultures no growth to date, and abscess culture pending  - De-escalation with stopping vancomycin on 3/5/22  - Continue Zosyn for now  - Advanced diet and case discussed with general surgery, currently with no surgical intervention planned at this time  - Difficult case given abscess involves the mesh  - Appreciate ID input, waiting abscess culture results  - Progressing well with PT and OT, recommend HH    Intra-abdominal abscess  See septic shock    Hypomagnesemia  - Repleted  - Continue to monitor    Elevated troponin  - Troponin 0.056--0.100 and reported chest pain  - In setting of septic shock  - No ischemic changes on EKG  - Troponins trended and echo done, not consistent with ACS and no further workup is indicated at this time    Abdominal wall abscess  - CT abdomen with large 17.1 x 9.1 x 13.4 cm abscess in the anterior abdominal wall at the level of prior mesh repair  - As discussed above under sepsis    Dysphagia  - Patient denies trouble eating, takes small bites  - Diet advanced and he is eating without difficulty    Hypothyroidism due to acquired atrophy of thyroid  - Continue Synthroid    Acute renal failure superimposed on stage 3 chronic kidney disease  - Baseline creatinine 1.4, presented with creatinine 1.9 in setting of septic shock  - Creatinine improved back to baseline and currently is 1.0  - Stopped IV fluids on 3/5/22   - Continue to monitor    Long term current use of anticoagulant therapy  - Hold Xarelto (rivaroxaban)    (HFpEF) heart failure with  preserved ejection fraction  - BNP at baseline and no signs of volume overload   - No recent echo on file   - Echo done on 3/03/2022 shows EF is 55% with indeterminate diastolic function  - Continue to hold lasix  - Beta-blocker to restart on 3/5/22  - Advanced diet and stopped IV fluids on 3/5/22    Type 2 diabetes mellitus with neurologic complication  Lab Results   Component Value Date    HGBA1C 5.9 (H) 03/02/2022   - Hold amaryl and actos  - Glucose levels were reviewed, currently in target range  - Monitor with Accu-Cheks and continue sliding scale insulin  - Continue to monitor levels and make adjustments as necessary to achieve levels of 140-180 during hospitalization    Cardiac pacemaker in situ  - Changed a few weeks ago, noted    CAD (coronary artery disease)  - Continue pravastatin, but beta-blocker initially held due to low BP  - Resumed Coreg on 3/5/22 and Lisinopril 3/6/22    Essential (primary) hypertension  - Initially held BP meds due to septic shock  - Resumed Coreg on 3/5/22  - Resumed Lisinopril 3/6/22  - Continue to hold amlodipine today and continue to monitor    Chronic atrial fibrillation  - Held beta-blocker for now in setting of septic shock  - Hold rivaroxaban for anticoagulation  - Resumed Coreg on 3/5/22      VTE Risk Mitigation (From admission, onward)         Ordered     IP VTE HIGH RISK PATIENT  Once         03/03/22 0400     Place sequential compression device  Until discontinued         03/03/22 0400                Discharge Planning   KANDICE:      Code Status: Full Code   Is the patient medically ready for discharge?:     Reason for patient still in hospital (select all that apply): Patient trending condition  Discharge Plan A: Home Health                  Ebonie Valdes PA-C  Department of Hospital Medicine   [facility]

## 2022-03-07 NOTE — PLAN OF CARE
SW sent referral to [facility] for IV abx.     Future Appointments   Date Time Provider Department Center   3/9/2022  8:00 AM Callum Roberts MD [facility]        03/07/22 7112   Post-Acute Status   Post-Acute Authorization IV Infusion   IV Infusion Status Referral(s) sent   Discharge Delays None known at this time   Discharge Plan   Discharge Plan A Home Health   Discharge Plan B Home with family

## 2022-03-07 NOTE — ASSESSMENT & PLAN NOTE
- BNP at baseline and no signs of volume overload   - No recent echo on file   - Echo done on 3/03/2022 shows EF is 55% with indeterminate diastolic function  - Continue to hold lasix  - Beta-blocker to restart on 3/5/22  - Advanced diet and stopped IV fluids on 3/5/22

## 2022-03-07 NOTE — PROGRESS NOTES
Neuroendocrine Surgery Progress Note  Admit Date: 3/3/2022  Hospital Day: 4    S:  Patient doing well this AM  Got cathflo to drain yesterday  90 mL dark sanguinous output overnight  Cx growing, GPR, GNR    O:  Vitals:   Temp:  [96 °F (35.6 °C)-98.5 °F (36.9 °C)]   Pulse:  [59-64]   Resp:  [18]   BP: (131-171)/(60-72)   SpO2:  [94 %-100 %]     Diet diabetic [facility]; 2000 Calorie; Cardiac (Low Na/Chol)   Intake/Output Summary (Last 24 hours) at 3/7/2022 0628  Last data filed at 3/7/2022 0610  Gross per 24 hour   Intake 892.62 ml   Output 650 ml   Net 242.62 ml            Physical Exam:  Gen: NAD, AAOx3  HEENT: EOMI, NCAT  CV: RR  Resp: no shortness of breath, normal WOB  Abd: soft, appropriately tender around drain site, drain w/dark serosanguinous output  Ext: Moving all extremities       Labs:  Recent Labs     03/05/22  0342 03/05/22  0710 03/06/22  0316 03/07/22  0337   WBC  --  9.51  --   --    HGB  --  9.2*  --   --    HCT  --  27.5*  --   --    PLT  --  101*  --   --      --  140 142   K 4.0  --  3.4* 3.5     --  108 105   CO2 19*  --  22* 27   BUN 26  --  18 19   CREATININE 1.2  --  0.9 1.0   BILITOT 1.1*  --  1.4* 1.3*   AST 29  --  22 19   ALT 12  --  12 13   ALKPHOS 115  --  116 109   CALCIUM 8.3*  --  8.5* 8.8   ALBUMIN 3.1*  --  2.8* 2.8*   PROT 6.2  --  6.0 6.1   MG 2.2  --  2.0 1.9   PHOS 2.6*  --  2.0* 2.4*     Scheduled Meds: carvediloL, 12.5 mg, BID WM  doxazosin, 1 mg, Nightly  levothyroxine, 75 mcg, Before breakfast  lisinopriL, 20 mg, Daily  mupirocin, , BID  pantoprazole, 40 mg, Daily  piperacillin-tazobactam (ZOSYN) IVPB, 4.5 g, Q8H  pravastatin, 20 mg, QHS  tamsulosin, 0.4 mg, Daily       sodium chloride 0.9% 5 mL/hr at 03/04/22 0515       A/P:  89 y.o. male admitted 3/3/2022 with infected mesh hematoma s/p IR drainage  - Continue to measure drain output  - Awaiting speciation of drain fluid culture  - Continue on IV Abx per primary team     Erin Archer MD  [employer]  General Surgery, [facility]

## 2022-03-07 NOTE — PLAN OF CARE
SW requested HH orders for doctors. Doctor reported that she will place HH orders tomorrow when final recs are done.     SW will continue to follow pt throughout his transitions of care and assist with any dc needs.     Future Appointments   Date Time Provider Department Center   3/9/2022  8:00 AM Callum Roberts MD [facility]

## 2022-03-08 ENCOUNTER — PATIENT OUTREACH (OUTPATIENT)
Dept: ADMINISTRATIVE | Facility: OTHER | Age: 87
End: 2022-03-08
Payer: MEDICARE

## 2022-03-08 VITALS
OXYGEN SATURATION: 95 % | BODY MASS INDEX: 30.67 KG/M2 | HEIGHT: 65 IN | TEMPERATURE: 98 F | RESPIRATION RATE: 18 BRPM | SYSTOLIC BLOOD PRESSURE: 145 MMHG | DIASTOLIC BLOOD PRESSURE: 66 MMHG | WEIGHT: 184.06 LBS | HEART RATE: 60 BPM

## 2022-03-08 DIAGNOSIS — E11.9 TYPE 2 DIABETES MELLITUS, CONTROLLED: ICD-10-CM

## 2022-03-08 LAB
ALBUMIN SERPL BCP-MCNC: 2.7 G/DL (ref 3.5–5.2)
ALP SERPL-CCNC: 104 U/L (ref 55–135)
ALT SERPL W/O P-5'-P-CCNC: 11 U/L (ref 10–44)
ANION GAP SERPL CALC-SCNC: 8 MMOL/L (ref 8–16)
AST SERPL-CCNC: 16 U/L (ref 10–40)
BASOPHILS # BLD AUTO: 0.05 K/UL (ref 0–0.2)
BASOPHILS NFR BLD: 0.6 % (ref 0–1.9)
BILIRUB SERPL-MCNC: 1 MG/DL (ref 0.1–1)
BUN SERPL-MCNC: 18 MG/DL (ref 10–30)
CALCIUM SERPL-MCNC: 8.4 MG/DL (ref 8.7–10.5)
CHLORIDE SERPL-SCNC: 106 MMOL/L (ref 95–110)
CO2 SERPL-SCNC: 26 MMOL/L (ref 23–29)
CREAT SERPL-MCNC: 0.8 MG/DL (ref 0.5–1.4)
DIFFERENTIAL METHOD: ABNORMAL
EOSINOPHIL # BLD AUTO: 0.1 K/UL (ref 0–0.5)
EOSINOPHIL NFR BLD: 0.7 % (ref 0–8)
ERYTHROCYTE [DISTWIDTH] IN BLOOD BY AUTOMATED COUNT: 12 % (ref 11.5–14.5)
EST. GFR  (AFRICAN AMERICAN): >60 ML/MIN/1.73 M^2
EST. GFR  (NON AFRICAN AMERICAN): >60 ML/MIN/1.73 M^2
GLUCOSE SERPL-MCNC: 131 MG/DL (ref 70–110)
HCT VFR BLD AUTO: 29.3 % (ref 40–54)
HGB BLD-MCNC: 9.8 G/DL (ref 14–18)
IMM GRANULOCYTES # BLD AUTO: 0.38 K/UL (ref 0–0.04)
IMM GRANULOCYTES NFR BLD AUTO: 4.5 % (ref 0–0.5)
LYMPHOCYTES # BLD AUTO: 1 K/UL (ref 1–4.8)
LYMPHOCYTES NFR BLD: 11.3 % (ref 18–48)
MAGNESIUM SERPL-MCNC: 1.7 MG/DL (ref 1.6–2.6)
MCH RBC QN AUTO: 33.7 PG (ref 27–31)
MCHC RBC AUTO-ENTMCNC: 33.4 G/DL (ref 32–36)
MCV RBC AUTO: 101 FL (ref 82–98)
MONOCYTES # BLD AUTO: 1 K/UL (ref 0.3–1)
MONOCYTES NFR BLD: 12.2 % (ref 4–15)
NEUTROPHILS # BLD AUTO: 6 K/UL (ref 1.8–7.7)
NEUTROPHILS NFR BLD: 70.7 % (ref 38–73)
NRBC BLD-RTO: 0 /100 WBC
PHOSPHATE SERPL-MCNC: 2.1 MG/DL (ref 2.7–4.5)
PLATELET # BLD AUTO: 143 K/UL (ref 150–450)
PMV BLD AUTO: 10.4 FL (ref 9.2–12.9)
POTASSIUM SERPL-SCNC: 3.2 MMOL/L (ref 3.5–5.1)
PROT SERPL-MCNC: 5.8 G/DL (ref 6–8.4)
RBC # BLD AUTO: 2.91 M/UL (ref 4.6–6.2)
SODIUM SERPL-SCNC: 140 MMOL/L (ref 136–145)
WBC # BLD AUTO: 8.51 K/UL (ref 3.9–12.7)

## 2022-03-08 PROCEDURE — 97110 THERAPEUTIC EXERCISES: CPT

## 2022-03-08 PROCEDURE — 25000003 PHARM REV CODE 250: Performed by: FAMILY MEDICINE

## 2022-03-08 PROCEDURE — 25000003 PHARM REV CODE 250: Performed by: HOSPITALIST

## 2022-03-08 PROCEDURE — 83735 ASSAY OF MAGNESIUM: CPT | Performed by: NURSE PRACTITIONER

## 2022-03-08 PROCEDURE — A4216 STERILE WATER/SALINE, 10 ML: HCPCS | Performed by: HOSPITALIST

## 2022-03-08 PROCEDURE — 36415 COLL VENOUS BLD VENIPUNCTURE: CPT | Performed by: NURSE PRACTITIONER

## 2022-03-08 PROCEDURE — 25000003 PHARM REV CODE 250

## 2022-03-08 PROCEDURE — 36415 COLL VENOUS BLD VENIPUNCTURE: CPT

## 2022-03-08 PROCEDURE — 84100 ASSAY OF PHOSPHORUS: CPT | Performed by: NURSE PRACTITIONER

## 2022-03-08 PROCEDURE — 94761 N-INVAS EAR/PLS OXIMETRY MLT: CPT

## 2022-03-08 PROCEDURE — 85025 COMPLETE CBC W/AUTO DIFF WBC: CPT

## 2022-03-08 PROCEDURE — 63600175 PHARM REV CODE 636 W HCPCS

## 2022-03-08 PROCEDURE — 97116 GAIT TRAINING THERAPY: CPT

## 2022-03-08 PROCEDURE — 25000003 PHARM REV CODE 250: Performed by: NURSE PRACTITIONER

## 2022-03-08 PROCEDURE — 80053 COMPREHEN METABOLIC PANEL: CPT | Performed by: NURSE PRACTITIONER

## 2022-03-08 RX ORDER — METRONIDAZOLE 500 MG/1
500 TABLET ORAL EVERY 8 HOURS
Qty: 27 TABLET | Refills: 0 | Status: ON HOLD | OUTPATIENT
Start: 2022-03-08 | End: 2022-03-14 | Stop reason: SDUPTHER

## 2022-03-08 RX ORDER — TAMSULOSIN HYDROCHLORIDE 0.4 MG/1
0.4 CAPSULE ORAL DAILY
Qty: 30 CAPSULE | Refills: 11 | Status: SHIPPED | OUTPATIENT
Start: 2022-03-08 | End: 2023-03-08

## 2022-03-08 RX ADMIN — POTASSIUM BICARBONATE 20 MEQ: 391 TABLET, EFFERVESCENT ORAL at 11:03

## 2022-03-08 RX ADMIN — CARVEDILOL 12.5 MG: 12.5 TABLET, FILM COATED ORAL at 08:03

## 2022-03-08 RX ADMIN — PANTOPRAZOLE SODIUM 40 MG: 40 TABLET, DELAYED RELEASE ORAL at 08:03

## 2022-03-08 RX ADMIN — SODIUM CHLORIDE, PRESERVATIVE FREE 10 ML: 5 INJECTION INTRAVENOUS at 06:03

## 2022-03-08 RX ADMIN — LEVOTHYROXINE SODIUM 75 MCG: 75 TABLET ORAL at 06:03

## 2022-03-08 RX ADMIN — CEFTRIAXONE 1 G: 1 INJECTION, SOLUTION INTRAVENOUS at 11:03

## 2022-03-08 RX ADMIN — MUPIROCIN: 20 OINTMENT TOPICAL at 08:03

## 2022-03-08 RX ADMIN — METRONIDAZOLE 500 MG: 500 TABLET ORAL at 06:03

## 2022-03-08 RX ADMIN — LISINOPRIL 20 MG: 20 TABLET ORAL at 08:03

## 2022-03-08 RX ADMIN — TAMSULOSIN HYDROCHLORIDE 0.4 MG: 0.4 CAPSULE ORAL at 08:03

## 2022-03-08 RX ADMIN — SODIUM CHLORIDE, PRESERVATIVE FREE 10 ML: 5 INJECTION INTRAVENOUS at 11:03

## 2022-03-08 NOTE — PLAN OF CARE
SW met with pts wife via [employer] to discuss dc planning.     Pts wife confirmed that she is agreeable to administer IV abx to pt.   SW expressed to pts wife that HH orders have been sent to [facility]. SW expressed that N will select HH agency. SW expressed that once HH agency is selected  they will contact to schedule first appointment time/date. SW provided pts wife with her contact information. SW expressed that if she does not here from the  agency by Thursday to provide sw a call so she can follow up on what is going on.     Pt and pts wife have received education on how to administer IV ax by [facility] staff. [facility] will deliver IV abx to pts home.     Pts wife will transport him home upon dc. Pt wife expressed understanding of entire conversation and did not have any questions.     SW will continue to follow pt throughout his transitions of care and assist with any dc needs.     --11:15 am SW spoke with Isak from [facility]. Isak expressed that pt is on her list to review. JOELLEN was made aware that they will select  agency for pt.     1:04 PM SW was made aware by [facility] staff that pt has been approved by [facility] agency. They will call and schedule first apt time/date.     Established Patient Visit with Callum Roberts MD  Wednesday Mar 9, 2022 8:00 AM  Internal Medicine Clinic    Future Appointments   Date Time Provider Department Center   3/9/2022  8:00 AM Callum Roberts MD [facility]   3/15/2022  2:00 PM Estevan Cortez MD [facility]        03/08/22 1059   Final Note   Assessment Type Final Discharge Note   Anticipated Discharge Disposition Home-Health   What phone number can be called within the next 1-3 days to see how you are doing after discharge? [phone]   Hospital Resources/Appts/Education Provided Appointments scheduled by Navigator/Coordinator   Post-Acute Status   IV Infusion Status Set-up Complete/Auth obtained   Discharge Delays None  known at this time

## 2022-03-08 NOTE — PROGRESS NOTES
Neuroendocrine Surgery Progress Note  Admit Date: 3/3/2022  Hospital Day: 5    S:  Patient w/cathflo via drain yesterday  350 mL dark sanguinous output after undoing clamp  Additional approximately 150 mL output this AM  Patient tolerating diet  Sating well on RA    O:  Vitals:   Temp:  [96.2 °F (35.7 °C)-98.5 °F (36.9 °C)]   Pulse:  [59-64]   Resp:  [16-20]   BP: (131-160)/(59-67)   SpO2:  [85 %-100 %]     Diet diabetic [facility]; 2000 Calorie; Cardiac (Low Na/Chol)   Intake/Output Summary (Last 24 hours) at 3/8/2022 0700  Last data filed at 3/8/2022 0600  Gross per 24 hour   Intake 10 ml   Output 350 ml   Net -340 ml            Physical Exam:  Gen: NAD, AAOx3  HEENT: EOMI, NCAT  CV: RR  Resp: no shortness of breath, normal WOB  Abd: soft, appropriately tender around drain site, abdomen less distended, drain with dark serosanguinous drainage  Ext: Moving all extremities       Labs:  Recent Labs     03/05/22  0710 03/06/22  0316 03/07/22  0337 03/07/22  0649 03/08/22  0347   WBC 9.51  --   --  6.85  --    HGB 9.2*  --   --  9.2*  --    HCT 27.5*  --   --  28.5*  --    *  --   --  113*  --    NA  --  140 142  --  140   K  --  3.4* 3.5  --  3.2*   CL  --  108 105  --  106   CO2  --  22* 27  --  26   BUN  --  18 19  --  18   CREATININE  --  0.9 1.0  --  0.8   BILITOT  --  1.4* 1.3*  --  1.0   AST  --  22 19  --  16   ALT  --  12 13  --  11   ALKPHOS  --  116 109  --  104   CALCIUM  --  8.5* 8.8  --  8.4*   ALBUMIN  --  2.8* 2.8*  --  2.7*   PROT  --  6.0 6.1  --  5.8*   MG  --  2.0 1.9  --  1.7   PHOS  --  2.0* 2.4*  --  2.1*     Scheduled Meds: carvediloL, 12.5 mg, BID WM  cefTRIAXone (ROCEPHIN) IVPB, 1 g, Q24H  doxazosin, 1 mg, Nightly  levothyroxine, 75 mcg, Before breakfast  lisinopriL, 20 mg, Daily  metroNIDAZOLE, 500 mg, Q8H  mupirocin, , BID  pantoprazole, 40 mg, Daily  pravastatin, 20 mg, QHS  sodium chloride 0.9%, 10 mL, Q6H  sodium chloride 0.9%, 10 mL, Q6H  tamsulosin, 0.4 mg, Daily        sodium chloride 0.9% 5 mL/hr at 03/04/22 0515     A/P:  89 y.o. male admitted 3/3/2022 with infected mesh hematoma s/p IR drainage  - Plan is to discharge today with IV antibiotics  - Continue local drain care and TID flushing  - Anaerobic speciation is pending  - Currently on Flagyl and Rocephin   - Will need outpatient surgical follow up    Erin Archer MD  [employer] General Surgery, [facility]

## 2022-03-08 NOTE — PROGRESS NOTES
Patient has received discharge instructions. Prescriptions received. Instructions reviewed with pt's wife using teachback method. All questions answered. Any non-implanted  IV access and telemetry present,  have been  removed per bedside nurse. Transport requested for discharge.          03/08/22 1226   Shift   Virtual Nurse - Rounding Complete  (Discharge)   Virtual Nurse - Patient Verbalized Approval Of Camera Use   Safety/Activity   Patient Rounds bed in low position;visualized patient   Safety Promotion/Fall Prevention side rails raised x 2   Positioning   Body Position supine   Head of Bed (HOB) Positioning HOB at 20-30 degrees

## 2022-03-08 NOTE — PT/OT/SLP PROGRESS
Physical Therapy Treatment    Patient Name:  Eddie Parada Sr.   MRN:  [id number]    Recommendations:     Discharge Recommendations:  home health PT, home health OT   Discharge Equipment Recommendations: none   Barriers to Discharge: None    Assessment:     Eddie Parada Sr. is a 89 y.o. male admitted with a medical diagnosis of Septic shock.  He presents with the following impairments/functional limitations:  weakness, impaired endurance, impaired self care skills, impaired functional mobilty, gait instability, impaired balance, decreased ROM. Pt making progress with functional mobility and activity tolerance, pt with no complaints throughout session. Pt ambulated 180 ft with RW at supervision level. Plan is for pt to d/c home today with HH PT/OT.    Rehab Prognosis: Good; patient would benefit from acute skilled PT services to address these deficits and reach maximum level of function.    Recent Surgery: * No surgery found *      Plan:     During this hospitalization, patient to be seen 3 x/week to address the identified rehab impairments via gait training, therapeutic activities, therapeutic exercises and progress toward the following goals:    · Plan of Care Expires:  04/06/22    Subjective     Chief Complaint: none  Patient/Family Comments/goals: pt is very pleasant and motivated to participate in therapy, reporting no concerns regarding d/c  Pain/Comfort:  · Pain Rating 1: 0/10  · Pain Rating Post-Intervention 1: 0/10      Objective:     Communicated with nurse Cox prior to session.  Patient found HOB elevated with bed alarm, telemetry upon PT entry to room.     General Precautions: Standard, fall , hearing impaired  Orthopedic Precautions:N/A   Braces: N/A  Respiratory Status: Room air     Functional Mobility:  · Bed Mobility:     · Scooting: modified independence  · Supine to Sit: modified independence  · Sit to Supine: modified independence  · Transfers:     · Sit to Stand:  supervision with rolling  walker  · Gait: 180 ft with RW at supervision level - ambulates with rounded shoulders and decreased step length, foot flat but no LOB or instability noted. Pt reporting no concerns regarding his mobility.      AM-PAC 6 CLICK MOBILITY  Turning over in bed (including adjusting bedclothes, sheets and blankets)?: 4  Sitting down on and standing up from a chair with arms (e.g., wheelchair, bedside commode, etc.): 3  Moving from lying on back to sitting on the side of the bed?: 4  Moving to and from a bed to a chair (including a wheelchair)?: 3  Need to walk in hospital room?: 3  Climbing 3-5 steps with a railing?: 3  Basic Mobility Total Score: 20       Therapeutic Activities and Exercises:  Educated pt and pt's wife on role of PT and pt agreeable to participate in therapy session.  Pt transitioned to sit EOB, instructed in APs, LAQs, and hip flexion.  Completed stand and ambulation as reported above then returned to bed.  Pt and pt's wife reporting no questions/concerns regarding pt's mobility status and d/c this date.    Patient left HOB elevated with all lines intact, call button in reach, bed alarm on, nurse notified and pt's wife present..    GOALS:   Multidisciplinary Problems     Physical Therapy Goals        Problem: Physical Therapy Goal    Goal Priority Disciplines Outcome Goal Variances Interventions   Physical Therapy Goal     PT, PT/OT Ongoing, Progressing     Description: Goals to be met by: 2022    Patient will increase functional independence with mobility by performin) Supine<>Sit with independence MET 3/8/2022  2) Sit <>Stand with Mod I with use of RW  3) Bed <>Chair with Mod I with use of RW   4) Pt to ambulate 150 feet with use of RW Mod I  5) Pt to perform BLE X10 mod I with handout.                      Time Tracking:     PT Received On: 22  PT Start Time: 1040     PT Stop Time: 1106  PT Total Time (min): 26 min     Billable Minutes: Gait Training 13 and Therapeutic Exercise  13    Treatment Type: Treatment  PT/PTA: PT     PTA Visit Number: 0     03/08/2022

## 2022-03-08 NOTE — DISCHARGE INSTRUCTIONS
Flush the drain with 10 cc saline 3x per day and measure the output every 12 hours   Antibiotic course: IV Ceftriaxone every 24 hours and oral metronidazole every 8 hours with end date on 3/17/22

## 2022-03-08 NOTE — PLAN OF CARE
Problem: Adult Inpatient Plan of Care  Goal: Plan of Care Review  3/8/2022 1229 by Brooke Glasgow RN  Outcome: Met  3/8/2022 0904 by Brooke Glasgow RN  Outcome: Ongoing, Progressing  Goal: Patient-Specific Goal (Individualized)  Outcome: Met  Goal: Absence of Hospital-Acquired Illness or Injury  Outcome: Met  Goal: Optimal Comfort and Wellbeing  Outcome: Met  Goal: Readiness for Transition of Care  Outcome: Met     Problem: Diabetes Comorbidity  Goal: Blood Glucose Level Within Targeted Range  Outcome: Met     Problem: Adjustment to Illness (Sepsis/Septic Shock)  Goal: Optimal Coping  Outcome: Met     Problem: Bleeding (Sepsis/Septic Shock)  Goal: Absence of Bleeding  Outcome: Met     Problem: Glycemic Control Impaired (Sepsis/Septic Shock)  Goal: Blood Glucose Level Within Desired Range  Outcome: Met     Problem: Infection Progression (Sepsis/Septic Shock)  Goal: Absence of Infection Signs and Symptoms  Outcome: Met     Problem: Nutrition Impaired (Sepsis/Septic Shock)  Goal: Optimal Nutrition Intake  Outcome: Met     Problem: Infection  Goal: Absence of Infection Signs and Symptoms  Outcome: Met     Problem: Impaired Wound Healing  Goal: Optimal Wound Healing  Outcome: Met     Problem: Fluid and Electrolyte Imbalance (Acute Kidney Injury/Impairment)  Goal: Fluid and Electrolyte Balance  Outcome: Met     Problem: Oral Intake Inadequate (Acute Kidney Injury/Impairment)  Goal: Optimal Nutrition Intake  Outcome: Met     Problem: Renal Function Impairment (Acute Kidney Injury/Impairment)  Goal: Effective Renal Function  Outcome: Met     Problem: Fall Injury Risk  Goal: Absence of Fall and Fall-Related Injury  Outcome: Met     Problem: Skin Injury Risk Increased  Goal: Skin Health and Integrity  Outcome: Met     Problem: Physical Therapy Goal  Goal: Physical Therapy Goal  Description: Goals to be met by: 2022    Patient will increase functional independence with mobility by performin) Supine<>Sit with independence  MET 3/8/2022  2) Sit <>Stand with Mod I with use of RW  3) Bed <>Chair with Mod I with use of RW   4) Pt to ambulate 150 feet with use of RW Mod I  5) Pt to perform BLE X10 mod I with handout.     Outcome: Met     Problem: Occupational Therapy Goal  Goal: Occupational Therapy Goal  Description: Goals to be met by: 4/7/22     Patient will increase functional independence with ADLs by performing:    LE Dressing with Modified [place].  Grooming while standing with Modified [place].  Toileting from toilet with Modified [place] for hygiene and clothing management.   Supine to sit with Modified [place].  Step transfer with Modified [place]  Toilet transfer to toilet with Modified [place].  Increased functional strength to WFL for self care skills and functional mobility .  Upper extremity exercise program x10 reps per handout, with independence.    Outcome: Met

## 2022-03-08 NOTE — TELEPHONE ENCOUNTER
No new care gaps identified.  Powered by [employer]. Reference number: [id number].   3/08/2022 3:35:29 PM CST

## 2022-03-08 NOTE — PROCEDURES
"Eddie Parada Sr. is a 89 y.o. male patient.    Temp: 98.5 °F (36.9 °C) (03/07/22 1954)  Pulse: 60 (03/07/22 2000)  Resp: 20 (03/07/22 1954)  BP: (!) 145/65 (03/07/22 1954)  SpO2: 99 % (03/07/22 1954)  Weight: 83.5 kg (184 lb 1.4 oz) (03/04/22 0522)  Height: 5' 5" (165.1 cm) (03/03/22 1208)    PICC  Date/Time: 3/7/2022 10:15 PM  Performed by: Rome Sanchez RN  Consent Done: Yes  Time out: Immediately prior to procedure a time out was called to verify the correct patient, procedure, equipment, support staff and site/side marked as required  Indications: med administration and vascular access  Anesthesia: local infiltration  Local anesthetic: lidocaine 1% without epinephrine  Anesthetic Total (mL): 2  Preparation: skin prepped with chlorhexidine (without alcohol)  Skin prep agent dried: skin prep agent completely dried prior to procedure  Sterile barriers: all five maximum sterile barriers used - cap, mask, sterile gown, sterile gloves, and large sterile sheet  Hand hygiene: hand hygiene performed prior to central venous catheter insertion  Location details: right basilic  Catheter type: double lumen  Catheter size: 5 Fr  Catheter Length: 37cm    Ultrasound guidance: yes  Vessel Caliber: large and patent, compressibility normal  Vascular Doppler: not done  Needle advanced into vessel with real time Ultrasound guidance.  Guidewire confirmed in vessel.  Sterile sheath used.  no esophageal manometryNumber of attempts: 1  Post-procedure: blood return through all ports, chlorhexidine patch and sterile dressing applied            Name Rome Sanchez RN  3/7/2022  "

## 2022-03-08 NOTE — PLAN OF CARE
[facility] - Telemetry      HOME HEALTH ORDERS  FACE TO FACE ENCOUNTER    Patient Name: Eddie Parada Sr.  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       Follow Up Appointments:  Future Appointments   Date Time Provider Department Center   3/9/2022  8:00 AM Callum Roberts MD [facility]       Allergies:  Review of patient's allergies indicates:   Allergen Reactions    Ciprofloxacin     Levofloxacin Swelling    Lyrica [pregabalin] Swelling    Unable to assess Other (See Comments)     Soft shell crabs       Medications: Review discharge medications with patient and family and provide education.    Current Facility-Administered Medications   Medication Dose Route Frequency Provider Last Rate Last Admin    0.9%  NaCl infusion   Intravenous Continuous Arvin Holguin MD 5 mL/hr at 03/04/22 0515 New Bag at 03/04/22 0515     acetaminophen tablet 650 mg  650 mg Oral Q4H PRN Missy Thompson NP   650 mg at 03/03/22 1427    carvediloL tablet 12.5 mg  12.5 mg Oral BID WM Olga Lozano MD   12.5 mg at 03/08/22 0839    cefTRIAXone (ROCEPHIN) 1 g/50 mL D5W IVPB  1 g Intravenous Q24H Ebonie Valdes PA-C   Stopped at 03/07/22 1830    doxazosin tablet 1 mg  1 mg Oral Nightly Olga Lozano MD   1 mg at 03/07/22 2146    levothyroxine tablet 75 mcg  75 mcg Oral Before breakfast Missy Thompson NP   75 mcg at 03/08/22 0624    lisinopriL tablet 20 mg  20 mg Oral Daily Olga Lozano MD   20 mg at 03/08/22 0839    melatonin tablet 6 mg  6 mg Oral Nightly PRN Missy Thompson NP        metroNIDAZOLE tablet 500 mg  500 mg Oral Q8H Ebonie Valdes PA-C   500 mg at 03/08/22 0624    ondansetron injection 4 mg  4 mg Intravenous Q8H PRN Missy Thompson NP        pantoprazole EC tablet 40 mg  40 mg Oral Daily Olga Lozano MD   40 mg at 03/08/22 0839    potassium bicarbonate disintegrating tablet 20 mEq  20 mEq Oral Once Hayley ANGELICA Cameron MD        pravastatin tablet 20 mg  20 mg Oral QHS Missy Thompson NP   20 mg at 03/07/22 2144    sodium chloride 0.9% flush 10 mL  10 mL Intravenous PRN Missy Thompson NP        sodium chloride 0.9% flush 10 mL  10 mL Intravenous Q6H Olga Lozano MD   10 mL at 03/08/22 0600    And    sodium chloride 0.9% flush 10 mL  10 mL Intravenous PRN Olga Lozano MD        sodium chloride 0.9% flush 10 mL  10 mL Intravenous Q6H Olga Lozano MD   10 mL at 03/08/22 0600    And    sodium chloride 0.9% flush 10 mL  10 mL Intravenous PRN Olga Lozano MD        tamsulosin 24 hr capsule 0.4 mg  0.4 mg Oral Daily Missy Thompson NP   0.4 mg at 03/08/22 0846     Current Discharge Medication List      START taking these medications    Details   cefTRIAXone (ROCEPHIN) 1 g/50 mL PgBk IVPB Inject 50 mLs (1 g total) into the vein once daily. for 9 days  Qty: 450 mL, Refills: 0  (End date: 3/17/22)      metroNIDAZOLE (FLAGYL) 500 MG tablet Take 1 tablet (500 mg total) by mouth every 8 (eight) hours. for 9 days  Qty: 27 tablet, Refills: 0 (End date: 3/17/22)         CONTINUE these medications which have CHANGED    Details   tamsulosin (FLOMAX) 0.4 mg Cap Take 1 capsule (0.4 mg total) by mouth once daily.  Qty: 30 capsule, Refills: 11         CONTINUE these medications which have NOT CHANGED    Details   amLODIPine (NORVASC) 10 MG tablet TAKE 1 TABLET BY MOUTH DAILY FOR BLOOD PRESSURE  Qty: 90 tablet, Refills: 1    Associated Diagnoses: Essential hypertension      carvediloL (COREG) 12.5 MG tablet TAKE 1 TABLET (12.5 MG TOTAL) BY MOUTH 2 (TWO) TIMES DAILY WITH MEALS.  Qty: 60 tablet, Refills: 5    Associated Diagnoses: Essential hypertension      doxazosin (CARDURA) 1 MG tablet Take 1 mg by mouth nightly.      furosemide (LASIX) 20 MG tablet TAKE ONE TABLET BY MOUTH EVERY OTHER DAY  Qty: 15 tablet, Refills: 1      gabapentin (NEURONTIN) 100 MG capsule Take 2 capsules (200 mg total) by mouth every evening.  Qty: 60 capsule, Refills: 11    Associated Diagnoses: Lumbar radiculopathy      glimepiride (AMARYL) 2 MG tablet TAKE 1 TABLET BY MOUTH EVERY MORNING WITH BREAKFAST FOR DIABETES  Qty: 30 tablet, Refills: 5    Associated Diagnoses: Type 2 diabetes mellitus, controlled      levothyroxine (SYNTHROID) 75 MCG tablet Take 1 tablet (75 mcg total) by mouth before breakfast.  Qty: 30 tablet, Refills: 11    Associated Diagnoses: Hypothyroidism due to acquired atrophy of thyroid      lisinopriL (PRINIVIL,ZESTRIL) 20 MG tablet       pantoprazole (PROTONIX) 40 MG tablet TAKE ONE TABLET BY MOUTH ONCE A DAY FOR STOMACH PROBLEMS  Qty: 30 tablet, Refills: 0    Associated Diagnoses: Gastroesophageal reflux disease      pioglitazone (ACTOS) 15 MG tablet ONE TABLET ONE TIME A DAY FOR DIABETES  Qty: 30 tablet, Refills: 0      pravastatin (PRAVACHOL) 20 MG tablet TAKE 1 TABLET BY MOUTH DAILY FOR CHOLESTROL  Qty:  30 tablet, Refills: 5    Associated Diagnoses: Mixed dyslipidemia      rivaroxaban (XARELTO) 20 mg Tab Take one tablet(20mg) by mouth once daily      acetaminophen (TYLENOL) 500 MG tablet Take 500 mg by mouth every 6 (six) hours as needed for Pain.      blood sugar diagnostic (BLOOD GLUCOSE TEST) Strp OM LatamTOUCH ULTRA TESTING STRIPS. USE TO TEST BLOOD GLUCOSE TWICE DAILY. DX CODE: E11.51  Qty: 200 strip, Refills: 3    Associated Diagnoses: Type 2 diabetes mellitus with other neurologic complication, with long-term current use of insulin      blood-glucose meter Misc ONETOUCH ULTRA GLUCOSE METER. USE AS DIRECTED TO TEST BLOOD GLUCOSE. DX CODE: E11.51  Qty: 1 each, Refills: 0    Associated Diagnoses: Type 2 diabetes mellitus with other neurologic complication, with long-term current use of insulin      fenofibrate 160 MG Tab fenofibrate 160 mg tablet      gemfibrozil (LOPID) 600 MG tablet Take one tablet(600mg) by mouth once daily      lancets (ONETOUCH ULTRASOFT LANCETS) Misc USE TO TEST BLOOD GLUCOSE TWICE DAILY. DX CODE: E11.51  Qty: 200 each, Refills: 3    Associated Diagnoses: Type 2 diabetes mellitus with other neurologic complication, with long-term current use of insulin               I have seen and examined this patient within the last 30 days. My clinical findings that support the need for the home health skilled services and home bound status are the following:no   Weakness/numbness causing balance and gait disturbance due to Weakness/Debility making it taxing to leave home.  Medical restrictions requiring assistance of another human to leave home due to  IV infusion Needs.     Diet:   cardiac diet and diabetic diet 2000 calorie    Labs:  None    Referrals/ Consults  Physical Therapy to evaluate and treat. Evaluate for home safety and equipment needs; Establish/upgrade home exercise program. Perform / instruct on therapeutic exercises, gait training, transfer training, and Range of Motion.  Occupational  Therapy to evaluate and treat. Evaluate home environment for safety and equipment needs. Perform/Instruct on transfers, ADL training, ROM, and therapeutic exercises.    Activities:   activity as tolerated    Nursing:   Agency to admit patient within 24 hours of hospital discharge unless specified on physician order or at patient request    SN to complete comprehensive assessment including routine vital signs. Instruct on disease process and s/s of complications to report to MD. Review/verify medication list sent home with the patient at time of discharge  and instruct patient/caregiver as needed. Frequency may be adjusted depending on start of care date.     Skilled nurse to perform up to 3 visits PRN for symptoms related to diagnosis    Notify MD if SBP > 160 or < 90; DBP > 90 or < 50; HR > 120 or < 50; Temp > 101; O2 < 88%; Other:      Ok to schedule additional visits based on staff availability and patient request on consecutive days within the home health episode.    When multiple disciplines ordered:    Start of Care occurs on Sunday - Wednesday schedule remaining discipline evaluations as ordered on separate consecutive days following the start of care.    Thursday SOC -schedule subsequent evaluations Friday and Monday the following week.     Friday - Saturday SOC - schedule subsequent discipline evaluations on consecutive days starting Monday of the following week.      Miscellaneous   Home Infusion Therapy:   SN to perform Infusion Therapy/Central Line Care.  Review Central Line Care & Central Line Flush with patient.    Administer (drug and dose): 1 g Ceftriaxone IV (end date 3/17/22)    Last dose given: 3/8/22                       Home dose due: 3/9/22    Scrub the Hub: Prior to accessing the line, always perform a 30 second alcohol scrub  Each lumen of the central line is to be flushed at least daily with 10 mL Normal Saline and 3 mL Heparin flush (10 units/mL)  Skilled Nurse (SN) may draw blood from IV  access  Blood Draw Procedure:   - Aspirate at least 5 mL of blood   - Discard   - Obtain specimen   - Change injection cap   - Flush with 20 mL Normal Saline followed by a                 3-5 mL Heparin flush (10 units/mL)  Central :   - Sterile dressing changes are done weekly and as needed.   - Use chlor-hexadine scrub to cleanse site, apply Biopatch to insertion site,       apply securement device dressing   - Injection caps are changed weekly and after EVERY lab draw.   - If sterile gauze is under dressing to control oozing,                 dressing change must be performed every 24 hours until gauze is not needed.      Remove PICC line after last dose on 3/17/22     Drain care instructions  Flush the drain with 10 cc saline 3x per day and measure the output every 12 hours    Outpatient follow up with general surgery scheduled    Home Health Aide:  Nursing Daily, Physical Therapy Three times weekly and Occupational Therapy Three times weekly    Wound Care Orders  no    I certify that this patient is confined to his home and needs intermittent skilled nursing care, physical therapy and occupational therapy.

## 2022-03-08 NOTE — PROGRESS NOTES
IP Liaison - Initial Visit Note    Patient: Eddie Parada Sr.  MRN:  [id number]  Date of Service:  3/8/2022  Completed by:  CM Crowley    Reason for Visit   Patient presents with    IP Liaison Initial Visit       CM contacted pt spouse Jessica via room phone in order to complete SDOH questionnaire and liaison assessment. Jessica has identified no immediate social barriers to care. Per Jessica, pt is not in need of resources at this time.    The following were addressed during this visit:  - Review SDOH Questions   - Complete patient assessment   - Complete initial visit with patient        Patient Summary     IP Liaison Patient Assessment    General  Level of Caregiver support: Member independent and does not need caregiver assistance  Have you had to make a decision between paying for any of the following in the last 2 months?: None  Transportation means: Family  Employment status: Retired and not working  Assessments  Was the PHQ Depression Screening completed this visit?: No  Was the YOLANDA-7 Screening completed this visit?: No         MC Crowley

## 2022-03-08 NOTE — DISCHARGE SUMMARY
[facility] Medicine  Discharge Summary      Patient Name: Eddie Parada Sr.  MRN: [id number]  Patient Class: IP- Inpatient  Admission Date: 3/3/2022  Hospital Length of Stay: 5 days  Discharge Date and Time:  03/08/2022 10:43 AM  Attending Physician: Hayley Cameron*   Discharging Provider: Ebonie Valdes PA-C  Primary Care Provider: Callum Roberts MD      HPI:   Eddie Parada is a 89 yo male with a pmh of CAD, Afib on xarelto, heart failure, pacemaker insertion, CKD, COPD, former smoker, hernia repair, CVA, HTN, PVD, DM2. He presented to [place] ED with AMS and fever yesterday. He was brought in by EMS. He was in septic shock. Given 2L NS vancomycin, zosyn, tylenol, ibuprofen, and started on levophed while in the ED. CT abd with large abscess noted in the anterior abdominal wall at the level of prior mesh repair. He was transferred to [facility] for surgery evaluation for abscess drainage. He is alert and oriented on arrival to [place]. BP stable off levophed. He reports he woke up feeling fine and had routine labs done yesterday morning. He then began feeling cold and his body was shaking. His wife reports she took his temp and noted 97.6F. He took a nap and woke up confused. His wife reports his temp was then 104 and he was short of breath. She called EMS at that time. His wife noticed his upper abdomen was distended while he was in the ED. He denies ever having abdominal pain or nausea. Had been eating well. He had not had a bowel movement in 3 days so he took a laxative yesterday morning.  He had an abdominal hernia repair with mesh placement about 20 years ago in [place]. Has not had any issues since then. He still drives and runs a business. He is followed by [facility] in [place] and had his pacemaker replaced about 2 weeks ago. He took antibiotics as prescribed at that time.       * No surgery found *      Hospital Course:   Mr. Parada presented with fever with altered  mental status. Admitted in septic shock to the ICU due to intra-abdominal abscess identified on CT scan. Empiric treatment with Zosyn and vancomycin given.  Patient required short time with Levophed, but was able to be stepped down to the floor on 03/03/2022. General surgery consulted with no plans for surgery and with recommendation to consult IR. Drain was placed by IR on 03/03/2022.  ID consulted, started on zosyn transitioned to rocephin and flagyl with 2 week course ending 3/17/2022. PICC team consulted for placement. Family counseled on drain care instructions to flush with 10 cc saline 3x per day and to measure output every 12 hours by RN. Patient discharged with HH OT/PT as well as daily nursing care for IV antibiotics. Will follow up with PCP and general surgery outpatient. Return precautions discussed. Patient and spouse voiced understanding. All questions and concerns addressed at this time.       Goals of Care Treatment Preferences:  Code Status: Full Code      Consults:   Consults (From admission, onward)        Status Ordering Provider     Inpatient consult to PICC team ([facility])  Once        Provider:  (Not yet assigned)    Acknowledged INNOCENT-ITROSALEE RUDD N.     Inpatient consult to Infectious Diseases  Once        Provider:  Vivek Arredondo MD    Completed SUNIL TOURE     Inpatient consult to Interventional Radiology  Once        Provider:  Cody Reynolds II, MD    Completed STEPHIE WINSTON     Inpatient consult to Interventional Radiology  Once        Provider:  (Not yet assigned)    Completed SUNIL TOURE     General Surgery  Once        Provider:  Francie Mukherjee MD    Completed PIOTR ARAMBULA          No new Assessment & Plan notes have been filed under this hospital service since the last note was generated.  Service: Hospital Medicine    Final Active Diagnoses:    Diagnosis Date Noted POA    PRINCIPAL PROBLEM:  Septic shock [A41.9, R65.21] 03/03/2022 Yes     Intra-abdominal abscess [K65.1] 03/05/2022 Yes    Abdominal wall abscess [L02.211] 03/03/2022 Yes    Elevated troponin [R77.8] 03/03/2022 Yes    Hypomagnesemia [E83.42] 03/03/2022 Yes    Dysphagia [R13.10] 06/22/2021 Yes    Hypothyroidism due to acquired atrophy of thyroid [E03.4] 01/20/2020 Yes    Acute renal failure superimposed on stage 3 chronic kidney disease [N17.9, N18.30] 01/11/2018 Yes    Long term current use of anticoagulant therapy [Z79.01] 09/13/2016 Not Applicable    (HFpEF) heart failure with preserved ejection fraction [I50.30] 06/17/2015 Yes    Type 2 diabetes mellitus with neurologic complication [E11.49] 10/17/2012 Yes    Cardiac pacemaker in situ [Z95.0] 10/12/2012 Yes    Essential (primary) hypertension [I10] 09/20/2012 Yes    CAD (coronary artery disease) [I25.10] 09/20/2012 Yes    Chronic atrial fibrillation [I48.20] 06/29/2012 Yes      Problems Resolved During this Admission:       Discharged Condition: good    Disposition: Home-Health Care Svc    Follow Up:   Follow-up Information     Callum Roberts MD Follow up on 3/9/2022.    Specialty: Internal Medicine  Contact information:  [address] [place] [address]  [phone]                       Patient Instructions:      Ambulatory referral/consult to General Surgery   Standing Status: Future   Referral Priority: Routine Referral Type: Consultation   Referral Reason: Specialty Services Required   Requested Specialty: General Surgery   Number of Visits Requested: 1     Diet diabetic     Diet Cardiac     Activity as tolerated       Significant Diagnostic Studies: Labs:   BMP:   Recent Labs   Lab 03/07/22  0337 03/08/22  0347   * 131*    140   K 3.5 3.2*    106   CO2 27 26   BUN 19 18   CREATININE 1.0 0.8   CALCIUM 8.8 8.4*   MG 1.9 1.7   , CMP   Recent Labs   Lab 03/07/22  0337 03/08/22  0347    140   K 3.5 3.2*    106   CO2 27 26   * 131*   BUN 19 18   CREATININE 1.0 0.8   CALCIUM 8.8 8.4*    PROT 6.1 5.8*   ALBUMIN 2.8* 2.7*   BILITOT 1.3* 1.0   ALKPHOS 109 104   AST 19 16   ALT 13 11   ANIONGAP 10 8   ESTGFRAFRICA >60 >60   EGFRNONAA >60 >60   , CBC   Recent Labs   Lab 03/07/22  0649 03/08/22  0735   WBC 6.85 8.51   HGB 9.2* 9.8*   HCT 28.5* 29.3*   * 143*    and All labs within the past 24 hours have been reviewed    Pending Diagnostic Studies:     None         Medications:  Reconciled Home Medications:      Medication List      START taking these medications    cefTRIAXone 1 g/50 mL Pgbk IVPB  Commonly known as: ROCEPHIN  Inject 50 mLs (1 g total) into the vein once daily. for 9 days     metroNIDAZOLE 500 MG tablet  Commonly known as: FLAGYL  Take 1 tablet (500 mg total) by mouth every 8 (eight) hours. for 9 days        CONTINUE taking these medications    acetaminophen 500 MG tablet  Commonly known as: TYLENOL  Take 500 mg by mouth every 6 (six) hours as needed for Pain.     amLODIPine 10 MG tablet  Commonly known as: NORVASC  TAKE 1 TABLET BY MOUTH DAILY FOR BLOOD PRESSURE     BLOOD GLUCOSE TEST Strp  Generic drug: blood sugar diagnostic  ONETOUCH ULTRA TESTING STRIPS. USE TO TEST BLOOD GLUCOSE TWICE DAILY. DX CODE: E11.51     blood-glucose meter Misc  ONETOUCH ULTRA GLUCOSE METER. USE AS DIRECTED TO TEST BLOOD GLUCOSE. DX CODE: E11.51     carvediloL 12.5 MG tablet  Commonly known as: COREG  TAKE 1 TABLET (12.5 MG TOTAL) BY MOUTH 2 (TWO) TIMES DAILY WITH MEALS.     doxazosin 1 MG tablet  Commonly known as: CARDURA  Take 1 mg by mouth nightly.     fenofibrate 160 MG Tab  fenofibrate 160 mg tablet     furosemide 20 MG tablet  Commonly known as: LASIX  TAKE ONE TABLET BY MOUTH EVERY OTHER DAY     gabapentin 100 MG capsule  Commonly known as: NEURONTIN  Take 2 capsules (200 mg total) by mouth every evening.     gemfibroziL 600 MG tablet  Commonly known as: LOPID  Take one tablet(600mg) by mouth once daily     glimepiride 2 MG tablet  Commonly known as: AMARYL  TAKE 1 TABLET BY MOUTH EVERY  MORNING WITH BREAKFAST FOR DIABETES     lancets Misc  Commonly known as: ONETOUCH ULTRASOFT LANCETS  USE TO TEST BLOOD GLUCOSE TWICE DAILY. DX CODE: E11.51     levothyroxine 75 MCG tablet  Commonly known as: SYNTHROID  Take 1 tablet (75 mcg total) by mouth before breakfast.     lisinopriL 20 MG tablet  Commonly known as: PRINIVIL,ZESTRIL     pantoprazole 40 MG tablet  Commonly known as: PROTONIX  TAKE ONE TABLET BY MOUTH ONCE A DAY FOR STOMACH PROBLEMS     pioglitazone 15 MG tablet  Commonly known as: ACTOS  ONE TABLET ONE TIME A DAY FOR DIABETES     pravastatin 20 MG tablet  Commonly known as: PRAVACHOL  TAKE 1 TABLET BY MOUTH DAILY FOR CHOLESTROL     rivaroxaban 20 mg Tab  Commonly known as: XARELTO  Take one tablet(20mg) by mouth once daily     tamsulosin 0.4 mg Cap  Commonly known as: FLOMAX  Take 1 capsule (0.4 mg total) by mouth once daily.            Indwelling Lines/Drains at time of discharge:   Lines/Drains/Airways     Peripherally Inserted Central Catheter Line  Duration           PICC Double Lumen 03/07/22 2215 right basilic <1 day          Drain  Duration                Closed/Suction Drain 03/03/22 1614 Medial;Right RUQ Accordion 10 Fr. 4 days                Time spent on the discharge of patient: 40 minutes         Ebonie Valdes PA-C  Department of Hospital Medicine  [place] - [facility]

## 2022-03-08 NOTE — PROGRESS NOTES
[facility]                    Pharmacy       Discharge Medication Education    Patient ACCEPTED medication education. Pharmacy has provided education on the name, indication, and possible side effects of the medication(s) prescribed, using teach-back method.     The following medications have also been discussed, during this admission.        Medication List        START taking these medications      cefTRIAXone 1 g/50 mL Pgbk IVPB  Commonly known as: ROCEPHIN  Inject 50 mLs (1 g total) into the vein once daily. for 9 days     metroNIDAZOLE 500 MG tablet  Commonly known as: FLAGYL  Take 1 tablet (500 mg total) by mouth every 8 (eight) hours. for 9 days            CONTINUE taking these medications      acetaminophen 500 MG tablet  Commonly known as: TYLENOL     amLODIPine 10 MG tablet  Commonly known as: NORVASC  TAKE 1 TABLET BY MOUTH DAILY FOR BLOOD PRESSURE     BLOOD GLUCOSE TEST Strp  Generic drug: blood sugar diagnostic  ONETOUCH ULTRA TESTING STRIPS. USE TO TEST BLOOD GLUCOSE TWICE DAILY. DX CODE: E11.51     blood-glucose meter Misc  ONETOUCH ULTRA GLUCOSE METER. USE AS DIRECTED TO TEST BLOOD GLUCOSE. DX CODE: E11.51     carvediloL 12.5 MG tablet  Commonly known as: COREG  TAKE 1 TABLET (12.5 MG TOTAL) BY MOUTH 2 (TWO) TIMES DAILY WITH MEALS.     doxazosin 1 MG tablet  Commonly known as: CARDURA     fenofibrate 160 MG Tab     furosemide 20 MG tablet  Commonly known as: LASIX  TAKE ONE TABLET BY MOUTH EVERY OTHER DAY     gabapentin 100 MG capsule  Commonly known as: NEURONTIN  Take 2 capsules (200 mg total) by mouth every evening.     gemfibroziL 600 MG tablet  Commonly known as: LOPID     glimepiride 2 MG tablet  Commonly known as: AMARYL  TAKE 1 TABLET BY MOUTH EVERY MORNING WITH BREAKFAST FOR DIABETES     lancets Misc  Commonly known as: ONETOUCH ULTRASOFT LANCETS  USE TO TEST BLOOD GLUCOSE TWICE DAILY. DX CODE: E11.51     levothyroxine 75 MCG tablet  Commonly known as: SYNTHROID  Take 1  tablet (75 mcg total) by mouth before breakfast.     lisinopriL 20 MG tablet  Commonly known as: PRINIVIL,ZESTRIL     pantoprazole 40 MG tablet  Commonly known as: PROTONIX  TAKE ONE TABLET BY MOUTH ONCE A DAY FOR STOMACH PROBLEMS     pioglitazone 15 MG tablet  Commonly known as: ACTOS  ONE TABLET ONE TIME A DAY FOR DIABETES     pravastatin 20 MG tablet  Commonly known as: PRAVACHOL  TAKE 1 TABLET BY MOUTH DAILY FOR CHOLESTROL     rivaroxaban 20 mg Tab  Commonly known as: XARELTO     tamsulosin 0.4 mg Cap  Commonly known as: FLOMAX  Take 1 capsule (0.4 mg total) by mouth once daily.               Where to Get Your Medications        These medications were sent to [facility]  [address], [place] [address]      Hours: Mon-Fri, 8a-5:30p Phone: [phone]   cefTRIAXone 1 g/50 mL Pgbk IVPB  metroNIDAZOLE 500 MG tablet  tamsulosin 0.4 mg Cap          Thank you  Jass Dillon, PharmD  [phone]

## 2022-03-08 NOTE — NURSING
Plan of care reviewed with patient and spouse. Voiced understanding. NSR on monitor with no red alarms noted. No acute distress noted. Drain output of 350cc today.Side rails x3, bed low, call bell within reach. Maintain bed alarm for patient safety. Patient will be monitored overnight.

## 2022-03-08 NOTE — PROGRESS NOTES
03/07/22 2215   Admission   Initial VN Admission Questions Complete   Shift   Virtual Nurse - Patient Verbalized Approval Of Camera Use   Significant Events This Shift   Significant Events Other (comment)   Virtual Nurse - Description of Significant Event TIME OUT   Virtual Nurse: Time out performed for PICC line placement at bedside. Pt tolerated procedure well. CXR order placed for placement confirmation.

## 2022-03-08 NOTE — PROGRESS NOTES
IP Liaison - Final Visit Note    Patient: Eddie Parada Sr.  MRN:  [id number]  Date of Service:  3/8/2022  Completed by:  CM Crowley    Reason for Visit   Patient presents with    IP Liaison Chart Review     Patient discharged from hospital before [place] was able to complete follow-up visit.       Patient Summary     Discharge Date: 03/08/2022  Discharge telephone number/address: [phone] / [address] [facility] [address]  Follow up provider: Callum Roberts MD / Estevan Cortez MD  Follow up appointments: 3/9/2022 @ 8:00am / 3/15/2022 @ 2:00pm  Home Health agency & telephone number: [facility]  DME ordered &  name: n/a  Assigned OPCM RN/SW: n/a  Report sent to follow up team (PCP/OPCM) via in basket message: n/a  Community Resources arranged including agency name & contact info: n/a      CM Crowley

## 2022-03-08 NOTE — PLAN OF CARE
Problem: Physical Therapy Goal  Goal: Physical Therapy Goal  Description: Goals to be met by: 2022    Patient will increase functional independence with mobility by performin) Supine<>Sit with independence MET 3/8/2022  2) Sit <>Stand with Mod I with use of RW  3) Bed <>Chair with Mod I with use of RW   4) Pt to ambulate 150 feet with use of RW Mod I  5) Pt to perform BLE X10 mod I with handout.     Outcome: Ongoing, Progressing     Pt making progress with functional mobility and activity tolerance, pt with no complaints throughout session. Pt ambulated 180 ft with RW at supervision level. Plan is for pt to d/c home today with HH PT/OT.

## 2022-03-08 NOTE — NURSING
Patient is discharged from the facility. Peripheral IVs and telemetry box to be removed prior to departure. IV dose of Rocephin infusing at this time at the provider's request; see MAR for details. Education provided to patient's wife regarding flushing abdominal drain and how to empty, measure, and record output - supplies provided to sustain patient until home health has the opportunity to assess needs. AVS printed and delivered by bedside nurse and will be reviewed by the bedside nurse.

## 2022-03-08 NOTE — PLAN OF CARE
[facility] educator met with patient and spouse discussed discharge plan for home IVABX. Eddie ABBOTT Tal Toussaint. will dc home with family support. Jessica (wife) [phone] will infuse medication via Elast[employer] Pump. spouse educated on S.A.S.H procedure. S.A.S.H mat provided.  Patient education checklist reviewed and acknowledged by above person(s) above and are agreeable to discharge with home infusion plan of care. IV administration process using aspetic technique was reviewed with successful return demonstration. Wife feels comfortable with infusion. Patient will dc home with ceftriaxone 1 GM IV q 24 hours for estimated end of therapy date  3/17/22. Dosing schedule times 4:30 pm. Patient will need dose prior to discharge.  No extensions needed to right basilic 37 cm double lumen picc placed on 3/7/22.  Pending HH assignment by [employer] for weekly dressing changes and lab draws. Time allotted for questions. Patients nurse and case management team notified teaching has been completed.     Medication delivery will be made to home    [facility]  Benigno Sequeira Rn, Clinical Educator  Cell [phone]  Office [phone]  Fax [phone]

## 2022-03-08 NOTE — PLAN OF CARE
SW sent Home Health orders to [facility].     [facility] will select HH agency for pt. Once HH agency is selected they will contact pt to schedule first appointment time/date.     Future Appointments   Date Time Provider Department Center   3/9/2022  8:00 AM Callum Roberts MD [facility]        03/08/22 1050   Post-Acute Status   Post-Acute Authorization Home Health   Home Health Status Referrals Sent   Discharge Delays None known at this time   Discharge Plan   Discharge Plan A Home Health   Discharge Plan B Home with family

## 2022-03-09 ENCOUNTER — PATIENT OUTREACH (OUTPATIENT)
Dept: ADMINISTRATIVE | Facility: CLINIC | Age: 87
End: 2022-03-09
Payer: MEDICARE

## 2022-03-09 ENCOUNTER — OFFICE VISIT (OUTPATIENT)
Dept: INTERNAL MEDICINE | Facility: CLINIC | Age: 87
End: 2022-03-09
Payer: MEDICARE

## 2022-03-09 VITALS
RESPIRATION RATE: 16 BRPM | WEIGHT: 184.06 LBS | HEIGHT: 65 IN | SYSTOLIC BLOOD PRESSURE: 132 MMHG | DIASTOLIC BLOOD PRESSURE: 60 MMHG | OXYGEN SATURATION: 97 % | HEART RATE: 62 BPM | BODY MASS INDEX: 30.67 KG/M2

## 2022-03-09 DIAGNOSIS — D64.9 ANEMIA, UNSPECIFIED TYPE: ICD-10-CM

## 2022-03-09 DIAGNOSIS — E87.6 HYPOKALEMIA: ICD-10-CM

## 2022-03-09 DIAGNOSIS — E88.09 HYPOALBUMINEMIA: ICD-10-CM

## 2022-03-09 DIAGNOSIS — L02.211 ABDOMINAL WALL ABSCESS: ICD-10-CM

## 2022-03-09 DIAGNOSIS — Z09 HOSPITAL DISCHARGE FOLLOW-UP: Primary | ICD-10-CM

## 2022-03-09 LAB — BACTERIA SPEC ANAEROBE CULT: NORMAL

## 2022-03-09 PROCEDURE — G0180 MD CERTIFICATION HHA PATIENT: HCPCS | Mod: ,,, | Performed by: INTERNAL MEDICINE

## 2022-03-09 PROCEDURE — 3288F PR FALLS RISK ASSESSMENT DOCUMENTED: ICD-10-PCS | Mod: CPTII,S$GLB,, | Performed by: INTERNAL MEDICINE

## 2022-03-09 PROCEDURE — 99499 RISK ADDL DX/OHS AUDIT: ICD-10-PCS | Mod: S$GLB,,, | Performed by: INTERNAL MEDICINE

## 2022-03-09 PROCEDURE — 99499 UNLISTED E&M SERVICE: CPT | Mod: S$GLB,,, | Performed by: INTERNAL MEDICINE

## 2022-03-09 PROCEDURE — 1111F DSCHRG MED/CURRENT MED MERGE: CPT | Mod: CPTII,S$GLB,, | Performed by: INTERNAL MEDICINE

## 2022-03-09 PROCEDURE — 1101F PT FALLS ASSESS-DOCD LE1/YR: CPT | Mod: CPTII,S$GLB,, | Performed by: INTERNAL MEDICINE

## 2022-03-09 PROCEDURE — 1159F MED LIST DOCD IN RCRD: CPT | Mod: CPTII,S$GLB,, | Performed by: INTERNAL MEDICINE

## 2022-03-09 PROCEDURE — 1159F PR MEDICATION LIST DOCUMENTED IN MEDICAL RECORD: ICD-10-PCS | Mod: CPTII,S$GLB,, | Performed by: INTERNAL MEDICINE

## 2022-03-09 PROCEDURE — 1126F PR PAIN SEVERITY QUANTIFIED, NO PAIN PRESENT: ICD-10-PCS | Mod: CPTII,S$GLB,, | Performed by: INTERNAL MEDICINE

## 2022-03-09 PROCEDURE — 99999 PR PBB SHADOW E&M-EST. PATIENT-LVL IV: ICD-10-PCS | Mod: PBBFAC,,, | Performed by: INTERNAL MEDICINE

## 2022-03-09 PROCEDURE — 1101F PR PT FALLS ASSESS DOC 0-1 FALLS W/OUT INJ PAST YR: ICD-10-PCS | Mod: CPTII,S$GLB,, | Performed by: INTERNAL MEDICINE

## 2022-03-09 PROCEDURE — G0180 PR HOME HEALTH MD CERTIFICATION: ICD-10-PCS | Mod: ,,, | Performed by: INTERNAL MEDICINE

## 2022-03-09 PROCEDURE — 1126F AMNT PAIN NOTED NONE PRSNT: CPT | Mod: CPTII,S$GLB,, | Performed by: INTERNAL MEDICINE

## 2022-03-09 PROCEDURE — 99214 OFFICE O/P EST MOD 30 MIN: CPT | Mod: S$GLB,,, | Performed by: INTERNAL MEDICINE

## 2022-03-09 PROCEDURE — 1111F PR DISCHARGE MEDS RECONCILED W/ CURRENT OUTPATIENT MED LIST: ICD-10-PCS | Mod: CPTII,S$GLB,, | Performed by: INTERNAL MEDICINE

## 2022-03-09 PROCEDURE — 3288F FALL RISK ASSESSMENT DOCD: CPT | Mod: CPTII,S$GLB,, | Performed by: INTERNAL MEDICINE

## 2022-03-09 PROCEDURE — 99999 PR PBB SHADOW E&M-EST. PATIENT-LVL IV: CPT | Mod: PBBFAC,,, | Performed by: INTERNAL MEDICINE

## 2022-03-09 PROCEDURE — 99214 PR OFFICE/OUTPT VISIT, EST, LEVL IV, 30-39 MIN: ICD-10-PCS | Mod: S$GLB,,, | Performed by: INTERNAL MEDICINE

## 2022-03-09 RX ORDER — POTASSIUM CHLORIDE 750 MG/1
10 TABLET, EXTENDED RELEASE ORAL DAILY
Qty: 90 TABLET | Refills: 1 | Status: SHIPPED | OUTPATIENT
Start: 2022-03-09

## 2022-03-09 NOTE — PROGRESS NOTES
Patient, Eddie Parada Sr. (MRN #[id number]), presented with a recent Platelet count less than 150 K/uL consistent with the definition of thrombocytopenia (ICD10 - D69.6).    Platelets   Date Value Ref Range Status   03/08/2022 143 (L) 150 - 450 K/uL Final     The patient's thrombocytopenia was monitored, evaluated, addressed and/or treated. This addendum to the medical record is made on 03/09/2022.

## 2022-03-09 NOTE — PROGRESS NOTES
Subjective:       Patient ID: Eddie Parada Sr. is a 89 y.o. male.    Chief Complaint: Follow-up and Hospital Follow Up    Discharge Summary        Patient Name: Eddie Parada Sr.  MRN: [id number]  Patient Class: IP- Inpatient  Admission Date: 3/3/2022  Hospital Length of Stay: 5 days  Discharge Date and Time:  03/08/2022 10:43 AM  Attending Physician: Hayley Cameron*   Discharging Provider: Ebonie Valdes PA-C  Primary Care Provider: Callum Roberts MD        HPI:   Eddie Pardaa is a 89 yo male with a pmh of CAD, Afib on xarelto, heart failure, pacemaker insertion, CKD, COPD, former smoker, hernia repair, CVA, HTN, PVD, DM2. He presented to [facility] with AMS and fever yesterday. He was brought in by EMS. He was in septic shock. Given 2L NS vancomycin, zosyn, tylenol, ibuprofen, and started on levophed while in the ED. CT abd with large abscess noted in the anterior abdominal wall at the level of prior mesh repair. He was transferred to [facility] for surgery evaluation for abscess drainage. He is alert and oriented on arrival to [facility]. BP stable off levophed. He reports he woke up feeling fine and had routine labs done yesterday morning. He then began feeling cold and his body was shaking. His wife reports she took his temp and noted 97.6F. He took a nap and woke up confused. His wife reports his temp was then 104 and he was short of breath. She called EMS at that time. His wife noticed his upper abdomen was distended while he was in the ED. He denies ever having abdominal pain or nausea. Had been eating well. He had not had a bowel movement in 3 days so he took a laxative yesterday morning.  He had an abdominal hernia repair with mesh placement about 20 years ago in [place]. Has not had any issues since then. He still drives and runs a business. He is followed by [facility] in [place] and had his pacemaker replaced about 2 weeks ago. He took antibiotics as prescribed at that time.         *  No surgery found *       Hospital Course:   Mr. Parada presented with fever with altered mental status. Admitted in septic shock to the ICU due to intra-abdominal abscess identified on CT scan. Empiric treatment with Zosyn and vancomycin given.  Patient required short time with Levophed, but was able to be stepped down to the floor on 03/03/2022. General surgery consulted with no plans for surgery and with recommendation to consult IR. Drain was placed by IR on 03/03/2022.  ID consulted, started on zosyn transitioned to rocephin and flagyl with 2 week course ending 3/17/2022. PICC team consulted for placement. Family counseled on drain care instructions to flush with 10 cc saline 3x per day and to measure output every 12 hours by RN. Patient discharged with HH OT/PT as well as daily nursing care for IV antibiotics. Will follow up with PCP and general surgery outpatient. Return precautions discussed. Patient and spouse voiced understanding. All questions and concerns addressed at this time.        Goals of Care Treatment Preferences:  Code Status: Full Code        Consults:   Consults (From admission, onward)        Status Ordering Provider        [facility]  Once       Provider:  (Not yet assigned)   Acknowledged INNOCENT-ITUAH, ROSALEE N.        Inpatient consult to Infectious Diseases  Once       Provider:  Vivek Arredondo MD   Completed SUNIL TOURE        Inpatient consult to Interventional Radiology  Once       Provider:  Cody Reynolds II, MD   Completed STEPHIE WINSTON        Inpatient consult to Interventional Radiology  Once       Provider:  (Not yet assigned)   Completed SUNIL TOURE        General Surgery  Once       Provider:  Francie Mukherjee MD   Completed PIOTR ARAMBULA           No new Assessment & Plan notes have been filed under this hospital service since the last note was generated.  Service: Hospital Medicine     Final Active  Diagnoses:    Diagnosis Date Noted POA   PRINCIPAL PROBLEM:  Septic shock (A41.9, R65.21) 03/03/2022 Yes   Intra-abdominal abscess (K65.1) 03/05/2022 Yes   Abdominal wall abscess (L02.211) 03/03/2022 Yes   Elevated troponin (R77.8) 03/03/2022 Yes   Hypomagnesemia (E83.42) 03/03/2022 Yes   Dysphagia (R13.10) 06/22/2021 Yes   Hypothyroidism due to acquired atrophy of thyroid (E03.4) 01/20/2020 Yes   Acute renal failure superimposed on stage 3 chronic kidney disease (N17.9, N18.30) 01/11/2018 Yes   Long term current use of anticoagulant therapy (Z79.01) 09/13/2016 Not Applicable   (HFpEF) heart failure with preserved ejection fraction (I50.30) 06/17/2015 Yes   Type 2 diabetes mellitus with neurologic complication (E11.49) 10/17/2012 Yes   Cardiac pacemaker in situ (Z95.0) 10/12/2012 Yes   Essential (primary) hypertension (I10) 09/20/2012 Yes   CAD (coronary artery disease) (I25.10) 09/20/2012 Yes   Chronic atrial fibrillation (I48.20) 06/29/2012 Yes     Problems Resolved During this Admission:        Discharged Condition: good     Disposition: Home-Health Care [facility] [place]     Follow Up:      Follow-up Information       Callum Roberts MD Follow up on 3/9/2022.   Specialty: Internal Medicine  Contact information:  [address] [address]  [phone]                           Patient Instructions:       Ambulatory referral/consult to General Surgery   Standing Status: Future  Referral Priority: Routine Referral Type: Consultation   Referral Reason: Specialty Services Required   Requested Specialty: General Surgery   Number of Visits Requested: 1      Diet diabetic     Diet Cardiac     Activity as tolerated        Significant Diagnostic Studies: Labs:   BMP:   Recent Labs  Lab 03/07/22  0337 03/08/22  0347  * 131*   140  K 3.5 3.2*   106  CO2 27 26  BUN 19 18  CREATININE 1.0 0.8  CALCIUM 8.8 8.4*  MG 1.9 1.7  , CMP   Recent  Labs  Lab 03/07/22  0337 03/08/22  0347   140  K 3.5 3.2*   106  CO2 27 26  * 131*  BUN 19 18  CREATININE 1.0 0.8  CALCIUM 8.8 8.4*  PROT 6.1 5.8*  ALBUMIN 2.8* 2.7*  BILITOT 1.3* 1.0  ALKPHOS 109 104  AST 19 16  ALT 13 11  ANIONGAP 10 8  ESTGFRAFRICA >60 >60  EGFRNONAA >60 >60  , CBC   Recent Labs  Lab 03/07/22  0649 03/08/22  0735  WBC 6.85 8.51  HGB 9.2* 9.8*  HCT 28.5* 29.3*  * 143*   and All labs within the past 24 hours have been reviewed     Pending Diagnostic Studies:     None        Medications:  Reconciled Home Medications:      Medication List     START taking these medications   cefTRIAXone 1 g/50 mL Pgbk IVPB  Commonly known as: ROCEPHIN  Inject 50 mLs (1 g total) into the vein once daily. for 9 days     metroNIDAZOLE 500 MG tablet  Commonly known as: FLAGYL  Take 1 tablet (500 mg total) by mouth every 8 (eight) hours. for 9 days        CONTINUE taking these medications   acetaminophen 500 MG tablet  Commonly known as: TYLENOL  Take 500 mg by mouth every 6 (six) hours as needed for Pain.     amLODIPine 10 MG tablet  Commonly known as: NORVASC  TAKE 1 TABLET BY MOUTH DAILY FOR BLOOD PRESSURE     BLOOD GLUCOSE TEST Strp  Generic drug: blood sugar diagnostic  ONETOUCH ULTRA TESTING STRIPS. USE TO TEST BLOOD GLUCOSE TWICE DAILY. DX CODE: E11.51     blood-glucose meter Misc  ONETOUCH ULTRA GLUCOSE METER. USE AS DIRECTED TO TEST BLOOD GLUCOSE. DX CODE: E11.51     carvediloL 12.5 MG tablet  Commonly known as: COREG  TAKE 1 TABLET (12.5 MG TOTAL) BY MOUTH 2 (TWO) TIMES DAILY WITH MEALS.     doxazosin 1 MG tablet  Commonly known as: CARDURA  Take 1 mg by mouth nightly.     fenofibrate 160 MG Tab  fenofibrate 160 mg tablet     furosemide 20 MG tablet  Commonly known as: LASIX  TAKE ONE TABLET BY MOUTH EVERY OTHER DAY     gabapentin 100 MG capsule  Commonly known as: NEURONTIN  Take 2 capsules (200 mg total) by mouth every evening.     gemfibroziL 600 MG tablet  Commonly known as:  LOPID  Take one tablet(600mg) by mouth once daily     glimepiride 2 MG tablet  Commonly known as: AMARYL  TAKE 1 TABLET BY MOUTH EVERY MORNING WITH BREAKFAST FOR DIABETES     lancets Misc  Commonly known as: ONETOUCH ULTRASOFT LANCETS  USE TO TEST BLOOD GLUCOSE TWICE DAILY. DX CODE: E11.51     levothyroxine 75 MCG tablet  Commonly known as: SYNTHROID  Take 1 tablet (75 mcg total) by mouth before breakfast.     lisinopriL 20 MG tablet  Commonly known as: PRINIVIL,ZESTRIL     pantoprazole 40 MG tablet  Commonly known as: PROTONIX  TAKE ONE TABLET BY MOUTH ONCE A DAY FOR STOMACH PROBLEMS     pioglitazone 15 MG tablet  Commonly known as: ACTOS  ONE TABLET ONE TIME A DAY FOR DIABETES     pravastatin 20 MG tablet  Commonly known as: PRAVACHOL  TAKE 1 TABLET BY MOUTH DAILY FOR CHOLESTROL     rivaroxaban 20 mg Tab  Commonly known as: XARELTO  Take one tablet(20mg) by mouth once daily     tamsulosin 0.4 mg Cap  Commonly known as: FLOMAX  Take 1 capsule (0.4 mg total) by mouth once daily.              Indwelling Lines/Drains at time of discharge:   Lines/Drains/Airways       Peripherally Inserted Central Catheter Line  Duration             PICC Double Lumen 03/07/22 2215 right basilic <1 day            Drain  Duration                  Closed/Suction Drain 03/03/22 1614 Medial;Right RUQ Accordion 10 Fr. 4 days                 Time spent on the discharge of patient: 40 minutes           Ebonie Valdes PA-C  Department of Hospital Medicine  [place] - Telemetry        3/9/22  Eddie Parada Sr. is a 89 y.o. male  Here for hospital discharge follow up .  Mesh infected ; discharged on IV Rocephin and PO metronidazole.  He has a drain and will be seeing surgery on 3/15  No fevers        Review of Systems   Constitutional: Negative for chills and fever.   HENT: Negative for congestion, postnasal drip and sore throat.    Eyes: Negative for photophobia.   Respiratory: Negative for chest tightness and shortness of breath.     Cardiovascular: Negative for chest pain.   Gastrointestinal: Negative for abdominal distention, abdominal pain, blood in stool and vomiting.   Genitourinary: Negative for dysuria, flank pain and hematuria.   Musculoskeletal: Negative for back pain.   Skin: Negative for pallor.   Neurological: Negative for dizziness, seizures, facial asymmetry, speech difficulty and numbness.   Hematological: Does not bruise/bleed easily.   Psychiatric/Behavioral: Negative for agitation and suicidal ideas. The patient is not nervous/anxious.        Objective:      Physical Exam  Vitals and nursing note reviewed.   Constitutional:       Appearance: He is well-developed.   HENT:      Head: Normocephalic and atraumatic.      Nose: Nose normal.   Eyes:      Conjunctiva/sclera: Conjunctivae normal.      Pupils: Pupils are equal, round, and reactive to light.   Neck:      Thyroid: No thyromegaly.      Vascular: No JVD.   Cardiovascular:      Rate and Rhythm: Normal rate and regular rhythm.      Heart sounds: Normal heart sounds.   Pulmonary:      Effort: Pulmonary effort is normal.      Breath sounds: Normal breath sounds.   Abdominal:      General: Bowel sounds are normal. There is no distension.      Palpations: Abdomen is soft. There is no mass.      Tenderness: There is no abdominal tenderness. There is no guarding.          Comments: drain    Musculoskeletal:         General: Normal range of motion.      Cervical back: Normal range of motion and neck supple.   Lymphadenopathy:      Cervical: No cervical adenopathy.   Skin:     General: Skin is warm and dry.      Coloration: Skin is not pale.      Findings: No rash.   Neurological:      Mental Status: He is alert and oriented to person, place, and time.      Cranial Nerves: No cranial nerve deficit.      Deep Tendon Reflexes: Reflexes are normal and symmetric.         Assessment:       1. Hospital discharge follow-up    2. Abdominal wall abscess    3. Hypokalemia    4. Anemia,  unspecified type    5. Hypoalbuminemia        Plan:   Eddie was seen today for follow-up and hospital follow up.    Diagnoses and all orders for this visit:    Hospital discharge follow-up    Abdominal wall abscess  Cultures reviewed   Doing better   continue IV Rocephin   Continue metronidazole   See surgery 3/15 ; draining about 50 cc in drain  Clear bloody discharge       Hypokalemia  -     potassium chloride SA (K-DUR,KLOR-CON) 10 MEQ tablet; Take 1 tablet (10 mEq total) by mouth once daily.  Replace potasium     Anemia, unspecified type  Continue iron sup[plements     Hypoalbuminemia  Needs to eat more       Problem List Items Addressed This Visit    None

## 2022-03-11 ENCOUNTER — TELEPHONE (OUTPATIENT)
Dept: SURGERY | Facility: CLINIC | Age: 87
End: 2022-03-11
Payer: MEDICARE

## 2022-03-11 ENCOUNTER — HOSPITAL ENCOUNTER (EMERGENCY)
Facility: HOSPITAL | Age: 87
Discharge: ANOTHER HOSPITAL | End: 2022-03-12
Attending: STUDENT IN AN ORGANIZED HEALTH CARE EDUCATION/TRAINING PROGRAM
Payer: MEDICARE

## 2022-03-11 DIAGNOSIS — K65.1 INTRA-ABDOMINAL ABSCESS: Primary | ICD-10-CM

## 2022-03-11 DIAGNOSIS — R50.9 FEVER: ICD-10-CM

## 2022-03-11 LAB
ALBUMIN SERPL BCP-MCNC: 2.6 G/DL (ref 3.5–5.2)
ALP SERPL-CCNC: 96 U/L (ref 55–135)
ALT SERPL W/O P-5'-P-CCNC: 7 U/L (ref 10–44)
ANION GAP SERPL CALC-SCNC: 11 MMOL/L (ref 8–16)
AST SERPL-CCNC: 15 U/L (ref 10–40)
BACTERIA #/AREA URNS HPF: ABNORMAL /HPF
BASOPHILS # BLD AUTO: 0.04 K/UL (ref 0–0.2)
BASOPHILS NFR BLD: 0.4 % (ref 0–1.9)
BILIRUB SERPL-MCNC: 0.8 MG/DL (ref 0.1–1)
BILIRUB UR QL STRIP: ABNORMAL
BUN SERPL-MCNC: 19 MG/DL (ref 10–30)
CALCIUM SERPL-MCNC: 8.1 MG/DL (ref 8.7–10.5)
CHLORIDE SERPL-SCNC: 100 MMOL/L (ref 95–110)
CLARITY UR: CLEAR
CO2 SERPL-SCNC: 24 MMOL/L (ref 23–29)
COLOR UR: ABNORMAL
CREAT SERPL-MCNC: 1.3 MG/DL (ref 0.5–1.4)
DIFFERENTIAL METHOD: ABNORMAL
EOSINOPHIL # BLD AUTO: 0 K/UL (ref 0–0.5)
EOSINOPHIL NFR BLD: 0.2 % (ref 0–8)
ERYTHROCYTE [DISTWIDTH] IN BLOOD BY AUTOMATED COUNT: 12.8 % (ref 11.5–14.5)
EST. GFR  (AFRICAN AMERICAN): 55 ML/MIN/1.73 M^2
EST. GFR  (NON AFRICAN AMERICAN): 47 ML/MIN/1.73 M^2
GLUCOSE SERPL-MCNC: 169 MG/DL (ref 70–110)
GLUCOSE UR QL STRIP: NEGATIVE
HCT VFR BLD AUTO: 28.8 % (ref 40–54)
HGB BLD-MCNC: 9.5 G/DL (ref 14–18)
HGB UR QL STRIP: NEGATIVE
HYALINE CASTS #/AREA URNS LPF: 1 /LPF
IMM GRANULOCYTES # BLD AUTO: 0.42 K/UL (ref 0–0.04)
IMM GRANULOCYTES NFR BLD AUTO: 3.8 % (ref 0–0.5)
KETONES UR QL STRIP: ABNORMAL
LACTATE SERPL-SCNC: 1.1 MMOL/L (ref 0.5–2.2)
LEUKOCYTE ESTERASE UR QL STRIP: ABNORMAL
LYMPHOCYTES # BLD AUTO: 1.1 K/UL (ref 1–4.8)
LYMPHOCYTES NFR BLD: 9.7 % (ref 18–48)
MCH RBC QN AUTO: 33.8 PG (ref 27–31)
MCHC RBC AUTO-ENTMCNC: 33 G/DL (ref 32–36)
MCV RBC AUTO: 103 FL (ref 82–98)
MICROSCOPIC COMMENT: ABNORMAL
MONOCYTES # BLD AUTO: 1.1 K/UL (ref 0.3–1)
MONOCYTES NFR BLD: 10.3 % (ref 4–15)
NEUTROPHILS # BLD AUTO: 8.3 K/UL (ref 1.8–7.7)
NEUTROPHILS NFR BLD: 75.6 % (ref 38–73)
NITRITE UR QL STRIP: NEGATIVE
NRBC BLD-RTO: 0 /100 WBC
PH UR STRIP: 5 [PH] (ref 5–8)
PLATELET # BLD AUTO: 201 K/UL (ref 150–450)
PLATELET BLD QL SMEAR: ABNORMAL
PMV BLD AUTO: 9.7 FL (ref 9.2–12.9)
POTASSIUM SERPL-SCNC: 3.3 MMOL/L (ref 3.5–5.1)
PROT SERPL-MCNC: 5.7 G/DL (ref 6–8.4)
PROT UR QL STRIP: ABNORMAL
RBC # BLD AUTO: 2.81 M/UL (ref 4.6–6.2)
RBC #/AREA URNS HPF: 2 /HPF (ref 0–4)
SARS-COV-2 RDRP RESP QL NAA+PROBE: NEGATIVE
SODIUM SERPL-SCNC: 135 MMOL/L (ref 136–145)
SP GR UR STRIP: 1.01 (ref 1–1.03)
SQUAMOUS #/AREA URNS HPF: 8 /HPF
URN SPEC COLLECT METH UR: ABNORMAL
UROBILINOGEN UR STRIP-ACNC: 1 EU/DL
WBC # BLD AUTO: 11 K/UL (ref 3.9–12.7)
WBC #/AREA URNS HPF: 12 /HPF (ref 0–5)
YEAST URNS QL MICRO: ABNORMAL

## 2022-03-11 PROCEDURE — 63600175 PHARM REV CODE 636 W HCPCS: Performed by: SURGERY

## 2022-03-11 PROCEDURE — 82962 GLUCOSE BLOOD TEST: CPT

## 2022-03-11 PROCEDURE — S0030 INJECTION, METRONIDAZOLE: HCPCS | Performed by: SURGERY

## 2022-03-11 PROCEDURE — 36415 COLL VENOUS BLD VENIPUNCTURE: CPT | Performed by: STUDENT IN AN ORGANIZED HEALTH CARE EDUCATION/TRAINING PROGRAM

## 2022-03-11 PROCEDURE — 25500020 PHARM REV CODE 255: Performed by: SURGERY

## 2022-03-11 PROCEDURE — 83605 ASSAY OF LACTIC ACID: CPT | Performed by: STUDENT IN AN ORGANIZED HEALTH CARE EDUCATION/TRAINING PROGRAM

## 2022-03-11 PROCEDURE — 80053 COMPREHEN METABOLIC PANEL: CPT | Performed by: STUDENT IN AN ORGANIZED HEALTH CARE EDUCATION/TRAINING PROGRAM

## 2022-03-11 PROCEDURE — 87086 URINE CULTURE/COLONY COUNT: CPT | Performed by: STUDENT IN AN ORGANIZED HEALTH CARE EDUCATION/TRAINING PROGRAM

## 2022-03-11 PROCEDURE — 99285 EMERGENCY DEPT VISIT HI MDM: CPT | Mod: 25

## 2022-03-11 PROCEDURE — 25000003 PHARM REV CODE 250: Performed by: SURGERY

## 2022-03-11 PROCEDURE — 25000003 PHARM REV CODE 250: Performed by: STUDENT IN AN ORGANIZED HEALTH CARE EDUCATION/TRAINING PROGRAM

## 2022-03-11 PROCEDURE — 85025 COMPLETE CBC W/AUTO DIFF WBC: CPT | Performed by: STUDENT IN AN ORGANIZED HEALTH CARE EDUCATION/TRAINING PROGRAM

## 2022-03-11 PROCEDURE — 87106 FUNGI IDENTIFICATION YEAST: CPT | Performed by: STUDENT IN AN ORGANIZED HEALTH CARE EDUCATION/TRAINING PROGRAM

## 2022-03-11 PROCEDURE — U0002 COVID-19 LAB TEST NON-CDC: HCPCS | Performed by: STUDENT IN AN ORGANIZED HEALTH CARE EDUCATION/TRAINING PROGRAM

## 2022-03-11 PROCEDURE — 81000 URINALYSIS NONAUTO W/SCOPE: CPT | Performed by: STUDENT IN AN ORGANIZED HEALTH CARE EDUCATION/TRAINING PROGRAM

## 2022-03-11 PROCEDURE — 96365 THER/PROPH/DIAG IV INF INIT: CPT

## 2022-03-11 PROCEDURE — 96367 TX/PROPH/DG ADDL SEQ IV INF: CPT

## 2022-03-11 PROCEDURE — 96366 THER/PROPH/DIAG IV INF ADDON: CPT

## 2022-03-11 PROCEDURE — 87088 URINE BACTERIA CULTURE: CPT | Performed by: STUDENT IN AN ORGANIZED HEALTH CARE EDUCATION/TRAINING PROGRAM

## 2022-03-11 RX ORDER — SODIUM CHLORIDE 9 MG/ML
INJECTION, SOLUTION INTRAVENOUS
Status: COMPLETED | OUTPATIENT
Start: 2022-03-11 | End: 2022-03-11

## 2022-03-11 RX ORDER — METRONIDAZOLE 500 MG/100ML
500 INJECTION, SOLUTION INTRAVENOUS
Status: COMPLETED | OUTPATIENT
Start: 2022-03-11 | End: 2022-03-11

## 2022-03-11 RX ORDER — CEFEPIME HYDROCHLORIDE 1 G/50ML
2 INJECTION, SOLUTION INTRAVENOUS
Status: COMPLETED | OUTPATIENT
Start: 2022-03-11 | End: 2022-03-11

## 2022-03-11 RX ORDER — VANCOMYCIN 1.75 GRAM/500 ML IN 0.9 % SODIUM CHLORIDE INTRAVENOUS
20 ONCE
Status: COMPLETED | OUTPATIENT
Start: 2022-03-11 | End: 2022-03-12

## 2022-03-11 RX ADMIN — METRONIDAZOLE 500 MG: 500 INJECTION, SOLUTION INTRAVENOUS at 09:03

## 2022-03-11 RX ADMIN — IOHEXOL 100 ML: 350 INJECTION, SOLUTION INTRAVENOUS at 06:03

## 2022-03-11 RX ADMIN — Medication 1750 MG: at 11:03

## 2022-03-11 RX ADMIN — SODIUM CHLORIDE: 0.9 INJECTION, SOLUTION INTRAVENOUS at 09:03

## 2022-03-11 RX ADMIN — CEFEPIME HYDROCHLORIDE 2 G: 2 INJECTION, SOLUTION INTRAVENOUS at 09:03

## 2022-03-11 RX ADMIN — POTASSIUM BICARBONATE 20 MEQ: 391 TABLET, EFFERVESCENT ORAL at 06:03

## 2022-03-11 NOTE — ED TRIAGE NOTES
89 y.o. male presents to ER ED 02/ED 02B   Chief Complaint   Patient presents with    Fever     Patient presents POV with C/O fever, wife states pt had a temp of 100.8 with a skin thermometer   . No acute distress noted.

## 2022-03-11 NOTE — TELEPHONE ENCOUNTER
3/11/22  2:40pm  Spoke to Eden (home health nurse) regarding patient. Eden was informed that patient has not been seen by Dr. Crotez. Nurse insisted patient has appointment with Dr. Cortez next week. Eden was informed that this is a new patient appointment and that patient has never been evaluated by Dr. Cortez. I encouraged Eden to contact patient's PCP for further instructions. Verbalized understanding.

## 2022-03-12 ENCOUNTER — HOSPITAL ENCOUNTER (INPATIENT)
Facility: HOSPITAL | Age: 87
LOS: 2 days | Discharge: HOME | DRG: 603 | End: 2022-03-14
Attending: FAMILY MEDICINE | Admitting: FAMILY MEDICINE
Payer: MEDICARE

## 2022-03-12 VITALS
DIASTOLIC BLOOD PRESSURE: 64 MMHG | WEIGHT: 196.13 LBS | HEART RATE: 70 BPM | OXYGEN SATURATION: 96 % | BODY MASS INDEX: 32.63 KG/M2 | SYSTOLIC BLOOD PRESSURE: 137 MMHG | RESPIRATION RATE: 18 BRPM | TEMPERATURE: 99 F

## 2022-03-12 DIAGNOSIS — L02.211 ABDOMINAL WALL ABSCESS: ICD-10-CM

## 2022-03-12 DIAGNOSIS — R07.9 CHEST PAIN: ICD-10-CM

## 2022-03-12 DIAGNOSIS — K65.1 INTRA-ABDOMINAL ABSCESS: ICD-10-CM

## 2022-03-12 PROBLEM — N18.30 CKD (CHRONIC KIDNEY DISEASE) STAGE 3, GFR 30-59 ML/MIN: Status: ACTIVE | Noted: 2022-03-12

## 2022-03-12 PROBLEM — E87.6 HYPOKALEMIA: Status: ACTIVE | Noted: 2022-03-12

## 2022-03-12 LAB
POCT GLUCOSE: 117 MG/DL (ref 70–110)
POCT GLUCOSE: 142 MG/DL (ref 70–110)
POCT GLUCOSE: 175 MG/DL (ref 70–110)
POCT GLUCOSE: 88 MG/DL (ref 70–110)
VANCOMYCIN SERPL-MCNC: 8 UG/ML

## 2022-03-12 PROCEDURE — 63600175 PHARM REV CODE 636 W HCPCS: Performed by: FAMILY MEDICINE

## 2022-03-12 PROCEDURE — 25000003 PHARM REV CODE 250: Performed by: FAMILY MEDICINE

## 2022-03-12 PROCEDURE — 87040 BLOOD CULTURE FOR BACTERIA: CPT | Mod: 59 | Performed by: INTERNAL MEDICINE

## 2022-03-12 PROCEDURE — 63600175 PHARM REV CODE 636 W HCPCS

## 2022-03-12 PROCEDURE — 80202 ASSAY OF VANCOMYCIN: CPT | Performed by: FAMILY MEDICINE

## 2022-03-12 PROCEDURE — 36415 COLL VENOUS BLD VENIPUNCTURE: CPT | Performed by: INTERNAL MEDICINE

## 2022-03-12 PROCEDURE — 36415 COLL VENOUS BLD VENIPUNCTURE: CPT | Performed by: FAMILY MEDICINE

## 2022-03-12 PROCEDURE — 99900035 HC TECH TIME PER 15 MIN (STAT)

## 2022-03-12 PROCEDURE — 63600175 PHARM REV CODE 636 W HCPCS: Performed by: RADIOLOGY

## 2022-03-12 PROCEDURE — 25000003 PHARM REV CODE 250

## 2022-03-12 PROCEDURE — S0030 INJECTION, METRONIDAZOLE: HCPCS

## 2022-03-12 PROCEDURE — 11000001 HC ACUTE MED/SURG PRIVATE ROOM

## 2022-03-12 RX ORDER — POTASSIUM CHLORIDE 20 MEQ/1
40 TABLET, EXTENDED RELEASE ORAL ONCE
Status: COMPLETED | OUTPATIENT
Start: 2022-03-12 | End: 2022-03-12

## 2022-03-12 RX ORDER — LISINOPRIL 10 MG/1
10 TABLET ORAL DAILY
Status: DISCONTINUED | OUTPATIENT
Start: 2022-03-12 | End: 2022-03-14 | Stop reason: HOSPADM

## 2022-03-12 RX ORDER — IBUPROFEN 200 MG
24 TABLET ORAL
Status: DISCONTINUED | OUTPATIENT
Start: 2022-03-12 | End: 2022-03-14 | Stop reason: HOSPADM

## 2022-03-12 RX ORDER — FENTANYL CITRATE 50 UG/ML
INJECTION, SOLUTION INTRAMUSCULAR; INTRAVENOUS CODE/TRAUMA/SEDATION MEDICATION
Status: COMPLETED | OUTPATIENT
Start: 2022-03-12 | End: 2022-03-12

## 2022-03-12 RX ORDER — METRONIDAZOLE 500 MG/100ML
500 INJECTION, SOLUTION INTRAVENOUS
Status: DISCONTINUED | OUTPATIENT
Start: 2022-03-12 | End: 2022-03-14

## 2022-03-12 RX ORDER — LEVOTHYROXINE SODIUM 75 UG/1
75 TABLET ORAL
Status: DISCONTINUED | OUTPATIENT
Start: 2022-03-13 | End: 2022-03-14 | Stop reason: HOSPADM

## 2022-03-12 RX ORDER — SODIUM CHLORIDE 0.9 % (FLUSH) 0.9 %
10 SYRINGE (ML) INJECTION EVERY 8 HOURS PRN
Status: DISCONTINUED | OUTPATIENT
Start: 2022-03-12 | End: 2022-03-14 | Stop reason: HOSPADM

## 2022-03-12 RX ORDER — MAG HYDROX/ALUMINUM HYD/SIMETH 200-200-20
30 SUSPENSION, ORAL (FINAL DOSE FORM) ORAL 4 TIMES DAILY PRN
Status: DISCONTINUED | OUTPATIENT
Start: 2022-03-12 | End: 2022-03-14 | Stop reason: HOSPADM

## 2022-03-12 RX ORDER — TALC
6 POWDER (GRAM) TOPICAL NIGHTLY PRN
Status: DISCONTINUED | OUTPATIENT
Start: 2022-03-12 | End: 2022-03-14 | Stop reason: HOSPADM

## 2022-03-12 RX ORDER — IPRATROPIUM BROMIDE AND ALBUTEROL SULFATE 2.5; .5 MG/3ML; MG/3ML
3 SOLUTION RESPIRATORY (INHALATION) EVERY 6 HOURS PRN
Status: DISCONTINUED | OUTPATIENT
Start: 2022-03-12 | End: 2022-03-14 | Stop reason: HOSPADM

## 2022-03-12 RX ORDER — CEFEPIME HYDROCHLORIDE 1 G/50ML
2 INJECTION, SOLUTION INTRAVENOUS
Status: DISCONTINUED | OUTPATIENT
Start: 2022-03-12 | End: 2022-03-14 | Stop reason: HOSPADM

## 2022-03-12 RX ORDER — TAMSULOSIN HYDROCHLORIDE 0.4 MG/1
0.4 CAPSULE ORAL DAILY
Status: DISCONTINUED | OUTPATIENT
Start: 2022-03-12 | End: 2022-03-14 | Stop reason: HOSPADM

## 2022-03-12 RX ORDER — SIMETHICONE 80 MG
1 TABLET,CHEWABLE ORAL 4 TIMES DAILY PRN
Status: DISCONTINUED | OUTPATIENT
Start: 2022-03-12 | End: 2022-03-14 | Stop reason: HOSPADM

## 2022-03-12 RX ORDER — MUPIROCIN 20 MG/G
OINTMENT TOPICAL 2 TIMES DAILY
Status: DISCONTINUED | OUTPATIENT
Start: 2022-03-12 | End: 2022-03-14 | Stop reason: HOSPADM

## 2022-03-12 RX ORDER — PANTOPRAZOLE SODIUM 40 MG/1
40 TABLET, DELAYED RELEASE ORAL DAILY
Status: DISCONTINUED | OUTPATIENT
Start: 2022-03-12 | End: 2022-03-14 | Stop reason: HOSPADM

## 2022-03-12 RX ORDER — IBUPROFEN 200 MG
16 TABLET ORAL
Status: DISCONTINUED | OUTPATIENT
Start: 2022-03-12 | End: 2022-03-14 | Stop reason: HOSPADM

## 2022-03-12 RX ORDER — GLUCAGON 1 MG
1 KIT INJECTION
Status: DISCONTINUED | OUTPATIENT
Start: 2022-03-12 | End: 2022-03-14 | Stop reason: HOSPADM

## 2022-03-12 RX ORDER — HEPARIN SODIUM 5000 [USP'U]/ML
5000 INJECTION, SOLUTION INTRAVENOUS; SUBCUTANEOUS EVERY 8 HOURS
Status: DISCONTINUED | OUTPATIENT
Start: 2022-03-12 | End: 2022-03-13

## 2022-03-12 RX ORDER — ACETAMINOPHEN 325 MG/1
650 TABLET ORAL EVERY 4 HOURS PRN
Status: DISCONTINUED | OUTPATIENT
Start: 2022-03-12 | End: 2022-03-14 | Stop reason: HOSPADM

## 2022-03-12 RX ORDER — ONDANSETRON 2 MG/ML
4 INJECTION INTRAMUSCULAR; INTRAVENOUS EVERY 8 HOURS PRN
Status: DISCONTINUED | OUTPATIENT
Start: 2022-03-12 | End: 2022-03-14 | Stop reason: HOSPADM

## 2022-03-12 RX ORDER — INSULIN ASPART 100 [IU]/ML
0-5 INJECTION, SOLUTION INTRAVENOUS; SUBCUTANEOUS
Status: DISCONTINUED | OUTPATIENT
Start: 2022-03-12 | End: 2022-03-14 | Stop reason: HOSPADM

## 2022-03-12 RX ADMIN — METRONIDAZOLE 500 MG: 500 INJECTION, SOLUTION INTRAVENOUS at 05:03

## 2022-03-12 RX ADMIN — PANTOPRAZOLE SODIUM 40 MG: 40 TABLET, DELAYED RELEASE ORAL at 02:03

## 2022-03-12 RX ADMIN — HEPARIN SODIUM 5000 UNITS: 5000 INJECTION INTRAVENOUS; SUBCUTANEOUS at 09:03

## 2022-03-12 RX ADMIN — METRONIDAZOLE 500 MG: 500 INJECTION, SOLUTION INTRAVENOUS at 11:03

## 2022-03-12 RX ADMIN — MUPIROCIN: 20 OINTMENT TOPICAL at 09:03

## 2022-03-12 RX ADMIN — MUPIROCIN: 20 OINTMENT TOPICAL at 11:03

## 2022-03-12 RX ADMIN — CEFEPIME HYDROCHLORIDE 2 G: 2 INJECTION, SOLUTION INTRAVENOUS at 11:03

## 2022-03-12 RX ADMIN — HEPARIN SODIUM 5000 UNITS: 5000 INJECTION INTRAVENOUS; SUBCUTANEOUS at 02:03

## 2022-03-12 RX ADMIN — LISINOPRIL 10 MG: 10 TABLET ORAL at 02:03

## 2022-03-12 RX ADMIN — CEFEPIME HYDROCHLORIDE 2 G: 2 INJECTION, SOLUTION INTRAVENOUS at 05:03

## 2022-03-12 RX ADMIN — FENTANYL CITRATE 50 MCG: 50 INJECTION INTRAMUSCULAR; INTRAVENOUS at 03:03

## 2022-03-12 RX ADMIN — TAMSULOSIN HYDROCHLORIDE 0.4 MG: 0.4 CAPSULE ORAL at 02:03

## 2022-03-12 RX ADMIN — POTASSIUM CHLORIDE 40 MEQ: 1500 TABLET, EXTENDED RELEASE ORAL at 11:03

## 2022-03-12 NOTE — H&P
[facility] Medicine  History & Physical    Patient Name: Eddie Parada Sr.  MRN: [id number]  Patient Class: IP- Inpatient  Admission Date: 3/12/2022  Attending Physician: Hayley Cameron*   Primary Care Provider: Callum Roberts MD         Patient information was obtained from patient, relative(s) and ER records.     Subjective:     Principal Problem:Abdominal wall abscess    Chief Complaint:   Chief Complaint   Patient presents with    Fever        HPI: Eddie Parada is a 89 y/o M with PMH of atrial fibrillation on AOC, CAD, chronic diastolic heart failure, DM II, COPD, hyperlipidemia and osteoporosis presents with concern that abdominal drain is no longer draining. There is associated? fever. Patient was recently hospitalized in [facility] from 3/3- 3/8/2022 for intra-abdominal abscess found at site of prior mesh repair with resultant septic shock.  IR placed drain, and patient subsequently discharged on IV Rocephin per ID recommendations with end date 03/17/2022, cultures have been no growth. Per wife the abscess has been draining until yesterday. Patient was instructed by home health to come to ED to be evaluated.   CT abd/pel shows Large 16.6 x 9 cm abscess at the midline abutting the anterior abdominal wall, increased in size from the prior study. Started blood culture, resume home Ceftriaxone and Flagyl and add Vanc, consult surgery, IR and ID.       Past Medical History:   Diagnosis Date    Abdominal fibromatosis     Acute posthemorrhagic anemia 1/9/2018    NIK (acute kidney injury) 1/11/2018    Atrial fibrillation     Bradycardia     Broken arm     CAD (coronary artery disease)     Cancer     basal cell on nose and melanoma on back    CHF (congestive heart failure)     COPD (chronic obstructive pulmonary disease)     Diabetes mellitus type II     Hyperlipidemia     Localization-related focal epilepsy with simple partial seizures 3/2/2021    Melanoma      Obesity (BMI 30.0-34.9) 9/13/2016    Osteoporosis     Peripheral vascular disease     Primary malignant neoplasm of prostate 3/3/2022    Septic shock 3/3/2022    Stroke     TIA (transient ischemic attack)     Type II diabetes mellitus with peripheral circulatory disorder     Type II or unspecified type diabetes mellitus without mention of complication, not stated as uncontrolled     Unspecified essential hypertension        Past Surgical History:   Procedure Laterality Date    AORTIC VALVE REPLACEMENT      CARDIAC PACEMAKER PLACEMENT  9/28/2012    CARDIAC VALVE REPLACEMENT  11/17/2011    aortic valve-tissue    CHOLECYSTECTOMY      COLONOSCOPY      ESOPHAGOGASTRODUODENOSCOPY N/A 5/27/2021    Procedure: EGD (ESOPHAGOGASTRODUODENOSCOPY);  Surgeon: Gualberto Medrano MD;  Location: [facility];  Service: Endoscopy;  Laterality: N/A;    ESOPHAGOGASTRODUODENOSCOPY N/A 6/22/2021    Procedure: EGD (ESOPHAGOGASTRODUODENOSCOPY);  Surgeon: Gualberto Medrano MD;  Location: [facility];  Service: Endoscopy;  Laterality: N/A;  please call wife(Jessica) with instructions/arrival time.    ESOPHAGOGASTRODUODENOSCOPY (EGD) WITH DILATION  06/22/2021    EYE SURGERY Bilateral     cataract [facility] lens [facility]    HERNIA REPAIR      abdominal    LASIK      UPPER GASTROINTESTINAL ENDOSCOPY  05/28/2021       Review of patient's allergies indicates:   Allergen Reactions    Ciprofloxacin     Levofloxacin Swelling    Lyrica [pregabalin] Swelling    Unable to assess Other (See Comments)     Soft shell crabs       Current Facility-Administered Medications on File Prior to Encounter   Medication    [COMPLETED] 0.9%  NaCl infusion    [COMPLETED] cefepime in dextrose 5 % IVPB 2 g    [COMPLETED] iohexoL (OMNIPAQUE 350) injection 100 mL    [COMPLETED] metronidazole IVPB 500 mg    [COMPLETED] potassium bicarbonate disintegrating tablet 20 mEq    [COMPLETED] vancomycin 1750 mg in 0.9% sodium chloride  500 mL IVPB    [DISCONTINUED] piperacillin-tazobactam 4.5 g in dextrose 5 % 100 mL IVPB (ready to mix system)    [DISCONTINUED] vancomycin - pharmacy to dose    [DISCONTINUED] vancomycin - pharmacy to dose     Current Outpatient Medications on File Prior to Encounter   Medication Sig    acetaminophen (TYLENOL) 500 MG tablet Take 500 mg by mouth every 6 (six) hours as needed for Pain.    amLODIPine (NORVASC) 10 MG tablet TAKE 1 TABLET BY MOUTH DAILY FOR BLOOD PRESSURE    blood-glucose meter Misc ONETOUCH ULTRA GLUCOSE METER. USE AS DIRECTED TO TEST BLOOD GLUCOSE. DX CODE: E11.51    carvediloL (COREG) 12.5 MG tablet TAKE 1 TABLET (12.5 MG TOTAL) BY MOUTH 2 (TWO) TIMES DAILY WITH MEALS.    cefTRIAXone (ROCEPHIN) 1 g/50 mL PgBk IVPB Inject 50 mLs (1 g total) into the vein once daily. for 9 days    doxazosin (CARDURA) 1 MG tablet Take 1 mg by mouth nightly.    fenofibrate 160 MG Tab fenofibrate 160 mg tablet    furosemide (LASIX) 20 MG tablet TAKE ONE TABLET BY MOUTH EVERY OTHER DAY    gabapentin (NEURONTIN) 100 MG capsule Take 2 capsules (200 mg total) by mouth every evening.    gemfibrozil (LOPID) 600 MG tablet Take one tablet(600mg) by mouth once daily    glimepiride (AMARYL) 2 MG tablet TAKE 1 TABLET BY MOUTH EVERY MORNING WITH BREAKFAST FOR DIABETES    lancets (ONETOUCH ULTRASOFT LANCETS) Misc USE TO TEST BLOOD GLUCOSE TWICE DAILY. DX CODE: E11.51    levothyroxine (SYNTHROID) 75 MCG tablet Take 1 tablet (75 mcg total) by mouth before breakfast.    lisinopriL (PRINIVIL,ZESTRIL) 20 MG tablet     metroNIDAZOLE (FLAGYL) 500 MG tablet Take 1 tablet (500 mg total) by mouth every 8 (eight) hours. for 9 days    pantoprazole (PROTONIX) 40 MG tablet TAKE ONE TABLET BY MOUTH ONCE A DAY FOR STOMACH PROBLEMS    pioglitazone (ACTOS) 15 MG tablet ONE TABLET ONE TIME A DAY FOR DIABETES    potassium chloride SA (K-DUR,KLOR-CON) 10 MEQ tablet Take 1 tablet (10 mEq total) by mouth once daily.    pravastatin  (PRAVACHOL) 20 MG tablet TAKE 1 TABLET BY MOUTH DAILY FOR CHOLESTROL    rivaroxaban (XARELTO) 20 mg Tab Take one tablet(20mg) by mouth once daily    tamsulosin (FLOMAX) 0.4 mg Cap Take 1 capsule (0.4 mg total) by mouth once daily.    blood sugar diagnostic (BLOOD GLUCOSE TEST) [employer] ULTRA TESTING STRIPS. USE TO TEST BLOOD GLUCOSE TWICE DAILY. DX CODE: E11.51     Family History       Problem Relation (Age of Onset)    Alcohol abuse Father    COPD Sister, Brother    Cancer Brother    Diabetes Daughter    Heart disease Brother    Hypertension Father    No Known Problems Son, Daughter, Son    Stroke Father          Tobacco Use    Smoking status: Former Smoker     Quit date: 1996     Years since quittin.4    Smokeless tobacco: Never Used    Tobacco comment: cigar smoker   Substance and Sexual Activity    Alcohol use: No     Comment: none since     Drug use: No    Sexual activity: Not Currently     Review of Systems   Constitutional:  Negative for chills and fever.   HENT:  Negative for congestion and rhinorrhea.    Eyes:  Negative for discharge.   Respiratory:  Negative for chest tightness and shortness of breath.    Cardiovascular:  Negative for chest pain.   Gastrointestinal:  Negative for abdominal distention, abdominal pain, blood in stool, nausea and vomiting.   Endocrine: Negative for polyphagia and polyuria.   Genitourinary:  Negative for dysuria and urgency.   Skin:  Negative for rash.   Neurological:  Negative for dizziness and weakness.   Psychiatric/Behavioral:  Negative for agitation.    Objective:     Vital Signs (Most Recent):  Temp: 96.4 °F (35.8 °C) (22 1120)  Pulse: 60 (22 1149)  Resp: 16 (22 1120)  BP: (!) 145/67 (22 1120)  SpO2: 97 % (22 0919)   Vital Signs (24h Range):  Temp:  [96.4 °F (35.8 °C)-99 °F (37.2 °C)] 96.4 °F (35.8 °C)  Pulse:  [60-71] 60  Resp:  [16-18] 16  SpO2:  [96 %-97 %] 97 %  BP: (102-163)/(52-70) 145/67     Weight: 88.9 kg  (195 lb 15.8 oz)  Body mass index is 32.61 kg/m².    Physical Exam  Constitutional:       Appearance: He is well-developed.   HENT:      Head: Normocephalic and atraumatic.   Cardiovascular:      Rate and Rhythm: Normal rate and regular rhythm.      Heart sounds: Normal heart sounds. No murmur heard.    No friction rub. No gallop.      Comments: Pacemaker in place  Pulmonary:      Effort: Pulmonary effort is normal.      Breath sounds: Normal breath sounds.   Chest:      Chest wall: No tenderness.   Abdominal:      General: Bowel sounds are normal. There is no distension.      Palpations: Abdomen is soft.      Tenderness: There is no abdominal tenderness.      Comments: Abdominal drain in place, no surrounding erythema       Musculoskeletal:         General: Normal range of motion.      Cervical back: Neck supple.   Skin:     General: Skin is warm.   Neurological:      Mental Status: He is alert and oriented to person, place, and time.           Significant Labs: A1C:   Recent Labs   Lab 03/02/22  0744   HGBA1C 5.9*     ABGs: No results for input(s): PH, PCO2, HCO3, POCSATURATED, BE, TOTALHB, COHB, METHB, O2HB, POCFIO2, PO2 in the last 48 hours.  Blood Culture: No results for input(s): LABBLOO in the last 48 hours.  CBC:   Recent Labs   Lab 03/11/22  1649   WBC 11.00   HGB 9.5*   HCT 28.8*        CMP:   Recent Labs   Lab 03/11/22  1648   *   K 3.3*      CO2 24   *   BUN 19   CREATININE 1.3   CALCIUM 8.1*   PROT 5.7*   ALBUMIN 2.6*   BILITOT 0.8   ALKPHOS 96   AST 15   ALT 7*   ANIONGAP 11   EGFRNONAA 47*     Lactic Acid:   Recent Labs   Lab 03/11/22  1648   LACTATE 1.1     Lipid Panel: No results for input(s): CHOL, HDL, LDLCALC, TRIG, CHOLHDL in the last 48 hours.  Troponin: No results for input(s): TROPONINI in the last 48 hours.  TSH:   Recent Labs   Lab 03/02/22  0744   TSH 5.943*     Urine Culture: No results for input(s): LABURIN in the last 48 hours.  Urine Studies:   Recent Labs    Lab 03/11/22  1737   COLORU [place]   APPEARANCEUA Clear   PHUR 5.0   SPECGRAV 1.015   PROTEINUA 1+*   GLUCUA Negative   KETONESU 1+*   BILIRUBINUA 2+*   OCCULTUA Negative   NITRITE Negative   UROBILINOGEN 1.0   LEUKOCYTESUR 1+*   RBCUA 2   WBCUA 12*   BACTERIA Few*   SQUAMEPITHEL 8   HYALINECASTS 1       Significant Imaging: I have reviewed all pertinent imaging results/findings within the past 24 hours.    Assessment/Plan:     * Abdominal wall abscess  Abdominal wall mass  s/p drain placement by IR on 3/3/2022  Ct abdomen/pelvis  1. Large 16.6 x 9 cm abscess at the midline abutting the anterior abdominal wall, increased in size from the prior study. Recommend surgical consultation and follow-up.  2. Mild cardiomegaly.  3. Bilateral renal cysts.  4. Incomplete distension of the urinary bladder with probable mild wall thickening.  Cystitis is a consideration.  5. Bilateral inguinal hernias with small bowel protrusion on the right in fat containing inguinal hernia on the left.  6. Prostatomegaly.  Blood culture   Resume home abx- Ceftriaxone and Flagyl and add Vanc  Consult surgery  Consult IR      Hypokalemia  Replete       CKD (chronic kidney disease) stage 3, GFR 30-59 ml/min  BUN/Cr 19/1.3  stable      Hypothyroidism due to acquired atrophy of thyroid  Continue synthroid.     (HFpEF) heart failure with preserved ejection fraction    Chronic combined systolic and diastolic congestive heart failure  Echo 3/03/2022 shows EF is 55% with indeterminate diastolic function  On Lasix- hold on admit   Hold BB due to low HR  Continue Lisinopril      Type II diabetes mellitus with peripheral circulatory disorder  HbA1c 5.9  Hold  Amaryl  accucheck  Low dose SSi  Diabetic diet      Cardiac pacemaker in situ  In place       CAD (coronary artery disease)  - Continue Pravastatin    Essential (primary) hypertension  - Continue Lisinopril     Chronic atrial fibrillation  Long term current use of anticoagulant therapy    Hold  rivaroxaban for anticoagulation for possible surgery   Will use subq heparin for now.   Coreg on hold, HR low on admit      VTE Risk Mitigation (From admission, onward)         Ordered     heparin (porcine) injection 5,000 Units  Every 8 hours         03/12/22 1227     IP VTE HIGH RISK PATIENT  Once         03/12/22 0956     Place sequential compression device  Until discontinued         03/12/22 0956     Reason for No Pharmacological VTE Prophylaxis  Once        Question:  Reasons:  Answer:  Already adequately anticoagulated on oral Anticoagulants    03/12/22 0956                   Hayley Cameron MD  Department of Hospital Medicine   [facility]

## 2022-03-12 NOTE — HPI
Eddie Parada is a 89 y/o M with PMH of atrial fibrillation on AOC, CAD, chronic diastolic heart failure, DM II, COPD, hyperlipidemia and osteoporosis presents with concern that abdominal drain is no longer draining. There is associated? fever. Patient was recently hospitalized in [facility] from 3/3- 3/8/2022 for intra-abdominal abscess found at site of prior mesh repair with resultant septic shock.  IR placed drain, and patient subsequently discharged on IV Rocephin per ID recommendations with end date 03/17/2022, cultures have been no growth. Per wife the abscess has been draining until yesterday. Patient was instructed by home health to come to ED to be evaluated.   CT abd/pel shows Large 16.6 x 9 cm abscess at the midline abutting the anterior abdominal wall, increased in size from the prior study. Started blood culture, resume home Ceftriaxone and Flagyl and add Vanc, consult surgery, IR and ID.

## 2022-03-12 NOTE — PROGRESS NOTES
[facility]           Pharmacy        Current Drug Shortage     Due to national backorder and [facility] is critically low on inventory of Dextrose 50% (D50) Syringes and Vials, pharmacy has automatically switched from D50% to D10% IVPB at the equivalent dose until resolution of the shortage per P&T approved protocol.               Brain Ly, PharmD  [phone]

## 2022-03-12 NOTE — CONSULTS
Inpatient Radiology Pre-procedure Note    History of Present Illness:  Eddie Parada Sr. is a 89 y.o. male who presents for abscess drain exchange/upsize.  Admission H&P reviewed.  Past Medical History:   Diagnosis Date    Abdominal fibromatosis     Acute posthemorrhagic anemia 1/9/2018    NIK (acute kidney injury) 1/11/2018    Atrial fibrillation     Bradycardia     Broken arm     CAD (coronary artery disease)     Cancer     basal cell on nose and melanoma on back    CHF (congestive heart failure)     COPD (chronic obstructive pulmonary disease)     Diabetes mellitus type II     Hyperlipidemia     Localization-related focal epilepsy with simple partial seizures 3/2/2021    Melanoma     Obesity (BMI 30.0-34.9) 9/13/2016    Osteoporosis     Peripheral vascular disease     Primary malignant neoplasm of prostate 3/3/2022    Septic shock 3/3/2022    Stroke     TIA (transient ischemic attack)     Type II diabetes mellitus with peripheral circulatory disorder     Type II or unspecified type diabetes mellitus without mention of complication, not stated as uncontrolled     Unspecified essential hypertension      Past Surgical History:   Procedure Laterality Date    AORTIC VALVE REPLACEMENT      CARDIAC PACEMAKER PLACEMENT  9/28/2012    CARDIAC VALVE REPLACEMENT  11/17/2011    aortic valve-tissue    CHOLECYSTECTOMY      COLONOSCOPY      ESOPHAGOGASTRODUODENOSCOPY N/A 5/27/2021    Procedure: EGD (ESOPHAGOGASTRODUODENOSCOPY);  Surgeon: Gualberto Medrano MD;  Location: [facility];  Service: Endoscopy;  Laterality: N/A;    ESOPHAGOGASTRODUODENOSCOPY N/A 6/22/2021    Procedure: EGD (ESOPHAGOGASTRODUODENOSCOPY);  Surgeon: Gualberto Medrano MD;  Location: [facility];  Service: Endoscopy;  Laterality: N/A;  please call wife(Jessica) with instructions/arrival time.    ESOPHAGOGASTRODUODENOSCOPY (EGD) WITH DILATION  06/22/2021    EYE SURGERY Bilateral     cataract with lens by  at  [facility]    HERNIA REPAIR      abdominal    LASIK      UPPER GASTROINTESTINAL ENDOSCOPY  05/28/2021       Review of Systems:   As documented in primary team H&P    Home Meds:   Prior to Admission medications    Medication Sig Start Date End Date Taking? Authorizing Provider   acetaminophen (TYLENOL) 500 MG tablet Take 500 mg by mouth every 6 (six) hours as needed for Pain.   Yes Historical Provider   amLODIPine (NORVASC) 10 MG tablet TAKE 1 TABLET BY MOUTH DAILY FOR BLOOD PRESSURE 1/21/22  Yes Callum Roberts MD   blood-glucose meter Misc ONETOUCH ULTRA GLUCOSE METER. USE AS DIRECTED TO TEST BLOOD GLUCOSE. DX CODE: E11.51 9/2/20  Yes Callum Roberts MD   carvediloL (COREG) 12.5 MG tablet TAKE 1 TABLET (12.5 MG TOTAL) BY MOUTH 2 (TWO) TIMES DAILY WITH MEALS. 11/23/20  Yes Callum Roberts MD   cefTRIAXone (ROCEPHIN) 1 g/50 mL PgBk IVPB Inject 50 mLs (1 g total) into the vein once daily. for 9 days 3/8/22 3/17/22 Yes Ebonie Valdes PA-C   doxazosin (CARDURA) 1 MG tablet Take 1 mg by mouth nightly. 5/11/21  Yes Historical Provider   fenofibrate 160 MG Tab fenofibrate 160 mg tablet   Yes Historical Provider   furosemide (LASIX) 20 MG tablet TAKE ONE TABLET BY MOUTH EVERY OTHER DAY 12/6/21  Yes Callum Roberts MD   gabapentin (NEURONTIN) 100 MG capsule Take 2 capsules (200 mg total) by mouth every evening. 5/24/21 5/19/22 Yes Elaine Eckert MD   gemfibrozil (LOPID) 600 MG tablet Take one tablet(600mg) by mouth once daily   Yes Historical Provider   glimepiride (AMARYL) 2 MG tablet TAKE 1 TABLET BY MOUTH EVERY MORNING WITH BREAKFAST FOR DIABETES 8/4/21  Yes Callum Roberts MD   lancets (ONETOUCH ULTRASOFT LANCETS) Misc USE TO TEST BLOOD GLUCOSE TWICE DAILY. DX CODE: E11.51 9/2/20  Yes Callum Roberts MD   levothyroxine (SYNTHROID) 75 MCG tablet Take 1 tablet (75 mcg total) by mouth before breakfast. 7/26/21 7/26/22 Yes Callum Roberts MD   lisinopriL (PRINIVIL,ZESTRIL) 20 MG tablet  2/23/21  Yes  Historical Provider   metroNIDAZOLE (FLAGYL) 500 MG tablet Take 1 tablet (500 mg total) by mouth every 8 (eight) hours. for 9 days 3/8/22 3/17/22 Yes Ebonie Valdes PA-C   pantoprazole (PROTONIX) 40 MG tablet TAKE ONE TABLET BY MOUTH ONCE A DAY FOR STOMACH PROBLEMS 2/21/22  Yes Callum Roberts MD   pioglitazone (ACTOS) 15 MG tablet ONE TABLET ONE TIME A DAY FOR DIABETES 2/28/22  Yes Callum Roberts MD   potassium chloride SA (K-DUR,KLOR-CON) 10 MEQ tablet Take 1 tablet (10 mEq total) by mouth once daily. 3/9/22  Yes Callum Roberts MD   pravastatin (PRAVACHOL) 20 MG tablet TAKE 1 TABLET BY MOUTH DAILY FOR CHOLESTROL 11/29/21  Yes Callum Roberts MD   rivaroxaban (XARELTO) 20 mg Tab Take one tablet(20mg) by mouth once daily   Yes Historical Provider   tamsulosin (FLOMAX) 0.4 mg Cap Take 1 capsule (0.4 mg total) by mouth once daily. 3/8/22 3/8/23 Yes Ebonie Valdes PA-C   blood sugar diagnostic (BLOOD GLUCOSE TEST) [employer] ULTRA TESTING STRIPS. USE TO TEST BLOOD GLUCOSE TWICE DAILY. DX CODE: E11.51 9/2/20   Callum Roberts MD     Scheduled Meds:    ceFEPime (MAXIPIME) IVPB  2 g Intravenous Q8H    heparin (porcine)  5,000 Units Subcutaneous Q8H    [START ON 3/13/2022] levothyroxine  75 mcg Oral Before breakfast    lisinopriL  10 mg Oral Daily    metronidazole  500 mg Intravenous Q8H    mupirocin   Nasal BID    pantoprazole  40 mg Oral Daily    tamsulosin  0.4 mg Oral Daily     Continuous Infusions:   PRN Meds:acetaminophen, albuterol-ipratropium, aluminum-magnesium hydroxide-simethicone, dextrose 10%, dextrose 10%, glucagon (human recombinant), glucose, glucose, influenza, insulin aspart U-100, melatonin, ondansetron, sars-cov-2 (covid-19), simethicone, sodium chloride 0.9%, Pharmacy to dose Vancomycin consult **AND** vancomycin - pharmacy to dose  Anticoagulants/Antiplatelets: no anticoagulation    Allergies:   Review of patient's allergies indicates:   Allergen Reactions    Ciprofloxacin      Levofloxacin Swelling    Lyrica [pregabalin] Swelling    Unable to assess Other (See Comments)     Soft shell crabs     Sedation Hx: have not been any systemic reactions    Labs:  No results for input(s): INR in the last 168 hours.    Invalid input(s):  PT,  PTT    Recent Labs   Lab 03/11/22  1649   WBC 11.00   HGB 9.5*   HCT 28.8*   *         Recent Labs   Lab 03/08/22  0347 03/11/22  1648   * 169*    135*   K 3.2* 3.3*    100   CO2 26 24   BUN 18 19   CREATININE 0.8 1.3   CALCIUM 8.4* 8.1*   MG 1.7  --    ALT 11 7*   AST 16 15   ALBUMIN 2.7* 2.6*   BILITOT 1.0 0.8         Vitals:  Temp: 96.4 °F (35.8 °C) (03/12/22 1120)  Pulse: 60 (03/12/22 1149)  Resp: 16 (03/12/22 1120)  BP: (!) 145/67 (03/12/22 1120)  SpO2: 97 % (03/12/22 0919)     Physical Exam:  ASA: 3  Mallampati: 2    General: no acute distress  Mental Status: alert and oriented to person, place and time  HEENT: normocephalic, atraumatic  Chest: unlabored breathing  Heart: regular heart rate  Abdomen: nondistended  Extremity: moves all extremities    Plan: Abscess drain upsize vs placement of new drainage  Sedation Plan: Moderate    Aden Bernard M.D.  Interventional Radiology  Department of Radiology  Pager: [phone]

## 2022-03-12 NOTE — PROGRESS NOTES
Pharmacokinetic Initial Assessment: IV Vancomycin    Assessment/Plan:    Initiate intravenous vancomycin with loading dose of 1750 mg once (20 mg/kg load due to renal function, age) with subsequent doses when random concentrations are less than 20 mcg/mL  Desired empiric serum trough concentration is 15 to 20 mcg/mL  Draw vancomycin random level on 3/12 at 2200.  Pharmacy will continue to follow and monitor vancomycin.      Please contact pharmacy at extension [phone] with any questions regarding this assessment.     Thank you for the consult,   Yolanda Lee       Patient brief summary:  Eddie Parada Sr. is a 89 y.o. male initiated on antimicrobial therapy with IV Vancomycin for treatment of suspected intra-abdominal infection    Drug Allergies:   Review of patient's allergies indicates:   Allergen Reactions    Ciprofloxacin     Levofloxacin Swelling    Lyrica [pregabalin] Swelling    Unable to assess Other (See Comments)     Soft shell crabs       Actual Body Weight:   89 kg    Renal Function:   Estimated Creatinine Clearance: 37.2 mL/min (based on SCr of 1.3 mg/dL).,     Dialysis Method (if applicable):  N/A    CBC (last 72 hours):  Recent Labs   Lab Result Units 03/11/22  1649   WBC K/uL 11.00   Hemoglobin g/dL 9.5*   Hematocrit % 28.8*   Platelets K/uL 201   Gran % % 75.6*   Lymph % % 9.7*   Mono % % 10.3   Eosinophil % % 0.2   Basophil % % 0.4   Differential Method  Automated       Metabolic Panel (last 72 hours):  Recent Labs   Lab Result Units 03/11/22  1648 03/11/22  1737   Sodium mmol/L 135*  --    Potassium mmol/L 3.3*  --    Chloride mmol/L 100  --    CO2 mmol/L 24  --    Glucose mg/dL 169*  --    Glucose, UA   --  Negative   BUN mg/dL 19  --    Creatinine mg/dL 1.3  --    Albumin g/dL 2.6*  --    Total Bilirubin mg/dL 0.8  --    Alkaline Phosphatase U/L 96  --    AST U/L 15  --    ALT U/L 7*  --        Drug levels (last 3 results):  No results for input(s): VANCOMYCINRA, VANCOMYCINPE,  VANCOMYCINTR in the last 72 hours.    Microbiologic Results:  Microbiology Results (last 7 days)       Procedure Component Value Units Date/Time    Urine culture [[id number]] Collected: 03/11/22 1737    Order Status: No result Specimen: Urine Updated: 03/11/22 1754

## 2022-03-12 NOTE — ED PROVIDER NOTES
[facility]                                                  Chief Complaint     Chief Complaint   Patient presents with    Fever     Patient presents POV with C/O fever, wife states pt had a temp of 100.8 with a skin thermometer       History of Present Illness  89 y.o. male with past medical history of atrial fibrillation, CAD, CHF, COPD, diabetes mellitus type 2, hyperlipidemia and osteoporosis who presents with fever.  Patient was recently discharged from [facility] 3 days ago after admission for intra-abdominal abscess with resultant septic shock, during which drain was placed and abscess was drained and he was treated with IV antibiotics.  Since discharge, he has been doing fine with no complaints.  He has been receiving IV Rocephin through a PICC line.  Per wife, his abscess has been draining at home adequately.  However, his temperature was checked by home health nurse today and he was found to have a fever of 100.4 and was instructed to come to the ED.  He denies chills, abdominal pain, nausea, vomiting, dysuria or hematuria.    History obtained from:  Patient    Review of patient's allergies indicates:   Allergen Reactions    Ciprofloxacin     Levofloxacin Swelling    Lyrica [pregabalin] Swelling    Unable to assess Other (See Comments)     Soft shell crabs     Past Medical History:   Diagnosis Date    Abdominal fibromatosis     Acute posthemorrhagic anemia 1/9/2018    NIK (acute kidney injury) 1/11/2018    Atrial fibrillation     Bradycardia     Broken arm     CAD (coronary artery disease)     Cancer     basal cell on nose and melanoma on back    CHF (congestive heart failure)     COPD (chronic obstructive pulmonary disease)     Diabetes mellitus type II     Hyperlipidemia     Localization-related focal epilepsy with simple partial seizures 3/2/2021    Melanoma     Obesity (BMI 30.0-34.9) 9/13/2016    Osteoporosis     Peripheral vascular disease     Primary malignant  neoplasm of prostate 3/3/2022    Septic shock 3/3/2022    Stroke     TIA (transient ischemic attack)     Type II diabetes mellitus with peripheral circulatory disorder     Type II or unspecified type diabetes mellitus without mention of complication, not stated as uncontrolled     Unspecified essential hypertension      Past Surgical History:   Procedure Laterality Date    AORTIC VALVE REPLACEMENT      CARDIAC PACEMAKER PLACEMENT  2012    CARDIAC VALVE REPLACEMENT  2011    aortic valve-tissue    CHOLECYSTECTOMY      COLONOSCOPY      ESOPHAGOGASTRODUODENOSCOPY N/A 2021    Procedure: EGD (ESOPHAGOGASTRODUODENOSCOPY);  Surgeon: Gualberto Medrano MD;  Location: [facility] END[facility];  Service: Endoscopy;  Laterality: N/A;    ESOPHAGOGASTRODUODENOSCOPY N/A 2021    Procedure: EGD (ESOPHAGOGASTRODUODENOSCOPY);  Surgeon: Gualberto Medrano MD;  Location: [facility];  Service: Endoscopy;  Laterality: N/A;  please call wife(Jessica) with instructions/arrival time.    ESOPHAGOGASTRODUODENOSCOPY (EGD) WITH DILATION  2021    EYE SURGERY Bilateral     cataract [facility]    HERNIA REPAIR      abdominal    LASIK      UPPER GASTROINTESTINAL ENDOSCOPY  2021      Family History   Problem Relation Age of Onset    Hypertension Father     Stroke Father     Alcohol abuse Father     Heart disease Brother     COPD Sister     Cancer Brother         Lung    Diabetes Daughter     No Known Problems Son     COPD Brother     No Known Problems Daughter     No Known Problems Son     Prostate cancer Neg Hx     Kidney disease Neg Hx      Social History     Tobacco Use    Smoking status: Former Smoker     Quit date: 1996     Years since quittin.5    Smokeless tobacco: Never Used    Tobacco comment: cigar smoker   Substance Use Topics    Alcohol use: No     Comment: none since     Drug use: No          Review of Systems and Physical Exam     Review  of Systems      + fever    All other symptoms negative as stated below    --Constitutional - Denies appetite change, chills  --Eyes - Denies eye discharge, eye pain, eye redness or visual disturbance  --HENT- Denies congestion, sore throat, drooling, ear discharge, rhinorrhea or trouble swallowing  --Respiratory - Denies cough, shortness of breath, wheezing  --Cardiovascular - Denies chest pain, leg swelling, palpitations  --Gastrointestinal - Denies abdominal pain, abdominal distension, constipation, diarrhea, nausea or vomiting  --Genitourinary - Denies difficulty urinating, dysuria, hematuria, urgency  --Musculoskeletal - Denies arthralgias, myalgias  --Neurological - Denies dizziness, headaches, lightheadedness, weakness  --Hematologic - Denies easy bruising or easy bleeding  --Skin - Denies rash, wound or pallor  --Psychiatric- Denies dysphoric mood, nervousness/anxiety    Vital Signs   weight is 89 kg (196 lb 1.6 oz). His oral temperature is 99 °F (37.2 °C). His blood pressure is 137/64 and his pulse is 70. His respiration is 18 and oxygen saturation is 96%.      Physical Exam   Nursing note and vitals reviewed  --Constitutional:  Well developed, well nourished. In no acute distress.  --HENT: Normocephalic, atraumatic. No rhinorrhea. Moist oral mucosa. No oropharyngeal edema, erythema or exudates.   --Eyes: PERRL. Extraocular movements intact. No periorbital swelling. Normal conjunctiva.  --Neck: Normal range of motion. Neck supple. No adenopathy  --Cardiac: Regular rhythm, normal S1, normal S2, no murmur, normal rate, intact distal pulses  --Pulmonary: Normal respiratory effort, breath sounds normal and equal bilaterally, no accessory muscle use, no respiratory distress  --Abdominal:  Drain in place in left upper quadrant with dressing clean dry and intact.  Soft, normal bowel sounds, no tenderness, no guarding, no rebound  --Musculoskeletal: PICC line in place in right upper extremity.  Right groin  ecchymosis from previous central line attempt with no tenderness palpation. Normal range of motion. No deformity, tenderness or edema.  --Neurological:  Alert and oriented x 4. Follows commands appropriately.    --Skin: Warm and dry. No rash, pallor, cyanosis or jaundice.  --Psych: Normal mood    ED Course       Differential Diagnosis:  Intra-abdominal abscess reaccumulation, UTI, pneumonia, COVID, viral URI    Clinical Tests:  Lab Tests: Ordered and reviewed  Radiological Study: Ordered and reviewed    ED Management     weight is 89 kg (196 lb 1.6 oz). His oral temperature is 99 °F (37.2 °C). His blood pressure is 137/64 and his pulse is 70. His respiration is 18 and oxygen saturation is 96%.     Physical exam with drain in place in right upper quadrant with dressing clean dry and intact.  Labs revealed UTI and hypokalemia.  Lactic acid normal.  Chest x-ray unremarkable.  Considering recent intra-abdominal abscess, CT abdomen pelvis was obtained evaluate for abscess re-accumulation, although unlikely considering adequate drainage noted per wife in no abdominal tenderness.  Potassium replaced.  Care will be handed off to oncoming physician, Dr. Alcocer.    Disposition and Plan  Condition: Stable  Disposition: Handed off to oncoming ED physician        Diagnosis  The primary encounter diagnosis was Intra-abdominal abscess. A diagnosis of Fever was also pertinent to this visit.                  David Rosa MD  03/11/22 1815       David Rosa MD  03/11/22 1816       David Rosa MD  03/30/22 2255       David Rosa MD  03/30/22 [id number]

## 2022-03-12 NOTE — ASSESSMENT & PLAN NOTE
Chronic combined systolic and diastolic congestive heart failure  Echo 3/03/2022 shows EF is 55% with indeterminate diastolic function  On Lasix- hold on admit   Hold BB due to low HR  Continue Lisinopril

## 2022-03-12 NOTE — PROVIDER PROGRESS NOTES - EMERGENCY DEPT.
Emergency Room Physician       This patient presents today with subjective fever after hospital discharge  Patient was discharged from the [facility] 3 days ago on March 8, 2022  Patient was transferred to [place] 5 days before with a intra-abdominal abscess    Patient was also in early septic shock at that time, was on IV antibiotics  Interventional Radiology placed a drain with marked clinical improvement after    Home health checked on the patient today with subjective fever noted then  Extensive workup in the ER showed good lab work in a normal lactic acid  CT scan abdomen pelvis however showed a larger abscess than previous  It appears the patient's intervention drain has only drain 25 cc this evening  Likely clogged drain with increasing abscess size, transfer back to [facility]       Isauro Alcocer M.D. 9:21 PM 3/11/2022

## 2022-03-12 NOTE — CONSULTS
Full note to follow - well known to the ID team     Patient re-admitted for worsening intra-abdominal abscess felt not to be a surgical candidate and IR placed a drain - no organsism grew out and patient placed on ooutpatient IV ceftriaxone and flagyl  - CT -abd from 3-11-22 shows worsening of abscess and patient admitted for same     Agree with vanc, cefepime and flagyl   Consider surgical or IR consults again  - orders already placed   ID will order blood cultures x 2

## 2022-03-12 NOTE — ASSESSMENT & PLAN NOTE
Abdominal wall mass  s/p drain placement by IR on 3/3/2022  Ct abdomen/pelvis  1. Large 16.6 x 9 cm abscess at the midline abutting the anterior abdominal wall, increased in size from the prior study. Recommend surgical consultation and follow-up.  2. Mild cardiomegaly.  3. Bilateral renal cysts.  4. Incomplete distension of the urinary bladder with probable mild wall thickening.  Cystitis is a consideration.  5. Bilateral inguinal hernias with small bowel protrusion on the right in fat containing inguinal hernia on the left.  6. Prostatomegaly.  Blood culture   Resume home abx- Ceftriaxone and Flagyl and add Vanc  Consult surgery  Consult IR

## 2022-03-12 NOTE — ASSESSMENT & PLAN NOTE
Long term current use of anticoagulant therapy    Hold rivaroxaban for anticoagulation for possible surgery   Will use subq heparin for now.   Coreg on hold, HR low on admit

## 2022-03-12 NOTE — SEDATION DOCUMENTATION
Abdominal drain changed from 10fr to 14 fr. Patient tolerated well, draining bloody drainage, 550 ml bloody fluid drained

## 2022-03-12 NOTE — PROCEDURES
Radiology Post-Procedure Note    Pre Op Diagnosis: Malfunctioning abscess drain    Post Op Diagnosis: Same    Procedure: Tube upsize    Procedure performed by: Aden Bernard MD    Written Informed Consent Obtained: Yes  Specimen Removed:  cc bloody fluid  Estimated Blood Loss: Minimal    Findings:   10 Fr APD was exchanged for 14 Fr APD. 550 cc bloody fluid drained. No complications. See dictation.    Patient tolerated procedure well.    Aden Bernard M.D.  Interventional Radiology  Department of Radiology  Pager: [phone]

## 2022-03-12 NOTE — PLAN OF CARE
03/12/22 1038   Admission   Initial VN Admission Questions Complete   Communication Issues? Patient Hearing   Shift   Virtual Nurse - Rounding Complete   Pain Management Interventions care clustered;diversional activity provided;pain management plan reviewed with patient/caregiver;pillow support provided;quiet environment facilitated;relaxation techniques promoted   Virtual Nurse - Patient Verbalized Approval Of Camera Use;VN Rounding   Type of Frequent Check   Type Patient Rounds;Other (see comments)  (new admission)   Safety/Activity   Patient Rounds bed in low position;bed wheels locked;call light in patient/parent reach;clutter free environment maintained;visualized patient;placement of personal items at bedside;ID band on   Safety Promotion/Fall Prevention assistive device/personal item within reach;bed alarm set;Fall Risk reviewed with patient/family;family to remain at bedside;medications reviewed;nonskid shoes/socks when out of bed;room near unit station;side rails raised x 2;instructed to call staff for mobility   Safety Precautions emergency equipment at bedside   Activity Management Rolling - L1   Positioning   Body Position position changed independently;supine   Head of Bed (HOB) Positioning HOB at 45 degrees   VN cued into patient's room for introduction with patient and Wife Jessica's permission.  VN role explained and informed them that VN would be working with bedside nurse and rest of care team.  Fall risk and bed alarm protocol education provided.  Instructed patient to call for assistance.  Patient aware and agreeable.  Patient's chart, labs and vital signs reviewed.  Allowed time for questions.  No acute distress noted.  Will continue to be available as needed.

## 2022-03-12 NOTE — SUBJECTIVE & OBJECTIVE
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    Melanoma     Obesity (BMI 30.0-34.9) 9/13/2016    Osteoporosis     Peripheral vascular disease     Primary malignant neoplasm of prostate 3/3/2022    Septic shock 3/3/2022    Stroke     TIA (transient ischemic attack)     Type II diabetes mellitus with peripheral circulatory disorder     Type II or unspecified type diabetes mellitus without mention of complication, not stated as uncontrolled     Unspecified essential hypertension        Past Surgical History:   Procedure Laterality Date    AORTIC VALVE REPLACEMENT      CARDIAC PACEMAKER PLACEMENT  9/28/2012    CARDIAC VALVE REPLACEMENT  11/17/2011    aortic valve-tissue    CHOLECYSTECTOMY      COLONOSCOPY      ESOPHAGOGASTRODUODENOSCOPY N/A 5/27/2021    Procedure: EGD (ESOPHAGOGASTRODUODENOSCOPY);  Surgeon: Gualberto Medrano MD;  Location: [facility];  Service: Endoscopy;  Laterality: N/A;    ESOPHAGOGASTRODUODENOSCOPY N/A 6/22/2021    Procedure: EGD (ESOPHAGOGASTRODUODENOSCOPY);  Surgeon: Gualberto Medrano MD;  Location: [facility];  Service: Endoscopy;  Laterality: N/A;  please call wife(Jessica) with instructions/arrival time.    ESOPHAGOGASTRODUODENOSCOPY (EGD) WITH DILATION  06/22/2021    EYE SURGERY Bilateral     cataract with lens by  at [facility]    HERNIA REPAIR      abdominal    LASIK      UPPER GASTROINTESTINAL ENDOSCOPY  05/28/2021       Review of patient's allergies indicates:   Allergen Reactions    Ciprofloxacin     Levofloxacin Swelling     Lyrica [pregabalin] Swelling    Unable to assess Other (See Comments)     Soft shell crabs       Current Facility-Administered Medications on File Prior to Encounter   Medication    [COMPLETED] 0.9%  NaCl infusion    [COMPLETED] cefepime in dextrose 5 % IVPB 2 g    [COMPLETED] iohexoL (OMNIPAQUE 350) injection 100 mL    [COMPLETED] metronidazole IVPB 500 mg    [COMPLETED] potassium bicarbonate disintegrating tablet 20 mEq    [COMPLETED] vancomycin 1750 mg in 0.9% sodium chloride 500 mL IVPB    [DISCONTINUED] piperacillin-tazobactam 4.5 g in dextrose 5 % 100 mL IVPB (ready to mix system)    [DISCONTINUED] vancomycin - pharmacy to dose    [DISCONTINUED] vancomycin - pharmacy to dose     Current Outpatient Medications on File Prior to Encounter   Medication Sig    acetaminophen (TYLENOL) 500 MG tablet Take 500 mg by mouth every 6 (six) hours as needed for Pain.    amLODIPine (NORVASC) 10 MG tablet TAKE 1 TABLET BY MOUTH DAILY FOR BLOOD PRESSURE    blood-glucose meter Misc ONETOUCH ULTRA GLUCOSE METER. USE AS DIRECTED TO TEST BLOOD GLUCOSE. DX CODE: E11.51    carvediloL (COREG) 12.5 MG tablet TAKE 1 TABLET (12.5 MG TOTAL) BY MOUTH 2 (TWO) TIMES DAILY WITH MEALS.    cefTRIAXone (ROCEPHIN) 1 g/50 mL PgBk IVPB Inject 50 mLs (1 g total) into the vein once daily. for 9 days    doxazosin (CARDURA) 1 MG tablet Take 1 mg by mouth nightly.    fenofibrate 160 MG Tab fenofibrate 160 mg tablet    furosemide (LASIX) 20 MG tablet TAKE ONE TABLET BY MOUTH EVERY OTHER DAY    gabapentin (NEURONTIN) 100 MG capsule Take 2 capsules (200 mg total) by mouth every evening.    gemfibrozil (LOPID) 600 MG tablet Take one tablet(600mg) by mouth once daily    glimepiride (AMARYL) 2 MG tablet TAKE 1 TABLET BY MOUTH EVERY MORNING WITH BREAKFAST FOR DIABETES    lancets (ONETOUCH ULTRASOFT LANCETS) Misc USE TO TEST BLOOD GLUCOSE TWICE DAILY. DX CODE: E11.51    levothyroxine (SYNTHROID) 75 MCG tablet Take 1 tablet (75 mcg total) by mouth before  breakfast.    lisinopriL (PRINIVIL,ZESTRIL) 20 MG tablet     metroNIDAZOLE (FLAGYL) 500 MG tablet Take 1 tablet (500 mg total) by mouth every 8 (eight) hours. for 9 days    pantoprazole (PROTONIX) 40 MG tablet TAKE ONE TABLET BY MOUTH ONCE A DAY FOR STOMACH PROBLEMS    pioglitazone (ACTOS) 15 MG tablet ONE TABLET ONE TIME A DAY FOR DIABETES    potassium chloride SA (K-DUR,KLOR-CON) 10 MEQ tablet Take 1 tablet (10 mEq total) by mouth once daily.    pravastatin (PRAVACHOL) 20 MG tablet TAKE 1 TABLET BY MOUTH DAILY FOR CHOLESTROL    rivaroxaban (XARELTO) 20 mg Tab Take one tablet(20mg) by mouth once daily    tamsulosin (FLOMAX) 0.4 mg Cap Take 1 capsule (0.4 mg total) by mouth once daily.    blood sugar diagnostic (BLOOD GLUCOSE TEST) [employer] ULTRA TESTING STRIPS. USE TO TEST BLOOD GLUCOSE TWICE DAILY. DX CODE: E11.51     Family History       Problem Relation (Age of Onset)    Alcohol abuse Father    COPD Sister, Brother    Cancer Brother    Diabetes Daughter    Heart disease Brother    Hypertension Father    No Known Problems Son, Daughter, Son    Stroke Father          Tobacco Use    Smoking status: Former Smoker     Quit date: 1996     Years since quittin.4    Smokeless tobacco: Never Used    Tobacco comment: cigar smoker   Substance and Sexual Activity    Alcohol use: No     Comment: none since     Drug use: No    Sexual activity: Not Currently     Review of Systems   Constitutional:  Negative for chills and fever.   HENT:  Negative for congestion and rhinorrhea.    Eyes:  Negative for discharge.   Respiratory:  Negative for chest tightness and shortness of breath.    Cardiovascular:  Negative for chest pain.   Gastrointestinal:  Negative for abdominal distention, abdominal pain, blood in stool, nausea and vomiting.   Endocrine: Negative for polyphagia and polyuria.   Genitourinary:  Negative for dysuria and urgency.   Skin:  Negative for rash.   Neurological:  Negative for dizziness and  weakness.   Psychiatric/Behavioral:  Negative for agitation.    Objective:     Vital Signs (Most Recent):  Temp: 96.4 °F (35.8 °C) (03/12/22 1120)  Pulse: 60 (03/12/22 1149)  Resp: 16 (03/12/22 1120)  BP: (!) 145/67 (03/12/22 1120)  SpO2: 97 % (03/12/22 0919)   Vital Signs (24h Range):  Temp:  [96.4 °F (35.8 °C)-99 °F (37.2 °C)] 96.4 °F (35.8 °C)  Pulse:  [60-71] 60  Resp:  [16-18] 16  SpO2:  [96 %-97 %] 97 %  BP: (102-163)/(52-70) 145/67     Weight: 88.9 kg (195 lb 15.8 oz)  Body mass index is 32.61 kg/m².    Physical Exam  Constitutional:       Appearance: He is well-developed.   HENT:      Head: Normocephalic and atraumatic.   Cardiovascular:      Rate and Rhythm: Normal rate and regular rhythm.      Heart sounds: Normal heart sounds. No murmur heard.    No friction rub. No gallop.      Comments: Pacemaker in place  Pulmonary:      Effort: Pulmonary effort is normal.      Breath sounds: Normal breath sounds.   Chest:      Chest wall: No tenderness.   Abdominal:      General: Bowel sounds are normal. There is no distension.      Palpations: Abdomen is soft.      Tenderness: There is no abdominal tenderness.      Comments: Abdominal drain in place, no surrounding erythema       Musculoskeletal:         General: Normal range of motion.      Cervical back: Neck supple.   Skin:     General: Skin is warm.   Neurological:      Mental Status: He is alert and oriented to person, place, and time.           Significant Labs: A1C:   Recent Labs   Lab 03/02/22  0744   HGBA1C 5.9*     ABGs: No results for input(s): PH, PCO2, HCO3, POCSATURATED, BE, TOTALHB, COHB, METHB, O2HB, POCFIO2, PO2 in the last 48 hours.  Blood Culture: No results for input(s): LABBLOO in the last 48 hours.  CBC:   Recent Labs   Lab 03/11/22  1649   WBC 11.00   HGB 9.5*   HCT 28.8*        CMP:   Recent Labs   Lab 03/11/22  1648   *   K 3.3*      CO2 24   *   BUN 19   CREATININE 1.3   CALCIUM 8.1*   PROT 5.7*   ALBUMIN 2.6*    BILITOT 0.8   ALKPHOS 96   AST 15   ALT 7*   ANIONGAP 11   EGFRNONAA 47*     Lactic Acid:   Recent Labs   Lab 03/11/22  1648   LACTATE 1.1     Lipid Panel: No results for input(s): CHOL, HDL, LDLCALC, TRIG, CHOLHDL in the last 48 hours.  Troponin: No results for input(s): TROPONINI in the last 48 hours.  TSH:   Recent Labs   Lab 03/02/22  0744   TSH 5.943*     Urine Culture: No results for input(s): LABURIN in the last 48 hours.  Urine Studies:   Recent Labs   Lab 03/11/22  1737   COLORU [place]   APPEARANCEUA Clear   PHUR 5.0   SPECGRAV 1.015   PROTEINUA 1+*   GLUCUA Negative   KETONESU 1+*   BILIRUBINUA 2+*   OCCULTUA Negative   NITRITE Negative   UROBILINOGEN 1.0   LEUKOCYTESUR 1+*   RBCUA 2   WBCUA 12*   BACTERIA Few*   SQUAMEPITHEL 8   HYALINECASTS 1       Significant Imaging: I have reviewed all pertinent imaging results/findings within the past 24 hours.

## 2022-03-12 NOTE — PLAN OF CARE
Outside Transfer Acceptance Note / [facility]erral [facility]    Referring facility:  [facility]   Referring provider:   MARIO MIRANDA  Accepting facility: [facility]  Accepting provider:  Andrew Huff MD  Reason for transfer:  HLOC  Transfer diagnosis: Intra-abdominal abscess  Transfer specialty requested: Surgery, IR, ID  Transfer specialty notified: no  Transfer level: NUMBER 1-5: 2  Bed type requested: Med-tele  Isolation status: No active isolations   Admission class or status:   IP- Inpatient      Narrative   Fully functional 89M with atrial fibrillation on A/C, CAD, chronic diastolic heart failure, T2DM, COPD, hyperlipidemia and osteoporosis who presents with fever.  Patient was recently discharged from [facility] 3 days ago after admission 3/3-8/2022 for intra-abdominal abscess with resultant septic shock, during which intra-abdominal abscess was found at site of prior surgery, IR placed drain, and patient subsequently discharged on IV abx per ID recommendations.  Cultures from abscess have been no growth aerobic and anaerobic. Since discharge, he has been doing fine with no complaints.  He has been receiving IV Rocephin through a PICC line.  Per wife, his abscess has been draining at home adequately.  However, his temperature was checked by home health nurse today and he was found to have a fever of 100.4 and was instructed to come to the ED.  He denies chills, abdominal pain, nausea, vomiting, dysuria or hematuria. CT today shows large 16.6 x 9 cm abscess at the midline abutting the anterior abdominal wall, increased in size from the prior study. Patient to be transferred to [facility] for re-evaluation by IR and surgery. Patient to be broadened to vanc/cefepime/flagyl prior to transfer.    Objective     Vitals: Temp: 98.2 °F (36.8 °C) (03/11/22 1608)  Pulse: 60 (03/11/22 1608)  Resp: 18 (03/11/22 1608)  BP: (!) 102/52 (03/11/22 1608)  SpO2: 96 % (03/11/22 1608)  Recent Labs: All  pertinent labs within the past 24 hours have been reviewed.  IV access:        Peripheral IV - Single Lumen 03/11/22 1855 20 G Right Antecubital (Active)   Site Assessment Clean;Dry;Intact 03/11/22 1855   Line Status Blood return noted;Flushed;Saline locked 03/11/22 1855   Dressing Status Clean;Dry;Intact 03/11/22 1855   Dressing Intervention First dressing 03/11/22 1855     Allergies:   Review of patient's allergies indicates:   Allergen Reactions    Ciprofloxacin     Levofloxacin Swelling    Lyrica [pregabalin] Swelling    Unable to assess Other (See Comments)     Soft shell crabs      NPO: No      Anticoagulation:   Anticoagulants     None           Instructions      [facility]  Admit to Hospital Medicine  Upon patient arrival to floor, please contact Hospital Medicine on call.

## 2022-03-13 LAB
ALBUMIN SERPL BCP-MCNC: 2.3 G/DL (ref 3.5–5.2)
ALP SERPL-CCNC: 80 U/L (ref 55–135)
ALT SERPL W/O P-5'-P-CCNC: 6 U/L (ref 10–44)
ANION GAP SERPL CALC-SCNC: 8 MMOL/L (ref 8–16)
AST SERPL-CCNC: 17 U/L (ref 10–40)
BACTERIA UR CULT: ABNORMAL
BASOPHILS # BLD AUTO: 0.05 K/UL (ref 0–0.2)
BASOPHILS NFR BLD: 0.6 % (ref 0–1.9)
BILIRUB SERPL-MCNC: 0.8 MG/DL (ref 0.1–1)
BUN SERPL-MCNC: 14 MG/DL (ref 10–30)
CALCIUM SERPL-MCNC: 7.9 MG/DL (ref 8.7–10.5)
CHLORIDE SERPL-SCNC: 106 MMOL/L (ref 95–110)
CO2 SERPL-SCNC: 22 MMOL/L (ref 23–29)
CREAT SERPL-MCNC: 0.9 MG/DL (ref 0.5–1.4)
DIFFERENTIAL METHOD: ABNORMAL
EOSINOPHIL # BLD AUTO: 0.1 K/UL (ref 0–0.5)
EOSINOPHIL NFR BLD: 0.6 % (ref 0–8)
ERYTHROCYTE [DISTWIDTH] IN BLOOD BY AUTOMATED COUNT: 13.1 % (ref 11.5–14.5)
EST. GFR  (AFRICAN AMERICAN): >60 ML/MIN/1.73 M^2
EST. GFR  (NON AFRICAN AMERICAN): >60 ML/MIN/1.73 M^2
GLUCOSE SERPL-MCNC: 106 MG/DL (ref 70–110)
HCT VFR BLD AUTO: 26.9 % (ref 40–54)
HGB BLD-MCNC: 8.7 G/DL (ref 14–18)
IMM GRANULOCYTES # BLD AUTO: 0.21 K/UL (ref 0–0.04)
IMM GRANULOCYTES NFR BLD AUTO: 2.4 % (ref 0–0.5)
LYMPHOCYTES # BLD AUTO: 1 K/UL (ref 1–4.8)
LYMPHOCYTES NFR BLD: 11.6 % (ref 18–48)
MCH RBC QN AUTO: 33.3 PG (ref 27–31)
MCHC RBC AUTO-ENTMCNC: 32.3 G/DL (ref 32–36)
MCV RBC AUTO: 103 FL (ref 82–98)
MONOCYTES # BLD AUTO: 0.8 K/UL (ref 0.3–1)
MONOCYTES NFR BLD: 9.6 % (ref 4–15)
NEUTROPHILS # BLD AUTO: 6.6 K/UL (ref 1.8–7.7)
NEUTROPHILS NFR BLD: 75.2 % (ref 38–73)
NRBC BLD-RTO: 0 /100 WBC
PLATELET # BLD AUTO: 206 K/UL (ref 150–450)
PMV BLD AUTO: 10.3 FL (ref 9.2–12.9)
POCT GLUCOSE: 104 MG/DL (ref 70–110)
POCT GLUCOSE: 138 MG/DL (ref 70–110)
POCT GLUCOSE: 150 MG/DL (ref 70–110)
POCT GLUCOSE: 172 MG/DL (ref 70–110)
POTASSIUM SERPL-SCNC: 3.3 MMOL/L (ref 3.5–5.1)
PROT SERPL-MCNC: 5.1 G/DL (ref 6–8.4)
RBC # BLD AUTO: 2.61 M/UL (ref 4.6–6.2)
SODIUM SERPL-SCNC: 136 MMOL/L (ref 136–145)
WBC # BLD AUTO: 8.78 K/UL (ref 3.9–12.7)

## 2022-03-13 PROCEDURE — 97166 OT EVAL MOD COMPLEX 45 MIN: CPT

## 2022-03-13 PROCEDURE — S0030 INJECTION, METRONIDAZOLE: HCPCS

## 2022-03-13 PROCEDURE — 85025 COMPLETE CBC W/AUTO DIFF WBC: CPT

## 2022-03-13 PROCEDURE — 11000001 HC ACUTE MED/SURG PRIVATE ROOM

## 2022-03-13 PROCEDURE — 97535 SELF CARE MNGMENT TRAINING: CPT

## 2022-03-13 PROCEDURE — 25000003 PHARM REV CODE 250: Performed by: FAMILY MEDICINE

## 2022-03-13 PROCEDURE — 80053 COMPREHEN METABOLIC PANEL: CPT

## 2022-03-13 PROCEDURE — 63600175 PHARM REV CODE 636 W HCPCS

## 2022-03-13 PROCEDURE — 25000003 PHARM REV CODE 250

## 2022-03-13 PROCEDURE — 63600175 PHARM REV CODE 636 W HCPCS: Performed by: FAMILY MEDICINE

## 2022-03-13 PROCEDURE — 94761 N-INVAS EAR/PLS OXIMETRY MLT: CPT

## 2022-03-13 PROCEDURE — 36415 COLL VENOUS BLD VENIPUNCTURE: CPT

## 2022-03-13 PROCEDURE — 94760 N-INVAS EAR/PLS OXIMETRY 1: CPT

## 2022-03-13 PROCEDURE — 25000003 PHARM REV CODE 250: Performed by: NURSE PRACTITIONER

## 2022-03-13 RX ORDER — FUROSEMIDE 20 MG/1
20 TABLET ORAL DAILY
Status: DISCONTINUED | OUTPATIENT
Start: 2022-03-13 | End: 2022-03-14 | Stop reason: HOSPADM

## 2022-03-13 RX ORDER — AMLODIPINE BESYLATE 5 MG/1
10 TABLET ORAL DAILY
Status: DISCONTINUED | OUTPATIENT
Start: 2022-03-13 | End: 2022-03-14 | Stop reason: HOSPADM

## 2022-03-13 RX ORDER — POTASSIUM CHLORIDE 20 MEQ/1
40 TABLET, EXTENDED RELEASE ORAL ONCE
Status: COMPLETED | OUTPATIENT
Start: 2022-03-13 | End: 2022-03-13

## 2022-03-13 RX ORDER — BENZONATATE 100 MG/1
100 CAPSULE ORAL 3 TIMES DAILY PRN
Status: DISCONTINUED | OUTPATIENT
Start: 2022-03-13 | End: 2022-03-14 | Stop reason: HOSPADM

## 2022-03-13 RX ADMIN — MUPIROCIN: 20 OINTMENT TOPICAL at 10:03

## 2022-03-13 RX ADMIN — BENZONATATE 100 MG: 100 CAPSULE ORAL at 10:03

## 2022-03-13 RX ADMIN — LISINOPRIL 10 MG: 10 TABLET ORAL at 10:03

## 2022-03-13 RX ADMIN — FUROSEMIDE 20 MG: 20 TABLET ORAL at 10:03

## 2022-03-13 RX ADMIN — HEPARIN SODIUM 5000 UNITS: 5000 INJECTION INTRAVENOUS; SUBCUTANEOUS at 05:03

## 2022-03-13 RX ADMIN — CEFEPIME HYDROCHLORIDE 2 G: 2 INJECTION, SOLUTION INTRAVENOUS at 06:03

## 2022-03-13 RX ADMIN — METRONIDAZOLE 500 MG: 500 INJECTION, SOLUTION INTRAVENOUS at 10:03

## 2022-03-13 RX ADMIN — POTASSIUM CHLORIDE 40 MEQ: 1500 TABLET, EXTENDED RELEASE ORAL at 10:03

## 2022-03-13 RX ADMIN — LEVOTHYROXINE SODIUM 75 MCG: 75 TABLET ORAL at 05:03

## 2022-03-13 RX ADMIN — RIVAROXABAN 20 MG: 20 TABLET, FILM COATED ORAL at 06:03

## 2022-03-13 RX ADMIN — CEFEPIME HYDROCHLORIDE 2 G: 2 INJECTION, SOLUTION INTRAVENOUS at 10:03

## 2022-03-13 RX ADMIN — TAMSULOSIN HYDROCHLORIDE 0.4 MG: 0.4 CAPSULE ORAL at 10:03

## 2022-03-13 RX ADMIN — MUPIROCIN: 20 OINTMENT TOPICAL at 09:03

## 2022-03-13 RX ADMIN — AMLODIPINE BESYLATE 10 MG: 5 TABLET ORAL at 10:03

## 2022-03-13 RX ADMIN — VANCOMYCIN HYDROCHLORIDE 1250 MG: 1.25 INJECTION, POWDER, LYOPHILIZED, FOR SOLUTION INTRAVENOUS at 05:03

## 2022-03-13 RX ADMIN — CEFEPIME HYDROCHLORIDE 2 G: 2 INJECTION, SOLUTION INTRAVENOUS at 03:03

## 2022-03-13 RX ADMIN — PANTOPRAZOLE SODIUM 40 MG: 40 TABLET, DELAYED RELEASE ORAL at 10:03

## 2022-03-13 RX ADMIN — METRONIDAZOLE 500 MG: 500 INJECTION, SOLUTION INTRAVENOUS at 03:03

## 2022-03-13 RX ADMIN — METRONIDAZOLE 500 MG: 500 INJECTION, SOLUTION INTRAVENOUS at 06:03

## 2022-03-13 NOTE — PLAN OF CARE
Problem: Occupational Therapy Goal  Goal: Occupational Therapy Goal  Description: Goals to be met by: 04/13/2022      Patient will increase functional independence with ADLs by performing:    UE Dressing with Modified [place].  LE Dressing with Modified [place].  Grooming while standing with Modified [place].  Toileting from toilet with Modified [place] for hygiene and clothing management.   Toilet transfer to toilet with Modified [place].  Increased functional strength to WFL for self care.  Upper extremity exercise program x10 reps per handout, with independence.      Outcome: Ongoing, Progressing   Pt would benefit from continued OT to address deficits in self care and functional mobility. Recommending HHOT; DME needs likely none

## 2022-03-13 NOTE — PT/OT/SLP EVAL
Occupational Therapy   Evaluation    Name: Eddie Parada Sr.  MRN: [id number]  Admitting Diagnosis:  Abdominal wall abscess  Recent Surgery: * No surgery found *      Recommendations:     Discharge Recommendations: home health OT  Discharge Equipment Recommendations:  none  Barriers to discharge:  None    Assessment:     Eddie Parada Sr. is a 89 y.o. male with a medical diagnosis of Abdominal wall abscess.  He presents with deconditioning, instability in stance. Performance deficits affecting function: weakness, impaired endurance, impaired self care skills, impaired functional mobilty, impaired balance, gait instability, decreased lower extremity function, decreased upper extremity function.      Rehab Prognosis: Good; patient would benefit from acute skilled OT services to address these deficits and reach maximum level of function.       Plan:     Patient to be seen 3 x/week to address the above listed problems via self-care/home management, therapeutic activities, therapeutic exercises  · Plan of Care Expires: 04/13/22  · Plan of Care Reviewed with: patient, spouse    Subjective     Chief Complaint: Pt reports he anticipates pain in abdomen from recent procedure  Patient/Family Comments/goals: To return to [facility]    Occupational Profile:  Living Environment: Pt lives with spouse in raised home, 2 VERNON with ramp access and B HR on ramp, tub.sh with TTB  Previous level of function: Mod I ADLs and functional mobility   Roles and Routines: Caretaker to self and home. Pt cleans, drives, and accompanies wife while grocery shopping but has stayed in the care recently 2/2 concerns for COVID. Pt completes light meal prep but spouse completes most of the household cooking.   Equipment Used at Home:  bath bench, walker, rolling, dressing device  Assistance upon Discharge: Spouse    Pain/Comfort:  · Pain Rating 1:  (anticipated pain in abdomen but reported no pain)    Patients cultural, spiritual, Rastafari conflicts  given the current situation:      Objective:     Communicated with: ella prior to session.  Patient found HOB elevated with bed alarm, peripheral IV, telemetry upon OT entry to room.    General Precautions: Standard, fall   Orthopedic Precautions:N/A   Braces: N/A  Respiratory Status: Room air    Occupational Performance:    Bed Mobility:    · Patient completed Rolling/Turning to Left with  stand by assistance  · Patient completed Rolling/Turning to Right with stand by assistance  · Patient completed Scooting/Bridging with stand by assistance  · Patient completed Supine to Sit with stand by assistance  · Patient completed Sit to Supine with stand by assistance    Functional Mobility/Transfers:  · Patient completed Sit <> Stand Transfer with contact guard assistance and minimum assistance  with  rolling walker   · Patient completed Toilet Transfer Step Transfer technique with contact guard assistance with  rolling walker  Functional Mobility: Pt with fair to fair- dynamic seated and standing balance.      Activities of Daily Living:  · Upper Body Dressing: moderate assistance to don gown as robe seated EOB  · Lower Body Dressing: maximal assistance to don B socks seated EOB  · Toileting: stand by assistance for urination in stance at toilet    Cognitive/Visual Perceptual:  Cognitive/Psychosocial Skills:     -       Oriented to: Person, Place, Time and Situation   -       Follows Commands/attention: Follows multistep  Commands but notably [place]  -       Communication: clear/fluent  -       Memory: No Deficits noted  -       Safety awareness/insight to disability: intact   -       Mood/Affect/Coping skills/emotional control: Appropriate to situation    Physical Exam:  Postural examination/scapula alignment:    -        Rounded shoulders  Sensation:    -       Intact  Motor Planning:    -       WFL  Dominant hand:    -       R handed  Upper Extremity Range of Motion: BUE WFL, minimally limited shoulder flexion B'ly but WFL      Upper Extremity Strength:  BUE grossly 4- to 4/5   Strength:  B hands 4/5  Fine Motor Coordination:    -       Intact  Gross motor coordination:   WFL     AMPAC 6 Click ADL:  AMPAC Total Score: 20    Treatment & Education:  Pt educated on role of OT and POC.   Pt performing skills as listed above.     Education:    Patient left HOB elevated with all lines intact, call button in reach, bed alarm on, nsg notified and spouse present    GOALS:   Multidisciplinary Problems     Occupational Therapy Goals        Problem: Occupational Therapy Goal    Goal Priority Disciplines Outcome Interventions   Occupational Therapy Goal     OT, PT/OT Ongoing, Progressing    Description: Goals to be met by: 04/13/2022      Patient will increase functional independence with ADLs by performing:    UE Dressing with Modified [place].  LE Dressing with Modified [place].  Grooming while standing with Modified [place].  Toileting from toilet with Modified [place] for hygiene and clothing management.   Toilet transfer to toilet with Modified [place].  Increased functional strength to WFL for self care.  Upper extremity exercise program x10 reps per handout, with independence.                       History:     Past Medical History:   Diagnosis Date    Abdominal fibromatosis     Acute posthemorrhagic anemia 1/9/2018    NIK (acute kidney injury) 1/11/2018    Atrial fibrillation     Bradycardia     Broken arm     CAD (coronary artery disease)     Cancer     basal cell on nose and melanoma on back    CHF (congestive heart failure)     COPD (chronic obstructive pulmonary disease)     Diabetes mellitus type II     Hyperlipidemia     Localization-related focal epilepsy with simple partial seizures 3/2/2021    Melanoma     Obesity (BMI 30.0-34.9) 9/13/2016    Osteoporosis     Peripheral vascular disease     Primary malignant neoplasm of prostate 3/3/2022    Septic shock 3/3/2022    Stroke     TIA  (transient ischemic attack)     Type II diabetes mellitus with peripheral circulatory disorder     Type II or unspecified type diabetes mellitus without mention of complication, not stated as uncontrolled     Unspecified essential hypertension        Past Surgical History:   Procedure Laterality Date    AORTIC VALVE REPLACEMENT      CARDIAC PACEMAKER PLACEMENT  9/28/2012    CARDIAC VALVE REPLACEMENT  11/17/2011    aortic valve-tissue    CHOLECYSTECTOMY      COLONOSCOPY      ESOPHAGOGASTRODUODENOSCOPY N/A 5/27/2021    Procedure: EGD (ESOPHAGOGASTRODUODENOSCOPY);  Surgeon: Gualberto Medrano MD;  Location: [facility];  Service: Endoscopy;  Laterality: N/A;    ESOPHAGOGASTRODUODENOSCOPY N/A 6/22/2021    Procedure: EGD (ESOPHAGOGASTRODUODENOSCOPY);  Surgeon: Gualberto Medrano MD;  Location: [facility];  Service: Endoscopy;  Laterality: N/A;  please call wife(Jessica) with instructions/arrival time.    ESOPHAGOGASTRODUODENOSCOPY (EGD) WITH DILATION  06/22/2021    EYE SURGERY Bilateral     cataract with lens by  at [facility]    HERNIA REPAIR      abdominal    LASIK      UPPER GASTROINTESTINAL ENDOSCOPY  05/28/2021       Time Tracking:     OT Date of Treatment: 03/13/22  OT Start Time: 1620  OT Stop Time: 1654  OT Total Time (min): 34 min    Billable Minutes:Evaluation 14  Self Care/Home Management 20    3/13/2022

## 2022-03-13 NOTE — PROGRESS NOTES
Pharmacokinetic Assessment Follow Up: IV Vancomycin    Vancomycin serum concentration assessment(s):    The random level was drawn correctly and can be used to guide therapy at this time. The measurement is below the desired definitive target range of 15 to 20 mcg/mL.    Vancomycin Regimen Plan:    Re-dose when the random level is less than 20 mcg/mL, next level to be drawn at 0200 on 3/14    Drug levels (last 3 results):  Recent Labs   Lab Result Units 03/12/22  2256   Vancomycin, Random ug/mL 8.0       Pharmacy will continue to follow and monitor vancomycin.    Please contact pharmacy at extension [phone] for questions regarding this assessment.    Thank you for the consult,   Yolanda Lee       Patient brief summary:  Eddie Parada Sr. is a 89 y.o. male initiated on antimicrobial therapy with IV Vancomycin for treatment of intra-abdominal infection    The patient's current regimen is pulse dosing due to age and renal function.     Drug Allergies:   Review of patient's allergies indicates:   Allergen Reactions    Ciprofloxacin     Levofloxacin Swelling    Lyrica [pregabalin] Swelling    Unable to assess Other (See Comments)     Soft shell crabs       Actual Body Weight:   88.9 kg    Renal Function:   Estimated Creatinine Clearance: 37.2 mL/min (based on SCr of 1.3 mg/dL).,     Dialysis Method (if applicable):  N/A    CBC (last 72 hours):  Recent Labs   Lab Result Units 03/11/22  1649   WBC K/uL 11.00   Hemoglobin g/dL 9.5*   Hematocrit % 28.8*   Platelets K/uL 201   Gran % % 75.6*   Lymph % % 9.7*   Mono % % 10.3   Eosinophil % % 0.2   Basophil % % 0.4   Differential Method  Automated       Metabolic Panel (last 72 hours):  Recent Labs   Lab Result Units 03/11/22  1648 03/11/22  1737   Sodium mmol/L 135*  --    Potassium mmol/L 3.3*  --    Chloride mmol/L 100  --    CO2 mmol/L 24  --    Glucose mg/dL 169*  --    Glucose, UA   --  Negative   BUN mg/dL 19  --    Creatinine mg/dL 1.3  --    Albumin g/dL 2.6*   --    Total Bilirubin mg/dL 0.8  --    Alkaline Phosphatase U/L 96  --    AST U/L 15  --    ALT U/L 7*  --        Vancomycin Administrations:  vancomycin given in the last 96 hours                     vancomycin 1750 mg in 0.9% sodium chloride 500 mL IVPB (mg) 1,750 mg New Bag 03/11/22 0051                    Microbiologic Results:  Microbiology Results (last 7 days)       Procedure Component Value Units Date/Time    Blood culture [[id number]] Collected: 03/12/22 1248    Order Status: Sent Specimen: Blood Updated: 03/12/22 1248    Blood culture [[id number]] Collected: 03/12/22 1248    Order Status: Sent Specimen: Blood Updated: 03/12/22 1248

## 2022-03-13 NOTE — ASSESSMENT & PLAN NOTE
Patient is a pleasant, fully independently functioning 89 year male who was admitted on 3-7-22 for fever to 104 and increase in abd girth - CT- abdomen noted a large fluid collection, presumed abscess - perhaps due to hernia mesh implanted over 20 years prior. He was not deemed a surgical candidate at that time, and IR placed a drain which put out about 200-500 ccs per day, with 275 cc upon placement. Nothing grew from the cultures of this fluid. It was noted that the drain was placed superficial to the mesh and there was likely a component of this beneath the mesh which may be difficult to put a drain into. Patient did better on empiric ceftriaxone an flagyl was discharged on this for a 2 week course. Patient was visited by home nurse and had a low grade temp of 100.4 and the nurse felt that abdomen was enlarged again. Patient had no pain or chills. ED eval and repeat CT-abdomen did note a alrger fluid collection on 3/11 as compared to 3/7 and patient admitted. He was placed on vanc, cefepime and metronidizole and IR again consulted.  A lager drain was placed and about 500 ccs of bloody fluid obtained. Patient is without pain and in good spirits. ID consulted for assistance with case.     At this time it is not clear if this patients course is due to lack of source control due to the mesh or a resistance bacteria - the initial drainage procedure was after he was IV abx for about 1 day - overall favor lack of source control and may need mesh removal if that is even possible       Rec:   1. Agree with vnac, cefepime and flagyl for now   2. No cultures from fluid removal today - will await blood culture results   3. UA suggestive of UTI - will await urine culture

## 2022-03-13 NOTE — CONSULTS
[facility]  [facility]fectious Disease  Consult Note    Patient Name: Eddie Parada Sr.  MRN: [id number]  Admission Date: 3/12/2022  Hospital Length of Stay: 0 days  Attending Physician: Hayley Cameron*  Primary Care Provider: Callum Roberts MD     Isolation Status: No active isolations    Patient information was obtained from patient and ER records.      Consults  Assessment/Plan:     * Abdominal wall abscess  Patient is a pleasant, fully independently functioning 89 year male who was admitted on 3-7-22 for fever to 104 and increase in abd girth - CT- abdomen noted a large fluid collection, presumed abscess - perhaps due to hernia mesh implanted over 20 years prior. He was not deemed a surgical candidate at that time, and IR placed a drain which put out about 200-500 ccs per day, with 275 cc upon placement. Nothing grew from the cultures of this fluid. It was noted that the drain was placed superficial to the mesh and there was likely a component of this beneath the mesh which may be difficult to put a drain into. Patient did better on empiric ceftriaxone an flagyl was discharged on this for a 2 week course. Patient was visited by home nurse and had a low grade temp of 100.4 and the nurse felt that abdomen was enlarged again. Patient had no pain or chills. ED eval and repeat CT-abdomen did note a alrger fluid collection on 3/11 as compared to 3/7 and patient admitted. He was placed on vanc, cefepime and metronidizole and IR again consulted.  A lager drain was placed and about 500 ccs of bloody fluid obtained. Patient is without pain and in good spirits. ID consulted for assistance with case.     At this time it is not clear if this patients course is due to lack of source control due to the mesh or a resistance bacteria - the initial drainage procedure was after he was IV abx for about 1 day - overall favor lack of source control and may need mesh removal if that is even possible       Rec:   1. Agree  with vnac, cefepime and flagyl for now   2. No cultures from fluid removal today - will await blood culture results   3. UA suggestive of UTI - will await urine culture             Thank you for your consult. I will follow-up with patient. Please contact us if you have any additional questions.    Montana Grewal MD  Infectious Disease  [place] - Telemetry    Subjective:     Principal Problem: Abdominal wall abscess    HPI: Patient is a pleasant, fully independently functioning 89 year male who was admitted on 3-7-22 for fever to 104 and increase in abd girth - CT- abdomen noted a large fluid collection, presumed abscess - perhaps due to hernia mesh implanted over 20 years prior. He was not deemed a surgical candidate at that time, and IR placed a drain which put out about 200-500 ccs per day, with 275 cc upon placement. Nothing grew from the cultures of this fluid. It was noted that the drain was placed superficial to the mesh and there was likely a component of this beneath the mesh which may be difficult to put a drain into. Patient did better on empiric ceftriaxone an flagyl was discharged on this for a 2 week course. Patient was visited by home nurse and had a low grade temp of 100.4 and the nurse felt that abdomen was enlarged again. Patient had no pain or chills. ED eval and repeat CT-abdomen did note a alrger fluid collection on 3/11 as compared to 3/7 and patient admitted. He was placed on vanc, cefepime and metronidizole and IR again consulted.  A lager drain was placed and about 500 ccs of bloody fluid obtained. Patient is without pain and in good spirits. ID consulted for assistance with case       Past Medical History:   Diagnosis Date    Abdominal fibromatosis     Acute posthemorrhagic anemia 1/9/2018    NIK (acute kidney injury) 1/11/2018    Atrial fibrillation     Bradycardia     Broken arm     CAD (coronary artery disease)     Cancer     basal cell on nose and melanoma on back    CHF  (congestive heart failure)     COPD (chronic obstructive pulmonary disease)     Diabetes mellitus type II     Hyperlipidemia     Localization-related focal epilepsy with simple partial seizures 3/2/2021    Melanoma     Obesity (BMI 30.0-34.9) 9/13/2016    Osteoporosis     Peripheral vascular disease     Primary malignant neoplasm of prostate 3/3/2022    Septic shock 3/3/2022    Stroke     TIA (transient ischemic attack)     Type II diabetes mellitus with peripheral circulatory disorder     Type II or unspecified type diabetes mellitus without mention of complication, not stated as uncontrolled     Unspecified essential hypertension        Past Surgical History:   Procedure Laterality Date    AORTIC VALVE REPLACEMENT      CARDIAC PACEMAKER PLACEMENT  9/28/2012    CARDIAC VALVE REPLACEMENT  11/17/2011    aortic valve-tissue    CHOLECYSTECTOMY      COLONOSCOPY      ESOPHAGOGASTRODUODENOSCOPY N/A 5/27/2021    Procedure: EGD (ESOPHAGOGASTRODUODENOSCOPY);  Surgeon: Gualberto Medrano MD;  Location: [facility];  Service: Endoscopy;  Laterality: N/A;    ESOPHAGOGASTRODUODENOSCOPY N/A 6/22/2021    Procedure: EGD (ESOPHAGOGASTRODUODENOSCOPY);  Surgeon: Gualberto Medrano MD;  Location: [facility];  Service: Endoscopy;  Laterality: N/A;  please call wife(Jessica) with instructions/arrival time.    ESOPHAGOGASTRODUODENOSCOPY (EGD) WITH DILATION  06/22/2021    EYE SURGERY Bilateral     cataract [facility] lens [facility]  at [facility]    HERNIA REPAIR      abdominal    LASIK      UPPER GASTROINTESTINAL ENDOSCOPY  05/28/2021       Review of patient's allergies indicates:   Allergen Reactions    Ciprofloxacin     Levofloxacin Swelling    Lyrica [pregabalin] Swelling    Unable to assess Other (See Comments)     Soft shell crabs       Medications:  Medications Prior to Admission   Medication Sig    acetaminophen (TYLENOL) 500 MG tablet Take 500 mg by mouth every 6 (six) hours as needed for Pain.     amLODIPine (NORVASC) 10 MG tablet TAKE 1 TABLET BY MOUTH DAILY FOR BLOOD PRESSURE    blood-glucose meter Misc ONETOUCH ULTRA GLUCOSE METER. USE AS DIRECTED TO TEST BLOOD GLUCOSE. DX CODE: E11.51    carvediloL (COREG) 12.5 MG tablet TAKE 1 TABLET (12.5 MG TOTAL) BY MOUTH 2 (TWO) TIMES DAILY WITH MEALS.    cefTRIAXone (ROCEPHIN) 1 g/50 mL PgBk IVPB Inject 50 mLs (1 g total) into the vein once daily. for 9 days    doxazosin (CARDURA) 1 MG tablet Take 1 mg by mouth nightly.    fenofibrate 160 MG Tab fenofibrate 160 mg tablet    furosemide (LASIX) 20 MG tablet TAKE ONE TABLET BY MOUTH EVERY OTHER DAY    gabapentin (NEURONTIN) 100 MG capsule Take 2 capsules (200 mg total) by mouth every evening.    gemfibrozil (LOPID) 600 MG tablet Take one tablet(600mg) by mouth once daily    glimepiride (AMARYL) 2 MG tablet TAKE 1 TABLET BY MOUTH EVERY MORNING WITH BREAKFAST FOR DIABETES    lancets (ONETOUCH ULTRASOFT LANCETS) Misc USE TO TEST BLOOD GLUCOSE TWICE DAILY. DX CODE: E11.51    levothyroxine (SYNTHROID) 75 MCG tablet Take 1 tablet (75 mcg total) by mouth before breakfast.    lisinopriL (PRINIVIL,ZESTRIL) 20 MG tablet     metroNIDAZOLE (FLAGYL) 500 MG tablet Take 1 tablet (500 mg total) by mouth every 8 (eight) hours. for 9 days    pantoprazole (PROTONIX) 40 MG tablet TAKE ONE TABLET BY MOUTH ONCE A DAY FOR STOMACH PROBLEMS    pioglitazone (ACTOS) 15 MG tablet ONE TABLET ONE TIME A DAY FOR DIABETES    potassium chloride SA (K-DUR,KLOR-CON) 10 MEQ tablet Take 1 tablet (10 mEq total) by mouth once daily.    pravastatin (PRAVACHOL) 20 MG tablet TAKE 1 TABLET BY MOUTH DAILY FOR CHOLESTROL    rivaroxaban (XARELTO) 20 mg Tab Take one tablet(20mg) by mouth once daily    tamsulosin (FLOMAX) 0.4 mg Cap Take 1 capsule (0.4 mg total) by mouth once daily.    blood sugar diagnostic (BLOOD GLUCOSE TEST) [facility] ULTRA TESTING STRIPS. USE TO TEST BLOOD GLUCOSE TWICE DAILY. DX CODE: E11.51     Antibiotics (From  "admission, onward)                Start     Stop Route Frequency Ordered    22 1115  mupirocin 2 % ointment          0859 Nasl 2 times daily 22 1005    22 0956  cefepime in dextrose 5 % IVPB 2 g         -- IV Every 8 hours (non-standard times) 22 0956    22 0956  metronidazole IVPB 500 mg         -- IV Every 8 hours (non-standard times) 22 0956    22 0956  vancomycin - pharmacy to dose  (vancomycin IVPB)        "And" Linked Group Details    -- IV pharmacy to manage frequency 22 0956          Antifungals (From admission, onward)                None          Antivirals (From admission, onward)      None             Immunization History   Administered Date(s) Administered    COVID-19, MRNA, LN-S, PF (Pfizer) (Purple Cap) 2021, 2021    Influenza - High Dose - PF (65 years and older) 2003, 2010, 2013, 2014, 10/13/2016, 2017, 2018, 10/22/2019    Influenza - Quadrivalent - High Dose - PF (65 years and older) 2020    Influenza - Trivalent (ADULT) 2003, 2013    Influenza A (H1N1) 2009 Monovalent - IM 2010    Influenza Split 2013    Pneumococcal Conjugate - 13 Valent 2017    Pneumococcal Polysaccharide - 23 Valent 2001, 2006    Tdap 2018       Family History       Problem Relation (Age of Onset)    Alcohol abuse Father    COPD Sister, Brother    Cancer Brother    Diabetes Daughter    Heart disease Brother    Hypertension Father    No Known Problems Son, Daughter, Son    Stroke Father          Social History     Socioeconomic History    Marital status:    Tobacco Use    Smoking status: Former Smoker     Quit date: 1996     Years since quittin.4    Smokeless tobacco: Never Used    Tobacco comment: cigar smoker   Substance and Sexual Activity    Alcohol use: No     Comment: none since     Drug use: No    Sexual activity: Not Currently "     Social Determinants of Health     Financial Resource Strain: Low Risk     Difficulty of Paying Living Expenses: Not hard at all   Food Insecurity: No Food Insecurity    Worried About Running Out of Food in the Last Year: Never true    Ran Out of Food in the Last Year: Never true   Transportation Needs: No Transportation Needs    Lack of Transportation (Medical): No    Lack of Transportation (Non-Medical): No   Physical Activity: Inactive    Days of Exercise per Week: 0 days    Minutes of Exercise per Session: 0 min   Social Connections: Unknown    Marital Status:    Housing Stability: Low Risk     Unable to Pay for Housing in the Last Year: No    Number of Places Lived in the Last Year: 1    Unstable Housing in the Last Year: No     Review of Systems   Constitutional:  Positive for activity change. Negative for chills, fatigue and fever.   HENT: Negative.     Eyes: Negative.    Respiratory: Negative.     Cardiovascular: Negative.    Gastrointestinal:  Positive for abdominal distention. Negative for abdominal pain, diarrhea, nausea and vomiting.   Endocrine: Negative.    Genitourinary: Negative.    Musculoskeletal: Negative.    Skin: Negative.    Allergic/Immunologic: Negative.    Neurological: Negative.    Hematological: Negative.    Psychiatric/Behavioral: Negative.     Objective:     Vital Signs (Most Recent):  Temp: 99.4 °F (37.4 °C) (03/12/22 1940)  Pulse: 61 (03/12/22 2000)  Resp: 19 (03/12/22 1940)  BP: (!) 141/63 (03/12/22 1940)  SpO2: 96 % (03/12/22 1940)   Vital Signs (24h Range):  Temp:  [96.4 °F (35.8 °C)-99.4 °F (37.4 °C)] 99.4 °F (37.4 °C)  Pulse:  [60-71] 61  Resp:  [16-19] 19  SpO2:  [93 %-97 %] 96 %  BP: (107-163)/(59-70) 141/63     Weight: 88.9 kg (195 lb 15.8 oz)  Body mass index is 32.61 kg/m².    Estimated Creatinine Clearance: 37.2 mL/min (based on SCr of 1.3 mg/dL).    Physical Exam  Constitutional:       Appearance: Normal appearance.   Eyes:      Extraocular Movements:  Extraocular movements intact.   Cardiovascular:      Rate and Rhythm: Normal rate and regular rhythm.      Pulses: Normal pulses.      Heart sounds: Normal heart sounds.   Pulmonary:      Effort: Pulmonary effort is normal.      Breath sounds: Normal breath sounds.   Abdominal:      General: There is distension.      Tenderness: There is abdominal tenderness. There is rebound. There is no guarding.      Comments: Drain noted in the mid abdomen - draining bloody fluid    Musculoskeletal:      Cervical back: Normal range of motion.   Skin:     General: Skin is warm.   Neurological:      General: No focal deficit present.      Mental Status: He is alert and oriented to person, place, and time.       Significant Labs: All pertinent labs within the past 24 hours have been reviewed.    Significant Imaging: I have reviewed all pertinent imaging results/findings within the past 24 hours.

## 2022-03-13 NOTE — HPI
Patient is a pleasant, fully independently functioning 89 year male who was admitted on 3-7-22 for fever to 104 and increase in abd girth - CT- abdomen noted a large fluid collection, presumed abscess - perhaps due to hernia mesh implanted over 20 years prior. He was not deemed a surgical candidate at that time, and IR placed a drain which put out about 200-500 ccs per day, with 275 cc upon placement. Nothing grew from the cultures of this fluid. It was noted that the drain was placed superficial to the mesh and there was likely a component of this beneath the mesh which may be difficult to put a drain into. Patient did better on empiric ceftriaxone an flagyl was discharged on this for a 2 week course. Patient was visited by home nurse and had a low grade temp of 100.4 and the nurse felt that abdomen was enlarged again. Patient had no pain or chills. ED eval and repeat CT-abdomen did note a alrger fluid collection on 3/11 as compared to 3/7 and patient admitted. He was placed on vanc, cefepime and metronidizole and IR again consulted.  A lager drain was placed and about 500 ccs of bloody fluid obtained. Patient is without pain and in good spirits. ID consulted for assistance with case

## 2022-03-13 NOTE — SUBJECTIVE & OBJECTIVE
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    Melanoma     Obesity (BMI 30.0-34.9) 9/13/2016    Osteoporosis     Peripheral vascular disease     Primary malignant neoplasm of prostate 3/3/2022    Septic shock 3/3/2022    Stroke     TIA (transient ischemic attack)     Type II diabetes mellitus with peripheral circulatory disorder     Type II or unspecified type diabetes mellitus without mention of complication, not stated as uncontrolled     Unspecified essential hypertension        Past Surgical History:   Procedure Laterality Date    AORTIC VALVE REPLACEMENT      CARDIAC PACEMAKER PLACEMENT  9/28/2012    CARDIAC VALVE REPLACEMENT  11/17/2011    aortic valve-tissue    CHOLECYSTECTOMY      COLONOSCOPY      ESOPHAGOGASTRODUODENOSCOPY N/A 5/27/2021    Procedure: EGD (ESOPHAGOGASTRODUODENOSCOPY);  Surgeon: Gualberto Medrano MD;  Location: [facility];  Service: Endoscopy;  Laterality: N/A;    ESOPHAGOGASTRODUODENOSCOPY N/A 6/22/2021    Procedure: EGD (ESOPHAGOGASTRODUODENOSCOPY);  Surgeon: Gualberto Medrano MD;  Location: [facility];  Service: Endoscopy;  Laterality: N/A;  please call wife(Jessica) with instructions/arrival time.    ESOPHAGOGASTRODUODENOSCOPY (EGD) WITH DILATION  06/22/2021    EYE SURGERY Bilateral     cataract with lens by  at [facility]    HERNIA REPAIR      abdominal    LASIK      UPPER GASTROINTESTINAL ENDOSCOPY  05/28/2021       Review of patient's allergies indicates:   Allergen Reactions    Ciprofloxacin     Levofloxacin Swelling     Lyrica [pregabalin] Swelling    Unable to assess Other (See Comments)     Soft shell crabs       Medications:  Medications Prior to Admission   Medication Sig    acetaminophen (TYLENOL) 500 MG tablet Take 500 mg by mouth every 6 (six) hours as needed for Pain.    amLODIPine (NORVASC) 10 MG tablet TAKE 1 TABLET BY MOUTH DAILY FOR BLOOD PRESSURE    blood-glucose meter Misc ONETOUCH ULTRA GLUCOSE METER. USE AS DIRECTED TO TEST BLOOD GLUCOSE. DX CODE: E11.51    carvediloL (COREG) 12.5 MG tablet TAKE 1 TABLET (12.5 MG TOTAL) BY MOUTH 2 (TWO) TIMES DAILY WITH MEALS.    cefTRIAXone (ROCEPHIN) 1 g/50 mL PgBk IVPB Inject 50 mLs (1 g total) into the vein once daily. for 9 days    doxazosin (CARDURA) 1 MG tablet Take 1 mg by mouth nightly.    fenofibrate 160 MG Tab fenofibrate 160 mg tablet    furosemide (LASIX) 20 MG tablet TAKE ONE TABLET BY MOUTH EVERY OTHER DAY    gabapentin (NEURONTIN) 100 MG capsule Take 2 capsules (200 mg total) by mouth every evening.    gemfibrozil (LOPID) 600 MG tablet Take one tablet(600mg) by mouth once daily    glimepiride (AMARYL) 2 MG tablet TAKE 1 TABLET BY MOUTH EVERY MORNING WITH BREAKFAST FOR DIABETES    lancets (ONETOUCH ULTRASOFT LANCETS) Misc USE TO TEST BLOOD GLUCOSE TWICE DAILY. DX CODE: E11.51    levothyroxine (SYNTHROID) 75 MCG tablet Take 1 tablet (75 mcg total) by mouth before breakfast.    lisinopriL (PRINIVIL,ZESTRIL) 20 MG tablet     metroNIDAZOLE (FLAGYL) 500 MG tablet Take 1 tablet (500 mg total) by mouth every 8 (eight) hours. for 9 days    pantoprazole (PROTONIX) 40 MG tablet TAKE ONE TABLET BY MOUTH ONCE A DAY FOR STOMACH PROBLEMS    pioglitazone (ACTOS) 15 MG tablet ONE TABLET ONE TIME A DAY FOR DIABETES    potassium chloride SA (K-DUR,KLOR-CON) 10 MEQ tablet Take 1 tablet (10 mEq total) by mouth once daily.    pravastatin (PRAVACHOL) 20 MG tablet TAKE 1 TABLET BY MOUTH DAILY FOR CHOLESTROL    rivaroxaban (XARELTO) 20 mg Tab Take one tablet(20mg) by mouth once daily     "tamsulosin (FLOMAX) 0.4 mg Cap Take 1 capsule (0.4 mg total) by mouth once daily.    blood sugar diagnostic (BLOOD GLUCOSE TEST) [employer] ULTRA TESTING STRIPS. USE TO TEST BLOOD GLUCOSE TWICE DAILY. DX CODE: E11.51     Antibiotics (From admission, onward)                Start     Stop Route Frequency Ordered    03/12/22 1115  mupirocin 2 % ointment         03/17 0859 Nasl 2 times daily 03/12/22 1005    03/12/22 0956  cefepime in dextrose 5 % IVPB 2 g         -- IV Every 8 hours (non-standard times) 03/12/22 0956    03/12/22 0956  metronidazole IVPB 500 mg         -- IV Every 8 hours (non-standard times) 03/12/22 0956    03/12/22 0956  vancomycin - pharmacy to dose  (vancomycin IVPB)        "And" Linked Group Details    -- IV pharmacy to manage frequency 03/12/22 0956          Antifungals (From admission, onward)                None          Antivirals (From admission, onward)      None             Immunization History   Administered Date(s) Administered    COVID-19, MRNA, LN-S, PF (Pfizer) (Purple Cap) 01/21/2021, 02/11/2021    Influenza - High Dose - PF (65 years and older) 11/12/2003, 11/18/2010, 02/13/2013, 11/20/2014, 10/13/2016, 12/04/2017, 11/05/2018, 10/22/2019    Influenza - Quadrivalent - High Dose - PF (65 years and older) 09/28/2020    Influenza - Trivalent (ADULT) 11/12/2003, 02/13/2013    Influenza A (H1N1) 2009 Monovalent - IM 01/18/2010    Influenza Split 02/13/2013    Pneumococcal Conjugate - 13 Valent 12/04/2017    Pneumococcal Polysaccharide - 23 Valent 01/01/2001, 09/01/2006    Tdap 09/16/2018       Family History       Problem Relation (Age of Onset)    Alcohol abuse Father    COPD Sister, Brother    Cancer Brother    Diabetes Daughter    Heart disease Brother    Hypertension Father    No Known Problems Son, Daughter, Son    Stroke Father          Social History     Socioeconomic History    Marital status:    Tobacco Use    Smoking status: Former Smoker     Quit date: 9/20/1996     " Years since quittin.4    Smokeless tobacco: Never Used    Tobacco comment: cigar smoker   Substance and Sexual Activity    Alcohol use: No     Comment: none since     Drug use: No    Sexual activity: Not Currently     Social Determinants of Health     Financial Resource Strain: Low Risk     Difficulty of Paying Living Expenses: Not hard at all   Food Insecurity: No Food Insecurity    Worried About Running Out of Food in the Last Year: Never true    Ran Out of Food in the Last Year: Never true   Transportation Needs: No Transportation Needs    Lack of Transportation (Medical): No    Lack of Transportation (Non-Medical): No   Physical Activity: Inactive    Days of Exercise per Week: 0 days    Minutes of Exercise per Session: 0 min   Social Connections: Unknown    Marital Status:    Housing Stability: Low Risk     Unable to Pay for Housing in the Last Year: No    Number of Places Lived in the Last Year: 1    Unstable Housing in the Last Year: No     Review of Systems   Constitutional:  Positive for activity change. Negative for chills, fatigue and fever.   HENT: Negative.     Eyes: Negative.    Respiratory: Negative.     Cardiovascular: Negative.    Gastrointestinal:  Positive for abdominal distention. Negative for abdominal pain, diarrhea, nausea and vomiting.   Endocrine: Negative.    Genitourinary: Negative.    Musculoskeletal: Negative.    Skin: Negative.    Allergic/Immunologic: Negative.    Neurological: Negative.    Hematological: Negative.    Psychiatric/Behavioral: Negative.     Objective:     Vital Signs (Most Recent):  Temp: 99.4 °F (37.4 °C) (22)  Pulse: 61 (22)  Resp: 19 (22)  BP: (!) 141/63 (22)  SpO2: 96 % (22)   Vital Signs (24h Range):  Temp:  [96.4 °F (35.8 °C)-99.4 °F (37.4 °C)] 99.4 °F (37.4 °C)  Pulse:  [60-71] 61  Resp:  [16-19] 19  SpO2:  [93 %-97 %] 96 %  BP: (107-163)/(59-70) 141/63     Weight: 88.9 kg (195 lb 15.8 oz)  Body  mass index is 32.61 kg/m².    Estimated Creatinine Clearance: 37.2 mL/min (based on SCr of 1.3 mg/dL).    Physical Exam  Constitutional:       Appearance: Normal appearance.   Eyes:      Extraocular Movements: Extraocular movements intact.   Cardiovascular:      Rate and Rhythm: Normal rate and regular rhythm.      Pulses: Normal pulses.      Heart sounds: Normal heart sounds.   Pulmonary:      Effort: Pulmonary effort is normal.      Breath sounds: Normal breath sounds.   Abdominal:      General: There is distension.      Tenderness: There is abdominal tenderness. There is rebound. There is no guarding.      Comments: Drain noted in the mid abdomen - draining bloody fluid    Musculoskeletal:      Cervical back: Normal range of motion.   Skin:     General: Skin is warm.   Neurological:      General: No focal deficit present.      Mental Status: He is alert and oriented to person, place, and time.       Significant Labs: All pertinent labs within the past 24 hours have been reviewed.    Significant Imaging: I have reviewed all pertinent imaging results/findings within the past 24 hours.

## 2022-03-13 NOTE — PROGRESS NOTES
[facility] Infectious Diseases Progress Note    Assessment/Plan:     Abdominal wall abscess  Patient is a pleasant, fully independently functioning 89 year male who was admitted on 3-7-22 for fever to 104 and increase in abd girth - CT- abdomen noted a large fluid collection, presumed abscess - perhaps due to hernia mesh implanted over 20 years prior. He was not deemed a surgical candidate at that time, and IR placed a drain which put out about 200-500 ccs per day, with 275 cc upon placement. Nothing grew from the cultures of this fluid. It was noted that the drain was placed superficial to the mesh and there was likely a component of this beneath the mesh which may be difficult to put a drain into. Patient did better on empiric ceftriaxone an flagyl was discharged on this for a 2 week course. Patient was visited by home nurse and had a low grade temp of 100.4 and the nurse felt that abdomen was enlarged again. Patient had no pain or chills. ED eval and repeat CT-abdomen did note a alrger fluid collection on 3/11 as compared to 3/7 and patient admitted. He was placed on vanc, cefepime and metronidizole and IR again consulted.  A lager drain was placed and about 500 ccs of bloody fluid obtained. Patient is without pain and in good spirits. ID consulted for assistance with case.      At this time it is not clear if this patients course is due to lack of source control due to the mesh or a resistance bacteria - the initial drainage procedure was after he was IV abx for about 1 day - overall favor lack of source control and may need mesh removal if that is even possible         Rec:   1. Agree with vanc, cefepime and flagyl for now   2. No cultures from fluid removal today - will await blood culture results, recommend to get abdominal fluid cultures if planning on another procedure  3. UA suggestive of UTI - will await urine culture   4. Consider surgery consult to re-evaluate possibility of surgery    Bijal Valles MD  [facility]  IM/Peds PGY-2  [facility] Infectious Disease Consults  Cell: [phone]    Thank you for allowing us to participate in the care of this patient. We will continue to follow along. Case has been discussed with consult staff, who is in agreement with assessment and plan. ID will continue to follow.     Subjective:      No acute events overnight. Denies any fevers, chills, NS, NV, changes in bowel or urinary habits. Denies abdominal pain.      Objective:   Last 24 Hour Vital Signs:  BP  Min: 141/63  Max: 162/70  Temp  Av.8 °F (36.6 °C)  Min: 96.4 °F (35.8 °C)  Max: 99.4 °F (37.4 °C)  Pulse  Av.3  Min: 60  Max: 64  Resp  Av.8  Min: 16  Max: 19  SpO2  Av.4 %  Min: 93 %  Max: 97 %  I/O last 3 completed shifts:  In: 340 [P.O.:340]  Out: 1120 [Urine:400; Drains:170; Other:550]    Physical Exam  General: well appearing, no acute distress, interactive, lying in bed  HEENT: NC/AT, EOM intact, normal sclera, MMM  CV: regular rate and rhythm, no murmurs  Lungs: clear to auscultation bilaterally  Abd: soft, area of induration around drain site, no erythema, or tenderness, BS present, drain in upper mid abdomen with bloody drainage in collection bag  MSK: no lower extremity edema    Laboratory:  Laboratory Data Reviewed: yes  Pertinent Findings:  Recent Labs   Lab 22  0347 22  0735 22  1648 22  1649 22  0622   WBC  --  8.51  --  11.00 8.78   HGB  --  9.8*  --  9.5* 8.7*   HCT  --  29.3*  --  28.8* 26.9*   PLT  --  143*  --  201 206   MCV  --  101*  --  103* 103*   RDW  --  12.0  --  12.8 13.1     --  135*  --  136   K 3.2*  --  3.3*  --  3.3*     --  100  --  106   CO2 26  --  24  --  22*   BUN 18  --  19  --  14   CREATININE 0.8  --  1.3  --  0.9   *  --  169*  --  106   PROT 5.8*  --  5.7*  --  5.1*   ALBUMIN 2.7*  --  2.6*  --  2.3*   BILITOT 1.0  --  0.8  --  0.8   AST 16  --  15  --  17   ALKPHOS 104  --  96  --  80   ALT 11  --  7*  --  6*         Microbiology  "Data:  Microbiology Results (last 7 days)     Procedure Component Value Units Date/Time    Blood culture [[id number]] Collected: 03/12/22 1248    Order Status: Sent Specimen: Blood Updated: 03/13/22 0103    Blood culture [[id number]] Collected: 03/12/22 1248    Order Status: Sent Specimen: Blood Updated: 03/13/22 0103            Antimicrobials:  Antibiotics (From admission, onward)            Start     Stop Route Frequency Ordered    03/12/22 1115  mupirocin 2 % ointment         03/17 0859 Nasl 2 times daily 03/12/22 1005    03/12/22 0956  cefepime in dextrose 5 % IVPB 2 g         -- IV Every 8 hours (non-standard times) 03/12/22 0956 03/12/22 0956  metronidazole IVPB 500 mg         -- IV Every 8 hours (non-standard times) 03/12/22 0956 03/12/22 0956  vancomycin - pharmacy to dose  (vancomycin IVPB)        "And" Linked Group Details    -- IV pharmacy to manage frequency 03/12/22 0956            Other Results:  Radiology Results:  X-Ray Chest 1 View    Result Date: 3/11/2022  EXAMINATION: XR CHEST 1 VIEW CLINICAL HISTORY: Fever, unspecified TECHNIQUE: Single frontal view of the chest was performed. COMPARISON: 03/07/2022 FINDINGS: The heart is enlarged and unchanged status post median sternotomy.  Dual lead pacemaker remains in place.  Right PICC line again seen.  Interstitial opacities in the lungs have almost entirely resolved.  No focal consolidation.  No pleural effusion or acute bony abnormality.     Improved aeration of the lungs.  Chronic cardiomegaly. Electronically signed by: Soheila Ruff Date:    03/11/2022 Time:    16:59    X-Ray Chest 1 View    Result Date: 3/3/2022  EXAMINATION: XR CHEST 1 VIEW CLINICAL HISTORY: Right IJ placement; TECHNIQUE: Single frontal view of the chest was performed. COMPARISON: 03/02/2022 FINDINGS: The heart is mildly enlarged.  Left-sided pacemaker device is in place.  Right IJ catheter terminates in the region of the proximal SVC.  No pneumothorax.  The lungs are " clear.  Skeletal structures are intact.     As above. Electronically signed by: Sherri Suarez MD Date:    03/03/2022 Time:    07:52    CT Abdomen Pelvis With Contrast    Result Date: 3/11/2022  EXAMINATION: CT ABDOMEN PELVIS WITH CONTRAST CLINICAL HISTORY: Abdominal abscess/infection suspected; TECHNIQUE: Low dose axial images, sagittal and coronal reformations were obtained from the lung bases to the pubic symphysis following the IV administration of 100 mL of Omnipaque 350 .  Oral contrast was not administered. COMPARISON: 03/04/2022 FINDINGS: Abdomen: - Lower thorax:Heart is minimally enlarged. - Lung bases: No infiltrates and no nodules. - Liver: No focal mass. - Gallbladder: Status post cholecystectomy. - Bile Ducts: No evidence of intra or extra hepatic biliary ductal dilation. - Spleen: Negative. - Kidneys: Bilateral small renal cysts.  No stone, soft tissue mass or hydronephrosis bilaterally. - Adrenals: Unremarkable. - Pancreas: No mass or peripancreatic fat stranding. - Retroperitoneum:  No significant adenopathy. - Vascular: No abdominal aortic aneurysm. - Abdominal wall:  Unremarkable. Pelvis: Urinary bladder is incompletely distended.  Probable mild wall thickening. Bilateral inguinal hernias with small bowel protrusion on the right.  Fat containing inguinal hernia on the left.  No evidence of bowel obstruction. Prostate is enlarged measuring 6.1 cm. Bowel/Mesentery: No evidence of bowel obstruction. There is a large ovoid fluid collection abutting the anterior abdominal wall at the midline measuring 16.6 x 9 cm on axial 72 of series 2.  This has increased in size from the prior study.  Pigtail catheter is noted.  Multiple foci of air and debris are noted consistent with an abscess.  Recommend surgical consultation and follow-up.  Drainage catheter dysfunction is a possibility.  Recommend follow-up. Bones:  No acute osseous abnormality and no suspicious lytic or blastic lesion. Postoperative changes of  the right femur with surgical fixation hardware noted.     1. Large 16.6 x 9 cm abscess at the midline abutting the anterior abdominal wall, increased in size from the prior study.  See above comments.  Recommend surgical consultation and follow-up. 2. Mild cardiomegaly. 3. Bilateral renal cysts. 4. Incomplete distension of the urinary bladder with probable mild wall thickening.  Cystitis is a consideration. 5. Bilateral inguinal hernias with small bowel protrusion on the right in fat containing inguinal hernia on the left. 6. Prostatomegaly. 7.  This report was flagged in Epic as abnormal. Electronically signed by: Kelvin Stone Date:    03/11/2022 Time:    19:52    IR Abscess Tube Change (xpd)    Result Date: 3/12/2022  EXAMINATION: Drainage catheter exchange Procedural Personnel Attending physician(s): Aden Bernard MD Fellow physician(s): None Resident physician(s): None Advanced practice provider(s): None Pre-procedure diagnosis: Persistent fluid collection despite drainage Post-procedure diagnosis: Same Indication: Persistent fluid collection despite indwelling drainage catheter Additional clinical history: None Complications: No immediate complications. TECHNIQUE: - Drainage catheter exchange under fluoroscopic guidance FINDINGS: Pre-procedure Consent: Informed consent for the procedure was obtained and time-out was performed prior to the procedure. Preparation: The site was prepared and draped using maximal sterile barrier technique including cutaneous antisepsis. Antibiotic administered: Prophylactic dose within 1 hour of procedure start time or 2 hours for vancomycin or fluoroquinolones Anesthesia/sedation Level of anesthesia/sedation: No sedation Anesthesia/sedation administered by: Not applicable Total intra-service sedation time (minutes): 0 Drainage catheter exchange The patient was positioned supine. Initial imaging was performed with contrast injection through the indwelling tube. Local  anesthesia was administered. A wire was placed through the indwelling drainage catheter and the catheter was removed. The new drainage catheter was advanced, and position within the fluid collection was confirmed. - Initial imaging findings: Contrast injection demonstrate persistent abdominal fluid collection - Pre-existing drainage catheter: 10 Barbadian APD - Drainage catheter placed: All-purpose drainage catheter - External catheter securement: Non-absorbable suture and adhesive anchoring device - Post-drain exchange imaging findings: Near-complete drainage of the fluid collection Contrast Contrast agent: Omnipaque 300 Contrast volume (mL): 30 Radiation Dose CT dose length product ( mGy-cm ): Fluoroscopy time ( minutes): 0.5 Reference air kerma ( mGy): 21 Kerma area product (microgray meters squared): 401.22 Additional Details Additional description of procedure: None Equipment details: None Specimens removed: 55 mL of bloody fluid was removed. Aspirated fluid was not sent for analysis. Estimated blood loss (mL): Less than 10 Standardized report: [email or web address] Attestation Signer name: Aden Bernard MD I attest that I was present for the entire procedure. I reviewed the stored images and agree with the report as written.     Exchange of indwelling drainage catheter for a new 14 Barbadian drainage catheter in anterior abdominal fluid collection. Plan: Flush with 10 cc of normal saline 4 times a day.  Monitor drain output.  Consider repeat imaging when output decreases to less than 10 cc in 24 hours. ______________________________________________________________________ Electronically signed by: Aden Beranrd MD Date:    03/12/2022 Time:    16:25        Current Medications:     Infusions:       Scheduled:   amLODIPine  10 mg Oral Daily    ceFEPime (MAXIPIME) IVPB  2 g Intravenous Q8H    furosemide  20 mg Oral Daily    levothyroxine  75 mcg Oral Before breakfast    lisinopriL  10 mg Oral Daily     metronidazole  500 mg Intravenous Q8H    mupirocin   Nasal BID    pantoprazole  40 mg Oral Daily    rivaroxaban  20 mg Oral Daily with dinner    tamsulosin  0.4 mg Oral Daily        PRN:  acetaminophen, albuterol-ipratropium, aluminum-magnesium hydroxide-simethicone, dextrose 10%, dextrose 10%, glucagon (human recombinant), glucose, glucose, influenza, insulin aspart U-100, melatonin, ondansetron, sars-cov-2 (covid-19), simethicone, sodium chloride 0.9%, Pharmacy to dose Vancomycin consult **AND** vancomycin - pharmacy to dose

## 2022-03-13 NOTE — SUBJECTIVE & OBJECTIVE
Interval History:   Awake and alert, no new complaint.   S/p IR drain exchange from 10 Fr APD to 14 Fr APD with drainage of 500 cc of bloody fluid on 03/12  Appreciate ID recs continue with vanc/cefepime/Flagyl for now pending blood culture report  BP - restart home Bp meds  Replace K    Review of Systems   Constitutional:  Negative for chills and fever.   HENT:  Negative for congestion and rhinorrhea.    Eyes:  Negative for discharge.   Respiratory:  Negative for chest tightness and shortness of breath.    Cardiovascular:  Negative for chest pain.   Gastrointestinal:  Negative for abdominal distention, abdominal pain, blood in stool, nausea and vomiting.   Endocrine: Negative for polyphagia and polyuria.   Genitourinary:  Negative for dysuria and urgency.   Skin:  Negative for rash.   Neurological:  Negative for dizziness and weakness.   Psychiatric/Behavioral:  Negative for agitation.        Objective:     Vital Signs (Most Recent):  Temp: 98 °F (36.7 °C) (03/13/22 0729)  Pulse: 64 (03/13/22 0729)  Resp: 18 (03/13/22 0729)  BP: (!) 162/70 (03/13/22 0729)  SpO2: 97 % (03/13/22 0905)   Vital Signs (24h Range):  Temp:  [96.4 °F (35.8 °C)-99.4 °F (37.4 °C)] 98 °F (36.7 °C)  Pulse:  [60-71] 64  Resp:  [16-19] 18  SpO2:  [93 %-97 %] 97 %  BP: (141-163)/(63-70) 162/70     Weight: 88.9 kg (195 lb 15.8 oz)  Body mass index is 32.61 kg/m².    Intake/Output Summary (Last 24 hours) at 3/13/2022 0916  Last data filed at 3/13/2022 0600  Gross per 24 hour   Intake 340 ml   Output 1120 ml   Net -780 ml      Physical Exam  Constitutional:       Appearance: He is well-developed.   HENT:      Head: Normocephalic and atraumatic.   Cardiovascular:      Rate and Rhythm: Normal rate and regular rhythm.      Heart sounds: Normal heart sounds. No murmur heard.    No friction rub. No gallop.      Comments: Pacemaker in place  Pulmonary:      Effort: Pulmonary effort is normal.      Breath sounds: Normal breath sounds.   Chest:      Chest  wall: No tenderness.   Abdominal:      General: Bowel sounds are normal. There is no distension.      Palpations: Abdomen is soft.      Tenderness: There is no abdominal tenderness.      Comments: Abdominal drain in place, no surrounding erythema       Musculoskeletal:         General: Normal range of motion.      Cervical back: Neck supple.   Skin:     General: Skin is warm.   Neurological:      Mental Status: He is alert and oriented to person, place, and time.       Significant Labs: A1C:   Recent Labs   Lab 03/02/22  0744   HGBA1C 5.9*     ABGs: No results for input(s): PH, PCO2, HCO3, POCSATURATED, BE, TOTALHB, COHB, METHB, O2HB, POCFIO2, PO2 in the last 48 hours.  Blood Culture: No results for input(s): LABBLOO in the last 48 hours.  CBC:   Recent Labs   Lab 03/11/22  1649 03/13/22  0622   WBC 11.00 8.78   HGB 9.5* 8.7*   HCT 28.8* 26.9*    206     CMP:   Recent Labs   Lab 03/11/22  1648 03/13/22  0622   * 136   K 3.3* 3.3*    106   CO2 24 22*   * 106   BUN 19 14   CREATININE 1.3 0.9   CALCIUM 8.1* 7.9*   PROT 5.7* 5.1*   ALBUMIN 2.6* 2.3*   BILITOT 0.8 0.8   ALKPHOS 96 80   AST 15 17   ALT 7* 6*   ANIONGAP 11 8   EGFRNONAA 47* >60     Lipase: No results for input(s): LIPASE in the last 48 hours.  Lipid Panel: No results for input(s): CHOL, HDL, LDLCALC, TRIG, CHOLHDL in the last 48 hours.  Magnesium: No results for input(s): MG in the last 48 hours.  TSH:   Recent Labs   Lab 03/02/22  0744   TSH 5.943*     Urine Culture:   Recent Labs   Lab 03/11/22  1737   LABURIN Further report to follow     Urine Studies:   Recent Labs   Lab 03/11/22 1737   COLORU [place]   APPEARANCEUA Clear   PHUR 5.0   SPECGRAV 1.015   PROTEINUA 1+*   GLUCUA Negative   KETONESU 1+*   BILIRUBINUA 2+*   OCCULTUA Negative   NITRITE Negative   UROBILINOGEN 1.0   LEUKOCYTESUR 1+*   RBCUA 2   WBCUA 12*   BACTERIA Few*   SQUAMEPITHEL 8   HYALINECASTS 1       Significant Imaging: I have reviewed all pertinent imaging  results/findings within the past 24 hours.

## 2022-03-13 NOTE — PROGRESS NOTES
[facility] Medicine  Progress Note    Patient Name: Eddie Parada Sr.  MRN: [id number]  Patient Class: IP- Inpatient   Admission Date: 3/12/2022  Length of Stay: 1 days  Attending Physician: Hayley Cameron*  Primary Care Provider: Callum Roberts MD        Subjective:     Principal Problem:Abdominal wall abscess        HPI:  Eddie Parada is a 89 y/o M with PMH of atrial fibrillation on AOC, CAD, chronic diastolic heart failure, DM II, COPD, hyperlipidemia and osteoporosis presents with concern that abdominal drain is no longer draining. There is associated? fever. Patient was recently hospitalized in [facility] from 3/3- 3/8/2022 for intra-abdominal abscess found at site of prior mesh repair with resultant septic shock.  IR placed drain, and patient subsequently discharged on IV Rocephin per ID recommendations with end date 03/17/2022, cultures have been no growth. Per wife the abscess has been draining until yesterday. Patient was instructed by home health to come to ED to be evaluated.   CT abd/pel shows Large 16.6 x 9 cm abscess at the midline abutting the anterior abdominal wall, increased in size from the prior study. Started blood culture, resume home Ceftriaxone and Flagyl and add Vanc, consult surgery, IR and ID.       Overview/Hospital Course:  No notes on file    Interval History:   Awake and alert, no new complaint.   S/p IR drain exchange from 10 Fr APD to 14 Fr APD with drainage of 500 cc of bloody fluid on 03/12  Appreciate ID recs continue with vanc/cefepime/Flagyl for now pending blood culture report  BP - restart home Bp meds  Replace K    Review of Systems   Constitutional:  Negative for chills and fever.   HENT:  Negative for congestion and rhinorrhea.    Eyes:  Negative for discharge.   Respiratory:  Negative for chest tightness and shortness of breath.    Cardiovascular:  Negative for chest pain.   Gastrointestinal:  Negative for abdominal distention,  abdominal pain, blood in stool, nausea and vomiting.   Endocrine: Negative for polyphagia and polyuria.   Genitourinary:  Negative for dysuria and urgency.   Skin:  Negative for rash.   Neurological:  Negative for dizziness and weakness.   Psychiatric/Behavioral:  Negative for agitation.        Objective:     Vital Signs (Most Recent):  Temp: 98 °F (36.7 °C) (03/13/22 0729)  Pulse: 64 (03/13/22 0729)  Resp: 18 (03/13/22 0729)  BP: (!) 162/70 (03/13/22 0729)  SpO2: 97 % (03/13/22 0905)   Vital Signs (24h Range):  Temp:  [96.4 °F (35.8 °C)-99.4 °F (37.4 °C)] 98 °F (36.7 °C)  Pulse:  [60-71] 64  Resp:  [16-19] 18  SpO2:  [93 %-97 %] 97 %  BP: (141-163)/(63-70) 162/70     Weight: 88.9 kg (195 lb 15.8 oz)  Body mass index is 32.61 kg/m².    Intake/Output Summary (Last 24 hours) at 3/13/2022 0916  Last data filed at 3/13/2022 0600  Gross per 24 hour   Intake 340 ml   Output 1120 ml   Net -780 ml      Physical Exam  Constitutional:       Appearance: He is well-developed.   HENT:      Head: Normocephalic and atraumatic.   Cardiovascular:      Rate and Rhythm: Normal rate and regular rhythm.      Heart sounds: Normal heart sounds. No murmur heard.    No friction rub. No gallop.      Comments: Pacemaker in place  Pulmonary:      Effort: Pulmonary effort is normal.      Breath sounds: Normal breath sounds.   Chest:      Chest wall: No tenderness.   Abdominal:      General: Bowel sounds are normal. There is no distension.      Palpations: Abdomen is soft.      Tenderness: There is no abdominal tenderness.      Comments: Abdominal drain in place, no surrounding erythema       Musculoskeletal:         General: Normal range of motion.      Cervical back: Neck supple.   Skin:     General: Skin is warm.   Neurological:      Mental Status: He is alert and oriented to person, place, and time.       Significant Labs: A1C:   Recent Labs   Lab 03/02/22  0744   HGBA1C 5.9*     ABGs: No results for input(s): PH, PCO2, HCO3, POCSATURATED,  BE, TOTALHB, COHB, METHB, O2HB, POCFIO2, PO2 in the last 48 hours.  Blood Culture: No results for input(s): LABBLOO in the last 48 hours.  CBC:   Recent Labs   Lab 03/11/22  1649 03/13/22  0622   WBC 11.00 8.78   HGB 9.5* 8.7*   HCT 28.8* 26.9*    206     CMP:   Recent Labs   Lab 03/11/22  1648 03/13/22  0622   * 136   K 3.3* 3.3*    106   CO2 24 22*   * 106   BUN 19 14   CREATININE 1.3 0.9   CALCIUM 8.1* 7.9*   PROT 5.7* 5.1*   ALBUMIN 2.6* 2.3*   BILITOT 0.8 0.8   ALKPHOS 96 80   AST 15 17   ALT 7* 6*   ANIONGAP 11 8   EGFRNONAA 47* >60     Lipase: No results for input(s): LIPASE in the last 48 hours.  Lipid Panel: No results for input(s): CHOL, HDL, LDLCALC, TRIG, CHOLHDL in the last 48 hours.  Magnesium: No results for input(s): MG in the last 48 hours.  TSH:   Recent Labs   Lab 03/02/22  0744   TSH 5.943*     Urine Culture:   Recent Labs   Lab 03/11/22  1737   LABURIN Further report to follow     Urine Studies:   Recent Labs   Lab 03/11/22  1737   COLORU [place]   APPEARANCEUA Clear   PHUR 5.0   SPECGRAV 1.015   PROTEINUA 1+*   GLUCUA Negative   KETONESU 1+*   BILIRUBINUA 2+*   OCCULTUA Negative   NITRITE Negative   UROBILINOGEN 1.0   LEUKOCYTESUR 1+*   RBCUA 2   WBCUA 12*   BACTERIA Few*   SQUAMEPITHEL 8   HYALINECASTS 1       Significant Imaging: I have reviewed all pertinent imaging results/findings within the past 24 hours.      Assessment/Plan:      * Abdominal wall abscess  Abdominal wall mass  s/p drain placement by IR on 3/3/2022  Ct abdomen/pelvis  1. Large 16.6 x 9 cm abscess at the midline abutting the anterior abdominal wall, increased in size from the prior study. Recommend surgical consultation and follow-up.  2. Mild cardiomegaly.  3. Bilateral renal cysts.  4. Incomplete distension of the urinary bladder with probable mild wall thickening.  Cystitis is a consideration.  5. Bilateral inguinal hernias with small bowel protrusion on the right in fat containing inguinal  hernia on the left.  6. Prostatomegaly.  Blood culture   Resume home abx- Ceftriaxone and Flagyl and add Vanc  Consult surgery  Consult IR      Hypokalemia  Replete       CKD (chronic kidney disease) stage 3, GFR 30-59 ml/min  BUN/Cr 19/1.3  stable      Hypothyroidism due to acquired atrophy of thyroid  Continue synthroid.     UTI (urinary tract infection)  UA positive  On Cefepime  Awaits culture      (HFpEF) heart failure with preserved ejection fraction    Chronic combined systolic and diastolic congestive heart failure  Echo 3/03/2022 shows EF is 55% with indeterminate diastolic function  On Lasix- hold on admit   Hold BB due to low HR  Continue Lisinopril      Type II diabetes mellitus with peripheral circulatory disorder  HbA1c 5.9  Hold  Amaryl  accucheck  Low dose SSi  Diabetic diet      Cardiac pacemaker in situ  In place       CAD (coronary artery disease)  - Continue Pravastatin    Essential (primary) hypertension  - Continue Lisinopril     Chronic atrial fibrillation  Long term current use of anticoagulant therapy    Hold rivaroxaban for anticoagulation for possible surgery   Will use subq heparin for now.   Coreg on hold, HR low on admit      VTE Risk Mitigation (From admission, onward)         Ordered     rivaroxaban tablet 20 mg  With dinner         03/13/22 0920     IP VTE HIGH RISK PATIENT  Once         03/12/22 0956     Place sequential compression device  Until discontinued         03/12/22 0956     Reason for No Pharmacological VTE Prophylaxis  Once        Question:  Reasons:  Answer:  Already adequately anticoagulated on oral Anticoagulants    03/12/22 0956                Discharge Planning   KANDICE:      Code Status: Full Code   Is the patient medically ready for discharge?:     Reason for patient still in hospital (select all that apply): Patient trending condition                     Hayley N MD Rakesh  Department of Hospital Medicine   [facility]

## 2022-03-14 ENCOUNTER — PATIENT OUTREACH (OUTPATIENT)
Dept: ADMINISTRATIVE | Facility: OTHER | Age: 87
End: 2022-03-14
Payer: MEDICARE

## 2022-03-14 VITALS
DIASTOLIC BLOOD PRESSURE: 62 MMHG | WEIGHT: 196 LBS | SYSTOLIC BLOOD PRESSURE: 128 MMHG | BODY MASS INDEX: 32.65 KG/M2 | HEIGHT: 65 IN | OXYGEN SATURATION: 97 % | HEART RATE: 67 BPM | RESPIRATION RATE: 18 BRPM | TEMPERATURE: 96 F

## 2022-03-14 LAB
ALBUMIN SERPL BCP-MCNC: 2.5 G/DL (ref 3.5–5.2)
ALP SERPL-CCNC: 85 U/L (ref 55–135)
ALT SERPL W/O P-5'-P-CCNC: 8 U/L (ref 10–44)
ANION GAP SERPL CALC-SCNC: 7 MMOL/L (ref 8–16)
AST SERPL-CCNC: 17 U/L (ref 10–40)
BASOPHILS # BLD AUTO: 0.04 K/UL (ref 0–0.2)
BASOPHILS NFR BLD: 0.4 % (ref 0–1.9)
BILIRUB SERPL-MCNC: 0.8 MG/DL (ref 0.1–1)
BUN SERPL-MCNC: 12 MG/DL (ref 10–30)
CALCIUM SERPL-MCNC: 8.2 MG/DL (ref 8.7–10.5)
CHLORIDE SERPL-SCNC: 104 MMOL/L (ref 95–110)
CO2 SERPL-SCNC: 24 MMOL/L (ref 23–29)
CREAT SERPL-MCNC: 0.9 MG/DL (ref 0.5–1.4)
DIFFERENTIAL METHOD: ABNORMAL
EOSINOPHIL # BLD AUTO: 0.1 K/UL (ref 0–0.5)
EOSINOPHIL NFR BLD: 1.3 % (ref 0–8)
ERYTHROCYTE [DISTWIDTH] IN BLOOD BY AUTOMATED COUNT: 13.2 % (ref 11.5–14.5)
EST. GFR  (AFRICAN AMERICAN): >60 ML/MIN/1.73 M^2
EST. GFR  (NON AFRICAN AMERICAN): >60 ML/MIN/1.73 M^2
GLUCOSE SERPL-MCNC: 156 MG/DL (ref 70–110)
HCT VFR BLD AUTO: 27.5 % (ref 40–54)
HGB BLD-MCNC: 9.1 G/DL (ref 14–18)
IMM GRANULOCYTES # BLD AUTO: 0.16 K/UL (ref 0–0.04)
IMM GRANULOCYTES NFR BLD AUTO: 1.8 % (ref 0–0.5)
LYMPHOCYTES # BLD AUTO: 0.9 K/UL (ref 1–4.8)
LYMPHOCYTES NFR BLD: 9.8 % (ref 18–48)
MCH RBC QN AUTO: 33.3 PG (ref 27–31)
MCHC RBC AUTO-ENTMCNC: 33.1 G/DL (ref 32–36)
MCV RBC AUTO: 101 FL (ref 82–98)
MONOCYTES # BLD AUTO: 0.8 K/UL (ref 0.3–1)
MONOCYTES NFR BLD: 8.8 % (ref 4–15)
NEUTROPHILS # BLD AUTO: 7 K/UL (ref 1.8–7.7)
NEUTROPHILS NFR BLD: 77.9 % (ref 38–73)
NRBC BLD-RTO: 0 /100 WBC
PLATELET # BLD AUTO: 230 K/UL (ref 150–450)
PMV BLD AUTO: 10 FL (ref 9.2–12.9)
POCT GLUCOSE: 123 MG/DL (ref 70–110)
POCT GLUCOSE: 164 MG/DL (ref 70–110)
POCT GLUCOSE: 184 MG/DL (ref 70–110)
POTASSIUM SERPL-SCNC: 3.7 MMOL/L (ref 3.5–5.1)
PROT SERPL-MCNC: 5.6 G/DL (ref 6–8.4)
RBC # BLD AUTO: 2.73 M/UL (ref 4.6–6.2)
SODIUM SERPL-SCNC: 135 MMOL/L (ref 136–145)
VANCOMYCIN SERPL-MCNC: 11.6 UG/ML
WBC # BLD AUTO: 9.05 K/UL (ref 3.9–12.7)

## 2022-03-14 PROCEDURE — 25000003 PHARM REV CODE 250

## 2022-03-14 PROCEDURE — 94761 N-INVAS EAR/PLS OXIMETRY MLT: CPT

## 2022-03-14 PROCEDURE — S0030 INJECTION, METRONIDAZOLE: HCPCS

## 2022-03-14 PROCEDURE — 25000003 PHARM REV CODE 250: Performed by: FAMILY MEDICINE

## 2022-03-14 PROCEDURE — 80202 ASSAY OF VANCOMYCIN: CPT | Performed by: FAMILY MEDICINE

## 2022-03-14 PROCEDURE — 85025 COMPLETE CBC W/AUTO DIFF WBC: CPT

## 2022-03-14 PROCEDURE — 90471 IMMUNIZATION ADMIN: CPT | Performed by: FAMILY MEDICINE

## 2022-03-14 PROCEDURE — 80053 COMPREHEN METABOLIC PANEL: CPT

## 2022-03-14 PROCEDURE — 36415 COLL VENOUS BLD VENIPUNCTURE: CPT

## 2022-03-14 PROCEDURE — 97535 SELF CARE MNGMENT TRAINING: CPT | Mod: CO

## 2022-03-14 PROCEDURE — 36415 COLL VENOUS BLD VENIPUNCTURE: CPT | Performed by: FAMILY MEDICINE

## 2022-03-14 PROCEDURE — 63600175 PHARM REV CODE 636 W HCPCS: Performed by: FAMILY MEDICINE

## 2022-03-14 PROCEDURE — 97530 THERAPEUTIC ACTIVITIES: CPT | Mod: CO

## 2022-03-14 PROCEDURE — 97162 PT EVAL MOD COMPLEX 30 MIN: CPT

## 2022-03-14 PROCEDURE — 63600175 PHARM REV CODE 636 W HCPCS

## 2022-03-14 PROCEDURE — G0008 ADMIN INFLUENZA VIRUS VAC: HCPCS | Performed by: FAMILY MEDICINE

## 2022-03-14 PROCEDURE — 97116 GAIT TRAINING THERAPY: CPT

## 2022-03-14 PROCEDURE — 90694 VACC AIIV4 NO PRSRV 0.5ML IM: CPT | Performed by: FAMILY MEDICINE

## 2022-03-14 RX ORDER — GLIMEPIRIDE 2 MG/1
TABLET ORAL
Qty: 30 TABLET | Refills: 5 | Status: SHIPPED | OUTPATIENT
Start: 2022-03-14 | End: 2022-04-07

## 2022-03-14 RX ORDER — MUPIROCIN 20 MG/G
OINTMENT TOPICAL 2 TIMES DAILY
Qty: 22 G | Refills: 0 | Status: SHIPPED | OUTPATIENT
Start: 2022-03-14 | End: 2022-04-13

## 2022-03-14 RX ORDER — METRONIDAZOLE 500 MG/1
500 TABLET ORAL EVERY 8 HOURS
Status: DISCONTINUED | OUTPATIENT
Start: 2022-03-14 | End: 2022-03-14 | Stop reason: HOSPADM

## 2022-03-14 RX ORDER — CEFEPIME HYDROCHLORIDE 1 G/50ML
2 INJECTION, SOLUTION INTRAVENOUS EVERY 8 HOURS
Qty: 2100 ML | Refills: 0 | Status: ON HOLD
Start: 2022-03-14 | End: 2022-03-24 | Stop reason: HOSPADM

## 2022-03-14 RX ORDER — METRONIDAZOLE 500 MG/1
500 TABLET ORAL EVERY 8 HOURS
Qty: 42 TABLET | Refills: 0 | Status: ON HOLD | OUTPATIENT
Start: 2022-03-14 | End: 2022-03-24 | Stop reason: HOSPADM

## 2022-03-14 RX ADMIN — TAMSULOSIN HYDROCHLORIDE 0.4 MG: 0.4 CAPSULE ORAL at 10:03

## 2022-03-14 RX ADMIN — RIVAROXABAN 20 MG: 20 TABLET, FILM COATED ORAL at 04:03

## 2022-03-14 RX ADMIN — METRONIDAZOLE 500 MG: 500 INJECTION, SOLUTION INTRAVENOUS at 03:03

## 2022-03-14 RX ADMIN — MUPIROCIN: 20 OINTMENT TOPICAL at 10:03

## 2022-03-14 RX ADMIN — INFLUENZA VACCINE, ADJUVANTED 0.5 ML: 15; 15; 15; 15 INJECTION, SUSPENSION INTRAMUSCULAR at 03:03

## 2022-03-14 RX ADMIN — CEFEPIME HYDROCHLORIDE 2 G: 2 INJECTION, SOLUTION INTRAVENOUS at 03:03

## 2022-03-14 RX ADMIN — LISINOPRIL 10 MG: 10 TABLET ORAL at 10:03

## 2022-03-14 RX ADMIN — PANTOPRAZOLE SODIUM 40 MG: 40 TABLET, DELAYED RELEASE ORAL at 10:03

## 2022-03-14 RX ADMIN — AMLODIPINE BESYLATE 10 MG: 5 TABLET ORAL at 10:03

## 2022-03-14 RX ADMIN — FUROSEMIDE 20 MG: 20 TABLET ORAL at 10:03

## 2022-03-14 RX ADMIN — LEVOTHYROXINE SODIUM 75 MCG: 75 TABLET ORAL at 06:03

## 2022-03-14 RX ADMIN — CEFEPIME HYDROCHLORIDE 2 G: 2 INJECTION, SOLUTION INTRAVENOUS at 01:03

## 2022-03-14 RX ADMIN — METRONIDAZOLE 500 MG: 500 TABLET ORAL at 12:03

## 2022-03-14 RX ADMIN — VANCOMYCIN HYDROCHLORIDE 1250 MG: 1.25 INJECTION, POWDER, LYOPHILIZED, FOR SOLUTION INTRAVENOUS at 06:03

## 2022-03-14 NOTE — PT/OT/SLP EVAL
"Physical Therapy Evaluation    Patient Name:  Eddie Parada Sr.   MRN:  [id number]    Recommendations:     Discharge Recommendations:  home   Discharge Equipment Recommendations: none   Barriers to discharge: None    Assessment:     Eddie Parada Sr. is a 89 y.o. male admitted with a medical diagnosis of Abdominal wall abscess.  He presents with the following impairments/functional limitations:  weakness, impaired functional mobilty, impaired endurance, gait instability, impaired balance, decreased lower extremity function, impaired self care skills.  Patient seen for physical therapy evaluation.  Spouse present during evaluation.  Physical therapy will continue to follow.  Do not anticipate this patient will have any discharge needs, including DME.    Rehab Prognosis: Good; patient would benefit from acute skilled PT services to address these deficits and reach maximum level of function.    Recent Surgery: * No surgery found *      Plan:     During this hospitalization, patient to be seen 3 x/week to address the identified rehab impairments via gait training, therapeutic activities, therapeutic exercises, neuromuscular re-education and progress toward the following goals:    · Plan of Care Expires:  04/13/22    Subjective     Chief Complaint: "I am just tired, but I am doing ok"  Patient/Family Comments/goals: To return home and continue to get stronger  Pain/Comfort:  · Pain Rating 1: 0/10  · Pain Rating Post-Intervention 1: 0/10    Patients cultural, spiritual, Yarsani conflicts given the current situation: no    Living Environment:  Patient lives with spouse in raised home with 5-6 VERNON.  Ramp access with B railings.  T/S with bath bench in bathroom.    Prior to admission, patients level of function was Modified independent with gait and ADLs. Patient drives and still works from home daily.    Equipment used at home: walker, rolling, bath bench.  DME owned (not currently used): none.  Upon discharge, " patient will have assistance from spouse.    Objective:     Communicated with nurse Fay prior to session.  Patient found up in chair with telemetry, peripheral IV, IMELDA drain  upon PT entry to room.    General Precautions: Standard, fall   Orthopedic Precautions:N/A   Braces: N/A  Respiratory Status: Room air    Exams:  · Cognitive Exam:  Patient is oriented to Person, Place, Time and Situation  · Fine Motor Coordination:    · -       Intact  Left hand thumb/finger opposition skills, Right hand thumb/finger opposition skills and Rapid alternating ankle DF/PF  · Gross Motor Coordination:  WFL  · Postural Exam:  Patient presented with the following abnormalities:    · -       Rounded shoulders  · -       Forward head  · -       Kyphosis  · Sensation:    · -       Intact  light/touch B LEs  · Skin Integrity/Edema:      · -       Skin integrity: Visible skin intact  · RLE ROM: WFL  · RLE Strength: 4+ to 5/5 throughout  · LLE ROM: WFL  · LLE Strength: 4+ to 5/5 throughout    Functional Mobility:  · Transfers:     · Sit to Stand:  contact guard assistance with rolling walker  · Gait: with RW x 75' with CGA, slow pace, fatigued with gait trial  · Balance: Sitting edge of chair:  no LOB, SBA  Standing:  CGA with use of RW    Therapeutic Activities and Exercises:   Educated on role of physical therapy.  Patient educated to call for assist when ready to return back to bed.     AM-PAC 6 CLICK MOBILITY  Total Score:18     Patient left up in chair with all lines intact and spouse present.    GOALS:   Multidisciplinary Problems     Physical Therapy Goals        Problem: Physical Therapy Goal    Goal Priority Disciplines Outcome Goal Variances Interventions   Physical Therapy Goal     PT, PT/OT Ongoing, Progressing     Description: Goals to be met by: 2022    Patient will increase functional independence with mobility by performin. Supine to sit with Modified [place]  2. Sit to supine with Modified  [place]  3. Sit to stand transfer with Supervision  4. Gait  x 150 feet with Supervision using Rolling Walker.                      History:     Past Medical History:   Diagnosis Date    Abdominal fibromatosis     Acute posthemorrhagic anemia 1/9/2018    NIK (acute kidney injury) 1/11/2018    Atrial fibrillation     Bradycardia     Broken arm     CAD (coronary artery disease)     Cancer     basal cell on nose and melanoma on back    CHF (congestive heart failure)     COPD (chronic obstructive pulmonary disease)     Diabetes mellitus type II     Hyperlipidemia     Localization-related focal epilepsy with simple partial seizures 3/2/2021    Melanoma     Obesity (BMI 30.0-34.9) 9/13/2016    Osteoporosis     Peripheral vascular disease     Primary malignant neoplasm of prostate 3/3/2022    Septic shock 3/3/2022    Stroke     TIA (transient ischemic attack)     Type II diabetes mellitus with peripheral circulatory disorder     Type II or unspecified type diabetes mellitus without mention of complication, not stated as uncontrolled     Unspecified essential hypertension        Past Surgical History:   Procedure Laterality Date    AORTIC VALVE REPLACEMENT      CARDIAC PACEMAKER PLACEMENT  9/28/2012    CARDIAC VALVE REPLACEMENT  11/17/2011    aortic valve-tissue    CHOLECYSTECTOMY      COLONOSCOPY      ESOPHAGOGASTRODUODENOSCOPY N/A 5/27/2021    Procedure: EGD (ESOPHAGOGASTRODUODENOSCOPY);  Surgeon: Gualberto Medrano MD;  Location: [facility];  Service: Endoscopy;  Laterality: N/A;    ESOPHAGOGASTRODUODENOSCOPY N/A 6/22/2021    Procedure: EGD (ESOPHAGOGASTRODUODENOSCOPY);  Surgeon: Gualberto Medrano MD;  Location: [facility];  Service: Endoscopy;  Laterality: N/A;  please call wife(Jessica) with instructions/arrival time.    ESOPHAGOGASTRODUODENOSCOPY (EGD) WITH DILATION  06/22/2021    EYE SURGERY Bilateral     cataract with lens by  at [facility]    HERNIA REPAIR       abdominal    LASIK      UPPER GASTROINTESTINAL ENDOSCOPY  05/28/2021       Time Tracking:     PT Received On: 03/14/22  PT Start Time: 1027     PT Stop Time: 1047  PT Total Time (min): 20 min     Billable Minutes: Evaluation 10 Gait Training 10        03/14/2022

## 2022-03-14 NOTE — PROGRESS NOTES
Pharmacokinetic Assessment Follow Up: IV Vancomycin    Vancomycin serum concentration assessment(s):    The random level was drawn correctly and can be used to guide therapy at this time. The measurement is within the desired definitive target range of 10 to 15 mcg/mL.    Vancomycin Regimen Plan:    Change regimen to Vancomycin 1250 mg IV every 24 hours with next serum trough concentration measured at 0500 prior to 3rd dose on 3/16    Drug levels (last 3 results):  Recent Labs   Lab Result Units 03/12/22  2256 03/14/22  0253   Vancomycin, Random ug/mL 8.0 11.6       Pharmacy will continue to follow and monitor vancomycin.    Please contact pharmacy at extension [phone] for questions regarding this assessment.    Thank you for the consult,   Jass Dillon       Patient brief summary:  Eddie Parada Sr. is a 89 y.o. male initiated on antimicrobial therapy with IV Vancomycin for treatment of intra-abdominal infection    The patient's current regimen is vancomycin 1250mg q24h    Drug Allergies:   Review of patient's allergies indicates:   Allergen Reactions    Ciprofloxacin     Levofloxacin Swelling    Lyrica [pregabalin] Swelling    Unable to assess Other (See Comments)     Soft shell crabs       Actual Body Weight:   88.9kg    Renal Function:   Estimated Creatinine Clearance: 53.7 mL/min (based on SCr of 0.9 mg/dL).,     Dialysis Method (if applicable):      CBC (last 72 hours):  Recent Labs   Lab Result Units 03/11/22  1649 03/13/22  0622   WBC K/uL 11.00 8.78   Hemoglobin g/dL 9.5* 8.7*   Hematocrit % 28.8* 26.9*   Platelets K/uL 201 206   Gran % % 75.6* 75.2*   Lymph % % 9.7* 11.6*   Mono % % 10.3 9.6   Eosinophil % % 0.2 0.6   Basophil % % 0.4 0.6   Differential Method  Automated Automated       Metabolic Panel (last 72 hours):  Recent Labs   Lab Result Units 03/11/22  1648 03/11/22  1737 03/13/22  0622   Sodium mmol/L 135*  --  136   Potassium mmol/L 3.3*  --  3.3*   Chloride mmol/L 100  --  106   CO2 mmol/L 24   --  22*   Glucose mg/dL 169*  --  106   Glucose, UA   --  Negative  --    BUN mg/dL 19  --  14   Creatinine mg/dL 1.3  --  0.9   Albumin g/dL 2.6*  --  2.3*   Total Bilirubin mg/dL 0.8  --  0.8   Alkaline Phosphatase U/L 96  --  80   AST U/L 15  --  17   ALT U/L 7*  --  6*       Vancomycin Administrations:  vancomycin given in the last 96 hours                     vancomycin 1.25 g in dextrose 5% 250 mL IVPB (ready to mix) (mg) 1,250 mg New Bag 03/14/22 0632    vancomycin 1.25 g in dextrose 5% 250 mL IVPB (ready to mix) (mg) 1,250 mg New Bag 03/13/22 0556    vancomycin 1750 mg in 0.9% sodium chloride 500 mL IVPB (mg) 1,750 mg New Bag 03/11/22 2314                    Microbiologic Results:  Microbiology Results (last 7 days)       Procedure Component Value Units Date/Time    Blood culture [[id number]] Collected: 03/12/22 1248    Order Status: Completed Specimen: Blood Updated: 03/13/22 1515     Blood Culture, Routine No Growth to date    Blood culture [[id number]] Collected: 03/12/22 1248    Order Status: Completed Specimen: Blood Updated: 03/13/22 1515     Blood Culture, Routine No Growth to date

## 2022-03-14 NOTE — PLAN OF CARE
Sw met with pt and pt's Wife Jessica [phone] at bedside to complete assessment. Pt will have Wife Jessica [phone] help transport him home at d/c. Pt reports having RW, glucometer and Tub bench at home. Pt receives HH with [facility]. SW will call HH to resume care. SW will request f/u apts. White board updated with CM name and contact information.  Discharge brochure provided.  Pt encouraged to call with any questions or concerns.  Cm will continue to follow pt through transitions of care and assist with any discharge needs.    Jcarlos Robert, MSPHILL  [phone]    Future Appointments   Date Time Provider Department Center   3/21/2022 11:30 AM Callum Roberts MD [facility]   4/7/2022  8:45 AM [facility]        03/14/22 1123   Discharge Assessment   Assessment Type Discharge Planning Assessment   Confirmed/corrected address, phone number and insurance Yes   Confirmed Demographics Correct on Facesheet   Source of Information patient   When was your last doctors appointment? 03/10/22   Communicated KANDICE with patient/caregiver Yes   Reason For Admission Abdominal wall abscess   Lives With spouse   Facility Arrived From: Home   Do you expect to return to your current living situation? Yes   Do you have help at home or someone to help you manage your care at home? Yes   Who are your caregiver(s) and their phone number(s)? Wife Jessica [phone]   Prior to hospitilization cognitive status: Alert/Oriented   Current cognitive status: Alert/Oriented   Walking or Climbing Stairs Difficulty ambulation difficulty, requires equipment   Dressing/Bathing Difficulty bathing difficulty, requires equipment   Home Accessibility wheelchair accessible   Home Layout Able to live on 1st floor   Equipment Currently Used at Home walker, rolling;bath bench;glucometer   Readmission within 30 days? Yes   Patient currently being followed by outpatient case management? No   Do you currently have  service(s) that help you manage your care at home? Yes   Name and Contact number of agency [facility]   Is the pt/caregiver preference to resume services with current agency Yes   Do you take prescription medications? Yes   Do you have prescription coverage? Yes   Coverage [employer]   Do you have any problems affording any of your prescribed medications? No   Is the patient taking medications as prescribed? yes   Who is going to help you get home at discharge? Kala Lopez [phone]   How do you get to doctors appointments? family or friend will provide   Are you on dialysis? No   Do you take coumadin? No   Discharge Plan A Home Health   Discharge Plan B Home with family   DME Needed Upon Discharge  other (see comments)  (TBD)   Discharge Plan discussed with: Patient;Spouse/sig other   Name(s) and Number(s) Kala Lopez [phone]   Discharge Barriers Identified None   Relationship/Environment   Name(s) of Who Lives With Patient Kala Lopez [phone]

## 2022-03-14 NOTE — PLAN OF CARE
Problem: Physical Therapy Goal  Goal: Physical Therapy Goal  Description: Goals to be met by: 2022    Patient will increase functional independence with mobility by performin. Supine to sit with Modified [place]  2. Sit to supine with Modified [place]  3. Sit to stand transfer with Supervision  4. Gait  x 150 feet with Supervision using Rolling Walker.     Outcome: Ongoing, Progressing       Patient seen for physical therapy evaluation.  Spouse present during evaluation.  Physical therapy will continue to follow.  Do not anticipate this patient will have any discharge needs, including DME.

## 2022-03-14 NOTE — HOSPITAL COURSE
Mr. Parada is a 89 year old male admitted for decreased drain output placed on 3/03. IR consulted and upsized catheter of drain from 10- 14 with 550 mL output. CT abdomen/pelvis showed large 16.6 x 9 cm abscess at the midline abutting the anterior abdominal wall, increased in size from the prior study on 3/04. Started on course of cefepime, vancomycin, and IV metronidazole, eventually transition to PO metronidazole. Urine culture <[id number] candida, no treatment provided as he was asymptomatic. ID consulted. General surgery consulted, no plans for surgical intervention at this time, recommendation to try cathflo if drain output decreases. Discharge home with HH PT/OT and nursing care for IV antibiotics with PCP and wound care clinic follow up. Return precautions discussed. Patient and wife voiced understanding. All questions and concerns answered at this time.     ID recommendations as follows   - Recommend minimum of a 2 week course of vancomycin, cefepime, and PO flagyl .   - Need weekly labs CBC, CMP, vanc level Labs to be faxed to ID office, attention to Dr. Whitney and Dr. Arredondo, [phone]  - Ambulatory referral for follow up in ID clinic with Dr. Arredondo  - Follow up with PCP within 2 weeks as ID clinic is unavailable for a few months, will have follow up appointment with ID when available.

## 2022-03-14 NOTE — PLAN OF CARE
[facility] - Telemetry      HOME HEALTH ORDERS  FACE TO FACE ENCOUNTER    Patient Name: Eddie Parada Sr.  YOB: 1929    PCP: Callum Roberts MD   PCP Address: [address] [place] [id number]  PCP Phone Number: [phone]  PCP Fax: [phone]    Encounter Date: 3/11/22    Admit to Home Health    Diagnoses:  Active Hospital Problems    Diagnosis  POA    *Abdominal wall abscess [L02.211]  Yes    CKD (chronic kidney disease) stage 3, GFR 30-59 ml/min [N18.30]  Yes    Hypokalemia [E87.6]  Yes    Abdominal mass [R19.00]  Yes    Hypothyroidism due to acquired atrophy of thyroid [E03.4]  Yes    UTI (urinary tract infection) [N39.0]  Yes    (HFpEF) heart failure with preserved ejection fraction [I50.30]  Yes    Type 2 diabetes mellitus, controlled [E11.9]  Yes    Chronic combined systolic and diastolic congestive heart failure [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       Follow Up Appointments:  Future Appointments   Date Time Provider Department Center   3/21/2022 11:30 AM MD [facility]   4/7/2022  8:45 AM MD [facility]       Allergies:  Review of patient's allergies indicates:   Allergen Reactions    Ciprofloxacin     Levofloxacin Swelling    Lyrica [pregabalin] Swelling    Unable to assess Other (See Comments)     Soft shell crabs       Medications: Review discharge medications with patient and family and provide education.    Current Facility-Administered Medications   Medication Dose Route Frequency  Provider Last Rate Last Admin    acetaminophen tablet 650 mg  650 mg Oral Q4H PRN Cassandra FLORIDA Gonzalez NP        albuterol-ipratropium 2.5 mg-0.5 mg/3 mL nebulizer solution 3 mL  3 mL Nebulization Q6H PRN Cassandra FLORIDA Gonzalez NP        aluminum-magnesium hydroxide-simethicone 200-200-20 mg/5 mL suspension 30 mL  30 mL Oral QID PRN Cassandra FLORIDA Gonzalez NP        amLODIPine tablet 10 mg  10 mg Oral Daily Hayley Cameron MD   10 mg at 03/14/22 1025    benzonatate capsule 100 mg  100 mg Oral TID PRN Svetlana Machuca NP   100 mg at 03/13/22 2206    cefepime in dextrose 5 % IVPB 2 g  2 g Intravenous Q8H Cassandra FLORIDA Gonzalez NP   Stopped at 03/14/22 1429    dextrose 10% bolus 125 mL  12.5 g Intravenous PRN Hayley Cameron MD        dextrose 10% bolus 250 mL  25 g Intravenous PRN Hayley Cameron MD        furosemide tablet 20 mg  20 mg Oral Daily Hayley Cameron MD   20 mg at 03/14/22 1025    glucagon (human recombinant) injection 1 mg  1 mg Intramuscular PRN Cassandra FLORIDA Gonzalez NP        glucose chewable tablet 16 g  16 g Oral PRN Cassandra FLORIDA Gonzalez NP        glucose chewable tablet 24 g  24 g Oral PRN Cassandra FLORIDA Gonzalez NP        insulin aspart U-100 pen 0-5 Units  0-5 Units Subcutaneous QID (AC + HS) PRN Cassandra FLORIDA Gonzalez NP        levothyroxine tablet 75 mcg  75 mcg Oral Before breakfast Hayley Cameron MD   75 mcg at 03/14/22 0626    lisinopriL tablet 10 mg  10 mg Oral Daily Hayley Cameron MD   10 mg at 03/14/22 1024    melatonin tablet 6 mg  6 mg Oral Nightly PRN Cassandra FLORIDA Gonzalez NP        metroNIDAZOLE tablet 500 mg  500 mg Oral Q8H Ebonie Valdes PA-C   500 mg at 03/14/22 1208    mupirocin 2 % ointment   Nasal BID Hayley Cameron MD   Given at 03/14/22 1029    ondansetron injection 4 mg  4 mg Intravenous Q8H PRN Cassandra Gonzalez NP        pantoprazole EC tablet 40 mg  40 mg Oral Daily Hayley DOMINGUEZ  MD Rakesh   40 mg at 03/14/22 1024    rivaroxaban tablet 20 mg  20 mg Oral Daily with dinner Hayley Cameron MD   20 mg at 03/14/22 1619    sars-cov-2 (covid-19) (Pfizer COVID-19) 30 mcg/0.3 ml injection 0.3 mL  0.3 mL Intramuscular Prior to discharge Hayley Cameron MD        simethicone chewable tablet 80 mg  1 tablet Oral QID PRN Cassandra Gonzalez NP        sodium chloride 0.9% flush 10 mL  10 mL Intravenous Q8H PRN Cassandra Gonzalez NP        tamsulosin 24 hr capsule 0.4 mg  0.4 mg Oral Daily Hayley Cameron MD   0.4 mg at 03/14/22 1024    vancomycin - pharmacy to dose   Intravenous pharmacy to manage frequency Cassandra Gonzalez NP        [START ON 3/15/2022] vancomycin 1.25 g in dextrose 5% 250 mL IVPB (ready to mix)  1,250 mg Intravenous Q24H Hayley Cameron MD         Current Discharge Medication List      START taking these medications    Details   cefepime in dextrose 5 % (MAXIPIME) 2 gram/50 mL PgBk Inject 50 mLs (2 g total) into the vein every 8 (eight) hours.  Qty: 2100 mL, Refills: 0      mupirocin (BACTROBAN) 2 % ointment by Nasal route 2 (two) times daily. for 2 days  Qty: 2 g, Refills: 0      vancomycin HCl (VANCOMYCIN 1.25 G/250 ML D5W, READY TO MIX,) Inject 250 mLs (1,250 mg total) into the vein once daily.         CONTINUE these medications which have CHANGED    Details   metroNIDAZOLE (FLAGYL) 500 MG tablet Take 1 tablet (500 mg total) by mouth every 8 (eight) hours. for 14 days  Qty: 42 tablet, Refills: 0         CONTINUE these medications which have NOT CHANGED    Details   acetaminophen (TYLENOL) 500 MG tablet Take 500 mg by mouth every 6 (six) hours as needed for Pain.      amLODIPine (NORVASC) 10 MG tablet TAKE 1 TABLET BY MOUTH DAILY FOR BLOOD PRESSURE  Qty: 90 tablet, Refills: 1    Associated Diagnoses: Essential hypertension      blood-glucose meter Misc ONETOUCH ULTRA GLUCOSE METER. USE AS DIRECTED TO TEST BLOOD GLUCOSE.  DX CODE: E11.51  Qty: 1 each, Refills: 0    Associated Diagnoses: Type 2 diabetes mellitus with other neurologic complication, with long-term current use of insulin      carvediloL (COREG) 12.5 MG tablet TAKE 1 TABLET (12.5 MG TOTAL) BY MOUTH 2 (TWO) TIMES DAILY WITH MEALS.  Qty: 60 tablet, Refills: 5    Associated Diagnoses: Essential hypertension      doxazosin (CARDURA) 1 MG tablet Take 1 mg by mouth nightly.      fenofibrate 160 MG Tab fenofibrate 160 mg tablet      furosemide (LASIX) 20 MG tablet TAKE ONE TABLET BY MOUTH EVERY OTHER DAY  Qty: 15 tablet, Refills: 1      gabapentin (NEURONTIN) 100 MG capsule Take 2 capsules (200 mg total) by mouth every evening.  Qty: 60 capsule, Refills: 11    Associated Diagnoses: Lumbar radiculopathy      gemfibrozil (LOPID) 600 MG tablet Take one tablet(600mg) by mouth once daily      lancets (ONETOUCH ULTRASOFT LANCETS) Misc USE TO TEST BLOOD GLUCOSE TWICE DAILY. DX CODE: E11.51  Qty: 200 each, Refills: 3    Associated Diagnoses: Type 2 diabetes mellitus with other neurologic complication, with long-term current use of insulin      levothyroxine (SYNTHROID) 75 MCG tablet Take 1 tablet (75 mcg total) by mouth before breakfast.  Qty: 30 tablet, Refills: 11    Associated Diagnoses: Hypothyroidism due to acquired atrophy of thyroid      lisinopriL (PRINIVIL,ZESTRIL) 20 MG tablet       pantoprazole (PROTONIX) 40 MG tablet TAKE ONE TABLET BY MOUTH ONCE A DAY FOR STOMACH PROBLEMS  Qty: 30 tablet, Refills: 0    Associated Diagnoses: Gastroesophageal reflux disease      pioglitazone (ACTOS) 15 MG tablet ONE TABLET ONE TIME A DAY FOR DIABETES  Qty: 30 tablet, Refills: 0      potassium chloride SA (K-DUR,KLOR-CON) 10 MEQ tablet Take 1 tablet (10 mEq total) by mouth once daily.  Qty: 90 tablet, Refills: 1    Associated Diagnoses: Hypokalemia      pravastatin (PRAVACHOL) 20 MG tablet TAKE 1 TABLET BY MOUTH DAILY FOR CHOLESTROL  Qty: 30 tablet, Refills: 5    Associated Diagnoses: Mixed  dyslipidemia      rivaroxaban (XARELTO) 20 mg Tab Take one tablet(20mg) by mouth once daily      tamsulosin (FLOMAX) 0.4 mg Cap Take 1 capsule (0.4 mg total) by mouth once daily.  Qty: 30 capsule, Refills: 11      blood sugar diagnostic (BLOOD GLUCOSE TEST) Strp RoomsterUCH ULTRA TESTING STRIPS. USE TO TEST BLOOD GLUCOSE TWICE DAILY. DX CODE: E11.51  Qty: 200 strip, Refills: 3    Associated Diagnoses: Type 2 diabetes mellitus with other neurologic complication, with long-term current use of insulin      glimepiride (AMARYL) 2 MG tablet TAKE 1 TABLET BY MOUTH EVERY MORNING WITH BREAKFAST FOR DIABETES  Qty: 30 tablet, Refills: 5    Associated Diagnoses: Type 2 diabetes mellitus, controlled         STOP taking these medications       cefTRIAXone (ROCEPHIN) 1 g/50 mL PgBk IVPB Comments:   Reason for Stopping:                 I have seen and examined this patient within the last 30 days. My clinical findings that support the need for the home health skilled services and home bound status are the following:no   Weakness/numbness causing balance and gait disturbance due to Weakness/Debility making it taxing to leave home.     Diet:   cardiac diet and diabetic diet 2000 calorie    Labs:  SN to perform labs:  CBC: Weekly; CMP: Weekly; ESR: Weekly; CRP: Weekly; Vancomycin level: Weekly    Report Lab results to Dr Whitney and Dr. Arredondo, fax number: [phone]    Referrals/ Consults  Physical Therapy to evaluate and treat. Evaluate for home safety and equipment needs; Establish/upgrade home exercise program. Perform / instruct on therapeutic exercises, gait training, transfer training, and Range of Motion.  Occupational Therapy to evaluate and treat. Evaluate home environment for safety and equipment needs. Perform/Instruct on transfers, ADL training, ROM, and therapeutic exercises.    Activities:   activity as tolerated    Nursing:   Agency to admit patient within 24 hours of hospital discharge unless specified on physician  order or at patient request    SN to complete comprehensive assessment including routine vital signs. Instruct on disease process and s/s of complications to report to MD. Review/verify medication list sent home with the patient at time of discharge  and instruct patient/caregiver as needed. Frequency may be adjusted depending on start of care date.     Skilled nurse to perform up to 3 visits PRN for symptoms related to diagnosis    Notify MD if SBP > 160 or < 90; DBP > 90 or < 50; HR > 120 or < 50; Temp > 101; O2 < 88%; Other:       Ok to schedule additional visits based on staff availability and patient request on consecutive days within the home health episode.    When multiple disciplines ordered:    Start of Care occurs on Sunday - Wednesday schedule remaining discipline evaluations as ordered on separate consecutive days following the start of care.    Thursday SOC -schedule subsequent evaluations Friday and Monday the following week.     Friday - Saturday SOC - schedule subsequent discipline evaluations on consecutive days starting Monday of the following week.      Miscellaneous   Home Infusion Therapy:  SN to perform Infusion Therapy/Central Line Care.  Review Central Line Care & Central Line Flush with patient.    Administer (drug and dose): Vancomycin 1.25 g in D5% 250 mL q24 hours   Last dose given: 3/14/22                       Home dose due: 3/15/22    Administer (drug and dose): Cefepime 2 g in D5%  q8 hours   Last dose given: 3/14/22                       Home dose due: 3/15/22     Minimum 2 week course    Scrub the Hub: Prior to accessing the line, always perform a 30 second alcohol scrub  Each lumen of the central line is to be flushed at least daily with 10 mL Normal Saline and 3 mL Heparin flush (10 units/mL)  Skilled Nurse (SN) may draw blood from IV access  Blood Draw Procedure:   - Aspirate at least 5 mL of blood   - Discard   - Obtain specimen   - Change injection cap   - Flush with 20 mL  Normal Saline followed by a                 3-5 mL Heparin flush (10 units/mL)  Central :   - Sterile dressing changes are done weekly and as needed.   - Use chlor-hexadine scrub to cleanse site, apply Biopatch to insertion site,       apply securement device dressing   - Injection caps are changed weekly and after EVERY lab draw.   - If sterile gauze is under dressing to control oozing,                 dressing change must be performed every 24 hours until gauze is not needed.    Home Health Aide:  Nursing Weekly for lab draws and Daily for assistance with IV antibiotics, Physical Therapy Three times weekly and Occupational Therapy Three times weekly    Wound Care Orders  yes:  Flush with 10 cc of normal saline 4 times a day and record drain output every 12 hours    I certify that this patient is confined to his home and needs intermittent skilled nursing care, physical therapy and occupational therapy.

## 2022-03-14 NOTE — TELEPHONE ENCOUNTER

## 2022-03-14 NOTE — PLAN OF CARE
Problem: Occupational Therapy Goal  Goal: Occupational Therapy Goal  Description: Goals to be met by: 04/13/2022      Patient will increase functional independence with ADLs by performing:    UE Dressing with Modified [place].  LE Dressing with Modified [place].  Grooming while standing with Modified [place].  Toileting from toilet with Modified [place] for hygiene and clothing management.   Toilet transfer to toilet with Modified [place].  Increased functional strength to WF for self care.  Upper extremity exercise program x10 reps per handout, with independence.    Outcome: Ongoing, Progressing     Patient tolerated activity fairly, though required increased VCs for simple tasks. Patient will benefit from continued skilled OT to address deficits and improve performance in functional ADL tasks. Cont OT.

## 2022-03-14 NOTE — PLAN OF CARE
Discharged IV and telemonitor from pt. Discharge education and instructions done at bedside. Pt will  medication from pharmacy.

## 2022-03-14 NOTE — PROGRESS NOTES
IP Liaison - Initial Visit Note    Patient: Eddie Parada Sr.  MRN:  [id number]  Date of Service:  3/14/2022  Completed by:  CM Crowley    Reason for Visit   Patient presents with    IP Liaison Initial Visit       Patient recently discharged from [facility] on 3/8/2022. CM met with patient and pt spouse Jessica at bedside in order to complete SDOH questionnaire and liaison assessment.  Pt has identified no immediate barriers to care.  Per pt and Jessica, pt is not in need of resources at this time.    The following were addressed during this visit:  - Review SDOH Questions   - Complete patient assessment   - Complete initial visit with patient        Patient Summary     IP Liaison Patient Assessment    General  Level of Caregiver support: Member independent and does not need caregiver assistance  Have you had to make a decision between paying for any of the following in the last 2 months?: None  Transportation means: Family  Employment status: Retired and not working  Assessments  Was the PHQ Depression Screening completed this visit?: No  Was the YOLANDA-7 Screening completed this visit?: No         CM Crowley

## 2022-03-14 NOTE — PROGRESS NOTES
Pharmacokinetic Assessment Follow Up: IV Vancomycin    Vancomycin serum concentration assessment(s):    The random level was drawn correctly and can be used to guide therapy at this time. The measurement is below the desired definitive target range of 15 to 20 mcg/mL.    Vancomycin Regimen Plan:    Re-dose when the random level is less than 20 mcg/mL, next level to be drawn at 3/15 on 0600    Drug levels (last 3 results):  Recent Labs   Lab Result Units 03/12/22  2256 03/14/22  0253   Vancomycin, Random ug/mL 8.0 11.6       Pharmacy will continue to follow and monitor vancomycin.    Please contact pharmacy at extension [phone] for questions regarding this assessment.    Thank you for the consult,   Yolanda Lee       Patient brief summary:  Eddie Parada Sr. is a 89 y.o. male initiated on antimicrobial therapy with IV Vancomycin for treatment of intra-abdominal infection    The patient's current regimen is pulse dosing    Drug Allergies:   Review of patient's allergies indicates:   Allergen Reactions    Ciprofloxacin     Levofloxacin Swelling    Lyrica [pregabalin] Swelling    Unable to assess Other (See Comments)     Soft shell crabs       Actual Body Weight:   88.9 kg    Renal Function:   Estimated Creatinine Clearance: 53.7 mL/min (based on SCr of 0.9 mg/dL).,     Dialysis Method (if applicable):  N/A    CBC (last 72 hours):  Recent Labs   Lab Result Units 03/11/22  1649 03/13/22  0622   WBC K/uL 11.00 8.78   Hemoglobin g/dL 9.5* 8.7*   Hematocrit % 28.8* 26.9*   Platelets K/uL 201 206   Gran % % 75.6* 75.2*   Lymph % % 9.7* 11.6*   Mono % % 10.3 9.6   Eosinophil % % 0.2 0.6   Basophil % % 0.4 0.6   Differential Method  Automated Automated       Metabolic Panel (last 72 hours):  Recent Labs   Lab Result Units 03/11/22  1648 03/11/22  1737 03/13/22  0622   Sodium mmol/L 135*  --  136   Potassium mmol/L 3.3*  --  3.3*   Chloride mmol/L 100  --  106   CO2 mmol/L 24  --  22*   Glucose mg/dL 169*  --  106    Glucose, UA   --  Negative  --    BUN mg/dL 19  --  14   Creatinine mg/dL 1.3  --  0.9   Albumin g/dL 2.6*  --  2.3*   Total Bilirubin mg/dL 0.8  --  0.8   Alkaline Phosphatase U/L 96  --  80   AST U/L 15  --  17   ALT U/L 7*  --  6*       Vancomycin Administrations:  vancomycin given in the last 96 hours                     vancomycin 1.25 g in dextrose 5% 250 mL IVPB (ready to mix) (mg) 1,250 mg New Bag 03/13/22 0556    vancomycin 1750 mg in 0.9% sodium chloride 500 mL IVPB (mg) 1,750 mg New Bag 03/11/22 2314                    Microbiologic Results:  Microbiology Results (last 7 days)       Procedure Component Value Units Date/Time    Blood culture [[id number]] Collected: 03/12/22 1248    Order Status: Completed Specimen: Blood Updated: 03/13/22 1515     Blood Culture, Routine No Growth to date    Blood culture [[id number]] Collected: 03/12/22 1248    Order Status: Completed Specimen: Blood Updated: 03/13/22 1515     Blood Culture, Routine No Growth to date

## 2022-03-14 NOTE — PLAN OF CARE
JOELLEN met with pt and pts wife Jessica [phone] at bedside for final assessment. Pt will have wife transport him home at d/c. Pt will have gen surge f/u tomorrow. JOELLEN called [facility] to resume HH. Rounds completed on pt.  All questions addressed.  Bedside nurse to discuss d/c medications.  Discussed importance to attend all f/u appts and take medications as prescribed.  Verbalized understanding.    Jcarlos Jones, MSPHILL  [phone]    Future Appointments   Date Time Provider Department Center   3/21/2022 11:30 AM MD SCARLET [facility]   4/7/2022  8:45 AM MD SCARLET [facility]        03/14/22 1216   Final Note   Assessment Type Final Discharge Note   Anticipated Discharge Disposition Home-Health   What phone number can be called within the next 1-3 days to see how you are doing after discharge? [phone]   Hospital Resources/Appts/Education Provided Appointments scheduled by Navigator/Coordinator   Post-Acute Status   Post-Acute Authorization Home Health   Home Health Status Set-up Complete/Auth obtained   Coverage PHN   Discharge Delays None known at this time

## 2022-03-14 NOTE — DISCHARGE SUMMARY
[facility] Medicine  Discharge Summary      Patient Name: Eddie Parada Sr.  MRN: [id number]  Patient Class: IP- Inpatient  Admission Date: 3/12/2022  Hospital Length of Stay: 2 days  Discharge Date and Time:  03/14/2022 5:09 PM  Attending Physician: Hayley Cameron*   Discharging Provider: Ebonie Valdes PA-C  Primary Care Provider: Callum Roberts MD      HPI:   Eddie Parada is a 89 y/o M with PMH of atrial fibrillation on AOC, CAD, chronic diastolic heart failure, DM II, COPD, hyperlipidemia and osteoporosis presents with concern that abdominal drain is no longer draining. There is associated? fever. Patient was recently hospitalized in [facility] from 3/3- 3/8/2022 for intra-abdominal abscess found at site of prior mesh repair with resultant septic shock.  IR placed drain, and patient subsequently discharged on IV Rocephin per ID recommendations with end date 03/17/2022, cultures have been no growth. Per wife the abscess has been draining until yesterday. Patient was instructed by home health to come to ED to be evaluated.   CT abd/pel shows Large 16.6 x 9 cm abscess at the midline abutting the anterior abdominal wall, increased in size from the prior study. Started blood culture, resume home Ceftriaxone and Flagyl and add Vanc, consult surgery, IR and ID.       * No surgery found *      Hospital Course:   Mr. Parada is a 89 year old male admitted for decreased drain output placed on 3/03. IR consulted and upsized catheter of drain from 10- 14 with 550 mL output. CT abdomen/pelvis showed large 16.6 x 9 cm abscess at the midline abutting the anterior abdominal wall, increased in size from the prior study on 3/04. Started on course of cefepime, vancomycin, and IV metronidazole, eventually transition to PO metronidazole. Urine culture <40466 candida, no treatment provided as he was asymptomatic. ID consulted. General surgery consulted, no plans for surgical intervention at  "this time, recommendation to try cathflo if drain output decreases. Discharge home with HH PT/OT and nursing care for IV antibiotics with PCP and wound care clinic follow up. Return precautions discussed. Patient and wife voiced understanding. All questions and concerns answered at this time.     ID recommendations as follows   - Recommend minimum of a 2 week course of vancomycin, cefepime, and PO flagyl .   - Need weekly labs CBC, CMP, vanc level Labs to be faxed to ID office, attention to Dr. Whitney and Dr. Arredondo, [phone]  - Ambulatory referral for follow up in ID clinic with Dr. Arredondo  - Follow up with PCP within 2 weeks as ID clinic is unavailable for a few months, will have follow up appointment with ID when available.        Goals of Care Treatment Preferences:  Code Status: Full Code      Consults:   Consults (From admission, onward)        Status Ordering Provider     General Surgery  Once        Provider:  SAMIR Thompson MD    Completed INNOCENT-ITROSALEE RUDD     Interventional Radiology  Once        Provider:  (Not yet assigned)    Completed HELEN TOLENTINO     Infectious Diseases  Once        Provider:  (Not yet assigned)    Completed HELEN TOLENTINO     Pharmacy to dose Vancomycin consult  Once        Provider:  (Not yet assigned)   "And" Linked Group Details    Acknowledged HELEN TOLENTINO.          * Abdominal wall abscess  Abdominal wall mass  s/p drain placement by IR on 3/3/2022  Ct abdomen/pelvis  1. Large 16.6 x 9 cm abscess at the midline abutting the anterior abdominal wall, increased in size from the prior study. Recommend surgical consultation and follow-up.  2. Mild cardiomegaly.  3. Bilateral renal cysts.  4. Incomplete distension of the urinary bladder with probable mild wall thickening.  Cystitis is a consideration.  5. Bilateral inguinal hernias with small bowel protrusion on the right in fat containing inguinal hernia on the left.  6. " Prostatomegaly.  Blood culture NGTD  Home abx- Ceftriaxone and Flagyl    IR upsized catheter from 10 Fr to 14 Fr  Surgery consulted, decline surgical intervention     - On Vancomycin, Cefepime, and Metronidazole    Hypokalemia  Replete as needed       CKD (chronic kidney disease) stage 3, GFR 30-59 ml/min  Initial BUN/Cr 19/1.3  Stable    Abdominal mass        Hypothyroidism due to acquired atrophy of thyroid  No acute complaints related to diagnosis    - Continue home synthroid    UTI (urinary tract infection)  UA positive  Culture grew Candida Albicans    - On cefepime, vancomycin, and PO metronidazole  - ID following, appreciate recs    (HFpEF) heart failure with preserved ejection fraction  Chronic combined systolic and diastolic congestive heart failure  Echo 3/03/2022 shows EF is 55% with indeterminate diastolic function    - Hold BB due to low HR  -Continue Lasix    Type II diabetes mellitus with peripheral circulatory disorder  Home antihyperglycemic regimen: Amaryl  Last A1c reviewed-   Lab Results   Component Value Date    HGBA1C 5.9 (H) 03/02/2022     Most recent fingerstick glucose reviewed-   Recent Labs     03/13/22  0315 03/13/22  1203 03/13/22  1658 03/13/22  2004 03/14/22  0402 03/14/22  1141   POCTGLUCOSE 104 138* 150* 172* 123* 184*       Plan  - Hold home oral antihyperglycemics while in hospital  - Patient's FSGs are controlled on current medication regimen.  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     Sinoatrial node dysfunction        History of CVA (cerebrovascular  accident)  Reported in 2005    CAD (coronary artery disease)  - Continue Pravastatin    Essential (primary) hypertension  - Continue Lisinopril     Chronic atrial fibrillation  Long term current use of anticoagulant therapy    - Coreg on hold, HR low on admit  - Continue rivaroxaban       Final Active Diagnoses:    Diagnosis Date Noted POA    PRINCIPAL PROBLEM:  Abdominal wall abscess [L02.211] 03/05/2022 Yes    CKD (chronic kidney disease) stage 3, GFR 30-59 ml/min [N18.30] 03/12/2022 Yes    Hypokalemia [E87.6] 03/12/2022 Yes    Abdominal mass [R19.00] 03/03/2022 Yes    Hypothyroidism due to acquired atrophy of thyroid [E03.4] 01/20/2020 Yes    UTI (urinary tract infection) [N39.0] 01/08/2018 Yes    (HFpEF) heart failure with preserved ejection fraction [I50.30] 06/17/2015 Yes    Type 2 diabetes mellitus, controlled [E11.9] 05/21/2014 Yes    Chronic combined systolic and diastolic congestive heart failure [I50.42] 10/17/2012 Yes    Type II diabetes mellitus with peripheral circulatory disorder [E11.51] 10/17/2012 Yes    Sinoatrial node dysfunction [I49.5] 10/12/2012 Yes    Cardiac pacemaker in situ [Z95.0] 10/12/2012 Yes    CAD (coronary artery disease) [I25.10] 09/20/2012 Yes    Essential (primary) hypertension [I10] 09/20/2012 Yes    History of CVA (cerebrovascular accident) [Z86.73] 09/20/2012 Not Applicable    Chronic atrial fibrillation [I48.20] 06/29/2012 Yes      Problems Resolved During this Admission:       Discharged Condition: fair    Disposition: Home or Self Care    Follow Up:   Follow-up Information     Estevan Cortez MD Follow up on 3/15/2022.    Specialties: Surgery, General Surgery  Contact information:  [address]  [address]  [place] [address] [phone]             Callum Roberts MD Follow up on 3/21/2022.    Specialty: Internal Medicine  Contact information:  [address]  [place] [address]  [phone]                       Patient Instructions:      Ambulatory  referral/consult to Infectious Disease   Standing Status: Future   Referral Priority: Routine Referral Type: Consultation   Referral Reason: Specialty Services Required   Requested Specialty: Infectious Diseases     Diet Cardiac     Diet diabetic     Notify your health care provider if you experience any of the following:  temperature >100.4     Notify your health care provider if you experience any of the following:  severe uncontrolled pain     Notify your health care provider if you experience any of the following:  redness, tenderness, or signs of infection (pain, swelling, redness, odor or green/yellow discharge around incision site)     Notify your health care provider if you experience any of the following:  worsening rash     Notify your health care provider if you experience any of the following:  increased confusion or weakness     Notify your health care provider if you experience any of the following:  persistent dizziness, light-headedness, or visual disturbances     Change dressing (specify)   Order Comments: Drain care instructions  Flush the drain with 10 cc saline 3x per day and measure the output every 12 hours     Activity as tolerated       Significant Diagnostic Studies: Labs:   BMP:   Recent Labs   Lab 03/13/22 0622 03/14/22  0826    156*    135*   K 3.3* 3.7    104   CO2 22* 24   BUN 14 12   CREATININE 0.9 0.9   CALCIUM 7.9* 8.2*   , CMP   Recent Labs   Lab 03/13/22 0622 03/14/22  0826    135*   K 3.3* 3.7    104   CO2 22* 24    156*   BUN 14 12   CREATININE 0.9 0.9   CALCIUM 7.9* 8.2*   PROT 5.1* 5.6*   ALBUMIN 2.3* 2.5*   BILITOT 0.8 0.8   ALKPHOS 80 85   AST 17 17   ALT 6* 8*   ANIONGAP 8 7*   ESTGFRAFRICA >60 >60   EGFRNONAA >60 >60   , CBC   Recent Labs   Lab 03/13/22 0622 03/14/22  0826   WBC 8.78 9.05   HGB 8.7* 9.1*   HCT 26.9* 27.5*    230    and All labs within the past 24 hours have been reviewed    Pending Diagnostic Studies:     None          Medications:  Reconciled Home Medications:      Medication List      START taking these medications    cefepime in dextrose 5 % 2 gram/50 mL Pgbk  Commonly known as: MAXIPIME  Inject 50 mLs (2 g total) into the vein every 8 (eight) hours.     mupirocin 2 % ointment  Commonly known as: BACTROBAN  by Nasal route 2 (two) times daily. for 2 days     VANCOMYCIN 1.25 G/250 ML D5W (READY TO MIX)  Inject 250 mLs (1,250 mg total) into the vein once daily.        CONTINUE taking these medications    acetaminophen 500 MG tablet  Commonly known as: TYLENOL  Take 500 mg by mouth every 6 (six) hours as needed for Pain.     amLODIPine 10 MG tablet  Commonly known as: NORVASC  TAKE 1 TABLET BY MOUTH DAILY FOR BLOOD PRESSURE     BLOOD GLUCOSE TEST Strp  Generic drug: blood sugar diagnostic  ONETOUCH ULTRA TESTING STRIPS. USE TO TEST BLOOD GLUCOSE TWICE DAILY. DX CODE: E11.51     blood-glucose meter Misc  ONETOUCH ULTRA GLUCOSE METER. USE AS DIRECTED TO TEST BLOOD GLUCOSE. DX CODE: E11.51     carvediloL 12.5 MG tablet  Commonly known as: COREG  TAKE 1 TABLET (12.5 MG TOTAL) BY MOUTH 2 (TWO) TIMES DAILY WITH MEALS.     doxazosin 1 MG tablet  Commonly known as: CARDURA  Take 1 mg by mouth nightly.     fenofibrate 160 MG Tab  fenofibrate 160 mg tablet     furosemide 20 MG tablet  Commonly known as: LASIX  TAKE ONE TABLET BY MOUTH EVERY OTHER DAY     gabapentin 100 MG capsule  Commonly known as: NEURONTIN  Take 2 capsules (200 mg total) by mouth every evening.     gemfibroziL 600 MG tablet  Commonly known as: LOPID  Take one tablet(600mg) by mouth once daily     glimepiride 2 MG tablet  Commonly known as: AMARYL  TAKE 1 TABLET BY MOUTH EVERY MORNING WITH BREAKFAST FOR DIABETES     lancets Misc  Commonly known as: ONETOUCH ULTRASOFT LANCETS  USE TO TEST BLOOD GLUCOSE TWICE DAILY. DX CODE: E11.51     levothyroxine 75 MCG tablet  Commonly known as: SYNTHROID  Take 1 tablet (75 mcg total) by mouth before breakfast.     lisinopriL 20  MG tablet  Commonly known as: PRINIVIL,ZESTRIL     metroNIDAZOLE 500 MG tablet  Commonly known as: FLAGYL  Take 1 tablet (500 mg total) by mouth every 8 (eight) hours. for 14 days     pantoprazole 40 MG tablet  Commonly known as: PROTONIX  TAKE ONE TABLET BY MOUTH ONCE A DAY FOR STOMACH PROBLEMS     pioglitazone 15 MG tablet  Commonly known as: ACTOS  ONE TABLET ONE TIME A DAY FOR DIABETES     potassium chloride SA 10 MEQ tablet  Commonly known as: K-DUR,KLOR-CON  Take 1 tablet (10 mEq total) by mouth once daily.     pravastatin 20 MG tablet  Commonly known as: PRAVACHOL  TAKE 1 TABLET BY MOUTH DAILY FOR CHOLESTROL     rivaroxaban 20 mg Tab  Commonly known as: XARELTO  Take one tablet(20mg) by mouth once daily     tamsulosin 0.4 mg Cap  Commonly known as: FLOMAX  Take 1 capsule (0.4 mg total) by mouth once daily.        STOP taking these medications    cefTRIAXone 1 g/50 mL Pgbk IVPB  Commonly known as: ROCEPHIN            Indwelling Lines/Drains at time of discharge:   Lines/Drains/Airways     Peripherally Inserted Central Catheter Line  Duration           PICC Double Lumen 03/07/22 2215 right basilic 6 days          Drain  Duration                Closed/Suction Drain 03/12/22 1550 [place];Midline Abdomen Accordion 14 Fr. 2 days                Time spent on the discharge of patient: 60 minutes         Ebonie Valdes PA-C  Department of [facility] Medicine  [facility]

## 2022-03-14 NOTE — ASSESSMENT & PLAN NOTE
Home antihyperglycemic regimen: Amaryl  Last A1c reviewed-   Lab Results   Component Value Date    HGBA1C 5.9 (H) 03/02/2022     Most recent fingerstick glucose reviewed-   Recent Labs     03/13/22  0315 03/13/22  1203 03/13/22  1658 03/13/22 2004 03/14/22  0402 03/14/22  1141   POCTGLUCOSE 104 138* 150* 172* 123* 184*       Plan  - Hold home oral antihyperglycemics while in hospital  - Patient's FSGs are controlled on current medication regimen.  - Current correctional scale  Low  - POCT accuchecks ACHS  - Diabetic diet 2000 calories  - BG goals: Preprandial <140 mg/dL, Random  <180 mg/dL    Antihyperglycemics (From admission, onward)            Start     Stop Route Frequency Ordered    03/12/22 0956  insulin aspart U-100 pen 0-5 Units         -- SubQ Before meals & nightly PRN 03/12/22 0956

## 2022-03-14 NOTE — CONSULTS
[facility]/General Surgery  Consultation Note    SUBJECTIVE:     History of Present Illness:  Patient is a 89 year old male with PMH of A fib (on Xarelto), hx of ventral hernia repair w/mesh placement who was admitted on 3/12 with abdominal wall fluid collection and mesh infection. Patient was admitted last week for management of this problem. He was discharged home on IV antibiotics and IR drain. Patient represented to the hospital on 3/12 after the drain shopped working. Per patient's wife, she states that the drain had been working up until the night prior. He underwent drain exchange with IR and a 14 Fr drain was placed with 550 mL output.     Allergies:  Review of patient's allergies indicates:   Allergen Reactions    Ciprofloxacin     Levofloxacin Swelling    Lyrica [pregabalin] Swelling    Unable to assess Other (See Comments)     Soft shell crabs       Home Medications:  No current facility-administered medications on file prior to encounter.     Current Outpatient Medications on File Prior to Encounter   Medication Sig    acetaminophen (TYLENOL) 500 MG tablet Take 500 mg by mouth every 6 (six) hours as needed for Pain.    amLODIPine (NORVASC) 10 MG tablet TAKE 1 TABLET BY MOUTH DAILY FOR BLOOD PRESSURE    blood-glucose meter Misc ONETOUCH ULTRA GLUCOSE METER. USE AS DIRECTED TO TEST BLOOD GLUCOSE. DX CODE: E11.51    carvediloL (COREG) 12.5 MG tablet TAKE 1 TABLET (12.5 MG TOTAL) BY MOUTH 2 (TWO) TIMES DAILY WITH MEALS.    cefTRIAXone (ROCEPHIN) 1 g/50 mL PgBk IVPB Inject 50 mLs (1 g total) into the vein once daily. for 9 days    doxazosin (CARDURA) 1 MG tablet Take 1 mg by mouth nightly.    fenofibrate 160 MG Tab fenofibrate 160 mg tablet    furosemide (LASIX) 20 MG tablet TAKE ONE TABLET BY MOUTH EVERY OTHER DAY    gabapentin (NEURONTIN) 100 MG capsule Take 2 capsules (200 mg total) by mouth every evening.    gemfibrozil (LOPID) 600 MG tablet Take one tablet(600mg) by mouth  once daily    glimepiride (AMARYL) 2 MG tablet TAKE 1 TABLET BY MOUTH EVERY MORNING WITH BREAKFAST FOR DIABETES    lancets (ONETOUCH ULTRASOFT LANCETS) Misc USE TO TEST BLOOD GLUCOSE TWICE DAILY. DX CODE: E11.51    levothyroxine (SYNTHROID) 75 MCG tablet Take 1 tablet (75 mcg total) by mouth before breakfast.    lisinopriL (PRINIVIL,ZESTRIL) 20 MG tablet     metroNIDAZOLE (FLAGYL) 500 MG tablet Take 1 tablet (500 mg total) by mouth every 8 (eight) hours. for 9 days    pantoprazole (PROTONIX) 40 MG tablet TAKE ONE TABLET BY MOUTH ONCE A DAY FOR STOMACH PROBLEMS    pioglitazone (ACTOS) 15 MG tablet ONE TABLET ONE TIME A DAY FOR DIABETES    potassium chloride SA (K-DUR,KLOR-CON) 10 MEQ tablet Take 1 tablet (10 mEq total) by mouth once daily.    pravastatin (PRAVACHOL) 20 MG tablet TAKE 1 TABLET BY MOUTH DAILY FOR CHOLESTROL    rivaroxaban (XARELTO) 20 mg Tab Take one tablet(20mg) by mouth once daily    tamsulosin (FLOMAX) 0.4 mg Cap Take 1 capsule (0.4 mg total) by mouth once daily.    blood sugar diagnostic (BLOOD GLUCOSE TEST) [facility] ONETOUCH ULTRA TESTING STRIPS. USE TO TEST BLOOD GLUCOSE TWICE DAILY. DX CODE: E11.51       Past Medical History:   Diagnosis Date    Abdominal fibromatosis     Acute posthemorrhagic anemia 1/9/2018    NIK (acute kidney injury) 1/11/2018    Atrial fibrillation     Bradycardia     Broken arm     CAD (coronary artery disease)     Cancer     basal cell on nose and melanoma on back    CHF (congestive heart failure)     COPD (chronic obstructive pulmonary disease)     Diabetes mellitus type II     Hyperlipidemia     Localization-related focal epilepsy with simple partial seizures 3/2/2021    Melanoma     Obesity (BMI 30.0-34.9) 9/13/2016    Osteoporosis     Peripheral vascular disease     Primary malignant neoplasm of prostate 3/3/2022    Septic shock 3/3/2022    Stroke     TIA (transient ischemic attack)     Type II diabetes mellitus with peripheral  circulatory disorder     Type II or unspecified type diabetes mellitus without mention of complication, not stated as uncontrolled     Unspecified essential hypertension      Past Surgical History:   Procedure Laterality Date    AORTIC VALVE REPLACEMENT      CARDIAC PACEMAKER PLACEMENT  2012    CARDIAC VALVE REPLACEMENT  2011    aortic valve-tissue    CHOLECYSTECTOMY      COLONOSCOPY      ESOPHAGOGASTRODUODENOSCOPY N/A 2021    Procedure: EGD (ESOPHAGOGASTRODUODENOSCOPY);  Surgeon: Gualberto Medrano MD;  Location: [facility];  Service: Endoscopy;  Laterality: N/A;    ESOPHAGOGASTRODUODENOSCOPY N/A 2021    Procedure: EGD (ESOPHAGOGASTRODUODENOSCOPY);  Surgeon: Gualberto Medrano MD;  Location: [facility];  Service: Endoscopy;  Laterality: N/A;  please call wife(Jessica) with instructions/arrival time.    ESOPHAGOGASTRODUODENOSCOPY (EGD) WITH DILATION  2021    EYE SURGERY Bilateral     cataract with lens [facility]  [facility]    HERNIA REPAIR      abdominal    LASIK      UPPER GASTROINTESTINAL ENDOSCOPY  2021     Family History   Problem Relation Age of Onset    Hypertension Father     Stroke Father     Alcohol abuse Father     Heart disease Brother     COPD Sister     Cancer Brother         Lung    Diabetes Daughter     No Known Problems Son     COPD Brother     No Known Problems Daughter     No Known Problems Son     Prostate cancer Neg Hx     Kidney disease Neg Hx      Social History     Tobacco Use    Smoking status: Former Smoker     Quit date: 1996     Years since quittin.4    Smokeless tobacco: Never Used    Tobacco comment: cigar smoker   Substance Use Topics    Alcohol use: No     Comment: none since     Drug use: No        Review of Systems:  As Per HPI    OBJECTIVE:     Vital Signs:  Temp: 96.8 °F (36 °C) (22)  Pulse: 62 (22)  Resp: 18 (22)  BP: (!) 166/74 (22)  SpO2: (!) 94 %  (03/14/22 1000)    Physical Exam:  General: Patient resting in bed, in NAD  HEENT: normocephalic, atraumatic, hearing grossly normal bilaterally, mucous membranes moist, EOM intact, no scleral icterus  Neck: supple, symmetrical, trachea midline, no JVD  Lungs:  clear to auscultation bilaterally and normal respiratory effort  Cardiovascular: regular rate and rhythm.  Extremities: no cyanosis or edema, or clubbing, distal pulses palpable and symmetric  Abdomen: Abdomen is soft, drain in place to upper midline with serosanguinous output  Skin: Skin color, texture, turgor normal. No rashes or lesions  Musculoskeletal:no clubbing, cyanosis, no deformities  Neurologic: No focal numbness or weakness  Psych/Behavioral:  Alert and oriented, appropriate affect.    Laboratory:  Labs Reviewed   COMPREHENSIVE METABOLIC PANEL - Abnormal; Notable for the following components:       Result Value    Potassium 3.3 (*)     CO2 22 (*)     Calcium 7.9 (*)     Total Protein 5.1 (*)     Albumin 2.3 (*)     ALT 6 (*)     All other components within normal limits   CBC W/ AUTO DIFFERENTIAL - Abnormal; Notable for the following components:    RBC 2.61 (*)     Hemoglobin 8.7 (*)     Hematocrit 26.9 (*)      (*)     MCH 33.3 (*)     Immature Granulocytes 2.4 (*)     Immature Grans (Abs) 0.21 (*)     Gran % 75.2 (*)     Lymph % 11.6 (*)     All other components within normal limits   CBC W/ AUTO DIFFERENTIAL - Abnormal; Notable for the following components:    RBC 2.73 (*)     Hemoglobin 9.1 (*)     Hematocrit 27.5 (*)      (*)     MCH 33.3 (*)     Immature Granulocytes 1.8 (*)     Immature Grans (Abs) 0.16 (*)     Lymph # 0.9 (*)     Gran % 77.9 (*)     Lymph % 9.8 (*)     All other components within normal limits   COMPREHENSIVE METABOLIC PANEL - Abnormal; Notable for the following components:    Sodium 135 (*)     Glucose 156 (*)     Calcium 8.2 (*)     Total Protein 5.6 (*)     Albumin 2.5 (*)     ALT 8 (*)     Anion Gap 7 (*)      All other components within normal limits   POCT GLUCOSE - Abnormal; Notable for the following components:    POCT Glucose 175 (*)     All other components within normal limits   POCT GLUCOSE - Abnormal; Notable for the following components:    POCT Glucose 142 (*)     All other components within normal limits   POCT GLUCOSE - Abnormal; Notable for the following components:    POCT Glucose 138 (*)     All other components within normal limits   POCT GLUCOSE - Abnormal; Notable for the following components:    POCT Glucose 150 (*)     All other components within normal limits   POCT GLUCOSE - Abnormal; Notable for the following components:    POCT Glucose 172 (*)     All other components within normal limits   POCT GLUCOSE - Abnormal; Notable for the following components:    POCT Glucose 123 (*)     All other components within normal limits   CULTURE, BLOOD   CULTURE, BLOOD   VANCOMYCIN, RANDOM   VANCOMYCIN, RANDOM   POCT GLUCOSE, HAND-HELD DEVICE   POCT GLUCOSE, HAND-HELD DEVICE   POCT GLUCOSE, HAND-HELD DEVICE   POCT GLUCOSE, HAND-HELD DEVICE   POCT GLUCOSE, HAND-HELD DEVICE   POCT GLUCOSE, HAND-HELD DEVICE   POCT GLUCOSE, HAND-HELD DEVICE   POCT GLUCOSE, HAND-HELD DEVICE   POCT GLUCOSE, HAND-HELD DEVICE   POCT GLUCOSE   POCT GLUCOSE       ASSESSMENT:     89M with anterior abdominal wall mesh infection s/p IR drain     PLAN:     - Continue drain management  - Continue on IV antibiotics  - No plans for surgical intervention at this time  - If drain output decreases, may try cathflo  - Will f/u cultures     Erin Archer MD  [facility] General Surgery, PGY2  10:27 AM

## 2022-03-14 NOTE — ASSESSMENT & PLAN NOTE
UA positive  Culture grew Candida Albicans    - On cefepime, vancomycin, and PO metronidazole  - ID following, appreciate recs

## 2022-03-14 NOTE — PT/OT/SLP PROGRESS
Occupational Therapy   Treatment    Name: Eddie Parada Sr.  MRN: [id number]  Admitting Diagnosis:  Abdominal wall abscess       Recommendations:     Discharge Recommendations: home health OT, home health PT  Discharge Equipment Recommendations:  none  Barriers to discharge:  None    Assessment:   Patient tolerated activity fairly, though required increased VCs for simple tasks. Patient will benefit from continued skilled OT to address deficits and improve performance in functional ADL tasks. Cont OT.    Eddie Parada Sr. is a 89 y.o. male with a medical diagnosis of Abdominal wall abscess. Performance deficits affecting function are weakness, impaired endurance, impaired self care skills, impaired functional mobilty, gait instability, impaired balance, decreased upper extremity function, decreased lower extremity function, decreased coordination, impaired cognition.     Rehab Prognosis:  Fair+; patient would benefit from acute skilled OT services to address these deficits and reach maximum level of function.       Plan:     Patient to be seen 3 x/week to address the above listed problems via self-care/home management, therapeutic activities, therapeutic exercises  · Plan of Care Expires: 04/13/22  · Plan of Care Reviewed with: patient, spouse    Subjective     Pain/Comfort:  · Pain Rating 1: 0/10  · Pain Rating Post-Intervention 1: 0/10    Objective:     Patient found HOB elevated with bed alarm, telemetry, peripheral IV, IMELDA drain upon OT entry to room.    General Precautions: Standard, fall     Bed Mobility:    · Patient completed Scooting/Bridging with minimum assistance  · Patient completed Supine to Sit with minimum assistance, moderate assistance and with side rail     Functional Mobility/Transfers:  · Patient completed Sit <> Stand Transfer with contact guard assistance  with  rolling walker   · Patient completed Bed <> Chair Transfer using Step Transfer technique with contact guard assistance with  rolling walker    Activities of Daily Living:  · Feeding:  supervision completed   · Grooming: set-up and VCs to initate task    · Toileting: CGA for balance in stance to urinate       [facility] 6 Click ADL: 19    Treatment & Education:  Patient required increased time, effort and VCs for bed mobility - increased difficulty following cues for technique. Patient ambulated to sink /c CGA and VCs - once at sink reported he needed to urinate, so ambulated to bathroom as above. Patient returned to sink for G/H tasks. Patient then t/f to bedside chair. Significant difficulty following cues to scoot buttocks back in chair.     Patient left up in chair with all lines intact, call button in reach and wife presentEducation:      GOALS:   Multidisciplinary Problems     Occupational Therapy Goals        Problem: Occupational Therapy Goal    Goal Priority Disciplines Outcome Interventions   Occupational Therapy Goal     OT, PT/OT Ongoing, Progressing    Description: Goals to be met by: 04/13/2022      Patient will increase functional independence with ADLs by performing:    UE Dressing with Modified [place].  LE Dressing with Modified [place].  Grooming while standing with Modified [place].  Toileting from toilet with Modified [place] for hygiene and clothing management.   Toilet transfer to toilet with Modified [place].  Increased functional strength to WFL for self care.  Upper extremity exercise program x10 reps per handout, with independence.                       Time Tracking:     OT Date of Treatment: 03/14/22  OT Start Time: 0901  OT Stop Time: 0932  OT Total Time (min): 31 min    Billable Minutes:Self Care/Home Management 16  Therapeutic Activity 15    OT/PATTI: PATTI ARMSTRONG Visit Number: 1    3/14/2022

## 2022-03-14 NOTE — DISCHARGE INSTRUCTIONS
Flush with 10 cc of normal saline 4 times a day and record drain output every 12 hours    Follow up with wound care clinic and with PCP

## 2022-03-14 NOTE — SUBJECTIVE & OBJECTIVE
Interval History:   Awake and alert, no new complaint.   S/p IR drain exchange from 10 Fr APD to 14 Fr APD with drainage of 500 cc of bloody fluid on 03/12  Appreciate ID recs continue with vanc/cefepime/Flagyl for now pending blood culture report  BP - restart home Bp meds  Appreciate IR rec's for possible re-evaluation.       Review of Systems   Constitutional:  Negative for chills and fever.   HENT:  Negative for congestion and rhinorrhea.    Respiratory:  Negative for chest tightness and shortness of breath.    Cardiovascular:  Negative for chest pain.   Gastrointestinal:  Negative for abdominal distention, abdominal pain, blood in stool, nausea and vomiting.   Genitourinary:  Negative for dysuria and urgency.   Skin:  Negative for rash.   Neurological:  Negative for dizziness and weakness.   Psychiatric/Behavioral:  Negative for agitation.        Objective:     Vital Signs (Most Recent):  Temp: 96.8 °F (36 °C) (03/14/22 0807)  Pulse: 62 (03/14/22 0825)  Resp: 18 (03/14/22 0807)  BP: (!) 166/74 (03/14/22 0807)  SpO2: (!) 94 % (03/14/22 1000)   Vital Signs (24h Range):  Temp:  [95.7 °F (35.4 °C)-97.8 °F (36.6 °C)] 96.8 °F (36 °C)  Pulse:  [54-88] 62  Resp:  [18-19] 18  SpO2:  [94 %-98 %] 94 %  BP: (130-166)/(58-74) 166/74     Weight: 88.9 kg (195 lb 15.8 oz)  Body mass index is 32.61 kg/m².    Intake/Output Summary (Last 24 hours) at 3/14/2022 1006  Last data filed at 3/13/2022 1829  Gross per 24 hour   Intake 480 ml   Output 170 ml   Net 310 ml        Physical Exam  Constitutional:       Appearance: He is well-developed.   HENT:      Head: Normocephalic and atraumatic.   Cardiovascular:      Rate and Rhythm: Normal rate and regular rhythm.      Heart sounds: Normal heart sounds. No murmur heard.    No friction rub. No gallop.      Comments: Pacemaker in place  Pulmonary:      Effort: Pulmonary effort is normal.      Breath sounds: Normal breath sounds.   Chest:      Chest wall: No tenderness.   Abdominal:       General: Bowel sounds are normal. There is no distension.      Palpations: Abdomen is soft.      Tenderness: There is no abdominal tenderness.      Comments: Abdominal drain in place and draining, no surrounding erythema       Musculoskeletal:         General: Normal range of motion.      Cervical back: Neck supple.   Skin:     General: Skin is warm.   Neurological:      Mental Status: He is alert and oriented to person, place, and time.       Significant Labs: A1C:   Recent Labs   Lab 03/02/22  0744   HGBA1C 5.9*       ABGs: No results for input(s): PH, PCO2, HCO3, POCSATURATED, BE, TOTALHB, COHB, METHB, O2HB, POCFIO2, PO2 in the last 48 hours.  Blood Culture:   Recent Labs   Lab 03/12/22  1248   LABBLOO No Growth to date  No Growth to date  No Growth to date  No Growth to date     CBC:   Recent Labs   Lab 03/13/22  0622 03/14/22  0826   WBC 8.78 9.05   HGB 8.7* 9.1*   HCT 26.9* 27.5*    230       CMP:   Recent Labs   Lab 03/13/22  0622 03/14/22  0826    135*   K 3.3* 3.7    104   CO2 22* 24    156*   BUN 14 12   CREATININE 0.9 0.9   CALCIUM 7.9* 8.2*   PROT 5.1* 5.6*   ALBUMIN 2.3* 2.5*   BILITOT 0.8 0.8   ALKPHOS 80 85   AST 17 17   ALT 6* 8*   ANIONGAP 8 7*   EGFRNONAA >60 >60       Lipase: No results for input(s): LIPASE in the last 48 hours.  Lipid Panel: No results for input(s): CHOL, HDL, LDLCALC, TRIG, CHOLHDL in the last 48 hours.  Magnesium: No results for input(s): MG in the last 48 hours.  TSH:   Recent Labs   Lab 03/02/22  0744   TSH 5.943*       Urine Culture:   No results for input(s): LABURIN in the last 48 hours.    Urine Studies:   No results for input(s): COLORU, APPEARANCEUA, PHUR, SPECGRAV, PROTEINUA, GLUCUA, KETONESU, BILIRUBINUA, OCCULTUA, NITRITE, UROBILINOGEN, LEUKOCYTESUR, RBCUA, WBCUA, BACTERIA, SQUAMEPITHEL, HYALINECASTS in the last 48 hours.    Invalid input(s): LATANYA      Significant Imaging: I have reviewed all pertinent imaging results/findings within  the past 24 hours.

## 2022-03-14 NOTE — ASSESSMENT & PLAN NOTE
Chronic combined systolic and diastolic congestive heart failure  Echo 3/03/2022 shows EF is 55% with indeterminate diastolic function    - Hold BB due to low HR  -Continue Lasix   Statement Selected

## 2022-03-14 NOTE — PROGRESS NOTES
[facility] Infectious Diseases Progress Note    Assessment/Plan:     Abdominal wall abscess  Patient is a pleasant, fully independently functioning 89 year male who was admitted on 3-7-22 for fever to 104 and increase in abd girth - CT- abdomen noted a large fluid collection, presumed abscess - perhaps due to hernia mesh implanted over 20 years prior. He was not deemed a surgical candidate at that time, and IR placed a drain which put out about 200-500 ccs per day, with 275 cc upon placement. Nothing grew from the cultures of this fluid. It was noted that the drain was placed superficial to the mesh and there was likely a component of this beneath the mesh which may be difficult to put a drain into. Patient did better on empiric ceftriaxone an flagyl was discharged on this for a 2 week course. Patient was visited by home nurse and had a low grade temp of 100.4 and the nurse felt that abdomen was enlarged again. Patient had no pain or chills. ED eval and repeat CT-abdomen did note a alrger fluid collection on 3/11 as compared to 3/7 and patient admitted. He was placed on vanc, cefepime and metronidizole and IR again consulted.  A lager drain was placed and about 500 ccs of bloody fluid obtained. Patient is without pain and in good spirits. ID consulted for assistance with case.      At this time it is not clear if this patients course is due to lack of source control due to the mesh or a resistance bacteria - the initial drainage procedure was after he was IV abx for about 1 day - overall favor lack of source control and may need mesh removal if that is even possible/ Surgery reconsulted and do not plan for intervention and will follow up in clinic.     FINAL RECOMMENDATIONS  Recommend minimum of a 2 week course of vancomycin, cefepime, and PO flagyl . Need weekly labs CBC, CMP, vanc level Labs to be faxed to ID office, attention to Dr. Whitney and Dr. Arredondo, [phone]. Please place ambulatory referral for follow up in  ID clinic with Dr. Arredondo. Patient will need follow up with PCP to re-order CT scan of abdomen to evaluate size of abscess prior to consideration of discontinuation of antibiotics as patient likely does not have source control. Dr. Arredondo clinic is booked months out. But will try to get into clinic when possible.   UA suggestive of UTI - urine culture with candida, do not recommend to treat as asymptomatic      Bijal Valles MD  [facility] IM/Peds PGY-2  [facility] Infectious Disease Consults  Cell: [phone]    Thank you for allowing us to participate in the care of this patient. We will continue to follow along. Case has been discussed with consult staff, who is in agreement with assessment and plan. ID will sign off.     Subjective:      No acute events overnight. Denies any fevers, chills, NS, NV, changes in bowel or urinary habits. Denies abdominal pain.      Objective:   Last 24 Hour Vital Signs:  BP  Min: 126/60  Max: 166/74  Temp  Av.2 °F (36.2 °C)  Min: 95.7 °F (35.4 °C)  Max: 98 °F (36.7 °C)  Pulse  Av.6  Min: 54  Max: 88  Resp  Av.5  Min: 18  Max: 19  SpO2  Av.5 %  Min: 94 %  Max: 98 %  I/O last 3 completed shifts:  In: 720 [P.O.:720]  Out: 650 [Urine:400; Drains:250]    Physical Exam  General: well appearing, no acute distress, interactive, lying in bed  HEENT: NC/AT, EOM intact, normal sclera, MMM  CV: regular rate and rhythm, no murmurs  Lungs: clear to auscultation bilaterally  Abd: soft, area of induration around drain site, no erythema, or tenderness, BS present, drain in upper mid abdomen with bloody drainage in collection bag  MSK: no lower extremity edema    Laboratory:  Laboratory Data Reviewed: yes  Pertinent Findings:  Recent Labs   Lab 22  1648 22  1649 22  0622 22  0826   WBC  --  11.00 8.78 9.05   HGB  --  9.5* 8.7* 9.1*   HCT  --  28.8* 26.9* 27.5*   PLT  --  201 206 230   MCV  --  103* 103* 101*   RDW  --  12.8 13.1 13.2   *  --  136 135*   K 3.3*  --  " 3.3* 3.7     --  106 104   CO2 24  --  22* 24   BUN 19  --  14 12   CREATININE 1.3  --  0.9 0.9   *  --  106 156*   PROT 5.7*  --  5.1* 5.6*   ALBUMIN 2.6*  --  2.3* 2.5*   BILITOT 0.8  --  0.8 0.8   AST 15  --  17 17   ALKPHOS 96  --  80 85   ALT 7*  --  6* 8*         Microbiology Data:  Microbiology Results (last 7 days)     Procedure Component Value Units Date/Time    Blood culture [[id number]] Collected: 03/12/22 1248    Order Status: Completed Specimen: Blood Updated: 03/14/22 0822     Blood Culture, Routine No Growth to date      No Growth to date    Blood culture [[id number]] Collected: 03/12/22 1248    Order Status: Completed Specimen: Blood Updated: 03/14/22 0822     Blood Culture, Routine No Growth to date      No Growth to date            Antimicrobials:  Antibiotics (From admission, onward)            Start     Stop Route Frequency Ordered    03/15/22 0600  vancomycin 1.25 g in dextrose 5% 250 mL IVPB (ready to mix)         -- IV Every 24 hours (non-standard times) 03/14/22 0809    03/14/22 1130  metroNIDAZOLE tablet 500 mg         -- Oral Every 8 hours 03/14/22 1119    03/12/22 1115  mupirocin 2 % ointment         03/17 0859 Nasl 2 times daily 03/12/22 1005    03/12/22 0956  cefepime in dextrose 5 % IVPB 2 g         -- IV Every 8 hours (non-standard times) 03/12/22 0956    03/12/22 0956  vancomycin - pharmacy to dose  (vancomycin IVPB)        "And" Linked Group Details    -- IV pharmacy to manage frequency 03/12/22 0956            Other Results:  Radiology Results:  X-Ray Chest 1 View    Result Date: 3/11/2022  EXAMINATION: XR CHEST 1 VIEW CLINICAL HISTORY: Fever, unspecified TECHNIQUE: Single frontal view of the chest was performed. COMPARISON: 03/07/2022 FINDINGS: The heart is enlarged and unchanged status post median sternotomy.  Dual lead pacemaker remains in place.  Right PICC line again seen.  Interstitial opacities in the lungs have almost entirely resolved.  No focal consolidation.  " No pleural effusion or acute bony abnormality.     Improved aeration of the lungs.  Chronic cardiomegaly. Electronically signed by: Soheila Ruff Date:    03/11/2022 Time:    16:59    X-Ray Chest 1 View    Result Date: 3/3/2022  EXAMINATION: XR CHEST 1 VIEW CLINICAL HISTORY: Right IJ placement; TECHNIQUE: Single frontal view of the chest was performed. COMPARISON: 03/02/2022 FINDINGS: The heart is mildly enlarged.  Left-sided pacemaker device is in place.  Right IJ catheter terminates in the region of the proximal SVC.  No pneumothorax.  The lungs are clear.  Skeletal structures are intact.     As above. Electronically signed by: Sherri Suarez MD Date:    03/03/2022 Time:    07:52    CT Abdomen Pelvis With Contrast    Result Date: 3/11/2022  EXAMINATION: CT ABDOMEN PELVIS WITH CONTRAST CLINICAL HISTORY: Abdominal abscess/infection suspected; TECHNIQUE: Low dose axial images, sagittal and coronal reformations were obtained from the lung bases to the pubic symphysis following the IV administration of 100 mL of Omnipaque 350 .  Oral contrast was not administered. COMPARISON: 03/04/2022 FINDINGS: Abdomen: - Lower thorax:Heart is minimally enlarged. - Lung bases: No infiltrates and no nodules. - Liver: No focal mass. - Gallbladder: Status post cholecystectomy. - Bile Ducts: No evidence of intra or extra hepatic biliary ductal dilation. - Spleen: Negative. - Kidneys: Bilateral small renal cysts.  No stone, soft tissue mass or hydronephrosis bilaterally. - Adrenals: Unremarkable. - Pancreas: No mass or peripancreatic fat stranding. - Retroperitoneum:  No significant adenopathy. - Vascular: No abdominal aortic aneurysm. - Abdominal wall:  Unremarkable. Pelvis: Urinary bladder is incompletely distended.  Probable mild wall thickening. Bilateral inguinal hernias with small bowel protrusion on the right.  Fat containing inguinal hernia on the left.  No evidence of bowel obstruction. Prostate is enlarged measuring 6.1  cm. Bowel/Mesentery: No evidence of bowel obstruction. There is a large ovoid fluid collection abutting the anterior abdominal wall at the midline measuring 16.6 x 9 cm on axial 72 of series 2.  This has increased in size from the prior study.  Pigtail catheter is noted.  Multiple foci of air and debris are noted consistent with an abscess.  Recommend surgical consultation and follow-up.  Drainage catheter dysfunction is a possibility.  Recommend follow-up. Bones:  No acute osseous abnormality and no suspicious lytic or blastic lesion. Postoperative changes of the right femur with surgical fixation hardware noted.     1. Large 16.6 x 9 cm abscess at the midline abutting the anterior abdominal wall, increased in size from the prior study.  See above comments.  Recommend surgical consultation and follow-up. 2. Mild cardiomegaly. 3. Bilateral renal cysts. 4. Incomplete distension of the urinary bladder with probable mild wall thickening.  Cystitis is a consideration. 5. Bilateral inguinal hernias with small bowel protrusion on the right in fat containing inguinal hernia on the left. 6. Prostatomegaly. 7.  This report was flagged in Epic as abnormal. Electronically signed by: Kelvin Stone Date:    03/11/2022 Time:    19:52    IR Abscess Tube Change (xpd)    Result Date: 3/12/2022  EXAMINATION: Drainage catheter exchange Procedural Personnel Attending physician(s): Aden Bernard MD Fellow physician(s): None Resident physician(s): None Advanced practice provider(s): None Pre-procedure diagnosis: Persistent fluid collection despite drainage Post-procedure diagnosis: Same Indication: Persistent fluid collection despite indwelling drainage catheter Additional clinical history: None Complications: No immediate complications. TECHNIQUE: - Drainage catheter exchange under fluoroscopic guidance FINDINGS: Pre-procedure Consent: Informed consent for the procedure was obtained and time-out was performed prior to the procedure.  Preparation: The site was prepared and draped using maximal sterile barrier technique including cutaneous antisepsis. Antibiotic administered: Prophylactic dose within 1 hour of procedure start time or 2 hours for vancomycin or fluoroquinolones Anesthesia/sedation Level of anesthesia/sedation: No sedation Anesthesia/sedation administered by: Not applicable Total intra-service sedation time (minutes): 0 Drainage catheter exchange The patient was positioned supine. Initial imaging was performed with contrast injection through the indwelling tube. Local anesthesia was administered. A wire was placed through the indwelling drainage catheter and the catheter was removed. The new drainage catheter was advanced, and position within the fluid collection was confirmed. - Initial imaging findings: Contrast injection demonstrate persistent abdominal fluid collection - Pre-existing drainage catheter: 10 [place] APD - Drainage catheter placed: All-purpose drainage catheter - External catheter securement: Non-absorbable suture and adhesive anchoring device - Post-drain exchange imaging findings: Near-complete drainage of the fluid collection Contrast Contrast agent: Omnipaque 300 Contrast volume (mL): 30 Radiation Dose CT dose length product ( mGy-cm ): Fluoroscopy time ( minutes): 0.5 Reference air kerma ( mGy): 21 Kerma area product (microgray meters squared): 401.22 Additional Details Additional description of procedure: None Equipment details: None Specimens removed: 55 mL of bloody fluid was removed. Aspirated fluid was not sent for analysis. Estimated blood loss (mL): Less than 10 Standardized report: [email or web address] Attestation Signer name: Aden Bernard MD I attest that I was present for the entire procedure. I reviewed the stored images and agree with the report as written.     Exchange of indwelling drainage catheter for a new 14 [place]ese drainage catheter in anterior abdominal fluid collection. Plan:  Flush with 10 cc of normal saline 4 times a day.  Monitor drain output.  Consider repeat imaging when output decreases to less than 10 cc in 24 hours. ______________________________________________________________________ Electronically signed by: Aden Bernard MD Date:    03/12/2022 Time:    16:25        Current Medications:     Infusions:       Scheduled:   amLODIPine  10 mg Oral Daily    ceFEPime (MAXIPIME) IVPB  2 g Intravenous Q8H    furosemide  20 mg Oral Daily    levothyroxine  75 mcg Oral Before breakfast    lisinopriL  10 mg Oral Daily    metroNIDAZOLE  500 mg Oral Q8H    mupirocin   Nasal BID    pantoprazole  40 mg Oral Daily    rivaroxaban  20 mg Oral Daily with dinner    tamsulosin  0.4 mg Oral Daily    [START ON 3/15/2022] vancomycin (VANCOCIN) IVPB  1,250 mg Intravenous Q24H        PRN:  acetaminophen, albuterol-ipratropium, aluminum-magnesium hydroxide-simethicone, benzonatate, dextrose 10%, dextrose 10%, glucagon (human recombinant), glucose, glucose, influenza, insulin aspart U-100, melatonin, ondansetron, sars-cov-2 (covid-19), simethicone, sodium chloride 0.9%, Pharmacy to dose Vancomycin consult **AND** vancomycin - pharmacy to dose

## 2022-03-15 ENCOUNTER — PATIENT OUTREACH (OUTPATIENT)
Dept: ADMINISTRATIVE | Facility: OTHER | Age: 87
End: 2022-03-15
Payer: MEDICARE

## 2022-03-15 ENCOUNTER — PATIENT OUTREACH (OUTPATIENT)
Dept: ADMINISTRATIVE | Facility: CLINIC | Age: 87
End: 2022-03-15
Payer: MEDICARE

## 2022-03-15 NOTE — PROGRESS NOTES
IP Liaison - Final Visit Note    Patient: Eddie Parada Sr.  MRN:  [id number]  Date of Service:  3/15/2022  Completed by:  CM Crowley    Reason for Visit   Patient presents with    IP Liaison Chart Review     Patient discharged from hospital before [place] was able to complete follow-up visit.       Patient Summary     Discharge Date: 3/14/2022  Discharge telephone number/address: [phone] / [address] [address]  Follow up provider: Estevan Cortez MD / Callum Roberts MD  Follow up appointments: 3/15/2022 / 3/21/2022 @ 11:30am  Home Health agency & telephone number: [facility]  DME ordered &  name: n/a  Assigned OPCM RN/SW: n/a  Report sent to follow up team (PCP/OPCM) via in basket message: n/a  Community Resources arranged including agency name & contact info: n/a      CM Crowley

## 2022-03-15 NOTE — PROGRESS NOTES
C3 nurse spoke with Eddie Parada Sr. for a TCC post hospital discharge follow up call. The patient has a scheduled HOSFU appointment with Callum Roberts MD on 3/21/22 @ 0455.

## 2022-03-18 ENCOUNTER — DOCUMENT SCAN (OUTPATIENT)
Dept: HOME HEALTH SERVICES | Facility: HOSPITAL | Age: 87
End: 2022-03-18
Payer: MEDICARE

## 2022-03-18 LAB
BACTERIA BLD CULT: NORMAL
BACTERIA BLD CULT: NORMAL

## 2022-03-19 ENCOUNTER — HOSPITAL ENCOUNTER (INPATIENT)
Facility: HOSPITAL | Age: 87
LOS: 6 days | Discharge: HOME HEALTH | DRG: 091 | End: 2022-03-26
Attending: INTERNAL MEDICINE | Admitting: INTERNAL MEDICINE
Payer: MEDICARE

## 2022-03-19 ENCOUNTER — HOSPITAL ENCOUNTER (EMERGENCY)
Facility: HOSPITAL | Age: 87
Discharge: ANOTHER HOSPITAL | End: 2022-03-19
Attending: SURGERY
Payer: MEDICARE

## 2022-03-19 ENCOUNTER — DOCUMENT SCAN (OUTPATIENT)
Dept: HOME HEALTH SERVICES | Facility: HOSPITAL | Age: 87
End: 2022-03-19
Payer: MEDICARE

## 2022-03-19 VITALS
HEIGHT: 65 IN | OXYGEN SATURATION: 98 % | DIASTOLIC BLOOD PRESSURE: 63 MMHG | WEIGHT: 180.31 LBS | HEART RATE: 60 BPM | SYSTOLIC BLOOD PRESSURE: 133 MMHG | TEMPERATURE: 98 F | BODY MASS INDEX: 30.04 KG/M2 | RESPIRATION RATE: 20 BRPM

## 2022-03-19 DIAGNOSIS — R53.83 FATIGUE: ICD-10-CM

## 2022-03-19 DIAGNOSIS — I48.20 CHRONIC ATRIAL FIBRILLATION: ICD-10-CM

## 2022-03-19 DIAGNOSIS — N18.9 ACUTE RENAL FAILURE SUPERIMPOSED ON CHRONIC KIDNEY DISEASE, UNSPECIFIED CKD STAGE, UNSPECIFIED ACUTE RENAL FAILURE TYPE: Primary | ICD-10-CM

## 2022-03-19 DIAGNOSIS — G93.40 ENCEPHALOPATHY: ICD-10-CM

## 2022-03-19 DIAGNOSIS — N17.9 ACUTE RENAL FAILURE SUPERIMPOSED ON CHRONIC KIDNEY DISEASE, UNSPECIFIED CKD STAGE, UNSPECIFIED ACUTE RENAL FAILURE TYPE: Primary | ICD-10-CM

## 2022-03-19 DIAGNOSIS — N17.9 AKI (ACUTE KIDNEY INJURY): ICD-10-CM

## 2022-03-19 DIAGNOSIS — L02.211 ABDOMINAL WALL ABSCESS: ICD-10-CM

## 2022-03-19 DIAGNOSIS — N39.0 URINARY TRACT INFECTION WITHOUT HEMATURIA, SITE UNSPECIFIED: ICD-10-CM

## 2022-03-19 DIAGNOSIS — N17.0 ATN (ACUTE TUBULAR NECROSIS): ICD-10-CM

## 2022-03-19 DIAGNOSIS — R41.82 ALTERED MENTAL STATUS, UNSPECIFIED ALTERED MENTAL STATUS TYPE: Primary | ICD-10-CM

## 2022-03-19 DIAGNOSIS — N17.9 ACUTE RENAL FAILURE, UNSPECIFIED ACUTE RENAL FAILURE TYPE: ICD-10-CM

## 2022-03-19 DIAGNOSIS — R41.82 ALTERED MENTAL STATE: ICD-10-CM

## 2022-03-19 DIAGNOSIS — Z87.898 HISTORY OF ABDOMINAL ABSCESS: ICD-10-CM

## 2022-03-19 DIAGNOSIS — I73.9 PERIPHERAL VASCULAR DISEASE: ICD-10-CM

## 2022-03-19 PROBLEM — G92.9 ENCEPHALOPATHY, TOXIC: Status: ACTIVE | Noted: 2022-03-19

## 2022-03-19 LAB
ALBUMIN SERPL BCP-MCNC: 2.5 G/DL (ref 3.5–5.2)
ALP SERPL-CCNC: 82 U/L (ref 55–135)
ALT SERPL W/O P-5'-P-CCNC: 8 U/L (ref 10–44)
ANION GAP SERPL CALC-SCNC: 11 MMOL/L (ref 8–16)
AST SERPL-CCNC: 21 U/L (ref 10–40)
BACTERIA #/AREA URNS HPF: ABNORMAL /HPF
BASOPHILS # BLD AUTO: 0.07 K/UL (ref 0–0.2)
BASOPHILS NFR BLD: 0.8 % (ref 0–1.9)
BILIRUB SERPL-MCNC: 0.9 MG/DL (ref 0.1–1)
BILIRUB UR QL STRIP: NEGATIVE
BNP SERPL-MCNC: 250 PG/ML (ref 0–99)
BUN SERPL-MCNC: 26 MG/DL (ref 10–30)
CALCIUM SERPL-MCNC: 8.6 MG/DL (ref 8.7–10.5)
CHLORIDE SERPL-SCNC: 104 MMOL/L (ref 95–110)
CK MB SERPL-MCNC: 1.9 NG/ML (ref 0.1–6.5)
CK MB SERPL-RTO: 7.9 % (ref 0–5)
CK SERPL-CCNC: 24 U/L (ref 20–200)
CK SERPL-CCNC: 24 U/L (ref 20–200)
CLARITY UR: CLEAR
CO2 SERPL-SCNC: 22 MMOL/L (ref 23–29)
COLOR UR: YELLOW
CREAT SERPL-MCNC: 2.5 MG/DL (ref 0.5–1.4)
CREAT UR-MCNC: 118 MG/DL (ref 23–375)
DIFFERENTIAL METHOD: ABNORMAL
EOSINOPHIL # BLD AUTO: 0.1 K/UL (ref 0–0.5)
EOSINOPHIL NFR BLD: 1.4 % (ref 0–8)
ERYTHROCYTE [DISTWIDTH] IN BLOOD BY AUTOMATED COUNT: 14.3 % (ref 11.5–14.5)
EST. GFR  (AFRICAN AMERICAN): 25 ML/MIN/1.73 M^2
EST. GFR  (NON AFRICAN AMERICAN): 21 ML/MIN/1.73 M^2
GLUCOSE SERPL-MCNC: 173 MG/DL (ref 70–110)
GLUCOSE UR QL STRIP: NEGATIVE
GRAN CASTS #/AREA URNS LPF: 5 /LPF
HCT VFR BLD AUTO: 30.9 % (ref 40–54)
HGB BLD-MCNC: 10.1 G/DL (ref 14–18)
HGB UR QL STRIP: ABNORMAL
HYALINE CASTS #/AREA URNS LPF: 0 /LPF
IMM GRANULOCYTES # BLD AUTO: 0.17 K/UL (ref 0–0.04)
IMM GRANULOCYTES NFR BLD AUTO: 1.9 % (ref 0–0.5)
INFLUENZA A, MOLECULAR: NEGATIVE
INFLUENZA B, MOLECULAR: NEGATIVE
KETONES UR QL STRIP: ABNORMAL
LACTATE SERPL-SCNC: 0.9 MMOL/L (ref 0.5–2.2)
LEUKOCYTE ESTERASE UR QL STRIP: ABNORMAL
LYMPHOCYTES # BLD AUTO: 0.8 K/UL (ref 1–4.8)
LYMPHOCYTES NFR BLD: 8.9 % (ref 18–48)
MCH RBC QN AUTO: 33.6 PG (ref 27–31)
MCHC RBC AUTO-ENTMCNC: 32.7 G/DL (ref 32–36)
MCV RBC AUTO: 103 FL (ref 82–98)
MICROSCOPIC COMMENT: ABNORMAL
MONOCYTES # BLD AUTO: 0.9 K/UL (ref 0.3–1)
MONOCYTES NFR BLD: 10.4 % (ref 4–15)
NEUTROPHILS # BLD AUTO: 6.9 K/UL (ref 1.8–7.7)
NEUTROPHILS NFR BLD: 76.6 % (ref 38–73)
NITRITE UR QL STRIP: NEGATIVE
NRBC BLD-RTO: 0 /100 WBC
PH UR STRIP: 5 [PH] (ref 5–8)
PLATELET # BLD AUTO: 267 K/UL (ref 150–450)
PMV BLD AUTO: 9.4 FL (ref 9.2–12.9)
POCT GLUCOSE: 244 MG/DL (ref 70–110)
POTASSIUM SERPL-SCNC: 4.1 MMOL/L (ref 3.5–5.1)
PROCALCITONIN SERPL IA-MCNC: 0.06 NG/ML
PROT SERPL-MCNC: 5.8 G/DL (ref 6–8.4)
PROT UR QL STRIP: ABNORMAL
RBC # BLD AUTO: 3.01 M/UL (ref 4.6–6.2)
RBC #/AREA URNS HPF: 0 /HPF (ref 0–4)
SARS-COV-2 RDRP RESP QL NAA+PROBE: NEGATIVE
SODIUM SERPL-SCNC: 137 MMOL/L (ref 136–145)
SODIUM UR-SCNC: 55 MMOL/L (ref 20–250)
SP GR UR STRIP: 1.01 (ref 1–1.03)
SPECIMEN SOURCE: NORMAL
SQUAMOUS #/AREA URNS HPF: 15 /HPF
TROPONIN I SERPL DL<=0.01 NG/ML-MCNC: 0.03 NG/ML (ref 0–0.03)
URN SPEC COLLECT METH UR: ABNORMAL
UROBILINOGEN UR STRIP-ACNC: NEGATIVE EU/DL
UUN UR-MCNC: 474 MG/DL (ref 140–1050)
VANCOMYCIN SERPL-MCNC: 33.3 UG/ML
WBC # BLD AUTO: 9.06 K/UL (ref 3.9–12.7)
WBC #/AREA URNS HPF: 75 /HPF (ref 0–5)
YEAST URNS QL MICRO: ABNORMAL

## 2022-03-19 PROCEDURE — 83605 ASSAY OF LACTIC ACID: CPT | Performed by: SURGERY

## 2022-03-19 PROCEDURE — 87502 INFLUENZA DNA AMP PROBE: CPT | Performed by: SURGERY

## 2022-03-19 PROCEDURE — 99291 CRITICAL CARE FIRST HOUR: CPT | Mod: 25

## 2022-03-19 PROCEDURE — 93005 ELECTROCARDIOGRAM TRACING: CPT

## 2022-03-19 PROCEDURE — 25000003 PHARM REV CODE 250: Performed by: STUDENT IN AN ORGANIZED HEALTH CARE EDUCATION/TRAINING PROGRAM

## 2022-03-19 PROCEDURE — 63600175 PHARM REV CODE 636 W HCPCS: Performed by: SURGERY

## 2022-03-19 PROCEDURE — 87088 URINE BACTERIA CULTURE: CPT | Performed by: SURGERY

## 2022-03-19 PROCEDURE — 63600175 PHARM REV CODE 636 W HCPCS: Performed by: STUDENT IN AN ORGANIZED HEALTH CARE EDUCATION/TRAINING PROGRAM

## 2022-03-19 PROCEDURE — G0378 HOSPITAL OBSERVATION PER HR: HCPCS

## 2022-03-19 PROCEDURE — 84300 ASSAY OF URINE SODIUM: CPT | Performed by: STUDENT IN AN ORGANIZED HEALTH CARE EDUCATION/TRAINING PROGRAM

## 2022-03-19 PROCEDURE — 87086 URINE CULTURE/COLONY COUNT: CPT | Performed by: SURGERY

## 2022-03-19 PROCEDURE — C1751 CATH, INF, PER/CENT/MIDLINE: HCPCS

## 2022-03-19 PROCEDURE — 84484 ASSAY OF TROPONIN QUANT: CPT | Performed by: SURGERY

## 2022-03-19 PROCEDURE — 99220 PR INITIAL OBSERVATION CARE,LEVL III: CPT | Mod: ,,, | Performed by: STUDENT IN AN ORGANIZED HEALTH CARE EDUCATION/TRAINING PROGRAM

## 2022-03-19 PROCEDURE — 93010 EKG 12-LEAD: ICD-10-PCS | Mod: ,,, | Performed by: INTERNAL MEDICINE

## 2022-03-19 PROCEDURE — 87106 FUNGI IDENTIFICATION YEAST: CPT | Performed by: SURGERY

## 2022-03-19 PROCEDURE — 84540 ASSAY OF URINE/UREA-N: CPT | Performed by: STUDENT IN AN ORGANIZED HEALTH CARE EDUCATION/TRAINING PROGRAM

## 2022-03-19 PROCEDURE — 82570 ASSAY OF URINE CREATININE: CPT | Performed by: STUDENT IN AN ORGANIZED HEALTH CARE EDUCATION/TRAINING PROGRAM

## 2022-03-19 PROCEDURE — 82553 CREATINE MB FRACTION: CPT | Performed by: SURGERY

## 2022-03-19 PROCEDURE — 25000003 PHARM REV CODE 250: Performed by: SURGERY

## 2022-03-19 PROCEDURE — 84145 PROCALCITONIN (PCT): CPT | Performed by: SURGERY

## 2022-03-19 PROCEDURE — 81000 URINALYSIS NONAUTO W/SCOPE: CPT | Performed by: SURGERY

## 2022-03-19 PROCEDURE — 93010 ELECTROCARDIOGRAM REPORT: CPT | Mod: ,,, | Performed by: INTERNAL MEDICINE

## 2022-03-19 PROCEDURE — U0002 COVID-19 LAB TEST NON-CDC: HCPCS | Performed by: SURGERY

## 2022-03-19 PROCEDURE — 80202 ASSAY OF VANCOMYCIN: CPT | Performed by: SURGERY

## 2022-03-19 PROCEDURE — 99220 PR INITIAL OBSERVATION CARE,LEVL III: ICD-10-PCS | Mod: ,,, | Performed by: STUDENT IN AN ORGANIZED HEALTH CARE EDUCATION/TRAINING PROGRAM

## 2022-03-19 PROCEDURE — 96360 HYDRATION IV INFUSION INIT: CPT

## 2022-03-19 PROCEDURE — 83880 ASSAY OF NATRIURETIC PEPTIDE: CPT | Performed by: SURGERY

## 2022-03-19 PROCEDURE — 85025 COMPLETE CBC W/AUTO DIFF WBC: CPT | Performed by: SURGERY

## 2022-03-19 PROCEDURE — C1729 CATH, DRAINAGE: HCPCS

## 2022-03-19 PROCEDURE — 80053 COMPREHEN METABOLIC PANEL: CPT | Performed by: SURGERY

## 2022-03-19 PROCEDURE — 96361 HYDRATE IV INFUSION ADD-ON: CPT

## 2022-03-19 PROCEDURE — G0379 DIRECT REFER HOSPITAL OBSERV: HCPCS

## 2022-03-19 PROCEDURE — 87040 BLOOD CULTURE FOR BACTERIA: CPT | Mod: 59 | Performed by: SURGERY

## 2022-03-19 RX ORDER — ASPIRIN 325 MG
325 TABLET ORAL
Status: COMPLETED | OUTPATIENT
Start: 2022-03-19 | End: 2022-03-19

## 2022-03-19 RX ORDER — TAMSULOSIN HYDROCHLORIDE 0.4 MG/1
0.4 CAPSULE ORAL DAILY
Status: DISCONTINUED | OUTPATIENT
Start: 2022-03-20 | End: 2022-03-26 | Stop reason: HOSPADM

## 2022-03-19 RX ORDER — NALOXONE HCL 0.4 MG/ML
0.02 VIAL (ML) INJECTION
Status: DISCONTINUED | OUTPATIENT
Start: 2022-03-19 | End: 2022-03-26 | Stop reason: HOSPADM

## 2022-03-19 RX ORDER — INSULIN ASPART 100 [IU]/ML
0-5 INJECTION, SOLUTION INTRAVENOUS; SUBCUTANEOUS
Status: DISCONTINUED | OUTPATIENT
Start: 2022-03-19 | End: 2022-03-26 | Stop reason: HOSPADM

## 2022-03-19 RX ORDER — SODIUM CHLORIDE 0.9 % (FLUSH) 0.9 %
3 SYRINGE (ML) INJECTION EVERY 12 HOURS PRN
Status: DISCONTINUED | OUTPATIENT
Start: 2022-03-19 | End: 2022-03-26 | Stop reason: HOSPADM

## 2022-03-19 RX ORDER — GLUCAGON 1 MG
1 KIT INJECTION
Status: DISCONTINUED | OUTPATIENT
Start: 2022-03-19 | End: 2022-03-26 | Stop reason: HOSPADM

## 2022-03-19 RX ORDER — PROMETHAZINE HYDROCHLORIDE 25 MG/1
25 TABLET ORAL EVERY 6 HOURS PRN
Status: DISCONTINUED | OUTPATIENT
Start: 2022-03-19 | End: 2022-03-26 | Stop reason: HOSPADM

## 2022-03-19 RX ORDER — SODIUM CHLORIDE 9 MG/ML
INJECTION, SOLUTION INTRAVENOUS
Status: COMPLETED | OUTPATIENT
Start: 2022-03-19 | End: 2022-03-19

## 2022-03-19 RX ORDER — IBUPROFEN 200 MG
16 TABLET ORAL
Status: DISCONTINUED | OUTPATIENT
Start: 2022-03-19 | End: 2022-03-26 | Stop reason: HOSPADM

## 2022-03-19 RX ORDER — CARVEDILOL 12.5 MG/1
12.5 TABLET ORAL 2 TIMES DAILY WITH MEALS
Status: DISCONTINUED | OUTPATIENT
Start: 2022-03-19 | End: 2022-03-26 | Stop reason: HOSPADM

## 2022-03-19 RX ORDER — ACETAMINOPHEN 325 MG/1
650 TABLET ORAL EVERY 4 HOURS PRN
Status: DISCONTINUED | OUTPATIENT
Start: 2022-03-19 | End: 2022-03-26 | Stop reason: HOSPADM

## 2022-03-19 RX ORDER — PANTOPRAZOLE SODIUM 40 MG/1
40 TABLET, DELAYED RELEASE ORAL DAILY
Status: DISCONTINUED | OUTPATIENT
Start: 2022-03-20 | End: 2022-03-26 | Stop reason: HOSPADM

## 2022-03-19 RX ORDER — AMLODIPINE BESYLATE 5 MG/1
5 TABLET ORAL DAILY
Status: DISCONTINUED | OUTPATIENT
Start: 2022-03-20 | End: 2022-03-23

## 2022-03-19 RX ORDER — IBUPROFEN 200 MG
24 TABLET ORAL
Status: DISCONTINUED | OUTPATIENT
Start: 2022-03-19 | End: 2022-03-26 | Stop reason: HOSPADM

## 2022-03-19 RX ORDER — PRAVASTATIN SODIUM 20 MG/1
20 TABLET ORAL DAILY
Status: DISCONTINUED | OUTPATIENT
Start: 2022-03-20 | End: 2022-03-26 | Stop reason: HOSPADM

## 2022-03-19 RX ORDER — ONDANSETRON 8 MG/1
8 TABLET, ORALLY DISINTEGRATING ORAL EVERY 8 HOURS PRN
Status: DISCONTINUED | OUTPATIENT
Start: 2022-03-19 | End: 2022-03-26 | Stop reason: HOSPADM

## 2022-03-19 RX ORDER — TALC
6 POWDER (GRAM) TOPICAL NIGHTLY PRN
Status: DISCONTINUED | OUTPATIENT
Start: 2022-03-19 | End: 2022-03-26 | Stop reason: HOSPADM

## 2022-03-19 RX ADMIN — ALTEPLASE 2 MG: 2.2 INJECTION, POWDER, LYOPHILIZED, FOR SOLUTION INTRAVENOUS at 08:03

## 2022-03-19 RX ADMIN — SODIUM CHLORIDE: 0.9 INJECTION, SOLUTION INTRAVENOUS at 11:03

## 2022-03-19 RX ADMIN — PIPERACILLIN AND TAZOBACTAM 4.5 G: 4; .5 INJECTION, POWDER, LYOPHILIZED, FOR SOLUTION INTRAVENOUS; PARENTERAL at 08:03

## 2022-03-19 RX ADMIN — ASPIRIN 325 MG ORAL TABLET 325 MG: 325 PILL ORAL at 11:03

## 2022-03-19 RX ADMIN — CARVEDILOL 12.5 MG: 12.5 TABLET, FILM COATED ORAL at 06:03

## 2022-03-19 RX ADMIN — SODIUM CHLORIDE: 0.9 INJECTION, SOLUTION INTRAVENOUS at 07:03

## 2022-03-19 NOTE — HPI
Mr. Parada is a 89-year-old male with a history of atrial fibrillation (on Xarelto), coronary artery disease, chronic diastolic heart failure, diabetes, COPD, osteoporosis, hyperlipidemia, and recent admissions to [facility] (March 3-8 and March 12-14) for intra-abdominal abscess with septic shock presented to [facility] on March 19 with confusion and slurred speech that began the day prior to presentation.  In the emergency department he was also noted to have acute kidney injury. He was at the time still urinating although his wife reports he was making less urine and it was a darker color.  He was on outpatient cefepime, metronidazole, and IV vancomycin with vanc supra therapeutic to 33.3 on admit.  He was transferred from OSH for further evaluation of the slurred speech/confusion (concern for possible stroke with slurred speech) and evaluation/treatment of his acute kidney injury.  Infectious workup negative so far. Currently on EEG with seizure like activity yesterday. Per wife, his urine has picked back up and is now clearer.     Urinalysis with no red blood cells 75 white blood cells/moderate bacteria/moderate yeast/1+ protein/trace ketones/2+ occult blood/2+ leukocytes. Random vancomycin level 33.3, procalcitonin 0.06, , troponin 0.026, CPK 24, sodium 137, potassium 4.1, chloride 104, CO2 22, BUN 26, creatinine 2.5 baseline (0.9-1), glucose 173, AST 21, ALT 8, influenza negative, lactic acid 0.9, COVID negative, white blood cells 9.06, hemoglobin 10.1, hematocrit 30.9, platelets 267. Blood cultures ordered. Granular casts in urine.     Imaging with improvement of intra-abdominal abscess and improvement in aeration of lungs from last admission.

## 2022-03-19 NOTE — NURSING
[facility]  See nurses assessment.

## 2022-03-19 NOTE — NURSING
No skin breakdown.  Scant amount of serosanguineous drainage noted in drainage bag.  PICC line with ax bulb infusing.  LCW pacemaker noted.  Pt is disoriented x 3.  Speech is slurred/garbled.  Unable to obtain pertinent information for admission questions.  No family at bedside.  Will attempt to call family. Telesitter in place.  Safety measures in place.  Call light within reach.  Bed in low position.  VSS. WCTM

## 2022-03-19 NOTE — ED NOTES
ER MD notified one lumen of PICC line appears clogged, called pharmacy for alteplase order, confirmed 2mg cathflo, draw up with 2.2mL of sterile water and mix. ER MD reports verbal order to give to attempt to de-clog PICC line.

## 2022-03-19 NOTE — PROVIDER TRANSFER
Outside Transfer Acceptance Note / Regional Referral Center    Referring facility: [facility]   Referring provider: MARIO MIRANDA  Accepting facility: [facility]  Accepting provider: IQRA CURIEL  Reason for transfer:  Need MRI  (with pacemaker in place)  Transfer diagnosis:  Acute kidney injury /slurred speech  Transfer specialty requested: Hospital Medicine  Transfer specialty notified: yes  Transfer level: NUMBER 1-5: 2  Bed type requested: stepdown  Isolation status: No active isolations   Admission class or status: OP- Observation      Narrative     89-year-old male with a history of atrial fibrillation (on Xarelto), coronary artery disease, chronic diastolic heart failure, diabetes, COPD, osteoporosis, hyperlipidemia, and recent admissions to [facility] (March 3-8 and March 12-14) for intra-abdominal abscess with septic shock presented to [facility] on March 19 with confusion and slurred speech that began the day prior to presentation.  In the emergency department he was also noted to have acute kidney injury. He is still urinating.  He is on outpatient cefepime, metronidazole, and IV vancomycin.  No focal motor deficits noted in his arms or legs.  In the emergency department he received IV fluid and Rocephin.  Of note, one lumen of his PICC appeared clogged, and alteplase was given. In the emergency department on March 19 he was noted to be hemodynamically stable.  They are requesting transfer for further evaluation of the slurred speech/confusion (concern for possible stroke with slurred speech) and evaluation/treatment of his acute kidney injury.  They are unable to perform MRIs with pacemaker in place at [facility].  Plan for transfer to Hospital Medicine at [facility] for neurologic evaluation and treatment of NIK.  Abdominal fluid collection appears to be improved.  No evidence of sepsis noted.    Initially admitted to [facility] March 3-8  with fever and altered mental status felt to be related to septic shock from intra-abdominal abscess.  He required Levophed initially but subsequently stepped down to a floor bed.  He was treated with broad-spectrum antibiotics.  He was seen by General Surgery and recommendation was for IR placement of a drain.  He was subsequently discharged on Rocephin and Flagyl.    He was readmitted to [facility] March 12-14 for upsizing of his abdominal drainage catheter.  Recommendation was for 2 weeks of cefepime, vancomycin, and oral Flagyl.  He was discharged home in stable condition.    COVID vaccinated  Urinalysis with no red blood cells 75 white blood cells/moderate bacteria/moderate yeast/1+ protein/trace ketones/2+ occult blood/2+ leukocytes  Random vancomycin level 33.3, procalcitonin 0.06, , troponin 0.026, CPK 24, sodium 137, potassium 4.1, chloride 104, CO2 22, BUN 26, creatinine 2.5 baseline (0.9-1), glucose 173, AST 21, ALT 8, influenza negative, lactic acid 0.9, COVID negative, white blood cells 9.06, hemoglobin 10.1, hematocrit 30.9, platelets 267  Blood cultures ordered    Chest x-ray showed a left-sided pacemaker, right-sided PICC line, no focal parenchymal consolidation.  Improved aeration of the left lung base.  Cardiomediastinal silhouette is stable.    CT head noted no acute abnormality.    CT abdomen and pelvis noted interval decrease in the size of a large abdominal collection/abscess compared with March 11. Drainage catheter is in place within the collection.  Trace right and small left pleural effusion, slightly increased since previous exam.  Unenhanced liver is unremarkable.  Pancreatic atrophy is noted.  Urinary bladder demonstrates decreased wall thickening compared to the previous exam.    VS:  Temperature 97.8°, pulse 67, respirations 22 blood pressure 144/64, oxygen saturation 100% on room air    Objective     Vitals: Temp: 97.8 °F (36.6 °C) (03/19/22 0705)  Pulse: 60 (03/19/22  1105)  Resp: (!) 21 (03/19/22 1105)  BP: (!) 133/56 (03/19/22 1105)  SpO2: 97 % (03/19/22 1105)  Recent Labs:   All pertinent labs within the past 24 hours have been reviewed.  CBC:   Recent Labs   Lab 03/19/22  0735   WBC 9.06   HGB 10.1*   HCT 30.9*        CMP:   Recent Labs   Lab 03/19/22  0735      K 4.1      CO2 22*   *   BUN 26   CREATININE 2.5*   CALCIUM 8.6*   PROT 5.8*   ALBUMIN 2.5*   BILITOT 0.9   ALKPHOS 82   AST 21   ALT 8*   ANIONGAP 11   EGFRNONAA 21*     Recent imaging: see above     Allergies:   Review of patient's allergies indicates:   Allergen Reactions    Ciprofloxacin     Levofloxacin Swelling    Lyrica [pregabalin] Swelling    Unable to assess Other (See Comments)     Soft shell crabs      NPO: No    Anticoagulation:   Anticoagulants     Xarelto           Instructions    1.  Admit to Hospital Medicine    Upon patient arrival to floor, please send SecureChat to [facility] or call extension [phone] (if no answer, do NOT leave a callback number after the beep, rather please send a SecureChat to [facility]), for Hospital Medicine admit team assignment and for additional admit orders for the patient.  Do not page the attending physician associated with the patient on arrival (this physician may not be on duty at the time of arrival).  Rather, always send a SecureChat to [facility] or call [phone] to reach the triage physician for orders and team assignment.    GUILLERMINA Toledo MD  Hospital Medicine Staff  Cell: [phone]

## 2022-03-19 NOTE — ED NOTES
Per cinthia GONZALEZ to use PICC line. Red port does not aspirate/flush. Lavender port is currently in use for vancomycin iv infusion.

## 2022-03-19 NOTE — ED NOTES
Started Cefepime IV infusion via PICC line per discharge instruction s/p hospitalization at [facility]. MD hager.

## 2022-03-19 NOTE — ED PROVIDER NOTES
[facility]                                                   Chief Complaint  89 y.o. male with Weakness     History of Present Illness  Eddie ABBOTT Tal Jernigan presents to the emergency room with confusion for 24 hours  Patient presents with confusion for 24 hours, slurring his speech today per family  Been to the hospital several times in last month with an abdominal wall abscess  Patient is on continuous vancomycin infusion with a IR placed drain to the abdomen  Patient was recently discharged in the hospital 4 days ago, deteriorated since then    The history is provided by the patient  Previous medical records were obtained from [facility]  Previous records are summarized from prior ER visits and hospitalizations    Past Medical History   -- Abdominal fibromatosis     -- Acute posthemorrhagic anemia     -- NIK (acute kidney injury)     -- Atrial fibrillation     -- Bradycardia     -- Broken arm     -- CAD (coronary artery disease)     -- Cancer     -- CHF (congestive heart failure)     -- COPD (chronic obstructive pulmonary disease)     -- Diabetes mellitus type II     -- Hyperlipidemia     -- Localization-related focal epilepsy with simple partial seizures     -- Melanoma     -- Obesity (BMI 30.0-34.9)     -- Osteoporosis     -- Peripheral vascular disease     -- Stroke     -- TIA (transient ischemic attack)     -- Type II diabetes mellitus with peripheral circulatory disorder     -- Unspecified essential hypertension        Past Surgical History   -- AORTIC VALVE REPLACEMENT       -- CARDIAC PACEMAKER PLACEMENT       -- CARDIAC VALVE REPLACEMENT       -- CHOLECYSTECTOMY       -- COLONOSCOPY       -- ESOPHAGOGASTRODUODENOSCOPY       -- EYE SURGERY       -- HERNIA REPAIR       -- LASIK       -- UPPER GASTROINTESTINAL ENDOSCOPY          Review of patient's allergies    -- Ciprofloxacin     -- Levofloxacin     -- Lyrica [pregabalin]     -- Unable to assess      No significant family history  No  significant social history, nonsmoker    I reviewed the ER triage nurse's note before evaluating the patient  I have reviewed all of this patient's past medical, surgical, family, and social   histories as well as active allergies and medications documented in the  electronic medical record    Review of Systems and Physical Exam      Review of Systems (all other ROS are otherwise negative)  -- cannot get an accurate review of systems    Vital Signs (reviewed by the physician)  His oral temperature is 97.8 °F (36.6 °C).   His blood pressure is 142/61 and his pulse is 60.   His respiration is 20 and oxygen saturation is 98%.     Physical Exam  -- Nursing note and vitals reviewed  -- Constitutional: Appears well-developed and well-nourished  -- Head: Atraumatic. Normocephalic. No obvious abnormality  -- Eyes: Pupils are equal and reactive to light. Normal conjunctiva and lids  -- Cardiac: Normal rate, regular rhythm and normal heart sounds  -- Respiratory: Normal respiratory effort, breath sounds clear to auscultation  -- Gastrointestinal: Soft, no tenderness. Normal bowel sounds. Normal liver edge  -- Musculoskeletal: Normal range of motion, no effusions. Joints stable   -- Neurological: No focal deficits. Showed good interaction with staff  -- Vascular: Posterior tibial, dorsalis pedis and radial pulses 2+ bilaterally    -- Lymphatic/hematologic: No cervical or peripheral LAD. No edema noted  -- Integument: Abdominal wall drain clean dry and intact    Emergency Room Course      Urinalysis  Protein, UA 1+ (*)   Ketones, UA Trace (*)   Occult Blood UA 2+ (*)   Leukocytes, UA 2+ (*)   WBC, UA 75 (*)   Bacteria Moderate (*)   Yeast, UA Moderate (*)     Lab Results (reviewed by the physician)     K 4.1      CO2 22 (L)   BUN 26   CREATININE 2.5 (H)    (H)   ALKPHOS 82   AST 21   ALT 8 (L)   BILITOT 0.9   ALBUMIN 2.5 (L)   PROT 5.8 (L)   WBC 9.06   HGB 10.1 (L)   HCT 30.9 (L)      CPK 24   CPK 24    CPKMB 1.9   TROPONINI 0.026   INR 2.4 (H)    (H)   LACTATE 0.9   MG 1.7   TSH 5.943 (H)     EKG (interpreted by the physician)  Overall Interpretation: normal rate and rhythm with no obvious ischemic changes  Normal sinus rhythm @ 60 bpm  No ST elevation or depression  No arrhythmia or QRS change noted  Similar when compared to previous EKG    CT abdomen pelvis (images visualized & reports reviewed by the physician)  Interval decrease in size of a large abdominal collection/abscess when compared to 03/11/2022 as above.  Drainage catheter is in place within the collection multiple other findings as discussed above and not significantly changed.     Additional Work up (reviewed by the physician)  -- Blood cultures have also been drawn, results are pending  -- The urine today has been sent for lab culture, results pending  -- The CT of the head performed was negative for acute pathology  -- Chest x-ray showed no infiltrate and showed no acute pathology  -- the patient tested negative for influenza  -- rapid Coronavirus PCR was negative    Medications Given  0.9%  NaCl infusion (has no administration in time range)   0.9%  NaCl infusion ( Intravenous Stopped 3/19/22 3814)   alteplase injection 2 mg (2 mg Other Given 3/19/22 4480)   aspirin tablet 325 mg (325 mg Oral Given 3/19/22 1156)     Critical Care ED Physician Time (minutes):  -- Performed by: Isauro Alcocer M.D.  -- Date/Time: 12:00 PM 3/19/2022   -- Direct Patient Care (Face Time): 5  -- Additional History from Records or Taking Additional History: 5  -- Ordering, Reviewing, and Interpreting Diagnostic Studies: 5  -- Total Time in Documentation: 11  -- Consultation with Other Physicians: 5  -- Consultation with Family Related to Condition: 0  -- Total Critical Care Time: 31  -- Critical care was necessary to treat altered mental status  -- Critical care was time spent personally by me on the following activities:   -- blood draw for specimens discussions  with consultants,   -- development of treatment plan with patient or surrogate,   -- examination of patient, ordering and performing treatments   -- review of radiographic studies, re-evaluation of pt's condition  -- review of labs and evaluation of response to treatment     Medical Decision Making     History  -- Previous medical records were obtained from [facility]  -- Previous records are summarized from prior ER visits and hospitalizations    Initial Assessment  -- patient presents with confusion and slurred speech today  -- patient with prolonged course of abdominal wall abscess    Differential Diagnosis  -- encephalopathy, stroke, confusion, deterioration, debility    Clinical Tests  -- Lab tests were ordered, interpreted, and reviewed in the ER today   -- EKG was ordered, visualized, interpreted, reviewed in the ER today  -- Radiology tests were ordered, visualized, interpreted, reviewed in the ER today    ED Course/ED Management  -- patient with confusion with a negative head CT in the emergency room today  -- normal white count, patient with negative lactic acid and procalcitonin today  -- patient with increased creatinine with continuous vancomycin infusion 24/7  -- patient needs MRI of the brain and further evaluation for possible stroke  -- patient also needs re-evaluation by infectious disease for vancomycin NIK  -- Family extensively counseled of plan of care going forward on transfer    Assessment, Disposition, & Plan      Diagnosis  [R53.83] Fatigue  [R41.82] Altered mental status, unspecified altered mental status type (Primary)  [N17.9] Acute renal failure, unspecified acute renal failure type  [N39.0] Urinary tract infection without hematuria, site unspecified  [Z87.898] History of abdominal abscess    Disposition and Plan  -- Disposition: transfer  -- Condition: stable    This note is dictated on M*Modal word recognition program.  There are word recognition mistakes that are occasionally missed on  review.         Isauro Alcocer MD  03/19/22 1673

## 2022-03-19 NOTE — SUBJECTIVE & OBJECTIVE
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    Melanoma     Obesity (BMI 30.0-34.9) 9/13/2016    Osteoporosis     Peripheral vascular disease     Primary malignant neoplasm of prostate 3/3/2022    Septic shock 3/3/2022    Stroke     TIA (transient ischemic attack)     Type II diabetes mellitus with peripheral circulatory disorder     Type II or unspecified type diabetes mellitus without mention of complication, not stated as uncontrolled     Unspecified essential hypertension        Past Surgical History:   Procedure Laterality Date    AORTIC VALVE REPLACEMENT      CARDIAC PACEMAKER PLACEMENT  9/28/2012    CARDIAC VALVE REPLACEMENT  11/17/2011    aortic valve-tissue    CHOLECYSTECTOMY      COLONOSCOPY      ESOPHAGOGASTRODUODENOSCOPY N/A 5/27/2021    Procedure: EGD (ESOPHAGOGASTRODUODENOSCOPY);  Surgeon: Gualberto Medrano MD;  Location: [facility];  Service: Endoscopy;  Laterality: N/A;    ESOPHAGOGASTRODUODENOSCOPY N/A 6/22/2021    Procedure: EGD (ESOPHAGOGASTRODUODENOSCOPY);  Surgeon: Gualberto Medrano MD;  Location: [facility];  Service: Endoscopy;  Laterality: N/A;  please call wife(Jessica) with instructions/arrival time.    ESOPHAGOGASTRODUODENOSCOPY (EGD) WITH DILATION  06/22/2021    EYE SURGERY Bilateral     cataract with lens by  at [facility]    HERNIA REPAIR      abdominal    LASIK      UPPER GASTROINTESTINAL ENDOSCOPY  05/28/2021       Review of patient's allergies indicates:   Allergen Reactions    Ciprofloxacin     Levofloxacin Swelling     Lyrica [pregabalin] Swelling    Unable to assess Other (See Comments)     Soft shell crabs       Current Facility-Administered Medications on File Prior to Encounter   Medication    [COMPLETED] 0.9%  NaCl infusion    [COMPLETED] 0.9%  NaCl infusion    [COMPLETED] alteplase injection 2 mg    [COMPLETED] aspirin tablet 325 mg    [DISCONTINUED] cefTRIAXone (ROCEPHIN) 2 g/50 mL D5W IVPB     Current Outpatient Medications on File Prior to Encounter   Medication Sig    acetaminophen (TYLENOL) 500 MG tablet Take 500 mg by mouth every 6 (six) hours as needed for Pain.    amLODIPine (NORVASC) 10 MG tablet TAKE 1 TABLET BY MOUTH DAILY FOR BLOOD PRESSURE    blood sugar diagnostic (BLOOD GLUCOSE TEST) [facility] [[email or web address]] G-Snap!TOUCH ULTRA TESTING STRIPS. USE TO TEST BLOOD GLUCOSE TWICE DAILY. DX CODE: E11.51    blood-glucose meter Misc ONETOUCH ULTRA GLUCOSE METER. USE AS DIRECTED TO TEST BLOOD GLUCOSE. DX CODE: E11.51    carvediloL (COREG) 12.5 MG tablet TAKE 1 TABLET (12.5 MG TOTAL) BY MOUTH 2 (TWO) TIMES DAILY WITH MEALS.    cefepime in dextrose 5 % (MAXIPIME) 2 gram/50 mL PgBk Inject 50 mLs (2 g total) into the vein every 8 (eight) hours.    doxazosin (CARDURA) 1 MG tablet Take 1 mg by mouth nightly.    fenofibrate 160 MG Tab fenofibrate 160 mg tablet    furosemide (LASIX) 20 MG tablet TAKE ONE TABLET BY MOUTH EVERY OTHER DAY    gabapentin (NEURONTIN) 100 MG capsule Take 2 capsules (200 mg total) by mouth every evening.    gemfibrozil (LOPID) 600 MG tablet Take one tablet(600mg) by mouth once daily    glimepiride (AMARYL) 2 MG tablet TAKE 1 TABLET BY MOUTH EVERY MORNING WITH BREAKFAST FOR DIABETES    lancets (ONETOUCH ULTRASOFT LANCETS) Misc USE TO TEST BLOOD GLUCOSE TWICE DAILY. DX CODE: E11.51    levothyroxine (SYNTHROID) 75 MCG tablet Take 1 tablet (75 mcg total) by mouth before breakfast.    lisinopriL (PRINIVIL,ZESTRIL) 20 MG tablet     metroNIDAZOLE (FLAGYL) 500 MG tablet Take 1 tablet (500 mg total) by mouth every 8 (eight) hours. for  14 days    mupirocin (BACTROBAN) 2 % ointment by Nasal route 2 (two) times daily. for 2 days    pantoprazole (PROTONIX) 40 MG tablet TAKE ONE TABLET BY MOUTH ONCE A DAY FOR STOMACH PROBLEMS    pioglitazone (ACTOS) 15 MG tablet ONE TABLET ONE TIME A DAY FOR DIABETES    potassium chloride SA (K-DUR,KLOR-CON) 10 MEQ tablet Take 1 tablet (10 mEq total) by mouth once daily.    pravastatin (PRAVACHOL) 20 MG tablet TAKE 1 TABLET BY MOUTH DAILY FOR CHOLESTROL    rivaroxaban (XARELTO) 20 mg Tab Take one tablet(20mg) by mouth once daily    tamsulosin (FLOMAX) 0.4 mg Cap Take 1 capsule (0.4 mg total) by mouth once daily.    vancomycin HCl (VANCOMYCIN 1.25 G/250 ML D5W, READY TO MIX,) Inject 250 mLs (1,250 mg total) into the vein once daily.     Family History       Problem Relation (Age of Onset)    Alcohol abuse Father    COPD Sister, Brother    Cancer Brother    Diabetes Daughter    Heart disease Brother    Hypertension Father    No Known Problems Son, Daughter, Son    Stroke Father          Tobacco Use    Smoking status: Former Smoker     Quit date: 1996     Years since quittin.5    Smokeless tobacco: Never Used    Tobacco comment: cigar smoker   Substance and Sexual Activity    Alcohol use: No     Comment: none since     Drug use: No    Sexual activity: Not Currently     Review of Systems   Unable to perform ROS: Mental status change   Objective:     Vital Signs (Most Recent):  Temp: 97.8 °F (36.6 °C) (22)  Pulse: 60 (22)  Resp: 20 (22)  BP: (!) 122/57 (22)  SpO2: 98 % (22)   Vital Signs (24h Range):  Temp:  [97.8 °F (36.6 °C)] 97.8 °F (36.6 °C)  Pulse:  [60-67] 60  Resp:  [20-24] 20  SpO2:  [97 %-100 %] 98 %  BP: (122-144)/(56-64) 122/57     Weight: 81.8 kg (180 lb 5.4 oz)  Body mass index is 30.01 kg/m².    Physical Exam  Constitutional:       General: He is awake. He is not in acute distress.     Appearance: He is obese. He is not ill-appearing or  toxic-appearing.   HENT:      Head: Normocephalic and atraumatic.      Mouth/Throat:      Mouth: Mucous membranes are moist.   Eyes:      General: No scleral icterus.     Extraocular Movements: Extraocular movements intact.      Conjunctiva/sclera: Conjunctivae normal.      Pupils: Pupils are equal, round, and reactive to light.   Cardiovascular:      Rate and Rhythm: Normal rate. Rhythm irregular.      Heart sounds: No murmur heard.  Pulmonary:      Effort: Pulmonary effort is normal. No respiratory distress.      Breath sounds: Normal breath sounds. No wheezing or rales.   Abdominal:      General: Abdomen is flat. There is no distension.      Palpations: Abdomen is soft.      Tenderness: There is no abdominal tenderness. There is no guarding.      Comments: Percutaneous drain anterior right abdomen   Musculoskeletal:         General: No swelling or tenderness.   Skin:     Findings: No rash.   Neurological:      Mental Status: He is disoriented and confused.      Cranial Nerves: No cranial nerve deficit.      Sensory: No sensory deficit.      Motor: No weakness (5/5 upper, unable to assess lower 2/2 mentation).   Psychiatric:         Attention and Perception: He is inattentive.         Speech: Speech is delayed.         CRANIAL NERVES     CN III, IV, VI   Pupils are equal, round, and reactive to light.     Significant Labs: CBC:   Recent Labs   Lab 03/19/22  0735   WBC 9.06   HGB 10.1*   HCT 30.9*        CMP:   Recent Labs   Lab 03/19/22  0735      K 4.1      CO2 22*   *   BUN 26   CREATININE 2.5*   CALCIUM 8.6*   PROT 5.8*   ALBUMIN 2.5*   BILITOT 0.9   ALKPHOS 82   AST 21   ALT 8*   ANIONGAP 11   EGFRNONAA 21*     Lactic Acid:   Recent Labs   Lab 03/19/22  0735   LACTATE 0.9     Urine Studies:   Recent Labs   Lab 03/19/22  0835   COLORU Yellow   APPEARANCEUA Clear   PHUR 5.0   SPECGRAV 1.015   PROTEINUA 1+*   GLUCUA Negative   KETONESU Trace*   BILIRUBINUA Negative   OCCULTUA 2+*    NITRITE Negative   UROBILINOGEN Negative   LEUKOCYTESUR 2+*   RBCUA 0   WBCUA 75*   BACTERIA Moderate*   SQUAMEPITHEL 15   HYALINECASTS 0       Significant Imaging: I have reviewed all pertinent imaging results/findings within the past 24 hours.

## 2022-03-20 PROBLEM — G93.40 ENCEPHALOPATHY: Status: ACTIVE | Noted: 2022-03-20

## 2022-03-20 LAB
ALBUMIN SERPL BCP-MCNC: 2.3 G/DL (ref 3.5–5.2)
ALP SERPL-CCNC: 78 U/L (ref 55–135)
ALT SERPL W/O P-5'-P-CCNC: 8 U/L (ref 10–44)
ANION GAP SERPL CALC-SCNC: 10 MMOL/L (ref 8–16)
AST SERPL-CCNC: 19 U/L (ref 10–40)
BACTERIA UR CULT: ABNORMAL
BASOPHILS # BLD AUTO: 0.06 K/UL (ref 0–0.2)
BASOPHILS NFR BLD: 0.8 % (ref 0–1.9)
BILIRUB SERPL-MCNC: 0.6 MG/DL (ref 0.1–1)
BUN SERPL-MCNC: 26 MG/DL (ref 10–30)
CALCIUM SERPL-MCNC: 8.4 MG/DL (ref 8.7–10.5)
CHLORIDE SERPL-SCNC: 110 MMOL/L (ref 95–110)
CO2 SERPL-SCNC: 21 MMOL/L (ref 23–29)
CREAT SERPL-MCNC: 2.3 MG/DL (ref 0.5–1.4)
DIFFERENTIAL METHOD: ABNORMAL
EOSINOPHIL # BLD AUTO: 0.2 K/UL (ref 0–0.5)
EOSINOPHIL NFR BLD: 2.2 % (ref 0–8)
ERYTHROCYTE [DISTWIDTH] IN BLOOD BY AUTOMATED COUNT: 14.5 % (ref 11.5–14.5)
EST. GFR  (AFRICAN AMERICAN): 27.5 ML/MIN/1.73 M^2
EST. GFR  (NON AFRICAN AMERICAN): 23.8 ML/MIN/1.73 M^2
GLUCOSE SERPL-MCNC: 133 MG/DL (ref 70–110)
HCT VFR BLD AUTO: 30.7 % (ref 40–54)
HGB BLD-MCNC: 9.9 G/DL (ref 14–18)
IMM GRANULOCYTES # BLD AUTO: 0.17 K/UL (ref 0–0.04)
IMM GRANULOCYTES NFR BLD AUTO: 2.2 % (ref 0–0.5)
LYMPHOCYTES # BLD AUTO: 0.8 K/UL (ref 1–4.8)
LYMPHOCYTES NFR BLD: 10.4 % (ref 18–48)
MAGNESIUM SERPL-MCNC: 1.7 MG/DL (ref 1.6–2.6)
MCH RBC QN AUTO: 33.4 PG (ref 27–31)
MCHC RBC AUTO-ENTMCNC: 32.2 G/DL (ref 32–36)
MCV RBC AUTO: 104 FL (ref 82–98)
MONOCYTES # BLD AUTO: 1 K/UL (ref 0.3–1)
MONOCYTES NFR BLD: 12.7 % (ref 4–15)
NEUTROPHILS # BLD AUTO: 5.4 K/UL (ref 1.8–7.7)
NEUTROPHILS NFR BLD: 71.7 % (ref 38–73)
NRBC BLD-RTO: 0 /100 WBC
PHOSPHATE SERPL-MCNC: 2.9 MG/DL (ref 2.7–4.5)
PLATELET # BLD AUTO: 241 K/UL (ref 150–450)
PMV BLD AUTO: 9.2 FL (ref 9.2–12.9)
POCT GLUCOSE: 150 MG/DL (ref 70–110)
POCT GLUCOSE: 153 MG/DL (ref 70–110)
POCT GLUCOSE: 156 MG/DL (ref 70–110)
POTASSIUM SERPL-SCNC: 4 MMOL/L (ref 3.5–5.1)
PROT SERPL-MCNC: 5.5 G/DL (ref 6–8.4)
RBC # BLD AUTO: 2.96 M/UL (ref 4.6–6.2)
SODIUM SERPL-SCNC: 141 MMOL/L (ref 136–145)
VANCOMYCIN SERPL-MCNC: 26.2 UG/ML
WBC # BLD AUTO: 7.56 K/UL (ref 3.9–12.7)

## 2022-03-20 PROCEDURE — 85025 COMPLETE CBC W/AUTO DIFF WBC: CPT | Performed by: STUDENT IN AN ORGANIZED HEALTH CARE EDUCATION/TRAINING PROGRAM

## 2022-03-20 PROCEDURE — 84100 ASSAY OF PHOSPHORUS: CPT | Performed by: STUDENT IN AN ORGANIZED HEALTH CARE EDUCATION/TRAINING PROGRAM

## 2022-03-20 PROCEDURE — 25000003 PHARM REV CODE 250: Performed by: STUDENT IN AN ORGANIZED HEALTH CARE EDUCATION/TRAINING PROGRAM

## 2022-03-20 PROCEDURE — 99233 SBSQ HOSP IP/OBS HIGH 50: CPT | Mod: ,,, | Performed by: STUDENT IN AN ORGANIZED HEALTH CARE EDUCATION/TRAINING PROGRAM

## 2022-03-20 PROCEDURE — 95700 EEG CONT REC W/VID EEG TECH: CPT

## 2022-03-20 PROCEDURE — 80202 ASSAY OF VANCOMYCIN: CPT | Performed by: STUDENT IN AN ORGANIZED HEALTH CARE EDUCATION/TRAINING PROGRAM

## 2022-03-20 PROCEDURE — 63600175 PHARM REV CODE 636 W HCPCS: Performed by: STUDENT IN AN ORGANIZED HEALTH CARE EDUCATION/TRAINING PROGRAM

## 2022-03-20 PROCEDURE — 95720 PR EEG, W/VIDEO, CONT RECORD, I&R, >12<26 HRS: ICD-10-PCS | Mod: ,,, | Performed by: PSYCHIATRY & NEUROLOGY

## 2022-03-20 PROCEDURE — 99223 PR INITIAL HOSPITAL CARE,LEVL III: ICD-10-PCS | Mod: ,,, | Performed by: PSYCHIATRY & NEUROLOGY

## 2022-03-20 PROCEDURE — 99233 PR SUBSEQUENT HOSPITAL CARE,LEVL III: ICD-10-PCS | Mod: ,,, | Performed by: STUDENT IN AN ORGANIZED HEALTH CARE EDUCATION/TRAINING PROGRAM

## 2022-03-20 PROCEDURE — C1729 CATH, DRAINAGE: HCPCS

## 2022-03-20 PROCEDURE — 99499 NO LOS: ICD-10-PCS | Mod: ,,, | Performed by: PHYSICIAN ASSISTANT

## 2022-03-20 PROCEDURE — 99223 PR INITIAL HOSPITAL CARE,LEVL III: ICD-10-PCS | Mod: ,,, | Performed by: PHYSICIAN ASSISTANT

## 2022-03-20 PROCEDURE — S5010 5% DEXTROSE AND 0.45% SALINE: HCPCS | Performed by: STUDENT IN AN ORGANIZED HEALTH CARE EDUCATION/TRAINING PROGRAM

## 2022-03-20 PROCEDURE — 20600001 HC STEP DOWN PRIVATE ROOM

## 2022-03-20 PROCEDURE — C1751 CATH, INF, PER/CENT/MIDLINE: HCPCS

## 2022-03-20 PROCEDURE — 80053 COMPREHEN METABOLIC PANEL: CPT | Performed by: STUDENT IN AN ORGANIZED HEALTH CARE EDUCATION/TRAINING PROGRAM

## 2022-03-20 PROCEDURE — 95714 VEEG EA 12-26 HR UNMNTR: CPT

## 2022-03-20 PROCEDURE — 99223 1ST HOSP IP/OBS HIGH 75: CPT | Mod: ,,, | Performed by: PHYSICIAN ASSISTANT

## 2022-03-20 PROCEDURE — 99223 1ST HOSP IP/OBS HIGH 75: CPT | Mod: ,,, | Performed by: PSYCHIATRY & NEUROLOGY

## 2022-03-20 PROCEDURE — 95720 EEG PHY/QHP EA INCR W/VEEG: CPT | Mod: ,,, | Performed by: PSYCHIATRY & NEUROLOGY

## 2022-03-20 PROCEDURE — 83735 ASSAY OF MAGNESIUM: CPT | Performed by: STUDENT IN AN ORGANIZED HEALTH CARE EDUCATION/TRAINING PROGRAM

## 2022-03-20 PROCEDURE — 99499 UNLISTED E&M SERVICE: CPT | Mod: ,,, | Performed by: PHYSICIAN ASSISTANT

## 2022-03-20 RX ORDER — LORAZEPAM 2 MG/ML
2 INJECTION INTRAMUSCULAR ONCE
Status: COMPLETED | OUTPATIENT
Start: 2022-03-20 | End: 2022-03-20

## 2022-03-20 RX ORDER — DEXTROSE MONOHYDRATE AND SODIUM CHLORIDE 5; .45 G/100ML; G/100ML
INJECTION, SOLUTION INTRAVENOUS CONTINUOUS
Status: DISCONTINUED | OUTPATIENT
Start: 2022-03-20 | End: 2022-03-21

## 2022-03-20 RX ORDER — DOXAZOSIN 1 MG/1
1 TABLET ORAL DAILY
Status: DISCONTINUED | OUTPATIENT
Start: 2022-03-21 | End: 2022-03-20

## 2022-03-20 RX ADMIN — CARVEDILOL 12.5 MG: 12.5 TABLET, FILM COATED ORAL at 06:03

## 2022-03-20 RX ADMIN — LORAZEPAM 2 MG: 2 INJECTION INTRAMUSCULAR; INTRAVENOUS at 12:03

## 2022-03-20 RX ADMIN — DEXTROSE AND SODIUM CHLORIDE: 5; .45 INJECTION, SOLUTION INTRAVENOUS at 05:03

## 2022-03-20 RX ADMIN — AMPICILLIN SODIUM AND SULBACTAM SODIUM 3 G: 2; 1 INJECTION, POWDER, FOR SOLUTION INTRAMUSCULAR; INTRAVENOUS at 05:03

## 2022-03-20 RX ADMIN — PIPERACILLIN AND TAZOBACTAM 4.5 G: 4; .5 INJECTION, POWDER, LYOPHILIZED, FOR SOLUTION INTRAVENOUS; PARENTERAL at 06:03

## 2022-03-20 NOTE — SUBJECTIVE & OBJECTIVE
Past Medical History:   Diagnosis Date    Abdominal fibromatosis     Acute posthemorrhagic anemia 1/9/2018    INK (acute kidney injury) 1/11/2018    Atrial fibrillation     Bradycardia     Broken arm     CAD (coronary artery disease)     Cancer     basal cell on nose and melanoma on back    CHF (congestive heart failure)     COPD (chronic obstructive pulmonary disease)     Diabetes mellitus type II     Hyperlipidemia     Localization-related focal epilepsy with simple partial seizures 3/2/2021    Melanoma     Obesity (BMI 30.0-34.9) 9/13/2016    Osteoporosis     Peripheral vascular disease     Primary malignant neoplasm of prostate 3/3/2022    Septic shock 3/3/2022    Stroke     TIA (transient ischemic attack)     Type II diabetes mellitus with peripheral circulatory disorder     Type II or unspecified type diabetes mellitus without mention of complication, not stated as uncontrolled     Unspecified essential hypertension        Past Surgical History:   Procedure Laterality Date    AORTIC VALVE REPLACEMENT      CARDIAC PACEMAKER PLACEMENT  9/28/2012    CARDIAC VALVE REPLACEMENT  11/17/2011    aortic valve-tissue    CHOLECYSTECTOMY      COLONOSCOPY      ESOPHAGOGASTRODUODENOSCOPY N/A 5/27/2021    Procedure: EGD (ESOPHAGOGASTRODUODENOSCOPY);  Surgeon: Gualberto Medrano MD;  Location: [facility];  Service: Endoscopy;  Laterality: N/A;    ESOPHAGOGASTRODUODENOSCOPY N/A 6/22/2021    Procedure: EGD (ESOPHAGOGASTRODUODENOSCOPY);  Surgeon: Gualberto Medrano MD;  Location: [facility];  Service: Endoscopy;  Laterality: N/A;  please call wife(Jessica) with instructions/arrival time.    ESOPHAGOGASTRODUODENOSCOPY (EGD) WITH DILATION  06/22/2021    EYE SURGERY Bilateral     cataract with lens by  at [facility]    HERNIA REPAIR      abdominal    LASIK      UPPER GASTROINTESTINAL ENDOSCOPY  05/28/2021       Review of patient's allergies indicates:   Allergen Reactions    Ciprofloxacin     Levofloxacin Swelling     Lyrica [pregabalin] Swelling    Unable to assess Other (See Comments)     Soft shell crabs       Medications:  Medications Prior to Admission   Medication Sig    acetaminophen (TYLENOL) 500 MG tablet Take 500 mg by mouth every 6 (six) hours as needed for Pain.    amLODIPine (NORVASC) 10 MG tablet TAKE 1 TABLET BY MOUTH DAILY FOR BLOOD PRESSURE    blood sugar diagnostic (BLOOD GLUCOSE TEST) [facility] ONETOUCH ULTRA TESTING STRIPS. USE TO TEST BLOOD GLUCOSE TWICE DAILY. DX CODE: E11.51    blood-glucose meter Misc ONETOUCH ULTRA GLUCOSE METER. USE AS DIRECTED TO TEST BLOOD GLUCOSE. DX CODE: E11.51    carvediloL (COREG) 12.5 MG tablet TAKE 1 TABLET (12.5 MG TOTAL) BY MOUTH 2 (TWO) TIMES DAILY WITH MEALS.    cefepime in dextrose 5 % (MAXIPIME) 2 gram/50 mL PgBk Inject 50 mLs (2 g total) into the vein every 8 (eight) hours.    doxazosin (CARDURA) 1 MG tablet Take 1 mg by mouth nightly.    fenofibrate 160 MG Tab fenofibrate 160 mg tablet    furosemide (LASIX) 20 MG tablet TAKE ONE TABLET BY MOUTH EVERY OTHER DAY    gabapentin (NEURONTIN) 100 MG capsule Take 2 capsules (200 mg total) by mouth every evening.    gemfibrozil (LOPID) 600 MG tablet Take one tablet(600mg) by mouth once daily    glimepiride (AMARYL) 2 MG tablet TAKE 1 TABLET BY MOUTH EVERY MORNING WITH BREAKFAST FOR DIABETES    lancets (ONETOUCH ULTRASOFT LANCETS) Misc USE TO TEST BLOOD GLUCOSE TWICE DAILY. DX CODE: E11.51    levothyroxine (SYNTHROID) 75 MCG tablet Take 1 tablet (75 mcg total) by mouth before breakfast.    lisinopriL (PRINIVIL,ZESTRIL) 20 MG tablet     metroNIDAZOLE (FLAGYL) 500 MG tablet Take 1 tablet (500 mg total) by mouth every 8 (eight) hours. for 14 days    mupirocin (BACTROBAN) 2 % ointment by Nasal route 2 (two) times daily. for 2 days    pantoprazole (PROTONIX) 40 MG tablet TAKE ONE TABLET BY MOUTH ONCE A DAY FOR STOMACH PROBLEMS    pioglitazone (ACTOS) 15 MG tablet ONE TABLET ONE TIME A DAY FOR DIABETES    potassium chloride SA  (K-DUR,KLOR-CON) 10 MEQ tablet Take 1 tablet (10 mEq total) by mouth once daily.    pravastatin (PRAVACHOL) 20 MG tablet TAKE 1 TABLET BY MOUTH DAILY FOR CHOLESTROL    rivaroxaban (XARELTO) 20 mg Tab Take one tablet(20mg) by mouth once daily    tamsulosin (FLOMAX) 0.4 mg Cap Take 1 capsule (0.4 mg total) by mouth once daily.    vancomycin HCl (VANCOMYCIN 1.25 G/250 ML D5W, READY TO MIX,) Inject 250 mLs (1,250 mg total) into the vein once daily.     Antibiotics (From admission, onward)                Start     Stop Route Frequency Ordered    03/19/22 1900  piperacillin-tazobactam 4.5 g in sodium chloride 0.9% 100 mL IVPB (ready to mix system)         -- IV Every 12 hours (non-standard times) 03/19/22 1755          Antifungals (From admission, onward)                None          Antivirals (From admission, onward)      None             Immunization History   Administered Date(s) Administered    COVID-19, MRNA, LN-S, PF (Pfizer) (Purple Cap) 01/21/2021, 02/11/2021    Influenza (FLUAD) - Quadrivalent - Adjuvanted - PF *Preferred* (65+) 03/14/2022    Influenza - High Dose - PF (65 years and older) 11/12/2003, 11/18/2010, 02/13/2013, 11/20/2014, 10/13/2016, 12/04/2017, 11/05/2018, 10/22/2019    Influenza - Quadrivalent - High Dose - PF (65 years and older) 09/28/2020    Influenza - Trivalent (ADULT) 11/12/2003, 02/13/2013    Influenza A (H1N1) 2009 Monovalent - IM 01/18/2010    Influenza Split 02/13/2013    Pneumococcal Conjugate - 13 Valent 12/04/2017    Pneumococcal Polysaccharide - 23 Valent 01/01/2001, 09/01/2006    Tdap 09/16/2018       Family History       Problem Relation (Age of Onset)    Alcohol abuse Father    COPD Sister, Brother    Cancer Brother    Diabetes Daughter    Heart disease Brother    Hypertension Father    No Known Problems Son, Daughter, Son    Stroke Father          Social History     Socioeconomic History    Marital status:    Tobacco Use    Smoking status: Former Smoker     Quit date:  1996     Years since quittin.5    Smokeless tobacco: Never Used    Tobacco comment: cigar smoker   Substance and Sexual Activity    Alcohol use: No     Comment: none since     Drug use: No    Sexual activity: Not Currently     Social Determinants of Health     Financial Resource Strain: Low Risk     Difficulty of Paying Living Expenses: Not hard at all   Food Insecurity: No Food Insecurity    Worried About Running Out of Food in the Last Year: Never true    Ran Out of Food in the Last Year: Never true   Transportation Needs: No Transportation Needs    Lack of Transportation (Medical): No    Lack of Transportation (Non-Medical): No   Physical Activity: Inactive    Days of Exercise per Week: 0 days    Minutes of Exercise per Session: 0 min   Stress: No Stress Concern Present    Feeling of Stress : Not at all   Social Connections: Moderately Isolated    Frequency of Communication with Friends and Family: More than three times a week    Frequency of Social Gatherings with Friends and Family: Three times a week    Attends Mandaeism Services: Never    Active Member of Clubs or Organizations: No    Attends Club or Organization Meetings: Never    Marital Status:    Housing Stability: Low Risk     Unable to Pay for Housing in the Last Year: No    Number of Places Lived in the Last Year: 1    Unstable Housing in the Last Year: No     Review of Systems   Unable to perform ROS: Mental status change   Objective:     Vital Signs (Most Recent):  Temp: 99.2 °F (37.3 °C) (22 1132)  Pulse: 102 (22 1132)  Resp: 18 (22 1132)  BP: (!) 146/86 (22 1132)  SpO2: 95 % (22 1132)   Vital Signs (24h Range):  Temp:  [97.5 °F (36.4 °C)-99.2 °F (37.3 °C)] 99.2 °F (37.3 °C)  Pulse:  [] 102  Resp:  [18-20] 18  SpO2:  [95 %-98 %] 95 %  BP: (122-159)/(57-86) 146/86     Weight: 81.8 kg (180 lb 5.4 oz)  Body mass index is 30.01 kg/m².    Estimated Creatinine Clearance: 20.2 mL/min (A) (based on  SCr of 2.3 mg/dL (H)).    Physical Exam  Constitutional:       General: He is awake. He is not in acute distress.     Appearance: He is not ill-appearing or toxic-appearing.   HENT:      Head: Normocephalic and atraumatic.      Nose: Nose normal.      Mouth/Throat:      Mouth: Mucous membranes are moist.   Eyes:      General: No scleral icterus.     Conjunctiva/sclera: Conjunctivae normal.   Cardiovascular:      Rate and Rhythm: Normal rate. Rhythm irregular.   Pulmonary:      Effort: Pulmonary effort is normal. No respiratory distress.      Breath sounds: Normal breath sounds. No wheezing or rales.   Abdominal:      General: There is no distension.      Palpations: Abdomen is soft.      Tenderness: There is no abdominal tenderness.      Comments: right abdominal drain with serosanguineous output   Musculoskeletal:         General: No swelling or tenderness.   Skin:     Findings: Bruising present. No rash.   Neurological:      Mental Status: He is disoriented and confused.      Cranial Nerves: No cranial nerve deficit.      Sensory: No sensory deficit.      Motor: No weakness.      Comments: Patient able to nod to two questions and squeeze my hands. Appears anxious and moving around in bed trashing and repetitively removing covers from his body   Psychiatric:         Behavior: Behavior is agitated.       Significant Labs: Blood Culture:   Recent Labs   Lab 03/02/22  1728 03/12/22  1248 03/19/22  0734   LABBLOO No growth after 5 days.  No growth after 5 days. No growth after 5 days.  No growth after 5 days. No Growth to date  No Growth to date     BMP:   Recent Labs   Lab 03/20/22  0402   *      K 4.0      CO2 21*   BUN 26   CREATININE 2.3*   CALCIUM 8.4*   MG 1.7     CBC:   Recent Labs   Lab 03/19/22  0735 03/20/22  0402   WBC 9.06 7.56   HGB 10.1* 9.9*   HCT 30.9* 30.7*    241     CMP:   Recent Labs   Lab 03/19/22  0735 03/20/22  0402    141   K 4.1 4.0    110   CO2 22* 21*    * 133*   BUN 26 26   CREATININE 2.5* 2.3*   CALCIUM 8.6* 8.4*   PROT 5.8* 5.5*   ALBUMIN 2.5* 2.3*   BILITOT 0.9 0.6   ALKPHOS 82 78   AST 21 19   ALT 8* 8*   ANIONGAP 11 10   EGFRNONAA 21* 23.8*     HIV Rapid: No results for input(s): HIVRAPID in the last 48 hours.  Microbiology Results (last 7 days)       ** No results found for the last 168 hours. **          Pathology Results  (Last 10 years)      None          Respiratory Culture: No results for input(s): GSRESP, RESPIRATORYC in the last 4320 hours.  Urine Culture:   Recent Labs   Lab 03/11/22  1737 03/19/22  0835   LABURIN CANDIDA ALBICANS  10,000 - 49,999 cfu/ml  Treatment of asymptomatic candiduria is not recommended (except for   specific populations). Candida isolated in the urine typically   represents colonization. If an indwelling urinary catheter is present  it should be removed or replaced.  * TAMARA ALBICANS  10,000 - 49,999 cfu/ml  Treatment of asymptomatic candiduria is not recommended (except for   specific populations). Candida isolated in the urine typically   represents colonization. If an indwelling urinary catheter is present  it should be removed or replaced.  *     Urine Studies:   Recent Labs   Lab 03/19/22  0835   COLORU Yellow   APPEARANCEUA Clear   PHUR 5.0   SPECGRAV 1.015   PROTEINUA 1+*   GLUCUA Negative   KETONESU Trace*   BILIRUBINUA Negative   OCCULTUA 2+*   NITRITE Negative   UROBILINOGEN Negative   LEUKOCYTESUR 2+*   RBCUA 0   WBCUA 75*   BACTERIA Moderate*   SQUAMEPITHEL 15   HYALINECASTS 0     Wound Culture:   Recent Labs   Lab 03/03/22  1625   LABAERO No growth     All pertinent labs within the past 24 hours have been reviewed.    Significant Imaging:   EXAMINATION:  CT ABDOMEN PELVIS WITHOUT CONTRAST     CLINICAL HISTORY:  Abdominal abscess/infection suspected;     TECHNIQUE:  Low dose axial images, sagittal and coronal reformations were obtained from the lung bases to the pubic symphysis.  30 mL of oral Omnipaque  350 was administered.     COMPARISON:  The CT from 8 days prior.     FINDINGS:  When compared to the most recent prior exam from 03/11/2022 there is been interval decrease in size of the collection abutting the anterior abdominal wall.  This currently measures 15 x 5 4.4 cm series 2, image 70 compared with 17 x 9 cm on the prior exam. Drain remains in place.  Locules of air remain within the collection.     Limited imaging through the inferior thorax demonstrates trace right and small left pleural effusion, slightly increased since the previous exam.  Heart size is increased.  Thoracic and coronary artery atherosclerosis is noted. Pacer leads in the right heart are present.     Bones are intact. Stable postoperative changes of the right femur. Degenerative changes of the spine.     Unenhanced liver is unremarkable. Patient is status post cholecystectomy.  Unenhanced spleen is within normal limits. Pancreatic atrophy is noted. Adrenals are normal. Left renal cysts are noted.  Other smaller lesions in the kidneys too small to characterize but likely relate to cysts as well. One of these on series 2, image 69 demonstrates attenuation slightly greater than fluid and likely relates to a complex cyst. Correlation with nonemergent ultrasound could be considered as warranted. There is no evidence of hydronephrosis.     Stable postsurgical changes of the stomach. There is no evidence of bowel obstruction. Normal appendix is seen. Colonic diverticulosis is noted without convincing evidence of diverticulitis.     Urinary bladder demonstrates decreased wall thickening compared to the previous exam. Prostate gland is enlarged with heterogeneous appearance and contains calcifications.     Aorta iliac atherosclerosis is noted. Small scattered retroperitoneal and inguinal lymph nodes are present. There is a fat and bowel containing right inguinal hernia and as well as a fat containing left inguinal hernia, unchanged from prior.      Motion artifact in the abdomen is present.     Impression:     Interval decrease in size of a large abdominal collection/abscess when compared to 03/11/2022 as above.  Drainage catheter is in place within the collection multiple other findings as discussed above and not significantly changed.        Electronically signed by: Alin Garcia MD  Date:                                            03/19/2022  Time:                                           09:45

## 2022-03-20 NOTE — PROGRESS NOTES
"[facility] - Telemetry [facility]  Progress Note    Patient Name: Eddie Parada Sr.  MRN: [id number]  Patient Class: OP- Observation   Admission Date: 3/19/2022  Length of Stay: 0 days  Attending Physician: Gustavo Gerard MD  Primary Care Provider: Callum Roberts MD        Subjective:     Principal Problem:Encephalopathy        HPI:  Unable to obtain history from patient secondary to mental status.    Spoke with daughter over the phone.  She stated the patient is very functional at baseline and currently able to run a business.  She said there was a decrease in mentation day prior to presentation but still able to converse with family.  This morning patient was notably altered and he oriented to self.  He was still able to recognize familiar faces.  However patient was noted to have general weakness, tremor, and decreased concentration prompting EMS call that brought him to outside hospital.    From provider transfer note:  "89-year-old male with a history of atrial fibrillation (on Xarelto), coronary artery disease, chronic diastolic heart failure, diabetes, COPD, osteoporosis, hyperlipidemia, and recent admissions to [facility] (March 3-8 and March 12-14) for intra-abdominal abscess with septic shock presented to [facility] on March 19 with confusion and slurred speech that began the day prior to presentation.  In the emergency department he was also noted to have acute kidney injury. He is still urinating.  He is on outpatient cefepime, metronidazole, and IV vancomycin.  No focal motor deficits noted in his arms or legs.  In the emergency department he received IV fluid and Rocephin.  Of note, one lumen of his PICC appeared clogged, and alteplase was given. In the emergency department on March 19 he was noted to be hemodynamically stable.  They are requesting transfer for further evaluation of the slurred speech/confusion (concern for possible stroke with slurred speech) and " evaluation/treatment of his acute kidney injury.  They are unable to perform MRIs with pacemaker in place at [facility].  Plan for transfer to Hospital Medicine at [facility] for neurologic evaluation and treatment of NIK.  Abdominal fluid collection appears to be improved.  No evidence of sepsis noted.     Initially admitted to [facility] March 3-8 with fever and altered mental status felt to be related to septic shock from intra-abdominal abscess.  He required Levophed initially but subsequently stepped down to a floor bed.  He was treated with broad-spectrum antibiotics.  He was seen by General Surgery and recommendation was for IR placement of a drain.  He was subsequently discharged on Rocephin and Flagyl.     He was readmitted to [facility] March 12-14 for upsizing of his abdominal drainage catheter.  Recommendation was for 2 weeks of cefepime, vancomycin, and oral Flagyl.  He was discharged home in stable condition.    Urinalysis with no red blood cells 75 white blood cells/moderate bacteria/moderate yeast/1+ protein/trace ketones/2+ occult blood/2+ leukocytes. Random vancomycin level 33.3, procalcitonin 0.06, , troponin 0.026, CPK 24, sodium 137, potassium 4.1, chloride 104, CO2 22, BUN 26, creatinine 2.5 baseline (0.9-1), glucose 173, AST 21, ALT 8, influenza negative, lactic acid 0.9, COVID negative, white blood cells 9.06, hemoglobin 10.1, hematocrit 30.9, platelets 267. Blood cultures ordered     Chest x-ray showed a left-sided pacemaker, right-sided PICC line, no focal parenchymal consolidation.  Improved aeration of the left lung base.  Cardiomediastinal silhouette is stable.     CT head noted no acute abnormality.     CT abdomen and pelvis noted interval decrease in the size of a large abdominal collection/abscess compared with March 11. Drainage catheter is in place within the collection.  Trace right and small left pleural effusion, slightly increased since previous exam.   "Unenhanced liver is unremarkable.  Pancreatic atrophy is noted.  Urinary bladder demonstrates decreased wall thickening compared to the previous exam."      Overview/Hospital Course:  No notes on file    Interval History:   No events overnight. This morning patient not verbal and slightly agitated. Wife at bedside and updated.     Review of Systems   Unable to perform ROS: Mental status change   Objective:     Vital Signs (Most Recent):  Temp: 98 °F (36.7 °C) (03/20/22 1532)  Pulse: 62 (03/20/22 1532)  Resp: 18 (03/20/22 1532)  BP: 126/70 (03/20/22 1532)  SpO2: 98 % (03/20/22 1532) Vital Signs (24h Range):  Temp:  [97.5 °F (36.4 °C)-99.2 °F (37.3 °C)] 98 °F (36.7 °C)  Pulse:  [] 62  Resp:  [18-20] 18  SpO2:  [95 %-98 %] 98 %  BP: (122-159)/(57-86) 126/70     Weight: 81.8 kg (180 lb 5.4 oz)  Body mass index is 30.01 kg/m².    Intake/Output Summary (Last 24 hours) at 3/20/2022 1559  Last data filed at 3/20/2022 0600  Gross per 24 hour   Intake 200 ml   Output --   Net 200 ml      Physical Exam  Constitutional:       General: He is awake. He is not in acute distress.     Appearance: He is obese. He is not ill-appearing or toxic-appearing.   HENT:      Head: Normocephalic and atraumatic.      Mouth/Throat:      Mouth: Mucous membranes are moist.   Eyes:      General: No scleral icterus.     Extraocular Movements: Extraocular movements intact.      Conjunctiva/sclera: Conjunctivae normal.      Pupils: Pupils are equal, round, and reactive to light.   Cardiovascular:      Rate and Rhythm: Normal rate. Rhythm irregular.      Heart sounds: No murmur heard.  Pulmonary:      Effort: Pulmonary effort is normal. No respiratory distress.      Breath sounds: Normal breath sounds. No wheezing or rales.   Abdominal:      General: Abdomen is flat. There is no distension.      Palpations: Abdomen is soft.      Tenderness: There is no abdominal tenderness. There is no guarding.      Comments: Percutaneous drain anterior right " abdomen   Musculoskeletal:         General: No swelling or tenderness.   Skin:     Findings: No rash.   Neurological:      Mental Status: He is alert. He is disoriented and confused.      Cranial Nerves: No cranial nerve deficit.      Sensory: No sensory deficit.      Motor: No weakness.   Psychiatric:         Attention and Perception: He is inattentive.         Speech: He is noncommunicative.       Significant Labs: CBC:   Recent Labs   Lab 03/19/22  0735 03/20/22  0402   WBC 9.06 7.56   HGB 10.1* 9.9*   HCT 30.9* 30.7*    241     CMP:   Recent Labs   Lab 03/19/22  0735 03/20/22  0402    141   K 4.1 4.0    110   CO2 22* 21*   * 133*   BUN 26 26   CREATININE 2.5* 2.3*   CALCIUM 8.6* 8.4*   PROT 5.8* 5.5*   ALBUMIN 2.5* 2.3*   BILITOT 0.9 0.6   ALKPHOS 82 78   AST 21 19   ALT 8* 8*   ANIONGAP 11 10   EGFRNONAA 21* 23.8*       Significant Imaging: I have reviewed all pertinent imaging results/findings within the past 24 hours.      Assessment/Plan:      * Encephalopathy  - To the presentation of worsening mental status with noted disorientation, tremor, and decrease concentration.  Exam nonfocal.  CT head with no acute process.  Chest x-ray unremarkable.  - Differential includes cefepime neurotoxicity in setting of NIK, CVA, metabolic disturbance, infectious  - Continue infectious workup as below  - Neurology consulted  -- Ativan challenge  -- Continuous EEG  - Neurochecks Q 4    Abdominal wall abscess  - Initially treated at [place] early March 2022 with IR drain placement and treated with Rocephin and Flagyl.  Readmitted to [place] in mid March with IR drain placement and discharged with minimum 2 weeks of additional antibiotics (cefepime, vancomycin, Flagyl) on March 14th._  - Admission CT abdomen/pelvis with decrease in size of fluid abdominal collections  - Patient readmitted with NIK and concern for cefepime toxicity, vancomycin induced NIK  - ID consulted, appreciate recs  -- Discontinue  Zosyn  -- Starting Unasyn  - UA with candida albicans, likely colonization    NIK (acute kidney injury)  -  Patient creatinine was at baseline 0.9 on 3/14 and on presentation  - Admission creatinine 2.5  - Patient was discharged on vancomycin, with random admission vanc level 33  - UA with granular casts  - Concern for ATN d/t vancomycin, patient also home Lasix  - Avoiding nephrotoxic medications  - Holding IV fluids as patient does not appear hypovolemic  - Trend BMP  - Improving slightly         Type 2 diabetes mellitus, controlled  - Home glimepiride, pioglitazone  - Diabetic diet  - Sliding scale insulin  - Accu-Cheks  - Holding home gabapentin due to NIK    Essential (primary) hypertension  - Holding home lisinopril due to NIK  - Amlodipine 5mg (home 10mg)  - Continue home Coreg        Disorder of prostate  - Continue home tamsulosin      Hypothyroidism due to acquired atrophy of thyroid  - Continue home levothyroxine    Peripheral vascular disease  - Continue home fenofibrate, pravastatin      Cardiac pacemaker in situ  - Unclear if MRI compatible      Chronic atrial fibrillation  - Holding home rivaroxaban due to NIK  - Continue Coreg        VTE Risk Mitigation (From admission, onward)         Ordered     IP VTE HIGH RISK PATIENT  Once         03/19/22 1616     Place sequential compression device  Until discontinued         03/19/22 1616                Discharge Planning   KANDICE: 3/23/2022     Code Status: Full Code   Is the patient medically ready for discharge?: No    Reason for patient still in hospital (select all that apply): Patient trending condition, Laboratory test, Treatment, Imaging, PT / OT recommendations and Pending disposition                     Gustavo Gerard MD  Department of Hospital Medicine   [facility] - Telemetry Stepdown

## 2022-03-20 NOTE — ASSESSMENT & PLAN NOTE
Patient is a 89yoM with atrial fibrillation (on Xarelto), coronary artery disease, chronic diastolic heart failure, diabetes, COPD, osteoporosis, hyperlipidemia, and recent admissions to [facility] (March 3-8 and March 12-14) for intra-abdominal abscess with septic shock. He was discharged home at that time on cefepime, metronidazole and vancand after abscess drainage. The patient later presented to [facility] on 3/19 with confusion and dysarthria that began on prior day. He was noted to have an NIK on arrival. Per report, the patient had no focal motor deficit at that time. MRI was unable to be performed at that time due to pacemaker incompatability. The patient was transferred to [facility] for further care. Neurology was consulted for the patients AMS.     The patient typically has good functional baseline. See HPI for full detail. The patients AMs is concerning for several possible etiologies. It is likely that his underlying infection has contributed to his poor mentation, although his acute worsening of mentation prompting admission is concerning for possible seizure, including NCSE. Given recent use of cefepime in the setting of NIK, it is possible that he also has cefepime induced neurotoxicity which can manifest as abnormalities on EEG. Patient was given ativan trial on floor as well.    Ativan trial while EEG is currently pending. Patient currently sleeping following trial. Will continue to monitor  EEg pending, will follow up  Patient will likely need restraints during EEG  Recommend avoiding cefepime if possible  Will continue to follow patient  Please contact with any questions or concerns

## 2022-03-20 NOTE — ASSESSMENT & PLAN NOTE
- Initially treated at [place] early March 2022 with IR drain placement and treated with Rocephin and Flagyl.  Readmitted to [place] in mid March with IR drain placement and discharged with minimum 2 weeks of additional antibiotics (cefepime, vancomycin, Flagyl) on March 14th._  - Admission CT abdomen/pelvis with decrease in size of fluid abdominal collections  - Patient readmitted with NIK and concern for cefepime toxicity  - Holding vancomycin and Flagyl  - Starting Zosyn  - Infectious disease consult in the morning

## 2022-03-20 NOTE — PLAN OF CARE
Problem: Adult Inpatient Plan of Care  Goal: Optimal Comfort and Wellbeing  Outcome: Ongoing, Progressing  Goal: Readiness for Transition of Care  Outcome: Ongoing, Progressing     Problem: Adult Inpatient Plan of Care  Goal: Plan of Care Review  Outcome: Ongoing, Not Progressing  Goal: Patient-Specific Goal (Individualized)  Outcome: Ongoing, Not Progressing     Problem: Nutrition Impaired (Sepsis/Septic Shock)  Goal: Optimal Nutrition Intake  Outcome: Ongoing, Not Progressing     Problem: Fluid and Electrolyte Imbalance (Acute Kidney Injury/Impairment)  Goal: Fluid and Electrolyte Balance  Outcome: Ongoing, Not Progressing     Problem: Oral Intake Inadequate (Acute Kidney Injury/Impairment)  Goal: Optimal Nutrition Intake  Outcome: Ongoing, Not Progressing     Cont eeg, neuro consult, ID consult, Iv abxs, cont poc.

## 2022-03-20 NOTE — HPI
Mr. Parada is a 89 year male with history of A-fib, CAD, CHF, COPD, DM, recent admissions to [facility] for septic shock and intraabdominal abscess (at site of hernia repair with mesh implanted over 20 years prior) on outpatient IV antibiotics who presented to [facility] on March 19 for confusion and slurred speech.    Per chart review, patient admitted to [facility] twice this month for his intraabdominal infection. On initial admission he was not deemed a surgical candidate. IR placed a drain in the collection on 3/3. Cultures returned negative. Gram stain positive for few GPR, rare GNR.  It was noted that the drain was placed superficial to the mesh and there was likely a component of this beneath the mesh which may be difficult to put a drain into. Patient clinically improved on empiric ceftriaxone an flagyl and discharged on this for a 2 week course. Patient developed fevers at home, HH nurse felt abdomen was enlarged and patient returned to ED. Repeat CT showed a larger fluid collection on 3/11. He was placed on vancomycin, cefepime and metronidizole and IR again consulted.  A lager drain was placed and about 500 ccs of bloody fluid obtained. No new cultures obtained. Patient discharged on 3/14 with a plan to complete a minimum of a 2 week course of vancomycin, cefepime, and PO flagyl  with repeat imaging  to evaluate size of abscess prior to consideration of discontinuation of antibiotics.    Patient has been at home and wife noted patient became more altered. 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 In ED at [facility] patient noted to have an NIK and supratherapeutic vanc level. Afebrile and no leukocytosis. VSS. Patient transferred to C or neurologic evaluation of slurred speech and treatment of NIK.  CT head no acute abnormality. Repeat CT A/P 3/19 showed decreased size of collection. Drain remains in place. Blood cx and flu negative. UA positive. ID consulted for antibiotic  recommendations. Patient currently alert but disoriented and unable to verbally communicate. Appears agitated.

## 2022-03-20 NOTE — ASSESSMENT & PLAN NOTE
- Initially treated at [place] early March 2022 with IR drain placement and treated with Rocephin and Flagyl.  Readmitted to [place] in mid March with IR drain placement and discharged with minimum 2 weeks of additional antibiotics (cefepime, vancomycin, Flagyl) on March 14th._  - Admission CT abdomen/pelvis with decrease in size of fluid abdominal collections  - Patient readmitted with NIK and concern for cefepime toxicity, vancomycin induced NIK  - ID consulted, appreciate recs  -- Discontinue Zosyn  -- Starting Unasyn  - UA with candida albicans, likely colonization

## 2022-03-20 NOTE — ASSESSMENT & PLAN NOTE
ID consult received. Chart being reviewed. Full consult note with recommendations to follow.      In the interim, please secure chat with any questions.    Thank you,  Didi Newman PA-C

## 2022-03-20 NOTE — H&P
"[facility]  History & Physical    Patient Name: Eddie Parada Sr.  MRN: [id number]  Patient Class: OP- Observation  Admission Date: 3/19/2022  Attending Physician: Gustavo Gerard MD   Primary Care Provider: Callum Roberts MD         Patient information was obtained from relative(s), past medical records and ER records.     Subjective:     Principal Problem:Encephalopathy, toxic    Chief Complaint:   Chief Complaint   Patient presents with    AMS        HPI: Unable to obtain history from patient secondary to mental status.    Spoke with daughter over the phone.  She stated the patient is very functional at baseline and currently able to run a business.  She said there was a decrease in mentation day prior to presentation but still able to converse with family.  This morning patient was notably altered and he oriented to self.  He was still able to recognize familiar faces.  However patient was noted to have general weakness, tremor, and decreased concentration prompting EMS call that brought him to outside hospital.    From provider transfer note:  "89-year-old male with a history of atrial fibrillation (on Xarelto), coronary artery disease, chronic diastolic heart failure, diabetes, COPD, osteoporosis, hyperlipidemia, and recent admissions to [facility] (March 3-8 and March 12-14) for intra-abdominal abscess with septic shock presented to [facility] on March 19 with confusion and slurred speech that began the day prior to presentation.  In the emergency department he was also noted to have acute kidney injury. He is still urinating.  He is on outpatient cefepime, metronidazole, and IV vancomycin.  No focal motor deficits noted in his arms or legs.  In the emergency department he received IV fluid and Rocephin.  Of note, one lumen of his PICC appeared clogged, and alteplase was given. In the emergency department on March 19 he was noted to be hemodynamically " stable.  They are requesting transfer for further evaluation of the slurred speech/confusion (concern for possible stroke with slurred speech) and evaluation/treatment of his acute kidney injury.  They are unable to perform MRIs with pacemaker in place at [facility].  Plan for transfer to Hospital Medicine at [facility] for neurologic evaluation and treatment of NIK.  Abdominal fluid collection appears to be improved.  No evidence of sepsis noted.     Initially admitted to [facility] March 3-8 with fever and altered mental status felt to be related to septic shock from intra-abdominal abscess.  He required Levophed initially but subsequently stepped down to a floor bed.  He was treated with broad-spectrum antibiotics.  He was seen by General Surgery and recommendation was for IR placement of a drain.  He was subsequently discharged on Rocephin and Flagyl.     He was readmitted to [facility] March 12-14 for upsizing of his abdominal drainage catheter.  Recommendation was for 2 weeks of cefepime, vancomycin, and oral Flagyl.  He was discharged home in stable condition.    Urinalysis with no red blood cells 75 white blood cells/moderate bacteria/moderate yeast/1+ protein/trace ketones/2+ occult blood/2+ leukocytes. Random vancomycin level 33.3, procalcitonin 0.06, , troponin 0.026, CPK 24, sodium 137, potassium 4.1, chloride 104, CO2 22, BUN 26, creatinine 2.5 baseline (0.9-1), glucose 173, AST 21, ALT 8, influenza negative, lactic acid 0.9, COVID negative, white blood cells 9.06, hemoglobin 10.1, hematocrit 30.9, platelets 267. Blood cultures ordered     Chest x-ray showed a left-sided pacemaker, right-sided PICC line, no focal parenchymal consolidation.  Improved aeration of the left lung base.  Cardiomediastinal silhouette is stable.     CT head noted no acute abnormality.     CT abdomen and pelvis noted interval decrease in the size of a large abdominal collection/abscess compared with  "March 11. Drainage catheter is in place within the collection.  Trace right and small left pleural effusion, slightly increased since previous exam.  Unenhanced liver is unremarkable.  Pancreatic atrophy is noted.  Urinary bladder demonstrates decreased wall thickening compared to the previous exam."      Past Medical History:   Diagnosis Date    Abdominal fibromatosis     Acute posthemorrhagic anemia 1/9/2018    NIK (acute kidney injury) 1/11/2018    Atrial fibrillation     Bradycardia     Broken arm     CAD (coronary artery disease)     Cancer     basal cell on nose and melanoma on back    CHF (congestive heart failure)     COPD (chronic obstructive pulmonary disease)     Diabetes mellitus type II     Hyperlipidemia     Localization-related focal epilepsy with simple partial seizures 3/2/2021    Melanoma     Obesity (BMI 30.0-34.9) 9/13/2016    Osteoporosis     Peripheral vascular disease     Primary malignant neoplasm of prostate 3/3/2022    Septic shock 3/3/2022    Stroke     TIA (transient ischemic attack)     Type II diabetes mellitus with peripheral circulatory disorder     Type II or unspecified type diabetes mellitus without mention of complication, not stated as uncontrolled     Unspecified essential hypertension        Past Surgical History:   Procedure Laterality Date    AORTIC VALVE REPLACEMENT      CARDIAC PACEMAKER PLACEMENT  9/28/2012    CARDIAC VALVE REPLACEMENT  11/17/2011    aortic valve-tissue    CHOLECYSTECTOMY      COLONOSCOPY      ESOPHAGOGASTRODUODENOSCOPY N/A 5/27/2021    Procedure: EGD (ESOPHAGOGASTRODUODENOSCOPY);  Surgeon: Gualberto Medrano MD;  Location: [facility];  Service: Endoscopy;  Laterality: N/A;    ESOPHAGOGASTRODUODENOSCOPY N/A 6/22/2021    Procedure: EGD (ESOPHAGOGASTRODUODENOSCOPY);  Surgeon: Gualberto Medrano MD;  Location: [facility];  Service: Endoscopy;  Laterality: N/A;  please call wife(Jessica) with instructions/arrival time.    " ESOPHAGOGASTRODUODENOSCOPY (EGD) WITH DILATION  06/22/2021    EYE SURGERY Bilateral     cataract with lens by  at [facility]    HERNIA REPAIR      abdominal    LASIK      UPPER GASTROINTESTINAL ENDOSCOPY  05/28/2021       Review of patient's allergies indicates:   Allergen Reactions    Ciprofloxacin     Levofloxacin Swelling    Lyrica [pregabalin] Swelling    Unable to assess Other (See Comments)     Soft shell crabs       Current Facility-Administered Medications on File Prior to Encounter   Medication    [COMPLETED] 0.9%  NaCl infusion    [COMPLETED] 0.9%  NaCl infusion    [COMPLETED] alteplase injection 2 mg    [COMPLETED] aspirin tablet 325 mg    [DISCONTINUED] cefTRIAXone (ROCEPHIN) 2 g/50 mL D5W IVPB     Current Outpatient Medications on File Prior to Encounter   Medication Sig    acetaminophen (TYLENOL) 500 MG tablet Take 500 mg by mouth every 6 (six) hours as needed for Pain.    amLODIPine (NORVASC) 10 MG tablet TAKE 1 TABLET BY MOUTH DAILY FOR BLOOD PRESSURE    blood sugar diagnostic (BLOOD GLUCOSE TEST) [facility] [employer] ULTRA TESTING STRIPS. USE TO TEST BLOOD GLUCOSE TWICE DAILY. DX CODE: E11.51    blood-glucose meter Misc ONETOUCH ULTRA GLUCOSE METER. USE AS DIRECTED TO TEST BLOOD GLUCOSE. DX CODE: E11.51    carvediloL (COREG) 12.5 MG tablet TAKE 1 TABLET (12.5 MG TOTAL) BY MOUTH 2 (TWO) TIMES DAILY WITH MEALS.    cefepime in dextrose 5 % (MAXIPIME) 2 gram/50 mL PgBk Inject 50 mLs (2 g total) into the vein every 8 (eight) hours.    doxazosin (CARDURA) 1 MG tablet Take 1 mg by mouth nightly.    fenofibrate 160 MG Tab fenofibrate 160 mg tablet    furosemide (LASIX) 20 MG tablet TAKE ONE TABLET BY MOUTH EVERY OTHER DAY    gabapentin (NEURONTIN) 100 MG capsule Take 2 capsules (200 mg total) by mouth every evening.    gemfibrozil (LOPID) 600 MG tablet Take one tablet(600mg) by mouth once daily    glimepiride (AMARYL) 2 MG tablet TAKE 1 TABLET BY MOUTH EVERY MORNING WITH BREAKFAST  FOR DIABETES    lancets (ONETOUCH ULTRASOFT LANCETS) Misc USE TO TEST BLOOD GLUCOSE TWICE DAILY. DX CODE: E11.51    levothyroxine (SYNTHROID) 75 MCG tablet Take 1 tablet (75 mcg total) by mouth before breakfast.    lisinopriL (PRINIVIL,ZESTRIL) 20 MG tablet     metroNIDAZOLE (FLAGYL) 500 MG tablet Take 1 tablet (500 mg total) by mouth every 8 (eight) hours. for 14 days    mupirocin (BACTROBAN) 2 % ointment by Nasal route 2 (two) times daily. for 2 days    pantoprazole (PROTONIX) 40 MG tablet TAKE ONE TABLET BY MOUTH ONCE A DAY FOR STOMACH PROBLEMS    pioglitazone (ACTOS) 15 MG tablet ONE TABLET ONE TIME A DAY FOR DIABETES    potassium chloride SA (K-DUR,KLOR-CON) 10 MEQ tablet Take 1 tablet (10 mEq total) by mouth once daily.    pravastatin (PRAVACHOL) 20 MG tablet TAKE 1 TABLET BY MOUTH DAILY FOR CHOLESTROL    rivaroxaban (XARELTO) 20 mg Tab Take one tablet(20mg) by mouth once daily    tamsulosin (FLOMAX) 0.4 mg Cap Take 1 capsule (0.4 mg total) by mouth once daily.    vancomycin HCl (VANCOMYCIN 1.25 G/250 ML D5W, READY TO MIX,) Inject 250 mLs (1,250 mg total) into the vein once daily.     Family History       Problem Relation (Age of Onset)    Alcohol abuse Father    COPD Sister, Brother    Cancer Brother    Diabetes Daughter    Heart disease Brother    Hypertension Father    No Known Problems Son, Daughter, Son    Stroke Father          Tobacco Use    Smoking status: Former Smoker     Quit date: 1996     Years since quittin.5    Smokeless tobacco: Never Used    Tobacco comment: cigar smoker   Substance and Sexual Activity    Alcohol use: No     Comment: none since     Drug use: No    Sexual activity: Not Currently     Review of Systems   Unable to perform ROS: Mental status change   Objective:     Vital Signs (Most Recent):  Temp: 97.8 °F (36.6 °C) (22)  Pulse: 60 (22)  Resp: 20 (22)  BP: (!) 122/57 (22)  SpO2: 98 % (22)    Vital Signs (24h Range):  Temp:  [97.8 °F (36.6 °C)] 97.8 °F (36.6 °C)  Pulse:  [60-67] 60  Resp:  [20-24] 20  SpO2:  [97 %-100 %] 98 %  BP: (122-144)/(56-64) 122/57     Weight: 81.8 kg (180 lb 5.4 oz)  Body mass index is 30.01 kg/m².    Physical Exam  Constitutional:       General: He is awake. He is not in acute distress.     Appearance: He is obese. He is not ill-appearing or toxic-appearing.   HENT:      Head: Normocephalic and atraumatic.      Mouth/Throat:      Mouth: Mucous membranes are moist.   Eyes:      General: No scleral icterus.     Extraocular Movements: Extraocular movements intact.      Conjunctiva/sclera: Conjunctivae normal.      Pupils: Pupils are equal, round, and reactive to light.   Cardiovascular:      Rate and Rhythm: Normal rate. Rhythm irregular.      Heart sounds: No murmur heard.  Pulmonary:      Effort: Pulmonary effort is normal. No respiratory distress.      Breath sounds: Normal breath sounds. No wheezing or rales.   Abdominal:      General: Abdomen is flat. There is no distension.      Palpations: Abdomen is soft.      Tenderness: There is no abdominal tenderness. There is no guarding.      Comments: Percutaneous drain anterior right abdomen   Musculoskeletal:         General: No swelling or tenderness.   Skin:     Findings: No rash.   Neurological:      Mental Status: He is disoriented and confused.      Cranial Nerves: No cranial nerve deficit.      Sensory: No sensory deficit.      Motor: No weakness (5/5 upper, unable to assess lower 2/2 mentation).   Psychiatric:         Attention and Perception: He is inattentive.         Speech: Speech is delayed.         CRANIAL NERVES     CN III, IV, VI   Pupils are equal, round, and reactive to light.     Significant Labs: CBC:   Recent Labs   Lab 03/19/22  0735   WBC 9.06   HGB 10.1*   HCT 30.9*        CMP:   Recent Labs   Lab 03/19/22  0735      K 4.1      CO2 22*   *   BUN 26   CREATININE 2.5*   CALCIUM 8.6*    PROT 5.8*   ALBUMIN 2.5*   BILITOT 0.9   ALKPHOS 82   AST 21   ALT 8*   ANIONGAP 11   EGFRNONAA 21*     Lactic Acid:   Recent Labs   Lab 03/19/22  0735   LACTATE 0.9     Urine Studies:   Recent Labs   Lab 03/19/22  0835   COLORU Yellow   APPEARANCEUA Clear   PHUR 5.0   SPECGRAV 1.015   PROTEINUA 1+*   GLUCUA Negative   KETONESU Trace*   BILIRUBINUA Negative   OCCULTUA 2+*   NITRITE Negative   UROBILINOGEN Negative   LEUKOCYTESUR 2+*   RBCUA 0   WBCUA 75*   BACTERIA Moderate*   SQUAMEPITHEL 15   HYALINECASTS 0       Significant Imaging: I have reviewed all pertinent imaging results/findings within the past 24 hours.    Assessment/Plan:     * Encephalopathy, toxic  - To the presentation of worsening mental status with noted disorientation, tremor, and decrease concentration.  Exam nonfocal.  CT head with no acute process.  Chest x-ray unremarkable.  - Differential includes cefepime neurotoxicity in setting of NIK, CVA, metabolic disturbance, infectious  - Follow-up blood and urine cultures  - Discontinue cefepime  - EEG  - Neurochecks Q 4  - Neurology consult in the morning        Abdominal wall abscess  - Initially treated at [place] early March 2022 with IR drain placement and treated with Rocephin and Flagyl.  Readmitted to [place] in mid March with IR drain placement and discharged with minimum 2 weeks of additional antibiotics (cefepime, vancomycin, Flagyl) on March 14th._  - Admission CT abdomen/pelvis with decrease in size of fluid abdominal collections  - Patient readmitted with NIK and concern for cefepime toxicity  - Holding vancomycin and Flagyl  - Starting Zosyn  - Infectious disease consult in the morning      NIK (acute kidney injury)  -  Patient creatinine was at baseline 0.9 on 3/14 and on presentation  - Admission creatinine 2.5  - Patient was discharged on vancomycin, with random admission vanc level 33  - UA with granular casts  - Concern for ATN d/t vancomycin, patient also home Lasix  - Avoiding  nephrotoxic medications  - Holding IV fluids as patient does not appear hypovolemic  - Trend BMP  - Consider nephrology consult, shields if creatinine continues up trend      Disorder of prostate  Continue home tamsulosin      Hypothyroidism due to acquired atrophy of thyroid  Continue home levothyroxine    Peripheral vascular disease  Continue home fenofibrate, pravastatin      Type 2 diabetes mellitus, controlled  Home glimepiride, pioglitazone  Diabetic diet  Sliding scale insulin  Accu-Cheks  Holding home gabapentin due to NKI    Cardiac pacemaker in situ  - Unclear if MRI compatible      Essential (primary) hypertension  Holding home lisinopril due to NIK  Amlodipine 5mg (home 10mg)  Continue home Coreg        Chronic atrial fibrillation  Holding home rivaroxaban due to NIK  Continue Coreg      VTE Risk Mitigation (From admission, onward)         Ordered     IP VTE HIGH RISK PATIENT  Once         03/19/22 1616     Place sequential compression device  Until discontinued         03/19/22 1616                   Gustavo Gerard MD  Department of Hospital Medicine   [facility] - Telemetry Stepdown

## 2022-03-20 NOTE — HPI
Patient is a 89yoM with atrial fibrillation (on Xarelto), coronary artery disease, chronic diastolic heart failure, diabetes, COPD, osteoporosis, hyperlipidemia, and recent admissions to [facility] (March 3-8 and March 12-14) for intra-abdominal abscess with septic shock. He was discharged home at that time on cefepime, metronidazole and vancand after abscess drainage. The patient later presented to [facility] on 3/19 with confusion and dysarthria that began on prior day. He was noted to have an NIK on arrival. Per report, the patient had no focal motor deficit at that time. MRI was unable to be performed at that time due to pacemaker incompatability. The patient was transferred to [facility] for further care. Neurology was consulted for the patients AMS.     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  She said there was a decrease in mentation day prior to presentation but still able to converse with family.  This morning patient was notably altered and he oriented to self.  He was still able to recognize familiar faces.  However patient was noted to have general weakness, tremor, and decreased concentration prompting EMS call that brought him to outside hospital.

## 2022-03-20 NOTE — ASSESSMENT & PLAN NOTE
- Home glimepiride, pioglitazone  - Diabetic diet  - Sliding scale insulin  - Accu-Cheks  - Holding home gabapentin due to NIK

## 2022-03-20 NOTE — ASSESSMENT & PLAN NOTE
- To the presentation of worsening mental status with noted disorientation, tremor, and decrease concentration.  Exam nonfocal.  CT head with no acute process.  Chest x-ray unremarkable.  - Differential includes cefepime neurotoxicity in setting of NIK, CVA, metabolic disturbance, infectious  - Follow-up blood and urine cultures  - Discontinue cefepime  - EEG  - Neurochecks Q 4  - Neurology consult in the morning

## 2022-03-20 NOTE — CONSULTS
[facility] - Telemetry Stepdown  Infectious Disease  Consult Note    Patient Name: Eddie Parada Sr.  MRN: [id number]  Admission Date: 3/19/2022  Hospital Length of Stay: 0 days  Attending Physician: Gustavo Gerard MD  Primary Care Provider: Callum Roberts MD     Isolation Status: No active isolations    Patient information was obtained from spouse/SO, past medical records and ER records.      Consults  Assessment/Plan:     Encephalopathy     See assessment and plan below    Abdominal wall abscess     89 year male with history of A-fib, CAD, CHF, COPD, DM, intraabdominal abscess (at site of hernia repair with mesh implanted over 20 years prior) on outpatient IV antibiotics (Cefepime, Vancomycin and Flagyl) admitted for AMS. See HPI for full history.    In ED at OSH patient noted to have an NIK and supratherapeutic vanc level. CT head no acute abnormality. Repeat CT A/P 3/19 showed decreased size of collection. Drain remains in place. Blood cx NGTD.  ID consulted for antibiotic recommendations. Neurology consulted as well.      Recommendations  · Would not give further vancomycin in setting of NIK and supratherapeutic Vanc levels. It is also possible patient has a component of cefepime induced neurotoxicity in setting of decreased renal function though will follow up neurology work up and evaluation  · Discontinue Zosyn and start IV Unasyn  · Follow blood cultures  · Will follow up tomorrow to assess for improvements in mentation  · Discussed antibiotic plan with ID staff. ID will follow.            Thank you for the consult. Please call for any questions.  Didi Newman PA-C  ID [id number] Spectra: [id number]    Subjective:     Principal Problem: Encephalopathy, toxic    HPI: Mr. Parada is a 89 year male with history of A-fib, CAD, CHF, COPD, DM, recent admissions to [facility] for septic shock and intraabdominal abscess (at site of hernia repair with mesh implanted over 20 years prior) on outpatient IV  antibiotics who presented to [facility] on March 19 for confusion and slurred speech.    Per chart review, patient admitted to [facility] twice this month for his intraabdominal infection. On initial admission he was not deemed a surgical candidate. IR placed a drain in the collection on 3/3. Cultures returned negative. Gram stain positive for few GPR, rare GNR.  It was noted that the drain was placed superficial to the mesh and there was likely a component of this beneath the mesh which may be difficult to put a drain into. Patient clinically improved on empiric ceftriaxone an flagyl and discharged on this for a 2 week course. Patient developed fevers at home, HH nurse felt abdomen was enlarged and patient returned to ED. Repeat CT showed a larger fluid collection on 3/11. He was placed on vancomycin, cefepime and metronidizole and IR again consulted.  A lager drain was placed and about 500 ccs of bloody fluid obtained. No new cultures obtained. Patient discharged on 3/14 with a plan to complete a minimum of a 2 week course of vancomycin, cefepime, and PO flagyl  with repeat imaging  to evaluate size of abscess prior to consideration of discontinuation of antibiotics.    Patient has been at home and wife noted patient became more altered. 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 In ED at OSH patient noted to have an NIK and supratherapeutic vanc level. Afebrile and no leukocytosis. VSS. Patient transferred to [facility] or neurologic evaluation of slurred speech and treatment of NIK.  CT head no acute abnormality. Repeat CT A/P 3/19 showed decreased size of collection. Drain remains in place. Blood cx and flu negative. UA positive. ID consulted for antibiotic recommendations. Patient currently alert but disoriented and unable to verbally communicate. Appears agitated.                Past Medical History:   Diagnosis Date    Abdominal fibromatosis     Acute posthemorrhagic anemia 1/9/2018    NIK  (acute kidney injury) 1/11/2018    Atrial fibrillation     Bradycardia     Broken arm     CAD (coronary artery disease)     Cancer     basal cell on nose and melanoma on back    CHF (congestive heart failure)     COPD (chronic obstructive pulmonary disease)     Diabetes mellitus type II     Hyperlipidemia     Localization-related focal epilepsy with simple partial seizures 3/2/2021    Melanoma     Obesity (BMI 30.0-34.9) 9/13/2016    Osteoporosis     Peripheral vascular disease     Primary malignant neoplasm of prostate 3/3/2022    Septic shock 3/3/2022    Stroke     TIA (transient ischemic attack)     Type II diabetes mellitus with peripheral circulatory disorder     Type II or unspecified type diabetes mellitus without mention of complication, not stated as uncontrolled     Unspecified essential hypertension        Past Surgical History:   Procedure Laterality Date    AORTIC VALVE REPLACEMENT      CARDIAC PACEMAKER PLACEMENT  9/28/2012    CARDIAC VALVE REPLACEMENT  11/17/2011    aortic valve-tissue    CHOLECYSTECTOMY      COLONOSCOPY      ESOPHAGOGASTRODUODENOSCOPY N/A 5/27/2021    Procedure: EGD (ESOPHAGOGASTRODUODENOSCOPY);  Surgeon: Gualberto Medrano MD;  Location: [facility];  Service: Endoscopy;  Laterality: N/A;    ESOPHAGOGASTRODUODENOSCOPY N/A 6/22/2021    Procedure: EGD (ESOPHAGOGASTRODUODENOSCOPY);  Surgeon: Gualberto Medrano MD;  Location: [facility];  Service: Endoscopy;  Laterality: N/A;  please call wife(Jessica) with instructions/arrival time.    ESOPHAGOGASTRODUODENOSCOPY (EGD) WITH DILATION  06/22/2021    EYE SURGERY Bilateral     cataract [facility] lens [facility]    HERNIA REPAIR      abdominal    LASIK      UPPER GASTROINTESTINAL ENDOSCOPY  05/28/2021       Review of patient's allergies indicates:   Allergen Reactions    Ciprofloxacin     Levofloxacin Swelling    Lyrica [pregabalin] Swelling    Unable to assess Other (See Comments)     Soft  shell crabs       Medications:  Medications Prior to Admission   Medication Sig    acetaminophen (TYLENOL) 500 MG tablet Take 500 mg by mouth every 6 (six) hours as needed for Pain.    amLODIPine (NORVASC) 10 MG tablet TAKE 1 TABLET BY MOUTH DAILY FOR BLOOD PRESSURE    blood sugar diagnostic (BLOOD GLUCOSE TEST) [facility] LightswitchTOUCH ULTRA TESTING STRIPS. USE TO TEST BLOOD GLUCOSE TWICE DAILY. DX CODE: E11.51    blood-glucose meter Misc ONETOUCH ULTRA GLUCOSE METER. USE AS DIRECTED TO TEST BLOOD GLUCOSE. DX CODE: E11.51    carvediloL (COREG) 12.5 MG tablet TAKE 1 TABLET (12.5 MG TOTAL) BY MOUTH 2 (TWO) TIMES DAILY WITH MEALS.    cefepime in dextrose 5 % (MAXIPIME) 2 gram/50 mL PgBk Inject 50 mLs (2 g total) into the vein every 8 (eight) hours.    doxazosin (CARDURA) 1 MG tablet Take 1 mg by mouth nightly.    fenofibrate 160 MG Tab fenofibrate 160 mg tablet    furosemide (LASIX) 20 MG tablet TAKE ONE TABLET BY MOUTH EVERY OTHER DAY    gabapentin (NEURONTIN) 100 MG capsule Take 2 capsules (200 mg total) by mouth every evening.    gemfibrozil (LOPID) 600 MG tablet Take one tablet(600mg) by mouth once daily    glimepiride (AMARYL) 2 MG tablet TAKE 1 TABLET BY MOUTH EVERY MORNING WITH BREAKFAST FOR DIABETES    lancets (ONETOUCH ULTRASOFT LANCETS) Misc USE TO TEST BLOOD GLUCOSE TWICE DAILY. DX CODE: E11.51    levothyroxine (SYNTHROID) 75 MCG tablet Take 1 tablet (75 mcg total) by mouth before breakfast.    lisinopriL (PRINIVIL,ZESTRIL) 20 MG tablet     metroNIDAZOLE (FLAGYL) 500 MG tablet Take 1 tablet (500 mg total) by mouth every 8 (eight) hours. for 14 days    mupirocin (BACTROBAN) 2 % ointment by Nasal route 2 (two) times daily. for 2 days    pantoprazole (PROTONIX) 40 MG tablet TAKE ONE TABLET BY MOUTH ONCE A DAY FOR STOMACH PROBLEMS    pioglitazone (ACTOS) 15 MG tablet ONE TABLET ONE TIME A DAY FOR DIABETES    potassium chloride SA (K-DUR,KLOR-CON) 10 MEQ tablet Take 1 tablet (10 mEq total) by mouth once  daily.    pravastatin (PRAVACHOL) 20 MG tablet TAKE 1 TABLET BY MOUTH DAILY FOR CHOLESTROL    rivaroxaban (XARELTO) 20 mg Tab Take one tablet(20mg) by mouth once daily    tamsulosin (FLOMAX) 0.4 mg Cap Take 1 capsule (0.4 mg total) by mouth once daily.    vancomycin HCl (VANCOMYCIN 1.25 G/250 ML D5W, READY TO MIX,) Inject 250 mLs (1,250 mg total) into the vein once daily.     Antibiotics (From admission, onward)                Start     Stop Route Frequency Ordered    22 1900  piperacillin-tazobactam 4.5 g in sodium chloride 0.9% 100 mL IVPB (ready to mix system)         -- IV Every 12 hours (non-standard times) 22 1755          Antifungals (From admission, onward)                None          Antivirals (From admission, onward)      None             Immunization History   Administered Date(s) Administered    COVID-19, MRNA, LN-S, PF (Pfizer) (Purple Cap) 2021, 2021    Influenza (FLUAD) - Quadrivalent - Adjuvanted - PF *Preferred* (65+) 2022    Influenza - High Dose - PF (65 years and older) 2003, 2010, 2013, 2014, 10/13/2016, 2017, 2018, 10/22/2019    Influenza - Quadrivalent - High Dose - PF (65 years and older) 2020    Influenza - Trivalent (ADULT) 2003, 2013    Influenza A (H1N1) 2009 Monovalent - IM 2010    Influenza Split 2013    Pneumococcal Conjugate - 13 Valent 2017    Pneumococcal Polysaccharide - 23 Valent 2001, 2006    Tdap 2018       Family History       Problem Relation (Age of Onset)    Alcohol abuse Father    COPD Sister, Brother    Cancer Brother    Diabetes Daughter    Heart disease Brother    Hypertension Father    No Known Problems Son, Daughter, Son    Stroke Father          Social History     Socioeconomic History    Marital status:    Tobacco Use    Smoking status: Former Smoker     Quit date: 1996     Years since quittin.5    Smokeless  tobacco: Never Used    Tobacco comment: cigar smoker   Substance and Sexual Activity    Alcohol use: No     Comment: none since 2006    Drug use: No    Sexual activity: Not Currently     Social Determinants of Health     Financial Resource Strain: Low Risk     Difficulty of Paying Living Expenses: Not hard at all   Food Insecurity: No Food Insecurity    Worried About Running Out of Food in the Last Year: Never true    Ran Out of Food in the Last Year: Never true   Transportation Needs: No Transportation Needs    Lack of Transportation (Medical): No    Lack of Transportation (Non-Medical): No   Physical Activity: Inactive    Days of Exercise per Week: 0 days    Minutes of Exercise per Session: 0 min   Stress: No Stress Concern Present    Feeling of Stress : Not at all   Social Connections: Moderately Isolated    Frequency of Communication with Friends and Family: More than three times a week    Frequency of Social Gatherings with Friends and Family: Three times a week    Attends Baptism Services: Never    Active Member of Clubs or Organizations: No    Attends Club or Organization Meetings: Never    Marital Status:    Housing Stability: Low Risk     Unable to Pay for Housing in the Last Year: No    Number of Places Lived in the Last Year: 1    Unstable Housing in the Last Year: No     Review of Systems   Unable to perform ROS: Mental status change   Objective:     Vital Signs (Most Recent):  Temp: 99.2 °F (37.3 °C) (03/20/22 1132)  Pulse: 102 (03/20/22 1132)  Resp: 18 (03/20/22 1132)  BP: (!) 146/86 (03/20/22 1132)  SpO2: 95 % (03/20/22 1132)   Vital Signs (24h Range):  Temp:  [97.5 °F (36.4 °C)-99.2 °F (37.3 °C)] 99.2 °F (37.3 °C)  Pulse:  [] 102  Resp:  [18-20] 18  SpO2:  [95 %-98 %] 95 %  BP: (122-159)/(57-86) 146/86     Weight: 81.8 kg (180 lb 5.4 oz)  Body mass index is 30.01 kg/m².    Estimated Creatinine Clearance: 20.2 mL/min (A) (based on SCr of 2.3 mg/dL (H)).    Physical  Exam  Constitutional:       General: He is awake. He is not in acute distress.     Appearance: He is not ill-appearing or toxic-appearing.   HENT:      Head: Normocephalic and atraumatic.      Nose: Nose normal.      Mouth/Throat:      Mouth: Mucous membranes are moist.   Eyes:      General: No scleral icterus.     Conjunctiva/sclera: Conjunctivae normal.   Cardiovascular:      Rate and Rhythm: Normal rate. Rhythm irregular.   Pulmonary:      Effort: Pulmonary effort is normal. No respiratory distress.      Breath sounds: Normal breath sounds. No wheezing or rales.   Abdominal:      General: There is no distension.      Palpations: Abdomen is soft.      Tenderness: There is no abdominal tenderness.      Comments: right abdominal drain with serosanguineous output   Musculoskeletal:         General: No swelling or tenderness.   Skin:     Findings: Bruising present. No rash.   Neurological:      Mental Status: He is disoriented and confused.      Cranial Nerves: No cranial nerve deficit.      Sensory: No sensory deficit.      Motor: No weakness.      Comments: Patient able to nod to two questions and squeeze my hands. Appears anxious and moving around in bed trashing and repetitively removing covers from his body   Psychiatric:         Behavior: Behavior is agitated.       Significant Labs: Blood Culture:   Recent Labs   Lab 03/02/22  1728 03/12/22  1248 03/19/22  0734   LABBLOO No growth after 5 days.  No growth after 5 days. No growth after 5 days.  No growth after 5 days. No Growth to date  No Growth to date     BMP:   Recent Labs   Lab 03/20/22  0402   *      K 4.0      CO2 21*   BUN 26   CREATININE 2.3*   CALCIUM 8.4*   MG 1.7     CBC:   Recent Labs   Lab 03/19/22  0735 03/20/22  0402   WBC 9.06 7.56   HGB 10.1* 9.9*   HCT 30.9* 30.7*    241     CMP:   Recent Labs   Lab 03/19/22  0735 03/20/22  0402    141   K 4.1 4.0    110   CO2 22* 21*   * 133*   BUN 26 26    CREATININE 2.5* 2.3*   CALCIUM 8.6* 8.4*   PROT 5.8* 5.5*   ALBUMIN 2.5* 2.3*   BILITOT 0.9 0.6   ALKPHOS 82 78   AST 21 19   ALT 8* 8*   ANIONGAP 11 10   EGFRNONAA 21* 23.8*     HIV Rapid: No results for input(s): HIVRAPID in the last 48 hours.  Microbiology Results (last 7 days)       ** No results found for the last 168 hours. **          Pathology Results  (Last 10 years)      None          Respiratory Culture: No results for input(s): GSRESP, RESPIRATORYC in the last 4320 hours.  Urine Culture:   Recent Labs   Lab 03/11/22  1737 03/19/22  0835   LABURIN CANDIDA ALBICANS  10,000 - 49,999 cfu/ml  Treatment of asymptomatic candiduria is not recommended (except for   specific populations). Candida isolated in the urine typically   represents colonization. If an indwelling urinary catheter is present  it should be removed or replaced.  * TAMARA ALBICANS  10,000 - 49,999 cfu/ml  Treatment of asymptomatic candiduria is not recommended (except for   specific populations). Candida isolated in the urine typically   represents colonization. If an indwelling urinary catheter is present  it should be removed or replaced.  *     Urine Studies:   Recent Labs   Lab 03/19/22  0835   COLORU Yellow   APPEARANCEUA Clear   PHUR 5.0   SPECGRAV 1.015   PROTEINUA 1+*   GLUCUA Negative   KETONESU Trace*   BILIRUBINUA Negative   OCCULTUA 2+*   NITRITE Negative   UROBILINOGEN Negative   LEUKOCYTESUR 2+*   RBCUA 0   WBCUA 75*   BACTERIA Moderate*   SQUAMEPITHEL 15   HYALINECASTS 0     Wound Culture:   Recent Labs   Lab 03/03/22  1625   LABAERO No growth     All pertinent labs within the past 24 hours have been reviewed.    Significant Imaging:   EXAMINATION:  CT ABDOMEN PELVIS WITHOUT CONTRAST     CLINICAL HISTORY:  Abdominal abscess/infection suspected;     TECHNIQUE:  Low dose axial images, sagittal and coronal reformations were obtained from the lung bases to the pubic symphysis.  30 mL of oral Omnipaque 350 was administered.      COMPARISON:  The CT from 8 days prior.     FINDINGS:  When compared to the most recent prior exam from 03/11/2022 there is been interval decrease in size of the collection abutting the anterior abdominal wall.  This currently measures 15 x 5 4.4 cm series 2, image 70 compared with 17 x 9 cm on the prior exam. Drain remains in place.  Locules of air remain within the collection.     Limited imaging through the inferior thorax demonstrates trace right and small left pleural effusion, slightly increased since the previous exam.  Heart size is increased.  Thoracic and coronary artery atherosclerosis is noted. Pacer leads in the right heart are present.     Bones are intact. Stable postoperative changes of the right femur. Degenerative changes of the spine.     Unenhanced liver is unremarkable. Patient is status post cholecystectomy.  Unenhanced spleen is within normal limits. Pancreatic atrophy is noted. Adrenals are normal. Left renal cysts are noted.  Other smaller lesions in the kidneys too small to characterize but likely relate to cysts as well. One of these on series 2, image 69 demonstrates attenuation slightly greater than fluid and likely relates to a complex cyst. Correlation with nonemergent ultrasound could be considered as warranted. There is no evidence of hydronephrosis.     Stable postsurgical changes of the stomach. There is no evidence of bowel obstruction. Normal appendix is seen. Colonic diverticulosis is noted without convincing evidence of diverticulitis.     Urinary bladder demonstrates decreased wall thickening compared to the previous exam. Prostate gland is enlarged with heterogeneous appearance and contains calcifications.     Aorta iliac atherosclerosis is noted. Small scattered retroperitoneal and inguinal lymph nodes are present. There is a fat and bowel containing right inguinal hernia and as well as a fat containing left inguinal hernia, unchanged from prior.     Motion artifact in the  abdomen is present.     Impression:     Interval decrease in size of a large abdominal collection/abscess when compared to 03/11/2022 as above.  Drainage catheter is in place within the collection multiple other findings as discussed above and not significantly changed.        Electronically signed by: Alin Garcia MD  Date:                                            03/19/2022  Time:                                           09:45

## 2022-03-20 NOTE — ASSESSMENT & PLAN NOTE
- To the presentation of worsening mental status with noted disorientation, tremor, and decrease concentration.  Exam nonfocal.  CT head with no acute process.  Chest x-ray unremarkable.  - Differential includes cefepime neurotoxicity in setting of NIK, CVA, metabolic disturbance, infectious  - Continue infectious workup as below  - Neurology consulted  -- Ativan challenge  -- Continuous EEG  - Neurochecks Q 4

## 2022-03-20 NOTE — SUBJECTIVE & OBJECTIVE
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    Melanoma     Obesity (BMI 30.0-34.9) 9/13/2016    Osteoporosis     Peripheral vascular disease     Primary malignant neoplasm of prostate 3/3/2022    Septic shock 3/3/2022    Stroke     TIA (transient ischemic attack)     Type II diabetes mellitus with peripheral circulatory disorder     Type II or unspecified type diabetes mellitus without mention of complication, not stated as uncontrolled     Unspecified essential hypertension      Past Surgical History:   Procedure Laterality Date    AORTIC VALVE REPLACEMENT      CARDIAC PACEMAKER PLACEMENT  9/28/2012    CARDIAC VALVE REPLACEMENT  11/17/2011    aortic valve-tissue    CHOLECYSTECTOMY      COLONOSCOPY      ESOPHAGOGASTRODUODENOSCOPY N/A 5/27/2021    Procedure: EGD (ESOPHAGOGASTRODUODENOSCOPY);  Surgeon: Gualberto Medrano MD;  Location: [facility];  Service: Endoscopy;  Laterality: N/A;    ESOPHAGOGASTRODUODENOSCOPY N/A 6/22/2021    Procedure: EGD (ESOPHAGOGASTRODUODENOSCOPY);  Surgeon: Gualberto Medrano MD;  Location: [facility];  Service: Endoscopy;  Laterality: N/A;  please call wife(Jessica) with instructions/arrival time.    ESOPHAGOGASTRODUODENOSCOPY (EGD) WITH DILATION  06/22/2021    EYE SURGERY Bilateral     cataract with lens by  at [facility]    HERNIA REPAIR      abdominal    LASIK      UPPER GASTROINTESTINAL ENDOSCOPY  05/28/2021     Review of patient's allergies indicates:   Allergen Reactions    Ciprofloxacin     Levofloxacin Swelling    Lyrica  [pregabalin] Swelling    Unable to assess Other (See Comments)     Soft shell crabs     Current Facility-Administered Medications on File Prior to Encounter   Medication    [DISCONTINUED] cefTRIAXone (ROCEPHIN) 2 g/50 mL D5W IVPB     Current Outpatient Medications on File Prior to Encounter   Medication Sig    acetaminophen (TYLENOL) 500 MG tablet Take 500 mg by mouth every 6 (six) hours as needed for Pain.    amLODIPine (NORVASC) 10 MG tablet TAKE 1 TABLET BY MOUTH DAILY FOR BLOOD PRESSURE    blood sugar diagnostic (BLOOD GLUCOSE TEST) [facility] DNAtriXTOUCH ULTRA TESTING STRIPS. USE TO TEST BLOOD GLUCOSE TWICE DAILY. DX CODE: E11.51    blood-glucose meter Misc ONETOUCH ULTRA GLUCOSE METER. USE AS DIRECTED TO TEST BLOOD GLUCOSE. DX CODE: E11.51    carvediloL (COREG) 12.5 MG tablet TAKE 1 TABLET (12.5 MG TOTAL) BY MOUTH 2 (TWO) TIMES DAILY WITH MEALS.    cefepime in dextrose 5 % (MAXIPIME) 2 gram/50 mL PgBk Inject 50 mLs (2 g total) into the vein every 8 (eight) hours.    doxazosin (CARDURA) 1 MG tablet Take 1 mg by mouth nightly.    fenofibrate 160 MG Tab fenofibrate 160 mg tablet    furosemide (LASIX) 20 MG tablet TAKE ONE TABLET BY MOUTH EVERY OTHER DAY    gabapentin (NEURONTIN) 100 MG capsule Take 2 capsules (200 mg total) by mouth every evening.    gemfibrozil (LOPID) 600 MG tablet Take one tablet(600mg) by mouth once daily    glimepiride (AMARYL) 2 MG tablet TAKE 1 TABLET BY MOUTH EVERY MORNING WITH BREAKFAST FOR DIABETES    lancets (ONETOUCH ULTRASOFT LANCETS) Misc USE TO TEST BLOOD GLUCOSE TWICE DAILY. DX CODE: E11.51    levothyroxine (SYNTHROID) 75 MCG tablet Take 1 tablet (75 mcg total) by mouth before breakfast.    lisinopriL (PRINIVIL,ZESTRIL) 20 MG tablet     metroNIDAZOLE (FLAGYL) 500 MG tablet Take 1 tablet (500 mg total) by mouth every 8 (eight) hours. for 14 days    mupirocin (BACTROBAN) 2 % ointment by Nasal route 2 (two) times daily. for 2 days    pantoprazole (PROTONIX) 40 MG tablet TAKE ONE TABLET BY  MOUTH ONCE A DAY FOR STOMACH PROBLEMS    pioglitazone (ACTOS) 15 MG tablet ONE TABLET ONE TIME A DAY FOR DIABETES    potassium chloride SA (K-DUR,KLOR-CON) 10 MEQ tablet Take 1 tablet (10 mEq total) by mouth once daily.    pravastatin (PRAVACHOL) 20 MG tablet TAKE 1 TABLET BY MOUTH DAILY FOR CHOLESTROL    rivaroxaban (XARELTO) 20 mg Tab Take one tablet(20mg) by mouth once daily    tamsulosin (FLOMAX) 0.4 mg Cap Take 1 capsule (0.4 mg total) by mouth once daily.    vancomycin HCl (VANCOMYCIN 1.25 G/250 ML D5W, READY TO MIX,) Inject 250 mLs (1,250 mg total) into the vein once daily.     Family History       Problem Relation (Age of Onset)    Alcohol abuse Father    COPD Sister, Brother    Cancer Brother    Diabetes Daughter    Heart disease Brother    Hypertension Father    No Known Problems Son, Daughter, Son    Stroke Father          Tobacco Use    Smoking status: Former Smoker     Quit date: 1996     Years since quittin.5    Smokeless tobacco: Never Used    Tobacco comment: cigar smoker   Substance and Sexual Activity    Alcohol use: No     Comment: none since     Drug use: No    Sexual activity: Not Currently     Review of Systems   Constitutional:  Positive for activity change and fatigue.   HENT:  Negative for voice change.    Eyes:  Negative for visual disturbance.   Respiratory:  Negative for shortness of breath.    Cardiovascular:  Negative for chest pain.   Gastrointestinal:  Negative for vomiting.   Neurological:  Negative for dizziness, seizures, facial asymmetry, speech difficulty and headaches.   Psychiatric/Behavioral:  Negative for agitation and confusion.    Objective:     Vital Signs (Most Recent):  Temp: 99.2 °F (37.3 °C) (22 1132)  Pulse: 102 (22 1132)  Resp: 18 (22 1132)  BP: (!) 146/86 (22 1132)  SpO2: 95 % (22 1132)   Vital Signs (24h Range):  Temp:  [97.5 °F (36.4 °C)-99.2 °F (37.3 °C)] 99.2 °F (37.3 °C)  Pulse:  [] 102  Resp:  [18-20] 18  SpO2:   [95 %-98 %] 95 %  BP: (122-159)/(57-86) 146/86     Weight: 81.8 kg (180 lb 5.4 oz)  Body mass index is 30.01 kg/m².    Physical Exam  HENT:      Head: Normocephalic and atraumatic.      Mouth/Throat:      Mouth: Mucous membranes are moist.      Pharynx: Oropharynx is clear.   Eyes:      General: No scleral icterus.     Extraocular Movements: EOM normal.      Conjunctiva/sclera: Conjunctivae normal.      Pupils: Pupils are equal, round, and reactive to light.   Abdominal:      General: Abdomen is flat. There is no distension.   Skin:     General: Skin is warm and dry.   Neurological:      Mental Status: He is disoriented.      Motor: Weakness present.      Deep Tendon Reflexes: Reflexes normal.      Reflex Scores:       Tricep reflexes are 2+ on the right side and 2+ on the left side.       Bicep reflexes are 2+ on the right side and 2+ on the left side.       Brachioradialis reflexes are 2+ on the right side and 2+ on the left side.       Patellar reflexes are 2+ on the right side and 2+ on the left side.       Achilles reflexes are 2+ on the right side and 2+ on the left side.  Psychiatric:         Speech: Speech normal.     NEUROLOGICAL EXAMINATION:     MENTAL STATUS   Speech: speech is normal   Level of consciousness: alert       Oriented to self and wife at bedside  Knows he is in the hospital  Able to name [facility]  Knows today is his wife's birthday   Follows simple commands      CRANIAL NERVES     CN III, IV, VI   Pupils are equal, round, and reactive to light.  Extraocular motions are normal.   Nystagmus: none   Ophthalmoparesis: none    CN V   Facial sensation intact.     CN VII   Facial expression full, symmetric.     CN VIII   Hearing: intact    CN XII   CN XII normal.     MOTOR EXAM   Muscle bulk: normal       Moves all extremities against gravity spontaneously with good tone      REFLEXES     Reflexes   Right brachioradialis: 2+  Left brachioradialis: 2+  Right biceps: 2+  Left biceps: 2+  Right  triceps: 2+  Left triceps: 2+  Right patellar: 2+  Left patellar: 2+  Right achilles: 2+  Left achilles: 2+  Right : 2+  Left : 2+  Right Arcos: absent  Left Arcos: absent  Right ankle clonus: absent  Left ankle clonus: absent    SENSORY EXAM   Light touch normal.     GAIT AND COORDINATION     Gait  Gait: (deferred)    Tremor   Resting tremor: absent    Significant Labs: All pertinent lab results from the past 24 hours have been reviewed.    EEG (3/20-3/21/22) 16 hr 46 min   This is an abnormal continuous EEG monitoring study because generalized background slowing with occasional generalized discharges with triphasic morphology consistent with diffuse cortical dysfunction and a moderate encephalopathy with some degree of cortical irritation.  This pattern is often seen in the setting of cefepime toxicity as well as a variety of other toxic/metabolic/infectious processes.  There are no pushbutton activations and no electrographic seizures.      Significant Imaging: I have reviewed all pertinent imaging results/findings within the past 24 hours.

## 2022-03-20 NOTE — ASSESSMENT & PLAN NOTE
89 year male with history of A-fib, CAD, CHF, COPD, DM, intraabdominal abscess (at site of hernia repair with mesh implanted over 20 years prior) on outpatient IV antibiotics (Cefepime, Vancomycin and Flagyl) admitted for AMS. See HPI for full history.    In ED at OSH patient noted to have an NIK and supratherapeutic vanc level. CT head no acute abnormality. Repeat CT A/P 3/19 showed decreased size of collection. Drain remains in place. Blood cx NGTD.  ID consulted for antibiotic recommendations. Neurology consulted as well.      Recommendations  · Would not give further vancomycin in setting of NIK and supratherapeutic Vanc levels. It is also possible patient has a component of cefepime induced neurotoxicity in setting of decreased renal function though will follow up neurology work up and evaluation  · Discontinue Zosyn and start IV Unasyn  · Follow blood cultures  · Will follow up tomorrow to assess for improvements in mentation  · Discussed antibiotic plan with ID staff. ID will follow.

## 2022-03-20 NOTE — ASSESSMENT & PLAN NOTE
-  Patient creatinine was at baseline 0.9 on 3/14 and on presentation  - Admission creatinine 2.5  - Patient was discharged on vancomycin, with random admission vanc level 33  - UA with granular casts  - Concern for ATN d/t vancomycin, patient also home Lasix  - Avoiding nephrotoxic medications  - Holding IV fluids as patient does not appear hypovolemic  - Trend BMP  - Consider nephrology consult, shields if creatinine continues up trend

## 2022-03-20 NOTE — SUBJECTIVE & OBJECTIVE
Interval History:   No events overnight. This morning patient not verbal and slightly agitated. Wife at bedside and updated.     Review of Systems   Unable to perform ROS: Mental status change   Objective:     Vital Signs (Most Recent):  Temp: 98 °F (36.7 °C) (03/20/22 1532)  Pulse: 62 (03/20/22 1532)  Resp: 18 (03/20/22 1532)  BP: 126/70 (03/20/22 1532)  SpO2: 98 % (03/20/22 1532) Vital Signs (24h Range):  Temp:  [97.5 °F (36.4 °C)-99.2 °F (37.3 °C)] 98 °F (36.7 °C)  Pulse:  [] 62  Resp:  [18-20] 18  SpO2:  [95 %-98 %] 98 %  BP: (122-159)/(57-86) 126/70     Weight: 81.8 kg (180 lb 5.4 oz)  Body mass index is 30.01 kg/m².    Intake/Output Summary (Last 24 hours) at 3/20/2022 1559  Last data filed at 3/20/2022 0600  Gross per 24 hour   Intake 200 ml   Output --   Net 200 ml      Physical Exam  Constitutional:       General: He is awake. He is not in acute distress.     Appearance: He is obese. He is not ill-appearing or toxic-appearing.   HENT:      Head: Normocephalic and atraumatic.      Mouth/Throat:      Mouth: Mucous membranes are moist.   Eyes:      General: No scleral icterus.     Extraocular Movements: Extraocular movements intact.      Conjunctiva/sclera: Conjunctivae normal.      Pupils: Pupils are equal, round, and reactive to light.   Cardiovascular:      Rate and Rhythm: Normal rate. Rhythm irregular.      Heart sounds: No murmur heard.  Pulmonary:      Effort: Pulmonary effort is normal. No respiratory distress.      Breath sounds: Normal breath sounds. No wheezing or rales.   Abdominal:      General: Abdomen is flat. There is no distension.      Palpations: Abdomen is soft.      Tenderness: There is no abdominal tenderness. There is no guarding.      Comments: Percutaneous drain anterior right abdomen   Musculoskeletal:         General: No swelling or tenderness.   Skin:     Findings: No rash.   Neurological:      Mental Status: He is alert. He is disoriented and confused.      Cranial Nerves: No  cranial nerve deficit.      Sensory: No sensory deficit.      Motor: No weakness.   Psychiatric:         Attention and Perception: He is inattentive.         Speech: He is noncommunicative.       Significant Labs: CBC:   Recent Labs   Lab 03/19/22  0735 03/20/22  0402   WBC 9.06 7.56   HGB 10.1* 9.9*   HCT 30.9* 30.7*    241     CMP:   Recent Labs   Lab 03/19/22  0735 03/20/22  0402    141   K 4.1 4.0    110   CO2 22* 21*   * 133*   BUN 26 26   CREATININE 2.5* 2.3*   CALCIUM 8.6* 8.4*   PROT 5.8* 5.5*   ALBUMIN 2.5* 2.3*   BILITOT 0.9 0.6   ALKPHOS 82 78   AST 21 19   ALT 8* 8*   ANIONGAP 11 10   EGFRNONAA 21* 23.8*       Significant Imaging: I have reviewed all pertinent imaging results/findings within the past 24 hours.

## 2022-03-20 NOTE — NURSING
Pt alert, but non verbal, doesn't follow commands, attempted to feed breakfast and morning meds multiple times with wife at bedside, unsuccessful, physician notified.

## 2022-03-20 NOTE — ASSESSMENT & PLAN NOTE
-  Patient creatinine was at baseline 0.9 on 3/14 and on presentation  - Admission creatinine 2.5  - Patient was discharged on vancomycin, with random admission vanc level 33  - UA with granular casts  - Concern for ATN d/t vancomycin, patient also home Lasix  - Avoiding nephrotoxic medications  - Holding IV fluids as patient does not appear hypovolemic  - Trend BMP  - Improving slightly

## 2022-03-20 NOTE — ASSESSMENT & PLAN NOTE
Home glimepiride, pioglitazone  Diabetic diet  Sliding scale insulin  Accu-Cheks  Holding home gabapentin due to NIK

## 2022-03-20 NOTE — CONSULTS
[facility] - Telemetry Stepdown  Neurology  Consult Note    Patient Name: Eddie Parada Sr.  MRN: [id number]  Admission Date: 3/19/2022  Hospital Length of Stay: 0 days  Code Status: Full Code   Attending Provider: Gustavo Gerard MD   Consulting Provider: Ash Clarke MD  Primary Care Physician: Callum Roberts MD  Principal Problem:Encephalopathy, toxic    Inpatient consult to Neurology  Consult performed by: Ash Clarke MD  Consult ordered by: Gustavo Gerard MD         Subjective:     Chief Complaint:  AMS     HPI:   Patient is a 89yoM with atrial fibrillation (on Xarelto), coronary artery disease, chronic diastolic heart failure, diabetes, COPD, osteoporosis, hyperlipidemia, and recent admissions to [facility] (March 3-8 and March 12-14) for intra-abdominal abscess with septic shock. He was discharged home at that time on cefepime, metronidazole and vancand after abscess drainage. The patient later presented to [facility] on 3/19 with confusion and dysarthria that began on prior day. He was noted to have an NIK on arrival. Per report, the patient had no focal motor deficit at that time. MRI was unable to be performed at that time due to pacemaker incompatability. The patient was transferred to [facility] for further care. Neurology was consulted for the patients AMS.     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  She said there was a decrease in mentation day prior to presentation but still able to converse with family.  This morning patient was notably altered and he oriented to self.  He was still able to recognize familiar faces.  However patient was noted to have general weakness, tremor, and decreased concentration prompting EMS call that brought him to outside hospital.           Past Medical History:   Diagnosis Date    Abdominal fibromatosis     Acute posthemorrhagic anemia 1/9/2018    NIK (acute kidney injury) 1/11/2018    Atrial  fibrillation     Bradycardia     Broken arm     CAD (coronary artery disease)     Cancer     basal cell on nose and melanoma on back    CHF (congestive heart failure)     COPD (chronic obstructive pulmonary disease)     Diabetes mellitus type II     Hyperlipidemia     Localization-related focal epilepsy with simple partial seizures 3/2/2021    Melanoma     Obesity (BMI 30.0-34.9) 9/13/2016    Osteoporosis     Peripheral vascular disease     Primary malignant neoplasm of prostate 3/3/2022    Septic shock 3/3/2022    Stroke     TIA (transient ischemic attack)     Type II diabetes mellitus with peripheral circulatory disorder     Type II or unspecified type diabetes mellitus without mention of complication, not stated as uncontrolled     Unspecified essential hypertension        Past Surgical History:   Procedure Laterality Date    AORTIC VALVE REPLACEMENT      CARDIAC PACEMAKER PLACEMENT  9/28/2012    CARDIAC VALVE REPLACEMENT  11/17/2011    aortic valve-tissue    CHOLECYSTECTOMY      COLONOSCOPY      ESOPHAGOGASTRODUODENOSCOPY N/A 5/27/2021    Procedure: EGD (ESOPHAGOGASTRODUODENOSCOPY);  Surgeon: Gualberto Medrano MD;  Location: [facility];  Service: Endoscopy;  Laterality: N/A;    ESOPHAGOGASTRODUODENOSCOPY N/A 6/22/2021    Procedure: EGD (ESOPHAGOGASTRODUODENOSCOPY);  Surgeon: Gualberto Medrano MD;  Location: [facility];  Service: Endoscopy;  Laterality: N/A;  please call wife(Jessica) with instructions/arrival time.    ESOPHAGOGASTRODUODENOSCOPY (EGD) WITH DILATION  06/22/2021    EYE SURGERY Bilateral     cataract with lens by  at [facility]    HERNIA REPAIR      abdominal    LASIK      UPPER GASTROINTESTINAL ENDOSCOPY  05/28/2021       Review of patient's allergies indicates:   Allergen Reactions    Ciprofloxacin     Levofloxacin Swelling    Lyrica [pregabalin] Swelling    Unable to assess Other (See Comments)     Soft shell crabs       Current  Facility-Administered Medications on File Prior to Encounter   Medication    [DISCONTINUED] cefTRIAXone (ROCEPHIN) 2 g/50 mL D5W IVPB     Current Outpatient Medications on File Prior to Encounter   Medication Sig    acetaminophen (TYLENOL) 500 MG tablet Take 500 mg by mouth every 6 (six) hours as needed for Pain.    amLODIPine (NORVASC) 10 MG tablet TAKE 1 TABLET BY MOUTH DAILY FOR BLOOD PRESSURE    blood sugar diagnostic (BLOOD GLUCOSE TEST) [facility] 4Cable TVTOUCH ULTRA TESTING STRIPS. USE TO TEST BLOOD GLUCOSE TWICE DAILY. DX CODE: E11.51    blood-glucose meter Misc ONETOUCH ULTRA GLUCOSE METER. USE AS DIRECTED TO TEST BLOOD GLUCOSE. DX CODE: E11.51    carvediloL (COREG) 12.5 MG tablet TAKE 1 TABLET (12.5 MG TOTAL) BY MOUTH 2 (TWO) TIMES DAILY WITH MEALS.    cefepime in dextrose 5 % (MAXIPIME) 2 gram/50 mL PgBk Inject 50 mLs (2 g total) into the vein every 8 (eight) hours.    doxazosin (CARDURA) 1 MG tablet Take 1 mg by mouth nightly.    fenofibrate 160 MG Tab fenofibrate 160 mg tablet    furosemide (LASIX) 20 MG tablet TAKE ONE TABLET BY MOUTH EVERY OTHER DAY    gabapentin (NEURONTIN) 100 MG capsule Take 2 capsules (200 mg total) by mouth every evening.    gemfibrozil (LOPID) 600 MG tablet Take one tablet(600mg) by mouth once daily    glimepiride (AMARYL) 2 MG tablet TAKE 1 TABLET BY MOUTH EVERY MORNING WITH BREAKFAST FOR DIABETES    lancets (ONETOUCH ULTRASOFT LANCETS) Misc USE TO TEST BLOOD GLUCOSE TWICE DAILY. DX CODE: E11.51    levothyroxine (SYNTHROID) 75 MCG tablet Take 1 tablet (75 mcg total) by mouth before breakfast.    lisinopriL (PRINIVIL,ZESTRIL) 20 MG tablet     metroNIDAZOLE (FLAGYL) 500 MG tablet Take 1 tablet (500 mg total) by mouth every 8 (eight) hours. for 14 days    mupirocin (BACTROBAN) 2 % ointment by Nasal route 2 (two) times daily. for 2 days    pantoprazole (PROTONIX) 40 MG tablet TAKE ONE TABLET BY MOUTH ONCE A DAY FOR STOMACH PROBLEMS    pioglitazone (ACTOS) 15 MG tablet ONE  TABLET ONE TIME A DAY FOR DIABETES    potassium chloride SA (K-DUR,KLOR-CON) 10 MEQ tablet Take 1 tablet (10 mEq total) by mouth once daily.    pravastatin (PRAVACHOL) 20 MG tablet TAKE 1 TABLET BY MOUTH DAILY FOR CHOLESTROL    rivaroxaban (XARELTO) 20 mg Tab Take one tablet(20mg) by mouth once daily    tamsulosin (FLOMAX) 0.4 mg Cap Take 1 capsule (0.4 mg total) by mouth once daily.    vancomycin HCl (VANCOMYCIN 1.25 G/250 ML D5W, READY TO MIX,) Inject 250 mLs (1,250 mg total) into the vein once daily.     Family History       Problem Relation (Age of Onset)    Alcohol abuse Father    COPD Sister, Brother    Cancer Brother    Diabetes Daughter    Heart disease Brother    Hypertension Father    No Known Problems Son, Daughter, Son    Stroke Father          Tobacco Use    Smoking status: Former Smoker     Quit date: 1996     Years since quittin.5    Smokeless tobacco: Never Used    Tobacco comment: cigar smoker   Substance and Sexual Activity    Alcohol use: No     Comment: none since     Drug use: No    Sexual activity: Not Currently     Review of Systems   Constitutional:  Negative for chills and fever.   HENT:  Negative for rhinorrhea and sneezing.    Eyes:  Negative for discharge and redness.   Skin:  Negative for pallor and rash.   Neurological:  Positive for tremors and weakness.   Psychiatric/Behavioral:  Positive for confusion and decreased concentration.    Objective:     Vital Signs (Most Recent):  Temp: 99.2 °F (37.3 °C) (22 1132)  Pulse: 102 (22 1132)  Resp: 18 (22 1132)  BP: (!) 146/86 (22 1132)  SpO2: 95 % (22 1132)   Vital Signs (24h Range):  Temp:  [97.5 °F (36.4 °C)-99.2 °F (37.3 °C)] 99.2 °F (37.3 °C)  Pulse:  [] 102  Resp:  [18-20] 18  SpO2:  [95 %-98 %] 95 %  BP: (122-159)/(57-86) 146/86     Weight: 81.8 kg (180 lb 5.4 oz)  Body mass index is 30.01 kg/m².    Physical Exam  HENT:      Head: Normocephalic and atraumatic.      Mouth/Throat:       Mouth: Mucous membranes are moist.      Pharynx: Oropharynx is clear.   Eyes:      General: No scleral icterus.     Conjunctiva/sclera: Conjunctivae normal.      Pupils: Pupils are equal, round, and reactive to light.   Abdominal:      General: Abdomen is flat. There is no distension.   Skin:     General: Skin is warm and dry.   Neurological:      Mental Status: He is disoriented.      Motor: Weakness present.      Deep Tendon Reflexes: Reflexes normal.      Reflex Scores:       Tricep reflexes are 2+ on the right side and 2+ on the left side.       Bicep reflexes are 2+ on the right side and 2+ on the left side.       Brachioradialis reflexes are 2+ on the right side and 2+ on the left side.       Patellar reflexes are 2+ on the right side and 2+ on the left side.       Achilles reflexes are 2+ on the right side and 2+ on the left side.      NEUROLOGICAL EXAMINATION:     MENTAL STATUS   Disoriented to person.   Disoriented to place.   Disoriented to time.   Attention: decreased. Concentration: decreased.   Speech: mute   Level of consciousness: drowsy    CRANIAL NERVES     CN III, IV, VI   Pupils are equal, round, and reactive to light.       Exam limited due to mentation     MOTOR EXAM        Exam limited due to mentation and not following commands     REFLEXES     Reflexes   Right brachioradialis: 2+  Left brachioradialis: 2+  Right biceps: 2+  Left biceps: 2+  Right triceps: 2+  Left triceps: 2+  Right patellar: 2+  Left patellar: 2+  Right achilles: 2+  Left achilles: 2+  Right : 2+  Left : 2+  Right Arcos: absent  Left Arcos: absent  Right ankle clonus: absent  Left ankle clonus: absent    GAIT AND COORDINATION        Myoclonic hand tremors     Significant Labs: All pertinent lab results from the past 24 hours have been reviewed.    Significant Imaging: I have reviewed all pertinent imaging results/findings within the past 24 hours.    Assessment and Plan:     Encephalopathy  Patient is a  89yoM with atrial fibrillation (on Xarelto), coronary artery disease, chronic diastolic heart failure, diabetes, COPD, osteoporosis, hyperlipidemia, and recent admissions to [facility] (March 3-8 and March 12-14) for intra-abdominal abscess with septic shock. He was discharged home at that time on cefepime, metronidazole and vancand after abscess drainage. The patient later presented to [facility] on 3/19 with confusion and dysarthria that began on prior day. He was noted to have an NIK on arrival. Per report, the patient had no focal motor deficit at that time. MRI was unable to be performed at that time due to pacemaker incompatability. The patient was transferred to [facility] for further care. Neurology was consulted for the patients AMS.     The patient typically has good functional baseline. See HPI for full detail. The patients AMs is concerning for several possible etiologies. It is likely that his underlying infection has contributed to his poor mentation, although his acute worsening of mentation prompting admission is concerning for possible seizure, including NCSE. Given recent use of cefepime in the setting of NIK, it is possible that he also has cefepime induced neurotoxicity which can manifest as abnormalities on EEG. Patient was given ativan trial on floor as well.    Ativan trial while EEG is currently pending. Patient currently sleeping following trial. Will continue to monitor  EEg pending, will follow up  Patient will likely need restraints during EEG  Recommend avoiding cefepime if possible  Will continue to follow patient  Please contact with any questions or concerns    NIK (acute kidney injury)  See above    Abdominal wall abscess  See above    Cardiac pacemaker in situ  Not MRI compatable per report        VTE Risk Mitigation (From admission, onward)         Ordered     IP VTE HIGH RISK PATIENT  Once         03/19/22 1616     Place sequential compression device  Until discontinued          03/19/22 6975                Thank you for your consult. I will follow-up with patient. Please contact us if you have any additional questions.    Ash Clarke MD  Neurology  [facility] - Telemetry Stepdown

## 2022-03-20 NOTE — CONSULTS
[facility] - Telemetry Stepdown  Infectious Disease  Consult Note    Patient Name: Eddie Parada Sr.  MRN: [id number]  Admission Date: 3/19/2022  Hospital Length of Stay: 0 days  Attending Physician: Gustavo Gerard MD  Primary Care Provider: Callum Roberts MD     Isolation Status: No active isolations      Inpatient consult to Infectious Diseases  Consult performed by: Didi Newman PA-C  Consult ordered by: Gustavo Gerard MD        Assessment/Plan:     Abdominal wall abscess  ID consult received. Chart being reviewed. Full consult note with recommendations to follow.      In the interim, please secure chat with any questions.    Thank you,  Didi Newman PA-C

## 2022-03-21 LAB
ALBUMIN SERPL BCP-MCNC: 2.3 G/DL (ref 3.5–5.2)
ALP SERPL-CCNC: 79 U/L (ref 55–135)
ALT SERPL W/O P-5'-P-CCNC: 8 U/L (ref 10–44)
ANION GAP SERPL CALC-SCNC: 9 MMOL/L (ref 8–16)
AST SERPL-CCNC: 24 U/L (ref 10–40)
BILIRUB SERPL-MCNC: 0.7 MG/DL (ref 0.1–1)
BUN SERPL-MCNC: 25 MG/DL (ref 10–30)
CALCIUM SERPL-MCNC: 8.6 MG/DL (ref 8.7–10.5)
CHLORIDE SERPL-SCNC: 109 MMOL/L (ref 95–110)
CO2 SERPL-SCNC: 24 MMOL/L (ref 23–29)
CREAT SERPL-MCNC: 2.3 MG/DL (ref 0.5–1.4)
EST. GFR  (AFRICAN AMERICAN): 27.5 ML/MIN/1.73 M^2
EST. GFR  (NON AFRICAN AMERICAN): 23.8 ML/MIN/1.73 M^2
FOLATE SERPL-MCNC: 4 NG/ML (ref 4–24)
GLUCOSE SERPL-MCNC: 123 MG/DL (ref 70–110)
MAGNESIUM SERPL-MCNC: 1.8 MG/DL (ref 1.6–2.6)
PHOSPHATE SERPL-MCNC: 3.1 MG/DL (ref 2.7–4.5)
POCT GLUCOSE: 143 MG/DL (ref 70–110)
POCT GLUCOSE: 155 MG/DL (ref 70–110)
POCT GLUCOSE: 182 MG/DL (ref 70–110)
POTASSIUM SERPL-SCNC: 4.1 MMOL/L (ref 3.5–5.1)
PROT SERPL-MCNC: 5.7 G/DL (ref 6–8.4)
SODIUM SERPL-SCNC: 142 MMOL/L (ref 136–145)
VIT B12 SERPL-MCNC: 490 PG/ML (ref 210–950)

## 2022-03-21 PROCEDURE — 63600175 PHARM REV CODE 636 W HCPCS: Performed by: STUDENT IN AN ORGANIZED HEALTH CARE EDUCATION/TRAINING PROGRAM

## 2022-03-21 PROCEDURE — 99223 PR INITIAL HOSPITAL CARE,LEVL III: ICD-10-PCS | Mod: ,,, | Performed by: INTERNAL MEDICINE

## 2022-03-21 PROCEDURE — 82607 VITAMIN B-12: CPT | Performed by: STUDENT IN AN ORGANIZED HEALTH CARE EDUCATION/TRAINING PROGRAM

## 2022-03-21 PROCEDURE — 99223 1ST HOSP IP/OBS HIGH 75: CPT | Mod: ,,, | Performed by: INTERNAL MEDICINE

## 2022-03-21 PROCEDURE — 80053 COMPREHEN METABOLIC PANEL: CPT | Performed by: STUDENT IN AN ORGANIZED HEALTH CARE EDUCATION/TRAINING PROGRAM

## 2022-03-21 PROCEDURE — 83735 ASSAY OF MAGNESIUM: CPT | Performed by: STUDENT IN AN ORGANIZED HEALTH CARE EDUCATION/TRAINING PROGRAM

## 2022-03-21 PROCEDURE — 99232 PR SUBSEQUENT HOSPITAL CARE,LEVL II: ICD-10-PCS | Mod: ,,, | Performed by: STUDENT IN AN ORGANIZED HEALTH CARE EDUCATION/TRAINING PROGRAM

## 2022-03-21 PROCEDURE — S5010 5% DEXTROSE AND 0.45% SALINE: HCPCS | Performed by: STUDENT IN AN ORGANIZED HEALTH CARE EDUCATION/TRAINING PROGRAM

## 2022-03-21 PROCEDURE — 99233 PR SUBSEQUENT HOSPITAL CARE,LEVL III: ICD-10-PCS | Mod: ,,, | Performed by: PHYSICIAN ASSISTANT

## 2022-03-21 PROCEDURE — 82746 ASSAY OF FOLIC ACID SERUM: CPT | Performed by: STUDENT IN AN ORGANIZED HEALTH CARE EDUCATION/TRAINING PROGRAM

## 2022-03-21 PROCEDURE — 99232 SBSQ HOSP IP/OBS MODERATE 35: CPT | Mod: ,,, | Performed by: STUDENT IN AN ORGANIZED HEALTH CARE EDUCATION/TRAINING PROGRAM

## 2022-03-21 PROCEDURE — 25000003 PHARM REV CODE 250: Performed by: STUDENT IN AN ORGANIZED HEALTH CARE EDUCATION/TRAINING PROGRAM

## 2022-03-21 PROCEDURE — 20600001 HC STEP DOWN PRIVATE ROOM

## 2022-03-21 PROCEDURE — 99233 SBSQ HOSP IP/OBS HIGH 50: CPT | Mod: ,,, | Performed by: PSYCHIATRY & NEUROLOGY

## 2022-03-21 PROCEDURE — 36415 COLL VENOUS BLD VENIPUNCTURE: CPT | Performed by: STUDENT IN AN ORGANIZED HEALTH CARE EDUCATION/TRAINING PROGRAM

## 2022-03-21 PROCEDURE — 99233 PR SUBSEQUENT HOSPITAL CARE,LEVL III: ICD-10-PCS | Mod: ,,, | Performed by: PSYCHIATRY & NEUROLOGY

## 2022-03-21 PROCEDURE — 99233 SBSQ HOSP IP/OBS HIGH 50: CPT | Mod: ,,, | Performed by: PHYSICIAN ASSISTANT

## 2022-03-21 PROCEDURE — 84100 ASSAY OF PHOSPHORUS: CPT | Performed by: STUDENT IN AN ORGANIZED HEALTH CARE EDUCATION/TRAINING PROGRAM

## 2022-03-21 RX ORDER — HYDRALAZINE HYDROCHLORIDE 10 MG/1
10 TABLET, FILM COATED ORAL EVERY 8 HOURS PRN
Status: DISCONTINUED | OUTPATIENT
Start: 2022-03-21 | End: 2022-03-26 | Stop reason: HOSPADM

## 2022-03-21 RX ADMIN — AMPICILLIN SODIUM AND SULBACTAM SODIUM 3 G: 2; 1 INJECTION, POWDER, FOR SOLUTION INTRAMUSCULAR; INTRAVENOUS at 05:03

## 2022-03-21 RX ADMIN — TAMSULOSIN HYDROCHLORIDE 0.4 MG: 0.4 CAPSULE ORAL at 08:03

## 2022-03-21 RX ADMIN — DEXTROSE AND SODIUM CHLORIDE: 5; .45 INJECTION, SOLUTION INTRAVENOUS at 04:03

## 2022-03-21 RX ADMIN — AMPICILLIN SODIUM AND SULBACTAM SODIUM 3 G: 2; 1 INJECTION, POWDER, FOR SOLUTION INTRAMUSCULAR; INTRAVENOUS at 04:03

## 2022-03-21 RX ADMIN — PANTOPRAZOLE SODIUM 40 MG: 40 TABLET, DELAYED RELEASE ORAL at 08:03

## 2022-03-21 RX ADMIN — CARVEDILOL 12.5 MG: 12.5 TABLET, FILM COATED ORAL at 07:03

## 2022-03-21 RX ADMIN — AMLODIPINE BESYLATE 5 MG: 5 TABLET ORAL at 08:03

## 2022-03-21 RX ADMIN — CARVEDILOL 12.5 MG: 12.5 TABLET, FILM COATED ORAL at 05:03

## 2022-03-21 RX ADMIN — PRAVASTATIN SODIUM 20 MG: 20 TABLET ORAL at 08:03

## 2022-03-21 NOTE — SUBJECTIVE & OBJECTIVE
Interval History:   No events overnight. This morning patient not verbal and slightly agitated. Wife at bedside and updated.     Review of Systems   Genitourinary:  Negative for decreased urine volume, difficulty urinating, dysuria, flank pain, hematuria and urgency.   Psychiatric/Behavioral:  Negative for confusion.    All other systems reviewed and are negative.  Objective:     Vital Signs (Most Recent):  Temp: 97.5 °F (36.4 °C) (03/21/22 1122)  Pulse: 60 (03/21/22 1122)  Resp: 18 (03/21/22 1122)  BP: (!) 157/70 (03/21/22 1122)  SpO2: 98 % (03/21/22 1122) Vital Signs (24h Range):  Temp:  [96.2 °F (35.7 °C)-99 °F (37.2 °C)] 97.5 °F (36.4 °C)  Pulse:  [60-87] 60  Resp:  [18] 18  SpO2:  [95 %-100 %] 98 %  BP: (126-171)/(63-78) 157/70     Weight: 81.8 kg (180 lb 5.4 oz)  Body mass index is 30.01 kg/m².    Intake/Output Summary (Last 24 hours) at 3/21/2022 1231  Last data filed at 3/21/2022 0745  Gross per 24 hour   Intake 513.76 ml   Output 215 ml   Net 298.76 ml        Physical Exam  Constitutional:       General: He is not in acute distress.     Appearance: He is obese.   HENT:      Head: Normocephalic and atraumatic.   Eyes:      Extraocular Movements: Extraocular movements intact.      Conjunctiva/sclera: Conjunctivae normal.   Cardiovascular:      Rate and Rhythm: Normal rate and regular rhythm.      Pulses: Normal pulses.   Abdominal:      General: Bowel sounds are normal. There is no distension.      Palpations: Abdomen is soft.      Tenderness: There is no abdominal tenderness.      Comments: Drain in place   Musculoskeletal:      Right lower leg: No edema.      Left lower leg: No edema.   Skin:     General: Skin is warm.   Neurological:      General: No focal deficit present.      Mental Status: He is alert.      Comments: Oriented to person, place and situation   Psychiatric:         Mood and Affect: Mood normal.         Behavior: Behavior normal.       Significant Labs: CBC:   Recent Labs   Lab  03/20/22  0402   WBC 7.56   HGB 9.9*   HCT 30.7*          CMP:   Recent Labs   Lab 03/20/22  0402 03/21/22  0558    142   K 4.0 4.1    109   CO2 21* 24   * 123*   BUN 26 25   CREATININE 2.3* 2.3*   CALCIUM 8.4* 8.6*   PROT 5.5* 5.7*   ALBUMIN 2.3* 2.3*   BILITOT 0.6 0.7   ALKPHOS 78 79   AST 19 24   ALT 8* 8*   ANIONGAP 10 9   EGFRNONAA 23.8* 23.8*         Significant Imaging: I have reviewed all pertinent imaging results/findings within the past 24 hours.

## 2022-03-21 NOTE — ASSESSMENT & PLAN NOTE
89 yo M with atrial fibrillation (on Xarelto), coronary artery disease, chronic diastolic heart failure, diabetes, COPD, osteoporosis, hyperlipidemia, and recent admission to [facility] (March 3-8 and March 12-14) for intra-abdominal abscess with septic shock. He was discharged home at that time on cefepime, metronidazole and vanc after abscess drainage. The patient later presented to [facility] on 3/19 with confusion and dysarthria that began on prior day. He was noted to have an NIK on arrival. Per report, the patient had no focal motor deficit at that time. MRI was unable to be performed at that time due to pacemaker incompatability. The [place] [facility] [place] Neurology was consulted for the patients AMS.     EEG 16+ hours with generalized discharges with triphasic morphology consistent with diffuse cortical dysfunction and a moderate encephalopathy with some degree of cortical irritation.  This pattern is often seen in the setting of cefepime toxicity as well as a variety of other toxic/metabolic/infectious processes.  There are no pushbutton activations and no electrographic seizures.    Presentation suspected 2/2 cefepime-induced neurotoxicity    3/21-mentation drastically improved today  No longer somnolent and now oriented to self, wife, place and able to follow commands    Recommendations:  --ok to unhook EEG  --no AEDs indicated at this time  --ID following and switched Abx to Unasyn

## 2022-03-21 NOTE — SUBJECTIVE & OBJECTIVE
Interval History:   No events overnight.  This morning patient is more alert and communicative.  Does not recall events leading from confusion at home through hospitalization.  Patient's wife and granddaughter at bedside and updated.  Patient with no complaints this morning.      Review of Systems   Unable to perform ROS: Mental status change   Constitutional:  Negative for activity change, appetite change, chills, diaphoresis, fatigue and fever.   HENT:  Negative for rhinorrhea and sore throat.    Respiratory:  Negative for cough, chest tightness and shortness of breath.    Cardiovascular:  Negative for chest pain and palpitations.   Gastrointestinal:  Negative for abdominal pain, constipation, diarrhea and nausea.   Endocrine: Negative for cold intolerance.   Genitourinary:  Negative for decreased urine volume, difficulty urinating, dysuria, flank pain, hematuria and urgency.   Musculoskeletal:  Negative for arthralgias and myalgias.   Skin:  Negative for rash and wound.   Neurological:  Negative for dizziness, weakness, numbness and headaches.   Psychiatric/Behavioral:  Positive for confusion. Negative for agitation and behavioral problems.    All other systems reviewed and are negative.  Objective:     Vital Signs (Most Recent):  Temp: 99 °F (37.2 °C) (03/21/22 0338)  Pulse: 60 (03/21/22 0705)  Resp: 18 (03/21/22 0338)  BP: 133/63 (03/21/22 0705)  SpO2: 100 % (03/21/22 0338) Vital Signs (24h Range):  Temp:  [97.8 °F (36.6 °C)-99.2 °F (37.3 °C)] 99 °F (37.2 °C)  Pulse:  [] 60  Resp:  [18] 18  SpO2:  [95 %-100 %] 100 %  BP: (126-171)/(63-86) 133/63     Weight: 81.8 kg (180 lb 5.4 oz)  Body mass index is 30.01 kg/m².    Intake/Output Summary (Last 24 hours) at 3/21/2022 0924  Last data filed at 3/21/2022 0700  Gross per 24 hour   Intake 513.76 ml   Output 205 ml   Net 308.76 ml        Physical Exam  Constitutional:       General: He is not in acute distress.     Appearance: He is obese.   HENT:      Head:  Normocephalic and atraumatic.      Mouth/Throat:      Mouth: Mucous membranes are moist.   Eyes:      Extraocular Movements: Extraocular movements intact.      Conjunctiva/sclera: Conjunctivae normal.   Cardiovascular:      Rate and Rhythm: Normal rate and regular rhythm.      Pulses: Normal pulses.   Pulmonary:      Effort: Pulmonary effort is normal. No respiratory distress.      Breath sounds: Normal breath sounds. No wheezing or rales.   Abdominal:      General: Bowel sounds are normal. There is no distension.      Palpations: Abdomen is soft.      Tenderness: There is no abdominal tenderness.      Comments: Drain in place   Musculoskeletal:      Right lower leg: No edema.      Left lower leg: No edema.   Skin:     General: Skin is warm.   Neurological:      General: No focal deficit present.      Mental Status: He is alert.      Comments: Oriented to person, place and situation   Psychiatric:         Mood and Affect: Mood normal.         Behavior: Behavior normal.       Significant Labs: CBC:   Recent Labs   Lab 03/20/22  0402   WBC 7.56   HGB 9.9*   HCT 30.7*          CMP:   Recent Labs   Lab 03/20/22  0402 03/21/22  0558    142   K 4.0 4.1    109   CO2 21* 24   * 123*   BUN 26 25   CREATININE 2.3* 2.3*   CALCIUM 8.4* 8.6*   PROT 5.5* 5.7*   ALBUMIN 2.3* 2.3*   BILITOT 0.6 0.7   ALKPHOS 78 79   AST 19 24   ALT 8* 8*   ANIONGAP 10 9   EGFRNONAA 23.8* 23.8*         Significant Imaging: I have reviewed all pertinent imaging results/findings within the past 24 hours.

## 2022-03-21 NOTE — PROGRESS NOTES
[facility] - Telemetry Stepdown  Neurology  Progress Note    Patient Name: Eddie Parada Sr.  MRN: [id number]  Admission Date: 3/19/2022  Hospital Length of Stay: 1 days  Code Status: Full Code   Attending Provider: Gustavo Gerard MD  Primary Care Physician: Callum Roberts MD   Principal Problem:Encephalopathy    HPI:   89 yo M with atrial fibrillation (on Xarelto), coronary artery disease, chronic diastolic heart failure, diabetes, COPD, osteoporosis, hyperlipidemia, and recent admissions to [facility] (March 3-8 and March 12-14) for intra-abdominal abscess with septic shock. He was discharged home at that time on cefepime, metronidazole and vancand after abscess drainage. The patient later presented to [facility] on 3/19 with confusion and dysarthria that began on prior day. He was noted to have an NIK on arrival. Per report, the patient had no focal motor deficit at that time. MRI was unable to be performed at that time due to pacemaker incompatability. The patient was transferred to [facility] [place] for further care. Neurology was consulted for the patients AMS.     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  She said there was a decrease in mentation day prior to presentation but still able to converse with family.  This morning patient was notably altered and he oriented to self.  He was still able to recognize familiar faces.  However patient was noted to have general weakness, tremor, and decreased concentration prompting EMS call that brought him to outside hospital.    Past Medical History:   Diagnosis Date    Abdominal fibromatosis     Acute posthemorrhagic anemia 1/9/2018    NIK (acute kidney injury) 1/11/2018    Atrial fibrillation     Bradycardia     Broken arm     CAD (coronary artery disease)     Cancer     basal cell on nose and melanoma on back    CHF (congestive heart failure)     COPD (chronic obstructive pulmonary disease)     Diabetes  mellitus type II     Hyperlipidemia     Localization-related focal epilepsy with simple partial seizures 3/2/2021    Melanoma     Obesity (BMI 30.0-34.9) 9/13/2016    Osteoporosis     Peripheral vascular disease     Primary malignant neoplasm of prostate 3/3/2022    Septic shock 3/3/2022    Stroke     TIA (transient ischemic attack)     Type II diabetes mellitus with peripheral circulatory disorder     Type II or unspecified type diabetes mellitus without mention of complication, not stated as uncontrolled     Unspecified essential hypertension      Past Surgical History:   Procedure Laterality Date    AORTIC VALVE REPLACEMENT      CARDIAC PACEMAKER PLACEMENT  9/28/2012    CARDIAC VALVE REPLACEMENT  11/17/2011    aortic valve-tissue    CHOLECYSTECTOMY      COLONOSCOPY      ESOPHAGOGASTRODUODENOSCOPY N/A 5/27/2021    Procedure: EGD (ESOPHAGOGASTRODUODENOSCOPY);  Surgeon: Gualberto Medrano MD;  Location: [facility];  Service: Endoscopy;  Laterality: N/A;    ESOPHAGOGASTRODUODENOSCOPY N/A 6/22/2021    Procedure: EGD (ESOPHAGOGASTRODUODENOSCOPY);  Surgeon: Gualberto Medrano MD;  Location: [facility];  Service: Endoscopy;  Laterality: N/A;  please call wife(Jessica) with instructions/arrival time.    ESOPHAGOGASTRODUODENOSCOPY (EGD) WITH DILATION  06/22/2021    EYE SURGERY Bilateral     cataract with lens by  at [facility]    HERNIA REPAIR      abdominal    LASIK      UPPER GASTROINTESTINAL ENDOSCOPY  05/28/2021     Review of patient's allergies indicates:   Allergen Reactions    Ciprofloxacin     Levofloxacin Swelling    Lyrica [pregabalin] Swelling    Unable to assess Other (See Comments)     Soft shell crabs     Current Facility-Administered Medications on File Prior to Encounter   Medication    [DISCONTINUED] cefTRIAXone (ROCEPHIN) 2 g/50 mL D5W IVPB     Current Outpatient Medications on File Prior to Encounter   Medication Sig    acetaminophen (TYLENOL) 500 MG tablet  Take 500 mg by mouth every 6 (six) hours as needed for Pain.    amLODIPine (NORVASC) 10 MG tablet TAKE 1 TABLET BY MOUTH DAILY FOR BLOOD PRESSURE    blood sugar diagnostic (BLOOD GLUCOSE TEST) [facility] LuxoftTOUCH ULTRA TESTING STRIPS. USE TO TEST BLOOD GLUCOSE TWICE DAILY. DX CODE: E11.51    blood-glucose meter Misc ONETOUCH ULTRA GLUCOSE METER. USE AS DIRECTED TO TEST BLOOD GLUCOSE. DX CODE: E11.51    carvediloL (COREG) 12.5 MG tablet TAKE 1 TABLET (12.5 MG TOTAL) BY MOUTH 2 (TWO) TIMES DAILY WITH MEALS.    cefepime in dextrose 5 % (MAXIPIME) 2 gram/50 mL PgBk Inject 50 mLs (2 g total) into the vein every 8 (eight) hours.    doxazosin (CARDURA) 1 MG tablet Take 1 mg by mouth nightly.    fenofibrate 160 MG Tab fenofibrate 160 mg tablet    furosemide (LASIX) 20 MG tablet TAKE ONE TABLET BY MOUTH EVERY OTHER DAY    gabapentin (NEURONTIN) 100 MG capsule Take 2 capsules (200 mg total) by mouth every evening.    gemfibrozil (LOPID) 600 MG tablet Take one tablet(600mg) by mouth once daily    glimepiride (AMARYL) 2 MG tablet TAKE 1 TABLET BY MOUTH EVERY MORNING WITH BREAKFAST FOR DIABETES    lancets (ONETOUCH ULTRASOFT LANCETS) Misc USE TO TEST BLOOD GLUCOSE TWICE DAILY. DX CODE: E11.51    levothyroxine (SYNTHROID) 75 MCG tablet Take 1 tablet (75 mcg total) by mouth before breakfast.    lisinopriL (PRINIVIL,ZESTRIL) 20 MG tablet     metroNIDAZOLE (FLAGYL) 500 MG tablet Take 1 tablet (500 mg total) by mouth every 8 (eight) hours. for 14 days    mupirocin (BACTROBAN) 2 % ointment by Nasal route 2 (two) times daily. for 2 days    pantoprazole (PROTONIX) 40 MG tablet TAKE ONE TABLET BY MOUTH ONCE A DAY FOR STOMACH PROBLEMS    pioglitazone (ACTOS) 15 MG tablet ONE TABLET ONE TIME A DAY FOR DIABETES    potassium chloride SA (K-DUR,KLOR-CON) 10 MEQ tablet Take 1 tablet (10 mEq total) by mouth once daily.    pravastatin (PRAVACHOL) 20 MG tablet TAKE 1 TABLET BY MOUTH DAILY FOR CHOLESTROL    rivaroxaban (XARELTO) 20  mg Tab Take one tablet(20mg) by mouth once daily    tamsulosin (FLOMAX) 0.4 mg Cap Take 1 capsule (0.4 mg total) by mouth once daily.    vancomycin HCl (VANCOMYCIN 1.25 G/250 ML D5W, READY TO MIX,) Inject 250 mLs (1,250 mg total) into the vein once daily.     Family History       Problem Relation (Age of Onset)    Alcohol abuse Father    COPD Sister, Brother    Cancer Brother    Diabetes Daughter    Heart disease Brother    Hypertension Father    No Known Problems Son, Daughter, Son    Stroke Father          Tobacco Use    Smoking status: Former Smoker     Quit date: 1996     Years since quittin.5    Smokeless tobacco: Never Used    Tobacco comment: cigar smoker   Substance and Sexual Activity    Alcohol use: No     Comment: none since     Drug use: No    Sexual activity: Not Currently     Review of Systems   Constitutional:  Positive for activity change and fatigue.   HENT:  Negative for voice change.    Eyes:  Negative for visual disturbance.   Respiratory:  Negative for shortness of breath.    Cardiovascular:  Negative for chest pain.   Gastrointestinal:  Negative for vomiting.   Neurological:  Negative for dizziness, seizures, facial asymmetry, speech difficulty and headaches.   Psychiatric/Behavioral:  Negative for agitation and confusion.    Objective:     Vital Signs (Most Recent):  Temp: 99.2 °F (37.3 °C) (22 1132)  Pulse: 102 (22 1132)  Resp: 18 (22 1132)  BP: (!) 146/86 (22 1132)  SpO2: 95 % (22 1132)   Vital Signs (24h Range):  Temp:  [97.5 °F (36.4 °C)-99.2 °F (37.3 °C)] 99.2 °F (37.3 °C)  Pulse:  [] 102  Resp:  [18-20] 18  SpO2:  [95 %-98 %] 95 %  BP: (122-159)/(57-86) 146/86     Weight: 81.8 kg (180 lb 5.4 oz)  Body mass index is 30.01 kg/m².    Physical Exam  HENT:      Head: Normocephalic and atraumatic.      Mouth/Throat:      Mouth: Mucous membranes are moist.      Pharynx: Oropharynx is clear.   Eyes:      General: No scleral icterus.      Extraocular Movements: EOM normal.      Conjunctiva/sclera: Conjunctivae normal.      Pupils: Pupils are equal, round, and reactive to light.   Abdominal:      General: Abdomen is flat. There is no distension.   Skin:     General: Skin is warm and dry.   Neurological:      Mental Status: He is disoriented.      Motor: Weakness present.      Deep Tendon Reflexes: Reflexes normal.      Reflex Scores:       Tricep reflexes are 2+ on the right side and 2+ on the left side.       Bicep reflexes are 2+ on the right side and 2+ on the left side.       Brachioradialis reflexes are 2+ on the right side and 2+ on the left side.       Patellar reflexes are 2+ on the right side and 2+ on the left side.       Achilles reflexes are 2+ on the right side and 2+ on the left side.  Psychiatric:         Speech: Speech normal.     NEUROLOGICAL EXAMINATION:     MENTAL STATUS   Speech: speech is normal   Level of consciousness: alert       Oriented to self and wife at bedside  Knows he is in the hospital  Able to name [facility]  Knows today is his wife's birthday   Follows simple commands      CRANIAL NERVES     CN III, IV, VI   Pupils are equal, round, and reactive to light.  Extraocular motions are normal.   Nystagmus: none   Ophthalmoparesis: none    CN V   Facial sensation intact.     CN VII   Facial expression full, symmetric.     CN VIII   Hearing: intact    CN XII   CN XII normal.     MOTOR EXAM   Muscle bulk: normal       Moves all extremities against gravity spontaneously with good tone      REFLEXES     Reflexes   Right brachioradialis: 2+  Left brachioradialis: 2+  Right biceps: 2+  Left biceps: 2+  Right triceps: 2+  Left triceps: 2+  Right patellar: 2+  Left patellar: 2+  Right achilles: 2+  Left achilles: 2+  Right : 2+  Left : 2+  Right Arcos: absent  Left Arcos: absent  Right ankle clonus: absent  Left ankle clonus: absent    SENSORY EXAM   Light touch normal.     GAIT AND COORDINATION     Gait  Gait:  (deferred)    Tremor   Resting tremor: absent    Significant Labs: All pertinent lab results from the past 24 hours have been reviewed.    EEG (3/20-3/21/22) 16 hr 46 min   This is an abnormal continuous EEG monitoring study because generalized background slowing with occasional generalized discharges with triphasic morphology consistent with diffuse cortical dysfunction and a moderate encephalopathy with some degree of cortical irritation.  This pattern is often seen in the setting of cefepime toxicity as well as a variety of other toxic/metabolic/infectious processes.  There are no pushbutton activations and no electrographic seizures.    Significant Imaging: I have reviewed all pertinent imaging results/findings within the past 24 hours.    Assessment and Plan:     * Encephalopathy  89 yo M with atrial fibrillation (on Xarelto), coronary artery disease, chronic diastolic heart failure, diabetes, COPD, osteoporosis, hyperlipidemia, and recent admission to [facility] (3/3-3/8 and 3/12-3/14) for intra-abdominal abscess with septic shock. He was discharged home at that time on cefepime, metronidazole and vanc after abscess drainage. The patient later presented to [facility] on 3/19 with confusion and dysarthria that began on prior day. He was noted to have an NIK on arrival. Per report, the patient had no focal motor deficit at that time. MRI was unable to be performed at that time due to pacemaker incompatability. The patient was transferred to [facility] for further care. Neurology was consulted for the patients AMS. EEG 16+ hours with generalized discharges with triphasic morphology consistent with diffuse cortical dysfunction and a moderate encephalopathy with some degree of cortical irritation.  This pattern is often seen in the setting of cefepime toxicity as well as a variety of other toxic/metabolic/infectious processes. There are no pushbutton activations and no electrographic seizures.    >>Presentation  suspected 2/2 cefepime-induced neurotoxicity    3/21-mentation drastically improved today  No longer somnolent and now oriented to self, wife, place and able to follow commands    Recommendations:  --ok to unhook EEG  --no AEDs indicated at this time  --ID following and switched Abx to Unasyn     VTE Risk Mitigation (From admission, onward)         Ordered     IP VTE HIGH RISK PATIENT  Once         03/19/22 1616     Place sequential compression device  Until discontinued         03/19/22 1616              Trang Barnes PA-C  General Neurology Consult

## 2022-03-21 NOTE — PLAN OF CARE
Patient received, AAOx3, forgetful at times. Patient awake and alert, denies pain. VSS. EEG currently at bedside, patient tolerating test well. PICC flushed with brisk blood return noted, no difficulty flushing both lumens. Patient and family at bedside, educated on meds. Patient with drain to abdomen, 10ml output noted this AM, documented per flowsheets. New orders obtained for drain care, followed per order. Wound consult pending. Wrist restraints d/c'd at 1145. No injury to skin noted. ROM performed. Patient repositioned q2 hours. Safety precautions in place, call bell within reach, bed locked and in lowest position, no needs at this time.     Problem: Adult Inpatient Plan of Care  Goal: Plan of Care Review  Outcome: Ongoing, Progressing  Goal: Patient-Specific Goal (Individualized)  Outcome: Ongoing, Progressing  Goal: Absence of Hospital-Acquired Illness or Injury  Outcome: Ongoing, Progressing  Goal: Optimal Comfort and Wellbeing  Outcome: Ongoing, Progressing  Goal: Readiness for Transition of Care  Outcome: Ongoing, Progressing     Problem: Diabetes Comorbidity  Goal: Blood Glucose Level Within Targeted Range  Outcome: Ongoing, Progressing     Problem: Adjustment to Illness (Sepsis/Septic Shock)  Goal: Optimal Coping  Outcome: Ongoing, Progressing     Problem: Bleeding (Sepsis/Septic Shock)  Goal: Absence of Bleeding  Outcome: Ongoing, Progressing     Problem: Glycemic Control Impaired (Sepsis/Septic Shock)  Goal: Blood Glucose Level Within Desired Range  Outcome: Ongoing, Progressing     Problem: Infection Progression (Sepsis/Septic Shock)  Goal: Absence of Infection Signs and Symptoms  Outcome: Ongoing, Progressing     Problem: Nutrition Impaired (Sepsis/Septic Shock)  Goal: Optimal Nutrition Intake  Outcome: Ongoing, Progressing     Problem: Fluid and Electrolyte Imbalance (Acute Kidney Injury/Impairment)  Goal: Fluid and Electrolyte Balance  Outcome: Ongoing, Progressing     Problem: Oral Intake  Inadequate (Acute Kidney Injury/Impairment)  Goal: Optimal Nutrition Intake  Outcome: Ongoing, Progressing     Problem: Renal Function Impairment (Acute Kidney Injury/Impairment)  Goal: Effective Renal Function  Outcome: Ongoing, Progressing     Problem: Infection  Goal: Absence of Infection Signs and Symptoms  Outcome: Ongoing, Progressing     Problem: Impaired Wound Healing  Goal: Optimal Wound Healing  Outcome: Ongoing, Progressing     Problem: Fall Injury Risk  Goal: Absence of Fall and Fall-Related Injury  Outcome: Ongoing, Progressing     Problem: Skin Injury Risk Increased  Goal: Skin Health and Integrity  Outcome: Ongoing, Progressing     Problem: Restraint, Nonbehavioral (Nonviolent)  Goal: Absence of Harm or Injury  Outcome: Ongoing, Progressing

## 2022-03-21 NOTE — PROCEDURES
[facility] EEG/VIDEO MONITORING REPORT  Eddie Parada Sr.  [id number]  5/12/1929    DATE OF SERVICE:  03/20/2022-03/21/2022  DATE OF ADMISSION: 3/19/2022  3:59 PM    ADMITTING/REQUESTING PROVIDER: Saul Toledo MD    REASON FOR CONSULT:  89-year-old man with multiple complicated medical comorbidities including intra-abdominal abscess on cefepime and renal dysfunction who was admitted with decreased responsiveness and confusion.  Evaluate for evidence of epileptiform activity.    METHODOLOGY   Electroencephalographic (EEG) recording is with electrodes placed according to the International 10-20 placement system.  Thirty two (32) channels of digital signal (sampling rate of 512/sec) including T1 and T2 was simultaneously recorded from the scalp and may include  EKG, EMG, and/or eye monitors.  Recording band pass was 0.1 to 512 hz.  Digital video recording of the patient is simultaneously recorded with the EEG.  The patient is instructed report clinical symptoms which may occur during the recording session.  EEG and video recording is stored and archived in digital format.  Activation procedures which include photic stimulation, hyperventilation and instructing patients to perform simple task are done in selected patients.   The EEG is displayed on a monitor screen and can be reviewed using different montages.  Computer assisted analysis is employed to detect spike and electrographic seizure activity.   The entire record is submitted for computer analysis.  The entire recording is visually reviewed and the times identified by computer analysis as being spikes or seizures are reviewed again.  Compresses spectral analysis (CSA) is also performed on the activity recorded from each individual channel.  This is displayed as a power display of frequencies from 0 to 30 Hz over time.   The CSA is reviewed looking for asymmetries in power between homologous areas of the scalp and then compared with the original EEG recording.      [employer] software is also utilized in the review of this study.  This software suite analyzes the EEG recording in multiple domains.  Coherence and rhythmicity is computed to identify EEG sections which may contain organized seizures.  Each channel undergoes analysis to detect presence of spike and sharp waves which have special and morphological characteristic of epileptic activity.  The routine EEG recording is converted from spacial into frequency domain.  This is then displayed comparing homologous areas to identify areas of significant asymmetry.  Algorithm to identify non-cortically generated artifact is used to separate eye movement, EMG and other artifact from the EEG.      RECORDING TIMES  Start on 03/20/2022 at 14:13 p.m.  Stop on 03/21/2022 at 14:44 p.m.-> End of the Recording Session  A total of 24 hours and 29 minutes of EEG recording is obtained.    EEG FINDINGS  Background activity:   The background is continuous, mildly disorganized, and slow predominantly theta/delta activity.  There are occasional runs of moderate voltage generalized discharges with a triphasic morphology that wax and wane.  There is variability throughout the recording session.    There are no pushbutton activations.    Sleep:  There is evidence of state transitions with the appearance of sleep architecture.    Activation procedures:   The patient is awake and looks over when his name is called but does not answer orientation questions or follow commands.    Cardiac Monitor:   Irregular    Impression:   This is an abnormal continuous EEG monitoring study because generalized background slowing with occasional generalized discharges with triphasic morphology consistent with diffuse cortical dysfunction and a moderate encephalopathy with some degree of cortical irritation.  This pattern is often seen in the setting of cefepime toxicity as well as a variety of other toxic/metabolic/infectious processes.  There are no pushbutton  activations and no electrographic seizures.    Danyelle Suárez MD PhD  Neurology-Epilepsy  [facility].

## 2022-03-21 NOTE — PROGRESS NOTES
[facility] Quick Note:     Patient observed to be readmitted on 3/19/2022.     Patient is a current IV ABX patient of [facility]; patient to be followed by [facility] staff while admitted for any needs.      Will continue to monitor while inpatient.     Please do not hesitate to reach out for any additional needs.     Rashid Alford MS, RDN, LDN  Clinical Dietitian  [facility]   Desk: [phone]  [email or web address]

## 2022-03-21 NOTE — ASSESSMENT & PLAN NOTE
Mr. Parada is a 89 yo M who presented with NIK and acute encephalopathy after recent admission for intra-abdominal abscess. At that time drains were placed and he was discharged with a PICC and on vanc/cefepime/flagyl. At time of admission on 3/19 he was found to have a creatinine of 2.5  With a vanc level of 33.3 and UA with granular casts. Cr now slightly down trending, 2.3 today. Suspect NIK/ATN due to vancomycin toxicity. Of note patient also was on home Lasix. AMS now improved and patient back at his baseline per wife. His UOP has picked up and is much lighter in color per wife.     - No need for continued fluids at this time as patient appears euvolemic and renal function is improving  - Encourage PO fluid intake   - Avoid nephrotoxic medications  - Strict I/Os  - Will continue to follow renal function

## 2022-03-21 NOTE — CONSULTS
[facility] - Telemetry Stepdown  Nephrology  Consult Note    Patient Name: Eddie Parada Sr.  MRN: [id number]  Admission Date: 3/19/2022  Hospital Length of Stay: 1 days  Attending Provider: Gustavo Gerard MD   Primary Care Physician: Callum Roberts MD  Principal Problem:Encephalopathy    Inpatient consult to Nephrology  Consult performed by: Amarjit Burroughs MD  Consult ordered by: Gustavo Gerard MD        Subjective:     HPI: Mr. Parada is a  89-year-old male with a history of atrial fibrillation (on Xarelto), coronary artery disease, chronic diastolic heart failure, diabetes, COPD, osteoporosis, hyperlipidemia, and recent admissions to [facility] (March 3-8 and March 12-14) for intra-abdominal abscess with septic shock presented to [facility] on March 19 with confusion and slurred speech that began the day prior to presentation.  In the emergency department he was also noted to have acute kidney injury, Cr 2.5 but with continued urine output.  He was on outpatient cefepime, metronidazole, and IV vancomycin for intra-abdominal abscess.   In the emergency department he received IV fluid and Rocephin.  He was transferred to [facility] for further evaluation of the slurred speech/confusion (concern for possible stroke with slurred speech) and evaluation/treatment of his acute kidney injury as they are unable to perform MRIs with pacemaker in place at [facility].  Here his abx were transitioned to unasyn. Currently on EEG for seizure like activity. Mentation wise- patient continues to improve and wife says he is at baseline now.     IUrinalysis with no red blood cells 75 white blood cells/moderate bacteria/moderate yeast/1+ protein/trace ketones/2+ occult blood/2+ leukocytes. Random vancomycin level 33.3. Cr initially 2.5, down trended to 2.3 today. Urine culture with candida.      Interval History:   No acute events overnight. This morning patient states he is feeling fine, and per wife he is  almost back to baseline    Review of Systems   Genitourinary:  Negative for decreased urine volume, difficulty urinating, dysuria, flank pain, hematuria and urgency.   Psychiatric/Behavioral:  Negative for confusion.    All other systems reviewed and are negative.  Objective:     Vital Signs (Most Recent):  Temp: 97.7 °F (36.5 °C) (03/22/22 0319)  Pulse: (!) 58 (03/22/22 0319)  Resp: 16 (03/22/22 0319)  BP: (!) 183/72 (03/22/22 0817)  SpO2: (!) 93 % (03/22/22 0319) Vital Signs (24h Range):  Temp:  [97.5 °F (36.4 °C)-97.8 °F (36.6 °C)] 97.7 °F (36.5 °C)  Pulse:  [58-79] 58  Resp:  [16-20] 16  SpO2:  [93 %-98 %] 93 %  BP: (144-183)/(68-74) 183/72     Weight: 81.8 kg (180 lb 5.4 oz)  Body mass index is 30.01 kg/m².    Intake/Output Summary (Last 24 hours) at 3/22/2022 0819  Last data filed at 3/21/2022 1800  Gross per 24 hour   Intake --   Output 510 ml   Net -510 ml        Physical Exam  Constitutional:       General: He is not in acute distress.     Appearance: He is obese.   HENT:      Head: Normocephalic and atraumatic.   Eyes:      Extraocular Movements: Extraocular movements intact.      Conjunctiva/sclera: Conjunctivae normal.   Cardiovascular:      Rate and Rhythm: Normal rate and regular rhythm.      Pulses: Normal pulses.   Abdominal:      General: Bowel sounds are normal. There is no distension.      Palpations: Abdomen is soft.      Tenderness: There is no abdominal tenderness.      Comments: Drain in place   Musculoskeletal:      Right lower leg: No edema.      Left lower leg: No edema.   Skin:     General: Skin is warm.   Neurological:      General: No focal deficit present.      Mental Status: He is alert.      Comments: Oriented to person, place and situation   Psychiatric:         Mood and Affect: Mood normal.         Behavior: Behavior normal.       Significant Labs: CBC:   No results for input(s): WBC, HGB, HCT, PLT in the last 48 hours.    CMP:   Recent Labs   Lab 03/21/22  0558 03/22/22  0441   JESSICA  142 142   K 4.1 3.9    108   CO2 24 24   * 127*   BUN 25 25   CREATININE 2.3* 2.2*   CALCIUM 8.6* 8.9   PROT 5.7* 5.7*   ALBUMIN 2.3* 2.4*   BILITOT 0.7 0.7   ALKPHOS 79 83   AST 24 20   ALT 8* 9*   ANIONGAP 9 10   EGFRNONAA 23.8* 25.1*         Significant Imaging: I have reviewed all pertinent imaging results/findings within the past 24 hours.    Assessment/Plan:     * Encephalopathy  Resolved    NIK (acute kidney injury)  Mr. Parada is a 89 yo M who presented with NIK and acute encephalopathy after recent admission for intra-abdominal abscess. At that time drains were placed and he was discharged with a PICC and on vanc/cefepime/flagyl. At time of admission on 3/19 he was found to have a creatinine of 2.5  With a vanc level of 33.3 and UA with granular casts. Cr now slightly down trending, 2.3 today. Suspect NIK/ATN due to vancomycin toxicity. Of note patient also was on home Lasix. AMS now improved and patient back at his baseline per wife. His UOP has picked up and is much lighter in color per wife.     - No need for continued fluids at this time as patient appears euvolemic and renal function is improving  - Encourage PO fluid intake   - Avoid nephrotoxic medications  - Strict I/Os  - Will continue to follow renal function      Abdominal wall abscess  Management per primary team        Thank you for your consult. I will follow-up with patient. Please contact us if you have any additional questions.    Amarjit Burroughs MD  Nephrology  [facility] - Telemetry Stepdown    ATTENDING PHYSICIAN ATTESTATION  I have personally verified the history and examined the patient. I thoroughly reviewed the demographic, clinical, laboratorial and imaging information available in medical records. I agree with the assessment and recommendations provided by the subspecialty resident who was under my supervision.

## 2022-03-21 NOTE — NURSING
1920- Resting in bed. Asymptomatic /78, P 62. Family at bedside. No complaints voiced or signs of acute distress observed.   2000- Asymptomatic /77, P 87.   2011- Notified Dr. JOLEEN Mcelroy concerning hypertension. No new orders given. Will continue to monitor.   2330- Asymptomatic /69, P 71. No distress noted. Family remains at bedside.

## 2022-03-21 NOTE — SUBJECTIVE & OBJECTIVE
Interval History:   No AEON,  Afebrile  NIK - improved to Cr 2.3   Wife says mental status much improved.  The patient denies any recent fever, chills, or sweats.      Review of Systems   Constitutional:  Negative for chills, diaphoresis and fever.   Respiratory:  Negative for shortness of breath.    Cardiovascular:  Negative for chest pain.   Gastrointestinal:  Negative for abdominal pain, diarrhea, nausea and vomiting.   Genitourinary:  Negative for dysuria and hematuria.   Objective:     Vital Signs (Most Recent):  Temp: 97.5 °F (36.4 °C) (03/21/22 1122)  Pulse: 60 (03/21/22 1122)  Resp: 18 (03/21/22 1122)  BP: (!) 157/70 (03/21/22 1122)  SpO2: 98 % (03/21/22 1122)   Vital Signs (24h Range):  Temp:  [96.2 °F (35.7 °C)-99 °F (37.2 °C)] 97.5 °F (36.4 °C)  Pulse:  [60-87] 60  Resp:  [18] 18  SpO2:  [95 %-100 %] 98 %  BP: (126-171)/(63-78) 157/70     Weight: 81.8 kg (180 lb 5.4 oz)  Body mass index is 30.01 kg/m².    Estimated Creatinine Clearance: 20.2 mL/min (A) (based on SCr of 2.3 mg/dL (H)).    Physical Exam  Constitutional:       General: He is awake. He is not in acute distress.     Appearance: He is not ill-appearing or toxic-appearing.   HENT:      Head: Normocephalic and atraumatic.      Nose: Nose normal.      Mouth/Throat:      Mouth: Mucous membranes are moist.   Eyes:      General: No scleral icterus.     Conjunctiva/sclera: Conjunctivae normal.   Cardiovascular:      Rate and Rhythm: Normal rate. Rhythm irregular.   Pulmonary:      Effort: Pulmonary effort is normal. No respiratory distress.      Breath sounds: Normal breath sounds. No wheezing or rales.   Abdominal:      General: There is no distension.      Palpations: Abdomen is soft.      Tenderness: There is no abdominal tenderness.      Comments: right abdominal drain with serosanguineous output   Musculoskeletal:         General: No swelling or tenderness.   Skin:     Findings: Bruising present. No rash.   Neurological:      Mental Status: He is  oriented to person, place, and time.      Cranial Nerves: No cranial nerve deficit.      Sensory: No sensory deficit.      Motor: No weakness.      Comments: Conversant, appropriate, and fully oriented   Psychiatric:         Mood and Affect: Mood normal.         Behavior: Behavior is not agitated.         Thought Content: Thought content normal.         Judgment: Judgment normal.       Significant Labs: Blood Culture:   Recent Labs   Lab 03/02/22  1728 03/12/22  1248 03/19/22  0734   LABBLOO No growth after 5 days.  No growth after 5 days. No growth after 5 days.  No growth after 5 days. No Growth to date  No Growth to date  No Growth to date  No Growth to date     CBC:   Recent Labs   Lab 03/20/22  0402   WBC 7.56   HGB 9.9*   HCT 30.7*        CMP:   Recent Labs   Lab 03/20/22  0402 03/21/22  0558    142   K 4.0 4.1    109   CO2 21* 24   * 123*   BUN 26 25   CREATININE 2.3* 2.3*   CALCIUM 8.4* 8.6*   PROT 5.5* 5.7*   ALBUMIN 2.3* 2.3*   BILITOT 0.6 0.7   ALKPHOS 78 79   AST 19 24   ALT 8* 8*   ANIONGAP 10 9   EGFRNONAA 23.8* 23.8*     Urine Culture:   Recent Labs   Lab 03/11/22  1737 03/19/22  0835   LABURIN CANDIDA ALBICANS  10,000 - 49,999 cfu/ml  Treatment of asymptomatic candiduria is not recommended (except for   specific populations). Candida isolated in the urine typically   represents colonization. If an indwelling urinary catheter is present  it should be removed or replaced.  * TAMARA ALBICANS  10,000 - 49,999 cfu/ml  Treatment of asymptomatic candiduria is not recommended (except for   specific populations). Candida isolated in the urine typically   represents colonization. If an indwelling urinary catheter is present  it should be removed or replaced.  *     Urine Studies:   Recent Labs   Lab 03/19/22  0835   COLORU Yellow   APPEARANCEUA Clear   PHUR 5.0   SPECGRAV 1.015   PROTEINUA 1+*   GLUCUA Negative   KETONESU Trace*   BILIRUBINUA Negative   OCCULTUA 2+*   NITRITE  Negative   UROBILINOGEN Negative   LEUKOCYTESUR 2+*   RBCUA 0   WBCUA 75*   BACTERIA Moderate*   SQUAMEPITHEL 15   HYALINECASTS 0     Wound Culture:   Recent Labs   Lab 03/03/22  1625   LABAERO No growth       Significant Imaging: I have reviewed all pertinent imaging results/findings within the past 24 hours.CT Abdomen Pelvis  Without Contrast    Status: Final result         MyChart Results Release    MyChart Status: Active  Results Release           PACS Images for ViTAL Lummi Viewer     Show images for CT Abdomen Pelvis Without Contrast                  CT Abdomen Pelvis  Without Contrast  Order: [id number]  Status: Final result    Visible to patient: Yes (not seen)    Next appt: 03/23/2022 at 11:15 AM in Infectious Diseases (Soheila Mendoza MD)    0 Result Notes    Details    Reading Physician Reading Date Result Priority   Alin Garcia MD  [phone] 3/19/2022 STAT     Narrative & Impression  EXAMINATION:  CT ABDOMEN PELVIS WITHOUT CONTRAST     CLINICAL HISTORY:  Abdominal abscess/infection suspected;     TECHNIQUE:  Low dose axial images, sagittal and coronal reformations were obtained from the lung bases to the pubic symphysis.  30 mL of oral Omnipaque 350 was administered.     COMPARISON:  The CT from 8 days prior.     FINDINGS:  When compared to the most recent prior exam from 03/11/2022 there is been interval decrease in size of the collection abutting the anterior abdominal wall.  This currently measures 15 x 5 4.4 cm series 2, image 70 compared with 17 x 9 cm on the prior exam. Drain remains in place.  Locules of air remain within the collection.     Limited imaging through the inferior thorax demonstrates trace right and small left pleural effusion, slightly increased since the previous exam.  Heart size is increased.  Thoracic and coronary artery atherosclerosis is noted. Pacer leads in the right heart are present.     Bones are intact. Stable postoperative changes of the right femur.  Degenerative changes of the spine.     Unenhanced liver is unremarkable. Patient is status post cholecystectomy.  Unenhanced spleen is within normal limits. Pancreatic atrophy is noted. Adrenals are normal. Left renal cysts are noted.  Other smaller lesions in the kidneys too small to characterize but likely relate to cysts as well. One of these on series 2, image 69 demonstrates attenuation slightly greater than fluid and likely relates to a complex cyst. Correlation with nonemergent ultrasound could be considered as warranted. There is no evidence of hydronephrosis.     Stable postsurgical changes of the stomach. There is no evidence of bowel obstruction. Normal appendix is seen. Colonic diverticulosis is noted without convincing evidence of diverticulitis.     Urinary bladder demonstrates decreased wall thickening compared to the previous exam. Prostate gland is enlarged with heterogeneous appearance and contains calcifications.     Aorta iliac atherosclerosis is noted. Small scattered retroperitoneal and inguinal lymph nodes are present. There is a fat and bowel containing right inguinal hernia and as well as a fat containing left inguinal hernia, unchanged from prior.     Motion artifact in the abdomen is present.     Impression:     Interval decrease in size of a large abdominal collection/abscess when compared to 03/11/2022 as above.  Drainage catheter is in place within the collection multiple other findings as discussed above and not significantly changed.        Electronically signed by: Alin Garcia MD  Date:                                            03/19/2022  Time:                                           09:45

## 2022-03-21 NOTE — HPI
Mr. Parada is a  89-year-old male with a history of atrial fibrillation (on Xarelto), coronary artery disease, chronic diastolic heart failure, diabetes, COPD, osteoporosis, hyperlipidemia, and recent admissions to [facility] (March 3-8 and March 12-14) for intra-abdominal abscess with septic shock presented to [facility] on March 19 with confusion and slurred speech that began the day prior to presentation.  In the emergency department he was also noted to have acute kidney injury, Cr 2.5 but with continued urine output.  He was on outpatient cefepime, metronidazole, and IV vancomycin for intra-abdominal abscess.   In the emergency department he received IV fluid and Rocephin.  He was transferred to [facility] for further evaluation of the slurred speech/confusion (concern for possible stroke with slurred speech) and evaluation/treatment of his acute kidney injury as they are unable to perform MRIs with pacemaker in place at [facility].  Here his abx were transitioned to unasyn. Currently on EEG for seizure like activity. Mentation wise- patient continues to improve and wife says he is at baseline now.     IUrinalysis with no red blood cells 75 white blood cells/moderate bacteria/moderate yeast/1+ protein/trace ketones/2+ occult blood/2+ leukocytes. Random vancomycin level 33.3. Cr initially 2.5, down trended to 2.3 today. Urine culture with candida.

## 2022-03-22 LAB
ALBUMIN SERPL BCP-MCNC: 2.4 G/DL (ref 3.5–5.2)
ALP SERPL-CCNC: 83 U/L (ref 55–135)
ALT SERPL W/O P-5'-P-CCNC: 9 U/L (ref 10–44)
ANION GAP SERPL CALC-SCNC: 10 MMOL/L (ref 8–16)
AST SERPL-CCNC: 20 U/L (ref 10–40)
BASOPHILS # BLD AUTO: 0.06 K/UL (ref 0–0.2)
BASOPHILS NFR BLD: 0.7 % (ref 0–1.9)
BILIRUB SERPL-MCNC: 0.7 MG/DL (ref 0.1–1)
BUN SERPL-MCNC: 25 MG/DL (ref 10–30)
CALCIUM SERPL-MCNC: 8.9 MG/DL (ref 8.7–10.5)
CHLORIDE SERPL-SCNC: 108 MMOL/L (ref 95–110)
CO2 SERPL-SCNC: 24 MMOL/L (ref 23–29)
CREAT SERPL-MCNC: 2.2 MG/DL (ref 0.5–1.4)
DIFFERENTIAL METHOD: ABNORMAL
EOSINOPHIL # BLD AUTO: 0.2 K/UL (ref 0–0.5)
EOSINOPHIL NFR BLD: 1.7 % (ref 0–8)
ERYTHROCYTE [DISTWIDTH] IN BLOOD BY AUTOMATED COUNT: 13.6 % (ref 11.5–14.5)
EST. GFR  (AFRICAN AMERICAN): 29 ML/MIN/1.73 M^2
EST. GFR  (NON AFRICAN AMERICAN): 25.1 ML/MIN/1.73 M^2
GLUCOSE SERPL-MCNC: 127 MG/DL (ref 70–110)
HCT VFR BLD AUTO: 34.4 % (ref 40–54)
HGB BLD-MCNC: 10.9 G/DL (ref 14–18)
IMM GRANULOCYTES # BLD AUTO: 0.11 K/UL (ref 0–0.04)
IMM GRANULOCYTES NFR BLD AUTO: 1.3 % (ref 0–0.5)
LYMPHOCYTES # BLD AUTO: 1.2 K/UL (ref 1–4.8)
LYMPHOCYTES NFR BLD: 13.4 % (ref 18–48)
MAGNESIUM SERPL-MCNC: 1.7 MG/DL (ref 1.6–2.6)
MCH RBC QN AUTO: 33.1 PG (ref 27–31)
MCHC RBC AUTO-ENTMCNC: 31.7 G/DL (ref 32–36)
MCV RBC AUTO: 105 FL (ref 82–98)
MONOCYTES # BLD AUTO: 1 K/UL (ref 0.3–1)
MONOCYTES NFR BLD: 11.4 % (ref 4–15)
NEUTROPHILS # BLD AUTO: 6.2 K/UL (ref 1.8–7.7)
NEUTROPHILS NFR BLD: 71.5 % (ref 38–73)
NRBC BLD-RTO: 0 /100 WBC
PHOSPHATE SERPL-MCNC: 3 MG/DL (ref 2.7–4.5)
PLATELET # BLD AUTO: 216 K/UL (ref 150–450)
PMV BLD AUTO: 9.4 FL (ref 9.2–12.9)
POCT GLUCOSE: 137 MG/DL (ref 70–110)
POCT GLUCOSE: 172 MG/DL (ref 70–110)
POCT GLUCOSE: 194 MG/DL (ref 70–110)
POTASSIUM SERPL-SCNC: 3.9 MMOL/L (ref 3.5–5.1)
PROT SERPL-MCNC: 5.7 G/DL (ref 6–8.4)
RBC # BLD AUTO: 3.29 M/UL (ref 4.6–6.2)
SODIUM SERPL-SCNC: 142 MMOL/L (ref 136–145)
WBC # BLD AUTO: 8.71 K/UL (ref 3.9–12.7)

## 2022-03-22 PROCEDURE — 25000003 PHARM REV CODE 250: Performed by: STUDENT IN AN ORGANIZED HEALTH CARE EDUCATION/TRAINING PROGRAM

## 2022-03-22 PROCEDURE — 20600001 HC STEP DOWN PRIVATE ROOM

## 2022-03-22 PROCEDURE — 97165 OT EVAL LOW COMPLEX 30 MIN: CPT

## 2022-03-22 PROCEDURE — 84100 ASSAY OF PHOSPHORUS: CPT | Performed by: STUDENT IN AN ORGANIZED HEALTH CARE EDUCATION/TRAINING PROGRAM

## 2022-03-22 PROCEDURE — 99233 PR SUBSEQUENT HOSPITAL CARE,LEVL III: ICD-10-PCS | Mod: ,,, | Performed by: PHYSICIAN ASSISTANT

## 2022-03-22 PROCEDURE — 97116 GAIT TRAINING THERAPY: CPT

## 2022-03-22 PROCEDURE — 36415 COLL VENOUS BLD VENIPUNCTURE: CPT | Performed by: STUDENT IN AN ORGANIZED HEALTH CARE EDUCATION/TRAINING PROGRAM

## 2022-03-22 PROCEDURE — 99232 PR SUBSEQUENT HOSPITAL CARE,LEVL II: ICD-10-PCS | Mod: ,,, | Performed by: STUDENT IN AN ORGANIZED HEALTH CARE EDUCATION/TRAINING PROGRAM

## 2022-03-22 PROCEDURE — 83735 ASSAY OF MAGNESIUM: CPT | Performed by: STUDENT IN AN ORGANIZED HEALTH CARE EDUCATION/TRAINING PROGRAM

## 2022-03-22 PROCEDURE — 99232 PR SUBSEQUENT HOSPITAL CARE,LEVL II: ICD-10-PCS | Mod: ,,, | Performed by: INTERNAL MEDICINE

## 2022-03-22 PROCEDURE — 97161 PT EVAL LOW COMPLEX 20 MIN: CPT

## 2022-03-22 PROCEDURE — 85025 COMPLETE CBC W/AUTO DIFF WBC: CPT | Performed by: STUDENT IN AN ORGANIZED HEALTH CARE EDUCATION/TRAINING PROGRAM

## 2022-03-22 PROCEDURE — 63600175 PHARM REV CODE 636 W HCPCS: Performed by: STUDENT IN AN ORGANIZED HEALTH CARE EDUCATION/TRAINING PROGRAM

## 2022-03-22 PROCEDURE — 99232 SBSQ HOSP IP/OBS MODERATE 35: CPT | Mod: ,,, | Performed by: STUDENT IN AN ORGANIZED HEALTH CARE EDUCATION/TRAINING PROGRAM

## 2022-03-22 PROCEDURE — 99233 SBSQ HOSP IP/OBS HIGH 50: CPT | Mod: ,,, | Performed by: PHYSICIAN ASSISTANT

## 2022-03-22 PROCEDURE — 99232 SBSQ HOSP IP/OBS MODERATE 35: CPT | Mod: ,,, | Performed by: INTERNAL MEDICINE

## 2022-03-22 PROCEDURE — 80053 COMPREHEN METABOLIC PANEL: CPT | Performed by: STUDENT IN AN ORGANIZED HEALTH CARE EDUCATION/TRAINING PROGRAM

## 2022-03-22 PROCEDURE — 97530 THERAPEUTIC ACTIVITIES: CPT

## 2022-03-22 RX ADMIN — AMPICILLIN SODIUM AND SULBACTAM SODIUM 3 G: 2; 1 INJECTION, POWDER, FOR SOLUTION INTRAMUSCULAR; INTRAVENOUS at 04:03

## 2022-03-22 RX ADMIN — AMLODIPINE BESYLATE 5 MG: 5 TABLET ORAL at 09:03

## 2022-03-22 RX ADMIN — PANTOPRAZOLE SODIUM 40 MG: 40 TABLET, DELAYED RELEASE ORAL at 09:03

## 2022-03-22 RX ADMIN — CARVEDILOL 12.5 MG: 12.5 TABLET, FILM COATED ORAL at 09:03

## 2022-03-22 RX ADMIN — TAMSULOSIN HYDROCHLORIDE 0.4 MG: 0.4 CAPSULE ORAL at 09:03

## 2022-03-22 RX ADMIN — CARVEDILOL 12.5 MG: 12.5 TABLET, FILM COATED ORAL at 04:03

## 2022-03-22 RX ADMIN — PRAVASTATIN SODIUM 20 MG: 20 TABLET ORAL at 09:03

## 2022-03-22 RX ADMIN — HYDRALAZINE HYDROCHLORIDE 10 MG: 10 TABLET, FILM COATED ORAL at 11:03

## 2022-03-22 NOTE — ASSESSMENT & PLAN NOTE
- To the presentation of worsening mental status with noted disorientation, tremor, and decrease concentration.  Exam nonfocal.  CT head with no acute process.  Chest x-ray unremarkable.  - Differential includes cefepime neurotoxicity in setting of NIK, CVA, metabolic disturbance, infectious  - Continue infectious workup as below  - Neurology consulted, signed off.  -- Ativan challenge, patient with improved mentation post   -- Continuous EEG discontinued, with triphasic morphology consistent with diffuse cortical dysfunction and a moderate encephalopathy   - Neurochecks Q 4

## 2022-03-22 NOTE — ASSESSMENT & PLAN NOTE
-  Patient creatinine was at baseline 0.9 on 3/14 and on presentation  - Admission creatinine 2.5  - Patient was discharged on vancomycin, with random admission vanc level 33  - UA with granular casts  - Concern for ATN d/t vancomycin, patient also home Lasix  - Avoiding nephrotoxic medications  - Holding IV fluids as patient does not appear hypovolemic  - Bishop for strict I&Os  - Trend BMP  - Improving slightly

## 2022-03-22 NOTE — ASSESSMENT & PLAN NOTE
89 year male with history of A-fib, CAD, CHF, COPD, DM, intraabdominal abscess (at site of hernia repair with mesh implanted over 20 years prior) on outpatient IV antibiotics (Cefepime, Vancomycin and Flagyl) admitted for AMS. See HPI for full history.    In ED at [facility] patient noted to have an NIK and supratherapeutic vanc level. CT head no acute abnormality. Repeat CT A/P 3/19 showed decreased size of collection. Drain remains in place. Blood cx NGTD.  ID consulted for antibiotic recommendations. Neurology consulted as well.    Changed to Unasyn.  NIK improved.  Fully oriented, conversant and appropriate today. Blood cultures NGTD. The patient denies any recent fever, chills, or sweats.        Recommendations  · Continue Unasyn  · Rec re-eval drain and continue flushes to ensure draining  · Follow blood cultures  · Will follow up tomorrow to assess for ontinued  improvements in mentation  · Discussed antibiotic plan with ID staff. ID will follow.

## 2022-03-22 NOTE — PT/OT/SLP EVAL
"Occupational Therapy   Co-Evaluation and Treatment    Name: Eddie Parada Sr.  MRN: [id number]  Admitting Diagnosis:  Encephalopathy  Recent Surgery: * No surgery found *    Co-treated with PT due to medical complexity and to maximize patient's safety   Recommendations:     Discharge Recommendations: home health OT  Discharge Equipment Recommendations:  grab bar  Barriers to discharge:  None    Assessment:     Eddie Parada Sr. is a 89 y.o. male with a medical diagnosis of Encephalopathy.  He presents with no c/o pain or discomfort but requires repeated verbal cues and simple commands to follow instructions. The patient was able to transfer from sit > supine with SBA, sit > stand with CGA, and ambulate approx. 150 ft total inside room including bathroom with SBA to CGA. With RW. He was able to demonstrate a step transfer to sit <> stand on toilet and performed UE dressing with Min A. He required Total A for LE diaper management. The patient tolerated the treatment very well with no fatigue and is making strong progress towards his PLOF.  Performance deficits affecting function: weakness, impaired endurance, impaired cognition, decreased upper extremity function, decreased lower extremity function, decreased safety awareness, impaired balance, impaired self care skills, impaired functional mobilty.      Rehab Prognosis: Good; patient would benefit from acute skilled OT services to address these deficits and reach maximum level of function.       Plan:     Patient to be seen 3 x/week to address the above listed problems via therapeutic activities, self-care/home management  · Plan of Care Expires: 04/05/22  · Plan of Care Reviewed with: patient, family, spouse    Subjective   "I can walk just fine."   Chief Complaint: Impaired cognition and decreased mobility affecting ability to perform ADLs   Patient/Family Comments/goals: To return home at [facility]    Occupational Profile:  Living Environment:   - Lives with " spouse in [facility] with 2 steps required to enter and a steep ramp with bilateral rails  -  Bathroom is a tub/shower combination with a tub bench and bars   Previous level of function: Independent   Roles and Routines:   - Spouse  - Enjoys cleaning and doing errands with wife   Equipment Used at Home:  shower chair, walker, rolling  Assistance upon Discharge: Wife and nearby family available for prn assistance     Pain/Comfort:  Pain Rating 1: 0/10    Patients cultural, spiritual, [employer] conflicts given the current situation: no    Objective:     Communicated with: RN prior to session.  Patient found supine with pulse ox (continuous) (abdominal drain) upon OT entry to room.    General Precautions: Standard, fall   Orthopedic Precautions:N/A   Braces: N/A  Respiratory Status: Room air    Occupational Performance:    Bed Mobility:    · Supine > sit with SBA   · Scooting with SBA   · Sitting EOB with SBA     Functional Mobility/Transfers:  Transfers:   · Sit > stand with CGA and RW  · Stand > sit in chair with CGA and RW   · Step transfer to stand <> sit on toilet with CGA and RW     Functional Mobility:   · Stood in place with CGA using RW   · Ambulated approx. 150 ft in room and bathroom with CGA using RW     Activities of Daily Living:  · UB dressing: Min A to don gown while sitting on EOB   · LB dressing/hygiene: Total  A for diaper management     Cognitive/Visual Perceptual:  Cognitive/Psychosocial Skills:  -       Oriented to: Person, Time and Situation   -       Follows Commands/attention:Follows two-step commands  -       Communication: limited to simple phrases  -       Memory: Impaired STM    Physical Exam:  Upper Extremity Range of Motion:     -       Right Upper Extremity: WFL  -       Left Upper Extremity: WFL  Upper Extremity Strength:    -       Right Upper Extremity: WFL  -       Left Upper Extremity: WFL   Strength:    -       Right Upper Extremity: WFL  -       Left Upper Extremity: WFL    [facility] 6  Click ADL:  AMPAC Total Score: 14    Treatment & Education:  - RW transfer techniques   - Role of PTvs OT   -  Importance of OOB mobility   Education:    Patient left up in chair with all lines intact and call button in reach    GOALS:   Multidisciplinary Problems     Occupational Therapy Goals        Problem: Occupational Therapy    Goal Priority Disciplines Outcome Interventions   Occupational Therapy Goal     OT, PT/OT Ongoing, Progressing    Description: Goals to be met by: 4/5/22    Patient will increase functional independence with ADLs by performing:    UE Dressing with [place].  LE Dressing with [place].  Toileting from toilet with Supervision for hygiene and clothing management.   Bathing from  shower chair/bench with Modified [place].  Step transfer with Supervision                     History:     Past Medical History:   Diagnosis Date    Abdominal fibromatosis     Acute posthemorrhagic anemia 1/9/2018    NIK (acute kidney injury) 1/11/2018    Atrial fibrillation     Bradycardia     Broken arm     CAD (coronary artery disease)     Cancer     basal cell on nose and melanoma on back    CHF (congestive heart failure)     COPD (chronic obstructive pulmonary disease)     Diabetes mellitus type II     Hyperlipidemia     Localization-related focal epilepsy with simple partial seizures 3/2/2021    Melanoma     Obesity (BMI 30.0-34.9) 9/13/2016    Osteoporosis     Peripheral vascular disease     Primary malignant neoplasm of prostate 3/3/2022    Septic shock 3/3/2022    Stroke     TIA (transient ischemic attack)     Type II diabetes mellitus with peripheral circulatory disorder     Type II or unspecified type diabetes mellitus without mention of complication, not stated as uncontrolled     Unspecified essential hypertension          Past Surgical History:   Procedure Laterality Date    AORTIC VALVE REPLACEMENT      CARDIAC PACEMAKER PLACEMENT  9/28/2012    CARDIAC VALVE  REPLACEMENT  11/17/2011    aortic valve-tissue    CHOLECYSTECTOMY      COLONOSCOPY      ESOPHAGOGASTRODUODENOSCOPY N/A 5/27/2021    Procedure: EGD (ESOPHAGOGASTRODUODENOSCOPY);  Surgeon: Gualberto Medrano MD;  Location: [facility];  Service: Endoscopy;  Laterality: N/A;    ESOPHAGOGASTRODUODENOSCOPY N/A 6/22/2021    Procedure: EGD (ESOPHAGOGASTRODUODENOSCOPY);  Surgeon: Gualberto Medrano MD;  Location: [facility];  Service: Endoscopy;  Laterality: N/A;  please call wife(Jessica) with instructions/arrival time.    ESOPHAGOGASTRODUODENOSCOPY (EGD) WITH DILATION  06/22/2021    EYE SURGERY Bilateral     cataract with lens by  at [facility]    HERNIA REPAIR      abdominal    LASIK      UPPER GASTROINTESTINAL ENDOSCOPY  05/28/2021       Time Tracking:     OT Date of Treatment: 03/22/22  OT Start Time: 1325  OT Stop Time: 1354  OT Total Time (min): 29 min    Billable Minutes:Evaluation 10  Therapeutic Activity 19    3/22/2022

## 2022-03-22 NOTE — ASSESSMENT & PLAN NOTE
- Initially treated at [facility] early March 2022 with IR drain placement and treated with Rocephin and Flagyl.  Readmitted to [facility] in mid March with IR drain placement and discharged with minimum 2 weeks of additional antibiotics (cefepime, vancomycin, Flagyl) on March 14th._  - Admission CT abdomen/pelvis with decrease in size of fluid abdominal collections  - Patient readmitted with NIK and concern for cefepime toxicity, vancomycin induced NIK  - ID consulted, appreciate recs  -- Continue Unasyn  - UA with candida albicans, likely colonization

## 2022-03-22 NOTE — PROGRESS NOTES
[facility] - Telemetry Stepdown  Nephrology  Progress Note    Patient Name: Eddie Parada Sr.  MRN: [id number]  Admission Date: 3/19/2022  Hospital Length of Stay: 2 days  Attending Provider: Gustavo Gerard MD   Primary Care Physician: Callum Roberts MD  Principal Problem:Encephalopathy    Subjective:     HPI: Mr. Parada is a  89-year-old male with a history of atrial fibrillation (on Xarelto), coronary artery disease, chronic diastolic heart failure, diabetes, COPD, osteoporosis, hyperlipidemia, and recent admissions to [facility] (March 3-8 and March 12-14) for intra-abdominal abscess with septic shock presented to [facility] on March 19 with confusion and slurred speech that began the day prior to presentation.  In the emergency department he was also noted to have acute kidney injury, Cr 2.5 but with continued urine output.  He was on outpatient cefepime, metronidazole, and IV vancomycin for intra-abdominal abscess.   In the emergency department he received IV fluid and Rocephin.  He was transferred to [facility] for further evaluation of the slurred speech/confusion (concern for possible stroke with slurred speech) and evaluation/treatment of his acute kidney injury as they are unable to perform MRIs with pacemaker in place at [facility].  Here his abx were transitioned to unasyn. Currently on EEG for seizure like activity. Mentation wise- patient continues to improve and wife says he is at baseline now.     IUrinalysis with no red blood cells 75 white blood cells/moderate bacteria/moderate yeast/1+ protein/trace ketones/2+ occult blood/2+ leukocytes. Random vancomycin level 33.3. Cr initially 2.5, down trended to 2.3 today. Urine culture with candida.      Interval History:   No acute events overnight. Pt continues to make urine, net euvolemic. Mentation at baseline per wife.     Review of Systems   Genitourinary:  Negative for decreased urine volume, difficulty urinating, dysuria, flank  pain, hematuria and urgency.   Psychiatric/Behavioral:  Negative for confusion.    All other systems reviewed and are negative.  Objective:     Vital Signs (Most Recent):  Temp: 97.7 °F (36.5 °C) (03/22/22 0319)  Pulse: (!) 58 (03/22/22 0319)  Resp: 16 (03/22/22 0319)  BP: (!) 183/72 (03/22/22 0817)  SpO2: (!) 93 % (03/22/22 0319) Vital Signs (24h Range):  Temp:  [97.5 °F (36.4 °C)-97.8 °F (36.6 °C)] 97.7 °F (36.5 °C)  Pulse:  [58-79] 58  Resp:  [16-20] 16  SpO2:  [93 %-98 %] 93 %  BP: (144-183)/(68-74) 183/72     Weight: 81.8 kg (180 lb 5.4 oz)  Body mass index is 30.01 kg/m².    Intake/Output Summary (Last 24 hours) at 3/22/2022 0819  Last data filed at 3/21/2022 1800  Gross per 24 hour   Intake --   Output 510 ml   Net -510 ml        Physical Exam  Constitutional:       General: He is not in acute distress.     Appearance: He is obese.   HENT:      Head: Normocephalic and atraumatic.   Eyes:      Extraocular Movements: Extraocular movements intact.      Conjunctiva/sclera: Conjunctivae normal.   Cardiovascular:      Rate and Rhythm: Normal rate and regular rhythm.      Pulses: Normal pulses.   Abdominal:      General: Bowel sounds are normal. There is no distension.      Palpations: Abdomen is soft.      Tenderness: There is no abdominal tenderness.      Comments: Drain in place   Musculoskeletal:      Right lower leg: No edema.      Left lower leg: No edema.   Skin:     General: Skin is warm.   Neurological:      General: No focal deficit present.      Mental Status: He is alert.      Comments: Oriented to person, place and situation   Psychiatric:         Mood and Affect: Mood normal.         Behavior: Behavior normal.       Significant Labs: CBC:   No results for input(s): WBC, HGB, HCT, PLT in the last 48 hours.    CMP:   Recent Labs   Lab 03/21/22  0558 03/22/22  0441    142   K 4.1 3.9    108   CO2 24 24   * 127*   BUN 25 25   CREATININE 2.3* 2.2*   CALCIUM 8.6* 8.9   PROT 5.7* 5.7*    ALBUMIN 2.3* 2.4*   BILITOT 0.7 0.7   ALKPHOS 79 83   AST 24 20   ALT 8* 9*   ANIONGAP 9 10   EGFRNONAA 23.8* 25.1*         Significant Imaging: I have reviewed all pertinent imaging results/findings within the past 24 hours.    Assessment/Plan:     NIK (acute kidney injury)  Mr. Parada is a 89 yo M who presented with NIK and acute encephalopathy after recent admission for intra-abdominal abscess. At that time drains were placed and he was discharged with a PICC and on vanc/cefepime/flagyl. At time of admission on 3/19 he was found to have a creatinine of 2.5  With a vanc level of 33.3 and UA with granular casts. Cr continuing to trend down, 2.2 today. Suspect NIK/ATN due to vancomycin toxicity. Of note patient also was on home Lasix. AMS now improved and patient back at his baseline per wife. His UOP has picked up and is much lighter in color per wife.     - No need for continued fluids at this time as patient appears euvolemic and renal function is improving  - Encourage PO fluid intake   - Avoid nephrotoxic medications  - Strict I/Os  - Will continue to follow renal function          Thank you for your consult. I will follow-up with patient. Please contact us if you have any additional questions.    Amarjit Burroughs MD  Nephrology  [facility] - Telemetry Stepdown    ATTENDING PHYSICIAN ATTESTATION  I have personally verified the history and examined the patient. I thoroughly reviewed the demographic, clinical, laboratorial and imaging information available in medical records. I agree with the assessment and recommendations provided by the subspecialty resident who was under my supervision.

## 2022-03-22 NOTE — SUBJECTIVE & OBJECTIVE
Interval History:   No AEON,  Afebrile  NIK - improved to Cr 2.2  Wife says mental status back to baseline.  Has no pain.  The patient denies any recent fever, chills, or sweats.      Review of Systems   Constitutional:  Negative for chills, diaphoresis and fever.   Respiratory:  Negative for shortness of breath.    Cardiovascular:  Negative for chest pain.   Gastrointestinal:  Negative for abdominal pain, diarrhea, nausea and vomiting.   Genitourinary:  Negative for dysuria and hematuria.   Objective:     Vital Signs (Most Recent):  Temp: 97.8 °F (36.6 °C) (03/22/22 1550)  Pulse: (!) 59 (03/22/22 1550)  Resp: 18 (03/22/22 1550)  BP: (!) 160/72 (03/22/22 1550)  SpO2: 96 % (03/22/22 1550)   Vital Signs (24h Range):  Temp:  [97.6 °F (36.4 °C)-98.2 °F (36.8 °C)] 97.8 °F (36.6 °C)  Pulse:  [58-64] 59  Resp:  [16-20] 18  SpO2:  [93 %-97 %] 96 %  BP: (146-183)/(70-74) 160/72     Weight: 81.8 kg (180 lb 5.4 oz)  Body mass index is 30.01 kg/m².    Estimated Creatinine Clearance: 21.1 mL/min (A) (based on SCr of 2.2 mg/dL (H)).    Physical Exam  Constitutional:       General: He is awake. He is not in acute distress.     Appearance: He is not ill-appearing or toxic-appearing.   HENT:      Head: Normocephalic and atraumatic.      Nose: Nose normal.      Mouth/Throat:      Mouth: Mucous membranes are moist.   Eyes:      General: No scleral icterus.     Conjunctiva/sclera: Conjunctivae normal.   Cardiovascular:      Rate and Rhythm: Normal rate. Rhythm irregular.   Pulmonary:      Effort: Pulmonary effort is normal. No respiratory distress.      Breath sounds: Normal breath sounds. No wheezing or rales.   Abdominal:      General: There is no distension.      Palpations: Abdomen is soft.      Tenderness: There is no abdominal tenderness.      Comments: right abdominal drain with serosanguineous output   Musculoskeletal:         General: No swelling or tenderness.   Skin:     Findings: Bruising present. No rash.   Neurological:       Mental Status: He is oriented to person, place, and time.      Cranial Nerves: No cranial nerve deficit.      Sensory: No sensory deficit.      Motor: No weakness.      Comments: Conversant, appropriate, and fully oriented   Psychiatric:         Mood and Affect: Mood normal.         Behavior: Behavior is not agitated.         Thought Content: Thought content normal.         Judgment: Judgment normal.       Significant Labs: Blood Culture:   Recent Labs   Lab 03/02/22  1728 03/12/22  1248 03/19/22  0734   LABBLOO No growth after 5 days.  No growth after 5 days. No growth after 5 days.  No growth after 5 days. No Growth to date  No Growth to date  No Growth to date  No Growth to date  No Growth to date  No Growth to date  No Growth to date  No Growth to date       CBC:   Recent Labs   Lab 03/22/22  0834   WBC 8.71   HGB 10.9*   HCT 34.4*          CMP:   Recent Labs   Lab 03/21/22  0558 03/22/22  0441    142   K 4.1 3.9    108   CO2 24 24   * 127*   BUN 25 25   CREATININE 2.3* 2.2*   CALCIUM 8.6* 8.9   PROT 5.7* 5.7*   ALBUMIN 2.3* 2.4*   BILITOT 0.7 0.7   ALKPHOS 79 83   AST 24 20   ALT 8* 9*   ANIONGAP 9 10   EGFRNONAA 23.8* 25.1*       Urine Culture:   Recent Labs   Lab 03/11/22  1737 03/19/22  0835   LABURIN CANDIDA ALBICANS  10,000 - 49,999 cfu/ml  Treatment of asymptomatic candiduria is not recommended (except for   specific populations). Candida isolated in the urine typically   represents colonization. If an indwelling urinary catheter is present  it should be removed or replaced.  * TAMARA ALBICANS  10,000 - 49,999 cfu/ml  Treatment of asymptomatic candiduria is not recommended (except for   specific populations). Candida isolated in the urine typically   represents colonization. If an indwelling urinary catheter is present  it should be removed or replaced.  *       Urine Studies:   Recent Labs   Lab 03/19/22  0835   COLORU Yellow   APPEARANCEUA Clear   PHUR 5.0    SPECGRAV 1.015   PROTEINUA 1+*   GLUCUA Negative   KETONESU Trace*   BILIRUBINUA Negative   OCCULTUA 2+*   NITRITE Negative   UROBILINOGEN Negative   LEUKOCYTESUR 2+*   RBCUA 0   WBCUA 75*   BACTERIA Moderate*   SQUAMEPITHEL 15   HYALINECASTS 0       Wound Culture:   Recent Labs   Lab 03/03/22  1625   LABAERO No growth         Significant Imaging: I have reviewed all pertinent imaging results/findings within the past 24 hours.CT Abdomen Pelvis  Without Contrast    Status: Final result         MyChart Results Release    MyChart Status: Active  Results Release           PACS Images for [facility] [place] Viewer     Show images for CT Abdomen Pelvis Without Contrast                  CT Abdomen Pelvis  Without Contrast  Order: [id number]  Status: Final result    Visible to patient: Yes (not seen)    Next appt: 03/23/2022 at 11:15 AM in Infectious Diseases (Soheila Mendoza MD)    0 Result Notes    Details    Reading Physician Reading Date Result Priority   Alin Garcia MD  [phone] 3/19/2022 STAT     Narrative & Impression  EXAMINATION:  CT ABDOMEN PELVIS WITHOUT CONTRAST     CLINICAL HISTORY:  Abdominal abscess/infection suspected;     TECHNIQUE:  Low dose axial images, sagittal and coronal reformations were obtained from the lung bases to the pubic symphysis.  30 mL of oral Omnipaque 350 was administered.     COMPARISON:  The CT from 8 days prior.     FINDINGS:  When compared to the most recent prior exam from 03/11/2022 there is been interval decrease in size of the collection abutting the anterior abdominal wall.  This currently measures 15 x 5 4.4 cm series 2, image 70 compared with 17 x 9 cm on the prior exam. Drain remains in place.  Locules of air remain within the collection.     Limited imaging through the inferior thorax demonstrates trace right and small left pleural effusion, slightly increased since the previous exam.  Heart size is increased.  Thoracic and coronary artery atherosclerosis is noted.  Pacer leads in the right heart are present.     Bones are intact. Stable postoperative changes of the right femur. Degenerative changes of the spine.     Unenhanced liver is unremarkable. Patient is status post cholecystectomy.  Unenhanced spleen is within normal limits. Pancreatic atrophy is noted. Adrenals are normal. Left renal cysts are noted.  Other smaller lesions in the kidneys too small to characterize but likely relate to cysts as well. One of these on series 2, image 69 demonstrates attenuation slightly greater than fluid and likely relates to a complex cyst. Correlation with nonemergent ultrasound could be considered as warranted. There is no evidence of hydronephrosis.     Stable postsurgical changes of the stomach. There is no evidence of bowel obstruction. Normal appendix is seen. Colonic diverticulosis is noted without convincing evidence of diverticulitis.     Urinary bladder demonstrates decreased wall thickening compared to the previous exam. Prostate gland is enlarged with heterogeneous appearance and contains calcifications.     Aorta iliac atherosclerosis is noted. Small scattered retroperitoneal and inguinal lymph nodes are present. There is a fat and bowel containing right inguinal hernia and as well as a fat containing left inguinal hernia, unchanged from prior.     Motion artifact in the abdomen is present.     Impression:     Interval decrease in size of a large abdominal collection/abscess when compared to 03/11/2022 as above.  Drainage catheter is in place within the collection multiple other findings as discussed above and not significantly changed.        Electronically signed by: Alin Garcia MD  Date:                                            03/19/2022  Time:                                           09:45

## 2022-03-22 NOTE — PT/OT/SLP EVAL
Physical Therapy Evaluation    Patient Name:  Eddie Parada Sr.   MRN:  [id number]  Therapy evaluation completed with Occupational Therapist to best establish plan of care for acute setting and to provide skilled treatment.    Recommendations:     Discharge Recommendations:  home health PT   Discharge Equipment Recommendations: none   Barriers to discharge: None    Assessment:     Eddie Parada Sr. is a 89 y.o. male admitted with a medical diagnosis of Encephalopathy.  He presents with the following impairments/functional limitations:  impaired endurance, impaired functional mobilty, gait instability . Patient participates well. Ambulates in room w/ RW and  feet; transfers w/ CGA. Family/wife at bedside during session and wife provides part of recent history. .    Rehab Prognosis: Good; patient would benefit from acute skilled PT services to address these deficits and reach maximum level of function.    Recent Surgery: * No surgery found *      Plan:     During this hospitalization, patient to be seen 3 x/week to address the identified rehab impairments via gait training, therapeutic activities, therapeutic exercises and progress toward the following goals:    · Plan of Care Expires:  04/21/22    Subjective     Chief Complaint: wants to be able to get up and use the toilet  Patient/Family Comments/goals: return [facility]  Pain/Comfort:  · Pain Rating 1: 0/10  · Pain Rating Post-Intervention 1: 0/10    Patients cultural, spiritual, Protestant conflicts given the current situation:      Living Environment:  Patient lives in [facility] w/ 7 [address] but has a ramp (steep) w/ BHR that patient uses. Has a tub shower w/ bath bench.  Prior to admission, patients level of function was prior to March 3, ambulated w/o AD, since this illness, has been using a RW for safety.  Equipment used at home: bath bench, walker, rolling.  DME owned (not currently used): none.  Upon discharge, patient will have assistance from  wife.    Objective:     Communicated with nurse prior to session.  Patient found HOB elevated with pulse ox (continuous), bed alarm (telesitter; abdominal drain bag)  upon PT entry to room.    General Precautions: Standard, fall   Orthopedic Precautions:N/A   Braces: N/A  Respiratory Status: Room air    Exams:  · Cognitive Exam:  Patient is oriented to Person, Place, Time and Situation  · Gross Motor Coordination:  WFL  · Postural Exam:  Patient presented with the following abnormalities:    · -       Rounded shoulders  · -       Posterior pelvic tilt  · Skin Integrity/Edema:      · -       abdominal drain/bag right abdominal area; bruises/ ecchymosis noted U/LEs  · RLE ROM: WNL  · RLE Strength: WNL  · LLE ROM: WNL  · LLE Strength: WNL    Functional Mobility:  · Bed Mobility:     · Supine to Sit: contact guard assistance and HOB elevated  · Transfers:     · Sit to Stand:  contact guard assistance with rolling walker and from EOB, chair  · Bed to Chair: contact guard assistance with  rolling walker  using  Step Transfer  · Toilet Transfer: contact guard assistance with  rolling walker  using  Step Transfer  · Gait: w/ RW and CGA 15 feet to toilet; then 150 feet w/ RW and CGA in room. no LOB, multiple turns    Therapeutic Activities and Exercises:   patient stands for donning back gown and adjusting/doffing diaper w/o LOB w/ RW and SBA  Sits EOB w/ SBA once feet on ground.  Patient and wife educated in PT POC, gait and trf tech, call for assistance    AM-PAC 6 CLICK MOBILITY  Total Score:18     Patient left up in chair with all lines intact, call button in reach and family present.    GOALS:   Multidisciplinary Problems     Physical Therapy Goals        Problem: Physical Therapy    Goal Priority Disciplines Outcome Goal Variances Interventions   Physical Therapy Goal     PT, PT/OT Ongoing, Progressing     Description: Goals to be met by: 04/10/22     Patient will increase functional independence with mobility by  performin. Supine to sit with Set-up Wrights  2. Sit to supine with Set-up Wrights  3. Sit to stand transfer with Supervision  4. Bed to chair transfer with Supervision using Rolling Walker  5. Gait  x 200 feet with Stand-by Assistance using Rolling Walker.   6. Lower extremity exercise program x15 reps per handout, with assistance as needed                     History:     Past Medical History:   Diagnosis Date    Abdominal fibromatosis     Acute posthemorrhagic anemia 2018    NIK (acute kidney injury) 2018    Atrial fibrillation     Bradycardia     Broken arm     CAD (coronary artery disease)     Cancer     basal cell on nose and melanoma on back    CHF (congestive heart failure)     COPD (chronic obstructive pulmonary disease)     Diabetes mellitus type II     Hyperlipidemia     Localization-related focal epilepsy with simple partial seizures 3/2/2021    Melanoma     Obesity (BMI 30.0-34.9) 2016    Osteoporosis     Peripheral vascular disease     Primary malignant neoplasm of prostate 3/3/2022    Septic shock 3/3/2022    Stroke     TIA (transient ischemic attack)     Type II diabetes mellitus with peripheral circulatory disorder     Type II or unspecified type diabetes mellitus without mention of complication, not stated as uncontrolled     Unspecified essential hypertension        Past Surgical History:   Procedure Laterality Date    AORTIC VALVE REPLACEMENT      CARDIAC PACEMAKER PLACEMENT  2012    CARDIAC VALVE REPLACEMENT  2011    aortic valve-tissue    CHOLECYSTECTOMY      COLONOSCOPY      ESOPHAGOGASTRODUODENOSCOPY N/A 2021    Procedure: EGD (ESOPHAGOGASTRODUODENOSCOPY);  Surgeon: Gualberto Medrano MD;  Location[facility];  Service: Endoscopy;  Laterality: N/A;    ESOPHAGOGASTRODUODENOSCOPY N/A 2021    Procedure: EGD (ESOPHAGOGASTRODUODENOSCOPY);  Surgeon: Gualberto Medrano MD;  Location: [facility];  Service:  Endoscopy;  Laterality: N/A;  please call wife(Jessica) with instructions/arrival time.    ESOPHAGOGASTRODUODENOSCOPY (EGD) WITH DILATION  06/22/2021    EYE SURGERY Bilateral     cataract with lens by  at [facility]    HERNIA REPAIR      abdominal    LASIK      UPPER GASTROINTESTINAL ENDOSCOPY  05/28/2021       Time Tracking:     PT Received On: 03/22/22  PT Start Time: 1327     PT Stop Time: 1353  PT Total Time (min): 26 min     Billable Minutes: Evaluation 10 and Gait Training 10      03/22/2022

## 2022-03-22 NOTE — SUBJECTIVE & OBJECTIVE
Interval History:   No acute events overnight. Pt continues to make urine, net euvolemic. Mentation at baseline per wife.     Review of Systems   Genitourinary:  Negative for decreased urine volume, difficulty urinating, dysuria, flank pain, hematuria and urgency.   Psychiatric/Behavioral:  Negative for confusion.    All other systems reviewed and are negative.  Objective:     Vital Signs (Most Recent):  Temp: 97.7 °F (36.5 °C) (03/22/22 0319)  Pulse: (!) 58 (03/22/22 0319)  Resp: 16 (03/22/22 0319)  BP: (!) 183/72 (03/22/22 0817)  SpO2: (!) 93 % (03/22/22 0319) Vital Signs (24h Range):  Temp:  [97.5 °F (36.4 °C)-97.8 °F (36.6 °C)] 97.7 °F (36.5 °C)  Pulse:  [58-79] 58  Resp:  [16-20] 16  SpO2:  [93 %-98 %] 93 %  BP: (144-183)/(68-74) 183/72     Weight: 81.8 kg (180 lb 5.4 oz)  Body mass index is 30.01 kg/m².    Intake/Output Summary (Last 24 hours) at 3/22/2022 0819  Last data filed at 3/21/2022 1800  Gross per 24 hour   Intake --   Output 510 ml   Net -510 ml        Physical Exam  Constitutional:       General: He is not in acute distress.     Appearance: He is obese.   HENT:      Head: Normocephalic and atraumatic.   Eyes:      Extraocular Movements: Extraocular movements intact.      Conjunctiva/sclera: Conjunctivae normal.   Cardiovascular:      Rate and Rhythm: Normal rate and regular rhythm.      Pulses: Normal pulses.   Abdominal:      General: Bowel sounds are normal. There is no distension.      Palpations: Abdomen is soft.      Tenderness: There is no abdominal tenderness.      Comments: Drain in place   Musculoskeletal:      Right lower leg: No edema.      Left lower leg: No edema.   Skin:     General: Skin is warm.   Neurological:      General: No focal deficit present.      Mental Status: He is alert.      Comments: Oriented to person, place and situation   Psychiatric:         Mood and Affect: Mood normal.         Behavior: Behavior normal.       Significant Labs: CBC:   No results for input(s): WBC,  HGB, HCT, PLT in the last 48 hours.    CMP:   Recent Labs   Lab 03/21/22  0558 03/22/22  0441    142   K 4.1 3.9    108   CO2 24 24   * 127*   BUN 25 25   CREATININE 2.3* 2.2*   CALCIUM 8.6* 8.9   PROT 5.7* 5.7*   ALBUMIN 2.3* 2.4*   BILITOT 0.7 0.7   ALKPHOS 79 83   AST 24 20   ALT 8* 9*   ANIONGAP 9 10   EGFRNONAA 23.8* 25.1*         Significant Imaging: I have reviewed all pertinent imaging results/findings within the past 24 hours.

## 2022-03-22 NOTE — ASSESSMENT & PLAN NOTE
Mr. Parada is a 89 yo M who presented with NIK and acute encephalopathy after recent admission for intra-abdominal abscess. At that time drains were placed and he was discharged with a PICC and on vanc/cefepime/flagyl. At time of admission on 3/19 he was found to have a creatinine of 2.5  With a vanc level of 33.3 and UA with granular casts. Cr continuing to trend down, 2.2 today. Suspect NIK/ATN due to vancomycin toxicity. Of note patient also was on home Lasix. AMS now improved and patient back at his baseline per wife. His UOP has picked up and is much lighter in color per wife.     - No need for continued fluids at this time as patient appears euvolemic and renal function is improving  - Encourage PO fluid intake   - Avoid nephrotoxic medications  - Strict I/Os  - Will continue to follow renal function

## 2022-03-22 NOTE — PROGRESS NOTES
[facility] - Telemetry [facility] Medicine  Progress Note    Patient Name: Eddie Parada Sr.  MRN: [id number]  Patient Class: IP- Inpatient   Admission Date: 3/19/2022  Length of Stay: 1 days  Attending Physician: Gustavo Gerard MD  Primary Care Provider: Callum Roberts MD        Subjective:     Principal Problem:Encephalopathy        HPI:  Mr. Parada is a 89-year-old male with a history of atrial fibrillation (on Xarelto), coronary artery disease, chronic diastolic heart failure, diabetes, COPD, osteoporosis, hyperlipidemia, and recent admissions to [facility] (March 3-8 and March 12-14) for intra-abdominal abscess with septic shock presented to [facility] on March 19 with confusion and slurred speech that began the day prior to presentation.  In the emergency department he was also noted to have acute kidney injury. He was at the time still urinating although his wife reports he was making less urine and it was a darker color.  He was on outpatient cefepime, metronidazole, and IV vancomycin with vanc supra therapeutic to 33.3 on admit.  He was transferred from OSH for further evaluation of the slurred speech/confusion (concern for possible stroke with slurred speech) and evaluation/treatment of his acute kidney injury.  Infectious workup negative so far. Currently on EEG with seizure like activity yesterday. Per wife, his urine has picked back up and is now clearer.     Urinalysis with no red blood cells 75 white blood cells/moderate bacteria/moderate yeast/1+ protein/trace ketones/2+ occult blood/2+ leukocytes. Random vancomycin level 33.3, procalcitonin 0.06, , troponin 0.026, CPK 24, sodium 137, potassium 4.1, chloride 104, CO2 22, BUN 26, creatinine 2.5 baseline (0.9-1), glucose 173, AST 21, ALT 8, influenza negative, lactic acid 0.9, COVID negative, white blood cells 9.06, hemoglobin 10.1, hematocrit 30.9, platelets 267. Blood cultures ordered. Granular casts in urine.      Imaging with improvement of intra-abdominal abscess and improvement in aeration of lungs from last admission.      Overview/Hospital Course:  No notes on file    Interval History:   No events overnight.  This morning patient is more alert and communicative.  Does not recall events leading from confusion at home through hospitalization.  Patient's wife and granddaughter at bedside and updated.  Patient with no complaints this morning.      Review of Systems   Unable to perform ROS: Mental status change   Constitutional:  Negative for activity change, appetite change, chills, diaphoresis, fatigue and fever.   HENT:  Negative for rhinorrhea and sore throat.    Respiratory:  Negative for cough, chest tightness and shortness of breath.    Cardiovascular:  Negative for chest pain and palpitations.   Gastrointestinal:  Negative for abdominal pain, constipation, diarrhea and nausea.   Endocrine: Negative for cold intolerance.   Genitourinary:  Negative for decreased urine volume, difficulty urinating, dysuria, flank pain, hematuria and urgency.   Musculoskeletal:  Negative for arthralgias and myalgias.   Skin:  Negative for rash and wound.   Neurological:  Negative for dizziness, weakness, numbness and headaches.   Psychiatric/Behavioral:  Positive for confusion. Negative for agitation and behavioral problems.    All other systems reviewed and are negative.  Objective:     Vital Signs (Most Recent):  Temp: 99 °F (37.2 °C) (03/21/22 0338)  Pulse: 60 (03/21/22 0705)  Resp: 18 (03/21/22 0338)  BP: 133/63 (03/21/22 0705)  SpO2: 100 % (03/21/22 0338) Vital Signs (24h Range):  Temp:  [97.8 °F (36.6 °C)-99.2 °F (37.3 °C)] 99 °F (37.2 °C)  Pulse:  [] 60  Resp:  [18] 18  SpO2:  [95 %-100 %] 100 %  BP: (126-171)/(63-86) 133/63     Weight: 81.8 kg (180 lb 5.4 oz)  Body mass index is 30.01 kg/m².    Intake/Output Summary (Last 24 hours) at 3/21/2022 0924  Last data filed at 3/21/2022 0700  Gross per 24 hour   Intake 513.76 ml    Output 205 ml   Net 308.76 ml        Physical Exam  Constitutional:       General: He is not in acute distress.     Appearance: He is obese.   HENT:      Head: Normocephalic and atraumatic.      Mouth/Throat:      Mouth: Mucous membranes are moist.   Eyes:      Extraocular Movements: Extraocular movements intact.      Conjunctiva/sclera: Conjunctivae normal.   Cardiovascular:      Rate and Rhythm: Normal rate and regular rhythm.      Pulses: Normal pulses.   Pulmonary:      Effort: Pulmonary effort is normal. No respiratory distress.      Breath sounds: Normal breath sounds. No wheezing or rales.   Abdominal:      General: Bowel sounds are normal. There is no distension.      Palpations: Abdomen is soft.      Tenderness: There is no abdominal tenderness.      Comments: Drain in place   Musculoskeletal:      Right lower leg: No edema.      Left lower leg: No edema.   Skin:     General: Skin is warm.   Neurological:      General: No focal deficit present.      Mental Status: He is alert.      Comments: Oriented to person, place and situation   Psychiatric:         Mood and Affect: Mood normal.         Behavior: Behavior normal.       Significant Labs: CBC:   Recent Labs   Lab 03/20/22  0402   WBC 7.56   HGB 9.9*   HCT 30.7*          CMP:   Recent Labs   Lab 03/20/22  0402 03/21/22  0558    142   K 4.0 4.1    109   CO2 21* 24   * 123*   BUN 26 25   CREATININE 2.3* 2.3*   CALCIUM 8.4* 8.6*   PROT 5.5* 5.7*   ALBUMIN 2.3* 2.3*   BILITOT 0.6 0.7   ALKPHOS 78 79   AST 19 24   ALT 8* 8*   ANIONGAP 10 9   EGFRNONAA 23.8* 23.8*         Significant Imaging: I have reviewed all pertinent imaging results/findings within the past 24 hours.      Assessment/Plan:      * Encephalopathy  - To the presentation of worsening mental status with noted disorientation, tremor, and decrease concentration.  Exam nonfocal.  CT head with no acute process.  Chest x-ray unremarkable.  - Differential includes cefepime  neurotoxicity in setting of NIK, CVA, metabolic disturbance, infectious  - Continue infectious workup as below  - Neurology consulted, signed off.  -- Ativan challenge, patient with improved mentation post   -- Continuous EEG discontinued, with triphasic morphology consistent with diffuse cortical dysfunction and a moderate encephalopathy   - Neurochecks Q 4    Abdominal wall abscess  - Initially treated at [facility] early March 2022 with IR drain placement and treated with Rocephin and Flagyl.  Readmitted to [facility] in mid March with IR drain placement and discharged with minimum 2 weeks of additional antibiotics (cefepime, vancomycin, Flagyl) on March 14th._  - Admission CT abdomen/pelvis with decrease in size of fluid abdominal collections  - Patient readmitted with NIK and concern for cefepime toxicity, vancomycin induced NIK  - ID consulted, appreciate recs  -- Continue Unasyn  - UA with candida albicans, likely colonization    NIK (acute kidney injury)  -  Patient creatinine was at baseline 0.9 on 3/14 and on presentation  - Admission creatinine 2.5  - Patient was discharged on vancomycin, with random admission vanc level 33  - UA with granular casts  - Concern for ATN d/t vancomycin, patient also home Lasix  - Avoiding nephrotoxic medications  - Holding IV fluids as patient does not appear hypovolemic  - [facility] for strict I&Os  - Trend BMP  - Improving slightly        Type 2 diabetes mellitus, controlled  - Home glimepiride, pioglitazone  - Diabetic diet  - Sliding scale insulin  - Accu-Cheks  - Holding home gabapentin due to NIK    Essential (primary) hypertension  - Holding home lisinopril due to NIK  - Amlodipine 5mg (home 10mg)  - Continue home Coreg        Disorder of prostate  - Continue home tamsulosin      Hypothyroidism due to acquired atrophy of thyroid  - Continue home levothyroxine    Peripheral vascular disease  - Continue home fenofibrate, pravastatin      Cardiac pacemaker in situ  - Unclear if MRI  compatible      Chronic atrial fibrillation  - Holding home rivaroxaban due to NIK  - Continue Coreg        VTE Risk Mitigation (From admission, onward)         Ordered     IP VTE HIGH RISK PATIENT  Once         03/19/22 1616     Place sequential compression device  Until discontinued         03/19/22 1616                Discharge Planning   KANDICE: 3/23/2022     Code Status: Full Code   Is the patient medically ready for discharge?: No    Reason for patient still in hospital (select all that apply): Patient trending condition, Laboratory test, Treatment, Consult recommendations, PT / OT recommendations and Pending disposition                     Gustavo Gerard MD  Department of Hospital Medicine   [facility] - Telemetry Stepdown

## 2022-03-22 NOTE — PROGRESS NOTES
[facility] - Telemetry Stepdown  Infectious Disease  Progress Note    Patient Name: Eddie Parada Sr.  MRN: [id number]  Admission Date: 3/19/2022  Length of Stay: 2 days  Attending Physician: Gustavo Gerard MD  Primary Care Provider: Callum Roberts MD    Isolation Status: No active isolations  Assessment/Plan:      * Encephalopathy    Appears resolved.    Abdominal wall abscess     89 year male with history of A-fib, CAD, CHF, COPD, DM, intraabdominal abscess (at site of hernia repair with mesh implanted over 20 years prior) on outpatient IV antibiotics (Cefepime, Vancomycin and Flagyl) admitted for AMS. See HPI for full history.    In ED at OSH patient noted to have an NIK and supratherapeutic vanc level. CT head no acute abnormality. Repeat CT A/P 3/19 showed decreased size of collection. Drain remains in place. Blood cx NGTD.  ID consulted for antibiotic recommendations. Neurology consulted as well.    Changed to Unasyn.  NIK slowly improving.  Fully oriented, conversant and appropriate today - encephalopathy from cefepime resolved now.. Blood cultures NGTD. The patient denies any recent fever, chills, or sweats.    Recommendations  · Continue Unasyn  · Rec re-eval drain and continue flushes to ensure draining  · Has fu appt with Dr. Mendoza (ID/IM) in [place] tomorrow 3/23/22 for FU - rec reschedule for post DC fu  · Weekly labs on Mondays, CBC,ESR, CRP, and CMP ATTN: Dr. Whitney and Dr. Arredondo, [phone] ([facility] at [facility])  Have messaged them and Dr. Mendoza about abx plan change and fu  · Will need to continue IV Unasyn until CT ABD/Pelvis shows resolution of abscess then drain removal and abx can be dc'd  · NIK - MGMT per primary team - please adjust Unasyn dose prn  ·  Discussed antibiotic plan with ID staff.  · May need suppression once collection resolved.  · Will sign off               Anticipated Disposition: tbd    Thank you for your consult. I will sign off. Please contact us if you have any  additional questions.    [facility]  Infectious Disease  [facility] - Telemetry Stepdown    Subjective:     Principal Problem:Encephalopathy    HPI: Mr. Parada is a 89 year male with history of A-fib, CAD, CHF, COPD, DM, recent admissions to [facility] for septic shock and intraabdominal abscess (at site of hernia repair with mesh implanted over 20 years prior) on outpatient IV antibiotics who presented to [facility] on March 19 for confusion and slurred speech.    Per chart review, patient admitted to [facility] twice this month for his intraabdominal infection. On initial admission he was not deemed a surgical candidate. IR placed a drain in the collection on 3/3. Cultures returned negative. Gram stain positive for few GPR, rare GNR.  It was noted that the drain was placed superficial to the mesh and there was likely a component of this beneath the mesh which may be difficult to put a drain into. Patient clinically improved on empiric ceftriaxone an flagyl and discharged on this for a 2 week course. Patient developed fevers at home, HH nurse felt abdomen was enlarged and patient returned to ED. Repeat CT showed a larger fluid collection on 3/11. He was placed on vancomycin, cefepime and metronidizole and IR again consulted.  A lager drain was placed and about 500 ccs of bloody fluid obtained. No new cultures obtained. Patient discharged on 3/14 with a plan to complete a minimum of a 2 week course of vancomycin, cefepime, and PO flagyl  with repeat imaging  to evaluate size of abscess prior to consideration of discontinuation of antibiotics.    Patient has been at home and wife noted patient became more altered. 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 In ED at OSH patient noted to have an NIK and supratherapeutic vanc level. Afebrile and no leukocytosis. VSS. Patient transferred to [facility] or neurologic evaluation of slurred speech and treatment of NIK.  CT head no acute  abnormality. Repeat CT A/P 3/19 showed decreased size of collection. Drain remains in place. Blood cx and flu negative. UA positive. ID consulted for antibiotic recommendations. Patient currently alert but disoriented and unable to verbally communicate. Appears agitated.              Interval History:   No AEON,  Afebrile  NIK - improved to Cr 2.2  Wife says mental status back to baseline.  Has no pain.  The patient denies any recent fever, chills, or sweats.      Review of Systems   Constitutional:  Negative for chills, diaphoresis and fever.   Respiratory:  Negative for shortness of breath.    Cardiovascular:  Negative for chest pain.   Gastrointestinal:  Negative for abdominal pain, diarrhea, nausea and vomiting.   Genitourinary:  Negative for dysuria and hematuria.   Objective:     Vital Signs (Most Recent):  Temp: 97.8 °F (36.6 °C) (03/22/22 1550)  Pulse: (!) 59 (03/22/22 1550)  Resp: 18 (03/22/22 1550)  BP: (!) 160/72 (03/22/22 1550)  SpO2: 96 % (03/22/22 1550)   Vital Signs (24h Range):  Temp:  [97.6 °F (36.4 °C)-98.2 °F (36.8 °C)] 97.8 °F (36.6 °C)  Pulse:  [58-64] 59  Resp:  [16-20] 18  SpO2:  [93 %-97 %] 96 %  BP: (146-183)/(70-74) 160/72     Weight: 81.8 kg (180 lb 5.4 oz)  Body mass index is 30.01 kg/m².    Estimated Creatinine Clearance: 21.1 mL/min (A) (based on SCr of 2.2 mg/dL (H)).    Physical Exam  Constitutional:       General: He is awake. He is not in acute distress.     Appearance: He is not ill-appearing or toxic-appearing.   HENT:      Head: Normocephalic and atraumatic.      Nose: Nose normal.      Mouth/Throat:      Mouth: Mucous membranes are moist.   Eyes:      General: No scleral icterus.     Conjunctiva/sclera: Conjunctivae normal.   Cardiovascular:      Rate and Rhythm: Normal rate. Rhythm irregular.   Pulmonary:      Effort: Pulmonary effort is normal. No respiratory distress.      Breath sounds: Normal breath sounds. No wheezing or rales.   Abdominal:      General: There is no  distension.      Palpations: Abdomen is soft.      Tenderness: There is no abdominal tenderness.      Comments: right abdominal drain with serosanguineous output   Musculoskeletal:         General: No swelling or tenderness.   Skin:     Findings: Bruising present. No rash.   Neurological:      Mental Status: He is oriented to person, place, and time.      Cranial Nerves: No cranial nerve deficit.      Sensory: No sensory deficit.      Motor: No weakness.      Comments: Conversant, appropriate, and fully oriented   Psychiatric:         Mood and Affect: Mood normal.         Behavior: Behavior is not agitated.         Thought Content: Thought content normal.         Judgment: Judgment normal.       Significant Labs: Blood Culture:   Recent Labs   Lab 03/02/22  1728 03/12/22  1248 03/19/22  0734   LABBLOO No growth after 5 days.  No growth after 5 days. No growth after 5 days.  No growth after 5 days. No Growth to date  No Growth to date  No Growth to date  No Growth to date  No Growth to date  No Growth to date  No Growth to date  No Growth to date       CBC:   Recent Labs   Lab 03/22/22  0834   WBC 8.71   HGB 10.9*   HCT 34.4*          CMP:   Recent Labs   Lab 03/21/22  0558 03/22/22  0441    142   K 4.1 3.9    108   CO2 24 24   * 127*   BUN 25 25   CREATININE 2.3* 2.2*   CALCIUM 8.6* 8.9   PROT 5.7* 5.7*   ALBUMIN 2.3* 2.4*   BILITOT 0.7 0.7   ALKPHOS 79 83   AST 24 20   ALT 8* 9*   ANIONGAP 9 10   EGFRNONAA 23.8* 25.1*       Urine Culture:   Recent Labs   Lab 03/11/22  1737 03/19/22  0835   LABURIN CANDIDA ALBICANS  10,000 - 49,999 cfu/ml  Treatment of asymptomatic candiduria is not recommended (except for   specific populations). Candida isolated in the urine typically   represents colonization. If an indwelling urinary catheter is present  it should be removed or replaced.  * TAMARA ALBICANS  10,000 - 49,999 cfu/ml  Treatment of asymptomatic candiduria is not recommended (except  for   specific populations). Candida isolated in the urine typically   represents colonization. If an indwelling urinary catheter is present  it should be removed or replaced.  *       Urine Studies:   Recent Labs   Lab 03/19/22  0835   COLORU Yellow   APPEARANCEUA Clear   PHUR 5.0   SPECGRAV 1.015   PROTEINUA 1+*   GLUCUA Negative   KETONESU Trace*   BILIRUBINUA Negative   OCCULTUA 2+*   NITRITE Negative   UROBILINOGEN Negative   LEUKOCYTESUR 2+*   RBCUA 0   WBCUA 75*   BACTERIA Moderate*   SQUAMEPITHEL 15   HYALINECASTS 0       Wound Culture:   Recent Labs   Lab 03/03/22  1625   LABAERO No growth         Significant Imaging: I have reviewed all pertinent imaging results/findings within the past 24 hours.CT Abdomen Pelvis  Without Contrast    Status: Final result         MyChart Results Release    [email or web address] Status: Active  Results Release           PACS Images for [employer] [facility]     Show images for CT Abdomen Pelvis Without Contrast                  CT Abdomen Pelvis  Without Contrast  Order: [id number]   Status: Final result     Visible to patient: Yes (not seen)     Next appt: 03/23/2022 at 11:15 AM in Infectious Diseases (Soheila Mendoza MD)     0 Result Notes    Details    Reading Physician Reading Date Result Priority   Alin Garcia MD  [phone] 3/19/2022 STAT     Narrative & Impression  EXAMINATION:  CT ABDOMEN PELVIS WITHOUT CONTRAST     CLINICAL HISTORY:  Abdominal abscess/infection suspected;     TECHNIQUE:  Low dose axial images, sagittal and coronal reformations were obtained from the lung bases to the pubic symphysis.  30 mL of oral Omnipaque 350 was administered.     COMPARISON:  The CT from 8 days prior.     FINDINGS:  When compared to the most recent prior exam from 03/11/2022 there is been interval decrease in size of the collection abutting the anterior abdominal wall.  This currently measures 15 x 5 4.4 cm series 2, image 70 compared with 17 x 9 cm on the prior exam. Drain remains in  place.  Locules of air remain within the collection.     Limited imaging through the inferior thorax demonstrates trace right and small left pleural effusion, slightly increased since the previous exam.  Heart size is increased.  Thoracic and coronary artery atherosclerosis is noted. Pacer leads in the right heart are present.     Bones are intact. Stable postoperative changes of the right femur. Degenerative changes of the spine.     Unenhanced liver is unremarkable. Patient is status post cholecystectomy.  Unenhanced spleen is within normal limits. Pancreatic atrophy is noted. Adrenals are normal. Left renal cysts are noted.  Other smaller lesions in the kidneys too small to characterize but likely relate to cysts as well. One of these on series 2, image 69 demonstrates attenuation slightly greater than fluid and likely relates to a complex cyst. Correlation with nonemergent ultrasound could be considered as warranted. There is no evidence of hydronephrosis.     Stable postsurgical changes of the stomach. There is no evidence of bowel obstruction. Normal appendix is seen. Colonic diverticulosis is noted without convincing evidence of diverticulitis.     Urinary bladder demonstrates decreased wall thickening compared to the previous exam. Prostate gland is enlarged with heterogeneous appearance and contains calcifications.     Aorta iliac atherosclerosis is noted. Small scattered retroperitoneal and inguinal lymph nodes are present. There is a fat and bowel containing right inguinal hernia and as well as a fat containing left inguinal hernia, unchanged from prior.     Motion artifact in the abdomen is present.     Impression:     Interval decrease in size of a large abdominal collection/abscess when compared to 03/11/2022 as above.  Drainage catheter is in place within the collection multiple other findings as discussed above and not significantly changed.        Electronically signed by: Alin Garcia MD  Date:                                             03/19/2022  Time:                                           09:45

## 2022-03-22 NOTE — PLAN OF CARE
Problem: Adult Inpatient Plan of Care  Goal: Plan of Care Review  Outcome: Ongoing, Progressing  Goal: Patient-Specific Goal (Individualized)  Outcome: Ongoing, Progressing  Goal: Absence of Hospital-Acquired Illness or Injury  Outcome: Ongoing, Progressing  Goal: Optimal Comfort and Wellbeing  Outcome: Ongoing, Progressing  Goal: Readiness for Transition of Care  Outcome: Ongoing, Progressing     Problem: Diabetes Comorbidity  Goal: Blood Glucose Level Within Targeted Range  Outcome: Ongoing, Progressing     Problem: Adjustment to Illness (Sepsis/Septic Shock)  Goal: Optimal Coping  Outcome: Ongoing, Progressing     Problem: Bleeding (Sepsis/Septic Shock)  Goal: Absence of Bleeding  Outcome: Ongoing, Progressing     Problem: Glycemic Control Impaired (Sepsis/Septic Shock)  Goal: Blood Glucose Level Within Desired Range  Outcome: Ongoing, Progressing     Problem: Infection Progression (Sepsis/Septic Shock)  Goal: Absence of Infection Signs and Symptoms  Outcome: Ongoing, Progressing     Problem: Nutrition Impaired (Sepsis/Septic Shock)  Goal: Optimal Nutrition Intake  Outcome: Ongoing, Progressing     Problem: Fluid and Electrolyte Imbalance (Acute Kidney Injury/Impairment)  Goal: Fluid and Electrolyte Balance  Outcome: Ongoing, Progressing     Problem: Oral Intake Inadequate (Acute Kidney Injury/Impairment)  Goal: Optimal Nutrition Intake  Outcome: Ongoing, Progressing     Problem: Renal Function Impairment (Acute Kidney Injury/Impairment)  Goal: Effective Renal Function  Outcome: Ongoing, Progressing     Problem: Infection  Goal: Absence of Infection Signs and Symptoms  Outcome: Ongoing, Progressing     Problem: Impaired Wound Healing  Goal: Optimal Wound Healing  Outcome: Ongoing, Progressing     Problem: Fall Injury Risk  Goal: Absence of Fall and Fall-Related Injury  Outcome: Ongoing, Progressing     Problem: Skin Injury Risk Increased  Goal: Skin Health and Integrity  Outcome: Ongoing, Progressing      Problem: Restraint, Nonbehavioral (Nonviolent)  Goal: Absence of Harm or Injury  Outcome: Ongoing, Progressing   Patient is alert, oriented and conscious. Vital signs checked and recorded. Mobilization done in room. SPO2 maintain in RA. Medication given as per order. Intake output recorded. Will continue to monitor.

## 2022-03-22 NOTE — PLAN OF CARE
Problem: Physical Therapy  Goal: Physical Therapy Goal  Description: Goals to be met by: 04/10/22     Patient will increase functional independence with mobility by performin. Supine to sit with Set-up [place]  2. Sit to supine with Set-up [place]  3. Sit to stand transfer with Supervision  4. Bed to chair transfer with Supervision using Rolling Walker  5. Gait  x 200 feet with Stand-by Assistance using Rolling Walker.   6. Lower extremity exercise program x15 reps per handout, with assistance as needed    JILLIAN small 3/22/2022

## 2022-03-22 NOTE — PLAN OF CARE
Problem: Occupational Therapy  Goal: Occupational Therapy Goal  Description: Goals to be met by: 4/5/22    Patient will increase functional independence with ADLs by performing:    UE Dressing with [place].  LE Dressing with [place].  Toileting from toilet with Supervision for hygiene and clothing management.   Bathing from  shower chair/bench with Modified [place].  Step transfer with Supervision    Outcome: Ongoing, Progressing

## 2022-03-22 NOTE — PROGRESS NOTES
[facility] - Telemetry Stepdown  Infectious Disease  Progress Note    Patient Name: Eddie Parada Sr.  MRN: [id number]  Admission Date: 3/19/2022  Length of Stay: 1 days  Attending Physician: Gustavo Gerard MD  Primary Care Provider: Callum Roberts MD    Isolation Status: No active isolations  Assessment/Plan:      * Encephalopathy    Appears resolved.    Abdominal wall abscess     89 year male with history of A-fib, CAD, CHF, COPD, DM, intraabdominal abscess (at site of hernia repair with mesh implanted over 20 years prior) on outpatient IV antibiotics (Cefepime, Vancomycin and Flagyl) admitted for AMS. See HPI for full history.    In ED at OSH patient noted to have an NIK and supratherapeutic vanc level. CT head no acute abnormality. Repeat CT A/P 3/19 showed decreased size of collection. Drain remains in place. Blood cx NGTD.  ID consulted for antibiotic recommendations. Neurology consulted as well.    Changed to Unasyn.  NIK improved.  Fully oriented, conversant and appropriate today. Blood cultures NGTD. The patient denies any recent fever, chills, or sweats.        Recommendations  · Continue Unasyn  · Rec re-eval drain and continue flushes to ensure draining  · Follow blood cultures  · Will follow up tomorrow to assess for ontinued  improvements in mentation  · Discussed antibiotic plan with ID staff. ID will follow.            Anticipated Disposition: tbd    Thank you for your consult. I will follow-up with patient. Please contact us if you have any additional questions.    [facility]  Infectious Disease  [facility] - Telemetry Stepdown    Subjective:     Principal Problem:Encephalopathy    HPI: Mr. Parada is a 89 year male with history of A-fib, CAD, CHF, COPD, DM, recent admissions to [facility] for septic shock and intraabdominal abscess (at site of hernia repair with mesh implanted over 20 years prior) on outpatient IV antibiotics who presented to [facility] on March 19 for  confusion and slurred speech.    Per chart review, patient admitted to [facility] twice this month for his intraabdominal infection. On initial admission he was not deemed a surgical candidate. IR placed a drain in the collection on 3/3. Cultures returned negative. Gram stain positive for few GPR, rare GNR.  It was noted that the drain was placed superficial to the mesh and there was likely a component of this beneath the mesh which may be difficult to put a drain into. Patient clinically improved on empiric ceftriaxone an flagyl and discharged on this for a 2 week course. Patient developed fevers at home, HH nurse felt abdomen was enlarged and patient returned to ED. Repeat CT showed a larger fluid collection on 3/11. He was placed on vancomycin, cefepime and metronidizole and IR again consulted.  A lager drain was placed and about 500 ccs of bloody fluid obtained. No new cultures obtained. Patient discharged on 3/14 with a plan to complete a minimum of a 2 week course of vancomycin, cefepime, and PO flagyl  with repeat imaging  to evaluate size of abscess prior to consideration of discontinuation of antibiotics.    Patient has been at home and wife noted patient became more altered. 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 In ED at OSH patient noted to have an NIK and supratherapeutic vanc level. Afebrile and no leukocytosis. VSS. Patient transferred to [facility] or neurologic evaluation of slurred speech and treatment of NIK.  CT head no acute abnormality. Repeat CT A/P 3/19 showed decreased size of collection. Drain remains in place. Blood cx and flu negative. UA positive. ID consulted for antibiotic recommendations. Patient currently alert but disoriented and unable to verbally communicate. Appears agitated.              Interval History:   No AEON,  Afebrile  NIK - improved to Cr 2.3   Wife says mental status much improved.  The patient denies any recent fever, chills, or sweats.      Review of  Systems   Constitutional:  Negative for chills, diaphoresis and fever.   Respiratory:  Negative for shortness of breath.    Cardiovascular:  Negative for chest pain.   Gastrointestinal:  Negative for abdominal pain, diarrhea, nausea and vomiting.   Genitourinary:  Negative for dysuria and hematuria.   Objective:     Vital Signs (Most Recent):  Temp: 97.5 °F (36.4 °C) (03/21/22 1122)  Pulse: 60 (03/21/22 1122)  Resp: 18 (03/21/22 1122)  BP: (!) 157/70 (03/21/22 1122)  SpO2: 98 % (03/21/22 1122)   Vital Signs (24h Range):  Temp:  [96.2 °F (35.7 °C)-99 °F (37.2 °C)] 97.5 °F (36.4 °C)  Pulse:  [60-87] 60  Resp:  [18] 18  SpO2:  [95 %-100 %] 98 %  BP: (126-171)/(63-78) 157/70     Weight: 81.8 kg (180 lb 5.4 oz)  Body mass index is 30.01 kg/m².    Estimated Creatinine Clearance: 20.2 mL/min (A) (based on SCr of 2.3 mg/dL (H)).    Physical Exam  Constitutional:       General: He is awake. He is not in acute distress.     Appearance: He is not ill-appearing or toxic-appearing.   HENT:      Head: Normocephalic and atraumatic.      Nose: Nose normal.      Mouth/Throat:      Mouth: Mucous membranes are moist.   Eyes:      General: No scleral icterus.     Conjunctiva/sclera: Conjunctivae normal.   Cardiovascular:      Rate and Rhythm: Normal rate. Rhythm irregular.   Pulmonary:      Effort: Pulmonary effort is normal. No respiratory distress.      Breath sounds: Normal breath sounds. No wheezing or rales.   Abdominal:      General: There is no distension.      Palpations: Abdomen is soft.      Tenderness: There is no abdominal tenderness.      Comments: right abdominal drain with serosanguineous output   Musculoskeletal:         General: No swelling or tenderness.   Skin:     Findings: Bruising present. No rash.   Neurological:      Mental Status: He is oriented to person, place, and time.      Cranial Nerves: No cranial nerve deficit.      Sensory: No sensory deficit.      Motor: No weakness.      Comments: Conversant,  appropriate, and fully oriented   Psychiatric:         Mood and Affect: Mood normal.         Behavior: Behavior is not agitated.         Thought Content: Thought content normal.         Judgment: Judgment normal.       Significant Labs: Blood Culture:   Recent Labs   Lab 03/02/22  1728 03/12/22  1248 03/19/22  0734   LABBLOO No growth after 5 days.  No growth after 5 days. No growth after 5 days.  No growth after 5 days. No Growth to date  No Growth to date  No Growth to date  No Growth to date     CBC:   Recent Labs   Lab 03/20/22  0402   WBC 7.56   HGB 9.9*   HCT 30.7*        CMP:   Recent Labs   Lab 03/20/22  0402 03/21/22  0558    142   K 4.0 4.1    109   CO2 21* 24   * 123*   BUN 26 25   CREATININE 2.3* 2.3*   CALCIUM 8.4* 8.6*   PROT 5.5* 5.7*   ALBUMIN 2.3* 2.3*   BILITOT 0.6 0.7   ALKPHOS 78 79   AST 19 24   ALT 8* 8*   ANIONGAP 10 9   EGFRNONAA 23.8* 23.8*     Urine Culture:   Recent Labs   Lab 03/11/22  1737 03/19/22  0835   LABURIN CANDIDA ALBICANS  10,000 - 49,999 cfu/ml  Treatment of asymptomatic candiduria is not recommended (except for   specific populations). Candida isolated in the urine typically   represents colonization. If an indwelling urinary catheter is present  it should be removed or replaced.  * TAMARA ALBICANS  10,000 - 49,999 cfu/ml  Treatment of asymptomatic candiduria is not recommended (except for   specific populations). Candida isolated in the urine typically   represents colonization. If an indwelling urinary catheter is present  it should be removed or replaced.  *     Urine Studies:   Recent Labs   Lab 03/19/22  0835   COLORU Yellow   APPEARANCEUA Clear   PHUR 5.0   SPECGRAV 1.015   PROTEINUA 1+*   GLUCUA Negative   KETONESU Trace*   BILIRUBINUA Negative   OCCULTUA 2+*   NITRITE Negative   UROBILINOGEN Negative   LEUKOCYTESUR 2+*   RBCUA 0   WBCUA 75*   BACTERIA Moderate*   SQUAMEPITHEL 15   HYALINECASTS 0     Wound Culture:   Recent Labs   Lab  03/03/22  1625   LABAERO No growth       Significant Imaging: I have reviewed all pertinent imaging results/findings within the past 24 hours.CT Abdomen Pelvis  Without Contrast    Status: Final result         MyChart Results Release    MyChart Status: Active  Results Release           PACS Images for [facility] Viewer     Show images for CT Abdomen Pelvis Without Contrast                  CT Abdomen Pelvis  Without Contrast  Order: [id number]   Status: Final result     Visible to patient: Yes (not seen)     Next appt: 03/23/2022 at 11:15 AM in Infectious Diseases (Soheila Mendoza MD)     0 Result Notes    Details    Reading Physician Reading Date Result Priority   Alin Garcia MD  [phone] 3/19/2022 STAT     Narrative & Impression  EXAMINATION:  CT ABDOMEN PELVIS WITHOUT CONTRAST     CLINICAL HISTORY:  Abdominal abscess/infection suspected;     TECHNIQUE:  Low dose axial images, sagittal and coronal reformations were obtained from the lung bases to the pubic symphysis.  30 mL of oral Omnipaque 350 was administered.     COMPARISON:  The CT from 8 days prior.     FINDINGS:  When compared to the most recent prior exam from 03/11/2022 there is been interval decrease in size of the collection abutting the anterior abdominal wall.  This currently measures 15 x 5 4.4 cm series 2, image 70 compared with 17 x 9 cm on the prior exam. Drain remains in place.  Locules of air remain within the collection.     Limited imaging through the inferior thorax demonstrates trace right and small left pleural effusion, slightly increased since the previous exam.  Heart size is increased.  Thoracic and coronary artery atherosclerosis is noted. Pacer leads in the right heart are present.     Bones are intact. Stable postoperative changes of the right femur. Degenerative changes of the spine.     Unenhanced liver is unremarkable. Patient is status post cholecystectomy.  Unenhanced spleen is within normal limits. Pancreatic atrophy  is noted. Adrenals are normal. Left renal cysts are noted.  Other smaller lesions in the kidneys too small to characterize but likely relate to cysts as well. One of these on series 2, image 69 demonstrates attenuation slightly greater than fluid and likely relates to a complex cyst. Correlation with nonemergent ultrasound could be considered as warranted. There is no evidence of hydronephrosis.     Stable postsurgical changes of the stomach. There is no evidence of bowel obstruction. Normal appendix is seen. Colonic diverticulosis is noted without convincing evidence of diverticulitis.     Urinary bladder demonstrates decreased wall thickening compared to the previous exam. Prostate gland is enlarged with heterogeneous appearance and contains calcifications.     Aorta iliac atherosclerosis is noted. Small scattered retroperitoneal and inguinal lymph nodes are present. There is a fat and bowel containing right inguinal hernia and as well as a fat containing left inguinal hernia, unchanged from prior.     Motion artifact in the abdomen is present.     Impression:     Interval decrease in size of a large abdominal collection/abscess when compared to 03/11/2022 as above.  Drainage catheter is in place within the collection multiple other findings as discussed above and not significantly changed.        Electronically signed by: Alin Garcia MD  Date:                                            03/19/2022  Time:                                           09:45

## 2022-03-22 NOTE — HOSPITAL COURSE
Patient antibiotics were changed to Zosyn.  Infectious Disease consult for additional recommendations for abdominal abscess management.  Zosyn was then transitioned to Unasyn.  Patient was a no-show talkative on admission, but in mentation decrease in became nontalkative.  Neurology consult given treatment status and persistent tremor.  Patient underwent Ativan challenge and EEG monitoring.  EEG showed no signs of seizures but evidence of encephalopathy likely associated with cefepime neurotoxicity.  The following day the patient's mentation improved close to his baseline.    Patient will continue Unasyn until he follows up outpatient with Infectious Disease for continued management of abdominal abscess.    Patient's NIK was slow to improve, and Nephrology was consulted.

## 2022-03-22 NOTE — ASSESSMENT & PLAN NOTE
89 year male with history of A-fib, CAD, CHF, COPD, DM, intraabdominal abscess (at site of hernia repair with mesh implanted over 20 years prior) on outpatient IV antibiotics (Cefepime, Vancomycin and Flagyl) admitted for AMS. See HPI for full history.    In ED at OSH patient noted to have an NIK and supratherapeutic vanc level. CT head no acute abnormality. Repeat CT A/P 3/19 showed decreased size of collection. Drain remains in place. Blood cx NGTD.  ID consulted for antibiotic recommendations. Neurology consulted as well.    Changed to Unasyn.  NIK slowly improving.  Fully oriented, conversant and appropriate today - encephalopathy from cefepime resolved now.. Blood cultures NGTD. The patient denies any recent fever, chills, or sweats.    Recommendations  · Continue Unasyn  · Rec re-eval drain and continue flushes to ensure draining  · Has fu appt with Dr. Mendoza (ID/IM) in [place] tomorrow 3/23/22 for FU - rec reschedule for post DC fu  · Weekly labs on Mondays, CBC,ESR, CRP, and CMP ATTN: Dr. Whitney and Dr. Arredondo, [phone] ([facility] at [place])  Have messaged them and Dr. Mendoza about abx plan change and fu  · Will need to continue IV Unasyn until CT ABD/Pelvis shows resolution of abscess then drain removal and abx can be dc'd  · NIK - MGMT per primary team - please adjust Unasyn dose prn  ·  Discussed antibiotic plan with ID staff.  · May need suppression once collection resolved.  · Will sign off

## 2022-03-23 LAB
ALBUMIN SERPL BCP-MCNC: 2.5 G/DL (ref 3.5–5.2)
ALP SERPL-CCNC: 83 U/L (ref 55–135)
ALT SERPL W/O P-5'-P-CCNC: 8 U/L (ref 10–44)
ANION GAP SERPL CALC-SCNC: 10 MMOL/L (ref 8–16)
AST SERPL-CCNC: 21 U/L (ref 10–40)
BASOPHILS # BLD AUTO: 0.07 K/UL (ref 0–0.2)
BASOPHILS NFR BLD: 0.8 % (ref 0–1.9)
BILIRUB SERPL-MCNC: 0.8 MG/DL (ref 0.1–1)
BUN SERPL-MCNC: 23 MG/DL (ref 10–30)
CALCIUM SERPL-MCNC: 9.2 MG/DL (ref 8.7–10.5)
CHLORIDE SERPL-SCNC: 107 MMOL/L (ref 95–110)
CO2 SERPL-SCNC: 26 MMOL/L (ref 23–29)
CREAT SERPL-MCNC: 2.2 MG/DL (ref 0.5–1.4)
DIFFERENTIAL METHOD: ABNORMAL
EOSINOPHIL # BLD AUTO: 0.1 K/UL (ref 0–0.5)
EOSINOPHIL NFR BLD: 0.9 % (ref 0–8)
ERYTHROCYTE [DISTWIDTH] IN BLOOD BY AUTOMATED COUNT: 13.7 % (ref 11.5–14.5)
EST. GFR  (AFRICAN AMERICAN): 29 ML/MIN/1.73 M^2
EST. GFR  (NON AFRICAN AMERICAN): 25.1 ML/MIN/1.73 M^2
GLUCOSE SERPL-MCNC: 135 MG/DL (ref 70–110)
HCT VFR BLD AUTO: 33.4 % (ref 40–54)
HGB BLD-MCNC: 10.8 G/DL (ref 14–18)
IMM GRANULOCYTES # BLD AUTO: 0.11 K/UL (ref 0–0.04)
IMM GRANULOCYTES NFR BLD AUTO: 1.3 % (ref 0–0.5)
LYMPHOCYTES # BLD AUTO: 1.1 K/UL (ref 1–4.8)
LYMPHOCYTES NFR BLD: 12.6 % (ref 18–48)
MAGNESIUM SERPL-MCNC: 1.8 MG/DL (ref 1.6–2.6)
MCH RBC QN AUTO: 33.8 PG (ref 27–31)
MCHC RBC AUTO-ENTMCNC: 32.3 G/DL (ref 32–36)
MCV RBC AUTO: 104 FL (ref 82–98)
MONOCYTES # BLD AUTO: 1 K/UL (ref 0.3–1)
MONOCYTES NFR BLD: 10.9 % (ref 4–15)
NEUTROPHILS # BLD AUTO: 6.4 K/UL (ref 1.8–7.7)
NEUTROPHILS NFR BLD: 73.5 % (ref 38–73)
NRBC BLD-RTO: 0 /100 WBC
PHOSPHATE SERPL-MCNC: 2.4 MG/DL (ref 2.7–4.5)
PLATELET # BLD AUTO: 196 K/UL (ref 150–450)
PMV BLD AUTO: 9.9 FL (ref 9.2–12.9)
POCT GLUCOSE: 155 MG/DL (ref 70–110)
POCT GLUCOSE: 171 MG/DL (ref 70–110)
POCT GLUCOSE: 174 MG/DL (ref 70–110)
POTASSIUM SERPL-SCNC: 4.7 MMOL/L (ref 3.5–5.1)
PROT SERPL-MCNC: 6.1 G/DL (ref 6–8.4)
RBC # BLD AUTO: 3.2 M/UL (ref 4.6–6.2)
SODIUM SERPL-SCNC: 143 MMOL/L (ref 136–145)
WBC # BLD AUTO: 8.72 K/UL (ref 3.9–12.7)

## 2022-03-23 PROCEDURE — 97116 GAIT TRAINING THERAPY: CPT

## 2022-03-23 PROCEDURE — 99232 PR SUBSEQUENT HOSPITAL CARE,LEVL II: ICD-10-PCS | Mod: ,,, | Performed by: STUDENT IN AN ORGANIZED HEALTH CARE EDUCATION/TRAINING PROGRAM

## 2022-03-23 PROCEDURE — C1751 CATH, INF, PER/CENT/MIDLINE: HCPCS

## 2022-03-23 PROCEDURE — 99232 PR SUBSEQUENT HOSPITAL CARE,LEVL II: ICD-10-PCS | Mod: ,,, | Performed by: INTERNAL MEDICINE

## 2022-03-23 PROCEDURE — 83735 ASSAY OF MAGNESIUM: CPT | Performed by: STUDENT IN AN ORGANIZED HEALTH CARE EDUCATION/TRAINING PROGRAM

## 2022-03-23 PROCEDURE — 99232 SBSQ HOSP IP/OBS MODERATE 35: CPT | Mod: ,,, | Performed by: STUDENT IN AN ORGANIZED HEALTH CARE EDUCATION/TRAINING PROGRAM

## 2022-03-23 PROCEDURE — 20600001 HC STEP DOWN PRIVATE ROOM

## 2022-03-23 PROCEDURE — 36415 COLL VENOUS BLD VENIPUNCTURE: CPT | Performed by: STUDENT IN AN ORGANIZED HEALTH CARE EDUCATION/TRAINING PROGRAM

## 2022-03-23 PROCEDURE — 25000003 PHARM REV CODE 250: Performed by: STUDENT IN AN ORGANIZED HEALTH CARE EDUCATION/TRAINING PROGRAM

## 2022-03-23 PROCEDURE — 99232 SBSQ HOSP IP/OBS MODERATE 35: CPT | Mod: ,,, | Performed by: INTERNAL MEDICINE

## 2022-03-23 PROCEDURE — 63600175 PHARM REV CODE 636 W HCPCS: Performed by: STUDENT IN AN ORGANIZED HEALTH CARE EDUCATION/TRAINING PROGRAM

## 2022-03-23 PROCEDURE — 80053 COMPREHEN METABOLIC PANEL: CPT | Performed by: STUDENT IN AN ORGANIZED HEALTH CARE EDUCATION/TRAINING PROGRAM

## 2022-03-23 PROCEDURE — 85025 COMPLETE CBC W/AUTO DIFF WBC: CPT | Performed by: STUDENT IN AN ORGANIZED HEALTH CARE EDUCATION/TRAINING PROGRAM

## 2022-03-23 PROCEDURE — 84100 ASSAY OF PHOSPHORUS: CPT | Performed by: STUDENT IN AN ORGANIZED HEALTH CARE EDUCATION/TRAINING PROGRAM

## 2022-03-23 RX ORDER — AMLODIPINE BESYLATE 10 MG/1
10 TABLET ORAL DAILY
Status: DISCONTINUED | OUTPATIENT
Start: 2022-03-23 | End: 2022-03-26 | Stop reason: HOSPADM

## 2022-03-23 RX ADMIN — ACETAMINOPHEN 650 MG: 325 TABLET ORAL at 05:03

## 2022-03-23 RX ADMIN — AMPICILLIN SODIUM AND SULBACTAM SODIUM 3 G: 2; 1 INJECTION, POWDER, FOR SOLUTION INTRAMUSCULAR; INTRAVENOUS at 05:03

## 2022-03-23 RX ADMIN — PANTOPRAZOLE SODIUM 40 MG: 40 TABLET, DELAYED RELEASE ORAL at 09:03

## 2022-03-23 RX ADMIN — CARVEDILOL 12.5 MG: 12.5 TABLET, FILM COATED ORAL at 05:03

## 2022-03-23 RX ADMIN — AMLODIPINE BESYLATE 10 MG: 10 TABLET ORAL at 09:03

## 2022-03-23 RX ADMIN — AMPICILLIN SODIUM AND SULBACTAM SODIUM 3 G: 2; 1 INJECTION, POWDER, FOR SOLUTION INTRAMUSCULAR; INTRAVENOUS at 04:03

## 2022-03-23 RX ADMIN — TAMSULOSIN HYDROCHLORIDE 0.4 MG: 0.4 CAPSULE ORAL at 09:03

## 2022-03-23 RX ADMIN — PRAVASTATIN SODIUM 20 MG: 20 TABLET ORAL at 09:03

## 2022-03-23 RX ADMIN — CARVEDILOL 12.5 MG: 12.5 TABLET, FILM COATED ORAL at 07:03

## 2022-03-23 NOTE — ASSESSMENT & PLAN NOTE
Mr. Parada is a 89 yo M who presented with NIK and acute encephalopathy after recent admission for intra-abdominal abscess. At that time drains were placed and he was discharged with a PICC and on vanc/cefepime/flagyl. At time of admission on 3/19 he was found to have a creatinine of 2.5  With a vanc level of 33.3 and UA with granular casts. Cr continuing to trend down, 2.2 today. Suspect NIK/ATN due to vancomycin toxicity. Of note patient also was on home Lasix. AMS now improved and patient back at his baseline per wife. His UOP has picked up and is much lighter in color per wife.     - No need for fluids at this time as patient appears euvolemic and renal function is improving  - Encourage PO fluid intake   - Avoid nephrotoxic medications  - Strict I/Os  - Will continue to follow renal function

## 2022-03-23 NOTE — SUBJECTIVE & OBJECTIVE
Interval History:   No acute events overnight. Pt continues to make urine, net euvolemic.     Review of Systems   Genitourinary:  Negative for decreased urine volume, difficulty urinating, dysuria, flank pain, hematuria and urgency.   Psychiatric/Behavioral:  Negative for confusion.    All other systems reviewed and are negative.  Objective:     Vital Signs (Most Recent):  Temp: 98.2 °F (36.8 °C) (03/23/22 1133)  Pulse: 62 (03/23/22 1133)  Resp: 18 (03/23/22 1133)  BP: (!) 149/67 (03/23/22 1133)  SpO2: 99 % (03/23/22 1133) Vital Signs (24h Range):  Temp:  [97 °F (36.1 °C)-98.6 °F (37 °C)] 98.2 °F (36.8 °C)  Pulse:  [59-68] 62  Resp:  [18-20] 18  SpO2:  [94 %-99 %] 99 %  BP: (149-170)/(67-77) 149/67     Weight: 81.8 kg (180 lb 5.4 oz)  Body mass index is 30.01 kg/m².    Intake/Output Summary (Last 24 hours) at 3/23/2022 1145  Last data filed at 3/23/2022 0700  Gross per 24 hour   Intake 900 ml   Output 1400 ml   Net -500 ml        Physical Exam  Constitutional:       General: He is not in acute distress.     Appearance: He is obese.   HENT:      Head: Normocephalic and atraumatic.   Eyes:      Extraocular Movements: Extraocular movements intact.      Conjunctiva/sclera: Conjunctivae normal.   Cardiovascular:      Rate and Rhythm: Normal rate and regular rhythm.      Pulses: Normal pulses.   Abdominal:      General: Bowel sounds are normal. There is no distension.      Palpations: Abdomen is soft.      Tenderness: There is no abdominal tenderness.      Comments: Drain in place   Musculoskeletal:      Right lower leg: No edema.      Left lower leg: No edema.   Skin:     General: Skin is warm.   Neurological:      General: No focal deficit present.      Mental Status: He is alert.      Comments: Oriented to person, place and situation   Psychiatric:         Mood and Affect: Mood normal.         Behavior: Behavior normal.       Significant Labs: CBC:   Recent Labs   Lab 03/22/22  0834 03/23/22  0454   WBC 8.71 8.72   HGB  10.9* 10.8*   HCT 34.4* 33.4*    196       CMP:   Recent Labs   Lab 03/22/22  0441 03/23/22  0454    143   K 3.9 4.7    107   CO2 24 26   * 135*   BUN 25 23   CREATININE 2.2* 2.2*   CALCIUM 8.9 9.2   PROT 5.7* 6.1   ALBUMIN 2.4* 2.5*   BILITOT 0.7 0.8   ALKPHOS 83 83   AST 20 21   ALT 9* 8*   ANIONGAP 10 10   EGFRNONAA 25.1* 25.1*         Significant Imaging: I have reviewed all pertinent imaging results/findings within the past 24 hours.

## 2022-03-23 NOTE — PLAN OF CARE
[facility] - Telemetry Stepdown      HOME HEALTH ORDERS  FACE TO FACE ENCOUNTER    Patient Name: Eddie Parada Sr.  YOB: 1929    PCP: Callum Roberts MD   PCP Address: [address] / [place] [address]  PCP Phone Number: [phone]  PCP Fax: [phone]    Encounter Date: 3/19/22    Admit to Home Health    Diagnoses:  Active Hospital Problems    Diagnosis  POA    Abdominal wall abscess [L02.211]  Yes    Disorder of prostate [N42.9]  Yes    Hypothyroidism due to acquired atrophy of thyroid [E03.4]  Yes    Acute renal failure superimposed on stage 3 chronic kidney disease [N17.9, N18.30]  Yes    Peripheral vascular disease [I73.9]  Yes    Type 2 diabetes mellitus, controlled [E11.9]  Yes    Cardiac pacemaker in situ [Z95.0]  Yes    Essential (primary) hypertension [I10]  Yes    Chronic atrial fibrillation [I48.20]  Yes      Resolved Hospital Problems    Diagnosis Date Resolved POA    *Encephalopathy [G93.40] 03/24/2022 Yes    Altered mental state [R41.82] 03/24/2022 Yes    NIK (acute kidney injury) [N17.9] 03/24/2022 No       Follow Up Appointments:  Future Appointments   Date Time Provider Department Center   3/29/2022  3:00 PM Callum Roberts MD [facility]   4/7/2022  8:45 AM Callum [facility] MD [facility]       Allergies:  Review of patient's allergies indicates:   Allergen Reactions    Ciprofloxacin     Levofloxacin Swelling    Lyrica [pregabalin] Swelling    Unable to assess Other (See Comments)     Soft shell crabs       Medications: Review discharge medications with patient and family and provide education.    Current Facility-Administered Medications   Medication Dose Route Frequency Provider Last Rate Last Admin    acetaminophen tablet 650 mg  650 mg Oral Q4H PRN Gustavo Gerard MD   650 mg at 03/23/22 1707    amLODIPine tablet 10 mg  10 mg Oral Daily Gustavo Gerard MD   10 mg at 03/25/22 0830    ampicillin-sulbactam 3 g in sodium chloride  0.9 % 100 mL IVPB (ready to mix system)  3 g Intravenous Q12H Gustavo Gerard MD   Stopped at 03/26/22 0638    carvediloL tablet 12.5 mg  12.5 mg Oral BID WM Gustavo Gerard MD   12.5 mg at 03/25/22 1734    dextrose 10% bolus 125 mL  12.5 g Intravenous PRN Gustavo Gerard MD        dextrose 10% bolus 250 mL  25 g Intravenous PRN Gustavo Gerard MD        glucagon (human recombinant) injection 1 mg  1 mg Intramuscular PRN Gustavo Gerard MD        glucose chewable tablet 16 g  16 g Oral PRN Gustavo Gerard MD        glucose chewable tablet 24 g  24 g Oral PRN Gustavo Gerard MD        hydrALAZINE tablet 10 mg  10 mg Oral Q8H PRN Gustavo Gearrd MD   10 mg at 03/22/22 1154    insulin aspart U-100 pen 0-5 Units  0-5 Units Subcutaneous QID (AC + HS) PRN Gustavo Gerard MD        melatonin tablet 6 mg  6 mg Oral Nightly PRN Gustavo Gerard MD        naloxone 0.4 mg/mL injection 0.02 mg  0.02 mg Intravenous PRN Gustavo Gerard MD        ondansetron disintegrating tablet 8 mg  8 mg Oral Q8H PRTU Gerard MD        pantoprazole EC tablet 40 mg  40 mg Oral Daily Gustavo Gerard MD   40 mg at 03/25/22 0830    polyethylene glycol packet 17 g  17 g Oral Daily Vinay Montaño MD   17 g at 03/24/22 1134    potassium bicarbonate disintegrating tablet 50 mEq  50 mEq Oral Q4H Vinay Montaño MD        pravastatin tablet 20 mg  20 mg Oral Daily Gustavo Gerard MD   20 mg at 03/25/22 0830    promethazine tablet 25 mg  25 mg Oral Q6H PRN Gustavo Gerard MD        senna-docusate 8.6-50 mg per tablet 1 tablet  1 tablet Oral BID PRN Vinay Montaño MD        sodium chloride 0.9% flush 3 mL  3 mL Intravenous Q12H PRN Gustavo Gerard MD        tamsulosin 24 hr capsule 0.4 mg  0.4 mg Oral Daily Gustavo Gerard MD   0.4 mg at 03/25/22 5371     Current Discharge Medication List      START taking these medications    Details   ampicillin sodium/sulbactam Na  (AMPICILLIN/SULBACTAM 3 G/100 ML NS, READY TO MIX,) Inject 100 mLs (3 g total) into the vein every 12 (twelve) hours. Tentative end date: 4/23/2022 but will need to followup with ID before discontinuation of antibiotics  Qty: 1400 mL, Refills: 0      senna-docusate 8.6-50 mg (PERICOLACE) 8.6-50 mg per tablet Take 1 tablet by mouth 2 (two) times daily as needed for Constipation.  Qty: 60 tablet, Refills: 2         CONTINUE these medications which have CHANGED    Details   furosemide (LASIX) 20 MG tablet Take 1 tablet (20 mg total) by mouth every other day. Hold medication until 3/29  Qty: 15 tablet, Refills: 2      lisinopriL (PRINIVIL,ZESTRIL) 20 MG tablet Take 1 tablet (20 mg total) by mouth once daily. Hold medication until 3/29  Qty: 30 tablet, Refills: 11    Comments: .         CONTINUE these medications which have NOT CHANGED    Details   acetaminophen (TYLENOL) 500 MG tablet Take 500 mg by mouth every 6 (six) hours as needed for Pain.      amLODIPine (NORVASC) 10 MG tablet TAKE 1 TABLET BY MOUTH DAILY FOR BLOOD PRESSURE  Qty: 90 tablet, Refills: 1    Associated Diagnoses: Essential hypertension      blood sugar diagnostic (BLOOD GLUCOSE TEST) [facility] [employer] ULTRA TESTING STRIPS. USE TO TEST BLOOD GLUCOSE TWICE DAILY. DX CODE: E11.51  Qty: 200 strip, Refills: 3    Associated Diagnoses: Type 2 diabetes mellitus with other neurologic complication, with long-term current use of insulin      blood-glucose meter Misc ONETOUCH ULTRA GLUCOSE METER. USE AS DIRECTED TO TEST BLOOD GLUCOSE. DX CODE: E11.51  Qty: 1 each, Refills: 0    Associated Diagnoses: Type 2 diabetes mellitus with other neurologic complication, with long-term current use of insulin      carvediloL (COREG) 12.5 MG tablet TAKE 1 TABLET (12.5 MG TOTAL) BY MOUTH 2 (TWO) TIMES DAILY WITH MEALS.  Qty: 60 tablet, Refills: 5    Associated Diagnoses: Essential hypertension      doxazosin (CARDURA) 1 MG tablet Take 1 mg by mouth nightly.      fenofibrate 160 MG  Tab fenofibrate 160 mg tablet      gabapentin (NEURONTIN) 100 MG capsule Take 2 capsules (200 mg total) by mouth every evening.  Qty: 60 capsule, Refills: 11    Associated Diagnoses: Lumbar radiculopathy      gemfibrozil (LOPID) 600 MG tablet Take one tablet(600mg) by mouth once daily      glimepiride (AMARYL) 2 MG tablet TAKE 1 TABLET BY MOUTH EVERY MORNING WITH BREAKFAST FOR DIABETES  Qty: 30 tablet, Refills: 5    Associated Diagnoses: Type 2 diabetes mellitus, controlled      lancets (ONETOUCH ULTRASOFT LANCETS) Misc USE TO TEST BLOOD GLUCOSE TWICE DAILY. DX CODE: E11.51  Qty: 200 each, Refills: 3    Associated Diagnoses: Type 2 diabetes mellitus with other neurologic complication, with long-term current use of insulin      levothyroxine (SYNTHROID) 75 MCG tablet Take 1 tablet (75 mcg total) by mouth before breakfast.  Qty: 30 tablet, Refills: 11    Associated Diagnoses: Hypothyroidism due to acquired atrophy of thyroid      mupirocin (BACTROBAN) 2 % ointment by Nasal route 2 (two) times daily. for 2 days  Qty: 22 g, Refills: 0      pantoprazole (PROTONIX) 40 MG tablet TAKE ONE TABLET BY MOUTH ONCE A DAY FOR STOMACH PROBLEMS  Qty: 30 tablet, Refills: 0    Associated Diagnoses: Gastroesophageal reflux disease      pioglitazone (ACTOS) 15 MG tablet ONE TABLET ONE TIME A DAY FOR DIABETES  Qty: 30 tablet, Refills: 0      potassium chloride SA (K-DUR,KLOR-CON) 10 MEQ tablet Take 1 tablet (10 mEq total) by mouth once daily.  Qty: 90 tablet, Refills: 1    Associated Diagnoses: Hypokalemia      pravastatin (PRAVACHOL) 20 MG tablet TAKE 1 TABLET BY MOUTH DAILY FOR CHOLESTROL  Qty: 30 tablet, Refills: 5    Associated Diagnoses: Mixed dyslipidemia      rivaroxaban (XARELTO) 20 mg Tab Take one tablet(20mg) by mouth once daily      tamsulosin (FLOMAX) 0.4 mg Cap Take 1 capsule (0.4 mg total) by mouth once daily.  Qty: 30 capsule, Refills: 11         STOP taking these medications       cefepime in dextrose 5 % (MAXIPIME) 2  gram/50 mL PgBk Comments:   Reason for Stopping:         metroNIDAZOLE (FLAGYL) 500 MG tablet Comments:   Reason for Stopping:         vancomycin HCl (VANCOMYCIN 1.25 G/250 ML D5W, READY TO MIX,) Comments:   Reason for Stopping:                 I have seen and examined this patient within the last 30 days. My clinical findings that support the need for the home health skilled services and home bound status are the following:no   Weakness/numbness causing balance and gait disturbance due to Infection making it taxing to leave home.     Diet:   cardiac diet    Labs:  Weekly labs on Mondays, CBC,ESR, CRP, and CMP   ATTN: Dr. Whitney and Dr. Arredondo, [phone] ([facility] [place])    Referrals/ Consults  Physical Therapy to evaluate and treat. Evaluate for home safety and equipment needs; Establish/upgrade home exercise program. Perform / instruct on therapeutic exercises, gait training, transfer training, and Range of Motion.  Occupational Therapy to evaluate and treat. Evaluate home environment for safety and equipment needs. Perform/Instruct on transfers, ADL training, ROM, and therapeutic exercises.    Activities:   activity as tolerated    Nursing:   Agency to admit patient within 24 hours of hospital discharge unless specified on physician order or at patient request    SN to complete comprehensive assessment including routine vital signs. Instruct on disease process and s/s of complications to report to MD. Review/verify medication list sent home with the patient at time of discharge  and instruct patient/caregiver as needed. Frequency may be adjusted depending on start of care date.     Skilled nurse to perform up to 3 visits PRN for symptoms related to diagnosis    Notify MD if SBP > 160 or < 90; DBP > 90 or < 50; HR > 120 or < 50; Temp > 101; O2 < 88%; Other:   n/a    Ok to schedule additional visits based on staff availability and patient request on consecutive days within the home health episode.    When  multiple disciplines ordered:    Start of Care occurs on Sunday - Wednesday schedule remaining discipline evaluations as ordered on separate consecutive days following the start of care.    Thursday SOC -schedule subsequent evaluations Friday and Monday the following week.     Friday - Saturday SOC - schedule subsequent discipline evaluations on consecutive days starting Monday of the following week.    For all post-discharge communication and subsequent orders please contact patient's primary care physician. If unable to reach primary care physician or do not receive response within 30 minutes, please contact [facility] for clinical staff order clarification    Miscellaneous   Home Infusion Therapy:   SN to perform Infusion Therapy/Central Line Care.  Review Central Line Care & Central Line Flush with patient.    Administer (drug and dose): Ampicillin-Sulbactam 3gm every 12 hours    Last dose given: 4/23/22 (Indefinete until ID follow up)           Home dose due: 3/26/22    Scrub the Hub: Prior to accessing the line, always perform a 30 second alcohol scrub  Each lumen of the central line is to be flushed at least daily with 10 mL Normal Saline and 3 mL Heparin flush (10 units/mL)  Skilled Nurse (SN) may draw blood from IV access  Blood Draw Procedure:   - Aspirate at least 5 mL of blood   - Discard   - Obtain specimen   - Change injection cap   - Flush with 20 mL Normal Saline followed by a                 3-5 mL Heparin flush (10 units/mL)  Central :   - Sterile dressing changes are done weekly and as needed.   - Use chlor-hexadine scrub to cleanse site, apply Biopatch to insertion site,       apply securement device dressing   - Injection caps are changed weekly and after EVERY lab draw.   - If sterile gauze is under dressing to control oozing,                 dressing change must be performed every 24 hours until gauze is not needed.      Routine Skin for Bedridden Patients: Instruct patient/caregiver  to apply moisture barrier cream to all skin folds and wet areas in perineal area daily and after baths and all bowel movements.  Diabetic Care:   SN to perform and educate Diabetic management with blood glucose monitoring:, Fingerstick blood sugar a.m. and p.m. and Report CBG < 60 or > 350 to physician.  Heart Failure:      SN to instruct on the following:    Instruct on the definition of CHF.   Instruct on the signs/sympoms of CHF to be reported.   Instruct on and monitor daily weights.   Instruct on factors that cause exacerbation.   Instruct on action, dose, schedule, and side effects of medications.   Instruct on diet as prescribed.   Instruct on activity allowed.   Instruct on life-style modifications for life long management of CHF   SN to assess compliance with daily weights, diet, medications, fluid retention,    safety precautions, activities permitted and life-style modifications.   Additional 1-2 SN visits per week as needed for signs and symptoms     of CHF exacerbation.      For Weight Gain > 2-3 lbs in 1 day or 4-6 lbs over 1 week notify PCP:  Obtain BMP lab test in 3 days    Nursing Three times weekly, Physical Therapy Three times weekly and Occupational Therapy Three times weekly    Wound Care Orders  no    I certify that this patient is confined to his home and needs physical therapy and occupational therapy.

## 2022-03-23 NOTE — PROGRESS NOTES
[facility] - Telemetry Stepdown  Nephrology  Progress Note    Patient Name: Eddie Parada Sr.  MRN: [id number]  Admission Date: 3/19/2022  Hospital Length of Stay: 3 days  Attending Provider: Gustavo Gerard MD   Primary Care Physician: Callum Roberts MD  Principal Problem:Encephalopathy    Subjective:     HPI: Mr. Parada is a  89-year-old male with a history of atrial fibrillation (on Xarelto), coronary artery disease, chronic diastolic heart failure, diabetes, COPD, osteoporosis, hyperlipidemia, and recent admissions to [facility] (March 3-8 and March 12-14) for intra-abdominal abscess with septic shock presented to [facility] on March 19 with confusion and slurred speech that began the day prior to presentation.  In the emergency department he was also noted to have acute kidney injury, Cr 2.5 but with continued urine output.  He was on outpatient cefepime, metronidazole, and IV vancomycin for intra-abdominal abscess.   In the emergency department he received IV fluid and Rocephin.  He was transferred to [facility] for further evaluation of the slurred speech/confusion (concern for possible stroke with slurred speech) and evaluation/treatment of his acute kidney injury as they are unable to perform MRIs with pacemaker in place at [facility].  Here his abx were transitioned to unasyn. Currently on EEG for seizure like activity. Mentation wise- patient continues to improve and wife says he is at baseline now.     IUrinalysis with no red blood cells 75 white blood cells/moderate bacteria/moderate yeast/1+ protein/trace ketones/2+ occult blood/2+ leukocytes. Random vancomycin level 33.3. Cr initially 2.5, down trended to 2.3 today. Urine culture with candida.      Interval History:   No acute events overnight. Pt continues to make urine, net euvolemic.     Review of Systems   Genitourinary:  Negative for decreased urine volume, difficulty urinating, dysuria, flank pain, hematuria and urgency.    Psychiatric/Behavioral:  Negative for confusion.    All other systems reviewed and are negative.  Objective:     Vital Signs (Most Recent):  Temp: 98.2 °F (36.8 °C) (03/23/22 1133)  Pulse: 62 (03/23/22 1133)  Resp: 18 (03/23/22 1133)  BP: (!) 149/67 (03/23/22 1133)  SpO2: 99 % (03/23/22 1133) Vital Signs (24h Range):  Temp:  [97 °F (36.1 °C)-98.6 °F (37 °C)] 98.2 °F (36.8 °C)  Pulse:  [59-68] 62  Resp:  [18-20] 18  SpO2:  [94 %-99 %] 99 %  BP: (149-170)/(67-77) 149/67     Weight: 81.8 kg (180 lb 5.4 oz)  Body mass index is 30.01 kg/m².    Intake/Output Summary (Last 24 hours) at 3/23/2022 1145  Last data filed at 3/23/2022 0700  Gross per 24 hour   Intake 900 ml   Output 1400 ml   Net -500 ml        Physical Exam  Constitutional:       General: He is not in acute distress.     Appearance: He is obese.   HENT:      Head: Normocephalic and atraumatic.   Eyes:      Extraocular Movements: Extraocular movements intact.      Conjunctiva/sclera: Conjunctivae normal.   Cardiovascular:      Rate and Rhythm: Normal rate and regular rhythm.      Pulses: Normal pulses.   Abdominal:      General: Bowel sounds are normal. There is no distension.      Palpations: Abdomen is soft.      Tenderness: There is no abdominal tenderness.      Comments: Drain in place   Musculoskeletal:      Right lower leg: No edema.      Left lower leg: No edema.   Skin:     General: Skin is warm.   Neurological:      General: No focal deficit present.      Mental Status: He is alert.      Comments: Oriented to person, place and situation   Psychiatric:         Mood and Affect: Mood normal.         Behavior: Behavior normal.       Significant Labs: CBC:   Recent Labs   Lab 03/22/22  0834 03/23/22  0454   WBC 8.71 8.72   HGB 10.9* 10.8*   HCT 34.4* 33.4*    196       CMP:   Recent Labs   Lab 03/22/22  0441 03/23/22  0454    143   K 3.9 4.7    107   CO2 24 26   * 135*   BUN 25 23   CREATININE 2.2* 2.2*   CALCIUM 8.9 9.2   PROT  5.7* 6.1   ALBUMIN 2.4* 2.5*   BILITOT 0.7 0.8   ALKPHOS 83 83   AST 20 21   ALT 9* 8*   ANIONGAP 10 10   EGFRNONAA 25.1* 25.1*         Significant Imaging: I have reviewed all pertinent imaging results/findings within the past 24 hours.    Assessment/Plan:     * Encephalopathy  Resolved    NIK (acute kidney injury)  Mr. Parada is a 89 yo M who presented with NIK and acute encephalopathy after recent admission for intra-abdominal abscess. At that time drains were placed and he was discharged with a PICC and on vanc/cefepime/flagyl. At time of admission on 3/19 he was found to have a creatinine of 2.5  With a vanc level of 33.3 and UA with granular casts. Cr continuing to trend down, 2.2 today. Suspect NIK/ATN due to vancomycin toxicity. Of note patient also was on home Lasix. AMS now improved and patient back at his baseline per wife. His UOP has picked up and is much lighter in color per wife.     - No need for fluids at this time as patient appears euvolemic and renal function is improving  - Encourage PO fluid intake   - Avoid nephrotoxic medications  - Strict I/Os  - Will continue to follow renal function      Abdominal wall abscess  Management per primary team        Thank you for your consult. I will follow-up with patient. Please contact us if you have any additional questions.    Amarjit Burroughs MD  Nephrology  [facility] - Telemetry Stepdown    ATTENDING PHYSICIAN ATTESTATION  I have personally verified the history and examined the patient. I thoroughly reviewed the demographic, clinical, laboratorial and imaging information available in medical records. I agree with the assessment and recommendations provided by the subspecialty resident who was under my supervision.

## 2022-03-23 NOTE — PROGRESS NOTES
[facility] - Telemetry Stepdown  Nephrology  Progress Note    Patient Name: Eddie Parada Sr.  MRN: [id number]  Admission Date: 3/19/2022  Hospital Length of Stay: 3 days  Attending Provider: Gustavo Gerard MD   Primary Care Physician: Callum Roberts MD  Principal Problem:Encephalopathy    Subjective:     HPI: Mr. Parada is a  89-year-old male with a history of atrial fibrillation (on Xarelto), coronary artery disease, chronic diastolic heart failure, diabetes, COPD, osteoporosis, hyperlipidemia, and recent admissions to [facility] (March 3-8 and March 12-14) for intra-abdominal abscess with septic shock presented to [facility] on March 19 with confusion and slurred speech that began the day prior to presentation.  In the emergency department he was also noted to have acute kidney injury, Cr 2.5 but with continued urine output.  He was on outpatient cefepime, metronidazole, and IV vancomycin for intra-abdominal abscess.   In the emergency department he received IV fluid and Rocephin.  He was transferred to [facility] for further evaluation of the slurred speech/confusion (concern for possible stroke with slurred speech) and evaluation/treatment of his acute kidney injury as they are unable to perform MRIs with pacemaker in place at [facility].  Here his abx were transitioned to unasyn. Currently on EEG for seizure like activity. Mentation wise- patient continues to improve and wife says he is at baseline now.     IUrinalysis with no red blood cells 75 white blood cells/moderate bacteria/moderate yeast/1+ protein/trace ketones/2+ occult blood/2+ leukocytes. Random vancomycin level 33.3. Cr initially 2.5, down trended to 2.3 today. Urine culture with candida.      Interval History:   No acute events overnight. Pt continues to make urine, net euvolemic.     Review of Systems   Genitourinary:  Negative for decreased urine volume, difficulty urinating, dysuria, flank pain, hematuria and urgency.    Psychiatric/Behavioral:  Negative for confusion.    All other systems reviewed and are negative.  Objective:     Vital Signs (Most Recent):  Temp: 97.7 °F (36.5 °C) (03/23/22 0738)  Pulse: (!) 59 (03/23/22 0738)  Resp: 18 (03/23/22 0738)  BP: (!) 163/70 (03/23/22 0738)  SpO2: 98 % (03/23/22 0738) Vital Signs (24h Range):  Temp:  [97 °F (36.1 °C)-98.6 °F (37 °C)] 97.7 °F (36.5 °C)  Pulse:  [59-68] 59  Resp:  [18-20] 18  SpO2:  [94 %-98 %] 98 %  BP: (157-170)/(68-77) 163/70     Weight: 81.8 kg (180 lb 5.4 oz)  Body mass index is 30.01 kg/m².    Intake/Output Summary (Last 24 hours) at 3/23/2022 0910  Last data filed at 3/23/2022 0700  Gross per 24 hour   Intake 900 ml   Output 1400 ml   Net -500 ml        Physical Exam  Constitutional:       General: He is not in acute distress.     Appearance: He is obese.   HENT:      Head: Normocephalic and atraumatic.   Eyes:      Extraocular Movements: Extraocular movements intact.      Conjunctiva/sclera: Conjunctivae normal.   Cardiovascular:      Rate and Rhythm: Normal rate and regular rhythm.      Pulses: Normal pulses.   Abdominal:      General: Bowel sounds are normal. There is no distension.      Palpations: Abdomen is soft.      Tenderness: There is no abdominal tenderness.      Comments: Drain in place   Musculoskeletal:      Right lower leg: No edema.      Left lower leg: No edema.   Skin:     General: Skin is warm.   Neurological:      General: No focal deficit present.      Mental Status: He is alert.      Comments: Oriented to person, place and situation   Psychiatric:         Mood and Affect: Mood normal.         Behavior: Behavior normal.       Significant Labs: CBC:   Recent Labs   Lab 03/22/22  0834 03/23/22 0454   WBC 8.71 8.72   HGB 10.9* 10.8*   HCT 34.4* 33.4*    196       CMP:   Recent Labs   Lab 03/22/22  0441 03/23/22 0454    143   K 3.9 4.7    107   CO2 24 26   * 135*   BUN 25 23   CREATININE 2.2* 2.2*   CALCIUM 8.9 9.2    PROT 5.7* 6.1   ALBUMIN 2.4* 2.5*   BILITOT 0.7 0.8   ALKPHOS 83 83   AST 20 21   ALT 9* 8*   ANIONGAP 10 10   EGFRNONAA 25.1* 25.1*         Significant Imaging: I have reviewed all pertinent imaging results/findings within the past 24 hours.    Assessment/Plan:     NIK (acute kidney injury)  Mr. Parada is a 89 yo M who presented with NIK and acute encephalopathy after recent admission for intra-abdominal abscess. At that time drains were placed and he was discharged with a PICC and on vanc/cefepime/flagyl. At time of admission on 3/19 he was found to have a creatinine of 2.5  With a vanc level of 33.3 and UA with granular casts. Cr continuing to trend down, 2.2 today. Suspect NIK/ATN due to vancomycin toxicity. Of note patient also was on home Lasix. AMS now improved and patient back at his baseline per wife. His UOP has picked up and is much lighter in color per wife.     - No need for fluids at this time as patient appears euvolemic and renal function is improving  - Encourage PO fluid intake   - Avoid nephrotoxic medications  - Strict I/Os  - Will continue to follow renal function          Thank you for your consult. I will follow-up with patient. Please contact us if you have any additional questions.    Amarjit Burroughs MD  Nephrology  [facility] - Telemetry Stepdown    ATTENDING PHYSICIAN ATTESTATION  I have personally verified the history and examined the patient. I thoroughly reviewed the demographic, clinical, laboratorial and imaging information available in medical records. I agree with the assessment and recommendations provided by the subspecialty resident who was under my supervision.

## 2022-03-23 NOTE — PLAN OF CARE
03/23/22 1408   Post-Acute Status   Post-Acute Authorization Home Health;IV Infusion   Home Health Status Set-up Complete/Auth obtained  ([facility] - [facility])   [facility]  [facility])   [facility]oke with [facility]  and confirmed patient is current. Patient and spouse prefer to [facility] with [facility] [place]. Patient had [facility] in the past and was happy with their services, agreeable per conversation on 3/22/22 to resume if needed.    [facility] able to see orders in Epic, will call  with any questions.  [facility].    4:37 PM  HH order and H&P were faxed to [facility] to obtain HH authorization.    Julia Menjivar LMSW  [facility]  Ext. [phone]

## 2022-03-23 NOTE — SUBJECTIVE & OBJECTIVE
Interval History:   No acute events overnight. Pt continues to make urine, net euvolemic.     Review of Systems   Genitourinary:  Negative for decreased urine volume, difficulty urinating, dysuria, flank pain, hematuria and urgency.   Psychiatric/Behavioral:  Negative for confusion.    All other systems reviewed and are negative.  Objective:     Vital Signs (Most Recent):  Temp: 97.7 °F (36.5 °C) (03/23/22 0738)  Pulse: (!) 59 (03/23/22 0738)  Resp: 18 (03/23/22 0738)  BP: (!) 163/70 (03/23/22 0738)  SpO2: 98 % (03/23/22 0738) Vital Signs (24h Range):  Temp:  [97 °F (36.1 °C)-98.6 °F (37 °C)] 97.7 °F (36.5 °C)  Pulse:  [59-68] 59  Resp:  [18-20] 18  SpO2:  [94 %-98 %] 98 %  BP: (157-170)/(68-77) 163/70     Weight: 81.8 kg (180 lb 5.4 oz)  Body mass index is 30.01 kg/m².    Intake/Output Summary (Last 24 hours) at 3/23/2022 0910  Last data filed at 3/23/2022 0700  Gross per 24 hour   Intake 900 ml   Output 1400 ml   Net -500 ml        Physical Exam  Constitutional:       General: He is not in acute distress.     Appearance: He is obese.   HENT:      Head: Normocephalic and atraumatic.   Eyes:      Extraocular Movements: Extraocular movements intact.      Conjunctiva/sclera: Conjunctivae normal.   Cardiovascular:      Rate and Rhythm: Normal rate and regular rhythm.      Pulses: Normal pulses.   Abdominal:      General: Bowel sounds are normal. There is no distension.      Palpations: Abdomen is soft.      Tenderness: There is no abdominal tenderness.      Comments: Drain in place   Musculoskeletal:      Right lower leg: No edema.      Left lower leg: No edema.   Skin:     General: Skin is warm.   Neurological:      General: No focal deficit present.      Mental Status: He is alert.      Comments: Oriented to person, place and situation   Psychiatric:         Mood and Affect: Mood normal.         Behavior: Behavior normal.       Significant Labs: CBC:   Recent Labs   Lab 03/22/22  0834 03/23/22  0454   WBC 8.71 8.72    HGB 10.9* 10.8*   HCT 34.4* 33.4*    196       CMP:   Recent Labs   Lab 03/22/22  0441 03/23/22  0454    143   K 3.9 4.7    107   CO2 24 26   * 135*   BUN 25 23   CREATININE 2.2* 2.2*   CALCIUM 8.9 9.2   PROT 5.7* 6.1   ALBUMIN 2.4* 2.5*   BILITOT 0.7 0.8   ALKPHOS 83 83   AST 20 21   ALT 9* 8*   ANIONGAP 10 10   EGFRNONAA 25.1* 25.1*         Significant Imaging: I have reviewed all pertinent imaging results/findings within the past 24 hours.

## 2022-03-23 NOTE — ASSESSMENT & PLAN NOTE
Mr. Parada is a 89 yo M who presented with NIK and acute encephalopathy after recent admission for intra-abdominal abscess. At that time drains were placed and he was discharged with a PICC and on vanc/cefepime/flagyl. At time of admission on 3/19 he was found to have a creatinine of 2.5  With a vanc level of 33.3 and UA with granular casts. Cr continuing to trend down, 2.2 today. Suspect NIK/ATN due to vancomycin toxicity. Of note patient also was on home Lasix. AMS now improved and patient back at his baseline per wife. His UOP has picked up and is much lighter in color per wife.     - No need for fluids at this time as patient appears euvolemic and renal function is improving  - Encourage PO fluid intake   - Avoid nephrotoxic medications  - Strict I/Os  - Will continue to follow renal function

## 2022-03-23 NOTE — PLAN OF CARE
Problem: Adult Inpatient Plan of Care  Goal: Plan of Care Review  Outcome: Met  Goal: Patient-Specific Goal (Individualized)  Outcome: Met  Goal: Absence of Hospital-Acquired Illness or Injury  Outcome: Met  Goal: Optimal Comfort and Wellbeing  Outcome: Met  Goal: Readiness for Transition of Care  Outcome: Met     Problem: Diabetes Comorbidity  Goal: Blood Glucose Level Within Targeted Range  Outcome: Met     Problem: Adjustment to Illness (Sepsis/Septic Shock)  Goal: Optimal Coping  Outcome: Met     Problem: Bleeding (Sepsis/Septic Shock)  Goal: Absence of Bleeding  Outcome: Met     Problem: Glycemic Control Impaired (Sepsis/Septic Shock)  Goal: Blood Glucose Level Within Desired Range  Outcome: Met     Problem: Infection Progression (Sepsis/Septic Shock)  Goal: Absence of Infection Signs and Symptoms  Outcome: Met     Problem: Nutrition Impaired (Sepsis/Septic Shock)  Goal: Optimal Nutrition Intake  Outcome: Met     Problem: Fluid and Electrolyte Imbalance (Acute Kidney Injury/Impairment)  Goal: Fluid and Electrolyte Balance  Outcome: Met     Problem: Oral Intake Inadequate (Acute Kidney Injury/Impairment)  Goal: Optimal Nutrition Intake  Outcome: Met     Problem: Renal Function Impairment (Acute Kidney Injury/Impairment)  Goal: Effective Renal Function  Outcome: Met     Problem: Infection  Goal: Absence of Infection Signs and Symptoms  Outcome: Met     Problem: Impaired Wound Healing  Goal: Optimal Wound Healing  Outcome: Met     Problem: Fall Injury Risk  Goal: Absence of Fall and Fall-Related Injury  Outcome: Met     Problem: Skin Injury Risk Increased  Goal: Skin Health and Integrity  Outcome: Met     Problem: Restraint, Nonbehavioral (Nonviolent)  Goal: Absence of Harm or Injury  Outcome: Met   Patient is alert, oriented and conscious. Vital signs taken and recorded. SPO2 maintain in RA. Intake output recorded. Medication given as per order. Mobilization done room. Abdominal drain flushed. Will continue to  monitor.

## 2022-03-23 NOTE — ASSESSMENT & PLAN NOTE
- Initially treated at [facility] early March 2022 with IR drain placement and treated with Rocephin and Flagyl.  Readmitted to [facility] in mid March with IR drain placement and discharged with minimum 2 weeks of additional antibiotics (cefepime, vancomycin, Flagyl) on March 14th._  - Admission CT abdomen/pelvis with decrease in size of fluid abdominal collections  - Patient readmitted with NIK and concern for cefepime toxicity, vancomycin induced NIK  - ID consulted, appreciate recs  -- Continue Unasyn at discharge  -- Will continue antibiotics until ID follow-up and repeat imaging

## 2022-03-23 NOTE — PLAN OF CARE
[facility] - Telemetry Stepdown  Initial Discharge Assessment       Primary Care Provider: Callum Roberts MD    Admission Diagnosis: Altered mental state [R41.82]    Admission Date: 3/19/2022  Expected Discharge Date: 3/23/2022    Discharge Barriers Identified: None    Payor: [employer]ARE / Plan: [employer]ar [employer] / Product Type: Medicare Advantage /     Extended Emergency Contact Information  Primary Emergency Contact: Jessica Parada  Address: [address]           [place] [address] [facility]  Home Phone: [phone]  Mobile Phone: [phone]  Relation: Spouse  Secondary Emergency Contact: Diana Romero   [facility]  Home Phone: [phone]  Relation: Daughter    Discharge Plan A: Home with family, Home Health  Discharge Plan B: Home with family      [id number] - [place] - [address] [address] [address]  [address] [facility] [address]  [place] [address]  Phone: [phone] Fax: [phone]    [facility] [place] - [address] [facility]  [address] [facility]  [place] [address]  Phone: [phone] Fax: [phone]    [facility] - [address] [address]  Phone: [phone] Fax: [phone]      Initial Assessment (most recent)     Adult Discharge Assessment - 03/23/22 0900        Discharge Assessment    Assessment Type Discharge Planning Assessment     Confirmed/corrected address, phone number and insurance Yes     Confirmed Demographics Correct on Facesheet     Source of Information patient;family     Reason For Admission Encephalopathy     Lives With spouse     Do you expect to return to your current living situation? Yes   [address] [facility], [place] [address]    Do you have help at home or someone to help you manage your care at home? Yes     Who are your caregiver(s) and their phone number(s)? Jessica Parada, spouse [phone]     Walking or  Climbing Stairs Difficulty ambulation difficulty, requires equipment     Mobility Management Patient uses RW at baseline     Dressing/Bathing Difficulty none     Home Layout Able to live on 1st floor     Equipment Currently Used at Home walker, rolling     Patient currently being followed by outpatient case management? No     Do you currently have service(s) that help you manage your care at home? Yes     Name and Contact number of agency [facility]     Is the pt/caregiver preference to resume services with current agency Yes     Do you take prescription medications? Yes     Do you have prescription coverage? Yes     Do you have any problems affording any of your prescribed medications? No     Is the patient taking medications as prescribed? yes     Who is going to help you get home at discharge? Spouse     How do you get to doctors appointments? family or friend will provide     Are you on dialysis? No     Do you take coumadin? No     Discharge Plan A Home with family;Home Health     Discharge Plan B Home with family     DME Needed Upon Discharge  --   TBD    Discharge Plan discussed with: Spouse/sig other;Patient     Discharge Barriers Identified None        Relationship/Environment    Name(s) of Who Lives With Patient Jessica Parada, spouse [phone]               Julia Menjivar, ROSITA  [facility]  Ext. [phone]

## 2022-03-23 NOTE — PT/OT/SLP PROGRESS
Physical Therapy Treatment    Patient Name:  Eddie Parada Sr.   MRN:  [id number]    Recommendations:     Discharge Recommendations:  home health PT   Discharge Equipment Recommendations: none   Barriers to discharge: None    Assessment:     Eddie Parada Sr. is a 89 y.o. male admitted with a medical diagnosis of Encephalopathy.  He presents with the following impairments/functional limitations:  weakness, impaired endurance, impaired functional mobilty Patient ambulates w/ RW and  feet. Transfers w/ SBA w/ RW.    Rehab Prognosis: Good; patient would benefit from acute skilled PT services to address these deficits and reach maximum level of function.    Recent Surgery: * No surgery found *      Plan:     During this hospitalization, patient to be seen 3 x/week to address the identified rehab impairments via gait training, therapeutic activities, therapeutic exercises and progress toward the following goals:    · Plan of Care Expires:  04/21/22    Subjective     Chief Complaint: reports his daughter will back in a few minutes  Patient/Family Comments/goals: return home to [facility]  Pain/Comfort:  · Pain Rating 1: 0/10  · Pain Rating Post-Intervention 1: 0/10      Objective:     Communicated with nurse prior to session.  Patient found HOB elevated with pulse ox (continuous) (abdominal abscess drain/closed suction) upon PT entry to room.     General Precautions: Standard, fall   Orthopedic Precautions:N/A   Braces: N/A  Respiratory Status: Room air     Functional Mobility:  · Bed Mobility:     · Supine to Sit: minimum assistance  · Transfers:     · Sit to Stand:  stand by assistance with rolling walker  · Bed to Chair: contact guard assistance with  rolling walker  using  Step Transfer  · Gait: ambulates w/ RW and  feet w/ brief standing rest break, slow pace, no LOB. chair in tow but patient did not need a seated rest break.      AM-PAC 6 CLICK MOBILITY  Turning over in bed (including adjusting  bedclothes, sheets and blankets)?: 3  Sitting down on and standing up from a chair with arms (e.g., wheelchair, bedside commode, etc.): 3  Moving from lying on back to sitting on the side of the bed?: 3  Moving to and from a bed to a chair (including a wheelchair)?: 3  Need to walk in hospital room?: 3  Climbing 3-5 steps with a railing?: 3  Basic Mobility Total Score: 18       Therapeutic Activities and Exercises:   patient educated in PT POC, gait and trf tech, call for assistance  Daughter arrived at end of session.    Patient left up in chair with all lines intact, call button in reach, PCT notified and daughter present..    GOALS:   Multidisciplinary Problems     Physical Therapy Goals        Problem: Physical Therapy    Goal Priority Disciplines Outcome Goal Variances Interventions   Physical Therapy Goal     PT, PT/OT Ongoing, Progressing     Description: Goals to be met by: 04/10/22     Patient will increase functional independence with mobility by performin. Supine to sit with Set-up Nolan  2. Sit to supine with Set-up Nolan  3. Sit to stand transfer with Supervision  4. Bed to chair transfer with Supervision using Rolling Walker  5. Gait  x 200 feet with Stand-by Assistance using Rolling Walker.   6. Lower extremity exercise program x15 reps per handout, with assistance as needed                     Time Tracking:     PT Received On: 22  PT Start Time: 1338     PT Stop Time: 1358  PT Total Time (min): 20 min     Billable Minutes: Gait Training 20    Treatment Type: Treatment  PT/PTA: PT     PTA Visit Number: 0     2022

## 2022-03-23 NOTE — CONSULTS
[facility] - Telemetry Stepdown  Wound Care    Patient Name:  Eddie Parada Sr.   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 3/2/2021    Melanoma     Obesity (BMI 30.0-34.9) 2016    Osteoporosis     Peripheral vascular disease     Primary malignant neoplasm of prostate 3/3/2022    Septic shock 3/3/2022    Stroke     TIA (transient ischemic attack)     Type II diabetes mellitus with peripheral circulatory disorder     Type II or unspecified type diabetes mellitus without mention of complication, not stated as uncontrolled     Unspecified essential hypertension        Social History     Socioeconomic History    Marital status:    Tobacco Use    Smoking status: Former Smoker     Quit date: 1996     Years since quittin.5    Smokeless tobacco: Never Used    Tobacco comment: cigar smoker   Substance and Sexual Activity    Alcohol use: No     Comment: none since     Drug use: No    Sexual activity: Not Currently     Social Determinants of Health     Financial Resource Strain: Low Risk     Difficulty of Paying Living Expenses: Not hard at all   Food Insecurity: No Food Insecurity    Worried About Running Out of Food in the Last Year: Never true    Ran Out of Food in the Last Year: Never true   Transportation Needs: No Transportation Needs    Lack of Transportation (Medical): No    Lack of Transportation (Non-Medical): No   Physical Activity: Inactive     Days of Exercise per Week: 0 days    Minutes of Exercise per Session: 0 min   Stress: No Stress Concern Present    Feeling of Stress : Not at all   Social Connections: Moderately Isolated    Frequency of Communication with Friends and Family: More than three times a week    Frequency of Social Gatherings with Friends and Family: Three times a week    Attends Spiritism Services: Never    Active Member of Clubs or Organizations: No    Attends Club or Organization Meetings: Never    Marital Status:    Housing Stability: Low Risk     Unable to Pay for Housing in the Last Year: No    Number of Places Lived in the Last Year: 1    Unstable Housing in the Last Year: No       Precautions:     Allergies as of 03/19/2022 - Reviewed 03/19/2022   Allergen Reaction Noted    Ciprofloxacin  01/08/2018    Levofloxacin Swelling 09/17/2014    Lyrica [pregabalin] Swelling 02/08/2016    Unable to assess Other (See Comments) 08/16/2012       [facility] Assessment Details/Treatment     Wound care consult received. Discussed with MD and no need for wound care at this time.     Wound care to sign off. Please re consult if needed.     03/23/2022

## 2022-03-23 NOTE — SUBJECTIVE & OBJECTIVE
Interval History:    No events overnight.  Patient with no complaints.  Patient continues to remain improved in regards to mental status.  Patient's creatinine continues to improve.     Review of Systems   Unable to perform ROS: Mental status change   Constitutional:  Negative for activity change, appetite change, chills, diaphoresis, fatigue and fever.   HENT:  Negative for rhinorrhea and sore throat.    Respiratory:  Negative for cough, chest tightness and shortness of breath.    Cardiovascular:  Negative for chest pain and palpitations.   Gastrointestinal:  Negative for abdominal pain, constipation, diarrhea and nausea.   Endocrine: Negative for cold intolerance.   Genitourinary:  Negative for decreased urine volume, difficulty urinating, dysuria, flank pain, hematuria and urgency.   Musculoskeletal:  Negative for arthralgias and myalgias.   Skin:  Negative for rash and wound.   Neurological:  Negative for dizziness, weakness, numbness and headaches.   Psychiatric/Behavioral:  Negative for agitation, behavioral problems and confusion.    All other systems reviewed and are negative.  Objective:     Vital Signs (Most Recent):  Temp: 97.6 °F (36.4 °C) (03/22/22 1953)  Pulse: 62 (03/22/22 1953)  Resp: 20 (03/22/22 1953)  BP: (!) 167/74 (03/22/22 1953)  SpO2: (!) 94 % (03/22/22 1953)   Vital Signs (24h Range):  Temp:  [97.6 °F (36.4 °C)-98.2 °F (36.8 °C)] 97.6 °F (36.4 °C)  Pulse:  [58-64] 62  Resp:  [16-20] 20  SpO2:  [93 %-97 %] 94 %  BP: (146-183)/(70-74) 167/74     Weight: 81.8 kg (180 lb 5.4 oz)  Body mass index is 30.01 kg/m².    Intake/Output Summary (Last 24 hours) at 3/22/2022 2305  Last data filed at 3/22/2022 1728  Gross per 24 hour   Intake 900 ml   Output 1500 ml   Net -600 ml      Physical Exam  Constitutional:       General: He is not in acute distress.     Appearance: He is obese.   HENT:      Head: Normocephalic and atraumatic.      Mouth/Throat:      Mouth: Mucous membranes are moist.   Eyes:       Extraocular Movements: Extraocular movements intact.      Conjunctiva/sclera: Conjunctivae normal.   Cardiovascular:      Rate and Rhythm: Normal rate and regular rhythm.      Pulses: Normal pulses.   Pulmonary:      Effort: Pulmonary effort is normal. No respiratory distress.      Breath sounds: Normal breath sounds. No wheezing or rales.   Abdominal:      General: Bowel sounds are normal. There is no distension.      Palpations: Abdomen is soft.      Tenderness: There is no abdominal tenderness.      Comments: Drain in place   Musculoskeletal:      Right lower leg: No edema.      Left lower leg: No edema.   Skin:     General: Skin is warm.   Neurological:      General: No focal deficit present.      Mental Status: He is alert.      Comments: Oriented to person, place and situation   Psychiatric:         Mood and Affect: Mood normal.         Behavior: Behavior normal.       Significant Labs: BMP:   Recent Labs   Lab 03/22/22  0441   *      K 3.9      CO2 24   BUN 25   CREATININE 2.2*   CALCIUM 8.9   MG 1.7     CBC:   Recent Labs   Lab 03/22/22  0834   WBC 8.71   HGB 10.9*   HCT 34.4*          Significant Imaging: I have reviewed all pertinent imaging results/findings within the past 24 hours.

## 2022-03-23 NOTE — PROGRESS NOTES
[facility] - Telemetry [facility] Medicine  Progress Note    Patient Name: Eddie Parada Sr.  MRN: [id number]  Patient Class: IP- Inpatient   Admission Date: 3/19/2022  Length of Stay: 2 days  Attending Physician: Gustavo Gerard MD  Primary Care Provider: Callum Roberts MD        Subjective:     Principal Problem:Encephalopathy        HPI:  Mr. Parada is a 89-year-old male with a history of atrial fibrillation (on Xarelto), coronary artery disease, chronic diastolic heart failure, diabetes, COPD, osteoporosis, hyperlipidemia, and recent admissions to [facility] (March 3-8 and March 12-14) for intra-abdominal abscess with septic shock presented to [facility] on March 19 with confusion and slurred speech that began the day prior to presentation.  In the emergency department he was also noted to have acute kidney injury. He was at the time still urinating although his wife reports he was making less urine and it was a darker color.  He was on outpatient cefepime, metronidazole, and IV vancomycin with vanc supra therapeutic to 33.3 on admit.  He was transferred from OSH for further evaluation of the slurred speech/confusion (concern for possible stroke with slurred speech) and evaluation/treatment of his acute kidney injury.  Infectious workup negative so far. Currently on EEG with seizure like activity yesterday. Per wife, his urine has picked back up and is now clearer.     Urinalysis with no red blood cells 75 white blood cells/moderate bacteria/moderate yeast/1+ protein/trace ketones/2+ occult blood/2+ leukocytes. Random vancomycin level 33.3, procalcitonin 0.06, , troponin 0.026, CPK 24, sodium 137, potassium 4.1, chloride 104, CO2 22, BUN 26, creatinine 2.5 baseline (0.9-1), glucose 173, AST 21, ALT 8, influenza negative, lactic acid 0.9, COVID negative, white blood cells 9.06, hemoglobin 10.1, hematocrit 30.9, platelets 267. Blood cultures ordered. Granular casts in urine.      Imaging with improvement of intra-abdominal abscess and improvement in aeration of lungs from last admission.      Overview/Hospital Course:  Patient antibiotics were changed to Zosyn.  Infectious Disease consult for additional recommendations for abdominal abscess management.  Zosyn was then transitioned to Unasyn.  Patient was a no-show talkative on admission, but in mentation decrease in became nontalkative.  Neurology consult given treatment status and persistent tremor.  Patient underwent Ativan challenge and EEG monitoring.  EEG showed no signs of seizures but evidence of encephalopathy likely associated with cefepime neurotoxicity.  The following day the patient's mentation improved close to his baseline.    Patient's NIK was slow to improve, and Nephrology was consulted.  Flow placed for strict monitoring of urinary output.      Interval History:    No events overnight.  Patient with no complaints.  Patient continues to remain improved in regards to mental status.  Patient's creatinine continues to improve.     Review of Systems   Unable to perform ROS: Mental status change   Constitutional:  Negative for activity change, appetite change, chills, diaphoresis, fatigue and fever.   HENT:  Negative for rhinorrhea and sore throat.    Respiratory:  Negative for cough, chest tightness and shortness of breath.    Cardiovascular:  Negative for chest pain and palpitations.   Gastrointestinal:  Negative for abdominal pain, constipation, diarrhea and nausea.   Endocrine: Negative for cold intolerance.   Genitourinary:  Negative for decreased urine volume, difficulty urinating, dysuria, flank pain, hematuria and urgency.   Musculoskeletal:  Negative for arthralgias and myalgias.   Skin:  Negative for rash and wound.   Neurological:  Negative for dizziness, weakness, numbness and headaches.   Psychiatric/Behavioral:  Negative for agitation, behavioral problems and confusion.    All other systems reviewed and are  negative.  Objective:     Vital Signs (Most Recent):  Temp: 97.6 °F (36.4 °C) (03/22/22 1953)  Pulse: 62 (03/22/22 1953)  Resp: 20 (03/22/22 1953)  BP: (!) 167/74 (03/22/22 1953)  SpO2: (!) 94 % (03/22/22 1953)   Vital Signs (24h Range):  Temp:  [97.6 °F (36.4 °C)-98.2 °F (36.8 °C)] 97.6 °F (36.4 °C)  Pulse:  [58-64] 62  Resp:  [16-20] 20  SpO2:  [93 %-97 %] 94 %  BP: (146-183)/(70-74) 167/74     Weight: 81.8 kg (180 lb 5.4 oz)  Body mass index is 30.01 kg/m².    Intake/Output Summary (Last 24 hours) at 3/22/2022 2305  Last data filed at 3/22/2022 1728  Gross per 24 hour   Intake 900 ml   Output 1500 ml   Net -600 ml      Physical Exam  Constitutional:       General: He is not in acute distress.     Appearance: He is obese.   HENT:      Head: Normocephalic and atraumatic.      Mouth/Throat:      Mouth: Mucous membranes are moist.   Eyes:      Extraocular Movements: Extraocular movements intact.      Conjunctiva/sclera: Conjunctivae normal.   Cardiovascular:      Rate and Rhythm: Normal rate and regular rhythm.      Pulses: Normal pulses.   Pulmonary:      Effort: Pulmonary effort is normal. No respiratory distress.      Breath sounds: Normal breath sounds. No wheezing or rales.   Abdominal:      General: Bowel sounds are normal. There is no distension.      Palpations: Abdomen is soft.      Tenderness: There is no abdominal tenderness.      Comments: Drain in place   Musculoskeletal:      Right lower leg: No edema.      Left lower leg: No edema.   Skin:     General: Skin is warm.   Neurological:      General: No focal deficit present.      Mental Status: He is alert.      Comments: Oriented to person, place and situation   Psychiatric:         Mood and Affect: Mood normal.         Behavior: Behavior normal.       Significant Labs: BMP:   Recent Labs   Lab 03/22/22  0441   *      K 3.9      CO2 24   BUN 25   CREATININE 2.2*   CALCIUM 8.9   MG 1.7     CBC:   Recent Labs   Lab 03/22/22  0834   WBC  8.71   HGB 10.9*   HCT 34.4*          Significant Imaging: I have reviewed all pertinent imaging results/findings within the past 24 hours.      Assessment/Plan:      * Encephalopathy  - To the presentation of worsening mental status with noted disorientation, tremor, and decrease concentration.  Exam nonfocal.  CT head with no acute process.  Chest x-ray unremarkable.  - Differential includes cefepime neurotoxicity in setting of NIK, CVA, metabolic disturbance, infectious  - Continue infectious workup as below  - Neurology consulted, signed off.  -- Ativan challenge, patient with improved mentation post   -- Continuous EEG discontinued, with triphasic morphology consistent with diffuse cortical dysfunction and a moderate encephalopathy   - Neurochecks Q 4    Abdominal wall abscess  - Initially treated at [place] early March 2022 with IR drain placement and treated with Rocephin and Flagyl.  Readmitted to [place] in mid March with IR drain placement and discharged with minimum 2 weeks of additional antibiotics (cefepime, vancomycin, Flagyl) on March 14th._  - Admission CT abdomen/pelvis with decrease in size of fluid abdominal collections  - Patient readmitted with NIK and concern for cefepime toxicity, vancomycin induced NIK  - ID consulted, appreciate recs  -- Continue Unasyn at discharge  -- Will continue antibiotics until ID follow-up and repeat imaging     NIK (acute kidney injury)  -  Patient creatinine was at baseline 0.9 on 3/14 and on presentation  - Admission creatinine 2.5  - Patient was discharged on vancomycin, with random admission vanc level 33  - UA with granular casts  - Concern for ATN d/t vancomycin, patient also home Lasix  - Avoiding nephrotoxic medications  - Holding IV fluids as patient does not appear hypovolemic  - Bishop for strict I&Os  - Trend BMP  - Improving slightly        Type 2 diabetes mellitus, controlled  - Home glimepiride, pioglitazone  - Diabetic diet  - Sliding scale  insulin  - Accu-Cheks  - Holding home gabapentin due to NIK    Essential (primary) hypertension  - Holding home lisinopril due to NIK  - Amlodipine 5mg (home 10mg)  - Continue home Coreg        Altered mental state  - See above      Disorder of prostate  - Continue home tamsulosin      Hypothyroidism due to acquired atrophy of thyroid  - Continue home levothyroxine    Peripheral vascular disease  - Continue home fenofibrate, pravastatin      Cardiac pacemaker in situ  - Unclear if MRI compatible      Chronic atrial fibrillation  - Holding home rivaroxaban due to NIK  - Continue Coreg        VTE Risk Mitigation (From admission, onward)         Ordered     IP VTE HIGH RISK PATIENT  Once         03/19/22 1616     Place sequential compression device  Until discontinued         03/19/22 1616                Discharge Planning   KANDICE: 3/23/2022     Code Status: Full Code   Is the patient medically ready for discharge?: No    Reason for patient still in hospital (select all that apply): Patient trending condition, Laboratory test, Treatment, Consult recommendations and Pending disposition                     Gustavo Gerard MD  Department of Hospital Medicine   [facility] - Telemetry Stepdown

## 2022-03-24 PROBLEM — G93.40 ENCEPHALOPATHY: Status: RESOLVED | Noted: 2022-03-20 | Resolved: 2022-03-24

## 2022-03-24 PROBLEM — N17.9 AKI (ACUTE KIDNEY INJURY): Status: RESOLVED | Noted: 2022-03-19 | Resolved: 2022-03-24

## 2022-03-24 LAB
ALBUMIN SERPL BCP-MCNC: 2.4 G/DL (ref 3.5–5.2)
ALP SERPL-CCNC: 81 U/L (ref 55–135)
ALT SERPL W/O P-5'-P-CCNC: 6 U/L (ref 10–44)
ANION GAP SERPL CALC-SCNC: 11 MMOL/L (ref 8–16)
AST SERPL-CCNC: 18 U/L (ref 10–40)
BACTERIA BLD CULT: NORMAL
BACTERIA BLD CULT: NORMAL
BASOPHILS # BLD AUTO: 0.04 K/UL (ref 0–0.2)
BASOPHILS NFR BLD: 0.5 % (ref 0–1.9)
BILIRUB SERPL-MCNC: 0.8 MG/DL (ref 0.1–1)
BUN SERPL-MCNC: 23 MG/DL (ref 10–30)
CALCIUM SERPL-MCNC: 8.6 MG/DL (ref 8.7–10.5)
CHLORIDE SERPL-SCNC: 106 MMOL/L (ref 95–110)
CO2 SERPL-SCNC: 24 MMOL/L (ref 23–29)
CREAT SERPL-MCNC: 2 MG/DL (ref 0.5–1.4)
DIFFERENTIAL METHOD: ABNORMAL
EOSINOPHIL # BLD AUTO: 0.1 K/UL (ref 0–0.5)
EOSINOPHIL NFR BLD: 1.1 % (ref 0–8)
ERYTHROCYTE [DISTWIDTH] IN BLOOD BY AUTOMATED COUNT: 13.9 % (ref 11.5–14.5)
EST. GFR  (AFRICAN AMERICAN): 32.5 ML/MIN/1.73 M^2
EST. GFR  (NON AFRICAN AMERICAN): 28.1 ML/MIN/1.73 M^2
GLUCOSE SERPL-MCNC: 115 MG/DL (ref 70–110)
HCT VFR BLD AUTO: 30.8 % (ref 40–54)
HGB BLD-MCNC: 9.9 G/DL (ref 14–18)
IMM GRANULOCYTES # BLD AUTO: 0.06 K/UL (ref 0–0.04)
IMM GRANULOCYTES NFR BLD AUTO: 0.7 % (ref 0–0.5)
LYMPHOCYTES # BLD AUTO: 1.1 K/UL (ref 1–4.8)
LYMPHOCYTES NFR BLD: 13 % (ref 18–48)
MAGNESIUM SERPL-MCNC: 1.7 MG/DL (ref 1.6–2.6)
MCH RBC QN AUTO: 33.7 PG (ref 27–31)
MCHC RBC AUTO-ENTMCNC: 32.1 G/DL (ref 32–36)
MCV RBC AUTO: 105 FL (ref 82–98)
MONOCYTES # BLD AUTO: 1.1 K/UL (ref 0.3–1)
MONOCYTES NFR BLD: 13 % (ref 4–15)
NEUTROPHILS # BLD AUTO: 5.8 K/UL (ref 1.8–7.7)
NEUTROPHILS NFR BLD: 71.7 % (ref 38–73)
NRBC BLD-RTO: 0 /100 WBC
PHOSPHATE SERPL-MCNC: 2.6 MG/DL (ref 2.7–4.5)
PLATELET # BLD AUTO: 169 K/UL (ref 150–450)
PMV BLD AUTO: 9.8 FL (ref 9.2–12.9)
POCT GLUCOSE: 109 MG/DL (ref 70–110)
POCT GLUCOSE: 130 MG/DL (ref 70–110)
POCT GLUCOSE: 156 MG/DL (ref 70–110)
POCT GLUCOSE: 179 MG/DL (ref 70–110)
POTASSIUM SERPL-SCNC: 3.2 MMOL/L (ref 3.5–5.1)
PROT SERPL-MCNC: 5.7 G/DL (ref 6–8.4)
RBC # BLD AUTO: 2.94 M/UL (ref 4.6–6.2)
SODIUM SERPL-SCNC: 141 MMOL/L (ref 136–145)
WBC # BLD AUTO: 8.13 K/UL (ref 3.9–12.7)

## 2022-03-24 PROCEDURE — 80053 COMPREHEN METABOLIC PANEL: CPT | Performed by: STUDENT IN AN ORGANIZED HEALTH CARE EDUCATION/TRAINING PROGRAM

## 2022-03-24 PROCEDURE — 99232 SBSQ HOSP IP/OBS MODERATE 35: CPT | Mod: ,,, | Performed by: INTERNAL MEDICINE

## 2022-03-24 PROCEDURE — 63600175 PHARM REV CODE 636 W HCPCS: Performed by: STUDENT IN AN ORGANIZED HEALTH CARE EDUCATION/TRAINING PROGRAM

## 2022-03-24 PROCEDURE — 97530 THERAPEUTIC ACTIVITIES: CPT

## 2022-03-24 PROCEDURE — 20600001 HC STEP DOWN PRIVATE ROOM

## 2022-03-24 PROCEDURE — 83735 ASSAY OF MAGNESIUM: CPT | Performed by: STUDENT IN AN ORGANIZED HEALTH CARE EDUCATION/TRAINING PROGRAM

## 2022-03-24 PROCEDURE — 25000003 PHARM REV CODE 250: Performed by: STUDENT IN AN ORGANIZED HEALTH CARE EDUCATION/TRAINING PROGRAM

## 2022-03-24 PROCEDURE — 36415 COLL VENOUS BLD VENIPUNCTURE: CPT | Performed by: STUDENT IN AN ORGANIZED HEALTH CARE EDUCATION/TRAINING PROGRAM

## 2022-03-24 PROCEDURE — 84100 ASSAY OF PHOSPHORUS: CPT | Performed by: STUDENT IN AN ORGANIZED HEALTH CARE EDUCATION/TRAINING PROGRAM

## 2022-03-24 PROCEDURE — 85025 COMPLETE CBC W/AUTO DIFF WBC: CPT | Performed by: STUDENT IN AN ORGANIZED HEALTH CARE EDUCATION/TRAINING PROGRAM

## 2022-03-24 PROCEDURE — 99232 PR SUBSEQUENT HOSPITAL CARE,LEVL II: ICD-10-PCS | Mod: ,,, | Performed by: INTERNAL MEDICINE

## 2022-03-24 PROCEDURE — 25000003 PHARM REV CODE 250: Performed by: INTERNAL MEDICINE

## 2022-03-24 PROCEDURE — 99231 SBSQ HOSP IP/OBS SF/LOW 25: CPT | Mod: ,,, | Performed by: INTERNAL MEDICINE

## 2022-03-24 PROCEDURE — 97110 THERAPEUTIC EXERCISES: CPT

## 2022-03-24 PROCEDURE — 99231 PR SUBSEQUENT HOSPITAL CARE,LEVL I: ICD-10-PCS | Mod: ,,, | Performed by: INTERNAL MEDICINE

## 2022-03-24 RX ORDER — AMOXICILLIN 250 MG
1 CAPSULE ORAL 2 TIMES DAILY PRN
Status: DISCONTINUED | OUTPATIENT
Start: 2022-03-24 | End: 2022-03-26 | Stop reason: HOSPADM

## 2022-03-24 RX ORDER — AMOXICILLIN 250 MG
1 CAPSULE ORAL 2 TIMES DAILY PRN
Start: 2022-03-24 | End: 2022-03-26 | Stop reason: SDUPTHER

## 2022-03-24 RX ORDER — POLYETHYLENE GLYCOL 3350 17 G/17G
17 POWDER, FOR SOLUTION ORAL DAILY
Status: DISCONTINUED | OUTPATIENT
Start: 2022-03-24 | End: 2022-03-26 | Stop reason: HOSPADM

## 2022-03-24 RX ADMIN — AMLODIPINE BESYLATE 10 MG: 10 TABLET ORAL at 08:03

## 2022-03-24 RX ADMIN — TAMSULOSIN HYDROCHLORIDE 0.4 MG: 0.4 CAPSULE ORAL at 08:03

## 2022-03-24 RX ADMIN — CARVEDILOL 12.5 MG: 12.5 TABLET, FILM COATED ORAL at 05:03

## 2022-03-24 RX ADMIN — PANTOPRAZOLE SODIUM 40 MG: 40 TABLET, DELAYED RELEASE ORAL at 08:03

## 2022-03-24 RX ADMIN — POLYETHYLENE GLYCOL 3350 17 G: 17 POWDER, FOR SOLUTION ORAL at 11:03

## 2022-03-24 RX ADMIN — PRAVASTATIN SODIUM 20 MG: 20 TABLET ORAL at 08:03

## 2022-03-24 RX ADMIN — AMPICILLIN SODIUM AND SULBACTAM SODIUM 3 G: 2; 1 INJECTION, POWDER, FOR SOLUTION INTRAMUSCULAR; INTRAVENOUS at 04:03

## 2022-03-24 RX ADMIN — AMPICILLIN SODIUM AND SULBACTAM SODIUM 3 G: 2; 1 INJECTION, POWDER, FOR SOLUTION INTRAMUSCULAR; INTRAVENOUS at 05:03

## 2022-03-24 RX ADMIN — CARVEDILOL 12.5 MG: 12.5 TABLET, FILM COATED ORAL at 07:03

## 2022-03-24 NOTE — PROGRESS NOTES
[facility] - Telemetry Stepdown  Nephrology  Progress Note    Patient Name: Eddie Parada Sr.  MRN: [id number]  Admission Date: 3/19/2022  Hospital Length of Stay: 4 days  Attending Provider: Vinay Montaño MD   Primary Care Physician: Callum Roberts MD  Principal Problem:Encephalopathy    Subjective:     HPI: Mr. Parada is a  89-year-old male with a history of atrial fibrillation (on Xarelto), coronary artery disease, chronic diastolic heart failure, diabetes, COPD, osteoporosis, hyperlipidemia, and recent admissions to [facility] (March 3-8 and March 12-14) for intra-abdominal abscess with septic shock presented to [facility] on March 19 with confusion and slurred speech that began the day prior to presentation.  In the emergency department he was also noted to have acute kidney injury, Cr 2.5 but with continued urine output.  He was on outpatient cefepime, metronidazole, and IV vancomycin for intra-abdominal abscess.   In the emergency department he received IV fluid and Rocephin.  He was transferred to [facility] for further evaluation of the slurred speech/confusion (concern for possible stroke with slurred speech) and evaluation/treatment of his acute kidney injury as they are unable to perform MRIs with pacemaker in place at [facility].  Here his abx were transitioned to unasyn. Currently on EEG for seizure like activity. Mentation wise- patient continues to improve and wife says he is at baseline now.     IUrinalysis with no red blood cells 75 white blood cells/moderate bacteria/moderate yeast/1+ protein/trace ketones/2+ occult blood/2+ leukocytes. Random vancomycin level 33.3. Cr initially 2.5, down trended to 2.3 today. Urine culture with candida.      Interval History:   No acute events overnight. Pt continues to make urine, net euvolemic.     Review of Systems   Genitourinary:  Negative for decreased urine volume, difficulty urinating, dysuria, flank pain, hematuria and urgency.    Psychiatric/Behavioral:  Negative for confusion.    All other systems reviewed and are negative.  Objective:     Vital Signs (Most Recent):  Temp: 96.5 °F (35.8 °C) (03/24/22 1114)  Pulse: 60 (03/24/22 1114)  Resp: 18 (03/24/22 1114)  BP: 138/62 (03/24/22 1114)  SpO2: 99 % (03/24/22 1114) Vital Signs (24h Range):  Temp:  [96 °F (35.6 °C)-98.6 °F (37 °C)] 96.5 °F (35.8 °C)  Pulse:  [60-96] 60  Resp:  [15-20] 18  SpO2:  [94 %-100 %] 99 %  BP: (130-189)/(58-74) 138/62     Weight: 81.8 kg (180 lb 5.4 oz)  Body mass index is 30.01 kg/m².    Intake/Output Summary (Last 24 hours) at 3/24/2022 1202  Last data filed at 3/24/2022 0731  Gross per 24 hour   Intake 600 ml   Output 1450 ml   Net -850 ml        Physical Exam  Constitutional:       General: He is not in acute distress.     Appearance: He is obese.   HENT:      Head: Normocephalic and atraumatic.   Eyes:      Extraocular Movements: Extraocular movements intact.      Conjunctiva/sclera: Conjunctivae normal.   Cardiovascular:      Rate and Rhythm: Normal rate and regular rhythm.      Pulses: Normal pulses.   Abdominal:      General: Bowel sounds are normal. There is no distension.      Palpations: Abdomen is soft.      Tenderness: There is no abdominal tenderness.      Comments: Drain in place   Musculoskeletal:      Right lower leg: No edema.      Left lower leg: No edema.   Skin:     General: Skin is warm.   Neurological:      General: No focal deficit present.      Mental Status: He is alert.      Comments: Oriented to person, place and situation   Psychiatric:         Mood and Affect: Mood normal.         Behavior: Behavior normal.       Significant Labs: CBC:   Recent Labs   Lab 03/23/22 0454 03/24/22 0427   WBC 8.72 8.13   HGB 10.8* 9.9*   HCT 33.4* 30.8*    169       CMP:   Recent Labs   Lab 03/23/22 0454 03/24/22 0427    141   K 4.7 3.2*    106   CO2 26 24   * 115*   BUN 23 23   CREATININE 2.2* 2.0*   CALCIUM 9.2 8.6*   PROT 6.1  5.7*   ALBUMIN 2.5* 2.4*   BILITOT 0.8 0.8   ALKPHOS 83 81   AST 21 18   ALT 8* 6*   ANIONGAP 10 11   EGFRNONAA 25.1* 28.1*         Significant Imaging: I have reviewed all pertinent imaging results/findings within the past 24 hours.    Assessment/Plan:     NIK (acute kidney injury)  Mr. Parada is a 89 yo M who presented with NIK and acute encephalopathy after recent admission for intra-abdominal abscess. At that time drains were placed and he was discharged with a PICC and on vanc/cefepime/flagyl. At time of admission on 3/19 he was found to have a creatinine of 2.5  With a vanc level of 33.3 and UA with granular casts. Cr continuing to trend down, 2.2 today. Suspect NIK/ATN due to vancomycin toxicity. Of note patient also was on home Lasix. AMS now improved and patient back at his baseline per wife. His UOP has picked up and is much lighter in color per wife.     - No need for fluids at this time as patient appears euvolemic and renal function is improving  - Encourage PO fluid intake   - Avoid nephrotoxic medications  - Strict I/Os  - Will continue to follow renal function      Abdominal wall abscess  Management per primary team        Thank you for your consult. I will follow-up with patient. Please contact us if you have any additional questions.    Amarjit Burroughs MD  Nephrology  [facility] - Telemetry Stepdown    ATTENDING PHYSICIAN ATTESTATION  I have personally verified the history and examined the patient. I thoroughly reviewed the demographic, clinical, laboratorial and imaging information available in medical records. I agree with the assessment and recommendations provided by the subspecialty resident who was under my supervision.

## 2022-03-24 NOTE — PT/OT/SLP PROGRESS
"Occupational Therapy   Treatment    Name: Eddie Parada Sr.  MRN: [id number]  Admitting Diagnosis:  Encephalopathy       Recommendations:     Discharge Recommendations: home health OT  Discharge Equipment Recommendations:  grab bar  Barriers to discharge:  None    Assessment:     Eddie Parada Sr. is a 89 y.o. male with a medical diagnosis of Encephalopathy.  He was alert and responsive and able to engage in tasks with 1-2 step commands and occasional repeated cues. The patient was transferred from supine > sit with Min A, sit > stand with CGA and performed a step transfer to his chair with CGA using a RW. He required CGA to ambulate to the bathroom and CGA to Min A to groom, and he was able to engage in therapeutic UE exercises without SOB or fatigue. The patient tolerated the treatment very well and is making progress towards his PLOF. Performance deficits affecting function are impaired endurance, impaired self care skills, impaired balance, impaired functional mobilty, impaired cognition.     Rehab Prognosis:  Good; patient would benefit from acute skilled OT services to address these deficits and reach maximum level of function.       Plan:     Patient to be seen 3 x/week to address the above listed problems via self-care/home management, therapeutic activities, therapeutic exercises  · Plan of Care Expires: 04/07/22  · Plan of Care Reviewed with: patient, spouse    Subjective   "I look good. I'm doing good."   Pain/Comfort:  Pain Rating 1: 0/10    Objective:     Communicated with: RN prior to session.  Patient found supine with pulse ox (continuous) upon OT entry to room. Spouse present in room.     General Precautions: Standard, fall   Orthopedic Precautions:N/A   Braces: N/A  Respiratory Status: Room air     Occupational Performance:     Bed Mobility:    · Supine > sit with Min A secondary to drowsiness from waking up   · Scooting with CGA   · Sitting EOB with SBA to CGA     Functional " Mobility/Transfers:  Transfers   · Sit <> stand with CGA using RW   · Step pivot transfer to chair with CGA using RW   Functional Mobility:   · Static standing balance at sink with CGA using RW    Exercises: 10x reps each   · Shoulder presses (front)   · Elbow flexion/extension     Activities of Daily Living:  · Grooming: CGA to Min A to brush teeth, comb hair and wash face while standing at sink with RW       [facility] 6 Click ADL: 15    Treatment & Education:  - Reason for sitting in chair  - Cued not to rush movements   - Instructed to let staff know before attempting to stand     Patient left up in chair with all lines intact and call button in reachEducation:      GOALS:   Multidisciplinary Problems     Occupational Therapy Goals        Problem: Occupational Therapy    Goal Priority Disciplines Outcome Interventions   Occupational Therapy Goal     OT, PT/OT Ongoing, Progressing    Description: Goals to be met by: 4/5/22    Patient will increase functional independence with ADLs by performing:    UE Dressing with [place].  LE Dressing with [place].  Toileting from toilet with Supervision for hygiene and clothing management.   Bathing from  shower chair/bench with Modified [place].  Step transfer with Supervision                     Time Tracking:     OT Date of Treatment: 03/24/22  OT Start Time: 1354  OT Stop Time: 1421  OT Total Time (min): 27 min    Billable Minutes:Therapeutic Activity 17  Therapeutic Exercise 10    OT/PATTI: OT          3/24/2022

## 2022-03-24 NOTE — ASSESSMENT & PLAN NOTE
- Initially treated at [facility] [facility] early March 2022 with IR drain placement and treated with Rocephin and Flagyl.  Readmitted to [facility] in mid March with IR drain placement and discharged with minimum 2 weeks of additional antibiotics (cefepime, vancomycin, Flagyl) on March 14th._  - Admission CT abdomen/pelvis with decrease in size of fluid abdominal collections  - Patient readmitted with NIK and concern for cefepime toxicity, vancomycin induced NIK  - ID consulted, appreciate recs  -- Continue Unasyn at discharge  -- Will continue antibiotics until ID follow-up and repeat imaging   -- Will need to notify Infectious Disease prior to discharge to aiding scheduling outpatient appointment

## 2022-03-24 NOTE — SUBJECTIVE & OBJECTIVE
Interval History:   No acute events overnight. Pt continues to make urine, net euvolemic.     Review of Systems   Genitourinary:  Negative for decreased urine volume, difficulty urinating, dysuria, flank pain, hematuria and urgency.   Psychiatric/Behavioral:  Negative for confusion.    All other systems reviewed and are negative.  Objective:     Vital Signs (Most Recent):  Temp: 96.5 °F (35.8 °C) (03/24/22 1114)  Pulse: 60 (03/24/22 1114)  Resp: 18 (03/24/22 1114)  BP: 138/62 (03/24/22 1114)  SpO2: 99 % (03/24/22 1114) Vital Signs (24h Range):  Temp:  [96 °F (35.6 °C)-98.6 °F (37 °C)] 96.5 °F (35.8 °C)  Pulse:  [60-96] 60  Resp:  [15-20] 18  SpO2:  [94 %-100 %] 99 %  BP: (130-189)/(58-74) 138/62     Weight: 81.8 kg (180 lb 5.4 oz)  Body mass index is 30.01 kg/m².    Intake/Output Summary (Last 24 hours) at 3/24/2022 1202  Last data filed at 3/24/2022 0731  Gross per 24 hour   Intake 600 ml   Output 1450 ml   Net -850 ml        Physical Exam  Constitutional:       General: He is not in acute distress.     Appearance: He is obese.   HENT:      Head: Normocephalic and atraumatic.   Eyes:      Extraocular Movements: Extraocular movements intact.      Conjunctiva/sclera: Conjunctivae normal.   Cardiovascular:      Rate and Rhythm: Normal rate and regular rhythm.      Pulses: Normal pulses.   Abdominal:      General: Bowel sounds are normal. There is no distension.      Palpations: Abdomen is soft.      Tenderness: There is no abdominal tenderness.      Comments: Drain in place   Musculoskeletal:      Right lower leg: No edema.      Left lower leg: No edema.   Skin:     General: Skin is warm.   Neurological:      General: No focal deficit present.      Mental Status: He is alert.      Comments: Oriented to person, place and situation   Psychiatric:         Mood and Affect: Mood normal.         Behavior: Behavior normal.       Significant Labs: CBC:   Recent Labs   Lab 03/23/22  0454 03/24/22  0427   WBC 8.72 8.13   HGB  10.8* 9.9*   HCT 33.4* 30.8*    169       CMP:   Recent Labs   Lab 03/23/22  0454 03/24/22  0427    141   K 4.7 3.2*    106   CO2 26 24   * 115*   BUN 23 23   CREATININE 2.2* 2.0*   CALCIUM 9.2 8.6*   PROT 6.1 5.7*   ALBUMIN 2.5* 2.4*   BILITOT 0.8 0.8   ALKPHOS 83 81   AST 21 18   ALT 8* 6*   ANIONGAP 10 11   EGFRNONAA 25.1* 28.1*         Significant Imaging: I have reviewed all pertinent imaging results/findings within the past 24 hours.

## 2022-03-24 NOTE — PLAN OF CARE
03/24/22 1350   Post-Acute Status   Post-Acute Authorization Home Health;IV Infusion   Home Health Status Pending post-acute provider review/more information requested  (Pending PHN authorization for [facility])   IV Infusion Status Pending payor review/awaiting authorization (if required)  (Pending PHN authorization for [facility])   Updated  orders sent to [facility] for authorization.    Julia Menjivar LMSW  [facility]  Ext. [phone]

## 2022-03-24 NOTE — PLAN OF CARE
Problem: Adult Inpatient Plan of Care  Goal: Plan of Care Review  Outcome: Ongoing, Progressing  Flowsheets (Taken 3/24/2022 1611)  Plan of Care Reviewed With:   patient   spouse  Goal: Optimal Comfort and Wellbeing  Outcome: Ongoing, Progressing       AAOx4 this shift.  Ambulated to bathroom with use of walker and SBA. Tolerated well.  Noted to have small area of irritation, bleeding to scrotum.  Cleaned and protective barrier ointment applied.  PICC line dressing changed per SN/Instructor.  Tolerated well.  Denies pain.  New order rec'd for Miralax daily, senna PRN for constipation. Miralax given as ordered, with good results.  POC reviewed.  Possible D/C 03/25/22. Family at bedside.  Will continue to monitor. Side rails up x 2, call light in reach, bed low and locked.

## 2022-03-24 NOTE — SUBJECTIVE & OBJECTIVE
Interval History:   No events overnight.  Patient with no complaints.  Creatinine stable from yesterday and awaiting renal recovery.  Wife at bedside and updated.      Review of Systems   Constitutional:  Negative for activity change, appetite change, chills, diaphoresis, fatigue and fever.   HENT:  Negative for rhinorrhea and sore throat.    Respiratory:  Negative for cough, chest tightness and shortness of breath.    Cardiovascular:  Negative for chest pain and palpitations.   Gastrointestinal:  Negative for abdominal pain, constipation, diarrhea and nausea.   Endocrine: Negative for cold intolerance.   Genitourinary:  Negative for decreased urine volume, difficulty urinating, dysuria, flank pain, hematuria and urgency.   Musculoskeletal:  Negative for arthralgias and myalgias.   Skin:  Negative for rash and wound.   Neurological:  Negative for dizziness, weakness, numbness and headaches.   Psychiatric/Behavioral:  Negative for agitation, behavioral problems and confusion.    All other systems reviewed and are negative.  Objective:     Vital Signs (Most Recent):  Temp: 97.6 °F (36.4 °C) (03/23/22 2000)  Pulse: 60 (03/23/22 2000)  Resp: 18 (03/23/22 2000)  BP: (!) 146/65 (03/23/22 2000)  SpO2: 97 % (03/23/22 2000)   Vital Signs (24h Range):  Temp:  [97 °F (36.1 °C)-98.6 °F (37 °C)] 97.6 °F (36.4 °C)  Pulse:  [59-68] 60  Resp:  [18-20] 18  SpO2:  [94 %-99 %] 97 %  BP: (137-169)/(60-77) 146/65     Weight: 81.8 kg (180 lb 5.4 oz)  Body mass index is 30.01 kg/m².    Intake/Output Summary (Last 24 hours) at 3/23/2022 2138  Last data filed at 3/23/2022 1712  Gross per 24 hour   Intake 1100 ml   Output 1850 ml   Net -750 ml      Physical Exam  Constitutional:       General: He is not in acute distress.     Appearance: He is obese.   HENT:      Head: Normocephalic and atraumatic.      Mouth/Throat:      Mouth: Mucous membranes are moist.   Eyes:      Extraocular Movements: Extraocular movements intact.       Conjunctiva/sclera: Conjunctivae normal.   Cardiovascular:      Rate and Rhythm: Normal rate and regular rhythm.      Pulses: Normal pulses.   Pulmonary:      Effort: Pulmonary effort is normal. No respiratory distress.      Breath sounds: Normal breath sounds. No wheezing or rales.   Abdominal:      General: Bowel sounds are normal. There is no distension.      Palpations: Abdomen is soft.      Tenderness: There is no abdominal tenderness.      Comments: Drain in place   Musculoskeletal:      Right lower leg: No edema.      Left lower leg: No edema.   Skin:     General: Skin is warm.   Neurological:      General: No focal deficit present.      Mental Status: He is alert.      Comments: Oriented to person, place and situation   Psychiatric:         Mood and Affect: Mood normal.         Behavior: Behavior normal.       Significant Labs: CBC:   Recent Labs   Lab 03/22/22  0834 03/23/22  0454   WBC 8.71 8.72   HGB 10.9* 10.8*   HCT 34.4* 33.4*    196     CMP:   Recent Labs   Lab 03/22/22  0441 03/23/22  0454    143   K 3.9 4.7    107   CO2 24 26   * 135*   BUN 25 23   CREATININE 2.2* 2.2*   CALCIUM 8.9 9.2   PROT 5.7* 6.1   ALBUMIN 2.4* 2.5*   BILITOT 0.7 0.8   ALKPHOS 83 83   AST 20 21   ALT 9* 8*   ANIONGAP 10 10   EGFRNONAA 25.1* 25.1*       Significant Imaging: I have reviewed all pertinent imaging results/findings within the past 24 hours.

## 2022-03-24 NOTE — PROGRESS NOTES
[facility] - Telemetry [facility] Medicine  Progress Note    Patient Name: Eddie Parada Sr.  MRN: [id number]  Patient Class: IP- Inpatient   Admission Date: 3/19/2022  Length of Stay: 3 days  Attending Physician: Gustavo Gerard MD  Primary Care Provider: Callum Roberts MD        Subjective:     Principal Problem:Encephalopathy        HPI:  Mr. Parada is a 89-year-old male with a history of atrial fibrillation (on Xarelto), coronary artery disease, chronic diastolic heart failure, diabetes, COPD, osteoporosis, hyperlipidemia, and recent admissions to [facility] (March 3-8 and March 12-14) for intra-abdominal abscess with septic shock presented to [facility] on March 19 with confusion and slurred speech that began the day prior to presentation.  In the emergency department he was also noted to have acute kidney injury. He was at the time still urinating although his wife reports he was making less urine and it was a darker color.  He was on outpatient cefepime, metronidazole, and IV vancomycin with vanc supra therapeutic to 33.3 on admit.  He was transferred from OSH for further evaluation of the slurred speech/confusion (concern for possible stroke with slurred speech) and evaluation/treatment of his acute kidney injury.  Infectious workup negative so far. Currently on EEG with seizure like activity yesterday. Per wife, his urine has picked back up and is now clearer.     Urinalysis with no red blood cells 75 white blood cells/moderate bacteria/moderate yeast/1+ protein/trace ketones/2+ occult blood/2+ leukocytes. Random vancomycin level 33.3, procalcitonin 0.06, , troponin 0.026, CPK 24, sodium 137, potassium 4.1, chloride 104, CO2 22, BUN 26, creatinine 2.5 baseline (0.9-1), glucose 173, AST 21, ALT 8, influenza negative, lactic acid 0.9, COVID negative, white blood cells 9.06, hemoglobin 10.1, hematocrit 30.9, platelets 267. Blood cultures ordered. Granular casts in urine.      Imaging with improvement of intra-abdominal abscess and improvement in aeration of lungs from last admission.      Overview/Hospital Course:  Patient antibiotics were changed to Zosyn.  Infectious Disease consult for additional recommendations for abdominal abscess management.  Zosyn was then transitioned to Unasyn.  Patient was a no-show talkative on admission, but in mentation decrease in became nontalkative.  Neurology consult given treatment status and persistent tremor.  Patient underwent Ativan challenge and EEG monitoring.  EEG showed no signs of seizures but evidence of encephalopathy likely associated with cefepime neurotoxicity.  The following day the patient's mentation improved close to his baseline.    Patient's NIK was slow to improve, and Nephrology was consulted.  Flow placed for strict monitoring of urinary output.      Interval History:   No events overnight.  Patient with no complaints.  Creatinine stable from yesterday and awaiting renal recovery.  Wife at bedside and updated.      Review of Systems   Constitutional:  Negative for activity change, appetite change, chills, diaphoresis, fatigue and fever.   HENT:  Negative for rhinorrhea and sore throat.    Respiratory:  Negative for cough, chest tightness and shortness of breath.    Cardiovascular:  Negative for chest pain and palpitations.   Gastrointestinal:  Negative for abdominal pain, constipation, diarrhea and nausea.   Endocrine: Negative for cold intolerance.   Genitourinary:  Negative for decreased urine volume, difficulty urinating, dysuria, flank pain, hematuria and urgency.   Musculoskeletal:  Negative for arthralgias and myalgias.   Skin:  Negative for rash and wound.   Neurological:  Negative for dizziness, weakness, numbness and headaches.   Psychiatric/Behavioral:  Negative for agitation, behavioral problems and confusion.    All other systems reviewed and are negative.  Objective:     Vital Signs (Most Recent):  Temp: 97.6 °F  (36.4 °C) (03/23/22 2000)  Pulse: 60 (03/23/22 2000)  Resp: 18 (03/23/22 2000)  BP: (!) 146/65 (03/23/22 2000)  SpO2: 97 % (03/23/22 2000)   Vital Signs (24h Range):  Temp:  [97 °F (36.1 °C)-98.6 °F (37 °C)] 97.6 °F (36.4 °C)  Pulse:  [59-68] 60  Resp:  [18-20] 18  SpO2:  [94 %-99 %] 97 %  BP: (137-169)/(60-77) 146/65     Weight: 81.8 kg (180 lb 5.4 oz)  Body mass index is 30.01 kg/m².    Intake/Output Summary (Last 24 hours) at 3/23/2022 2138  Last data filed at 3/23/2022 1712  Gross per 24 hour   Intake 1100 ml   Output 1850 ml   Net -750 ml      Physical Exam  Constitutional:       General: He is not in acute distress.     Appearance: He is obese.   HENT:      Head: Normocephalic and atraumatic.      Mouth/Throat:      Mouth: Mucous membranes are moist.   Eyes:      Extraocular Movements: Extraocular movements intact.      Conjunctiva/sclera: Conjunctivae normal.   Cardiovascular:      Rate and Rhythm: Normal rate and regular rhythm.      Pulses: Normal pulses.   Pulmonary:      Effort: Pulmonary effort is normal. No respiratory distress.      Breath sounds: Normal breath sounds. No wheezing or rales.   Abdominal:      General: Bowel sounds are normal. There is no distension.      Palpations: Abdomen is soft.      Tenderness: There is no abdominal tenderness.      Comments: Drain in place   Musculoskeletal:      Right lower leg: No edema.      Left lower leg: No edema.   Skin:     General: Skin is warm.   Neurological:      General: No focal deficit present.      Mental Status: He is alert.      Comments: Oriented to person, place and situation   Psychiatric:         Mood and Affect: Mood normal.         Behavior: Behavior normal.       Significant Labs: CBC:   Recent Labs   Lab 03/22/22  0834 03/23/22  0454   WBC 8.71 8.72   HGB 10.9* 10.8*   HCT 34.4* 33.4*    196     CMP:   Recent Labs   Lab 03/22/22  0441 03/23/22  0454    143   K 3.9 4.7    107   CO2 24 26   * 135*   BUN 25 23    CREATININE 2.2* 2.2*   CALCIUM 8.9 9.2   PROT 5.7* 6.1   ALBUMIN 2.4* 2.5*   BILITOT 0.7 0.8   ALKPHOS 83 83   AST 20 21   ALT 9* 8*   ANIONGAP 10 10   EGFRNONAA 25.1* 25.1*       Significant Imaging: I have reviewed all pertinent imaging results/findings within the past 24 hours.      Assessment/Plan:      * Encephalopathy  - To the presentation of worsening mental status with noted disorientation, tremor, and decrease concentration.  Exam nonfocal.  CT head with no acute process.  Chest x-ray unremarkable.  - Differential includes cefepime neurotoxicity in setting of NIK, CVA, metabolic disturbance, infectious  - Continue infectious workup as below  - Neurology consulted, signed off.  -- Ativan challenge, patient with improved mentation post   -- Continuous EEG discontinued, with triphasic morphology consistent with diffuse cortical dysfunction and a moderate encephalopathy   - Resolved    NIK (acute kidney injury)  - Patient creatinine was at baseline 0.9 on 3/14 and on presentation  - Admission creatinine 2.5  - Patient was discharged on vancomycin, with random admission vanc level 33  - UA with granular casts  - Concern for ATN d/t vancomycin, patient also home Lasix  - Avoiding nephrotoxic medications  - PO hydration  - Strict I&Os  - Trend BMP  - Cr stable at 2.2    Abdominal wall abscess  - Initially treated at [place] early March 2022 with IR drain placement and treated with Rocephin and Flagyl.  Readmitted to [place] in mid March with IR drain placement and discharged with minimum 2 weeks of additional antibiotics (cefepime, vancomycin, Flagyl) on March 14th._  - Admission CT abdomen/pelvis with decrease in size of fluid abdominal collections  - Patient readmitted with NIK and concern for cefepime toxicity, vancomycin induced NIK  - ID consulted, appreciate recs  -- Continue Unasyn at discharge  -- Will continue antibiotics until ID follow-up and repeat imaging   -- Will need to notify Infectious Disease  prior to discharge to aiding scheduling outpatient appointment    Type 2 diabetes mellitus, controlled  - Home glimepiride, pioglitazone  - Diabetic diet  - Sliding scale insulin  - Accu-Cheks  - Holding home gabapentin due to NIK    Essential (primary) hypertension  - Holding home lisinopril due to NIK  - Amlodipine 5mg (home 10mg)  - Continue home Coreg        Altered mental state  - See above      Disorder of prostate  - Continue home tamsulosin      Hypothyroidism due to acquired atrophy of thyroid  - Continue home levothyroxine    Peripheral vascular disease  - Continue home fenofibrate, pravastatin      Cardiac pacemaker in situ  - Unclear if MRI compatible      Chronic atrial fibrillation  - Holding home rivaroxaban due to NIK  - Continue Coreg        VTE Risk Mitigation (From admission, onward)         Ordered     IP VTE HIGH RISK PATIENT  Once         03/19/22 1616     Place sequential compression device  Until discontinued         03/19/22 1616                Discharge Planning   KANDICE: 3/24/2022     Code Status: Full Code   Is the patient medically ready for discharge?: No    Reason for patient still in hospital (select all that apply): Patient trending condition, Laboratory test, Consult recommendations and Pending disposition  Discharge Plan A: Home with family, Home Health                  Gustavo Gerard MD  Department of Hospital Medicine   [facility] - Telemetry Stepdown

## 2022-03-24 NOTE — PLAN OF CARE
Home unasyn and supplies delivered to bedside in anticipation of d/c today. Spoke with patient wife and reviewed administration. Wife states patient's daughter is a nurse and she administers med. Plan to stay in the hospital for a day or so, until kidney function improves. Will leave meds at bedside and reassess if patient discharges tomorrow. Will cont to follow.     [facility]  Benigno Sequeira Rn, Clinical Educator  Cell [phone]  Office [phone]  Fax [phone]

## 2022-03-24 NOTE — ASSESSMENT & PLAN NOTE
- To the presentation of worsening mental status with noted disorientation, tremor, and decrease concentration.  Exam nonfocal.  CT head with no acute process.  Chest x-ray unremarkable.  - Differential includes cefepime neurotoxicity in setting of NIK, CVA, metabolic disturbance, infectious  - Continue infectious workup as below  - Neurology consulted, signed off.  -- Ativan challenge, patient with improved mentation post   -- Continuous EEG discontinued, with triphasic morphology consistent with diffuse cortical dysfunction and a moderate encephalopathy   - Resolved

## 2022-03-24 NOTE — ASSESSMENT & PLAN NOTE
- Patient creatinine was at baseline 0.9 on 3/14 and on presentation  - Admission creatinine 2.5  - Patient was discharged on vancomycin, with random admission vanc level 33  - UA with granular casts  - Concern for ATN d/t vancomycin, patient also home Lasix  - Avoiding nephrotoxic medications  - PO hydration  - Strict I&Os  - Trend BMP  - Cr stable at 2.2

## 2022-03-25 LAB
ALBUMIN SERPL BCP-MCNC: 2.3 G/DL (ref 3.5–5.2)
ALP SERPL-CCNC: 78 U/L (ref 55–135)
ALT SERPL W/O P-5'-P-CCNC: 9 U/L (ref 10–44)
ANION GAP SERPL CALC-SCNC: 9 MMOL/L (ref 8–16)
AST SERPL-CCNC: 21 U/L (ref 10–40)
BASOPHILS # BLD AUTO: 0.06 K/UL (ref 0–0.2)
BASOPHILS NFR BLD: 0.9 % (ref 0–1.9)
BILIRUB SERPL-MCNC: 0.8 MG/DL (ref 0.1–1)
BUN SERPL-MCNC: 25 MG/DL (ref 10–30)
CALCIUM SERPL-MCNC: 8.6 MG/DL (ref 8.7–10.5)
CHLORIDE SERPL-SCNC: 106 MMOL/L (ref 95–110)
CO2 SERPL-SCNC: 27 MMOL/L (ref 23–29)
CREAT SERPL-MCNC: 2 MG/DL (ref 0.5–1.4)
DIFFERENTIAL METHOD: ABNORMAL
EOSINOPHIL # BLD AUTO: 0.1 K/UL (ref 0–0.5)
EOSINOPHIL NFR BLD: 1.1 % (ref 0–8)
ERYTHROCYTE [DISTWIDTH] IN BLOOD BY AUTOMATED COUNT: 13.7 % (ref 11.5–14.5)
EST. GFR  (AFRICAN AMERICAN): 32.5 ML/MIN/1.73 M^2
EST. GFR  (NON AFRICAN AMERICAN): 28.1 ML/MIN/1.73 M^2
GLUCOSE SERPL-MCNC: 99 MG/DL (ref 70–110)
HCT VFR BLD AUTO: 29.7 % (ref 40–54)
HGB BLD-MCNC: 9.8 G/DL (ref 14–18)
IMM GRANULOCYTES # BLD AUTO: 0.08 K/UL (ref 0–0.04)
IMM GRANULOCYTES NFR BLD AUTO: 1.1 % (ref 0–0.5)
LYMPHOCYTES # BLD AUTO: 1.1 K/UL (ref 1–4.8)
LYMPHOCYTES NFR BLD: 15.5 % (ref 18–48)
MAGNESIUM SERPL-MCNC: 1.9 MG/DL (ref 1.6–2.6)
MCH RBC QN AUTO: 33.6 PG (ref 27–31)
MCHC RBC AUTO-ENTMCNC: 33 G/DL (ref 32–36)
MCV RBC AUTO: 102 FL (ref 82–98)
MONOCYTES # BLD AUTO: 1 K/UL (ref 0.3–1)
MONOCYTES NFR BLD: 14.2 % (ref 4–15)
NEUTROPHILS # BLD AUTO: 4.7 K/UL (ref 1.8–7.7)
NEUTROPHILS NFR BLD: 67.2 % (ref 38–73)
NRBC BLD-RTO: 0 /100 WBC
PHOSPHATE SERPL-MCNC: 2.8 MG/DL (ref 2.7–4.5)
PLATELET # BLD AUTO: 152 K/UL (ref 150–450)
PMV BLD AUTO: 10.1 FL (ref 9.2–12.9)
POCT GLUCOSE: 109 MG/DL (ref 70–110)
POCT GLUCOSE: 142 MG/DL (ref 70–110)
POCT GLUCOSE: 145 MG/DL (ref 70–110)
POCT GLUCOSE: 167 MG/DL (ref 70–110)
POTASSIUM SERPL-SCNC: 3.5 MMOL/L (ref 3.5–5.1)
PROT SERPL-MCNC: 5.6 G/DL (ref 6–8.4)
RBC # BLD AUTO: 2.92 M/UL (ref 4.6–6.2)
SODIUM SERPL-SCNC: 142 MMOL/L (ref 136–145)
WBC # BLD AUTO: 7.02 K/UL (ref 3.9–12.7)

## 2022-03-25 PROCEDURE — 25000003 PHARM REV CODE 250: Performed by: INTERNAL MEDICINE

## 2022-03-25 PROCEDURE — 99231 PR SUBSEQUENT HOSPITAL CARE,LEVL I: ICD-10-PCS | Mod: ,,, | Performed by: INTERNAL MEDICINE

## 2022-03-25 PROCEDURE — 99232 SBSQ HOSP IP/OBS MODERATE 35: CPT | Mod: ,,, | Performed by: INTERNAL MEDICINE

## 2022-03-25 PROCEDURE — 84100 ASSAY OF PHOSPHORUS: CPT | Performed by: STUDENT IN AN ORGANIZED HEALTH CARE EDUCATION/TRAINING PROGRAM

## 2022-03-25 PROCEDURE — 36415 COLL VENOUS BLD VENIPUNCTURE: CPT | Performed by: STUDENT IN AN ORGANIZED HEALTH CARE EDUCATION/TRAINING PROGRAM

## 2022-03-25 PROCEDURE — 85025 COMPLETE CBC W/AUTO DIFF WBC: CPT | Performed by: STUDENT IN AN ORGANIZED HEALTH CARE EDUCATION/TRAINING PROGRAM

## 2022-03-25 PROCEDURE — 20600001 HC STEP DOWN PRIVATE ROOM

## 2022-03-25 PROCEDURE — 63600175 PHARM REV CODE 636 W HCPCS: Performed by: STUDENT IN AN ORGANIZED HEALTH CARE EDUCATION/TRAINING PROGRAM

## 2022-03-25 PROCEDURE — 99231 SBSQ HOSP IP/OBS SF/LOW 25: CPT | Mod: ,,, | Performed by: INTERNAL MEDICINE

## 2022-03-25 PROCEDURE — 80053 COMPREHEN METABOLIC PANEL: CPT | Performed by: STUDENT IN AN ORGANIZED HEALTH CARE EDUCATION/TRAINING PROGRAM

## 2022-03-25 PROCEDURE — 25000003 PHARM REV CODE 250: Performed by: STUDENT IN AN ORGANIZED HEALTH CARE EDUCATION/TRAINING PROGRAM

## 2022-03-25 PROCEDURE — 99232 PR SUBSEQUENT HOSPITAL CARE,LEVL II: ICD-10-PCS | Mod: ,,, | Performed by: INTERNAL MEDICINE

## 2022-03-25 PROCEDURE — 83735 ASSAY OF MAGNESIUM: CPT | Performed by: STUDENT IN AN ORGANIZED HEALTH CARE EDUCATION/TRAINING PROGRAM

## 2022-03-25 RX ADMIN — PRAVASTATIN SODIUM 20 MG: 20 TABLET ORAL at 08:03

## 2022-03-25 RX ADMIN — CARVEDILOL 12.5 MG: 12.5 TABLET, FILM COATED ORAL at 07:03

## 2022-03-25 RX ADMIN — PANTOPRAZOLE SODIUM 40 MG: 40 TABLET, DELAYED RELEASE ORAL at 08:03

## 2022-03-25 RX ADMIN — TAMSULOSIN HYDROCHLORIDE 0.4 MG: 0.4 CAPSULE ORAL at 08:03

## 2022-03-25 RX ADMIN — AMLODIPINE BESYLATE 10 MG: 10 TABLET ORAL at 08:03

## 2022-03-25 RX ADMIN — AMPICILLIN SODIUM AND SULBACTAM SODIUM 3 G: 2; 1 INJECTION, POWDER, FOR SOLUTION INTRAMUSCULAR; INTRAVENOUS at 05:03

## 2022-03-25 RX ADMIN — CARVEDILOL 12.5 MG: 12.5 TABLET, FILM COATED ORAL at 05:03

## 2022-03-25 RX ADMIN — AMPICILLIN SODIUM AND SULBACTAM SODIUM 3 G: 2; 1 INJECTION, POWDER, FOR SOLUTION INTRAMUSCULAR; INTRAVENOUS at 06:03

## 2022-03-25 NOTE — PLAN OF CARE
Problem: Adult Inpatient Plan of Care  Goal: Plan of Care Review  Outcome: Ongoing, Progressing  Goal: Patient-Specific Goal (Individualized)  Outcome: Ongoing, Progressing  Goal: Optimal Comfort and Wellbeing  Outcome: Ongoing, Progressing     Problem: Skin Injury Risk Increased  Goal: Skin Health and Integrity  Outcome: Ongoing, Progressing     AAOx4.  Denies pain this shift. PICC line intact, dressing c/d.  Abd drain intact, serosanguinous drainage.  Assisted with repositioning every 2 hours. POC reviewed with patient, wife.  Will continue to monitor.  Side rails up x 2, call light in reach, bed low and locked. Wife at bedside.

## 2022-03-25 NOTE — PROGRESS NOTES
[facility] - Telemetry Stepdown  Nephrology  Progress Note    Patient Name: Eddie Parada Sr.  MRN: [id number]  Admission Date: 3/19/2022  Hospital Length of Stay: 5 days  Attending Provider: Vinay Montaño MD   Primary Care Physician: Callum Roberts MD  Principal Problem:Encephalopathy    Subjective:     HPI: Mr. Parada is a  89-year-old male with a history of atrial fibrillation (on Xarelto), coronary artery disease, chronic diastolic heart failure, diabetes, COPD, osteoporosis, hyperlipidemia, and recent admissions to [facility] (March 3-8 and March 12-14) for intra-abdominal abscess with septic shock presented to [facility] on March 19 with confusion and slurred speech that began the day prior to presentation.  In the emergency department he was also noted to have acute kidney injury, Cr 2.5 but with continued urine output.  He was on outpatient cefepime, metronidazole, and IV vancomycin for intra-abdominal abscess.   In the emergency department he received IV fluid and Rocephin.  He was transferred to [facility] for further evaluation of the slurred speech/confusion (concern for possible stroke with slurred speech) and evaluation/treatment of his acute kidney injury as they are unable to perform MRIs with pacemaker in place at [facility].  Here his abx were transitioned to unasyn. Currently on EEG for seizure like activity. Mentation wise- patient continues to improve and wife says he is at baseline now.     IUrinalysis with no red blood cells 75 white blood cells/moderate bacteria/moderate yeast/1+ protein/trace ketones/2+ occult blood/2+ leukocytes. Random vancomycin level 33.3. Cr initially 2.5, down trended to 2.3 today. Urine culture with candida.      Interval History:   No acute events overnight. Pt continues to make urine, net euvolemic. Cr stable.    Review of Systems   Genitourinary:  Negative for decreased urine volume, difficulty urinating, dysuria, flank pain, hematuria and  urgency.   Psychiatric/Behavioral:  Negative for confusion.    All other systems reviewed and are negative.  Objective:     Vital Signs (Most Recent):  Temp: 97.9 °F (36.6 °C) (03/25/22 1119)  Pulse: 60 (03/25/22 1119)  Resp: 19 (03/25/22 1119)  BP: 131/63 (03/25/22 1119)  SpO2: 95 % (03/25/22 1119) Vital Signs (24h Range):  Temp:  [97.4 °F (36.3 °C)-98.9 °F (37.2 °C)] 97.9 °F (36.6 °C)  Pulse:  [59-67] 60  Resp:  [18-20] 19  SpO2:  [92 %-98 %] 95 %  BP: (131-147)/(58-67) 131/63     Weight: 81.8 kg (180 lb 5.4 oz)  Body mass index is 30.01 kg/m².    Intake/Output Summary (Last 24 hours) at 3/25/2022 1210  Last data filed at 3/25/2022 0900  Gross per 24 hour   Intake 460 ml   Output 200 ml   Net 260 ml        Physical Exam  Constitutional:       General: He is not in acute distress.     Appearance: He is obese.   HENT:      Head: Normocephalic and atraumatic.   Eyes:      Extraocular Movements: Extraocular movements intact.      Conjunctiva/sclera: Conjunctivae normal.   Cardiovascular:      Rate and Rhythm: Normal rate and regular rhythm.      Pulses: Normal pulses.   Abdominal:      General: Bowel sounds are normal. There is no distension.      Palpations: Abdomen is soft.      Tenderness: There is no abdominal tenderness.      Comments: Drain in place   Musculoskeletal:      Right lower leg: No edema.      Left lower leg: No edema.   Skin:     General: Skin is warm.   Neurological:      General: No focal deficit present.      Mental Status: He is alert.      Comments: Oriented to person, place and situation   Psychiatric:         Mood and Affect: Mood normal.         Behavior: Behavior normal.       Significant Labs: CBC:   Recent Labs   Lab 03/24/22 0427 03/25/22 0219   WBC 8.13 7.02   HGB 9.9* 9.8*   HCT 30.8* 29.7*    152       CMP:   Recent Labs   Lab 03/24/22 0427 03/25/22 0219    142   K 3.2* 3.5    106   CO2 24 27   * 99   BUN 23 25   CREATININE 2.0* 2.0*   CALCIUM 8.6* 8.6*    PROT 5.7* 5.6*   ALBUMIN 2.4* 2.3*   BILITOT 0.8 0.8   ALKPHOS 81 78   AST 18 21   ALT 6* 9*   ANIONGAP 11 9   EGFRNONAA 28.1* 28.1*         Significant Imaging: I have reviewed all pertinent imaging results/findings within the past 24 hours.    Assessment/Plan:     Abdominal wall abscess  Management per primary team    Acute renal failure superimposed on stage 3 chronic kidney disease  Mr. Parada is a 89 yo M who presented with NIK and acute encephalopathy after recent admission for intra-abdominal abscess. At that time drains were placed and he was discharged with a PICC and on vanc/cefepime/flagyl. At time of admission on 3/19 he was found to have a creatinine of 2.5  With a vanc level of 33.3 and UA with granular casts. Cr continuing to trend down, 2.2 today. Suspect NIK/ATN due to vancomycin toxicity. Of note patient also was on home Lasix. AMS now improved and patient back at his baseline per wife. His UOP has picked up and is much lighter in color per wife.      - No need for fluids at this time as patient appears euvolemic and renal function is improving  -Follow up with outpt nephrology after dc  - Encourage PO fluid intake   - Avoid nephrotoxic medications  - Strict I/Os  - Will continue to follow renal function        Thank you for your consult. I will follow-up with patient. Please contact us if you have any additional questions.    Amarjit Burroughs MD  Nephrology  [facility] - Telemetry Stepdown    ATTENDING PHYSICIAN ATTESTATION  I have personally verified the history and examined the patient. I thoroughly reviewed the demographic, clinical, laboratorial and imaging information available in medical records. I agree with the assessment and recommendations provided by the subspecialty resident who was under my supervision.

## 2022-03-25 NOTE — SUBJECTIVE & OBJECTIVE
Interval History:   No events overnight.  Patient with no complaints.  NIK improving. Plan to discharge with home health tomorrow.  Wife at bedside and updated.      Review of Systems   Constitutional:  Negative for activity change, appetite change, chills, diaphoresis, fatigue and fever.   HENT:  Negative for rhinorrhea and sore throat.    Respiratory:  Negative for cough, chest tightness and shortness of breath.    Cardiovascular:  Negative for chest pain and palpitations.   Gastrointestinal:  Negative for abdominal pain, constipation, diarrhea and nausea.   Endocrine: Negative for cold intolerance.   Genitourinary:  Negative for decreased urine volume, difficulty urinating, dysuria, flank pain, hematuria and urgency.   Musculoskeletal:  Negative for arthralgias and myalgias.   Skin:  Negative for rash and wound.   Neurological:  Negative for dizziness, weakness, numbness and headaches.   Psychiatric/Behavioral:  Negative for agitation, behavioral problems and confusion.    All other systems reviewed and are negative.  Objective:     Vital Signs (Most Recent):  Temp: 97.6 °F (36.4 °C) (03/24/22 2348)  Pulse: 67 (03/24/22 2348)  Resp: 18 (03/24/22 2348)  BP: (!) 136/58 (03/24/22 2348)  SpO2: (!) 94 % (03/24/22 2348)   Vital Signs (24h Range):  Temp:  [96 °F (35.6 °C)-98.6 °F (37 °C)] 97.6 °F (36.4 °C)  Pulse:  [60-90] 67  Resp:  [17-20] 18  SpO2:  [94 %-100 %] 94 %  BP: (136-189)/(58-74) 136/58     Weight: 81.8 kg (180 lb 5.4 oz)  Body mass index is 30.01 kg/m².    Intake/Output Summary (Last 24 hours) at 3/24/2022 2355  Last data filed at 3/24/2022 1615  Gross per 24 hour   Intake 340 ml   Output 400 ml   Net -60 ml        Physical Exam  Constitutional:       General: He is not in acute distress.     Appearance: He is obese.   HENT:      Head: Normocephalic and atraumatic.      Mouth/Throat:      Mouth: Mucous membranes are moist.   Eyes:      Extraocular Movements: Extraocular movements intact.       Conjunctiva/sclera: Conjunctivae normal.   Cardiovascular:      Rate and Rhythm: Normal rate and regular rhythm.      Pulses: Normal pulses.   Pulmonary:      Effort: Pulmonary effort is normal. No respiratory distress.      Breath sounds: Normal breath sounds. No wheezing or rales.   Abdominal:      General: Bowel sounds are normal. There is no distension.      Palpations: Abdomen is soft.      Tenderness: There is no abdominal tenderness.      Comments: Drain in place   Musculoskeletal:      Right lower leg: No edema.      Left lower leg: No edema.   Skin:     General: Skin is warm.   Neurological:      General: No focal deficit present.      Mental Status: He is alert.      Comments: Oriented to person, place and situation   Psychiatric:         Mood and Affect: Mood normal.         Behavior: Behavior normal.       Significant Labs: CBC:   Recent Labs   Lab 03/23/22 0454 03/24/22 0427   WBC 8.72 8.13   HGB 10.8* 9.9*   HCT 33.4* 30.8*    169       CMP:   Recent Labs   Lab 03/23/22 0454 03/24/22  0427    141   K 4.7 3.2*    106   CO2 26 24   * 115*   BUN 23 23   CREATININE 2.2* 2.0*   CALCIUM 9.2 8.6*   PROT 6.1 5.7*   ALBUMIN 2.5* 2.4*   BILITOT 0.8 0.8   ALKPHOS 83 81   AST 21 18   ALT 8* 6*   ANIONGAP 10 11   EGFRNONAA 25.1* 28.1*         Significant Imaging: I have reviewed all pertinent imaging results/findings within the past 24 hours.

## 2022-03-25 NOTE — SUBJECTIVE & OBJECTIVE
Interval History:   No acute events overnight. Pt continues to make urine, net euvolemic. Cr stable.    Review of Systems   Genitourinary:  Negative for decreased urine volume, difficulty urinating, dysuria, flank pain, hematuria and urgency.   Psychiatric/Behavioral:  Negative for confusion.    All other systems reviewed and are negative.  Objective:     Vital Signs (Most Recent):  Temp: 97.9 °F (36.6 °C) (03/25/22 1119)  Pulse: 60 (03/25/22 1119)  Resp: 19 (03/25/22 1119)  BP: 131/63 (03/25/22 1119)  SpO2: 95 % (03/25/22 1119) Vital Signs (24h Range):  Temp:  [97.4 °F (36.3 °C)-98.9 °F (37.2 °C)] 97.9 °F (36.6 °C)  Pulse:  [59-67] 60  Resp:  [18-20] 19  SpO2:  [92 %-98 %] 95 %  BP: (131-147)/(58-67) 131/63     Weight: 81.8 kg (180 lb 5.4 oz)  Body mass index is 30.01 kg/m².    Intake/Output Summary (Last 24 hours) at 3/25/2022 1210  Last data filed at 3/25/2022 0900  Gross per 24 hour   Intake 460 ml   Output 200 ml   Net 260 ml        Physical Exam  Constitutional:       General: He is not in acute distress.     Appearance: He is obese.   HENT:      Head: Normocephalic and atraumatic.   Eyes:      Extraocular Movements: Extraocular movements intact.      Conjunctiva/sclera: Conjunctivae normal.   Cardiovascular:      Rate and Rhythm: Normal rate and regular rhythm.      Pulses: Normal pulses.   Abdominal:      General: Bowel sounds are normal. There is no distension.      Palpations: Abdomen is soft.      Tenderness: There is no abdominal tenderness.      Comments: Drain in place   Musculoskeletal:      Right lower leg: No edema.      Left lower leg: No edema.   Skin:     General: Skin is warm.   Neurological:      General: No focal deficit present.      Mental Status: He is alert.      Comments: Oriented to person, place and situation   Psychiatric:         Mood and Affect: Mood normal.         Behavior: Behavior normal.       Significant Labs: CBC:   Recent Labs   Lab 03/24/22  0427 03/25/22  0219   WBC 8.13  7.02   HGB 9.9* 9.8*   HCT 30.8* 29.7*    152       CMP:   Recent Labs   Lab 03/24/22  0427 03/25/22  0219    142   K 3.2* 3.5    106   CO2 24 27   * 99   BUN 23 25   CREATININE 2.0* 2.0*   CALCIUM 8.6* 8.6*   PROT 5.7* 5.6*   ALBUMIN 2.4* 2.3*   BILITOT 0.8 0.8   ALKPHOS 81 78   AST 18 21   ALT 6* 9*   ANIONGAP 11 9   EGFRNONAA 28.1* 28.1*         Significant Imaging: I have reviewed all pertinent imaging results/findings within the past 24 hours.

## 2022-03-25 NOTE — PROGRESS NOTES
[facility] - Telemetry [facility]  Progress Note    Patient Name: Eddie Parada Sr.  MRN: [id number]  Patient Class: IP- Inpatient   Admission Date: 3/19/2022  Length of Stay: 4 days  Attending Physician: Vinay Montaño MD  Primary Care Provider: Callum Roberts MD        Subjective:     Principal Problem:Encephalopathy        HPI:  Mr. Parada is a 89-year-old male with a history of atrial fibrillation (on Xarelto), coronary artery disease, chronic diastolic heart failure, diabetes, COPD, osteoporosis, hyperlipidemia, and recent admissions to [facility] (March 3-8 and March 12-14) for intra-abdominal abscess with septic shock presented to [facility] on March 19 with confusion and slurred speech that began the day prior to presentation.  In the emergency department he was also noted to have acute kidney injury. He was at the time still urinating although his wife reports he was making less urine and it was a darker color.  He was on outpatient cefepime, metronidazole, and IV vancomycin with vanc supra therapeutic to 33.3 on admit.  He was transferred from OSH for further evaluation of the slurred speech/confusion (concern for possible stroke with slurred speech) and evaluation/treatment of his acute kidney injury.  Infectious workup negative so far. Currently on EEG with seizure like activity yesterday. Per wife, his urine has picked back up and is now clearer.     Urinalysis with no red blood cells 75 white blood cells/moderate bacteria/moderate yeast/1+ protein/trace ketones/2+ occult blood/2+ leukocytes. Random vancomycin level 33.3, procalcitonin 0.06, , troponin 0.026, CPK 24, sodium 137, potassium 4.1, chloride 104, CO2 22, BUN 26, creatinine 2.5 baseline (0.9-1), glucose 173, AST 21, ALT 8, influenza negative, lactic acid 0.9, COVID negative, white blood cells 9.06, hemoglobin 10.1, hematocrit 30.9, platelets 267. Blood cultures ordered. Granular casts in urine.      Imaging with improvement of intra-abdominal abscess and improvement in aeration of lungs from last admission.      Overview/Hospital Course:  Patient antibiotics were changed to Zosyn.  Infectious Disease consult for additional recommendations for abdominal abscess management.  Zosyn was then transitioned to Unasyn.  Patient was a no-show talkative on admission, but in mentation decrease in became nontalkative.  Neurology consult given treatment status and persistent tremor.  Patient underwent Ativan challenge and EEG monitoring.  EEG showed no signs of seizures but evidence of encephalopathy likely associated with cefepime neurotoxicity.  The following day the patient's mentation improved close to his baseline.    Patient will continue Unasyn until he follows up outpatient with Infectious Disease for continued management of abdominal abscess.    Patient's NIK was slow to improve, and Nephrology was consulted.       Interval History:   No events overnight.  Patient with no complaints.  NIK improving. Plan to discharge with home health tomorrow.  Wife at bedside and updated.      Review of Systems   Constitutional:  Negative for activity change, appetite change, chills, diaphoresis, fatigue and fever.   HENT:  Negative for rhinorrhea and sore throat.    Respiratory:  Negative for cough, chest tightness and shortness of breath.    Cardiovascular:  Negative for chest pain and palpitations.   Gastrointestinal:  Negative for abdominal pain, constipation, diarrhea and nausea.   Endocrine: Negative for cold intolerance.   Genitourinary:  Negative for decreased urine volume, difficulty urinating, dysuria, flank pain, hematuria and urgency.   Musculoskeletal:  Negative for arthralgias and myalgias.   Skin:  Negative for rash and wound.   Neurological:  Negative for dizziness, weakness, numbness and headaches.   Psychiatric/Behavioral:  Negative for agitation, behavioral problems and confusion.    All other systems reviewed  and are negative.  Objective:     Vital Signs (Most Recent):  Temp: 97.6 °F (36.4 °C) (03/24/22 2348)  Pulse: 67 (03/24/22 2348)  Resp: 18 (03/24/22 2348)  BP: (!) 136/58 (03/24/22 2348)  SpO2: (!) 94 % (03/24/22 2348)   Vital Signs (24h Range):  Temp:  [96 °F (35.6 °C)-98.6 °F (37 °C)] 97.6 °F (36.4 °C)  Pulse:  [60-90] 67  Resp:  [17-20] 18  SpO2:  [94 %-100 %] 94 %  BP: (136-189)/(58-74) 136/58     Weight: 81.8 kg (180 lb 5.4 oz)  Body mass index is 30.01 kg/m².    Intake/Output Summary (Last 24 hours) at 3/24/2022 2355  Last data filed at 3/24/2022 1615  Gross per 24 hour   Intake 340 ml   Output 400 ml   Net -60 ml        Physical Exam  Constitutional:       General: He is not in acute distress.     Appearance: He is obese.   HENT:      Head: Normocephalic and atraumatic.      Mouth/Throat:      Mouth: Mucous membranes are moist.   Eyes:      Extraocular Movements: Extraocular movements intact.      Conjunctiva/sclera: Conjunctivae normal.   Cardiovascular:      Rate and Rhythm: Normal rate and regular rhythm.      Pulses: Normal pulses.   Pulmonary:      Effort: Pulmonary effort is normal. No respiratory distress.      Breath sounds: Normal breath sounds. No wheezing or rales.   Abdominal:      General: Bowel sounds are normal. There is no distension.      Palpations: Abdomen is soft.      Tenderness: There is no abdominal tenderness.      Comments: Drain in place   Musculoskeletal:      Right lower leg: No edema.      Left lower leg: No edema.   Skin:     General: Skin is warm.   Neurological:      General: No focal deficit present.      Mental Status: He is alert.      Comments: Oriented to person, place and situation   Psychiatric:         Mood and Affect: Mood normal.         Behavior: Behavior normal.       Significant Labs: CBC:   Recent Labs   Lab 03/23/22  0454 03/24/22  0427   WBC 8.72 8.13   HGB 10.8* 9.9*   HCT 33.4* 30.8*    169       CMP:   Recent Labs   Lab 03/23/22  0454 03/24/22  0421     141   K 4.7 3.2*    106   CO2 26 24   * 115*   BUN 23 23   CREATININE 2.2* 2.0*   CALCIUM 9.2 8.6*   PROT 6.1 5.7*   ALBUMIN 2.5* 2.4*   BILITOT 0.8 0.8   ALKPHOS 83 81   AST 21 18   ALT 8* 6*   ANIONGAP 10 11   EGFRNONAA 25.1* 28.1*         Significant Imaging: I have reviewed all pertinent imaging results/findings within the past 24 hours.      Assessment/Plan:      Abdominal wall abscess  - Initially treated at [facility] early March 2022 with IR drain placement and treated with Rocephin and Flagyl.  Readmitted to [facility] in mid March with IR drain placement and discharged with minimum 2 weeks of additional antibiotics (cefepime, vancomycin, Flagyl) on March 14th._  - Admission CT abdomen/pelvis with decrease in size of fluid abdominal collections  - Patient readmitted with NIK and concern for cefepime toxicity, vancomycin induced NIK  - ID consulted, appreciate recs  -- Continue Unasyn at discharge  -- Will continue antibiotics until ID follow-up and repeat imaging   -- Will need to notify Infectious Disease prior to discharge to aiding scheduling outpatient appointment    Disorder of prostate  - Continue home tamsulosin      Hypothyroidism due to acquired atrophy of thyroid  - Continue home levothyroxine    Peripheral vascular disease  - Continue home fenofibrate, pravastatin      Type 2 diabetes mellitus, controlled  - Home glimepiride, pioglitazone  - Diabetic diet  - Sliding scale insulin  - Accu-Cheks  - Holding home gabapentin due to NIK    Cardiac pacemaker in situ  - Unclear if MRI compatible      Essential (primary) hypertension  - Holding home lisinopril due to NIK  - Amlodipine 5mg (home 10mg)  - Continue home Coreg        Chronic atrial fibrillation  - Holding home rivaroxaban due to NIK  - Continue Coreg        VTE Risk Mitigation (From admission, onward)         Ordered     IP VTE HIGH RISK PATIENT  Once         03/19/22 1616     Place sequential compression device  Until  discontinued         03/19/22 1616                Discharge Planning   KANDICE: 3/25/2022     Code Status: Full Code   Is the patient medically ready for discharge?: No    Reason for patient still in hospital (select all that apply): Patient unstable, Patient trending condition, Laboratory test and Treatment  Discharge Plan A: Home with family, Home Health            Time spent in care of patient: > 35 minutes         Vinay Montaño MD  Department of Hospital Medicine   [facility] - Telemetry Stepdown

## 2022-03-25 NOTE — ASSESSMENT & PLAN NOTE
Mr. Parada is a 89 yo M who presented with NIK and acute encephalopathy after recent admission for intra-abdominal abscess. At that time drains were placed and he was discharged with a PICC and on vanc/cefepime/flagyl. At time of admission on 3/19 he was found to have a creatinine of 2.5  With a vanc level of 33.3 and UA with granular casts. Cr continuing to trend down, 2.2 today. Suspect NIK/ATN due to vancomycin toxicity. Of note patient also was on home Lasix. AMS now improved and patient back at his baseline per wife. His UOP has picked up and is much lighter in color per wife.      - No need for fluids at this time as patient appears euvolemic and renal function is improving  -Follow up with outpt nephrology after dc  - Encourage PO fluid intake   - Avoid nephrotoxic medications  - Strict I/Os  - Will continue to follow renal function

## 2022-03-26 VITALS
TEMPERATURE: 98 F | HEIGHT: 65 IN | RESPIRATION RATE: 16 BRPM | HEART RATE: 61 BPM | DIASTOLIC BLOOD PRESSURE: 63 MMHG | OXYGEN SATURATION: 93 % | WEIGHT: 180.31 LBS | BODY MASS INDEX: 30.04 KG/M2 | SYSTOLIC BLOOD PRESSURE: 130 MMHG

## 2022-03-26 LAB
ALBUMIN SERPL BCP-MCNC: 2.3 G/DL (ref 3.5–5.2)
ALP SERPL-CCNC: 80 U/L (ref 55–135)
ALT SERPL W/O P-5'-P-CCNC: 8 U/L (ref 10–44)
ANION GAP SERPL CALC-SCNC: 12 MMOL/L (ref 8–16)
AST SERPL-CCNC: 20 U/L (ref 10–40)
BASOPHILS # BLD AUTO: 0.06 K/UL (ref 0–0.2)
BASOPHILS NFR BLD: 0.8 % (ref 0–1.9)
BILIRUB SERPL-MCNC: 0.8 MG/DL (ref 0.1–1)
BUN SERPL-MCNC: 22 MG/DL (ref 10–30)
CALCIUM SERPL-MCNC: 8.3 MG/DL (ref 8.7–10.5)
CHLORIDE SERPL-SCNC: 104 MMOL/L (ref 95–110)
CO2 SERPL-SCNC: 24 MMOL/L (ref 23–29)
CREAT SERPL-MCNC: 1.9 MG/DL (ref 0.5–1.4)
DIFFERENTIAL METHOD: ABNORMAL
EOSINOPHIL # BLD AUTO: 0.1 K/UL (ref 0–0.5)
EOSINOPHIL NFR BLD: 1.3 % (ref 0–8)
ERYTHROCYTE [DISTWIDTH] IN BLOOD BY AUTOMATED COUNT: 13.2 % (ref 11.5–14.5)
EST. GFR  (AFRICAN AMERICAN): 34.6 ML/MIN/1.73 M^2
EST. GFR  (NON AFRICAN AMERICAN): 29.9 ML/MIN/1.73 M^2
GLUCOSE SERPL-MCNC: 99 MG/DL (ref 70–110)
HCT VFR BLD AUTO: 30.9 % (ref 40–54)
HGB BLD-MCNC: 10.2 G/DL (ref 14–18)
IMM GRANULOCYTES # BLD AUTO: 0.05 K/UL (ref 0–0.04)
IMM GRANULOCYTES NFR BLD AUTO: 0.7 % (ref 0–0.5)
LYMPHOCYTES # BLD AUTO: 0.9 K/UL (ref 1–4.8)
LYMPHOCYTES NFR BLD: 11.2 % (ref 18–48)
MAGNESIUM SERPL-MCNC: 1.7 MG/DL (ref 1.6–2.6)
MCH RBC QN AUTO: 33.4 PG (ref 27–31)
MCHC RBC AUTO-ENTMCNC: 33 G/DL (ref 32–36)
MCV RBC AUTO: 101 FL (ref 82–98)
MONOCYTES # BLD AUTO: 1 K/UL (ref 0.3–1)
MONOCYTES NFR BLD: 12.5 % (ref 4–15)
NEUTROPHILS # BLD AUTO: 5.7 K/UL (ref 1.8–7.7)
NEUTROPHILS NFR BLD: 73.5 % (ref 38–73)
NRBC BLD-RTO: 0 /100 WBC
PHOSPHATE SERPL-MCNC: 2.6 MG/DL (ref 2.7–4.5)
PLATELET # BLD AUTO: 128 K/UL (ref 150–450)
PMV BLD AUTO: 10 FL (ref 9.2–12.9)
POCT GLUCOSE: 110 MG/DL (ref 70–110)
POTASSIUM SERPL-SCNC: 2.9 MMOL/L (ref 3.5–5.1)
PROT SERPL-MCNC: 5.3 G/DL (ref 6–8.4)
RBC # BLD AUTO: 3.05 M/UL (ref 4.6–6.2)
SODIUM SERPL-SCNC: 140 MMOL/L (ref 136–145)
WBC # BLD AUTO: 7.68 K/UL (ref 3.9–12.7)

## 2022-03-26 PROCEDURE — 36415 COLL VENOUS BLD VENIPUNCTURE: CPT | Performed by: STUDENT IN AN ORGANIZED HEALTH CARE EDUCATION/TRAINING PROGRAM

## 2022-03-26 PROCEDURE — 84100 ASSAY OF PHOSPHORUS: CPT | Performed by: STUDENT IN AN ORGANIZED HEALTH CARE EDUCATION/TRAINING PROGRAM

## 2022-03-26 PROCEDURE — 99239 HOSP IP/OBS DSCHRG MGMT >30: CPT | Mod: ,,, | Performed by: INTERNAL MEDICINE

## 2022-03-26 PROCEDURE — 83735 ASSAY OF MAGNESIUM: CPT | Performed by: STUDENT IN AN ORGANIZED HEALTH CARE EDUCATION/TRAINING PROGRAM

## 2022-03-26 PROCEDURE — 85025 COMPLETE CBC W/AUTO DIFF WBC: CPT | Performed by: STUDENT IN AN ORGANIZED HEALTH CARE EDUCATION/TRAINING PROGRAM

## 2022-03-26 PROCEDURE — 25000003 PHARM REV CODE 250: Performed by: STUDENT IN AN ORGANIZED HEALTH CARE EDUCATION/TRAINING PROGRAM

## 2022-03-26 PROCEDURE — 80053 COMPREHEN METABOLIC PANEL: CPT | Performed by: STUDENT IN AN ORGANIZED HEALTH CARE EDUCATION/TRAINING PROGRAM

## 2022-03-26 PROCEDURE — 63600175 PHARM REV CODE 636 W HCPCS: Performed by: STUDENT IN AN ORGANIZED HEALTH CARE EDUCATION/TRAINING PROGRAM

## 2022-03-26 PROCEDURE — 25000003 PHARM REV CODE 250: Performed by: INTERNAL MEDICINE

## 2022-03-26 PROCEDURE — 99239 PR HOSPITAL DISCHARGE DAY,>30 MIN: ICD-10-PCS | Mod: ,,, | Performed by: INTERNAL MEDICINE

## 2022-03-26 RX ORDER — AMOXICILLIN 250 MG
1 CAPSULE ORAL 2 TIMES DAILY PRN
Qty: 60 TABLET | Refills: 2 | Status: SHIPPED | OUTPATIENT
Start: 2022-03-26

## 2022-03-26 RX ORDER — LISINOPRIL 20 MG/1
20 TABLET ORAL DAILY
Qty: 30 TABLET | Refills: 11 | Status: SHIPPED | OUTPATIENT
Start: 2022-03-29 | End: 2022-03-26 | Stop reason: SDUPTHER

## 2022-03-26 RX ORDER — FUROSEMIDE 20 MG/1
20 TABLET ORAL EVERY OTHER DAY
Qty: 15 TABLET | Refills: 2 | Status: SHIPPED | OUTPATIENT
Start: 2022-03-29

## 2022-03-26 RX ORDER — FUROSEMIDE 20 MG/1
20 TABLET ORAL EVERY OTHER DAY
Qty: 15 TABLET | Refills: 1 | Status: SHIPPED | OUTPATIENT
Start: 2022-03-29 | End: 2022-03-26 | Stop reason: SDUPTHER

## 2022-03-26 RX ORDER — LISINOPRIL 20 MG/1
20 TABLET ORAL DAILY
Qty: 30 TABLET | Refills: 11 | Status: ON HOLD | OUTPATIENT
Start: 2022-03-29 | End: 2022-04-30 | Stop reason: CLARIF

## 2022-03-26 RX ADMIN — POTASSIUM BICARBONATE 50 MEQ: 978 TABLET, EFFERVESCENT ORAL at 09:03

## 2022-03-26 RX ADMIN — PRAVASTATIN SODIUM 20 MG: 20 TABLET ORAL at 09:03

## 2022-03-26 RX ADMIN — CARVEDILOL 12.5 MG: 12.5 TABLET, FILM COATED ORAL at 09:03

## 2022-03-26 RX ADMIN — PANTOPRAZOLE SODIUM 40 MG: 40 TABLET, DELAYED RELEASE ORAL at 09:03

## 2022-03-26 RX ADMIN — AMPICILLIN SODIUM AND SULBACTAM SODIUM 3 G: 2; 1 INJECTION, POWDER, FOR SOLUTION INTRAMUSCULAR; INTRAVENOUS at 05:03

## 2022-03-26 RX ADMIN — AMLODIPINE BESYLATE 10 MG: 10 TABLET ORAL at 09:03

## 2022-03-26 RX ADMIN — TAMSULOSIN HYDROCHLORIDE 0.4 MG: 0.4 CAPSULE ORAL at 09:03

## 2022-03-26 NOTE — PROGRESS NOTES
[facility] Discharge Planning/Quick Note:      Referral received for home IV antibiotics (IV Unasyn 3g Q12). Patient on service with [facility] for IV ABX. Bedside delivery and reinforcement education already completed.   Spouse updated with dosing schedule.    Patient is ready for discharge from [facility] perspective. CM team updated with the above.    Please do not hesitate to reach out for any additional needs.    Rashid Alford MS, RDN, LDN  Clinical Dietitian  [facility]   Desk: [phone]  Other Phone: [phone]  [email or web address]

## 2022-03-26 NOTE — PROGRESS NOTES
[facility] - Telemetry [facility] Medicine  Progress Note    Patient Name: Eddie Parada Sr.  MRN: [id number]  Patient Class: IP- Inpatient   Admission Date: 3/19/2022  Length of Stay: 6 days  Attending Physician: Vinay Montaño MD  Primary Care Provider: Callum Roberts MD        Subjective:     Principal Problem:Encephalopathy        HPI:  Mr. Parada is a 89-year-old male with a history of atrial fibrillation (on Xarelto), coronary artery disease, chronic diastolic heart failure, diabetes, COPD, osteoporosis, hyperlipidemia, and recent admissions to [facility] (March 3-8 and March 12-14) for intra-abdominal abscess with septic shock presented to [facility] on March 19 with confusion and slurred speech that began the day prior to presentation.  In the emergency department he was also noted to have acute kidney injury. He was at the time still urinating although his wife reports he was making less urine and it was a darker color.  He was on outpatient cefepime, metronidazole, and IV vancomycin with vanc supra therapeutic to 33.3 on admit.  He was transferred from OSH for further evaluation of the slurred speech/confusion (concern for possible stroke with slurred speech) and evaluation/treatment of his acute kidney injury.  Infectious workup negative so far. Currently on EEG with seizure like activity yesterday. Per wife, his urine has picked back up and is now clearer.     Urinalysis with no red blood cells 75 white blood cells/moderate bacteria/moderate yeast/1+ protein/trace ketones/2+ occult blood/2+ leukocytes. Random vancomycin level 33.3, procalcitonin 0.06, , troponin 0.026, CPK 24, sodium 137, potassium 4.1, chloride 104, CO2 22, BUN 26, creatinine 2.5 baseline (0.9-1), glucose 173, AST 21, ALT 8, influenza negative, lactic acid 0.9, COVID negative, white blood cells 9.06, hemoglobin 10.1, hematocrit 30.9, platelets 267. Blood cultures ordered. Granular casts in urine.      Imaging with improvement of intra-abdominal abscess and improvement in aeration of lungs from last admission.      Overview/Hospital Course:  Patient antibiotics were changed to Zosyn.  Infectious Disease consult for additional recommendations for abdominal abscess management.  Zosyn was then transitioned to Unasyn.  Patient was a no-show talkative on admission, but in mentation decrease in became nontalkative.  Neurology consult given treatment status and persistent tremor.  Patient underwent Ativan challenge and EEG monitoring.  EEG showed no signs of seizures but evidence of encephalopathy likely associated with cefepime neurotoxicity.  The following day the patient's mentation improved close to his baseline.    Patient will continue Unasyn until he follows up outpatient with Infectious Disease for continued management of abdominal abscess.    Patient's NIK was slow to improve, and Nephrology was consulted.       Interval History:   No events overnight.  Patient with no complaints.  NIK improving. Plan to discharge with home health tomorrow.        Review of Systems   Constitutional:  Negative for activity change, appetite change, chills, diaphoresis, fatigue and fever.   HENT:  Negative for rhinorrhea and sore throat.    Respiratory:  Negative for cough, chest tightness and shortness of breath.    Cardiovascular:  Negative for chest pain and palpitations.   Gastrointestinal:  Negative for abdominal pain, constipation, diarrhea and nausea.   Endocrine: Negative for cold intolerance.   Genitourinary:  Negative for decreased urine volume, difficulty urinating, dysuria, flank pain, hematuria and urgency.   Musculoskeletal:  Negative for arthralgias and myalgias.   Skin:  Negative for rash and wound.   Neurological:  Negative for dizziness, weakness, numbness and headaches.   Psychiatric/Behavioral:  Negative for agitation, behavioral problems and confusion.    All other systems reviewed and are  negative.  Objective:     Vital Signs (Most Recent):  Temp: 97.9 °F (36.6 °C) (03/26/22 0837)  Pulse: (!) 57 (03/26/22 0837)  Resp: 18 (03/26/22 0837)  BP: (!) 160/71 (03/26/22 0837)  SpO2: (!) 92 % (03/26/22 0837)   Vital Signs (24h Range):  Temp:  [97.8 °F (36.6 °C)-98.4 °F (36.9 °C)] 97.9 °F (36.6 °C)  Pulse:  [57-63] 57  Resp:  [18-20] 18  SpO2:  [90 %-95 %] 92 %  BP: (131-160)/(52-71) 160/71     Weight: 81.8 kg (180 lb 5.4 oz)  Body mass index is 30.01 kg/m².    Intake/Output Summary (Last 24 hours) at 3/26/2022 0906  Last data filed at 3/25/2022 1330  Gross per 24 hour   Intake 211.32 ml   Output --   Net 211.32 ml        Physical Exam  Constitutional:       General: He is not in acute distress.     Appearance: He is obese.   HENT:      Head: Normocephalic and atraumatic.      Mouth/Throat:      Mouth: Mucous membranes are moist.   Eyes:      Extraocular Movements: Extraocular movements intact.      Conjunctiva/sclera: Conjunctivae normal.   Cardiovascular:      Rate and Rhythm: Normal rate and regular rhythm.      Pulses: Normal pulses.   Pulmonary:      Effort: Pulmonary effort is normal. No respiratory distress.      Breath sounds: Normal breath sounds. No wheezing or rales.   Abdominal:      General: Bowel sounds are normal. There is no distension.      Palpations: Abdomen is soft.      Tenderness: There is no abdominal tenderness.      Comments: Drain in place   Musculoskeletal:      Right lower leg: No edema.      Left lower leg: No edema.   Skin:     General: Skin is warm.   Neurological:      General: No focal deficit present.      Mental Status: He is alert.      Comments: Oriented to person, place and situation   Psychiatric:         Mood and Affect: Mood normal.         Behavior: Behavior normal.       Significant Labs: CBC:   Recent Labs   Lab 03/25/22  0219 03/26/22  0452   WBC 7.02 7.68   HGB 9.8* 10.2*   HCT 29.7* 30.9*    128*       CMP:   Recent Labs   Lab 03/25/22  0219 03/26/22  3979     140   K 3.5 2.9*    104   CO2 27 24   GLU 99 99   BUN 25 22   CREATININE 2.0* 1.9*   CALCIUM 8.6* 8.3*   PROT 5.6* 5.3*   ALBUMIN 2.3* 2.3*   BILITOT 0.8 0.8   ALKPHOS 78 80   AST 21 20   ALT 9* 8*   ANIONGAP 9 12   EGFRNONAA 28.1* 29.9*         Significant Imaging: I have reviewed all pertinent imaging results/findings within the past 24 hours.      Assessment/Plan:      Abdominal wall abscess  - Initially treated at [facility] early March 2022 with IR drain placement and treated with Rocephin and Flagyl.  Readmitted to [facility] in mid March with IR drain placement and discharged with minimum 2 weeks of additional antibiotics (cefepime, vancomycin, Flagyl) on March 14th._  - Admission CT abdomen/pelvis with decrease in size of fluid abdominal collections  - Patient readmitted with NIK and concern for cefepime toxicity, vancomycin induced NIK  - ID consulted, appreciate recs  -- Continue Unasyn at discharge  -- Will continue antibiotics until ID follow-up and repeat imaging   -- Will need to notify Infectious Disease prior to discharge to aiding scheduling outpatient appointment    Disorder of prostate  - Continue home tamsulosin      Hypothyroidism due to acquired atrophy of thyroid  - Continue home levothyroxine    Peripheral vascular disease  - Continue home fenofibrate, pravastatin      Type 2 diabetes mellitus, controlled  - Home glimepiride, pioglitazone  - Diabetic diet  - Sliding scale insulin  - Accu-Cheks  - Holding home gabapentin due to NIK    Cardiac pacemaker in situ  - Unclear if MRI compatible      Essential (primary) hypertension  - Holding home lisinopril due to NIK  - Amlodipine 5mg (home 10mg)  - Continue home Coreg        Chronic atrial fibrillation  - Holding home rivaroxaban due to NIK  - Continue Coreg        VTE Risk Mitigation (From admission, onward)         Ordered     IP VTE HIGH RISK PATIENT  Once         03/19/22 1616     Place sequential compression device  Until discontinued          03/19/22 1616                Discharge Planning   KANDICE: 3/26/2022     Code Status: Full Code   Is the patient medically ready for discharge?: No    Reason for patient still in hospital (select all that apply): Patient trending condition, Laboratory test, Treatment and Consult recommendations  Discharge Plan A: Home with family, Home Health            Time spent in care of patient: > 35 minutes         Vinay Montaño MD  Department of Hospital Medicine   [facility] - Telemetry Stepdown

## 2022-03-26 NOTE — SUBJECTIVE & OBJECTIVE
Interval History:   No events overnight.  Patient with no complaints.  NIK improving. Plan to discharge with home health tomorrow.        Review of Systems   Constitutional:  Negative for activity change, appetite change, chills, diaphoresis, fatigue and fever.   HENT:  Negative for rhinorrhea and sore throat.    Respiratory:  Negative for cough, chest tightness and shortness of breath.    Cardiovascular:  Negative for chest pain and palpitations.   Gastrointestinal:  Negative for abdominal pain, constipation, diarrhea and nausea.   Endocrine: Negative for cold intolerance.   Genitourinary:  Negative for decreased urine volume, difficulty urinating, dysuria, flank pain, hematuria and urgency.   Musculoskeletal:  Negative for arthralgias and myalgias.   Skin:  Negative for rash and wound.   Neurological:  Negative for dizziness, weakness, numbness and headaches.   Psychiatric/Behavioral:  Negative for agitation, behavioral problems and confusion.    All other systems reviewed and are negative.  Objective:     Vital Signs (Most Recent):  Temp: 97.9 °F (36.6 °C) (03/26/22 0837)  Pulse: (!) 57 (03/26/22 0837)  Resp: 18 (03/26/22 0837)  BP: (!) 160/71 (03/26/22 0837)  SpO2: (!) 92 % (03/26/22 0837)   Vital Signs (24h Range):  Temp:  [97.8 °F (36.6 °C)-98.4 °F (36.9 °C)] 97.9 °F (36.6 °C)  Pulse:  [57-63] 57  Resp:  [18-20] 18  SpO2:  [90 %-95 %] 92 %  BP: (131-160)/(52-71) 160/71     Weight: 81.8 kg (180 lb 5.4 oz)  Body mass index is 30.01 kg/m².    Intake/Output Summary (Last 24 hours) at 3/26/2022 0906  Last data filed at 3/25/2022 1330  Gross per 24 hour   Intake 211.32 ml   Output --   Net 211.32 ml        Physical Exam  Constitutional:       General: He is not in acute distress.     Appearance: He is obese.   HENT:      Head: Normocephalic and atraumatic.      Mouth/Throat:      Mouth: Mucous membranes are moist.   Eyes:      Extraocular Movements: Extraocular movements intact.      Conjunctiva/sclera: Conjunctivae  normal.   Cardiovascular:      Rate and Rhythm: Normal rate and regular rhythm.      Pulses: Normal pulses.   Pulmonary:      Effort: Pulmonary effort is normal. No respiratory distress.      Breath sounds: Normal breath sounds. No wheezing or rales.   Abdominal:      General: Bowel sounds are normal. There is no distension.      Palpations: Abdomen is soft.      Tenderness: There is no abdominal tenderness.      Comments: Drain in place   Musculoskeletal:      Right lower leg: No edema.      Left lower leg: No edema.   Skin:     General: Skin is warm.   Neurological:      General: No focal deficit present.      Mental Status: He is alert.      Comments: Oriented to person, place and situation   Psychiatric:         Mood and Affect: Mood normal.         Behavior: Behavior normal.       Significant Labs: CBC:   Recent Labs   Lab 03/25/22 0219 03/26/22  0452   WBC 7.02 7.68   HGB 9.8* 10.2*   HCT 29.7* 30.9*    128*       CMP:   Recent Labs   Lab 03/25/22 0219 03/26/22  0452    140   K 3.5 2.9*    104   CO2 27 24   GLU 99 99   BUN 25 22   CREATININE 2.0* 1.9*   CALCIUM 8.6* 8.3*   PROT 5.6* 5.3*   ALBUMIN 2.3* 2.3*   BILITOT 0.8 0.8   ALKPHOS 78 80   AST 21 20   ALT 9* 8*   ANIONGAP 9 12   EGFRNONAA 28.1* 29.9*         Significant Imaging: I have reviewed all pertinent imaging results/findings within the past 24 hours.

## 2022-03-26 NOTE — PLAN OF CARE
Problem: Skin Injury Risk Increased  Goal: Skin Health and Integrity  Outcome: Met     Problem: Diabetes Comorbidity  Goal: Blood Glucose Level Within Targeted Range  Outcome: Met     Problem: Adult Inpatient Plan of Care  Goal: Plan of Care Review  Outcome: Met  Goal: Patient-Specific Goal (Individualized)  Outcome: Met  Goal: Absence of Hospital-Acquired Illness or Injury  Outcome: Met  Goal: Optimal Comfort and Wellbeing  Outcome: Met  Goal: Readiness for Transition of Care  Outcome: Met   Patient is alert, oriented and conscious. Vital signs taken and recorded. SPO2 maintain in RA. Mobilization done in room with assist. Medication given as per order. Intake output recorded. Will continue to monitor.

## 2022-03-28 ENCOUNTER — PATIENT OUTREACH (OUTPATIENT)
Dept: ADMINISTRATIVE | Facility: CLINIC | Age: 87
End: 2022-03-28
Payer: MEDICARE

## 2022-03-28 ENCOUNTER — TELEPHONE (OUTPATIENT)
Dept: INTERNAL MEDICINE | Facility: CLINIC | Age: 87
End: 2022-03-28
Payer: MEDICARE

## 2022-03-28 NOTE — TELEPHONE ENCOUNTER
----- Message from Tammy Gibbs MA sent at 3/28/2022  9:08 AM CDT -----  Eddie ABBOTT Cathielalew .  MRN: [id number]  : 1929  PCP: Callum Roberts  Home Phone      [phone]  Work Phone      Not on file.  Mobile          [phone]      MESSAGE:     Patient was just d/c from hospitals with [facility] says on the bottom, of the discharge papers there is mention of a BMP.   She is not clear with BMP needs to be done?    Please Advise:  [phone]

## 2022-03-28 NOTE — PROGRESS NOTES
C3 nurse spoke with Eddie Parada 's wife for a TCC post hospital discharge follow up call. The patient has a scheduled HOSFU appointment with Callum Roberts MD   on 3/29/2022 @ 3PM.

## 2022-03-28 NOTE — TELEPHONE ENCOUNTER
Patient due for BMP tomorrow according to notes dated 3/26/22 per Dr. Montaño. The patient has an appt with Dr. Roberts tomorrow @ 3:00. Jenna from [facility] states that they cannot see the patient on the same day of a doctor visit, so he will be sent to [facility] to have labs drawn tomorrow.

## 2022-03-29 ENCOUNTER — EXTERNAL HOME HEALTH (OUTPATIENT)
Dept: HOME HEALTH SERVICES | Facility: HOSPITAL | Age: 87
End: 2022-03-29
Payer: MEDICARE

## 2022-03-29 ENCOUNTER — OFFICE VISIT (OUTPATIENT)
Dept: INTERNAL MEDICINE | Facility: CLINIC | Age: 87
End: 2022-03-29
Payer: MEDICARE

## 2022-03-29 VITALS
HEART RATE: 78 BPM | SYSTOLIC BLOOD PRESSURE: 126 MMHG | WEIGHT: 179 LBS | DIASTOLIC BLOOD PRESSURE: 54 MMHG | BODY MASS INDEX: 29.82 KG/M2 | OXYGEN SATURATION: 98 % | RESPIRATION RATE: 16 BRPM | HEIGHT: 65 IN

## 2022-03-29 DIAGNOSIS — N18.30 ACUTE RENAL FAILURE WITH ACUTE TUBULAR NECROSIS SUPERIMPOSED ON STAGE 3 CHRONIC KIDNEY DISEASE, UNSPECIFIED WHETHER STAGE 3A OR 3B CKD: ICD-10-CM

## 2022-03-29 DIAGNOSIS — Z09 HOSPITAL DISCHARGE FOLLOW-UP: Primary | ICD-10-CM

## 2022-03-29 DIAGNOSIS — L02.211 ABDOMINAL WALL ABSCESS: ICD-10-CM

## 2022-03-29 DIAGNOSIS — N17.0 ACUTE RENAL FAILURE WITH ACUTE TUBULAR NECROSIS SUPERIMPOSED ON STAGE 3 CHRONIC KIDNEY DISEASE, UNSPECIFIED WHETHER STAGE 3A OR 3B CKD: ICD-10-CM

## 2022-03-29 PROCEDURE — 1126F PR PAIN SEVERITY QUANTIFIED, NO PAIN PRESENT: ICD-10-PCS | Mod: CPTII,S$GLB,, | Performed by: INTERNAL MEDICINE

## 2022-03-29 PROCEDURE — 99214 OFFICE O/P EST MOD 30 MIN: CPT | Mod: S$GLB,,, | Performed by: INTERNAL MEDICINE

## 2022-03-29 PROCEDURE — 1159F MED LIST DOCD IN RCRD: CPT | Mod: CPTII,S$GLB,, | Performed by: INTERNAL MEDICINE

## 2022-03-29 PROCEDURE — 99999 PR PBB SHADOW E&M-EST. PATIENT-LVL V: CPT | Mod: PBBFAC,,, | Performed by: INTERNAL MEDICINE

## 2022-03-29 PROCEDURE — 1111F DSCHRG MED/CURRENT MED MERGE: CPT | Mod: CPTII,S$GLB,, | Performed by: INTERNAL MEDICINE

## 2022-03-29 PROCEDURE — 3288F PR FALLS RISK ASSESSMENT DOCUMENTED: ICD-10-PCS | Mod: CPTII,S$GLB,, | Performed by: INTERNAL MEDICINE

## 2022-03-29 PROCEDURE — 1159F PR MEDICATION LIST DOCUMENTED IN MEDICAL RECORD: ICD-10-PCS | Mod: CPTII,S$GLB,, | Performed by: INTERNAL MEDICINE

## 2022-03-29 PROCEDURE — 99999 PR PBB SHADOW E&M-EST. PATIENT-LVL V: ICD-10-PCS | Mod: PBBFAC,,, | Performed by: INTERNAL MEDICINE

## 2022-03-29 PROCEDURE — 1126F AMNT PAIN NOTED NONE PRSNT: CPT | Mod: CPTII,S$GLB,, | Performed by: INTERNAL MEDICINE

## 2022-03-29 PROCEDURE — 1101F PR PT FALLS ASSESS DOC 0-1 FALLS W/OUT INJ PAST YR: ICD-10-PCS | Mod: CPTII,S$GLB,, | Performed by: INTERNAL MEDICINE

## 2022-03-29 PROCEDURE — 1111F PR DISCHARGE MEDS RECONCILED W/ CURRENT OUTPATIENT MED LIST: ICD-10-PCS | Mod: CPTII,S$GLB,, | Performed by: INTERNAL MEDICINE

## 2022-03-29 PROCEDURE — 1101F PT FALLS ASSESS-DOCD LE1/YR: CPT | Mod: CPTII,S$GLB,, | Performed by: INTERNAL MEDICINE

## 2022-03-29 PROCEDURE — 99214 PR OFFICE/OUTPT VISIT, EST, LEVL IV, 30-39 MIN: ICD-10-PCS | Mod: S$GLB,,, | Performed by: INTERNAL MEDICINE

## 2022-03-29 PROCEDURE — 3288F FALL RISK ASSESSMENT DOCD: CPT | Mod: CPTII,S$GLB,, | Performed by: INTERNAL MEDICINE

## 2022-03-29 PROCEDURE — 99499 RISK ADDL DX/OHS AUDIT: ICD-10-PCS | Mod: S$GLB,,, | Performed by: INTERNAL MEDICINE

## 2022-03-29 PROCEDURE — 99499 UNLISTED E&M SERVICE: CPT | Mod: S$GLB,,, | Performed by: INTERNAL MEDICINE

## 2022-03-29 RX ORDER — FERROUS SULFATE 325(65) MG
325 TABLET ORAL DAILY
COMMUNITY

## 2022-03-29 NOTE — PROGRESS NOTES
Referred to Dr. Garrison per Dr. Roberts for abdominal wall abscess. Referral, clinic notes, test results, insurance and demographic information faxed to his office. Instructed patient to follow up with his office in a couple of days for scheduling.     BMP order faxed to [facility]. To be drawn on 4/4/22. Patient has F/U with Dr. Roberts on 4/7/22.

## 2022-03-29 NOTE — PROGRESS NOTES
Subjective:       Patient ID: Eddie Parada Sr. is a 89 y.o. male.    Chief Complaint: Hospital Follow Up    Patient Name: Eddie Parada Sr.  MRN: [id number]  Admission Date: 3/19/2022  Hospital Length of Stay: 5 days  Attending Provider: Vinay Montaño MD   Primary Care Physician: Callum Roberts MD  Principal Problem:Encephalopathy     Subjective:     HPI: Mr. Parada is a  89-year-old male with a history of atrial fibrillation (on Xarelto), coronary artery disease, chronic diastolic heart failure, diabetes, COPD, osteoporosis, hyperlipidemia, and recent admissions to [facility] (March 3-8 and March 12-14) for intra-abdominal abscess with septic shock presented to [facility] on March 19 with confusion and slurred speech that began the day prior to presentation.  In the emergency department he was also noted to have acute kidney injury, Cr 2.5 but with continued urine output.  He was on outpatient cefepime, metronidazole, and IV vancomycin for intra-abdominal abscess.   In the emergency department he received IV fluid and Rocephin.  He was transferred to [facility] for further evaluation of the slurred speech/confusion (concern for possible stroke with slurred speech) and evaluation/treatment of his acute kidney injury as they are unable to perform MRIs with pacemaker in place at [facility].  Here his abx were transitioned to unasyn. Currently on EEG for seizure like activity. Mentation wise- patient continues to improve and wife says he is at baseline now.     IUrinalysis with no red blood cells 75 white blood cells/moderate bacteria/moderate yeast/1+ protein/trace ketones/2+ occult blood/2+ leukocytes. Random vancomycin level 33.3. Cr initially 2.5, down trended to 2.3 today. Urine culture with candida.        Interval History:   No acute events overnight. Pt continues to make urine, net euvolemic. Cr stable.     Review of Systems   Genitourinary:  Negative for decreased urine volume,  difficulty urinating, dysuria, flank pain, hematuria and urgency.   Psychiatric/Behavioral:  Negative for confusion.    All other systems reviewed and are negative.  Objective:     Vital Signs (Most Recent):  Temp: 97.9 °F (36.6 °C) (03/25/22 1119)  Pulse: 60 (03/25/22 1119)  Resp: 19 (03/25/22 1119)  BP: 131/63 (03/25/22 1119)  SpO2: 95 % (03/25/22 1119) Vital Signs (24h Range):  Temp:  (97.4 °F (36.3 °C)-98.9 °F (37.2 °C)) 97.9 °F (36.6 °C)  Pulse:  (59-67) 60  Resp:  (18-20) 19  SpO2:  (92 %-98 %) 95 %  BP: (131-147)/(58-67) 131/63     Weight: 81.8 kg (180 lb 5.4 oz)  Body mass index is 30.01 kg/m².     Intake/Output Summary (Last 24 hours) at 3/25/2022 1210  Last data filed at 3/25/2022 0900  Gross per 24 hour  Intake 460 ml  Output 200 ml  Net 260 ml        Physical Exam  Constitutional:       General: He is not in acute distress.     Appearance: He is obese.   HENT:      Head: Normocephalic and atraumatic.   Eyes:      Extraocular Movements: Extraocular movements intact.      Conjunctiva/sclera: Conjunctivae normal.   Cardiovascular:      Rate and Rhythm: Normal rate and regular rhythm.      Pulses: Normal pulses.   Abdominal:      General: Bowel sounds are normal. There is no distension.      Palpations: Abdomen is soft.      Tenderness: There is no abdominal tenderness.      Comments: Drain in place   Musculoskeletal:      Right lower leg: No edema.      Left lower leg: No edema.   Skin:     General: Skin is warm.   Neurological:      General: No focal deficit present.      Mental Status: He is alert.      Comments: Oriented to person, place and situation   Psychiatric:         Mood and Affect: Mood normal.         Behavior: Behavior normal.         Significant Labs: CBC:   Recent Labs  Lab 03/24/22  0427 03/25/22  0219  WBC 8.13 7.02  HGB 9.9* 9.8*  HCT 30.8* 29.7*   152        CMP:   Recent  Labs  Lab 03/24/22  0427 03/25/22  0219   142  K 3.2* 3.5   106  CO2 24 27  * 99  BUN 23 25  CREATININE 2.0* 2.0*  CALCIUM 8.6* 8.6*  PROT 5.7* 5.6*  ALBUMIN 2.4* 2.3*  BILITOT 0.8 0.8  ALKPHOS 81 78  AST 18 21  ALT 6* 9*  ANIONGAP 11 9  EGFRNONAA 28.1* 28.1*           Significant Imaging: I have reviewed all pertinent imaging results/findings within the past 24 hours.     Assessment/Plan:     Abdominal wall abscess  Management per primary team     Acute renal failure superimposed on stage 3 chronic kidney disease  Mr. Parada is a 89 yo M who presented with NIK and acute encephalopathy after recent admission for intra-abdominal abscess. At that time drains were placed and he was discharged with a PICC and on vanc/cefepime/flagyl. At time of admission on 3/19 he was found to have a creatinine of 2.5  With a vanc level of 33.3 and UA with granular casts. Cr continuing to trend down, 2.2 today. Suspect NIK/ATN due to vancomycin toxicity. Of note patient also was on home Lasix. AMS now improved and patient back at his baseline per wife. His UOP has picked up and is much lighter in color per wife.      - No need for fluids at this time as patient appears euvolemic and renal function is improving  -Follow up with outpt nephrology after dc  - Encourage PO fluid intake   - Avoid nephrotoxic medications  - Strict I/Os  - Will continue to follow renal function          BMP  Lab Results       Component                Value               Date                       NA                       140                 03/26/2022                 K                        2.9 (L)             03/26/2022                 CL                       104                 03/26/2022                 CO2                      24                  03/26/2022                 BUN                      22                  03/26/2022                 CREATININE               1.9 (H)             03/26/2022                 CALCIUM                   8.3 (L)             03/26/2022                 ANIONGAP                 12                  03/26/2022                 ESTGFRAFRICA             34.6 (A)            03/26/2022                 EGFRNONAA                29.9 (A)            03/26/2022              3/29/22  Eddie Parada Sr. is a 89 y.o. male  Here with hospital discharge follow up.      Review of Systems   Constitutional: Negative for chills and fever.   HENT: Negative for congestion, postnasal drip and sore throat.    Eyes: Negative for photophobia.   Respiratory: Negative for chest tightness and shortness of breath.    Cardiovascular: Negative for chest pain.   Gastrointestinal: Negative for abdominal distention, abdominal pain, blood in stool and vomiting.        Drain still there   Genitourinary: Negative for dysuria, flank pain and hematuria.   Musculoskeletal: Negative for back pain.   Skin: Negative for pallor.   Neurological: Negative for dizziness, seizures, facial asymmetry, speech difficulty and numbness.   Hematological: Does not bruise/bleed easily.   Psychiatric/Behavioral: Negative for agitation and suicidal ideas. The patient is not nervous/anxious.        Objective:      Physical Exam  Vitals and nursing note reviewed.   Constitutional:       Appearance: He is well-developed.   HENT:      Head: Normocephalic and atraumatic.      Nose: Nose normal.   Eyes:      Conjunctiva/sclera: Conjunctivae normal.      Pupils: Pupils are equal, round, and reactive to light.   Neck:      Thyroid: No thyromegaly.      Vascular: No JVD.   Cardiovascular:      Rate and Rhythm: Normal rate and regular rhythm.      Heart sounds: Normal heart sounds.   Pulmonary:      Effort: Pulmonary effort is normal.      Breath sounds: Normal breath sounds.   Abdominal:      General: Bowel sounds are normal. There is no distension.      Palpations: Abdomen is soft. There is no mass.      Tenderness: There is no abdominal tenderness. There is no guarding.           Comments: drain    Musculoskeletal:         General: Normal range of motion.      Cervical back: Normal range of motion and neck supple.   Lymphadenopathy:      Cervical: No cervical adenopathy.   Skin:     General: Skin is warm and dry.      Coloration: Skin is not pale.      Findings: No rash.   Neurological:      Mental Status: He is alert and oriented to person, place, and time.      Cranial Nerves: No cranial nerve deficit.      Deep Tendon Reflexes: Reflexes are normal and symmetric.         Assessment:       1. Hospital discharge follow-up    2. Abdominal wall abscess    3. Acute renal failure with acute tubular necrosis superimposed on stage 3 chronic kidney disease, unspecified whether stage 3a or 3b CKD        Plan:   Eddie was seen today for hospital follow up.    Diagnoses and all orders for this visit:    Hospital discharge follow-up    Abdominal wall abscess  -     Ambulatory referral/consult to General Surgery; Future  Will likely need drain removed     Acute renal failure with acute tubular necrosis superimposed on stage 3 chronic kidney disease, unspecified whether stage 3a or 3b CKD  -     Basic Metabolic Panel on Monday with home     Continue antibiotics q 12 for now       RTC ;2 week             Problem List Items Addressed This Visit    None

## 2022-03-31 ENCOUNTER — DOCUMENT SCAN (OUTPATIENT)
Dept: HOME HEALTH SERVICES | Facility: HOSPITAL | Age: 87
End: 2022-03-31
Payer: MEDICARE

## 2022-04-04 LAB — FUNGUS SPEC CULT: NORMAL

## 2022-04-05 ENCOUNTER — OFFICE VISIT (OUTPATIENT)
Dept: SURGERY | Facility: CLINIC | Age: 87
End: 2022-04-05
Payer: MEDICARE

## 2022-04-05 ENCOUNTER — PATIENT OUTREACH (OUTPATIENT)
Dept: ADMINISTRATIVE | Facility: OTHER | Age: 87
End: 2022-04-05
Payer: MEDICARE

## 2022-04-05 VITALS
WEIGHT: 179 LBS | RESPIRATION RATE: 16 BRPM | DIASTOLIC BLOOD PRESSURE: 60 MMHG | HEART RATE: 72 BPM | HEIGHT: 65 IN | OXYGEN SATURATION: 96 % | BODY MASS INDEX: 29.82 KG/M2 | SYSTOLIC BLOOD PRESSURE: 108 MMHG

## 2022-04-05 DIAGNOSIS — L02.211 ABDOMINAL WALL ABSCESS: Primary | ICD-10-CM

## 2022-04-05 PROCEDURE — 1160F PR REVIEW ALL MEDS BY PRESCRIBER/CLIN PHARMACIST DOCUMENTED: ICD-10-PCS | Mod: CPTII,S$GLB,, | Performed by: SURGERY

## 2022-04-05 PROCEDURE — 1126F AMNT PAIN NOTED NONE PRSNT: CPT | Mod: CPTII,S$GLB,, | Performed by: SURGERY

## 2022-04-05 PROCEDURE — 1159F MED LIST DOCD IN RCRD: CPT | Mod: CPTII,S$GLB,, | Performed by: SURGERY

## 2022-04-05 PROCEDURE — 99999 PR PBB SHADOW E&M-EST. PATIENT-LVL IV: ICD-10-PCS | Mod: PBBFAC,,, | Performed by: SURGERY

## 2022-04-05 PROCEDURE — 3288F FALL RISK ASSESSMENT DOCD: CPT | Mod: CPTII,S$GLB,, | Performed by: SURGERY

## 2022-04-05 PROCEDURE — 99215 OFFICE O/P EST HI 40 MIN: CPT | Mod: S$GLB,,, | Performed by: SURGERY

## 2022-04-05 PROCEDURE — 1160F RVW MEDS BY RX/DR IN RCRD: CPT | Mod: CPTII,S$GLB,, | Performed by: SURGERY

## 2022-04-05 PROCEDURE — 1101F PT FALLS ASSESS-DOCD LE1/YR: CPT | Mod: CPTII,S$GLB,, | Performed by: SURGERY

## 2022-04-05 PROCEDURE — 1101F PR PT FALLS ASSESS DOC 0-1 FALLS W/OUT INJ PAST YR: ICD-10-PCS | Mod: CPTII,S$GLB,, | Performed by: SURGERY

## 2022-04-05 PROCEDURE — 99999 PR PBB SHADOW E&M-EST. PATIENT-LVL IV: CPT | Mod: PBBFAC,,, | Performed by: SURGERY

## 2022-04-05 PROCEDURE — 3288F PR FALLS RISK ASSESSMENT DOCUMENTED: ICD-10-PCS | Mod: CPTII,S$GLB,, | Performed by: SURGERY

## 2022-04-05 PROCEDURE — 1126F PR PAIN SEVERITY QUANTIFIED, NO PAIN PRESENT: ICD-10-PCS | Mod: CPTII,S$GLB,, | Performed by: SURGERY

## 2022-04-05 PROCEDURE — 1159F PR MEDICATION LIST DOCUMENTED IN MEDICAL RECORD: ICD-10-PCS | Mod: CPTII,S$GLB,, | Performed by: SURGERY

## 2022-04-05 PROCEDURE — 1111F PR DISCHARGE MEDS RECONCILED W/ CURRENT OUTPATIENT MED LIST: ICD-10-PCS | Mod: CPTII,S$GLB,, | Performed by: SURGERY

## 2022-04-05 PROCEDURE — 1111F DSCHRG MED/CURRENT MED MERGE: CPT | Mod: CPTII,S$GLB,, | Performed by: SURGERY

## 2022-04-05 PROCEDURE — 99215 PR OFFICE/OUTPT VISIT, EST, LEVL V, 40-54 MIN: ICD-10-PCS | Mod: S$GLB,,, | Performed by: SURGERY

## 2022-04-05 NOTE — PROGRESS NOTES
Health Maintenance Due   Topic Date Due    Shingles Vaccine (1 of 2) Never done    COVID-19 Vaccine (3 - Booster for Pfizer series) 07/11/2021    Foot Exam  08/25/2021     Updates were requested from care everywhere.  Chart was reviewed for overdue Proactive [facility] Encounters (KHADRA) topics (CRS, Breast Cancer Screening, Eye exam)  Health Maintenance has been updated.  LINKS immunization registry triggered.  Immunizations were reconciled.

## 2022-04-05 NOTE — PROGRESS NOTES
Subjective:       Patient ID: Eddie Parada Sr. is a 89 y.o. male.    Chief Complaint: Consult (Abdominal Wall Abscess)    Review of patient's allergies indicates:   Allergen Reactions    Ciprofloxacin     Levofloxacin Swelling    Lyrica [pregabalin] Swelling    Unable to assess Other (See Comments)     Soft shell crabs     89-year-old male who was in the hospital at [facility] last months for renal failure and intra-abdominal abscess status post percutaneous drainage with IR.  Apparently, patient had infected seroma from a hernia repair many many years ago.  What I can see, the aerobic and anaerobic cultures did not grow out anything.  He is currently on a PICC line with IV antibiotics.  Patient was to follow up with General surgery in [place] at the end of last month but he was still in the hospital.  Last CT scan appears to be done here March 19, 2022 where he still had a large fluid collection but decreased from prior.  Drain still in place.  It is putting out about 150 mL per day of serosanguineous fluid.  This is a little bit too entailed for me to take care of here.  I instructed the patient and his daughter that he really should follow-up in [place].  We did not have Interventional Radiology capabilities here.  He may need another drain possibly.  She understands and will call to make a follow-up appointment.    Past Medical History:   Diagnosis Date    Abdominal fibromatosis     Acute posthemorrhagic anemia 1/9/2018    NIK (acute kidney injury) 1/11/2018    Atrial fibrillation     Bradycardia     Broken arm     CAD (coronary artery disease)     Cancer     basal cell on nose and melanoma on back    CHF (congestive heart failure)     COPD (chronic obstructive pulmonary disease)     Diabetes mellitus type II     Hyperlipidemia     Localization-related focal epilepsy with simple partial seizures 3/2/2021    Melanoma     Obesity (BMI 30.0-34.9) 9/13/2016     Osteoporosis     Peripheral vascular disease     Primary malignant neoplasm of prostate 3/3/2022    Septic shock 3/3/2022    Stroke     TIA (transient ischemic attack)     Type II diabetes mellitus with peripheral circulatory disorder     Type II or unspecified type diabetes mellitus without mention of complication, not stated as uncontrolled     Unspecified essential hypertension      Past Surgical History:   Procedure Laterality Date    AORTIC VALVE REPLACEMENT      CARDIAC PACEMAKER PLACEMENT  2012    CARDIAC VALVE REPLACEMENT  2011    aortic valve-tissue    CHOLECYSTECTOMY      COLONOSCOPY      ESOPHAGOGASTRODUODENOSCOPY N/A 2021    Procedure: EGD (ESOPHAGOGASTRODUODENOSCOPY);  Surgeon: Gualberto Medrano MD;  Location: [facility] ENDO;  Service: Endoscopy;  Laterality: N/A;    ESOPHAGOGASTRODUODENOSCOPY N/A 2021    Procedure: EGD (ESOPHAGOGASTRODUODENOSCOPY);  Surgeon: Gualberto Medrano MD;  Location: [facility] ENDO;  Service: Endoscopy;  Laterality: N/A;  please call wife(Jessica) with instructions/arrival time.    ESOPHAGOGASTRODUODENOSCOPY (EGD) WITH DILATION  2021    EYE SURGERY Bilateral     cataract [facility]    HERNIA REPAIR      abdominal    LASIK      UPPER GASTROINTESTINAL ENDOSCOPY  2021     Family History   Problem Relation Age of Onset    Hypertension Father     Stroke Father     Alcohol abuse Father     Heart disease Brother     COPD Sister     Cancer Brother         Lung    Diabetes Daughter     No Known Problems Son     COPD Brother     No Known Problems Daughter     No Known Problems Son     Prostate cancer Neg Hx     Kidney disease Neg Hx      Social History     Socioeconomic History    Marital status:    Tobacco Use    Smoking status: Former Smoker     Quit date: 1996     Years since quittin.5    Smokeless tobacco: Never Used    Tobacco comment: cigar smoker   Substance and Sexual  Activity    Alcohol use: No     Comment: none since 2006    Drug use: No    Sexual activity: Not Currently     Social Determinants of Health     Financial Resource Strain: Low Risk     Difficulty of Paying Living Expenses: Not hard at all   Food Insecurity: No Food Insecurity    Worried About Running Out of Food in the Last Year: Never true    Ran Out of Food in the Last Year: Never true   Transportation Needs: No Transportation Needs    Lack of Transportation (Medical): No    Lack of Transportation (Non-Medical): No   Physical Activity: Inactive    Days of Exercise per Week: 0 days    Minutes of Exercise per Session: 0 min   Stress: No Stress Concern Present    Feeling of Stress : Not at all   Social Connections: Moderately Isolated    Frequency of Communication with Friends and Family: More than three times a week    Frequency of Social Gatherings with Friends and Family: Three times a week    Attends Restorationism Services: Never    Active Member of Clubs or Organizations: No    Attends Club or Organization Meetings: Never    Marital Status:    Housing Stability: Low Risk     Unable to Pay for Housing in the Last Year: No    Number of Places Lived in the Last Year: 1    Unstable Housing in the Last Year: No     Vitals:    04/05/22 0851   BP: 108/60   Pulse: 72   Resp: 16       Review of Systems   All other systems reviewed and are negative.      Objective:      Physical Exam  Vitals and nursing note reviewed.   Constitutional:       Appearance: He is well-developed.      Comments: Wheelchair   HENT:      Head: Normocephalic.   Eyes:      Pupils: Pupils are equal, round, and reactive to light.   Pulmonary:      Effort: Pulmonary effort is normal.   Abdominal:      Palpations: Abdomen is soft.      Comments: Percutaneous drain coming out of the right lower quadrant.  Serosanguineous fluid.   Musculoskeletal:         General: Normal range of motion.      Cervical back: Normal range of motion.    Skin:     General: Skin is warm and dry.   Neurological:      Mental Status: He is alert and oriented to person, place, and time.         Assessment:       1. Abdominal wall abscess        Plan:         Eddie was seen today for consult.    Diagnoses and all orders for this visit:    Abdominal wall abscess    It would be best for the patient to follow up with surgery in [place] as this is a little bit too much to take care of here.  All of his previous care in the hospital was done in [place].    No follow-ups on file.          Avery Garrison Jr, MD

## 2022-04-07 ENCOUNTER — OFFICE VISIT (OUTPATIENT)
Dept: INTERNAL MEDICINE | Facility: CLINIC | Age: 87
End: 2022-04-07
Payer: MEDICARE

## 2022-04-07 VITALS
HEART RATE: 62 BPM | HEIGHT: 65 IN | BODY MASS INDEX: 29.82 KG/M2 | SYSTOLIC BLOOD PRESSURE: 110 MMHG | WEIGHT: 179 LBS | RESPIRATION RATE: 16 BRPM | DIASTOLIC BLOOD PRESSURE: 58 MMHG

## 2022-04-07 DIAGNOSIS — C61 PRIMARY MALIGNANT NEOPLASM OF PROSTATE: ICD-10-CM

## 2022-04-07 DIAGNOSIS — N18.30 STAGE 3 CHRONIC KIDNEY DISEASE, UNSPECIFIED WHETHER STAGE 3A OR 3B CKD: ICD-10-CM

## 2022-04-07 DIAGNOSIS — E03.4 HYPOTHYROIDISM DUE TO ACQUIRED ATROPHY OF THYROID: ICD-10-CM

## 2022-04-07 DIAGNOSIS — I10 ESSENTIAL (PRIMARY) HYPERTENSION: ICD-10-CM

## 2022-04-07 DIAGNOSIS — E11.49 TYPE 2 DIABETES MELLITUS WITH OTHER NEUROLOGIC COMPLICATION, WITHOUT LONG-TERM CURRENT USE OF INSULIN: ICD-10-CM

## 2022-04-07 DIAGNOSIS — L02.211 ABDOMINAL WALL ABSCESS: Primary | ICD-10-CM

## 2022-04-07 PROCEDURE — 1160F RVW MEDS BY RX/DR IN RCRD: CPT | Mod: CPTII,S$GLB,, | Performed by: INTERNAL MEDICINE

## 2022-04-07 PROCEDURE — 1159F PR MEDICATION LIST DOCUMENTED IN MEDICAL RECORD: ICD-10-PCS | Mod: CPTII,S$GLB,, | Performed by: INTERNAL MEDICINE

## 2022-04-07 PROCEDURE — 1159F MED LIST DOCD IN RCRD: CPT | Mod: CPTII,S$GLB,, | Performed by: INTERNAL MEDICINE

## 2022-04-07 PROCEDURE — 1160F PR REVIEW ALL MEDS BY PRESCRIBER/CLIN PHARMACIST DOCUMENTED: ICD-10-PCS | Mod: CPTII,S$GLB,, | Performed by: INTERNAL MEDICINE

## 2022-04-07 PROCEDURE — 1126F AMNT PAIN NOTED NONE PRSNT: CPT | Mod: CPTII,S$GLB,, | Performed by: INTERNAL MEDICINE

## 2022-04-07 PROCEDURE — 3288F FALL RISK ASSESSMENT DOCD: CPT | Mod: CPTII,S$GLB,, | Performed by: INTERNAL MEDICINE

## 2022-04-07 PROCEDURE — 99999 PR PBB SHADOW E&M-EST. PATIENT-LVL IV: CPT | Mod: PBBFAC,,, | Performed by: INTERNAL MEDICINE

## 2022-04-07 PROCEDURE — 1101F PR PT FALLS ASSESS DOC 0-1 FALLS W/OUT INJ PAST YR: ICD-10-PCS | Mod: CPTII,S$GLB,, | Performed by: INTERNAL MEDICINE

## 2022-04-07 PROCEDURE — 3288F PR FALLS RISK ASSESSMENT DOCUMENTED: ICD-10-PCS | Mod: CPTII,S$GLB,, | Performed by: INTERNAL MEDICINE

## 2022-04-07 PROCEDURE — 99499 UNLISTED E&M SERVICE: CPT | Mod: S$GLB,,, | Performed by: INTERNAL MEDICINE

## 2022-04-07 PROCEDURE — 99499 RISK ADDL DX/OHS AUDIT: ICD-10-PCS | Mod: S$GLB,,, | Performed by: INTERNAL MEDICINE

## 2022-04-07 PROCEDURE — 1111F PR DISCHARGE MEDS RECONCILED W/ CURRENT OUTPATIENT MED LIST: ICD-10-PCS | Mod: CPTII,S$GLB,, | Performed by: INTERNAL MEDICINE

## 2022-04-07 PROCEDURE — 1111F DSCHRG MED/CURRENT MED MERGE: CPT | Mod: CPTII,S$GLB,, | Performed by: INTERNAL MEDICINE

## 2022-04-07 PROCEDURE — 1126F PR PAIN SEVERITY QUANTIFIED, NO PAIN PRESENT: ICD-10-PCS | Mod: CPTII,S$GLB,, | Performed by: INTERNAL MEDICINE

## 2022-04-07 PROCEDURE — 1101F PT FALLS ASSESS-DOCD LE1/YR: CPT | Mod: CPTII,S$GLB,, | Performed by: INTERNAL MEDICINE

## 2022-04-07 PROCEDURE — 99999 PR PBB SHADOW E&M-EST. PATIENT-LVL IV: ICD-10-PCS | Mod: PBBFAC,,, | Performed by: INTERNAL MEDICINE

## 2022-04-07 PROCEDURE — 99214 PR OFFICE/OUTPT VISIT, EST, LEVL IV, 30-39 MIN: ICD-10-PCS | Mod: S$GLB,,, | Performed by: INTERNAL MEDICINE

## 2022-04-07 PROCEDURE — 99214 OFFICE O/P EST MOD 30 MIN: CPT | Mod: S$GLB,,, | Performed by: INTERNAL MEDICINE

## 2022-04-07 NOTE — PROGRESS NOTES
Subjective:       Patient ID: Eddie Parada Sr. is a 89 y.o. male.    Chief Complaint: Follow-up    Eddie Parada Sr. is a  89 y.o. male who presents for Type II DM,  CHF, Hypertension, and Hyperlipidemia follow up. Labs were reviewed with patient today.    He will be seeing ID and general surgery for his abscess ;  Drainage is less ; he is on at home IV amp/sulbac    Labs are reviewed           Follow-up  Pertinent negatives include no abdominal pain, chest pain, chills, congestion, fever, numbness, sore throat or vomiting.   Hypertension  This is a chronic problem. The current episode started more than 1 year ago. The problem is controlled. Associated symptoms include shortness of breath. Pertinent negatives include no chest pain or orthopnea. Risk factors for coronary artery disease include sedentary lifestyle, male gender, obesity, dyslipidemia and diabetes mellitus. Past treatments include beta blockers, calcium channel blockers and diuretics. The current treatment provides moderate improvement. Hypertensive end-organ damage includes kidney disease and CAD/MI. Identifiable causes of hypertension include a thyroid problem.   Hyperlipidemia  This is a chronic problem. The current episode started more than 1 year ago. The problem is controlled. Recent lipid tests were reviewed and are low. Associated symptoms include shortness of breath. Pertinent negatives include no chest pain. Current antihyperlipidemic treatment includes statins. The current treatment provides significant improvement of lipids.   Diabetes  He presents for his follow-up diabetic visit. He has type 2 diabetes mellitus. His disease course has been stable. Pertinent negatives for hypoglycemia include no dizziness, nervousness/anxiousness, pallor, seizures or speech difficulty. Associated symptoms include foot paresthesias. Pertinent negatives for diabetes include no chest pain. Pertinent negatives for hypoglycemia complications include no  blackouts. Symptoms are improving. Pertinent negatives for diabetic complications include no autonomic neuropathy. Current diabetic treatment includes oral agent (dual therapy). His breakfast blood glucose range is generally 110-130 mg/dl. An ACE inhibitor/angiotensin II receptor blocker is being taken.   Thyroid Problem  Patient reports no anxiety. His past medical history is significant for hyperlipidemia.     Review of Systems   Constitutional: Negative for chills and fever.   HENT: Negative for congestion, postnasal drip and sore throat.    Eyes: Negative for photophobia.   Respiratory: Positive for shortness of breath. Negative for chest tightness.    Cardiovascular: Negative for chest pain and orthopnea.   Gastrointestinal: Negative for abdominal distention, abdominal pain, blood in stool and vomiting.        Drain still there   Genitourinary: Negative for dysuria, flank pain and hematuria.   Musculoskeletal: Negative for back pain.   Skin: Negative for pallor.   Neurological: Negative for dizziness, seizures, facial asymmetry, speech difficulty and numbness.   Hematological: Does not bruise/bleed easily.   Psychiatric/Behavioral: Negative for agitation and suicidal ideas. The patient is not nervous/anxious.        Objective:      Physical Exam  Vitals and nursing note reviewed.   Constitutional:       Appearance: He is well-developed.   HENT:      Head: Normocephalic and atraumatic.      Nose: Nose normal.   Eyes:      Conjunctiva/sclera: Conjunctivae normal.      Pupils: Pupils are equal, round, and reactive to light.   Neck:      Thyroid: No thyromegaly.      Vascular: No JVD.   Cardiovascular:      Rate and Rhythm: Normal rate and regular rhythm.      Heart sounds: Normal heart sounds.   Pulmonary:      Effort: Pulmonary effort is normal.      Breath sounds: Normal breath sounds.   Abdominal:      General: Bowel sounds are normal. There is no distension.      Palpations: Abdomen is soft. There is no mass.       Tenderness: There is no abdominal tenderness. There is no guarding.          Comments: drain    Musculoskeletal:         General: Normal range of motion.      Cervical back: Normal range of motion and neck supple.   Lymphadenopathy:      Cervical: No cervical adenopathy.   Skin:     General: Skin is warm and dry.      Coloration: Skin is not pale.      Findings: No rash.   Neurological:      Mental Status: He is alert and oriented to person, place, and time.      Cranial Nerves: No cranial nerve deficit.      Deep Tendon Reflexes: Reflexes are normal and symmetric.         Assessment:       1. Abdominal wall abscess    2. Primary malignant neoplasm of prostate    3. Type 2 diabetes mellitus with other neurologic complication, without long-term current use of insulin    4. Hypothyroidism due to acquired atrophy of thyroid    5. Stage 3 chronic kidney disease, unspecified whether stage 3a or 3b CKD    6. Essential (primary) hypertension        Plan:   Eddie was seen today for follow-up.    Diagnoses and all orders for this visit:    Abdominal wall abscess  Continue IV antibiotics  See ID and genreal surgery     Primary malignant neoplasm of prostate  -     PROSTATE SPECIFIC ANTIGEN, DIAGNOSTIC; Future  Seen urology before  PSA, Screen (ng/mL)   Date Value   02/18/2021 2.3       Type 2 diabetes mellitus with other neurologic complication, without long-term current use of insulin  -     Lipid Panel; Future  -     Hemoglobin A1C; Future  -     Microalbumin/Creatinine Ratio, Urine; Future  Lab Results   Component Value Date    HGBA1C 5.9 (H) 03/02/2022     DC amaryl and actos    Hypothyroidism due to acquired atrophy of thyroid  -     Lipid Panel; Future  -     TSH; Future  Well controlled.  Continue same medication and dose.    Stage 3 chronic kidney disease, unspecified whether stage 3a or 3b CKD  -     Comprehensive Metabolic Panel; Future  Will monitor     Essential (primary) hypertension  -     CBC Auto  Differential; Future  -     Comprehensive Metabolic Panel; Future    Well controlled.  Continue same medication and dose.  1. Keep weight close to ideal body weight.   2.   Avoid high salt foods (olives, pickles, smoked meats, salted potato chips, etc.).   Do not add salt to your food at the table.   Use only small amounts of salt when cooking.   3. Begin an exercise program. Discuss with your doctor what type of exercise program would be best for you. It doesn't have to be difficult. Even brisk walking for 20 minutes three times a week is a good form of exercise.   4. Avoid medicines which contain heart stimulants. This includes many cold and sinus decongestant pills and sprays as well as diet pills. Check the warnings about hypertension on the label. Stimulants such as amphetamine or cocaine could be lethal for someone with hypertension. Never take these.      Problem List Items Addressed This Visit    None

## 2022-04-12 DIAGNOSIS — K21.9 GASTROESOPHAGEAL REFLUX DISEASE: ICD-10-CM

## 2022-04-12 NOTE — TELEPHONE ENCOUNTER
No new care gaps identified.  Powered by [employer]. Reference number: [id number].   4/12/2022 12:34:51 PM CDT

## 2022-04-13 ENCOUNTER — TELEPHONE (OUTPATIENT)
Dept: INTERNAL MEDICINE | Facility: CLINIC | Age: 87
End: 2022-04-13
Payer: MEDICARE

## 2022-04-13 RX ORDER — PANTOPRAZOLE SODIUM 40 MG/1
TABLET, DELAYED RELEASE ORAL
Qty: 30 TABLET | Refills: 0 | Status: ON HOLD | OUTPATIENT
Start: 2022-04-13 | End: 2022-05-23

## 2022-04-13 NOTE — TELEPHONE ENCOUNTER
Refill Routing Note   Medication(s) are not appropriate for processing by [facility] for the following reason(s):      - Drug-Disease Interaction (osteoporosis)  - Required indication for medication not on problem list (GERD)    [facility] action(s):  Defer Medication-related problems identified:   No indication for a medication  Drug-disease interaction        Medication reconciliation completed: No     Appointments  past 12m or future 3m with PCP    Date Provider   Last Visit   4/7/2022 Callum Roberts MD   Next Visit   Visit date not found Callum Roberts MD   ED visits in past 90 days: 3        Note composed:10:51 PM 04/12/2022

## 2022-04-13 NOTE — TELEPHONE ENCOUNTER
"----- Message from Jinny Montes sent at 2022  9:53 AM CDT -----  Regarding: Request to speak to a nurse  Contact: Jessica (spouse)  Eddie Parada Sr.  MRN: [id number]  : 1929  PCP: Callum Roberts  Home Phone      [phone]  Work Phone      Not on file.  Mobile          [phone]      MESSAGE:   Request to speak to a nurse regarding [facility] labs drawn every Monday. Dr. Roberts instructed Mrs. Lopez "to call the office every Wednesday to remind him to check on Mr. Parada's labs." Jessica (spouse) is concerned about Mr. Morgan's kidneys.     Phone # [phone]    [facility] [id number] [facility]    "

## 2022-04-13 NOTE — TELEPHONE ENCOUNTER
I called [facility] to inquire about lab results. Nurse states that CMP result is still pending. Labs are all sent out to [facility]. She will send results to me as soon as she receives them.

## 2022-04-14 ENCOUNTER — DOCUMENT SCAN (OUTPATIENT)
Dept: HOME HEALTH SERVICES | Facility: HOSPITAL | Age: 87
End: 2022-04-14
Payer: MEDICARE

## 2022-04-20 ENCOUNTER — TELEPHONE (OUTPATIENT)
Dept: INTERNAL MEDICINE | Facility: CLINIC | Age: 87
End: 2022-04-20
Payer: MEDICARE

## 2022-04-20 NOTE — TELEPHONE ENCOUNTER
----- Message from Joanna Lim MA sent at 2022  4:34 PM CDT -----  Contact: Jessica / wife  Eddie ABBOTT Tal Toussaint.  MRN: [id number]  : 1929  PCP: Callum Roberts  Home Phone      [phone]  Work Phone      Not on file.  Mobile          [phone]      MESSAGE: Would like to remind Dr Roberts to check lab work for Mr Morgan.      Phone:  [phone]

## 2022-04-22 PROBLEM — N18.30 ACUTE RENAL FAILURE SUPERIMPOSED ON STAGE 3 CHRONIC KIDNEY DISEASE: Status: RESOLVED | Noted: 2018-01-11 | Resolved: 2022-04-22

## 2022-04-22 PROBLEM — N17.9 ACUTE RENAL FAILURE SUPERIMPOSED ON STAGE 3 CHRONIC KIDNEY DISEASE: Status: RESOLVED | Noted: 2018-01-11 | Resolved: 2022-04-22

## 2022-04-22 PROBLEM — R65.21 SEPTIC SHOCK: Status: RESOLVED | Noted: 2022-03-03 | Resolved: 2022-04-22

## 2022-04-22 PROBLEM — A41.9 SEPTIC SHOCK: Status: RESOLVED | Noted: 2022-03-03 | Resolved: 2022-04-22

## 2022-04-26 ENCOUNTER — DOCUMENT SCAN (OUTPATIENT)
Dept: HOME HEALTH SERVICES | Facility: HOSPITAL | Age: 87
End: 2022-04-26
Payer: MEDICARE

## 2022-04-28 ENCOUNTER — DOCUMENT SCAN (OUTPATIENT)
Dept: HOME HEALTH SERVICES | Facility: HOSPITAL | Age: 87
End: 2022-04-28
Payer: MEDICARE

## 2022-04-29 ENCOUNTER — TELEPHONE (OUTPATIENT)
Dept: INTERNAL MEDICINE | Facility: CLINIC | Age: 87
End: 2022-04-29
Payer: MEDICARE

## 2022-04-29 NOTE — TELEPHONE ENCOUNTER
Wife would like an appt to have patient evaluated from home O2 d/t increased SOB with exertion. Appt scheduled with Dr. Roberts for 5/5/22.

## 2022-04-29 NOTE — TELEPHONE ENCOUNTER
----- Message from Fidel Brooks sent at 2022 10:12 AM CDT -----  Contact: [place]/wife  Eddie Maysamson .  MRN: [id number]  : 1929  PCP: Callum Roberts  Home Phone      [phone]  Work Phone      Not on file.  Mobile          [phone]      MESSAGE:   Patients wife is calling regarding fluid and patient is having trouble breathing after just going to and from bathroom. Please return call [phone]

## 2022-04-30 ENCOUNTER — HOSPITAL ENCOUNTER (INPATIENT)
Facility: HOSPITAL | Age: 87
LOS: 3 days | Discharge: ANOTHER HOSPITAL | DRG: 291 | End: 2022-05-04
Attending: STUDENT IN AN ORGANIZED HEALTH CARE EDUCATION/TRAINING PROGRAM | Admitting: FAMILY MEDICINE
Payer: MEDICARE

## 2022-04-30 DIAGNOSIS — J18.9 PNEUMONIA OF LEFT LOWER LOBE DUE TO INFECTIOUS ORGANISM: ICD-10-CM

## 2022-04-30 DIAGNOSIS — J90 PLEURAL EFFUSION: ICD-10-CM

## 2022-04-30 DIAGNOSIS — J44.1 COPD EXACERBATION: Primary | ICD-10-CM

## 2022-04-30 DIAGNOSIS — I50.9 ACUTE ON CHRONIC CONGESTIVE HEART FAILURE, UNSPECIFIED HEART FAILURE TYPE: ICD-10-CM

## 2022-04-30 DIAGNOSIS — R06.02 SHORTNESS OF BREATH: ICD-10-CM

## 2022-04-30 DIAGNOSIS — I50.9 HEART FAILURE: ICD-10-CM

## 2022-04-30 PROBLEM — N39.0 UTI (URINARY TRACT INFECTION): Status: RESOLVED | Noted: 2018-01-08 | Resolved: 2022-04-30

## 2022-04-30 LAB
ALBUMIN SERPL BCP-MCNC: 2.7 G/DL (ref 3.5–5.2)
ALLENS TEST: ABNORMAL
ALP SERPL-CCNC: 105 U/L (ref 55–135)
ALT SERPL W/O P-5'-P-CCNC: 19 U/L (ref 10–44)
ANION GAP SERPL CALC-SCNC: 15 MMOL/L (ref 8–16)
AST SERPL-CCNC: 37 U/L (ref 10–40)
BASOPHILS # BLD AUTO: 0.04 K/UL (ref 0–0.2)
BASOPHILS NFR BLD: 0.6 % (ref 0–1.9)
BILIRUB SERPL-MCNC: 0.6 MG/DL (ref 0.1–1)
BNP SERPL-MCNC: 672 PG/ML (ref 0–99)
BUN SERPL-MCNC: 17 MG/DL (ref 10–30)
CALCIUM SERPL-MCNC: 8.2 MG/DL (ref 8.7–10.5)
CHLORIDE SERPL-SCNC: 104 MMOL/L (ref 95–110)
CO2 SERPL-SCNC: 25 MMOL/L (ref 23–29)
CREAT SERPL-MCNC: 1.5 MG/DL (ref 0.5–1.4)
DELSYS: ABNORMAL
DIFFERENTIAL METHOD: ABNORMAL
EOSINOPHIL # BLD AUTO: 0.1 K/UL (ref 0–0.5)
EOSINOPHIL NFR BLD: 1.4 % (ref 0–8)
ERYTHROCYTE [DISTWIDTH] IN BLOOD BY AUTOMATED COUNT: 14 % (ref 11.5–14.5)
EST. GFR  (AFRICAN AMERICAN): 46 ML/MIN/1.73 M^2
EST. GFR  (NON AFRICAN AMERICAN): 40 ML/MIN/1.73 M^2
ESTIMATED AVG GLUCOSE: 126 MG/DL (ref 68–131)
FIO2: 21 (ref 21–100)
GLUCOSE SERPL-MCNC: 161 MG/DL (ref 70–110)
HBA1C MFR BLD: 6 % (ref 4–5.6)
HCO3 UR-SCNC: 28.4 MMOL/L
HCT VFR BLD AUTO: 32.1 % (ref 40–54)
HGB BLD-MCNC: 10.6 G/DL (ref 14–18)
IMM GRANULOCYTES # BLD AUTO: 0.04 K/UL (ref 0–0.04)
IMM GRANULOCYTES NFR BLD AUTO: 0.6 % (ref 0–0.5)
INFLUENZA A, MOLECULAR: NEGATIVE
INFLUENZA B, MOLECULAR: NEGATIVE
LACTATE SERPL-SCNC: 1 MMOL/L (ref 0.5–2.2)
LACTATE SERPL-SCNC: 1.2 MMOL/L (ref 0.5–2.2)
LYMPHOCYTES # BLD AUTO: 1.3 K/UL (ref 1–4.8)
LYMPHOCYTES NFR BLD: 18.3 % (ref 18–48)
MCH RBC QN AUTO: 33.3 PG (ref 27–31)
MCHC RBC AUTO-ENTMCNC: 33 G/DL (ref 32–36)
MCV RBC AUTO: 101 FL (ref 82–98)
MONOCYTES # BLD AUTO: 0.7 K/UL (ref 0.3–1)
MONOCYTES NFR BLD: 10.1 % (ref 4–15)
NEUTROPHILS # BLD AUTO: 5 K/UL (ref 1.8–7.7)
NEUTROPHILS NFR BLD: 69 % (ref 38–73)
NRBC BLD-RTO: 0 /100 WBC
PCO2 BLDA: 39 MMHG (ref 35–45)
PH SMN: 7.47 [PH] (ref 7.35–7.45)
PLATELET # BLD AUTO: 173 K/UL (ref 150–450)
PMV BLD AUTO: 9.7 FL (ref 9.2–12.9)
PO2 BLDA: 58 MMHG (ref 75–100)
POC BE: 4.4 MMOL/L (ref -2–2)
POC COHB: 2.3 % (ref 0–3)
POC METHB: 0.9 % (ref 0–1.5)
POC O2HB ARTERIAL: 86.8 % (ref 94–100)
POC SATURATED O2: 89.7 % (ref 90–100)
POC TCO2: 29.6 MMOL/L
POC THB: 10.8 G/DL (ref 12–18)
POCT GLUCOSE: 183 MG/DL (ref 70–110)
POTASSIUM SERPL-SCNC: 3.4 MMOL/L (ref 3.5–5.1)
PROT SERPL-MCNC: 6.8 G/DL (ref 6–8.4)
RBC # BLD AUTO: 3.18 M/UL (ref 4.6–6.2)
SARS-COV-2 RDRP RESP QL NAA+PROBE: NEGATIVE
SITE: ABNORMAL
SODIUM SERPL-SCNC: 144 MMOL/L (ref 136–145)
SPECIMEN SOURCE: NORMAL
TROPONIN I SERPL DL<=0.01 NG/ML-MCNC: 0.07 NG/ML (ref 0–0.03)
TROPONIN I SERPL DL<=0.01 NG/ML-MCNC: 0.07 NG/ML (ref 0–0.03)
WBC # BLD AUTO: 7.23 K/UL (ref 3.9–12.7)

## 2022-04-30 PROCEDURE — 63600175 PHARM REV CODE 636 W HCPCS: Performed by: STUDENT IN AN ORGANIZED HEALTH CARE EDUCATION/TRAINING PROGRAM

## 2022-04-30 PROCEDURE — 83036 HEMOGLOBIN GLYCOSYLATED A1C: CPT | Performed by: STUDENT IN AN ORGANIZED HEALTH CARE EDUCATION/TRAINING PROGRAM

## 2022-04-30 PROCEDURE — 93010 ELECTROCARDIOGRAM REPORT: CPT | Mod: ,,, | Performed by: INTERNAL MEDICINE

## 2022-04-30 PROCEDURE — 96367 TX/PROPH/DG ADDL SEQ IV INF: CPT | Performed by: STUDENT IN AN ORGANIZED HEALTH CARE EDUCATION/TRAINING PROGRAM

## 2022-04-30 PROCEDURE — 84484 ASSAY OF TROPONIN QUANT: CPT | Performed by: STUDENT IN AN ORGANIZED HEALTH CARE EDUCATION/TRAINING PROGRAM

## 2022-04-30 PROCEDURE — G0378 HOSPITAL OBSERVATION PER HR: HCPCS

## 2022-04-30 PROCEDURE — 96367 TX/PROPH/DG ADDL SEQ IV INF: CPT

## 2022-04-30 PROCEDURE — 99220 PR INITIAL OBSERVATION CARE,LEVL III: CPT | Mod: ,,, | Performed by: FAMILY MEDICINE

## 2022-04-30 PROCEDURE — 99900035 HC TECH TIME PER 15 MIN (STAT)

## 2022-04-30 PROCEDURE — 99291 CRITICAL CARE FIRST HOUR: CPT | Mod: 25

## 2022-04-30 PROCEDURE — 93010 EKG 12-LEAD: ICD-10-PCS | Mod: ,,, | Performed by: INTERNAL MEDICINE

## 2022-04-30 PROCEDURE — 99220 PR INITIAL OBSERVATION CARE,LEVL III: ICD-10-PCS | Mod: ,,, | Performed by: FAMILY MEDICINE

## 2022-04-30 PROCEDURE — 25000003 PHARM REV CODE 250: Performed by: STUDENT IN AN ORGANIZED HEALTH CARE EDUCATION/TRAINING PROGRAM

## 2022-04-30 PROCEDURE — 87502 INFLUENZA DNA AMP PROBE: CPT | Performed by: STUDENT IN AN ORGANIZED HEALTH CARE EDUCATION/TRAINING PROGRAM

## 2022-04-30 PROCEDURE — 85025 COMPLETE CBC W/AUTO DIFF WBC: CPT | Performed by: STUDENT IN AN ORGANIZED HEALTH CARE EDUCATION/TRAINING PROGRAM

## 2022-04-30 PROCEDURE — 93005 ELECTROCARDIOGRAM TRACING: CPT

## 2022-04-30 PROCEDURE — 82803 BLOOD GASES ANY COMBINATION: CPT | Performed by: STUDENT IN AN ORGANIZED HEALTH CARE EDUCATION/TRAINING PROGRAM

## 2022-04-30 PROCEDURE — 94760 N-INVAS EAR/PLS OXIMETRY 1: CPT

## 2022-04-30 PROCEDURE — 36415 COLL VENOUS BLD VENIPUNCTURE: CPT | Performed by: STUDENT IN AN ORGANIZED HEALTH CARE EDUCATION/TRAINING PROGRAM

## 2022-04-30 PROCEDURE — 27000221 HC OXYGEN, UP TO 24 HOURS

## 2022-04-30 PROCEDURE — 83880 ASSAY OF NATRIURETIC PEPTIDE: CPT | Performed by: STUDENT IN AN ORGANIZED HEALTH CARE EDUCATION/TRAINING PROGRAM

## 2022-04-30 PROCEDURE — 87040 BLOOD CULTURE FOR BACTERIA: CPT | Performed by: STUDENT IN AN ORGANIZED HEALTH CARE EDUCATION/TRAINING PROGRAM

## 2022-04-30 PROCEDURE — U0002 COVID-19 LAB TEST NON-CDC: HCPCS | Performed by: STUDENT IN AN ORGANIZED HEALTH CARE EDUCATION/TRAINING PROGRAM

## 2022-04-30 PROCEDURE — 36600 WITHDRAWAL OF ARTERIAL BLOOD: CPT

## 2022-04-30 PROCEDURE — 96366 THER/PROPH/DIAG IV INF ADDON: CPT | Performed by: STUDENT IN AN ORGANIZED HEALTH CARE EDUCATION/TRAINING PROGRAM

## 2022-04-30 PROCEDURE — 83605 ASSAY OF LACTIC ACID: CPT | Mod: 91 | Performed by: STUDENT IN AN ORGANIZED HEALTH CARE EDUCATION/TRAINING PROGRAM

## 2022-04-30 PROCEDURE — 96375 TX/PRO/DX INJ NEW DRUG ADDON: CPT

## 2022-04-30 PROCEDURE — 96365 THER/PROPH/DIAG IV INF INIT: CPT

## 2022-04-30 PROCEDURE — 80053 COMPREHEN METABOLIC PANEL: CPT | Performed by: STUDENT IN AN ORGANIZED HEALTH CARE EDUCATION/TRAINING PROGRAM

## 2022-04-30 PROCEDURE — 94640 AIRWAY INHALATION TREATMENT: CPT | Mod: XB

## 2022-04-30 PROCEDURE — 25000242 PHARM REV CODE 250 ALT 637 W/ HCPCS: Performed by: STUDENT IN AN ORGANIZED HEALTH CARE EDUCATION/TRAINING PROGRAM

## 2022-04-30 RX ORDER — MAGNESIUM SULFATE HEPTAHYDRATE 40 MG/ML
2 INJECTION, SOLUTION INTRAVENOUS
Status: COMPLETED | OUTPATIENT
Start: 2022-04-30 | End: 2022-04-30

## 2022-04-30 RX ORDER — IPRATROPIUM BROMIDE AND ALBUTEROL SULFATE 2.5; .5 MG/3ML; MG/3ML
3 SOLUTION RESPIRATORY (INHALATION)
Status: COMPLETED | OUTPATIENT
Start: 2022-04-30 | End: 2022-04-30

## 2022-04-30 RX ORDER — TALC
6 POWDER (GRAM) TOPICAL NIGHTLY PRN
Status: DISCONTINUED | OUTPATIENT
Start: 2022-04-30 | End: 2022-05-04 | Stop reason: HOSPADM

## 2022-04-30 RX ORDER — VANCOMYCIN HCL IN 5 % DEXTROSE 1G/250ML
1000 PLASTIC BAG, INJECTION (ML) INTRAVENOUS
Status: DISCONTINUED | OUTPATIENT
Start: 2022-04-30 | End: 2022-05-01

## 2022-04-30 RX ORDER — SODIUM CHLORIDE 0.9 % (FLUSH) 0.9 %
10 SYRINGE (ML) INJECTION
Status: DISCONTINUED | OUTPATIENT
Start: 2022-04-30 | End: 2022-05-04 | Stop reason: HOSPADM

## 2022-04-30 RX ORDER — AMLODIPINE BESYLATE 10 MG/1
10 TABLET ORAL DAILY
Status: DISCONTINUED | OUTPATIENT
Start: 2022-05-01 | End: 2022-05-04 | Stop reason: HOSPADM

## 2022-04-30 RX ORDER — FUROSEMIDE 10 MG/ML
40 INJECTION INTRAMUSCULAR; INTRAVENOUS
Status: COMPLETED | OUTPATIENT
Start: 2022-04-30 | End: 2022-04-30

## 2022-04-30 RX ORDER — ACETAMINOPHEN 500 MG
1000 TABLET ORAL
Status: COMPLETED | OUTPATIENT
Start: 2022-04-30 | End: 2022-04-30

## 2022-04-30 RX ORDER — DOXAZOSIN 1 MG/1
1 TABLET ORAL NIGHTLY
Status: DISCONTINUED | OUTPATIENT
Start: 2022-04-30 | End: 2022-05-03

## 2022-04-30 RX ORDER — LEVOTHYROXINE SODIUM 75 UG/1
75 TABLET ORAL
Status: DISCONTINUED | OUTPATIENT
Start: 2022-05-01 | End: 2022-05-04 | Stop reason: HOSPADM

## 2022-04-30 RX ORDER — GABAPENTIN 100 MG/1
200 CAPSULE ORAL NIGHTLY
Status: DISCONTINUED | OUTPATIENT
Start: 2022-04-30 | End: 2022-05-04 | Stop reason: HOSPADM

## 2022-04-30 RX ORDER — METHYLPREDNISOLONE SOD SUCC 125 MG
125 VIAL (EA) INJECTION
Status: COMPLETED | OUTPATIENT
Start: 2022-04-30 | End: 2022-04-30

## 2022-04-30 RX ORDER — PRAVASTATIN SODIUM 20 MG/1
20 TABLET ORAL DAILY
Status: DISCONTINUED | OUTPATIENT
Start: 2022-05-01 | End: 2022-05-04 | Stop reason: HOSPADM

## 2022-04-30 RX ORDER — GEMFIBROZIL 600 MG/1
600 TABLET, FILM COATED ORAL DAILY
Status: DISCONTINUED | OUTPATIENT
Start: 2022-05-01 | End: 2022-04-30

## 2022-04-30 RX ORDER — POTASSIUM CHLORIDE 20 MEQ/1
40 TABLET, EXTENDED RELEASE ORAL
Status: COMPLETED | OUTPATIENT
Start: 2022-04-30 | End: 2022-04-30

## 2022-04-30 RX ORDER — CARVEDILOL 12.5 MG/1
12.5 TABLET ORAL 2 TIMES DAILY WITH MEALS
Status: DISCONTINUED | OUTPATIENT
Start: 2022-04-30 | End: 2022-05-04 | Stop reason: HOSPADM

## 2022-04-30 RX ORDER — TAMSULOSIN HYDROCHLORIDE 0.4 MG/1
0.4 CAPSULE ORAL DAILY
Status: DISCONTINUED | OUTPATIENT
Start: 2022-05-01 | End: 2022-05-04 | Stop reason: HOSPADM

## 2022-04-30 RX ADMIN — GABAPENTIN 200 MG: 100 CAPSULE ORAL at 08:04

## 2022-04-30 RX ADMIN — CEFTRIAXONE 1 G: 1 INJECTION, SOLUTION INTRAVENOUS at 12:04

## 2022-04-30 RX ADMIN — DOXAZOSIN 1 MG: 1 TABLET ORAL at 08:04

## 2022-04-30 RX ADMIN — IPRATROPIUM BROMIDE AND ALBUTEROL SULFATE 3 ML: 2.5; .5 SOLUTION RESPIRATORY (INHALATION) at 11:04

## 2022-04-30 RX ADMIN — CARVEDILOL 12.5 MG: 12.5 TABLET, FILM COATED ORAL at 05:04

## 2022-04-30 RX ADMIN — VANCOMYCIN HYDROCHLORIDE 1000 MG: 1 INJECTION, POWDER, LYOPHILIZED, FOR SOLUTION INTRAVENOUS at 05:04

## 2022-04-30 RX ADMIN — MAGNESIUM SULFATE 2 G: 2 INJECTION INTRAVENOUS at 02:04

## 2022-04-30 RX ADMIN — METHYLPREDNISOLONE SODIUM SUCCINATE 125 MG: 125 INJECTION, POWDER, FOR SOLUTION INTRAMUSCULAR; INTRAVENOUS at 02:04

## 2022-04-30 RX ADMIN — DEXTROSE 500 MG: 5 SOLUTION INTRAVENOUS at 01:04

## 2022-04-30 RX ADMIN — ACETAMINOPHEN 1000 MG: 500 TABLET ORAL at 02:04

## 2022-04-30 RX ADMIN — PIPERACILLIN AND TAZOBACTAM 4.5 G: 4; .5 INJECTION, POWDER, LYOPHILIZED, FOR SOLUTION INTRAVENOUS; PARENTERAL at 11:04

## 2022-04-30 RX ADMIN — PIPERACILLIN AND TAZOBACTAM 4.5 G: 4; .5 INJECTION, POWDER, LYOPHILIZED, FOR SOLUTION INTRAVENOUS; PARENTERAL at 05:04

## 2022-04-30 RX ADMIN — Medication 6 MG: at 08:04

## 2022-04-30 RX ADMIN — POTASSIUM CHLORIDE 40 MEQ: 1500 TABLET, EXTENDED RELEASE ORAL at 12:04

## 2022-04-30 RX ADMIN — FUROSEMIDE 40 MG: 10 INJECTION, SOLUTION INTRAMUSCULAR; INTRAVENOUS at 12:04

## 2022-04-30 NOTE — NURSING
Per patients wife, Pts PICC line dressings are changed every Monday. The current dressing was changed on 2/25/22.

## 2022-04-30 NOTE — ED TRIAGE NOTES
"Patient presents POV with C/O SOB for " 3-4 days" Spouse states patient had a drain to an abdominal abscess  removed yesterday.   "

## 2022-04-30 NOTE — ED PROVIDER NOTES
"Encounter Date: 4/30/2022    This document was partially completed using speech recognition software and may contain misspellings, grammatical errors, and/or unexpected word substitutions.       History     Chief Complaint   Patient presents with    Shortness of Breath     For " 3-4 days"       89 year-old male with a history of atrial fibrillation on Xarelto, CAD, CHF, COPD, diabetes, hyperlipidemia, abdominal wall abscess from an infected mesh (followed by general surgery, Dr. Haji, who has a note from yesterday and removed the drain and ID who is recommending unasyn) who presents to emergency department with his wife with shortness of breath.  Has been going on for the past 3-4 days. Has been trying to see Dr. Roberts, his PCP, but 1st available appointment was Thursday of next week.  Reports mild cough but that his shortness of breath has been getting worse to the point where he is having difficulty moving short distances.  Denies any chest pain or abdominal pain. Wife reports the patient's face and chest appears more edematous. No lower extremity swelling.  Takes Xarelto regularly since his wife gives it to him.    Room air SpO2 sat 87-88% while on the stretcher.        Review of patient's allergies indicates:   Allergen Reactions    Ciprofloxacin     Levofloxacin Swelling    Lyrica [pregabalin] Swelling    Unable to assess Other (See Comments)     Soft shell crabs     Past Medical History:   Diagnosis Date    Abdominal fibromatosis     Acute posthemorrhagic anemia 1/9/2018    NIK (acute kidney injury) 1/11/2018    Atrial fibrillation     Bradycardia     Broken arm     CAD (coronary artery disease)     Cancer     basal cell on nose and melanoma on back    CHF (congestive heart failure)     COPD (chronic obstructive pulmonary disease)     Diabetes mellitus type II     Hyperlipidemia     Localization-related focal epilepsy with simple partial seizures 3/2/2021    Melanoma     Obesity (BMI " 30.0-34.9) 2016    Osteoporosis     Peripheral vascular disease     Primary malignant neoplasm of prostate 3/3/2022    Septic shock 3/3/2022    Stroke     TIA (transient ischemic attack)     Type II diabetes mellitus with peripheral circulatory disorder     Type II or unspecified type diabetes mellitus without mention of complication, not stated as uncontrolled     Unspecified essential hypertension      Past Surgical History:   Procedure Laterality Date    AORTIC VALVE REPLACEMENT      CARDIAC PACEMAKER PLACEMENT  2012    CARDIAC VALVE REPLACEMENT  2011    aortic valve-tissue    CHOLECYSTECTOMY      COLONOSCOPY      ESOPHAGOGASTRODUODENOSCOPY N/A 2021    Procedure: EGD (ESOPHAGOGASTRODUODENOSCOPY);  Surgeon: Gualberto Medrano MD;  Location: [facility];  Service: Endoscopy;  Laterality: N/A;    ESOPHAGOGASTRODUODENOSCOPY N/A 2021    Procedure: EGD (ESOPHAGOGASTRODUODENOSCOPY);  Surgeon: Gualberto Medrano MD;  Location: [facility];  Service: Endoscopy;  Laterality: N/A;  please call wife(Jessica) with instructions/arrival time.    ESOPHAGOGASTRODUODENOSCOPY (EGD) WITH DILATION  2021    EYE SURGERY Bilateral     cataract with lens by  at [facility] [place]    HERNIA REPAIR      abdominal    LASIK      UPPER GASTROINTESTINAL ENDOSCOPY  2021     Family History   Problem Relation Age of Onset    Hypertension Father     Stroke Father     Alcohol abuse Father     Heart disease Brother     COPD Sister     Cancer Brother         Lung    Diabetes Daughter     No Known Problems Son     COPD Brother     No Known Problems Daughter     No Known Problems Son     Prostate cancer Neg Hx     Kidney disease Neg Hx      Social History     Tobacco Use    Smoking status: Former Smoker     Quit date: 1996     Years since quittin.6    Smokeless tobacco: Never Used    Tobacco comment: cigar smoker   Substance Use Topics    Alcohol use: No      Comment: none since 2006    Drug use: No     Review of Systems   Constitutional: Positive for fatigue. Negative for chills and fever.   HENT: Negative for congestion, rhinorrhea and sneezing.    Eyes: Negative for discharge and redness.   Respiratory: Positive for cough and shortness of breath.    Cardiovascular: Negative for chest pain and palpitations.   Gastrointestinal: Negative for abdominal pain, diarrhea and vomiting.   Genitourinary: Negative for difficulty urinating, flank pain and urgency.   Musculoskeletal: Negative for back pain and neck pain.   Skin: Negative for rash and wound.   Neurological: Negative for weakness, numbness and headaches.       Physical Exam     Initial Vitals   BP Pulse Resp Temp SpO2   04/30/22 1020 04/30/22 1020 04/30/22 1032 04/30/22 1052 04/30/22 1020   (!) 143/99 69 (!) 26 98 °F (36.7 °C) (!) 87 %      MAP       --                Physical Exam    Nursing note and vitals reviewed.  Constitutional: He appears well-developed. He is not diaphoretic. No distress.   HENT:   Head: Normocephalic and atraumatic.   Right Ear: External ear normal.   Left Ear: External ear normal.   Nose: Nose normal.   Eyes: Conjunctivae are normal. Right eye exhibits no discharge. Left eye exhibits no discharge. No scleral icterus.   Cardiovascular: Normal rate. An irregularly irregular rhythm present.    Pulmonary/Chest: No stridor. Tachypnea noted. No respiratory distress. He has decreased breath sounds. He has no wheezes. He has no rhonchi. He has no rales.   Abdominal: Abdomen is soft. He exhibits no distension. There is no abdominal tenderness.   RLQ small hole from previous drain with no surrounding redness/erythema; palpation without fluctuance/induration; nontender There is no guarding.   Musculoskeletal:         General: No edema.     Neurological: He is alert and oriented to person, place, and time. GCS score is 15. GCS eye subscore is 4. GCS verbal subscore is 5. GCS motor subscore is 6.    Skin: Skin is warm and dry. Capillary refill takes less than 2 seconds.   Psychiatric: He has a normal mood and affect.         ED Course   Critical Care    Date/Time: 4/30/2022 2:45 PM  Performed by: Nicolas Dillon DO  Authorized by: Nicolas Dillon DO   Direct patient critical care time: 20 minutes  Additional history critical care time: 3 minutes  Ordering / reviewing critical care time: 2 minutes  Documentation critical care time: 3 minutes  Consulting other physicians critical care time: 3 minutes  Consult with family critical care time: 2 minutes  Total critical care time (exclusive of procedural time) : 33 minutes  Critical care time was exclusive of separately billable procedures and treating other patients and teaching time.  Critical care was necessary to treat or prevent imminent or life-threatening deterioration of the following conditions: respiratory failure.  Critical care was time spent personally by me on the following activities: discussions with consultants, evaluation of patient's response to treatment, obtaining history from patient or surrogate, ordering and review of laboratory studies, pulse oximetry, review of old charts, re-evaluation of patient's condition, ordering and review of radiographic studies, ordering and performing treatments and interventions, examination of patient, development of treatment plan with patient or surrogate and discussions with primary provider.        Labs Reviewed   CBC W/ AUTO DIFFERENTIAL - Abnormal; Notable for the following components:       Result Value    RBC 3.18 (*)     Hemoglobin 10.6 (*)     Hematocrit 32.1 (*)      (*)     MCH 33.3 (*)     Immature Granulocytes 0.6 (*)     All other components within normal limits   COMPREHENSIVE METABOLIC PANEL - Abnormal; Notable for the following components:    Potassium 3.4 (*)     Glucose 161 (*)     Creatinine 1.5 (*)     Calcium 8.2 (*)     Albumin 2.7 (*)     eGFR if  46 (*)     eGFR if  non  40 (*)     All other components within normal limits   TROPONIN I - Abnormal; Notable for the following components:    Troponin I 0.069 (*)     All other components within normal limits   B-TYPE NATRIURETIC PEPTIDE - Abnormal; Notable for the following components:     (*)     All other components within normal limits   TROPONIN I - Abnormal; Notable for the following components:    Troponin I 0.066 (*)     All other components within normal limits   INFLUENZA A & B BY MOLECULAR   CULTURE, BLOOD   CULTURE, BLOOD   CULTURE, BLOOD   CULTURE, BLOOD   CULTURE, BLOOD   CULTURE, BLOOD   SARS-COV-2 RNA AMPLIFICATION, QUAL   LACTIC ACID, PLASMA   LACTIC ACID, PLASMA     EKG Readings: (Independently Interpreted)   Previous EKG: Compared with most recent EKG Previous EKG Date: 3/19/2022.   Ventricular-paced at 63 bpm. PVC. No STEMI.        Imaging Results          X-Ray Chest 1 View (Final result)  Result time 04/30/22 11:13:17    Final result by Isauro Sosa MD (04/30/22 11:13:17)                 Impression:      Left perihilar and lower lung airspace opacification may reflect pneumonia, aspiration, or pulmonary edema.  Possible small left pleural effusion.      Electronically signed by: Isauro Sosa  Date:    04/30/2022  Time:    11:13             Narrative:    EXAMINATION:  XR CHEST 1 VIEW    CLINICAL HISTORY:  shortness of breath;    TECHNIQUE:  Single frontal view of the chest was performed.    COMPARISON:  03/19/2022    FINDINGS:  Left anterior chest wall dual lead pacemaker.  Postoperative change of prior sternotomy.  Right PICC catheter tip projected over the distal SVC.  Cardiomediastinal silhouette is enlarged, unchanged.  Left perihilar and lower lobe opacification may reflect pneumonia, aspiration, or pulmonary edema.  Possible small left pleural effusion.  No pneumothorax.                                 Medications   magnesium sulfate 2g in water 50mL IVPB (premix) (2 g Intravenous New  Bag 4/30/22 1437)   vancomycin in dextrose 5 % 1 gram/250 mL IVPB 1,000 mg (has no administration in time range)   piperacillin-tazobactam 4.5 g in dextrose 5 % 100 mL IVPB (ready to mix system) (has no administration in time range)   vancomycin - pharmacy to dose (has no administration in time range)   acetaminophen tablet 1,000 mg (has no administration in time range)   albuterol-ipratropium 2.5 mg-0.5 mg/3 mL nebulizer solution 3 mL (3 mLs Nebulization Given 4/30/22 1116)   furosemide injection 40 mg (40 mg Intravenous Given 4/30/22 1207)   cefTRIAXone (ROCEPHIN) 1 g/50 mL D5W IVPB (0 g Intravenous Stopped 4/30/22 1244)   azithromycin 500 mg in dextrose 5 % 250 mL IVPB (ready to mix system) (0 mg Intravenous Stopped 4/30/22 1405)   potassium chloride SA CR tablet 40 mEq (40 mEq Oral Given 4/30/22 1207)   methylPREDNISolone sodium succinate injection 125 mg (125 mg Intravenous Given 4/30/22 1433)     Medical Decision Making:   Differential Diagnosis:   Differential considerations include (in no particular order): ACS, PE, CHF, COPD, Pneumothorax, Asthma, Pneumonia, Anemia, COVID-19  ED Management:  Based on the patient's evaluation - patient will need admission for hypoxia due to COPD/CHF/pneumonia. Hypoxic on room air. Treated all 3 possible etiologies with duoneb/steroids/mag/resp antibiotics (patient's breathing improved and was moving more air), CHF (lasix, PO K for hypoK), cultures/lactic. Spoke with Dr. Greco who was very happy to help - recommends culturing the right arm PICC line and broaden antibiotics.    Updated patient and wife who are in agreement. Patient comfortable on 2L NC. Asked about CODE status - patient and wife states that if there is a chance he can survive = full code. If futile, DNR.                      Clinical Impression:   Final diagnoses:  [R06.02] Shortness of breath  [J44.1] COPD exacerbation (Primary)  [I50.9] Acute on chronic congestive heart failure, unspecified heart failure  type  [J18.9] Pneumonia of left lower lobe due to infectious organism  [J90] Pleural effusion          ED Disposition Condition    Observation               Nicolas Dillon DO  04/30/22 1508

## 2022-04-30 NOTE — PROGRESS NOTES
Pharmacokinetic Initial Assessment: IV Vancomycin    Assessment/Plan:    Initiate intravenous vancomycin 1000mg IV every 24 hours.  Desired empiric serum trough concentration is 10 to 20 mcg/mL  Draw vancomycin trough level 60 min prior to third dose on 5/2/22 at approximately 1500  Pharmacy will continue to follow and monitor vancomycin.      Please contact pharmacy at extension [phone] with any questions regarding this assessment.     Thank you for the consult,   Parisa DIMITRIOS Leonidas       Patient brief summary:  Eddie Parada Sr. is a 89 y.o. male initiated on antimicrobial therapy with IV Vancomycin for treatment of suspected lower respiratory infection    Drug Allergies:   Review of patient's allergies indicates:   Allergen Reactions    Ciprofloxacin     Levofloxacin Swelling    Lyrica [pregabalin] Swelling    Unable to assess Other (See Comments)     Soft shell crabs       Actual Body Weight:   85.7 kg    Renal Function:   Estimated Creatinine Clearance: 31.6 mL/min (A) (based on SCr of 1.5 mg/dL (H)).,     Dialysis Method (if applicable):  N/A    CBC (last 72 hours):  Recent Labs   Lab Result Units 04/30/22  1046   WBC K/uL 7.23   Hemoglobin g/dL 10.6*   Hematocrit % 32.1*   Platelets K/uL 173   Gran % % 69.0   Lymph % % 18.3   Mono % % 10.1   Eosinophil % % 1.4   Basophil % % 0.6   Differential Method  Automated       Metabolic Panel (last 72 hours):  Recent Labs   Lab Result Units 04/30/22  1046   Sodium mmol/L 144   Potassium mmol/L 3.4*   Chloride mmol/L 104   CO2 mmol/L 25   Glucose mg/dL 161*   BUN mg/dL 17   Creatinine mg/dL 1.5*   Albumin g/dL 2.7*   Total Bilirubin mg/dL 0.6   Alkaline Phosphatase U/L 105   AST U/L 37   ALT U/L 19       Drug levels (last 3 results):  No results for input(s): VANCOMYCINRA, VANCOMYCINPE, VANCOMYCINTR in the last 72 hours.    Microbiologic Results:  Microbiology Results (last 7 days)       Procedure Component Value Units Date/Time    Blood Culture #1 **CANNOT BE ORDERED  STAT** [[id number]]     Order Status: Sent Specimen: Blood     Blood Culture #2 **CANNOT BE ORDERED STAT** [[id number]]     Order Status: Sent Specimen: Blood     Blood Culture #1 **CANNOT BE ORDERED STAT** [[id number]]     Order Status: Canceled Specimen: Blood     Blood Culture #2 **CANNOT BE ORDERED STAT** [[id number]]     Order Status: Canceled Specimen: Blood     Blood culture #2 [[id number]] Collected: 04/30/22 1046    Order Status: Sent Specimen: Blood from Antecubital, Right Updated: 04/30/22 1239    Blood culture #1 [[id number]] Collected: 04/30/22 1046    Order Status: Sent Specimen: Blood from Antecubital, Left Updated: 04/30/22 1239    Blood Culture #1 **CANNOT BE ORDERED STAT** [[id number]] Collected: 04/30/22 1144    Order Status: Sent Specimen: Blood from Antecubital, Left Updated: 04/30/22 1144    Blood Culture #2 **CANNOT BE ORDERED STAT** [[id number]] Collected: 04/30/22 1144    Order Status: Sent Specimen: Blood from Antecubital, Right Updated: 04/30/22 1144    Influenza A & B by Molecular [[id number]] Collected: 04/30/22 1055    Order Status: Completed Specimen: Nasopharyngeal Swab from Nasal Swab Updated: 04/30/22 1116     Influenza A, Molecular Negative     Influenza B, Molecular Negative     Flu A & B Source Nasal swab

## 2022-04-30 NOTE — NURSING
Abdominal site where drain was pulled yesterday as pictured below:        Dressing changed. Gauze and tape applied. Wife states that MD told her to change dressing daily.

## 2022-04-30 NOTE — ED NOTES
"89 y.o. male presents to ER ED 05/ED 05   Chief Complaint   Patient presents with    Shortness of Breath     For " 3-4 days"     Pt arrived to Ed c/o having increased SOB that started 2-4 days pta. Pt states he has been treated for an abscess to his abdomen and went to the doctor yesterday. Pt wife states that she noticed this morning that pt had edema noted to his face and increased SOB , O2 sat in low 90's at home.   "

## 2022-05-01 LAB
ALBUMIN SERPL BCP-MCNC: 2.2 G/DL (ref 3.5–5.2)
ALP SERPL-CCNC: 82 U/L (ref 55–135)
ALT SERPL W/O P-5'-P-CCNC: 18 U/L (ref 10–44)
ANION GAP SERPL CALC-SCNC: 12 MMOL/L (ref 8–16)
ANION GAP SERPL CALC-SCNC: 14 MMOL/L (ref 8–16)
AST SERPL-CCNC: 38 U/L (ref 10–40)
BASOPHILS # BLD AUTO: 0 K/UL (ref 0–0.2)
BASOPHILS NFR BLD: 0 % (ref 0–1.9)
BILIRUB SERPL-MCNC: 0.4 MG/DL (ref 0.1–1)
BUN SERPL-MCNC: 18 MG/DL (ref 10–30)
BUN SERPL-MCNC: 23 MG/DL (ref 10–30)
CALCIUM SERPL-MCNC: 7.7 MG/DL (ref 8.7–10.5)
CALCIUM SERPL-MCNC: 7.7 MG/DL (ref 8.7–10.5)
CHLORIDE SERPL-SCNC: 102 MMOL/L (ref 95–110)
CHLORIDE SERPL-SCNC: 103 MMOL/L (ref 95–110)
CO2 SERPL-SCNC: 26 MMOL/L (ref 23–29)
CO2 SERPL-SCNC: 27 MMOL/L (ref 23–29)
CREAT SERPL-MCNC: 1.6 MG/DL (ref 0.5–1.4)
CREAT SERPL-MCNC: 1.7 MG/DL (ref 0.5–1.4)
DIFFERENTIAL METHOD: ABNORMAL
EOSINOPHIL # BLD AUTO: 0 K/UL (ref 0–0.5)
EOSINOPHIL NFR BLD: 0 % (ref 0–8)
ERYTHROCYTE [DISTWIDTH] IN BLOOD BY AUTOMATED COUNT: 14.1 % (ref 11.5–14.5)
EST. GFR  (AFRICAN AMERICAN): 40 ML/MIN/1.73 M^2
EST. GFR  (AFRICAN AMERICAN): 43 ML/MIN/1.73 M^2
EST. GFR  (NON AFRICAN AMERICAN): 34 ML/MIN/1.73 M^2
EST. GFR  (NON AFRICAN AMERICAN): 37 ML/MIN/1.73 M^2
GLUCOSE SERPL-MCNC: 194 MG/DL (ref 70–110)
GLUCOSE SERPL-MCNC: 222 MG/DL (ref 70–110)
HCT VFR BLD AUTO: 26.7 % (ref 40–54)
HGB BLD-MCNC: 8.8 G/DL (ref 14–18)
IMM GRANULOCYTES # BLD AUTO: 0.05 K/UL (ref 0–0.04)
IMM GRANULOCYTES NFR BLD AUTO: 1.3 % (ref 0–0.5)
LYMPHOCYTES # BLD AUTO: 0.5 K/UL (ref 1–4.8)
LYMPHOCYTES NFR BLD: 14.1 % (ref 18–48)
MAGNESIUM SERPL-MCNC: 1.7 MG/DL (ref 1.6–2.6)
MCH RBC QN AUTO: 33.3 PG (ref 27–31)
MCHC RBC AUTO-ENTMCNC: 33 G/DL (ref 32–36)
MCV RBC AUTO: 101 FL (ref 82–98)
MONOCYTES # BLD AUTO: 0.1 K/UL (ref 0.3–1)
MONOCYTES NFR BLD: 1.6 % (ref 4–15)
NEUTROPHILS # BLD AUTO: 3.1 K/UL (ref 1.8–7.7)
NEUTROPHILS NFR BLD: 83 % (ref 38–73)
NRBC BLD-RTO: 0 /100 WBC
PLATELET # BLD AUTO: 115 K/UL (ref 150–450)
PMV BLD AUTO: 9.9 FL (ref 9.2–12.9)
POCT GLUCOSE: 225 MG/DL (ref 70–110)
POCT GLUCOSE: 243 MG/DL (ref 70–110)
POCT GLUCOSE: 315 MG/DL (ref 70–110)
POTASSIUM SERPL-SCNC: 3.6 MMOL/L (ref 3.5–5.1)
POTASSIUM SERPL-SCNC: 3.6 MMOL/L (ref 3.5–5.1)
PROT SERPL-MCNC: 5.5 G/DL (ref 6–8.4)
RBC # BLD AUTO: 2.64 M/UL (ref 4.6–6.2)
SODIUM SERPL-SCNC: 141 MMOL/L (ref 136–145)
SODIUM SERPL-SCNC: 143 MMOL/L (ref 136–145)
VANCOMYCIN SERPL-MCNC: 7.2 UG/ML
WBC # BLD AUTO: 3.77 K/UL (ref 3.9–12.7)

## 2022-05-01 PROCEDURE — 96366 THER/PROPH/DIAG IV INF ADDON: CPT | Performed by: STUDENT IN AN ORGANIZED HEALTH CARE EDUCATION/TRAINING PROGRAM

## 2022-05-01 PROCEDURE — 63600175 PHARM REV CODE 636 W HCPCS: Performed by: STUDENT IN AN ORGANIZED HEALTH CARE EDUCATION/TRAINING PROGRAM

## 2022-05-01 PROCEDURE — 99233 PR SUBSEQUENT HOSPITAL CARE,LEVL III: ICD-10-PCS | Mod: ,,, | Performed by: FAMILY MEDICINE

## 2022-05-01 PROCEDURE — 63600175 PHARM REV CODE 636 W HCPCS: Performed by: FAMILY MEDICINE

## 2022-05-01 PROCEDURE — 25000003 PHARM REV CODE 250: Performed by: STUDENT IN AN ORGANIZED HEALTH CARE EDUCATION/TRAINING PROGRAM

## 2022-05-01 PROCEDURE — 85025 COMPLETE CBC W/AUTO DIFF WBC: CPT | Performed by: STUDENT IN AN ORGANIZED HEALTH CARE EDUCATION/TRAINING PROGRAM

## 2022-05-01 PROCEDURE — 99900035 HC TECH TIME PER 15 MIN (STAT)

## 2022-05-01 PROCEDURE — 27000221 HC OXYGEN, UP TO 24 HOURS

## 2022-05-01 PROCEDURE — 80202 ASSAY OF VANCOMYCIN: CPT | Performed by: FAMILY MEDICINE

## 2022-05-01 PROCEDURE — 94761 N-INVAS EAR/PLS OXIMETRY MLT: CPT

## 2022-05-01 PROCEDURE — 36415 COLL VENOUS BLD VENIPUNCTURE: CPT | Performed by: STUDENT IN AN ORGANIZED HEALTH CARE EDUCATION/TRAINING PROGRAM

## 2022-05-01 PROCEDURE — 80048 BASIC METABOLIC PNL TOTAL CA: CPT | Mod: XB | Performed by: FAMILY MEDICINE

## 2022-05-01 PROCEDURE — 99233 SBSQ HOSP IP/OBS HIGH 50: CPT | Mod: ,,, | Performed by: FAMILY MEDICINE

## 2022-05-01 PROCEDURE — 11000001 HC ACUTE MED/SURG PRIVATE ROOM

## 2022-05-01 PROCEDURE — 25000003 PHARM REV CODE 250: Performed by: FAMILY MEDICINE

## 2022-05-01 PROCEDURE — 80053 COMPREHEN METABOLIC PANEL: CPT | Performed by: STUDENT IN AN ORGANIZED HEALTH CARE EDUCATION/TRAINING PROGRAM

## 2022-05-01 PROCEDURE — 83735 ASSAY OF MAGNESIUM: CPT | Performed by: STUDENT IN AN ORGANIZED HEALTH CARE EDUCATION/TRAINING PROGRAM

## 2022-05-01 RX ORDER — INSULIN ASPART 100 [IU]/ML
11 INJECTION, SOLUTION INTRAVENOUS; SUBCUTANEOUS ONCE
Status: DISCONTINUED | OUTPATIENT
Start: 2022-05-01 | End: 2022-05-01

## 2022-05-01 RX ORDER — IBUPROFEN 200 MG
24 TABLET ORAL
Status: DISCONTINUED | OUTPATIENT
Start: 2022-05-01 | End: 2022-05-04 | Stop reason: HOSPADM

## 2022-05-01 RX ORDER — MUPIROCIN 20 MG/G
OINTMENT TOPICAL 2 TIMES DAILY
Status: DISCONTINUED | OUTPATIENT
Start: 2022-05-01 | End: 2022-05-04 | Stop reason: HOSPADM

## 2022-05-01 RX ORDER — INSULIN ASPART 100 [IU]/ML
11 INJECTION, SOLUTION INTRAVENOUS; SUBCUTANEOUS ONCE
Status: COMPLETED | OUTPATIENT
Start: 2022-05-01 | End: 2022-05-01

## 2022-05-01 RX ORDER — MAGNESIUM SULFATE HEPTAHYDRATE 40 MG/ML
2 INJECTION, SOLUTION INTRAVENOUS ONCE
Status: COMPLETED | OUTPATIENT
Start: 2022-05-01 | End: 2022-05-01

## 2022-05-01 RX ORDER — INSULIN ASPART 100 [IU]/ML
0-5 INJECTION, SOLUTION INTRAVENOUS; SUBCUTANEOUS
Status: DISCONTINUED | OUTPATIENT
Start: 2022-05-01 | End: 2022-05-04 | Stop reason: HOSPADM

## 2022-05-01 RX ORDER — IBUPROFEN 200 MG
16 TABLET ORAL
Status: DISCONTINUED | OUTPATIENT
Start: 2022-05-01 | End: 2022-05-04 | Stop reason: HOSPADM

## 2022-05-01 RX ORDER — GLUCAGON 1 MG
1 KIT INJECTION
Status: DISCONTINUED | OUTPATIENT
Start: 2022-05-01 | End: 2022-05-04 | Stop reason: HOSPADM

## 2022-05-01 RX ORDER — BUMETANIDE 0.25 MG/ML
0.5 INJECTION INTRAMUSCULAR; INTRAVENOUS DAILY
Status: DISCONTINUED | OUTPATIENT
Start: 2022-05-01 | End: 2022-05-02

## 2022-05-01 RX ADMIN — PIPERACILLIN AND TAZOBACTAM 4.5 G: 4; .5 INJECTION, POWDER, LYOPHILIZED, FOR SOLUTION INTRAVENOUS; PARENTERAL at 11:05

## 2022-05-01 RX ADMIN — INSULIN ASPART 4 UNITS: 100 INJECTION, SOLUTION INTRAVENOUS; SUBCUTANEOUS at 12:05

## 2022-05-01 RX ADMIN — AMLODIPINE BESYLATE 10 MG: 10 TABLET ORAL at 08:05

## 2022-05-01 RX ADMIN — DOXAZOSIN 1 MG: 1 TABLET ORAL at 08:05

## 2022-05-01 RX ADMIN — CARVEDILOL 12.5 MG: 12.5 TABLET, FILM COATED ORAL at 04:05

## 2022-05-01 RX ADMIN — BUMETANIDE 0.5 MG: 0.25 INJECTION INTRAMUSCULAR; INTRAVENOUS at 10:05

## 2022-05-01 RX ADMIN — LEVOTHYROXINE SODIUM 75 MCG: 75 TABLET ORAL at 06:05

## 2022-05-01 RX ADMIN — PIPERACILLIN AND TAZOBACTAM 4.5 G: 4; .5 INJECTION, POWDER, LYOPHILIZED, FOR SOLUTION INTRAVENOUS; PARENTERAL at 04:05

## 2022-05-01 RX ADMIN — PRAVASTATIN SODIUM 20 MG: 20 TABLET ORAL at 08:05

## 2022-05-01 RX ADMIN — CARVEDILOL 12.5 MG: 12.5 TABLET, FILM COATED ORAL at 08:05

## 2022-05-01 RX ADMIN — INSULIN ASPART 11 UNITS: 100 INJECTION, SOLUTION INTRAVENOUS; SUBCUTANEOUS at 12:05

## 2022-05-01 RX ADMIN — PIPERACILLIN AND TAZOBACTAM 4.5 G: 4; .5 INJECTION, POWDER, LYOPHILIZED, FOR SOLUTION INTRAVENOUS; PARENTERAL at 08:05

## 2022-05-01 RX ADMIN — MAGNESIUM SULFATE HEPTAHYDRATE 2 G: 40 INJECTION, SOLUTION INTRAVENOUS at 01:05

## 2022-05-01 RX ADMIN — MUPIROCIN: 20 OINTMENT TOPICAL at 10:05

## 2022-05-01 RX ADMIN — GABAPENTIN 200 MG: 100 CAPSULE ORAL at 08:05

## 2022-05-01 RX ADMIN — VANCOMYCIN HYDROCHLORIDE 750 MG: 750 INJECTION, POWDER, LYOPHILIZED, FOR SOLUTION INTRAVENOUS at 04:05

## 2022-05-01 RX ADMIN — INSULIN ASPART 1 UNITS: 100 INJECTION, SOLUTION INTRAVENOUS; SUBCUTANEOUS at 08:05

## 2022-05-01 RX ADMIN — TAMSULOSIN HYDROCHLORIDE 0.4 MG: 0.4 CAPSULE ORAL at 08:05

## 2022-05-01 RX ADMIN — MUPIROCIN: 20 OINTMENT TOPICAL at 08:05

## 2022-05-01 RX ADMIN — RIVAROXABAN 20 MG: 20 TABLET, FILM COATED ORAL at 08:05

## 2022-05-01 RX ADMIN — INSULIN ASPART 2 UNITS: 100 INJECTION, SOLUTION INTRAVENOUS; SUBCUTANEOUS at 04:05

## 2022-05-01 NOTE — PLAN OF CARE
Problem: Adult Inpatient Plan of Care  Goal: Plan of Care Review  Outcome: Ongoing, Progressing     Problem: Adult Inpatient Plan of Care  Goal: Patient-Specific Goal (Individualized)  Outcome: Ongoing, Progressing     Problem: Adult Inpatient Plan of Care  Goal: Absence of Hospital-Acquired Illness or Injury  Outcome: Ongoing, Progressing     Problem: Diabetes Comorbidity  Goal: Blood Glucose Level Within Targeted Range  Outcome: Ongoing, Progressing     Problem: Fall Injury Risk  Goal: Absence of Fall and Fall-Related Injury  Outcome: Ongoing, Progressing     Problem: Adult Inpatient Plan of Care  Goal: Optimal Comfort and Wellbeing  Outcome: Met

## 2022-05-01 NOTE — H&P
"[facility] ([address] [place] [address] Medicine  History & Physical    Patient Name: Eddie Parada Sr.  MRN: [id number]  Patient Class: OP- Observation  Admission Date: 4/30/2022  Attending Physician: Linda Greco MD   Primary Care Provider: Callum Roberts MD         Patient information was obtained from patient and ER records.     Subjective:     Principal Problem:<principal problem not specified>    Chief Complaint:   Chief Complaint   Patient presents with    Shortness of Breath     For " 3-4 days"          HPI: 89 year old male with well controlled htn, diabetes, cad hfpef and recent complication of umbilical hernia mesh was brought in because of shortness of breath. 2 months ago he was admitted and then treated for an abdominal wall abscess. His post drainage course has been complicated by abx intolerance, renal failure. He has improved and has been on amp/sulbactam via picc for the last 2 weeks. Apparently yesterday he went to see dr. Haji and had his drain removed as the production had decreased. Over the last few days he has had worsening c/o dyspnea with exertion. There has been some concern for his renal function after sepsis and vanc treatment, so his lasix has been held for sometime. Over the last 4 mornings it has been noted that his pulse ox is around 84 upon wakening but it would improve to low 90s with movement/activity. No fever. No productive coughing. No choking/sputtering. No sick contacts. This morning he had swelling in his face which ultimately lead to his presentation to the ER.      Past Medical History:   Diagnosis Date    Abdominal fibromatosis     Acute posthemorrhagic anemia 1/9/2018    NIK (acute kidney injury) 1/11/2018    Atrial fibrillation     Bradycardia     Broken arm     CAD (coronary artery disease)     Cancer     basal cell on nose and melanoma on back    CHF (congestive heart failure)     COPD (chronic obstructive pulmonary disease)     Diabetes mellitus " type II     Hyperlipidemia     Localization-related focal epilepsy with simple partial seizures 3/2/2021    Melanoma     Obesity (BMI 30.0-34.9) 9/13/2016    Osteoporosis     Peripheral vascular disease     Pneumonia of left lower lobe due to infectious organism 4/30/2022    Primary malignant neoplasm of prostate 3/3/2022    Septic shock 3/3/2022    Stroke     TIA (transient ischemic attack)     Type II diabetes mellitus with peripheral circulatory disorder     Type II or unspecified type diabetes mellitus without mention of complication, not stated as uncontrolled     Unspecified essential hypertension        Past Surgical History:   Procedure Laterality Date    AORTIC VALVE REPLACEMENT      CARDIAC PACEMAKER PLACEMENT  9/28/2012    CARDIAC VALVE REPLACEMENT  11/17/2011    aortic valve-tissue    CHOLECYSTECTOMY      COLONOSCOPY      ESOPHAGOGASTRODUODENOSCOPY N/A 5/27/2021    Procedure: EGD (ESOPHAGOGASTRODUODENOSCOPY);  Surgeon: Gualberto Medrano MD;  Location: [facility];  Service: Endoscopy;  Laterality: N/A;    ESOPHAGOGASTRODUODENOSCOPY N/A 6/22/2021    Procedure: EGD (ESOPHAGOGASTRODUODENOSCOPY);  Surgeon: Gualberto Medrano MD;  Location: [facility];  Service: Endoscopy;  Laterality: N/A;  please call wife(Jessica) with instructions/arrival time.    ESOPHAGOGASTRODUODENOSCOPY (EGD) WITH DILATION  06/22/2021    EYE SURGERY Bilateral     cataract with lens by  at [facility]    HERNIA REPAIR      abdominal    LASIK      UPPER GASTROINTESTINAL ENDOSCOPY  05/28/2021       Review of patient's allergies indicates:   Allergen Reactions    Ciprofloxacin     Levofloxacin Swelling    Lyrica [pregabalin] Swelling    Unable to assess Other (See Comments)     Soft shell crabs       No current facility-administered medications on file prior to encounter.     Current Outpatient Medications on File Prior to Encounter   Medication Sig    acetaminophen (TYLENOL) 500 MG tablet Take  500 mg by mouth every 6 (six) hours as needed for Pain.    amLODIPine (NORVASC) 10 MG tablet TAKE 1 TABLET BY MOUTH DAILY FOR BLOOD PRESSURE    ampicillin sodium/sulbactam Na (AMPICILLIN/SULBACTAM 3 G/100 ML NS, READY TO MIX,) Inject 100 mLs (3 g total) into the vein every 12 (twelve) hours. Tentative end date: 4/23/2022 but will need to followup with ID before discontinuation of antibiotics    carvediloL (COREG) 12.5 MG tablet TAKE 1 TABLET (12.5 MG TOTAL) BY MOUTH 2 (TWO) TIMES DAILY WITH MEALS.    doxazosin (CARDURA) 1 MG tablet Take 1 mg by mouth nightly.    ferrous sulfate (FEOSOL) 325 mg (65 mg iron) Tab tablet Take 325 mg by mouth once daily at 6am.    furosemide (LASIX) 20 MG tablet Take 1 tablet (20 mg total) by mouth every other day. Hold medication until 3/29 (Patient taking differently: Take 20 mg by mouth once daily at 6am.)    gabapentin (NEURONTIN) 100 MG capsule Take 2 capsules (200 mg total) by mouth every evening.    levothyroxine (SYNTHROID) 75 MCG tablet Take 1 tablet (75 mcg total) by mouth before breakfast.    pantoprazole (PROTONIX) 40 MG tablet TAKE ONE TABLET BY MOUTH ONCE A DAY FOR STOMACH PROBLEMS    potassium chloride SA (K-DUR,KLOR-CON) 10 MEQ tablet Take 1 tablet (10 mEq total) by mouth once daily.    pravastatin (PRAVACHOL) 20 MG tablet TAKE 1 TABLET BY MOUTH DAILY FOR CHOLESTROL    rivaroxaban (XARELTO) 20 mg Tab Take one tablet(20mg) by mouth once daily    senna-docusate 8.6-50 mg (PERICOLACE) 8.6-50 mg per tablet Take 1 tablet by mouth 2 (two) times daily as needed for Constipation.    tamsulosin (FLOMAX) 0.4 mg Cap Take 1 capsule (0.4 mg total) by mouth once daily.    [DISCONTINUED] blood sugar diagnostic (BLOOD GLUCOSE TEST) [employer] ULTRA TESTING STRIPS. USE TO TEST BLOOD GLUCOSE TWICE DAILY. DX CODE: E11.51 (Patient not taking: No sig reported)    [DISCONTINUED] blood-glucose meter Misc ONETOUCH ULTRA GLUCOSE METER. USE AS DIRECTED TO TEST BLOOD GLUCOSE. DX  CODE: E11.51 (Patient not taking: No sig reported)    [DISCONTINUED] gemfibrozil (LOPID) 600 MG tablet Take one tablet(600mg) by mouth once daily    [DISCONTINUED] lancets (ONETOUCH ULTRASOFT LANCETS) Misc USE TO TEST BLOOD GLUCOSE TWICE DAILY. DX CODE: E11.51 (Patient not taking: No sig reported)    [DISCONTINUED] lisinopriL (PRINIVIL,ZESTRIL) 20 MG tablet Take 1 tablet (20 mg total) by mouth once daily. Hold medication until 3/29/22 (Patient not taking: No sig reported)     Family History       Problem Relation (Age of Onset)    Alcohol abuse Father    COPD Sister, Brother    Cancer Brother    Diabetes Daughter    Heart disease Brother    Hypertension Father    No Known Problems Son, Daughter, Son    Stroke Father          Tobacco Use    Smoking status: Former Smoker     Quit date: 1996     Years since quittin.6    Smokeless tobacco: Never Used    Tobacco comment: cigar smoker   Substance and Sexual Activity    Alcohol use: No     Comment: none since     Drug use: No    Sexual activity: Not Currently     Review of Systems   Constitutional:  Negative for chills and fever.   HENT:  Negative for congestion, ear pain, postnasal drip, rhinorrhea, sore throat and trouble swallowing.    Eyes:  Negative for redness and itching.   Respiratory:  Positive for cough. Negative for shortness of breath and wheezing.         AMAYA. No orthopnea   Cardiovascular:  Positive for leg swelling. Negative for chest pain and palpitations.   Gastrointestinal:  Negative for abdominal pain, diarrhea, nausea and vomiting.   Genitourinary:  Negative for dysuria and frequency.   Skin:  Negative for rash.   Neurological:  Negative for weakness and headaches.   Objective:     Vital Signs (Most Recent):  Temp: 97.2 °F (36.2 °C) (22)  Pulse: 64 (Simultaneous filing. User may not have seen previous data.) (22)  Resp: 20 (22)  BP: (!) 130/57 (22)  SpO2: (!) 93 % (22)   Vital  Signs (24h Range):  Temp:  [96.7 °F (35.9 °C)-98 °F (36.7 °C)] 97.2 °F (36.2 °C)  Pulse:  [59-69] 64  Resp:  [17-36] 20  SpO2:  [87 %-100 %] 93 %  BP: (115-151)/(55-99) 130/57     Weight: 88.2 kg (194 lb 7.1 oz)  Body mass index is 32.36 kg/m².    Physical Exam  Vitals and nursing note reviewed.   Constitutional:       General: He is not in acute distress.     Appearance: He is well-developed.   HENT:      Head: Normocephalic and atraumatic.      Nose:      Comments: 2L nc in place  Eyes:      Conjunctiva/sclera: Conjunctivae normal.      Pupils: Pupils are equal, round, and reactive to light.   Neck:      Thyroid: No thyromegaly.   Cardiovascular:      Rate and Rhythm: Normal rate. Rhythm irregular.      Heart sounds: Murmur heard.   Pulmonary:      Effort: Pulmonary effort is normal. No respiratory distress.      Breath sounds: Normal breath sounds. No wheezing.   Abdominal:      General: Bowel sounds are normal.      Palpations: Abdomen is soft.      Tenderness: There is no abdominal tenderness.   Musculoskeletal:         General: Normal range of motion.      Cervical back: Normal range of motion and neck supple.      Right lower leg: Edema present.      Left lower leg: Edema present.   Lymphadenopathy:      Cervical: No cervical adenopathy.   Skin:     General: Skin is warm and dry.      Findings: No rash.   Neurological:      Mental Status: He is alert and oriented to person, place, and time.   Psychiatric:         Behavior: Behavior normal.         CRANIAL NERVES     CN III, IV, VI   Pupils are equal, round, and reactive to light.     Significant Labs: All pertinent labs within the past 24 hours have been reviewed.  A1C:   Recent Labs   Lab 03/02/22  0744 04/30/22  1046   HGBA1C 5.9* 6.0*     ABGs:   Recent Labs   Lab 04/30/22  1052   PH 7.470*   PCO2 39   HCO3 28.40   POCSATURATED 89.7*   BE 4.40*   TOTALHB 10.8*   COHB 2.3   METHB 0.9   PO2 58*     Blood Culture:   Recent Labs   Lab 04/30/22  1046   LABBLOO  No Growth to date  No Growth to date     CBC:   Recent Labs   Lab 04/30/22  1046   WBC 7.23   HGB 10.6*   HCT 32.1*        CMP:   Recent Labs   Lab 04/30/22  1046      K 3.4*      CO2 25   *   BUN 17   CREATININE 1.5*   CALCIUM 8.2*   PROT 6.8   ALBUMIN 2.7*   BILITOT 0.6   ALKPHOS 105   AST 37   ALT 19   ANIONGAP 15   EGFRNONAA 40*     Lactic Acid:   Recent Labs   Lab 04/30/22  1046 04/30/22  1144   LACTATE 1.2 1.0     Magnesium: No results for input(s): MG in the last 48 hours.  Troponin:   Recent Labs   Lab 04/30/22  1046 04/30/22  1329   TROPONINI 0.069* 0.066*     TSH:   Recent Labs   Lab 03/02/22  0744   TSH 5.943*     Urine Studies: No results for input(s): COLORU, APPEARANCEUA, PHUR, SPECGRAV, PROTEINUA, GLUCUA, KETONESU, BILIRUBINUA, OCCULTUA, NITRITE, UROBILINOGEN, LEUKOCYTESUR, RBCUA, WBCUA, BACTERIA, SQUAMEPITHEL, HYALINECASTS in the last 48 hours.    Invalid input(s): WRIGHTSUR    Significant Imaging: I have reviewed all pertinent imaging results/findings within the past 24 hours.      Left perihilar and lower lung airspace opacification may reflect pneumonia, aspiration, or pulmonary edema.  Possible small left pleural effusion.    Assessment/Plan:     Pneumonia of left lower lobe due to infectious organism  Clinical presentation not completely c/w this. More likely he has chf. No cough. No fever. Has picc line and has been getting amp. For now, broaden abx. Check cbc in am. Wean o2 as tolerated. And monitor response to diuresis. vanc and zosyn ordered. Amp on hold. Blood cultures sent.      Acute on chronic congestive heart failure  Patient is identified as having Diastolic (HFpEF) heart failure that is Acute on chronic. CHF is currently uncontrolled due to Continued edema of extremities, Hepatic congestion/ascites and JVD and Pulmonary edema/pleural effusion on CXR. Latest ECHO performed and demonstrates- Results for orders placed during the hospital encounter of  03/03/22    Echo    Interpretation Summary  · The left ventricle is normal in size with normal systolic function.  · The estimated ejection fraction is 55%.  · Indeterminate left ventricular diastolic function.  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  · Normal right ventricular size with normal right ventricular systolic function.  · Severe left atrial enlargement.  · Severe right atrial enlargement.  · Mild to moderate tricuspid regurgitation.  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 There is no aortic insufficiency present. Prosthetic aortic valve is normal.  · The aortic valve mean gradient is 13 mmHg with a dimensionless index of 0.61.  . Continue Beta Blocker and Furosemide and monitor clinical status closely. Monitor on telemetry. Patient is on CHF pathway.  Monitor strict Is&Os and daily weights.  Place on fluid restriction of 1.5 L. Continue to stress to patient importance of self efficacy and  on diet for CHF. Last BNP reviewed- and noted below   Recent Labs   Lab 04/30/22  1046   *   .  Has not been on lasix. Given 1 dose in er. Hasn't really diuresed. May switch to bumex pending diuresis. Not on an ace - ?renal function. Not on jardiance, either. Will consult cards for Monday.    Abdominal wall abscess  Seeing id and surgery outpatient.  Drain removed 4/29.  Seems there is still persistence in swelling in ab wall.      Elevated troponin  Stable. Trend. No chest pain.      (HFpEF) heart failure with preserved ejection fraction  3/3/22  · The left ventricle is normal in size with normal systolic function.  · The estimated ejection fraction is 55%.  · Indeterminate left ventricular diastolic function.  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  · Normal right ventricular size with normal right ventricular systolic function.  · Severe left atrial enlargement.  · Severe right atrial  enlargement.  · Mild to moderate tricuspid regurgitation.  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 There is no aortic insufficiency present. Prosthetic aortic valve is normal.  · The aortic valve mean gradient is 13 mmHg with a dimensionless index of 0.61.          Type 2 diabetes mellitus, controlled  Not on meds. Sliding scale ordered.      Aortic stenosis  S/p replacement. On xarelto.      CAD (coronary artery disease)  On BB, statin. Not taking aspirin.      Essential (primary) hypertension  Cont on home meds.      Chronic atrial fibrillation  On xarelto and coreg at home. On telemetry.        VTE Risk Mitigation (From admission, onward)         Ordered     rivaroxaban tablet 20 mg  Daily         04/30/22 1617     IP VTE HIGH RISK PATIENT  Once         04/30/22 1617     Place sequential compression device  Until discontinued         04/30/22 1617     Place SOPHIA hose  Until discontinued         04/30/22 1617                   Linda Greco MD  Department of Hospital Medicine   [facility] - Med Surg (3rd Fl)

## 2022-05-01 NOTE — HPI
89 year old male with well controlled htn, diabetes, cad hfpef and recent complication of umbilical hernia mesh was brought in because of shortness of breath. 2 months ago he was admitted and then treated for an abdominal wall abscess. His post drainage course has been complicated by abx intolerance, renal failure. He has improved and has been on amp/sulbactam via picc for the last 2 weeks. Apparently yesterday he went to see dr. Haji and had his drain removed as the production had decreased. Over the last few days he has had worsening c/o dyspnea with exertion. There has been some concern for his renal function after sepsis and vanc treatment, so his lasix has been held for sometime. Over the last 4 mornings it has been noted that his pulse ox is around 84 upon wakening but it would improve to low 90s with movement/activity. No fever. No productive coughing. No choking/sputtering. No sick contacts. This morning he had swelling in his face which ultimately lead to his presentation to the ER.

## 2022-05-01 NOTE — PROGRESS NOTES
[place] - [facility] ([facility])  [facility] Medicine  Progress Note    Patient Name: Eddie Paraad Sr.  MRN: [id number]  Patient Class: IP- Inpatient   Admission Date: 4/30/2022  Length of Stay: 0 days  Attending Physician: Linda Greco MD  Primary Care Provider: Callum Roberts MD        Subjective:     Principal Problem:<principal problem not specified>        HPI:  89 year old male with well controlled htn, diabetes, cad hfpef and recent complication of umbilical hernia mesh was brought in because of shortness of breath. 2 months ago he was admitted and then treated for an abdominal wall abscess. His post drainage course has been complicated by abx intolerance, renal failure. He has improved and has been on amp/sulbactam via picc for the last 2 weeks. Apparently yesterday he went to see dr. Haji and had his drain removed as the production had decreased. Over the last few days he has had worsening c/o dyspnea with exertion. There has been some concern for his renal function after sepsis and vanc treatment, so his lasix has been held for sometime. Over the last 4 mornings it has been noted that his pulse ox is around 84 upon wakening but it would improve to low 90s with movement/activity. No fever. No productive coughing. No choking/sputtering. No sick contacts. This morning he had swelling in his face which ultimately lead to his presentation to the ER.      Overview/Hospital Course:  Less than 1 L output of urine overnight. No longer with resp distress and patient about to lay completely flat without issues. He denies any chest pain, shortness of breath. Swelling noted over drain site. Sats lower 90s on supplemental oxygen. Afebrile.      Past Medical History:   Diagnosis Date    Abdominal fibromatosis     Acute posthemorrhagic anemia 1/9/2018    NIK (acute kidney injury) 1/11/2018    Atrial fibrillation     Bradycardia     Broken arm     CAD (coronary artery disease)     Cancer     basal cell on nose  and melanoma on back    CHF (congestive heart failure)     COPD (chronic obstructive pulmonary disease)     Diabetes mellitus type II     Hyperlipidemia     Localization-related focal epilepsy with simple partial seizures 3/2/2021    Melanoma     Obesity (BMI 30.0-34.9) 9/13/2016    Osteoporosis     Peripheral vascular disease     Pneumonia of left lower lobe due to infectious organism 4/30/2022    Primary malignant neoplasm of prostate 3/3/2022    Septic shock 3/3/2022    Stroke     TIA (transient ischemic attack)     Type II diabetes mellitus with peripheral circulatory disorder     Type II or unspecified type diabetes mellitus without mention of complication, not stated as uncontrolled     Unspecified essential hypertension            Review of Systems   Constitutional:  Negative for chills and fever.   HENT:  Negative for congestion, ear pain, postnasal drip, rhinorrhea, sore throat and trouble swallowing.    Eyes:  Negative for redness and itching.   Respiratory:  Positive for cough. Negative for shortness of breath and wheezing.         AMAYA. No orthopnea   Cardiovascular:  Positive for leg swelling. Negative for chest pain and palpitations.   Gastrointestinal:  Negative for abdominal pain, diarrhea, nausea and vomiting.   Genitourinary:  Negative for dysuria and frequency.   Skin:  Negative for rash.   Neurological:  Negative for weakness and headaches.   Objective:     Vital Signs (Most Recent):  Temp: 96.9 °F (36.1 °C) (05/01/22 0704)  Pulse: 63 (05/01/22 1000)  Resp: 18 (05/01/22 0704)  BP: (!) 118/59 (05/01/22 0704)  SpO2: (!) 91 % (05/01/22 0704)   Vital Signs (24h Range):  Temp:  [96.7 °F (35.9 °C)-98.6 °F (37 °C)] 96.9 °F (36.1 °C)  Pulse:  [59-69] 63  Resp:  [17-36] 18  SpO2:  [90 %-100 %] 91 %  BP: (102-151)/(52-69) 118/59     Weight: 85.6 kg (188 lb 11.4 oz)  Body mass index is 31.4 kg/m².    Physical Exam  Vitals and nursing note reviewed.   Constitutional:       General: He is not  in acute distress.     Appearance: He is well-developed.   HENT:      Head: Normocephalic and atraumatic.      Nose:      Comments: 2L nc in place  Eyes:      Conjunctiva/sclera: Conjunctivae normal.      Pupils: Pupils are equal, round, and reactive to light.   Neck:      Thyroid: No thyromegaly.   Cardiovascular:      Rate and Rhythm: Normal rate. Rhythm irregular.      Heart sounds: Murmur heard.   Pulmonary:      Effort: Pulmonary effort is normal. No respiratory distress.      Breath sounds: Normal breath sounds. No wheezing.   Abdominal:      General: Bowel sounds are normal.      Palpations: Abdomen is soft.      Tenderness: There is no abdominal tenderness.   Musculoskeletal:         General: Normal range of motion.      Cervical back: Normal range of motion and neck supple.      Right lower leg: Edema present.      Left lower leg: Edema present.   Lymphadenopathy:      Cervical: No cervical adenopathy.   Skin:     General: Skin is warm and dry.      Findings: No rash.   Neurological:      Mental Status: He is alert and oriented to person, place, and time.   Psychiatric:         Behavior: Behavior normal.         CRANIAL NERVES     CN III, IV, VI   Pupils are equal, round, and reactive to light.     Significant Labs: All pertinent labs within the past 24 hours have been reviewed.  A1C:   Recent Labs   Lab 03/02/22  0744 04/30/22  1046   HGBA1C 5.9* 6.0*     ABGs:   Recent Labs   Lab 04/30/22  1052   PH 7.470*   PCO2 39   HCO3 28.40   POCSATURATED 89.7*   BE 4.40*   TOTALHB 10.8*   COHB 2.3   METHB 0.9   PO2 58*     Blood Culture:   Recent Labs   Lab 04/30/22  1046 04/30/22  1144 04/30/22  1447   LABBLOO No Growth to date  No Growth to date No Growth to date  No Growth to date No Growth to date  No Growth to date     CBC:   Recent Labs   Lab 04/30/22  1046 05/01/22  0610   WBC 7.23 3.77*   HGB 10.6* 8.8*   HCT 32.1* 26.7*    115*     CMP:   Recent Labs   Lab 04/30/22  1046 05/01/22  0610     143   K 3.4* 3.6    103   CO2 25 26   * 222*   BUN 17 18   CREATININE 1.5* 1.6*   CALCIUM 8.2* 7.7*   PROT 6.8 5.5*   ALBUMIN 2.7* 2.2*   BILITOT 0.6 0.4   ALKPHOS 105 82   AST 37 38   ALT 19 18   ANIONGAP 15 14   EGFRNONAA 40* 37*     Lactic Acid:   Recent Labs   Lab 04/30/22  1046 04/30/22  1144   LACTATE 1.2 1.0     Magnesium: No results for input(s): MG in the last 48 hours.  Troponin:   Recent Labs   Lab 04/30/22  1046 04/30/22  1329   TROPONINI 0.069* 0.066*     TSH:   Recent Labs   Lab 03/02/22  0744   TSH 5.943*     Urine Studies: No results for input(s): COLORU, APPEARANCEUA, PHUR, SPECGRAV, PROTEINUA, GLUCUA, KETONESU, BILIRUBINUA, OCCULTUA, NITRITE, UROBILINOGEN, LEUKOCYTESUR, RBCUA, WBCUA, BACTERIA, SQUAMEPITHEL, HYALINECASTS in the last 48 hours.    Invalid input(s): WRIGHTSUR    Significant Imaging: I have reviewed all pertinent imaging results/findings within the past 24 hours.      Left perihilar and lower lung airspace opacification may reflect pneumonia, aspiration, or pulmonary edema.  Possible small left pleural effusion.      Assessment/Plan:      Pneumonia of left lower lobe due to infectious organism  Clinical presentation not completely c/w this. More likely he has chf. No cough. No fever. Has picc line and has been getting amp. For now, broaden abx. Check cbc in am. Wean o2 as tolerated. And monitor response to diuresis. vanc and zosyn ordered. Amp on hold. Blood cultures sent.    5/1  WBC ct dropped - but he is pancytopenic this morning so I question lab error. Will recheck cbc. Afebrile. Will recheck cxr. He has not really diuresed though and breathing has improved. Still with hypoxia.    Acute on chronic congestive heart failure  Patient is identified as having Diastolic (HFpEF) heart failure that is Acute on chronic. CHF is currently uncontrolled due to Continued edema of extremities, Hepatic congestion/ascites and JVD and Pulmonary edema/pleural effusion on CXR. Latest ECHO  performed and demonstrates- Results for orders placed during the hospital encounter of 03/03/22    Echo    Interpretation Summary  · The left ventricle is normal in size with normal systolic function.  · The estimated ejection fraction is 55%.  · Indeterminate left ventricular diastolic function.  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  · Normal right ventricular size with normal right ventricular systolic function.  · Severe left atrial enlargement.  · Severe right atrial enlargement.  · Mild to moderate tricuspid regurgitation.  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 There is no aortic insufficiency present. Prosthetic aortic valve is normal.  · The aortic valve mean gradient is 13 mmHg with a dimensionless index of 0.61.  . Continue Beta Blocker and Furosemide and monitor clinical status closely. Monitor on telemetry. Patient is on CHF pathway.  Monitor strict Is&Os and daily weights.  Place on fluid restriction of 1.5 L. Continue to stress to patient importance of self efficacy and  on diet for CHF. Last BNP reviewed- and noted below   Recent Labs   Lab 04/30/22  1046   *   .  Has not been on lasix. Given 1 dose in er. Hasn't really diuresed. May switch to bumex pending diuresis. Not on an ace - ?renal function. Not on jardiance, either. Will consult cards for Monday.    5/1 consult cards. Consideration of repeat echo - though just recently completed in march.    CKD (chronic kidney disease) stage 3, GFR 30-59 ml/min  Xarelto adjusted.  Check daily BMPs with diuretic use.      Abdominal wall abscess  Seeing id and surgery outpatient.  Drain removed 4/29.  Seems there is still persistence in swelling in ab wall.    5/1  On physical exam I can feel a swelling superior to where the drain was. Will have dr. Haji see patient if he is in house.      Elevated troponin  Stable. Trend. No chest pain.      (HFpEF) heart failure with preserved ejection  fraction  3/3/22  · The left ventricle is normal in size with normal systolic function.  · The estimated ejection fraction is 55%.  · Indeterminate left ventricular diastolic function.  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  · Normal right ventricular size with normal right ventricular systolic function.  · Severe left atrial enlargement.  · Severe right atrial enlargement.  · Mild to moderate tricuspid regurgitation.  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 There is no aortic insufficiency present. Prosthetic aortic valve is normal.  · The aortic valve mean gradient is 13 mmHg with a dimensionless index of 0.61.    5/1  Switch from lasix to bumex and monitor diuresis. Really didn't get much output overnight. Breathing is improved. Repeat cxr ordered for this morning. Need accurate weights.      Type 2 diabetes mellitus, controlled  Not on meds. Sliding scale ordered.      Aortic stenosis  S/p replacement. On xarelto.      Cardiac pacemaker in situ  Not showing on tele.  Short run of v tach this morning.  Check mag/k      CAD (coronary artery disease)  On BB, statin. Not taking aspirin.      Essential (primary) hypertension  Cont on home meds.      Chronic atrial fibrillation  On xarelto (lower dose here with renal insuff) and coreg at home. On telemetry.        VTE Risk Mitigation (From admission, onward)         Ordered     rivaroxaban tablet 15 mg  with dinner         05/01/22 0921     IP VTE HIGH RISK PATIENT  Once         04/30/22 1617     Place sequential compression device  Until discontinued         04/30/22 1617     Place SOPHIA hose  Until discontinued         04/30/22 1617                Discharge Planning   KANDICE:      Code Status: Full Code   Is the patient medically ready for discharge?:     Reason for patient still in hospital (select all that apply): Patient new problem, Patient trending condition, Laboratory test and Treatment                      Linda Greco MD  Department of Hospital Medicine   [place] - [facility] (3rd Fl)

## 2022-05-01 NOTE — ASSESSMENT & PLAN NOTE
Clinical presentation not completely c/w this. More likely he has chf. No cough. No fever. Has picc line and has been getting amp. For now, broaden abx. Check cbc in am. Wean o2 as tolerated. And monitor response to diuresis. vanc and zosyn ordered. Amp on hold. Blood cultures sent.    5/1  WBC ct dropped - but he is pancytopenic this morning so I question lab error. Will recheck cbc. Afebrile. Will recheck cxr. He has not really diuresed though and breathing has improved. Still with hypoxia.

## 2022-05-01 NOTE — ASSESSMENT & PLAN NOTE
Seeing id and surgery outpatient.  Drain removed 4/29.  Seems there is still persistence in swelling in ab wall.    5/1  On physical exam I can feel a swelling superior to where the drain was. Will have dr. Haji see patient if he is in house.

## 2022-05-01 NOTE — ASSESSMENT & PLAN NOTE
Clinical presentation not completely c/w this. More likely he has chf. No cough. No fever. Has picc line and has been getting amp. For now, broaden abx. Check cbc in am. Wean o2 as tolerated. And monitor response to diuresis. vanc and zosyn ordered. Amp on hold. Blood cultures sent.

## 2022-05-01 NOTE — ASSESSMENT & PLAN NOTE
Patient is identified as having Diastolic (HFpEF) heart failure that is Acute on chronic. CHF is currently uncontrolled due to Continued edema of extremities, Hepatic congestion/ascites and JVD and Pulmonary edema/pleural effusion on CXR. Latest ECHO performed and demonstrates- Results for orders placed during the hospital encounter of 03/03/22    Echo    Interpretation Summary  · The left ventricle is normal in size with normal systolic function.  · The estimated ejection fraction is 55%.  · Indeterminate left ventricular diastolic function.  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  · Normal right ventricular size with normal right ventricular systolic function.  · Severe left atrial enlargement.  · Severe right atrial enlargement.  · Mild to moderate tricuspid regurgitation.  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 There is no aortic insufficiency present. Prosthetic aortic valve is normal.  · The aortic valve mean gradient is 13 mmHg with a dimensionless index of 0.61.  . Continue Beta Blocker and Furosemide and monitor clinical status closely. Monitor on telemetry. Patient is on CHF pathway.  Monitor strict Is&Os and daily weights.  Place on fluid restriction of 1.5 L. Continue to stress to patient importance of self efficacy and  on diet for CHF. Last BNP reviewed- and noted below   Recent Labs   Lab 04/30/22  1046   *   .  Has not been on lasix. Given 1 dose in er. Hasn't really diuresed. May switch to bumex pending diuresis. Not on an ace - ?renal function. Not on jardiance, either. Will consult cards for Monday.    5/1 consult cards. Consideration of repeat echo - though just recently completed in march.

## 2022-05-01 NOTE — ASSESSMENT & PLAN NOTE
3/3/22  · The left ventricle is normal in size with normal systolic function.  · The estimated ejection fraction is 55%.  · Indeterminate left ventricular diastolic function.  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  · Normal right ventricular size with normal right ventricular systolic function.  · Severe left atrial enlargement.  · Severe right atrial enlargement.  · Mild to moderate tricuspid regurgitation.  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 There is no aortic insufficiency present. Prosthetic aortic valve is normal.  · The aortic valve mean gradient is 13 mmHg with a dimensionless index of 0.61.

## 2022-05-01 NOTE — NURSING
Pt w/ 7 beats of v tach per [place].     [facility] [place] notified Dr. Greco when she came to the floor.     PT AAOx4. Pt denies any chest pain or shortness of breath.

## 2022-05-01 NOTE — ASSESSMENT & PLAN NOTE
Patient is identified as having Diastolic (HFpEF) heart failure that is Acute on chronic. CHF is currently uncontrolled due to Continued edema of extremities, Hepatic congestion/ascites and JVD and Pulmonary edema/pleural effusion on CXR. Latest ECHO performed and demonstrates- Results for orders placed during the hospital encounter of 03/03/22    Echo    Interpretation Summary  · The left ventricle is normal in size with normal systolic function.  · The estimated ejection fraction is 55%.  · Indeterminate left ventricular diastolic function.  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  · Normal right ventricular size with normal right ventricular systolic function.  · Severe left atrial enlargement.  · Severe right atrial enlargement.  · Mild to moderate tricuspid regurgitation.  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 There is no aortic insufficiency present. Prosthetic aortic valve is normal.  · The aortic valve mean gradient is 13 mmHg with a dimensionless index of 0.61.  . Continue Beta Blocker and Furosemide and monitor clinical status closely. Monitor on telemetry. Patient is on CHF pathway.  Monitor strict Is&Os and daily weights.  Place on fluid restriction of 1.5 L. Continue to stress to patient importance of self efficacy and  on diet for CHF. Last BNP reviewed- and noted below   Recent Labs   Lab 04/30/22  1046   *   .  Has not been on lasix. Given 1 dose in er. Hasn't really diuresed. May switch to bumex pending diuresis. Not on an ace - ?renal function. Not on jardiance, either. Will consult cards for Monday.

## 2022-05-01 NOTE — ASSESSMENT & PLAN NOTE
Seeing id and surgery outpatient.  Drain removed 4/29.  Seems there is still persistence in swelling in ab wall.

## 2022-05-01 NOTE — SUBJECTIVE & OBJECTIVE
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    Melanoma     Obesity (BMI 30.0-34.9) 9/13/2016    Osteoporosis     Peripheral vascular disease     Pneumonia of left lower lobe due to infectious organism 4/30/2022    Primary malignant neoplasm of prostate 3/3/2022    Septic shock 3/3/2022    Stroke     TIA (transient ischemic attack)     Type II diabetes mellitus with peripheral circulatory disorder     Type II or unspecified type diabetes mellitus without mention of complication, not stated as uncontrolled     Unspecified essential hypertension            Review of Systems   Constitutional:  Negative for chills and fever.   HENT:  Negative for congestion, ear pain, postnasal drip, rhinorrhea, sore throat and trouble swallowing.    Eyes:  Negative for redness and itching.   Respiratory:  Positive for cough. Negative for shortness of breath and wheezing.         AMAYA. No orthopnea   Cardiovascular:  Positive for leg swelling. Negative for chest pain and palpitations.   Gastrointestinal:  Negative for abdominal pain, diarrhea, nausea and vomiting.   Genitourinary:  Negative for dysuria and frequency.   Skin:  Negative for rash.   Neurological:  Negative for weakness and headaches.   Objective:     Vital Signs (Most Recent):  Temp: 96.9 °F (36.1 °C) (05/01/22 0704)  Pulse: 63 (05/01/22 1000)  Resp: 18 (05/01/22 0704)  BP: (!) 118/59 (05/01/22 0704)  SpO2: (!) 91 % (05/01/22 0704)   Vital Signs (24h Range):  Temp:  [96.7 °F (35.9 °C)-98.6 °F (37 °C)] 96.9 °F (36.1 °C)  Pulse:   [59-69] 63  Resp:  [17-36] 18  SpO2:  [90 %-100 %] 91 %  BP: (102-151)/(52-69) 118/59     Weight: 85.6 kg (188 lb 11.4 oz)  Body mass index is 31.4 kg/m².    Physical Exam  Vitals and nursing note reviewed.   Constitutional:       General: He is not in acute distress.     Appearance: He is well-developed.   HENT:      Head: Normocephalic and atraumatic.      Nose:      Comments: 2L nc in place  Eyes:      Conjunctiva/sclera: Conjunctivae normal.      Pupils: Pupils are equal, round, and reactive to light.   Neck:      Thyroid: No thyromegaly.   Cardiovascular:      Rate and Rhythm: Normal rate. Rhythm irregular.      Heart sounds: Murmur heard.   Pulmonary:      Effort: Pulmonary effort is normal. No respiratory distress.      Breath sounds: Normal breath sounds. No wheezing.   Abdominal:      General: Bowel sounds are normal.      Palpations: Abdomen is soft.      Tenderness: There is no abdominal tenderness.   Musculoskeletal:         General: Normal range of motion.      Cervical back: Normal range of motion and neck supple.      Right lower leg: Edema present.      Left lower leg: Edema present.   Lymphadenopathy:      Cervical: No cervical adenopathy.   Skin:     General: Skin is warm and dry.      Findings: No rash.   Neurological:      Mental Status: He is alert and oriented to person, place, and time.   Psychiatric:         Behavior: Behavior normal.         CRANIAL NERVES     CN III, IV, VI   Pupils are equal, round, and reactive to light.     Significant Labs: All pertinent labs within the past 24 hours have been reviewed.  A1C:   Recent Labs   Lab 03/02/22  0744 04/30/22  1046   HGBA1C 5.9* 6.0*     ABGs:   Recent Labs   Lab 04/30/22  1052   PH 7.470*   PCO2 39   HCO3 28.40   POCSATURATED 89.7*   BE 4.40*   TOTALHB 10.8*   COHB 2.3   METHB 0.9   PO2 58*     Blood Culture:   Recent Labs   Lab 04/30/22  1046 04/30/22  1144 04/30/22  1447   LABBLOO No Growth to date  No Growth to date No Growth to date  No  Growth to date No Growth to date  No Growth to date     CBC:   Recent Labs   Lab 04/30/22  1046 05/01/22  0610   WBC 7.23 3.77*   HGB 10.6* 8.8*   HCT 32.1* 26.7*    115*     CMP:   Recent Labs   Lab 04/30/22  1046 05/01/22  0610    143   K 3.4* 3.6    103   CO2 25 26   * 222*   BUN 17 18   CREATININE 1.5* 1.6*   CALCIUM 8.2* 7.7*   PROT 6.8 5.5*   ALBUMIN 2.7* 2.2*   BILITOT 0.6 0.4   ALKPHOS 105 82   AST 37 38   ALT 19 18   ANIONGAP 15 14   EGFRNONAA 40* 37*     Lactic Acid:   Recent Labs   Lab 04/30/22 1046 04/30/22  1144   LACTATE 1.2 1.0     Magnesium: No results for input(s): MG in the last 48 hours.  Troponin:   Recent Labs   Lab 04/30/22  1046 04/30/22  1329   TROPONINI 0.069* 0.066*     TSH:   Recent Labs   Lab 03/02/22  0744   TSH 5.943*     Urine Studies: No results for input(s): COLORU, APPEARANCEUA, PHUR, SPECGRAV, PROTEINUA, GLUCUA, KETONESU, BILIRUBINUA, OCCULTUA, NITRITE, UROBILINOGEN, LEUKOCYTESUR, RBCUA, WBCUA, BACTERIA, SQUAMEPITHEL, HYALINECASTS in the last 48 hours.    Invalid input(s): WRIGHTSUR    Significant Imaging: I have reviewed all pertinent imaging results/findings within the past 24 hours.      Left perihilar and lower lung airspace opacification may reflect pneumonia, aspiration, or pulmonary edema.  Possible small left pleural effusion.

## 2022-05-01 NOTE — NURSING
Pt appears asleep in bed, arousable to touch. No signs of distress noted at this time. Wife at the bedside. VSS.     Bed in lowest position with call light in reach. Will continue to monitor.

## 2022-05-01 NOTE — HOSPITAL COURSE
5/1 Less than 1 L output of urine overnight. No longer with resp distress and patient able to lay completely flat without issues. He denies any chest pain, shortness of breath. Swelling noted over drain site. Sats lower 90s on supplemental oxygen. Afebrile.    5/2 has lasix 40 mg IV in ER and yesterday was given 0.5mg bumex yesterday. Renal function remains stable. I and O does not show that he has really diuresed but patient is feeling better. Sats 93% on 3L NC, > does not use O2 at home. Cardiology and general surgery consulted for today. He does remains on vanc and zosyn for sepsis s/p abdominal wall abscess s/p abd wall hernia repair with mesh per Dr Haji. No fever. No elevated WBC,. H/H stable and platelets still low but improved. He also endorses cough that he has had for weeks prior to arrival. Today brining up mucus,     5/3/22  Eddie Parada Sr. is a 89 y.o. male  admitted with HF, & sepsis remains on 3LNC , O2 sat 93%, not on Home O2   ECHO - The estimated ejection fraction is 55%.; Indeterminate left ventricular diastolic function. Moderate Pulmonary HTN   Lasix 20mg po daily; Creat 1.6>1.6, BUN 23>22  Afebrile   Day 4 Vanc and Zosyn, for abd abcess, consult Dr. Haji   Sit to Stand:  stand by assistance with rolling walker  Gait: Standby Assistance x ~60 feet with RW.  Wife noting abdomen seems firmer

## 2022-05-01 NOTE — PLAN OF CARE
[facility] - Med Surg (3rd Fl)  Initial Discharge Assessment       Primary Care Provider: Callum Roberts MD    Admission Diagnosis: Shortness of breath [R06.02]  Pleural effusion [J90]  COPD exacerbation [J44.1]  Pneumonia of left lower lobe due to infectious organism [J18.9]  Acute on chronic congestive heart failure, unspecified heart failure type [I50.9]    Admission Date: 4/30/2022  Expected Discharge Date:     Discharge Barriers Identified: None    Payor: [employer] MEDICARE / Plan: [employer] 65 / Product Type: Medicare Advantage /     Extended Emergency Contact Information  Primary Emergency Contact: Jessica Parada  Address: [address] [facility] [address] [place] [address] [facility]  Home Phone: [phone]  Mobile Phone: [phone]  Relation: Spouse  Secondary Emergency Contact: Diana Romero   [facility]  Home Phone: [phone]  Relation: Daughter    Discharge Plan A: Home with family, Home Health  Discharge Plan B: Home with family, Home Health      [facility] [place], [place] [facility] [address] [facility]  [address] [facility] [address]  Phone: [phone] Fax: [phone]    [facility] [address] [facility]  [address] [facility] [address]  Phone: [phone] Fax: [phone]    [facility] - [address] [address]  Phone: [phone] Fax: [phone]      Initial Assessment (most recent)     Adult Discharge Assessment - 05/01/22 1113        Discharge Assessment    Assessment Type Discharge Planning Assessment     Confirmed/corrected address, phone number and insurance Yes     Confirmed Demographics Correct on Facesheet     Source of Information patient;family     Communicated KANDICE with patient/caregiver Date not available/Unable to determine     Reason For Admission CHF/Pneumonia     Lives With spouse      Facility Arrived From: Home     Do you expect to return to your current living situation? Yes     Do you have help at home or someone to help you manage your care at home? Yes     Who are your caregiver(s) and their phone number(s)? Jessica Parada (Spouse) [phone]     Prior to hospitilization cognitive status: Alert/Oriented     Current cognitive status: Alert/Oriented     Walking or Climbing Stairs Difficulty ambulation difficulty, requires equipment     Mobility Management Patient ambulates with a walker when needed.     Dressing/Bathing Difficulty none     Home Accessibility wheelchair accessible     Home Layout Able to live on 1st floor     Equipment Currently Used at Home walker, rolling;raised toilet     Readmission within 30 days? No     Patient currently being followed by outpatient case management? No     Do you currently have service(s) that help you manage your care at home? Yes     Name and Contact number of agency [facility]     Is the pt/caregiver preference to resume services with current agency Yes     Do you take prescription medications? Yes     Do you have prescription coverage? Yes     Coverage [employer]     Do you have any problems affording any of your prescribed medications? No     How do you get to doctors appointments? family or friend will provide     Are you on dialysis? No     Do you take coumadin? No     Discharge Plan A Home with family;Home Health     Discharge Plan B Home with family;Home Health     DME Needed Upon Discharge  none     Discharge Plan discussed with: Patient;Spouse/sig other     Discharge Barriers Identified None                      Discharge assessment completed with patient and spouse. Wishing to resume home health with [facility] at discharge. Patient with current PICC line placement for home IV antibiotics after hospital discharge with abdominal abscess. Managed by ID, Soheila Borwn MD, at [facility]. Discussed potential need for home oxygen at  discharge. SW to remain available.

## 2022-05-01 NOTE — ASSESSMENT & PLAN NOTE
3/3/22  · The left ventricle is normal in size with normal systolic function.  · The estimated ejection fraction is 55%.  · Indeterminate left ventricular diastolic function.  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  · Normal right ventricular size with normal right ventricular systolic function.  · Severe left atrial enlargement.  · Severe right atrial enlargement.  · Mild to moderate tricuspid regurgitation.  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 There is no aortic insufficiency present. Prosthetic aortic valve is normal.  · The aortic valve mean gradient is 13 mmHg with a dimensionless index of 0.61.    5/1  Switch from lasix to bumex and monitor diuresis. Really didn't get much output overnight. Breathing is improved. Repeat cxr ordered for this morning. Need accurate weights.

## 2022-05-01 NOTE — SUBJECTIVE & OBJECTIVE
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    Melanoma     Obesity (BMI 30.0-34.9) 9/13/2016    Osteoporosis     Peripheral vascular disease     Pneumonia of left lower lobe due to infectious organism 4/30/2022    Primary malignant neoplasm of prostate 3/3/2022    Septic shock 3/3/2022    Stroke     TIA (transient ischemic attack)     Type II diabetes mellitus with peripheral circulatory disorder     Type II or unspecified type diabetes mellitus without mention of complication, not stated as uncontrolled     Unspecified essential hypertension        Past Surgical History:   Procedure Laterality Date    AORTIC VALVE REPLACEMENT      CARDIAC PACEMAKER PLACEMENT  9/28/2012    CARDIAC VALVE REPLACEMENT  11/17/2011    aortic valve-tissue    CHOLECYSTECTOMY      COLONOSCOPY      ESOPHAGOGASTRODUODENOSCOPY N/A 5/27/2021    Procedure: EGD (ESOPHAGOGASTRODUODENOSCOPY);  Surgeon: Gualberto Medrano MD;  Location: [facility];  Service: Endoscopy;  Laterality: N/A;    ESOPHAGOGASTRODUODENOSCOPY N/A 6/22/2021    Procedure: EGD (ESOPHAGOGASTRODUODENOSCOPY);  Surgeon: Gualberto Medrano MD;  Location: [facility];  Service: Endoscopy;  Laterality: N/A;  please call wife(Jessica) with instructions/arrival time.    ESOPHAGOGASTRODUODENOSCOPY (EGD) WITH DILATION  06/22/2021    EYE SURGERY Bilateral     cataract with lens by  at [facility]    HERNIA REPAIR      abdominal    LASIK      UPPER GASTROINTESTINAL ENDOSCOPY  05/28/2021       Review of patient's allergies indicates:    Allergen Reactions    Ciprofloxacin     Levofloxacin Swelling    Lyrica [pregabalin] Swelling    Unable to assess Other (See Comments)     Soft shell crabs       No current facility-administered medications on file prior to encounter.     Current Outpatient Medications on File Prior to Encounter   Medication Sig    acetaminophen (TYLENOL) 500 MG tablet Take 500 mg by mouth every 6 (six) hours as needed for Pain.    amLODIPine (NORVASC) 10 MG tablet TAKE 1 TABLET BY MOUTH DAILY FOR BLOOD PRESSURE    ampicillin sodium/sulbactam Na (AMPICILLIN/SULBACTAM 3 G/100 ML NS, READY TO MIX,) Inject 100 mLs (3 g total) into the vein every 12 (twelve) hours. Tentative end date: 4/23/2022 but will need to followup with ID before discontinuation of antibiotics    carvediloL (COREG) 12.5 MG tablet TAKE 1 TABLET (12.5 MG TOTAL) BY MOUTH 2 (TWO) TIMES DAILY WITH MEALS.    doxazosin (CARDURA) 1 MG tablet Take 1 mg by mouth nightly.    ferrous sulfate (FEOSOL) 325 mg (65 mg iron) Tab tablet Take 325 mg by mouth once daily at 6am.    furosemide (LASIX) 20 MG tablet Take 1 tablet (20 mg total) by mouth every other day. Hold medication until 3/29 (Patient taking differently: Take 20 mg by mouth once daily at 6am.)    gabapentin (NEURONTIN) 100 MG capsule Take 2 capsules (200 mg total) by mouth every evening.    levothyroxine (SYNTHROID) 75 MCG tablet Take 1 tablet (75 mcg total) by mouth before breakfast.    pantoprazole (PROTONIX) 40 MG tablet TAKE ONE TABLET BY MOUTH ONCE A DAY FOR STOMACH PROBLEMS    potassium chloride SA (K-DUR,KLOR-CON) 10 MEQ tablet Take 1 tablet (10 mEq total) by mouth once daily.    pravastatin (PRAVACHOL) 20 MG tablet TAKE 1 TABLET BY MOUTH DAILY FOR CHOLESTROL    rivaroxaban (XARELTO) 20 mg Tab Take one tablet(20mg) by mouth once daily    senna-docusate 8.6-50 mg (PERICOLACE) 8.6-50 mg per tablet Take 1 tablet by mouth 2 (two) times daily as needed for Constipation.    tamsulosin (FLOMAX) 0.4 mg Cap Take 1  capsule (0.4 mg total) by mouth once daily.    [DISCONTINUED] blood sugar diagnostic (BLOOD GLUCOSE TEST) Strp ONETOUCH ULTRA TESTING STRIPS. USE TO TEST BLOOD GLUCOSE TWICE DAILY. DX CODE: E11.51 (Patient not taking: No sig reported)    [DISCONTINUED] blood-glucose meter Misc ONETOUCH ULTRA GLUCOSE METER. USE AS DIRECTED TO TEST BLOOD GLUCOSE. DX CODE: E11.51 (Patient not taking: No sig reported)    [DISCONTINUED] gemfibrozil (LOPID) 600 MG tablet Take one tablet(600mg) by mouth once daily    [DISCONTINUED] lancets (ONETOUCH ULTRASOFT LANCETS) Misc USE TO TEST BLOOD GLUCOSE TWICE DAILY. DX CODE: E11.51 (Patient not taking: No sig reported)    [DISCONTINUED] lisinopriL (PRINIVIL,ZESTRIL) 20 MG tablet Take 1 tablet (20 mg total) by mouth once daily. Hold medication until 3/29/22 (Patient not taking: No sig reported)     Family History       Problem Relation (Age of Onset)    Alcohol abuse Father    COPD Sister, Brother    Cancer Brother    Diabetes Daughter    Heart disease Brother    Hypertension Father    No Known Problems Son, Daughter, Son    Stroke Father          Tobacco Use    Smoking status: Former Smoker     Quit date: 1996     Years since quittin.6    Smokeless tobacco: Never Used    Tobacco comment: cigar smoker   Substance and Sexual Activity    Alcohol use: No     Comment: none since     Drug use: No    Sexual activity: Not Currently     Review of Systems   Constitutional:  Negative for chills and fever.   HENT:  Negative for congestion, ear pain, postnasal drip, rhinorrhea, sore throat and trouble swallowing.    Eyes:  Negative for redness and itching.   Respiratory:  Positive for cough. Negative for shortness of breath and wheezing.         AMAYA. No orthopnea   Cardiovascular:  Positive for leg swelling. Negative for chest pain and palpitations.   Gastrointestinal:  Negative for abdominal pain, diarrhea, nausea and vomiting.   Genitourinary:  Negative for dysuria and frequency.   Skin:   Negative for rash.   Neurological:  Negative for weakness and headaches.   Objective:     Vital Signs (Most Recent):  Temp: 97.2 °F (36.2 °C) (04/30/22 1957)  Pulse: 64 (Simultaneous filing. User may not have seen previous data.) (04/30/22 1957)  Resp: 20 (04/30/22 1957)  BP: (!) 130/57 (04/30/22 1957)  SpO2: (!) 93 % (04/30/22 1957)   Vital Signs (24h Range):  Temp:  [96.7 °F (35.9 °C)-98 °F (36.7 °C)] 97.2 °F (36.2 °C)  Pulse:  [59-69] 64  Resp:  [17-36] 20  SpO2:  [87 %-100 %] 93 %  BP: (115-151)/(55-99) 130/57     Weight: 88.2 kg (194 lb 7.1 oz)  Body mass index is 32.36 kg/m².    Physical Exam  Vitals and nursing note reviewed.   Constitutional:       General: He is not in acute distress.     Appearance: He is well-developed.   HENT:      Head: Normocephalic and atraumatic.      Nose:      Comments: 2L nc in place  Eyes:      Conjunctiva/sclera: Conjunctivae normal.      Pupils: Pupils are equal, round, and reactive to light.   Neck:      Thyroid: No thyromegaly.   Cardiovascular:      Rate and Rhythm: Normal rate. Rhythm irregular.      Heart sounds: Murmur heard.   Pulmonary:      Effort: Pulmonary effort is normal. No respiratory distress.      Breath sounds: Normal breath sounds. No wheezing.   Abdominal:      General: Bowel sounds are normal.      Palpations: Abdomen is soft.      Tenderness: There is no abdominal tenderness.   Musculoskeletal:         General: Normal range of motion.      Cervical back: Normal range of motion and neck supple.      Right lower leg: Edema present.      Left lower leg: Edema present.   Lymphadenopathy:      Cervical: No cervical adenopathy.   Skin:     General: Skin is warm and dry.      Findings: No rash.   Neurological:      Mental Status: He is alert and oriented to person, place, and time.   Psychiatric:         Behavior: Behavior normal.         CRANIAL NERVES     CN III, IV, VI   Pupils are equal, round, and reactive to light.     Significant Labs: All pertinent labs  within the past 24 hours have been reviewed.  A1C:   Recent Labs   Lab 03/02/22  0744 04/30/22  1046   HGBA1C 5.9* 6.0*     ABGs:   Recent Labs   Lab 04/30/22  1052   PH 7.470*   PCO2 39   HCO3 28.40   POCSATURATED 89.7*   BE 4.40*   TOTALHB 10.8*   COHB 2.3   METHB 0.9   PO2 58*     Blood Culture:   Recent Labs   Lab 04/30/22  1046   LABBLOO No Growth to date  No Growth to date     CBC:   Recent Labs   Lab 04/30/22  1046   WBC 7.23   HGB 10.6*   HCT 32.1*        CMP:   Recent Labs   Lab 04/30/22  1046      K 3.4*      CO2 25   *   BUN 17   CREATININE 1.5*   CALCIUM 8.2*   PROT 6.8   ALBUMIN 2.7*   BILITOT 0.6   ALKPHOS 105   AST 37   ALT 19   ANIONGAP 15   EGFRNONAA 40*     Lactic Acid:   Recent Labs   Lab 04/30/22  1046 04/30/22  1144   LACTATE 1.2 1.0     Magnesium: No results for input(s): MG in the last 48 hours.  Troponin:   Recent Labs   Lab 04/30/22  1046 04/30/22  1329   TROPONINI 0.069* 0.066*     TSH:   Recent Labs   Lab 03/02/22  0744   TSH 5.943*     Urine Studies: No results for input(s): COLORU, APPEARANCEUA, PHUR, SPECGRAV, PROTEINUA, GLUCUA, KETONESU, BILIRUBINUA, OCCULTUA, NITRITE, UROBILINOGEN, LEUKOCYTESUR, RBCUA, WBCUA, BACTERIA, SQUAMEPITHEL, HYALINECASTS in the last 48 hours.    Invalid input(s): WRIGHTSUR    Significant Imaging: I have reviewed all pertinent imaging results/findings within the past 24 hours.      Left perihilar and lower lung airspace opacification may reflect pneumonia, aspiration, or pulmonary edema.  Possible small left pleural effusion.

## 2022-05-02 LAB
ANION GAP SERPL CALC-SCNC: 13 MMOL/L (ref 8–16)
AV INDEX (PROSTH): 0.64
AV MEAN GRADIENT: 6 MMHG
AV PEAK GRADIENT: 14 MMHG
AV VALVE AREA: 1.69 CM2
AV VELOCITY RATIO: 0.59
BASOPHILS # BLD AUTO: 0.01 K/UL (ref 0–0.2)
BASOPHILS NFR BLD: 0.1 % (ref 0–1.9)
BSA FOR ECHO PROCEDURE: 1.98 M2
BUN SERPL-MCNC: 23 MG/DL (ref 10–30)
CALCIUM SERPL-MCNC: 8.3 MG/DL (ref 8.7–10.5)
CHLORIDE SERPL-SCNC: 102 MMOL/L (ref 95–110)
CO2 SERPL-SCNC: 27 MMOL/L (ref 23–29)
CREAT SERPL-MCNC: 1.6 MG/DL (ref 0.5–1.4)
CV ECHO LV RWT: 0.32 CM
DIFFERENTIAL METHOD: ABNORMAL
DOP CALC AO PEAK VEL: 1.88 M/S
DOP CALC AO VTI: 37.59 CM
DOP CALC LVOT AREA: 2.6 CM2
DOP CALC LVOT DIAMETER: 1.83 CM
DOP CALC LVOT PEAK VEL: 1.11 M/S
DOP CALC LVOT STROKE VOLUME: 63.57 CM3
DOP CALCLVOT PEAK VEL VTI: 24.18 CM
E WAVE DECELERATION TIME: 183.5 MSEC
E/A RATIO: 2.55
ECHO LV POSTERIOR WALL: 0.78 CM (ref 0.6–1.1)
EJECTION FRACTION: 55 %
EOSINOPHIL # BLD AUTO: 0 K/UL (ref 0–0.5)
EOSINOPHIL NFR BLD: 0 % (ref 0–8)
ERYTHROCYTE [DISTWIDTH] IN BLOOD BY AUTOMATED COUNT: 14 % (ref 11.5–14.5)
EST. GFR  (AFRICAN AMERICAN): 43 ML/MIN/1.73 M^2
EST. GFR  (NON AFRICAN AMERICAN): 37 ML/MIN/1.73 M^2
FRACTIONAL SHORTENING: 53 % (ref 28–44)
GLUCOSE SERPL-MCNC: 146 MG/DL (ref 70–110)
HCT VFR BLD AUTO: 27.2 % (ref 40–54)
HGB BLD-MCNC: 8.8 G/DL (ref 14–18)
IMM GRANULOCYTES # BLD AUTO: 0.05 K/UL (ref 0–0.04)
IMM GRANULOCYTES NFR BLD AUTO: 0.5 % (ref 0–0.5)
INTERVENTRICULAR SEPTUM: 1.38 CM (ref 0.6–1.1)
IVRT: 41.52 MSEC
LA MAJOR: 5.83 CM
LA WIDTH: 4.59 CM
LEFT ATRIUM SIZE: 5.02 CM
LEFT ATRIUM VOLUME INDEX MOD: 19.4 ML/M2
LEFT ATRIUM VOLUME MOD: 37.44 CM3
LEFT INTERNAL DIMENSION IN SYSTOLE: 2.31 CM (ref 2.1–4)
LEFT VENTRICLE DIASTOLIC VOLUME INDEX: 59.22 ML/M2
LEFT VENTRICLE DIASTOLIC VOLUME: 114.29 ML
LEFT VENTRICLE MASS INDEX: 102 G/M2
LEFT VENTRICLE SYSTOLIC VOLUME INDEX: 9.5 ML/M2
LEFT VENTRICLE SYSTOLIC VOLUME: 18.26 ML
LEFT VENTRICULAR INTERNAL DIMENSION IN DIASTOLE: 4.93 CM (ref 3.5–6)
LEFT VENTRICULAR MASS: 197.43 G
LYMPHOCYTES # BLD AUTO: 0.8 K/UL (ref 1–4.8)
LYMPHOCYTES NFR BLD: 8 % (ref 18–48)
MCH RBC QN AUTO: 33.3 PG (ref 27–31)
MCHC RBC AUTO-ENTMCNC: 32.4 G/DL (ref 32–36)
MCV RBC AUTO: 103 FL (ref 82–98)
MONOCYTES # BLD AUTO: 0.5 K/UL (ref 0.3–1)
MONOCYTES NFR BLD: 5.8 % (ref 4–15)
MV PEAK A VEL: 0.51 M/S
MV PEAK E VEL: 1.3 M/S
MV STENOSIS PRESSURE HALF TIME: 53.21 MS
MV VALVE AREA P 1/2 METHOD: 4.13 CM2
NEUTROPHILS # BLD AUTO: 8 K/UL (ref 1.8–7.7)
NEUTROPHILS NFR BLD: 85.6 % (ref 38–73)
NRBC BLD-RTO: 0 /100 WBC
PISA MRMAX VEL: 0.05 M/S
PISA TR MAX VEL: 3.69 M/S
PLATELET # BLD AUTO: 129 K/UL (ref 150–450)
PMV BLD AUTO: 10.1 FL (ref 9.2–12.9)
POCT GLUCOSE: 174 MG/DL (ref 70–110)
POCT GLUCOSE: 191 MG/DL (ref 70–110)
POCT GLUCOSE: 224 MG/DL (ref 70–110)
POCT GLUCOSE: 233 MG/DL (ref 70–110)
POTASSIUM SERPL-SCNC: 3.7 MMOL/L (ref 3.5–5.1)
PULM VEIN S/D RATIO: 0.95
PV PEAK D VEL: 0.4 M/S
PV PEAK S VEL: 0.38 M/S
RA MAJOR: 5.87 CM
RBC # BLD AUTO: 2.64 M/UL (ref 4.6–6.2)
RIGHT VENTRICULAR END-DIASTOLIC DIMENSION: 3.8 CM
SODIUM SERPL-SCNC: 142 MMOL/L (ref 136–145)
TR MAX PG: 54 MMHG
TRICUSPID ANNULAR PLANE SYSTOLIC EXCURSION: 1.61 CM
VANCOMYCIN TROUGH SERPL-MCNC: 11.5 UG/ML (ref 10–22)
WBC # BLD AUTO: 9.39 K/UL (ref 3.9–12.7)

## 2022-05-02 PROCEDURE — 80048 BASIC METABOLIC PNL TOTAL CA: CPT | Performed by: FAMILY MEDICINE

## 2022-05-02 PROCEDURE — 36415 COLL VENOUS BLD VENIPUNCTURE: CPT | Performed by: STUDENT IN AN ORGANIZED HEALTH CARE EDUCATION/TRAINING PROGRAM

## 2022-05-02 PROCEDURE — 63600175 PHARM REV CODE 636 W HCPCS: Performed by: STUDENT IN AN ORGANIZED HEALTH CARE EDUCATION/TRAINING PROGRAM

## 2022-05-02 PROCEDURE — 11000001 HC ACUTE MED/SURG PRIVATE ROOM

## 2022-05-02 PROCEDURE — 87205 SMEAR GRAM STAIN: CPT | Performed by: NURSE PRACTITIONER

## 2022-05-02 PROCEDURE — 25000003 PHARM REV CODE 250: Performed by: NURSE PRACTITIONER

## 2022-05-02 PROCEDURE — 99900035 HC TECH TIME PER 15 MIN (STAT)

## 2022-05-02 PROCEDURE — 63600175 PHARM REV CODE 636 W HCPCS: Performed by: FAMILY MEDICINE

## 2022-05-02 PROCEDURE — 25000003 PHARM REV CODE 250: Performed by: FAMILY MEDICINE

## 2022-05-02 PROCEDURE — 85025 COMPLETE CBC W/AUTO DIFF WBC: CPT | Performed by: FAMILY MEDICINE

## 2022-05-02 PROCEDURE — 27000221 HC OXYGEN, UP TO 24 HOURS

## 2022-05-02 PROCEDURE — 94761 N-INVAS EAR/PLS OXIMETRY MLT: CPT

## 2022-05-02 PROCEDURE — 80202 ASSAY OF VANCOMYCIN: CPT | Performed by: STUDENT IN AN ORGANIZED HEALTH CARE EDUCATION/TRAINING PROGRAM

## 2022-05-02 PROCEDURE — 99233 SBSQ HOSP IP/OBS HIGH 50: CPT | Mod: ,,, | Performed by: INTERNAL MEDICINE

## 2022-05-02 PROCEDURE — 94640 AIRWAY INHALATION TREATMENT: CPT

## 2022-05-02 PROCEDURE — 97530 THERAPEUTIC ACTIVITIES: CPT

## 2022-05-02 PROCEDURE — 97162 PT EVAL MOD COMPLEX 30 MIN: CPT

## 2022-05-02 PROCEDURE — 87070 CULTURE OTHR SPECIMN AEROBIC: CPT | Performed by: NURSE PRACTITIONER

## 2022-05-02 PROCEDURE — 25000003 PHARM REV CODE 250: Performed by: STUDENT IN AN ORGANIZED HEALTH CARE EDUCATION/TRAINING PROGRAM

## 2022-05-02 PROCEDURE — 99233 PR SUBSEQUENT HOSPITAL CARE,LEVL III: ICD-10-PCS | Mod: ,,, | Performed by: INTERNAL MEDICINE

## 2022-05-02 PROCEDURE — 25000242 PHARM REV CODE 250 ALT 637 W/ HCPCS: Performed by: NURSE PRACTITIONER

## 2022-05-02 RX ORDER — LEVALBUTEROL 1.25 MG/.5ML
1.25 SOLUTION, CONCENTRATE RESPIRATORY (INHALATION) EVERY 8 HOURS
Status: DISCONTINUED | OUTPATIENT
Start: 2022-05-02 | End: 2022-05-04 | Stop reason: HOSPADM

## 2022-05-02 RX ORDER — GUAIFENESIN 600 MG/1
600 TABLET, EXTENDED RELEASE ORAL 2 TIMES DAILY
Status: DISCONTINUED | OUTPATIENT
Start: 2022-05-02 | End: 2022-05-04 | Stop reason: HOSPADM

## 2022-05-02 RX ORDER — FUROSEMIDE 20 MG/1
20 TABLET ORAL DAILY
Status: DISCONTINUED | OUTPATIENT
Start: 2022-05-02 | End: 2022-05-04 | Stop reason: HOSPADM

## 2022-05-02 RX ADMIN — MUPIROCIN: 20 OINTMENT TOPICAL at 08:05

## 2022-05-02 RX ADMIN — VANCOMYCIN HYDROCHLORIDE 750 MG: 750 INJECTION, POWDER, LYOPHILIZED, FOR SOLUTION INTRAVENOUS at 04:05

## 2022-05-02 RX ADMIN — PIPERACILLIN AND TAZOBACTAM 4.5 G: 4; .5 INJECTION, POWDER, LYOPHILIZED, FOR SOLUTION INTRAVENOUS; PARENTERAL at 05:05

## 2022-05-02 RX ADMIN — GUAIFENESIN 600 MG: 600 TABLET, EXTENDED RELEASE ORAL at 08:05

## 2022-05-02 RX ADMIN — TAMSULOSIN HYDROCHLORIDE 0.4 MG: 0.4 CAPSULE ORAL at 08:05

## 2022-05-02 RX ADMIN — BUMETANIDE 0.5 MG: 0.25 INJECTION INTRAMUSCULAR; INTRAVENOUS at 08:05

## 2022-05-02 RX ADMIN — CARVEDILOL 12.5 MG: 12.5 TABLET, FILM COATED ORAL at 04:05

## 2022-05-02 RX ADMIN — CARVEDILOL 12.5 MG: 12.5 TABLET, FILM COATED ORAL at 08:05

## 2022-05-02 RX ADMIN — INSULIN ASPART 2 UNITS: 100 INJECTION, SOLUTION INTRAVENOUS; SUBCUTANEOUS at 05:05

## 2022-05-02 RX ADMIN — PIPERACILLIN AND TAZOBACTAM 4.5 G: 4; .5 INJECTION, POWDER, LYOPHILIZED, FOR SOLUTION INTRAVENOUS; PARENTERAL at 11:05

## 2022-05-02 RX ADMIN — LEVALBUTEROL 1.25 MG: 1.25 SOLUTION, CONCENTRATE RESPIRATORY (INHALATION) at 03:05

## 2022-05-02 RX ADMIN — LEVOTHYROXINE SODIUM 75 MCG: 75 TABLET ORAL at 06:05

## 2022-05-02 RX ADMIN — LEVALBUTEROL 1.25 MG: 1.25 SOLUTION, CONCENTRATE RESPIRATORY (INHALATION) at 11:05

## 2022-05-02 RX ADMIN — FUROSEMIDE 20 MG: 20 TABLET ORAL at 10:05

## 2022-05-02 RX ADMIN — GABAPENTIN 200 MG: 100 CAPSULE ORAL at 08:05

## 2022-05-02 RX ADMIN — AMLODIPINE BESYLATE 10 MG: 10 TABLET ORAL at 08:05

## 2022-05-02 RX ADMIN — DOXAZOSIN 1 MG: 1 TABLET ORAL at 08:05

## 2022-05-02 RX ADMIN — PIPERACILLIN AND TAZOBACTAM 4.5 G: 4; .5 INJECTION, POWDER, LYOPHILIZED, FOR SOLUTION INTRAVENOUS; PARENTERAL at 08:05

## 2022-05-02 RX ADMIN — INSULIN ASPART 1 UNITS: 100 INJECTION, SOLUTION INTRAVENOUS; SUBCUTANEOUS at 08:05

## 2022-05-02 RX ADMIN — LEVALBUTEROL 1.25 MG: 1.25 SOLUTION, CONCENTRATE RESPIRATORY (INHALATION) at 09:05

## 2022-05-02 RX ADMIN — RIVAROXABAN 15 MG: 15 TABLET, FILM COATED ORAL at 04:05

## 2022-05-02 RX ADMIN — PRAVASTATIN SODIUM 20 MG: 20 TABLET ORAL at 08:05

## 2022-05-02 NOTE — PLAN OF CARE
Problem: Adult Inpatient Plan of Care  Goal: Plan of Care Review  Outcome: Ongoing, Progressing  Goal: Patient-Specific Goal (Individualized)  Outcome: Ongoing, Progressing  Goal: Absence of Hospital-Acquired Illness or Injury  Outcome: Ongoing, Progressing     Problem: Diabetes Comorbidity  Goal: Blood Glucose Level Within Targeted Range  Outcome: Ongoing, Progressing     Problem: Infection  Goal: Absence of Infection Signs and Symptoms  Outcome: Ongoing, Progressing     Problem: Impaired Wound Healing  Goal: Optimal Wound Healing  Outcome: Ongoing, Progressing     Problem: Fall Injury Risk  Goal: Absence of Fall and Fall-Related Injury  Outcome: Ongoing, Progressing     Problem: Fluid Imbalance (Pneumonia)  Goal: Fluid Balance  Outcome: Ongoing, Progressing     Problem: Infection (Pneumonia)  Goal: Resolution of Infection Signs and Symptoms  Outcome: Ongoing, Progressing     Problem: Respiratory Compromise (Pneumonia)  Goal: Effective Oxygenation and Ventilation  Outcome: Ongoing, Progressing     Problem: Gas Exchange Impaired  Goal: Optimal Gas Exchange  Outcome: Ongoing, Progressing    Continuing to diurese. Patient receives vancomycin and zosyn to a right upper arm PICC. PICC line dressing was changed today. breathing treatments ordered. Decreasing patient's O2 as tolerated. PT to evaluate and treat. Free from falls and injury. Daily weight. Afebrile

## 2022-05-02 NOTE — NURSING
Resting in bed no acute distress noted wife at the bedside. Call light within reach bed in low position report given to oncoming nurse.

## 2022-05-02 NOTE — CONSULTS
[place] - Med Surg (3rd Fl)  Cardiology  Consult Note    Patient Name: Eddie Parada Sr.  MRN: [id number]  Admission Date: 4/30/2022  Hospital Length of Stay: 1 days  Code Status: Full Code   Attending Provider: Linda Greco MD   Consulting Provider: Ebony Johnston NP  Primary Care Physician: Callum Roberts MD  Principal Problem:<principal problem not specified>    Patient information was obtained from patient, past medical records and ER records.     Consults  Subjective:     Chief Complaint:  SOB     HPI: 89 yo wm hx PPM s/p generator replacement 3/22, chronic AF on Xarelto, bio-AVR,  chronic diastolic CHF with difficulty maintaining euvolemia due to waxing and waning renal function due in part to abx therapy from abd wall abscess. Renal function lately averaging creatinine 1.5-1.7. His lasix was initially put on hold but resumed at 20mg po qday due to volume overload (increased weight, SOB, abd distension, edema, sats 90%).  He was advised to come to the hospital, but initially refused. He eventually presented ot ER with c/o SOB.  Sats upper 80s%. . Troponin mildly elevated 0.066. V-Paced rhythm  Appears that he received lasix 40mg IV in ER.    Do note worsened anemia over the past month or so, but it has been stable lately with H/H averaging around 9/27.    Past Medical History:   Diagnosis Date    Abdominal fibromatosis     Acute posthemorrhagic anemia 1/9/2018    NIK (acute kidney injury) 1/11/2018    Atrial fibrillation     Bradycardia     Broken arm     CAD (coronary artery disease)     Cancer     basal cell on nose and melanoma on back    CHF (congestive heart failure)     COPD (chronic obstructive pulmonary disease)     Diabetes mellitus type II     Hyperlipidemia     Localization-related focal epilepsy with simple partial seizures 3/2/2021    Melanoma     Obesity (BMI 30.0-34.9) 9/13/2016    Osteoporosis     Peripheral vascular disease     Pneumonia of left lower  lobe due to infectious organism 4/30/2022    Primary malignant neoplasm of prostate 3/3/2022    Septic shock 3/3/2022    Stroke     TIA (transient ischemic attack)     Type II diabetes mellitus with peripheral circulatory disorder     Type II or unspecified type diabetes mellitus without mention of complication, not stated as uncontrolled     Unspecified essential hypertension        Past Surgical History:   Procedure Laterality Date    AORTIC VALVE REPLACEMENT      CARDIAC PACEMAKER PLACEMENT  9/28/2012    CARDIAC VALVE REPLACEMENT  11/17/2011    aortic valve-tissue    CHOLECYSTECTOMY      COLONOSCOPY      ESOPHAGOGASTRODUODENOSCOPY N/A 5/27/2021    Procedure: EGD (ESOPHAGOGASTRODUODENOSCOPY);  Surgeon: Gualberto Medrano MD;  Location: [facility];  Service: Endoscopy;  Laterality: N/A;    ESOPHAGOGASTRODUODENOSCOPY N/A 6/22/2021    Procedure: EGD (ESOPHAGOGASTRODUODENOSCOPY);  Surgeon: Gualberto Medrano MD;  Location: [facility];  Service: Endoscopy;  Laterality: N/A;  please call wife(Jessica) with instructions/arrival time.    ESOPHAGOGASTRODUODENOSCOPY (EGD) WITH DILATION  06/22/2021    EYE SURGERY Bilateral     cataract with lens by  at [facility]    HERNIA REPAIR      abdominal    LASIK      UPPER GASTROINTESTINAL ENDOSCOPY  05/28/2021       Review of patient's allergies indicates:   Allergen Reactions    Ciprofloxacin     Levofloxacin Swelling    Lyrica [pregabalin] Swelling    Unable to assess Other (See Comments)     Soft shell crabs       No current facility-administered medications on file prior to encounter.     Current Outpatient Medications on File Prior to Encounter   Medication Sig    acetaminophen (TYLENOL) 500 MG tablet Take 500 mg by mouth every 6 (six) hours as needed for Pain.    amLODIPine (NORVASC) 10 MG tablet TAKE 1 TABLET BY MOUTH DAILY FOR BLOOD PRESSURE    ampicillin sodium/sulbactam Na (AMPICILLIN/SULBACTAM 3 G/100 ML NS, READY TO MIX,) Inject 100  mLs (3 g total) into the vein every 12 (twelve) hours. Tentative end date: 2022 but will need to followup with ID before discontinuation of antibiotics    carvediloL (COREG) 12.5 MG tablet TAKE 1 TABLET (12.5 MG TOTAL) BY MOUTH 2 (TWO) TIMES DAILY WITH MEALS.    doxazosin (CARDURA) 1 MG tablet Take 1 mg by mouth nightly.    ferrous sulfate (FEOSOL) 325 mg (65 mg iron) Tab tablet Take 325 mg by mouth once daily at 6am.    furosemide (LASIX) 20 MG tablet Take 1 tablet (20 mg total) by mouth every other day. Hold medication until 3/29 (Patient taking differently: Take 20 mg by mouth once daily at 6am.)    gabapentin (NEURONTIN) 100 MG capsule Take 2 capsules (200 mg total) by mouth every evening.    levothyroxine (SYNTHROID) 75 MCG tablet Take 1 tablet (75 mcg total) by mouth before breakfast.    pantoprazole (PROTONIX) 40 MG tablet TAKE ONE TABLET BY MOUTH ONCE A DAY FOR STOMACH PROBLEMS    potassium chloride SA (K-DUR,KLOR-CON) 10 MEQ tablet Take 1 tablet (10 mEq total) by mouth once daily.    pravastatin (PRAVACHOL) 20 MG tablet TAKE 1 TABLET BY MOUTH DAILY FOR CHOLESTROL    rivaroxaban (XARELTO) 20 mg Tab Take one tablet(20mg) by mouth once daily    senna-docusate 8.6-50 mg (PERICOLACE) 8.6-50 mg per tablet Take 1 tablet by mouth 2 (two) times daily as needed for Constipation.    tamsulosin (FLOMAX) 0.4 mg Cap Take 1 capsule (0.4 mg total) by mouth once daily.     Family History     Problem Relation (Age of Onset)    Alcohol abuse Father    COPD Sister, Brother    Cancer Brother    Diabetes Daughter    Heart disease Brother    Hypertension Father    No Known Problems Son, Daughter, Son    Stroke Father        Tobacco Use    Smoking status: Former Smoker     Quit date: 1996     Years since quittin.6    Smokeless tobacco: Never Used    Tobacco comment: cigar smoker   Substance and Sexual Activity    Alcohol use: No     Comment: none since     Drug use: No    Sexual activity: Not  Currently     Review of Systems   Constitutional: Negative.   HENT: Negative.    Cardiovascular: Positive for dyspnea on exertion. Negative for chest pain, irregular heartbeat and orthopnea.   Respiratory: Positive for shortness of breath.    Endocrine: Negative.    Gastrointestinal: Negative.    Neurological: Negative.      Objective:     Vital Signs (Most Recent):  Temp: 96 °F (35.6 °C) (05/02/22 0737)  Pulse: 66 (05/02/22 0800)  Resp: (!) 22 (05/02/22 0737)  BP: 128/61 (05/02/22 0737)  SpO2: (!) 94 % (05/02/22 0740) Vital Signs (24h Range):  Temp:  [96 °F (35.6 °C)-97.5 °F (36.4 °C)] 96 °F (35.6 °C)  Pulse:  [59-68] 66  Resp:  [18-24] 22  SpO2:  [91 %-94 %] 94 %  BP: (122-128)/(56-61) 128/61     Weight: 85.4 kg (188 lb 4.4 oz)  Body mass index is 31.33 kg/m².    SpO2: (!) 94 %  O2 Device (Oxygen Therapy): nasal cannula      Intake/Output Summary (Last 24 hours) at 5/2/2022 0925  Last data filed at 5/2/2022 0623  Gross per 24 hour   Intake 1224.18 ml   Output 525 ml   Net 699.18 ml       Lines/Drains/Airways     Peripherally Inserted Central Catheter Line  Duration           PICC Double Lumen 03/07/22 2215 right basilic 55 days                Physical Exam  Vitals and nursing note reviewed.   Constitutional:       Appearance: Normal appearance.   Cardiovascular:      Rate and Rhythm: Normal rate and regular rhythm.   Pulmonary:      Effort: Pulmonary effort is normal.      Breath sounds: Normal breath sounds.   Abdominal:      General: Abdomen is flat.   Musculoskeletal:      Right lower leg: Edema present.      Left lower leg: Edema present.      Comments: Trace BLE edema   Skin:     General: Skin is warm and dry.   Neurological:      General: No focal deficit present.      Mental Status: He is alert and oriented to person, place, and time.   Psychiatric:         Mood and Affect: Mood normal.         Significant Labs:   BMP:   Recent Labs   Lab 05/01/22  0610 05/01/22  1503 05/02/22  0600   * 194* 146*     141 142   K 3.6 3.6 3.7    102 102   CO2 26 27 27   BUN 18 23 23   CREATININE 1.6* 1.7* 1.6*   CALCIUM 7.7* 7.7* 8.3*   MG 1.7  --   --    , CMP   Recent Labs   Lab 04/30/22  1046 05/01/22  0610 05/01/22  1503 05/02/22  0600    143 141 142   K 3.4* 3.6 3.6 3.7    103 102 102   CO2 25 26 27 27   * 222* 194* 146*   BUN 17 18 23 23   CREATININE 1.5* 1.6* 1.7* 1.6*   CALCIUM 8.2* 7.7* 7.7* 8.3*   PROT 6.8 5.5*  --   --    ALBUMIN 2.7* 2.2*  --   --    BILITOT 0.6 0.4  --   --    ALKPHOS 105 82  --   --    AST 37 38  --   --    ALT 19 18  --   --    ANIONGAP 15 14 12 13   ESTGFRAFRICA 46* 43* 40* 43*   EGFRNONAA 40* 37* 34* 37*   , CBC   Recent Labs   Lab 04/30/22  1046 05/01/22  0610 05/02/22  0600   WBC 7.23 3.77* 9.39   HGB 10.6* 8.8* 8.8*   HCT 32.1* 26.7* 27.2*    115* 129*    and Troponin   Recent Labs   Lab 04/30/22  1046 04/30/22  1329   TROPONINI 0.069* 0.066*       Significant Imaging: Echocardiogram:   Transthoracic echo (TTE) complete (Cupid Only):   Results for orders placed or performed during the hospital encounter of 03/03/22   Echo   Result Value Ref Range    BSA 1.96 m2    LA WIDTH 4.00 cm    PV PEAK VELOCITY 1.45 cm/s    LVIDd 4.40 3.5 - 6.0 cm    IVS 0.90 (A) 0.6 - 1.1 cm    Posterior Wall 0.90 0.6 - 1.1 cm    LVIDs 2.00 2.1 - 4.0 cm    FS 55 28 - 44 %    LA volume 110.29 cm3    LV mass 128.02 g    LA size 4.61 cm    RVDD 3.20 cm    Left Ventricle Relative Wall Thickness 0.41 cm    AV mean gradient 13 mmHg    AV Velocity Ratio 0.49     AV index (prosthetic) 0.61     MV mean gradient 1 mmHg    LVOT peak taiwo 1.15 m/s    LVOT peak VTI 28.70 cm    Ao peak taiwo 2.37 m/s    Ao VTI 47.06 cm    Mr max taiwo 0.04 m/s    AV peak gradient 22 mmHg    MV peak gradient 6 mmHg    TR Max Taiwo 3.58 m/s    MV VTI 20.02 cm    LV Systolic Volume 30.91 mL    LV Systolic Volume Index 16.2 mL/m2    LV Diastolic Volume 80.55 mL    LV Diastolic Volume Index 42.17 mL/m2    LA Volume Index  57.7 mL/m2    LV Mass Index 67 g/m2    RA Major Axis 6.10 cm    Left Atrium Minor Axis 6.50 cm    Left Atrium Major Axis 7.67 cm    Triscuspid Valve Regurgitation Peak Gradient 51 mmHg    LA Volume Index (Mod) 57.6 mL/m2    LA volume (mod) 110.03 cm3    RA Width 4.50 cm    EF 55 %    Ao root annulus 3.40 cm    TAPSE 2.10 cm    Right ventricular length in diastole (apical 4-chamber view) 5.30 cm    AV valve area 2.53 cm2    MV valve area by continuity eq 5.95 cm2    LVOT diameter 2.30 cm    LVOT area 4.2 cm2    LVOT stroke volume 119.18 cm3    Narrative    · The left ventricle is normal in size with normal systolic function.  · The estimated ejection fraction is 55%.  · Indeterminate left ventricular diastolic function.  · There is abnormal septal wall motion consistent with right ventricular   pacemaker.  · Normal right ventricular size with normal right ventricular systolic   function.  · Severe left atrial enlargement.  · Severe right atrial enlargement.  · Mild to moderate tricuspid regurgitation.  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 There is no aortic   insufficiency present. Prosthetic aortic valve is normal.  · The aortic valve mean gradient is 13 mmHg with a dimensionless index of   0.61.        Assessment and Plan:     Acute on chronic diastolic CHF, currently mostly euvolemic  Mild trop elevation--not ACS  Echo 3/21 EF 55%, normal AVR, 2+ TR, 1-2+ MR, 1+ AI, PAP 48  CKD with recent AKIs--waxing and waning  Anemia  Chronic AF on Xarelto  s/p PPM  PPM  Hx TIA  CAD hx PCI Cfx, LAD, MPI 2017 normal    Plan:  Repeat echo pending  Begin Lasix 20mg IV q day and see how much Lasix is optimal fopr him with Daily BMP  Follow  H/H as pt on Xarelto--denies bleeding, will check stool but  consider lower H/H due to age/?abx use    Active Diagnoses:    Diagnosis Date Noted POA    Acute on chronic congestive heart failure [I50.9] 04/30/2022 Yes    Pneumonia of left lower lobe due to infectious organism  [J18.9] 04/30/2022 Yes    CKD (chronic kidney disease) stage 3, GFR 30-59 ml/min [N18.30] 03/12/2022 Yes    Hypokalemia [E87.6] 03/12/2022 Yes    Abdominal wall abscess [L02.211] 03/05/2022 Yes    Elevated troponin [R77.8] 03/03/2022 Yes    (HFpEF) heart failure with preserved ejection fraction [I50.30] 06/17/2015 Yes    Type 2 diabetes mellitus, controlled [E11.9] 05/21/2014 Yes    Aortic stenosis [I35.0] 02/25/2014 Yes    S/P AVR [Z95.2] 02/25/2014 Not Applicable    Cardiac pacemaker in situ [Z95.0] 10/12/2012 Yes    CAD (coronary artery disease) [I25.10] 09/20/2012 Yes    Essential (primary) hypertension [I10] 09/20/2012 Yes    Chronic atrial fibrillation [I48.20] 06/29/2012 Yes      Problems Resolved During this Admission:       VTE Risk Mitigation (From admission, onward)         Ordered     rivaroxaban tablet 15 mg  with dinner         05/01/22 0921     IP VTE HIGH RISK PATIENT  Once         04/30/22 1617     Place sequential compression device  Until discontinued         04/30/22 1617     Place SOPHIA hose  Until discontinued         04/30/22 1617                Thank you for your consult. I will follow-up with patient. Please contact us if you have any additional questions.    Ebony Johnston NP  Cardiology   [facility] - Med Surg (3rd Fl)  I attest that I have personally seen and examined this patient. I have reviewed and discussed the management in detail as outlined above.

## 2022-05-02 NOTE — ASSESSMENT & PLAN NOTE
Patient is identified as having Diastolic (HFpEF) heart failure that is Acute on chronic. CHF is currently uncontrolled due to Continued edema of extremities, Hepatic congestion/ascites and JVD and Pulmonary edema/pleural effusion on CXR. Latest ECHO performed and demonstrates- Results for orders placed during the hospital encounter of 03/03/22    Echo>> repeat ordered since he has not had one since recent illness    Interpretation Summary  · The left ventricle is normal in size with normal systolic function.  · The estimated ejection fraction is 55%.  · Indeterminate left ventricular diastolic function.  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  · Normal right ventricular size with normal right ventricular systolic function.  · Severe left atrial enlargement.  · Severe right atrial enlargement.  · Mild to moderate tricuspid regurgitation.  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 There is no aortic insufficiency present. Prosthetic aortic valve is normal.  · The aortic valve mean gradient is 13 mmHg with a dimensionless index of 0.61.  . Continue Beta Blocker and Furosemide and monitor clinical status closely. Monitor on telemetry. Patient is on CHF pathway.  Monitor strict Is&Os and daily weights.  Place on fluid restriction of 1.5 L. Continue to stress to patient importance of self efficacy and  on diet for CHF. Last BNP reviewed- and noted below   Recent Labs   Lab 04/30/22  1046   *   .  Has not been on lasix. Given 1 dose in er. Hasn't really diuresed. May switch to bumex pending diuresis. Not on an ace - ?renal function. Not on jardiance, either. Will consult cards for Monday.    5/1 consult cards. Consideration of repeat echo - though just recently completed in march.    52 Dr Monterroso saw patient today would like to cont home lasix. Laying flat today   Check BNP in Am with bmp

## 2022-05-02 NOTE — PT/OT/SLP EVAL
"Physical Therapy Evaluation    Patient Name:  Eddie Parada Sr.   MRN:  [id number]    Recommendations:     Discharge Recommendations:  home health PT, home with home health   Discharge Equipment Recommendations: none   Barriers to discharge: None    Assessment:     Eddie Parada Sr. is a 89 y.o. male admitted with a medical diagnosis of <principal problem not specified>.  He presents with the following impairments/functional limitations:  weakness, gait instability, impaired functional mobilty, impaired self care skills, impaired endurance. Toerated sitting up at edge of the bed with supervision and gait functions x ~60 feet with RW at room air. Maintained SPO2 at 92 %- 94%.    Rehab Prognosis: Fair; patient would benefit from acute skilled PT services to address these deficits and reach maximum level of function.    Recent Surgery: * No surgery found *      Plan:     During this hospitalization, patient to be seen 5 x/week to address the identified rehab impairments via gait training, therapeutic activities, therapeutic exercises and progress toward the following goals:    · Plan of Care Expires:  05/05/22    Subjective     Chief Complaint: Pt is anxious to talk to the doctor to find out what's wrong with him.  Patient/Family Comments/goals: "To return home".  Pain/Comfort:  · Pain Rating 1: 0/10    Patients cultural, spiritual, Hindu conflicts given the current situation:      Living Environment:  Patient lives with wife in a [facility] with ramp and handrails access to enter.  Prior to admission, patients level of function was Modifed Ind with ADLs and gait functions using RW at home environment.  Equipment used at home: walker, rolling, raised toilet, wheelchair.  DME owned (not currently used): none.  Upon discharge, patient will have assistance from wife.    Objective:     Communicated with patient and wife prior to session.  Patient found HOB elevated with oxygen, peripheral IV, telemetry  upon PT entry " to room.    General Precautions: Standard, fall   Orthopedic Precautions:    Braces: N/A  Respiratory Status: Nasal cannula, flow 2.5 L/min    Exams:  · Cognitive Exam:  Patient is oriented to Person, Place and Time  · Fine Motor Coordination:    · -       Intact  · Gross Motor Coordination:  WFL  · Postural Exam:  Patient presented with the following abnormalities:    · -       Rounded shoulders  · -       Forward head  · Skin Integrity/Edema:      · -       Skin integrity: Visible skin intact  · RUE ROM: WFL  · RUE Strength: WFL  · LUE ROM: WFL  · LUE Strength: WFL  · RLE ROM: WFL  · RLE Strength: WFL  · LLE ROM: WFL  · LLE Strength: WFL    Functional Mobility:  · Bed Mobility:     · Rolling Left:  modified independence  · Rolling Right: modified independence  · Scooting: modified independence  · Supine to Sit: supervision  · Sit to Supine: supervision  · Transfers:     · Sit to Stand:  stand by assistance with rolling walker  · Gait: Standby Assistance x ~60 feet with RW.    Therapeutic Activities and Exercises:   Completed PT eval. Educated PT role, proper breathing ex and initiated out of bed activity with safety measures.  AM-PAC 6 CLICK MOBILITY  Total Score:20     Patient left HOB elevated with all lines intact, call button in reach, bed alarm on, nursing notified and wife present.    GOALS:   Multidisciplinary Problems     Physical Therapy Goals        Problem: Physical Therapy    Goal Priority Disciplines Outcome Goal Variances Interventions   Physical Therapy Goal     PT, PT/OT      Description:   Patient will increase functional independence with mobility by performin. Supine to sit with Independent  2. Sit to supine with Independent  3. Bed to chair transfer with Modified Independent with or without rolling walker using Step Transfer TECHNIQUE  4. Gait  x ~100  feet with Supervision or Set-up Assistance with  rolling walker  5. Lower extremity exercise program x10 reps per handout, with  assistance as needed                      History:     Past Medical History:   Diagnosis Date    Abdominal fibromatosis     Acute posthemorrhagic anemia 1/9/2018    NIK (acute kidney injury) 1/11/2018    Atrial fibrillation     Bradycardia     Broken arm     CAD (coronary artery disease)     Cancer     basal cell on nose and melanoma on back    CHF (congestive heart failure)     COPD (chronic obstructive pulmonary disease)     Diabetes mellitus type II     Hyperlipidemia     Localization-related focal epilepsy with simple partial seizures 3/2/2021    Melanoma     Obesity (BMI 30.0-34.9) 9/13/2016    Osteoporosis     Peripheral vascular disease     Pneumonia of left lower lobe due to infectious organism 4/30/2022    Primary malignant neoplasm of prostate 3/3/2022    Septic shock 3/3/2022    Stroke     TIA (transient ischemic attack)     Type II diabetes mellitus with peripheral circulatory disorder     Type II or unspecified type diabetes mellitus without mention of complication, not stated as uncontrolled     Unspecified essential hypertension        Past Surgical History:   Procedure Laterality Date    AORTIC VALVE REPLACEMENT      CARDIAC PACEMAKER PLACEMENT  9/28/2012    CARDIAC VALVE REPLACEMENT  11/17/2011    aortic valve-tissue    CHOLECYSTECTOMY      COLONOSCOPY      ESOPHAGOGASTRODUODENOSCOPY N/A 5/27/2021    Procedure: EGD (ESOPHAGOGASTRODUODENOSCOPY);  Surgeon: Gualberto Medrano MD;  Location: [facility];  Service: Endoscopy;  Laterality: N/A;    ESOPHAGOGASTRODUODENOSCOPY N/A 6/22/2021    Procedure: EGD (ESOPHAGOGASTRODUODENOSCOPY);  Surgeon: Gualberto Medrano MD;  Location: [facility];  Service: Endoscopy;  Laterality: N/A;  please call wife(Jessica) with instructions/arrival time.    ESOPHAGOGASTRODUODENOSCOPY (EGD) WITH DILATION  06/22/2021    EYE SURGERY Bilateral     cataract [facility] lens by  at [facility]    HERNIA REPAIR      abdominal    LASIK       UPPER GASTROINTESTINAL ENDOSCOPY  05/28/2021       Time Tracking:     PT Received On: 05/02/22  PT Start Time: 0903     PT Stop Time: 0935  PT Total Time (min): 32 min     Billable Minutes: Evaluation 15 mins  and Therapeutic Activity 17 mins      05/02/2022

## 2022-05-02 NOTE — SUBJECTIVE & OBJECTIVE
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    Melanoma     Obesity (BMI 30.0-34.9) 9/13/2016    Osteoporosis     Peripheral vascular disease     Pneumonia of left lower lobe due to infectious organism 4/30/2022    Primary malignant neoplasm of prostate 3/3/2022    Septic shock 3/3/2022    Stroke     TIA (transient ischemic attack)     Type II diabetes mellitus with peripheral circulatory disorder     Type II or unspecified type diabetes mellitus without mention of complication, not stated as uncontrolled     Unspecified essential hypertension            Review of Systems   Constitutional:  Negative for chills and fever.   HENT:  Negative for congestion, ear pain, postnasal drip, rhinorrhea, sore throat and trouble swallowing.    Eyes:  Negative for redness and itching.   Respiratory:  Positive for cough. Negative for shortness of breath and wheezing.    Cardiovascular:  Negative for chest pain, palpitations and leg swelling.   Gastrointestinal:  Negative for abdominal pain, diarrhea, nausea and vomiting.   Genitourinary:  Negative for dysuria and frequency.   Skin:  Negative for rash.   Neurological:  Negative for weakness and headaches.   Objective:     Vital Signs (Most Recent):  Temp: 96 °F (35.6 °C) (05/02/22 0737)  Pulse: 66 (05/02/22 0737)  Resp: (!) 22 (05/02/22 0737)  BP: 128/61 (05/02/22 0737)  SpO2: (!) 93 % (05/02/22 0739)   Vital Signs (24h Range):  Temp:  [96 °F (35.6 °C)-97.5 °F (36.4 °C)] 96 °F (35.6 °C)  Pulse:  [59-68] 66  Resp:  [18-24] 22  SpO2:  [91  %-94 %] 93 %  BP: (122-128)/(56-61) 128/61     Weight: 85.4 kg (188 lb 4.4 oz)  Body mass index is 31.33 kg/m².    Physical Exam  Vitals and nursing note reviewed.   Constitutional:       General: He is not in acute distress.     Appearance: He is well-developed.   HENT:      Head: Normocephalic and atraumatic.      Nose:      Comments: 2L nc in place  Eyes:      Conjunctiva/sclera: Conjunctivae normal.      Pupils: Pupils are equal, round, and reactive to light.   Neck:      Thyroid: No thyromegaly.   Cardiovascular:      Rate and Rhythm: Normal rate. Rhythm irregular.      Heart sounds: Murmur heard.   Pulmonary:      Effort: Pulmonary effort is normal. No respiratory distress.      Breath sounds: Normal breath sounds. No wheezing.   Abdominal:      General: Bowel sounds are normal.      Palpations: Abdomen is soft.      Tenderness: There is no abdominal tenderness.   Musculoskeletal:         General: Normal range of motion.      Cervical back: Normal range of motion and neck supple.      Right lower leg: Edema (trace, compression stockings on) present.      Left lower leg: Edema (trace, compression stockings on) present.   Lymphadenopathy:      Cervical: No cervical adenopathy.   Skin:     General: Skin is warm and dry.      Findings: No rash.   Neurological:      Mental Status: He is alert and oriented to person, place, and time.   Psychiatric:         Behavior: Behavior normal.         CRANIAL NERVES     CN III, IV, VI   Pupils are equal, round, and reactive to light.     Significant Labs: All pertinent labs within the past 24 hours have been reviewed.  A1C:   Recent Labs   Lab 03/02/22  0744 04/30/22  1046   HGBA1C 5.9* 6.0*       ABGs:   Recent Labs   Lab 04/30/22  1052   PH 7.470*   PCO2 39   HCO3 28.40   POCSATURATED 89.7*   BE 4.40*   TOTALHB 10.8*   COHB 2.3   METHB 0.9   PO2 58*       Blood Culture:   Recent Labs   Lab 04/30/22  1046 04/30/22  1144 04/30/22  1447   LABBLOO No Growth to date  No Growth  to date  No Growth to date  No Growth to date No Growth to date  No Growth to date  No Growth to date  No Growth to date No Growth to date  No Growth to date  No Growth to date  No Growth to date       CBC:   Recent Labs   Lab 22  1046 22  0610 22  0600   WBC 7.23 3.77* 9.39   HGB 10.6* 8.8* 8.8*   HCT 32.1* 26.7* 27.2*    115* 129*       CMP:   Recent Labs   Lab 22  1046 22  0610 22  1503 22  0600    143 141 142   K 3.4* 3.6 3.6 3.7    103 102 102   CO2 25 26 27 27   * 222* 194* 146*   BUN 17 18 23 23   CREATININE 1.5* 1.6* 1.7* 1.6*   CALCIUM 8.2* 7.7* 7.7* 8.3*   PROT 6.8 5.5*  --   --    ALBUMIN 2.7* 2.2*  --   --    BILITOT 0.6 0.4  --   --    ALKPHOS 105 82  --   --    AST 37 38  --   --    ALT 19 18  --   --    ANIONGAP 15 14 12 13   EGFRNONAA 40* 37* 34* 37*       Lactic Acid:   Recent Labs   Lab 22  1046 22  1144   LACTATE 1.2 1.0       Magnesium:   Recent Labs   Lab 22  0610   MG 1.7   Was given 2gm mag IV  and    Troponin:   Recent Labs   Lab 22  1046 22  1329   TROPONINI 0.069* 0.066*     BNP  Recent Labs   Lab 22  1046   *       TSH:   Recent Labs   Lab 22  0744   TSH 5.943*     Covid negative    Significant Imagin/1 CXR Right PICC in place with tip projected over the SVC.  Multiple cardiac monitor wires overlie the chest.  Left anterior chest wall dual lead pacemaker device.  Patient is status post sternotomy and CABG.  Cardiomediastinal silhouette remains enlarged.Prominent pulmonary vascular and interstitial markings with patchy airspace opacification left mid and lower lung, not significantly changed since the prior study.  Possible small left pleural effusion.  Pneumothorax.     CXR Left anterior chest wall dual lead pacemaker.  Postoperative change of prior sternotomy.  Right PICC catheter tip projected over the distal SVC.  Cardiomediastinal  silhouette is enlarged, unchanged.  Left perihilar and lower lobe opacification may reflect pneumonia, aspiration, or pulmonary edema.  Possible small left pleural effusion.  No pneumothorax.    4/30 EKG Ventricular-paced rhythm with occasional Premature ventricular complexes ;Probably AF  Abnormal ECG  When compared with ECG of 19-MAR-2022 07:35,  Premature ventricular complexes are now Present  Vent. rate has increased BY 3 BPM  Confirmed by Pool SERNA MD ([id number]) on 5/1/2022 11:12:48 AM

## 2022-05-02 NOTE — PROGRESS NOTES
[facility] ([facility])  [facility]  Progress Note    Patient Name: Eddie Parada Sr.  MRN: [id number]  Patient Class: IP- Inpatient   Admission Date: 4/30/2022  Length of Stay: 1 days  Attending Physician: Linda Greco MD  Primary Care Provider: Callum Roberts MD        Subjective:     Principal Problem:Pneumonia of left lower lobe due to infectious organism        HPI:  89 year old male with well controlled htn, diabetes, cad hfpef and recent complication of umbilical hernia mesh was brought in because of shortness of breath. 2 months ago he was admitted and then treated for an abdominal wall abscess. His post drainage course has been complicated by abx intolerance, renal failure. He has improved and has been on amp/sulbactam via picc for the last 2 weeks. Apparently yesterday he went to see dr. Haji and had his drain removed as the production had decreased. Over the last few days he has had worsening c/o dyspnea with exertion. There has been some concern for his renal function after sepsis and vanc treatment, so his lasix has been held for sometime. Over the last 4 mornings it has been noted that his pulse ox is around 84 upon wakening but it would improve to low 90s with movement/activity. No fever. No productive coughing. No choking/sputtering. No sick contacts. This morning he had swelling in his face which ultimately lead to his presentation to the ER.      Overview/Hospital Course:  5/1 Less than 1 L output of urine overnight. No longer with resp distress and patient about to lay completely flat without issues. He denies any chest pain, shortness of breath. Swelling noted over drain site. Sats lower 90s on supplemental oxygen. Afebrile.    5/2 has lasix 40 mg IV in ER and yesterday was given 0.5mg bumex yesterday. Renal function remains stable. I and O does not show that he has really diuresed but patient is feeling better. Sats 93% on 3L NC, > does not use O2 at home. Cardiology and  general surgery consulted for today. He does remains on vanc and zosyn for sepsis s/p abdominal wall abscess s/p abd wall hernia repair with mesh per Dr Haji. No fever. No elevated WBC,. H/H stable and platelets still low but improved. He also endorses cough that he has had for weeks prior to arrival. 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   HENT:  Negative for congestion, ear pain, postnasal drip, rhinorrhea, sore throat and trouble swallowing.    Eyes:  Negative for redness and itching.   Respiratory:  Positive for cough. Negative for shortness of breath and wheezing.    Cardiovascular:  Negative for chest pain, palpitations and leg swelling.   Gastrointestinal:  Negative for abdominal pain, diarrhea, nausea and vomiting.   Genitourinary:  Negative for dysuria and frequency.   Skin:  Negative for rash.   Neurological:  Negative for  weakness and headaches.   Objective:     Vital Signs (Most Recent):  Temp: 96 °F (35.6 °C) (05/02/22 0737)  Pulse: 66 (05/02/22 0737)  Resp: (!) 22 (05/02/22 0737)  BP: 128/61 (05/02/22 0737)  SpO2: (!) 93 % (05/02/22 0739)   Vital Signs (24h Range):  Temp:  [96 °F (35.6 °C)-97.5 °F (36.4 °C)] 96 °F (35.6 °C)  Pulse:  [59-68] 66  Resp:  [18-24] 22  SpO2:  [91 %-94 %] 93 %  BP: (122-128)/(56-61) 128/61     Weight: 85.4 kg (188 lb 4.4 oz)  Body mass index is 31.33 kg/m².    Physical Exam  Vitals and nursing note reviewed.   Constitutional:       General: He is not in acute distress.     Appearance: He is well-developed.   HENT:      Head: Normocephalic and atraumatic.      Nose:      Comments: 2L nc in place  Eyes:      Conjunctiva/sclera: Conjunctivae normal.      Pupils: Pupils are equal, round, and reactive to light.   Neck:      Thyroid: No thyromegaly.   Cardiovascular:      Rate and Rhythm: Normal rate. Rhythm irregular.      Heart sounds: Murmur heard.   Pulmonary:      Effort: Pulmonary effort is normal. No respiratory distress.      Breath sounds: Normal breath sounds. No wheezing.   Abdominal:      General: Bowel sounds are normal.      Palpations: Abdomen is soft.      Tenderness: There is no abdominal tenderness.   Musculoskeletal:         General: Normal range of motion.      Cervical back: Normal range of motion and neck supple.      Right lower leg: Edema (trace, compression stockings on) present.      Left lower leg: Edema (trace, compression stockings on) present.   Lymphadenopathy:      Cervical: No cervical adenopathy.   Skin:     General: Skin is warm and dry.      Findings: No rash.   Neurological:      Mental Status: He is alert and oriented to person, place, and time.   Psychiatric:         Behavior: Behavior normal.         CRANIAL NERVES     CN III, IV, VI   Pupils are equal, round, and reactive to light.     Significant Labs: All pertinent labs within the past 24 hours have been  reviewed.  A1C:   Recent Labs   Lab 22  0744 22  1046   HGBA1C 5.9* 6.0*       ABGs:   Recent Labs   Lab 22  1052   PH 7.470*   PCO2 39   HCO3 28.40   POCSATURATED 89.7*   BE 4.40*   TOTALHB 10.8*   COHB 2.3   METHB 0.9   PO2 58*       Blood Culture:   Recent Labs   Lab 22  1046 22  1144 22  1447   LABBLOO No Growth to date  No Growth to date  No Growth to date  No Growth to date No Growth to date  No Growth to date  No Growth to date  No Growth to date No Growth to date  No Growth to date  No Growth to date  No Growth to date       CBC:   Recent Labs   Lab 22  1046 22  0610 22  0600   WBC 7.23 3.77* 9.39   HGB 10.6* 8.8* 8.8*   HCT 32.1* 26.7* 27.2*    115* 129*       CMP:   Recent Labs   Lab 22  1046 22  0610 22  1503 22  0600    143 141 142   K 3.4* 3.6 3.6 3.7    103 102 102   CO2 25 26 27 27   * 222* 194* 146*   BUN 17 18 23 23   CREATININE 1.5* 1.6* 1.7* 1.6*   CALCIUM 8.2* 7.7* 7.7* 8.3*   PROT 6.8 5.5*  --   --    ALBUMIN 2.7* 2.2*  --   --    BILITOT 0.6 0.4  --   --    ALKPHOS 105 82  --   --    AST 37 38  --   --    ALT 19 18  --   --    ANIONGAP 15 14 12 13   EGFRNONAA 40* 37* 34* 37*       Lactic Acid:   Recent Labs   Lab 22  1046 22  1144   LACTATE 1.2 1.0       Magnesium:   Recent Labs   Lab 22  0610   MG 1.7   Was given 2gm mag IV  and    Troponin:   Recent Labs   Lab 22  1046 22  1329   TROPONINI 0.069* 0.066*     BNP  Recent Labs   Lab 22  1046   *       TSH:   Recent Labs   Lab 22  0744   TSH 5.943*     Covid negative    Significant Imagin/1 CXR Right PICC in place with tip projected over the SVC.  Multiple cardiac monitor wires overlie the chest.  Left anterior chest wall dual lead pacemaker device.  Patient is status post sternotomy and CABG.  Cardiomediastinal silhouette remains enlarged.Prominent pulmonary vascular  and interstitial markings with patchy airspace opacification left mid and lower lung, not significantly changed since the prior study.  Possible small left pleural effusion.  Pneumothorax.    4/30 CXR Left anterior chest wall dual lead pacemaker.  Postoperative change of prior sternotomy.  Right PICC catheter tip projected over the distal SVC.  Cardiomediastinal silhouette is enlarged, unchanged.  Left perihilar and lower lobe opacification may reflect pneumonia, aspiration, or pulmonary edema.  Possible small left pleural effusion.  No pneumothorax.    4/30 EKG Ventricular-paced rhythm with occasional Premature ventricular complexes ;Probably AF  Abnormal ECG  When compared with ECG of 19-MAR-2022 07:35,  Premature ventricular complexes are now Present  Vent. rate has increased BY 3 BPM  Confirmed by Pool SERNA MD ([id number]) on 5/1/2022 11:12:48 AM      Assessment/Plan:      * Pneumonia of left lower lobe due to infectious organism  Clinical presentation not completely c/w this. More likely he has chf. No cough. No fever. Has picc line and has been getting amp. For now, broaden abx. Check cbc in am. Wean o2 as tolerated. And monitor response to diuresis. vanc and zosyn ordered. Amp on hold. Blood cultures sent.    5/1  WBC ct dropped - but he is pancytopenic this morning so I question lab error. Will recheck cbc. Afebrile. Will recheck cxr. He has not really diuresed though and breathing has improved. Still with hypoxia.    5/2 WBC back to normal. Afebrile cxr without much improvedment thougfh breathing better still requiring 3L NC. Also coughing. Will start nebs and mucinex. Ask for sputum culture     Acute on chronic congestive heart failure  Patient is identified as having Diastolic (HFpEF) heart failure that is Acute on chronic. CHF is currently uncontrolled due to Continued edema of extremities, Hepatic congestion/ascites and JVD and Pulmonary edema/pleural effusion on CXR. Latest ECHO performed and demonstrates-  Results for orders placed during the hospital encounter of 03/03/22    Echo>> repeat ordered since he has not had one since recent illness    Interpretation Summary  · The left ventricle is normal in size with normal systolic function.  · The estimated ejection fraction is 55%.  · Indeterminate left ventricular diastolic function.  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  · Normal right ventricular size with normal right ventricular systolic function.  · Severe left atrial enlargement.  · Severe right atrial enlargement.  · Mild to moderate tricuspid regurgitation.  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 There is no aortic insufficiency present. Prosthetic aortic valve is normal.  · The aortic valve mean gradient is 13 mmHg with a dimensionless index of 0.61.  . Continue Beta Blocker and Furosemide and monitor clinical status closely. Monitor on telemetry. Patient is on CHF pathway.  Monitor strict Is&Os and daily weights.  Place on fluid restriction of 1.5 L. Continue to stress to patient importance of self efficacy and  on diet for CHF. Last BNP reviewed- and noted below   Recent Labs   Lab 04/30/22  1046   *   .  Has not been on lasix. Given 1 dose in er. Hasn't really diuresed. May switch to bumex pending diuresis. Not on an ace - ?renal function. Not on jardiance, either. Will consult cards for Monday.    5/1 consult cards. Consideration of repeat echo - though just recently completed in march.    52 Dr Monterroso saw patient today would like to cont home lasix. Laying flat today   Check BNP in Am with bmp    Hypokalemia    K 3.7    CKD (chronic kidney disease) stage 3, GFR 30-59 ml/min  Xarelto adjusted.  Check daily BMPs with diuretic use.  stable    Abdominal wall abscess  Seeing id and surgery outpatient.  Drain removed 4/29.  Seems there is still persistence in swelling in ab wall.    5/1  On physical exam I can feel a swelling superior to where  the drain was. Will have dr. Haji see patient if he is in house.    5/2 day 3 vanc and zosyn- Dr Haji to see him today       Elevated troponin  Stable. Trend. No chest pain.      (HFpEF) heart failure with preserved ejection fraction  3/3/22  · The left ventricle is normal in size with normal systolic function.  · The estimated ejection fraction is 55%.  · Indeterminate left ventricular diastolic function.  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  · Normal right ventricular size with normal right ventricular systolic function.  · Severe left atrial enlargement.  · Severe right atrial enlargement.  · Mild to moderate tricuspid regurgitation.  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 There is no aortic insufficiency present. Prosthetic aortic valve is normal.  · The aortic valve mean gradient is 13 mmHg with a dimensionless index of 0.61.    5/1  Switch from lasix to bumex and monitor diuresis. Really didn't get much output overnight. Breathing is improved. Repeat cxr ordered for this morning. Need accurate weights.    5/2 not much changed from resp standpoint. Still on 3L NC and doesn't wear O2 at home. CXR still showing infiltrate area on left lower lobe. Now with a cough on vanc and zosyn. Will start mucinex and nebs.     Type 2 diabetes mellitus, controlled  Not on meds. Sliding scale ordered.   this am     S/P AVR        Aortic stenosis  S/p replacement. On xarelto.      Cardiac pacemaker in situ  Not showing on tele.  Short run of v tach this Sunday morning> mag replaced .        CAD (coronary artery disease)  On BB, statin. Not taking aspirin.      Essential (primary) hypertension  Cont on home meds.  /61 stable on amlodipine, doxazosin and coreg       Chronic atrial fibrillation  On xarelto (lower dose here with renal insuff) and coreg at home. On telemetry.      VTE Risk Mitigation (From admission, onward)         Ordered     rivaroxaban tablet  15 mg  with dinner         05/01/22 0921     IP VTE HIGH RISK PATIENT  Once         04/30/22 1617     Place sequential compression device  Until discontinued         04/30/22 1617     Place SOPHIA hose  Until discontinued         04/30/22 1617                Discharge Planning   KANDICE:      Code Status: Full Code   Is the patient medically ready for discharge?:     Reason for patient still in hospital (select all that apply): Treatment  Discharge Plan A: Home with family, Home Health                  Georgia Ann MD  Department of Hospital Medicine   [place] [facility] ([facility])

## 2022-05-02 NOTE — ASSESSMENT & PLAN NOTE
Seeing id and surgery outpatient.  Drain removed 4/29.  Seems there is still persistence in swelling in ab wall.    5/1  On physical exam I can feel a swelling superior to where the drain was. Will have dr. Haji see patient if he is in house.    5/2 day 3 vanc and zosyn- Dr Haji to see him today

## 2022-05-02 NOTE — PROGRESS NOTES
Patient received two doses outside of the pharmacy protocol. Trough drawn 05/02/2022 is 11.5. Spoke with Dr. Ann. She would like pharmacy to continue the consult. Trough scheduled for 5/3/22 at 1500. Pharmacy will continue to monitor and dose according to the pharmacy to dose vancomycin protocol.     Terry Solis, ClaudeD.

## 2022-05-02 NOTE — ASSESSMENT & PLAN NOTE
3/3/22  · The left ventricle is normal in size with normal systolic function.  · The estimated ejection fraction is 55%.  · Indeterminate left ventricular diastolic function.  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  · Normal right ventricular size with normal right ventricular systolic function.  · Severe left atrial enlargement.  · Severe right atrial enlargement.  · Mild to moderate tricuspid regurgitation.  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 There is no aortic insufficiency present. Prosthetic aortic valve is normal.  · The aortic valve mean gradient is 13 mmHg with a dimensionless index of 0.61.    5/1  Switch from lasix to bumex and monitor diuresis. Really didn't get much output overnight. Breathing is improved. Repeat cxr ordered for this morning. Need accurate weights.    5/2 not much changed from resp standpoint. Still on 3L NC and doesn't wear O2 at home. CXR still showing infiltrate area on left lower lobe. Now with a cough on vanc and zosyn. Will start mucinex and nebs.

## 2022-05-02 NOTE — ASSESSMENT & PLAN NOTE
Clinical presentation not completely c/w this. More likely he has chf. No cough. No fever. Has picc line and has been getting amp. For now, broaden abx. Check cbc in am. Wean o2 as tolerated. And monitor response to diuresis. vanc and zosyn ordered. Amp on hold. Blood cultures sent.    5/1  WBC ct dropped - but he is pancytopenic this morning so I question lab error. Will recheck cbc. Afebrile. Will recheck cxr. He has not really diuresed though and breathing has improved. Still with hypoxia.    5/2 WBC back to normal. Afebrile cxr without much improvedment thougfh breathing better still requiring 3L NC. Also coughing. Will start nebs and mucinex. Ask for sputum culture

## 2022-05-03 LAB
ANION GAP SERPL CALC-SCNC: 11 MMOL/L (ref 8–16)
BASOPHILS # BLD AUTO: 0.01 K/UL (ref 0–0.2)
BASOPHILS NFR BLD: 0.2 % (ref 0–1.9)
BILIRUB UR QL STRIP: NEGATIVE
BNP SERPL-MCNC: 348 PG/ML (ref 0–99)
BUN SERPL-MCNC: 22 MG/DL (ref 10–30)
CALCIUM SERPL-MCNC: 8.1 MG/DL (ref 8.7–10.5)
CHLORIDE SERPL-SCNC: 101 MMOL/L (ref 95–110)
CLARITY UR: CLEAR
CO2 SERPL-SCNC: 28 MMOL/L (ref 23–29)
COLOR UR: YELLOW
CREAT SERPL-MCNC: 1.6 MG/DL (ref 0.5–1.4)
DIFFERENTIAL METHOD: ABNORMAL
EOSINOPHIL # BLD AUTO: 0.1 K/UL (ref 0–0.5)
EOSINOPHIL NFR BLD: 1.2 % (ref 0–8)
ERYTHROCYTE [DISTWIDTH] IN BLOOD BY AUTOMATED COUNT: 14.1 % (ref 11.5–14.5)
EST. GFR  (AFRICAN AMERICAN): 43 ML/MIN/1.73 M^2
EST. GFR  (NON AFRICAN AMERICAN): 37 ML/MIN/1.73 M^2
GLUCOSE SERPL-MCNC: 134 MG/DL (ref 70–110)
GLUCOSE UR QL STRIP: NEGATIVE
HCT VFR BLD AUTO: 28.3 % (ref 40–54)
HGB BLD-MCNC: 9.1 G/DL (ref 14–18)
HGB UR QL STRIP: ABNORMAL
IMM GRANULOCYTES # BLD AUTO: 0.06 K/UL (ref 0–0.04)
IMM GRANULOCYTES NFR BLD AUTO: 0.9 % (ref 0–0.5)
KETONES UR QL STRIP: NEGATIVE
LEUKOCYTE ESTERASE UR QL STRIP: ABNORMAL
LYMPHOCYTES # BLD AUTO: 1 K/UL (ref 1–4.8)
LYMPHOCYTES NFR BLD: 14.7 % (ref 18–48)
MCH RBC QN AUTO: 33.2 PG (ref 27–31)
MCHC RBC AUTO-ENTMCNC: 32.2 G/DL (ref 32–36)
MCV RBC AUTO: 103 FL (ref 82–98)
MICROSCOPIC COMMENT: ABNORMAL
MONOCYTES # BLD AUTO: 0.6 K/UL (ref 0.3–1)
MONOCYTES NFR BLD: 8.6 % (ref 4–15)
NEUTROPHILS # BLD AUTO: 4.8 K/UL (ref 1.8–7.7)
NEUTROPHILS NFR BLD: 74.4 % (ref 38–73)
NITRITE UR QL STRIP: NEGATIVE
NRBC BLD-RTO: 0 /100 WBC
PH UR STRIP: 6 [PH] (ref 5–8)
PLATELET # BLD AUTO: 125 K/UL (ref 150–450)
PMV BLD AUTO: 9.9 FL (ref 9.2–12.9)
POCT GLUCOSE: 160 MG/DL (ref 70–110)
POCT GLUCOSE: 192 MG/DL (ref 70–110)
POCT GLUCOSE: 212 MG/DL (ref 70–110)
POCT GLUCOSE: 266 MG/DL (ref 70–110)
POTASSIUM SERPL-SCNC: 3.6 MMOL/L (ref 3.5–5.1)
PROT UR QL STRIP: NEGATIVE
RBC # BLD AUTO: 2.74 M/UL (ref 4.6–6.2)
RBC #/AREA URNS HPF: 1 /HPF (ref 0–4)
SODIUM SERPL-SCNC: 140 MMOL/L (ref 136–145)
SP GR UR STRIP: 1.01 (ref 1–1.03)
URN SPEC COLLECT METH UR: ABNORMAL
UROBILINOGEN UR STRIP-ACNC: NEGATIVE EU/DL
VANCOMYCIN TROUGH SERPL-MCNC: 13.7 UG/ML (ref 10–22)
WBC # BLD AUTO: 6.48 K/UL (ref 3.9–12.7)
WBC #/AREA URNS HPF: 3 /HPF (ref 0–5)
YEAST URNS QL MICRO: ABNORMAL

## 2022-05-03 PROCEDURE — 94761 N-INVAS EAR/PLS OXIMETRY MLT: CPT

## 2022-05-03 PROCEDURE — 99233 PR SUBSEQUENT HOSPITAL CARE,LEVL III: ICD-10-PCS | Mod: ,,, | Performed by: INTERNAL MEDICINE

## 2022-05-03 PROCEDURE — 83880 ASSAY OF NATRIURETIC PEPTIDE: CPT | Performed by: NURSE PRACTITIONER

## 2022-05-03 PROCEDURE — 36415 COLL VENOUS BLD VENIPUNCTURE: CPT | Performed by: FAMILY MEDICINE

## 2022-05-03 PROCEDURE — 63600175 PHARM REV CODE 636 W HCPCS: Performed by: FAMILY MEDICINE

## 2022-05-03 PROCEDURE — 25000242 PHARM REV CODE 250 ALT 637 W/ HCPCS: Performed by: NURSE PRACTITIONER

## 2022-05-03 PROCEDURE — 99900035 HC TECH TIME PER 15 MIN (STAT)

## 2022-05-03 PROCEDURE — 63600175 PHARM REV CODE 636 W HCPCS: Performed by: STUDENT IN AN ORGANIZED HEALTH CARE EDUCATION/TRAINING PROGRAM

## 2022-05-03 PROCEDURE — 99233 SBSQ HOSP IP/OBS HIGH 50: CPT | Mod: ,,, | Performed by: INTERNAL MEDICINE

## 2022-05-03 PROCEDURE — 85025 COMPLETE CBC W/AUTO DIFF WBC: CPT | Performed by: NURSE PRACTITIONER

## 2022-05-03 PROCEDURE — 97116 GAIT TRAINING THERAPY: CPT

## 2022-05-03 PROCEDURE — 25000003 PHARM REV CODE 250: Performed by: FAMILY MEDICINE

## 2022-05-03 PROCEDURE — 80048 BASIC METABOLIC PNL TOTAL CA: CPT | Performed by: NURSE PRACTITIONER

## 2022-05-03 PROCEDURE — 81000 URINALYSIS NONAUTO W/SCOPE: CPT | Performed by: FAMILY MEDICINE

## 2022-05-03 PROCEDURE — 80202 ASSAY OF VANCOMYCIN: CPT | Performed by: FAMILY MEDICINE

## 2022-05-03 PROCEDURE — 25000003 PHARM REV CODE 250: Performed by: NURSE PRACTITIONER

## 2022-05-03 PROCEDURE — 25000003 PHARM REV CODE 250: Performed by: STUDENT IN AN ORGANIZED HEALTH CARE EDUCATION/TRAINING PROGRAM

## 2022-05-03 PROCEDURE — 27000221 HC OXYGEN, UP TO 24 HOURS

## 2022-05-03 PROCEDURE — 63600175 PHARM REV CODE 636 W HCPCS: Performed by: NURSE PRACTITIONER

## 2022-05-03 PROCEDURE — 94640 AIRWAY INHALATION TREATMENT: CPT

## 2022-05-03 PROCEDURE — 97530 THERAPEUTIC ACTIVITIES: CPT

## 2022-05-03 PROCEDURE — 11000001 HC ACUTE MED/SURG PRIVATE ROOM

## 2022-05-03 RX ORDER — FUROSEMIDE 10 MG/ML
40 INJECTION INTRAMUSCULAR; INTRAVENOUS ONCE
Status: COMPLETED | OUTPATIENT
Start: 2022-05-03 | End: 2022-05-03

## 2022-05-03 RX ORDER — DOXAZOSIN 2 MG/1
2 TABLET ORAL NIGHTLY
Status: DISCONTINUED | OUTPATIENT
Start: 2022-05-03 | End: 2022-05-04 | Stop reason: HOSPADM

## 2022-05-03 RX ADMIN — TAMSULOSIN HYDROCHLORIDE 0.4 MG: 0.4 CAPSULE ORAL at 08:05

## 2022-05-03 RX ADMIN — CARVEDILOL 12.5 MG: 12.5 TABLET, FILM COATED ORAL at 08:05

## 2022-05-03 RX ADMIN — LEVALBUTEROL 1.25 MG: 1.25 SOLUTION, CONCENTRATE RESPIRATORY (INHALATION) at 08:05

## 2022-05-03 RX ADMIN — INSULIN ASPART 2 UNITS: 100 INJECTION, SOLUTION INTRAVENOUS; SUBCUTANEOUS at 05:05

## 2022-05-03 RX ADMIN — FUROSEMIDE 20 MG: 20 TABLET ORAL at 08:05

## 2022-05-03 RX ADMIN — RIVAROXABAN 15 MG: 15 TABLET, FILM COATED ORAL at 04:05

## 2022-05-03 RX ADMIN — GUAIFENESIN 600 MG: 600 TABLET, EXTENDED RELEASE ORAL at 09:05

## 2022-05-03 RX ADMIN — GABAPENTIN 200 MG: 100 CAPSULE ORAL at 09:05

## 2022-05-03 RX ADMIN — MUPIROCIN: 20 OINTMENT TOPICAL at 09:05

## 2022-05-03 RX ADMIN — FUROSEMIDE 40 MG: 10 INJECTION, SOLUTION INTRAMUSCULAR; INTRAVENOUS at 12:05

## 2022-05-03 RX ADMIN — LEVALBUTEROL 1.25 MG: 1.25 SOLUTION, CONCENTRATE RESPIRATORY (INHALATION) at 11:05

## 2022-05-03 RX ADMIN — LEVOTHYROXINE SODIUM 75 MCG: 75 TABLET ORAL at 06:05

## 2022-05-03 RX ADMIN — AMLODIPINE BESYLATE 10 MG: 10 TABLET ORAL at 08:05

## 2022-05-03 RX ADMIN — DOXAZOSIN 2 MG: 2 TABLET ORAL at 09:05

## 2022-05-03 RX ADMIN — PRAVASTATIN SODIUM 20 MG: 20 TABLET ORAL at 08:05

## 2022-05-03 RX ADMIN — INSULIN ASPART 1 UNITS: 100 INJECTION, SOLUTION INTRAVENOUS; SUBCUTANEOUS at 09:05

## 2022-05-03 RX ADMIN — GUAIFENESIN 600 MG: 600 TABLET, EXTENDED RELEASE ORAL at 08:05

## 2022-05-03 RX ADMIN — LEVALBUTEROL 1.25 MG: 1.25 SOLUTION, CONCENTRATE RESPIRATORY (INHALATION) at 03:05

## 2022-05-03 RX ADMIN — CARVEDILOL 12.5 MG: 12.5 TABLET, FILM COATED ORAL at 04:05

## 2022-05-03 RX ADMIN — PIPERACILLIN AND TAZOBACTAM 4.5 G: 4; .5 INJECTION, POWDER, LYOPHILIZED, FOR SOLUTION INTRAVENOUS; PARENTERAL at 04:05

## 2022-05-03 RX ADMIN — PIPERACILLIN AND TAZOBACTAM 4.5 G: 4; .5 INJECTION, POWDER, LYOPHILIZED, FOR SOLUTION INTRAVENOUS; PARENTERAL at 08:05

## 2022-05-03 RX ADMIN — VANCOMYCIN HYDROCHLORIDE 750 MG: 750 INJECTION, POWDER, LYOPHILIZED, FOR SOLUTION INTRAVENOUS at 05:05

## 2022-05-03 RX ADMIN — PIPERACILLIN AND TAZOBACTAM 4.5 G: 4; .5 INJECTION, POWDER, LYOPHILIZED, FOR SOLUTION INTRAVENOUS; PARENTERAL at 11:05

## 2022-05-03 NOTE — PROGRESS NOTES
Eddie Parada Sr. is a 89 y.o. male  Admitted to med /  surg floor for CHF /abdominal wall abscess .  presently on IV vanc and zosyn . He had abscess abdominal wall about 2 months ago  Treated with drainage with IR . Placed on antibiotics .  Seen recently by general surgery ; Dr Jesus Haji saw him at [facility] ; drain removed 4/29  Now his fluid collection is back again ; see CT report   Discussed with Dr Haji ; will need IR drainage .  Will ask for transfer higher level of care ; wife requesting [facility] .      CT   As compared to the previous examination, the patient's drainage catheter in the anterior abdominal wall has been removed in the region of the patient's abdominal wall mesh.  In association with this mesh there is abnormal collection of fluid and air that extends from the midline of the abdomen and extends towards the left, measuring approximately 17 x 3.6 cm in transverse dimension and approximately 14 cm in craniocaudal dimension.  Findings are concerning for abscess formation.  The adjacent bowel is intimately associated with this abdominal wall collection.  There is abnormal whole-body anasarca with extensive soft tissue edema seen throughout the visualized abdomen and pelvis however more pronounced in the left hemiabdomen and extending into the left chest and left flank.  There are bilateral inguinal hernias which contain a small amount of fluid with portions of the bowel seen in the patient's right inguinal hernia.  Diverticulosis coli is seen without diverticulitis.  No free air  or obstruction.  No pathologically enlarged abdominal or pelvic lymph nodes are seen.     Postoperative changes of the right hip.  Age-appropriate degenerative changes affect the skeleton.          As compared to the previous study, the patient has abdominal drain has been removed.  There has been reaccumulation of fluid and air along the anterior abdominal wall extending more to the left of the abdomen  involving the patient's abdominal wall mesh, highly concerning for abscess formation.  The small bowel appears to be adherent to this collection of fluid and air an underlying involvement/fistulization is difficult to entirely exclude although felt unlikely.  There is extensive whole-body anasarca with asymmetric abnormal edema seen in the left abdominal wall primarily in the flank and extending into the left chest.     Moderate right with large left pleural effusion with adjacent passive atelectasis.  There does appear to be complete collapse of the left lower lobe.  Component of developing infiltrate not entirely excluded.

## 2022-05-03 NOTE — ASSESSMENT & PLAN NOTE
K 3.7.    5/3  BMP  Lab Results   Component Value Date     05/03/2022    K 3.6 05/03/2022     05/03/2022    CO2 28 05/03/2022    BUN 22 05/03/2022    CREATININE 1.6 (H) 05/03/2022    CALCIUM 8.1 (L) 05/03/2022    ANIONGAP 11 05/03/2022    ESTGFRAFRICA 43 (A) 05/03/2022    EGFRNONAA 37 (A) 05/03/2022

## 2022-05-03 NOTE — ASSESSMENT & PLAN NOTE
Seeing id and surgery outpatient.  Drain removed 4/29.  Seems there is still persistence in swelling in ab wall.    5/1  On physical exam I can feel a swelling superior to where the drain was. Will have dr. Haji see patient if he is in house.    5/3 day 4 vanc and zosyn- Dr Haji to see him today

## 2022-05-03 NOTE — NURSING
Notified Dr. Haji of patient's hardening abdomen. Discussed conversation with Dr. Roberts. See new orders.

## 2022-05-03 NOTE — PT/OT/SLP PROGRESS
"Physical Therapy Treatment    Patient Name:  Eddie Parada Sr.   MRN:  [id number]    Recommendations:     Discharge Recommendations:  home health PT, home with home health   Discharge Equipment Recommendations: none   Barriers to discharge: None    Assessment:     Eddie Parada Sr. is a 89 y.o. male admitted with a medical diagnosis of Pneumonia of left lower lobe due to infectious organism.  He presents with the following impairments/functional limitations:  weakness, gait instability, impaired self care skills, impaired functional mobilty, impaired endurance. Patient tolerated PT session today with increased gait distance at room air at the hallway. SPO2 based line from 93% to 91%. No sign of respiratory distress.    Rehab Prognosis: Fair; patient would benefit from acute skilled PT services to address these deficits and reach maximum level of function.    Recent Surgery: * No surgery found *      Plan:     During this hospitalization, patient to be seen 5 x/week to address the identified rehab impairments via gait training, therapeutic activities, therapeutic exercises and progress toward the following goals:    · Plan of Care Expires:  05/05/22    Subjective     Chief Complaint: No complain   Patient/Family Comments/goals: "To return home and work at my office".  Pain/Comfort:  · Pain Rating 1: 0/10      Objective:     Communicated with patient and wife  prior to session.  Patient found HOB elevated with oxygen, peripheral IV, telemetry upon PT entry to room.     General Precautions: Standard, fall   Orthopedic Precautions:    Braces: N/A  Respiratory Status: Nasal cannula, flow 3 L/min     Functional Mobility:  · Bed Mobility:     · Rolling Left:  modified independence  · Rolling Right: modified independence  · Scooting: modified independence  · Supine to Sit: supervision  · Sit to Supine: supervision  · Transfers:     · Sit to Stand:  supervision with rolling walker  · Gait: Supervision x~100 feet with " RW. Reciprocal gait      AM-PAC 6 CLICK MOBILITY  Turning over in bed (including adjusting bedclothes, sheets and blankets)?: 4  Sitting down on and standing up from a chair with arms (e.g., wheelchair, bedside commode, etc.): 4  Moving from lying on back to sitting on the side of the bed?: 4  Moving to and from a bed to a chair (including a wheelchair)?: 3  Need to walk in hospital room?: 3  Climbing 3-5 steps with a railing?: 3  Basic Mobility Total Score: 21       Therapeutic Activities and Exercises:   Reviewed and performed home ex program for bilat LE strengthening ROM ex  X 10 reps on each such as ankle pumps, heel slides, hip abd/add ex, rolling to sides x 2, supine<>sit and sit <>stand x 2, breathing ex, and gait trng at the hallway.    Patient left HOB elevated with all lines intact, call button in reach, nursing  notified and wife present..    GOALS:   Multidisciplinary Problems     Physical Therapy Goals        Problem: Physical Therapy    Goal Priority Disciplines Outcome Goal Variances Interventions   Physical Therapy Goal     PT, PT/OT Ongoing, Progressing     Description:   Patient will increase functional independence with mobility by performin. Supine to sit with Independent  2. Sit to supine with Independent  3. Bed to chair transfer with Modified Independent with or without rolling walker using Step Transfer TECHNIQUE  4. Gait  x ~100  feet with Supervision or Set-up Assistance with  rolling walker  5. Lower extremity exercise program x10 reps per handout, with assistance as needed                      Time Tracking:     PT Received On: 22  PT Start Time: 913     PT Stop Time: 950  PT Total Time (min): 37 min     Billable Minutes: Gait Training 17 mins and Therapeutic Activity 20 mins    Treatment Type: Treatment  PT/PTA: PT           2022

## 2022-05-03 NOTE — PLAN OF CARE
Problem: Physical Therapy  Goal: Physical Therapy Goal  Description:   Patient will increase functional independence with mobility by performin. Supine to sit with Independent  2. Sit to supine with Independent  3. Bed to chair transfer with Modified Independent with or without rolling walker using Step Transfer TECHNIQUE  4. Gait  x ~100  feet with Supervision or Set-up Assistance with  rolling walker  5. Lower extremity exercise program x10 reps per handout, with assistance as needed     Outcome: Ongoing, Progressing

## 2022-05-03 NOTE — PROVIDER TRANSFER
Outside Transfer Acceptance Note / Regional Referral Center    Referring facility: [facility]   Referring provider: GABRIEL STALEY  Accepting [facility]  Accepting provider:IQRA CURIEL  Admitting provider: TRUPTI GARCIA  Reason for transfer:  Need Interventional Radiology, General Surgery  Transfer diagnosis: Abdominal wall abscess  Transfer specialty requested: [facility] Surgery  Hospital Medicine  Interventional Radiology  Transfer specialty notified: yes  Transfer level: NUMBER 1-5: 2  Bed type requested: med-tele  Isolation status: No active isolations   Admission class or status: IP- Inpatient      Narrative     89-year-old male with a history of hypertension, diabetes, coronary artery disease, hyperlipidemia, TIA, COPD, NIK, atrial fibrillation with pacemaker placement (on Rivaroxaban), and recent treatment for abdominal abscess (IR drain placement and subsequent removal) admitted to [facility] on April 30 with dyspnea.  He was admitted with concerns for left lower lobe pneumonia, CHF exacerbation, and abdominal wall abscess.  He received antibiotics and diuretics.  Respiratory status appeared fairly stable, though he still has supplemental oxygen requirements.  Repeat imaging of his abdomen on May 3 noted removal of the previous abdominal drain with reaccumulation of fluid and air along the anterior abdominal wall concerning for abscess formation.  He is currently on Zosyn and vancomycin.  Transfer center spoke with Interventional Radiology and General Surgery at [facility].  Hospital Medicine was asked to admit for further treatment.  Referring provider noted no alteration in mentation and noted that he is not having significant pain.  He remains on 3 L nasal cannula, but oxygenation is stable.    He was initially admitted to [facility] March 3-8 of this year with septic shock felt to be related to a large abscess in the anterior abdominal wall at  the level of the prior mesh repair.  He was evaluated by General Surgery and Interventional Radiology.  A drain was placed by Interventional Radiology on March 3. He was treated with antibiotics and subsequently discharged home.  He was readmitted March 12-14 with abdominal imaging showing an increase in size in the abscess.  Antibiotics were adjusted and recommendation was to try Cathflo in the drain if output decreased.  He was then admitted to [facility] [place] [place] March 19-26 with encephalopathy, NIK, and abdominal wall abscess.  Antibiotics were adjusted.  EEG showed evidence of encephalopathy but not signs of seizures.  He was discharged on Unasyn.  Abdominal drain was removed in at the [facility] on April 29.     COVID vaccinated  May 3:  Sodium 140, potassium 3.6, chloride 101, CO2 28, BUN 22, creatinine 1.6, glucose 134, , white blood cells 6.48, hemoglobin 9.1, hematocrit 28.3, platelets 125    Echocardiogram on May 2 with EF 55%, indeterminate LV diastolic function, moderate RV enlargement with mildly reduced RV systolic function, bioprosthetic aortic valve present.  There is aortic insufficiency present, prosthetic aortic valve is normal.  Mild MR, mild TR, moderate pulmonary hypertension.    Blood cultures with no growth to date    CT abdomen and pelvis on May 3 noted that the abdominal drain has been removed.  There has been reaccumulation of fluid and air along the anterior abdominal wall extending more to the left of the abdomen involving the patient's abdominal wall mesh, highly concerning for abscess formation.  The small bowel appears to be adherent to this collection of fluid and air.  On underlying fistula is difficult to entirely exclude.  There is extensive whole body anasarca with asymmetric abnormal edema seen in the left abdominal wall primarily in the flank and extending into the chest.  Moderate right and large left pleural effusion with adjacent passive atelectasis.  There does  appear to be complete collapse of the left lower lobe.    Chest x-ray on May 3 shows left-sided pacemaker, right-sided PICC line, enlarged heart.  Median sternotomy wires are in place.  Large left and small right pleural effusion with left basilar opacity.  Pulmonary vascular congestion with mild interstitial edema bilaterally.    Objective     Vitals: Temp: 96 °F (35.6 °C) (05/03/22 1530)  Pulse: 70 (05/03/22 1600)  Resp: 18 (05/03/22 1530)  BP: 131/62 (05/03/22 1530)  SpO2: (!) 93 % (05/03/22 1530)  Recent Labs:   CBC:   Recent Labs   Lab 05/02/22  0600 05/03/22  0541   WBC 9.39 6.48   HGB 8.8* 9.1*   HCT 27.2* 28.3*   * 125*     CMP:   Recent Labs   Lab 05/02/22  0600 05/03/22  0541    140   K 3.7 3.6    101   CO2 27 28   * 134*   BUN 23 22   CREATININE 1.6* 1.6*   CALCIUM 8.3* 8.1*   ANIONGAP 13 11   EGFRNONAA 37* 37*     Recent imaging: see above     Allergies:   Review of patient's allergies indicates:   Allergen Reactions    Ciprofloxacin     Levofloxacin Swelling    Lyrica [pregabalin] Swelling    Unable to assess Other (See Comments)     Soft shell crabs      NPO: No      Anticoagulation:   Anticoagulants     Ordered     Route Frequency Start Stop    05/01/22 0921  Xarelto         Oral with dinner 05/02/22 1645 --           Instructions    1. Admit to Hospital Medicine     Upon patient arrival to floor, please contact Hospital Medicine on call.     GUILLERMINA Toledo MD  Hospital Medicine Staff  Cell: [phone]

## 2022-05-03 NOTE — PROGRESS NOTES
Pharmacokinetic Assessment Follow Up: IV Vancomycin    Vancomycin serum concentration assessment(s):    The trough level was drawn correctly and can be used to guide therapy at this time. The measurement is within the desired definitive target range of 10 to 15 mcg/mL.    Vancomycin Regimen Plan:    Continue regimen to Vancomycin 750 mg IV every 24 hours with next serum trough concentration measured at 1500 prior to 3rd dose on 5/5/22.    Drug levels (last 3 results):  Recent Labs   Lab Result Units 05/01/22  1503 05/02/22  1513 05/03/22  1501   Vancomycin, Random ug/mL 7.2  --   --    Vancomycin-Trough ug/mL  --  11.5 13.7       Pharmacy will continue to follow and monitor vancomycin.    Please contact pharmacy at extension [phone] for questions regarding this assessment.    Thank you for the consult,   Terry Solis       Patient brief summary:  Eddie Parada Sr. is a 89 y.o. male initiated on antimicrobial therapy with IV Vancomycin for treatment of  pneumonia.    The patient's current regimen is vancomycin 750mg iv q 24 h.    Drug Allergies:   Review of patient's allergies indicates:   Allergen Reactions    Ciprofloxacin     Levofloxacin Swelling    Lyrica [pregabalin] Swelling    Unable to assess Other (See Comments)     Soft shell crabs       Actual Body Weight:   85.3kg    Renal Function:   Estimated Creatinine Clearance: 29.6 mL/min (A) (based on SCr of 1.6 mg/dL (H)).,     Dialysis Method (if applicable):  N/A    CBC (last 72 hours):  Recent Labs   Lab Result Units 05/01/22  0610 05/02/22  0600 05/03/22  0541   WBC K/uL 3.77* 9.39 6.48   Hemoglobin g/dL 8.8* 8.8* 9.1*   Hematocrit % 26.7* 27.2* 28.3*   Platelets K/uL 115* 129* 125*   Gran % % 83.0* 85.6* 74.4*   Lymph % % 14.1* 8.0* 14.7*   Mono % % 1.6* 5.8 8.6   Eosinophil % % 0.0 0.0 1.2   Basophil % % 0.0 0.1 0.2   Differential Method  Automated Automated Automated       Metabolic Panel (last 72 hours):  Recent Labs   Lab Result Units  05/01/22  0610 05/01/22  1503 05/02/22  0600 05/03/22  0541 05/03/22  1657   Sodium mmol/L 143 141 142 140  --    Potassium mmol/L 3.6 3.6 3.7 3.6  --    Chloride mmol/L 103 102 102 101  --    CO2 mmol/L 26 27 27 28  --    Glucose mg/dL 222* 194* 146* 134*  --    Glucose, UA   --   --   --   --  Negative   BUN mg/dL 18 23 23 22  --    Creatinine mg/dL 1.6* 1.7* 1.6* 1.6*  --    Albumin g/dL 2.2*  --   --   --   --    Total Bilirubin mg/dL 0.4  --   --   --   --    Alkaline Phosphatase U/L 82  --   --   --   --    AST U/L 38  --   --   --   --    ALT U/L 18  --   --   --   --    Magnesium mg/dL 1.7  --   --   --   --        Vancomycin Administrations:  vancomycin given in the last 96 hours                     vancomycin 750 mg in dextrose 5 % 250 mL IVPB (ready to mix system) ()  Restarted 05/02/22 1713     750 mg New Bag  1620     750 mg New Bag 05/01/22 1634    vancomycin in dextrose 5 % 1 gram/250 mL IVPB 1,000 mg (mg) 1,000 mg New Bag 04/30/22 1741                    Microbiologic Results:  Microbiology Results (last 7 days)       Procedure Component Value Units Date/Time    Blood culture #2 [[id number]] Collected: 04/30/22 1046    Order Status: Completed Specimen: Blood from Antecubital, Right Updated: 05/03/22 1412     Blood Culture, Routine No Growth to date      No Growth to date      No Growth to date      No Growth to date    Blood culture #1 [[id number]] Collected: 04/30/22 1046    Order Status: Completed Specimen: Blood from Antecubital, Left Updated: 05/03/22 1412     Blood Culture, Routine No Growth to date      No Growth to date      No Growth to date      No Growth to date    Culture, Respiratory with Gram Stain [[id number]] Collected: 05/02/22 1605    Order Status: Completed Specimen: Respiratory from Sputum Updated: 05/03/22 0127     Gram Stain (Respiratory) <10 epithelial cells per low power field.     Gram Stain (Respiratory) Rare WBC's     Gram Stain (Respiratory) Moderate Gram negative rods      Gram Stain (Respiratory) Moderate Gram positive cocci    Blood Culture #2 **CANNOT BE ORDERED STAT** [[id number]] Collected: 04/30/22 1447    Order Status: Completed Specimen: Blood Updated: 05/02/22 2212     Blood Culture, Routine No Growth to date      No Growth to date      No Growth to date    Blood Culture #1 **CANNOT BE ORDERED STAT** [[id number]] Collected: 04/30/22 1447    Order Status: Completed Specimen: Blood Updated: 05/02/22 2212     Blood Culture, Routine No Growth to date      No Growth to date      No Growth to date    Blood Culture #1 **CANNOT BE ORDERED STAT** [[id number]] Collected: 04/30/22 1144    Order Status: Completed Specimen: Blood from Antecubital, Left Updated: 05/02/22 2212     Blood Culture, Routine No Growth to date      No Growth to date      No Growth to date    Blood Culture #2 **CANNOT BE ORDERED STAT** [[id number]] Collected: 04/30/22 1144    Order Status: Completed Specimen: Blood from Antecubital, Right Updated: 05/02/22 2212     Blood Culture, Routine No Growth to date      No Growth to date      No Growth to date    Blood Culture #1 **CANNOT BE ORDERED STAT** [[id number]]     Order Status: Canceled Specimen: Blood     Blood Culture #2 **CANNOT BE ORDERED STAT** [[id number]]     Order Status: Canceled Specimen: Blood     Influenza A & B by Molecular [[id number]] Collected: 04/30/22 1055    Order Status: Completed Specimen: Nasopharyngeal Swab from Nasal Swab Updated: 04/30/22 1116     Influenza A, Molecular Negative     Influenza B, Molecular Negative     Flu A & B Source Nasal swab

## 2022-05-03 NOTE — SUBJECTIVE & OBJECTIVE
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    Melanoma     Obesity (BMI 30.0-34.9) 9/13/2016    Osteoporosis     Peripheral vascular disease     Pneumonia of left lower lobe due to infectious organism 4/30/2022    Primary malignant neoplasm of prostate 3/3/2022    Septic shock 3/3/2022    Stroke     TIA (transient ischemic attack)     Type II diabetes mellitus with peripheral circulatory disorder     Type II or unspecified type diabetes mellitus without mention of complication, not stated as uncontrolled     Unspecified essential hypertension            Review of Systems   Constitutional:  Negative for chills and fever.   HENT:  Negative for congestion, ear pain, postnasal drip, rhinorrhea, sore throat and trouble swallowing.    Eyes:  Negative for redness and itching.   Respiratory:  Positive for cough and shortness of breath. Negative for wheezing.    Cardiovascular:  Negative for chest pain, palpitations and leg swelling.   Gastrointestinal:  Negative for abdominal pain, diarrhea, nausea and vomiting.   Genitourinary:  Negative for dysuria and frequency.   Skin:  Negative for rash.   Neurological:  Negative for weakness and headaches.   Objective:     Vital Signs (Most Recent):  Temp: 96.1 °F (35.6 °C) (05/03/22 0738)  Pulse: 62 (05/03/22 1000)  Resp: 16 (05/03/22 0801)  BP: (!) 132/58 (05/03/22 0738)  SpO2: (!) 94 % (05/03/22 0801)   Vital Signs (24h Range):  Temp:  [96.1 °F (35.6 °C)-97.5 °F (36.4 °C)] 96.1 °F (35.6 °C)  Pulse:  [59-76] 62  Resp:  [16-22]  16  SpO2:  [85 %-94 %] 94 %  BP: (106-145)/(58-69) 132/58     Weight: 85.3 kg (188 lb)  Body mass index is 31.28 kg/m².    Physical Exam  Vitals and nursing note reviewed.   Constitutional:       General: He is not in acute distress.     Appearance: He is well-developed.   HENT:      Head: Normocephalic and atraumatic.      Nose:      Comments: 2L nc in place  Eyes:      Conjunctiva/sclera: Conjunctivae normal.      Pupils: Pupils are equal, round, and reactive to light.   Neck:      Thyroid: No thyromegaly.   Cardiovascular:      Rate and Rhythm: Normal rate. Rhythm irregular.      Heart sounds: Murmur heard.   Pulmonary:      Effort: Pulmonary effort is normal. No respiratory distress.      Breath sounds: Normal breath sounds. No wheezing.   Abdominal:      General: Bowel sounds are normal.      Palpations: Abdomen is soft.      Tenderness: There is no abdominal tenderness.   Musculoskeletal:         General: Normal range of motion.      Cervical back: Normal range of motion and neck supple.      Right lower leg: Edema (trace, compression stockings on) present.      Left lower leg: Edema (trace, compression stockings on) present.   Lymphadenopathy:      Cervical: No cervical adenopathy.   Skin:     General: Skin is warm and dry.      Findings: No rash.   Neurological:      Mental Status: He is alert and oriented to person, place, and time.   Psychiatric:         Behavior: Behavior normal.         CRANIAL NERVES     CN III, IV, VI   Pupils are equal, round, and reactive to light.     Significant Labs: All pertinent labs within the past 24 hours have been reviewed.  A1C:   Recent Labs   Lab 03/02/22  0744 04/30/22  1046   HGBA1C 5.9* 6.0*     ABGs:   No results for input(s): PH, PCO2, HCO3, POCSATURATED, BE, TOTALHB, COHB, METHB, O2HB, POCFIO2, PO2 in the last 48 hours.    Blood Culture:   No results for input(s): LABBLOO in the last 48 hours.    CBC:   Recent Labs   Lab 05/02/22  0600 05/03/22  0541   WBC 9.39 6.48    HGB 8.8* 9.1*   HCT 27.2* 28.3*   * 125*     CMP:   Recent Labs   Lab 22  1503 22  0600 22  0541    142 140   K 3.6 3.7 3.6    102 101   CO2 27 27 28   * 146* 134*   BUN 23 23 22   CREATININE 1.7* 1.6* 1.6*   CALCIUM 7.7* 8.3* 8.1*   ANIONGAP 12 13 11   EGFRNONAA 34* 37* 37*     Lactic Acid:   No results for input(s): LACTATE in the last 48 hours.    Magnesium: No results for input(s): MG in the last 48 hours.  Was given 2gm mag IV  and    Troponin:   No results for input(s): TROPONINI in the last 48 hours.  BNP  Recent Labs   Lab 22  0541   *       TSH:   Recent Labs   Lab 22  0744   TSH 5.943*   Covid negative    Significant Imagin/1 ECHO   The estimated ejection fraction is 55%.  Indeterminate left ventricular diastolic function.  Moderate right ventricular enlargement with mildly reduced right ventricular systolic function.  Mild aortic regurgitation.  There is a bioprosthetic aortic valve present. There is aortic insufficiency present. Prosthetic aortic valve is normal.  The aortic valve mean gradient is 6 mmHg with a dimensionless index of 0.64.  Mild mitral regurgitation.  Mild tricuspid regurgitation.  There is moderate pulmonary hypertension.        CXR Right PICC in place with tip projected over the SVC.  Multiple cardiac monitor wires overlie the chest.  Left anterior chest wall dual lead pacemaker device.  Patient is status post sternotomy and CABG.  Cardiomediastinal silhouette remains enlarged.Prominent pulmonary vascular and interstitial markings with patchy airspace opacification left mid and lower lung, not significantly changed since the prior study.  Possible small left pleural effusion.  Pneumothorax.     CXR Left anterior chest wall dual lead pacemaker.  Postoperative change of prior sternotomy.  Right PICC catheter tip projected over the distal SVC.  Cardiomediastinal silhouette is enlarged, unchanged.  Left  perihilar and lower lobe opacification may reflect pneumonia, aspiration, or pulmonary edema.  Possible small left pleural effusion.  No pneumothorax.    4/30 EKG Ventricular-paced rhythm with occasional Premature ventricular complexes ;Probably AF  Abnormal ECG  When compared with ECG of 19-MAR-2022 07:35,  Premature ventricular complexes are now Present  Vent. rate has increased BY 3 BPM  Confirmed by Pool SERNA MD ([id number]) on 5/1/2022 11:12:48 AM

## 2022-05-03 NOTE — PROGRESS NOTES
[facility] ([facility])  [facility] Medicine  Progress Note    Patient Name: Eddie Parada Sr.  MRN: [id number]  Patient Class: IP- Inpatient   Admission Date: 4/30/2022  Length of Stay: 2 days  Attending Physician: Linda Greco MD  Primary Care Provider: Callum Roberts MD        Subjective:     Principal Problem:Pneumonia of left lower lobe due to infectious organism        HPI:  89 year old male with well controlled htn, diabetes, cad hfpef and recent complication of umbilical hernia mesh was brought in because of shortness of breath. 2 months ago he was admitted and then treated for an abdominal wall abscess. His post drainage course has been complicated by abx intolerance, renal failure. He has improved and has been on amp/sulbactam via picc for the last 2 weeks. Apparently yesterday he went to see dr. Haji and had his drain removed as the production had decreased. Over the last few days he has had worsening c/o dyspnea with exertion. There has been some concern for his renal function after sepsis and vanc treatment, so his lasix has been held for sometime. Over the last 4 mornings it has been noted that his pulse ox is around 84 upon wakening but it would improve to low 90s with movement/activity. No fever. No productive coughing. No choking/sputtering. No sick contacts. This morning he had swelling in his face which ultimately lead to his presentation to the ER.      Overview/Hospital Course:  5/1 Less than 1 L output of urine overnight. No longer with resp distress and patient able to lay completely flat without issues. He denies any chest pain, shortness of breath. Swelling noted over drain site. Sats lower 90s on supplemental oxygen. Afebrile.    5/2 has lasix 40 mg IV in ER and yesterday was given 0.5mg bumex yesterday. Renal function remains stable. I and O does not show that he has really diuresed but patient is feeling better. Sats 93% on 3L NC, > does not use O2 at home. Cardiology and  general surgery consulted for today. He does remains on vanc and zosyn for sepsis s/p abdominal wall abscess s/p abd wall hernia repair with mesh per Dr Haji. No fever. No elevated WBC,. H/H stable and platelets still low but improved. He also endorses cough that he has had for weeks prior to arrival. Today brining up mucus,     5/3/22  Eddie Parada Sr. is a 89 y.o. male  admitted with HF, & sepsis remains on 3LNC , O2 sat 93%, not on Home O2   ECHO - The estimated ejection fraction is 55%.; Indeterminate left ventricular diastolic function. Moderate Pulmonary HTN   Lasix 20mg po daily; Creat 1.6>1.6, BUN 23>22  Afebrile   Day 4 Vanc and Zosyn, for abd abcess, consult Dr. Haji   · Sit to Stand:  stand by assistance with rolling walker  · Gait: Standby Assistance x ~60 feet with RW.  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    HENT:  Negative for congestion, ear pain, postnasal drip, rhinorrhea, sore throat and trouble swallowing.    Eyes:  Negative for redness and itching.   Respiratory:  Positive for cough and shortness of breath. Negative for wheezing.    Cardiovascular:  Negative for chest pain, palpitations and leg swelling.   Gastrointestinal:  Negative for abdominal pain, diarrhea, nausea and vomiting.   Genitourinary:  Negative for dysuria and frequency.   Skin:  Negative for rash.   Neurological:  Negative for weakness and headaches.   Objective:     Vital Signs (Most Recent):  Temp: 96.1 °F (35.6 °C) (05/03/22 0738)  Pulse: 62 (05/03/22 1000)  Resp: 16 (05/03/22 0801)  BP: (!) 132/58 (05/03/22 0738)  SpO2: (!) 94 % (05/03/22 0801)   Vital Signs (24h Range):  Temp:  [96.1 °F (35.6 °C)-97.5 °F (36.4 °C)] 96.1 °F (35.6 °C)  Pulse:  [59-76] 62  Resp:  [16-22] 16  SpO2:  [85 %-94 %] 94 %  BP: (106-145)/(58-69) 132/58     Weight: 85.3 kg (188 lb)  Body mass index is 31.28 kg/m².    Physical Exam  Vitals and nursing note reviewed.   Constitutional:       General: He is not in acute distress.     Appearance: He is well-developed.   HENT:      Head: Normocephalic and atraumatic.      Nose:      Comments: 2L nc in place  Eyes:      Conjunctiva/sclera: Conjunctivae normal.      Pupils: Pupils are equal, round, and reactive to light.   Neck:      Thyroid: No thyromegaly.   Cardiovascular:      Rate and Rhythm: Normal rate. Rhythm irregular.      Heart sounds: Murmur heard.   Pulmonary:      Effort: Pulmonary effort is normal. No respiratory distress.      Breath sounds: Normal breath sounds. No wheezing.   Abdominal:      General: Bowel sounds are normal.      Palpations: Abdomen is soft. Area of abscess looks firm ;bandaged     Tenderness: There is no abdominal tenderness.   Musculoskeletal:         General: Normal range of motion.      Cervical back: Normal range of motion and neck supple.      Right lower leg: Edema (trace, compression  stockings on) present.      Left lower leg: Edema (trace, compression stockings on) present.   Lymphadenopathy:      Cervical: No cervical adenopathy.   Skin:     General: Skin is warm and dry.      Findings: No rash.   Neurological:      Mental Status: He is alert and oriented to person, place, and time.   Psychiatric:         Behavior: Behavior normal.         CRANIAL NERVES     CN III, IV, VI   Pupils are equal, round, and reactive to light.     Significant Labs: All pertinent labs within the past 24 hours have been reviewed.  A1C:   Recent Labs   Lab 22  0744 22  1046   HGBA1C 5.9* 6.0*     ABGs:   No results for input(s): PH, PCO2, HCO3, POCSATURATED, BE, TOTALHB, COHB, METHB, O2HB, POCFIO2, PO2 in the last 48 hours.    Blood Culture:   No results for input(s): LABBLOO in the last 48 hours.    CBC:   Recent Labs   Lab 22  0600 22  0541   WBC 9.39 6.48   HGB 8.8* 9.1*   HCT 27.2* 28.3*   * 125*     CMP:   Recent Labs   Lab 22  1503 22  0600 22  0541    142 140   K 3.6 3.7 3.6    102 101   CO2 27 27 28   * 146* 134*   BUN 23 23 22   CREATININE 1.7* 1.6* 1.6*   CALCIUM 7.7* 8.3* 8.1*   ANIONGAP 12 13 11   EGFRNONAA 34* 37* 37*     Lactic Acid:   No results for input(s): LACTATE in the last 48 hours.    Magnesium: No results for input(s): MG in the last 48 hours.  Was given 2gm mag IV  and    Troponin:   No results for input(s): TROPONINI in the last 48 hours.  BNP  Recent Labs   Lab 22  0541   *       TSH:   Recent Labs   Lab 22  0744   TSH 5.943*   Covid negative    Significant Imagin/1 ECHO   · The estimated ejection fraction is 55%.  · Indeterminate left ventricular diastolic function.  · Moderate right ventricular enlargement with mildly reduced right ventricular systolic function.  · Mild aortic regurgitation.  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 There is aortic insufficiency present. Prosthetic  aortic valve is normal.  · The aortic valve mean gradient is 6 mmHg with a dimensionless index of 0.64.  · Mild mitral regurgitation.  · Mild tricuspid regurgitation.  · There is moderate pulmonary hypertension.       5/1 CXR Right PICC in place with tip projected over the SVC.  Multiple cardiac monitor wires overlie the chest.  Left anterior chest wall dual lead pacemaker device.  Patient is status post sternotomy and CABG.  Cardiomediastinal silhouette remains enlarged.Prominent pulmonary vascular and interstitial markings with patchy airspace opacification left mid and lower lung, not significantly changed since the prior study.  Possible small left pleural effusion.  Pneumothorax.    4/30 CXR Left anterior chest wall dual lead pacemaker.  Postoperative change of prior sternotomy.  Right PICC catheter tip projected over the distal SVC.  Cardiomediastinal silhouette is enlarged, unchanged.  Left perihilar and lower lobe opacification may reflect pneumonia, aspiration, or pulmonary edema.  Possible small left pleural effusion.  No pneumothorax.    4/30 EKG Ventricular-paced rhythm with occasional Premature ventricular complexes ;Probably AF  Abnormal ECG  When compared with ECG of 19-MAR-2022 07:35,  Premature ventricular complexes are now Present  Vent. rate has increased BY 3 BPM  Confirmed by Pool SERNA MD ([id number]) on 5/1/2022 11:12:48 AM      Assessment/Plan:      * Pneumonia of left lower lobe due to infectious organism  Clinical presentation not completely c/w this. More likely he has chf. No cough. No fever. Has picc line and has been getting amp. For now, broaden abx. Check cbc in am. Wean o2 as tolerated. And monitor response to diuresis. vanc and zosyn ordered. Amp on hold. Blood cultures sent.    5/1  WBC ct dropped - but he is pancytopenic this morning so I question lab error. Will recheck cbc. Afebrile. Will recheck cxr. He has not really diuresed though and breathing has improved. Still with  hypoxia.    5/2 WBC back to normal. Afebrile cxr without much improvedment thougfh breathing better still requiring 3L NC. Also coughing. Will start nebs and mucinex. Ask for sputum culture .    5/3  On vanc and zosyn       Acute on chronic congestive heart failure  Patient is identified as having Diastolic (HFpEF) heart failure that is Acute on chronic. CHF is currently uncontrolled due to Continued edema of extremities, Hepatic congestion/ascites and JVD and Pulmonary edema/pleural effusion on CXR. Latest ECHO performed and demonstrates- Results for orders placed during the hospital encounter of 03/03/22    Echo>> repeat ordered since he has not had one since recent illness    Interpretation Summary  · The left ventricle is normal in size with normal systolic function.  · The estimated ejection fraction is 55%.  · Indeterminate left ventricular diastolic function.  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  · Normal right ventricular size with normal right ventricular systolic function.  · Severe left atrial enlargement.  · Severe right atrial enlargement.  · Mild to moderate tricuspid regurgitation.  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 There is no aortic insufficiency present. Prosthetic aortic valve is normal.  · The aortic valve mean gradient is 13 mmHg with a dimensionless index of 0.61.  . Continue Beta Blocker and Furosemide and monitor clinical status closely. Monitor on telemetry. Patient is on CHF pathway.  Monitor strict Is&Os and daily weights.  Place on fluid restriction of 1.5 L. Continue to stress to patient importance of self efficacy and  on diet for CHF. Last BNP reviewed- and noted below   Recent Labs   Lab 04/30/22  1046   *   .  Has not been on lasix. Given 1 dose in er. Hasn't really diuresed. May switch to bumex pending diuresis. Not on an ace - ?renal function. Not on jardiance, either. Will consult cards for Monday.    5/1  consult cards. Consideration of repeat echo - though just recently completed in march.    52 Dr Monterroso saw patient today would like to cont home lasix. Laying flat today   Check BNP in Am with bmp.    Hypokalemia    K 3.7.    5/3  BMP  Lab Results   Component Value Date     05/03/2022    K 3.6 05/03/2022     05/03/2022    CO2 28 05/03/2022    BUN 22 05/03/2022    CREATININE 1.6 (H) 05/03/2022    CALCIUM 8.1 (L) 05/03/2022    ANIONGAP 11 05/03/2022    ESTGFRAFRICA 43 (A) 05/03/2022    EGFRNONAA 37 (A) 05/03/2022         CKD (chronic kidney disease) stage 3, GFR 30-59 ml/min  Xarelto adjusted.  Check daily BMPs with diuretic use.  Stable.    Abdominal wall abscess  Seeing id and surgery outpatient.  Drain removed 4/29.  Seems there is still persistence in swelling in ab wall.    5/1  On physical exam I can feel a swelling superior to where the drain was. Will have dr. Haji see patient if he is in house.    5/3 day 4 vanc and zosyn- Dr Haji to see him today .  Nurse talked to him   Will do CT abdomen to assess abscess       Elevated troponin  Stable. Trend. No chest pain.      (HFpEF) heart failure with preserved ejection fraction  3/3/22  · The left ventricle is normal in size with normal systolic function.  · The estimated ejection fraction is 55%.  · Indeterminate left ventricular diastolic function.  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  · Normal right ventricular size with normal right ventricular systolic function.  · Severe left atrial enlargement.  · Severe right atrial enlargement.  · Mild to moderate tricuspid regurgitation.  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 There is no aortic insufficiency present. Prosthetic aortic valve is normal.  · The aortic valve mean gradient is 13 mmHg with a dimensionless index of 0.61.    5/1  Switch from lasix to bumex and monitor diuresis. Really didn't get much output overnight. Breathing is improved.  Repeat cxr ordered for this morning. Need accurate weights.    5/2/22   not much changed from resp standpoint. Still on 3L NC and doesn't wear O2 at home. CXR still showing infiltrate area on left lower lobe. Now with a cough on vanc and zosyn. Will start mucinex and nebs.       5/3  IV lasix 40 mg daily     Type 2 diabetes mellitus, controlled  Not on meds. Sliding scale ordered.   this am .    S/P AVR  Continue xarelto      Aortic stenosis  S/p replacement. On xarelto.      Cardiac pacemaker in situ  Not showing on tele.  Short run of v tach this Sunday morning> mag replaced .        CAD (coronary artery disease)  On BB, statin. Not taking aspirin.      Essential (primary) hypertension  Cont on home meds.  /61 stable on amlodipine, doxazosin and coreg .      Chronic atrial fibrillation  On xarelto (lower dose here with renal insuff) and coreg at home. On telemetry.        VTE Risk Mitigation (From admission, onward)         Ordered     rivaroxaban tablet 15 mg  with dinner         05/01/22 0921     IP VTE HIGH RISK PATIENT  Once         04/30/22 1617     Place sequential compression device  Until discontinued         04/30/22 1617     Place SOPHIA hose  Until discontinued         04/30/22 1617                Discharge Planning   KANDICE:      Code Status: Full Code   Is the patient medically ready for discharge?:     Reason for patient still in hospital (select all that apply): Treatment  Discharge Plan A: Home with family, Home Health                  Callum Roberts MD  Department of Hospital Medicine   [facility] (3rd Fl)

## 2022-05-03 NOTE — NURSING
Dr Haji and Dr. Roberts was notified of CT results. Dr. Haji given recommendations. See new orders.

## 2022-05-03 NOTE — ASSESSMENT & PLAN NOTE
Patient is identified as having Diastolic (HFpEF) heart failure that is Acute on chronic. CHF is currently uncontrolled due to Continued edema of extremities, Hepatic congestion/ascites and JVD and Pulmonary edema/pleural effusion on CXR. Latest ECHO performed and demonstrates- Results for orders placed during the hospital encounter of 03/03/22    Echo>> repeat ordered since he has not had one since recent illness    Interpretation Summary  · The left ventricle is normal in size with normal systolic function.  · The estimated ejection fraction is 55%.  · Indeterminate left ventricular diastolic function.  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  · Normal right ventricular size with normal right ventricular systolic function.  · Severe left atrial enlargement.  · Severe right atrial enlargement.  · Mild to moderate tricuspid regurgitation.  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 There is no aortic insufficiency present. Prosthetic aortic valve is normal.  · The aortic valve mean gradient is 13 mmHg with a dimensionless index of 0.61.  . Continue Beta Blocker and Furosemide and monitor clinical status closely. Monitor on telemetry. Patient is on CHF pathway.  Monitor strict Is&Os and daily weights.  Place on fluid restriction of 1.5 L. Continue to stress to patient importance of self efficacy and  on diet for CHF. Last BNP reviewed- and noted below   Recent Labs   Lab 04/30/22  1046   *   .  Has not been on lasix. Given 1 dose in er. Hasn't really diuresed. May switch to bumex pending diuresis. Not on an ace - ?renal function. Not on jardiance, either. Will consult cards for Monday.    5/1 consult cards. Consideration of repeat echo - though just recently completed in march.    52 Dr Monterroso saw patient today would like to cont home lasix. Laying flat today   Check BNP in Am with bmp.

## 2022-05-03 NOTE — ASSESSMENT & PLAN NOTE
3/3/22  · The left ventricle is normal in size with normal systolic function.  · The estimated ejection fraction is 55%.  · Indeterminate left ventricular diastolic function.  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  · Normal right ventricular size with normal right ventricular systolic function.  · Severe left atrial enlargement.  · Severe right atrial enlargement.  · Mild to moderate tricuspid regurgitation.  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 There is no aortic insufficiency present. Prosthetic aortic valve is normal.  · The aortic valve mean gradient is 13 mmHg with a dimensionless index of 0.61.    5/1  Switch from lasix to bumex and monitor diuresis. Really didn't get much output overnight. Breathing is improved. Repeat cxr ordered for this morning. Need accurate weights.    5/2/22   not much changed from resp standpoint. Still on 3L NC and doesn't wear O2 at home. CXR still showing infiltrate area on left lower lobe. Now with a cough on vanc and zosyn. Will start mucinex and nebs.       5/3  IV lasix 40 mg daily

## 2022-05-03 NOTE — PROGRESS NOTES
[place] - Med Surg (3rd Fl)  Cardiology  Progress Note    Patient Name: Eddie Parada Sr.  MRN: [id number]  Admission Date: 4/30/2022  Hospital Length of Stay: 2 days  Code Status: Full Code   Attending Physician: Linda Greco MD   Primary Care Physician: Callum Roberts MD  Expected Discharge Date:   Principal Problem:Pneumonia of left lower lobe due to infectious organism    Subjective:     89 yo wm hx PPM s/p generator replacement 3/22, chronic AF on Xarelto, bio-AVR,  chronic diastolic CHF with difficulty maintaining euvolemia due to waxing and waning renal function due in part to abx therapy from abd wall abscess. Renal function lately averaging creatinine 1.5-1.7. His lasix was initially put on hold but resumed at 20mg po qday due to volume overload (increased weight, SOB, abd distension, edema, sats 90%).  He was advised to come to the hospital, but initially refused. He eventually presented ot ER with c/o SOB.  Sats upper 80s%. . Troponin mildly elevated 0.066. V-Paced rhythm  Appears that he received lasix 40mg IV in ER. Started on oral furosemide 20mg daily.  Pt appears to be euvolemic, aside from some scrotal edema.  Echo 5/2/22 showed still preserved EF with AI  normally functioning bioprosthetic aortic valve. Negative bubble study.     Do note worsened anemia over the past month or so, but it has been stable lately with H/H averaging around 9/27.       Review of Systems   Constitutional: Negative.   Eyes: Negative.    Cardiovascular: Negative for chest pain and dyspnea on exertion.   Respiratory: Positive for shortness of breath.    Endocrine: Negative.    Hematologic/Lymphatic: Negative.    Genitourinary: Negative.    Neurological: Negative.      Objective:     Vital Signs (Most Recent):  Temp: 96.1 °F (35.6 °C) (05/03/22 0738)  Pulse: 62 (05/03/22 0801)  Resp: 16 (05/03/22 0801)  BP: (!) 132/58 (05/03/22 0738)  SpO2: (!) 94 % (05/03/22 0801) Vital Signs (24h Range):  Temp:  [96.1 °F  (35.6 °C)-97.5 °F (36.4 °C)] 96.1 °F (35.6 °C)  Pulse:  [59-76] 62  Resp:  [16-22] 16  SpO2:  [85 %-95 %] 94 %  BP: (106-145)/(58-69) 132/58     Weight: 85.3 kg (188 lb)  Body mass index is 31.28 kg/m².    SpO2: (!) 94 %  O2 Device (Oxygen Therapy): nasal cannula      Intake/Output Summary (Last 24 hours) at 5/3/2022 0836  Last data filed at 5/3/2022 0352  Gross per 24 hour   Intake 524.97 ml   Output 975 ml   Net -450.03 ml       Lines/Drains/Airways     Peripherally Inserted Central Catheter Line  Duration           PICC Double Lumen 03/07/22 2215 right basilic 56 days                Physical Exam  Vitals and nursing note reviewed.   Constitutional:       Appearance: Normal appearance.   Cardiovascular:      Rate and Rhythm: Normal rate and regular rhythm.      Pulses: Normal pulses.      Heart sounds: Normal heart sounds.      Comments: Scrotal edema  Pulmonary:      Effort: Pulmonary effort is normal.      Breath sounds: Normal breath sounds.   Abdominal:      General: Abdomen is flat.   Skin:     General: Skin is warm and dry.   Neurological:      General: No focal deficit present.      Mental Status: He is alert.   Psychiatric:         Mood and Affect: Mood normal.         Significant Labs:   BMP:   Recent Labs   Lab 05/01/22  1503 05/02/22  0600 05/03/22  0541   * 146* 134*    142 140   K 3.6 3.7 3.6    102 101   CO2 27 27 28   BUN 23 23 22   CREATININE 1.7* 1.6* 1.6*   CALCIUM 7.7* 8.3* 8.1*   , CMP   Recent Labs   Lab 05/01/22  1503 05/02/22  0600 05/03/22  0541    142 140   K 3.6 3.7 3.6    102 101   CO2 27 27 28   * 146* 134*   BUN 23 23 22   CREATININE 1.7* 1.6* 1.6*   CALCIUM 7.7* 8.3* 8.1*   ANIONGAP 12 13 11   ESTGFRAFRICA 40* 43* 43*   EGFRNONAA 34* 37* 37*   , CBC   Recent Labs   Lab 05/02/22  0600 05/03/22  0541   WBC 9.39 6.48   HGB 8.8* 9.1*   HCT 27.2* 28.3*   * 125*    and Troponin No results for input(s): TROPONINI in the last 48  hours.    Significant Imaging: Echocardiogram:   Transthoracic echo (TTE) complete (Cupid Only):   Results for orders placed or performed during the hospital encounter of 04/30/22   Echo   Result Value Ref Range    AV mean gradient 6 mmHg    Ao peak taiwo 1.88 m/s    Ao VTI 37.59 cm    IVRT 41.52 msec    IVS 1.38 (A) 0.6 - 1.1 cm    LA size 5.02 cm    Left Atrium Major Axis 5.83 cm    LVIDd 4.93 3.5 - 6.0 cm    LVIDs 2.31 2.1 - 4.0 cm    LVOT diameter 1.83 cm    LVOT peak VTI 24.18 cm    Posterior Wall 0.78 0.6 - 1.1 cm    MV Peak A Taiwo 0.51 m/s    E wave deceleration time 183.50 msec    MV Peak E Taiwo 1.30 m/s    PV Peak D Taiwo 0.40 m/s    PV Peak S Taiwo 0.38 m/s    RA Major Axis 5.87 cm    RVDD 3.80 cm    TAPSE 1.61 cm    TR Max Taiwo 3.69 m/s    LA WIDTH 4.59 cm    MV stenosis pressure 1/2 time 53.21 ms    LV Diastolic Volume 114.29 mL    LV Systolic Volume 18.26 mL    LVOT peak taiwo 1.11 m/s    Mr max taiwo 0.05 m/s    LA volume (mod) 37.44 cm3    FS 53 %    LV mass 197.43 g    Left Ventricle Relative Wall Thickness 0.32 cm    AV valve area 1.69 cm2    AV Velocity Ratio 0.59     AV index (prosthetic) 0.64     MV valve area p 1/2 method 4.13 cm2    E/A ratio 2.55     Pulm vein S/D ratio 0.95     LVOT area 2.6 cm2    LVOT stroke volume 63.57 cm3    AV peak gradient 14 mmHg    LV Systolic Volume Index 9.5 mL/m2    LV Diastolic Volume Index 59.22 mL/m2    LV Mass Index 102 g/m2    Triscuspid Valve Regurgitation Peak Gradient 54 mmHg    LA Volume Index (Mod) 19.4 mL/m2    BSA 1.98 m2    EF 55 %    Narrative    · The estimated ejection fraction is 55%.  · Indeterminate left ventricular diastolic function.  · Moderate right ventricular enlargement with mildly reduced right   ventricular systolic function.  · Mild aortic regurgitation.  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 There is aortic   insufficiency present. Prosthetic aortic valve is normal.  · The aortic valve mean gradient is 6 mmHg with a dimensionless index  of   0.64.  · Mild mitral regurgitation.  · Mild tricuspid regurgitation.  · There is moderate pulmonary hypertension.        Assessment and Plan:     Acute on chronic diastolic CHF, currently euvolemic, leg edema resolved but with some scrotal edema in bed.  Echo 5/22 EF 55%, normal AVR, 2+ TR, 1-2+ MR, 1+ AI, PAP 48  CKD with recent AKIs--waxing and waning; creatinine stable at 1.6 on lasix 20 mg iv  Anemia  Chronic AF on Xarelto  s/p PPM  PPM  Hx TIA  CAD hx PCI Cfx, LAD, MPI 2017 normal  thrombocytopenia     Plan:  Continue lasix 20mg po qday  Daily BMP  Follow  platelet and H/H as pt on Xarelto--denies bleeding, consider lower H/H due to age/?abx use  Increase doxazosin 2mg  Po qpm for BP  Cont coreg, amlodipine, pravastatin, xarelto       Active Diagnoses:    Diagnosis Date Noted POA    PRINCIPAL PROBLEM:  Pneumonia of left lower lobe due to infectious organism [J18.9] 04/30/2022 Yes    Acute on chronic congestive heart failure [I50.9] 04/30/2022 Yes    CKD (chronic kidney disease) stage 3, GFR 30-59 ml/min [N18.30] 03/12/2022 Yes    Hypokalemia [E87.6] 03/12/2022 Yes    Abdominal wall abscess [L02.211] 03/05/2022 Yes    Elevated troponin [R77.8] 03/03/2022 Yes    (HFpEF) heart failure with preserved ejection fraction [I50.30] 06/17/2015 Yes    Type 2 diabetes mellitus, controlled [E11.9] 05/21/2014 Yes    Aortic stenosis [I35.0] 02/25/2014 Yes    S/P AVR [Z95.2] 02/25/2014 Not Applicable    Cardiac pacemaker in situ [Z95.0] 10/12/2012 Yes    CAD (coronary artery disease) [I25.10] 09/20/2012 Yes    Essential (primary) hypertension [I10] 09/20/2012 Yes    Chronic atrial fibrillation [I48.20] 06/29/2012 Yes      Problems Resolved During this Admission:       VTE Risk Mitigation (From admission, onward)         Ordered     rivaroxaban tablet 15 mg  with dinner         05/01/22 0921     IP VTE HIGH RISK PATIENT  Once         04/30/22 1617     Place sequential compression device  Until discontinued          04/30/22 1617     Place SOPHIA hose  Until discontinued         04/30/22 1617                Ebony Johnston NP scribe for MD Hang  Cardiology  [facility] (3rd Fl)  I attest that I have personally seen and examined this patient. I have reviewed and discussed the management in detail as outlined above.

## 2022-05-04 ENCOUNTER — PATIENT OUTREACH (OUTPATIENT)
Dept: ADMINISTRATIVE | Facility: OTHER | Age: 87
End: 2022-05-04
Payer: MEDICARE

## 2022-05-04 ENCOUNTER — HOSPITAL ENCOUNTER (INPATIENT)
Facility: HOSPITAL | Age: 87
LOS: 2 days | Discharge: HOME HEALTH | DRG: 919 | End: 2022-05-06
Attending: FAMILY MEDICINE | Admitting: FAMILY MEDICINE
Payer: MEDICARE

## 2022-05-04 VITALS
BODY MASS INDEX: 31.32 KG/M2 | HEART RATE: 67 BPM | DIASTOLIC BLOOD PRESSURE: 67 MMHG | TEMPERATURE: 100 F | SYSTOLIC BLOOD PRESSURE: 147 MMHG | HEIGHT: 65 IN | OXYGEN SATURATION: 93 % | WEIGHT: 188 LBS | RESPIRATION RATE: 18 BRPM

## 2022-05-04 DIAGNOSIS — K65.1 INTRA-ABDOMINAL ABSCESS: ICD-10-CM

## 2022-05-04 DIAGNOSIS — Z95.0 CARDIAC PACEMAKER IN SITU: ICD-10-CM

## 2022-05-04 DIAGNOSIS — D53.9 MACROCYTIC ANEMIA: ICD-10-CM

## 2022-05-04 DIAGNOSIS — I50.30 HEART FAILURE WITH PRESERVED EJECTION FRACTION, UNSPECIFIED HF CHRONICITY: ICD-10-CM

## 2022-05-04 DIAGNOSIS — I50.33 ACUTE ON CHRONIC DIASTOLIC CONGESTIVE HEART FAILURE: ICD-10-CM

## 2022-05-04 DIAGNOSIS — T81.43XA POSTPROCEDURAL INTRAABDOMINAL ABSCESS: Primary | ICD-10-CM

## 2022-05-04 DIAGNOSIS — L02.211 ABDOMINAL WALL ABSCESS: ICD-10-CM

## 2022-05-04 DIAGNOSIS — I48.20 CHRONIC ATRIAL FIBRILLATION: ICD-10-CM

## 2022-05-04 DIAGNOSIS — J18.9 PNEUMONIA OF LEFT LOWER LOBE DUE TO INFECTIOUS ORGANISM: ICD-10-CM

## 2022-05-04 PROBLEM — R13.19 OTHER DYSPHAGIA: Status: RESOLVED | Noted: 2021-05-27 | Resolved: 2022-05-04

## 2022-05-04 PROBLEM — Z87.81 S/P ORIF (OPEN REDUCTION INTERNAL FIXATION) FRACTURE: Status: RESOLVED | Noted: 2017-11-27 | Resolved: 2022-05-04
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
## 2022-05-04 PROBLEM — Z98.890 S/P ORIF (OPEN REDUCTION INTERNAL FIXATION) FRACTURE: Status: RESOLVED | Noted: 2017-11-27 | Resolved: 2022-05-04

## 2022-05-04 PROBLEM — I95.9 HYPOTENSION: Status: RESOLVED | Noted: 2018-01-11 | Resolved: 2022-05-04

## 2022-05-04 PROBLEM — T18.128A FOOD IMPACTION OF ESOPHAGUS: Status: RESOLVED | Noted: 2022-03-03 | Resolved: 2022-05-04

## 2022-05-04 PROBLEM — R65.20 SEPSIS WITH ACUTE HYPOXIC RESPIRATORY FAILURE: Status: ACTIVE | Noted: 2022-03-03

## 2022-05-04 PROBLEM — E87.6 HYPOKALEMIA: Status: RESOLVED | Noted: 2022-03-12 | Resolved: 2022-05-04

## 2022-05-04 PROBLEM — J96.01 SEPSIS WITH ACUTE HYPOXIC RESPIRATORY FAILURE: Status: ACTIVE | Noted: 2022-03-03

## 2022-05-04 LAB
ABO + RH BLD: NORMAL
ALBUMIN SERPL BCP-MCNC: 2.4 G/DL (ref 3.5–5.2)
ALP SERPL-CCNC: 85 U/L (ref 55–135)
ALT SERPL W/O P-5'-P-CCNC: 29 U/L (ref 10–44)
ANION GAP SERPL CALC-SCNC: 13 MMOL/L (ref 8–16)
AST SERPL-CCNC: 46 U/L (ref 10–40)
BASOPHILS # BLD AUTO: 0.01 K/UL (ref 0–0.2)
BASOPHILS NFR BLD: 0.2 % (ref 0–1.9)
BILIRUB SERPL-MCNC: 0.5 MG/DL (ref 0.1–1)
BLD GP AB SCN CELLS X3 SERPL QL: NORMAL
BUN SERPL-MCNC: 18 MG/DL (ref 10–30)
CALCIUM SERPL-MCNC: 8.3 MG/DL (ref 8.7–10.5)
CHLORIDE SERPL-SCNC: 98 MMOL/L (ref 95–110)
CO2 SERPL-SCNC: 30 MMOL/L (ref 23–29)
CREAT SERPL-MCNC: 1.4 MG/DL (ref 0.5–1.4)
DIFFERENTIAL METHOD: ABNORMAL
EOSINOPHIL # BLD AUTO: 0.2 K/UL (ref 0–0.5)
EOSINOPHIL NFR BLD: 2.4 % (ref 0–8)
ERYTHROCYTE [DISTWIDTH] IN BLOOD BY AUTOMATED COUNT: 13.8 % (ref 11.5–14.5)
EST. GFR  (AFRICAN AMERICAN): 50 ML/MIN/1.73 M^2
EST. GFR  (NON AFRICAN AMERICAN): 43 ML/MIN/1.73 M^2
FERRITIN SERPL-MCNC: 96 NG/ML (ref 20–300)
GLUCOSE SERPL-MCNC: 158 MG/DL (ref 70–110)
GRAM STN SPEC: NORMAL
GRAM STN SPEC: NORMAL
HCT VFR BLD AUTO: 27.9 % (ref 40–54)
HGB BLD-MCNC: 9.1 G/DL (ref 14–18)
IMM GRANULOCYTES # BLD AUTO: 0.05 K/UL (ref 0–0.04)
IMM GRANULOCYTES NFR BLD AUTO: 0.8 % (ref 0–0.5)
IRON SERPL-MCNC: 63 UG/DL (ref 45–160)
LYMPHOCYTES # BLD AUTO: 1 K/UL (ref 1–4.8)
LYMPHOCYTES NFR BLD: 16.1 % (ref 18–48)
MAGNESIUM SERPL-MCNC: 1.6 MG/DL (ref 1.6–2.6)
MCH RBC QN AUTO: 33.2 PG (ref 27–31)
MCHC RBC AUTO-ENTMCNC: 32.6 G/DL (ref 32–36)
MCV RBC AUTO: 102 FL (ref 82–98)
MONOCYTES # BLD AUTO: 0.6 K/UL (ref 0.3–1)
MONOCYTES NFR BLD: 9.3 % (ref 4–15)
NEUTROPHILS # BLD AUTO: 4.4 K/UL (ref 1.8–7.7)
NEUTROPHILS NFR BLD: 71.2 % (ref 38–73)
NRBC BLD-RTO: 0 /100 WBC
PHOSPHATE SERPL-MCNC: 2.6 MG/DL (ref 2.7–4.5)
PLATELET # BLD AUTO: 118 K/UL (ref 150–450)
PMV BLD AUTO: 10.3 FL (ref 9.2–12.9)
POCT GLUCOSE: 155 MG/DL (ref 70–110)
POCT GLUCOSE: 164 MG/DL (ref 70–110)
POCT GLUCOSE: 187 MG/DL (ref 70–110)
POTASSIUM SERPL-SCNC: 3.3 MMOL/L (ref 3.5–5.1)
PROT SERPL-MCNC: 5.9 G/DL (ref 6–8.4)
RBC # BLD AUTO: 2.74 M/UL (ref 4.6–6.2)
SATURATED IRON: 22 % (ref 20–50)
SODIUM SERPL-SCNC: 141 MMOL/L (ref 136–145)
TOTAL IRON BINDING CAPACITY: 286 UG/DL (ref 250–450)
TRANSFERRIN SERPL-MCNC: 193 MG/DL (ref 200–375)
VANCOMYCIN SERPL-MCNC: 15.1 UG/ML
WBC # BLD AUTO: 6.16 K/UL (ref 3.9–12.7)

## 2022-05-04 PROCEDURE — 25000242 PHARM REV CODE 250 ALT 637 W/ HCPCS: Performed by: STUDENT IN AN ORGANIZED HEALTH CARE EDUCATION/TRAINING PROGRAM

## 2022-05-04 PROCEDURE — 11000001 HC ACUTE MED/SURG PRIVATE ROOM

## 2022-05-04 PROCEDURE — 87106 FUNGI IDENTIFICATION YEAST: CPT | Performed by: INTERNAL MEDICINE

## 2022-05-04 PROCEDURE — 36415 COLL VENOUS BLD VENIPUNCTURE: CPT | Performed by: STUDENT IN AN ORGANIZED HEALTH CARE EDUCATION/TRAINING PROGRAM

## 2022-05-04 PROCEDURE — 97535 SELF CARE MNGMENT TRAINING: CPT

## 2022-05-04 PROCEDURE — 25000003 PHARM REV CODE 250: Performed by: STUDENT IN AN ORGANIZED HEALTH CARE EDUCATION/TRAINING PROGRAM

## 2022-05-04 PROCEDURE — 27000221 HC OXYGEN, UP TO 24 HOURS

## 2022-05-04 PROCEDURE — 84100 ASSAY OF PHOSPHORUS: CPT | Performed by: STUDENT IN AN ORGANIZED HEALTH CARE EDUCATION/TRAINING PROGRAM

## 2022-05-04 PROCEDURE — 99900035 HC TECH TIME PER 15 MIN (STAT)

## 2022-05-04 PROCEDURE — 63600175 PHARM REV CODE 636 W HCPCS: Performed by: INTERNAL MEDICINE

## 2022-05-04 PROCEDURE — 94640 AIRWAY INHALATION TREATMENT: CPT

## 2022-05-04 PROCEDURE — 80202 ASSAY OF VANCOMYCIN: CPT | Performed by: INTERNAL MEDICINE

## 2022-05-04 PROCEDURE — 84466 ASSAY OF TRANSFERRIN: CPT | Performed by: STUDENT IN AN ORGANIZED HEALTH CARE EDUCATION/TRAINING PROGRAM

## 2022-05-04 PROCEDURE — 87206 SMEAR FLUORESCENT/ACID STAI: CPT | Performed by: INTERNAL MEDICINE

## 2022-05-04 PROCEDURE — 25000003 PHARM REV CODE 250: Performed by: RADIOLOGY

## 2022-05-04 PROCEDURE — 94761 N-INVAS EAR/PLS OXIMETRY MLT: CPT

## 2022-05-04 PROCEDURE — 63600175 PHARM REV CODE 636 W HCPCS: Performed by: STUDENT IN AN ORGANIZED HEALTH CARE EDUCATION/TRAINING PROGRAM

## 2022-05-04 PROCEDURE — 87205 SMEAR GRAM STAIN: CPT | Performed by: INTERNAL MEDICINE

## 2022-05-04 PROCEDURE — 80053 COMPREHEN METABOLIC PANEL: CPT | Performed by: STUDENT IN AN ORGANIZED HEALTH CARE EDUCATION/TRAINING PROGRAM

## 2022-05-04 PROCEDURE — 86901 BLOOD TYPING SEROLOGIC RH(D): CPT | Performed by: STUDENT IN AN ORGANIZED HEALTH CARE EDUCATION/TRAINING PROGRAM

## 2022-05-04 PROCEDURE — 97166 OT EVAL MOD COMPLEX 45 MIN: CPT

## 2022-05-04 PROCEDURE — 87070 CULTURE OTHR SPECIMN AEROBIC: CPT | Performed by: INTERNAL MEDICINE

## 2022-05-04 PROCEDURE — 87102 FUNGUS ISOLATION CULTURE: CPT | Performed by: INTERNAL MEDICINE

## 2022-05-04 PROCEDURE — 25000003 PHARM REV CODE 250: Performed by: INTERNAL MEDICINE

## 2022-05-04 PROCEDURE — 87015 SPECIMEN INFECT AGNT CONCNTJ: CPT | Performed by: INTERNAL MEDICINE

## 2022-05-04 PROCEDURE — 87116 MYCOBACTERIA CULTURE: CPT | Performed by: INTERNAL MEDICINE

## 2022-05-04 PROCEDURE — 85025 COMPLETE CBC W/AUTO DIFF WBC: CPT | Performed by: STUDENT IN AN ORGANIZED HEALTH CARE EDUCATION/TRAINING PROGRAM

## 2022-05-04 PROCEDURE — 83735 ASSAY OF MAGNESIUM: CPT | Performed by: STUDENT IN AN ORGANIZED HEALTH CARE EDUCATION/TRAINING PROGRAM

## 2022-05-04 PROCEDURE — 87075 CULTR BACTERIA EXCEPT BLOOD: CPT | Performed by: INTERNAL MEDICINE

## 2022-05-04 PROCEDURE — 82728 ASSAY OF FERRITIN: CPT | Performed by: STUDENT IN AN ORGANIZED HEALTH CARE EDUCATION/TRAINING PROGRAM

## 2022-05-04 PROCEDURE — 97530 THERAPEUTIC ACTIVITIES: CPT

## 2022-05-04 RX ORDER — NALOXONE HCL 0.4 MG/ML
0.02 VIAL (ML) INJECTION
Status: DISCONTINUED | OUTPATIENT
Start: 2022-05-04 | End: 2022-05-06 | Stop reason: HOSPADM

## 2022-05-04 RX ORDER — FUROSEMIDE 10 MG/ML
40 INJECTION INTRAMUSCULAR; INTRAVENOUS DAILY
Status: DISCONTINUED | OUTPATIENT
Start: 2022-05-04 | End: 2022-05-06 | Stop reason: HOSPADM

## 2022-05-04 RX ORDER — SODIUM CHLORIDE 0.9 % (FLUSH) 0.9 %
10 SYRINGE (ML) INJECTION EVERY 12 HOURS PRN
Status: DISCONTINUED | OUTPATIENT
Start: 2022-05-04 | End: 2022-05-06 | Stop reason: HOSPADM

## 2022-05-04 RX ORDER — METRONIDAZOLE 500 MG/1
500 TABLET ORAL EVERY 8 HOURS
Status: DISCONTINUED | OUTPATIENT
Start: 2022-05-04 | End: 2022-05-06 | Stop reason: HOSPADM

## 2022-05-04 RX ORDER — INSULIN ASPART 100 [IU]/ML
0-5 INJECTION, SOLUTION INTRAVENOUS; SUBCUTANEOUS EVERY 6 HOURS PRN
Status: DISCONTINUED | OUTPATIENT
Start: 2022-05-04 | End: 2022-05-06 | Stop reason: HOSPADM

## 2022-05-04 RX ORDER — FUROSEMIDE 20 MG/1
20 TABLET ORAL DAILY
Status: DISCONTINUED | OUTPATIENT
Start: 2022-05-04 | End: 2022-05-04

## 2022-05-04 RX ORDER — FUROSEMIDE 40 MG/1
40 TABLET ORAL DAILY
Status: DISCONTINUED | OUTPATIENT
Start: 2022-05-04 | End: 2022-05-04

## 2022-05-04 RX ORDER — CEFEPIME HYDROCHLORIDE 1 G/50ML
1 INJECTION, SOLUTION INTRAVENOUS
Status: DISCONTINUED | OUTPATIENT
Start: 2022-05-04 | End: 2022-05-06

## 2022-05-04 RX ORDER — IBUPROFEN 200 MG
24 TABLET ORAL
Status: DISCONTINUED | OUTPATIENT
Start: 2022-05-04 | End: 2022-05-04

## 2022-05-04 RX ORDER — AMLODIPINE BESYLATE 5 MG/1
10 TABLET ORAL DAILY
Status: DISCONTINUED | OUTPATIENT
Start: 2022-05-04 | End: 2022-05-06 | Stop reason: HOSPADM

## 2022-05-04 RX ORDER — MUPIROCIN 20 MG/G
OINTMENT TOPICAL 2 TIMES DAILY
Status: COMPLETED | OUTPATIENT
Start: 2022-05-04 | End: 2022-05-05

## 2022-05-04 RX ORDER — ACETAMINOPHEN 325 MG/1
650 TABLET ORAL EVERY 6 HOURS PRN
Status: DISCONTINUED | OUTPATIENT
Start: 2022-05-04 | End: 2022-05-06 | Stop reason: HOSPADM

## 2022-05-04 RX ORDER — TAMSULOSIN HYDROCHLORIDE 0.4 MG/1
0.4 CAPSULE ORAL DAILY
Status: DISCONTINUED | OUTPATIENT
Start: 2022-05-04 | End: 2022-05-06 | Stop reason: HOSPADM

## 2022-05-04 RX ORDER — CEFEPIME HYDROCHLORIDE 1 G/50ML
1 INJECTION, SOLUTION INTRAVENOUS
Status: DISCONTINUED | OUTPATIENT
Start: 2022-05-04 | End: 2022-05-04

## 2022-05-04 RX ORDER — GLUCAGON 1 MG
1 KIT INJECTION
Status: DISCONTINUED | OUTPATIENT
Start: 2022-05-04 | End: 2022-05-04

## 2022-05-04 RX ORDER — DOXAZOSIN 2 MG/1
2 TABLET ORAL NIGHTLY
Status: DISCONTINUED | OUTPATIENT
Start: 2022-05-04 | End: 2022-05-06 | Stop reason: HOSPADM

## 2022-05-04 RX ORDER — GABAPENTIN 100 MG/1
200 CAPSULE ORAL NIGHTLY
Status: DISCONTINUED | OUTPATIENT
Start: 2022-05-04 | End: 2022-05-06 | Stop reason: HOSPADM

## 2022-05-04 RX ORDER — IBUPROFEN 200 MG
16 TABLET ORAL
Status: DISCONTINUED | OUTPATIENT
Start: 2022-05-04 | End: 2022-05-06 | Stop reason: HOSPADM

## 2022-05-04 RX ORDER — GUAIFENESIN 600 MG/1
600 TABLET, EXTENDED RELEASE ORAL 2 TIMES DAILY
Status: DISCONTINUED | OUTPATIENT
Start: 2022-05-04 | End: 2022-05-06 | Stop reason: HOSPADM

## 2022-05-04 RX ORDER — LIDOCAINE HYDROCHLORIDE 10 MG/ML
INJECTION INFILTRATION; PERINEURAL CODE/TRAUMA/SEDATION MEDICATION
Status: COMPLETED | OUTPATIENT
Start: 2022-05-04 | End: 2022-05-04

## 2022-05-04 RX ORDER — LEVALBUTEROL 1.25 MG/.5ML
1.25 SOLUTION, CONCENTRATE RESPIRATORY (INHALATION) EVERY 8 HOURS
Status: DISCONTINUED | OUTPATIENT
Start: 2022-05-04 | End: 2022-05-06 | Stop reason: HOSPADM

## 2022-05-04 RX ORDER — CARVEDILOL 12.5 MG/1
12.5 TABLET ORAL 2 TIMES DAILY WITH MEALS
Status: DISCONTINUED | OUTPATIENT
Start: 2022-05-04 | End: 2022-05-06 | Stop reason: HOSPADM

## 2022-05-04 RX ORDER — PRAVASTATIN SODIUM 20 MG/1
20 TABLET ORAL DAILY
Status: DISCONTINUED | OUTPATIENT
Start: 2022-05-04 | End: 2022-05-06 | Stop reason: HOSPADM

## 2022-05-04 RX ADMIN — PRAVASTATIN SODIUM 20 MG: 20 TABLET ORAL at 08:05

## 2022-05-04 RX ADMIN — ACETAMINOPHEN 650 MG: 325 TABLET ORAL at 05:05

## 2022-05-04 RX ADMIN — CARVEDILOL 12.5 MG: 12.5 TABLET, FILM COATED ORAL at 08:05

## 2022-05-04 RX ADMIN — METRONIDAZOLE 500 MG: 500 TABLET ORAL at 05:05

## 2022-05-04 RX ADMIN — METRONIDAZOLE 500 MG: 500 TABLET ORAL at 01:05

## 2022-05-04 RX ADMIN — LIDOCAINE HYDROCHLORIDE 5 ML: 10 INJECTION, SOLUTION INFILTRATION; PERINEURAL at 03:05

## 2022-05-04 RX ADMIN — CARVEDILOL 12.5 MG: 12.5 TABLET, FILM COATED ORAL at 05:05

## 2022-05-04 RX ADMIN — TAMSULOSIN HYDROCHLORIDE 0.4 MG: 0.4 CAPSULE ORAL at 08:05

## 2022-05-04 RX ADMIN — AMLODIPINE BESYLATE 10 MG: 5 TABLET ORAL at 08:05

## 2022-05-04 RX ADMIN — MUPIROCIN: 20 OINTMENT TOPICAL at 08:05

## 2022-05-04 RX ADMIN — CEFEPIME HYDROCHLORIDE 1 G: 1 INJECTION, SOLUTION INTRAVENOUS at 05:05

## 2022-05-04 RX ADMIN — DOXAZOSIN 2 MG: 2 TABLET ORAL at 08:05

## 2022-05-04 RX ADMIN — LEVALBUTEROL 1.25 MG: 1.25 SOLUTION, CONCENTRATE RESPIRATORY (INHALATION) at 09:05

## 2022-05-04 RX ADMIN — VANCOMYCIN HYDROCHLORIDE 750 MG: 750 INJECTION, POWDER, LYOPHILIZED, FOR SOLUTION INTRAVENOUS at 08:05

## 2022-05-04 RX ADMIN — FUROSEMIDE 40 MG: 40 INJECTION, SOLUTION INTRAMUSCULAR; INTRAVENOUS at 11:05

## 2022-05-04 RX ADMIN — GABAPENTIN 200 MG: 100 CAPSULE ORAL at 08:05

## 2022-05-04 RX ADMIN — DOXAZOSIN 2 MG: 2 TABLET ORAL at 05:05

## 2022-05-04 RX ADMIN — AZITHROMYCIN MONOHYDRATE 500 MG: 500 INJECTION, POWDER, LYOPHILIZED, FOR SOLUTION INTRAVENOUS at 08:05

## 2022-05-04 RX ADMIN — LEVOTHYROXINE SODIUM 75 MCG: 25 TABLET ORAL at 05:05

## 2022-05-04 RX ADMIN — FUROSEMIDE 20 MG: 20 TABLET ORAL at 08:05

## 2022-05-04 RX ADMIN — GUAIFENESIN 600 MG: 600 TABLET, EXTENDED RELEASE ORAL at 08:05

## 2022-05-04 RX ADMIN — METRONIDAZOLE 500 MG: 500 TABLET ORAL at 10:05

## 2022-05-04 NOTE — PLAN OF CARE
Problem: Adult Inpatient Plan of Care  Goal: Plan of Care Review  Outcome: Ongoing, Progressing  Goal: Patient-Specific Goal (Individualized)  Outcome: Ongoing, Progressing  Goal: Absence of Hospital-Acquired Illness or Injury  Outcome: Ongoing, Progressing  Goal: Optimal Comfort and Wellbeing  Outcome: Ongoing, Progressing     Problem: Diabetes Comorbidity  Goal: Blood Glucose Level Within Targeted Range  Outcome: Ongoing, Progressing     POC reviewed with patient and daughter. All questions and concerns reviewed. Fall/safety precautions implemented and maintained. Blood glucose monitored. IV abx given. No acute events noted this shift. Please see flowsheet for full assessment and vitals. Bed locked in lowest position. Call bell within reach. Will continue to monitor.

## 2022-05-04 NOTE — PT/OT/SLP DISCHARGE
Physical Therapy Discharge Summary    Name: Eddie Parada Sr.  MRN: [id number]   Principal Problem: Pneumonia of left lower lobe due to infectious organism     Patient Discharged from acute Physical Therapy on 5/3/22.  Please refer to prior PT noted date on 5/3/22 for functional status.     Assessment:     Patient was discharged unexpectedly.  Information required to complete an accurate discharge summary is unknown.  Refer to therapy initial evaluation and last progress note for initial and most recent functional status and goal achievement.  Recommendations made may be found in medical record.    Objective:     GOALS:   Multidisciplinary Problems     Physical Therapy Goals        Problem: Physical Therapy    Goal Priority Disciplines Outcome Goal Variances Interventions   Physical Therapy Goal     PT, PT/OT Ongoing, Progressing     Description:   Patient will increase functional independence with mobility by performin. Supine to sit with Independent  2. Sit to supine with Independent  3. Bed to chair transfer with Modified Independent with or without rolling walker using Step Transfer TECHNIQUE  4. Gait  x ~100  feet with Supervision or Set-up Assistance with  rolling walker  5. Lower extremity exercise program x10 reps per handout, with assistance as needed                      Reasons for Discontinuation of Therapy Services  Transfer to another hospital for higher level of care.      Plan:     Patient Discharged to: Transfer to another hospital for higher level of care.      2022

## 2022-05-04 NOTE — PLAN OF CARE
Problem: Occupational Therapy  Goal: Occupational Therapy Goal  Description: Goals to be met by: 5/25/2022    Patient will increase functional independence with ADLs by performing:    UE Dressing with Modified [place].  LE Dressing with Modified [place] and Assistive Devices as needed.  Grooming while standing at sink with Supervision.  Toileting from toilet with Supervision for hygiene and clothing management.   Rolling to Bilateral with Modified [place].   Supine to sit with Modified [place].  Step transfer with Supervision  Toilet transfer to toilet with Supervision.  Increased functional strength to WFL for ADLS.  Upper extremity exercise program x 10 reps per handout, with supervision.    Outcome: Ongoing, Progressing   OT initial eval completed, POC established and tx initiated. Pt. found on 3Lpm O2, SpO2 93% at rest and 95% with activity; pt. moderately fatigued with activity. Pt. performed bed mobility Mod A for supine>sit, sit<>stand min A to RW, ambulated Min A with RW in room, performed g/hygiene CGA standing at sink inside RW. Pt. performed chair t/f with Min A with RW, LE dressing Total A for socks.  Pt. left sitting UIC.  Family arrived at end of OT tx session.  OT recommending SNF at discharge.  Continue with OT POC.

## 2022-05-04 NOTE — CONSULTS
Recommendation:   1. When medically acceptable, advance to clear liquid diet.   2. Monitor weights/labs.   3.When diet advanced addition of Trav BID to promote wound healing.   4. RD to follow up to monitor intake.     Goals:   Pt to recieve nutrition by RD follow up.  Nutrition Goal Status: new  Communication of RD Recs: other (comment) (POC)      Problem: Impaired Wound Healing  Goal: Optimal Wound Healing  Outcome: Ongoing, Progressing     Problem: Nutrition Impaired (Sepsis/Septic Shock)  Goal: Optimal Nutrition Intake  Outcome: Ongoing, Progressing     Problem: Oral Intake Inadequate (Hospitalized Older Adult)  Goal: Optimal Nutrition Intake  Outcome: Ongoing, Progressing

## 2022-05-04 NOTE — PT/OT/SLP EVAL
Occupational Therapy   Evaluation and tx    Name: Eddie Parada Sr.  MRN: [id number]  Admitting Diagnosis:  Abdominal wall abscess  Recent Surgery: * No surgery found *    The primary encounter diagnosis was Postprocedural intraabdominal abscess. Diagnoses of Intra-abdominal abscess, Macrocytic anemia, and Abdominal wall abscess were also pertinent to this visit.    Recommendations:     Discharge Recommendations: nursing facility, skilled  Discharge Equipment Recommendations:   (TBD)  Barriers to discharge:  Decreased caregiver support    Assessment:     Eddie Parada Sr. is a 89 y.o. male with a medical diagnosis of Abdominal wall abscess.  He presents with Performance deficits affecting function: weakness, impaired endurance, impaired self care skills, impaired functional mobilty, gait instability, impaired balance, decreased coordination, decreased upper extremity function, decreased lower extremity function, decreased safety awareness, impaired cardiopulmonary response to activity, decreased ROM.      OT initial eval completed, POC established and tx initiated. Pt. found on 3Lpm O2, SpO2 93% at rest and 95% with activity; pt. moderately fatigued with activity. Pt. performed bed mobility Mod A for supine>sit, sit<>stand min A to RW, ambulated Min A with RW in room, performed g/hygiene CGA standing at sink inside RW. Pt. performed chair t/f with Min A with RW, LE dressing Total A for socks.  Pt. left sitting UIC.  Family arrived at end of OT tx session.  OT recommending SNF at discharge.  Continue with OT POC.       Rehab Prognosis: Good; patient would benefit from acute skilled OT services to address these deficits and reach maximum level of function.       Plan:     Patient to be seen 5 x/week to address the above listed problems via self-care/home management, therapeutic activities, therapeutic exercises  · Plan of Care Expires: 06/04/22  · Plan of Care Reviewed with: patient    Subjective     Chief  "Complaint: none  Patient/Family Comments/goals: none    Occupational Profile:  Living Environment: Pt. lives with spouse  In SSH with ramp and 10-12 steps with BHR; t/s combo with TTB. Nabila lives next door.  Previous level of function: Indep-Min A with ADLS, assisted to don socks at times via wife and pt uses sock aide without difficulty. Pt. normally ambulates Indep without a device, says he uses RW and wc when he gets sick "like times like these."  Pt. does not drive anymore since March 2022, wife and stepson drives and wife does all IADLS.  Roles and Routines: recently had abdominal drain inserted, fairly active in above, wife been assisting with ADLS lately. 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  Equipment Used at Home:  walker, rolling, wheelchair, grab bar, bath bench  Assistance upon Discharge: spouse mostly because nabila works as interstate  for pt's hari business.    Pain/Comfort:  · Pain Rating 1: 0/10  · Pain Rating Post-Intervention 1: 0/10    Patients cultural, spiritual, Spiritism conflicts given the current situation: no    Objective:     Communicated with: nurse prior to session.  Patient found HOB elevated with oxygen, telemetry, peripheral IV, PICC line upon OT entry to room.    General Precautions: Standard, fall, diabetic, NPO, hearing impaired   Orthopedic Precautions:N/A   Braces: N/A  Respiratory Status: Nasal cannula, flow 3 L/min, SpO2 92% at rest, 95% with activity.    Occupational Performance:    Bed Mobility:    · Patient completed Rolling/Turning to Left with  minimum assistance and with side rail  · Patient completed Scooting/Bridging with moderate assistance  · Patient completed Supine to Sit with moderate assistance and with side rail    Functional Mobility/Transfers:  · Patient completed Sit <> Stand Transfer with minimum assistance  with  rolling walker   · Patient completed Bed <> Chair Transfer using Step Transfer technique with minimum " assistance with rolling walker, sat prematurely on chair, fatigued.  · Functional Mobility: ambulated min n/CGA with RW in room to sink and then BS chair, gait instability and fatigue noted; min posterior LOB backing away from sink.    Activities of Daily Living:  · Feeding:  NPO .  · Grooming: contact guard assistance. Pt. washed face and rinsed mouth standing inside RW at sink, assisted pt. to comb hair.  · Lower Body Dressing: total assistance socks, pt. uses sock aid at home.  · Toileting: minimum assistance for urinal use while in bed with staff assistance prior to OT visit.     Cognitive/Visual Perceptual:  Cognitive/Psychosocial Skills:     -       Oriented to: Person, Place, Time and Situation   -       Follows Commands/attention:Follows one-step commands  -       Communication: slurred speech at times due to not wearing dentures and pt. [place]  -       Memory: NT  -       Safety awareness/insight to disability: impaired   -       Mood/Affect/Coping skills/emotional control: Cooperative  Visual/Perceptual:      -Intact .    Physical Exam:  Balance:    -       sitting:  good- dynamic: fair  standing;  fair  dynamic: poor   Postural examination/scapula alignment:    -       Rounded shoulders  -       Forward head  Dominant hand:    -       right  Upper Extremity Range of Motion:     -       Right Upper Extremity: WFL  -       Left Upper Extremity: WFL except shld flex/abd ~60 2/2 chronic shld issues and limited ROM  Upper Extremity Strength:    -       Right Upper Extremity: WFL except shld 4-/5  -       Left Upper Extremity: WFL except shld 3+/5   Strength:    -       Right Upper Extremity: WFL  -       Left Upper Extremity: WFL    AMPAC 6 Click ADL:  AMPAC Total Score: 18    Treatment & Education:  Purpose of OT and POC.  Call don't fall for BTB assist, fall prevention education.  Impt of OOB activity and sitting UIC at least 3 times a day ~1hr to start today.  Fx ambulation training with RW for ambulatory  ADLS in room to sink and BS chair, min A for balance recovery when backing and turning away from sink. Pt. Instructed to slow down, stand inside RW as he turns away from sink and avoid stepping backwards to minimize fall risk.   All questions./concerns addressed within scope.   Vitals assessed throughout wfl for SpO2 95% on 3 LPM and HR 65 with activity.  Education:    Patient left up in chair with all lines intact, call button in reach, chair alarm on, nurse notified and son and son's wife present    GOALS:   Multidisciplinary Problems     Occupational Therapy Goals        Problem: Occupational Therapy    Goal Priority Disciplines Outcome Interventions   Occupational Therapy Goal     OT, PT/OT Ongoing, Progressing    Description: Goals to be met by: 5/25/2022    Patient will increase functional independence with ADLs by performing:    UE Dressing with Modified [place].  LE Dressing with Modified [place] and Assistive Devices as needed.  Grooming while standing at sink with Supervision.  Toileting from toilet with Supervision for hygiene and clothing management.   Rolling to Bilateral with Modified [place].   Supine to sit with Modified [place].  Step transfer with Supervision  Toilet transfer to toilet with Supervision.  Increased functional strength to WFL for ADLS.  Upper extremity exercise program x 10 reps per handout, with supervision.                     History:     Past Medical History:   Diagnosis Date    Abdominal fibromatosis     Acute posthemorrhagic anemia 01/09/2018    NIK (acute kidney injury) 01/11/2018    Aortic stenosis 02/25/2014    Atrial fibrillation     Bradycardia     Broken arm     CAD (coronary artery disease)     Cancer     basal cell on nose and melanoma on back    CHF (congestive heart failure)     COPD (chronic obstructive pulmonary disease)     Diabetes mellitus type II     Encounter for blood transfusion     Food impaction of esophagus, history of  03/03/2022    Hyperlipidemia     Localization-related focal epilepsy with simple partial seizures 03/02/2021    Melanoma     Obesity (BMI 30.0-34.9) 09/13/2016    Obesity, diabetes, and hypertension syndrome 09/13/2016    Osteoporosis     Other dysphagia 05/27/2021    Peripheral vascular disease     Pneumonia of left lower lobe due to infectious organism 04/30/2022    Primary malignant neoplasm of prostate 03/03/2022    S/P ORIF (open reduction internal fixation) fracture 11/27/2017    Sepsis with acute hypoxic respiratory failure     Septic shock 03/03/2022    Stroke     TIA (transient ischemic attack)     Type II diabetes mellitus with peripheral circulatory disorder     Type II or unspecified type diabetes mellitus without mention of complication, not stated as uncontrolled     Unspecified essential hypertension        Past Surgical History:   Procedure Laterality Date    AORTIC VALVE REPLACEMENT      CARDIAC PACEMAKER PLACEMENT  9/28/2012    CARDIAC VALVE REPLACEMENT  11/17/2011    aortic valve-tissue    CHOLECYSTECTOMY      COLONOSCOPY      ESOPHAGOGASTRODUODENOSCOPY N/A 5/27/2021    Procedure: EGD (ESOPHAGOGASTRODUODENOSCOPY);  Surgeon: Gualberto Medrano MD;  Location: [facility];  Service: Endoscopy;  Laterality: N/A;    ESOPHAGOGASTRODUODENOSCOPY N/A 6/22/2021    Procedure: EGD (ESOPHAGOGASTRODUODENOSCOPY);  Surgeon: Gualberto Medrano MD;  Location: [facility];  Service: Endoscopy;  Laterality: N/A;  please call wife(Jessica) with instructions/arrival time.    ESOPHAGOGASTRODUODENOSCOPY (EGD) WITH DILATION  06/22/2021    EYE SURGERY Bilateral     cataract with lens by  at [facility]    HERNIA REPAIR      abdominal    LASIK      UPPER GASTROINTESTINAL ENDOSCOPY  05/28/2021       Time Tracking:     OT Date of Treatment: 05/04/22  OT Start Time: 1243  OT Stop Time: 1329  OT Total Time (min): 46 min    Billable Minutes:Evaluation 10  Self Care/Home Management  15  Therapeutic Activity 21  Total Time 46    5/4/2022

## 2022-05-04 NOTE — NURSING
0305: Patient arrived via stretcher per EMS x2. Patient AAOx4. Respirations even and unlabored. O2 3L/NC. Denies any pain. Oriented to room and call bell. Bed locked in lowest position. Call bell within reach. Will continue to monitor.

## 2022-05-04 NOTE — PROGRESS NOTES
[place] [facility] Surg  Adult Nutrition  Progress Note    SUMMARY       Recommendations    Recommendation:   1. When medically acceptable, advance to clear liquid diet.   2. Monitor weights/labs.   3.When diet advanced addition of Trav BID to promote wound healing.   4. RD to follow up to monitor intake.    Goals:   Pt to recieve nutrition by RD follow up.  Nutrition Goal Status: new  Communication of RD Recs: other (comment) (POC)    Assessment and Plan    Intra-abdominal abscess  Contributing Nutrition Diagnosis  Inadequate energy intake    Related to (etiology):   physiological state     Signs and Symptoms (as evidenced by):   Pt currently NPO, pending possible surgical/IR intervention for ascites and intra-abdominal abscess.     Interventions:  Collaboration with other providers  Modified Beverage- Trav BID    Nutrition Diagnosis Status:   New           Malnutrition Assessment  Unable to assess NFPE 2/2 Pt asleep     Reason for Assessment  Reason For Assessment: consult (wound healing)  Diagnosis: other (see comments) (abdominal wall abscess)  Relevant Medical History: HTN, HLD, CAD, s/p pacemaker, a fib, CHF, edema, hx of ventral abdominal hernia repaired 20 years ago, DM, Hyperlipidemia, obesity, osteoporosis,   abodminal drain removed 4/29.  Interdisciplinary Rounds: did not attend  General Information Comments: Pt currently NPO. PT is a 89 year old male with HTN, HLD, CAD, s/p pacemaker, a fib, CHF, edema, hx of ventral abdominal hernia repaired 20 years ago, DM, Hyperlipidemia, obesity, osteoporosis, abodminal drain removed 4/29. Pt presents with edema seen in the left abdominal wall and extending into the left chest. Pt sleeping during attempted visit. Per chart, Pt experiencing constipation, but no nausea, vomitting, or diarrhea. Ronaldo 19: R greater trochanter wound contusion, L lower quadrant punture. Unable to assess NFPE 2/2 Pt sleeping. (Simultaneous filing. User may not have seen previous  "data.)  Nutrition Discharge Planning: d/c diet needs to be determined.    Nutrition Risk Screen  Nutrition Risk Screen: no indicators present    Nutrition/Diet History  Food Preferences: no spiritual or cultural food prefs identified.  Spiritual, Cultural Beliefs, Presybeterian Practices, Values that Affect Care: no  Food Allergies: NKFA  Factors Affecting Nutritional Intake: constipation, abdominal distension    Anthropometrics  Temp: 97.6 °F (36.4 °C)  Height Method: Stated  Height: 5' 5" (165.1 cm)  Height (inches): 65 in  Weight Method: Bed Scale  Weight: 84.6 kg (186 lb 8.2 oz)  Weight (lb): 186.51 lb  Ideal Body Weight (IBW), Male: 136 lb  % Ideal Body Weight, Male (lb): 137.14 %  BMI (Calculated): 31  BMI Grade: 30 - 34.9- obesity - grade I       Lab/Procedures/Meds  Pertinent Labs Reviewed: reviewed  Pertinent Labs Comments: RBC 2.74L. hemoglobin 9.1L, hematocrit 27.9L, K 3.3L, CO2 30, eGFR 43, Glu 158, Ca 8.3L, Phos 2.6L, Protein 5.9L Alb 2.4L, AST 46H  Pertinent Medications Reviewed: reviewed  Pertinent Medications Comments: amlodipine, carvadilol, doxazosin, furosemide, gabapentin guaifenesis, metronidazole, pravastatin, tamslosin.    Estimated/Assessed Needs  Weight Used For Calorie Calculations: 84.6 kg (186 lb 8.2 oz)  Energy Calorie Requirements (kcal): 2115  Energy Need Method: Kcal/kg (25 kcal/kg)  Protein Requirements: 101 (1.2 g/kg)  Weight Used For Protein Calculations: 84.6 kg (186 lb 8.2 oz)     Estimated Fluid Requirement Method: RDA Method  RDA Method (mL): 2115  CHO Requirement: 225      Nutrition Prescription Ordered  Current Diet Order: NPO    Evaluation of Received Nutrient/Fluid Intake  I/O: 0/200  Energy Calories Required: not meeting needs  Protein Required: not meeting needs  Fluid Required: not meeting needs  Comments: LBM: not recorded  % Intake of Estimated Energy Needs: 0 - 25 %  % Meal Intake: NPO    Nutrition Risk  Level of Risk/Frequency of Follow-up:  (2x weekly)     Monitor and " Evaluation  Food and Nutrient Intake: energy intake  Food and Nutrient Adminstration: diet order  Knowledge/Beliefs/Attitudes: food and nutrition knowledge/skill  Physical Activity and Function: nutrition-related ADLs and IADLs  Anthropometric Measurements: weight  Biochemical Data, Medical Tests and Procedures: glucose/endocrine profile, gastrointestinal profile  Nutrition-Focused Physical Findings: overall appearance     Nutrition Follow-Up  RD Follow-up?: Yes

## 2022-05-04 NOTE — NURSING
[facility] here to transport patient to [facility] room 521.  Report given to Haritha and Nereida EMT.  Patient awake and alert.  Voicing no complaints SOB or discomfort.  Antibiotic infusing per pump.  Oxygen 2L nasal cannula.  Wife at side.

## 2022-05-04 NOTE — PROGRESS NOTES
Nursing Notes  Report received from [place] VERMA.  Patient awake and alert.  Wife at bedside.  Call bell in reach.    Huddle Comments

## 2022-05-04 NOTE — PLAN OF CARE
Plan of Care:  Monitor vital signs, labs, telemetry, SATs. Oxygen 3L nasal cannula.  Atrial fib on telemetry.  SATs mid 90s.  Strict I&O.  Increased urine output following Lasix administration.  Glycemic controls in place.  Abdominal dressing dry and intact.  SOPHIA hose in place.  Double lumen PICC lines patent.  IV antibiotics.  Afebrile.  No falls this shift. Transferring to [facility] for drain placement.

## 2022-05-04 NOTE — CONSULTS
Surgery Consult Note    Reason for consult:  Abdominal wall abscess    History of present illness:  89M PMHx HTN, HLD, CAD, s/p pacemaker, a fib on xarelto, CHF admitted to OSH w/ CHF exacerbation and fluid overload. Patient w/ history of ventral abdominal hernia repaired about 20 years ago w/ mesh. He subsequently developed abscess of the mesh 3/3/2022 and was admitted with septic shock. He discharged home at that time w/ an IR drain and IV abx. Patient was then readmitted 3/12/2022 due to concern that drain was clogged. Surgery was consulted at that time but no surgical intervention. His drain was exchanged, functioning. Readmitted 3/19 w/ encephalopathy, NIK likely due to abx which were adjusted. Sent home on Unasyn per ID. Per daughter at bedside, he was evaluated by surgeon in [place], Dr. Barber, who removed the drain in clinic 4/29. He then developed progressively worsening SOB, decreased O2 sat and came to ED. At OSH, CT AP was performed showing [id number] cm fluid collection superficial to mesh. Sent here for evaluation. Patient denies abdominal pain, NV.     Past Medical History:   Diagnosis Date    Abdominal fibromatosis     Acute posthemorrhagic anemia 01/09/2018    NIK (acute kidney injury) 01/11/2018    Aortic stenosis 02/25/2014    Atrial fibrillation     Bradycardia     Broken arm     CAD (coronary artery disease)     Cancer     basal cell on nose and melanoma on back    CHF (congestive heart failure)     COPD (chronic obstructive pulmonary disease)     Diabetes mellitus type II     Encounter for blood transfusion     Food impaction of esophagus, history of 03/03/2022    Hyperlipidemia     Localization-related focal epilepsy with simple partial seizures 03/02/2021    Melanoma     Obesity (BMI 30.0-34.9) 09/13/2016    Obesity, diabetes, and hypertension syndrome 09/13/2016    Osteoporosis     Other dysphagia 05/27/2021    Peripheral vascular disease     Pneumonia of left lower lobe due  to infectious organism 2022    Primary malignant neoplasm of prostate 2022    S/P ORIF (open reduction internal fixation) fracture 2017    Sepsis with acute hypoxic respiratory failure     Septic shock 2022    Stroke     TIA (transient ischemic attack)     Type II diabetes mellitus with peripheral circulatory disorder     Type II or unspecified type diabetes mellitus without mention of complication, not stated as uncontrolled     Unspecified essential hypertension      Past Surgical History:   Procedure Laterality Date    AORTIC VALVE REPLACEMENT      CARDIAC PACEMAKER PLACEMENT  2012    CARDIAC VALVE REPLACEMENT  2011    aortic valve-tissue    CHOLECYSTECTOMY      COLONOSCOPY      ESOPHAGOGASTRODUODENOSCOPY N/A 2021    Procedure: EGD (ESOPHAGOGASTRODUODENOSCOPY);  Surgeon: Gualberto Medrano MD;  Location: [facility];  Service: Endoscopy;  Laterality: N/A;    ESOPHAGOGASTRODUODENOSCOPY N/A 2021    Procedure: EGD (ESOPHAGOGASTRODUODENOSCOPY);  Surgeon: Gualberto Medrano MD;  Location: [facility];  Service: Endoscopy;  Laterality: N/A;  please call wife(Jessica) with instructions/arrival time.    ESOPHAGOGASTRODUODENOSCOPY (EGD) WITH DILATION  2021    EYE SURGERY Bilateral     cataract [facility] lens [facility]  at [facility]    HERNIA REPAIR      abdominal    LASIK      UPPER GASTROINTESTINAL ENDOSCOPY  2021     Family History   Problem Relation Age of Onset    Hypertension Father     Stroke Father     Alcohol abuse Father     Heart disease Brother     COPD Sister     Cancer Brother         Lung    Diabetes Daughter     No Known Problems Son     COPD Brother     No Known Problems Daughter     No Known Problems Son     Prostate cancer Neg Hx     Kidney disease Neg Hx      Social History     Tobacco Use    Smoking status: Former Smoker     Quit date: 1996     Years since quittin.6    Smokeless tobacco: Never Used     Tobacco comment: cigar smoker   Substance Use Topics    Alcohol use: No     Comment: none since 2006    Drug use: No     Review of patient's allergies indicates:   Allergen Reactions    Ciprofloxacin     Levofloxacin Swelling    Lyrica [pregabalin] Swelling    Unable to assess Other (See Comments)     Soft shell crabs     Review of systems: see HPI; otherwise, 12-point ROS negative.     Vitals: T: 97.6 HR: 64 BP: 160/71 RR: 19 SpO2: 95  Gen: no acute distress. Alert and oriented x3. Non-toxic appearing.   HEENT: normocephalic and atraumatic. EOMI. Moist mucous membranes. Trachea midline.   Neck: supple. Normal ROM.  Resp: unlabored respirations. Stable on room air.  CV: regular rate.  Abd: soft, nondistended, nontender.  Prior drain site CDI  Ext: warm and well perfused. No clubbing, cyanosis, or edema.  Neuro: CN grossly intact. No focal neurological deficits.      Labs  WBC 6  Hgb 9.1  hct 28   Plt 125    Cr 1.6  BUN 22    Imaging  CT CAP 5/3/2022:  As compared to the previous study, the patient has abdominal drain has been removed.  There has been reaccumulation of fluid and air along the anterior abdominal wall extending more to the left of the abdomen involving the patient's abdominal wall mesh, highly concerning for abscess formation.  The small bowel appears to be adherent to this collection of fluid and air an underlying involvement/fistulization is difficult to entirely exclude although felt unlikely.  There is extensive whole-body anasarca with asymmetric abnormal edema seen in the left abdominal wall primarily in the flank and extending into the left chest.     Moderate right with large left pleural effusion with adjacent passive atelectasis.  There does appear to be complete collapse of the left lower lobe.  Component of developing infiltrate not entirely excluded.    Assessment and plan:  89M PMHx HTN, HLD, CAD, s/p pacemaker, a fib on xarelto, CHF admitted to OSH w/ CHF exacerbation and fluid  overload; surgery consulted for abdominal wall abscess around mesh from hernia repair 20 years ago    -Recommend IR drainage   -Patient with multiple medical co morbidities, no a good surgical candidate for mesh excision \  -IV abx   -Will discuss further management w/ staff    Cary Chow MD   [employer] [facility] Surgery

## 2022-05-04 NOTE — CONSULTS
Interventional Radiology Consult/Pre-Procedure Note      Chief Complaint/Reason for Consult: Abscess    History of Present Illness:  Eddie Parada Sr. is a 89 y.o. male with the PMH listed below who presents with a recurrent anterior abd wall abscess 2/2 infected mesh implant. Seen by Surg and not felt to be a surgical candidate. IR consulted for palliative drain placement.    Admission H&P reviewed.    Past Medical History:   Diagnosis Date    Abdominal fibromatosis     Acute posthemorrhagic anemia 01/09/2018    NIK (acute kidney injury) 01/11/2018    Aortic stenosis 02/25/2014    Atrial fibrillation     Bradycardia     Broken arm     CAD (coronary artery disease)     Cancer     basal cell on nose and melanoma on back    CHF (congestive heart failure)     COPD (chronic obstructive pulmonary disease)     Diabetes mellitus type II     Encounter for blood transfusion     Food impaction of esophagus, history of 03/03/2022    Hyperlipidemia     Localization-related focal epilepsy with simple partial seizures 03/02/2021    Melanoma     Obesity (BMI 30.0-34.9) 09/13/2016    Obesity, diabetes, and hypertension syndrome 09/13/2016    Osteoporosis     Other dysphagia 05/27/2021    Peripheral vascular disease     Pneumonia of left lower lobe due to infectious organism 04/30/2022    Primary malignant neoplasm of prostate 03/03/2022    S/P ORIF (open reduction internal fixation) fracture 11/27/2017    Sepsis with acute hypoxic respiratory failure     Septic shock 03/03/2022    Stroke     TIA (transient ischemic attack)     Type II diabetes mellitus with peripheral circulatory disorder     Type II or unspecified type diabetes mellitus without mention of complication, not stated as uncontrolled     Unspecified essential hypertension      Past Surgical History:   Procedure Laterality Date    AORTIC VALVE REPLACEMENT      CARDIAC PACEMAKER PLACEMENT  9/28/2012    CARDIAC VALVE REPLACEMENT   11/17/2011    aortic valve-tissue    CHOLECYSTECTOMY      COLONOSCOPY      ESOPHAGOGASTRODUODENOSCOPY N/A 5/27/2021    Procedure: EGD (ESOPHAGOGASTRODUODENOSCOPY);  Surgeon: Gualberto Medrano MD;  Location: [facility];  Service: Endoscopy;  Laterality: N/A;    ESOPHAGOGASTRODUODENOSCOPY N/A 6/22/2021    Procedure: EGD (ESOPHAGOGASTRODUODENOSCOPY);  Surgeon: Gualberto Medrano MD;  Location: [facility];  Service: Endoscopy;  Laterality: N/A;  please call wife(Jessica) with instructions/arrival time.    ESOPHAGOGASTRODUODENOSCOPY (EGD) WITH DILATION  06/22/2021    EYE SURGERY Bilateral     cataract [facility] lens by  at [facility]    HERNIA REPAIR      abdominal    LASIK      UPPER GASTROINTESTINAL ENDOSCOPY  05/28/2021       Allergies:   Review of patient's allergies indicates:   Allergen Reactions    Ciprofloxacin     Levofloxacin Swelling    Lyrica [pregabalin] Swelling    Unable to assess Other (See Comments)     Soft shell crabs       Scheduled Meds:    amLODIPine  10 mg Oral Daily    azithromycin  500 mg Intravenous Q24H    carvediloL  12.5 mg Oral BID WM    ceFEPime (MAXIPIME) IVPB  1 g Intravenous Q12H    doxazosin  2 mg Oral Nightly    furosemide (LASIX) injection  40 mg Intravenous Daily    gabapentin  200 mg Oral QHS    guaiFENesin  600 mg Oral BID    levalbuterol  1.25 mg Nebulization Q8H    levothyroxine  75 mcg Oral Before breakfast    metroNIDAZOLE  500 mg Oral Q8H    mupirocin   Nasal BID    pravastatin  20 mg Oral Daily    tamsulosin  0.4 mg Oral Daily     Continuous Infusions:   PRN Meds:acetaminophen, dextrose 10%, glucose, insulin aspart U-100, naloxone, sodium chloride 0.9%, Pharmacy to dose Vancomycin consult **AND** vancomycin - pharmacy to dose    Anticoagulation/Antiplatelet Meds: Xarelto     Review of Systems:   As documented in admission H&P.    Physical Exam:  Temp: 97.5 °F (36.4 °C) (05/04/22 1524)  Pulse: 60 (05/04/22 1600)  Resp: 18 (05/04/22  1524)  BP: 135/65 (05/04/22 1524)  SpO2: 95 % (05/04/22 1524)     General: NAD  HEENT: Normocephalic, sclera anicteric, oropharynx clear  Heart: RRR  Lungs: Symmetric excursions, breathing unlabored  Abd: NTND, soft  Extremities: MILLIGAN  Neuro: Gross nonfocal    Labs:  No results for input(s): INR in the last 168 hours.    Invalid input(s):  PT,  PTT    Recent Labs   Lab 05/04/22  0749   WBC 6.16   HGB 9.1*   HCT 27.9*   *   *      Recent Labs   Lab 05/04/22  0749   *      K 3.3*   CL 98   CO2 30*   BUN 18   CREATININE 1.4   CALCIUM 8.3*   MG 1.6   ALT 29   AST 46*   ALBUMIN 2.4*   BILITOT 0.5       Imaging:  [facility] CT AP 5/3/22 reviewed.    Assessment/Plan:   Infected mesh with abscess. Will place drain but infection is unlikely to resolve without surg. May need drainage catheter indefinitely. Surgery team and patient are aware.    Sedation: None    Risks (including, but not limited to, pain, bleeding, infection, damage to nearby structures, treatment failure/recurrence, and the need for additional procedures), potential benefits, and alternatives were discussed with the patient. All questions were answered to the best of my abilities. The patient wishes to proceed. Written informed consent was obtained.      Andrew Marsala MD [facility]  Pager [phone]

## 2022-05-04 NOTE — PLAN OF CARE
CM met with pt and his daughter Diana  [phone]   lives with wife Jessica Parada  [phone]       physical address:  [address] [place] [address]     pt is current with [facility], pt has a PICC - currently getting home IV abx.     Infusion Co:   [facility]    [facility]     family will transport pt to home at d/c     pt transferred from [facility] to [facility] abscess - for Surg Consult   per notes - poor candidate for surgery/mesh excision - recc IR drainage, cx, abx         05/04/22 1820   Discharge Planning   Assessment Type Discharge Planning Brief Assessment   Resource/Environmental Concerns none   Support Systems Family members   Equipment Currently Used at Home walker, rolling;raised toilet   Current Living Arrangements home/apartment/condo   Patient/Family Anticipates Transition to home;home with family   Patient/Family Anticipated Services at Transition home health care  ([facility]  - current)   DME Needed Upon Discharge    (tbd)   Discharge Plan A Home;Home with family;Home Health

## 2022-05-04 NOTE — NURSING
Report called in to Marley VERMA at [facility].  Patient will be transported via ground and admitted to room 521 for further care. Awaiting transport.

## 2022-05-04 NOTE — PROGRESS NOTES
IP Liaison - Initial Visit Note    Patient: Eddie Parada Sr.  MRN:  [id number]  Date of Service:  5/4/2022  Completed by:  CM Crowley    Reason for Visit   Patient presents with    IP Liaison Initial Visit       RSW met with patient at bedside in order to complete SDOH questionnaire and liaison assessment.  Pt has identified no social barriers to care. Per pt, pt is not in need of resources at this time.    The following were addressed during this visit:  - Review SDOH Questions   - Complete patient assessment   - Complete initial visit with patient        Patient Summary     IP Liaison Patient Assessment    General  Level of Caregiver support: Member independent and does not need caregiver assistance  Have you had to make a decision between paying for any of the following in the last 2 months?: None  Transportation means: Family  Employment status: Retired and not working  Assessments  Was the PHQ Depression Screening completed this visit?: No  Was the YOLANDA-7 Screening completed this visit?: No         CM Crowley

## 2022-05-04 NOTE — PROCEDURES
Interventional Radiology Immediate Post-Procedure Note    Pre-Op Diagnosis: Abscess  Post-Op Diagnosis: Same    Procedure: US-guided drain placement    Procedure performed by: Cody Reynolds MD  Assistants: None    Estimated Blood Loss: Minimal  Specimen Removed: Yes    Findings/description of procedure:  12-Fr APD catheter placed into anterior abd wall abscess. 120 mL bloody fluid removed. Specimen sent.    No immediate complications. Patient tolerated procedure well. Please see full dictated procedure report for additional details and recommendations.      Cody Reynolds MD  [facility]  Pager [phone]

## 2022-05-04 NOTE — DISCHARGE SUMMARY
[place] - Med Surg ([facility])  [facility] Medicine  Discharge summary    Patient Name: Eddie Parada Sr.  MRN: [id number]  Patient Class: IP- Inpatient     Admission Date: 4/30/2022  Length of Stay: 2 days  Attending Physician: Linda Greco MD  Primary Care Provider: Callum Roberts MD           Subjective:      Principal Problem:Pneumonia of left lower lobe due to infectious organism/ CHF/abdominal wall abscess            HPI:  89 year old male with well controlled htn, diabetes, cad hfpef and recent complication of umbilical hernia mesh was brought in because of shortness of breath. 2 months ago he was admitted and then treated for an abdominal wall abscess. His post drainage course has been complicated by abx intolerance, renal failure. He has improved and has been on amp/sulbactam via picc for the last 2 weeks. Apparently yesterday he went to see dr. Haji and had his drain removed as the production had decreased. Over the last few days he has had worsening c/o dyspnea with exertion. There has been some concern for his renal function after sepsis and vanc treatment, so his lasix has been held for sometime. Over the last 4 mornings it has been noted that his pulse ox is around 84 upon wakening but it would improve to low 90s with movement/activity. No fever. No productive coughing. No choking/sputtering. No sick contacts. This morning he had swelling in his face which ultimately lead to his presentation to the ER.        Overview/Hospital Course:  5/1 Less than 1 L output of urine overnight. No longer with resp distress and patient able to lay completely flat without issues. He denies any chest pain, shortness of breath. Swelling noted over drain site. Sats lower 90s on supplemental oxygen. Afebrile.     5/2 has lasix 40 mg IV in ER and yesterday was given 0.5mg bumex yesterday. Renal function remains stable. I and O does not show that he has really diuresed but patient is feeling better. Sats 93% on 3L NC,  > does not use O2 at home. Cardiology and general surgery consulted for today. He does remains on vanc and zosyn for sepsis s/p abdominal wall abscess s/p abd wall hernia repair with mesh per Dr Haji. No fever. No elevated WBC,. H/H stable and platelets still low but improved. He also endorses cough that he has had for weeks prior to arrival. Today brining up mucus,      5/3/22  Eddie Parada Sr. is a 89 y.o. male  admitted with HF, & sepsis remains on 3LNC , O2 sat 93%, not on Home O2   ECHO - The estimated ejection fraction is 55%.; Indeterminate left ventricular diastolic function. Moderate Pulmonary HTN   Lasix 20mg po daily; Creat 1.6>1.6, BUN 23>22  Afebrile   Day 4 Vanc and Zosyn, for abd abcess, consult Dr. Haji   · Sit to Stand:  stand by assistance with rolling walker  · Gait: Standby Assistance x ~60 feet with RW.  Wife noting abdomen seems firmer        Past Medical History:   Diagnosis Date    Abdominal fibromatosis      Acute posthemorrhagic anemia 1/9/2018    NIK (acute kidney injury) 1/11/2018    Atrial fibrillation      Bradycardia      Broken arm      CAD (coronary artery disease)      Cancer       basal cell on nose and melanoma on back    CHF (congestive heart failure)      COPD (chronic obstructive pulmonary disease)      Diabetes mellitus type II      Hyperlipidemia      Localization-related focal epilepsy with simple partial seizures 3/2/2021    Melanoma      Obesity (BMI 30.0-34.9) 9/13/2016    Osteoporosis      Peripheral vascular disease      Pneumonia of left lower lobe due to infectious organism 4/30/2022    Primary malignant neoplasm of prostate 3/3/2022    Septic shock 3/3/2022    Stroke      TIA (transient ischemic attack)      Type II diabetes mellitus with peripheral circulatory disorder      Type II or unspecified type diabetes mellitus without mention of complication, not stated as uncontrolled      Unspecified essential hypertension                  Review of Systems   Constitutional:  Negative for chills and fever.   HENT:  Negative for congestion, ear pain, postnasal drip, rhinorrhea, sore throat and trouble swallowing.    Eyes:  Negative for redness and itching.   Respiratory:  Positive for cough and shortness of breath. Negative for wheezing.    Cardiovascular:  Negative for chest pain, palpitations and leg swelling.   Gastrointestinal:  Negative for abdominal pain, diarrhea, nausea and vomiting.   Genitourinary:  Negative for dysuria and frequency.   Skin:  Negative for rash.   Neurological:  Negative for weakness and headaches.   Objective:      Vital Signs (Most Recent):  Temp: 96.1 °F (35.6 °C) (05/03/22 0738)  Pulse: 62 (05/03/22 1000)  Resp: 16 (05/03/22 0801)  BP: (!) 132/58 (05/03/22 0738)  SpO2: (!) 94 % (05/03/22 0801)    Vital Signs (24h Range):  Temp:  [96.1 °F (35.6 °C)-97.5 °F (36.4 °C)] 96.1 °F (35.6 °C)  Pulse:  [59-76] 62  Resp:  [16-22] 16  SpO2:  [85 %-94 %] 94 %  BP: (106-145)/(58-69) 132/58      Weight: 85.3 kg (188 lb)  Body mass index is 31.28 kg/m².     Physical Exam  Vitals and nursing note reviewed.   Constitutional:       General: He is not in acute distress.     Appearance: He is well-developed.   HENT:      Head: Normocephalic and atraumatic.      Nose:      Comments: 2L nc in place  Eyes:      Conjunctiva/sclera: Conjunctivae normal.      Pupils: Pupils are equal, round, and reactive to light.   Neck:      Thyroid: No thyromegaly.   Cardiovascular:      Rate and Rhythm: Normal rate. Rhythm irregular.      Heart sounds: Murmur heard.   Pulmonary:      Effort: Pulmonary effort is normal. No respiratory distress.      Breath sounds: Normal breath sounds. No wheezing.   Abdominal:      General: Bowel sounds are normal.      Palpations: Abdomen is soft. Area of abscess looks firm ;bandaged     Tenderness: There is no abdominal tenderness.   Musculoskeletal:         General: Normal range of motion.      Cervical back:  Normal range of motion and neck supple.      Right lower leg: Edema (trace, compression stockings on) present.      Left lower leg: Edema (trace, compression stockings on) present.   Lymphadenopathy:      Cervical: No cervical adenopathy.   Skin:     General: Skin is warm and dry.      Findings: No rash.   Neurological:      Mental Status: He is alert and oriented to person, place, and time.   Psychiatric:         Behavior: Behavior normal.            CRANIAL NERVES      CN III, IV, VI   Pupils are equal, round, and reactive to light.     Significant Labs: All pertinent labs within the past 24 hours have been reviewed.  A1C:        Recent Labs   Lab 22  0744 22  1046   HGBA1C 5.9* 6.0*      ABGs:   No results for input(s): PH, PCO2, HCO3, POCSATURATED, BE, TOTALHB, COHB, METHB, O2HB, POCFIO2, PO2 in the last 48 hours.     Blood Culture:   No results for input(s): LABBLOO in the last 48 hours.     CBC:        Recent Labs   Lab 22  0600 22  0541   WBC 9.39 6.48   HGB 8.8* 9.1*   HCT 27.2* 28.3*   * 125*      CMP:         Recent Labs   Lab 22  1503 22  0600 22  0541    142 140   K 3.6 3.7 3.6    102 101   CO2 27 27 28   * 146* 134*   BUN 23 23 22   CREATININE 1.7* 1.6* 1.6*   CALCIUM 7.7* 8.3* 8.1*   ANIONGAP 12 13 11   EGFRNONAA 34* 37* 37*      Lactic Acid:   No results for input(s): LACTATE in the last 48 hours.     Magnesium: No results for input(s): MG in the last 48 hours.  Was given 2gm mag IV  and    Troponin:   No results for input(s): TROPONINI in the last 48 hours.  BNP      Recent Labs   Lab 22  0541   *         TSH:       Recent Labs   Lab 22  0744   TSH 5.943*   Covid negative     Significant Imagin/1 ECHO   · The estimated ejection fraction is 55%.  · Indeterminate left ventricular diastolic function.  · Moderate right ventricular enlargement with mildly reduced right ventricular systolic  function.  · Mild aortic regurgitation.  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 There is aortic insufficiency present. Prosthetic aortic valve is normal.  · The aortic valve mean gradient is 6 mmHg with a dimensionless index of 0.64.  · Mild mitral regurgitation.  · Mild tricuspid regurgitation.  · There is moderate pulmonary hypertension.        5/1 CXR Right PICC in place with tip projected over the SVC.  Multiple cardiac monitor wires overlie the chest.  Left anterior chest wall dual lead pacemaker device.  Patient is status post sternotomy and CABG.  Cardiomediastinal silhouette remains enlarged.Prominent pulmonary vascular and interstitial markings with patchy airspace opacification left mid and lower lung, not significantly changed since the prior study.  Possible small left pleural effusion.  Pneumothorax.     4/30 CXR Left anterior chest wall dual lead pacemaker.  Postoperative change of prior sternotomy.  Right PICC catheter tip projected over the distal SVC.  Cardiomediastinal silhouette is enlarged, unchanged.  Left perihilar and lower lobe opacification may reflect pneumonia, aspiration, or pulmonary edema.  Possible small left pleural effusion.  No pneumothorax.     4/30 EKG Ventricular-paced rhythm with occasional Premature ventricular complexes ;Probably AF  Abnormal ECG  When compared with ECG of 19-MAR-2022 07:35,  Premature ventricular complexes are now Present  Vent. rate has increased BY 3 BPM  Confirmed by Pool SERNA MD ([id number]) on 5/1/2022 11:12:48 AM        Assessment/Plan:      * Pneumonia of left lower lobe due to infectious organism  Clinical presentation not completely c/w this. More likely he has chf. No cough. No fever. Has picc line and has been getting amp. For now, broaden abx. Check cbc in am. Wean o2 as tolerated. And monitor response to diuresis. vanc and zosyn ordered. Amp on hold. Blood cultures sent.     5/1  WBC ct dropped - but he is pancytopenic this morning so I  question lab error. Will recheck cbc. Afebrile. Will recheck cxr. He has not really diuresed though and breathing has improved. Still with hypoxia.     5/2 WBC back to normal. Afebrile cxr without much improvedment thougfh breathing better still requiring 3L NC. Also coughing. Will start nebs and mucinex. Ask for sputum culture .     5/3  On vanc and zosyn         Acute on chronic congestive heart failure  Patient is identified as having Diastolic (HFpEF) heart failure that is Acute on chronic. CHF is currently uncontrolled due to Continued edema of extremities, Hepatic congestion/ascites and JVD and Pulmonary edema/pleural effusion on CXR. Latest ECHO performed and demonstrates- Results for orders placed during the hospital encounter of 03/03/22     Echo>> repeat ordered since he has not had one since recent illness     Interpretation Summary  · The left ventricle is normal in size with normal systolic function.  · The estimated ejection fraction is 55%.  · Indeterminate left ventricular diastolic function.  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  · Normal right ventricular size with normal right ventricular systolic function.  · Severe left atrial enlargement.  · Severe right atrial enlargement.  · Mild to moderate tricuspid regurgitation.  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 There is no aortic insufficiency present. Prosthetic aortic valve is normal.  · The aortic valve mean gradient is 13 mmHg with a dimensionless index of 0.61.  . Continue Beta Blocker and Furosemide and monitor clinical status closely. Monitor on telemetry. Patient is on CHF pathway.  Monitor strict Is&Os and daily weights.  Place on fluid restriction of 1.5 L. Continue to stress to patient importance of self efficacy and  on diet for CHF. Last BNP reviewed- and noted below       Recent Labs   Lab 04/30/22  1046   *   .  Has not been on lasix. Given 1 dose in er. Hasn't  really diuresed. May switch to bumex pending diuresis. Not on an ace - ?renal function. Not on jardiance, either. Will consult cards for Monday.     5/1 consult cards. Consideration of repeat echo - though just recently completed in march.     52 Dr Monterroso saw patient today would like to cont home lasix. Laying flat today   Check BNP in Am with bmp.     Hypokalemia     K 3.7.     5/3  BMP        Lab Results   Component Value Date      05/03/2022     K 3.6 05/03/2022      05/03/2022     CO2 28 05/03/2022     BUN 22 05/03/2022     CREATININE 1.6 (H) 05/03/2022     CALCIUM 8.1 (L) 05/03/2022     ANIONGAP 11 05/03/2022     ESTGFRAFRICA 43 (A) 05/03/2022     EGFRNONAA 37 (A) 05/03/2022            CKD (chronic kidney disease) stage 3, GFR 30-59 ml/min  Xarelto adjusted.  Check daily BMPs with diuretic use.  Stable.     Abdominal wall abscess  Seeing id and surgery outpatient.  Drain removed 4/29.  Seems there is still persistence in swelling in ab wall.     5/1  On physical exam I can feel a swelling superior to where the drain was. Will have dr. Haji see patient if he is in house.     5/3 day 4 vanc and zosyn- Dr Haji to see him today .  Nurse talked to him   Will do CT abdomen to assess abscess         Elevated troponin  Stable. Trend. No chest pain.        (HFpEF) heart failure with preserved ejection fraction  3/3/22  · The left ventricle is normal in size with normal systolic function.  · The estimated ejection fraction is 55%.  · Indeterminate left ventricular diastolic function.  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  · Normal right ventricular size with normal right ventricular systolic function.  · Severe left atrial enlargement.  · Severe right atrial enlargement.  · Mild to moderate tricuspid regurgitation.  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 There is no aortic insufficiency present. Prosthetic aortic valve is normal.  · The aortic valve  mean gradient is 13 mmHg with a dimensionless index of 0.61.     5/1  Switch from lasix to bumex and monitor diuresis. Really didn't get much output overnight. Breathing is improved. Repeat cxr ordered for this morning. Need accurate weights.     5/2/22   not much changed from resp standpoint. Still on 3L NC and doesn't wear O2 at home. CXR still showing infiltrate area on left lower lobe. Now with a cough on vanc and zosyn. Will start mucinex and nebs.         5/3  IV lasix 40 mg daily      Type 2 diabetes mellitus, controlled  Not on meds. Sliding scale ordered.   this am .     S/P AVR  Continue xarelto        Aortic stenosis  S/p replacement. On xarelto.        Cardiac pacemaker in situ  Not showing on tele.  Short run of v tach this Sunday morning> mag replaced .           CAD (coronary artery disease)  On BB, statin. Not taking aspirin.            Eddie Parada Sr. is a 89 y.o. male  Admitted to med /  surg floor for CHF /abdominal wall abscess .  presently on IV vanc and zosyn . He had abscess abdominal wall about 2 months ago  Treated with drainage with IR . Placed on antibiotics .  Seen recently by general surgery ; Dr Jesus Haji saw him at [facility] ; drain removed 4/29  Now his fluid collection is back again ; see CT report   Discussed with Dr Haji ; will need IR drainage .  Will ask for transfer higher level of care ; wife requesting [facility] .        CT   As compared to the previous examination, the patient's drainage catheter in the anterior abdominal wall has been removed in the region of the patient's abdominal wall mesh.  In association with this mesh there is abnormal collection of fluid and air that extends from the midline of the abdomen and extends towards the left, measuring approximately 17 x 3.6 cm in transverse dimension and approximately 14 cm in craniocaudal dimension.  Findings are concerning for abscess formation.  The adjacent bowel is intimately associated with  this abdominal wall collection.  There is abnormal whole-body anasarca with extensive soft tissue edema seen throughout the visualized abdomen and pelvis however more pronounced in the left hemiabdomen and extending into the left chest and left flank.  There are bilateral inguinal hernias which contain a small amount of fluid with portions of the bowel seen in the patient's right inguinal hernia.  Diverticulosis coli is seen without diverticulitis.  No free air  or obstruction.  No pathologically enlarged abdominal or pelvic lymph nodes are seen.     Postoperative changes of the right hip.  Age-appropriate degenerative changes affect the skeleton.              As compared to the previous study, the patient has abdominal drain has been removed.  There has been reaccumulation of fluid and air along the anterior abdominal wall extending more to the left of the abdomen involving the patient's abdominal wall mesh, highly concerning for abscess formation.  The small bowel appears to be adherent to this collection of fluid and air an underlying involvement/fistulization is difficult to entirely exclude although felt unlikely.  There is extensive whole-body anasarca with asymmetric abnormal edema seen in the left abdominal wall primarily in the flank and extending into the left chest.     Moderate right with large left pleural effusion with adjacent passive atelectasis.  There does appear to be complete collapse of the left lower lobe.  Component of developing infiltrate not entirely excluded.         He was accepted at [facility] for higher level of care . IR drainage

## 2022-05-04 NOTE — PLAN OF CARE
Patient on oxygen with documented flow.  Will attempt to wean per O2 order protocol. The proper method of use, as well as anticipated side effects, of this aerosol treatment are discussed and demonstrated to the patient.  Will continue to monitor.

## 2022-05-04 NOTE — ASSESSMENT & PLAN NOTE
Contributing Nutrition Diagnosis  Inadequate energy intake    Related to (etiology):   physiological state     Signs and Symptoms (as evidenced by):   Pt currently NPO, pending possible surgical/IR intervention for ascites and intra-abdominal abscess.     Interventions:  Collaboration with other providers  Modified Beverage- Trav BID    Nutrition Diagnosis Status:   New

## 2022-05-04 NOTE — PLAN OF CARE
Problem: Adult Inpatient Plan of Care  Goal: Plan of Care Review  Outcome: Ongoing, Progressing     VIRTUAL NURSE:  Cued into patient's room.  Patient resting in bed with eyes closed; respirations even and unlabored.  No distress noted.  Permission received per patient's daughter to turn camera to view patient.  Introduced as VN for night shift that will be working with floor nurse and nursing assistant.  Educated patient's daughter on VN's role in patient care and  [facility] model.  Plan of care reviewed with patient's daughter.  Education per flowsheet.   Informed patient's daughter that staff will round on them every 2 hours but to use call light for any other needs they may have; informed of fall risk and fall precautions.  Patient's daughter verbalized understanding.  Call light within reach; bed siderails up x3.  Opportunity given for questions and questions answered.  Admission assessment questions answered by patient's daughter.  No complaints or any needs at this time. Instructed to call for assistance.  Will cont to monitor and intervene as needed.    Labs, notes, orders, and careplan reviewed.       05/04/22 0422   Patient Request   Patient Requested spoke with daughter; patient asleep   Admission   Initial VN Admission Questions Complete   Communication Issues? Technical Issue   Shift   Virtual Nurse - Rounding Complete   Pain Management Interventions pain management plan reviewed with patient/caregiver   Virtual Nurse - Patient Verbalized Approval Of Camera Use;VN Rounding   Type of Frequent Check   Type Patient Rounds   Safety/Activity   Patient Rounds bed in low position;placement of personal items at bedside;bed wheels locked;call light in patient/parent reach;visualized patient;clutter free environment maintained   Safety Promotion/Fall Prevention assistive device/personal item within reach;commode/urinal/bedpan at bedside;diversional activities provided;Fall Risk reviewed with patient/family;family to  remain at bedside;high risk medications identified;medications reviewed;nonskid shoes/socks when out of bed;room near unit station;side rails raised x 3;supervised activity;instructed to call staff for mobility   Safety Precautions emergency equipment at bedside   Positioning   Body Position neutral body alignment;neutral head position   Head of Bed (HOB) Positioning HOB at 60 degrees   Pain/Comfort/Sleep   Preferred Pain Scale FACES (Monterroso-Webb FACES Pain Rating Scale)   Comfort/Acceptable Pain Level 0   Pain Rating (0-10): Rest 0   FACES Pain Rating: Rest 0-->no hurt   Sleep/Rest/Relaxation appears asleep

## 2022-05-04 NOTE — H&P
[facility]     Admitting Team: [facility] Hospitalist Team B  Attending Physician: Tigre Sharma MD  Resident: Dr. Pelayo  Intern: Dr. Zapata    Date of Admit: 5/4/2022    Chief Complaint     Abdominal wall abscess x 2 months    Subjective:      History of Present Illness:  Eddie Parada Sr. is a 89 y.o. man with past medical history of afib, pacemaker, aortic stenosis s/p repair, and COPD who presented on 5/4/2022 for ongoing complications associated of an anterior abdominal wall mass secondary to ventral hernia mesh placed ~20 years ago.     The following HPI is obtained from the patient, daughter, and supplemented by information found in the EMR.     Patient was initially admitted to [facility] 3/3/22 for septic shock secondary to an anterior abdominal wall abscess formed near a previous ventral hernia mesh that was ~20 years ago. IR placed a drain and he was started on antibiotics and discharged on 3/8/22. He was readmitted 3/12/22 and imaging at that time showed increase in size of the abdominal wall abscess. Antibiotics were adjusted and he was discharged on 3/14/22. Patient was readmitted to [facility] 3/19/22 with encephalopathy, NIK, and symptoms associated with the abdominal abscess, workup at the time showed no evidence of seizures and he was discharged on 3/26/22 with Unasyn. On 4/29/22 the abdominal wall drain was removed at [facility].     He was admitted to [facility] on 4/30/22 for dyspnea and concerns of possible LLL pneumonia, CHF exacerbation, and worsening abdominal abscess. He received abx and diuretics. Imaging showed re accumulation of his abdominal fluid and air following drain removal. He was transferred to our facility on 5/4/22 to be evaluated by IR and general surgery for any possible procedural intervention.     On our initial interview here, patient reported shortness of breath and mildly productive cough for approx 1 week. Otherwise no major  symptoms including no fevers, chills, chest pain, abdominal pain, nausea, vomiting, diarrhea, constipation, trouble urinating.     Review of Systems:  Consitutional: denies fever, chills  Head & Neck: denies sinus congestion, sore throat  Cardiovascular: denies chest pain, palpitations  Respiratory: shortness of breath, cough, denies wheezing  Gastrointestinal: denies abdominal pain, nausea, vomiting, diarrhea, constipation, blood in stool  Genitourinary: denies trouble urinating, blood in urine  Musculoskeletal: denies pain or swelling to extremities  Skin: denies rash, itching  Neurological: denies headaches, loss of consciousness    All other systems reviewed and negative    Past Medical History:  HFpEF  CAD  Aortic stenosis s/p repair   Afib with pacemaker placement   HTN  T2DM  HLD  TIA  COPD       Past Surgical History:  Past Surgical History:   Procedure Laterality Date    AORTIC VALVE REPLACEMENT      CARDIAC PACEMAKER PLACEMENT  9/28/2012    CARDIAC VALVE REPLACEMENT  11/17/2011    aortic valve-tissue    CHOLECYSTECTOMY      COLONOSCOPY      ESOPHAGOGASTRODUODENOSCOPY N/A 5/27/2021    Procedure: EGD (ESOPHAGOGASTRODUODENOSCOPY);  Surgeon: Gualberto Medrano MD;  Location: [facility];  Service: Endoscopy;  Laterality: N/A;    ESOPHAGOGASTRODUODENOSCOPY N/A 6/22/2021    Procedure: EGD (ESOPHAGOGASTRODUODENOSCOPY);  Surgeon: Gualberto Medrano MD;  Location: [facility];  Service: Endoscopy;  Laterality: N/A;  please call wife(Jessica) with instructions/arrival time.    ESOPHAGOGASTRODUODENOSCOPY (EGD) WITH DILATION  06/22/2021    EYE SURGERY Bilateral     cataract with lens by  at [facility]    HERNIA REPAIR      abdominal    LASIK      UPPER GASTROINTESTINAL ENDOSCOPY  05/28/2021       Allergies:  Lyrica (leg swelling)   Ciprofloxacin (?seizure-like activity)  Levaquin (?seizure-like activity)  Soft shell crab (mucosal swelling)     Home Medications:  Per chart review:   Xarelto 20mg  daily  Norvasc 10mg daily   Coreg 12.5mg bid  Lasix 20mg daily   Pravastatin 20mg daily   Doxazosin 1mg qhs   Flomas 0.4mg daily   Neurontin 200mg qhs   Synthroid 75 mcg qam   Protonix 40mg daily   Potassium chloride 10meq daily   Ferrous sulfate 325mg daily   Senna-docusate 8.6-50mg daily prn   Tylenol prn     Family History:  Per chart review  Mother: none  Father: hypertension, stroke   Siblings: unknown cancer, CAD    Social History:  EtOH use: no alcohol since 2006  Tobacco use: former cigar smoker; quit 1996  Recreational drug use: denies   Living situation: lives with wifeJessica    Health Maintaince:  Primary Care Physician: Callum Roberts MD     Immunizations:   Currently on File within the [employer]:   Most Recent Immunizations   Administered Date(s) Administered    COVID-19, MRNA, LN-S, PF (Pfizer) (Purple Cap) 02/11/2021    Influenza (FLUAD) - Quadrivalent - Adjuvanted - PF *Preferred* (65+) 03/14/2022    Influenza - High Dose - PF (65 years and older) 10/22/2019    Influenza - Quadrivalent - High Dose - PF (65 years and older) 09/28/2020    Influenza - Trivalent (ADULT) 02/13/2013    Influenza A (H1N1) 2009 Monovalent - IM 01/18/2010    Influenza Split 02/13/2013    Pneumococcal Conjugate - 13 Valent 12/04/2017    Pneumococcal Polysaccharide - 23 Valent 09/01/2006    Tdap 09/16/2018     TDap:   [x] 2018   Influenza:  [x] 2022  Pneumovax:  [x] PCV13 & PPSV23  COVID-19:  [x] x2 doses     Cancer Screening:  Colonoscopy:  [] unknown      Objective     Temp:  [96 °F (35.6 °C)-99.5 °F (37.5 °C)] 97.6 °F (36.4 °C)  Pulse:  [59-70] 64  Resp:  [16-20] 19  SpO2:  [92 %-95 %] 95 %  BP: (128-160)/(55-71) 160/71  General: No acute distress, resting comfortably   HEENT: Atraumatic, normocephalic, moist mucous membranes  Cardiovascular: Regular rate and rhythm, normal S1 and S2, no extra heart sounds or murmurs appreciated   Chest wall: Non-tender to palpation, no gross deformities noted   Back:  Non-tender to palpation, no abnormal curvature noted, symmetric rise with respiration   Respiratory: Stable on 3L NC, non-labored breathing, no accessory muscle use noted, clear to auscultation bilaterally  Abdominal: firm region noted anterior abdominal wall supraumbilical, normoactive bowel sounds, non-tender to palpation, no rebound tenderness, no guarding  Extremities: Non-edematous, moving all four extremities  Pulses: 2+ and symmetric radial artery, dorsalis pedis artery, posterior tibial artery  Skin: Non-diaphoretic, non-jaundice  Neurologic: No gross focal neurologic deficits noted     Laboratory:  Recent Labs   Lab 04/30/22  1046 04/30/22  1329 05/01/22  0610 05/01/22  1503 05/02/22  0600 05/03/22  0541   WBC 7.23  --  3.77*  --  9.39 6.48   HGB 10.6*  --  8.8*  --  8.8* 9.1*     --  115*  --  129* 125*   *  --  101*  --  103* 103*     --  143 141 142 140   K 3.4*  --  3.6 3.6 3.7 3.6     --  103 102 102 101   CO2 25  --  26 27 27 28   BUN 17  --  18 23 23 22   CREATININE 1.5*  --  1.6* 1.7* 1.6* 1.6*   *  --  222* 194* 146* 134*   CALCIUM 8.2*  --  7.7* 7.7* 8.3* 8.1*   PROT 6.8  --  5.5*  --   --   --    ALBUMIN 2.7*  --  2.2*  --   --   --    MG  --   --  1.7  --   --   --    AST 37  --  38  --   --   --    ALT 19  --  18  --   --   --    ALKPHOS 105  --  82  --   --   --    TROPONINI 0.069* 0.066*  --   --   --   --    *  --   --   --   --  348*     All laboratory data reviewed    Microbiology Data: Reviewed  4/30/22 Blood cultures: no growth   4/30/22 Flu: negative  5/02/22 Sputum culture: rare wbcs, moderate GNRs, moderate GPCs    EKG Data: Reviewed    Radiology Data: Reviewed  5/3/22 CT noncontrast abdomen pelvis: interval reaccumulation of fluid and air along the anterior abdominal wall involving mesh. The small bowel appears to be adherent to this collection. Moderate right with large left pleural effusion with adjacent passive atelectasis.  There does  appear to be complete collapse of the left lower lobe.      Assessment/Plan     Eddie Parada Sr. is a 89 y.o. man with past medical history of afib, pacemaker, aortic stenosis s/p repair, and COPD who presented on 5/4/2022 for ongoing complications associated of an anterior abdominal wall mass secondary to ventral hernia mesh placed ~20 years ago.  Patient is admitted to [facility] for ongoing workup and management of his infection.     #Abdominal wall abscess  -ventral hernia repair with mesh placement ~20 years ago, no complications until 3/2022 with several hospitalizations leading to drain placement and antibiotics since that time, drain removed 4/29/22   -most recent hospitalization began on 4/30/22 at [facility] and was transferred here on 5/4/22 for surgical and IR evaluation  -was initiated on Vanc and Zosyn at [facility]  -switched to Vanc, Cefepime, Flagyl on admission to our facility given renal function   -blood cultures from 4/30/22 no growth to date   -general surgery and IR consulted, pending evaluation and recommendations     #Acute hypoxic respiratory failure   #Concern for acute on chronic HFpEF exacerbation  #Concern for pneumonia  -approx 1 week of dyspnea and sputum production with cough, coinciding with abdominal drain removal as above  -no associated fevers or chills  -At [facility] required oxygen via NC, , given Bumex without significant improvement   -TTE on 3/3/22 with LVEF 55%, several L and R atrial enlargement, mild to moderate TR   -continued home lasix 20mg daily, will adjust as necessary   -currently satting well on 3L NC without conversational dyspnea   -current antibiotic regimen vanc, cefepime, flagyl  -will consider atypical pneumonia coverage with respiratory status worsens     #Paroxysmal Afib  #Pacemaker placement   -pacemaker placed 2012  -continued home xarelto 20mg daily     #Aortic stenosis s/p valve repair   -bioprosthetic valve  placed 2011   -continued home xarelto 20mg daily     #Macrocytic anemia  -admission CBC with hemoglobin 9 and  and appear to be at baselien   -march 2022 folate and B12 within normal limits   -iron profile and ferritin pending     #?CKD4  -prior to abdominal wall abscess complications, baseline GFRs >60; since that time, GFRs have remains 15-30  -concern for chronic kidney disease given duration   -does not appear to be established with nephrology outpatient   -referral placed on 3/26/22 to nephrology by Dr. Vinay Montaño     #Essential HTN  -continued home coreg 12.5mg daily, lasix 20mg daily, and norvasc 10mg daily     #T2DM  -HbA1c 6.0 on 4/30/22  -does not appear to be on any home medications   -SSI ordered     #CAD  -continued home coreg 12.5mg bid & pravastatin 20mg daily     #HLD  -continued home pravastatin 20mg daily     #TIA  -no residual deficits  -continued home meds xarelto 20mg daily & pravastatin 20mg daily     #COPD   -per chart review   -does not appear to be on any home medications or home oxygen   -no active issues  -current respiratory status as above     #Health Care Maintenance  -pcp is Callum Roberts MD  -TSH mildly elevated 5.9 on 3/2/22, defer to PCP to recheck given current acute setting   -HbA1c 6.0 on 4/30/22  -lipid panel largely within normal limits on 3/22/22  -has received Tdap within last 10 years  -has received PCV13 and PPSV23  -has received at least 2 doses of covid19 vaccination    Diet: NPO  VTE PPx: Xarelto  Code Status: Full    Dispo: pending general surgery and interventional radiology recommendations   Estimated LOS: 48-72hr    Gómez Carter MD  [facility] HO-I  [facility] Hospitalist Medicine Team B  05/04/2022 4:48 AM      [facility] Hospitalist Pager numbers:   [facility] Hospitalist Medicine Team A (Zeenat/Donna): [phone]  [facility] Hospitalist Medicine Team B (Lidia/Edith):  [phone]

## 2022-05-04 NOTE — PROGRESS NOTES
Pharmacist Renal Dose Adjustment Note    Eddie Parada Sr. is a 89 y.o. male being treated with the medication cefepime    Patient Data:    Vital Signs (Most Recent):  Temp: 97.5 °F (36.4 °C) (05/04/22 0737)  Pulse: 60 (05/04/22 0918)  Resp: 16 (05/04/22 0918)  BP: 139/66 (05/04/22 0854)  SpO2: 97 % (05/04/22 0918) Vital Signs (72h Range):  Temp:  [96 °F (35.6 °C)-99.5 °F (37.5 °C)]   Pulse:  [59-76]   Resp:  [16-24]   BP: (106-160)/(55-71)   SpO2:  [85 %-97 %]      Recent Labs   Lab 05/02/22  0600 05/03/22  0541 05/04/22  0749   CREATININE 1.6* 1.6* 1.4     Serum creatinine: 1.4 mg/dL 05/04/22 0749  Estimated creatinine clearance: 33.7 mL/min    Medication:Cefepime dose: 1g frequency q 24 hours will be changed to medication:cefepime dose:1g frequency:q 12 hours    Pharmacist's Name: Georgia Angel  Pharmacist's Extension: [phone]

## 2022-05-05 LAB
ALBUMIN SERPL BCP-MCNC: 2.2 G/DL (ref 3.5–5.2)
ALP SERPL-CCNC: 81 U/L (ref 55–135)
ALT SERPL W/O P-5'-P-CCNC: 30 U/L (ref 10–44)
ANION GAP SERPL CALC-SCNC: 10 MMOL/L (ref 8–16)
AST SERPL-CCNC: 38 U/L (ref 10–40)
BACTERIA BLD CULT: NORMAL
BACTERIA SPEC AEROBE CULT: NORMAL
BACTERIA SPEC AEROBE CULT: NORMAL
BASOPHILS # BLD AUTO: 0.03 K/UL (ref 0–0.2)
BASOPHILS NFR BLD: 0.5 % (ref 0–1.9)
BILIRUB SERPL-MCNC: 0.5 MG/DL (ref 0.1–1)
BUN SERPL-MCNC: 14 MG/DL (ref 10–30)
CALCIUM SERPL-MCNC: 7.9 MG/DL (ref 8.7–10.5)
CHLORIDE SERPL-SCNC: 101 MMOL/L (ref 95–110)
CO2 SERPL-SCNC: 30 MMOL/L (ref 23–29)
CREAT SERPL-MCNC: 1 MG/DL (ref 0.5–1.4)
DIFFERENTIAL METHOD: ABNORMAL
EOSINOPHIL # BLD AUTO: 0.1 K/UL (ref 0–0.5)
EOSINOPHIL NFR BLD: 1.9 % (ref 0–8)
ERYTHROCYTE [DISTWIDTH] IN BLOOD BY AUTOMATED COUNT: 13.3 % (ref 11.5–14.5)
EST. GFR  (AFRICAN AMERICAN): >60 ML/MIN/1.73 M^2
EST. GFR  (NON AFRICAN AMERICAN): >60 ML/MIN/1.73 M^2
GLUCOSE SERPL-MCNC: 131 MG/DL (ref 70–110)
GRAM STN SPEC: NORMAL
HCT VFR BLD AUTO: 28.6 % (ref 40–54)
HGB BLD-MCNC: 9.7 G/DL (ref 14–18)
IMM GRANULOCYTES # BLD AUTO: 0.07 K/UL (ref 0–0.04)
IMM GRANULOCYTES NFR BLD AUTO: 1.2 % (ref 0–0.5)
LYMPHOCYTES # BLD AUTO: 1 K/UL (ref 1–4.8)
LYMPHOCYTES NFR BLD: 16.1 % (ref 18–48)
MAGNESIUM SERPL-MCNC: 1.4 MG/DL (ref 1.6–2.6)
MCH RBC QN AUTO: 33.2 PG (ref 27–31)
MCHC RBC AUTO-ENTMCNC: 33.9 G/DL (ref 32–36)
MCV RBC AUTO: 98 FL (ref 82–98)
MONOCYTES # BLD AUTO: 0.6 K/UL (ref 0.3–1)
MONOCYTES NFR BLD: 9.6 % (ref 4–15)
NEUTROPHILS # BLD AUTO: 4.2 K/UL (ref 1.8–7.7)
NEUTROPHILS NFR BLD: 70.7 % (ref 38–73)
NRBC BLD-RTO: 0 /100 WBC
PHOSPHATE SERPL-MCNC: 2.1 MG/DL (ref 2.7–4.5)
PLATELET # BLD AUTO: 112 K/UL (ref 150–450)
PMV BLD AUTO: 10.3 FL (ref 9.2–12.9)
POCT GLUCOSE: 132 MG/DL (ref 70–110)
POCT GLUCOSE: 154 MG/DL (ref 70–110)
POCT GLUCOSE: 156 MG/DL (ref 70–110)
POTASSIUM SERPL-SCNC: 2.8 MMOL/L (ref 3.5–5.1)
PROT SERPL-MCNC: 5.3 G/DL (ref 6–8.4)
RBC # BLD AUTO: 2.92 M/UL (ref 4.6–6.2)
SODIUM SERPL-SCNC: 141 MMOL/L (ref 136–145)
WBC # BLD AUTO: 5.91 K/UL (ref 3.9–12.7)

## 2022-05-05 PROCEDURE — 25000003 PHARM REV CODE 250: Performed by: STUDENT IN AN ORGANIZED HEALTH CARE EDUCATION/TRAINING PROGRAM

## 2022-05-05 PROCEDURE — 27000221 HC OXYGEN, UP TO 24 HOURS

## 2022-05-05 PROCEDURE — 85025 COMPLETE CBC W/AUTO DIFF WBC: CPT | Performed by: STUDENT IN AN ORGANIZED HEALTH CARE EDUCATION/TRAINING PROGRAM

## 2022-05-05 PROCEDURE — 94761 N-INVAS EAR/PLS OXIMETRY MLT: CPT

## 2022-05-05 PROCEDURE — 99900035 HC TECH TIME PER 15 MIN (STAT)

## 2022-05-05 PROCEDURE — 94640 AIRWAY INHALATION TREATMENT: CPT

## 2022-05-05 PROCEDURE — 94760 N-INVAS EAR/PLS OXIMETRY 1: CPT

## 2022-05-05 PROCEDURE — 97530 THERAPEUTIC ACTIVITIES: CPT

## 2022-05-05 PROCEDURE — 25000003 PHARM REV CODE 250: Performed by: INTERNAL MEDICINE

## 2022-05-05 PROCEDURE — 99222 PR INITIAL HOSPITAL CARE,LEVL II: ICD-10-PCS | Mod: ,,, | Performed by: INTERNAL MEDICINE

## 2022-05-05 PROCEDURE — 97116 GAIT TRAINING THERAPY: CPT

## 2022-05-05 PROCEDURE — 11000001 HC ACUTE MED/SURG PRIVATE ROOM

## 2022-05-05 PROCEDURE — 63600175 PHARM REV CODE 636 W HCPCS: Performed by: STUDENT IN AN ORGANIZED HEALTH CARE EDUCATION/TRAINING PROGRAM

## 2022-05-05 PROCEDURE — 63600175 PHARM REV CODE 636 W HCPCS: Performed by: SURGERY

## 2022-05-05 PROCEDURE — 99222 1ST HOSP IP/OBS MODERATE 55: CPT | Mod: ,,, | Performed by: INTERNAL MEDICINE

## 2022-05-05 PROCEDURE — 84100 ASSAY OF PHOSPHORUS: CPT | Performed by: STUDENT IN AN ORGANIZED HEALTH CARE EDUCATION/TRAINING PROGRAM

## 2022-05-05 PROCEDURE — 80053 COMPREHEN METABOLIC PANEL: CPT | Performed by: STUDENT IN AN ORGANIZED HEALTH CARE EDUCATION/TRAINING PROGRAM

## 2022-05-05 PROCEDURE — 97162 PT EVAL MOD COMPLEX 30 MIN: CPT

## 2022-05-05 PROCEDURE — 63600175 PHARM REV CODE 636 W HCPCS: Performed by: INTERNAL MEDICINE

## 2022-05-05 PROCEDURE — 25000242 PHARM REV CODE 250 ALT 637 W/ HCPCS: Performed by: STUDENT IN AN ORGANIZED HEALTH CARE EDUCATION/TRAINING PROGRAM

## 2022-05-05 PROCEDURE — 97535 SELF CARE MNGMENT TRAINING: CPT

## 2022-05-05 PROCEDURE — 83735 ASSAY OF MAGNESIUM: CPT | Performed by: STUDENT IN AN ORGANIZED HEALTH CARE EDUCATION/TRAINING PROGRAM

## 2022-05-05 RX ORDER — SENNOSIDES 8.6 MG/1
8.6 TABLET ORAL DAILY
Status: DISCONTINUED | OUTPATIENT
Start: 2022-05-05 | End: 2022-05-06 | Stop reason: HOSPADM

## 2022-05-05 RX ORDER — FLUCONAZOLE 2 MG/ML
400 INJECTION, SOLUTION INTRAVENOUS
Status: DISCONTINUED | OUTPATIENT
Start: 2022-05-05 | End: 2022-05-06

## 2022-05-05 RX ORDER — POLYETHYLENE GLYCOL 3350 17 G/17G
17 POWDER, FOR SOLUTION ORAL DAILY
Status: DISCONTINUED | OUTPATIENT
Start: 2022-05-05 | End: 2022-05-06 | Stop reason: HOSPADM

## 2022-05-05 RX ORDER — POTASSIUM CHLORIDE 20 MEQ/1
40 TABLET, EXTENDED RELEASE ORAL
Status: COMPLETED | OUTPATIENT
Start: 2022-05-05 | End: 2022-05-05

## 2022-05-05 RX ORDER — MAGNESIUM SULFATE HEPTAHYDRATE 40 MG/ML
2 INJECTION, SOLUTION INTRAVENOUS ONCE
Status: COMPLETED | OUTPATIENT
Start: 2022-05-05 | End: 2022-05-05

## 2022-05-05 RX ORDER — AZITHROMYCIN 250 MG/1
500 TABLET, FILM COATED ORAL DAILY
Status: DISCONTINUED | OUTPATIENT
Start: 2022-05-06 | End: 2022-05-06

## 2022-05-05 RX ADMIN — LEVALBUTEROL 1.25 MG: 1.25 SOLUTION, CONCENTRATE RESPIRATORY (INHALATION) at 09:05

## 2022-05-05 RX ADMIN — GUAIFENESIN 600 MG: 600 TABLET, EXTENDED RELEASE ORAL at 10:05

## 2022-05-05 RX ADMIN — GUAIFENESIN 600 MG: 600 TABLET, EXTENDED RELEASE ORAL at 08:05

## 2022-05-05 RX ADMIN — PRAVASTATIN SODIUM 20 MG: 20 TABLET ORAL at 10:05

## 2022-05-05 RX ADMIN — DOXAZOSIN 2 MG: 2 TABLET ORAL at 08:05

## 2022-05-05 RX ADMIN — FLUCONAZOLE 400 MG: 400 INJECTION, SOLUTION INTRAVENOUS at 12:05

## 2022-05-05 RX ADMIN — MAGNESIUM SULFATE 2 G: 2 INJECTION INTRAVENOUS at 10:05

## 2022-05-05 RX ADMIN — CARVEDILOL 12.5 MG: 12.5 TABLET, FILM COATED ORAL at 05:05

## 2022-05-05 RX ADMIN — SENNOSIDES 8.6 MG: 8.6 TABLET, FILM COATED ORAL at 12:05

## 2022-05-05 RX ADMIN — VANCOMYCIN HYDROCHLORIDE 750 MG: 750 INJECTION, POWDER, LYOPHILIZED, FOR SOLUTION INTRAVENOUS at 08:05

## 2022-05-05 RX ADMIN — METRONIDAZOLE 500 MG: 500 TABLET ORAL at 05:05

## 2022-05-05 RX ADMIN — FUROSEMIDE 40 MG: 40 INJECTION, SOLUTION INTRAMUSCULAR; INTRAVENOUS at 10:05

## 2022-05-05 RX ADMIN — GABAPENTIN 200 MG: 100 CAPSULE ORAL at 08:05

## 2022-05-05 RX ADMIN — LEVALBUTEROL 1.25 MG: 1.25 SOLUTION, CONCENTRATE RESPIRATORY (INHALATION) at 12:05

## 2022-05-05 RX ADMIN — CARVEDILOL 12.5 MG: 12.5 TABLET, FILM COATED ORAL at 10:05

## 2022-05-05 RX ADMIN — CEFEPIME HYDROCHLORIDE 1 G: 1 INJECTION, SOLUTION INTRAVENOUS at 05:05

## 2022-05-05 RX ADMIN — AMLODIPINE BESYLATE 10 MG: 5 TABLET ORAL at 10:05

## 2022-05-05 RX ADMIN — POLYETHYLENE GLYCOL 3350 17 G: 17 POWDER, FOR SOLUTION ORAL at 12:05

## 2022-05-05 RX ADMIN — POTASSIUM CHLORIDE 40 MEQ: 1500 TABLET, EXTENDED RELEASE ORAL at 12:05

## 2022-05-05 RX ADMIN — POTASSIUM CHLORIDE 40 MEQ: 1500 TABLET, EXTENDED RELEASE ORAL at 10:05

## 2022-05-05 RX ADMIN — LEVALBUTEROL 1.25 MG: 1.25 SOLUTION, CONCENTRATE RESPIRATORY (INHALATION) at 03:05

## 2022-05-05 RX ADMIN — MUPIROCIN: 20 OINTMENT TOPICAL at 08:05

## 2022-05-05 RX ADMIN — LEVOTHYROXINE SODIUM 75 MCG: 25 TABLET ORAL at 05:05

## 2022-05-05 RX ADMIN — ACETAMINOPHEN 650 MG: 325 TABLET ORAL at 07:05

## 2022-05-05 RX ADMIN — TAMSULOSIN HYDROCHLORIDE 0.4 MG: 0.4 CAPSULE ORAL at 10:05

## 2022-05-05 RX ADMIN — METRONIDAZOLE 500 MG: 500 TABLET ORAL at 09:05

## 2022-05-05 RX ADMIN — AZITHROMYCIN MONOHYDRATE 500 MG: 500 INJECTION, POWDER, LYOPHILIZED, FOR SOLUTION INTRAVENOUS at 10:05

## 2022-05-05 RX ADMIN — MUPIROCIN: 20 OINTMENT TOPICAL at 10:05

## 2022-05-05 RX ADMIN — METRONIDAZOLE 500 MG: 500 TABLET ORAL at 02:05

## 2022-05-05 NOTE — PROGRESS NOTES
Pharmacokinetic Assessment Follow Up: IV Vancomycin    Vancomycin serum concentration assessment(s):    The random level was drawn correctly and can be used to guide therapy at this time. The measurement is within the desired definitive target range of 15 to 20 mcg/mL.    Vancomycin Regimen Plan:  Continue regimen to Vancomycin 750 mg IV every 24 hours with next serum trough concentration measured at 19:30 on 5-6      Drug levels (last 3 results):  Recent Labs   Lab Result Units 05/02/22  1513 05/03/22  1501 05/04/22  1752   Vancomycin, Random ug/mL  --   --  15.1   Vancomycin-Trough ug/mL 11.5 13.7  --        Pharmacy will continue to follow and monitor vancomycin.    Please contact pharmacy at extension [phone] for questions regarding this assessment.    Thank you for the consult,   Carlos Tang       Patient brief summary:  Eddie Parada Sr. is a 89 y.o. male initiated on antimicrobial therapy with IV Vancomycin for treatment of lower respiratory infection      Drug Allergies:   Review of patient's allergies indicates:   Allergen Reactions    Ciprofloxacin     Levofloxacin Swelling    Lyrica [pregabalin] Swelling    Unable to assess Other (See Comments)     Soft shell crabs       Actual Body Weight:   84.6 kg    Renal Function:   Estimated Creatinine Clearance: 33.7 mL/min (based on SCr of 1.4 mg/dL).,     Dialysis Method (if applicable):  N/A    CBC (last 72 hours):  Recent Labs   Lab Result Units 05/02/22  0600 05/03/22  0541 05/04/22  0749   WBC K/uL 9.39 6.48 6.16   Hemoglobin g/dL 8.8* 9.1* 9.1*   Hematocrit % 27.2* 28.3* 27.9*   Platelets K/uL 129* 125* 118*   Gran % % 85.6* 74.4* 71.2   Lymph % % 8.0* 14.7* 16.1*   Mono % % 5.8 8.6 9.3   Eosinophil % % 0.0 1.2 2.4   Basophil % % 0.1 0.2 0.2   Differential Method  Automated Automated Automated       Metabolic Panel (last 72 hours):  Recent Labs   Lab Result Units 05/02/22  0600 05/03/22  0541 05/03/22  1657 05/04/22  0749   Sodium mmol/L 142 140  --   141   Potassium mmol/L 3.7 3.6  --  3.3*   Chloride mmol/L 102 101  --  98   CO2 mmol/L 27 28  --  30*   Glucose mg/dL 146* 134*  --  158*   Glucose, UA   --   --  Negative  --    BUN mg/dL 23 22  --  18   Creatinine mg/dL 1.6* 1.6*  --  1.4   Albumin g/dL  --   --   --  2.4*   Total Bilirubin mg/dL  --   --   --  0.5   Alkaline Phosphatase U/L  --   --   --  85   AST U/L  --   --   --  46*   ALT U/L  --   --   --  29   Magnesium mg/dL  --   --   --  1.6   Phosphorus mg/dL  --   --   --  2.6*       Vancomycin Administrations:  vancomycin given in the last 96 hours                     vancomycin 750 mg in dextrose 5 % 250 mL IVPB (ready to mix system) (mg) 750 mg New Bag 05/03/22 1749      Restarted 05/02/22 1713     750 mg New Bag  1620     750 mg New Bag 05/01/22 1634                    Microbiologic Results:  Microbiology Results (last 7 days)       Procedure Component Value Units Date/Time    Gram stain [[id number]] Collected: 05/04/22 1620    Order Status: Sent Specimen: Abscess from Abdomen Updated: 05/04/22 1922    Aerobic culture [[id number]] Collected: 05/04/22 1620    Order Status: Sent Specimen: Abscess Updated: 05/04/22 1922    Culture, Anaerobe [[id number]] Collected: 05/04/22 1620    Order Status: Sent Specimen: Abscess from Abdomen Updated: 05/04/22 1922    Fungus culture [[id number]] Collected: 05/04/22 1620    Order Status: Sent Specimen: Abscess from Abdomen Updated: 05/04/22 1922    AFB Culture & Smear [[id number]] Collected: 05/04/22 1620    Order Status: Sent Specimen: Abscess from Abdomen Updated: 05/04/22 1922    Culture, Fluid  (Aerobic) [[id number]]     Order Status: Canceled Specimen: Body Fluid

## 2022-05-05 NOTE — ASSESSMENT & PLAN NOTE
89 y.o. man with past medical history of afib, pacemaker, aortic stenosis s/p repair, and COPD who presented on 5/4/2022 for ongoing complications associated of an anterior abdominal wall mass secondary to ventral hernia mesh placed ~20 years ago    -now with another drain placed  -await pending cxs although all previous cultures have been negative  -continue vanco, flagyl, and fluconazole for now  -of note he was thought to have AMS due to cefepime during previous admission - consider change to ceftriaxone

## 2022-05-05 NOTE — PLAN OF CARE
chart reviewed - case discussed in am MD meeting  - MD might transfer pt  back to [facility]      daughter Diana  [phone] at bedside   lives with wife Jessica Parada  [phone]        physical address:  [address] [place] [address]  [id number]      pt is current with [facility], pt has a PICC - currently getting home IV abx.     Infusion Co:   [facility]    [facility]   independent prior to admit - pt recently driving, no dme, no hh      family will transport pt to home at d/c      pt transferred from [facility] to [facility] - for Surg Consult   per notes - poor candidate for surgery/mesh excision -   IR drain placed yesterday.         05/05/22 1850   Post-Acute Status   Post-Acute Authorization Home Health   Home Health Status Referrals Sent  ([facility])   Discharge Delays (!) Other  (pending medical stability)   Discharge Plan   Discharge Plan A   (return to transferring hospital - [facility])   Discharge Plan B Home;Home with family;Home Health

## 2022-05-05 NOTE — PROGRESS NOTES
Pharmacist Intervention IV to PO Note    Eddie Parada Sr. is a 89 y.o. male being treated with IV medication azithromycin    Patient Data:    Vital Signs (Most Recent):  Temp: 96.9 °F (36.1 °C) (05/05/22 0820)  Pulse: 62 (05/05/22 1200)  Resp: 18 (05/05/22 0900)  BP: 132/63 (05/05/22 0820)  SpO2: 97 % (05/05/22 0900) Vital Signs (72h Range):  Temp:  [96 °F (35.6 °C)-99.5 °F (37.5 °C)]   Pulse:  [57-87]   Resp:  [16-22]   BP: (106-174)/(55-82)   SpO2:  [85 %-97 %]      CBC:  Recent Labs   Lab 05/03/22  0541 05/04/22  0749 05/05/22  0536   WBC 6.48 6.16 5.91   RBC 2.74* 2.74* 2.92*   HGB 9.1* 9.1* 9.7*   HCT 28.3* 27.9* 28.6*   * 118* 112*   * 102* 98   MCH 33.2* 33.2* 33.2*   MCHC 32.2 32.6 33.9     CMP:     Recent Labs   Lab 05/01/22  0610 05/01/22  1503 05/03/22  0541 05/04/22  0749 05/05/22  0536   *   < > 134* 158* 131*   CALCIUM 7.7*   < > 8.1* 8.3* 7.9*   ALBUMIN 2.2*  --   --  2.4* 2.2*   PROT 5.5*  --   --  5.9* 5.3*      < > 140 141 141   K 3.6   < > 3.6 3.3* 2.8*   CO2 26   < > 28 30* 30*      < > 101 98 101   BUN 18   < > 22 18 14   CREATININE 1.6*   < > 1.6* 1.4 1.0   ALKPHOS 82  --   --  85 81   ALT 18  --   --  29 30   AST 38  --   --  46* 38   BILITOT 0.4  --   --  0.5 0.5    < > = values in this interval not displayed.       Dietary Orders:  Diet Orders  Report           Diet clear liquid: Clear Liquid starting at 05/04 1800            Based on the following criteria, this patient qualifies for intravenous to oral conversion:  [x] The patients gastrointestinal tract is functioning (tolerating medications via oral or enteral route for 24 hours and tolerating food or enteral feeds for 24 hours).  [x] The patient is hemodynamically stable for 24 hours (heart rate <100 beats per minute, systolic blood pressure >99 mm Hg, and respiratory rate <20 breaths per minute).  [x] The patient shows clinical improvement (afebrile for at least 24 hours and white blood cell count  downtrending or normalized). Additionally, the patient must be non-neutropenic (absolute neutrophil count >500 cells/mm3).  [x] For antimicrobials, the patient has received IV therapy for at least 24 hours.    IV medication azithromycin will be changed to oral medication azithromycin    Pharmacist's Name: Georgia Angel  Pharmacist's Extension: [phone]

## 2022-05-05 NOTE — PLAN OF CARE
Problem: Physical Therapy  Goal: Physical Therapy Goal  Description: Goals to be met by:2022  Patient will increase functional independence with mobility by performin. Supine to sit with Modified [place]  2. Sit to supine with Modified [place]  3. Rolling to Left and Right with Modified [place].  4. Sit to stand transfer with Stand-by Assistance  5. Gait  x 200 feet with Stand-by Assistance using Rolling Walker.     Outcome: Ongoing, Progressing       Patient seen for physical therapy evaluation on this date.  Patient agreeable to therapy.  Patient requiring Mod assist for supine to sit and Min Assist for sit <-> stand.  Patient tolerated gait with RW x 125' with CGA, slow pace, shuffling steps, flexed trunk.  Mild SOB after gait trial with 3L supplemental O2 in tow.  Patient declined sitting in bedside chair.  Patient would benefit from a stay in skilled nursing facility for continued rehabilitation.  Patient would also benefit from a wheelchair for home use.

## 2022-05-05 NOTE — PLAN OF CARE
Pt AAOx3. Daughter at bedside. Medications administered per order. No difficulty swallowing. Tolerating clears. 2L NC. Pt denies pain, discomfort, or nausea. Drain site CDI, accordian flushed per orders. Pt voids spontaneously per urinal. Cardiac monitor maintained. Encouraged to call with questions/concerns/assistance. Safety.

## 2022-05-05 NOTE — PROGRESS NOTES
"[facility] Medicine Progress Note    Primary Team: [facility] Hospitalist Team B  Attending Physician: Tigre Sharma MD  Resident: Gita  Intern:     Subjective:      Underwent US-guided drain placement with IR yesterday, tolerated procedure well. Denies fevers, chills, nausea, vomiting, CP, SOB, abdominal pain. Reports good urine output with lasix. Feels like his breathing and swelling have improved.       Objective:     Last 24 Hour Vital Signs:  BP  Min: 131/70  Max: 174/82  Temp  Av.5 °F (36.4 °C)  Min: 97.5 °F (36.4 °C)  Max: 97.6 °F (36.4 °C)  Pulse  Av.8  Min: 57  Max: 87  Resp  Av.8  Min: 16  Max: 18  SpO2  Av.1 %  Min: 94 %  Max: 97 %  Height  Av' 5" (165.1 cm)  Min: 5' 5" (165.1 cm)  Max: 5' 5" (165.1 cm)  Weight  Av.2 kg (187 lb 13.3 oz)  Min: 84.6 kg (186 lb 8.2 oz)  Max: 85.8 kg (189 lb 2.5 oz)  I/O last 3 completed shifts:  In: 1096.6 [P.O.:490; Other:10; IV Piggyback:596.6]  Out: 3160 [Urine:3100; Other:60]    Physical Examination:  General: No acute distress, resting comfortably   HEENT: Atraumatic, normocephalic, moist mucous membranes  Cardiovascular: Regular rate and rhythm, normal S1 and S2, no extra heart sounds or murmurs appreciated   Chest wall: Non-tender to palpation, no gross deformities noted   Back: Non-tender to palpation, no abnormal curvature noted, symmetric rise with respiration   Respiratory: non-labored breathing, no accessory muscle use noted, clear to auscultation bilaterally  Abdominal: firm region noted anterior abdominal wall supraumbilical, normoactive bowel sounds, non-tender to palpation, no rebound tenderness, no guarding; drain in place with no surrounding erythema, swelling, or tenderness; about 50ml of blood in drain   Extremities: Non-edematous, moving all four extremities  Pulses: 2+ and symmetric radial artery, dorsalis pedis artery, posterior tibial artery  Skin: Non-diaphoretic, non-jaundice  Neurologic: No gross focal neurologic " deficits noted     Laboratory:  Recent Labs   Lab 05/01/22  0610 05/01/22  1503 05/03/22  0541 05/04/22  0749 05/05/22  0536   WBC 3.77*   < > 6.48 6.16 5.91   HGB 8.8*   < > 9.1* 9.1* 9.7*   HCT 26.7*   < > 28.3* 27.9* 28.6*   *   < > 125* 118* 112*   *   < > 103* 102* 98   RDW 14.1   < > 14.1 13.8 13.3      < > 140 141 141   K 3.6   < > 3.6 3.3* 2.8*      < > 101 98 101   CO2 26   < > 28 30* 30*   BUN 18   < > 22 18 14   CREATININE 1.6*   < > 1.6* 1.4 1.0   *   < > 134* 158* 131*   PROT 5.5*  --   --  5.9* 5.3*   ALBUMIN 2.2*  --   --  2.4* 2.2*   BILITOT 0.4  --   --  0.5 0.5   AST 38  --   --  46* 38   ALKPHOS 82  --   --  85 81   ALT 18  --   --  29 30    < > = values in this interval not displayed.     Laboratory Data Reviewed:  All laboratory data reviewed.     Microbiology Data Reviewed:  Pertinent Findings:  Resp Cx 5/2 with moderate GNR and GPC on gram stain  Abscess/wound Cx 5/4 pending    Other Results:  EKG (my interpretation): No new    Radiology Data Reviewed:   Pertinent Findings:  CT C/A/P 5/3  Impression:  As compared to the previous study, the patient has abdominal drain has been removed.  There has been reaccumulation of fluid and air along the anterior abdominal wall extending more to the left of the abdomen involving the patient's abdominal wall mesh, highly concerning for abscess formation.  The small bowel appears to be adherent to this collection of fluid and air an underlying involvement/fistulization is difficult to entirely exclude although felt unlikely.  There is extensive whole-body anasarca with asymmetric abnormal edema seen in the left abdominal wall primarily in the flank and extending into the left chest.     Moderate right with large left pleural effusion with adjacent passive atelectasis.  There does appear to be complete collapse of the left lower lobe.  Component of developing infiltrate not entirely excluded.    Current Medications:      Infusions:       Scheduled:   amLODIPine  10 mg Oral Daily    azithromycin  500 mg Intravenous Q24H    carvediloL  12.5 mg Oral BID WM    ceFEPime (MAXIPIME) IVPB  1 g Intravenous Q12H    doxazosin  2 mg Oral Nightly    furosemide (LASIX) injection  40 mg Intravenous Daily    gabapentin  200 mg Oral QHS    guaiFENesin  600 mg Oral BID    levalbuterol  1.25 mg Nebulization Q8H    levothyroxine  75 mcg Oral Before breakfast    magnesium sulfate IVPB  2 g Intravenous Once    metroNIDAZOLE  500 mg Oral Q8H    mupirocin   Nasal BID    potassium chloride  40 mEq Oral Q2H    pravastatin  20 mg Oral Daily    tamsulosin  0.4 mg Oral Daily    vancomycin (VANCOCIN) IVPB  750 mg Intravenous Q24H        PRN:  acetaminophen, dextrose 10%, glucose, insulin aspart U-100, naloxone, sodium chloride 0.9%, Pharmacy to dose Vancomycin consult **AND** vancomycin - pharmacy to dose    Antibiotics and Day Number of Therapy:  Azithromycin 500mg IV daily - started 5/4  Cefepime 1g q12h - started 5/4  Vancomycin a24h - started 5/4  Flagyl 500mg q8h - started 5/4    Lines and Day Number of Therapy:  PICC RUE 3/7  Abdominal drain 5/4    Assessment:     Eddie Parada Sr. is a 89 y.o. man with past medical history of afib, pacemaker, aortic stenosis s/p repair, and COPD who presented on 5/4/2022 for ongoing complications associated of an anterior abdominal wall mass secondary to ventral hernia mesh placed ~20 years ago. Drain recently removed prior to presentation, with return of symptoms. General surgery consulted who states patient is not a good surgical candidate. Patient underwent US-guided drain placement with IR yesterday, tolerated well. BSA with vanc, cefepime, flagyl, and azithromycin (for atypicals). Will discuss with family GOC and discharge planning.      Plan:     #Abdominal wall abscess  -ventral hernia repair with mesh placement ~20 years ago, no complications until 3/2022 with several hospitalizations leading  to drain placement and antibiotics since that time, drain removed 4/29/22   -most recent hospitalization began on 4/30/22 at [facility] and was transferred here on 5/4/22 for surgical and IR evaluation  -was initiated on Vanc and Zosyn at [facility]  -switched to Vanc, Cefepime, Flagyl on admission to our facility given renal function   -blood cultures from 4/30/22 no growth to date   -general surgery states patient is not good surgical candidate  - underwent US-guided drain placement 5/4  - continue BSA  - will discuss long-term antibiotics     #Acute hypoxic respiratory failure   Acute on chronic HFpEF exacerbation vs Pneumonia  - approx 1 week of dyspnea and sputum production with cough, coinciding with abdominal drain removal as above  - no associated fevers or chills  - At [facility] required oxygen via NC, , given Bumex without significant improvement   - TTE on 3/3/22 with LVEF 55%, several L and R atrial enlargement, mild to moderate TR   - diuresing with IV lasix with good urine output  - currently satting well on 2-3L NC without conversational dyspnea   - current antibiotic regimen vanc, cefepime, flagyl  - added azithromycin for atypical/CAP coverage     #Paroxysmal Afib  #Pacemaker placement   - pacemaker placed 2012  - continue home xarelto 20mg daily      #Aortic stenosis s/p valve repair   - bioprosthetic valve placed 2011   - continue home xarelto 20mg daily      #Macrocytic anemia  - admission CBC with hemoglobin 9 and  and appear to be at baseline  - march 2022 folate and B12 within normal limits   - Iron studies: Fe 63, Transferrin 193, TIBC 286, Saturated Fe 22, ferritin 96  - CTM     #?CKD4  - prior to abdominal wall abscess complications, baseline GFRs >60; since that time, GFRs have remains 15-30  - concern for chronic kidney disease given duration   - does not appear to be established with nephrology outpatient   - referral placed on 3/26/22 to nephrology  by Dr. Vinay Montaño      #Essential HTN  - continued home coreg 12.5mg daily, lasix 20mg daily, and norvasc 10mg daily      #T2DM  - HbA1c 6.0 on 4/30/22  - does not appear to be on any home medications   - SSI + POCT glucose     #CAD  - continued home coreg 12.5mg bid & pravastatin 20mg daily      #HLD  - continued home pravastatin 20mg daily      #TIA  - no residual deficits  - continued home meds xarelto 20mg daily & pravastatin 20mg daily      #COPD   - per chart review   - does not appear to be on any home medications or home oxygen   - no active issues  - current respiratory status as above      #Health Care Maintenance  - pcp is Callum Roberts MD  - TSH mildly elevated 5.9 on 3/2/22, defer to PCP to recheck given current acute setting   - HbA1c 6.0 on 4/30/22  - lipid panel largely within normal limits on 3/22/22  - has received Tdap within last 10 years  - has received PCV13 and PPSV23  - has received at least 2 doses of covid19 vaccination     Diet: cardiac   VTE PPx: Xarelto  Code Status: Full    Dispo: pending discharge planning and IV abx    Cary Zapata MD  [facility] HO-I  [facility] Internal Medicine Team B    [facility] Medicine Hospitalist Pager numbers:   [facility] Hospitalist Medicine Team A (Zeenat/Donna): [phone]  [facility] Hospitalist Medicine Team B (Lidia/Edith):  [phone]

## 2022-05-05 NOTE — PROGRESS NOTES
Neuroendocrine Surgery Progress Note  Admit Date: 5/4/2022  Hospital Day: 1    S:  NAEO   Drain yesterday by IR w/ 20 SS output   Cultures pending     O:  Vitals:   Temp:  [96.9 °F (36.1 °C)-97.6 °F (36.4 °C)]   Pulse:  [57-87]   Resp:  [18-20]   BP: (131-174)/(61-82)   SpO2:  [94 %-97 %]     Diet clear liquid   Intake/Output Summary (Last 24 hours) at 5/5/2022 1056  Last data filed at 5/5/2022 0536  Gross per 24 hour   Intake 796.62 ml   Output 2660 ml   Net -1863.38 ml            Physical Exam:  Gen: NAD, AAOx3  HEENT: EOMI, NCAT  CV: RR  Resp: no shortness of breath, normal WOB  Abd: soft, non-distended, NT, drain w/ SS in bulb  Ext: no edema      Labs:  Recent Labs     05/03/22  0541 05/04/22  0749 05/05/22  0536   WBC 6.48 6.16 5.91   HGB 9.1* 9.1* 9.7*   HCT 28.3* 27.9* 28.6*   * 118* 112*    141 141   K 3.6 3.3* 2.8*    98 101   CO2 28 30* 30*   BUN 22 18 14   CREATININE 1.6* 1.4 1.0   BILITOT  --  0.5 0.5   AST  --  46* 38   ALT  --  29 30   ALKPHOS  --  85 81   CALCIUM 8.1* 8.3* 7.9*   ALBUMIN  --  2.4* 2.2*   PROT  --  5.9* 5.3*   MG  --  1.6 1.4*   PHOS  --  2.6* 2.1*     Scheduled Meds: amLODIPine, 10 mg, Daily  azithromycin, 500 mg, Q24H  carvediloL, 12.5 mg, BID WM  ceFEPime (MAXIPIME) IVPB, 1 g, Q12H  doxazosin, 2 mg, Nightly  furosemide (LASIX) injection, 40 mg, Daily  gabapentin, 200 mg, QHS  guaiFENesin, 600 mg, BID  levalbuterol, 1.25 mg, Q8H  levothyroxine, 75 mcg, Before breakfast  magnesium sulfate IVPB, 2 g, Once  metroNIDAZOLE, 500 mg, Q8H  mupirocin, , BID  polyethylene glycol, 17 g, Daily  potassium chloride, 40 mEq, Q2H  pravastatin, 20 mg, Daily  senna, 8.6 mg, Daily  tamsulosin, 0.4 mg, Daily  vancomycin (VANCOCIN) IVPB, 750 mg, Q24H        A/P:  89M PMHx HTN, HLD, CAD, s/p pacemaker, a fib on xarelto, CHF admitted to OSH w/ CHF exacerbation and fluid overload; surgery consulted for abdominal wall abscess around mesh from hernia repair 20 years agov    -Continue drain    -fu drain cultures  -abx per primary team   -Surgery will sign off. Please call w/ any questions or concerns     Cary Chow MD  [facility] General Surgery, [facility]

## 2022-05-05 NOTE — CONSULTS
[facility]  Infectious Disease  Consult Note    Patient Name: Eddie Parada Sr.  MRN: [id number]  Admission Date: 5/4/2022  Hospital Length of Stay: 1 days  Attending Physician: Tigre Sharma MD  Primary Care Provider: Callum Roberts MD     Isolation Status: No active isolations    Patient information was obtained from patient, relative(s) and past medical records.      Inpatient consult to Infectious Diseases  Consult performed by: Vivek Arredondo MD  Consult ordered by: Adonis Pelayo MD  Reason for consult: abd wall abscess        Assessment/Plan:     * Abdominal wall abscess  89 y.o. man with past medical history of afib, pacemaker, aortic stenosis s/p repair, and COPD who presented on 5/4/2022 for ongoing complications associated of an anterior abdominal wall mass secondary to ventral hernia mesh placed ~20 years ago    -now with another drain placed  -await pending cxs although all previous cultures have been negative  -continue vanco, flagyl, and fluconazole for now  -of note he was thought to have AMS due to cefepime during previous admission - consider change to ceftriaxone          Thank you for your consult. I will follow-up with patient. Please contact us if you have any additional questions.    Vivek Arredondo MD  Infectious Disease  [place] [facility]    Subjective:     Principal Problem: Abdominal wall abscess    HPI: 89 y.o. man with past medical history of afib, pacemaker, aortic stenosis s/p repair, and COPD who presented on 5/4/2022 for ongoing complications associated of an anterior abdominal wall mass secondary to ventral hernia mesh placed ~20 years ago.      Patient was initially admitted to [facility] 3/3/22 for septic shock secondary to an anterior abdominal wall abscess formed near a previous ventral hernia mesh that was ~20 years ago. IR placed a drain and he was started on antibiotics and discharged on 3/8/22. He was readmitted 3/12/22 and imaging at that time showed  increase in size of the abdominal wall abscess. Antibiotics were adjusted and he was discharged on 3/14/22. Patient was readmitted to [facility] 3/19/22 with encephalopathy, NIK, and symptoms associated with the abdominal abscess, workup at the time showed no evidence of seizures and he was discharged on 3/26/22 with Unasyn. On 4/29/22 the abdominal wall drain was removed at the [facility].      He was admitted to [facility] on 4/30/22 for dyspnea and concerns of possible LLL pneumonia, CHF exacerbation, and worsening abdominal abscess. He received abx and diuretics. Imaging showed re accumulation of his abdominal fluid and air following drain removal. He was transferred to our facility on 5/4/22 to be evaluated by IR and general surgery for any possible procedural intervention.     None of his csx from previous drains were positive but he did improve on unasyn         Past Medical History:   Diagnosis Date    Abdominal fibromatosis     Acute posthemorrhagic anemia 01/09/2018    NIK (acute kidney injury) 01/11/2018    Aortic stenosis 02/25/2014    Atrial fibrillation     Bradycardia     Broken arm     CAD (coronary artery disease)     Cancer     basal cell on nose and melanoma on back    CHF (congestive heart failure)     COPD (chronic obstructive pulmonary disease)     Diabetes mellitus type II     Encounter for blood transfusion     Food impaction of esophagus, history of 03/03/2022    Hyperlipidemia     Localization-related focal epilepsy with simple partial seizures 03/02/2021    Melanoma     Obesity (BMI 30.0-34.9) 09/13/2016    Obesity, diabetes, and hypertension syndrome 09/13/2016    Osteoporosis     Other dysphagia 05/27/2021    Peripheral vascular disease     Pneumonia of left lower lobe due to infectious organism 04/30/2022    Primary malignant neoplasm of prostate 03/03/2022    S/P ORIF (open reduction internal fixation) fracture 11/27/2017    Sepsis with acute  hypoxic respiratory failure     Septic shock 03/03/2022    Stroke     TIA (transient ischemic attack)     Type II diabetes mellitus with peripheral circulatory disorder     Type II or unspecified type diabetes mellitus without mention of complication, not stated as uncontrolled     Unspecified essential hypertension        Past Surgical History:   Procedure Laterality Date    AORTIC VALVE REPLACEMENT      CARDIAC PACEMAKER PLACEMENT  9/28/2012    CARDIAC VALVE REPLACEMENT  11/17/2011    aortic valve-tissue    CHOLECYSTECTOMY      COLONOSCOPY      ESOPHAGOGASTRODUODENOSCOPY N/A 5/27/2021    Procedure: EGD (ESOPHAGOGASTRODUODENOSCOPY);  Surgeon: Gualberto Medrano MD;  Location: [facility];  Service: Endoscopy;  Laterality: N/A;    ESOPHAGOGASTRODUODENOSCOPY N/A 6/22/2021    Procedure: EGD (ESOPHAGOGASTRODUODENOSCOPY);  Surgeon: Gualberto Medrano MD;  Location: [facility];  Service: Endoscopy;  Laterality: N/A;  please call wife(Jessica) with instructions/arrival time.    ESOPHAGOGASTRODUODENOSCOPY (EGD) WITH DILATION  06/22/2021    EYE SURGERY Bilateral     cataract with lens by  at [facility]    HERNIA REPAIR      abdominal    LASIK      UPPER GASTROINTESTINAL ENDOSCOPY  05/28/2021       Review of patient's allergies indicates:   Allergen Reactions    Ciprofloxacin     Levofloxacin Swelling    Lyrica [pregabalin] Swelling    Unable to assess Other (See Comments)     Soft shell crabs       Medications:  Medications Prior to Admission   Medication Sig    acetaminophen (TYLENOL) 500 MG tablet Take 500 mg by mouth every 6 (six) hours as needed for Pain.    amLODIPine (NORVASC) 10 MG tablet TAKE 1 TABLET BY MOUTH DAILY FOR BLOOD PRESSURE    ampicillin sodium/sulbactam Na (AMPICILLIN/SULBACTAM 3 G/100 ML NS, READY TO MIX,) Inject 100 mLs (3 g total) into the vein every 12 (twelve) hours. Tentative end date: 4/23/2022 but will need to followup with ID before discontinuation of  "antibiotics    carvediloL (COREG) 12.5 MG tablet TAKE 1 TABLET (12.5 MG TOTAL) BY MOUTH 2 (TWO) TIMES DAILY WITH MEALS.    doxazosin (CARDURA) 1 MG tablet Take 1 mg by mouth nightly.    ferrous sulfate (FEOSOL) 325 mg (65 mg iron) Tab tablet Take 325 mg by mouth once daily at 6am.    furosemide (LASIX) 20 MG tablet Take 1 tablet (20 mg total) by mouth every other day. Hold medication until 3/29 (Patient taking differently: Take 20 mg by mouth once daily at 6am.)    gabapentin (NEURONTIN) 100 MG capsule Take 2 capsules (200 mg total) by mouth every evening.    levothyroxine (SYNTHROID) 75 MCG tablet Take 1 tablet (75 mcg total) by mouth before breakfast.    pantoprazole (PROTONIX) 40 MG tablet TAKE ONE TABLET BY MOUTH ONCE A DAY FOR STOMACH PROBLEMS    potassium chloride SA (K-DUR,KLOR-CON) 10 MEQ tablet Take 1 tablet (10 mEq total) by mouth once daily.    pravastatin (PRAVACHOL) 20 MG tablet TAKE 1 TABLET BY MOUTH DAILY FOR CHOLESTROL    rivaroxaban (XARELTO) 20 mg Tab Take one tablet(20mg) by mouth once daily    senna-docusate 8.6-50 mg (PERICOLACE) 8.6-50 mg per tablet Take 1 tablet by mouth 2 (two) times daily as needed for Constipation.    tamsulosin (FLOMAX) 0.4 mg Cap Take 1 capsule (0.4 mg total) by mouth once daily.     Antibiotics (From admission, onward)                Start     Stop Route Frequency Ordered    05/04/22 2030  vancomycin 750 mg in dextrose 5 % 250 mL IVPB (ready to mix system)         -- IV Every 24 hours (non-standard times) 05/04/22 2000 05/04/22 1726  cefepime in dextrose 5 % 1 gram/50 mL IVPB 1 g         05/13 1729 IV Every 12 hours (non-standard times) 05/04/22 0957    05/04/22 0904  vancomycin - pharmacy to dose  (vancomycin IVPB)        "And" Linked Group Details    -- IV pharmacy to manage frequency 05/04/22 0805    05/04/22 0900  mupirocin 2 % ointment         05/06 0859 Nasl 2 times daily 05/04/22 0356    05/04/22 0830  azithromycin 500 mg in dextrose 5 % 250 mL IVPB " (ready to mix system)         -- IV Every 24 hours (non-standard times) 22 0715    22 0600  metroNIDAZOLE tablet 500 mg          0559 Oral Every 8 hours 22 0356          Antifungals (From admission, onward)                Start     Stop Route Frequency Ordered    22 1200  fluconazole (DIFLUCAN) IVPB 400 mg         -- IV Every 24 hours (non-standard times) 22 1059          Antivirals (From admission, onward)      None             Immunization History   Administered Date(s) Administered    COVID-19, MRNA, LN-S, PF (Pfizer) (Purple Cap) 2021, 2021    Influenza (FLUAD) - Quadrivalent - Adjuvanted - PF *Preferred* (65+) 2022    Influenza - High Dose - PF (65 years and older) 2003, 2010, 2013, 2014, 10/13/2016, 2017, 2017, 2018, 10/22/2019    Influenza - Quadrivalent - High Dose - PF (65 years and older) 2020    Influenza - Trivalent (ADULT) 2003, 2013    Influenza A (H1N1) 2009 Monovalent - IM 2010    Influenza Split 2013    Pneumococcal Conjugate - 13 Valent 2017    Pneumococcal Polysaccharide - 23 Valent 2001, 2006    Tdap 2018       Family History       Problem Relation (Age of Onset)    Alcohol abuse Father    COPD Sister, Brother    Cancer Brother    Diabetes Daughter    Heart disease Brother    Hypertension Father    No Known Problems Son, Daughter, Son    Stroke Father          Social History     Socioeconomic History    Marital status:    Tobacco Use    Smoking status: Former Smoker     Quit date: 1996     Years since quittin.6    Smokeless tobacco: Never Used    Tobacco comment: cigar smoker   Substance and Sexual Activity    Alcohol use: No     Comment: none since     Drug use: No    Sexual activity: Not Currently     Social Determinants of Health     Financial Resource Strain: Low Risk     Difficulty of Paying Living  Expenses: Not hard at all   Food Insecurity: No Food Insecurity    Worried About Running Out of Food in the Last Year: Never true    Ran Out of Food in the Last Year: Never true   Transportation Needs: No Transportation Needs    Lack of Transportation (Medical): No    Lack of Transportation (Non-Medical): No   Physical Activity: Inactive    Days of Exercise per Week: 0 days    Minutes of Exercise per Session: 0 min   Stress: No Stress Concern Present    Feeling of Stress : Not at all   Social Connections: Moderately Isolated    Frequency of Communication with Friends and Family: More than three times a week    Frequency of Social Gatherings with Friends and Family: Three times a week    Attends Islam Services: Never    Active Member of Clubs or Organizations: No    Attends Club or Organization Meetings: Never    Marital Status:    Housing Stability: Low Risk     Unable to Pay for Housing in the Last Year: No    Number of Places Lived in the Last Year: 1    Unstable Housing in the Last Year: No     Review of Systems   Constitutional:  Negative for chills and fever.   HENT:  Negative for congestion, ear pain, postnasal drip, rhinorrhea, sore throat and trouble swallowing.    Eyes:  Negative for redness and itching.   Respiratory:  Positive for cough and shortness of breath. Negative for wheezing.    Cardiovascular:  Negative for chest pain, palpitations and leg swelling.   Gastrointestinal:  Negative for abdominal pain, diarrhea, nausea and vomiting.   Genitourinary:  Negative for dysuria and frequency.   Skin:  Negative for rash.   Neurological:  Negative for weakness and headaches.   Objective:     Vital Signs (Most Recent):  Temp: 96.9 °F (36.1 °C) (05/05/22 0820)  Pulse: 62 (05/05/22 1200)  Resp: 18 (05/05/22 0900)  BP: 132/63 (05/05/22 0820)  SpO2: 97 % (05/05/22 0900) Vital Signs (24h Range):  Temp:  [96.9 °F (36.1 °C)-97.6 °F (36.4 °C)] 96.9 °F (36.1 °C)  Pulse:  [57-87] 62  Resp:   [18-20] 18  SpO2:  [94 %-97 %] 97 %  BP: (131-174)/(61-82) 132/63     Weight: 85.8 kg (189 lb 2.5 oz)  Body mass index is 31.48 kg/m².    Estimated Creatinine Clearance: 47.5 mL/min (based on SCr of 1 mg/dL).    Physical Exam  Vitals and nursing note reviewed.   Constitutional:       General: He is not in acute distress.     Appearance: He is well-developed.   HENT:      Head: Normocephalic and atraumatic.      Nose:      Comments: 2L nc in place  Eyes:      Conjunctiva/sclera: Conjunctivae normal.      Pupils: Pupils are equal, round, and reactive to light.   Neck:      Thyroid: No thyromegaly.   Cardiovascular:      Rate and Rhythm: Normal rate. Rhythm irregular.      Heart sounds: Murmur heard.   Pulmonary:      Effort: Pulmonary effort is normal. No respiratory distress.      Breath sounds: Normal breath sounds. No wheezing.   Abdominal:      General: Bowel sounds are normal.      Palpations: Abdomen is soft.      Tenderness: There is no abdominal tenderness.   Musculoskeletal:         General: Normal range of motion.      Cervical back: Normal range of motion and neck supple.      Right lower leg: Edema (trace, compression stockings on) present.      Left lower leg: Edema (trace, compression stockings on) present.   Lymphadenopathy:      Cervical: No cervical adenopathy.   Skin:     General: Skin is warm and dry.      Findings: No rash.   Neurological:      Mental Status: He is alert and oriented to person, place, and time.   Psychiatric:         Behavior: Behavior normal.     +abd drain    Significant Labs: All pertinent labs within the past 24 hours have been reviewed.    Significant Imaging: I have reviewed all pertinent imaging results/findings within the past 24 hours.

## 2022-05-05 NOTE — PHARMACY MED REC
"[facility]           Pharmacy  Admission Medication History     The home medication history was taken by Jelly Wilson PharmD.      Medications listed below were obtained from: Patient/family    Based on information gathered for medication list, you may go to "Admission" then "Reconcile Home Medications" tabs to review and/or act upon those items.      The home medication list has been updated by the Pharmacy department.    Please read ALL comments highlighted in yellow.    Please address this information as you see fit.     Feel free to contact us if you have any questions or require assistance.    The medications listed below were removed from the home medication list.  Please reorder if appropriate:     Patient reports he/she IS TAKING the following which was not ordered upon admit  o Ferrous sulfate 325 mg PO QD  o Pantoprazole 40 mg PO QD  o Rivaroxaban 20 mg PO QD      No current facility-administered medications on file prior to encounter.     Current Outpatient Medications on File Prior to Encounter   Medication Sig Dispense Refill    acetaminophen (TYLENOL) 500 MG tablet Take 500 mg by mouth every 6 (six) hours as needed for Pain.      amLODIPine (NORVASC) 10 MG tablet TAKE 1 TABLET BY MOUTH DAILY FOR BLOOD PRESSURE 90 tablet 1    ampicillin sodium/sulbactam Na (AMPICILLIN/SULBACTAM 3 G/100 ML NS, READY TO MIX,) Inject 100 mLs (3 g total) into the vein every 12 (twelve) hours. Tentative end date: 4/23/2022 but will need to followup with ID before discontinuation of antibiotics 1400 mL 0    carvediloL (COREG) 12.5 MG tablet TAKE 1 TABLET (12.5 MG TOTAL) BY MOUTH 2 (TWO) TIMES DAILY WITH MEALS. 60 tablet 5    doxazosin (CARDURA) 1 MG tablet Take 1 mg by mouth nightly.      ferrous sulfate (FEOSOL) 325 mg (65 mg iron) Tab tablet Take 325 mg by mouth once daily at 6am.      furosemide (LASIX) 20 MG tablet Take 1 tablet (20 mg total) by mouth every other day. Hold " medication until 3/29 (Patient taking differently: Take 20 mg by mouth once daily.) 15 tablet 2    gabapentin (NEURONTIN) 100 MG capsule Take 2 capsules (200 mg total) by mouth every evening. 60 capsule 11    levothyroxine (SYNTHROID) 75 MCG tablet Take 1 tablet (75 mcg total) by mouth before breakfast. 30 tablet 11    pantoprazole (PROTONIX) 40 MG tablet TAKE ONE TABLET BY MOUTH ONCE A DAY FOR STOMACH PROBLEMS 30 tablet 0    potassium chloride SA (K-DUR,KLOR-CON) 10 MEQ tablet Take 1 tablet (10 mEq total) by mouth once daily. 90 tablet 1    pravastatin (PRAVACHOL) 20 MG tablet TAKE 1 TABLET BY MOUTH DAILY FOR CHOLESTROL 30 tablet 5    rivaroxaban (XARELTO) 20 mg Tab Take one tablet(20mg) by mouth once daily      senna-docusate 8.6-50 mg (PERICOLACE) 8.6-50 mg per tablet Take 1 tablet by mouth 2 (two) times daily as needed for Constipation. 60 tablet 2    tamsulosin (FLOMAX) 0.4 mg Cap Take 1 capsule (0.4 mg total) by mouth once daily. 30 capsule 11       Please address this information as you see fit.  Feel free to contact us if you have any questions or require assistance.    Jelly Wilson, PharmD  [phone]                  .

## 2022-05-05 NOTE — HPI
89 y.o. man with past medical history of afib, pacemaker, aortic stenosis s/p repair, and COPD who presented on 5/4/2022 for ongoing complications associated of an anterior abdominal wall mass secondary to ventral hernia mesh placed ~20 years ago.      Patient was initially admitted to [facility] 3/3/22 for septic shock secondary to an anterior abdominal wall abscess formed near a previous ventral hernia mesh that was ~20 years ago. IR placed a drain and he was started on antibiotics and discharged on 3/8/22. He was readmitted 3/12/22 and imaging at that time showed increase in size of the abdominal wall abscess. Antibiotics were adjusted and he was discharged on 3/14/22. Patient was readmitted to [facility] 3/19/22 with encephalopathy, NIK, and symptoms associated with the abdominal abscess, workup at the time showed no evidence of seizures and he was discharged on 3/26/22 with Unasyn. On 4/29/22 the abdominal wall drain was removed at the [facility].      He was admitted to [facility] on 4/30/22 for dyspnea and concerns of possible LLL pneumonia, CHF exacerbation, and worsening abdominal abscess. He received abx and diuretics. Imaging showed re accumulation of his abdominal fluid and air following drain removal. He was transferred to our facility on 5/4/22 to be evaluated by IR and general surgery for any possible procedural intervention.     None of his csx from previous drains were positive but he did improve on unasyn

## 2022-05-05 NOTE — PT/OT/SLP EVAL
"Physical Therapy Evaluation    Patient Name:  Eddie Parada Sr.   MRN:  [id number]    Recommendations:     Discharge Recommendations:  nursing facility, skilled   Discharge Equipment Recommendations: wheelchair   Barriers to discharge: Current functional Status    Assessment:     Eddie Parada Sr. is a 89 y.o. male admitted with a medical diagnosis of Abdominal wall abscess.  He presents with the following impairments/functional limitations:  weakness, impaired balance, impaired endurance, impaired skin, impaired self care skills, impaired functional mobilty, gait instability, decreased lower extremity function.  Patient seen for physical therapy evaluation on this date.  Patient agreeable to therapy.  Patient requiring Mod assist for supine to sit and Min Assist for sit <-> stand.  Patient tolerated gait with RW x 125' with CGA, slow pace, shuffling steps, flexed trunk.  Mild SOB after gait trial with 3L supplemental O2 in tow.  Patient declined sitting in bedside chair.  Patient would benefit from a stay in skilled nursing facility for continued rehabilitation.  Patient would also benefit from a wheelchair for home use.      Rehab Prognosis: Fair; patient would benefit from acute skilled PT services to address these deficits and reach maximum level of function.    Recent Surgery: * No surgery found *      Plan:     During this hospitalization, patient to be seen 5 x/week to address the identified rehab impairments via gait training, therapeutic activities, therapeutic exercises, neuromuscular re-education and progress toward the following goals:    · Plan of Care Expires:  06/04/22    Subjective     Chief Complaint: "I want to go home ... I don't want to go to another facility"  Patient/Family Comments/goals: To return home   Pain/Comfort:  · Pain Rating 1: 0/10  · Pain Rating Post-Intervention 1: 0/10    Patients cultural, spiritual, Shinto conflicts given the current situation: no    Living " Environment:  Patient lives with spouse in a 1 SH with ramp (steep - goes over 10-12 steps) with B handrails, T/S with bath bench in bathroom.  Stepson lives next door.  Prior to admission, patients level of function was patient was requiring assist for dressing and bathing, was walking in home with no AD, using RW and borrowed wheelchair when not feeling well.  - Driving.  Equipment used at home: walker, rolling, grab bar, bath bench.  DME owned (not currently used): none.  Upon discharge, patient will have assistance from spouse and stepson.    Objective:     Patient found supine with oxygen, peripheral IV, PICC line, telemetry, Other (comments) (midline umbilical drain)  upon PT entry to room.    General Precautions: Standard, fall, diabetic, hearing impaired   Orthopedic Precautions:N/A   Braces: N/A  Respiratory Status: Nasal cannula, flow 3 L/min    Exams:  · Cognitive Exam:  Patient is oriented to Person, Place, Time and Situation  · Fine Motor Coordination:    · -       Intact  Left hand thumb/finger opposition skills and Right hand thumb/finger opposition skills  · Gross Motor Coordination:  WFL  · Postural Exam:  Patient presented with the following abnormalities:    · -       Rounded shoulders  · -       Forward head  · -       Kyphosis  · Sensation:    · -       Intact  light/touch B LEs grossly  · Skin Integrity/Edema:      · -       Skin integrity: midline drain, dressing intact  · RLE ROM: Deficits: Hip:  WFL,  Knee:  lacking ~ 25 degrees extension, WFL flexion.  Ankle:  WFL  · RLE Strength: Deficits: Hip:  3-/5  Knee: 3-/5  ankle 4/5  · LLE ROM: WFL  · LLE Strength: Deficits: Hip: 3+/5  Knee:  3+/5  Ankle 4/5    Functional Mobility:  · Bed Mobility:     · Rolling Left:  moderate assistance  · Supine to Sit: moderate assistance  · Sit to Supine: maximal assistance  · Transfers:     · Sit to Stand:  minimum assistance with rolling walker  · Gait: 125' with CGA with RW, short step lengths, slow pace.   Mild AMAYA after gait trial.  3L supplemental O2 in tow.    · Balance: Sitting Balance:  Good, no LOB   Standing: unsteady, requires RW, CGA    Therapeutic Activities and Exercises:  Patient educated on role of physical therapy and importance of OOB activities.  Educated on deep breathing following gait trial with mild dyspnea.  Educated on importance of OOB to chair later in day to optimize function.  Patient agreeable.  Wife present for evaluation.      AM-PAC 6 CLICK MOBILITY  Total Score:12     Patient left supine with all lines intact, call button in reach, bed alarm on and nurse notified.    GOALS:   Multidisciplinary Problems     Physical Therapy Goals        Problem: Physical Therapy    Goal Priority Disciplines Outcome Goal Variances Interventions   Physical Therapy Goal     PT, PT/OT Ongoing, Progressing     Description: Goals to be met by:2022  Patient will increase functional independence with mobility by performin. Supine to sit with Modified [place]  2. Sit to supine with Modified [place]  3. Rolling to Left and Right with Modified [place].  4. Sit to stand transfer with Stand-by Assistance  5. Gait  x 200 feet with Stand-by Assistance using Rolling Walker.                      History:     Past Medical History:   Diagnosis Date    Abdominal fibromatosis     Acute posthemorrhagic anemia 2018    NIK (acute kidney injury) 2018    Aortic stenosis 2014    Atrial fibrillation     Bradycardia     Broken arm     CAD (coronary artery disease)     Cancer     basal cell on nose and melanoma on back    CHF (congestive heart failure)     COPD (chronic obstructive pulmonary disease)     Diabetes mellitus type II     Encounter for blood transfusion     Food impaction of esophagus, history of 2022    Hyperlipidemia     Localization-related focal epilepsy with simple partial seizures 2021    Melanoma     Obesity (BMI 30.0-34.9) 2016     Obesity, diabetes, and hypertension syndrome 09/13/2016    Osteoporosis     Other dysphagia 05/27/2021    Peripheral vascular disease     Pneumonia of left lower lobe due to infectious organism 04/30/2022    Primary malignant neoplasm of prostate 03/03/2022    S/P ORIF (open reduction internal fixation) fracture 11/27/2017    Sepsis with acute hypoxic respiratory failure     Septic shock 03/03/2022    Stroke     TIA (transient ischemic attack)     Type II diabetes mellitus with peripheral circulatory disorder     Type II or unspecified type diabetes mellitus without mention of complication, not stated as uncontrolled     Unspecified essential hypertension        Past Surgical History:   Procedure Laterality Date    AORTIC VALVE REPLACEMENT      CARDIAC PACEMAKER PLACEMENT  9/28/2012    CARDIAC VALVE REPLACEMENT  11/17/2011    aortic valve-tissue    CHOLECYSTECTOMY      COLONOSCOPY      ESOPHAGOGASTRODUODENOSCOPY N/A 5/27/2021    Procedure: EGD (ESOPHAGOGASTRODUODENOSCOPY);  Surgeon: Gualberto Medrano MD;  Location: [facility];  Service: Endoscopy;  Laterality: N/A;    ESOPHAGOGASTRODUODENOSCOPY N/A 6/22/2021    Procedure: EGD (ESOPHAGOGASTRODUODENOSCOPY);  Surgeon: Gualberto Medrano MD;  Location: [facility];  Service: Endoscopy;  Laterality: N/A;  please call wife(Jessica) with instructions/arrival time.    ESOPHAGOGASTRODUODENOSCOPY (EGD) WITH DILATION  06/22/2021    EYE SURGERY Bilateral     cataract [facility] lens [facility]  at [facility]    HERNIA REPAIR      abdominal    LASIK      UPPER GASTROINTESTINAL ENDOSCOPY  05/28/2021       Time Tracking:     PT Received On: 05/05/22  PT Start Time: 1133     PT Stop Time: 1203  PT Total Time (min): 30 min     Billable Minutes: Evaluation 15 and Gait Training 15      05/05/2022

## 2022-05-05 NOTE — SUBJECTIVE & OBJECTIVE
Past Medical History:   Diagnosis Date    Abdominal fibromatosis     Acute posthemorrhagic anemia 01/09/2018    NIK (acute kidney injury) 01/11/2018    Aortic stenosis 02/25/2014    Atrial fibrillation     Bradycardia     Broken arm     CAD (coronary artery disease)     Cancer     basal cell on nose and melanoma on back    CHF (congestive heart failure)     COPD (chronic obstructive pulmonary disease)     Diabetes mellitus type II     Encounter for blood transfusion     Food impaction of esophagus, history of 03/03/2022    Hyperlipidemia     Localization-related focal epilepsy with simple partial seizures 03/02/2021    Melanoma     Obesity (BMI 30.0-34.9) 09/13/2016    Obesity, diabetes, and hypertension syndrome 09/13/2016    Osteoporosis     Other dysphagia 05/27/2021    Peripheral vascular disease     Pneumonia of left lower lobe due to infectious organism 04/30/2022    Primary malignant neoplasm of prostate 03/03/2022    S/P ORIF (open reduction internal fixation) fracture 11/27/2017    Sepsis with acute hypoxic respiratory failure     Septic shock 03/03/2022    Stroke     TIA (transient ischemic attack)     Type II diabetes mellitus with peripheral circulatory disorder     Type II or unspecified type diabetes mellitus without mention of complication, not stated as uncontrolled     Unspecified essential hypertension        Past Surgical History:   Procedure Laterality Date    AORTIC VALVE REPLACEMENT      CARDIAC PACEMAKER PLACEMENT  9/28/2012    CARDIAC VALVE REPLACEMENT  11/17/2011    aortic valve-tissue    CHOLECYSTECTOMY      COLONOSCOPY      ESOPHAGOGASTRODUODENOSCOPY N/A 5/27/2021    Procedure: EGD (ESOPHAGOGASTRODUODENOSCOPY);  Surgeon: Gualberto Medrano MD;  Location: [facility];  Service: Endoscopy;  Laterality: N/A;    ESOPHAGOGASTRODUODENOSCOPY N/A 6/22/2021    Procedure: EGD (ESOPHAGOGASTRODUODENOSCOPY);  Surgeon: Gualberto Medrano MD;  Location: [facility];  Service: Endoscopy;   Laterality: N/A;  please call wife(Jessica) with instructions/arrival time.    ESOPHAGOGASTRODUODENOSCOPY (EGD) WITH DILATION  06/22/2021    EYE SURGERY Bilateral     cataract with lens by  at [facility]    HERNIA REPAIR      abdominal    LASIK      UPPER GASTROINTESTINAL ENDOSCOPY  05/28/2021       Review of patient's allergies indicates:   Allergen Reactions    Ciprofloxacin     Levofloxacin Swelling    Lyrica [pregabalin] Swelling    Unable to assess Other (See Comments)     Soft shell crabs       Medications:  Medications Prior to Admission   Medication Sig    acetaminophen (TYLENOL) 500 MG tablet Take 500 mg by mouth every 6 (six) hours as needed for Pain.    amLODIPine (NORVASC) 10 MG tablet TAKE 1 TABLET BY MOUTH DAILY FOR BLOOD PRESSURE    ampicillin sodium/sulbactam Na (AMPICILLIN/SULBACTAM 3 G/100 ML NS, READY TO MIX,) Inject 100 mLs (3 g total) into the vein every 12 (twelve) hours. Tentative end date: 4/23/2022 but will need to followup with ID before discontinuation of antibiotics    carvediloL (COREG) 12.5 MG tablet TAKE 1 TABLET (12.5 MG TOTAL) BY MOUTH 2 (TWO) TIMES DAILY WITH MEALS.    doxazosin (CARDURA) 1 MG tablet Take 1 mg by mouth nightly.    ferrous sulfate (FEOSOL) 325 mg (65 mg iron) Tab tablet Take 325 mg by mouth once daily at 6am.    furosemide (LASIX) 20 MG tablet Take 1 tablet (20 mg total) by mouth every other day. Hold medication until 3/29 (Patient taking differently: Take 20 mg by mouth once daily at 6am.)    gabapentin (NEURONTIN) 100 MG capsule Take 2 capsules (200 mg total) by mouth every evening.    levothyroxine (SYNTHROID) 75 MCG tablet Take 1 tablet (75 mcg total) by mouth before breakfast.    pantoprazole (PROTONIX) 40 MG tablet TAKE ONE TABLET BY MOUTH ONCE A DAY FOR STOMACH PROBLEMS    potassium chloride SA (K-DUR,KLOR-CON) 10 MEQ tablet Take 1 tablet (10 mEq total) by mouth once daily.    pravastatin (PRAVACHOL) 20 MG tablet TAKE 1 TABLET BY MOUTH DAILY FOR CHOLESTROL  "   rivaroxaban (XARELTO) 20 mg Tab Take one tablet(20mg) by mouth once daily    senna-docusate 8.6-50 mg (PERICOLACE) 8.6-50 mg per tablet Take 1 tablet by mouth 2 (two) times daily as needed for Constipation.    tamsulosin (FLOMAX) 0.4 mg Cap Take 1 capsule (0.4 mg total) by mouth once daily.     Antibiotics (From admission, onward)                Start     Stop Route Frequency Ordered    05/04/22 2030  vancomycin 750 mg in dextrose 5 % 250 mL IVPB (ready to mix system)         -- IV Every 24 hours (non-standard times) 05/04/22 2000    05/04/22 1726  cefepime in dextrose 5 % 1 gram/50 mL IVPB 1 g         05/13 1729 IV Every 12 hours (non-standard times) 05/04/22 0957    05/04/22 0904  vancomycin - pharmacy to dose  (vancomycin IVPB)        "And" Linked Group Details    -- IV pharmacy to manage frequency 05/04/22 0805    05/04/22 0900  mupirocin 2 % ointment         05/06 0859 Nasl 2 times daily 05/04/22 0356    05/04/22 0830  azithromycin 500 mg in dextrose 5 % 250 mL IVPB (ready to mix system)         -- IV Every 24 hours (non-standard times) 05/04/22 0715    05/04/22 0600  metroNIDAZOLE tablet 500 mg         05/18 0559 Oral Every 8 hours 05/04/22 0356          Antifungals (From admission, onward)                Start     Stop Route Frequency Ordered    05/05/22 1200  fluconazole (DIFLUCAN) IVPB 400 mg         -- IV Every 24 hours (non-standard times) 05/05/22 1059          Antivirals (From admission, onward)      None             Immunization History   Administered Date(s) Administered    COVID-19, MRNA, LN-S, PF (Pfizer) (Purple Cap) 01/21/2021, 02/11/2021    Influenza (FLUAD) - Quadrivalent - Adjuvanted - PF *Preferred* (65+) 03/14/2022    Influenza - High Dose - PF (65 years and older) 11/12/2003, 11/18/2010, 02/13/2013, 11/20/2014, 10/13/2016, 11/21/2017, 12/04/2017, 11/05/2018, 10/22/2019    Influenza - Quadrivalent - High Dose - PF (65 years and older) 09/28/2020    Influenza - Trivalent (ADULT) 11/12/2003, " 2013    Influenza A (H1N1) 2009 Monovalent - IM 2010    Influenza Split 2013    Pneumococcal Conjugate - 13 Valent 2017    Pneumococcal Polysaccharide - 23 Valent 2001, 2006    Tdap 2018       Family History       Problem Relation (Age of Onset)    Alcohol abuse Father    COPD Sister, Brother    Cancer Brother    Diabetes Daughter    Heart disease Brother    Hypertension Father    No Known Problems Son, Daughter, Son    Stroke Father          Social History     Socioeconomic History    Marital status:    Tobacco Use    Smoking status: Former Smoker     Quit date: 1996     Years since quittin.6    Smokeless tobacco: Never Used    Tobacco comment: cigar smoker   Substance and Sexual Activity    Alcohol use: No     Comment: none since     Drug use: No    Sexual activity: Not Currently     Social Determinants of Health     Financial Resource Strain: Low Risk     Difficulty of Paying Living Expenses: Not hard at all   Food Insecurity: No Food Insecurity    Worried About Running Out of Food in the Last Year: Never true    Ran Out of Food in the Last Year: Never true   Transportation Needs: No Transportation Needs    Lack of Transportation (Medical): No    Lack of Transportation (Non-Medical): No   Physical Activity: Inactive    Days of Exercise per Week: 0 days    Minutes of Exercise per Session: 0 min   Stress: No Stress Concern Present    Feeling of Stress : Not at all   Social Connections: Moderately Isolated    Frequency of Communication with Friends and Family: More than three times a week    Frequency of Social Gatherings with Friends and Family: Three times a week    Attends Anabaptism Services: Never    Active Member of Clubs or Organizations: No    Attends Club or Organization Meetings: Never    Marital Status:    Housing Stability: Low Risk     Unable to Pay for Housing in the Last Year: No    Number of Places Lived in the Last Year: 1     Unstable Housing in the Last Year: No     Review of Systems   Constitutional:  Negative for chills and fever.   HENT:  Negative for congestion, ear pain, postnasal drip, rhinorrhea, sore throat and trouble swallowing.    Eyes:  Negative for redness and itching.   Respiratory:  Positive for cough and shortness of breath. Negative for wheezing.    Cardiovascular:  Negative for chest pain, palpitations and leg swelling.   Gastrointestinal:  Negative for abdominal pain, diarrhea, nausea and vomiting.   Genitourinary:  Negative for dysuria and frequency.   Skin:  Negative for rash.   Neurological:  Negative for weakness and headaches.   Objective:     Vital Signs (Most Recent):  Temp: 96.9 °F (36.1 °C) (05/05/22 0820)  Pulse: 62 (05/05/22 1200)  Resp: 18 (05/05/22 0900)  BP: 132/63 (05/05/22 0820)  SpO2: 97 % (05/05/22 0900) Vital Signs (24h Range):  Temp:  [96.9 °F (36.1 °C)-97.6 °F (36.4 °C)] 96.9 °F (36.1 °C)  Pulse:  [57-87] 62  Resp:  [18-20] 18  SpO2:  [94 %-97 %] 97 %  BP: (131-174)/(61-82) 132/63     Weight: 85.8 kg (189 lb 2.5 oz)  Body mass index is 31.48 kg/m².    Estimated Creatinine Clearance: 47.5 mL/min (based on SCr of 1 mg/dL).    Physical Exam  Vitals and nursing note reviewed.   Constitutional:       General: He is not in acute distress.     Appearance: He is well-developed.   HENT:      Head: Normocephalic and atraumatic.      Nose:      Comments: 2L nc in place  Eyes:      Conjunctiva/sclera: Conjunctivae normal.      Pupils: Pupils are equal, round, and reactive to light.   Neck:      Thyroid: No thyromegaly.   Cardiovascular:      Rate and Rhythm: Normal rate. Rhythm irregular.      Heart sounds: Murmur heard.   Pulmonary:      Effort: Pulmonary effort is normal. No respiratory distress.      Breath sounds: Normal breath sounds. No wheezing.   Abdominal:      General: Bowel sounds are normal.      Palpations: Abdomen is soft.      Tenderness: There is no abdominal tenderness.   Musculoskeletal:          General: Normal range of motion.      Cervical back: Normal range of motion and neck supple.      Right lower leg: Edema (trace, compression stockings on) present.      Left lower leg: Edema (trace, compression stockings on) present.   Lymphadenopathy:      Cervical: No cervical adenopathy.   Skin:     General: Skin is warm and dry.      Findings: No rash.   Neurological:      Mental Status: He is alert and oriented to person, place, and time.   Psychiatric:         Behavior: Behavior normal.     +abd drain    Significant Labs: All pertinent labs within the past 24 hours have been reviewed.    Significant Imaging: I have reviewed all pertinent imaging results/findings within the past 24 hours.

## 2022-05-06 ENCOUNTER — PATIENT MESSAGE (OUTPATIENT)
Dept: SLEEP MEDICINE | Facility: CLINIC | Age: 87
End: 2022-05-06
Payer: MEDICARE

## 2022-05-06 ENCOUNTER — PATIENT OUTREACH (OUTPATIENT)
Dept: ADMINISTRATIVE | Facility: OTHER | Age: 87
End: 2022-05-06
Payer: MEDICARE

## 2022-05-06 VITALS
TEMPERATURE: 99 F | SYSTOLIC BLOOD PRESSURE: 106 MMHG | BODY MASS INDEX: 31.51 KG/M2 | HEIGHT: 65 IN | RESPIRATION RATE: 17 BRPM | HEART RATE: 64 BPM | OXYGEN SATURATION: 96 % | WEIGHT: 189.13 LBS | DIASTOLIC BLOOD PRESSURE: 53 MMHG

## 2022-05-06 LAB
ALBUMIN SERPL BCP-MCNC: 2.3 G/DL (ref 3.5–5.2)
ALP SERPL-CCNC: 89 U/L (ref 55–135)
ALT SERPL W/O P-5'-P-CCNC: 28 U/L (ref 10–44)
ANION GAP SERPL CALC-SCNC: 11 MMOL/L (ref 8–16)
AST SERPL-CCNC: 39 U/L (ref 10–40)
BACTERIA SPEC AEROBE CULT: NORMAL
BASOPHILS # BLD AUTO: 0.02 K/UL (ref 0–0.2)
BASOPHILS NFR BLD: 0.4 % (ref 0–1.9)
BILIRUB SERPL-MCNC: 0.6 MG/DL (ref 0.1–1)
BUN SERPL-MCNC: 15 MG/DL (ref 10–30)
CALCIUM SERPL-MCNC: 8.5 MG/DL (ref 8.7–10.5)
CHLORIDE SERPL-SCNC: 96 MMOL/L (ref 95–110)
CO2 SERPL-SCNC: 32 MMOL/L (ref 23–29)
CREAT SERPL-MCNC: 1 MG/DL (ref 0.5–1.4)
DIFFERENTIAL METHOD: ABNORMAL
EOSINOPHIL # BLD AUTO: 0.1 K/UL (ref 0–0.5)
EOSINOPHIL NFR BLD: 2.1 % (ref 0–8)
ERYTHROCYTE [DISTWIDTH] IN BLOOD BY AUTOMATED COUNT: 13.5 % (ref 11.5–14.5)
EST. GFR  (AFRICAN AMERICAN): >60 ML/MIN/1.73 M^2
EST. GFR  (NON AFRICAN AMERICAN): >60 ML/MIN/1.73 M^2
GLUCOSE SERPL-MCNC: 120 MG/DL (ref 70–110)
HCT VFR BLD AUTO: 26.9 % (ref 40–54)
HGB BLD-MCNC: 9.2 G/DL (ref 14–18)
IMM GRANULOCYTES # BLD AUTO: 0.06 K/UL (ref 0–0.04)
IMM GRANULOCYTES NFR BLD AUTO: 1.1 % (ref 0–0.5)
LYMPHOCYTES # BLD AUTO: 1.2 K/UL (ref 1–4.8)
LYMPHOCYTES NFR BLD: 21 % (ref 18–48)
MAGNESIUM SERPL-MCNC: 2 MG/DL (ref 1.6–2.6)
MCH RBC QN AUTO: 33.8 PG (ref 27–31)
MCHC RBC AUTO-ENTMCNC: 34.2 G/DL (ref 32–36)
MCV RBC AUTO: 99 FL (ref 82–98)
MONOCYTES # BLD AUTO: 0.8 K/UL (ref 0.3–1)
MONOCYTES NFR BLD: 14.3 % (ref 4–15)
NEUTROPHILS # BLD AUTO: 3.4 K/UL (ref 1.8–7.7)
NEUTROPHILS NFR BLD: 61.1 % (ref 38–73)
NRBC BLD-RTO: 0 /100 WBC
PHOSPHATE SERPL-MCNC: 2 MG/DL (ref 2.7–4.5)
PLATELET # BLD AUTO: 109 K/UL (ref 150–450)
PMV BLD AUTO: 10.2 FL (ref 9.2–12.9)
POCT GLUCOSE: 120 MG/DL (ref 70–110)
POCT GLUCOSE: 198 MG/DL (ref 70–110)
POTASSIUM SERPL-SCNC: 3.5 MMOL/L (ref 3.5–5.1)
PROT SERPL-MCNC: 5.5 G/DL (ref 6–8.4)
RBC # BLD AUTO: 2.72 M/UL (ref 4.6–6.2)
SODIUM SERPL-SCNC: 139 MMOL/L (ref 136–145)
WBC # BLD AUTO: 5.61 K/UL (ref 3.9–12.7)

## 2022-05-06 PROCEDURE — 80053 COMPREHEN METABOLIC PANEL: CPT | Performed by: STUDENT IN AN ORGANIZED HEALTH CARE EDUCATION/TRAINING PROGRAM

## 2022-05-06 PROCEDURE — 27000221 HC OXYGEN, UP TO 24 HOURS

## 2022-05-06 PROCEDURE — 83735 ASSAY OF MAGNESIUM: CPT | Performed by: STUDENT IN AN ORGANIZED HEALTH CARE EDUCATION/TRAINING PROGRAM

## 2022-05-06 PROCEDURE — 63600175 PHARM REV CODE 636 W HCPCS: Performed by: STUDENT IN AN ORGANIZED HEALTH CARE EDUCATION/TRAINING PROGRAM

## 2022-05-06 PROCEDURE — 63600175 PHARM REV CODE 636 W HCPCS: Performed by: INTERNAL MEDICINE

## 2022-05-06 PROCEDURE — 25000003 PHARM REV CODE 250: Performed by: STUDENT IN AN ORGANIZED HEALTH CARE EDUCATION/TRAINING PROGRAM

## 2022-05-06 PROCEDURE — 63700000 PHARM REV CODE 250 ALT 637 W/O HCPCS: Performed by: INTERNAL MEDICINE

## 2022-05-06 PROCEDURE — 94640 AIRWAY INHALATION TREATMENT: CPT

## 2022-05-06 PROCEDURE — 97530 THERAPEUTIC ACTIVITIES: CPT | Mod: CQ

## 2022-05-06 PROCEDURE — 97116 GAIT TRAINING THERAPY: CPT | Mod: CQ

## 2022-05-06 PROCEDURE — 97535 SELF CARE MNGMENT TRAINING: CPT | Mod: CO

## 2022-05-06 PROCEDURE — 94761 N-INVAS EAR/PLS OXIMETRY MLT: CPT

## 2022-05-06 PROCEDURE — 85025 COMPLETE CBC W/AUTO DIFF WBC: CPT | Performed by: STUDENT IN AN ORGANIZED HEALTH CARE EDUCATION/TRAINING PROGRAM

## 2022-05-06 PROCEDURE — 99900035 HC TECH TIME PER 15 MIN (STAT)

## 2022-05-06 PROCEDURE — 25000242 PHARM REV CODE 250 ALT 637 W/ HCPCS: Performed by: STUDENT IN AN ORGANIZED HEALTH CARE EDUCATION/TRAINING PROGRAM

## 2022-05-06 PROCEDURE — 99223 PR INITIAL HOSPITAL CARE,LEVL III: ICD-10-PCS | Mod: ,,, | Performed by: STUDENT IN AN ORGANIZED HEALTH CARE EDUCATION/TRAINING PROGRAM

## 2022-05-06 PROCEDURE — 84100 ASSAY OF PHOSPHORUS: CPT | Performed by: STUDENT IN AN ORGANIZED HEALTH CARE EDUCATION/TRAINING PROGRAM

## 2022-05-06 PROCEDURE — 63600175 PHARM REV CODE 636 W HCPCS: Performed by: SURGERY

## 2022-05-06 PROCEDURE — 99223 1ST HOSP IP/OBS HIGH 75: CPT | Mod: ,,, | Performed by: STUDENT IN AN ORGANIZED HEALTH CARE EDUCATION/TRAINING PROGRAM

## 2022-05-06 PROCEDURE — 99497 ADVNCD CARE PLAN 30 MIN: CPT | Mod: 25,,, | Performed by: STUDENT IN AN ORGANIZED HEALTH CARE EDUCATION/TRAINING PROGRAM

## 2022-05-06 PROCEDURE — 99497 PR ADVNCD CARE PLAN 30 MIN: ICD-10-PCS | Mod: 25,,, | Performed by: STUDENT IN AN ORGANIZED HEALTH CARE EDUCATION/TRAINING PROGRAM

## 2022-05-06 RX ORDER — GUAIFENESIN 600 MG/1
600 TABLET, EXTENDED RELEASE ORAL 2 TIMES DAILY
Qty: 20 TABLET | Refills: 0 | Status: ON HOLD | OUTPATIENT
Start: 2022-05-06 | End: 2022-05-24 | Stop reason: HOSPADM

## 2022-05-06 RX ORDER — METRONIDAZOLE 500 MG/1
500 TABLET ORAL EVERY 8 HOURS
Qty: 84 TABLET | Refills: 0 | Status: SHIPPED | OUTPATIENT
Start: 2022-05-06 | End: 2022-06-03

## 2022-05-06 RX ORDER — POLYETHYLENE GLYCOL 3350 17 G/17G
17 POWDER, FOR SOLUTION ORAL DAILY
Qty: 510 G | Refills: 0 | Status: SHIPPED | OUTPATIENT
Start: 2022-05-07 | End: 2022-06-06

## 2022-05-06 RX ORDER — SODIUM,POTASSIUM PHOSPHATES 280-250MG
1 POWDER IN PACKET (EA) ORAL ONCE
Status: COMPLETED | OUTPATIENT
Start: 2022-05-06 | End: 2022-05-06

## 2022-05-06 RX ORDER — FLUCONAZOLE 200 MG/1
400 TABLET ORAL DAILY
Status: DISCONTINUED | OUTPATIENT
Start: 2022-05-07 | End: 2022-05-06 | Stop reason: HOSPADM

## 2022-05-06 RX ORDER — SENNOSIDES 8.6 MG/1
1 TABLET ORAL DAILY
Qty: 30 TABLET | Refills: 0 | Status: SHIPPED | OUTPATIENT
Start: 2022-05-07 | End: 2022-06-06

## 2022-05-06 RX ORDER — FLUCONAZOLE 100 MG/1
100 TABLET ORAL DAILY
Qty: 30 TABLET | Refills: 0 | Status: ON HOLD | OUTPATIENT
Start: 2022-05-06 | End: 2022-05-24 | Stop reason: HOSPADM

## 2022-05-06 RX ADMIN — LEVOTHYROXINE SODIUM 75 MCG: 25 TABLET ORAL at 05:05

## 2022-05-06 RX ADMIN — SENNOSIDES 8.6 MG: 8.6 TABLET, FILM COATED ORAL at 09:05

## 2022-05-06 RX ADMIN — LEVALBUTEROL 1.25 MG: 1.25 SOLUTION, CONCENTRATE RESPIRATORY (INHALATION) at 07:05

## 2022-05-06 RX ADMIN — POLYETHYLENE GLYCOL 3350 17 G: 17 POWDER, FOR SOLUTION ORAL at 09:05

## 2022-05-06 RX ADMIN — VANCOMYCIN HYDROCHLORIDE 750 MG: 750 INJECTION, POWDER, LYOPHILIZED, FOR SOLUTION INTRAVENOUS at 03:05

## 2022-05-06 RX ADMIN — GUAIFENESIN 600 MG: 600 TABLET, EXTENDED RELEASE ORAL at 09:05

## 2022-05-06 RX ADMIN — LEVALBUTEROL 1.25 MG: 1.25 SOLUTION, CONCENTRATE RESPIRATORY (INHALATION) at 12:05

## 2022-05-06 RX ADMIN — METRONIDAZOLE 500 MG: 500 TABLET ORAL at 05:05

## 2022-05-06 RX ADMIN — AMLODIPINE BESYLATE 10 MG: 5 TABLET ORAL at 09:05

## 2022-05-06 RX ADMIN — CARVEDILOL 12.5 MG: 12.5 TABLET, FILM COATED ORAL at 05:05

## 2022-05-06 RX ADMIN — CEFTRIAXONE 1 G: 1 INJECTION, SOLUTION INTRAVENOUS at 10:05

## 2022-05-06 RX ADMIN — FLUCONAZOLE 400 MG: 400 INJECTION, SOLUTION INTRAVENOUS at 11:05

## 2022-05-06 RX ADMIN — PRAVASTATIN SODIUM 20 MG: 20 TABLET ORAL at 09:05

## 2022-05-06 RX ADMIN — CARVEDILOL 12.5 MG: 12.5 TABLET, FILM COATED ORAL at 09:05

## 2022-05-06 RX ADMIN — POTASSIUM & SODIUM PHOSPHATES POWDER PACK 280-160-250 MG 1 PACKET: 280-160-250 PACK at 09:05

## 2022-05-06 RX ADMIN — TAMSULOSIN HYDROCHLORIDE 0.4 MG: 0.4 CAPSULE ORAL at 09:05

## 2022-05-06 RX ADMIN — CEFEPIME HYDROCHLORIDE 1 G: 1 INJECTION, SOLUTION INTRAVENOUS at 05:05

## 2022-05-06 RX ADMIN — AZITHROMYCIN MONOHYDRATE 500 MG: 250 TABLET ORAL at 09:05

## 2022-05-06 RX ADMIN — METRONIDAZOLE 500 MG: 500 TABLET ORAL at 02:05

## 2022-05-06 RX ADMIN — FUROSEMIDE 40 MG: 40 INJECTION, SOLUTION INTRAMUSCULAR; INTRAVENOUS at 09:05

## 2022-05-06 NOTE — PT/OT/SLP PROGRESS
Occupational Therapy   Treatment    Name: Eddie Parada Sr.  MRN: [id number]  Admitting Diagnosis:  Abdominal wall abscess       Recommendations:     Discharge Recommendations: nursing facility, skilled  Discharge Equipment Recommendations:   (TBD)  Barriers to discharge:   (increased level of assist)    Assessment:     Eddie Parada Sr. is a 89 y.o. male with a medical diagnosis of Abdominal wall abscess.  He presents with Performance deficits affecting function are weakness, impaired endurance, impaired self care skills, impaired functional mobilty, gait instability, impaired balance, decreased upper extremity function, decreased coordination, decreased lower extremity function, decreased safety awareness, impaired cardiopulmonary response to activity.     Rehab Prognosis:  Fair; patient would benefit from acute skilled OT services to address these deficits and reach maximum level of function.       Plan:     Patient to be seen 5 x/week to address the above listed problems via self-care/home management, therapeutic exercises, therapeutic activities  · Plan of Care Expires: 06/04/22  · Plan of Care Reviewed with: patient    Subjective     Pain/Comfort:  · Pain Rating 1: 0/10  · Pain Rating Post-Intervention 1: 0/10    Objective:     Communicated with: nurse prior to session.  Patient found HOB elevated with oxygen, peripheral IV, PICC line, telemetry (midline umbilical drain) upon OT entry to room.    General Precautions: Standard, fall, diabetic, hearing impaired (clear liquid diet)   Orthopedic Precautions:N/A   Braces: N/A  Respiratory Status: Nasal cannula, flow 3 L/min     Occupational Performance:     Bed Mobility:    · Patient completed Rolling/Turning to Left with  stand by assistance and with side rail  · Patient completed Scooting/Bridging with minimum assistance and with side rail  · Patient completed Supine to Sit with moderate assistance and with side rail     Functional  Mobility/Transfers:  · Patient completed Sit <> Stand Transfer with stand by assistance and contact guard assistance  with  rolling walker   · Patient completed Bed <> Chair Transfer using Step Transfer technique with contact guard assistance with rolling walker  · Patient completed Toilet Transfer Step Transfer technique with contact guard assistance with  rolling walker and grab bars  · Functional Mobility: ambulated CGA with RW to bathroom, sink and BS chair on opposite side of room, unsteady, no LOB, fatigue    Activities of Daily Living:  · Feeding:  independence fed slef jello  · Grooming: minimum assistance to comb back of hair, washed face , hands standig inside RW with CGA for balacne safety; vc for sequencing  · Toileting: minimum assistance clothes management stading with RW      [facility] 6 Click ADL: 18    Treatment & Education:  Role of OT and POC  Family training with wife, she observed the above Selfcare and mobility training.  Issued and instructed in gait belt use for ambulation, standing ADLS safety, wife verbalized understanding  All questions/concerns addressed within scope.  Impt of OOB activtiy and siting UIC at least 2 hrs at a time throughout the day.    Patient left up in chair with all lines intact, call button in reach, chair alarm on, nurse notified and wife presentEducation:      GOALS:   Multidisciplinary Problems     Occupational Therapy Goals        Problem: Occupational Therapy    Goal Priority Disciplines Outcome Interventions   Occupational Therapy Goal     OT, PT/OT Ongoing, Progressing    Description: Goals to be met by: 5/25/2022    Patient will increase functional independence with ADLs by performing:    UE Dressing with Modified [place].  LE Dressing with Modified [place] and Assistive Devices as needed.  Grooming while standing at sink with Supervision.  Toileting from toilet with Supervision for hygiene and clothing management.   Rolling to Bilateral with Modified  [place].   Supine to sit with Modified [place].  Step transfer with Supervision  Toilet transfer to toilet with Supervision.  Increased functional strength to WFL for ADLS.  Upper extremity exercise program x 10 reps per handout, with supervision.                     Time Tracking:     OT Date of Treatment: 05/05/22  OT Start Time: 1328  OT Stop Time: 1355  OT Total Time (min): 27 min    Billable Minutes:Self Care/Home Management 15  Therapeutic Activity 12  Total Time 27    OT/PATTI: OT          5/5/2022

## 2022-05-06 NOTE — PLAN OF CARE
Discharge orders noted. Additional clinical references attached.    Patient's discharge instructions given by bedside RN and reviewed via this VN.  Education provided on new medication, diagnosis, and follow-up appointments.  Teach back method used. Patient verbalized understanding. All questions answered.  Floor nurse notified.  Patient receiving Vanc dose. Okay to d/c once completed.

## 2022-05-06 NOTE — PLAN OF CARE
Full note to follow     No growth from abd abscess drainage - likely due to pre-procedure abx at outside facility     Difficult to know how to treat - but current vanc, ceftriaxone, PO flagyl and PO diflucan is a reasonable option     Would need at least 2 weeks - but needs continued follow up and decision making based on clinical course, perhaps future outpatient CTs etc.     Patient and family wondering about discharge today - that is OK with ID but again needs close outpatient follow up and not clear how that will happen - I believe that Dr. Arredondo's schedule is booked for 1-2 months     [facility] [phone]

## 2022-05-06 NOTE — PROGRESS NOTES
[place] met with patient to discuss discharge and additional patient barriers to care. Pt identified no additional barriers to care at this time. Per pt, pt is not in need of resources at this time.    The following were addressed during this visit:  -Complete follow-up with patient    CM [place]

## 2022-05-06 NOTE — PT/OT/SLP PROGRESS
Physical Therapy Treatment    Patient Name:  Eddie Parada Sr.   MRN:  [id number]    Recommendations:     Discharge Recommendations:  nursing facility, skilled   Discharge Equipment Recommendations: wheelchair   Barriers to discharge: decreased mobility,strength and endurance    Assessment:     Eddie Parada Sr. is a 89 y.o. male admitted with a medical diagnosis of Abdominal wall abscess.  He presents with the following impairments/functional limitations:  weakness, impaired endurance, impaired functional mobilty, gait instability, impaired balance, decreased upper extremity function, decreased lower extremity function, decreased safety awareness, decreased ROM, impaired coordination,pt with good participation and limited by loose stools today,pt requires assistance with all mobility and will benefit from SNF upon discharge.    Rehab Prognosis: Fair; patient would benefit from acute skilled PT services to address these deficits and reach maximum level of function.    Recent Surgery: * No surgery found *      Plan:     During this hospitalization, patient to be seen 5 x/week to address the identified rehab impairments via gait training, therapeutic activities, therapeutic exercises, neuromuscular re-education and progress toward the following goals:    · Plan of Care Expires:  06/04/22    Subjective     Chief Complaint: n/a  Patient/Family Comments/goals: pt states he took a laxative.  Pain/Comfort:  · Pain Rating 1: 0/10      Objective:     Communicated with nsg prior to session.  Patient found up in chair with chair check, PICC line, telemetry upon PT entry to room.     General Precautions: Standard, diabetic, fall, hearing impaired   Orthopedic Precautions:N/A   Braces: N/A  Respiratory Status: Room air     Functional Mobility:  · Bed Mobility:     · Sit to Supine: minimum assistance and moderate assistance  · Transfers:     · Sit to Stand:  moderate assistance with rolling walker  · Toilet Transfer: minimum  assistance with  rolling walker  using  ambulation  · Gait: amb ~16' with RW to bathroom and Min A with IV in tow  · Balance: fair standing balance with RW      AM-PAC 6 CLICK MOBILITY  Turning over in bed (including adjusting bedclothes, sheets and blankets)?: 2  Sitting down on and standing up from a chair with arms (e.g., wheelchair, bedside commode, etc.): 2  Moving from lying on back to sitting on the side of the bed?: 2  Moving to and from a bed to a chair (including a wheelchair)?: 3  Need to walk in hospital room?: 3  Climbing 3-5 steps with a railing?: 1  Basic Mobility Total Score: 13       Therapeutic Activities and Exercises: pt used bathroom and assisted by spouse to clean up,changed pt's soiled gown post using bathroom.       Patient left supine with all lines intact, call button in reach, bed alarm on and spouse present..    GOALS: see general POC  Multidisciplinary Problems     Physical Therapy Goals        Problem: Physical Therapy    Goal Priority Disciplines Outcome Goal Variances Interventions   Physical Therapy Goal     PT, PT/OT Ongoing, Progressing     Description: Goals to be met by:2022  Patient will increase functional independence with mobility by performin. Supine to sit with Modified Anderson  2. Sit to supine with Modified Anderson  3. Rolling to Left and Right with Modified Anderson.  4. Sit to stand transfer with Stand-by Assistance  5. Gait  x 200 feet with Stand-by Assistance using Rolling Walker.                      Time Tracking:     PT Received On: 22  PT Start Time: 1258     PT Stop Time: 1324  PT Total Time (min): 26 min     Billable Minutes: Gait Training 12 and Therapeutic Activity 14    Treatment Type: Treatment  PT/PTA: PTA     PTA Visit Number: 1     2022

## 2022-05-06 NOTE — PROGRESS NOTES
Pharmacist Intervention IV to PO Note    Eddie Parada Sr. is a 89 y.o. male being treated with IV medication fluconazole    Patient Data:    Vital Signs (Most Recent):  Temp: 98.7 °F (37.1 °C) (05/06/22 1147)  Pulse: 64 (05/06/22 1147)  Resp: 17 (05/06/22 1147)  BP: (!) 106/53 (05/06/22 1147)  SpO2: 96 % (05/06/22 1158)   Vital Signs (72h Range):  Temp:  [96 °F (35.6 °C)-99.5 °F (37.5 °C)]   Pulse:  [57-87]   Resp:  [16-22]   BP: (106-174)/(53-84)   SpO2:  [92 %-99 %]      CBC:  Recent Labs   Lab 05/04/22  0749 05/05/22  0536 05/06/22  0513   WBC 6.16 5.91 5.61   RBC 2.74* 2.92* 2.72*   HGB 9.1* 9.7* 9.2*   HCT 27.9* 28.6* 26.9*   * 112* 109*   * 98 99*   MCH 33.2* 33.2* 33.8*   MCHC 32.6 33.9 34.2     CMP:     Recent Labs   Lab 05/04/22  0749 05/05/22  0536 05/06/22  0513   * 131* 120*   CALCIUM 8.3* 7.9* 8.5*   ALBUMIN 2.4* 2.2* 2.3*   PROT 5.9* 5.3* 5.5*    141 139   K 3.3* 2.8* 3.5   CO2 30* 30* 32*   CL 98 101 96   BUN 18 14 15   CREATININE 1.4 1.0 1.0   ALKPHOS 85 81 89   ALT 29 30 28   AST 46* 38 39   BILITOT 0.5 0.5 0.6       Dietary Orders:  Diet Orders  Report           Diet Cardiac: Cardiac starting at 05/06 0815            Based on the following criteria, this patient qualifies for intravenous to oral conversion:  [x] The patients gastrointestinal tract is functioning (tolerating medications via oral or enteral route for 24 hours and tolerating food or enteral feeds for 24 hours).  [x] The patient is hemodynamically stable for 24 hours (heart rate <100 beats per minute, systolic blood pressure >99 mm Hg, and respiratory rate <20 breaths per minute).  [x] The patient shows clinical improvement (afebrile for at least 24 hours and white blood cell count downtrending or normalized). Additionally, the patient must be non-neutropenic (absolute neutrophil count >500 cells/mm3).  [x] For antimicrobials, the patient has received IV therapy for at least 24 hours.    IV medication  fluconazole will be changed to oral medication fluconazole    Pharmacist's Name: Georgia Angel  Pharmacist's Extension: [phone]

## 2022-05-06 NOTE — PLAN OF CARE
Problem: Occupational Therapy  Goal: Occupational Therapy Goal  Description: Goals to be met by: 5/25/2022    Patient will increase functional independence with ADLs by performing:    UE Dressing with Modified Pettis.  LE Dressing with Modified Pettis and Assistive Devices as needed.  Grooming while standing at sink with Supervision.  Toileting from toilet with Supervision for hygiene and clothing management.   Rolling to Bilateral with Modified Pettis.   Supine to sit with Modified Pettis.  Step transfer with Supervision  Toilet transfer to toilet with Supervision.  Increased functional strength to WFL for ADLS.  Upper extremity exercise program x 10 reps per handout, with supervision.    Outcome: Ongoing, Progressing

## 2022-05-06 NOTE — DISCHARGE SUMMARY
[facility] Hospital Medicine Discharge Summary    Primary Team: [facility] Hospitalist Team B  Attending Physician: Tigre Sharma MD  Resident: Gita  Intern:     Date of Admit: 5/4/2022  Date of Discharge: 5/6/2022    Discharge to: Home  Condition: Stable    Discharge Diagnoses     Patient Active Problem List   Diagnosis    Chronic atrial fibrillation    Anticoagulation monitoring by pharmacist    Atrial flutter    RBBB (right bundle branch block with left anterior fascicular block)    Essential (primary) hypertension    CAD (coronary artery disease)    History of CVA (cerebrovascular accident)    Sinoatrial node dysfunction    Cardiac pacemaker in situ    Type 2 diabetes mellitus with neurologic complication    Chronic combined systolic and diastolic congestive heart failure    Type II diabetes mellitus with peripheral circulatory disorder    Hereditary and idiopathic peripheral neuropathy    Mixed dyslipidemia    S/P AVR    Tachy-ann syndrome    Type 2 diabetes mellitus, controlled    Peripheral vascular disease    (HFpEF) heart failure with preserved ejection fraction    Osteoporosis    Non-melanoma skin cancer    Long term current use of anticoagulant therapy    Bruising    Hypothyroidism due to acquired atrophy of thyroid    Chronic pain    Dysphagia    Intra-abdominal abscess    Aortic valve disorder    Disorder of prostate    Primary malignant neoplasm of prostate    Sepsis with acute hypoxic respiratory failure    Elevated troponin    Hypomagnesemia    Abdominal wall abscess    CKD (chronic kidney disease) stage 3, GFR 30-59 ml/min    ATN (acute tubular necrosis)    BMI 31.0-31.9,adult    Acute on chronic congestive heart failure    Pneumonia of left lower lobe due to infectious organism     Consultants and Procedures     Consultants:  General Surgery  IR  ID  Palliative Care    Procedures:   Ultrasound-guided drain placement    Imaging:  CT chest, abdomen, pelvis done at OSH  prior to transfer    Brief History of Present Illness      Eddie Parada Sr. is a 89 y.o. man with past medical history of afib, pacemaker, aortic stenosis s/p repair, and COPD who presented on 5/4/2022 for ongoing complications associated of an anterior abdominal wall mass secondary to ventral hernia mesh placed ~20 years ago. Drain recently removed prior to presentation, with return of symptoms, re-accumulation of fluid. General surgery was consulted who states patient is not a good surgical candidate. Patient underwent US-guided drain placement with IR 5/4, which he tolerated well. Initiated BSA with vanc, cefepime, flagyl, and azithromycin (for atypicals/CAP). Consulted ID for long-term IV Abx who recommends discharging patient on vanc, ceftriaxone, PO flagyl, and PO diflucan for at least 2 weeks and close follow-up with ID for decision making based on clinical course. Because patient has had several hospitalizations in last few months due to complications from abdominal wall abscess, consulted palliative care to have on board and to establish outpatient follow-up. Patient was discharged home with abdominal drain in place, 4 weeks of vanc, ceftriaxone, PO flagyl, and PO diflucan, and close follow-up with ID, general surgery, palliative care, and home health. Patient remained afebrile and without abdominal pain, leukocytosis, or other issues during hospital stay.     For the full HPI please refer to the History & Physical from this admission.    Hospital Course By Problem with Pertinent Findings     #Abdominal wall abscess  - ventral hernia repair with mesh placement ~20 years ago, no complications until 3/2022 with several hospitalizations leading to drain placement and antibiotics since that time, drain removed 4/29/22   - most recent hospitalization began on 4/30/22 at [facility] and was transferred here on 5/4/22 for surgical and IR evaluation  - was initiated on Vanc and Zosyn at [facility]  - switched to Vanc, Cefepime, Flagyl on admission to our facility given renal function   - blood cultures from 4/30/22 no growth to date   - wound cultures from 3/3 and 5/4 NGTD  - general surgery states patient is not good surgical candidate  - underwent US-guided drain placement 5/4  - general surgery added fluconazole for yeast seen on gram stain of wound Cx  - consulted ID for assistance with Abx: discharge with IV vanc, IV ceftriaxone, PO flagyl, and PO diflucan for at least 2 weeks with close ID follow-up   - discharged patient with abdominal drain in place and 4 weeks of vanc, rocephin, flagyl, and diflucan  - follow up with general surgery for drain and ID for Abx     Recurrent issues with abdominal wall abscess  Social, goals of care  - patient with several hospitalizations in last few months for abdominal wall abscess   - prior to 2 months ago, was still independent, working and running a business  - consulted palliative care to have on board and have OP follow up     #Acute hypoxic respiratory failure   Acute on chronic HFpEF exacerbation vs Pneumonia  - approx 1 week of dyspnea and sputum production with cough, coinciding with abdominal drain removal as above  - no associated fevers or chills  - At [facility] required oxygen via NC, , given Bumex without significant improvement   - TTE on 3/3/22 with LVEF 55%, several L and R atrial enlargement, mild to moderate TR   - diuresing with IV lasix with good urine output  - current antibiotic regimen vanc, cefepime, flagyl  - added azithromycin for atypical/CAP coverage  - AHRF likely 2/2 fluid overload, oxygenation and dyspnea improved with diuresis  - patient breathing well on RA on discharge     #Paroxysmal Afib  #Pacemaker placement   - pacemaker placed 2012  - continue home xarelto 20mg daily      #Aortic stenosis s/p valve repair   - bioprosthetic valve placed 2011   - continue home xarelto 20mg daily      #Macrocytic anemia  -  admission CBC with hemoglobin 9 and  and appear to be at baseline  - march 2022 folate and B12 within normal limits   - Iron studies: Fe 63, Transferrin 193, TIBC 286, Saturated Fe 22, ferritin 96  - follow up with PCP     #?CKD4  - prior to abdominal wall abscess complications, baseline GFRs >60; since that time, GFRs have remains 15-30  - concern for chronic kidney disease given duration   - does not appear to be established with nephrology outpatient   - referral placed on 3/26/22 to nephrology by Dr. Vinay Montaño      #Essential HTN  - continued home coreg 12.5mg daily, lasix 20mg daily, and norvasc 10mg daily      #T2DM  - HbA1c 6.0 on 4/30/22  - does not appear to be on any home medications   - SSI + POCT glucose while inpatient  - continue to manage with lifstyle, follow up with PCP     #CAD  - continued home coreg 12.5mg bid & pravastatin 20mg daily      #HLD  - continued home pravastatin 20mg daily      #TIA  - no residual deficits  - continue home meds xarelto 20mg daily & pravastatin 20mg daily      #COPD   - per chart review   - does not appear to be on any home medications or home oxygen   - no active issues  - breathing well on RA on discharge     #Health Care Maintenance  - pcp is Callum Roberts MD  - TSH mildly elevated 5.9 on 3/2/22, defer to PCP to recheck given current acute setting   - HbA1c 6.0 on 4/30/22  - lipid panel largely within normal limits on 3/22/22  - has received Tdap within last 10 years  - has received PCV13 and PPSV23  - has received at least 2 doses of covid19 vaccination    Discharge Medications        Medication List      START taking these medications    cefTRIAXone 1 g/50 mL Pgbk IVPB  Commonly known as: ROCEPHIN  Inject 50 mLs (1 g total) into the vein once daily.     fluconazole 100 MG tablet  Commonly known as: DIFLUCAN  Take 1 tablet (100 mg total) by mouth once daily.     GAVILAX 17 gram/dose powder  Generic drug: polyethylene glycol  Take 17 g by mouth once  daily.  Start taking on: May 7, 2022     metroNIDAZOLE 500 MG tablet  Commonly known as: FLAGYL  Take 1 tablet (500 mg total) by mouth every 8 (eight) hours.     MUCUS RELIEF  mg 12 hr tablet  Generic drug: guaiFENesin  Take 1 tablet (600 mg total) by mouth 2 (two) times daily. for 10 days     SENNA LAXATIVE 8.6 mg tablet  Generic drug: senna  Take 1 tablet by mouth once daily.  Start taking on: May 7, 2022     VANCOMYCIN 750 MG/250 ML D5W (READY TO MIX SYSTEM)  Inject 250 mLs (750 mg total) into the vein once daily.        CHANGE how you take these medications    furosemide 20 MG tablet  Commonly known as: LASIX  Take 1 tablet (20 mg total) by mouth every other day. Hold medication until 3/29  What changed:   · when to take this  · additional instructions        CONTINUE taking these medications    acetaminophen 500 MG tablet  Commonly known as: TYLENOL     amLODIPine 10 MG tablet  Commonly known as: NORVASC  TAKE 1 TABLET BY MOUTH DAILY FOR BLOOD PRESSURE     carvediloL 12.5 MG tablet  Commonly known as: COREG  TAKE 1 TABLET (12.5 MG TOTAL) BY MOUTH 2 (TWO) TIMES DAILY WITH MEALS.     doxazosin 1 MG tablet  Commonly known as: CARDURA     ferrous sulfate 325 mg (65 mg iron) Tab tablet  Commonly known as: FEOSOL     gabapentin 100 MG capsule  Commonly known as: NEURONTIN  Take 2 capsules (200 mg total) by mouth every evening.     levothyroxine 75 MCG tablet  Commonly known as: SYNTHROID  Take 1 tablet (75 mcg total) by mouth before breakfast.     pantoprazole 40 MG tablet  Commonly known as: PROTONIX  TAKE ONE TABLET BY MOUTH ONCE A DAY FOR STOMACH PROBLEMS     potassium chloride SA 10 MEQ tablet  Commonly known as: K-DUR,KLOR-CON  Take 1 tablet (10 mEq total) by mouth once daily.     pravastatin 20 MG tablet  Commonly known as: PRAVACHOL  TAKE 1 TABLET BY MOUTH DAILY FOR CHOLESTROL     rivaroxaban 20 mg Tab  Commonly known as: XARELTO     STOOL SOFTENER-LAXATIVE 8.6-50 mg per tablet  Generic drug:  senna-docusate 8.6-50 mg  Take 1 tablet by mouth 2 (two) times daily as needed for Constipation.     tamsulosin 0.4 mg Cap  Commonly known as: FLOMAX  Take 1 capsule (0.4 mg total) by mouth once daily.        STOP taking these medications    AMPICILLIN/SULBACTAM 3 G/100 ML NS (READY TO MIX)           Where to Get Your Medications      These medications were sent to [facility]  [address], [place] [address]    Hours: Mon-Fri, 8a-5:30p Phone: [phone]   · fluconazole 100 MG tablet  · GAVILAX 17 gram/dose powder  · metroNIDAZOLE 500 MG tablet  · MUCUS RELIEF  mg 12 hr tablet  · SENNA LAXATIVE 8.6 mg tablet  · VANCOMYCIN 750 MG/250 ML D5W (READY TO MIX SYSTEM)     Information about where to get these medications is not yet available    Ask your nurse or doctor about these medications  · cefTRIAXone 1 g/50 mL Pgbk IVPB         Discharge Information:     Diet:  Cardiac    Physical Activity:  As tolerated             Instructions:  1. Take all medications as prescribed  2. Keep all follow-up appointments  3. Return to the hospital or call your primary care physicians if any worsening symptoms such as fever, chest pain, shortness of breath, return of symptoms, or any other concerns.    Follow-Up Appointments:  ID  General surgery  Palliative Care  PCP    Cary Zapata MD  [facility] Internal Medicine HO-I  [facility] Internal Medicine Team B

## 2022-05-06 NOTE — PLAN OF CARE
Pt AAOx3. Daughter at bedside. Medications administered per order. No difficulty swallowing. Tolerating clears. 2L NC. Pt denies pain, discomfort, or nausea. Drain site CDI, accordian flushed per orders. Cardiac monitor maintained. Encouraged to call with questions/concerns/assistance. Safety.

## 2022-05-06 NOTE — PLAN OF CARE
"ID follow-up scheduled. CM requested PCP and Surgery follow-up from access navigator. I also requested wheelchair order from team.    Per MD team, patient refusing placement. He wants to go back with (was previously set up with HH and [facility])    CM met with patient and wife. Confirmed they want to go home at discharge. Wife requests a "light weight wheelchair." Order placed, YVAN notified Tiesha with [facility].    1159--Correct transport chair order placed. Tiesha with [facility] notified.    Pt has [employer], and since the w/c is a rental item [facility] requires authorization.  Once I get authorization, I'll be able to schedule delivery.    1445--HH and Home Infusions referral sent to [facility]. Awaiting HH orders.    I called [facility] and notified them of discharge today. They informed me will need auth from [facility]. CM sent referral, awaiting the HH orders.    1440--Dr. Pelayo notified need specifics for labs placed on orders.    1510--Lillie with [facility]  stated medication will be delivered to home. Jenna at [facility] stated she has Epic access, she will pull up orders. I called [facility] and they will look out for orders sent.     1609--CM spoke with wife. She stated Dr. Gonzalez said they need to see Dr. Haji. CM requested from access navigator appointment to be changed.     MD Jesus Patel MDSt. [facility] Wound [phone] [facility] [id number] Steps: Follow upAppointment: Instructions: Follow-Up Requested    PCP, PALLIATIVE, ID, AND SURGERY APPOINTMENTS SCHEDULED.    Patient Contacts    Name Relation Home Work Mobile   Jessica Parada Spouse [phone]   Diana Romero Daughter [phone]     Lorin Robins Daughter [phone]         Future Appointments   Date Time Provider Department Center   5/11/2022  1:30 PM Georgia Ann MD [facility]   5/20/2022  2:45 PM Soheila Mendoza MD [facility]   6/7/2022  9:00 " AM Isiah Torres Jr., MD [facility] [phone] [facility] [place] [address] Next Steps: Follow upAppointment: Instructions:     [phone] [facility] [place] [address] Next Steps: Follow up    [facility] [phone] [facility] [place] [address] Next Steps: Follow upAppointment: Instructions: [facility] [employer] [facility]       05/06/22 1507   Final Note   Assessment Type Final Discharge Note   Anticipated Discharge Disposition Home-Health  (IV INFUSIONS)   Hospital Resources/Appts/Education Provided Appointments scheduled by Navigator/Coordinator   Post-Acute Status   Post-Acute Authorization Home Health;IV Infusion   Home Health Status Pending Payor Review   IV Infusion Status Awaiting delivery   Discharge Delays None known at this time     Beatriz Arevalo RN    [phone]

## 2022-05-06 NOTE — PLAN OF CARE
Problem: Occupational Therapy  Goal: Occupational Therapy Goal  Description: Goals to be met by: 5/25/2022    Patient will increase functional independence with ADLs by performing:    UE Dressing with Modified Saunders.  LE Dressing with Modified Saunders and Assistive Devices as needed.  Grooming while standing at sink with Supervision.  Toileting from toilet with Supervision for hygiene and clothing management.   Rolling to Bilateral with Modified Saunders.   Supine to sit with Modified Saunders.  Step transfer with Supervision  Toilet transfer to toilet with Supervision.  Increased functional strength to WFL for ADLS.  Upper extremity exercise program x 10 reps per handout, with supervision.    Outcome: Ongoing, Progressing     Patient tolerated activity fairly, but becomes SOB and fatigued with ADL tasks. Patient would benefit from SNF upon D/C to improve strength and endurance.

## 2022-05-06 NOTE — PLAN OF CARE
Problem: Physical Therapy  Goal: Physical Therapy Goal  Description: Goals to be met by:2022  Patient will increase functional independence with mobility by performin. Supine to sit with Modified Motley  2. Sit to supine with Modified Motley  3. Rolling to Left and Right with Modified Motley.  4. Sit to stand transfer with Stand-by Assistance  5. Gait  x 200 feet with Stand-by Assistance using Rolling Walker.     Outcome: Ongoing, Progressing

## 2022-05-06 NOTE — CONSULTS
Consult Note  Palliative Care    Consult Requested By: Tigre Sharma MD  Reason for Consult: Establish care    SUBJECTIVE:     History of Present Illness:  Disease Process: Nonhealing abdominal wall abscess    89M with PMH of COPD, aortic stenosis s/p repair, Afib s/p PPM and PSHx of ventral hernia repair placed in ~2000 that recently (3/3/22) developed an abscess adjacent to the mesh implant resulting in septic shock for which pt was admitted and underwent IR drainage, discharged 3/8/22 to continue home IV abx via PICC.    Postprocedural course has been complicated with back-to-back admissions for complications of same abscess:    3/12/22: readmitted for worsening pain, imaging showed enlargement of abscess, abx were changed and he was discharged 3/14/22.    3/19/22: readmitted for encephalopathy and NIK, underwent negative seizure workup, discharged on 3/26/22.    (Abdominal wall drain removed 4/29/22 at surgery clinic)    4/30/22: admitted to [facility] for CHF exac, LLL PNA and worsening abscess confirmed by imaging. Started on vancomycin and zosyn.    5/4/22: Transferred to [facility]. Vitals and labs unrevealing. Abx changed to vancomycin, cefepime and flagyl to limit salt load. Gen surg and IR consulted; surgery reports poor op candidate and recommended repeat IR drainage. IR assesses abscess will not resolve without surgery and thus may require indefinite drainage; patient understood and consented to drain placement done the afternoon of 5/4 with 120cc bloody fluid removed and cultured, to date growing skin raul without a dominant pathogen.    5/5/22: Diuresing well with improved abdominal pain and no constitutional symptoms. Surgery signed off. ID consulted and recommended changing cefepime to ceftriaxone due to hx of cefepime neurotoxicity.    5/6/22: Feeling much improved requesting discharge to home.    Palliative has been consulted to establish care.    At time of interview pt is AAOx3 in NAD, OOB  to chair, fully conversant with good mood and congruent affect. Had difficulty naming current President otherwise fully oriented and at baseline per his spouse, Jessica, at bedside. Per family request included pt's daughter Diana on speakerphone during interview.    Pt comes from home with his wife of 30 years and uses a cane or walker around the house, uses a ramp to get in and out without difficulty, has never fallen. The past few months have been difficult however pt states he has never had deconditioning and always ambulates while he is admitted. At baseline pt cannot put on his own shoes due to back stiffness otherwise carries out ADLs. Pt felt fully recovered at the time the abscess drain was briefly removed; he states he sought attention for CHF symptoms rather than reforming abscess and now suspects that if it were not for the edema he may have waited longer and become sicker.     He accepts that the drain is likely to be permanent and will have home health services and nurse visits to care for his drain and PICC along with his spouse and daughter who are former nurses.    Pt's daughter Diana requested clarification as to reason for consult and inquired whether pt would be referred for hospice; in my assessment pt has no terminal diagnosis at this time and hospice is not appropriate. We discussed the role of intermediate palliative care including symptom management and efforts to maintain independence and normalcy while dealing with a challenging condition; fortunately pt continues to respond well to drainage with both restorative and palliative goals and I anticipate he will maintain his baseline function after discharge to home with home PT.    Pt and spouse are very agreeable to outpatient monthly or bimonthly palliative visits, either telemedicine if pt is doing well, or in-person if there are changes in status or symptoms that require hands-on assessment.    Left ACP planning booklet for pt and  spouse to peruse at their leisure and discuss in detail during our initial clinic visit.    Past Medical History:   Diagnosis Date    Abdominal fibromatosis     Acute posthemorrhagic anemia 01/09/2018    NIK (acute kidney injury) 01/11/2018    Aortic stenosis 02/25/2014    Atrial fibrillation     Bradycardia     Broken arm     CAD (coronary artery disease)     Cancer     basal cell on nose and melanoma on back    CHF (congestive heart failure)     COPD (chronic obstructive pulmonary disease)     Diabetes mellitus type II     Encounter for blood transfusion     Food impaction of esophagus, history of 03/03/2022    Hyperlipidemia     Localization-related focal epilepsy with simple partial seizures 03/02/2021    Melanoma     Obesity (BMI 30.0-34.9) 09/13/2016    Obesity, diabetes, and hypertension syndrome 09/13/2016    Osteoporosis     Other dysphagia 05/27/2021    Peripheral vascular disease     Pneumonia of left lower lobe due to infectious organism 04/30/2022    Primary malignant neoplasm of prostate 03/03/2022    S/P ORIF (open reduction internal fixation) fracture 11/27/2017    Sepsis with acute hypoxic respiratory failure     Septic shock 03/03/2022    Stroke     TIA (transient ischemic attack)     Type II diabetes mellitus with peripheral circulatory disorder     Type II or unspecified type diabetes mellitus without mention of complication, not stated as uncontrolled     Unspecified essential hypertension      Past Surgical History:   Procedure Laterality Date    AORTIC VALVE REPLACEMENT      CARDIAC PACEMAKER PLACEMENT  9/28/2012    CARDIAC VALVE REPLACEMENT  11/17/2011    aortic valve-tissue    CHOLECYSTECTOMY      COLONOSCOPY      ESOPHAGOGASTRODUODENOSCOPY N/A 5/27/2021    Procedure: EGD (ESOPHAGOGASTRODUODENOSCOPY);  Surgeon: Gualberto Medrano MD;  [facility];  Service: Endoscopy;  Laterality: N/A;    ESOPHAGOGASTRODUODENOSCOPY N/A 6/22/2021     Procedure: EGD (ESOPHAGOGASTRODUODENOSCOPY);  Surgeon: Gualberto Medrano MD;  Location[facility];  Service: Endoscopy;  Laterality: N/A;  please call wife(Jessica) with instructions/arrival time.    ESOPHAGOGASTRODUODENOSCOPY (EGD) WITH DILATION  2021    EYE SURGERY Bilateral     cataract with lens by  at [facility]    HERNIA REPAIR      abdominal    LASIK      UPPER GASTROINTESTINAL ENDOSCOPY  2021     Family History   Problem Relation Age of Onset    Hypertension Father     Stroke Father     Alcohol abuse Father     Heart disease Brother     COPD Sister     Cancer Brother         Lung    Diabetes Daughter     No Known Problems Son     COPD Brother     No Known Problems Daughter     No Known Problems Son     Prostate cancer Neg Hx     Kidney disease Neg Hx      Social History     Tobacco Use    Smoking status: Former Smoker     Quit date: 1996     Years since quittin.6    Smokeless tobacco: Never Used    Tobacco comment: cigar smoker   Substance Use Topics    Alcohol use: No     Comment: none since     Drug use: No     Mental Status: Oriented x3    ECOG Performance Status Grade: 2 - Ambulates, capable of self care only    Review of Systems:  No active complaints.    Review of Symptoms    Symptom Assessment (ESAS 0-10 Scale)  Pain:  0  Dyspnea:  0  Anxiety:  0  Nausea:  0  Depression:  0  Anorexia:  0  Fatigue:  0  Insomnia:  0  Restlessness:  0  Agitation:  0     CAM / Delirium:  Negative  Constipation:  Negative  Diarrhea:  Negative    Bowel Management Plan (BMP):  Yes      Comments:  Senna and miralax    Modified Julian Scale:  0    ECOG Performance Status rdGrdrrdarddrderd:rd rd3rd Psychosocial/Cultural: Active in community, supported by extended family    Spiritual:  F - Danielle and Belief:  Sikh  I - Importance:  Low  C - Community:  Varsha Plummer - Address in Care:  Explore further at followup     Time-Based Charting:  Yes  Chart Review: 25 minutes  Face to Face:  20 minutes  Symptom Assessment: 10 minutes  Coordination of Care: 10 minutes  Discharge Planning: 10 minutes  Advance Care Planning: 15 minutes  Goals of Care: 20 minutes    Total Time Spent: 110 minutes      Advance Care Planning   Advance Directives:   Living Will: No        Oral Declaration: No    LaPOST: No    Do Not Resuscitate Status: No    Medical Power of : Yes    Agent's Name:  Jessica Parada (legal spouse)   Agent's Contact Number:  [phone]    Decision Making:  Patient answered questions and Family answered questions         OBJECTIVE:     Physical Exam  Vitals reviewed.   Constitutional:       Appearance: Normal appearance. He is not ill-appearing.   HENT:      Head: Normocephalic and atraumatic.      Comments: Bilateral hearing loss worse on left     Mouth/Throat:      Mouth: Mucous membranes are dry.   Eyes:      Extraocular Movements: Extraocular movements intact.      Conjunctiva/sclera: Conjunctivae normal.      Pupils: Pupils are equal, round, and reactive to light.   Cardiovascular:      Rate and Rhythm: Normal rate and regular rhythm.      Pulses: Normal pulses.      Heart sounds: Normal heart sounds. No murmur heard.    No friction rub. No gallop.   Pulmonary:      Effort: Pulmonary effort is normal.      Breath sounds: Normal breath sounds. No wheezing, rhonchi or rales.   Abdominal:      General: Abdomen is flat.      Palpations: Abdomen is soft. There is mass (midline subcutaneous mass).      Comments: Accordion drain with scant bloody output   Musculoskeletal:         General: Normal range of motion.      Cervical back: Normal range of motion and neck supple.      Right lower leg: No edema.      Left lower leg: No edema.      Comments: RUE PICC c/d/i   Skin:     General: Skin is warm and dry.      Capillary Refill: Capillary refill takes less than 2 seconds.   Neurological:      General: No focal deficit present.      Mental Status: He is alert and oriented to person, place, and  time.   Psychiatric:         Mood and Affect: Mood normal.         Behavior: Behavior normal.         Thought Content: Thought content normal.         Judgment: Judgment normal.       ASSESSMENT/PLAN:     89M with PMH of COPD, aortic stenosis s/p repair, Afib s/p PPM and PSHx of ventral hernia repair placed in ~2000 that recently (3/3/22) developed an abscess adjacent to the mesh implant resulting in septic shock for which pt was admitted and underwent IR drainage, discharged 3/8/22 to continue home IV abx via PICC but was rapidly readmitted for expanding abscess that responded to adjusted abx. Pt markedly improved by late April and the drain was removed however the abscess rapidly reaccumulated leading to readmission. Now has replaced IR drain which is likely to remain indefinitely. Clinically much improved. Plan is for discharge to home with health services and continued abx. Palliative consulted to establish care.    Pt has no life limiting dx at this time and is functionally preserved despite recent repeated institutional care. Today's visit was introductory and I have provided pt with our ACP planning literature to facilitate family discussion and detailed review at our upcoming palliative clinic visit on 6/7/22.    Recommendations:  Medical: maintain IR drain for the foreseeable future; abx per ID  Symptom Management: no complaints at this time; avoid anticholinergics and benzos  Psychosocial: has full decisional capacity, some memory lapses but has good insight and motivation  Legal: legal spouse, Jessica, is unshared MPOA  Prognosis: > 6 months and not qualified for hospice at this time    Isiah Torres MD  Hospice and Palliative Medicine  Palliative Care Pager: [phone]    Advance Care Planning     Date: 05/06/2022    [facility]  I engaged the patient and family in a conversation about advance care planning and we specifically addressed what the goals of care would be moving forward, in light of the patient's  change in clinical status, specifically nonhealing abdominal wall abscess.  We did specifically address the patient's likely prognosis, which is good .  We explored the patient's values and preferences for future care.  The patient and family endorses that what is most important right now is to focus on spending time at home, avoiding the hospital and remaining as independent as possible    Accordingly, we have decided that the best plan to meet the patient's goals includes continuing with treatment    I did not explain the role for hospice care at this stage of the patient's illness, including its ability to help the patient live with the best quality of life possible.  We will not be making a hospice referral.    I spent a total of 35 minutes engaging the patient in this advance care planning discussion.

## 2022-05-06 NOTE — PROGRESS NOTES
[facility] Medicine Progress Note    Primary Team: [facility] Hospitalist Team B  Attending Physician: Tigre Sharma MD  Resident: Gita  Intern: Do    Subjective:      No acute events overnight. Denies fevers, chills, nausea, vomiting, CP, SOB, abdominal pain. Continues to have good urine output with lasix. Patient states that he would like to be home.      Objective:     Last 24 Hour Vital Signs:  BP  Min: 118/56  Max: 140/84  Temp  Av.2 °F (36.2 °C)  Min: 96.1 °F (35.6 °C)  Max: 98.6 °F (37 °C)  Pulse  Av  Min: 59  Max: 78  Resp  Av  Min: 16  Max: 20  SpO2  Av %  Min: 93 %  Max: 99 %  I/O last 3 completed shifts:  In: 1096.6 [P.O.:490; Other:10; IV Piggyback:596.6]  Out: 3856 [Urine:3795; Other:60; Stool:1]    Physical Examination:  General: No acute distress, resting comfortably   HEENT: Atraumatic, normocephalic, moist mucous membranes  Cardiovascular: Regular rate and rhythm, normal S1 and S2, no extra heart sounds or murmurs appreciated   Chest wall: Non-tender to palpation, no gross deformities noted   Back: Non-tender to palpation, no abnormal curvature noted, symmetric rise with respiration   Respiratory: non-labored breathing, no accessory muscle use noted, clear to auscultation bilaterally  Abdominal: firm region noted anterior abdominal wall supraumbilical, normoactive bowel sounds, non-tender to palpation, no rebound tenderness, no guarding; drain in place with no surrounding erythema, swelling, or tenderness; about 50ml of blood in drain   Extremities: Non-edematous, moving all four extremities  Pulses: 2+ and symmetric radial artery, dorsalis pedis artery, posterior tibial artery  Skin: Non-diaphoretic, non-jaundice  Neurologic: No gross focal neurologic deficits noted     Laboratory:  Recent Labs   Lab 22  0749 22  0536 22  0513   WBC 6.16 5.91 5.61   HGB 9.1* 9.7* 9.2*   HCT 27.9* 28.6* 26.9*   * 112* 109*   * 98 99*   RDW 13.8 13.3 13.5     141 139   K 3.3* 2.8* 3.5   CL 98 101 96   CO2 30* 30* 32*   BUN 18 14 15   CREATININE 1.4 1.0 1.0   * 131* 120*   PROT 5.9* 5.3* 5.5*   ALBUMIN 2.4* 2.2* 2.3*   BILITOT 0.5 0.5 0.6   AST 46* 38 39   ALKPHOS 85 81 89   ALT 29 30 28     Laboratory Data Reviewed:  All laboratory data reviewed.     Microbiology Data Reviewed:  Pertinent Findings:  Resp Cx 5/2 with moderate GNR and GPC on gram stain  Abscess/wound Cx 5/4 pending    Other Results:  EKG (my interpretation): No new    Radiology Data Reviewed:   Pertinent Findings:  CT C/A/P 5/3  Impression:  As compared to the previous study, the patient has abdominal drain has been removed.  There has been reaccumulation of fluid and air along the anterior abdominal wall extending more to the left of the abdomen involving the patient's abdominal wall mesh, highly concerning for abscess formation.  The small bowel appears to be adherent to this collection of fluid and air an underlying involvement/fistulization is difficult to entirely exclude although felt unlikely.  There is extensive whole-body anasarca with asymmetric abnormal edema seen in the left abdominal wall primarily in the flank and extending into the left chest.     Moderate right with large left pleural effusion with adjacent passive atelectasis.  There does appear to be complete collapse of the left lower lobe.  Component of developing infiltrate not entirely excluded.    Current Medications:     Infusions:       Scheduled:   amLODIPine  10 mg Oral Daily    azithromycin  500 mg Oral Daily    carvediloL  12.5 mg Oral BID WM    ceFEPime (MAXIPIME) IVPB  1 g Intravenous Q12H    doxazosin  2 mg Oral Nightly    fluconazole (DIFLUCAN) IV (PEDS and ADULTS)  400 mg Intravenous Q24H    furosemide (LASIX) injection  40 mg Intravenous Daily    gabapentin  200 mg Oral QHS    guaiFENesin  600 mg Oral BID    levalbuterol  1.25 mg Nebulization Q8H    levothyroxine  75 mcg Oral Before breakfast     metroNIDAZOLE  500 mg Oral Q8H    polyethylene glycol  17 g Oral Daily    pravastatin  20 mg Oral Daily    senna  8.6 mg Oral Daily    tamsulosin  0.4 mg Oral Daily    vancomycin (VANCOCIN) IVPB  750 mg Intravenous Q24H        PRN:  acetaminophen, dextrose 10%, glucose, insulin aspart U-100, naloxone, sodium chloride 0.9%, Pharmacy to dose Vancomycin consult **AND** vancomycin - pharmacy to dose    Antibiotics and Day Number of Therapy:  Azithromycin 500mg IV daily - started 5/4  Cefepime 1g q12h - started 5/4  Vancomycin a24h - started 5/4  Flagyl 500mg q8h - started 5/4    Lines and Day Number of Therapy:  PICC RUE 3/7  Abdominal drain 5/4    Assessment:     Eddie Maysclalew ToussaintMonse is a 89 y.o. man with past medical history of afib, pacemaker, aortic stenosis s/p repair, and COPD who presented on 5/4/2022 for ongoing complications associated of an anterior abdominal wall mass secondary to ventral hernia mesh placed ~20 years ago. Drain recently removed prior to presentation, with return of symptoms. General surgery consulted who states patient is not a good surgical candidate. Patient underwent US-guided drain placement with IR yesterday, tolerated well. BSA with vanc, cefepime, flagyl, and azithromycin (for atypicals). Will discuss with family GOC and discharge planning.      Plan:     #Abdominal wall abscess  - ventral hernia repair with mesh placement ~20 years ago, no complications until 3/2022 with several hospitalizations leading to drain placement and antibiotics since that time, drain removed 4/29/22   - most recent hospitalization began on 4/30/22 at [facility] and was transferred here on 5/4/22 for surgical and IR evaluation  - was initiated on Vanc and Zosyn at [facility]  - switched to Vanc, Cefepime, Flagyl on admission to our facility given renal function   - blood cultures from 4/30/22 no growth to date   - general surgery states patient is not good surgical candidate  - underwent  US-guided drain placement 5/4  - general surgery added fluconazole for yeast seen on gram stain of wound Cx  - consulted ID for assistance with Abx, can continue vanc, flagyl, fluconazole for now; consider changing cefepime to CTX due to previous AMS    Recurrent issues with abdominal wall abscess  Social, goals of care  - patient with several hospitalizations in last few months for abdominal wall abscess   - prior to 2 months ago, was still independent, working and running a business  - consulted palliative care to have on board and have OP follow up  - PT recommends SNF on discharge, but will need to discuss with patient/family if this is consistent with what they want     #Acute hypoxic respiratory failure   Acute on chronic HFpEF exacerbation vs Pneumonia  - approx 1 week of dyspnea and sputum production with cough, coinciding with abdominal drain removal as above  - no associated fevers or chills  - At [facility] required oxygen via NC, , given Bumex without significant improvement   - TTE on 3/3/22 with LVEF 55%, several L and R atrial enlargement, mild to moderate TR   - diuresing with IV lasix with good urine output  - currently satting well on 2-3L NC without conversational dyspnea   - current antibiotic regimen vanc, cefepime, flagyl  - added azithromycin for atypical/CAP coverage  - consulted ID for assistance with Abx, can continue vanc, flagyl, fluconazole for now; consider changing cefepime to CTX due to previous AMS     #Paroxysmal Afib  #Pacemaker placement   - pacemaker placed 2012  - continue home xarelto 20mg daily      #Aortic stenosis s/p valve repair   - bioprosthetic valve placed 2011   - continue home xarelto 20mg daily      #Macrocytic anemia  - admission CBC with hemoglobin 9 and  and appear to be at baseline  - march 2022 folate and B12 within normal limits   - Iron studies: Fe 63, Transferrin 193, TIBC 286, Saturated Fe 22, ferritin 96  - CTM     #?CKD4  - prior to  abdominal wall abscess complications, baseline GFRs >60; since that time, GFRs have remains 15-30  - concern for chronic kidney disease given duration   - does not appear to be established with nephrology outpatient   - referral placed on 3/26/22 to nephrology by Dr. Vinay Montaño      #Essential HTN  - continued home coreg 12.5mg daily, lasix 20mg daily, and norvasc 10mg daily      #T2DM  - HbA1c 6.0 on 4/30/22  - does not appear to be on any home medications   - SSI + POCT glucose     #CAD  - continued home coreg 12.5mg bid & pravastatin 20mg daily      #HLD  - continued home pravastatin 20mg daily      #TIA  - no residual deficits  - continued home meds xarelto 20mg daily & pravastatin 20mg daily      #COPD   - per chart review   - does not appear to be on any home medications or home oxygen   - no active issues  - current respiratory status as above      #Health Care Maintenance  - pcp is Callum Roberts MD  - TSH mildly elevated 5.9 on 3/2/22, defer to PCP to recheck given current acute setting   - HbA1c 6.0 on 4/30/22  - lipid panel largely within normal limits on 3/22/22  - has received Tdap within last 10 years  - has received PCV13 and PPSV23  - has received at least 2 doses of covid19 vaccination     Diet: cardiac   VTE PPx: Xarelto  Code Status: Full    Dispo: pending discharge planning and IV abx; PT recommends SNF placement, will discus with family     Cary Zapata MD  [facility] Medicine HO-I  [facility] Internal Medicine Team B    [facility] Medicine Hospitalist Pager numbers:   [facility] Hospitalist Medicine Team A (Zeenat/Donna): [phone]  [facility] Hospitalist Medicine Team B (Lidia/Edith):  [phone]

## 2022-05-09 ENCOUNTER — PATIENT OUTREACH (OUTPATIENT)
Dept: ADMINISTRATIVE | Facility: CLINIC | Age: 87
End: 2022-05-09
Payer: MEDICARE

## 2022-05-09 ENCOUNTER — PATIENT OUTREACH (OUTPATIENT)
Dept: ADMINISTRATIVE | Facility: OTHER | Age: 87
End: 2022-05-09
Payer: MEDICARE

## 2022-05-09 DIAGNOSIS — L02.211 ABDOMINAL WALL ABSCESS: Primary | ICD-10-CM

## 2022-05-09 PROCEDURE — G0180 MD CERTIFICATION HHA PATIENT: HCPCS | Mod: ,,, | Performed by: INTERNAL MEDICINE

## 2022-05-09 PROCEDURE — G0180 PR HOME HEALTH MD CERTIFICATION: ICD-10-PCS | Mod: ,,, | Performed by: INTERNAL MEDICINE

## 2022-05-09 NOTE — PROGRESS NOTES
IP Liaison - Final Visit Note    Patient: Eddie Parada Sr.  MRN:  [id number]  Date of Service:  5/9/2022  Completed by:  CM Crowley    Reason for Visit   Patient presents with    IP Liaison Chart Review        Patient Summary     Discharge Date: 5/6/2022  Discharge telephone number/address: [phone] / [address] [address]  Follow up provider: Georgia Ann MD  Follow up appointments: 5/11/2022 @ 1:30pm  Home Health agency & telephone number: [facility]   DME ordered &  name: n/a  Assigned OPCM RN/SW: n/a  Report sent to follow up team (PCP/OPCM) via in basket message: n/a  Community Resources arranged including agency name & contact info: n/a      CM Crowley

## 2022-05-10 ENCOUNTER — HOSPITAL ENCOUNTER (OUTPATIENT)
Dept: RADIOLOGY | Facility: HOSPITAL | Age: 87
Discharge: HOME | End: 2022-05-10
Attending: INTERNAL MEDICINE
Payer: MEDICARE

## 2022-05-10 DIAGNOSIS — L02.211 ABDOMINAL WALL ABSCESS: ICD-10-CM

## 2022-05-10 LAB — BACTERIA SPEC ANAEROBE CULT: NORMAL

## 2022-05-11 ENCOUNTER — OFFICE VISIT (OUTPATIENT)
Dept: INTERNAL MEDICINE | Facility: CLINIC | Age: 87
End: 2022-05-11
Payer: MEDICARE

## 2022-05-11 ENCOUNTER — TELEPHONE (OUTPATIENT)
Dept: INTERNAL MEDICINE | Facility: CLINIC | Age: 87
End: 2022-05-11
Payer: MEDICARE

## 2022-05-11 VITALS
OXYGEN SATURATION: 94 % | SYSTOLIC BLOOD PRESSURE: 110 MMHG | RESPIRATION RATE: 16 BRPM | WEIGHT: 179 LBS | HEART RATE: 64 BPM | BODY MASS INDEX: 29.82 KG/M2 | DIASTOLIC BLOOD PRESSURE: 52 MMHG | HEIGHT: 65 IN

## 2022-05-11 DIAGNOSIS — E87.6 HYPOKALEMIA: ICD-10-CM

## 2022-05-11 DIAGNOSIS — Z95.828 S/P PICC CENTRAL LINE PLACEMENT: ICD-10-CM

## 2022-05-11 DIAGNOSIS — Z09 HOSPITAL DISCHARGE FOLLOW-UP: Primary | ICD-10-CM

## 2022-05-11 DIAGNOSIS — J18.9 PNEUMONIA OF LEFT LOWER LOBE DUE TO INFECTIOUS ORGANISM: ICD-10-CM

## 2022-05-11 DIAGNOSIS — L02.211 ABDOMINAL WALL ABSCESS: ICD-10-CM

## 2022-05-11 PROCEDURE — 1160F RVW MEDS BY RX/DR IN RCRD: CPT | Mod: CPTII,S$GLB,, | Performed by: INTERNAL MEDICINE

## 2022-05-11 PROCEDURE — 99214 PR OFFICE/OUTPT VISIT, EST, LEVL IV, 30-39 MIN: ICD-10-PCS | Mod: S$GLB,,, | Performed by: INTERNAL MEDICINE

## 2022-05-11 PROCEDURE — 99999 PR PBB SHADOW E&M-EST. PATIENT-LVL V: CPT | Mod: PBBFAC,,, | Performed by: INTERNAL MEDICINE

## 2022-05-11 PROCEDURE — 3288F PR FALLS RISK ASSESSMENT DOCUMENTED: ICD-10-PCS | Mod: CPTII,S$GLB,, | Performed by: INTERNAL MEDICINE

## 2022-05-11 PROCEDURE — 99999 PR PBB SHADOW E&M-EST. PATIENT-LVL V: ICD-10-PCS | Mod: PBBFAC,,, | Performed by: INTERNAL MEDICINE

## 2022-05-11 PROCEDURE — 99214 OFFICE O/P EST MOD 30 MIN: CPT | Mod: S$GLB,,, | Performed by: INTERNAL MEDICINE

## 2022-05-11 PROCEDURE — 1126F AMNT PAIN NOTED NONE PRSNT: CPT | Mod: CPTII,S$GLB,, | Performed by: INTERNAL MEDICINE

## 2022-05-11 PROCEDURE — 1111F DSCHRG MED/CURRENT MED MERGE: CPT | Mod: CPTII,S$GLB,, | Performed by: INTERNAL MEDICINE

## 2022-05-11 PROCEDURE — 1111F PR DISCHARGE MEDS RECONCILED W/ CURRENT OUTPATIENT MED LIST: ICD-10-PCS | Mod: CPTII,S$GLB,, | Performed by: INTERNAL MEDICINE

## 2022-05-11 PROCEDURE — 1101F PR PT FALLS ASSESS DOC 0-1 FALLS W/OUT INJ PAST YR: ICD-10-PCS | Mod: CPTII,S$GLB,, | Performed by: INTERNAL MEDICINE

## 2022-05-11 PROCEDURE — 1159F PR MEDICATION LIST DOCUMENTED IN MEDICAL RECORD: ICD-10-PCS | Mod: CPTII,S$GLB,, | Performed by: INTERNAL MEDICINE

## 2022-05-11 PROCEDURE — 1159F MED LIST DOCD IN RCRD: CPT | Mod: CPTII,S$GLB,, | Performed by: INTERNAL MEDICINE

## 2022-05-11 PROCEDURE — 1101F PT FALLS ASSESS-DOCD LE1/YR: CPT | Mod: CPTII,S$GLB,, | Performed by: INTERNAL MEDICINE

## 2022-05-11 PROCEDURE — 1126F PR PAIN SEVERITY QUANTIFIED, NO PAIN PRESENT: ICD-10-PCS | Mod: CPTII,S$GLB,, | Performed by: INTERNAL MEDICINE

## 2022-05-11 PROCEDURE — 3288F FALL RISK ASSESSMENT DOCD: CPT | Mod: CPTII,S$GLB,, | Performed by: INTERNAL MEDICINE

## 2022-05-11 PROCEDURE — 1160F PR REVIEW ALL MEDS BY PRESCRIBER/CLIN PHARMACIST DOCUMENTED: ICD-10-PCS | Mod: CPTII,S$GLB,, | Performed by: INTERNAL MEDICINE

## 2022-05-11 NOTE — TELEPHONE ENCOUNTER
"----- Message from Jinny Montes sent at 2022 10:18 AM CDT -----  Regarding: Request to speak to a nurse  Contact: Bijal  with [employer]  Eddie Parada Sr.  MRN: [id number]  : 1929  PCP: Callum Roberts  Home Phone      [phone]  Work Phone      Not on file.  Mobile          [phone]      MESSAGE:   Request to speak to a nurse regarding assistance with patient's picc line.   Patient was seen by [facility] on 22. It was documented the picc line was in the "wrong place, and pulled out." Patient is using the picc line "which could cause him problems."   Patient's family brought patient to [facility] to have his picc line replaced on 5/10/22.  Mr. Parada's family was notified the insurance had not approved the picc line replacement.  [employer] did improve the picc line replacement for the patient.   Bijal () is asking for assistance with patient during appointment scheduled on 22.  Appointment with Dr. Ann - 22  PCP - Dr. Roberts     Phone # [phone] - cell phone #     [facility] - [place] - [id number]    "

## 2022-05-11 NOTE — TELEPHONE ENCOUNTER
Spoke to Bijal. I advised that Dr. Ann can access the status of the picc line when she sees the patient today.

## 2022-05-11 NOTE — PROGRESS NOTES
Subjective:       Patient ID: Eddie Parada Sr. is a 89 y.o. male.    Chief Complaint: Hospital Follow Up      HPI:  Patient is new tome but known to clinic and presents for hospital follow up. He presented to [facility] and admitted for PNA. Was started on vanc and Zosyn. He had h/o abd abscess and CT during hospitalization showed re accumulation of fluid in abdominal wall. Transferred to [place] for IR drainage. ID recommended discharge on IV Vanc, ceftriaxone, PO flagyl and PO diflucan for 2 weeks. Today he reports feeling fatigue. Low appetite. Home health did labs and noted K 2.9; taking [id number] daily with his lasix. O2 sats stable since discharge (CT did show effusion with LLL collapse which may still be present)      Nurse went today for PICC dressing change. Appears PICC has been pulled out a few inches. Nurse put new dressing and he came today for me to assess. They went yesterday for new PICC but there were insurance issues so it was not changed. They are still using to give him the antibx however.     Past Medical History:   Diagnosis Date    Abdominal fibromatosis     Acute posthemorrhagic anemia 01/09/2018    NIK (acute kidney injury) 01/11/2018    Aortic stenosis 02/25/2014    Atrial fibrillation     Bradycardia     Broken arm     CAD (coronary artery disease)     Cancer     basal cell on nose and melanoma on back    CHF (congestive heart failure)     COPD (chronic obstructive pulmonary disease)     Diabetes mellitus type II     Encounter for blood transfusion     Food impaction of esophagus, history of 03/03/2022    Hyperlipidemia     Localization-related focal epilepsy with simple partial seizures 03/02/2021    Melanoma     Obesity (BMI 30.0-34.9) 09/13/2016    Obesity, diabetes, and hypertension syndrome 09/13/2016    Osteoporosis     Other dysphagia 05/27/2021    Peripheral vascular disease     Pneumonia of left lower lobe due to infectious organism 04/30/2022    Primary  malignant neoplasm of prostate 2022    S/P ORIF (open reduction internal fixation) fracture 2017    Sepsis with acute hypoxic respiratory failure     Septic shock 2022    Stroke     TIA (transient ischemic attack)     Type II diabetes mellitus with peripheral circulatory disorder     Type II or unspecified type diabetes mellitus without mention of complication, not stated as uncontrolled     Unspecified essential hypertension        Family History   Problem Relation Age of Onset    Hypertension Father     Stroke Father     Alcohol abuse Father     Heart disease Brother     COPD Sister     Cancer Brother         Lung    Diabetes Daughter     No Known Problems Son     COPD Brother     No Known Problems Daughter     No Known Problems Son     Prostate cancer Neg Hx     Kidney disease Neg Hx        Social History     Socioeconomic History    Marital status:    Tobacco Use    Smoking status: Former Smoker     Quit date: 1996     Years since quittin.6    Smokeless tobacco: Never Used    Tobacco comment: cigar smoker   Substance and Sexual Activity    Alcohol use: No     Comment: none since     Drug use: No    Sexual activity: Not Currently     Social Determinants of Health     Financial Resource Strain: Low Risk     Difficulty of Paying Living Expenses: Not hard at all   Food Insecurity: No Food Insecurity    Worried About Running Out of Food in the Last Year: Never true    Ran Out of Food in the Last Year: Never true   Transportation Needs: No Transportation Needs    Lack of Transportation (Medical): No    Lack of Transportation (Non-Medical): No   Physical Activity: Inactive    Days of Exercise per Week: 0 days    Minutes of Exercise per Session: 0 min   Stress: No Stress Concern Present    Feeling of Stress : Not at all   Social Connections: Moderately Isolated    Frequency of Communication with Friends and Family: More than three times a week     Frequency of Social Gatherings with Friends and Family: Three times a week    Attends Congregation Services: Never    Active Member of Clubs or Organizations: No    Attends Club or Organization Meetings: Never    Marital Status:    Housing Stability: Low Risk     Unable to Pay for Housing in the Last Year: No    Number of Places Lived in the Last Year: 1    Unstable Housing in the Last Year: No       Review of Systems   Constitutional: Positive for appetite change and fatigue. Negative for activity change, fever and unexpected weight change.   HENT: Negative for congestion, ear pain, hearing loss, rhinorrhea and sore throat.    Eyes: Negative for pain, redness and visual disturbance.   Respiratory: Negative for cough, shortness of breath and wheezing.    Cardiovascular: Negative for chest pain, palpitations and leg swelling.   Gastrointestinal: Negative for abdominal pain, blood in stool, constipation, diarrhea, nausea and vomiting.   Genitourinary: Negative for decreased urine volume, dysuria, frequency and urgency.   Musculoskeletal: Negative for back pain, joint swelling and neck pain.   Skin: Negative for color change, rash and wound.   Neurological: Negative for dizziness, tremors, weakness, light-headedness and headaches.         Objective:      Physical Exam  Vitals reviewed.   Constitutional:       General: He is not in acute distress.     Appearance: He is well-developed. He is obese.   HENT:      Head: Normocephalic and atraumatic.      Right Ear: External ear normal.      Left Ear: External ear normal.   Eyes:      General:         Right eye: No discharge.         Left eye: No discharge.      Extraocular Movements: Extraocular movements intact.      Conjunctiva/sclera: Conjunctivae normal.      Pupils: Pupils are equal, round, and reactive to light.   Neck:      Thyroid: No thyromegaly.   Cardiovascular:      Rate and Rhythm: Normal rate and regular rhythm.      Heart sounds: No murmur  heard.  Pulmonary:      Effort: Pulmonary effort is normal. No respiratory distress.      Breath sounds: No wheezing or rales.      Comments: Decreased left base  Abdominal:      General: Bowel sounds are normal. There is no distension.      Palpations: Abdomen is soft.      Tenderness: There is no abdominal tenderness.   Skin:     General: Skin is warm and dry.      Comments: picc insertion without signs of infection but pulled out and curled under dressing   Neurological:      Mental Status: He is alert and oriented to person, place, and time.      Cranial Nerves: No cranial nerve deficit.   Psychiatric:         Behavior: Behavior normal.         Thought Content: Thought content normal.         Assessment:       1. Hospital discharge follow-up    2. S/P PICC central line placement    3. Hypokalemia    4. Abdominal wall abscess    5. Pneumonia of left lower lobe due to infectious organism        Plan:       Eddie was seen today for hospital follow up.    Diagnoses and all orders for this visit:    Hospital discharge follow-up  Both discharge summaries reviewed  Discharge labs and home health labs reviewed  antibx management per ID    S/P PICC central line placement  -     X-Ray Chest PA And Lateral; Future  PICC appears to have been pulled out  Needs CXR to determine where the tip is and if safe to use. Midline would likely still be OK to use until it can be exchanged  This was supposed to happen yesterday but insurance issues delayed. He will be rescheduled at [facility] as this is not something that is done outpatient at [facility]  CXR today    Hypokalemia  -     Basic Metabolic Panel; Future  -     Magnesium; Future  New problem  Take 2 (20meq) KCL for next 3 days  Labs on Monday with home health    Abdominal wall abscess  Management per ID  Cont antibx for now pending CXR    Pneumonia of left lower lobe due to infectious organism  sats stable  CXR but likely does still have some left effusion given decreased breath  sounds  No fevers  Cont antibx

## 2022-05-12 ENCOUNTER — HOSPITAL ENCOUNTER (OUTPATIENT)
Dept: RADIOLOGY | Facility: HOSPITAL | Age: 87
Discharge: HOME | End: 2022-05-12
Attending: INTERNAL MEDICINE
Payer: MEDICARE

## 2022-05-12 ENCOUNTER — HOSPITAL ENCOUNTER (OUTPATIENT)
Dept: INTERVENTIONAL RADIOLOGY/VASCULAR | Facility: HOSPITAL | Age: 87
Discharge: HOME | End: 2022-05-12
Attending: INTERNAL MEDICINE
Payer: MEDICARE

## 2022-05-12 VITALS
HEART RATE: 74 BPM | TEMPERATURE: 97 F | OXYGEN SATURATION: 95 % | SYSTOLIC BLOOD PRESSURE: 129 MMHG | RESPIRATION RATE: 16 BRPM | DIASTOLIC BLOOD PRESSURE: 58 MMHG

## 2022-05-12 DIAGNOSIS — L02.211 ABDOMINAL WALL ABSCESS: ICD-10-CM

## 2022-05-12 DIAGNOSIS — Z79.01 LONG TERM CURRENT USE OF ANTICOAGULANT THERAPY: ICD-10-CM

## 2022-05-12 DIAGNOSIS — I50.33 ACUTE ON CHRONIC DIASTOLIC CONGESTIVE HEART FAILURE: Primary | ICD-10-CM

## 2022-05-12 DIAGNOSIS — Z45.2 PICC (PERIPHERALLY INSERTED CENTRAL CATHETER) IN PLACE: ICD-10-CM

## 2022-05-12 PROCEDURE — 36572 INSJ PICC RS&I <5 YR: CPT

## 2022-05-12 PROCEDURE — 71045 XR CHEST AP PORTABLE: ICD-10-PCS | Mod: 26,,, | Performed by: RADIOLOGY

## 2022-05-12 PROCEDURE — C1751 CATH, INF, PER/CENT/MIDLINE: HCPCS

## 2022-05-12 PROCEDURE — 71045 X-RAY EXAM CHEST 1 VIEW: CPT | Mod: 26,,, | Performed by: RADIOLOGY

## 2022-05-12 PROCEDURE — 71045 X-RAY EXAM CHEST 1 VIEW: CPT | Mod: TC,FY

## 2022-05-12 RX ORDER — SODIUM CHLORIDE 0.9 % (FLUSH) 0.9 %
10 SYRINGE (ML) INJECTION EVERY 6 HOURS
Status: DISCONTINUED | OUTPATIENT
Start: 2022-05-12 | End: 2022-05-13 | Stop reason: HOSPADM

## 2022-05-12 RX ORDER — SODIUM CHLORIDE 0.9 % (FLUSH) 0.9 %
10 SYRINGE (ML) INJECTION
Status: DISCONTINUED | OUTPATIENT
Start: 2022-05-12 | End: 2022-05-13 | Stop reason: HOSPADM

## 2022-05-12 NOTE — PROCEDURES
Eddie Parada Sr. is a 89 y.o. male patient.    Temp: 97.3 °F (36.3 °C) (05/12/22 0820)  Pulse: 74 (05/12/22 0820)  Resp: 16 (05/12/22 0820)  BP: (!) 129/58 (05/12/22 0820)  SpO2: 95 % (05/12/22 0820)    PICC  Date/Time: 5/12/2022 8:45 AM  Performed by: Rome Sanchez RN  Consent Done: Yes  Time out: Immediately prior to procedure a time out was called to verify the correct patient, procedure, equipment, support staff and site/side marked as required  Indications: med administration and vascular access  Anesthesia method: overwire exchange.  Anesthetic Total (mL): 0  Preparation: skin prepped with chlorhexidine (without alcohol)  Skin prep agent dried: skin prep agent completely dried prior to procedure  Sterile barriers: all five maximum sterile barriers used - cap, mask, sterile gown, sterile gloves, and large sterile sheet  Hand hygiene: hand hygiene performed prior to central venous catheter insertion  Location details: right basilic  Catheter type: double lumen  Catheter size: 5 Fr  Catheter Length: 37cm    no (overwire exchange of existing PICC)no esophageal manometryNumber of attempts: 1  Post-procedure: blood return through all ports, chlorhexidine patch and sterile dressing applied            Name Rome Sanchez RN  5/12/2022

## 2022-05-15 ENCOUNTER — HOSPITAL ENCOUNTER (INPATIENT)
Facility: HOSPITAL | Age: 87
LOS: 8 days | Discharge: HOME HEALTH | DRG: 291 | End: 2022-05-24
Attending: STUDENT IN AN ORGANIZED HEALTH CARE EDUCATION/TRAINING PROGRAM | Admitting: FAMILY MEDICINE
Payer: MEDICARE

## 2022-05-15 DIAGNOSIS — J96.21 ACUTE ON CHRONIC RESPIRATORY FAILURE WITH HYPOXEMIA: ICD-10-CM

## 2022-05-15 DIAGNOSIS — N17.9 AKI (ACUTE KIDNEY INJURY): ICD-10-CM

## 2022-05-15 DIAGNOSIS — R79.89 ELEVATED BRAIN NATRIURETIC PEPTIDE (BNP) LEVEL: Primary | ICD-10-CM

## 2022-05-15 DIAGNOSIS — K65.1 INTRA-ABDOMINAL ABSCESS: ICD-10-CM

## 2022-05-15 DIAGNOSIS — R06.02 SHORTNESS OF BREATH: ICD-10-CM

## 2022-05-15 DIAGNOSIS — I50.30 (HFPEF) HEART FAILURE WITH PRESERVED EJECTION FRACTION: ICD-10-CM

## 2022-05-15 DIAGNOSIS — J96.11 CHRONIC RESPIRATORY FAILURE WITH HYPOXIA: ICD-10-CM

## 2022-05-15 DIAGNOSIS — R53.81 DEBILITY: ICD-10-CM

## 2022-05-15 DIAGNOSIS — K21.9 GASTROESOPHAGEAL REFLUX DISEASE: ICD-10-CM

## 2022-05-15 DIAGNOSIS — J96.01 ACUTE HYPOXEMIC RESPIRATORY FAILURE: ICD-10-CM

## 2022-05-15 DIAGNOSIS — R79.89 ELEVATED D-DIMER: ICD-10-CM

## 2022-05-15 DIAGNOSIS — R79.89 ELEVATED TROPONIN: ICD-10-CM

## 2022-05-15 DIAGNOSIS — J44.9 CHRONIC OBSTRUCTIVE PULMONARY DISEASE, UNSPECIFIED COPD TYPE: ICD-10-CM

## 2022-05-15 DIAGNOSIS — R74.01 TRANSAMINITIS: ICD-10-CM

## 2022-05-15 DIAGNOSIS — I10 ESSENTIAL HYPERTENSION: ICD-10-CM

## 2022-05-15 LAB
ALBUMIN SERPL BCP-MCNC: 2.7 G/DL (ref 3.5–5.2)
ALLENS TEST: ABNORMAL
ALP SERPL-CCNC: 125 U/L (ref 55–135)
ALT SERPL W/O P-5'-P-CCNC: 30 U/L (ref 10–44)
ANION GAP SERPL CALC-SCNC: 16 MMOL/L (ref 8–16)
AST SERPL-CCNC: 72 U/L (ref 10–40)
BASOPHILS # BLD AUTO: 0.03 K/UL (ref 0–0.2)
BASOPHILS NFR BLD: 0.4 % (ref 0–1.9)
BILIRUB SERPL-MCNC: 0.7 MG/DL (ref 0.1–1)
BNP SERPL-MCNC: 578 PG/ML (ref 0–99)
BUN SERPL-MCNC: 45 MG/DL (ref 10–30)
CALCIUM SERPL-MCNC: 8.6 MG/DL (ref 8.7–10.5)
CHLORIDE SERPL-SCNC: 95 MMOL/L (ref 95–110)
CK SERPL-CCNC: 45 U/L (ref 20–200)
CO2 SERPL-SCNC: 25 MMOL/L (ref 23–29)
CREAT SERPL-MCNC: 2.1 MG/DL (ref 0.5–1.4)
D DIMER PPP IA.FEU-MCNC: 2.25 MG/L FEU
DELSYS: ABNORMAL
DIFFERENTIAL METHOD: ABNORMAL
EOSINOPHIL # BLD AUTO: 0 K/UL (ref 0–0.5)
EOSINOPHIL NFR BLD: 0.3 % (ref 0–8)
ERYTHROCYTE [DISTWIDTH] IN BLOOD BY AUTOMATED COUNT: 14.9 % (ref 11.5–14.5)
EST. GFR  (AFRICAN AMERICAN): 30 ML/MIN/1.73 M^2
EST. GFR  (NON AFRICAN AMERICAN): 26 ML/MIN/1.73 M^2
FIO2: 21 (ref 21–100)
GLUCOSE SERPL-MCNC: 180 MG/DL (ref 70–110)
HCO3 UR-SCNC: 27.7 MMOL/L
HCT VFR BLD AUTO: 27.9 % (ref 40–54)
HGB BLD-MCNC: 9.1 G/DL (ref 14–18)
IMM GRANULOCYTES # BLD AUTO: 0.04 K/UL (ref 0–0.04)
IMM GRANULOCYTES NFR BLD AUTO: 0.6 % (ref 0–0.5)
INFLUENZA A, MOLECULAR: NEGATIVE
INFLUENZA B, MOLECULAR: NEGATIVE
LACTATE SERPL-SCNC: 1.3 MMOL/L (ref 0.5–2.2)
LYMPHOCYTES # BLD AUTO: 1.1 K/UL (ref 1–4.8)
LYMPHOCYTES NFR BLD: 16.3 % (ref 18–48)
MCH RBC QN AUTO: 32.6 PG (ref 27–31)
MCHC RBC AUTO-ENTMCNC: 32.6 G/DL (ref 32–36)
MCV RBC AUTO: 100 FL (ref 82–98)
MONOCYTES # BLD AUTO: 0.8 K/UL (ref 0.3–1)
MONOCYTES NFR BLD: 11.7 % (ref 4–15)
NEUTROPHILS # BLD AUTO: 5 K/UL (ref 1.8–7.7)
NEUTROPHILS NFR BLD: 70.7 % (ref 38–73)
NRBC BLD-RTO: 0 /100 WBC
PCO2 BLDA: 38 MMHG (ref 35–45)
PH SMN: 7.47 [PH] (ref 7.35–7.45)
PLATELET # BLD AUTO: 207 K/UL (ref 150–450)
PMV BLD AUTO: 10.2 FL (ref 9.2–12.9)
PO2 BLDA: 59 MMHG (ref 75–100)
POC BE: 3.8 MMOL/L (ref -2–2)
POC COHB: 3.1 % (ref 0–3)
POC METHB: 0.8 % (ref 0–1.5)
POC O2HB ARTERIAL: 85.5 % (ref 94–100)
POC SATURATED O2: 89.1 % (ref 90–100)
POC TCO2: 28.9 MMOL/L
POC THB: 9.7 G/DL (ref 12–18)
POTASSIUM SERPL-SCNC: 3.5 MMOL/L (ref 3.5–5.1)
PROT SERPL-MCNC: 6.4 G/DL (ref 6–8.4)
RBC # BLD AUTO: 2.79 M/UL (ref 4.6–6.2)
SARS-COV-2 RDRP RESP QL NAA+PROBE: NEGATIVE
SITE: ABNORMAL
SODIUM SERPL-SCNC: 136 MMOL/L (ref 136–145)
SPECIMEN SOURCE: NORMAL
TROPONIN I SERPL DL<=0.01 NG/ML-MCNC: 0.04 NG/ML (ref 0–0.03)
WBC # BLD AUTO: 7 K/UL (ref 3.9–12.7)

## 2022-05-15 PROCEDURE — U0002 COVID-19 LAB TEST NON-CDC: HCPCS | Performed by: STUDENT IN AN ORGANIZED HEALTH CARE EDUCATION/TRAINING PROGRAM

## 2022-05-15 PROCEDURE — 93005 ELECTROCARDIOGRAM TRACING: CPT

## 2022-05-15 PROCEDURE — 63600175 PHARM REV CODE 636 W HCPCS: Performed by: STUDENT IN AN ORGANIZED HEALTH CARE EDUCATION/TRAINING PROGRAM

## 2022-05-15 PROCEDURE — 82803 BLOOD GASES ANY COMBINATION: CPT | Performed by: STUDENT IN AN ORGANIZED HEALTH CARE EDUCATION/TRAINING PROGRAM

## 2022-05-15 PROCEDURE — 85025 COMPLETE CBC W/AUTO DIFF WBC: CPT | Performed by: STUDENT IN AN ORGANIZED HEALTH CARE EDUCATION/TRAINING PROGRAM

## 2022-05-15 PROCEDURE — 99291 CRITICAL CARE FIRST HOUR: CPT | Mod: 25

## 2022-05-15 PROCEDURE — 93010 ELECTROCARDIOGRAM REPORT: CPT | Mod: ,,, | Performed by: INTERNAL MEDICINE

## 2022-05-15 PROCEDURE — 96365 THER/PROPH/DIAG IV INF INIT: CPT

## 2022-05-15 PROCEDURE — 87502 INFLUENZA DNA AMP PROBE: CPT | Performed by: STUDENT IN AN ORGANIZED HEALTH CARE EDUCATION/TRAINING PROGRAM

## 2022-05-15 PROCEDURE — 93010 EKG 12-LEAD: ICD-10-PCS | Mod: ,,, | Performed by: INTERNAL MEDICINE

## 2022-05-15 PROCEDURE — 99900035 HC TECH TIME PER 15 MIN (STAT)

## 2022-05-15 PROCEDURE — 83880 ASSAY OF NATRIURETIC PEPTIDE: CPT | Performed by: STUDENT IN AN ORGANIZED HEALTH CARE EDUCATION/TRAINING PROGRAM

## 2022-05-15 PROCEDURE — 83605 ASSAY OF LACTIC ACID: CPT | Performed by: STUDENT IN AN ORGANIZED HEALTH CARE EDUCATION/TRAINING PROGRAM

## 2022-05-15 PROCEDURE — 36415 COLL VENOUS BLD VENIPUNCTURE: CPT | Performed by: STUDENT IN AN ORGANIZED HEALTH CARE EDUCATION/TRAINING PROGRAM

## 2022-05-15 PROCEDURE — 82550 ASSAY OF CK (CPK): CPT | Performed by: STUDENT IN AN ORGANIZED HEALTH CARE EDUCATION/TRAINING PROGRAM

## 2022-05-15 PROCEDURE — 84484 ASSAY OF TROPONIN QUANT: CPT | Performed by: STUDENT IN AN ORGANIZED HEALTH CARE EDUCATION/TRAINING PROGRAM

## 2022-05-15 PROCEDURE — 80053 COMPREHEN METABOLIC PANEL: CPT | Performed by: STUDENT IN AN ORGANIZED HEALTH CARE EDUCATION/TRAINING PROGRAM

## 2022-05-15 PROCEDURE — 36600 WITHDRAWAL OF ARTERIAL BLOOD: CPT

## 2022-05-15 PROCEDURE — 85379 FIBRIN DEGRADATION QUANT: CPT | Performed by: STUDENT IN AN ORGANIZED HEALTH CARE EDUCATION/TRAINING PROGRAM

## 2022-05-15 RX ORDER — MAGNESIUM SULFATE HEPTAHYDRATE 40 MG/ML
2 INJECTION, SOLUTION INTRAVENOUS
Status: COMPLETED | OUTPATIENT
Start: 2022-05-15 | End: 2022-05-16

## 2022-05-15 RX ADMIN — MAGNESIUM SULFATE 2 G: 2 INJECTION INTRAVENOUS at 11:05

## 2022-05-16 PROBLEM — R53.81 DEBILITY: Status: ACTIVE | Noted: 2022-05-16

## 2022-05-16 LAB
ANION GAP SERPL CALC-SCNC: 16 MMOL/L (ref 8–16)
BACTERIA #/AREA URNS HPF: NORMAL /HPF
BASOPHILS # BLD AUTO: 0.03 K/UL (ref 0–0.2)
BASOPHILS NFR BLD: 0.4 % (ref 0–1.9)
BILIRUB UR QL STRIP: ABNORMAL
BUN SERPL-MCNC: 48 MG/DL (ref 10–30)
CALCIUM SERPL-MCNC: 8.6 MG/DL (ref 8.7–10.5)
CHLORIDE SERPL-SCNC: 96 MMOL/L (ref 95–110)
CLARITY UR: CLEAR
CO2 SERPL-SCNC: 26 MMOL/L (ref 23–29)
COLOR UR: ABNORMAL
CREAT SERPL-MCNC: 2.1 MG/DL (ref 0.5–1.4)
DIFFERENTIAL METHOD: ABNORMAL
EOSINOPHIL # BLD AUTO: 0.1 K/UL (ref 0–0.5)
EOSINOPHIL NFR BLD: 0.9 % (ref 0–8)
ERYTHROCYTE [DISTWIDTH] IN BLOOD BY AUTOMATED COUNT: 14.8 % (ref 11.5–14.5)
EST. GFR  (AFRICAN AMERICAN): 30 ML/MIN/1.73 M^2
EST. GFR  (NON AFRICAN AMERICAN): 26 ML/MIN/1.73 M^2
GLUCOSE SERPL-MCNC: 175 MG/DL (ref 70–110)
GLUCOSE UR QL STRIP: NEGATIVE
HCT VFR BLD AUTO: 26.2 % (ref 40–54)
HGB BLD-MCNC: 8.6 G/DL (ref 14–18)
HGB UR QL STRIP: NEGATIVE
HYALINE CASTS #/AREA URNS LPF: 0 /LPF
IMM GRANULOCYTES # BLD AUTO: 0.05 K/UL (ref 0–0.04)
IMM GRANULOCYTES NFR BLD AUTO: 0.7 % (ref 0–0.5)
KETONES UR QL STRIP: ABNORMAL
LEUKOCYTE ESTERASE UR QL STRIP: NEGATIVE
LYMPHOCYTES # BLD AUTO: 1.1 K/UL (ref 1–4.8)
LYMPHOCYTES NFR BLD: 15.6 % (ref 18–48)
MCH RBC QN AUTO: 33.2 PG (ref 27–31)
MCHC RBC AUTO-ENTMCNC: 32.8 G/DL (ref 32–36)
MCV RBC AUTO: 101 FL (ref 82–98)
MICROSCOPIC COMMENT: NORMAL
MONOCYTES # BLD AUTO: 0.8 K/UL (ref 0.3–1)
MONOCYTES NFR BLD: 12 % (ref 4–15)
NEUTROPHILS # BLD AUTO: 4.9 K/UL (ref 1.8–7.7)
NEUTROPHILS NFR BLD: 70.4 % (ref 38–73)
NITRITE UR QL STRIP: POSITIVE
NRBC BLD-RTO: 0 /100 WBC
PH UR STRIP: 5 [PH] (ref 5–8)
PLATELET # BLD AUTO: 193 K/UL (ref 150–450)
PMV BLD AUTO: 10.4 FL (ref 9.2–12.9)
POCT GLUCOSE: 192 MG/DL (ref 70–110)
POCT GLUCOSE: 196 MG/DL (ref 70–110)
POCT GLUCOSE: 213 MG/DL (ref 70–110)
POCT GLUCOSE: 217 MG/DL (ref 70–110)
POTASSIUM SERPL-SCNC: 3.6 MMOL/L (ref 3.5–5.1)
PROT UR QL STRIP: ABNORMAL
RBC # BLD AUTO: 2.59 M/UL (ref 4.6–6.2)
RBC #/AREA URNS HPF: 0 /HPF (ref 0–4)
SODIUM SERPL-SCNC: 138 MMOL/L (ref 136–145)
SP GR UR STRIP: 1.02 (ref 1–1.03)
TROPONIN I SERPL DL<=0.01 NG/ML-MCNC: 0.04 NG/ML (ref 0–0.03)
TROPONIN I SERPL DL<=0.01 NG/ML-MCNC: 0.04 NG/ML (ref 0–0.03)
TROPONIN I SERPL DL<=0.01 NG/ML-MCNC: 0.05 NG/ML (ref 0–0.03)
URN SPEC COLLECT METH UR: ABNORMAL
UROBILINOGEN UR STRIP-ACNC: NEGATIVE EU/DL
WBC # BLD AUTO: 6.99 K/UL (ref 3.9–12.7)
WBC #/AREA URNS HPF: 1 /HPF (ref 0–5)

## 2022-05-16 PROCEDURE — 84484 ASSAY OF TROPONIN QUANT: CPT | Performed by: STUDENT IN AN ORGANIZED HEALTH CARE EDUCATION/TRAINING PROGRAM

## 2022-05-16 PROCEDURE — 99222 PR INITIAL HOSPITAL CARE,LEVL II: ICD-10-PCS | Mod: ,,, | Performed by: INTERNAL MEDICINE

## 2022-05-16 PROCEDURE — 96375 TX/PRO/DX INJ NEW DRUG ADDON: CPT

## 2022-05-16 PROCEDURE — 25000003 PHARM REV CODE 250: Performed by: FAMILY MEDICINE

## 2022-05-16 PROCEDURE — 63600175 PHARM REV CODE 636 W HCPCS: Performed by: STUDENT IN AN ORGANIZED HEALTH CARE EDUCATION/TRAINING PROGRAM

## 2022-05-16 PROCEDURE — 85025 COMPLETE CBC W/AUTO DIFF WBC: CPT | Performed by: STUDENT IN AN ORGANIZED HEALTH CARE EDUCATION/TRAINING PROGRAM

## 2022-05-16 PROCEDURE — 81000 URINALYSIS NONAUTO W/SCOPE: CPT | Performed by: FAMILY MEDICINE

## 2022-05-16 PROCEDURE — 97162 PT EVAL MOD COMPLEX 30 MIN: CPT

## 2022-05-16 PROCEDURE — 99222 1ST HOSP IP/OBS MODERATE 55: CPT | Mod: ,,, | Performed by: INTERNAL MEDICINE

## 2022-05-16 PROCEDURE — 63600175 PHARM REV CODE 636 W HCPCS: Performed by: NURSE PRACTITIONER

## 2022-05-16 PROCEDURE — 11000001 HC ACUTE MED/SURG PRIVATE ROOM

## 2022-05-16 PROCEDURE — 94761 N-INVAS EAR/PLS OXIMETRY MLT: CPT

## 2022-05-16 PROCEDURE — 80048 BASIC METABOLIC PNL TOTAL CA: CPT | Performed by: STUDENT IN AN ORGANIZED HEALTH CARE EDUCATION/TRAINING PROGRAM

## 2022-05-16 PROCEDURE — 63700000 PHARM REV CODE 250 ALT 637 W/O HCPCS: Performed by: NURSE PRACTITIONER

## 2022-05-16 PROCEDURE — 25000003 PHARM REV CODE 250: Performed by: NURSE PRACTITIONER

## 2022-05-16 PROCEDURE — 96366 THER/PROPH/DIAG IV INF ADDON: CPT

## 2022-05-16 PROCEDURE — 27000221 HC OXYGEN, UP TO 24 HOURS

## 2022-05-16 PROCEDURE — 36415 COLL VENOUS BLD VENIPUNCTURE: CPT | Performed by: STUDENT IN AN ORGANIZED HEALTH CARE EDUCATION/TRAINING PROGRAM

## 2022-05-16 RX ORDER — AMOXICILLIN 250 MG
1 CAPSULE ORAL 2 TIMES DAILY PRN
Status: DISCONTINUED | OUTPATIENT
Start: 2022-05-16 | End: 2022-05-24 | Stop reason: HOSPADM

## 2022-05-16 RX ORDER — FLUCONAZOLE 100 MG/1
100 TABLET ORAL DAILY
Status: DISCONTINUED | OUTPATIENT
Start: 2022-05-16 | End: 2022-05-19

## 2022-05-16 RX ORDER — POTASSIUM CHLORIDE 750 MG/1
10 TABLET, EXTENDED RELEASE ORAL DAILY
Status: DISCONTINUED | OUTPATIENT
Start: 2022-05-16 | End: 2022-05-24 | Stop reason: HOSPADM

## 2022-05-16 RX ORDER — SENNOSIDES 8.6 MG/1
1 TABLET ORAL DAILY
Status: DISCONTINUED | OUTPATIENT
Start: 2022-05-16 | End: 2022-05-24 | Stop reason: HOSPADM

## 2022-05-16 RX ORDER — TAMSULOSIN HYDROCHLORIDE 0.4 MG/1
0.4 CAPSULE ORAL DAILY
Status: DISCONTINUED | OUTPATIENT
Start: 2022-05-16 | End: 2022-05-24 | Stop reason: HOSPADM

## 2022-05-16 RX ORDER — PRAVASTATIN SODIUM 20 MG/1
20 TABLET ORAL NIGHTLY
Status: DISCONTINUED | OUTPATIENT
Start: 2022-05-16 | End: 2022-05-24 | Stop reason: HOSPADM

## 2022-05-16 RX ORDER — FUROSEMIDE 10 MG/ML
40 INJECTION INTRAMUSCULAR; INTRAVENOUS
Status: DISCONTINUED | OUTPATIENT
Start: 2022-05-16 | End: 2022-05-16

## 2022-05-16 RX ORDER — MIDAZOLAM IN 5 % DEXTROSE 50 MG/50ML
SYRINGE (ML) INTRAVENOUS ONCE
Status: CANCELLED | OUTPATIENT
Start: 2022-05-16 | End: 2022-05-16

## 2022-05-16 RX ORDER — GABAPENTIN 100 MG/1
200 CAPSULE ORAL NIGHTLY
Status: DISCONTINUED | OUTPATIENT
Start: 2022-05-16 | End: 2022-05-24 | Stop reason: HOSPADM

## 2022-05-16 RX ORDER — INSULIN ASPART 100 [IU]/ML
0-5 INJECTION, SOLUTION INTRAVENOUS; SUBCUTANEOUS
Status: DISCONTINUED | OUTPATIENT
Start: 2022-05-16 | End: 2022-05-24 | Stop reason: HOSPADM

## 2022-05-16 RX ORDER — MUPIROCIN 20 MG/G
OINTMENT TOPICAL 2 TIMES DAILY
Status: COMPLETED | OUTPATIENT
Start: 2022-05-16 | End: 2022-05-20

## 2022-05-16 RX ORDER — IBUPROFEN 200 MG
16 TABLET ORAL
Status: DISCONTINUED | OUTPATIENT
Start: 2022-05-16 | End: 2022-05-24 | Stop reason: HOSPADM

## 2022-05-16 RX ORDER — METRONIDAZOLE 500 MG/1
500 TABLET ORAL EVERY 8 HOURS
Status: DISCONTINUED | OUTPATIENT
Start: 2022-05-16 | End: 2022-05-24 | Stop reason: HOSPADM

## 2022-05-16 RX ORDER — FUROSEMIDE 10 MG/ML
20 INJECTION INTRAMUSCULAR; INTRAVENOUS
Status: COMPLETED | OUTPATIENT
Start: 2022-05-16 | End: 2022-05-16

## 2022-05-16 RX ORDER — LANOLIN ALCOHOL/MO/W.PET/CERES
1 CREAM (GRAM) TOPICAL DAILY
Status: DISCONTINUED | OUTPATIENT
Start: 2022-05-16 | End: 2022-05-24 | Stop reason: HOSPADM

## 2022-05-16 RX ORDER — SODIUM CHLORIDE 0.9 % (FLUSH) 0.9 %
10 SYRINGE (ML) INJECTION
Status: DISCONTINUED | OUTPATIENT
Start: 2022-05-16 | End: 2022-05-24 | Stop reason: HOSPADM

## 2022-05-16 RX ORDER — GLUCAGON 1 MG
1 KIT INJECTION
Status: DISCONTINUED | OUTPATIENT
Start: 2022-05-16 | End: 2022-05-24 | Stop reason: HOSPADM

## 2022-05-16 RX ORDER — IBUPROFEN 200 MG
24 TABLET ORAL
Status: DISCONTINUED | OUTPATIENT
Start: 2022-05-16 | End: 2022-05-24 | Stop reason: HOSPADM

## 2022-05-16 RX ORDER — LEVOTHYROXINE SODIUM 75 UG/1
75 TABLET ORAL
Status: DISCONTINUED | OUTPATIENT
Start: 2022-05-17 | End: 2022-05-19

## 2022-05-16 RX ORDER — CARVEDILOL 3.12 MG/1
3.12 TABLET ORAL 2 TIMES DAILY WITH MEALS
Status: DISCONTINUED | OUTPATIENT
Start: 2022-05-16 | End: 2022-05-24 | Stop reason: HOSPADM

## 2022-05-16 RX ORDER — FUROSEMIDE 10 MG/ML
20 INJECTION INTRAMUSCULAR; INTRAVENOUS
Status: DISCONTINUED | OUTPATIENT
Start: 2022-05-16 | End: 2022-05-18

## 2022-05-16 RX ORDER — POLYETHYLENE GLYCOL 3350 17 G/17G
17 POWDER, FOR SOLUTION ORAL DAILY
Status: DISCONTINUED | OUTPATIENT
Start: 2022-05-16 | End: 2022-05-24 | Stop reason: HOSPADM

## 2022-05-16 RX ORDER — PANTOPRAZOLE SODIUM 40 MG/1
40 TABLET, DELAYED RELEASE ORAL DAILY
Status: DISCONTINUED | OUTPATIENT
Start: 2022-05-16 | End: 2022-05-24 | Stop reason: HOSPADM

## 2022-05-16 RX ORDER — FUROSEMIDE 10 MG/ML
20 INJECTION INTRAMUSCULAR; INTRAVENOUS EVERY 6 HOURS
Status: DISCONTINUED | OUTPATIENT
Start: 2022-05-16 | End: 2022-05-16

## 2022-05-16 RX ORDER — TALC
6 POWDER (GRAM) TOPICAL NIGHTLY PRN
Status: DISCONTINUED | OUTPATIENT
Start: 2022-05-16 | End: 2022-05-24 | Stop reason: HOSPADM

## 2022-05-16 RX ADMIN — FERROUS SULFATE TAB 325 MG (65 MG ELEMENTAL FE) 1 EACH: 325 (65 FE) TAB at 11:05

## 2022-05-16 RX ADMIN — POTASSIUM CHLORIDE 10 MEQ: 750 TABLET, FILM COATED, EXTENDED RELEASE ORAL at 12:05

## 2022-05-16 RX ADMIN — FUROSEMIDE 20 MG: 10 INJECTION, SOLUTION INTRAMUSCULAR; INTRAVENOUS at 06:05

## 2022-05-16 RX ADMIN — VANCOMYCIN HYDROCHLORIDE 750 MG: 750 INJECTION, POWDER, LYOPHILIZED, FOR SOLUTION INTRAVENOUS at 12:05

## 2022-05-16 RX ADMIN — PRAVASTATIN SODIUM 20 MG: 20 TABLET ORAL at 09:05

## 2022-05-16 RX ADMIN — INSULIN ASPART 2 UNITS: 100 INJECTION, SOLUTION INTRAVENOUS; SUBCUTANEOUS at 05:05

## 2022-05-16 RX ADMIN — METRONIDAZOLE 500 MG: 500 TABLET ORAL at 09:05

## 2022-05-16 RX ADMIN — PANTOPRAZOLE SODIUM 40 MG: 40 TABLET, DELAYED RELEASE ORAL at 11:05

## 2022-05-16 RX ADMIN — CEFTRIAXONE 1 G: 1 INJECTION, SOLUTION INTRAVENOUS at 11:05

## 2022-05-16 RX ADMIN — MUPIROCIN: 20 OINTMENT TOPICAL at 09:05

## 2022-05-16 RX ADMIN — FLUCONAZOLE 100 MG: 100 TABLET ORAL at 11:05

## 2022-05-16 RX ADMIN — CARVEDILOL 3.12 MG: 3.12 TABLET, FILM COATED ORAL at 05:05

## 2022-05-16 RX ADMIN — METRONIDAZOLE 500 MG: 500 TABLET ORAL at 01:05

## 2022-05-16 RX ADMIN — RIVAROXABAN 15 MG: 15 TABLET, FILM COATED ORAL at 05:05

## 2022-05-16 RX ADMIN — TAMSULOSIN HYDROCHLORIDE 0.4 MG: 0.4 CAPSULE ORAL at 11:05

## 2022-05-16 RX ADMIN — FUROSEMIDE 20 MG: 10 INJECTION, SOLUTION INTRAMUSCULAR; INTRAVENOUS at 01:05

## 2022-05-16 RX ADMIN — GABAPENTIN 200 MG: 100 CAPSULE ORAL at 09:05

## 2022-05-16 RX ADMIN — MUPIROCIN: 20 OINTMENT TOPICAL at 11:05

## 2022-05-16 RX ADMIN — SENNOSIDES 1 TABLET: 8.6 TABLET, FILM COATED ORAL at 11:05

## 2022-05-16 RX ADMIN — POLYETHYLENE GLYCOL (3350) 17 G: 17 POWDER, FOR SOLUTION ORAL at 11:05

## 2022-05-16 RX ADMIN — FUROSEMIDE 20 MG: 20 INJECTION, SOLUTION INTRAMUSCULAR; INTRAVENOUS at 06:05

## 2022-05-16 RX ADMIN — INSULIN ASPART 1 UNITS: 100 INJECTION, SOLUTION INTRAVENOUS; SUBCUTANEOUS at 09:05

## 2022-05-16 NOTE — HPI
Eddie Parada Sr. is a 89 y.o. male with known atrial fibrillation, aortic stenosis s/p repair,  CAD, CHF, COPD, diabetes mellitus type 2, hyperlipidemia,  CVA and ongoing complications associated of an anterior abdominal wall mass secondary to ventral hernia mesh placed ~20 years ago who presented to ER with shortness of breath since just prior to arrival.  While sitting on a sofa at home, he suddenly began short of breath.  Per wife, he has been compliant with all of his home medication.  Endorses leg swelling.   Denies chest pain, diaphoresis.   BNP> 500, CXR demonstrating pulmonary edema, Creat elevated at 2.1- (Baseline creat 1.0-1.7)   D- dimer elevated, LE US negative for DVT, TNI- 0.044>0.048>0.043   H&H 9.1/27.9>8.6>26.2     He is s/p Drain recently removed prior to presentation, with return of symptoms, re-accumulation of fluid. General surgery was consulted who states patient is not a good surgical candidate. Patient underwent US-guided drain placement with IR 5/4, which he tolerated well. Initiated BSA with vanc, cefepime, flagyl, and azithromycin (for atypicals/CAP). Consulted ID for long-term IV Abx who recommends discharging patient on vanc, ceftriaxone, PO flagyl, and PO diflucan for at least 2 weeks and close follow-up with ID for decision making based on clinical course. Because patient has had several hospitalizations in last few months due to complications from abdominal wall abscess, consulted palliative care to have on board and to establish outpatient follow-up. Patient was discharged home with abdominal drain in place, 4 weeks of vanc, ceftriaxone, PO flagyl, and PO diflucan, and close follow-up with ID, general surgery, palliative care, and home health. Patient remained afebrile and without abdominal pain, leukocytosis, or other issues during hospital stay.

## 2022-05-16 NOTE — ASSESSMENT & PLAN NOTE
Last discharge 5/6  with abdominal drain in place and 4 weeks of vanc, rocephin, flagyl, and diflucan  - follow up with general surgery for drain and ID for Abx- ( 10 days since discharge)   Consult pharmacy to adjust vanc- monitor     ventral hernia repair with mesh placement ~20 years ago, no complications until 3/2022 with several hospitalizations leading to drain placement and antibiotics since that time, drain removed 4/29/22   - most recent hospitalization began on 4/30/22 at [facility] and was transferred here on 5/4/22 for surgical and IR evaluation  - was initiated on Vanc and Zosyn at [facility]  - switched to Vanc, Cefepime, Flagyl on admission to our facility given renal function   - blood cultures from 4/30/22 no growth to date   - wound cultures from 3/3 and 5/4 NGTD  - general surgery states patient is not good surgical candidate  - underwent US-guided drain placement 5/4  - general surgery added fluconazole for yeast seen on gram stain of wound Cx  - consulted ID for assistance with Abx: discharge with IV vanc, IV ceftriaxone, PO flagyl, and PO diflucan for at least 2 weeks with close ID follow-up   -

## 2022-05-16 NOTE — CONSULTS
[facility] - Med Surg (3rd Fl)  Cardiology  Consult Note    Patient Name: Eddie Parada Sr.  MRN: [id number]  Admission Date: 5/15/2022  Hospital Length of Stay: 0 days  Code Status: Full Code   Attending Provider: Joseph Redmond MD   Consulting Provider: Ebony Johnston NP  Primary Care Physician: Callum Roberts MD  Principal Problem:(HFpEF) heart failure with preserved ejection fraction    Patient information was obtained from patient, past medical records and ER records.     Consults  Subjective:     Chief Complaint:  SOB     89 yo wm hx PPM s/p generator replacement 3/22, chronic AF on Xarelto, bio-AVR,  chronic diastolic CHF with difficulty maintaining euvolemia due to waxing and waning renal function due in part to abx therapy from abd wall abscess. Renal function lately averaging creatinine 1.5-1.7.   Echo 5/2/22 showed still preserved EF with AI  normally functioning bioprosthetic aortic valve. Negative bubble study.  Presented to ER via EMS with acute onset SOB/AMAYA/CHF. O2 sat low 70's.  Given O2/lasix IV in ER   Troponin chronically mildly elevated, today 0.043.   EKG shows  rhythm  Creat has risen to 2.1, higher than baseline but stable overnight.  H/H has fallen overnight 9.1/27.9--8.6/26.2  D-dimer 2.25         Past Medical History:   Diagnosis Date    Abdominal fibromatosis     Acute posthemorrhagic anemia 01/09/2018    NIK (acute kidney injury) 01/11/2018    Aortic stenosis 02/25/2014    Atrial fibrillation     Bradycardia     Broken arm     CAD (coronary artery disease)     Cancer     basal cell on nose and melanoma on back    CHF (congestive heart failure)     COPD (chronic obstructive pulmonary disease)     Diabetes mellitus type II     Encounter for blood transfusion     Food impaction of esophagus, history of 03/03/2022    Hyperlipidemia     Localization-related focal epilepsy with simple partial seizures 03/02/2021    Melanoma     Obesity (BMI 30.0-34.9)  09/13/2016    Obesity, diabetes, and hypertension syndrome 09/13/2016    Osteoporosis     Other dysphagia 05/27/2021    Peripheral vascular disease     Pneumonia of left lower lobe due to infectious organism 04/30/2022    Primary malignant neoplasm of prostate 03/03/2022    S/P ORIF (open reduction internal fixation) fracture 11/27/2017    Sepsis with acute hypoxic respiratory failure     Septic shock 03/03/2022    Stroke     TIA (transient ischemic attack)     Type II diabetes mellitus with peripheral circulatory disorder     Type II or unspecified type diabetes mellitus without mention of complication, not stated as uncontrolled     Unspecified essential hypertension        Past Surgical History:   Procedure Laterality Date    AORTIC VALVE REPLACEMENT      CARDIAC PACEMAKER PLACEMENT  9/28/2012    CARDIAC VALVE REPLACEMENT  11/17/2011    aortic valve-tissue    CHOLECYSTECTOMY      COLONOSCOPY      ESOPHAGOGASTRODUODENOSCOPY N/A 5/27/2021    Procedure: EGD (ESOPHAGOGASTRODUODENOSCOPY);  Surgeon: Gualberto Medrano MD;  Location[facility];  Service: Endoscopy;  Laterality: N/A;    ESOPHAGOGASTRODUODENOSCOPY N/A 6/22/2021    Procedure: EGD (ESOPHAGOGASTRODUODENOSCOPY);  Surgeon: Gualberto Medrano MD;  Location: [facility];  Service: Endoscopy;  Laterality: N/A;  please call wife(Jessica) with instructions/arrival time.    ESOPHAGOGASTRODUODENOSCOPY (EGD) WITH DILATION  06/22/2021    EYE SURGERY Bilateral     cataract with lens by  at [facility]    HERNIA REPAIR      abdominal    LASIK      UPPER GASTROINTESTINAL ENDOSCOPY  05/28/2021       Review of patient's allergies indicates:   Allergen Reactions    Ciprofloxacin     Levofloxacin Swelling    Lyrica [pregabalin] Swelling    Unable to assess Other (See Comments)     Soft shell crabs       No current facility-administered medications on file prior to encounter.     Current Outpatient Medications on File Prior to  Encounter   Medication Sig    acetaminophen (TYLENOL) 500 MG tablet Take 500 mg by mouth every 6 (six) hours as needed for Pain.    amLODIPine (NORVASC) 10 MG tablet TAKE 1 TABLET BY MOUTH DAILY FOR BLOOD PRESSURE    carvediloL (COREG) 12.5 MG tablet TAKE 1 TABLET (12.5 MG TOTAL) BY MOUTH 2 (TWO) TIMES DAILY WITH MEALS.    cefTRIAXone (ROCEPHIN) 1 g/50 mL PgBk IVPB Inject 50 mLs (1 g total) into the vein once daily.    doxazosin (CARDURA) 1 MG tablet Take 1 mg by mouth nightly.    ferrous sulfate (FEOSOL) 325 mg (65 mg iron) Tab tablet Take 325 mg by mouth once daily at 6am.    fluconazole (DIFLUCAN) 100 MG tablet Take 1 tablet (100 mg total) by mouth once daily.    furosemide (LASIX) 20 MG tablet Take 1 tablet (20 mg total) by mouth every other day. Hold medication until 3/29 (Patient taking differently: Take 20 mg by mouth once daily.)    gabapentin (NEURONTIN) 100 MG capsule Take 2 capsules (200 mg total) by mouth every evening.    levothyroxine (SYNTHROID) 75 MCG tablet Take 1 tablet (75 mcg total) by mouth before breakfast.    metroNIDAZOLE (FLAGYL) 500 MG tablet Take 1 tablet (500 mg total) by mouth every 8 (eight) hours.    pantoprazole (PROTONIX) 40 MG tablet TAKE ONE TABLET BY MOUTH ONCE A DAY FOR STOMACH PROBLEMS    polyethylene glycol (GLYCOLAX) 17 gram/dose powder Take 17 g by mouth once daily.    potassium chloride SA (K-DUR,KLOR-CON) 10 MEQ tablet Take 1 tablet (10 mEq total) by mouth once daily.    pravastatin (PRAVACHOL) 20 MG tablet TAKE 1 TABLET BY MOUTH DAILY FOR CHOLESTROL    rivaroxaban (XARELTO) 20 mg Tab Take one tablet(20mg) by mouth once daily    tamsulosin (FLOMAX) 0.4 mg Cap Take 1 capsule (0.4 mg total) by mouth once daily.    vancomycin HCl (VANCOMYCIN 750 MG/250 ML D5W, READY TO MIX SYSTEM,) Inject 250 mLs (750 mg total) into the vein once daily.    guaiFENesin (MUCINEX) 600 mg 12 hr tablet Take 1 tablet (600 mg total) by mouth 2 (two) times daily. for 10 days  (Patient not taking: Reported on 2022)    senna (SENOKOT) 8.6 mg tablet Take 1 tablet by mouth once daily.    senna-docusate 8.6-50 mg (PERICOLACE) 8.6-50 mg per tablet Take 1 tablet by mouth 2 (two) times daily as needed for Constipation.     Family History     Problem Relation (Age of Onset)    Alcohol abuse Father    COPD Sister, Brother    Cancer Brother    Diabetes Daughter    Heart disease Brother    Hypertension Father    No Known Problems Son, Daughter, Son    Stroke Father        Tobacco Use    Smoking status: Former Smoker     Quit date: 1996     Years since quittin.6    Smokeless tobacco: Never Used    Tobacco comment: cigar smoker   Substance and Sexual Activity    Alcohol use: No     Comment: none since     Drug use: No    Sexual activity: Not Currently     Review of Systems   Constitutional: Negative.   HENT: Negative.    Eyes: Negative.    Cardiovascular: Positive for dyspnea on exertion. Negative for chest pain.   Respiratory: Positive for shortness of breath.    Endocrine: Negative.    Hematologic/Lymphatic: Negative.    Genitourinary: Negative.    Neurological: Negative.      Objective:     Vital Signs (Most Recent):  Temp: 98 °F (36.7 °C) (22 1214)  Pulse: 63 (22 1201)  Resp: 19 (22 1142)  BP: (!) 133/58 (22 1142)  SpO2: (!) 93 % (22 1142) Vital Signs (24h Range):  Temp:  [96 °F (35.6 °C)-98.4 °F (36.9 °C)] 98 °F (36.7 °C)  Pulse:  [60-87] 63  Resp:  [16-29] 19  SpO2:  [92 %-100 %] 93 %  BP: ()/(51-70) 133/58     Weight: 79.6 kg (175 lb 7.8 oz)  Body mass index is 29.2 kg/m².    SpO2: (!) 93 %  O2 Device (Oxygen Therapy): nasal cannula      Intake/Output Summary (Last 24 hours) at 2022 1304  Last data filed at 2022 0900  Gross per 24 hour   Intake 287 ml   Output --   Net 287 ml       Lines/Drains/Airways     Peripherally Inserted Central Catheter Line  Duration           PICC Double Lumen 22 0845 right basilic 4 days           Drain  Duration                Drain/Device  05/04/22 1618 midline umbilical area pigtail 11 days                Physical Exam  Vitals and nursing note reviewed.   Cardiovascular:      Rate and Rhythm: Normal rate and regular rhythm.      Pulses: Normal pulses.      Heart sounds: Normal heart sounds.      Comments: sys M  Pulmonary:      Effort: Pulmonary effort is normal.      Comments: Diminished at bases  Abdominal:      General: Abdomen is flat.   Musculoskeletal:         General: Normal range of motion.   Skin:     General: Skin is warm and dry.   Neurological:      General: No focal deficit present.      Mental Status: He is alert and oriented to person, place, and time.   Psychiatric:         Mood and Affect: Mood normal.         Significant Labs:   BMP:   Recent Labs   Lab 05/15/22  2242 05/16/22  0557   * 175*    138   K 3.5 3.6   CL 95 96   CO2 25 26   BUN 45* 48*   CREATININE 2.1* 2.1*   CALCIUM 8.6* 8.6*   , CMP   Recent Labs   Lab 05/15/22  2242 05/16/22  0557    138   K 3.5 3.6   CL 95 96   CO2 25 26   * 175*   BUN 45* 48*   CREATININE 2.1* 2.1*   CALCIUM 8.6* 8.6*   PROT 6.4  --    ALBUMIN 2.7*  --    BILITOT 0.7  --    ALKPHOS 125  --    AST 72*  --    ALT 30  --    ANIONGAP 16 16   ESTGFRAFRICA 30* 30*   EGFRNONAA 26* 26*   , CBC   Recent Labs   Lab 05/15/22  2242 05/16/22  0557   WBC 7.00 6.99   HGB 9.1* 8.6*   HCT 27.9* 26.2*    193    and Troponin   Recent Labs   Lab 05/15/22  2242 05/16/22  0244 05/16/22  0750   TROPONINI 0.044* 0.048* 0.043*       Significant Imaging: Echocardiogram:   Transthoracic echo (TTE) complete (Cupid Only):   Results for orders placed or performed during the hospital encounter of 04/30/22   Echo   Result Value Ref Range    AV mean gradient 6 mmHg    Ao peak amei 1.88 m/s    Ao VTI 37.59 cm    IVRT 41.52 msec    IVS 1.38 (A) 0.6 - 1.1 cm    LA size 5.02 cm    Left Atrium Major Axis 5.83 cm    LVIDd 4.93 3.5 - 6.0 cm    LVIDs 2.31  2.1 - 4.0 cm    LVOT diameter 1.83 cm    LVOT peak VTI 24.18 cm    Posterior Wall 0.78 0.6 - 1.1 cm    MV Peak A Taiwo 0.51 m/s    E wave deceleration time 183.50 msec    MV Peak E Taiwo 1.30 m/s    PV Peak D Taiwo 0.40 m/s    PV Peak S Taiwo 0.38 m/s    RA Major Axis 5.87 cm    RVDD 3.80 cm    TAPSE 1.61 cm    TR Max Taiwo 3.69 m/s    LA WIDTH 4.59 cm    MV stenosis pressure 1/2 time 53.21 ms    LV Diastolic Volume 114.29 mL    LV Systolic Volume 18.26 mL    LVOT peak taiwo 1.11 m/s    Mr max taiwo 0.05 m/s    LA volume (mod) 37.44 cm3    FS 53 %    LV mass 197.43 g    Left Ventricle Relative Wall Thickness 0.32 cm    AV valve area 1.69 cm2    AV Velocity Ratio 0.59     AV index (prosthetic) 0.64     MV valve area p 1/2 method 4.13 cm2    E/A ratio 2.55     Pulm vein S/D ratio 0.95     LVOT area 2.6 cm2    LVOT stroke volume 63.57 cm3    AV peak gradient 14 mmHg    LV Systolic Volume Index 9.5 mL/m2    LV Diastolic Volume Index 59.22 mL/m2    LV Mass Index 102 g/m2    Triscuspid Valve Regurgitation Peak Gradient 54 mmHg    LA Volume Index (Mod) 19.4 mL/m2    BSA 1.98 m2    EF 55 %    Narrative    · The estimated ejection fraction is 55%.  · Indeterminate left ventricular diastolic function.  · Moderate right ventricular enlargement with mildly reduced right   ventricular systolic function.  · Mild aortic regurgitation.  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 There is aortic   insufficiency present. Prosthetic aortic valve is normal.  · The aortic valve mean gradient is 6 mmHg with a dimensionless index of   0.64.  · Mild mitral regurgitation.  · Mild tricuspid regurgitation.  · There is moderate pulmonary hypertension.        Assessment and Plan:     Acute on chronic diastolic CHF, currently euvolemic, leg edema resolved but with some scrotal edema in bed.  Echo 5/22 EF 55%, normal AVR, 2+ TR, 1-2+ MR, 1+ AI, PAP 48  CKD with recent AKIs--waxing and waning; creatinine elevated but on vanc  Anemia  Chronic AF on  Xarelto  s/p PPM  PPM  Hx TIA  CAD hx PCI Cfx, LAD, MPI 2017 normal  thrombocytopenia     Plan:  flash pulm edema with NIK  Cont to diurese as tolerated and follow renal function  Avoid nephrotoxic agents   Follow H/H on Xarelto/ consider holding for now; may sub with heparin if tolerated  Cont coreg, pravastatin  As discussed with pt and wife; would avoid invasive approach for CAD evaluation as cause for flash pulmonary edema at this time  Pt remains fullcode and willing to have dialysis if necessary.    Active Diagnoses:    Diagnosis Date Noted POA    PRINCIPAL PROBLEM:  (HFpEF) heart failure with preserved ejection fraction [I50.30] 06/17/2015 Yes    Debility [R53.81] 05/16/2022 Yes    NIK (acute kidney injury) [N17.9] 03/19/2022 Yes    Abdominal wall abscess [L02.211] 03/05/2022 Yes    Disorder of prostate [N42.9] 03/03/2022 Yes    Hypothyroidism due to acquired atrophy of thyroid [E03.4] 01/20/2020 Yes    Long term current use of anticoagulant therapy [Z79.01] 09/13/2016 Not Applicable    Type 2 diabetes mellitus, controlled [E11.9] 05/21/2014 Yes    S/P AVR [Z95.2] 02/25/2014 Not Applicable    Mixed dyslipidemia [E78.2] 11/07/2012 Yes    Essential (primary) hypertension [I10] 09/20/2012 Yes    CAD (coronary artery disease) [I25.10] 09/20/2012 Yes    Chronic atrial fibrillation [I48.20] 06/29/2012 Yes      Problems Resolved During this Admission:       VTE Risk Mitigation (From admission, onward)         Ordered     rivaroxaban tablet 15 mg  With dinner         05/16/22 1112     IP VTE HIGH RISK PATIENT  Once         05/16/22 0419     Place sequential compression device  Until discontinued         05/16/22 0419                Thank you for your consult. I will follow-up with patient. Please contact us if you have any additional questions.    Ebony Johnston NP scribe for VIDHYA Monterroso MD  Cardiology   [facility] - Med Surg (3rd Fl)  I attest that I have personally seen and examined this patient. I  have reviewed and discussed the management in detail as outlined above.

## 2022-05-16 NOTE — ED NOTES
Patient transported to 3rd floor in ED stretcher on 2L/min nasal cannula oxygen. PICC line patent. Report given to LUCI Kingsley.

## 2022-05-16 NOTE — ASSESSMENT & PLAN NOTE
PT consulted   Per records patient with several hospitalizations in last few months for abdominal wall abscess   - prior to 2 months ago, was still independent, working and running a business  - consulted palliative care to have on board and have OP follow up

## 2022-05-16 NOTE — SUBJECTIVE & OBJECTIVE
Past Medical History:   Diagnosis Date    Abdominal fibromatosis     Acute posthemorrhagic anemia 01/09/2018    NIK (acute kidney injury) 01/11/2018    Aortic stenosis 02/25/2014    Atrial fibrillation     Bradycardia     Broken arm     CAD (coronary artery disease)     Cancer     basal cell on nose and melanoma on back    CHF (congestive heart failure)     COPD (chronic obstructive pulmonary disease)     Diabetes mellitus type II     Encounter for blood transfusion     Food impaction of esophagus, history of 03/03/2022    Hyperlipidemia     Localization-related focal epilepsy with simple partial seizures 03/02/2021    Melanoma     Obesity (BMI 30.0-34.9) 09/13/2016    Obesity, diabetes, and hypertension syndrome 09/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    Osteoporosis     Other dysphagia 05/27/2021    Peripheral vascular disease     Pneumonia of left lower lobe due to infectious organism 04/30/2022    Primary malignant neoplasm of prostate 03/03/2022    S/P ORIF (open reduction internal fixation) fracture 11/27/2017    Sepsis with acute hypoxic respiratory failure     Septic shock 03/03/2022    Stroke     TIA (transient ischemic attack)     Type II diabetes mellitus with peripheral circulatory disorder     Type II or unspecified type diabetes mellitus without mention of complication, not stated as uncontrolled     Unspecified essential hypertension        Past Surgical History:   Procedure Laterality Date    AORTIC VALVE REPLACEMENT      CARDIAC PACEMAKER PLACEMENT  9/28/2012    CARDIAC VALVE REPLACEMENT  11/17/2011    aortic valve-tissue    CHOLECYSTECTOMY      COLONOSCOPY      ESOPHAGOGASTRODUODENOSCOPY N/A 5/27/2021    Procedure: EGD (ESOPHAGOGASTRODUODENOSCOPY);  Surgeon: Gualberto Medrano MD;  Location: [facility];  Service: Endoscopy;  Laterality: N/A;    ESOPHAGOGASTRODUODENOSCOPY N/A 6/22/2021    Procedure: EGD (ESOPHAGOGASTRODUODENOSCOPY);  Surgeon: Gualberto Medrano MD;  Location: [facility];  Service: Endoscopy;   Laterality: N/A;  please call wife(Jessica) with instructions/arrival time.    ESOPHAGOGASTRODUODENOSCOPY (EGD) WITH DILATION  06/22/2021    EYE SURGERY Bilateral     cataract with lens by  at [facility]    HERNIA REPAIR      abdominal    LASIK      UPPER GASTROINTESTINAL ENDOSCOPY  05/28/2021       Review of patient's allergies indicates:   Allergen Reactions    Ciprofloxacin     Levofloxacin Swelling    Lyrica [pregabalin] Swelling    Unable to assess Other (See Comments)     Soft shell crabs       No current facility-administered medications on file prior to encounter.     Current Outpatient Medications on File Prior to Encounter   Medication Sig    acetaminophen (TYLENOL) 500 MG tablet Take 500 mg by mouth every 6 (six) hours as needed for Pain.    amLODIPine (NORVASC) 10 MG tablet TAKE 1 TABLET BY MOUTH DAILY FOR BLOOD PRESSURE    carvediloL (COREG) 12.5 MG tablet TAKE 1 TABLET (12.5 MG TOTAL) BY MOUTH 2 (TWO) TIMES DAILY WITH MEALS.    cefTRIAXone (ROCEPHIN) 1 g/50 mL PgBk IVPB Inject 50 mLs (1 g total) into the vein once daily.    doxazosin (CARDURA) 1 MG tablet Take 1 mg by mouth nightly.    ferrous sulfate (FEOSOL) 325 mg (65 mg iron) Tab tablet Take 325 mg by mouth once daily at 6am.    fluconazole (DIFLUCAN) 100 MG tablet Take 1 tablet (100 mg total) by mouth once daily.    furosemide (LASIX) 20 MG tablet Take 1 tablet (20 mg total) by mouth every other day. Hold medication until 3/29 (Patient taking differently: Take 20 mg by mouth once daily.)    gabapentin (NEURONTIN) 100 MG capsule Take 2 capsules (200 mg total) by mouth every evening.    levothyroxine (SYNTHROID) 75 MCG tablet Take 1 tablet (75 mcg total) by mouth before breakfast.    metroNIDAZOLE (FLAGYL) 500 MG tablet Take 1 tablet (500 mg total) by mouth every 8 (eight) hours.    pantoprazole (PROTONIX) 40 MG tablet TAKE ONE TABLET BY MOUTH ONCE A DAY FOR STOMACH PROBLEMS    polyethylene glycol (GLYCOLAX) 17 gram/dose powder Take 17 g by mouth  once daily.    potassium chloride SA (K-DUR,KLOR-CON) 10 MEQ tablet Take 1 tablet (10 mEq total) by mouth once daily.    pravastatin (PRAVACHOL) 20 MG tablet TAKE 1 TABLET BY MOUTH DAILY FOR CHOLESTROL    rivaroxaban (XARELTO) 20 mg Tab Take one tablet(20mg) by mouth once daily    tamsulosin (FLOMAX) 0.4 mg Cap Take 1 capsule (0.4 mg total) by mouth once daily.    vancomycin HCl (VANCOMYCIN 750 MG/250 ML D5W, READY TO MIX SYSTEM,) Inject 250 mLs (750 mg total) into the vein once daily.    guaiFENesin (MUCINEX) 600 mg 12 hr tablet Take 1 tablet (600 mg total) by mouth 2 (two) times daily. for 10 days (Patient not taking: Reported on 2022)    senna (SENOKOT) 8.6 mg tablet Take 1 tablet by mouth once daily.    senna-docusate 8.6-50 mg (PERICOLACE) 8.6-50 mg per tablet Take 1 tablet by mouth 2 (two) times daily as needed for Constipation.     Family History       Problem Relation (Age of Onset)    Alcohol abuse Father    COPD Sister, Brother    Cancer Brother    Diabetes Daughter    Heart disease Brother    Hypertension Father    No Known Problems Son, Daughter, Son    Stroke Father          Tobacco Use    Smoking status: Former Smoker     Quit date: 1996     Years since quittin.6    Smokeless tobacco: Never Used    Tobacco comment: cigar smoker   Substance and Sexual Activity    Alcohol use: No     Comment: none since     Drug use: No    Sexual activity: Not Currently     Review of Systems   Constitutional:  Positive for fatigue. Negative for activity change, fever and unexpected weight change.   HENT:  Negative for congestion, ear pain, hearing loss, rhinorrhea and sore throat.    Eyes:  Negative for pain, redness and visual disturbance.   Respiratory:  Positive for shortness of breath. Negative for cough and wheezing.    Cardiovascular:  Negative for chest pain, palpitations and leg swelling.   Gastrointestinal:  Negative for abdominal pain, constipation, diarrhea, nausea and vomiting.    Genitourinary:  Negative for decreased urine volume, dysuria, frequency and urgency.   Musculoskeletal:  Negative for back pain, joint swelling and neck pain.   Skin:  Negative for color change, rash and wound.   Neurological:  Negative for dizziness, tremors, weakness, light-headedness and headaches.   Objective:     Vital Signs (Most Recent):  Temp: 96.1 °F (35.6 °C) (05/16/22 0730)  Pulse: 87 (05/16/22 0801)  Resp: 20 (05/16/22 0730)  BP: (!) 129/59 (05/16/22 0730)  SpO2: (!) 92 % (05/16/22 0733)   Vital Signs (24h Range):  Temp:  [96.1 °F (35.6 °C)-98.4 °F (36.9 °C)] 96.1 °F (35.6 °C)  Pulse:  [60-87] 87  Resp:  [16-29] 20  SpO2:  [92 %-100 %] 92 %  BP: ()/(51-70) 129/59     Weight: 79.6 kg (175 lb 7.8 oz)  Body mass index is 29.2 kg/m².    Physical Exam  Vitals and nursing note reviewed.   Constitutional:       General: He is not in acute distress.     Appearance: He is well-developed.   HENT:      Head: Normocephalic and atraumatic.      Right Ear: External ear normal.      Left Ear: External ear normal.   Eyes:      General:         Right eye: No discharge.         Left eye: No discharge.      Extraocular Movements: Extraocular movements intact.      Conjunctiva/sclera: Conjunctivae normal.      Pupils: Pupils are equal, round, and reactive to light.   Neck:      Thyroid: No thyromegaly.   Cardiovascular:      Rate and Rhythm: Normal rate and regular rhythm.      Heart sounds: No murmur heard.  Pulmonary:      Effort: Pulmonary effort is normal. No respiratory distress.      Breath sounds: Rales (soft crackle in bases) present. No wheezing.   Abdominal:      General: Bowel sounds are normal. There is no distension.      Palpations: Abdomen is soft.      Tenderness: There is no abdominal tenderness.      Comments: Drain in place   Musculoskeletal:      Right lower leg: Edema (trace pedal edema) present.      Left lower leg: Edema (trace pedal edema) present.   Skin:     General: Skin is warm and dry.       Comments: PICC to right UE, dressing c/d/i   Neurological:      Mental Status: He is alert and oriented to person, place, and time.      Cranial Nerves: No cranial nerve deficit.   Psychiatric:         Behavior: Behavior normal.         Thought Content: Thought content normal.         CRANIAL NERVES     CN III, IV, VI   Pupils are equal, round, and reactive to light.     Significant Labs: All pertinent labs within the past 24 hours have been reviewed.  A1C:   Recent Labs   Lab 03/02/22  0744 04/30/22  1046   HGBA1C 5.9* 6.0*     ABGs:   Recent Labs   Lab 05/15/22  2255   PH 7.470*   PCO2 38   HCO3 27.70   POCSATURATED 89.1*   BE 3.80*   TOTALHB 9.7*   COHB 3.1*   METHB 0.8   PO2 59*     Bilirubin:   Recent Labs   Lab 05/01/22  0610 05/04/22  0749 05/05/22  0536 05/06/22  0513 05/15/22  2242   BILITOT 0.4 0.5 0.5 0.6 0.7     Blood Culture: No results for input(s): LABBLOO in the last 48 hours.  CBC:   Recent Labs   Lab 05/15/22  2242 05/16/22  0557   WBC 7.00 6.99   HGB 9.1* 8.6*   HCT 27.9* 26.2*    193     CMP:   Recent Labs   Lab 05/15/22  2242 05/16/22  0557    138   K 3.5 3.6   CL 95 96   CO2 25 26   * 175*   BUN 45* 48*   CREATININE 2.1* 2.1*   CALCIUM 8.6* 8.6*   PROT 6.4  --    ALBUMIN 2.7*  --    BILITOT 0.7  --    ALKPHOS 125  --    AST 72*  --    ALT 30  --    ANIONGAP 16 16   EGFRNONAA 26* 26*     Cardiac Markers:   Recent Labs   Lab 05/15/22  2242   *     Lactic Acid:   Recent Labs   Lab 05/15/22  2242   LACTATE 1.3     Lipase: No results for input(s): LIPASE in the last 48 hours.  Lipid Panel: No results for input(s): CHOL, HDL, LDLCALC, TRIG, CHOLHDL in the last 48 hours.  Magnesium: No results for input(s): MG in the last 48 hours.  POCT Glucose:   Recent Labs   Lab 05/16/22  0621   POCTGLUCOSE 196*     Troponin:   Recent Labs   Lab 05/15/22  2242 05/16/22  0244 05/16/22  0750   TROPONINI 0.044* 0.048* 0.043*     TSH:   Recent Labs   Lab 03/02/22  0744   TSH 5.943*      Urine Culture: No results for input(s): LABURIN in the last 48 hours.  Urine Studies: No results for input(s): COLORU, APPEARANCEUA, PHUR, SPECGRAV, PROTEINUA, GLUCUA, KETONESU, BILIRUBINUA, OCCULTUA, NITRITE, UROBILINOGEN, LEUKOCYTESUR, RBCUA, WBCUA, BACTERIA, SQUAMEPITHEL, HYALINECASTS in the last 48 hours.    Invalid input(s): WRIGHTSUR    Significant Imaging: I have reviewed and interpreted all pertinent imaging results/findings within the past 24 hours.    LE US -No evidence of deep venous thrombosis in either lower extremity.    CXR- Increased left-sided pleural effusion along with new airspace opacities in left hemithorax.  Findings suggestive of worsening CHF pattern of pulmonary edema.

## 2022-05-16 NOTE — PLAN OF CARE
Problem: Adult Inpatient Plan of Care  Goal: Plan of Care Review  Outcome: Ongoing, Progressing  Goal: Patient-Specific Goal (Individualized)  Outcome: Ongoing, Progressing  Goal: Absence of Hospital-Acquired Illness or Injury  Outcome: Ongoing, Progressing  Goal: Optimal Comfort and Wellbeing  Outcome: Ongoing, Progressing  Goal: Readiness for Transition of Care  Outcome: Ongoing, Progressing     Problem: Diabetes Comorbidity  Goal: Blood Glucose Level Within Targeted Range  Outcome: Ongoing, Progressing     Problem: Adjustment to Illness (Sepsis/Septic Shock)  Goal: Optimal Coping  Outcome: Ongoing, Progressing     Problem: Bleeding (Sepsis/Septic Shock)  Goal: Absence of Bleeding  Outcome: Ongoing, Progressing     Problem: Glycemic Control Impaired (Sepsis/Septic Shock)  Goal: Blood Glucose Level Within Desired Range  Outcome: Ongoing, Progressing     Problem: Infection Progression (Sepsis/Septic Shock)  Goal: Absence of Infection Signs and Symptoms  Outcome: Ongoing, Progressing     Problem: Nutrition Impaired (Sepsis/Septic Shock)  Goal: Optimal Nutrition Intake  Outcome: Ongoing, Progressing     Problem: Fluid Imbalance (Pneumonia)  Goal: Fluid Balance  Outcome: Ongoing, Progressing     Problem: Infection (Pneumonia)  Goal: Resolution of Infection Signs and Symptoms  Outcome: Ongoing, Progressing     Problem: Respiratory Compromise (Pneumonia)  Goal: Effective Oxygenation and Ventilation  Outcome: Ongoing, Progressing     Problem: Infection  Goal: Absence of Infection Signs and Symptoms  Outcome: Ongoing, Progressing     Problem: Impaired Wound Healing  Goal: Optimal Wound Healing  Outcome: Ongoing, Progressing     Problem: Fall Injury Risk  Goal: Absence of Fall and Fall-Related Injury  Outcome: Ongoing, Progressing     Problem: Skin Injury Risk Increased  Goal: Skin Health and Integrity  Outcome: Ongoing, Progressing     Problem: Fluid and Electrolyte Imbalance (Acute Kidney Injury/Impairment)  Goal: Fluid  and Electrolyte Balance  Outcome: Ongoing, Progressing     Problem: Oral Intake Inadequate (Acute Kidney Injury/Impairment)  Goal: Optimal Nutrition Intake  Outcome: Ongoing, Progressing     Problem: Renal Function Impairment (Acute Kidney Injury/Impairment)  Goal: Effective Renal Function  Outcome: Ongoing, Progressing     Problem: Gas Exchange Impaired  Goal: Optimal Gas Exchange  Outcome: Ongoing, Progressing     Problem: Adjustment to Illness (Heart Failure)  Goal: Optimal Coping  Outcome: Ongoing, Progressing     Problem: Cardiac Output Decreased (Heart Failure)  Goal: Optimal Cardiac Output  Outcome: Ongoing, Progressing     Problem: Dysrhythmia (Heart Failure)  Goal: Stable Heart Rate and Rhythm  Outcome: Ongoing, Progressing     Problem: Fluid Imbalance (Heart Failure)  Goal: Fluid Balance  Outcome: Ongoing, Progressing     Problem: Functional Ability Impaired (Heart Failure)  Goal: Optimal Functional Ability  Outcome: Ongoing, Progressing     Problem: Oral Intake Inadequate (Heart Failure)  Goal: Optimal Nutrition Intake  Outcome: Ongoing, Progressing     Problem: Respiratory Compromise (Heart Failure)  Goal: Effective Oxygenation and Ventilation  Outcome: Ongoing, Progressing     Problem: Sleep Disordered Breathing (Heart Failure)  Goal: Effective Breathing Pattern During Sleep  Outcome: Ongoing, Progressing    Patient admitted with CHF exacerbation. Continuing to diurese. Patient requiring 3L per NC to maintain oxygen >93%. PT to eval and treat. PICC line to Right upper arm. CHG bath given. Accordion drain to mid abdomen. Draining serosanguineous. Purulent drain of dressing. Dressing changed. Daily weight. Strict I&O. Afebrile. Poor appetite. SCD placed. Continued abx therapy.

## 2022-05-16 NOTE — ED PROVIDER NOTES
[facility]                                                  Chief Complaint     Chief Complaint   Patient presents with    Shortness of Breath     Patient arrived by [facility] with c/o sob at home. EMS reports firefighters were first to respond to the scene and spo2 in the 70s, when EMS arrived pt was on high flow O2 at 10L/min spo2 at 100 %. EMS took patient off of oxygen and his spo2 stayed around 95%. Pt PMHX of COPD and CHF. Pt states he is not on oxygen at home. NADN in triage.        History of Present Illness  89 y.o. male with past medical history of atrial fibrillation, aortic stenosis, CAD, CHF, diabetes mellitus type 2, hyperlipidemia and CVA who presents with shortness of breath since just prior to arrival.  While sitting on a sofa at home, he suddenly began short of breath.  Per wife, he has been compliant with all of his home medication.  Endorses leg swelling.   Denies chest pain, diaphoresis, agitation, fever, chills, myalgias, nausea, vomiting, palpitations, lightheadedness.     History obtained from:  Patient's wife    Review of patient's allergies indicates:   Allergen Reactions    Ciprofloxacin     Levofloxacin Swelling    Lyrica [pregabalin] Swelling    Unable to assess Other (See Comments)     Soft shell crabs     Past Medical History:   Diagnosis Date    Abdominal fibromatosis     Acute posthemorrhagic anemia 01/09/2018    NIK (acute kidney injury) 01/11/2018    Aortic stenosis 02/25/2014    Atrial fibrillation     Bradycardia     Broken arm     CAD (coronary artery disease)     Cancer     basal cell on nose and melanoma on back    CHF (congestive heart failure)     COPD (chronic obstructive pulmonary disease)     Diabetes mellitus type II     Encounter for blood transfusion     Food impaction of esophagus, history of 03/03/2022    Hyperlipidemia     Localization-related focal epilepsy with simple partial seizures 03/02/2021    Melanoma     Obesity (BMI 30.0-34.9)  09/13/2016    Obesity, diabetes, and hypertension syndrome 09/13/2016    Osteoporosis     Other dysphagia 05/27/2021    Peripheral vascular disease     Pneumonia of left lower lobe due to infectious organism 04/30/2022    Primary malignant neoplasm of prostate 03/03/2022    S/P ORIF (open reduction internal fixation) fracture 11/27/2017    Sepsis with acute hypoxic respiratory failure     Septic shock 03/03/2022    Stroke     TIA (transient ischemic attack)     Type II diabetes mellitus with peripheral circulatory disorder     Type II or unspecified type diabetes mellitus without mention of complication, not stated as uncontrolled     Unspecified essential hypertension      Past Surgical History:   Procedure Laterality Date    AORTIC VALVE REPLACEMENT      CARDIAC PACEMAKER PLACEMENT  9/28/2012    CARDIAC VALVE REPLACEMENT  11/17/2011    aortic valve-tissue    CHOLECYSTECTOMY      COLONOSCOPY      ESOPHAGOGASTRODUODENOSCOPY N/A 5/27/2021    Procedure: EGD (ESOPHAGOGASTRODUODENOSCOPY);  Surgeon: Gualberto Medrano MD;  Location: [facility];  Service: Endoscopy;  Laterality: N/A;    ESOPHAGOGASTRODUODENOSCOPY N/A 6/22/2021    Procedure: EGD (ESOPHAGOGASTRODUODENOSCOPY);  Surgeon: Gualberto Medrano MD;  Location: [facility];  Service: Endoscopy;  Laterality: N/A;  please call wife(Jessica) with instructions/arrival time.    ESOPHAGOGASTRODUODENOSCOPY (EGD) WITH DILATION  06/22/2021    EYE SURGERY Bilateral     cataract [facility] lens [facility]  at [facility]    HERNIA REPAIR      abdominal    LASIK      UPPER GASTROINTESTINAL ENDOSCOPY  05/28/2021      Family History   Problem Relation Age of Onset    Hypertension Father     Stroke Father     Alcohol abuse Father     Heart disease Brother     COPD Sister     Cancer Brother         Lung    Diabetes Daughter     No Known Problems Son     COPD Brother     No Known Problems Daughter     No Known Problems Son     Prostate cancer Neg  "Hx     Kidney disease Neg Hx      Social History     Tobacco Use    Smoking status: Former Smoker     Quit date: 1996     Years since quittin.6    Smokeless tobacco: Never Used    Tobacco comment: cigar smoker   Substance Use Topics    Alcohol use: No     Comment: none since     Drug use: No          Review of Systems and Physical Exam     Review of Systems      + shortness of breath, leg swelling    All other symptoms negative as stated below    --Constitutional - Denies fever, appetite change, chills  --Eyes - Denies eye discharge, eye pain, eye redness or visual disturbance  --HENT- Denies congestion, sore throat, drooling, ear discharge, rhinorrhea or trouble swallowing  --Respiratory - Denies cough, wheezing  --Cardiovascular - Denies chest pain, leg swelling, palpitations  --Gastrointestinal - Denies abdominal pain, abdominal distension, constipation, diarrhea, nausea or vomiting  --Genitourinary - Denies difficulty urinating, dysuria, hematuria, urgency  --Musculoskeletal - Denies arthralgias, myalgias  --Neurological - Denies dizziness, headaches, lightheadedness, weakness  --Hematologic - Denies easy bruising or easy bleeding  --Skin - Denies rash, wound or pallor  --Psychiatric- Denies dysphoric mood, nervousness/anxiety    Vital Signs   height is 5' 5" (1.651 m) and weight is 79.6 kg (175 lb 7.8 oz). His tympanic temperature is 98.4 °F (36.9 °C). His blood pressure is 107/55 (abnormal) and his pulse is 72. His respiration is 20 and oxygen saturation is 94% (abnormal).      Physical Exam   Nursing note and vitals reviewed  --Constitutional:  Well developed, well nourished. In no acute distress.  --HENT: Normocephalic, atraumatic. No rhinorrhea. Moist oral mucosa. No oropharyngeal edema, erythema or exudates.   --Eyes: PERRL. Extraocular movements intact. No periorbital swelling. Normal conjunctiva.  --Neck: Normal range of motion. Neck supple. No adenopathy  --Cardiac: Regular rhythm, " normal S1, normal S2, no murmur, normal rate, intact distal pulses  --Pulmonary:  Bibasilar crackles.  Normal respiratory effort. No accessory muscle use, no respiratory distress  --Abdominal: Soft, normal bowel sounds, no tenderness, no guarding, no rebound  --Musculoskeletal:  1+ bilateral lower extremity edema.  Normal range of motion. No deformity, tenderness.  --Neurological:  Alert and oriented x 4. Follows commands appropriately.    --Skin: Warm and dry. No rash, pallor, cyanosis or jaundice.  --Psych: Normal mood    ED Course   Critical Care    Date/Time: 5/16/2022 6:12 AM  Performed by: David Rosa MD  Authorized by: Joseph Redmond MD   Direct patient critical care time: 10 minutes  Additional history critical care time: 10 minutes  Ordering / reviewing critical care time: 10 minutes  Documentation critical care time: 9 minutes  Consulting other physicians critical care time: 9 minutes  Consult with family critical care time: 9 minutes  Total critical care time (exclusive of procedural time) : 57 minutes        EKG (interpreted by me)  Rate: 63 bpm  Rhythm: Wide QRS rhythm with occasional ventricular-paced complexes  Axis: Right axis deviation  ST-segments: No elevation or depression  Overall Interpretation: Wide QRS rhythm with occasional ventricular-paced complexes with right axis deviation and inferolateral T-wave inversions      Lab Results (reviewed by me)  Labs Reviewed   D DIMER, QUANTITATIVE - Abnormal; Notable for the following components:       Result Value    D-Dimer 2.25 (*)     All other components within normal limits   B-TYPE NATRIURETIC PEPTIDE - Abnormal; Notable for the following components:     (*)     All other components within normal limits   TROPONIN I - Abnormal; Notable for the following components:    Troponin I 0.044 (*)     All other components within normal limits   COMPREHENSIVE METABOLIC PANEL - Abnormal; Notable for the following components:    Glucose 180  "(*)     BUN 45 (*)     Creatinine 2.1 (*)     Calcium 8.6 (*)     Albumin 2.7 (*)     AST 72 (*)     eGFR if  30 (*)     eGFR if non  26 (*)     All other components within normal limits   CBC W/ AUTO DIFFERENTIAL - Abnormal; Notable for the following components:    RBC 2.79 (*)     Hemoglobin 9.1 (*)     Hematocrit 27.9 (*)      (*)     MCH 32.6 (*)     RDW 14.9 (*)     Immature Granulocytes 0.6 (*)     Lymph % 16.3 (*)     All other components within normal limits   TROPONIN I - Abnormal; Notable for the following components:    Troponin I 0.048 (*)     All other components within normal limits   INFLUENZA A & B BY MOLECULAR   SARS-COV-2 RNA AMPLIFICATION, QUAL   LACTIC ACID, PLASMA   CK       Radiology (images visualized & reports reviewed by me)  X-Ray Chest 1 View   Final Result      Increased left-sided pleural effusion along with new airspace opacities in left hemithorax.  Findings suggestive of worsening CHF pattern of pulmonary edema.         Electronically signed by: Abisai Maynard MD   Date:    05/15/2022   Time:    23:12      US Lower Extremity Veins Bilateral    (Results Pending)       Medications Given  Medications   sodium chloride 0.9% flush 10 mL (has no administration in time range)   melatonin tablet 6 mg (has no administration in time range)   furosemide injection 20 mg (has no administration in time range)   magnesium sulfate 2g in water 50mL IVPB (premix) (0 g Intravenous Stopped 5/16/22 0126)   furosemide injection 20 mg (20 mg Intravenous Given 5/16/22 0121)       Differential Diagnosis:  Asthma, COPD, pulmonary embolism, Covid, Influenza, cardiogenic pulmonary edema, noncardiogenic pulmonary edema, pneumonia, myocardial infarction, cardiac tamponade    Clinical Tests:  Lab Tests: Ordered and reviewed  Radiological Study: Ordered and reviewed  Medical Tests: Ordered and reviewed    ED Management     height is 5' 5" (1.651 m) and weight is 79.6 kg (175 lb " 7.8 oz). His tympanic temperature is 98.4 °F (36.9 °C). His blood pressure is 107/55 (abnormal) and his pulse is 72. His respiration is 20 and oxygen saturation is 94% (abnormal).     Physical exam with bibasilar crackles and 1+ bilateral lower extremity edema.  Labs revealed elevated BNP, mildly elevated troponin, hypoxemia (PO2 59), NIK and elevated D-dimer.  EKG revealed wqide QRS rhythm with occasional ventricular-paced complexes with right axis deviation and inferolateral T-wave inversions.  Arterial blood gas revealed hypoxemia with PO2 59. Chest x-ray revealed pulmonary edema.  Considering NIK, CT PE study was unable to be obtained.  Bilateral lower extremity ultrasound obtained in the started and was negative.  Magnesium sulfate given for prolonged QRS on EKG.  Lasix IV given as well.  He was admitted to hospital medicine for acute on chronic hypoxemic respiratory failure.    Diagnosis  The primary encounter diagnosis was Elevated brain natriuretic peptide (BNP) level. Diagnoses of Shortness of breath, Elevated d-dimer, NIK (acute kidney injury), Elevated troponin, Acute on chronic respiratory failure with hypoxemia, Transaminitis, and Acute hypoxemic respiratory failure were also pertinent to this visit.    Disposition and Plan  Condition: Stable  Disposition: Discharge            David Rosa MD  05/16/22 0614

## 2022-05-16 NOTE — PT/OT/SLP EVAL
"Physical Therapy Evaluation    Patient Name:  Eddie Parada Sr.   MRN:  [id number]    Recommendations:     Discharge Recommendations:  home with home health, home health PT   Discharge Equipment Recommendations: none   Barriers to discharge: None    Assessment:     Eddie Parada Sr. is a 89 y.o. male admitted with a medical diagnosis of <principal problem not specified>.  He presents with the following impairments/functional limitations:  weakness, impaired functional mobilty, impaired endurance, impaired self care skills, impaired balance, gait instability. Completed and tolerated out of bed activity with contact guard assistance and gait functions at room air x ~20 ft with Contact Guard Assistance. Monitored SPO2 based line from 95% to 89%. Immediately recoverd back to 91% after 1 minute rest.    Rehab Prognosis: Fair; patient would benefit from acute skilled PT services to address these deficits and reach maximum level of function.    Recent Surgery: * No surgery found *      Plan:     During this hospitalization, patient to be seen 5 x/week to address the identified rehab impairments via gait training, therapeutic activities, therapeutic exercises and progress toward the following goals:    · Plan of Care Expires:  05/23/22    Subjective     Chief Complaint: Patient not feeling good. Weakness  Patient/Family Comments/goals: "To get better"  Pain/Comfort:  · Pain Rating 1: 0/10    Patients cultural, spiritual, Anabaptism conflicts given the current situation: no    Living Environment:  Patient lives with wife in a H(elevated)  with ramp with handrails and  8 steps with handrails to enter.  Prior to admission, patients level of function was Supervision/Modified Independent with gait functions using RW and some self care tasks..  Equipment used at home: walker, rolling, shower chair, wheelchair.  DME owned (not currently used): none.  Upon discharge, patient will have assistance from wife.    Objective: "     Communicated with patient and wife prior to session.  Patient found HOB elevated with oxygen, PICC line, IMELDA drain, telemetry  upon PT entry to room.    General Precautions: Standard, fall   Orthopedic Precautions:N/A   Braces: N/A  Respiratory Status: Nasal cannula, flow 3 L/min    Exams:  · Cognitive Exam:  Patient is oriented to Person, Place and Time  · Fine Motor Coordination:    · -       Intact  · Gross Motor Coordination:  WFL  · Postural Exam:  Patient presented with the following abnormalities:    · -       Rounded shoulders  · -       Forward head  · Skin Integrity/Edema:      · -       Skin integrity: Visible skin intact  · RUE ROM: WFL  · RUE Strength: WFL  · LUE ROM: WFL  · LUE Strength: WFL  · RLE ROM: WFL  · RLE Strength: WFL  · LLE ROM: WFL  · LLE Strength: WFL    Functional Mobility:  · Bed Mobility:     · Rolling Left:  supervision  · Rolling Right: supervision  · Scooting: contact guard assistance  · Supine to Sit: contact guard assistance  · Sit to Supine: contact guard assistance  · Transfers:     · Sit to Stand:  contact guard assistance with rolling walker  · Gait: Contact Guard Assistance x ~20 feet with RW. Decreased foot/floor clearance.    Therapeutic Activities and Exercises:   Completed PT eval. Initiated out of bed activity with safety measures.    AM-PAC 6 CLICK MOBILITY  Total Score:18     Patient left HOB elevated with all lines intact, call button in reach, bed alarm on, nursing  notified and wife present.    GOALS:   Multidisciplinary Problems     Physical Therapy Goals        Problem: Physical Therapy    Goal Priority Disciplines Outcome Goal Variances Interventions   Physical Therapy Goal     PT, PT/OT      Description:   Patient will increase functional independence with mobility by performin. Supine to sit with Modified Independent  2. Sit to supine with Modified Independent  3. Bed to chair transfer with Supervision or Set-up Assistancewith or without rolling walker  using Step Transfer TECHNIQUE  4. Gait  x ~100  feet with Supervision or Set-up Assistance with or without rolling walker  5. Lower extremity exercise program x10 reps per handout, with assistance as needed                      History:     Past Medical History:   Diagnosis Date    Abdominal fibromatosis     Acute posthemorrhagic anemia 01/09/2018    NIK (acute kidney injury) 01/11/2018    Aortic stenosis 02/25/2014    Atrial fibrillation     Bradycardia     Broken arm     CAD (coronary artery disease)     Cancer     basal cell on nose and melanoma on back    CHF (congestive heart failure)     COPD (chronic obstructive pulmonary disease)     Diabetes mellitus type II     Encounter for blood transfusion     Food impaction of esophagus, history of 03/03/2022    Hyperlipidemia     Localization-related focal epilepsy with simple partial seizures 03/02/2021    Melanoma     Obesity (BMI 30.0-34.9) 09/13/2016    Obesity, diabetes, and hypertension syndrome 09/13/2016    Osteoporosis     Other dysphagia 05/27/2021    Peripheral vascular disease     Pneumonia of left lower lobe due to infectious organism 04/30/2022    Primary malignant neoplasm of prostate 03/03/2022    S/P ORIF (open reduction internal fixation) fracture 11/27/2017    Sepsis with acute hypoxic respiratory failure     Septic shock 03/03/2022    Stroke     TIA (transient ischemic attack)     Type II diabetes mellitus with peripheral circulatory disorder     Type II or unspecified type diabetes mellitus without mention of complication, not stated as uncontrolled     Unspecified essential hypertension        Past Surgical History:   Procedure Laterality Date    AORTIC VALVE REPLACEMENT      CARDIAC PACEMAKER PLACEMENT  9/28/2012    CARDIAC VALVE REPLACEMENT  11/17/2011    aortic valve-tissue    CHOLECYSTECTOMY      COLONOSCOPY      ESOPHAGOGASTRODUODENOSCOPY N/A 5/27/2021    Procedure: EGD (ESOPHAGOGASTRODUODENOSCOPY);   Surgeon: Gualberto Medrano MD;  Location: [facility];  Service: Endoscopy;  Laterality: N/A;    ESOPHAGOGASTRODUODENOSCOPY N/A 6/22/2021    Procedure: EGD (ESOPHAGOGASTRODUODENOSCOPY);  Surgeon: Gualberto Medrano MD;  Location: [facility];  Service: Endoscopy;  Laterality: N/A;  please call wife(Jessica) with instructions/arrival time.    ESOPHAGOGASTRODUODENOSCOPY (EGD) WITH DILATION  06/22/2021    EYE SURGERY Bilateral     cataract [facility] lens [facility]    HERNIA REPAIR      abdominal    LASIK      UPPER GASTROINTESTINAL ENDOSCOPY  05/28/2021       Time Tracking:     PT Received On: 05/16/22  PT Start Time: 1018     PT Stop Time: 1038  PT Total Time (min): 20 min     Billable Minutes: Evaluation 20 mins      05/16/2022

## 2022-05-16 NOTE — ASSESSMENT & PLAN NOTE
HbA1c 6.0 on 4/30/22  - does not appear to be on any home medications   - SSI + POCT glucose while inpatient  - continue to manage with lifstyle, follow up with PCP

## 2022-05-16 NOTE — PROGRESS NOTES
Pharmacokinetic Initial Assessment: IV Vancomycin    Assessment/Plan:    Patient came in on Vancomycin 750 mg daily with home health. followed by a maintenance dose of vancomycin 750 mg IV every 24 hours  Desired empiric serum trough concentration is 10 to 20 mcg/mL  Draw vancomycin trough level 60 min prior to next dose on 5/17/22 at approximately 0800  Pharmacy will continue to follow and monitor vancomycin.      Please contact pharmacy at extension [phone] with any questions regarding this assessment.     Thank you for the consult,   Vicky Murillo       Patient brief summary:  Eddie Parada Sr. is a 89 y.o. male initiated on antimicrobial therapy with IV Vancomycin for treatment of suspected bacteremia    Drug Allergies:   Review of patient's allergies indicates:   Allergen Reactions    Ciprofloxacin     Levofloxacin Swelling    Lyrica [pregabalin] Swelling    Unable to assess Other (See Comments)     Soft shell crabs       Actual Body Weight:   79.6 kg    Renal Function:   Estimated Creatinine Clearance: 21.4 mL/min (A) (based on SCr of 2.1 mg/dL (H)).,     Dialysis Method (if applicable):  N/A    CBC (last 72 hours):  Recent Labs   Lab Result Units 05/15/22  2242 05/16/22  0557   WBC K/uL 7.00 6.99   Hemoglobin g/dL 9.1* 8.6*   Hematocrit % 27.9* 26.2*   Platelets K/uL 207 193   Gran % % 70.7 70.4   Lymph % % 16.3* 15.6*   Mono % % 11.7 12.0   Eosinophil % % 0.3 0.9   Basophil % % 0.4 0.4   Differential Method  Automated Automated       Metabolic Panel (last 72 hours):  Recent Labs   Lab Result Units 05/15/22  2242 05/16/22  0557   Sodium mmol/L 136 138   Potassium mmol/L 3.5 3.6   Chloride mmol/L 95 96   CO2 mmol/L 25 26   Glucose mg/dL 180* 175*   BUN mg/dL 45* 48*   Creatinine mg/dL 2.1* 2.1*   Albumin g/dL 2.7*  --    Total Bilirubin mg/dL 0.7  --    Alkaline Phosphatase U/L 125  --    AST U/L 72*  --    ALT U/L 30  --        Drug levels (last 3 results):  No results for input(s): VANCOMYCINRA,  VANCOMYCINPE, VANCOMYCINTR in the last 72 hours.    Microbiologic Results:  Microbiology Results (last 7 days)       Procedure Component Value Units Date/Time    Influenza A & B by Molecular [[id number]] Collected: 05/15/22 2300    Order Status: Completed Specimen: Nasopharyngeal Swab Updated: 05/15/22 2340     Influenza A, Molecular Negative     Influenza B, Molecular Negative     Flu A & B Source Nasal swab

## 2022-05-16 NOTE — PLAN OF CARE
[facility] - Med Surg (3rd Fl)  Initial Discharge Assessment       Primary Care Provider: Callum Roberts MD    Admission Diagnosis: Shortness of breath [R06.02]  Transaminitis [R74.01]  Elevated troponin [R77.8]  Elevated brain natriuretic peptide (BNP) level [R79.89]  Elevated d-dimer [R79.89]  NIK (acute kidney injury) [N17.9]  Acute on chronic respiratory failure with hypoxemia [J96.21]  Acute hypoxemic respiratory failure [J96.01]    Admission Date: 5/15/2022  Expected Discharge Date:     Discharge Barriers Identified: None    Payor: [employer] MEDICARE / Plan: [employer] 65 / Product Type: Medicare Advantage /     Extended Emergency Contact Information  Primary Emergency Contact: Jessica Parada  Address: [address] [address] [facility]  Home Phone: [phone]  Mobile Phone: [phone]  Relation: Spouse  Secondary Emergency Contact: Diana Romero   [facility]  Home Phone: [phone]  Relation: Daughter    Discharge Plan A: Home with family, Home Health  Discharge Plan B: Home with family, Home Health      [id number] - [place] - [address] [facility]  [address] [facility]  [place] [address]  Phone: [phone] Fax: [phone]    [facility] - [place] - [address] [facility]  [address] [facility]  [place] [address]  Phone: [phone] Fax: [phone]    [facility] - [address]  [address] [address]  Phone: [phone] Fax: [phone]      Initial Assessment (most recent)     Adult Discharge Assessment - 05/16/22 1219        Discharge Assessment    Assessment Type Discharge Planning Assessment     Confirmed/corrected address, phone number and insurance Yes     Confirmed Demographics Correct on Facesheet     Source of Information patient;family;health record     Communicated KANDICE with patient/caregiver Date not  available/Unable to determine     Reason For Admission CHF     Lives With spouse     Facility Arrived From: Home     Do you expect to return to your current living situation? Yes     Do you have help at home or someone to help you manage your care at home? Yes     Who are your caregiver(s) and their phone number(s)? Jessica Parada (Spouse) [phone]     Prior to hospitilization cognitive status: Alert/Oriented     Current cognitive status: Alert/Oriented     Walking or Climbing Stairs Difficulty ambulation difficulty, requires equipment     Dressing/Bathing Difficulty none     Equipment Currently Used at Home walker, rolling;raised toilet;wheelchair     Readmission within 30 days? Yes     Patient currently being followed by outpatient case management? No     Do you currently have service(s) that help you manage your care at home? Yes     Name and Contact number of agency [facility]     Is the pt/caregiver preference to resume services with current agency Yes     Do you take prescription medications? Yes     Do you have prescription coverage? Yes     Coverage [employer]     Do you have any problems affording any of your prescribed medications? No     How do you get to doctors appointments? family or friend will provide     Are you on dialysis? No     Do you take coumadin? No     Discharge Plan A Home with family;Home Health     Discharge Plan B Home with family;Home Health     DME Needed Upon Discharge  other (see comments)   TBD    Discharge Barriers Identified None                        Discharge assessment completed. Patient currently receiving outpatient IV antibiotics for abdominal wall abscess. He receives home health with [facility] who is managing his PICC line and overseeing antibiotics. Patient does not have home oxygen. SW to remain available for discharge needs.

## 2022-05-16 NOTE — ASSESSMENT & PLAN NOTE
BP Readings from Last 3 Encounters:   05/16/22 (!) 129/59   05/12/22 (!) 129/58   05/11/22 (!) 110/52     Resume home coreg-at lower dose 3.25mg , hold amlodipine & doxazosin  for now

## 2022-05-16 NOTE — NURSING
Patient transported to 3rd floor room 304. NADN. Care assumed after report received from LUCI Clayton. Patient is AAOx3. No complaints of chest pain or any other discomforts. Patient and wife oriented to room, bed in low position, side rails up x2, call bell in reach. Will continue to monitor.

## 2022-05-16 NOTE — H&P
Former[facility] ([facility])  [facility] Medicine  History & Physical    Patient Name: Eddie Parada Sr.  MRN: [id number]  Patient Class: IP- Inpatient  Admission Date: 5/15/2022  Attending Physician: Joseph Redmond MD   Primary Care Provider: Callum Roberts MD         Patient information was obtained from patient and ER records.     Subjective:     Principal Problem:(HFpEF) heart failure with preserved ejection fraction    Chief Complaint:   Chief Complaint   Patient presents with    Shortness of Breath     Patient arrived by [facility] with c/o sob at home. EMS reports firefighters were first to respond to the scene and spo2 in the 70s, when EMS arrived pt was on high flow O2 at 10L/min spo2 at 100 %. EMS took patient off of oxygen and his spo2 stayed around 95%. Pt PMHX of COPD and CHF. Pt states he is not on oxygen at home. NADN in triage.         HPI: Eddie Parada Sr. is a 89 y.o. male with known atrial fibrillation, aortic stenosis s/p repair,  CAD, CHF, COPD, diabetes mellitus type 2, hyperlipidemia,  CVA and ongoing complications associated of an anterior abdominal wall mass secondary to ventral hernia mesh placed ~20 years ago who presented to ER with shortness of breath since just prior to arrival.  While sitting on a sofa at home, he suddenly began short of breath.  Per wife, he has been compliant with all of his home medication.  Endorses leg swelling.   Denies chest pain, diaphoresis.   BNP> 500, CXR demonstrating pulmonary edema, Creat elevated at 2.1- (Baseline creat 1.0-1.7)   D- dimer elevated, LE US negative for DVT, TNI- 0.044>0.048>0.043   H&H 9.1/27.9>8.6>26.2     He is s/p Drain recently removed prior to presentation, with return of symptoms, re-accumulation of fluid. General surgery was consulted who states patient is not a good surgical candidate. Patient underwent US-guided drain placement with IR 5/4, which he tolerated well. Initiated BSA with vanc, cefepime, flagyl, and  azithromycin (for atypicals/CAP). Consulted ID for long-term IV Abx who recommends discharging patient on vanc, ceftriaxone, PO flagyl, and PO diflucan for at least 2 weeks and close follow-up with ID for decision making based on clinical course. Because patient has had several hospitalizations in last few months due to complications from abdominal wall abscess, consulted palliative care to have on board and to establish outpatient follow-up. Patient was discharged home with abdominal drain in place, 4 weeks of vanc, ceftriaxone, PO flagyl, and PO diflucan, and close follow-up with ID, general surgery, palliative care, and home health. Patient remained afebrile and without abdominal pain, leukocytosis, or other issues during hospital stay.          Past Medical History:   Diagnosis Date    Abdominal fibromatosis     Acute posthemorrhagic anemia 01/09/2018    NIK (acute kidney injury) 01/11/2018    Aortic stenosis 02/25/2014    Atrial fibrillation     Bradycardia     Broken arm     CAD (coronary artery disease)     Cancer     basal cell on nose and melanoma on back    CHF (congestive heart failure)     COPD (chronic obstructive pulmonary disease)     Diabetes mellitus type II     Encounter for blood transfusion     Food impaction of esophagus, history of 03/03/2022    Hyperlipidemia     Localization-related focal epilepsy with simple partial seizures 03/02/2021    Melanoma     Obesity (BMI 30.0-34.9) 09/13/2016    Obesity, diabetes, and hypertension syndrome 09/13/2016    Osteoporosis     Other dysphagia 05/27/2021    Peripheral vascular disease     Pneumonia of left lower lobe due to infectious organism 04/30/2022    Primary malignant neoplasm of prostate 03/03/2022    S/P ORIF (open reduction internal fixation) fracture 11/27/2017    Sepsis with acute hypoxic respiratory failure     Septic shock 03/03/2022    Stroke     TIA (transient ischemic attack)     Type II diabetes mellitus with  peripheral circulatory disorder     Type II or unspecified type diabetes mellitus without mention of complication, not stated as uncontrolled     Unspecified essential hypertension        Past Surgical History:   Procedure Laterality Date    AORTIC VALVE REPLACEMENT      CARDIAC PACEMAKER PLACEMENT  9/28/2012    CARDIAC VALVE REPLACEMENT  11/17/2011    aortic valve-tissue    CHOLECYSTECTOMY      COLONOSCOPY      ESOPHAGOGASTRODUODENOSCOPY N/A 5/27/2021    Procedure: EGD (ESOPHAGOGASTRODUODENOSCOPY);  Surgeon: Gualberto Medrano MD;  Location: [facility];  Service: Endoscopy;  Laterality: N/A;    ESOPHAGOGASTRODUODENOSCOPY N/A 6/22/2021    Procedure: EGD (ESOPHAGOGASTRODUODENOSCOPY);  Surgeon: Gualberto Medrano MD;  Location: [facility];  Service: Endoscopy;  Laterality: N/A;  please call wife(Jessica) with instructions/arrival time.    ESOPHAGOGASTRODUODENOSCOPY (EGD) WITH DILATION  06/22/2021    EYE SURGERY Bilateral     cataract with lens by  at [facility]    HERNIA REPAIR      abdominal    LASIK      UPPER GASTROINTESTINAL ENDOSCOPY  05/28/2021       Review of patient's allergies indicates:   Allergen Reactions    Ciprofloxacin     Levofloxacin Swelling    Lyrica [pregabalin] Swelling    Unable to assess Other (See Comments)     Soft shell crabs       No current facility-administered medications on file prior to encounter.     Current Outpatient Medications on File Prior to Encounter   Medication Sig    acetaminophen (TYLENOL) 500 MG tablet Take 500 mg by mouth every 6 (six) hours as needed for Pain.    amLODIPine (NORVASC) 10 MG tablet TAKE 1 TABLET BY MOUTH DAILY FOR BLOOD PRESSURE    carvediloL (COREG) 12.5 MG tablet TAKE 1 TABLET (12.5 MG TOTAL) BY MOUTH 2 (TWO) TIMES DAILY WITH MEALS.    cefTRIAXone (ROCEPHIN) 1 g/50 mL PgBk IVPB Inject 50 mLs (1 g total) into the vein once daily.    doxazosin (CARDURA) 1 MG tablet Take 1 mg by mouth nightly.    ferrous sulfate (FEOSOL)  325 mg (65 mg iron) Tab tablet Take 325 mg by mouth once daily at 6am.    fluconazole (DIFLUCAN) 100 MG tablet Take 1 tablet (100 mg total) by mouth once daily.    furosemide (LASIX) 20 MG tablet Take 1 tablet (20 mg total) by mouth every other day. Hold medication until 3/29 (Patient taking differently: Take 20 mg by mouth once daily.)    gabapentin (NEURONTIN) 100 MG capsule Take 2 capsules (200 mg total) by mouth every evening.    levothyroxine (SYNTHROID) 75 MCG tablet Take 1 tablet (75 mcg total) by mouth before breakfast.    metroNIDAZOLE (FLAGYL) 500 MG tablet Take 1 tablet (500 mg total) by mouth every 8 (eight) hours.    pantoprazole (PROTONIX) 40 MG tablet TAKE ONE TABLET BY MOUTH ONCE A DAY FOR STOMACH PROBLEMS    polyethylene glycol (GLYCOLAX) 17 gram/dose powder Take 17 g by mouth once daily.    potassium chloride SA (K-DUR,KLOR-CON) 10 MEQ tablet Take 1 tablet (10 mEq total) by mouth once daily.    pravastatin (PRAVACHOL) 20 MG tablet TAKE 1 TABLET BY MOUTH DAILY FOR CHOLESTROL    rivaroxaban (XARELTO) 20 mg Tab Take one tablet(20mg) by mouth once daily    tamsulosin (FLOMAX) 0.4 mg Cap Take 1 capsule (0.4 mg total) by mouth once daily.    vancomycin HCl (VANCOMYCIN 750 MG/250 ML D5W, READY TO MIX SYSTEM,) Inject 250 mLs (750 mg total) into the vein once daily.    guaiFENesin (MUCINEX) 600 mg 12 hr tablet Take 1 tablet (600 mg total) by mouth 2 (two) times daily. for 10 days (Patient not taking: Reported on 5/11/2022)    senna (SENOKOT) 8.6 mg tablet Take 1 tablet by mouth once daily.    senna-docusate 8.6-50 mg (PERICOLACE) 8.6-50 mg per tablet Take 1 tablet by mouth 2 (two) times daily as needed for Constipation.     Family History       Problem Relation (Age of Onset)    Alcohol abuse Father    COPD Sister, Brother    Cancer Brother    Diabetes Daughter    Heart disease Brother    Hypertension Father    No Known Problems Son, Daughter, Son    Stroke Father          Tobacco Use     Smoking status: Former Smoker     Quit date: 1996     Years since quittin.6    Smokeless tobacco: Never Used    Tobacco comment: cigar smoker   Substance and Sexual Activity    Alcohol use: No     Comment: none since     Drug use: No    Sexual activity: Not Currently     Review of Systems   Constitutional:  Positive for fatigue. Negative for activity change, fever and unexpected weight change.   HENT:  Negative for congestion, ear pain, hearing loss, rhinorrhea and sore throat.    Eyes:  Negative for pain, redness and visual disturbance.   Respiratory:  Positive for shortness of breath. Negative for cough and wheezing.    Cardiovascular:  Negative for chest pain, palpitations and leg swelling.   Gastrointestinal:  Negative for abdominal pain, constipation, diarrhea, nausea and vomiting.   Genitourinary:  Negative for decreased urine volume, dysuria, frequency and urgency.   Musculoskeletal:  Negative for back pain, joint swelling and neck pain.   Skin:  Negative for color change, rash and wound.   Neurological:  Negative for dizziness, tremors, weakness, light-headedness and headaches.   Objective:     Vital Signs (Most Recent):  Temp: 96.1 °F (35.6 °C) (22 0730)  Pulse: 87 (22 0801)  Resp: 20 (22 0730)  BP: (!) 129/59 (22 0730)  SpO2: (!) 92 % (22 0733)   Vital Signs (24h Range):  Temp:  [96.1 °F (35.6 °C)-98.4 °F (36.9 °C)] 96.1 °F (35.6 °C)  Pulse:  [60-87] 87  Resp:  [16-29] 20  SpO2:  [92 %-100 %] 92 %  BP: ()/(51-70) 129/59     Weight: 79.6 kg (175 lb 7.8 oz)  Body mass index is 29.2 kg/m².    Physical Exam  Vitals and nursing note reviewed.   Constitutional:       General: He is not in acute distress.     Appearance: He is well-developed.   HENT:      Head: Normocephalic and atraumatic.      Right Ear: External ear normal.      Left Ear: External ear normal.   Eyes:      General:         Right eye: No discharge.         Left eye: No discharge.       Extraocular Movements: Extraocular movements intact.      Conjunctiva/sclera: Conjunctivae normal.      Pupils: Pupils are equal, round, and reactive to light.   Neck:      Thyroid: No thyromegaly.   Cardiovascular:      Rate and Rhythm: Normal rate and regular rhythm.      Heart sounds: No murmur heard.  Pulmonary:      Effort: Pulmonary effort is normal. No respiratory distress.      Breath sounds: Rales (soft crackle in bases) present. No wheezing.   Abdominal:      General: Bowel sounds are normal. There is no distension.      Palpations: Abdomen is soft.      Tenderness: There is no abdominal tenderness.      Comments: Drain in place   Musculoskeletal:      Right lower leg: Edema (trace pedal edema) present.      Left lower leg: Edema (trace pedal edema) present.   Skin:     General: Skin is warm and dry.      Comments: PICC to right UE, dressing c/d/i   Neurological:      Mental Status: He is alert and oriented to person, place, and time.      Cranial Nerves: No cranial nerve deficit.   Psychiatric:         Behavior: Behavior normal.         Thought Content: Thought content normal.         CRANIAL NERVES     CN III, IV, VI   Pupils are equal, round, and reactive to light.     Significant Labs: All pertinent labs within the past 24 hours have been reviewed.  A1C:   Recent Labs   Lab 03/02/22  0744 04/30/22  1046   HGBA1C 5.9* 6.0*     ABGs:   Recent Labs   Lab 05/15/22  2255   PH 7.470*   PCO2 38   HCO3 27.70   POCSATURATED 89.1*   BE 3.80*   TOTALHB 9.7*   COHB 3.1*   METHB 0.8   PO2 59*     Bilirubin:   Recent Labs   Lab 05/01/22  0610 05/04/22  0749 05/05/22  0536 05/06/22  0513 05/15/22  2242   BILITOT 0.4 0.5 0.5 0.6 0.7     Blood Culture: No results for input(s): LABBLOO in the last 48 hours.  CBC:   Recent Labs   Lab 05/15/22  2242 05/16/22  0557   WBC 7.00 6.99   HGB 9.1* 8.6*   HCT 27.9* 26.2*    193     CMP:   Recent Labs   Lab 05/15/22  2242 05/16/22  0557    138   K 3.5 3.6   CL 95 96    CO2 25 26   * 175*   BUN 45* 48*   CREATININE 2.1* 2.1*   CALCIUM 8.6* 8.6*   PROT 6.4  --    ALBUMIN 2.7*  --    BILITOT 0.7  --    ALKPHOS 125  --    AST 72*  --    ALT 30  --    ANIONGAP 16 16   EGFRNONAA 26* 26*     Cardiac Markers:   Recent Labs   Lab 05/15/22  2242   *     Lactic Acid:   Recent Labs   Lab 05/15/22  2242   LACTATE 1.3     Lipase: No results for input(s): LIPASE in the last 48 hours.  Lipid Panel: No results for input(s): CHOL, HDL, LDLCALC, TRIG, CHOLHDL in the last 48 hours.  Magnesium: No results for input(s): MG in the last 48 hours.  POCT Glucose:   Recent Labs   Lab 05/16/22  0621   POCTGLUCOSE 196*     Troponin:   Recent Labs   Lab 05/15/22  2242 05/16/22  0244 05/16/22  0750   TROPONINI 0.044* 0.048* 0.043*     TSH:   Recent Labs   Lab 03/02/22  0744   TSH 5.943*     Urine Culture: No results for input(s): LABURIN in the last 48 hours.  Urine Studies: No results for input(s): COLORU, APPEARANCEUA, PHUR, SPECGRAV, PROTEINUA, GLUCUA, KETONESU, BILIRUBINUA, OCCULTUA, NITRITE, UROBILINOGEN, LEUKOCYTESUR, RBCUA, WBCUA, BACTERIA, SQUAMEPITHEL, HYALINECASTS in the last 48 hours.    Invalid input(s): WRIGHTSUR    Significant Imaging: I have reviewed and interpreted all pertinent imaging results/findings within the past 24 hours.    LE US -No evidence of deep venous thrombosis in either lower extremity.    CXR- Increased left-sided pleural effusion along with new airspace opacities in left hemithorax.  Findings suggestive of worsening CHF pattern of pulmonary edema.    Assessment/Plan:     * (HFpEF) heart failure with preserved ejection fraction  5/2/22 Echo-   · The estimated ejection fraction is 55%.  · Indeterminate left ventricular diastolic function.  · Moderate right ventricular enlargement with mildly reduced right ventricular systolic function.  · Mild aortic regurgitation.  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 There is aortic insufficiency present. Prosthetic  aortic valve is normal.  · The aortic valve mean gradient is 6 mmHg with a dimensionless index of 0.64.  · Mild mitral regurgitation.  · Mild tricuspid regurgitation.  · There is moderate pulmonary hypertension.       Increased SOB and hypoxia. Diuresis initiated. CIS following. Strict I/O. Low Na diet      Debility  PT consulted   Per records patient with several hospitalizations in last few months for abdominal wall abscess   - prior to 2 months ago, was still independent, working and running a business  - consulted palliative care to have on board and have OP follow up      NIK (acute kidney injury)  Monitor BUN/SCr.  Monitor I/Os.  Monitor electrolytes.  Avoid non-steroidal anti-inflammatory medications.  Symptoms and labs suggestive of Acute HF; continue with diuretics, monitor renal fx closely   NIK on CKD     If worsening consider renal US        Abdominal wall abscess  Last discharge 5/6  with abdominal drain in place and 4 weeks of vanc, rocephin, flagyl, and diflucan  - follow up with general surgery for drain and ID for Abx- ( 10 days since discharge)   Consult pharmacy to adjust vanc- monitor     ventral hernia repair with mesh placement ~20 years ago, no complications until 3/2022 with several hospitalizations leading to drain placement and antibiotics since that time, drain removed 4/29/22   - most recent hospitalization began on 4/30/22 at [facility] and was transferred here on 5/4/22 for surgical and IR evaluation  - was initiated on Vanc and Zosyn at [facility]  - switched to Vanc, Cefepime, Flagyl on admission to our facility given renal function   - blood cultures from 4/30/22 no growth to date   - wound cultures from 3/3 and 5/4 NGTD  - general surgery states patient is not good surgical candidate  - underwent US-guided drain placement 5/4  - general surgery added fluconazole for yeast seen on gram stain of wound Cx  - consulted ID for assistance with Abx: discharge with IV vanc,  IV ceftriaxone, PO flagyl, and PO diflucan for at least 2 weeks with close ID follow-up   -    Disorder of prostate  Continue flomax       Hypothyroidism due to acquired atrophy of thyroid  Continue levothyroxine       Long term current use of anticoagulant therapy  Continue xarelto- adjust for renal; recommending decrease to 15mg daily       Type 2 diabetes mellitus, controlled  HbA1c 6.0 on 4/30/22  - does not appear to be on any home medications   - SSI + POCT glucose while inpatient  - continue to manage with lifstyle, follow up with PCP         S/P AVR   bioprosthetic valve placed 2011   - continue home xarelto - adjust currently for renal dosing       Mixed dyslipidemia  Continue pravastatin       CAD (coronary artery disease)  Continue statin, bb, NOAC   Cards consulted     Essential (primary) hypertension  BP Readings from Last 3 Encounters:   05/16/22 (!) 129/59   05/12/22 (!) 129/58   05/11/22 (!) 110/52     Resume home coreg-at lower dose 3.25mg , hold amlodipine & doxazosin  for now       Chronic atrial fibrillation  Maintain BB, rivaroxaban- renal dose  p PPM         VTE Risk Mitigation (From admission, onward)         Ordered     rivaroxaban tablet 15 mg  With dinner         05/16/22 1112     IP VTE HIGH RISK PATIENT  Once         05/16/22 0419     Place sequential compression device  Until discontinued         05/16/22 0419                   Georgia Ann MD  Department of Hospital Medicine   [facility] (3rd Fl)

## 2022-05-16 NOTE — ASSESSMENT & PLAN NOTE
5/2/22 Echo-   · The estimated ejection fraction is 55%.  · Indeterminate left ventricular diastolic function.  · Moderate right ventricular enlargement with mildly reduced right ventricular systolic function.  · Mild aortic regurgitation.  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 There is aortic insufficiency present. Prosthetic aortic valve is normal.  · The aortic valve mean gradient is 6 mmHg with a dimensionless index of 0.64.  · Mild mitral regurgitation.  · Mild tricuspid regurgitation.  · There is moderate pulmonary hypertension.       Increased SOB and hypoxia. Diuresis initiated. CIS following. Strict I/O. Low Na diet

## 2022-05-16 NOTE — ASSESSMENT & PLAN NOTE
Monitor BUN/SCr.  Monitor I/Os.  Monitor electrolytes.  Avoid non-steroidal anti-inflammatory medications.  Symptoms and labs suggestive of Acute HF; continue with diuretics, monitor renal fx closely   NIK on CKD     If worsening consider renal US

## 2022-05-17 PROBLEM — Z71.89 COUNSELING REGARDING ADVANCED DIRECTIVES AND GOALS OF CARE: Status: ACTIVE | Noted: 2022-05-17

## 2022-05-17 LAB
ALBUMIN SERPL BCP-MCNC: 2.6 G/DL (ref 3.5–5.2)
ALP SERPL-CCNC: 114 U/L (ref 55–135)
ALT SERPL W/O P-5'-P-CCNC: 26 U/L (ref 10–44)
ANION GAP SERPL CALC-SCNC: 14 MMOL/L (ref 8–16)
AST SERPL-CCNC: 46 U/L (ref 10–40)
BASOPHILS # BLD AUTO: 0.02 K/UL (ref 0–0.2)
BASOPHILS NFR BLD: 0.3 % (ref 0–1.9)
BILIRUB SERPL-MCNC: 0.6 MG/DL (ref 0.1–1)
BUN SERPL-MCNC: 53 MG/DL (ref 10–30)
CALCIUM SERPL-MCNC: 8.6 MG/DL (ref 8.7–10.5)
CHLORIDE SERPL-SCNC: 95 MMOL/L (ref 95–110)
CO2 SERPL-SCNC: 28 MMOL/L (ref 23–29)
CREAT SERPL-MCNC: 2.1 MG/DL (ref 0.5–1.4)
DIFFERENTIAL METHOD: ABNORMAL
EOSINOPHIL # BLD AUTO: 0.1 K/UL (ref 0–0.5)
EOSINOPHIL NFR BLD: 0.8 % (ref 0–8)
ERYTHROCYTE [DISTWIDTH] IN BLOOD BY AUTOMATED COUNT: 14.9 % (ref 11.5–14.5)
EST. GFR  (AFRICAN AMERICAN): 30 ML/MIN/1.73 M^2
EST. GFR  (NON AFRICAN AMERICAN): 26 ML/MIN/1.73 M^2
GLUCOSE SERPL-MCNC: 167 MG/DL (ref 70–110)
HCT VFR BLD AUTO: 25.6 % (ref 40–54)
HGB BLD-MCNC: 8.4 G/DL (ref 14–18)
IMM GRANULOCYTES # BLD AUTO: 0.03 K/UL (ref 0–0.04)
IMM GRANULOCYTES NFR BLD AUTO: 0.4 % (ref 0–0.5)
LYMPHOCYTES # BLD AUTO: 1.3 K/UL (ref 1–4.8)
LYMPHOCYTES NFR BLD: 16.9 % (ref 18–48)
MCH RBC QN AUTO: 33.5 PG (ref 27–31)
MCHC RBC AUTO-ENTMCNC: 32.8 G/DL (ref 32–36)
MCV RBC AUTO: 102 FL (ref 82–98)
MONOCYTES # BLD AUTO: 0.9 K/UL (ref 0.3–1)
MONOCYTES NFR BLD: 12.3 % (ref 4–15)
NEUTROPHILS # BLD AUTO: 5.3 K/UL (ref 1.8–7.7)
NEUTROPHILS NFR BLD: 69.3 % (ref 38–73)
NRBC BLD-RTO: 0 /100 WBC
PLATELET # BLD AUTO: 183 K/UL (ref 150–450)
PMV BLD AUTO: 10.1 FL (ref 9.2–12.9)
POCT GLUCOSE: 189 MG/DL (ref 70–110)
POCT GLUCOSE: 234 MG/DL (ref 70–110)
POCT GLUCOSE: 247 MG/DL (ref 70–110)
POCT GLUCOSE: 253 MG/DL (ref 70–110)
POTASSIUM SERPL-SCNC: 3.6 MMOL/L (ref 3.5–5.1)
PROT SERPL-MCNC: 6.1 G/DL (ref 6–8.4)
RBC # BLD AUTO: 2.51 M/UL (ref 4.6–6.2)
SODIUM SERPL-SCNC: 137 MMOL/L (ref 136–145)
VANCOMYCIN SERPL-MCNC: 28 UG/ML
VANCOMYCIN TROUGH SERPL-MCNC: 28.1 UG/ML (ref 10–22)
WBC # BLD AUTO: 7.63 K/UL (ref 3.9–12.7)

## 2022-05-17 PROCEDURE — 99233 SBSQ HOSP IP/OBS HIGH 50: CPT | Mod: ,,, | Performed by: INTERNAL MEDICINE

## 2022-05-17 PROCEDURE — 85025 COMPLETE CBC W/AUTO DIFF WBC: CPT | Performed by: NURSE PRACTITIONER

## 2022-05-17 PROCEDURE — 27000221 HC OXYGEN, UP TO 24 HOURS

## 2022-05-17 PROCEDURE — 80202 ASSAY OF VANCOMYCIN: CPT | Mod: 91 | Performed by: FAMILY MEDICINE

## 2022-05-17 PROCEDURE — 25000003 PHARM REV CODE 250: Performed by: NURSE PRACTITIONER

## 2022-05-17 PROCEDURE — 11000001 HC ACUTE MED/SURG PRIVATE ROOM

## 2022-05-17 PROCEDURE — 63600175 PHARM REV CODE 636 W HCPCS: Performed by: NURSE PRACTITIONER

## 2022-05-17 PROCEDURE — 94761 N-INVAS EAR/PLS OXIMETRY MLT: CPT

## 2022-05-17 PROCEDURE — 99233 PR SUBSEQUENT HOSPITAL CARE,LEVL III: ICD-10-PCS | Mod: ,,, | Performed by: INTERNAL MEDICINE

## 2022-05-17 PROCEDURE — 97530 THERAPEUTIC ACTIVITIES: CPT

## 2022-05-17 PROCEDURE — 80202 ASSAY OF VANCOMYCIN: CPT | Performed by: FAMILY MEDICINE

## 2022-05-17 PROCEDURE — 63700000 PHARM REV CODE 250 ALT 637 W/O HCPCS: Performed by: NURSE PRACTITIONER

## 2022-05-17 PROCEDURE — 25000003 PHARM REV CODE 250

## 2022-05-17 PROCEDURE — 80053 COMPREHEN METABOLIC PANEL: CPT | Performed by: NURSE PRACTITIONER

## 2022-05-17 RX ORDER — ISOSORBIDE MONONITRATE 30 MG/1
30 TABLET, EXTENDED RELEASE ORAL DAILY
Status: DISCONTINUED | OUTPATIENT
Start: 2022-05-17 | End: 2022-05-24 | Stop reason: HOSPADM

## 2022-05-17 RX ADMIN — INSULIN ASPART 1 UNITS: 100 INJECTION, SOLUTION INTRAVENOUS; SUBCUTANEOUS at 09:05

## 2022-05-17 RX ADMIN — FERROUS SULFATE TAB 325 MG (65 MG ELEMENTAL FE) 1 EACH: 325 (65 FE) TAB at 08:05

## 2022-05-17 RX ADMIN — INSULIN ASPART 2 UNITS: 100 INJECTION, SOLUTION INTRAVENOUS; SUBCUTANEOUS at 11:05

## 2022-05-17 RX ADMIN — SENNOSIDES 1 TABLET: 8.6 TABLET, FILM COATED ORAL at 08:05

## 2022-05-17 RX ADMIN — GABAPENTIN 200 MG: 100 CAPSULE ORAL at 09:05

## 2022-05-17 RX ADMIN — TAMSULOSIN HYDROCHLORIDE 0.4 MG: 0.4 CAPSULE ORAL at 08:05

## 2022-05-17 RX ADMIN — FUROSEMIDE 20 MG: 20 INJECTION, SOLUTION INTRAMUSCULAR; INTRAVENOUS at 05:05

## 2022-05-17 RX ADMIN — MUPIROCIN: 20 OINTMENT TOPICAL at 08:05

## 2022-05-17 RX ADMIN — CEFTRIAXONE 1 G: 1 INJECTION, SOLUTION INTRAVENOUS at 08:05

## 2022-05-17 RX ADMIN — CARVEDILOL 3.12 MG: 3.12 TABLET, FILM COATED ORAL at 08:05

## 2022-05-17 RX ADMIN — ISOSORBIDE MONONITRATE 30 MG: 30 TABLET, EXTENDED RELEASE ORAL at 02:05

## 2022-05-17 RX ADMIN — FLUCONAZOLE 100 MG: 100 TABLET ORAL at 08:05

## 2022-05-17 RX ADMIN — PANTOPRAZOLE SODIUM 40 MG: 40 TABLET, DELAYED RELEASE ORAL at 08:05

## 2022-05-17 RX ADMIN — INSULIN ASPART 2 UNITS: 100 INJECTION, SOLUTION INTRAVENOUS; SUBCUTANEOUS at 05:05

## 2022-05-17 RX ADMIN — MUPIROCIN: 20 OINTMENT TOPICAL at 09:05

## 2022-05-17 RX ADMIN — METRONIDAZOLE 500 MG: 500 TABLET ORAL at 09:05

## 2022-05-17 RX ADMIN — PRAVASTATIN SODIUM 20 MG: 20 TABLET ORAL at 09:05

## 2022-05-17 RX ADMIN — LEVOTHYROXINE SODIUM 75 MCG: 75 TABLET ORAL at 06:05

## 2022-05-17 RX ADMIN — METRONIDAZOLE 500 MG: 500 TABLET ORAL at 02:05

## 2022-05-17 RX ADMIN — CARVEDILOL 3.12 MG: 3.12 TABLET, FILM COATED ORAL at 05:05

## 2022-05-17 RX ADMIN — POTASSIUM CHLORIDE 10 MEQ: 750 TABLET, FILM COATED, EXTENDED RELEASE ORAL at 08:05

## 2022-05-17 RX ADMIN — FUROSEMIDE 20 MG: 20 INJECTION, SOLUTION INTRAMUSCULAR; INTRAVENOUS at 06:05

## 2022-05-17 RX ADMIN — METRONIDAZOLE 500 MG: 500 TABLET ORAL at 06:05

## 2022-05-17 NOTE — PROGRESS NOTES
[place] [facility] ([facility])  [facility] Medicine  Progress Note    Patient Name: Eddie Parada Sr.  MRN: [id number]  Patient Class: IP- Inpatient   Admission Date: 5/15/2022  Length of Stay: 1 days  Attending Physician: Joseph Redmond MD  Primary Care Provider: Callum Roberts MD        Subjective:     Principal Problem:(HFpEF) heart failure with preserved ejection fraction        HPI:  Eddie Parada Sr. is a 89 y.o. male with known atrial fibrillation, aortic stenosis s/p repair,  CAD, CHF, COPD, diabetes mellitus type 2, hyperlipidemia,  CVA and ongoing complications associated of an anterior abdominal wall mass secondary to ventral hernia mesh placed ~20 years ago who presented to ER with shortness of breath since just prior to arrival.  While sitting on a sofa at home, he suddenly began short of breath.  Per wife, he has been compliant with all of his home medication.  Endorses leg swelling.   Denies chest pain, diaphoresis.   BNP> 500, CXR demonstrating pulmonary edema, Creat elevated at 2.1- (Baseline creat 1.0-1.7)   D- dimer elevated, LE US negative for DVT, TNI- 0.044>0.048>0.043   H&H 9.1/27.9>8.6>26.2     He is s/p Drain recently removed prior to presentation, with return of symptoms, re-accumulation of fluid. General surgery was consulted who states patient is not a good surgical candidate. Patient underwent US-guided drain placement with IR 5/4, which he tolerated well. Initiated BSA with vanc, cefepime, flagyl, and azithromycin (for atypicals/CAP). Consulted ID for long-term IV Abx who recommends discharging patient on vanc, ceftriaxone, PO flagyl, and PO diflucan for at least 2 weeks and close follow-up with ID for decision making based on clinical course. Because patient has had several hospitalizations in last few months due to complications from abdominal wall abscess, consulted palliative care to have on board and to establish outpatient follow-up. Patient was discharged home with  abdominal drain in place, 4 weeks of vanc, ceftriaxone, PO flagyl, and PO diflucan, and close follow-up with ID, general surgery, palliative care, and home health. Patient remained afebrile and without abdominal pain, leukocytosis, or other issues during hospital stay.          Overview/Hospital Course:  5/17 89 YO WM receiving multiple home abx including vancomycin at home for abdominal abscess, presented with acute SOB, Heart failure, getting lasix 20mg IV q 12, not much diuresis, intake and output reflecting + 270  Noting more edema to arms, below eyes , wife noting more confusion when he wakes up   + NIK on CKD; Creat 2.1; K+ 3.6   O2 sat 93-94% on 3LNC , not on home oxygen,   H&H 8.4/25.6 trending down ; NOAC stopped per cards after dose yesterday   Decreased appetite, Boost ordered,   End of life care discussed ; pt does not have living will .  He will sign living will .  He will be DNR .  I also talked to wife by patients bedside and daughter on phone ;answered all questions.  Spent about 20 minutes  . Later on signed living will         Past Medical History:   Diagnosis Date    Abdominal fibromatosis     Acute posthemorrhagic anemia 01/09/2018    NIK (acute kidney injury) 01/11/2018    Aortic stenosis 02/25/2014    Atrial fibrillation     Bradycardia     Broken arm     CAD (coronary artery disease)     Cancer     basal cell on nose and melanoma on back    CHF (congestive heart failure)     COPD (chronic obstructive pulmonary disease)     Diabetes mellitus type II     Encounter for blood transfusion     Food impaction of esophagus, history of 03/03/2022    Hyperlipidemia     Localization-related focal epilepsy with simple partial seizures 03/02/2021    Melanoma     Obesity (BMI 30.0-34.9) 09/13/2016    Obesity, diabetes, and hypertension syndrome 09/13/2016    Osteoporosis     Other dysphagia 05/27/2021    Peripheral vascular disease     Pneumonia of left lower lobe due to infectious  organism 04/30/2022    Primary malignant neoplasm of prostate 03/03/2022    S/P ORIF (open reduction internal fixation) fracture 11/27/2017    Sepsis with acute hypoxic respiratory failure     Septic shock 03/03/2022    Stroke     TIA (transient ischemic attack)     Type II diabetes mellitus with peripheral circulatory disorder     Type II or unspecified type diabetes mellitus without mention of complication, not stated as uncontrolled     Unspecified essential hypertension        Past Surgical History:   Procedure Laterality Date    AORTIC VALVE REPLACEMENT      CARDIAC PACEMAKER PLACEMENT  9/28/2012    CARDIAC VALVE REPLACEMENT  11/17/2011    aortic valve-tissue    CHOLECYSTECTOMY      COLONOSCOPY      ESOPHAGOGASTRODUODENOSCOPY N/A 5/27/2021    Procedure: EGD (ESOPHAGOGASTRODUODENOSCOPY);  Surgeon: Gualberto Medrano MD;  Location: [facility];  Service: Endoscopy;  Laterality: N/A;    ESOPHAGOGASTRODUODENOSCOPY N/A 6/22/2021    Procedure: EGD (ESOPHAGOGASTRODUODENOSCOPY);  Surgeon: Gualberto Medrano MD;  Location: [facility];  Service: Endoscopy;  Laterality: N/A;  please call wife(Jessica) with instructions/arrival time.    ESOPHAGOGASTRODUODENOSCOPY (EGD) WITH DILATION  06/22/2021    EYE SURGERY Bilateral     cataract with lens by  [facility]    HERNIA REPAIR      abdominal    LASIK      UPPER GASTROINTESTINAL ENDOSCOPY  05/28/2021       Review of patient's allergies indicates:   Allergen Reactions    Ciprofloxacin     Levofloxacin Swelling    Lyrica [pregabalin] Swelling    Unable to assess Other (See Comments)     Soft shell crabs       No current facility-administered medications on file prior to encounter.     Current Outpatient Medications on File Prior to Encounter   Medication Sig    acetaminophen (TYLENOL) 500 MG tablet Take 500 mg by mouth every 6 (six) hours as needed for Pain.    amLODIPine (NORVASC) 10 MG tablet TAKE 1 TABLET BY MOUTH DAILY FOR BLOOD  PRESSURE    carvediloL (COREG) 12.5 MG tablet TAKE 1 TABLET (12.5 MG TOTAL) BY MOUTH 2 (TWO) TIMES DAILY WITH MEALS.    cefTRIAXone (ROCEPHIN) 1 g/50 mL PgBk IVPB Inject 50 mLs (1 g total) into the vein once daily.    doxazosin (CARDURA) 1 MG tablet Take 1 mg by mouth nightly.    ferrous sulfate (FEOSOL) 325 mg (65 mg iron) Tab tablet Take 325 mg by mouth once daily at 6am.    fluconazole (DIFLUCAN) 100 MG tablet Take 1 tablet (100 mg total) by mouth once daily.    furosemide (LASIX) 20 MG tablet Take 1 tablet (20 mg total) by mouth every other day. Hold medication until 3/29 (Patient taking differently: Take 20 mg by mouth once daily.)    gabapentin (NEURONTIN) 100 MG capsule Take 2 capsules (200 mg total) by mouth every evening.    levothyroxine (SYNTHROID) 75 MCG tablet Take 1 tablet (75 mcg total) by mouth before breakfast.    metroNIDAZOLE (FLAGYL) 500 MG tablet Take 1 tablet (500 mg total) by mouth every 8 (eight) hours.    pantoprazole (PROTONIX) 40 MG tablet TAKE ONE TABLET BY MOUTH ONCE A DAY FOR STOMACH PROBLEMS    polyethylene glycol (GLYCOLAX) 17 gram/dose powder Take 17 g by mouth once daily.    potassium chloride SA (K-DUR,KLOR-CON) 10 MEQ tablet Take 1 tablet (10 mEq total) by mouth once daily.    pravastatin (PRAVACHOL) 20 MG tablet TAKE 1 TABLET BY MOUTH DAILY FOR CHOLESTROL    rivaroxaban (XARELTO) 20 mg Tab Take one tablet(20mg) by mouth once daily    tamsulosin (FLOMAX) 0.4 mg Cap Take 1 capsule (0.4 mg total) by mouth once daily.    vancomycin HCl (VANCOMYCIN 750 MG/250 ML D5W, READY TO MIX SYSTEM,) Inject 250 mLs (750 mg total) into the vein once daily.    [] guaiFENesin (MUCINEX) 600 mg 12 hr tablet Take 1 tablet (600 mg total) by mouth 2 (two) times daily. for 10 days (Patient not taking: Reported on 2022)    senna (SENOKOT) 8.6 mg tablet Take 1 tablet by mouth once daily.    senna-docusate 8.6-50 mg (PERICOLACE) 8.6-50 mg per tablet Take 1 tablet by mouth 2  (two) times daily as needed for Constipation.     Family History       Problem Relation (Age of Onset)    Alcohol abuse Father    COPD Sister, Brother    Cancer Brother    Diabetes Daughter    Heart disease Brother    Hypertension Father    No Known Problems Son, Daughter, Son    Stroke Father          Tobacco Use    Smoking status: Former Smoker     Quit date: 1996     Years since quittin.6    Smokeless tobacco: Never Used    Tobacco comment: cigar smoker   Substance and Sexual Activity    Alcohol use: No     Comment: none since     Drug use: No    Sexual activity: Not Currently     Review of Systems   Constitutional:  Positive for fatigue. Negative for activity change, fever and unexpected weight change.   HENT:  Negative for congestion, ear pain, hearing loss, rhinorrhea and sore throat.    Eyes:  Negative for pain, redness and visual disturbance.   Respiratory:  Positive for cough and shortness of breath. Negative for wheezing.    Cardiovascular:  Negative for chest pain, palpitations and leg swelling.   Gastrointestinal:  Negative for abdominal pain, constipation, diarrhea, nausea and vomiting.   Genitourinary:  Negative for decreased urine volume, dysuria, frequency and urgency.   Musculoskeletal:  Negative for back pain, joint swelling and neck pain.   Skin:  Negative for color change, rash and wound.   Neurological:  Negative for dizziness, tremors, weakness, light-headedness and headaches.   Objective:     Vital Signs (Most Recent):  Temp: 96.9 °F (36.1 °C) (22)  Pulse: 65 (22)  Resp: 18 (22)  BP: (!) 112/55 (22)  SpO2: (!) 94 % (22)   Vital Signs (24h Range):  Temp:  [96 °F (35.6 °C)-98.4 °F (36.9 °C)] 96.9 °F (36.1 °C)  Pulse:  [59-87] 65  Resp:  [18-20] 18  SpO2:  [92 %-95 %] 94 %  BP: (103-177)/(50-63) 112/55     Weight: 79.6 kg (175 lb 7.8 oz)  Body mass index is 29.2 kg/m².    Physical Exam  Vitals and nursing note reviewed.    Constitutional:       General: He is not in acute distress.     Appearance: He is well-developed.   HENT:      Head: Normocephalic and atraumatic.      Right Ear: External ear normal.      Left Ear: External ear normal.   Eyes:      General:         Right eye: No discharge.         Left eye: No discharge.      Extraocular Movements: Extraocular movements intact.      Conjunctiva/sclera: Conjunctivae normal.      Pupils: Pupils are equal, round, and reactive to light.   Neck:      Thyroid: No thyromegaly.   Cardiovascular:      Rate and Rhythm: Normal rate and regular rhythm.      Heart sounds: No murmur heard.  Pulmonary:      Effort: Pulmonary effort is normal. No respiratory distress.      Breath sounds: Examination of the right-lower field reveals rales. Examination of the left-lower field reveals rales. Rales (soft crackle in bases) present. No wheezing.       Abdominal:      General: Bowel sounds are normal. There is no distension.      Palpations: Abdomen is soft.      Tenderness: There is no abdominal tenderness.      Comments: Drain in place.   Musculoskeletal:      Right lower leg: Edema (trace pedal edema) present.      Left lower leg: Edema (trace pedal edema) present.   Skin:     General: Skin is warm and dry.      Comments: PICC to right UE, dressing c/d/i   Neurological:      Mental Status: He is alert and oriented to person, place, and time.      Cranial Nerves: No cranial nerve deficit.   Psychiatric:         Behavior: Behavior normal.         Thought Content: Thought content normal.         CRANIAL NERVES     CN III, IV, VI   Pupils are equal, round, and reactive to light.     Significant Labs: All pertinent labs within the past 24 hours have been reviewed.  A1C:   Recent Labs   Lab 03/02/22  0744 04/30/22  1046   HGBA1C 5.9* 6.0*       ABGs:   Recent Labs   Lab 05/15/22  2255   PH 7.470*   PCO2 38   HCO3 27.70   POCSATURATED 89.1*   BE 3.80*   TOTALHB 9.7*   COHB 3.1*   METHB 0.8   PO2 59*        Bilirubin:   Recent Labs   Lab 05/04/22  0749 05/05/22  0536 05/06/22  0513 05/15/22  2242 05/17/22  0642   BILITOT 0.5 0.5 0.6 0.7 0.6       Blood Culture: No results for input(s): LABBLOO in the last 48 hours.  CBC:   Recent Labs   Lab 05/15/22  2242 05/16/22  0557 05/17/22  0642   WBC 7.00 6.99 7.63   HGB 9.1* 8.6* 8.4*   HCT 27.9* 26.2* 25.6*    193 183       CMP:   Recent Labs   Lab 05/15/22  2242 05/16/22  0557 05/17/22  0642    138 137   K 3.5 3.6 3.6   CL 95 96 95   CO2 25 26 28   * 175* 167*   BUN 45* 48* 53*   CREATININE 2.1* 2.1* 2.1*   CALCIUM 8.6* 8.6* 8.6*   PROT 6.4  --  6.1   ALBUMIN 2.7*  --  2.6*   BILITOT 0.7  --  0.6   ALKPHOS 125  --  114   AST 72*  --  46*   ALT 30  --  26   ANIONGAP 16 16 14   EGFRNONAA 26* 26* 26*       Cardiac Markers:   Recent Labs   Lab 05/15/22  2242   *       Lactic Acid:   Recent Labs   Lab 05/15/22  2242   LACTATE 1.3       Lipase: No results for input(s): LIPASE in the last 48 hours.  Lipid Panel: No results for input(s): CHOL, HDL, LDLCALC, TRIG, CHOLHDL in the last 48 hours.  Magnesium: No results for input(s): MG in the last 48 hours.  POCT Glucose:   Recent Labs   Lab 05/16/22  1617 05/16/22  1907 05/17/22  0614   POCTGLUCOSE 213* 217* 189*       Troponin:   Recent Labs   Lab 05/16/22  0244 05/16/22  0750 05/16/22  1359   TROPONINI 0.048* 0.043* 0.037*       TSH:   Recent Labs   Lab 03/02/22  0744   TSH 5.943*       Urine Culture: No results for input(s): LABURIN in the last 48 hours.  Urine Studies:   Recent Labs   Lab 05/16/22  1800   COLORU [place]   APPEARANCEUA Clear   PHUR 5.0   SPECGRAV 1.025   PROTEINUA 1+*   GLUCUA Negative   KETONESU Trace*   BILIRUBINUA 1+*   OCCULTUA Negative   NITRITE Positive*   UROBILINOGEN Negative   LEUKOCYTESUR Negative   RBCUA 0   WBCUA 1   BACTERIA Occasional   HYALINECASTS 0       Significant Imaging: I have reviewed and interpreted all pertinent imaging results/findings within the past 24  hours.    LE US -No evidence of deep venous thrombosis in either lower extremity.    CXR- Increased left-sided pleural effusion along with new airspace opacities in left hemithorax.  Findings suggestive of worsening CHF pattern of pulmonary edema.      Assessment/Plan:      * (HFpEF) heart failure with preserved ejection fraction  5/2/22 Echo-   · The estimated ejection fraction is 55%.  · Indeterminate left ventricular diastolic function.  · Moderate right ventricular enlargement with mildly reduced right ventricular systolic function.  · Mild aortic regurgitation.  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 There is aortic insufficiency present. Prosthetic aortic valve is normal.  · The aortic valve mean gradient is 6 mmHg with a dimensionless index of 0.64.  · Mild mitral regurgitation.  · Mild tricuspid regurgitation.  · There is moderate pulmonary hypertension.       Increased SOB and hypoxia. Diuresis initiated. CIS following. Strict I/O. Low Na diet.      Counseling regarding advanced directives and goals of care  Advance Care Planning     Living Will  During this visit, I engaged the patient and family  in the advance care planning process.  The patient and I reviewed the role for advance directives and their purpose in directing future healthcare if the patient's unable to speak for him/herself.  At this point in time, the patient does have full decision-making capacity.  We discussed different extreme health states that he could experience, and reviewed what kind of medical care he would want in those situations.  The patient and family communicated that if he were comatose and had little chance of a meaningful recovery, he would not want machines/life-sustaining treatments used. In addition to the above preference, other important end-of-life issues for the patient include . The patient has completed a living will to reflect these preferences.  I spent a total of 20 minutes engaging the patient in  this advance care planning discussion.Discussed with wife in person and daughter via telephone           Today a meeting took place: bedside    Patient Participation: Patient is able to participate     Attendees (Name and  Relationship to patient):also dicussed with wife and daughter    Staff attendees (Name and  Role): Georgia VERMA, xochitl Jules RN case manage   Code Status: DNR; status confirmed/order placed in chart    ACP Documents: Other Documents (specify): living will     Goals of care: The patient and family endorses that what is most important right now is to focus on symptom/pain control, quality of life, even if it means sacrificing a little time and improvement in condition but with limits to invasive therapies    Accordingly, we have decided that the best plan to meet the patient's goals includes continuing with treatment      Recommendations/Length of ACP   conversation in minutes: 20 minutes      Code Status  In light of the patients advanced and life limiting illness,I engaged the the patient and family in a conversation about the patient's preferences for care  at the very end of life. The patient wishes to have a natural, peaceful death.  Along those lines, the patient does not wish to have CPR or other invasive treatments performed when his heart and/or breathing stops. I communicated to the patient and family that a DNR order would be placed in his medical record to reflect this preference.  I spent a total of 20 minutes engaging the patient in this advance care planning discussion.         Debility  PT consulted .  Per records patient with several hospitalizations in last few months for abdominal wall abscess   - prior to 2 months ago, was still independent, working and running a business  - consulted palliative care to have on board and have OP follow up      NIK (acute kidney injury)  Monitor BUN/SCr.  Monitor I/Os.  Monitor electrolytes.  Avoid non-steroidal anti-inflammatory  medications.  Symptoms and labs suggestive of Acute HF; continue with diuretics, monitor renal fx closely   NIK on CKD     If worsening consider renal US.        Abdominal wall abscess  Last discharge 5/6  with abdominal drain in place and 4 weeks of vanc, rocephin, flagyl, and diflucan  - follow up with general surgery for drain and ID for Abx- ( 10 days since discharge)   Consult pharmacy to adjust vanc- monitor     ventral hernia repair with mesh placement ~20 years ago, no complications until 3/2022 with several hospitalizations leading to drain placement and antibiotics since that time, drain removed 4/29/22   - most recent hospitalization began on 4/30/22 at [facility] and was transferred here on 5/4/22 for surgical and IR evaluation  - was initiated on Vanc and Zosyn at [facility]  - switched to Vanc, Cefepime, Flagyl on admission to our facility given renal function   - blood cultures from 4/30/22 no growth to date   - wound cultures from 3/3 and 5/4 NGTD  - general surgery states patient is not good surgical candidate  - underwent US-guided drain placement 5/4  - general surgery added fluconazole for yeast seen on gram stain of wound Cx  - consulted ID for assistance with Abx: discharge with IV vanc, IV ceftriaxone, PO flagyl, and PO diflucan for at least 2 weeks with close ID follow-up .  -    Disorder of prostate  Continue flomax .      Hypothyroidism due to acquired atrophy of thyroid  Continue levothyroxine .      Long term current use of anticoagulant therapy  Continue xarelto- adjust for renal; recommending decrease to 15mg daily .      Type 2 diabetes mellitus, controlled  HbA1c 6.0 on 4/30/22  - does not appear to be on any home medications   - SSI + POCT glucose while inpatient  - continue to manage with lifstyle, follow up with PCP.         S/P AVR   bioprosthetic valve placed 2011   - continue home xarelto - adjust currently for renal dosing .      Mixed dyslipidemia  Continue  pravastatin .      CAD (coronary artery disease)  Continue statin, bb, NOAC   Cards consulted .    Essential (primary) hypertension  BP Readings from Last 3 Encounters:   05/17/22 (!) 112/55   05/12/22 (!) 129/58   05/11/22 (!) 110/52     Resume home coreg-at lower dose 3.25mg , hold amlodipine & doxazosin  for now .      Chronic atrial fibrillation  Maintain BB, rivaroxaban- renal dose  p PPM .        VTE Risk Mitigation (From admission, onward)         Ordered     IP VTE HIGH RISK PATIENT  Once         05/16/22 0419     Place sequential compression device  Until discontinued         05/16/22 0419                Discharge Planning   KANDICE:      Code Status: Full Code   Is the patient medically ready for discharge?:     Reason for patient still in hospital (select all that apply): Treatment  Discharge Plan A: Home with family, Home Health                  Callum Roberts MD  Department of Hospital Medicine   [place] - Med Surg (3rd Fl)

## 2022-05-17 NOTE — ASSESSMENT & PLAN NOTE
BP Readings from Last 3 Encounters:   05/17/22 (!) 112/55   05/12/22 (!) 129/58   05/11/22 (!) 110/52     Resume home coreg-at lower dose 3.25mg , hold amlodipine & doxazosin  for now .

## 2022-05-17 NOTE — ASSESSMENT & PLAN NOTE
BP Readings from Last 3 Encounters:   05/17/22 (!) 120/54   05/12/22 (!) 129/58   05/11/22 (!) 110/52     Resume home coreg-at lower dose 3.25mg , hold amlodipine & doxazosin  for now .  5/17 BP labile ; Dr. Monterroso ordered Imdur, monitor BP

## 2022-05-17 NOTE — PT/OT/SLP PROGRESS
"Physical Therapy Treatment    Patient Name:  Eddie Parada Sr.   MRN:  [id number]    Recommendations:     Discharge Recommendations:  home with home health, home health PT   Discharge Equipment Recommendations: none   Barriers to discharge: None    Assessment:     Eddie Parada Sr. is a 89 y.o. male admitted with a medical diagnosis of (HFpEF) heart failure with preserved ejection fraction.  He presents with the following impairments/functional limitations:  weakness, impaired functional mobilty, impaired self care skills, impaired endurance, gait instability, impaired cardiopulmonary response to activity, impaired balance, decreased lower extremity function.  Patient performed gait functions using RW with O2 supplement at limited distance today due to weakness and gets tired easily. Monitored SPO2 from based line 88% to 73%.    Rehab Prognosis: Fair; patient would benefit from acute skilled PT services to address these deficits and reach maximum level of function.    Recent Surgery: * No surgery found *      Plan:     During this hospitalization, patient to be seen 5 x/week to address the identified rehab impairments via gait training, therapeutic activities, therapeutic exercises and progress toward the following goals:    · Plan of Care Expires:  05/23/22    Subjective     Chief Complaint: weakness and gets tired easily  Patient/Family Comments/goals: "To get better:.  Pain/Comfort:  · Pain Rating 1: 0/10      Objective:     Communicated with patient  and wife prior to session.  Patient found HOB elevated with oxygen, PICC line, IMELDA drain, telemetry upon PT entry to room.     General Precautions: Standard, fall   Orthopedic Precautions:N/A   Braces: N/A  Respiratory Status: Nasal cannula, flow 4 L/min     Functional Mobility:  · Bed Mobility:     · Rolling Left:  contact guard assistance  · Rolling Right: contact guard assistance  · Scooting: contact guard assistance  · Supine to Sit: contact guard " assistance  · Sit to Supine: contact guard assistance  · Transfers:     · Sit to Stand:  minimum assistance with rolling walker  · Gait: Minimal Assistance x ~10 feet with side steps using RW. Decrease stride, dec foot/floor clearance and fatigue easily.      AM-PAC 6 CLICK MOBILITY  Turning over in bed (including adjusting bedclothes, sheets and blankets)?: 3  Sitting down on and standing up from a chair with arms (e.g., wheelchair, bedside commode, etc.): 3  Moving from lying on back to sitting on the side of the bed?: 3  Moving to and from a bed to a chair (including a wheelchair)?: 3  Need to walk in hospital room?: 3  Climbing 3-5 steps with a railing?: 3  Basic Mobility Total Score: 18       Therapeutic Activities and Exercises:   Worked on out of bed activity, standing activity and gait trng with RW at limited distance.    Patient left HOB elevated with all lines intact, call button in reach, bed alarm on, nursing  notified and wife present..    GOALS:   Multidisciplinary Problems     Physical Therapy Goals        Problem: Physical Therapy    Goal Priority Disciplines Outcome Goal Variances Interventions   Physical Therapy Goal     PT, PT/OT      Description:   Patient will increase functional independence with mobility by performin. Supine to sit with Modified Independent  2. Sit to supine with Modified Independent  3. Bed to chair transfer with Supervision or Set-up Assistancewith or without rolling walker using Step Transfer TECHNIQUE  4. Gait  x ~100  feet with Supervision or Set-up Assistance with or without rolling walker  5. Lower extremity exercise program x10 reps per handout, with assistance as needed                      Time Tracking:     PT Received On: 22  PT Start Time: 1058     PT Stop Time: 1115  PT Total Time (min): 17 min     Billable Minutes: Therapeutic Activity 17 mins    Treatment Type: Treatment  PT/PTA: PT           2022

## 2022-05-17 NOTE — PLAN OF CARE
Problem: Adult Inpatient Plan of Care  Goal: Plan of Care Review  Outcome: Ongoing, Progressing  Goal: Patient-Specific Goal (Individualized)  Outcome: Ongoing, Progressing  Goal: Absence of Hospital-Acquired Illness or Injury  Outcome: Ongoing, Progressing  Goal: Optimal Comfort and Wellbeing  Outcome: Ongoing, Progressing  Goal: Readiness for Transition of Care  Outcome: Ongoing, Progressing     Problem: Diabetes Comorbidity  Goal: Blood Glucose Level Within Targeted Range  Outcome: Ongoing, Progressing     Problem: Adjustment to Illness (Sepsis/Septic Shock)  Goal: Optimal Coping  Outcome: Ongoing, Progressing     Problem: Bleeding (Sepsis/Septic Shock)  Goal: Absence of Bleeding  Outcome: Ongoing, Progressing     Problem: Glycemic Control Impaired (Sepsis/Septic Shock)  Goal: Blood Glucose Level Within Desired Range  Outcome: Ongoing, Progressing     Problem: Infection Progression (Sepsis/Septic Shock)  Goal: Absence of Infection Signs and Symptoms  Outcome: Ongoing, Progressing     Problem: Nutrition Impaired (Sepsis/Septic Shock)  Goal: Optimal Nutrition Intake  Outcome: Ongoing, Progressing     Problem: Fluid Imbalance (Pneumonia)  Goal: Fluid Balance  Outcome: Ongoing, Progressing     Problem: Infection (Pneumonia)  Goal: Resolution of Infection Signs and Symptoms  Outcome: Ongoing, Progressing     Problem: Respiratory Compromise (Pneumonia)  Goal: Effective Oxygenation and Ventilation  Outcome: Ongoing, Progressing     Problem: Infection  Goal: Absence of Infection Signs and Symptoms  Outcome: Ongoing, Progressing     Problem: Impaired Wound Healing  Goal: Optimal Wound Healing  Outcome: Ongoing, Progressing     Problem: Fall Injury Risk  Goal: Absence of Fall and Fall-Related Injury  Outcome: Ongoing, Progressing     Problem: Skin Injury Risk Increased  Goal: Skin Health and Integrity  Outcome: Ongoing, Progressing     Problem: Fluid and Electrolyte Imbalance (Acute Kidney Injury/Impairment)  Goal: Fluid  and Electrolyte Balance  Outcome: Ongoing, Progressing     Problem: Oral Intake Inadequate (Acute Kidney Injury/Impairment)  Goal: Optimal Nutrition Intake  Outcome: Ongoing, Progressing     Problem: Renal Function Impairment (Acute Kidney Injury/Impairment)  Goal: Effective Renal Function  Outcome: Ongoing, Progressing     Problem: Gas Exchange Impaired  Goal: Optimal Gas Exchange  Outcome: Ongoing, Progressing     Problem: Adjustment to Illness (Heart Failure)  Goal: Optimal Coping  Outcome: Ongoing, Progressing     Problem: Cardiac Output Decreased (Heart Failure)  Goal: Optimal Cardiac Output  Outcome: Ongoing, Progressing     Problem: Dysrhythmia (Heart Failure)  Goal: Stable Heart Rate and Rhythm  Outcome: Ongoing, Progressing     Problem: Fluid Imbalance (Heart Failure)  Goal: Fluid Balance  Outcome: Ongoing, Progressing     Problem: Functional Ability Impaired (Heart Failure)  Goal: Optimal Functional Ability  Outcome: Ongoing, Progressing     Problem: Oral Intake Inadequate (Heart Failure)  Goal: Optimal Nutrition Intake  Outcome: Ongoing, Progressing     Problem: Respiratory Compromise (Heart Failure)  Goal: Effective Oxygenation and Ventilation  Outcome: Ongoing, Progressing     Problem: Sleep Disordered Breathing (Heart Failure)  Goal: Effective Breathing Pattern During Sleep  Outcome: Ongoing, Progressing

## 2022-05-17 NOTE — ASSESSMENT & PLAN NOTE
bioprosthetic valve placed 2011   - continue home xarelto - adjust currently for renal dosing .

## 2022-05-17 NOTE — ASSESSMENT & PLAN NOTE
Monitor BUN/SCr.  Monitor I/Os.  Monitor electrolytes.  Avoid non-steroidal anti-inflammatory medications.  Symptoms and labs suggestive of Acute HF; continue with diuretics, monitor renal fx closely   NIK on CKD     If worsening consider renal US.

## 2022-05-17 NOTE — ASSESSMENT & PLAN NOTE
Advance Care Planning     Living Will  During this visit, I engaged the patient and family  in the advance care planning process.  The patient and I reviewed the role for advance directives and their purpose in directing future healthcare if the patient's unable to speak for him/herself.  At this point in time, the patient does have full decision-making capacity.  We discussed different extreme health states that he could experience, and reviewed what kind of medical care he would want in those situations.  The patient and family communicated that if he were comatose and had little chance of a meaningful recovery, he would not want machines/life-sustaining treatments used. In addition to the above preference, other important end-of-life issues for the patient include . The patient has completed a living will to reflect these preferences.  I spent a total of 20 minutes engaging the patient in this advance care planning discussion.Discussed with wife in person and daughter via telephone           Today a meeting took place: bedside    Patient Participation: Patient is able to participate     Attendees (Name and  Relationship to patient):also dicussed with wife and daughter    Staff attendees (Name and  Role): [place] VERMA, xochitl Jules RN case manage   Code Status: DNR; status confirmed/order placed in chart    ACP Documents: Other Documents (specify): living will     Goals of care: The patient and family endorses that what is most important right now is to focus on symptom/pain control, quality of life, even if it means sacrificing a little time and improvement in condition but with limits to invasive therapies    Accordingly, we have decided that the best plan to meet the patient's goals includes continuing with treatment      Recommendations/Length of ACP   conversation in minutes: 20 minutes      Code Status  In light of the patients advanced and life limiting illness,I engaged the the patient and family in a  conversation about the patient's preferences for care  at the very end of life. The patient wishes to have a natural, peaceful death.  Along those lines, the patient does not wish to have CPR or other invasive treatments performed when his heart and/or breathing stops. I communicated to the patient and family that a DNR order would be placed in his medical record to reflect this preference.  I spent a total of 20 minutes engaging the patient in this advance care planning discussion.

## 2022-05-17 NOTE — CONSULTS
"  [facility] - Med Surg (3rd Fl)  Adult Nutrition  Consult Note    SUMMARY     Recommendations    Recommendation:   1. Continue diabetic diet   -Encourage adequate intake as needed   2. Continue Boost Glucose TID   3. Food Preferences taken daily to promote oral intake    Goals: pt to meet at least 75% EEN by f/u  Nutrition Goal Status: new  Communication of RD Recs:  (POC)    Assessment and Plan  Nutrition Problem:  Inadequate oral intake    Related to (etiology):   Early satiety     Signs and Symptoms (as evidenced by):   Consuming < 50% of meals, states he eats a few bites of food and gets full    Interventions:  Carbohydrate modified diet  Commercial beverage  Collaboration with other providers    Nutrition Diagnosis Status:   New     Reason for Assessment    Reason For Assessment: consult  Diagnosis: cardiac disease  Relevant Medical History: atrial fibrillation, aortic stenosis, CAD, CHF, diabetes mellitus type 2, hyperlipidemia and CVA    General Information Comments: Consult recieved for "Decreased appetite, Diabetic, dysphagia." He is consuming </=50% of meals. 9lb weight loss noted since March per chart review, but pt reports loss of 20lb. Boost ordered, pt likes the chocolate fine. Reports no trouble chewing or swallowing and that he "eats what he is given." NFPE- Pt appears well nourished, current muscle mass consistent with avanced age.    Nutrition Discharge Planning: cardiac/diabetic diet    Nutrition Risk Screen    Nutrition Risk Screen: no indicators present, dysphagia or difficulty swallowing    Nutrition/Diet History    Patient Reported Diet/Restrictions/Preferences: general  Typical Food/Fluid Intake: likes soups and gumbos, cajun style foods  Spiritual, Cultural Beliefs, Mandaen Practices, Values that Affect Care: no  Vitamin/Mineral/Herbal Supplements: believes he takes some sort of vitamin but is unsure 2/2 wife handling his medication  Food Allergies: NKFA  Factors Affecting Nutritional " "Intake: early satiety    Anthropometrics    Temp: 96.2 °F (35.7 °C)  Height Method: Stated  Height: 5' 5" (165.1 cm)  Height (inches): 65 in  Weight Method: Bed Scale  Weight: 79.6 kg (175 lb 7.8 oz)  Weight (lb): 175.49 lb  Ideal Body Weight (IBW), Male: 136 lb  BMI (Calculated): 29.2  BMI Grade: 25 - 29.9 - overweight       Lab/Procedures/Meds    Pertinent Labs Reviewed: reviewed  Pertinent Labs Comments: BUN 53, creatinine 2.1, eGFR 26, glucose 167, calcium 8.6, albumin 2.6, AST 46  Pertinent Medications Reviewed: reviewed  Pertinent Medications Comments: carvedilol, ferrous sulfate, furosemide, gabapentin, pantoprazole, polyethylene glycol, pravastatin, senna, tamulosin    Estimated/Assessed Needs    Weight Used For Calorie Calculations: 79.6 kg (175 lb 7.8 oz)  Energy Calorie Requirements (kcal): 1700  Energy Need Method: [place]-St Jeor (*1.25)  Protein Requirements: 87 g (1.1 g/kg for older adult)  Weight Used For Protein Calculations: 79.6 kg (175 lb 7.8 oz)     Estimated Fluid Requirement Method: RDA Method  RDA Method (mL): 1700     Nutrition Prescription Ordered    Current Diet Order: Diabetic Diet  Oral Nutrition Supplement: Boost Glucose TID    Evaluation of Received Nutrient/Fluid Intake    Fluid Required: meeting needs  % Intake of Estimated Energy Needs: 25 - 50 %  % Meal Intake: < 50%    Nutrition Risk    Level of Risk/Frequency of Follow-up:  (1-2x/wk)       Monitor and Evaluation    Food and Nutrient Intake: energy intake, food and beverage intake  Food and Nutrient Adminstration: diet order  Physical Activity and Function: nutrition-related ADLs and IADLs  Anthropometric Measurements: weight, weight change, body mass index  Biochemical Data, Medical Tests and Procedures: electrolyte and renal panel, glucose/endocrine profile, lipid profile  Nutrition-Focused Physical Findings: overall appearance       Nutrition Follow-Up    RD Follow-up?: Yes  "

## 2022-05-17 NOTE — ASSESSMENT & PLAN NOTE
Last discharge 5/6  with abdominal drain in place and 4 weeks of vanc, rocephin, flagyl, and diflucan  - follow up with general surgery for drain and ID for Abx- ( 10 days since discharge)   Consult pharmacy to adjust vanc- monitor     ventral hernia repair with mesh placement ~20 years ago, no complications until 3/2022 with several hospitalizations leading to drain placement and antibiotics since that time, drain removed 4/29/22   - most recent hospitalization began on 4/30/22 at [facility] and was transferred here on 5/4/22 for surgical and IR evaluation  - was initiated on Vanc and Zosyn at [facility]  - switched to Vanc, Cefepime, Flagyl on admission to our facility given renal function   - blood cultures from 4/30/22 no growth to date   - wound cultures from 3/3 and 5/4 NGTD  - general surgery states patient is not good surgical candidate  - underwent US-guided drain placement 5/4  - general surgery added fluconazole for yeast seen on gram stain of wound Cx  - consulted ID for assistance with Abx: discharge with IV vanc, IV ceftriaxone, PO flagyl, and PO diflucan for at least 2 weeks with close ID follow-up .  -

## 2022-05-17 NOTE — ASSESSMENT & PLAN NOTE
HbA1c 6.0 on 4/30/22  - does not appear to be on any home medications   - SSI + POCT glucose while inpatient  - continue to manage with lifstyle, follow up with PCP.

## 2022-05-17 NOTE — HOSPITAL COURSE
5/17 89 YO WM receiving multiple home abx including vancomycin at home for abdominal abscess, presented with acute SOB, Heart failure, getting lasix 20mg IV q 12, not much diuresis, intake and output reflecting + 270  Noting more edema to arms, below eyes , wife noting more confusion when he wakes up   + NIK on CKD; Creat 2.1; K+ 3.6   O2 sat 93-94% on 3LNC , not on home oxygen,   H&H 8.4/25.6 trending down ; NOAC stopped per cards after dose yesterday   Decreased appetite, Boost ordered,   End of life care discussed ; pt does not have living will .  He will sign living will .  He will be DNR .  I also talked to wife by patients bedside and daughter on phone ;answered all questions.  Spent about 20 minutes  . Later on signed living will     5/18 Pt was admitted with heart failure with elevated BNP and some lower ext swelling. He was given 60 mg IV lasix 5/16 and then 40mg 5/17. He has had fluctuating renal function 2 weeks ago Creat on admit 2.1.>> 2.2 today. BNP was elevated  on admission and is not putting out much urine.Dr Monterroso was consulted. Troponin chronically elevated. Echo 55% mild heart failure and mod pulm htn noted. Holding xarel;to for now due to h/h dropping. 9>8. D dimer was elevated u/s legs no clot     Pt was currently being treated at home for abd wall abscess s/p hernia repair. Per Id was on vanc and rocephin via PICC and PO flagyl and diflucan outpt- continued while here. Still has drain from abd wall abscess, not much drainage  Serosanguinous     5/19 pt was being treated for flash pulm edema with lasix 60mg IV 5/16 and 40mg IV 5/17. Yesterday diuretics were held as creat was elevated and not improving with diureses. Also noted not making much urine. Renal u/s done with no obstruction noted. Was started on NS at 50ml/hr last night and creat is better this am 2.1>1.9. Looks like his renal fucntion fluctuates but 2 weeks ago had creat 1.0. He remains on vanc and rocephin IV and diflucan and flagyl  po per ID from abd abscess (still with drain)- today makes 2 weeks since d/c from [place]. Awaiting guidance on if he will need 2 or 4 weeks of these abx. Had a very short bonnie of v tach last night while sleeping. K+ WNL- mag and tsh pending this am., ambulatory with PT min assist 15ft    5/20 pt is currently being treated for over diuresis. He is on NS @ 50ml/hr. Creat is improving now at 1.6 this am. Also had vanc held for abd abscess. Trough at 16 this am. Due to v tach yesterday diflucan was stopped after 2 weeks. He remains on rocephin and flagyl for the abd abscess.     5/21 slight improvement in Cr. Still on 4L NC and not able to wean. He is feeling a bit more SOB this morning. Repeat CXR with large left effusion. Diflucantstopped after 2 weeks treatment. Remains on Vanc, rocephin and flagyl for abscess. ID recs consider repeat CT abd/pelvis with contrast to see if resolving abscess; no output from drain.   Long discussion this AM with patient, wife and daughter (on phone) about goals of care.  His appetite remains very poor.     5/22: he reports no changes. Still poor appetite. Still on 4L NC  Dr. Morris plans for thoracentesis today as he did not tolerate diuresis and remains with large left pleural effusion on CXR.  Cr improved--plan for CT abd and pelvis with contrast today per ID recs to see if abscess resolved.     5/23/22     89 YO WM ongoing complications associated of an anterior abdominal wall mass secondary to ventral hernia mesh placed ~20 years ago who presented to ER with shortness of breath, CHF ; did not tolerate diuresis and remained with large left pleural effusion on CXR, worsening creat.   S/p L thoracentesis yesterday ; 1500 ml of bloody pleural fluid was obtained. A sample was sent to Pathology for cytogenetics, flow, and cell counts, as well as for infection analysis. CT abd pelvis yesterday showing Moderate right and small left pleural effusions with bibasilar dependent atelectasis.  CXR-  Mild cardiomegaly.  Prior cardiac surgery with pacemaker in place.  PICC line in position.  Vascular congestion with lower lobe subsegmental atelectasis greater on the left than the right.  The left effusion appears less prominent than on the prior study.  No evidence of pneumothorax.  WBC 5.17, afebrile. O2 sat 95-98% on 4LNC , BIPAP overnight,   Diflucan stopped after 2 weeks treatment( had vtach)  Remains on Vanc, rocephin and flagyl for abscess. Vanc level 20.1 yesterday; Day 18/30 since d/c from [place]. ID recs recommended  repeat CT abd/pelvis with contrast; demonstrating Prior ventral hernia repair with surgical mesh in place with percutaneous drainage catheter within a poorly defined fluid collection surrounding the surgical mesh which is slightly decreased in size over the interval.  Pt sitting up in chair this am, looks much better. Ambulating 25 ft with PT; still has desaturation     5/24 pt remains here with abdominal wall abscess. Was on home IV abx therapy and drain. ID rec cont abx until abscess resolved. Repeat CT shows that he still has some abscess though smaller. He continues on vanc, rocephin and flagyl- day 19. He has fluconazole stopped due to non sustained v tach episode. Remains afebrile with no elevated WBC. Renal function has returned to baseline. Has not had lasix since last week. Now starting to swell a little. Dr Mathis following and will add lasix today. Albumin 2.4- not eating much. He was also tapped Sunday for the pleural effusion that was not resolving with diuresis and still requiring supplemental O2. Dr holt following 2 mesothelial cells.     He is doing well with PT Ambulated 150ft with RW using stand by assist and supplmental O2.

## 2022-05-17 NOTE — ASSESSMENT & PLAN NOTE
PT consulted .  Per records patient with several hospitalizations in last few months for abdominal wall abscess   - prior to 2 months ago, was still independent, working and running a business  - consulted palliative care to have on board and have OP follow up

## 2022-05-17 NOTE — SUBJECTIVE & OBJECTIVE
Past Medical History:   Diagnosis Date    Abdominal fibromatosis     Acute posthemorrhagic anemia 01/09/2018    NIK (acute kidney injury) 01/11/2018    Aortic stenosis 02/25/2014    Atrial fibrillation     Bradycardia     Broken arm     CAD (coronary artery disease)     Cancer     basal cell on nose and melanoma on back    CHF (congestive heart failure)     COPD (chronic obstructive pulmonary disease)     Diabetes mellitus type II     Encounter for blood transfusion     Food impaction of esophagus, history of 03/03/2022    Hyperlipidemia     Localization-related focal epilepsy with simple partial seizures 03/02/2021    Melanoma     Obesity (BMI 30.0-34.9) 09/13/2016    Obesity, diabetes, and hypertension syndrome 09/13/2016    Osteoporosis     Other dysphagia 05/27/2021    Peripheral vascular disease     Pneumonia of left lower lobe due to infectious organism 04/30/2022    Primary malignant neoplasm of prostate 03/03/2022    S/P ORIF (open reduction internal fixation) fracture 11/27/2017    Sepsis with acute hypoxic respiratory failure     Septic shock 03/03/2022    Stroke     TIA (transient ischemic attack)     Type II diabetes mellitus with peripheral circulatory disorder     Type II or unspecified type diabetes mellitus without mention of complication, not stated as uncontrolled     Unspecified essential hypertension        Past Surgical History:   Procedure Laterality Date    AORTIC VALVE REPLACEMENT      CARDIAC PACEMAKER PLACEMENT  9/28/2012    CARDIAC VALVE REPLACEMENT  11/17/2011    aortic valve-tissue    CHOLECYSTECTOMY      COLONOSCOPY      ESOPHAGOGASTRODUODENOSCOPY N/A 5/27/2021    Procedure: EGD (ESOPHAGOGASTRODUODENOSCOPY);  Surgeon: Gualberto Medrano MD;  Location: [facility];  Service: Endoscopy;  Laterality: N/A;    ESOPHAGOGASTRODUODENOSCOPY N/A 6/22/2021    Procedure: EGD (ESOPHAGOGASTRODUODENOSCOPY);  Surgeon: Gualberto Medrano MD;  Location: [facility];  Service: Endoscopy;   Laterality: N/A;  please call wife(Jessica) with instructions/arrival time.    ESOPHAGOGASTRODUODENOSCOPY (EGD) WITH DILATION  06/22/2021    EYE SURGERY Bilateral     cataract with lens by  at [facility]    HERNIA REPAIR      abdominal    LASIK      UPPER GASTROINTESTINAL ENDOSCOPY  05/28/2021       Review of patient's allergies indicates:   Allergen Reactions    Ciprofloxacin     Levofloxacin Swelling    Lyrica [pregabalin] Swelling    Unable to assess Other (See Comments)     Soft shell crabs       No current facility-administered medications on file prior to encounter.     Current Outpatient Medications on File Prior to Encounter   Medication Sig    acetaminophen (TYLENOL) 500 MG tablet Take 500 mg by mouth every 6 (six) hours as needed for Pain.    amLODIPine (NORVASC) 10 MG tablet TAKE 1 TABLET BY MOUTH DAILY FOR BLOOD PRESSURE    carvediloL (COREG) 12.5 MG tablet TAKE 1 TABLET (12.5 MG TOTAL) BY MOUTH 2 (TWO) TIMES DAILY WITH MEALS.    cefTRIAXone (ROCEPHIN) 1 g/50 mL PgBk IVPB Inject 50 mLs (1 g total) into the vein once daily.    doxazosin (CARDURA) 1 MG tablet Take 1 mg by mouth nightly.    ferrous sulfate (FEOSOL) 325 mg (65 mg iron) Tab tablet Take 325 mg by mouth once daily at 6am.    fluconazole (DIFLUCAN) 100 MG tablet Take 1 tablet (100 mg total) by mouth once daily.    furosemide (LASIX) 20 MG tablet Take 1 tablet (20 mg total) by mouth every other day. Hold medication until 3/29 (Patient taking differently: Take 20 mg by mouth once daily.)    gabapentin (NEURONTIN) 100 MG capsule Take 2 capsules (200 mg total) by mouth every evening.    levothyroxine (SYNTHROID) 75 MCG tablet Take 1 tablet (75 mcg total) by mouth before breakfast.    metroNIDAZOLE (FLAGYL) 500 MG tablet Take 1 tablet (500 mg total) by mouth every 8 (eight) hours.    pantoprazole (PROTONIX) 40 MG tablet TAKE ONE TABLET BY MOUTH ONCE A DAY FOR STOMACH PROBLEMS    polyethylene glycol (GLYCOLAX) 17 gram/dose powder Take 17 g by mouth  once daily.    potassium chloride SA (K-DUR,KLOR-CON) 10 MEQ tablet Take 1 tablet (10 mEq total) by mouth once daily.    pravastatin (PRAVACHOL) 20 MG tablet TAKE 1 TABLET BY MOUTH DAILY FOR CHOLESTROL    rivaroxaban (XARELTO) 20 mg Tab Take one tablet(20mg) by mouth once daily    tamsulosin (FLOMAX) 0.4 mg Cap Take 1 capsule (0.4 mg total) by mouth once daily.    vancomycin HCl (VANCOMYCIN 750 MG/250 ML D5W, READY TO MIX SYSTEM,) Inject 250 mLs (750 mg total) into the vein once daily.    [] guaiFENesin (MUCINEX) 600 mg 12 hr tablet Take 1 tablet (600 mg total) by mouth 2 (two) times daily. for 10 days (Patient not taking: Reported on 2022)    senna (SENOKOT) 8.6 mg tablet Take 1 tablet by mouth once daily.    senna-docusate 8.6-50 mg (PERICOLACE) 8.6-50 mg per tablet Take 1 tablet by mouth 2 (two) times daily as needed for Constipation.     Family History       Problem Relation (Age of Onset)    Alcohol abuse Father    COPD Sister, Brother    Cancer Brother    Diabetes Daughter    Heart disease Brother    Hypertension Father    No Known Problems Son, Daughter, Son    Stroke Father          Tobacco Use    Smoking status: Former Smoker     Quit date: 1996     Years since quittin.6    Smokeless tobacco: Never Used    Tobacco comment: cigar smoker   Substance and Sexual Activity    Alcohol use: No     Comment: none since     Drug use: No    Sexual activity: Not Currently     Review of Systems   Constitutional:  Positive for fatigue. Negative for activity change, fever and unexpected weight change.   HENT:  Negative for congestion, ear pain, hearing loss, rhinorrhea and sore throat.    Eyes:  Negative for pain, redness and visual disturbance.   Respiratory:  Positive for cough and shortness of breath. Negative for wheezing.    Cardiovascular:  Negative for chest pain, palpitations and leg swelling.   Gastrointestinal:  Negative for abdominal pain, constipation, diarrhea, nausea and vomiting.    Genitourinary:  Negative for decreased urine volume, dysuria, frequency and urgency.   Musculoskeletal:  Negative for back pain, joint swelling and neck pain.   Skin:  Negative for color change, rash and wound.   Neurological:  Negative for dizziness, tremors, weakness, light-headedness and headaches.   Objective:     Vital Signs (Most Recent):  Temp: 96.9 °F (36.1 °C) (05/17/22 0707)  Pulse: 65 (05/17/22 0707)  Resp: 18 (05/17/22 0707)  BP: (!) 112/55 (05/17/22 0707)  SpO2: (!) 94 % (05/17/22 0718)   Vital Signs (24h Range):  Temp:  [96 °F (35.6 °C)-98.4 °F (36.9 °C)] 96.9 °F (36.1 °C)  Pulse:  [59-87] 65  Resp:  [18-20] 18  SpO2:  [92 %-95 %] 94 %  BP: (103-177)/(50-63) 112/55     Weight: 79.6 kg (175 lb 7.8 oz)  Body mass index is 29.2 kg/m².    Physical Exam  Vitals and nursing note reviewed.   Constitutional:       General: He is not in acute distress.     Appearance: He is well-developed.   HENT:      Head: Normocephalic and atraumatic.      Right Ear: External ear normal.      Left Ear: External ear normal.   Eyes:      General:         Right eye: No discharge.         Left eye: No discharge.      Extraocular Movements: Extraocular movements intact.      Conjunctiva/sclera: Conjunctivae normal.      Pupils: Pupils are equal, round, and reactive to light.   Neck:      Thyroid: No thyromegaly.   Cardiovascular:      Rate and Rhythm: Normal rate and regular rhythm.      Heart sounds: No murmur heard.  Pulmonary:      Effort: Pulmonary effort is normal. No respiratory distress.      Breath sounds: Examination of the right-lower field reveals rales. Examination of the left-lower field reveals rales. Rales (soft crackle in bases) present. No wheezing.       Abdominal:      General: Bowel sounds are normal. There is no distension.      Palpations: Abdomen is soft.      Tenderness: There is no abdominal tenderness.      Comments: Drain in place.   Musculoskeletal:      Right lower leg: Edema (trace pedal edema)  present.      Left lower leg: Edema (trace pedal edema) present.   Skin:     General: Skin is warm and dry.      Comments: PICC to right UE, dressing c/d/i   Neurological:      Mental Status: He is alert and oriented to person, place, and time.      Cranial Nerves: No cranial nerve deficit.   Psychiatric:         Behavior: Behavior normal.         Thought Content: Thought content normal.         CRANIAL NERVES     CN III, IV, VI   Pupils are equal, round, and reactive to light.     Significant Labs: All pertinent labs within the past 24 hours have been reviewed.  A1C:   Recent Labs   Lab 03/02/22  0744 04/30/22  1046   HGBA1C 5.9* 6.0*       ABGs:   Recent Labs   Lab 05/15/22  2255   PH 7.470*   PCO2 38   HCO3 27.70   POCSATURATED 89.1*   BE 3.80*   TOTALHB 9.7*   COHB 3.1*   METHB 0.8   PO2 59*       Bilirubin:   Recent Labs   Lab 05/04/22  0749 05/05/22  0536 05/06/22  0513 05/15/22  2242 05/17/22  0642   BILITOT 0.5 0.5 0.6 0.7 0.6       Blood Culture: No results for input(s): LABBLOO in the last 48 hours.  CBC:   Recent Labs   Lab 05/15/22  2242 05/16/22  0557 05/17/22  0642   WBC 7.00 6.99 7.63   HGB 9.1* 8.6* 8.4*   HCT 27.9* 26.2* 25.6*    193 183       CMP:   Recent Labs   Lab 05/15/22  2242 05/16/22  0557 05/17/22  0642    138 137   K 3.5 3.6 3.6   CL 95 96 95   CO2 25 26 28   * 175* 167*   BUN 45* 48* 53*   CREATININE 2.1* 2.1* 2.1*   CALCIUM 8.6* 8.6* 8.6*   PROT 6.4  --  6.1   ALBUMIN 2.7*  --  2.6*   BILITOT 0.7  --  0.6   ALKPHOS 125  --  114   AST 72*  --  46*   ALT 30  --  26   ANIONGAP 16 16 14   EGFRNONAA 26* 26* 26*       Cardiac Markers:   Recent Labs   Lab 05/15/22  2242   *       Lactic Acid:   Recent Labs   Lab 05/15/22  2242   LACTATE 1.3       Lipase: No results for input(s): LIPASE in the last 48 hours.  Lipid Panel: No results for input(s): CHOL, HDL, LDLCALC, TRIG, CHOLHDL in the last 48 hours.  Magnesium: No results for input(s): MG in the last 48 hours.  POCT  Glucose:   Recent Labs   Lab 05/16/22  1617 05/16/22  1907 05/17/22  0614   POCTGLUCOSE 213* 217* 189*       Troponin:   Recent Labs   Lab 05/16/22  0244 05/16/22  0750 05/16/22  1359   TROPONINI 0.048* 0.043* 0.037*       TSH:   Recent Labs   Lab 03/02/22  0744   TSH 5.943*       Urine Culture: No results for input(s): LABURIN in the last 48 hours.  Urine Studies:   Recent Labs   Lab 05/16/22  1800   COLORU [place]   APPEARANCEUA Clear   PHUR 5.0   SPECGRAV 1.025   PROTEINUA 1+*   GLUCUA Negative   KETONESU Trace*   BILIRUBINUA 1+*   OCCULTUA Negative   NITRITE Positive*   UROBILINOGEN Negative   LEUKOCYTESUR Negative   RBCUA 0   WBCUA 1   BACTERIA Occasional   HYALINECASTS 0       Significant Imaging: I have reviewed and interpreted all pertinent imaging results/findings within the past 24 hours.    LE US -No evidence of deep venous thrombosis in either lower extremity.    CXR- Increased left-sided pleural effusion along with new airspace opacities in left hemithorax.  Findings suggestive of worsening CHF pattern of pulmonary edema.

## 2022-05-17 NOTE — PROGRESS NOTES
[place] - Med Surg ([facility])  Cardiology  Progress Note    Patient Name: Eddie Parada Sr.  MRN: [id number]  Admission Date: 5/15/2022  Hospital Length of Stay: 1 days  Code Status: Full Code   Attending Physician: Joseph Redmond MD   Primary Care Physician: Callum Roberts MD  Expected Discharge Date:   Principal Problem:(HFpEF) heart failure with preserved ejection fraction    Subjective:     Hospital Course: 89 yo wm hx PPM s/p generator replacement 3/22, chronic AF on Xarelto, bio-AVR,  chronic diastolic CHF with difficulty maintaining euvolemia due to waxing and waning renal function due in part to abx therapy from abd wall abscess. Renal function lately averaging creatinine 1.5-1.7.   Echo 5/2/22 showed still preserved EF with AI  normally functioning bioprosthetic aortic valve. Negative bubble study.  Presented to ER via EMS with acute onset SOB/AMAYA/CHF. O2 sat low 70's.  Given O2/lasix IV in ER   Troponin chronically mildly elevated, today 0.043.   EKG shows  rhythm  D-dimer 2.25       Interval History: H/H with minimal decrease to 8.4/25.6. Creatine remains unchanged at 2.1, higher than baseline. Remains hemodynamically stable. Venous US of BLE negative for a DVT.     ROS   Constitutional: Negative.   HENT: Negative.    Eyes: Negative.    Cardiovascular: Positive for dyspnea on exertion. Negative for chest pain.   Respiratory: Positive for shortness of breath.    Endocrine: Negative.    Hematologic/Lymphatic: Negative.    Genitourinary: Negative.    Neurological: Negative.      Objective:     Vital Signs (Most Recent):  Temp: 96.9 °F (36.1 °C) (05/17/22 0707)  Pulse: 65 (05/17/22 0707)  Resp: 18 (05/17/22 0707)  BP: (!) 112/55 (05/17/22 0707)  SpO2: (!) 94 % (05/17/22 0718) Vital Signs (24h Range):  Temp:  [96 °F (35.6 °C)-98.4 °F (36.9 °C)] 96.9 °F (36.1 °C)  Pulse:  [59-66] 65  Resp:  [18-20] 18  SpO2:  [92 %-95 %] 94 %  BP: (103-177)/(50-63) 112/55     Weight: 79.6 kg (175 lb 7.8 oz)  Body mass  index is 29.2 kg/m².    SpO2: (!) 94 %  O2 Device (Oxygen Therapy): nasal cannula      Intake/Output Summary (Last 24 hours) at 5/17/2022 0853  Last data filed at 5/17/2022 0800  Gross per 24 hour   Intake 870.15 ml   Output 360 ml   Net 510.15 ml       Lines/Drains/Airways     Peripherally Inserted Central Catheter Line  Duration           PICC Double Lumen 05/12/22 0845 right basilic 5 days          Drain  Duration                Drain/Device  05/04/22 1618 midline umbilical area pigtail 12 days                Physical Exam   Vitals and nursing note reviewed.   Cardiovascular:      Rate and Rhythm: Normal rate and regular rhythm.      Pulses: Normal pulses.      Heart sounds: Normal heart sounds.      Comments: sys M  Pulmonary:      Effort: Pulmonary effort is normal.      Comments: Diminished at bases  Abdominal:      General: Abdomen is flat.   Musculoskeletal:         General: Normal range of motion.   Skin:     General: Skin is warm and dry.   Neurological:      General: No focal deficit present.      Mental Status: He is alert and oriented to person, place, and time.   Psychiatric:         Mood and Affect: Mood normal.     Significant Labs:   CMP   Recent Labs   Lab 05/15/22  2242 05/16/22  0557 05/17/22  0642    138 137   K 3.5 3.6 3.6   CL 95 96 95   CO2 25 26 28   * 175* 167*   BUN 45* 48* 53*   CREATININE 2.1* 2.1* 2.1*   CALCIUM 8.6* 8.6* 8.6*   PROT 6.4  --  6.1   ALBUMIN 2.7*  --  2.6*   BILITOT 0.7  --  0.6   ALKPHOS 125  --  114   AST 72*  --  46*   ALT 30  --  26   ANIONGAP 16 16 14   ESTGFRAFRICA 30* 30* 30*   EGFRNONAA 26* 26* 26*    and CBC   Recent Labs   Lab 05/15/22  2242 05/16/22  0557 05/17/22  0642   WBC 7.00 6.99 7.63   HGB 9.1* 8.6* 8.4*   HCT 27.9* 26.2* 25.6*    193 183       Significant Imaging: X-Ray: CXR: X-Ray Chest 1 View (CXR):   Results for orders placed or performed during the hospital encounter of 05/15/22   X-Ray Chest 1 View    Narrative     EXAMINATION:  XR CHEST 1 VIEW    CLINICAL HISTORY:  Shortness of breath    TECHNIQUE:  Single frontal view of the chest was performed.    COMPARISON:  05/12/2022.    FINDINGS:  The right-sided PICC line tip is unchanged.  There is stable appearance of left-sided AICD.  There are postoperative changes of median sternotomy.    The trachea is unremarkable.  There is stable enlargement of the cardiomediastinal silhouette.  There is no pleural effusion right.  There is increase in the left-sided pleural effusion.  There is mild pulmonary vascular congestion.  There are new airspace opacities in the left hemithorax.  There are degenerative changes in the osseous structures.      Impression    Increased left-sided pleural effusion along with new airspace opacities in left hemithorax.  Findings suggestive of worsening CHF pattern of pulmonary edema.      Electronically signed by: Abisai Maynard MD  Date:    05/15/2022  Time:    23:12     Assessment and Plan:       Active Diagnoses:    Diagnosis Date Noted POA    PRINCIPAL PROBLEM:  (HFpEF) heart failure with preserved ejection fraction [I50.30] 06/17/2015 Yes    Debility [R53.81] 05/16/2022 Yes    NIK (acute kidney injury) [N17.9] 03/19/2022 Yes    Abdominal wall abscess [L02.211] 03/05/2022 Yes    Disorder of prostate [N42.9] 03/03/2022 Yes    Hypothyroidism due to acquired atrophy of thyroid [E03.4] 01/20/2020 Yes    Long term current use of anticoagulant therapy [Z79.01] 09/13/2016 Not Applicable    Type 2 diabetes mellitus, controlled [E11.9] 05/21/2014 Yes    S/P AVR [Z95.2] 02/25/2014 Not Applicable    Mixed dyslipidemia [E78.2] 11/07/2012 Yes    Essential (primary) hypertension [I10] 09/20/2012 Yes    CAD (coronary artery disease) [I25.10] 09/20/2012 Yes    Chronic atrial fibrillation [I48.20] 06/29/2012 Yes      Problems Resolved During this Admission:       VTE Risk Mitigation (From admission, onward)         Ordered     IP VTE HIGH RISK PATIENT  Once          05/16/22 0419     Place sequential compression device  Until discontinued         05/16/22 0419              Current Facility-Administered Medications   Medication    carvediloL tablet 3.125 mg    cefTRIAXone (ROCEPHIN) 1 g/50 mL D5W IVPB    dextrose 10% bolus 250 mL    ferrous sulfate tablet 1 each    fluconazole tablet 100 mg    furosemide injection 20 mg    gabapentin capsule 200 mg    glucagon (human recombinant) injection 1 mg    glucose chewable tablet 16 g    glucose chewable tablet 24 g    insulin aspart U-100 pen 0-5 Units    levothyroxine tablet 75 mcg    melatonin tablet 6 mg    metroNIDAZOLE tablet 500 mg    mupirocin 2 % ointment    pantoprazole EC tablet 40 mg    polyethylene glycol packet 17 g    potassium chloride CR tablet 10 mEq    pravastatin tablet 20 mg    senna tablet 1 tablet    senna-docusate 8.6-50 mg per tablet 1 tablet    sodium chloride 0.9% flush 10 mL    tamsulosin 24 hr capsule 0.4 mg    vancomycin - pharmacy to dose     Echocardiogram 4/30/2022:  The estimated ejection fraction is 55%.  · Indeterminate left ventricular diastolic function.  · Moderate right ventricular enlargement with mildly reduced right   ventricular systolic function.  · Mild aortic regurgitation.  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 There is aortic   insufficiency present. Prosthetic aortic valve is normal.  · The aortic valve mean gradient is 6 mmHg with a dimensionless index of   0.64.  · Mild mitral regurgitation.  · Mild tricuspid regurgitation.  · There is moderate pulmonary hypertension.     Assessment:  Acute on chronic diastolic CHF, currently euvolemic, leg edema resolved but with some scrotal edema in bed.  Echo 5/22 EF 55%, normal AVR, 2+ TR, 1-2+ MR, 1+ AI, PAP 48  CKD with recent AKIs--waxing and waning; creatinine elevated but on vanc  Anemia  Chronic AF on Xarelto  s/p PPM  PPM  Hx TIA  CAD hx PCI Cfx, LAD, MPI 2017 normal  thrombocytopenia     Plan:  flash  pulm edema with NIK  Cont to diurese as tolerated and follow renal function  Avoid nephrotoxic agents   Follow H/H on Xarelto/ consider holding for now; may sub with heparin if tolerated  Increase coreg, continue pravastatin  Start Imdur 30mg   As discussed with pt and wife; would avoid invasive approach for CAD evaluation as cause for flash pulmonary edema at this time  Pt remains fullcode and willing to have dialysis if necessary.      Barbie Ortiz NP scribed for Dr. Monterroso  Cardiology  [place] [facility] Surg (3rd Fl)    I have personally interviewed and examined this patient face-to-face, and as the physician. Documented the above plan and rendered all medical decision making for this encounter. I have read and agree with the above documentation unless otherwise noted.

## 2022-05-17 NOTE — PLAN OF CARE
Diminished bs in bases, 02 remains @ 3L/min n/c, sats 93%.  According drain intact mid abd, serousanguinous drainage in tubing.  Antibiotics continue. Blood glucose slightly elevated, covered with aspart.  No void this shift, will notify MD. Sacral foam dressing I place.  Junctional rhythm on the monitor.    Problem: Adult Inpatient Plan of Care  Goal: Plan of Care Review  Outcome: Ongoing, Progressing      Problem: Diabetes Comorbidity  Goal: Blood Glucose Level Within Targeted Range  Outcome: Ongoing, Progressing     Problem: Infection Progression (Sepsis/Septic Shock)  Goal: Absence of Infection Signs and Symptoms  Outcome: Ongoing, Progressing     Problem: Nutrition Impaired (Sepsis/Septic Shock)  Goal: Optimal Nutrition Intake  Outcome: Ongoing, Progressing     Problem: Fall Injury Risk  Goal: Absence of Fall and Fall-Related Injury  Outcome: Ongoing, Progressing     Problem: Cardiac Output Decreased (Heart Failure)  Goal: Optimal Cardiac Output  Outcome: Ongoing, Progressing     Problem: Fluid Imbalance (Heart Failure)  Goal: Fluid Balance  Outcome: Ongoing, Progressing     Problem: Functional Ability Impaired (Heart Failure)  Goal: Optimal Functional Ability  Outcome: Ongoing, Progressing

## 2022-05-17 NOTE — PLAN OF CARE
Recommendation:   1. Continue diabetic diet   -Encourage adequate intake as needed   2. Continue Boost Glucose TID   3. Food Preferences taken daily to promote oral intake    Goals: pt to meet at least 75% EEN by f/u  Nutrition Goal Status: new

## 2022-05-17 NOTE — ASSESSMENT & PLAN NOTE
5/2/22 Echo-   · The estimated ejection fraction is 55%.  · Indeterminate left ventricular diastolic function.  · Moderate right ventricular enlargement with mildly reduced right ventricular systolic function.  · Mild aortic regurgitation.  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 There is aortic insufficiency present. Prosthetic aortic valve is normal.  · The aortic valve mean gradient is 6 mmHg with a dimensionless index of 0.64.  · Mild mitral regurgitation.  · Mild tricuspid regurgitation.  · There is moderate pulmonary hypertension.       Increased SOB and hypoxia. Diuresis initiated. CIS following. Strict I/O. Low Na diet.

## 2022-05-17 NOTE — PROGRESS NOTES
Pharmacokinetic Assessment Follow Up: IV Vancomycin    Vancomycin serum concentration assessment(s):    The random level was drawn correctly and can be used to guide therapy at this time. The measurement is above the desired definitive target range of 10 to 20 mcg/mL.    Vancomycin Regimen Plan:    Discontinue the scheduled vancomycin regimen and re-dose when the random level is less than 20 mcg/mL, next level to be drawn at 0600 on 05/18/2022.    Drug levels (last 3 results):  Recent Labs   Lab Result Units 05/17/22  0807 05/17/22  1205   Vancomycin, Random ug/mL  --  28.0   Vancomycin-Trough ug/mL 28.1*  --        Pharmacy will continue to follow and monitor vancomycin.    Please contact pharmacy at extension [phone] for questions regarding this assessment.    Thank you for the consult,   Parisa Lauren       Patient brief summary:  Eddie Parada Sr. is a 89 y.o. male initiated on antimicrobial therapy with IV Vancomycin for treatment of lower respiratory infection    The patient's current regimen is Vancomycin 750 mg IV Q24 hours     Drug Allergies:   Review of patient's allergies indicates:   Allergen Reactions    Ciprofloxacin     Levofloxacin Swelling    Lyrica [pregabalin] Swelling    Unable to assess Other (See Comments)     Soft shell crabs       Actual Body Weight:   79.6 kg    Renal Function:   Estimated Creatinine Clearance: 21.4 mL/min (A) (based on SCr of 2.1 mg/dL (H)).,     Dialysis Method (if applicable):  N/A    CBC (last 72 hours):  Recent Labs   Lab Result Units 05/15/22  2242 05/16/22  0557 05/17/22  0642   WBC K/uL 7.00 6.99 7.63   Hemoglobin g/dL 9.1* 8.6* 8.4*   Hematocrit % 27.9* 26.2* 25.6*   Platelets K/uL 207 193 183   Gran % % 70.7 70.4 69.3   Lymph % % 16.3* 15.6* 16.9*   Mono % % 11.7 12.0 12.3   Eosinophil % % 0.3 0.9 0.8   Basophil % % 0.4 0.4 0.3   Differential Method  Automated Automated Automated       Metabolic Panel (last 72 hours):  Recent Labs   Lab Result Units  05/15/22  2242 05/16/22  0557 05/16/22  1800 05/17/22  0642   Sodium mmol/L 136 138  --  137   Potassium mmol/L 3.5 3.6  --  3.6   Chloride mmol/L 95 96  --  95   CO2 mmol/L 25 26  --  28   Glucose mg/dL 180* 175*  --  167*   Glucose, UA   --   --  Negative  --    BUN mg/dL 45* 48*  --  53*   Creatinine mg/dL 2.1* 2.1*  --  2.1*   Albumin g/dL 2.7*  --   --  2.6*   Total Bilirubin mg/dL 0.7  --   --  0.6   Alkaline Phosphatase U/L 125  --   --  114   AST U/L 72*  --   --  46*   ALT U/L 30  --   --  26       Vancomycin Administrations:  vancomycin given in the last 96 hours                     vancomycin 750 mg in dextrose 5 % 250 mL IVPB (ready to mix system) (mg) 750 mg New Bag 05/16/22 1214                    Microbiologic Results:  Microbiology Results (last 7 days)       Procedure Component Value Units Date/Time    Influenza A & B by Molecular [[id number]] Collected: 05/15/22 2300    Order Status: Completed Specimen: Nasopharyngeal Swab Updated: 05/15/22 2340     Influenza A, Molecular Negative     Influenza B, Molecular Negative     Flu A & B Source Nasal swab

## 2022-05-18 LAB
ANION GAP SERPL CALC-SCNC: 12 MMOL/L (ref 8–16)
BASOPHILS # BLD AUTO: 0.04 K/UL (ref 0–0.2)
BASOPHILS NFR BLD: 0.6 % (ref 0–1.9)
BUN SERPL-MCNC: 56 MG/DL (ref 10–30)
CALCIUM SERPL-MCNC: 8.5 MG/DL (ref 8.7–10.5)
CHLORIDE SERPL-SCNC: 97 MMOL/L (ref 95–110)
CO2 SERPL-SCNC: 29 MMOL/L (ref 23–29)
CREAT SERPL-MCNC: 2.2 MG/DL (ref 0.5–1.4)
DIFFERENTIAL METHOD: ABNORMAL
EOSINOPHIL # BLD AUTO: 0.1 K/UL (ref 0–0.5)
EOSINOPHIL NFR BLD: 1.1 % (ref 0–8)
ERYTHROCYTE [DISTWIDTH] IN BLOOD BY AUTOMATED COUNT: 14.6 % (ref 11.5–14.5)
EST. GFR  (AFRICAN AMERICAN): 29 ML/MIN/1.73 M^2
EST. GFR  (NON AFRICAN AMERICAN): 25 ML/MIN/1.73 M^2
GLUCOSE SERPL-MCNC: 168 MG/DL (ref 70–110)
HCT VFR BLD AUTO: 24.5 % (ref 40–54)
HGB BLD-MCNC: 7.9 G/DL (ref 14–18)
IMM GRANULOCYTES # BLD AUTO: 0.03 K/UL (ref 0–0.04)
IMM GRANULOCYTES NFR BLD AUTO: 0.4 % (ref 0–0.5)
LYMPHOCYTES # BLD AUTO: 1.3 K/UL (ref 1–4.8)
LYMPHOCYTES NFR BLD: 19.1 % (ref 18–48)
MCH RBC QN AUTO: 32.8 PG (ref 27–31)
MCHC RBC AUTO-ENTMCNC: 32.2 G/DL (ref 32–36)
MCV RBC AUTO: 102 FL (ref 82–98)
MONOCYTES # BLD AUTO: 0.9 K/UL (ref 0.3–1)
MONOCYTES NFR BLD: 12.2 % (ref 4–15)
NEUTROPHILS # BLD AUTO: 4.7 K/UL (ref 1.8–7.7)
NEUTROPHILS NFR BLD: 66.6 % (ref 38–73)
NRBC BLD-RTO: 0 /100 WBC
PLATELET # BLD AUTO: 207 K/UL (ref 150–450)
PMV BLD AUTO: 10.1 FL (ref 9.2–12.9)
POCT GLUCOSE: 221 MG/DL (ref 70–110)
POCT GLUCOSE: 221 MG/DL (ref 70–110)
POCT GLUCOSE: 236 MG/DL (ref 70–110)
POCT GLUCOSE: 246 MG/DL (ref 70–110)
POTASSIUM SERPL-SCNC: 3.7 MMOL/L (ref 3.5–5.1)
RBC # BLD AUTO: 2.41 M/UL (ref 4.6–6.2)
SODIUM SERPL-SCNC: 138 MMOL/L (ref 136–145)
VANCOMYCIN SERPL-MCNC: 23.2 UG/ML
WBC # BLD AUTO: 7.03 K/UL (ref 3.9–12.7)

## 2022-05-18 PROCEDURE — 85025 COMPLETE CBC W/AUTO DIFF WBC: CPT | Performed by: NURSE PRACTITIONER

## 2022-05-18 PROCEDURE — 94761 N-INVAS EAR/PLS OXIMETRY MLT: CPT

## 2022-05-18 PROCEDURE — 11000001 HC ACUTE MED/SURG PRIVATE ROOM

## 2022-05-18 PROCEDURE — 80048 BASIC METABOLIC PNL TOTAL CA: CPT | Performed by: FAMILY MEDICINE

## 2022-05-18 PROCEDURE — 25000003 PHARM REV CODE 250

## 2022-05-18 PROCEDURE — 99232 SBSQ HOSP IP/OBS MODERATE 35: CPT | Mod: ,,, | Performed by: FAMILY MEDICINE

## 2022-05-18 PROCEDURE — 25000003 PHARM REV CODE 250: Performed by: NURSE PRACTITIONER

## 2022-05-18 PROCEDURE — 63600175 PHARM REV CODE 636 W HCPCS: Performed by: NURSE PRACTITIONER

## 2022-05-18 PROCEDURE — 63700000 PHARM REV CODE 250 ALT 637 W/O HCPCS: Performed by: NURSE PRACTITIONER

## 2022-05-18 PROCEDURE — 80202 ASSAY OF VANCOMYCIN: CPT | Performed by: FAMILY MEDICINE

## 2022-05-18 PROCEDURE — 97530 THERAPEUTIC ACTIVITIES: CPT

## 2022-05-18 PROCEDURE — 99232 PR SUBSEQUENT HOSPITAL CARE,LEVL II: ICD-10-PCS | Mod: ,,, | Performed by: FAMILY MEDICINE

## 2022-05-18 PROCEDURE — 25000003 PHARM REV CODE 250: Performed by: FAMILY MEDICINE

## 2022-05-18 RX ORDER — SODIUM CHLORIDE 9 MG/ML
INJECTION, SOLUTION INTRAVENOUS CONTINUOUS
Status: ACTIVE | OUTPATIENT
Start: 2022-05-18 | End: 2022-05-19

## 2022-05-18 RX ORDER — ENOXAPARIN SODIUM 100 MG/ML
1 INJECTION SUBCUTANEOUS
Status: DISCONTINUED | OUTPATIENT
Start: 2022-05-18 | End: 2022-05-22

## 2022-05-18 RX ADMIN — METRONIDAZOLE 500 MG: 500 TABLET ORAL at 09:05

## 2022-05-18 RX ADMIN — FERROUS SULFATE TAB 325 MG (65 MG ELEMENTAL FE) 1 EACH: 325 (65 FE) TAB at 10:05

## 2022-05-18 RX ADMIN — METRONIDAZOLE 500 MG: 500 TABLET ORAL at 02:05

## 2022-05-18 RX ADMIN — MUPIROCIN: 20 OINTMENT TOPICAL at 10:05

## 2022-05-18 RX ADMIN — ISOSORBIDE MONONITRATE 30 MG: 30 TABLET, EXTENDED RELEASE ORAL at 10:05

## 2022-05-18 RX ADMIN — FLUCONAZOLE 100 MG: 100 TABLET ORAL at 10:05

## 2022-05-18 RX ADMIN — MUPIROCIN: 20 OINTMENT TOPICAL at 09:05

## 2022-05-18 RX ADMIN — LEVOTHYROXINE SODIUM 75 MCG: 75 TABLET ORAL at 06:05

## 2022-05-18 RX ADMIN — PANTOPRAZOLE SODIUM 40 MG: 40 TABLET, DELAYED RELEASE ORAL at 10:05

## 2022-05-18 RX ADMIN — SENNOSIDES 1 TABLET: 8.6 TABLET, FILM COATED ORAL at 10:05

## 2022-05-18 RX ADMIN — INSULIN ASPART 2 UNITS: 100 INJECTION, SOLUTION INTRAVENOUS; SUBCUTANEOUS at 07:05

## 2022-05-18 RX ADMIN — POLYETHYLENE GLYCOL (3350) 17 G: 17 POWDER, FOR SOLUTION ORAL at 10:05

## 2022-05-18 RX ADMIN — FUROSEMIDE 20 MG: 20 INJECTION, SOLUTION INTRAMUSCULAR; INTRAVENOUS at 06:05

## 2022-05-18 RX ADMIN — GABAPENTIN 200 MG: 100 CAPSULE ORAL at 09:05

## 2022-05-18 RX ADMIN — INSULIN ASPART 2 UNITS: 100 INJECTION, SOLUTION INTRAVENOUS; SUBCUTANEOUS at 05:05

## 2022-05-18 RX ADMIN — POTASSIUM CHLORIDE 10 MEQ: 750 TABLET, FILM COATED, EXTENDED RELEASE ORAL at 10:05

## 2022-05-18 RX ADMIN — TAMSULOSIN HYDROCHLORIDE 0.4 MG: 0.4 CAPSULE ORAL at 10:05

## 2022-05-18 RX ADMIN — CARVEDILOL 3.12 MG: 3.12 TABLET, FILM COATED ORAL at 05:05

## 2022-05-18 RX ADMIN — SODIUM CHLORIDE: 0.9 INJECTION, SOLUTION INTRAVENOUS at 05:05

## 2022-05-18 RX ADMIN — INSULIN ASPART 1 UNITS: 100 INJECTION, SOLUTION INTRAVENOUS; SUBCUTANEOUS at 09:05

## 2022-05-18 RX ADMIN — PRAVASTATIN SODIUM 20 MG: 20 TABLET ORAL at 09:05

## 2022-05-18 RX ADMIN — METRONIDAZOLE 500 MG: 500 TABLET ORAL at 06:05

## 2022-05-18 RX ADMIN — CARVEDILOL 3.12 MG: 3.12 TABLET, FILM COATED ORAL at 07:05

## 2022-05-18 RX ADMIN — INSULIN ASPART 2 UNITS: 100 INJECTION, SOLUTION INTRAVENOUS; SUBCUTANEOUS at 12:05

## 2022-05-18 RX ADMIN — CEFTRIAXONE 1 G: 1 INJECTION, SOLUTION INTRAVENOUS at 10:05

## 2022-05-18 RX ADMIN — ENOXAPARIN SODIUM 80 MG: 80 INJECTION SUBCUTANEOUS at 02:05

## 2022-05-18 NOTE — PROGRESS NOTES
[place] [facility] ([facility])  [facility]  Progress Note    Patient Name: Eddie Parada Sr.  MRN: [id number]  Patient Class: IP- Inpatient   Admission Date: 5/15/2022  Length of Stay: 2 days  Attending Physician: Joseph Redmond MD  Primary Care Provider: Callum Roberts MD        Subjective:     Principal Problem:(HFpEF) heart failure with preserved ejection fraction        HPI:  Eddie Parada Sr. is a 89 y.o. male with known atrial fibrillation, aortic stenosis s/p repair,  CAD, CHF, COPD, diabetes mellitus type 2, hyperlipidemia,  CVA and ongoing complications associated of an anterior abdominal wall mass secondary to ventral hernia mesh placed ~20 years ago who presented to ER with shortness of breath since just prior to arrival.  While sitting on a sofa at home, he suddenly began short of breath.  Per wife, he has been compliant with all of his home medication.  Endorses leg swelling.   Denies chest pain, diaphoresis.   BNP> 500, CXR demonstrating pulmonary edema, Creat elevated at 2.1- (Baseline creat 1.0-1.7)   D- dimer elevated, LE US negative for DVT, TNI- 0.044>0.048>0.043   H&H 9.1/27.9>8.6>26.2     He is s/p Drain recently removed prior to presentation, with return of symptoms, re-accumulation of fluid. General surgery was consulted who states patient is not a good surgical candidate. Patient underwent US-guided drain placement with IR 5/4, which he tolerated well. Initiated BSA with vanc, cefepime, flagyl, and azithromycin (for atypicals/CAP). Consulted ID for long-term IV Abx who recommends discharging patient on vanc, ceftriaxone, PO flagyl, and PO diflucan for at least 2 weeks and close follow-up with ID for decision making based on clinical course. Because patient has had several hospitalizations in last few months due to complications from abdominal wall abscess, consulted palliative care to have on board and to establish outpatient follow-up. Patient was discharged home with  abdominal drain in place, 4 weeks of vanc, ceftriaxone, PO flagyl, and PO diflucan, and close follow-up with ID, general surgery, palliative care, and home health. Patient remained afebrile and without abdominal pain, leukocytosis, or other issues during hospital stay.          Overview/Hospital Course:  5/17 89 YO WM receiving multiple home abx including vancomycin at home for abdominal abscess, presented with acute SOB, Heart failure, getting lasix 20mg IV q 12, not much diuresis, intake and output reflecting + 270  Noting more edema to arms, below eyes , wife noting more confusion when he wakes up   + NIK on CKD; Creat 2.1; K+ 3.6   O2 sat 93-94% on 3LNC , not on home oxygen,   H&H 8.4/25.6 trending down ; NOAC stopped per cards after dose yesterday   Decreased appetite, Boost ordered,   End of life care discussed ; pt does not have living will .  He will sign living will .  He will be DNR .  I also talked to wife by patients bedside and daughter on phone ;answered all questions.  Spent about 20 minutes  . Later on signed living will     5/18 Pt was admitted with heart failure with elevated BNP and some lower ext swelling. He was given 60 mg IV lasix 5/16 and then 40mg 5/17. He has had fluctuating renal function 2 weeks ago Creat on admit 2.1.>> 2.2 today. BNP was elevated  on admission and is not putting out much urine.Dr Monterroso was consulted. Troponin chronically elevated. Echo 55% mild heart failure and mod pulm htn noted. Holding xarel;to for now due to h/h dropping. 9>8. D dimer was elevated u/s legs no clot     Pt was currently being treated at home for abd wall abscess s/p hernia repair. Per Id was on vanc and rocephin via PICC and PO flagyl and diflucan outpt- continued while here. Still has drain from abd wall abscess, not much drainage  Serosanguinous       Past Medical History:   Diagnosis Date    Abdominal fibromatosis     Acute posthemorrhagic anemia 01/09/2018    NIK (acute kidney injury)  01/11/2018    Aortic stenosis 02/25/2014    Atrial fibrillation     Bradycardia     Broken arm     CAD (coronary artery disease)     Cancer     basal cell on nose and melanoma on back    CHF (congestive heart failure)     COPD (chronic obstructive pulmonary disease)     Diabetes mellitus type II     Encounter for blood transfusion     Food impaction of esophagus, history of 03/03/2022    Hyperlipidemia     Localization-related focal epilepsy with simple partial seizures 03/02/2021    Melanoma     Obesity (BMI 30.0-34.9) 09/13/2016    Obesity, diabetes, and hypertension syndrome 09/13/2016    Osteoporosis     Other dysphagia 05/27/2021    Peripheral vascular disease     Pneumonia of left lower lobe due to infectious organism 04/30/2022    Primary malignant neoplasm of prostate 03/03/2022    S/P ORIF (open reduction internal fixation) fracture 11/27/2017    Sepsis with acute hypoxic respiratory failure     Septic shock 03/03/2022    Stroke     TIA (transient ischemic attack)     Type II diabetes mellitus with peripheral circulatory disorder     Type II or unspecified type diabetes mellitus without mention of complication, not stated as uncontrolled     Unspecified essential hypertension        Past Surgical History:   Procedure Laterality Date    AORTIC VALVE REPLACEMENT      CARDIAC PACEMAKER PLACEMENT  9/28/2012    CARDIAC VALVE REPLACEMENT  11/17/2011    aortic valve-tissue    CHOLECYSTECTOMY      COLONOSCOPY      ESOPHAGOGASTRODUODENOSCOPY N/A 5/27/2021    Procedure: EGD (ESOPHAGOGASTRODUODENOSCOPY);  Surgeon: Gualberto Medrano MD;  Location: [facility];  Service: Endoscopy;  Laterality: N/A;    ESOPHAGOGASTRODUODENOSCOPY N/A 6/22/2021    Procedure: EGD (ESOPHAGOGASTRODUODENOSCOPY);  Surgeon: Gualberto Medrano MD;  Location: [facility];  Service: Endoscopy;  Laterality: N/A;  please call wife(Jessica) with instructions/arrival time.    ESOPHAGOGASTRODUODENOSCOPY (EGD)  WITH DILATION  06/22/2021    EYE SURGERY Bilateral     cataract with lens by  at [facility]    HERNIA REPAIR      abdominal    LASIK      UPPER GASTROINTESTINAL ENDOSCOPY  05/28/2021       Review of patient's allergies indicates:   Allergen Reactions    Ciprofloxacin     Levofloxacin Swelling    Lyrica [pregabalin] Swelling    Unable to assess Other (See Comments)     Soft shell crabs       No current facility-administered medications on file prior to encounter.     Current Outpatient Medications on File Prior to Encounter   Medication Sig    acetaminophen (TYLENOL) 500 MG tablet Take 500 mg by mouth every 6 (six) hours as needed for Pain.    amLODIPine (NORVASC) 10 MG tablet TAKE 1 TABLET BY MOUTH DAILY FOR BLOOD PRESSURE    carvediloL (COREG) 12.5 MG tablet TAKE 1 TABLET (12.5 MG TOTAL) BY MOUTH 2 (TWO) TIMES DAILY WITH MEALS.    cefTRIAXone (ROCEPHIN) 1 g/50 mL PgBk IVPB Inject 50 mLs (1 g total) into the vein once daily.    doxazosin (CARDURA) 1 MG tablet Take 1 mg by mouth nightly.    ferrous sulfate (FEOSOL) 325 mg (65 mg iron) Tab tablet Take 325 mg by mouth once daily at 6am.    fluconazole (DIFLUCAN) 100 MG tablet Take 1 tablet (100 mg total) by mouth once daily.    furosemide (LASIX) 20 MG tablet Take 1 tablet (20 mg total) by mouth every other day. Hold medication until 3/29 (Patient taking differently: Take 20 mg by mouth once daily.)    gabapentin (NEURONTIN) 100 MG capsule Take 2 capsules (200 mg total) by mouth every evening.    levothyroxine (SYNTHROID) 75 MCG tablet Take 1 tablet (75 mcg total) by mouth before breakfast.    metroNIDAZOLE (FLAGYL) 500 MG tablet Take 1 tablet (500 mg total) by mouth every 8 (eight) hours.    pantoprazole (PROTONIX) 40 MG tablet TAKE ONE TABLET BY MOUTH ONCE A DAY FOR STOMACH PROBLEMS    polyethylene glycol (GLYCOLAX) 17 gram/dose powder Take 17 g by mouth once daily.    potassium chloride SA (K-DUR,KLOR-CON) 10 MEQ tablet Take 1 tablet (10  mEq total) by mouth once daily.    pravastatin (PRAVACHOL) 20 MG tablet TAKE 1 TABLET BY MOUTH DAILY FOR CHOLESTROL    rivaroxaban (XARELTO) 20 mg Tab Take one tablet(20mg) by mouth once daily    tamsulosin (FLOMAX) 0.4 mg Cap Take 1 capsule (0.4 mg total) by mouth once daily.    vancomycin HCl (VANCOMYCIN 750 MG/250 ML D5W, READY TO MIX SYSTEM,) Inject 250 mLs (750 mg total) into the vein once daily.    senna (SENOKOT) 8.6 mg tablet Take 1 tablet by mouth once daily.    senna-docusate 8.6-50 mg (PERICOLACE) 8.6-50 mg per tablet Take 1 tablet by mouth 2 (two) times daily as needed for Constipation.     Family History       Problem Relation (Age of Onset)    Alcohol abuse Father    COPD Sister, Brother    Cancer Brother    Diabetes Daughter    Heart disease Brother    Hypertension Father    No Known Problems Son, Daughter, Son    Stroke Father          Tobacco Use    Smoking status: Former Smoker     Quit date: 1996     Years since quittin.6    Smokeless tobacco: Never Used    Tobacco comment: cigar smoker   Substance and Sexual Activity    Alcohol use: No     Comment: none since     Drug use: No    Sexual activity: Not Currently     Review of Systems   Constitutional:  Positive for fatigue. Negative for activity change, fever and unexpected weight change.   HENT:  Negative for congestion, ear pain, hearing loss, rhinorrhea and sore throat.    Eyes:  Negative for pain, redness and visual disturbance.   Respiratory:  Positive for cough and shortness of breath. Negative for wheezing.    Cardiovascular:  Negative for chest pain, palpitations and leg swelling.   Gastrointestinal:  Negative for abdominal pain, constipation, diarrhea, nausea and vomiting.   Genitourinary:  Negative for decreased urine volume, dysuria, frequency and urgency.   Musculoskeletal:  Negative for back pain, joint swelling and neck pain.   Skin:  Negative for color change, rash and wound.   Neurological:  Negative for  dizziness, tremors, weakness, light-headedness and headaches.   Objective:     Vital Signs (Most Recent):  Temp: 97.7 °F (36.5 °C) (05/18/22 1111)  Pulse: 60 (05/18/22 1200)  Resp: 19 (05/18/22 1111)  BP: (!) 103/55 (05/18/22 1111)  SpO2: 96 % (05/18/22 1111)   Vital Signs (24h Range):  Temp:  [96.2 °F (35.7 °C)-98.5 °F (36.9 °C)] 97.7 °F (36.5 °C)  Pulse:  [52-80] 60  Resp:  [18-20] 19  SpO2:  [93 %-97 %] 96 %  BP: (101-125)/(53-57) 103/55     Weight: 79.6 kg (175 lb 7.8 oz)  Body mass index is 29.2 kg/m².    Physical Exam  Vitals and nursing note reviewed.   Constitutional:       General: He is not in acute distress.     Appearance: He is well-developed.   HENT:      Head: Normocephalic and atraumatic.      Right Ear: External ear normal.      Left Ear: External ear normal.   Eyes:      General:         Right eye: No discharge.         Left eye: No discharge.      Extraocular Movements: Extraocular movements intact.      Conjunctiva/sclera: Conjunctivae normal.      Pupils: Pupils are equal, round, and reactive to light.   Neck:      Thyroid: No thyromegaly.   Cardiovascular:      Rate and Rhythm: Normal rate and regular rhythm.      Heart sounds: No murmur heard.  Pulmonary:      Effort: Pulmonary effort is normal. No respiratory distress.      Breath sounds: No wheezing or rales.   Abdominal:      General: Bowel sounds are normal. There is no distension.      Palpations: Abdomen is soft.      Tenderness: There is no abdominal tenderness.      Comments: Drain in place.   Musculoskeletal:      Right lower leg: No edema.      Left lower leg: No edema.   Skin:     General: Skin is warm and dry.      Comments: PICC to right UE, dressing c/d/i   Neurological:      Mental Status: He is alert and oriented to person, place, and time.      Cranial Nerves: No cranial nerve deficit.   Psychiatric:         Behavior: Behavior normal.         Thought Content: Thought content normal.         CRANIAL NERVES     CN III, IV, VI    Pupils are equal, round, and reactive to light.     Significant Labs: All pertinent labs within the past 24 hours have been reviewed.  A1C:   Recent Labs   Lab 22  0744 22  1046   HGBA1C 5.9* 6.0*         Bilirubin:   Recent Labs   Lab 22  0749 22  0536 22  0513 05/15/22  2242 22  0642   BILITOT 0.5 0.5 0.6 0.7 0.6       CBC:   Recent Labs   Lab 22  0642   WBC 7.63   HGB 8.4*   HCT 25.6*          CMP:   Recent Labs   Lab 22  0642 22  0605    138   K 3.6 3.7   CL 95 97   CO2 28 29   * 168*   BUN 53* 56*   CREATININE 2.1* 2.2*   CALCIUM 8.6* 8.5*   PROT 6.1  --    ALBUMIN 2.6*  --    BILITOT 0.6  --    ALKPHOS 114  --    AST 46*  --    ALT 26  --    ANIONGAP 14 12   EGFRNONAA 26* 25*       Lab Results   Component Value Date    DDIMER 2.25 (H) 05/15/2022     Lactate 1.3    BNP  Recent Labs   Lab 05/15/22  2242   *       Recent Labs   Lab 22  1902 22  0602 22  1108   POCTGLUCOSE 253* 221* 221*       Troponin: 0.044>0.048>0.043>0.037  Recent Labs   Lab 22  1359   TROPONINI 0.037*       TSH:   Recent Labs   Lab 22  0744   TSH 5.943*         Urine Studies:   Recent Labs   Lab 22  1800   COLORU [place]   APPEARANCEUA Clear   PHUR 5.0   SPECGRAV 1.025   PROTEINUA 1+*   GLUCUA Negative   KETONESU Trace*   BILIRUBINUA 1+*   OCCULTUA Negative   NITRITE Positive*   UROBILINOGEN Negative   LEUKOCYTESUR Negative   RBCUA 0   WBCUA 1   BACTERIA Occasional   HYALINECASTS 0     Covid negative   Flu negative     Significant Imagin/12 CXR Cardiomegaly with mild CHF.     Left lower lobe atelectasis, small left pleural effusion.    LE US -No evidence of deep venous thrombosis in either lower extremity.    5/15 CXR- Increased left-sided pleural effusion along with new airspace opacities in left hemithorax.  Findings suggestive of worsening CHF pattern of pulmonary edema.    EKG Likely ventricular paced rhythm  -  morphology suggestive of biventricular   pacing   Abnormal ECG   When compared with ECG of 30-APR-2022 10:39,   Premature ventricular complexes are no longer Present   Confirmed by Adam Mcmahon MD ([id number]) on 5/16/2022 8:11:55 AM      Assessment/Plan:      * (HFpEF) heart failure with preserved ejection fraction  5/2/22 Echo-   · The estimated ejection fraction is 55%.  · Indeterminate left ventricular diastolic function.  · Moderate right ventricular enlargement with mildly reduced right ventricular systolic function.  · Mild aortic regurgitation.  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 There is aortic insufficiency present. Prosthetic aortic valve is normal.  · The aortic valve mean gradient is 6 mmHg with a dimensionless index of 0.64.  · Mild mitral regurgitation.  · Mild tricuspid regurgitation.  · There is moderate pulmonary hypertension.       Increased SOB and hypoxia. Diuresis initiated. CIS following. Strict I/O. Low Na diet.    Stop lasix today. Creat rising and patient not urinating much- check renal u/s today considering fluids       Counseling regarding advanced directives and goals of care  Advance Care Planning     Living Will  During this visit, I engaged the patient and family  in the advance care planning process.  The patient and I reviewed the role for advance directives and their purpose in directing future healthcare if the patient's unable to speak for him/herself.  At this point in time, the patient does have full decision-making capacity.  We discussed different extreme health states that he could experience, and reviewed what kind of medical care he would want in those situations.  The patient and family communicated that if he were comatose and had little chance of a meaningful recovery, he would not want machines/life-sustaining treatments used. In addition to the above preference, other important end-of-life issues for the patient include . The patient has completed a living  will to reflect these preferences.  I spent a total of 20 minutes engaging the patient in this advance care planning discussion.Discussed with wife in person and daughter via telephone           Today a meeting took place: bedside    Patient Participation: Patient is able to participate     Attendees (Name and  Relationship to patient):also dicussed with wife and daughter    Staff attendees (Name and  Role): Georgia VERMA, xochitl Jules RN case manage   Code Status: DNR; status confirmed/order placed in chart    ACP Documents: Other Documents (specify): living will     Goals of care: The patient and family endorses that what is most important right now is to focus on symptom/pain control, quality of life, even if it means sacrificing a little time and improvement in condition but with limits to invasive therapies    Accordingly, we have decided that the best plan to meet the patient's goals includes continuing with treatment      Recommendations/Length of ACP   conversation in minutes: 20 minutes      Code Status  In light of the patients advanced and life limiting illness,I engaged the the patient and family in a conversation about the patient's preferences for care  at the very end of life. The patient wishes to have a natural, peaceful death.  Along those lines, the patient does not wish to have CPR or other invasive treatments performed when his heart and/or breathing stops. I communicated to the patient and family that a DNR order would be placed in his medical record to reflect this preference.  I spent a total of 20 minutes engaging the patient in this advance care planning discussion.         Debility  PT consulted .  Per records patient with several hospitalizations in last few months for abdominal wall abscess   - prior to 2 months ago, was still independent, working and running a business  - consulted palliative care to have on board and have OP follow up  · PT>>Gait: Minimal Assistance x ~10 feet with side  steps using RW. Decrease stride, dec foot/floor clearance and fatigue easily.      NIK (acute kidney injury)  Monitor BUN/SCr.  Monitor I/Os.  Monitor electrolytes.  Avoid non-steroidal anti-inflammatory medications.  Symptoms and labs suggestive of Acute HF; continue with diuretics, monitor renal fx closely   NIK on CKD      renal US today and hold further diuresis- pt not voiding much  vanc dosing per pharm        Abdominal wall abscess  Last discharge 5/6  with abdominal drain in place and 4 weeks of vanc, rocephin, flagyl, and diflucan  - follow up with general surgery for drain and ID for Abx- ( 10 days since discharge)   Consult pharmacy to adjust vanc- monitor     ventral hernia repair with mesh placement ~20 years ago, no complications until 3/2022 with several hospitalizations leading to drain placement and antibiotics since that time, drain removed 4/29/22   - most recent hospitalization began on 4/30/22 at [facility] and was transferred here on 5/4/22 for surgical and IR evaluation  - was initiated on Vanc and Zosyn at [facility]  - switched to Vanc, Cefepime, Flagyl on admission to our facility given renal function   - blood cultures from 4/30/22 no growth to date   - wound cultures from 3/3 and 5/4 NGTD  - general surgery states patient is not good surgical candidate  - underwent US-guided drain placement 5/4  - general surgery added fluconazole for yeast seen on gram stain of wound Cx  - consulted ID for assistance with Abx: discharge with IV vanc, IV ceftriaxone, PO flagyl, and PO diflucan for at least 2 weeks with close ID follow-up .  -vanc dosing per pharmacy    Disorder of prostate  Continue flomax .      Hypothyroidism due to acquired atrophy of thyroid  Continue levothyroxine .      Long term current use of anticoagulant therapy  Continue xarelto- adjust for renal; recommending decrease to 15mg daily .  Now holding due to anemia/NIK  lovenox renal dosing today    Type 2 diabetes  mellitus, controlled  HbA1c 6.0 on 4/30/22  - does not appear to be on any home medications   - SSI + POCT glucose while inpatient  - continue to manage with lifstyle, follow up with PCP.         S/P AVR   bioprosthetic valve placed 2011   - continue home xarelto - adjust currently for renal dosing .  Repeat h/h and resume if stable       Mixed dyslipidemia  Continue pravastatin .      CAD (coronary artery disease)  Continue statin, bb, NOAC   Cards consulted     NOAC held due to NIK    Essential (primary) hypertension  BP Readings from Last 3 Encounters:   05/17/22 (!) 120/54   05/12/22 (!) 129/58   05/11/22 (!) 110/52     Resume home coreg-at lower dose 3.25mg , hold amlodipine & doxazosin  for now .  5/17 BP labile ; Dr. Monterroso ordered Imdur, monitor BP       Chronic atrial fibrillation  Maintain BB, rivaroxaban- renal dose  p PPM .  xarelto held due to NIK  Placing on renal dosed lovenox         VTE Risk Mitigation (From admission, onward)         Ordered     enoxaparin injection 80 mg  Every 24 hours (non-standard times)         05/18/22 1256     IP VTE HIGH RISK PATIENT  Once         05/16/22 0419     Place sequential compression device  Until discontinued         05/16/22 0419                Discharge Planning   KANDICE:      Code Status: DNR   Is the patient medically ready for discharge?:     Reason for patient still in hospital (select all that apply): Treatment  Discharge Plan A: Home with family, Home Health                  Joseph Redmond MD  Department of Hospital Medicine   [place] [facility] (3rd Fl)

## 2022-05-18 NOTE — PLAN OF CARE
Patient had uneventful shift. BM today - was able to get out of bed with maximal assistance. New labs ordered today - started on enoxaparin. Heels offloaded. Generalized edema noted. Encouraged to keep extremities elevated. Turning q2h. Foam dressing to sacrum intact. Family remains at bedside. IVF started this evening. BS covered x3 today.

## 2022-05-18 NOTE — PROGRESS NOTES
[facility] - Med Surg ([facility])  Cardiology  Progress Note    Patient Name: Eddie Parada Sr.  MRN: [id number]  Admission Date: 5/15/2022  Hospital Length of Stay: 2 days  Code Status: DNR   Attending Physician: Joseph Redmond MD   Primary Care Physician: Callum Roberts MD  Expected Discharge Date:   Principal Problem:(HFpEF) heart failure with preserved ejection fraction    Subjective:      89 yo wm hx PPM s/p generator replacement 3/22, chronic AF on Xarelto, bio-AVR,  chronic diastolic CHF with difficulty maintaining euvolemia due to waxing and waning renal function due in part to abx therapy from abd wall abscess. Renal function lately averaging creatinine 1.5-1.7.   Echo 5/2/22 showed still preserved EF with AI  normally functioning bioprosthetic aortic valve. Negative bubble study.  Presented to ER via EMS with acute onset SOB/AMAYA/CHF. O2 sat low 70's.  Given O2/lasix IV in ER   Troponin chronically mildly elevated, today 0.043.   EKG shows  rhythm    Review of Systems   Constitutional: Negative.   HENT: Negative.    Eyes: Negative.    Cardiovascular: Negative for chest pain.   Respiratory: Negative.    Endocrine: Negative.    Hematologic/Lymphatic: Negative.    Neurological: Negative.    Psychiatric/Behavioral: Negative.      Objective:     Vital Signs (Most Recent):  Temp: 97.2 °F (36.2 °C) (05/18/22 0736)  Pulse: 80 (05/18/22 0736)  Resp: 18 (05/18/22 0736)  BP: (!) 125/57 (05/18/22 0736)  SpO2: 97 % (05/18/22 0736) Vital Signs (24h Range):  Temp:  [96.2 °F (35.7 °C)-98.5 °F (36.9 °C)] 97.2 °F (36.2 °C)  Pulse:  [52-83] 80  Resp:  [18-20] 18  SpO2:  [93 %-97 %] 97 %  BP: (101-125)/(53-57) 125/57     Weight: 79.6 kg (175 lb 7.8 oz)  Body mass index is 29.2 kg/m².    SpO2: 97 %  O2 Device (Oxygen Therapy): nasal cannula      Intake/Output Summary (Last 24 hours) at 5/18/2022 0746  Last data filed at 5/18/2022 0600  Gross per 24 hour   Intake 535.16 ml   Output 440 ml   Net 95.16 ml        Lines/Drains/Airways     Peripherally Inserted Central Catheter Line  Duration           PICC Double Lumen 05/12/22 0845 right basilic 5 days          Drain  Duration                Drain/Device  05/04/22 1618 midline umbilical area pigtail 13 days                Physical Exam  Vitals and nursing note reviewed.   Constitutional:       Appearance: Normal appearance.   Cardiovascular:      Rate and Rhythm: Normal rate and regular rhythm.      Pulses: Normal pulses.      Heart sounds: Normal heart sounds.   Abdominal:      General: Abdomen is flat.   Skin:     General: Skin is warm and dry.   Neurological:      General: No focal deficit present.      Mental Status: He is alert and oriented to person, place, and time.         Significant Labs:   BMP:   Recent Labs   Lab 05/17/22  0642 05/18/22  0605   * 168*    138   K 3.6 3.7   CL 95 97   CO2 28 29   BUN 53* 56*   CREATININE 2.1* 2.2*   CALCIUM 8.6* 8.5*   , CMP   Recent Labs   Lab 05/17/22  0642 05/18/22  0605    138   K 3.6 3.7   CL 95 97   CO2 28 29   * 168*   BUN 53* 56*   CREATININE 2.1* 2.2*   CALCIUM 8.6* 8.5*   PROT 6.1  --    ALBUMIN 2.6*  --    BILITOT 0.6  --    ALKPHOS 114  --    AST 46*  --    ALT 26  --    ANIONGAP 14 12   ESTGFRAFRICA 30* 29*   EGFRNONAA 26* 25*   , CBC   Recent Labs   Lab 05/17/22  0642   WBC 7.63   HGB 8.4*   HCT 25.6*       and Troponin   Recent Labs   Lab 05/16/22  0750 05/16/22  1359   TROPONINI 0.043* 0.037*       Significant Imaging: Echocardiogram:   Transthoracic echo (TTE) complete ([facility] Only):   Results for orders placed or performed during the hospital encounter of 04/30/22   Echo   Result Value Ref Range    AV mean gradient 6 mmHg    Ao peak amie 1.88 m/s    Ao VTI 37.59 cm    IVRT 41.52 msec    IVS 1.38 (A) 0.6 - 1.1 cm    LA size 5.02 cm    Left Atrium Major Axis 5.83 cm    LVIDd 4.93 3.5 - 6.0 cm    LVIDs 2.31 2.1 - 4.0 cm    LVOT diameter 1.83 cm    LVOT peak VTI 24.18 cm     Posterior Wall 0.78 0.6 - 1.1 cm    MV Peak A Taiwo 0.51 m/s    E wave deceleration time 183.50 msec    MV Peak E Taiwo 1.30 m/s    PV Peak D Taiwo 0.40 m/s    PV Peak S Taiwo 0.38 m/s    RA Major Axis 5.87 cm    RVDD 3.80 cm    TAPSE 1.61 cm    TR Max Taiwo 3.69 m/s    LA WIDTH 4.59 cm    MV stenosis pressure 1/2 time 53.21 ms    LV Diastolic Volume 114.29 mL    LV Systolic Volume 18.26 mL    LVOT peak taiwo 1.11 m/s    Mr max taiwo 0.05 m/s    LA volume (mod) 37.44 cm3    FS 53 %    LV mass 197.43 g    Left Ventricle Relative Wall Thickness 0.32 cm    AV valve area 1.69 cm2    AV Velocity Ratio 0.59     AV index (prosthetic) 0.64     MV valve area p 1/2 method 4.13 cm2    E/A ratio 2.55     Pulm vein S/D ratio 0.95     LVOT area 2.6 cm2    LVOT stroke volume 63.57 cm3    AV peak gradient 14 mmHg    LV Systolic Volume Index 9.5 mL/m2    LV Diastolic Volume Index 59.22 mL/m2    LV Mass Index 102 g/m2    Triscuspid Valve Regurgitation Peak Gradient 54 mmHg    LA Volume Index (Mod) 19.4 mL/m2    BSA 1.98 m2    EF 55 %    Narrative    · The estimated ejection fraction is 55%.  · Indeterminate left ventricular diastolic function.  · Moderate right ventricular enlargement with mildly reduced right   ventricular systolic function.  · Mild aortic regurgitation.  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 There is aortic   insufficiency present. Prosthetic aortic valve is normal.  · The aortic valve mean gradient is 6 mmHg with a dimensionless index of   0.64.  · Mild mitral regurgitation.  · Mild tricuspid regurgitation.  · There is moderate pulmonary hypertension.        Assessment and Plan:     Acute on chronic diastolic CHF, currently euvolemic, leg edema resolved but with some scrotal edema in bed.  Echo 5/22 EF 55%, normal AVR, 2+ TR, 1-2+ MR, 1+ AI, PAP 48  CKD with recent AKIs--waxing and waning; creatinine elevated but on vanc  Anemia  Chronic AF on Xarelto  s/p PPM  PPM  Hx TIA  CAD hx PCI Cfx, LAD, MPI 2017  normal  thrombocytopenia     Plan:  flash pulm edema with NIK, creatinine up to 2.2  reduce diurese/ give as tolerated and follow renal function  Avoid nephrotoxic agents   Follow H/H on Xarelto/ consider holding for now; may sub with heparin if tolerated  Increase coreg, continue pravastatin, Imdur 30mg   As discussed with pt and wife; would avoid invasive approach for CAD evaluation as cause for flash pulmonary edema at this time  Pt remains fullcode and willing to have dialysis if necessary.       Active Diagnoses:    Diagnosis Date Noted POA    PRINCIPAL PROBLEM:  (HFpEF) heart failure with preserved ejection fraction [I50.30] 06/17/2015 Yes    Counseling regarding advanced directives and goals of care [Z71.89] 05/17/2022 Not Applicable    Debility [R53.81] 05/16/2022 Yes    NIK (acute kidney injury) [N17.9] 03/19/2022 Yes    Abdominal wall abscess [L02.211] 03/05/2022 Yes    Disorder of prostate [N42.9] 03/03/2022 Yes    Hypothyroidism due to acquired atrophy of thyroid [E03.4] 01/20/2020 Yes    Long term current use of anticoagulant therapy [Z79.01] 09/13/2016 Not Applicable    Type 2 diabetes mellitus, controlled [E11.9] 05/21/2014 Yes    S/P AVR [Z95.2] 02/25/2014 Not Applicable    Mixed dyslipidemia [E78.2] 11/07/2012 Yes    Essential (primary) hypertension [I10] 09/20/2012 Yes    CAD (coronary artery disease) [I25.10] 09/20/2012 Yes    Chronic atrial fibrillation [I48.20] 06/29/2012 Yes      Problems Resolved During this Admission:       VTE Risk Mitigation (From admission, onward)         Ordered     IP VTE HIGH RISK PATIENT  Once         05/16/22 0419     Place sequential compression device  Until discontinued         05/16/22 0419                Ebony Johnston NP scribe for VIDHYA Monterroso MD  Cardiology  [facility] Surg (3rd Fl)  I attest that I have personally seen and examined this patient. I have reviewed and discussed the management in detail as outlined above.

## 2022-05-18 NOTE — ASSESSMENT & PLAN NOTE
Patient with acute kidney injury likely d/t Pre-renal azotemia and Acute tubular necrosis Which is currently improving. Labs reviewed- Renal function/electrolytes with Estimated Creatinine Clearance: 20.7 mL/min (A) (based on SCr of 2.2 mg/dL (H)). according to latest data. Monitor urine output and serial BMP and adjust therapy as needed. Avoid nephrotoxins and renally dose meds for GFR listed above.

## 2022-05-18 NOTE — ASSESSMENT & PLAN NOTE
Continue xarelto- adjust for renal; recommending decrease to 15mg daily .  Now holding due to anemia/NIK  lovenox renal dosing today

## 2022-05-18 NOTE — ASSESSMENT & PLAN NOTE
Monitor BUN/SCr.  Monitor I/Os.  Monitor electrolytes.  Avoid non-steroidal anti-inflammatory medications.  Symptoms and labs suggestive of Acute HF; continue with diuretics, monitor renal fx closely   NIK on CKD      renal US today and hold further diuresis- pt not voiding much  vanc dosing per pharm

## 2022-05-18 NOTE — ASSESSMENT & PLAN NOTE
Maintain BB, rivaroxaban- renal dose  p PPM .  xarelto held due to NIK  Placing on renal dosed lovenox

## 2022-05-18 NOTE — SUBJECTIVE & OBJECTIVE
Past Medical History:   Diagnosis Date    Abdominal fibromatosis     Acute posthemorrhagic anemia 01/09/2018    NIK (acute kidney injury) 01/11/2018    Aortic stenosis 02/25/2014    Atrial fibrillation     Bradycardia     Broken arm     CAD (coronary artery disease)     Cancer     basal cell on nose and melanoma on back    CHF (congestive heart failure)     COPD (chronic obstructive pulmonary disease)     Diabetes mellitus type II     Encounter for blood transfusion     Food impaction of esophagus, history of 03/03/2022    Hyperlipidemia     Localization-related focal epilepsy with simple partial seizures 03/02/2021    Melanoma     Obesity (BMI 30.0-34.9) 09/13/2016    Obesity, diabetes, and hypertension syndrome 09/13/2016    Osteoporosis     Other dysphagia 05/27/2021    Peripheral vascular disease     Pneumonia of left lower lobe due to infectious organism 04/30/2022    Primary malignant neoplasm of prostate 03/03/2022    S/P ORIF (open reduction internal fixation) fracture 11/27/2017    Sepsis with acute hypoxic respiratory failure     Septic shock 03/03/2022    Stroke     TIA (transient ischemic attack)     Type II diabetes mellitus with peripheral circulatory disorder     Type II or unspecified type diabetes mellitus without mention of complication, not stated as uncontrolled     Unspecified essential hypertension        Past Surgical History:   Procedure Laterality Date    AORTIC VALVE REPLACEMENT      CARDIAC PACEMAKER PLACEMENT  9/28/2012    CARDIAC VALVE REPLACEMENT  11/17/2011    aortic valve-tissue    CHOLECYSTECTOMY      COLONOSCOPY      ESOPHAGOGASTRODUODENOSCOPY N/A 5/27/2021    Procedure: EGD (ESOPHAGOGASTRODUODENOSCOPY);  Surgeon: Gualberto Medrano MD;  Location: [facility];  Service: Endoscopy;  Laterality: N/A;    ESOPHAGOGASTRODUODENOSCOPY N/A 6/22/2021    Procedure: EGD (ESOPHAGOGASTRODUODENOSCOPY);  Surgeon: Gualberto Medrano MD;  Location: [facility];  Service: Endoscopy;   Laterality: N/A;  please call wife(Jessica) with instructions/arrival time.    ESOPHAGOGASTRODUODENOSCOPY (EGD) WITH DILATION  06/22/2021    EYE SURGERY Bilateral     cataract with lens by  at [facility]    HERNIA REPAIR      abdominal    LASIK      UPPER GASTROINTESTINAL ENDOSCOPY  05/28/2021       Review of patient's allergies indicates:   Allergen Reactions    Ciprofloxacin     Levofloxacin Swelling    Lyrica [pregabalin] Swelling    Unable to assess Other (See Comments)     Soft shell crabs       No current facility-administered medications on file prior to encounter.     Current Outpatient Medications on File Prior to Encounter   Medication Sig    acetaminophen (TYLENOL) 500 MG tablet Take 500 mg by mouth every 6 (six) hours as needed for Pain.    amLODIPine (NORVASC) 10 MG tablet TAKE 1 TABLET BY MOUTH DAILY FOR BLOOD PRESSURE    carvediloL (COREG) 12.5 MG tablet TAKE 1 TABLET (12.5 MG TOTAL) BY MOUTH 2 (TWO) TIMES DAILY WITH MEALS.    cefTRIAXone (ROCEPHIN) 1 g/50 mL PgBk IVPB Inject 50 mLs (1 g total) into the vein once daily.    doxazosin (CARDURA) 1 MG tablet Take 1 mg by mouth nightly.    ferrous sulfate (FEOSOL) 325 mg (65 mg iron) Tab tablet Take 325 mg by mouth once daily at 6am.    fluconazole (DIFLUCAN) 100 MG tablet Take 1 tablet (100 mg total) by mouth once daily.    furosemide (LASIX) 20 MG tablet Take 1 tablet (20 mg total) by mouth every other day. Hold medication until 3/29 (Patient taking differently: Take 20 mg by mouth once daily.)    gabapentin (NEURONTIN) 100 MG capsule Take 2 capsules (200 mg total) by mouth every evening.    levothyroxine (SYNTHROID) 75 MCG tablet Take 1 tablet (75 mcg total) by mouth before breakfast.    metroNIDAZOLE (FLAGYL) 500 MG tablet Take 1 tablet (500 mg total) by mouth every 8 (eight) hours.    pantoprazole (PROTONIX) 40 MG tablet TAKE ONE TABLET BY MOUTH ONCE A DAY FOR STOMACH PROBLEMS    polyethylene glycol (GLYCOLAX) 17 gram/dose powder Take 17 g by mouth  once daily.    potassium chloride SA (K-DUR,KLOR-CON) 10 MEQ tablet Take 1 tablet (10 mEq total) by mouth once daily.    pravastatin (PRAVACHOL) 20 MG tablet TAKE 1 TABLET BY MOUTH DAILY FOR CHOLESTROL    rivaroxaban (XARELTO) 20 mg Tab Take one tablet(20mg) by mouth once daily    tamsulosin (FLOMAX) 0.4 mg Cap Take 1 capsule (0.4 mg total) by mouth once daily.    vancomycin HCl (VANCOMYCIN 750 MG/250 ML D5W, READY TO MIX SYSTEM,) Inject 250 mLs (750 mg total) into the vein once daily.    senna (SENOKOT) 8.6 mg tablet Take 1 tablet by mouth once daily.    senna-docusate 8.6-50 mg (PERICOLACE) 8.6-50 mg per tablet Take 1 tablet by mouth 2 (two) times daily as needed for Constipation.     Family History       Problem Relation (Age of Onset)    Alcohol abuse Father    COPD Sister, Brother    Cancer Brother    Diabetes Daughter    Heart disease Brother    Hypertension Father    No Known Problems Son, Daughter, Son    Stroke Father          Tobacco Use    Smoking status: Former Smoker     Quit date: 1996     Years since quittin.6    Smokeless tobacco: Never Used    Tobacco comment: cigar smoker   Substance and Sexual Activity    Alcohol use: No     Comment: none since     Drug use: No    Sexual activity: Not Currently     Review of Systems   Constitutional:  Positive for fatigue. Negative for activity change, fever and unexpected weight change.   HENT:  Negative for congestion, ear pain, hearing loss, rhinorrhea and sore throat.    Eyes:  Negative for pain, redness and visual disturbance.   Respiratory:  Positive for cough and shortness of breath. Negative for wheezing.    Cardiovascular:  Negative for chest pain, palpitations and leg swelling.   Gastrointestinal:  Negative for abdominal pain, constipation, diarrhea, nausea and vomiting.   Genitourinary:  Negative for decreased urine volume, dysuria, frequency and urgency.   Musculoskeletal:  Negative for back pain, joint swelling and neck pain.   Skin:   Negative for color change, rash and wound.   Neurological:  Negative for dizziness, tremors, weakness, light-headedness and headaches.   Objective:     Vital Signs (Most Recent):  Temp: 97.7 °F (36.5 °C) (05/18/22 1111)  Pulse: 60 (05/18/22 1200)  Resp: 19 (05/18/22 1111)  BP: (!) 103/55 (05/18/22 1111)  SpO2: 96 % (05/18/22 1111)   Vital Signs (24h Range):  Temp:  [96.2 °F (35.7 °C)-98.5 °F (36.9 °C)] 97.7 °F (36.5 °C)  Pulse:  [52-80] 60  Resp:  [18-20] 19  SpO2:  [93 %-97 %] 96 %  BP: (101-125)/(53-57) 103/55     Weight: 79.6 kg (175 lb 7.8 oz)  Body mass index is 29.2 kg/m².    Physical Exam  Vitals and nursing note reviewed.   Constitutional:       General: He is not in acute distress.     Appearance: He is well-developed.   HENT:      Head: Normocephalic and atraumatic.      Right Ear: External ear normal.      Left Ear: External ear normal.   Eyes:      General:         Right eye: No discharge.         Left eye: No discharge.      Extraocular Movements: Extraocular movements intact.      Conjunctiva/sclera: Conjunctivae normal.      Pupils: Pupils are equal, round, and reactive to light.   Neck:      Thyroid: No thyromegaly.   Cardiovascular:      Rate and Rhythm: Normal rate and regular rhythm.      Heart sounds: No murmur heard.  Pulmonary:      Effort: Pulmonary effort is normal. No respiratory distress.      Breath sounds: No wheezing or rales.   Abdominal:      General: Bowel sounds are normal. There is no distension.      Palpations: Abdomen is soft.      Tenderness: There is no abdominal tenderness.      Comments: Drain in place.   Musculoskeletal:      Right lower leg: No edema.      Left lower leg: No edema.   Skin:     General: Skin is warm and dry.      Comments: PICC to right UE, dressing c/d/i   Neurological:      Mental Status: He is alert and oriented to person, place, and time.      Cranial Nerves: No cranial nerve deficit.   Psychiatric:         Behavior: Behavior normal.         Thought  Content: Thought content normal.         CRANIAL NERVES     CN III, IV, VI   Pupils are equal, round, and reactive to light.     Significant Labs: All pertinent labs within the past 24 hours have been reviewed.  A1C:   Recent Labs   Lab 22  0744 22  1046   HGBA1C 5.9* 6.0*         Bilirubin:   Recent Labs   Lab 22  0749 22  0536 22  0513 05/15/22  2242 22  0642   BILITOT 0.5 0.5 0.6 0.7 0.6       CBC:   Recent Labs   Lab 22  0642   WBC 7.63   HGB 8.4*   HCT 25.6*          CMP:   Recent Labs   Lab 22  0642 22  0605    138   K 3.6 3.7   CL 95 97   CO2 28 29   * 168*   BUN 53* 56*   CREATININE 2.1* 2.2*   CALCIUM 8.6* 8.5*   PROT 6.1  --    ALBUMIN 2.6*  --    BILITOT 0.6  --    ALKPHOS 114  --    AST 46*  --    ALT 26  --    ANIONGAP 14 12   EGFRNONAA 26* 25*       Lab Results   Component Value Date    DDIMER 2.25 (H) 05/15/2022     Lactate 1.3    BNP  Recent Labs   Lab 05/15/22  2242   *       Recent Labs   Lab 22  1902 22  0602 22  1108   POCTGLUCOSE 253* 221* 221*       Troponin: 0.044>0.048>0.043>0.037  Recent Labs   Lab 22  1359   TROPONINI 0.037*       TSH:   Recent Labs   Lab 22  0744   TSH 5.943*         Urine Studies:   Recent Labs   Lab 22  1800   COLORU [place]   APPEARANCEUA Clear   PHUR 5.0   SPECGRAV 1.025   PROTEINUA 1+*   GLUCUA Negative   KETONESU Trace*   BILIRUBINUA 1+*   OCCULTUA Negative   NITRITE Positive*   UROBILINOGEN Negative   LEUKOCYTESUR Negative   RBCUA 0   WBCUA 1   BACTERIA Occasional   HYALINECASTS 0     Covid negative   Flu negative     Significant Imagin/12 CXR Cardiomegaly with mild CHF.     Left lower lobe atelectasis, small left pleural effusion.    LE US -No evidence of deep venous thrombosis in either lower extremity.    5/15 CXR- Increased left-sided pleural effusion along with new airspace opacities in left hemithorax.  Findings suggestive of worsening CHF  pattern of pulmonary edema.    EKG Likely ventricular paced rhythm  - morphology suggestive of biventricular   pacing   Abnormal ECG   When compared with ECG of 30-APR-2022 10:39,   Premature ventricular complexes are no longer Present   Confirmed by Adam Mcmahon MD ([id number]) on 5/16/2022 8:11:55 AM

## 2022-05-18 NOTE — ASSESSMENT & PLAN NOTE
PT consulted .  Per records patient with several hospitalizations in last few months for abdominal wall abscess   - prior to 2 months ago, was still independent, working and running a business  - consulted palliative care to have on board and have OP follow up  · PT>>Gait: Minimal Assistance x ~10 feet with side steps using RW. Decrease stride, dec foot/floor clearance and fatigue easily.

## 2022-05-18 NOTE — PLAN OF CARE
Voiding more tonight. Much more talkative. Confused upon waking, but reorients with ques.  Junctional on the monitor.      Problem: Adult Inpatient Plan of Care  Goal: Plan of Care Review  5/18/2022 0450 by Lyric Avila RN  Outcome: Ongoing, Progressing  5/18/2022 0448 by Lyric Avila RN  Outcome: Ongoing, Progressing     Problem: Diabetes Comorbidity  Goal: Blood Glucose Level Within Targeted Range  5/18/2022 0450 by Lyric Avila RN  Outcome: Ongoing, Progressing  5/18/2022 0448 by Lyric Avila RN  Outcome: Ongoing, Progressing     Problem: Diabetes Comorbidity  Goal: Blood Glucose Level Within Targeted Range  5/18/2022 0450 by Lyric Avila RN  Outcome: Ongoing, Progressing  5/18/2022 0448 by Lyric Avila RN  Outcome: Ongoing, Progressing     Problem: Adjustment to Illness (Sepsis/Septic Shock)  Goal: Optimal Coping  5/18/2022 0450 by Lyric Avila RN  Outcome: Ongoing, Progressing  5/18/2022 0448 by Lyric Avila RN  Outcome: Ongoing, Progressing     Problem: Fluid Imbalance (Pneumonia)  Goal: Fluid Balance  5/18/2022 0450 by Lyric Avila RN  Outcome: Ongoing, Progressing  5/18/2022 0448 by Lyric Avila RN  Outcome: Ongoing, Progressing     Problem: Respiratory Compromise (Pneumonia)  Goal: Effective Oxygenation and Ventilation  5/18/2022 0450 by Lyric Avila RN  Outcome: Ongoing, Progressing  5/18/2022 0448 by Lyric Avila RN  Outcome: Ongoing, Progressing     Problem: Impaired Wound Healing  Goal: Optimal Wound Healing  5/18/2022 0450 by Lyric Avila RN  Outcome: Ongoing, Progressing  5/18/2022 0448 by Lyric Avila RN  Outcome: Ongoing, Progressing     Problem: Fluid and Electrolyte Imbalance (Acute Kidney Injury/Impairment)  Goal: Fluid and Electrolyte Balance  5/18/2022 0450 by Lyric Avila RN  Outcome: Ongoing, Progressing  5/18/2022 0448 by Lyric Avila RN  Outcome: Ongoing, Progressing     Problem: Fluid and  Electrolyte Imbalance (Acute Kidney Injury/Impairment)  Goal: Fluid and Electrolyte Balance  5/18/2022 0450 by Lyric Avila RN  Outcome: Ongoing, Progressing  5/18/2022 0448 by Lyric Avila RN  Outcome: Ongoing, Progressing     Problem: Gas Exchange Impaired  Goal: Optimal Gas Exchange  5/18/2022 0450 by Lyric Avila RN  Outcome: Ongoing, Progressing  5/18/2022 0448 by Lyric Avila RN  Outcome: Ongoing, Progressing     Problem: Adjustment to Illness (Heart Failure)  Goal: Optimal Coping  5/18/2022 0450 by Lyric Avila RN  Outcome: Ongoing, Progressing  5/18/2022 0448 by Lyric Avila RN  Outcome: Ongoing, Progressing     Problem: Functional Ability Impaired (Heart Failure)  Goal: Optimal Functional Ability  5/18/2022 0450 by Lyric Avila RN  Outcome: Ongoing, Progressing  5/18/2022 0448 by Lyric Avila RN  Outcome: Ongoing, Progressing     Problem: Respiratory Compromise (Heart Failure)  Goal: Effective Oxygenation and Ventilation  5/18/2022 0450 by Lyric Avila RN  Outcome: Ongoing, Progressing  5/18/2022 0448 by Lyric Avila RN  Outcome: Ongoing, Progressing

## 2022-05-18 NOTE — PROGRESS NOTES
Pharmacokinetic Assessment Follow Up: IV Vancomycin    Vancomycin serum concentration assessment(s):    The trough level was drawn correctly and can be used to guide therapy at this time. The measurement is above the desired definitive target range of 10 to 20 mcg/mL.    Vancomycin Regimen Plan:    Re-dose when the random level is less than 20 mcg/mL, next level to be drawn at 0800 on 05/19/2022    Drug levels (last 3 results):  Recent Labs   Lab Result Units 05/17/22  0807 05/17/22  1205 05/18/22  0605   Vancomycin, Random ug/mL  --  28.0 23.2   Vancomycin-Trough ug/mL 28.1*  --   --        Pharmacy will continue to follow and monitor vancomycin.    Please contact pharmacy at extension [phone] for questions regarding this assessment.    Thank you for the consult,   Parisa Lauren       Patient brief summary:  Eddie Parada Sr. is a 89 y.o. male initiated on antimicrobial therapy with IV Vancomycin for treatment of lower respiratory infection    The patient's current regimen is Vancomycin 750mg IV every 24 hours     Drug Allergies:   Review of patient's allergies indicates:   Allergen Reactions    Ciprofloxacin     Levofloxacin Swelling    Lyrica [pregabalin] Swelling    Unable to assess Other (See Comments)     Soft shell crabs       Actual Body Weight:   79.6 kg    Renal Function:   Estimated Creatinine Clearance: 20.4 mL/min (A) (based on SCr of 2.2 mg/dL (H)).,     Dialysis Method (if applicable):  N/A    CBC (last 72 hours):  Recent Labs   Lab Result Units 05/15/22  2242 05/16/22  0557 05/17/22  0642   WBC K/uL 7.00 6.99 7.63   Hemoglobin g/dL 9.1* 8.6* 8.4*   Hematocrit % 27.9* 26.2* 25.6*   Platelets K/uL 207 193 183   Gran % % 70.7 70.4 69.3   Lymph % % 16.3* 15.6* 16.9*   Mono % % 11.7 12.0 12.3   Eosinophil % % 0.3 0.9 0.8   Basophil % % 0.4 0.4 0.3   Differential Method  Automated Automated Automated       Metabolic Panel (last 72 hours):  Recent Labs   Lab Result Units 05/15/22  2242 05/16/22  0557  05/16/22  1800 05/17/22  0642 05/18/22  0605   Sodium mmol/L 136 138  --  137 138   Potassium mmol/L 3.5 3.6  --  3.6 3.7   Chloride mmol/L 95 96  --  95 97   CO2 mmol/L 25 26  --  28 29   Glucose mg/dL 180* 175*  --  167* 168*   Glucose, UA   --   --  Negative  --   --    BUN mg/dL 45* 48*  --  53* 56*   Creatinine mg/dL 2.1* 2.1*  --  2.1* 2.2*   Albumin g/dL 2.7*  --   --  2.6*  --    Total Bilirubin mg/dL 0.7  --   --  0.6  --    Alkaline Phosphatase U/L 125  --   --  114  --    AST U/L 72*  --   --  46*  --    ALT U/L 30  --   --  26  --        Vancomycin Administrations:  vancomycin given in the last 96 hours                     vancomycin 750 mg in dextrose 5 % 250 mL IVPB (ready to mix system) (mg) 750 mg New Bag 05/16/22 1214                    Microbiologic Results:  Microbiology Results (last 7 days)       Procedure Component Value Units Date/Time    Influenza A & B by Molecular [[id number]] Collected: 05/15/22 2300    Order Status: Completed Specimen: Nasopharyngeal Swab Updated: 05/15/22 2340     Influenza A, Molecular Negative     Influenza B, Molecular Negative     Flu A & B Source Nasal swab

## 2022-05-18 NOTE — ASSESSMENT & PLAN NOTE
Last discharge 5/6  with abdominal drain in place and 4 weeks of vanc, rocephin, flagyl, and diflucan  - follow up with general surgery for drain and ID for Abx- ( 10 days since discharge)   Consult pharmacy to adjust vanc- monitor     ventral hernia repair with mesh placement ~20 years ago, no complications until 3/2022 with several hospitalizations leading to drain placement and antibiotics since that time, drain removed 4/29/22   - most recent hospitalization began on 4/30/22 at [facility] and was transferred here on 5/4/22 for surgical and IR evaluation  - was initiated on Vanc and Zosyn at [facility]  - switched to Vanc, Cefepime, Flagyl on admission to our facility given renal function   - blood cultures from 4/30/22 no growth to date   - wound cultures from 3/3 and 5/4 NGTD  - general surgery states patient is not good surgical candidate  - underwent US-guided drain placement 5/4  - general surgery added fluconazole for yeast seen on gram stain of wound Cx  - consulted ID for assistance with Abx: discharge with IV vanc, IV ceftriaxone, PO flagyl, and PO diflucan for at least 2 weeks with close ID follow-up .  -vanc dosing per pharmacy

## 2022-05-18 NOTE — ASSESSMENT & PLAN NOTE
- Initially treated at [facility] early March 2022 with IR drain placement and treated with Rocephin and Flagyl.  Readmitted to [facility] in mid March with IR drain placement and discharged with minimum 2 weeks of additional antibiotics (cefepime, vancomycin, Flagyl) on March 14th._  - Admission CT abdomen/pelvis with decrease in size of fluid abdominal collections  - Patient readmitted with NIK and concern for cefepime toxicity, vancomycin induced NIK  - ID consulted, appreciate recs  -- Continue Unasyn at discharge  -- Will continue antibiotics until ID follow-up and repeat imaging   -- Will need to notify Infectious Disease prior to discharge to aiding scheduling outpatient appointment

## 2022-05-18 NOTE — PLAN OF CARE
Problem: Physical Therapy  Goal: Physical Therapy Goal  Description:   Patient will increase functional independence with mobility by performin. Supine to sit with Modified Independent  2. Sit to supine with Modified Independent  3. Bed to chair transfer with Supervision or Set-up Assistancewith or without rolling walker using Step Transfer TECHNIQUE  4. Gait  x ~100  feet with Supervision or Set-up Assistance with or without rolling walker  5. Lower extremity exercise program x10 reps per handout, with assistance as needed     Outcome: Ongoing, Progressing

## 2022-05-18 NOTE — ASSESSMENT & PLAN NOTE
5/2/22 Echo-   · The estimated ejection fraction is 55%.  · Indeterminate left ventricular diastolic function.  · Moderate right ventricular enlargement with mildly reduced right ventricular systolic function.  · Mild aortic regurgitation.  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 There is aortic insufficiency present. Prosthetic aortic valve is normal.  · The aortic valve mean gradient is 6 mmHg with a dimensionless index of 0.64.  · Mild mitral regurgitation.  · Mild tricuspid regurgitation.  · There is moderate pulmonary hypertension.       Increased SOB and hypoxia. Diuresis initiated. CIS following. Strict I/O. Low Na diet.    Stop lasix today. Creat rising and patient not urinating much- check renal u/s today considering fluids

## 2022-05-18 NOTE — PT/OT/SLP PROGRESS
"Physical Therapy Treatment    Patient Name:  Eddie Parada Sr.   MRN:  [id number]    Recommendations:     Discharge Recommendations:  home with home health, home health PT   Discharge Equipment Recommendations: none   Barriers to discharge: None    Assessment:     Eddie Parada Sr. is a 89 y.o. male admitted with a medical diagnosis of (HFpEF) heart failure with preserved ejection fraction.  He presents with the following impairments/functional limitations:  weakness, impaired self care skills, impaired endurance, impaired functional mobilty, gait instability, impaired cardiopulmonary response to activity, impaired balance, decreased lower extremity function. Patient tolerated PT session with gradual increased gait distances using RW with O2 supplement .    Rehab Prognosis: Fair; patient would benefit from acute skilled PT services to address these deficits and reach maximum level of function.    Recent Surgery: * No surgery found *      Plan:     During this hospitalization, patient to be seen 5 x/week to address the identified rehab impairments via gait training, therapeutic activities, therapeutic exercises and progress toward the following goals:    · Plan of Care Expires:  05/23/22    Subjective     Chief Complaint: weakness  Patient/Family Comments/goals: "Get stronger and go home"- per pt  Pain/Comfort:  · Pain Rating 1: 0/10      Objective:     Communicated with patient prior to session.  Patient found HOB elevated with oxygen, PICC line, IMELDA drain, telemetry upon PT entry to room.     General Precautions: Standard, fall   Orthopedic Precautions:N/A   Braces: N/A  Respiratory Status: Nasal cannula, flow 3 L/min     Functional Mobility:  · Bed Mobility:     · Rolling Left:  contact guard assistance  · Rolling Right: contact guard assistance  · Scooting: contact guard assistance  · Supine to Sit: contact guard assistance  · Sit to Supine: contact guard assistance  · Transfers:     · Sit to Stand:  minimum " assistance with rolling walker  · Gait: Minimal Assistance x ~15 feet with RW. Decrease foot/floor clearance, dec stride length and gets tired easily.      AM-PAC 6 CLICK MOBILITY  Turning over in bed (including adjusting bedclothes, sheets and blankets)?: 3  Sitting down on and standing up from a chair with arms (e.g., wheelchair, bedside commode, etc.): 3  Moving from lying on back to sitting on the side of the bed?: 3  Moving to and from a bed to a chair (including a wheelchair)?: 3  Need to walk in hospital room?: 3  Climbing 3-5 steps with a railing?: 3  Basic Mobility Total Score: 18       Therapeutic Activities and Exercises:   Worked on out of bed actiivty and gait functions using RW x ~15 feet with RW    Patient left HOB elevated with all lines intact, call button in reach, bed alarm on, nursing  notified and wife present..    GOALS:   Multidisciplinary Problems     Physical Therapy Goals        Problem: Physical Therapy    Goal Priority Disciplines Outcome Goal Variances Interventions   Physical Therapy Goal     PT, PT/OT Ongoing, Progressing     Description:   Patient will increase functional independence with mobility by performin. Supine to sit with Modified Independent  2. Sit to supine with Modified Independent  3. Bed to chair transfer with Supervision or Set-up Assistancewith or without rolling walker using Step Transfer TECHNIQUE  4. Gait  x ~100  feet with Supervision or Set-up Assistance with or without rolling walker  5. Lower extremity exercise program x10 reps per handout, with assistance as needed                      Time Tracking:     PT Received On: 22  PT Start Time: 1015     PT Stop Time: 1032  PT Total Time (min): 17 min     Billable Minutes: Therapeutic Activity 17 mins    Treatment Type: Treatment  PT/PTA: PT           2022

## 2022-05-18 NOTE — ASSESSMENT & PLAN NOTE
bioprosthetic valve placed 2011   - continue home xarelto - adjust currently for renal dosing .  Repeat h/h and resume if stable

## 2022-05-19 DIAGNOSIS — K21.9 GASTROESOPHAGEAL REFLUX DISEASE: ICD-10-CM

## 2022-05-19 LAB
ALBUMIN SERPL BCP-MCNC: 2.5 G/DL (ref 3.5–5.2)
ALP SERPL-CCNC: 111 U/L (ref 55–135)
ALT SERPL W/O P-5'-P-CCNC: 22 U/L (ref 10–44)
ANION GAP SERPL CALC-SCNC: 12 MMOL/L (ref 8–16)
AST SERPL-CCNC: 48 U/L (ref 10–40)
BASOPHILS # BLD AUTO: 0.03 K/UL (ref 0–0.2)
BASOPHILS NFR BLD: 0.5 % (ref 0–1.9)
BILIRUB SERPL-MCNC: 0.5 MG/DL (ref 0.1–1)
BUN SERPL-MCNC: 55 MG/DL (ref 10–30)
CALCIUM SERPL-MCNC: 8.4 MG/DL (ref 8.7–10.5)
CHLORIDE SERPL-SCNC: 97 MMOL/L (ref 95–110)
CO2 SERPL-SCNC: 29 MMOL/L (ref 23–29)
CREAT SERPL-MCNC: 1.9 MG/DL (ref 0.5–1.4)
DIFFERENTIAL METHOD: ABNORMAL
EOSINOPHIL # BLD AUTO: 0.1 K/UL (ref 0–0.5)
EOSINOPHIL NFR BLD: 1.3 % (ref 0–8)
ERYTHROCYTE [DISTWIDTH] IN BLOOD BY AUTOMATED COUNT: 14.6 % (ref 11.5–14.5)
EST. GFR  (AFRICAN AMERICAN): 34 ML/MIN/1.73 M^2
EST. GFR  (NON AFRICAN AMERICAN): 30 ML/MIN/1.73 M^2
GLUCOSE SERPL-MCNC: 170 MG/DL (ref 70–110)
HCT VFR BLD AUTO: 24.6 % (ref 40–54)
HGB BLD-MCNC: 8 G/DL (ref 14–18)
IMM GRANULOCYTES # BLD AUTO: 0.03 K/UL (ref 0–0.04)
IMM GRANULOCYTES NFR BLD AUTO: 0.5 % (ref 0–0.5)
LYMPHOCYTES # BLD AUTO: 1.2 K/UL (ref 1–4.8)
LYMPHOCYTES NFR BLD: 20.3 % (ref 18–48)
MAGNESIUM SERPL-MCNC: 2.2 MG/DL (ref 1.6–2.6)
MCH RBC QN AUTO: 33.2 PG (ref 27–31)
MCHC RBC AUTO-ENTMCNC: 32.5 G/DL (ref 32–36)
MCV RBC AUTO: 102 FL (ref 82–98)
MONOCYTES # BLD AUTO: 0.8 K/UL (ref 0.3–1)
MONOCYTES NFR BLD: 12.9 % (ref 4–15)
NEUTROPHILS # BLD AUTO: 3.8 K/UL (ref 1.8–7.7)
NEUTROPHILS NFR BLD: 64.5 % (ref 38–73)
NRBC BLD-RTO: 0 /100 WBC
PLATELET # BLD AUTO: 191 K/UL (ref 150–450)
PMV BLD AUTO: 10.1 FL (ref 9.2–12.9)
POCT GLUCOSE: 182 MG/DL (ref 70–110)
POCT GLUCOSE: 208 MG/DL (ref 70–110)
POCT GLUCOSE: 228 MG/DL (ref 70–110)
POCT GLUCOSE: 229 MG/DL (ref 70–110)
POTASSIUM SERPL-SCNC: 3.9 MMOL/L (ref 3.5–5.1)
PROT SERPL-MCNC: 5.8 G/DL (ref 6–8.4)
RBC # BLD AUTO: 2.41 M/UL (ref 4.6–6.2)
SODIUM SERPL-SCNC: 138 MMOL/L (ref 136–145)
T4 FREE SERPL-MCNC: 0.93 NG/DL (ref 0.71–1.51)
TSH SERPL DL<=0.005 MIU/L-ACNC: 8.56 UIU/ML (ref 0.4–4)
VANCOMYCIN SERPL-MCNC: 18.5 UG/ML
WBC # BLD AUTO: 5.95 K/UL (ref 3.9–12.7)

## 2022-05-19 PROCEDURE — 85025 COMPLETE CBC W/AUTO DIFF WBC: CPT | Performed by: NURSE PRACTITIONER

## 2022-05-19 PROCEDURE — 27000221 HC OXYGEN, UP TO 24 HOURS

## 2022-05-19 PROCEDURE — 97530 THERAPEUTIC ACTIVITIES: CPT

## 2022-05-19 PROCEDURE — 25000003 PHARM REV CODE 250: Performed by: NURSE PRACTITIONER

## 2022-05-19 PROCEDURE — 80202 ASSAY OF VANCOMYCIN: CPT | Performed by: FAMILY MEDICINE

## 2022-05-19 PROCEDURE — 94761 N-INVAS EAR/PLS OXIMETRY MLT: CPT

## 2022-05-19 PROCEDURE — 99233 PR SUBSEQUENT HOSPITAL CARE,LEVL III: ICD-10-PCS | Mod: ,,, | Performed by: FAMILY MEDICINE

## 2022-05-19 PROCEDURE — 99233 SBSQ HOSP IP/OBS HIGH 50: CPT | Mod: ,,, | Performed by: FAMILY MEDICINE

## 2022-05-19 PROCEDURE — 25000003 PHARM REV CODE 250

## 2022-05-19 PROCEDURE — 80053 COMPREHEN METABOLIC PANEL: CPT | Performed by: NURSE PRACTITIONER

## 2022-05-19 PROCEDURE — 84439 ASSAY OF FREE THYROXINE: CPT | Performed by: NURSE PRACTITIONER

## 2022-05-19 PROCEDURE — 63600175 PHARM REV CODE 636 W HCPCS: Performed by: NURSE PRACTITIONER

## 2022-05-19 PROCEDURE — 84443 ASSAY THYROID STIM HORMONE: CPT | Performed by: NURSE PRACTITIONER

## 2022-05-19 PROCEDURE — 83735 ASSAY OF MAGNESIUM: CPT | Performed by: NURSE PRACTITIONER

## 2022-05-19 PROCEDURE — 36415 COLL VENOUS BLD VENIPUNCTURE: CPT | Performed by: FAMILY MEDICINE

## 2022-05-19 PROCEDURE — 11000001 HC ACUTE MED/SURG PRIVATE ROOM

## 2022-05-19 PROCEDURE — 63700000 PHARM REV CODE 250 ALT 637 W/O HCPCS: Performed by: NURSE PRACTITIONER

## 2022-05-19 RX ORDER — SODIUM CHLORIDE 9 MG/ML
INJECTION, SOLUTION INTRAVENOUS CONTINUOUS
Status: DISCONTINUED | OUTPATIENT
Start: 2022-05-19 | End: 2022-05-24 | Stop reason: HOSPADM

## 2022-05-19 RX ORDER — LEVOTHYROXINE SODIUM 88 UG/1
88 TABLET ORAL
Status: DISCONTINUED | OUTPATIENT
Start: 2022-05-20 | End: 2022-05-24 | Stop reason: HOSPADM

## 2022-05-19 RX ADMIN — METRONIDAZOLE 500 MG: 500 TABLET ORAL at 02:05

## 2022-05-19 RX ADMIN — LEVOTHYROXINE SODIUM 75 MCG: 75 TABLET ORAL at 05:05

## 2022-05-19 RX ADMIN — SODIUM CHLORIDE: 0.9 INJECTION, SOLUTION INTRAVENOUS at 11:05

## 2022-05-19 RX ADMIN — INSULIN ASPART 2 UNITS: 100 INJECTION, SOLUTION INTRAVENOUS; SUBCUTANEOUS at 11:05

## 2022-05-19 RX ADMIN — MUPIROCIN: 20 OINTMENT TOPICAL at 09:05

## 2022-05-19 RX ADMIN — PRAVASTATIN SODIUM 20 MG: 20 TABLET ORAL at 09:05

## 2022-05-19 RX ADMIN — GABAPENTIN 200 MG: 100 CAPSULE ORAL at 09:05

## 2022-05-19 RX ADMIN — POTASSIUM CHLORIDE 10 MEQ: 750 TABLET, FILM COATED, EXTENDED RELEASE ORAL at 09:05

## 2022-05-19 RX ADMIN — FLUCONAZOLE 100 MG: 100 TABLET ORAL at 09:05

## 2022-05-19 RX ADMIN — CARVEDILOL 3.12 MG: 3.12 TABLET, FILM COATED ORAL at 05:05

## 2022-05-19 RX ADMIN — CARVEDILOL 3.12 MG: 3.12 TABLET, FILM COATED ORAL at 07:05

## 2022-05-19 RX ADMIN — CEFTRIAXONE 1 G: 1 INJECTION, SOLUTION INTRAVENOUS at 09:05

## 2022-05-19 RX ADMIN — INSULIN ASPART 2 UNITS: 100 INJECTION, SOLUTION INTRAVENOUS; SUBCUTANEOUS at 05:05

## 2022-05-19 RX ADMIN — ENOXAPARIN SODIUM 80 MG: 80 INJECTION SUBCUTANEOUS at 02:05

## 2022-05-19 RX ADMIN — ISOSORBIDE MONONITRATE 30 MG: 30 TABLET, EXTENDED RELEASE ORAL at 09:05

## 2022-05-19 RX ADMIN — METRONIDAZOLE 500 MG: 500 TABLET ORAL at 09:05

## 2022-05-19 RX ADMIN — PANTOPRAZOLE SODIUM 40 MG: 40 TABLET, DELAYED RELEASE ORAL at 09:05

## 2022-05-19 RX ADMIN — TAMSULOSIN HYDROCHLORIDE 0.4 MG: 0.4 CAPSULE ORAL at 09:05

## 2022-05-19 RX ADMIN — SENNOSIDES 1 TABLET: 8.6 TABLET, FILM COATED ORAL at 09:05

## 2022-05-19 RX ADMIN — METRONIDAZOLE 500 MG: 500 TABLET ORAL at 05:05

## 2022-05-19 RX ADMIN — FERROUS SULFATE TAB 325 MG (65 MG ELEMENTAL FE) 1 EACH: 325 (65 FE) TAB at 09:05

## 2022-05-19 RX ADMIN — INSULIN ASPART 1 UNITS: 100 INJECTION, SOLUTION INTRAVENOUS; SUBCUTANEOUS at 09:05

## 2022-05-19 NOTE — ASSESSMENT & PLAN NOTE
BP Readings from Last 3 Encounters:   05/19/22 (!) 117/58   05/12/22 (!) 129/58   05/11/22 (!) 110/52     Resume home coreg-at lower dose 3.25mg , hold amlodipine & doxazosin  for now .  Dr. Monterroso ordered Imdur, monitor BP

## 2022-05-19 NOTE — SUBJECTIVE & OBJECTIVE
Wife at bedside reports increased fatigue, leg weakness  Review of Systems   Constitutional:  Positive for fatigue. Negative for activity change, fever and unexpected weight change.   HENT:  Negative for congestion, ear pain, hearing loss, rhinorrhea and sore throat.    Eyes:  Negative for pain, redness and visual disturbance.   Respiratory:  Positive for cough and shortness of breath. Negative for wheezing.    Cardiovascular:  Negative for chest pain, palpitations and leg swelling.   Gastrointestinal:  Negative for abdominal pain, constipation, diarrhea, nausea and vomiting.   Genitourinary:  Negative for decreased urine volume, dysuria, frequency and urgency.   Musculoskeletal:  Negative for back pain, joint swelling and neck pain.   Skin:  Negative for color change, rash and wound.   Neurological:  Positive for weakness. Negative for dizziness, tremors, light-headedness and headaches.   Objective:     Vital Signs (Most Recent):  Temp: 96.8 °F (36 °C) (05/19/22 0724)  Pulse: 66 (05/19/22 0724)  Resp: 19 (05/19/22 0724)  BP: (!) 117/58 (05/19/22 0724)  SpO2: 95 % (05/19/22 0724)   Vital Signs (24h Range):  Temp:  [96.2 °F (35.7 °C)-98.2 °F (36.8 °C)] 96.8 °F (36 °C)  Pulse:  [59-82] 66  Resp:  [18-22] 19  SpO2:  [93 %-97 %] 95 %  BP: (103-128)/(53-60) 117/58     Weight: 79.6 kg (175 lb 7.8 oz)  Body mass index is 29.2 kg/m².    Physical Exam  Vitals and nursing note reviewed.   Constitutional:       General: He is not in acute distress.     Appearance: He is well-developed.      Comments: sleeping   HENT:      Head: Normocephalic and atraumatic.      Right Ear: External ear normal.      Left Ear: External ear normal.   Eyes:      General:         Right eye: No discharge.         Left eye: No discharge.   Neck:      Thyroid: No thyromegaly.   Cardiovascular:      Rate and Rhythm: Normal rate and regular rhythm.      Heart sounds: No murmur heard.  Pulmonary:      Effort: Pulmonary effort is normal. No respiratory  distress.      Breath sounds: No wheezing or rales.   Abdominal:      General: Bowel sounds are normal. There is no distension.      Palpations: Abdomen is soft.      Tenderness: There is no abdominal tenderness.      Comments: Drain in place.   Musculoskeletal:      Right lower leg: No edema.      Left lower leg: No edema.   Skin:     General: Skin is warm and dry.      Comments: PICC to right UE, dressing c/d/i           Significant Labs:   A1C:   Recent Labs   Lab 22  0744 22  1046   HGBA1C 5.9* 6.0*         Bilirubin:   Recent Labs   Lab 22  0536 22  0513 05/15/22  2242 22  0642 22  0542   BILITOT 0.5 0.6 0.7 0.6 0.5       CBC:   Recent Labs   Lab 22  1414 22  0542   WBC 7.03 5.95   HGB 7.9* 8.0*   HCT 24.5* 24.6*    191       CMP:   Recent Labs   Lab 22  0605 22  0542    138   K 3.7 3.9   CL 97 97   CO2 29 29   * 170*   BUN 56* 55*   CREATININE 2.2* 1.9*   CALCIUM 8.5* 8.4*   PROT  --  5.8*   ALBUMIN  --  2.5*   BILITOT  --  0.5   ALKPHOS  --  111   AST  --  48*   ALT  --  22   ANIONGAP 12 12   EGFRNONAA 25* 30*       Lab Results   Component Value Date    DDIMER 2.25 (H) 05/15/2022     Lactate 1.3    BNP  Recent Labs   Lab 05/15/22  2242   *         Recent Labs   Lab 22  1619 22  1945 22  0626   POCTGLUCOSE 246* 236* 182*       Troponin: 0.044>0.048>0.043>0.037    TSH:   Recent Labs   Lab 22  0744   TSH 5.943*         Covid negative   Flu negative     Significant Imagin/18 US renal Technically limited study with known renal cysts.  No evidence of obstructive uropathy     CXR Cardiomegaly with mild CHF.     Left lower lobe atelectasis, small left pleural effusion.    LE US -No evidence of deep venous thrombosis in either lower extremity.    5/15 CXR- Increased left-sided pleural effusion along with new airspace opacities in left hemithorax.  Findings suggestive of worsening CHF pattern of  pulmonary edema.    EKG Likely ventricular paced rhythm  - morphology suggestive of biventricular   pacing   Abnormal ECG   When compared with ECG of 30-APR-2022 10:39,   Premature ventricular complexes are no longer Present   Confirmed by Adam Mcmahon MD ([id number]) on 5/16/2022 8:11:55 AM

## 2022-05-19 NOTE — ASSESSMENT & PLAN NOTE
Monitor BUN/SCr.  Monitor I/Os.  Monitor electrolytes.  Avoid non-steroidal anti-inflammatory medications.  Symptoms and labs suggestive of Acute HF; continue with diuretics, monitor renal fx closely   NIK on CKD      renal US today and hold further diuresis- pt not voiding much  vanc dosing per pharm    5/19 renal u/s yesterday does not show obstruction. Placed in gentle IVF and diuretics held. Creat has improved a smidgen

## 2022-05-19 NOTE — ASSESSMENT & PLAN NOTE
Pt had 9 beat run of v-tach. Pt asymptomatic and no complaints of chest pain. Was sleeping soundly at the time.   K+ WNL- add mag > normal and tsh elevated increase synthroid from 75>88  Stop diflucan. Had a few yeast in abscess fungal culture but has had 2 weeks of that

## 2022-05-19 NOTE — NURSING
Pt had 9 beat run of v-tach. Pt asymptomatic and no complaints of chest pain. Was sleeping soundly at the time.

## 2022-05-19 NOTE — PROGRESS NOTES
Pharmacokinetic Assessment Follow Up: IV Vancomycin    Vancomycin serum concentration assessment(s):      Vancomycin Regimen Plan:  Vancomycin dose was held today by provider. Provider would like to resume consult when trough levels are below 10. Provider would like to maintain a goal trough of 10-15.  Repeat level is ordered for 0600 05/20/2022.  Pharmacy will re-evaluate dose when level is resulted.      Drug levels (last 3 results):  Recent Labs   Lab Result Units 05/17/22  0807 05/17/22  1205 05/18/22  0605 05/19/22  0801   Vancomycin, Random ug/mL  --  28.0 23.2 18.5   Vancomycin-Trough ug/mL 28.1*  --   --   --        Pharmacy will continue to follow and monitor vancomycin.    Please contact pharmacy at extension [phone] for questions regarding this assessment.    Thank you for the consult,   Maria D Guillory       Patient brief summary:  Eddie Parada Sr. is a 89 y.o. male initiated on antimicrobial therapy with IV Vancomycin for treatment of bacteremia    The patient's regimen will be determined when vancomycin levels are below 10.     Drug Allergies:   Review of patient's allergies indicates:   Allergen Reactions    Ciprofloxacin     Levofloxacin Swelling    Lyrica [pregabalin] Swelling    Unable to assess Other (See Comments)     Soft shell crabs       Actual Body Weight: 79.6Kg    Renal Function:   Estimated Creatinine Clearance: 23.6 mL/min (A) (based on SCr of 1.9 mg/dL (H)).,     Dialysis Method (if applicable):  N/A    CBC (last 72 hours):  Recent Labs   Lab Result Units 05/17/22  0642 05/18/22  1414 05/19/22  0542   WBC K/uL 7.63 7.03 5.95   Hemoglobin g/dL 8.4* 7.9* 8.0*   Hematocrit % 25.6* 24.5* 24.6*   Platelets K/uL 183 207 191   Gran % % 69.3 66.6 64.5   Lymph % % 16.9* 19.1 20.3   Mono % % 12.3 12.2 12.9   Eosinophil % % 0.8 1.1 1.3   Basophil % % 0.3 0.6 0.5   Differential Method  Automated Automated Automated       Metabolic Panel (last 72 hours):  Recent Labs   Lab Result Units  05/16/22  1800 05/17/22  0642 05/18/22  0605 05/19/22  0541 05/19/22  0542   Sodium mmol/L  --  137 138  --  138   Potassium mmol/L  --  3.6 3.7  --  3.9   Chloride mmol/L  --  95 97  --  97   CO2 mmol/L  --  28 29  --  29   Glucose mg/dL  --  167* 168*  --  170*   Glucose, UA  Negative  --   --   --   --    BUN mg/dL  --  53* 56*  --  55*   Creatinine mg/dL  --  2.1* 2.2*  --  1.9*   Albumin g/dL  --  2.6*  --   --  2.5*   Total Bilirubin mg/dL  --  0.6  --   --  0.5   Alkaline Phosphatase U/L  --  114  --   --  111   AST U/L  --  46*  --   --  48*   ALT U/L  --  26  --   --  22   Magnesium mg/dL  --   --   --  2.2  --        Vancomycin Administrations:  vancomycin given in the last 96 hours                     vancomycin 750 mg in dextrose 5 % 250 mL IVPB (ready to mix system) (mg) 750 mg New Bag 05/16/22 1214                    Microbiologic Results:  Microbiology Results (last 7 days)       Procedure Component Value Units Date/Time    Influenza A & B by Molecular [[id number]] Collected: 05/15/22 2300    Order Status: Completed Specimen: Nasopharyngeal Swab Updated: 05/15/22 2340     Influenza A, Molecular Negative     Influenza B, Molecular Negative     Flu A & B Source Nasal swab

## 2022-05-19 NOTE — ASSESSMENT & PLAN NOTE
bioprosthetic valve placed 2011   - continue home xarelto - adjust currently for renal dosing > now on lovenox

## 2022-05-19 NOTE — TELEPHONE ENCOUNTER
Refill Routing Note   Medication(s) are not appropriate for processing by [facility] for the following reason(s):      - Osteoporosis is on the problem list  - Patient has been seen in the ED/Hospital since the last PCP visit  - Required indication for medication not on problem list (GERD)    [facility] action(s):  Route          Medication reconciliation completed: No     Appointments  past 12m or future 3m with PCP    Date Provider   Last Visit   4/7/2022 Callum Roberts MD   Next Visit   5/25/2022 Callum Roberts MD   ED visits in past 90 days: 3        Note composed:6:26 PM 05/19/2022

## 2022-05-19 NOTE — PROGRESS NOTES
[facility] - Med Surg ([facility])  Cardiology  Progress Note    Patient Name: Eddie Parada Sr.  MRN: [id number]  Admission Date: 5/15/2022  Hospital Length of Stay: 3 days  Code Status: DNR   Attending Physician: Joseph Redmond MD   Primary Care Physician: Callum Roberts MD  Expected Discharge Date:   Principal Problem:(HFpEF) heart failure with preserved ejection fraction    Subjective:     Hospital Course: 89 yo wm hx PPM s/p generator replacement 3/22, chronic AF on Xarelto, bio-AVR,  chronic diastolic CHF with difficulty maintaining euvolemia due to waxing and waning renal function due in part to abx therapy from abd wall abscess. Renal function lately averaging creatinine 1.5-1.7.   Echo 5/2/22 showed still preserved EF with AI  normally functioning bioprosthetic aortic valve. Negative bubble study.  Presented to ER via EMS with acute onset SOB/AMAYA/CHF. O2 sat low 70's.  Given O2/lasix IV in ER   Troponin chronically mildly elevated, today 0.043.   EKG shows  rhythm      ROS     Constitutional: Negative.   HENT: Negative.    Eyes: Negative.    Cardiovascular: Negative for chest pain.   Respiratory: Negative.    Endocrine: Negative.    Hematologic/Lymphatic: Negative.    Neurological: Negative.    Psychiatric/Behavioral: Negative.      Objective:     Vital Signs (Most Recent):  Temp: 96.8 °F (36 °C) (05/19/22 0724)  Pulse: 66 (05/19/22 0724)  Resp: 19 (05/19/22 0724)  BP: (!) 117/58 (05/19/22 0724)  SpO2: 95 % (05/19/22 0724) Vital Signs (24h Range):  Temp:  [96.2 °F (35.7 °C)-98.2 °F (36.8 °C)] 96.8 °F (36 °C)  Pulse:  [59-82] 66  Resp:  [18-22] 19  SpO2:  [93 %-97 %] 95 %  BP: (103-128)/(53-60) 117/58     Weight: 79.6 kg (175 lb 7.8 oz)  Body mass index is 29.2 kg/m².    SpO2: 95 %  O2 Device (Oxygen Therapy): nasal cannula      Intake/Output Summary (Last 24 hours) at 5/19/2022 0834  Last data filed at 5/19/2022 0400  Gross per 24 hour   Intake 57.94 ml   Output 260 ml   Net -202.06 ml        Lines/Drains/Airways     Peripherally Inserted Central Catheter Line  Duration           PICC Double Lumen 05/12/22 0845 right basilic 6 days          Drain  Duration                Drain/Device  05/04/22 1618 midline umbilical area pigtail 14 days                Physical Exam       Physical Exam  Vitals and nursing note reviewed.   Constitutional:       Appearance: Normal appearance.   Cardiovascular:      Rate and Rhythm: Normal rate and regular rhythm.      Pulses: Normal pulses.      Heart sounds: Normal heart sounds.   Abdominal:      General: Abdomen is flat.   Skin:     General: Skin is warm and dry.   Neurological:      General: No focal deficit present.      Mental Status: He is alert and oriented to person, place, and time.         Significant Labs:   BMP:   Recent Labs   Lab 05/18/22  0605 05/19/22  0542   * 170*    138   K 3.7 3.9   CL 97 97   CO2 29 29   BUN 56* 55*   CREATININE 2.2* 1.9*   CALCIUM 8.5* 8.4*   , CMP   Recent Labs   Lab 05/18/22  0605 05/19/22  0542    138   K 3.7 3.9   CL 97 97   CO2 29 29   * 170*   BUN 56* 55*   CREATININE 2.2* 1.9*   CALCIUM 8.5* 8.4*   PROT  --  5.8*   ALBUMIN  --  2.5*   BILITOT  --  0.5   ALKPHOS  --  111   AST  --  48*   ALT  --  22   ANIONGAP 12 12   ESTGFRAFRICA 29* 34*   EGFRNONAA 25* 30*   , CBC   Recent Labs   Lab 05/18/22  1414 05/19/22  0542   WBC 7.03 5.95   HGB 7.9* 8.0*   HCT 24.5* 24.6*    191    and Troponin No results for input(s): TROPONINI in the last 48 hours.    Significant Imaging: X-Ray: CXR: X-Ray Chest 1 View (CXR):   Results for orders placed or performed during the hospital encounter of 05/15/22   X-Ray Chest 1 View    Narrative    EXAMINATION:  XR CHEST 1 VIEW    CLINICAL HISTORY:  Shortness of breath    TECHNIQUE:  Single frontal view of the chest was performed.    COMPARISON:  05/12/2022.    FINDINGS:  The right-sided PICC line tip is unchanged.  There is stable appearance of left-sided AICD.  There  are postoperative changes of median sternotomy.    The trachea is unremarkable.  There is stable enlargement of the cardiomediastinal silhouette.  There is no pleural effusion right.  There is increase in the left-sided pleural effusion.  There is mild pulmonary vascular congestion.  There are new airspace opacities in the left hemithorax.  There are degenerative changes in the osseous structures.      Impression    Increased left-sided pleural effusion along with new airspace opacities in left hemithorax.  Findings suggestive of worsening CHF pattern of pulmonary edema.      Electronically signed by: Abisai Maynard MD  Date:    05/15/2022  Time:    23:12    and X-Ray Chest PA and Lateral (CXR): No results found for this visit on 05/15/22.  Assessment and Plan:       Active Diagnoses:    Diagnosis Date Noted POA    PRINCIPAL PROBLEM:  (HFpEF) heart failure with preserved ejection fraction [I50.30] 06/17/2015 Yes    Counseling regarding advanced directives and goals of care [Z71.89] 05/17/2022 Not Applicable    Debility [R53.81] 05/16/2022 Yes    NIK (acute kidney injury) [N17.9] 03/19/2022 Yes    Abdominal wall abscess [L02.211] 03/05/2022 Yes    Disorder of prostate [N42.9] 03/03/2022 Yes    Hypothyroidism due to acquired atrophy of thyroid [E03.4] 01/20/2020 Yes    Ventricular tachycardia [I47.2] 11/28/2017 Yes    Long term current use of anticoagulant therapy [Z79.01] 09/13/2016 Not Applicable    Type 2 diabetes mellitus, controlled [E11.9] 05/21/2014 Yes    S/P AVR [Z95.2] 02/25/2014 Not Applicable    Mixed dyslipidemia [E78.2] 11/07/2012 Yes    Essential (primary) hypertension [I10] 09/20/2012 Yes    CAD (coronary artery disease) [I25.10] 09/20/2012 Yes    Chronic atrial fibrillation [I48.20] 06/29/2012 Yes      Problems Resolved During this Admission:       VTE Risk Mitigation (From admission, onward)         Ordered     enoxaparin injection 80 mg  Every 24 hours (non-standard times)          05/18/22 1256     IP VTE HIGH RISK PATIENT  Once         05/16/22 0419     Place sequential compression device  Until discontinued         05/16/22 0419              Current Facility-Administered Medications   Medication    carvediloL tablet 3.125 mg    cefTRIAXone (ROCEPHIN) 1 g/50 mL D5W IVPB    dextrose 10% bolus 250 mL    enoxaparin injection 80 mg    ferrous sulfate tablet 1 each    fluconazole tablet 100 mg    gabapentin capsule 200 mg    glucagon (human recombinant) injection 1 mg    glucose chewable tablet 16 g    glucose chewable tablet 24 g    insulin aspart U-100 pen 0-5 Units    isosorbide mononitrate 24 hr tablet 30 mg    levothyroxine tablet 75 mcg    melatonin tablet 6 mg    metroNIDAZOLE tablet 500 mg    mupirocin 2 % ointment    pantoprazole EC tablet 40 mg    polyethylene glycol packet 17 g    potassium chloride CR tablet 10 mEq    pravastatin tablet 20 mg    senna tablet 1 tablet    senna-docusate 8.6-50 mg per tablet 1 tablet    sodium chloride 0.9% flush 10 mL    tamsulosin 24 hr capsule 0.4 mg    vancomycin - pharmacy to dose        Acute on chronic diastolic CHF, currently euvolemic, leg edema resolved but with some scrotal edema in bed.  Echo 5/22 EF 55%, normal AVR, 2+ TR, 1-2+ MR, 1+ AI, PAP 48  CKD with recent AKIs--waxing and waning; creatinine elevated but on vanc  Anemia  Chronic AF on Xarelto  s/pPPM  Hx TIA  CAD hx PCI Cfx, LAD, MPI 2017 normal  Thrombocytopenia  Abdominal wall abscess; still receiving Vancomycin, s/p Drain       Plan:  flash pulm edema resolved, creatinine improving  reduce diurese/ give as tolerated and follow renal function  Avoid nephrotoxic agents   Consider resuming Xarelto/ and DC heparin   Increased coreg, continue pravastatin, Imdur 30mg   As discussed with pt and wife; would avoid invasive approach for CAD evaluation as cause for flash pulmonary edema at this time  Pt remains fullcode and willing to have dialysis if  necessary.    Bijal Brooks NP for Dr. Monterroso  Cardiology  [facility] - Med Surg (3rd Fl)    I have personally interviewed and examined this patient face-to-face, and as the physician. Documented the above plan and rendered all medical decision making for this encounter. I have read and agree with the above documentation unless otherwise noted.

## 2022-05-19 NOTE — PLAN OF CARE
Patient had an uneventful shift. Remains on IV fluids. Had visitors today. Slept a good portion of the shift. Turning q  2 hours. Repositions self most of the time. Foam dressing to sacrum for blanchable redness. Telemetry maintained. Dressing to abdominal drain with minimal serosanguinous drainage. On fall precautions. Heels floated.

## 2022-05-19 NOTE — ASSESSMENT & PLAN NOTE
Continue xarelto- adjust for renal; recommending decrease to 15mg daily .  Now holding due to anemia/NIK  lovenox renal dosing

## 2022-05-19 NOTE — PLAN OF CARE
Problem: Adult Inpatient Plan of Care  Goal: Absence of Hospital-Acquired Illness or Injury  Outcome: Ongoing, Progressing     Problem: Adult Inpatient Plan of Care  Goal: Optimal Comfort and Wellbeing  Outcome: Ongoing, Progressing     Problem: Diabetes Comorbidity  Goal: Blood Glucose Level Within Targeted Range  Outcome: Ongoing, Progressing     Problem: Fluid Imbalance (Pneumonia)  Goal: Fluid Balance  Outcome: Ongoing, Progressing     Problem: Fall Injury Risk  Goal: Absence of Fall and Fall-Related Injury  Outcome: Ongoing, Progressing

## 2022-05-19 NOTE — ASSESSMENT & PLAN NOTE
Continue levothyroxine .  Repeat tsh today as last one was elevated on chart and pt has short run v tach overnight

## 2022-05-19 NOTE — ASSESSMENT & PLAN NOTE
Last discharge 5/6  with abdominal drain in place and 4 weeks of vanc, rocephin, flagyl, and diflucan  - follow up with general surgery for drain and ID for Abx- ( 10 days since discharge)   Consult pharmacy to adjust vanc- monitor     ventral hernia repair with mesh placement ~20 years ago, no complications until 3/2022 with several hospitalizations leading to drain placement and antibiotics since that time, drain removed 4/29/22   - most recent hospitalization began on 4/30/22 at [facility] and was transferred here on 5/4/22 for surgical and IR evaluation  - was initiated on Vanc and Zosyn at [facility]  - switched to Vanc, Cefepime, Flagyl on admission to our facility given renal function   - blood cultures from 4/30/22 no growth to date   - wound cultures from 3/3 and 5/4 NGTD  - general surgery states patient is not good surgical candidate  - underwent US-guided drain placement 5/4  - general surgery added fluconazole for yeast seen on gram stain of wound Cx  - consulted ID for assistance with Abx: discharge with IV vanc, IV ceftriaxone, PO flagyl, and PO diflucan for at least 2 weeks with close ID follow-up .  -vanc dosing per pharmacy but will lower vanc trough goal to 10  Message sent to ID for guidance as today makes 2 weeks from D/c and last notes per ID read 2-4 weeks of therapy needed outpt

## 2022-05-19 NOTE — PROGRESS NOTES
Pharmacokinetic Assessment Follow Up: IV Vancomycin    Vancomycin serum concentration assessment(s):    The random level was drawn correctly and can be used to guide therapy at this time. The measurement is above the desired definitive target range of 10 to 15 mcg/mL.    Vancomycin Regimen Plan:  Change regimen to pulse dose. Vancomycin 750 mg IV once with next serum random concentration measured at 0930 on 5/20/22    Drug levels (last 3 results):  Recent Labs   Lab Result Units 05/17/22  0807 05/17/22  1205 05/18/22  0605 05/19/22  0801   Vancomycin, Random ug/mL  --  28.0 23.2 18.5   Vancomycin-Trough ug/mL 28.1*  --   --   --        Pharmacy will continue to follow and monitor vancomycin.    Please contact pharmacy at extension [phone] for questions regarding this assessment.    Thank you for the consult,   Terry Solis       Patient brief summary:  Eddie Parada Sr. is a 89 y.o. male initiated on antimicrobial therapy with IV Vancomycin for treatment of bacteremia    The patient's current regimen was held due to high random level.    Drug Allergies:   Review of patient's allergies indicates:   Allergen Reactions    Ciprofloxacin     Levofloxacin Swelling    Lyrica [pregabalin] Swelling    Unable to assess Other (See Comments)     Soft shell crabs       Actual Body Weight:   79.6kg    Renal Function:   Estimated Creatinine Clearance: 23.6 mL/min (A) (based on SCr of 1.9 mg/dL (H)).,     Dialysis Method (if applicable):  N/A    CBC (last 72 hours):  Recent Labs   Lab Result Units 05/17/22  0642 05/18/22  1414 05/19/22  0542   WBC K/uL 7.63 7.03 5.95   Hemoglobin g/dL 8.4* 7.9* 8.0*   Hematocrit % 25.6* 24.5* 24.6*   Platelets K/uL 183 207 191   Gran % % 69.3 66.6 64.5   Lymph % % 16.9* 19.1 20.3   Mono % % 12.3 12.2 12.9   Eosinophil % % 0.8 1.1 1.3   Basophil % % 0.3 0.6 0.5   Differential Method  Automated Automated Automated       Metabolic Panel (last 72 hours):  Recent Labs   Lab Result Units  05/16/22  1800 05/17/22  0642 05/18/22  0605 05/19/22  0541 05/19/22  0542   Sodium mmol/L  --  137 138  --  138   Potassium mmol/L  --  3.6 3.7  --  3.9   Chloride mmol/L  --  95 97  --  97   CO2 mmol/L  --  28 29  --  29   Glucose mg/dL  --  167* 168*  --  170*   Glucose, UA  Negative  --   --   --   --    BUN mg/dL  --  53* 56*  --  55*   Creatinine mg/dL  --  2.1* 2.2*  --  1.9*   Albumin g/dL  --  2.6*  --   --  2.5*   Total Bilirubin mg/dL  --  0.6  --   --  0.5   Alkaline Phosphatase U/L  --  114  --   --  111   AST U/L  --  46*  --   --  48*   ALT U/L  --  26  --   --  22   Magnesium mg/dL  --   --   --  2.2  --        Vancomycin Administrations:  vancomycin given in the last 96 hours                     vancomycin 750 mg in dextrose 5 % 250 mL IVPB (ready to mix system) (mg) 750 mg New Bag 05/16/22 1214                    Microbiologic Results:  Microbiology Results (last 7 days)       Procedure Component Value Units Date/Time    Influenza A & B by Molecular [[id number]] Collected: 05/15/22 2300    Order Status: Completed Specimen: Nasopharyngeal Swab Updated: 05/15/22 2340     Influenza A, Molecular Negative     Influenza B, Molecular Negative     Flu A & B Source Nasal swab

## 2022-05-19 NOTE — PT/OT/SLP PROGRESS
"Physical Therapy Treatment    Patient Name:  Eddie Parada Sr.   MRN:  [id number]    Recommendations:     Discharge Recommendations:  home with home health, home health PT   Discharge Equipment Recommendations: none   Barriers to discharge: None    Assessment:     Eddie Parada Sr. is a 89 y.o. male admitted with a medical diagnosis of (HFpEF) heart failure with preserved ejection fraction.  He presents with the following impairments/functional limitations:  weakness, impaired self care skills, impaired endurance, impaired functional mobilty, gait instability, impaired cardiopulmonary response to activity, decreased lower extremity function, impaired balance. Patient tolerated PT session with gradual increased gait distance with O2 supplement using RW. SPO2 based line form 93% to 91%. Noted antalgic gait with knees giving out easily.    Rehab Prognosis: Fair; patient would benefit from acute skilled PT services to address these deficits and reach maximum level of function.    Recent Surgery: * No surgery found *      Plan:     During this hospitalization, patient to be seen 5 x/week to address the identified rehab impairments via gait training, therapeutic activities, therapeutic exercises and progress toward the following goals:    · Plan of Care Expires:  05/23/22    Subjective     Chief Complaint: "shaking legs and giving out" - per wife  Patient/Family Comments/goals: "To get better"  Pain/Comfort:  · Pain Rating 1: 0/10      Objective:     Communicated with patient and wife prior to session.  Patient found HOB elevated with PICC line, oxygen, IMELDA drain, telemetry upon PT entry to room.     General Precautions: Standard, fall   Orthopedic Precautions:N/A   Braces: N/A  Respiratory Status: Nasal cannula, flow 3 L/min     Functional Mobility:  · Bed Mobility:     · Rolling Left:  modified independence  · Rolling Right: modified independence  · Scooting: supervision  · Supine to Sit: supervision  · Sit to " Supine: stand by assistance  · Transfers:     · Sit to Stand:  contact guard assistance with rolling walker  · Gait: Minimal assistance x ~20 feet and 10 feet with RW. Noted antalgic gait with knees giving out easily.      AM-PAC 6 CLICK MOBILITY  Turning over in bed (including adjusting bedclothes, sheets and blankets)?: 4  Sitting down on and standing up from a chair with arms (e.g., wheelchair, bedside commode, etc.): 3  Moving from lying on back to sitting on the side of the bed?: 3  Moving to and from a bed to a chair (including a wheelchair)?: 3  Need to walk in hospital room?: 3  Climbing 3-5 steps with a railing?: 3  Basic Mobility Total Score: 19       Therapeutic Activities and Exercises:   Worked on out of bed activity, sit to stand trng and gait trng with RW with minimal assistance for  balance and safety.    Patient left HOB elevated with all lines intact, call button in reach, bed alarm on, nursing  notified and wife present..    GOALS:   Multidisciplinary Problems     Physical Therapy Goals        Problem: Physical Therapy    Goal Priority Disciplines Outcome Goal Variances Interventions   Physical Therapy Goal     PT, PT/OT Ongoing, Progressing     Description:   Patient will increase functional independence with mobility by performin. Supine to sit with Modified Independent  2. Sit to supine with Modified Independent  3. Bed to chair transfer with Supervision or Set-up Assistancewith or without rolling walker using Step Transfer TECHNIQUE  4. Gait  x ~100  feet with Supervision or Set-up Assistance with or without rolling walker  5. Lower extremity exercise program x10 reps per handout, with assistance as needed                      Time Tracking:     PT Received On: 22  PT Start Time: 1004     PT Stop Time: 1024  PT Total Time (min): 20 min     Billable Minutes: Therapeutic Activity 20 mins    Treatment Type: Treatment  PT/PTA: PT           2022

## 2022-05-19 NOTE — TELEPHONE ENCOUNTER
No new care gaps identified.  [facility] Embedded Care Gaps. Reference number: [id number]. 5/19/2022   6:05:19 PM CDT

## 2022-05-19 NOTE — PROGRESS NOTES
[facility] ([facility])  [facility] Medicine  Progress Note    Patient Name: Eddie Parada Sr.  MRN: [id number]  Patient Class: IP- Inpatient   Admission Date: 5/15/2022  Length of Stay: 3 days  Attending Physician: Joseph Redmond MD  Primary Care Provider: Callum Roberts MD        Subjective:     Principal Problem:(HFpEF) heart failure with preserved ejection fraction        HPI:  Eddie Parada Sr. is a 89 y.o. male with known atrial fibrillation, aortic stenosis s/p repair,  CAD, CHF, COPD, diabetes mellitus type 2, hyperlipidemia,  CVA and ongoing complications associated of an anterior abdominal wall mass secondary to ventral hernia mesh placed ~20 years ago who presented to ER with shortness of breath since just prior to arrival.  While sitting on a sofa at home, he suddenly began short of breath.  Per wife, he has been compliant with all of his home medication.  Endorses leg swelling.   Denies chest pain, diaphoresis.   BNP> 500, CXR demonstrating pulmonary edema, Creat elevated at 2.1- (Baseline creat 1.0-1.7)   D- dimer elevated, LE US negative for DVT, TNI- 0.044>0.048>0.043   H&H 9.1/27.9>8.6>26.2     He is s/p Drain recently removed prior to presentation, with return of symptoms, re-accumulation of fluid. General surgery was consulted who states patient is not a good surgical candidate. Patient underwent US-guided drain placement with IR 5/4, which he tolerated well. Initiated BSA with vanc, cefepime, flagyl, and azithromycin (for atypicals/CAP). Consulted ID for long-term IV Abx who recommends discharging patient on vanc, ceftriaxone, PO flagyl, and PO diflucan for at least 2 weeks and close follow-up with ID for decision making based on clinical course. Because patient has had several hospitalizations in last few months due to complications from abdominal wall abscess, consulted palliative care to have on board and to establish outpatient follow-up. Patient was discharged home with  abdominal drain in place, 4 weeks of vanc, ceftriaxone, PO flagyl, and PO diflucan, and close follow-up with ID, general surgery, palliative care, and home health. Patient remained afebrile and without abdominal pain, leukocytosis, or other issues during hospital stay.          Overview/Hospital Course:  5/17 89 YO WM receiving multiple home abx including vancomycin at home for abdominal abscess, presented with acute SOB, Heart failure, getting lasix 20mg IV q 12, not much diuresis, intake and output reflecting + 270  Noting more edema to arms, below eyes , wife noting more confusion when he wakes up   + NIK on CKD; Creat 2.1; K+ 3.6   O2 sat 93-94% on 3LNC , not on home oxygen,   H&H 8.4/25.6 trending down ; NOAC stopped per cards after dose yesterday   Decreased appetite, Boost ordered,   End of life care discussed ; pt does not have living will .  He will sign living will .  He will be DNR .  I also talked to wife by patients bedside and daughter on phone ;answered all questions.  Spent about 20 minutes  . Later on signed living will     5/18 Pt was admitted with heart failure with elevated BNP and some lower ext swelling. He was given 60 mg IV lasix 5/16 and then 40mg 5/17. He has had fluctuating renal function 2 weeks ago Creat on admit 2.1.>> 2.2 today. BNP was elevated  on admission and is not putting out much urine.Dr Monterroso was consulted. Troponin chronically elevated. Echo 55% mild heart failure and mod pulm htn noted. Holding xarel;to for now due to h/h dropping. 9>8. D dimer was elevated u/s legs no clot     Pt was currently being treated at home for abd wall abscess s/p hernia repair. Per Id was on vanc and rocephin via PICC and PO flagyl and diflucan outpt- continued while here. Still has drain from abd wall abscess, not much drainage  Serosanguinous     5/19 pt was being treated for flash pulm edema with lasix 60mg IV 5/16 and 40mg IV 5/17. Yesterday diuretics were held as creat was elevated and not  improving with diureses. Also noted not making much urine. Renal u/s done with no obstruction noted. Was started on NS at 50ml/hr last night and creat is better this am 2.1>1.9. Looks like his renal fucntion fluctuates but 2 weeks ago had creat 1.0. He remains on vanc and rocephin IV and diflucan and flagyl po per ID from abd abscess (still with drain)- today makes 2 weeks since d/c from [place]. Awaiting guidance on if he will need 2 or 4 weeks of these abx. Had a very short bonnie of v tach last night while sleeping. K+ WNL- mag and tsh pending this am., ambulatory with PT min assist 15ft        Wife at bedside reports increased fatigue, leg weakness  Review of Systems   Constitutional:  Positive for fatigue. Negative for activity change, fever and unexpected weight change.   HENT:  Negative for congestion, ear pain, hearing loss, rhinorrhea and sore throat.    Eyes:  Negative for pain, redness and visual disturbance.   Respiratory:  Positive for cough and shortness of breath. Negative for wheezing.    Cardiovascular:  Negative for chest pain, palpitations and leg swelling.   Gastrointestinal:  Negative for abdominal pain, constipation, diarrhea, nausea and vomiting.   Genitourinary:  Negative for decreased urine volume, dysuria, frequency and urgency.   Musculoskeletal:  Negative for back pain, joint swelling and neck pain.   Skin:  Negative for color change, rash and wound.   Neurological:  Positive for weakness. Negative for dizziness, tremors, light-headedness and headaches.   Objective:     Vital Signs (Most Recent):  Temp: 96.8 °F (36 °C) (05/19/22 0724)  Pulse: 66 (05/19/22 0724)  Resp: 19 (05/19/22 0724)  BP: (!) 117/58 (05/19/22 0724)  SpO2: 95 % (05/19/22 0724)   Vital Signs (24h Range):  Temp:  [96.2 °F (35.7 °C)-98.2 °F (36.8 °C)] 96.8 °F (36 °C)  Pulse:  [59-82] 66  Resp:  [18-22] 19  SpO2:  [93 %-97 %] 95 %  BP: (103-128)/(53-60) 117/58     Weight: 79.6 kg (175 lb 7.8 oz)  Body mass index is 29.2  kg/m².    Physical Exam  Vitals and nursing note reviewed.   Constitutional:       General: He is not in acute distress.     Appearance: He is well-developed.      Comments: sleeping   HENT:      Head: Normocephalic and atraumatic.      Right Ear: External ear normal.      Left Ear: External ear normal.   Eyes:      General:         Right eye: No discharge.         Left eye: No discharge.   Neck:      Thyroid: No thyromegaly.   Cardiovascular:      Rate and Rhythm: Normal rate and regular rhythm.      Heart sounds: No murmur heard.  Pulmonary:      Effort: Pulmonary effort is normal. No respiratory distress.      Breath sounds: No wheezing or rales.   Abdominal:      General: Bowel sounds are normal. There is no distension.      Palpations: Abdomen is soft.      Tenderness: There is no abdominal tenderness.      Comments: Drain in place.   Musculoskeletal:      Right lower leg: No edema.      Left lower leg: No edema.   Skin:     General: Skin is warm and dry.      Comments: PICC to right UE, dressing c/d/i           Significant Labs:   A1C:   Recent Labs   Lab 03/02/22  0744 04/30/22  1046   HGBA1C 5.9* 6.0*         Bilirubin:   Recent Labs   Lab 05/05/22  0536 05/06/22  0513 05/15/22  2242 05/17/22  0642 05/19/22  0542   BILITOT 0.5 0.6 0.7 0.6 0.5       CBC:   Recent Labs   Lab 05/18/22  1414 05/19/22  0542   WBC 7.03 5.95   HGB 7.9* 8.0*   HCT 24.5* 24.6*    191       CMP:   Recent Labs   Lab 05/18/22  0605 05/19/22  0542    138   K 3.7 3.9   CL 97 97   CO2 29 29   * 170*   BUN 56* 55*   CREATININE 2.2* 1.9*   CALCIUM 8.5* 8.4*   PROT  --  5.8*   ALBUMIN  --  2.5*   BILITOT  --  0.5   ALKPHOS  --  111   AST  --  48*   ALT  --  22   ANIONGAP 12 12   EGFRNONAA 25* 30*       Lab Results   Component Value Date    DDIMER 2.25 (H) 05/15/2022     Lactate 1.3    BNP  Recent Labs   Lab 05/15/22  2242   *         Recent Labs   Lab 05/18/22  1619 05/18/22  1945 05/19/22  0626   POCTGLUCOSE 246*  236* 182*       Troponin: 0.044>0.048>0.043>0.037    TSH:   Recent Labs   Lab 22  0744   TSH 5.943*         Covid negative   Flu negative     Significant Imagin/18 US renal Technically limited study with known renal cysts.  No evidence of obstructive uropathy     CXR Cardiomegaly with mild CHF.     Left lower lobe atelectasis, small left pleural effusion.    LE US -No evidence of deep venous thrombosis in either lower extremity.    5/15 CXR- Increased left-sided pleural effusion along with new airspace opacities in left hemithorax.  Findings suggestive of worsening CHF pattern of pulmonary edema.    EKG Likely ventricular paced rhythm  - morphology suggestive of biventricular   pacing   Abnormal ECG   When compared with ECG of 2022 10:39,   Premature ventricular complexes are no longer Present   Confirmed by Adam Mcmahon MD ([id number]) on 2022 8:11:55 AM      Assessment/Plan:      * (HFpEF) heart failure with preserved ejection fraction  22 Echo-   · The estimated ejection fraction is 55%.  · Indeterminate left ventricular diastolic function.  · Moderate right ventricular enlargement with mildly reduced right ventricular systolic function.  · Mild aortic regurgitation.  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 There is aortic insufficiency present. Prosthetic aortic valve is normal.  · The aortic valve mean gradient is 6 mmHg with a dimensionless index of 0.64.  · Mild mitral regurgitation.  · Mild tricuspid regurgitation.  · There is moderate pulmonary hypertension.       Increased SOB and hypoxia. Diuresis initiated. CIS following. Strict I/O. Low Na diet.    Stop lasix today. Creat rising and patient not urinating much- check renal u/s today considering fluids      was started on minimal fluids last night NS at 50ml/hr creat improved 2.1>1.9- hold further diuresis today     Counseling regarding advanced directives and goals of care  Advance Care Planning     Living  Will  During this visit, I engaged the patient and family  in the advance care planning process.  The patient and I reviewed the role for advance directives and their purpose in directing future healthcare if the patient's unable to speak for him/herself.  At this point in time, the patient does have full decision-making capacity.  We discussed different extreme health states that he could experience, and reviewed what kind of medical care he would want in those situations.  The patient and family communicated that if he were comatose and had little chance of a meaningful recovery, he would not want machines/life-sustaining treatments used. In addition to the above preference, other important end-of-life issues for the patient include . The patient has completed a living will to reflect these preferences.  I spent a total of 20 minutes engaging the patient in this advance care planning discussion.Discussed with wife in person and daughter via telephone           Today a meeting took place: bedside    Patient Participation: Patient is able to participate     Attendees (Name and  Relationship to patient):also dicussed with wife and daughter    Staff attendees (Name and  Role): Georgia VERMA, xochitl Jules RN case manage   Code Status: DNR; status confirmed/order placed in chart    ACP Documents: Other Documents (specify): living will     Goals of care: The patient and family endorses that what is most important right now is to focus on symptom/pain control, quality of life, even if it means sacrificing a little time and improvement in condition but with limits to invasive therapies    Accordingly, we have decided that the best plan to meet the patient's goals includes continuing with treatment      Recommendations/Length of ACP   conversation in minutes: 20 minutes      Code Status  In light of the patients advanced and life limiting illness,I engaged the the patient and family in a conversation about the patient's  preferences for care  at the very end of life. The patient wishes to have a natural, peaceful death.  Along those lines, the patient does not wish to have CPR or other invasive treatments performed when his heart and/or breathing stops. I communicated to the patient and family that a DNR order would be placed in his medical record to reflect this preference.  I spent a total of 20 minutes engaging the patient in this advance care planning discussion.         Debility  PT consulted .  Per records patient with several hospitalizations in last few months for abdominal wall abscess   - prior to 2 months ago, was still independent, working and running a business  - consulted palliative care to have on board and have OP follow up  · PT>>Gait: Minimal Assistance x ~10 feet with side steps using RW. Decrease stride, dec foot/floor clearance and fatigue easily.      NIK (acute kidney injury)  Monitor BUN/SCr.  Monitor I/Os.  Monitor electrolytes.  Avoid non-steroidal anti-inflammatory medications.  Symptoms and labs suggestive of Acute HF; continue with diuretics, monitor renal fx closely   NIK on CKD      renal US today and hold further diuresis- pt not voiding much  vanc dosing per pharm    5/19 renal u/s yesterday does not show obstruction. Placed in gentle IVF and diuretics held. Creat has improved a smidgen     Abdominal wall abscess  Last discharge 5/6  with abdominal drain in place and 4 weeks of vanc, rocephin, flagyl, and diflucan  - follow up with general surgery for drain and ID for Abx- ( 10 days since discharge)   Consult pharmacy to adjust vanc- monitor     ventral hernia repair with mesh placement ~20 years ago, no complications until 3/2022 with several hospitalizations leading to drain placement and antibiotics since that time, drain removed 4/29/22   - most recent hospitalization began on 4/30/22 at [facility] and was transferred here on 5/4/22 for surgical and IR evaluation  - was initiated on Vanc  and Zosyn at [facility]  - switched to Vanc, Cefepime, Flagyl on admission to our facility given renal function   - blood cultures from 4/30/22 no growth to date   - wound cultures from 3/3 and 5/4 NGTD  - general surgery states patient is not good surgical candidate  - underwent US-guided drain placement 5/4  - general surgery added fluconazole for yeast seen on gram stain of wound Cx  - consulted ID for assistance with Abx: discharge with IV vanc, IV ceftriaxone, PO flagyl, and PO diflucan for at least 2 weeks with close ID follow-up .  -vanc dosing per pharmacy but will lower vanc trough goal to 10  Message sent to ID for guidance as today makes 2 weeks from D/c and last notes per ID read 2-4 weeks of therapy needed outpt     Disorder of prostate  Continue flomax .      Hypothyroidism due to acquired atrophy of thyroid  Continue levothyroxine .  Repeat tsh today as last one was elevated on chart and pt has short run v tach overnight     Ventricular tachycardia  Pt had 9 beat run of v-tach. Pt asymptomatic and no complaints of chest pain. Was sleeping soundly at the time.   K+ WNL- add mag > normal and tsh elevated increase synthroid from 75>88  Stop diflucan. Had a few yeast in abscess fungal culture but has had 2 weeks of that      Long term current use of anticoagulant therapy  Continue xarelto- adjust for renal; recommending decrease to 15mg daily .  Now holding due to anemia/NIK  lovenox renal dosing     Type 2 diabetes mellitus, controlled  HbA1c 6.0 on 4/30/22  - does not appear to be on any home medications   - SSI + POCT glucose while inpatient  - continue to manage with lifstyle, follow up with PCP.         S/P AVR   bioprosthetic valve placed 2011   - continue home xarelto - adjust currently for renal dosing > now on lovenox        Mixed dyslipidemia  Continue pravastatin .      CAD (coronary artery disease)  Continue statin, bb, NOAC   Cards consulted     NOAC held due to NIK> lovenox currently       Essential (primary) hypertension  BP Readings from Last 3 Encounters:   05/19/22 (!) 117/58   05/12/22 (!) 129/58   05/11/22 (!) 110/52     Resume home coreg-at lower dose 3.25mg , hold amlodipine & doxazosin  for now .  Dr. Monterroso ordered Imdur, monitor BP       Chronic atrial fibrillation  Maintain BB, rivaroxaban- renal dose  p PPM .  xarelto held due to NIK  Placing on renal dosed lovenox         VTE Risk Mitigation (From admission, onward)         Ordered     enoxaparin injection 80 mg  Every 24 hours (non-standard times)         05/18/22 1256     IP VTE HIGH RISK PATIENT  Once         05/16/22 0419     Place sequential compression device  Until discontinued         05/16/22 0419                Discharge Planning   KANDICE:      Code Status: DNR   Is the patient medically ready for discharge?:     Reason for patient still in hospital (select all that apply): Patient trending condition  Discharge Plan A: Home with family, Home Health                  Linda Greco MD  Department of Hospital Medicine   [place] [facility] (3rd Fl)

## 2022-05-19 NOTE — ASSESSMENT & PLAN NOTE
5/2/22 Echo-   · The estimated ejection fraction is 55%.  · Indeterminate left ventricular diastolic function.  · Moderate right ventricular enlargement with mildly reduced right ventricular systolic function.  · Mild aortic regurgitation.  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 There is aortic insufficiency present. Prosthetic aortic valve is normal.  · The aortic valve mean gradient is 6 mmHg with a dimensionless index of 0.64.  · Mild mitral regurgitation.  · Mild tricuspid regurgitation.  · There is moderate pulmonary hypertension.       Increased SOB and hypoxia. Diuresis initiated. CIS following. Strict I/O. Low Na diet.    Stop lasix today. Creat rising and patient not urinating much- check renal u/s today considering fluids     5/19 was started on minimal fluids last night NS at 50ml/hr creat improved 2.1>1.9- hold further diuresis today

## 2022-05-20 LAB
ALBUMIN SERPL BCP-MCNC: 2.6 G/DL (ref 3.5–5.2)
ALP SERPL-CCNC: 111 U/L (ref 55–135)
ALT SERPL W/O P-5'-P-CCNC: 22 U/L (ref 10–44)
ANION GAP SERPL CALC-SCNC: 12 MMOL/L (ref 8–16)
AST SERPL-CCNC: 40 U/L (ref 10–40)
BASOPHILS # BLD AUTO: 0.03 K/UL (ref 0–0.2)
BASOPHILS NFR BLD: 0.5 % (ref 0–1.9)
BILIRUB SERPL-MCNC: 0.5 MG/DL (ref 0.1–1)
BUN SERPL-MCNC: 47 MG/DL (ref 10–30)
CALCIUM SERPL-MCNC: 8.5 MG/DL (ref 8.7–10.5)
CHLORIDE SERPL-SCNC: 99 MMOL/L (ref 95–110)
CO2 SERPL-SCNC: 27 MMOL/L (ref 23–29)
CREAT SERPL-MCNC: 1.7 MG/DL (ref 0.5–1.4)
DIFFERENTIAL METHOD: ABNORMAL
EOSINOPHIL # BLD AUTO: 0.1 K/UL (ref 0–0.5)
EOSINOPHIL NFR BLD: 1.3 % (ref 0–8)
ERYTHROCYTE [DISTWIDTH] IN BLOOD BY AUTOMATED COUNT: 14.6 % (ref 11.5–14.5)
EST. GFR  (AFRICAN AMERICAN): 39 ML/MIN/1.73 M^2
EST. GFR  (NON AFRICAN AMERICAN): 34 ML/MIN/1.73 M^2
GLUCOSE SERPL-MCNC: 162 MG/DL (ref 70–110)
HCT VFR BLD AUTO: 25.9 % (ref 40–54)
HGB BLD-MCNC: 8.3 G/DL (ref 14–18)
IMM GRANULOCYTES # BLD AUTO: 0.04 K/UL (ref 0–0.04)
IMM GRANULOCYTES NFR BLD AUTO: 0.7 % (ref 0–0.5)
LYMPHOCYTES # BLD AUTO: 1.3 K/UL (ref 1–4.8)
LYMPHOCYTES NFR BLD: 21.6 % (ref 18–48)
MCH RBC QN AUTO: 32.7 PG (ref 27–31)
MCHC RBC AUTO-ENTMCNC: 32 G/DL (ref 32–36)
MCV RBC AUTO: 102 FL (ref 82–98)
MONOCYTES # BLD AUTO: 0.9 K/UL (ref 0.3–1)
MONOCYTES NFR BLD: 14.4 % (ref 4–15)
NEUTROPHILS # BLD AUTO: 3.7 K/UL (ref 1.8–7.7)
NEUTROPHILS NFR BLD: 61.5 % (ref 38–73)
NRBC BLD-RTO: 0 /100 WBC
PLATELET # BLD AUTO: 212 K/UL (ref 150–450)
PMV BLD AUTO: 9.8 FL (ref 9.2–12.9)
POCT GLUCOSE: 195 MG/DL (ref 70–110)
POCT GLUCOSE: 223 MG/DL (ref 70–110)
POCT GLUCOSE: 235 MG/DL (ref 70–110)
POCT GLUCOSE: 250 MG/DL (ref 70–110)
POTASSIUM SERPL-SCNC: 3.8 MMOL/L (ref 3.5–5.1)
PROT SERPL-MCNC: 6.1 G/DL (ref 6–8.4)
RBC # BLD AUTO: 2.54 M/UL (ref 4.6–6.2)
SODIUM SERPL-SCNC: 138 MMOL/L (ref 136–145)
VANCOMYCIN TROUGH SERPL-MCNC: 16.2 UG/ML (ref 10–22)
WBC # BLD AUTO: 5.96 K/UL (ref 3.9–12.7)

## 2022-05-20 PROCEDURE — 80202 ASSAY OF VANCOMYCIN: CPT | Performed by: NURSE PRACTITIONER

## 2022-05-20 PROCEDURE — 99232 PR SUBSEQUENT HOSPITAL CARE,LEVL II: ICD-10-PCS | Mod: ,,, | Performed by: INTERNAL MEDICINE

## 2022-05-20 PROCEDURE — 80053 COMPREHEN METABOLIC PANEL: CPT | Performed by: NURSE PRACTITIONER

## 2022-05-20 PROCEDURE — 63600175 PHARM REV CODE 636 W HCPCS: Performed by: NURSE PRACTITIONER

## 2022-05-20 PROCEDURE — 94761 N-INVAS EAR/PLS OXIMETRY MLT: CPT

## 2022-05-20 PROCEDURE — 99232 SBSQ HOSP IP/OBS MODERATE 35: CPT | Mod: ,,, | Performed by: INTERNAL MEDICINE

## 2022-05-20 PROCEDURE — 99900035 HC TECH TIME PER 15 MIN (STAT)

## 2022-05-20 PROCEDURE — 97530 THERAPEUTIC ACTIVITIES: CPT

## 2022-05-20 PROCEDURE — 25000003 PHARM REV CODE 250: Performed by: NURSE PRACTITIONER

## 2022-05-20 PROCEDURE — 27000221 HC OXYGEN, UP TO 24 HOURS

## 2022-05-20 PROCEDURE — 25000003 PHARM REV CODE 250

## 2022-05-20 PROCEDURE — 85025 COMPLETE CBC W/AUTO DIFF WBC: CPT | Performed by: NURSE PRACTITIONER

## 2022-05-20 PROCEDURE — 11000001 HC ACUTE MED/SURG PRIVATE ROOM

## 2022-05-20 RX ADMIN — PANTOPRAZOLE SODIUM 40 MG: 40 TABLET, DELAYED RELEASE ORAL at 08:05

## 2022-05-20 RX ADMIN — ENOXAPARIN SODIUM 80 MG: 80 INJECTION SUBCUTANEOUS at 01:05

## 2022-05-20 RX ADMIN — PRAVASTATIN SODIUM 20 MG: 20 TABLET ORAL at 09:05

## 2022-05-20 RX ADMIN — POTASSIUM CHLORIDE 10 MEQ: 750 TABLET, FILM COATED, EXTENDED RELEASE ORAL at 08:05

## 2022-05-20 RX ADMIN — LEVOTHYROXINE SODIUM 88 MCG: 88 TABLET ORAL at 05:05

## 2022-05-20 RX ADMIN — INSULIN ASPART 2 UNITS: 100 INJECTION, SOLUTION INTRAVENOUS; SUBCUTANEOUS at 09:05

## 2022-05-20 RX ADMIN — POLYETHYLENE GLYCOL (3350) 17 G: 17 POWDER, FOR SOLUTION ORAL at 08:05

## 2022-05-20 RX ADMIN — METRONIDAZOLE 500 MG: 500 TABLET ORAL at 01:05

## 2022-05-20 RX ADMIN — CEFTRIAXONE 1 G: 1 INJECTION, SOLUTION INTRAVENOUS at 08:05

## 2022-05-20 RX ADMIN — GABAPENTIN 200 MG: 100 CAPSULE ORAL at 09:05

## 2022-05-20 RX ADMIN — CARVEDILOL 3.12 MG: 3.12 TABLET, FILM COATED ORAL at 08:05

## 2022-05-20 RX ADMIN — TAMSULOSIN HYDROCHLORIDE 0.4 MG: 0.4 CAPSULE ORAL at 08:05

## 2022-05-20 RX ADMIN — SENNOSIDES 1 TABLET: 8.6 TABLET, FILM COATED ORAL at 08:05

## 2022-05-20 RX ADMIN — ISOSORBIDE MONONITRATE 30 MG: 30 TABLET, EXTENDED RELEASE ORAL at 08:05

## 2022-05-20 RX ADMIN — INSULIN ASPART 2 UNITS: 100 INJECTION, SOLUTION INTRAVENOUS; SUBCUTANEOUS at 05:05

## 2022-05-20 RX ADMIN — MUPIROCIN: 20 OINTMENT TOPICAL at 09:05

## 2022-05-20 RX ADMIN — METRONIDAZOLE 500 MG: 500 TABLET ORAL at 05:05

## 2022-05-20 RX ADMIN — FERROUS SULFATE TAB 325 MG (65 MG ELEMENTAL FE) 1 EACH: 325 (65 FE) TAB at 08:05

## 2022-05-20 RX ADMIN — METRONIDAZOLE 500 MG: 500 TABLET ORAL at 09:05

## 2022-05-20 RX ADMIN — CARVEDILOL 3.12 MG: 3.12 TABLET, FILM COATED ORAL at 05:05

## 2022-05-20 NOTE — ASSESSMENT & PLAN NOTE
5/2/22 Echo-   · The estimated ejection fraction is 55%.  · Indeterminate left ventricular diastolic function.  · Moderate right ventricular enlargement with mildly reduced right ventricular systolic function.  · Mild aortic regurgitation.  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 There is aortic insufficiency present. Prosthetic aortic valve is normal.  · The aortic valve mean gradient is 6 mmHg with a dimensionless index of 0.64.  · Mild mitral regurgitation.  · Mild tricuspid regurgitation.  · There is moderate pulmonary hypertension.       Increased SOB and hypoxia. Diuresis initiated. CIS following. Strict I/O. Low Na diet.    Stop lasix today. Creat rising and patient not urinating much- check renal u/s today considering fluids     5/19 was started on minimal fluids last night NS at 50ml/hr creat improved 2.1>1.9- hold further diuresis today     5/20 Cont to hold diurese and creat continues to improve. On BB and Isorbide,

## 2022-05-20 NOTE — PLAN OF CARE
Problem: Adult Inpatient Plan of Care  Goal: Absence of Hospital-Acquired Illness or Injury  Outcome: Ongoing, Progressing     Problem: Diabetes Comorbidity  Goal: Blood Glucose Level Within Targeted Range  Outcome: Ongoing, Progressing     Problem: Adjustment to Illness (Sepsis/Septic Shock)  Goal: Optimal Coping  Outcome: Ongoing, Progressing     Problem: Fall Injury Risk  Goal: Absence of Fall and Fall-Related Injury  Outcome: Ongoing, Progressing

## 2022-05-20 NOTE — PROGRESS NOTES
[place] - [facility] ([facility])  [facility] Medicine  Progress Note    Patient Name: Eddie Parada Sr.  MRN: [id number]  Patient Class: IP- Inpatient   Admission Date: 5/15/2022  Length of Stay: 4 days  Attending Physician: Joseph Redmond MD  Primary Care Provider: Callum Roberts MD        Subjective:     Principal Problem:(HFpEF) heart failure with preserved ejection fraction        HPI:  Eddie Parada Sr. is a 89 y.o. male with known atrial fibrillation, aortic stenosis s/p repair,  CAD, CHF, COPD, diabetes mellitus type 2, hyperlipidemia,  CVA and ongoing complications associated of an anterior abdominal wall mass secondary to ventral hernia mesh placed ~20 years ago who presented to ER with shortness of breath since just prior to arrival.  While sitting on a sofa at home, he suddenly began short of breath.  Per wife, he has been compliant with all of his home medication.  Endorses leg swelling.   Denies chest pain, diaphoresis.   BNP> 500, CXR demonstrating pulmonary edema, Creat elevated at 2.1- (Baseline creat 1.0-1.7)   D- dimer elevated, LE US negative for DVT, TNI- 0.044>0.048>0.043   H&H 9.1/27.9>8.6>26.2     He is s/p Drain recently removed prior to presentation, with return of symptoms, re-accumulation of fluid. General surgery was consulted who states patient is not a good surgical candidate. Patient underwent US-guided drain placement with IR 5/4, which he tolerated well. Initiated BSA with vanc, cefepime, flagyl, and azithromycin (for atypicals/CAP). Consulted ID for long-term IV Abx who recommends discharging patient on vanc, ceftriaxone, PO flagyl, and PO diflucan for at least 2 weeks and close follow-up with ID for decision making based on clinical course. Because patient has had several hospitalizations in last few months due to complications from abdominal wall abscess, consulted palliative care to have on board and to establish outpatient follow-up. Patient was discharged home with  abdominal drain in place, 4 weeks of vanc, ceftriaxone, PO flagyl, and PO diflucan, and close follow-up with ID, general surgery, palliative care, and home health. Patient remained afebrile and without abdominal pain, leukocytosis, or other issues during hospital stay.          Overview/Hospital Course:  5/17 89 YO WM receiving multiple home abx including vancomycin at home for abdominal abscess, presented with acute SOB, Heart failure, getting lasix 20mg IV q 12, not much diuresis, intake and output reflecting + 270  Noting more edema to arms, below eyes , wife noting more confusion when he wakes up   + NIK on CKD; Creat 2.1; K+ 3.6   O2 sat 93-94% on 3LNC , not on home oxygen,   H&H 8.4/25.6 trending down ; NOAC stopped per cards after dose yesterday   Decreased appetite, Boost ordered,   End of life care discussed ; pt does not have living will .  He will sign living will .  He will be DNR .  I also talked to wife by patients bedside and daughter on phone ;answered all questions.  Spent about 20 minutes  . Later on signed living will     5/18 Pt was admitted with heart failure with elevated BNP and some lower ext swelling. He was given 60 mg IV lasix 5/16 and then 40mg 5/17. He has had fluctuating renal function 2 weeks ago Creat on admit 2.1.>> 2.2 today. BNP was elevated  on admission and is not putting out much urine.Dr Monterroso was consulted. Troponin chronically elevated. Echo 55% mild heart failure and mod pulm htn noted. Holding xarel;to for now due to h/h dropping. 9>8. D dimer was elevated u/s legs no clot     Pt was currently being treated at home for abd wall abscess s/p hernia repair. Per Id was on vanc and rocephin via PICC and PO flagyl and diflucan outpt- continued while here. Still has drain from abd wall abscess, not much drainage  Serosanguinous     5/19 pt was being treated for flash pulm edema with lasix 60mg IV 5/16 and 40mg IV 5/17. Yesterday diuretics were held as creat was elevated and not  improving with diureses. Also noted not making much urine. Renal u/s done with no obstruction noted. Was started on NS at 50ml/hr last night and creat is better this am 2.1>1.9. Looks like his renal fucntion fluctuates but 2 weeks ago had creat 1.0. He remains on vanc and rocephin IV and diflucan and flagyl po per ID from abd abscess (still with drain)- today makes 2 weeks since d/c from [place]. Awaiting guidance on if he will need 2 or 4 weeks of these abx. Had a very short bonnie of v tach last night while sleeping. K+ WNL- mag and tsh pending this am., ambulatory with PT min assist 15ft 5/20 pt is currently being treated for over diuresis. He is on NS @ 50ml/hr. Creat is improving now at 1.6 this am. Also had vanc held for abd abscess. Trough at 16 this am. Due to v tach yesterday diflucan was stopped after 2 weeks. He remains on rocephin and flagyl for the abd abscess.         Wife at bedside reports increased fatigue, leg weakness  Review of Systems   Constitutional:  Positive for fatigue. Negative for activity change, fever and unexpected weight change.   HENT:  Negative for congestion, ear pain, hearing loss, rhinorrhea and sore throat.    Eyes:  Negative for pain, redness and visual disturbance.   Respiratory:  Negative for cough, shortness of breath and wheezing.    Cardiovascular:  Negative for chest pain, palpitations and leg swelling.   Gastrointestinal:  Negative for abdominal pain, constipation, diarrhea, nausea and vomiting.   Genitourinary:  Negative for decreased urine volume, dysuria, frequency and urgency.   Musculoskeletal:  Negative for back pain, joint swelling and neck pain.   Skin:  Negative for color change, rash and wound.   Neurological:  Positive for weakness. Negative for dizziness, tremors, light-headedness and headaches.   Objective:     Vital Signs (Most Recent):  Temp: 96.6 °F (35.9 °C) (05/20/22 0716)  Pulse: 69 (05/20/22 0716)  Resp: 18 (05/20/22 0716)  BP: 134/60 (05/20/22  0716)  SpO2: (!) 94 % (05/20/22 0823)   Vital Signs (24h Range):  Temp:  [96.6 °F (35.9 °C)-99.8 °F (37.7 °C)] 96.6 °F (35.9 °C)  Pulse:  [59-77] 69  Resp:  [18-20] 18  SpO2:  [91 %-96 %] 94 %  BP: (117-134)/(53-61) 134/60     Weight: 79.6 kg (175 lb 7.8 oz)  Body mass index is 29.2 kg/m².    Physical Exam  Vitals and nursing note reviewed.   Constitutional:       General: He is not in acute distress.     Appearance: He is well-developed.   HENT:      Head: Normocephalic and atraumatic.      Right Ear: External ear normal.      Left Ear: External ear normal.   Eyes:      General:         Right eye: No discharge.         Left eye: No discharge.   Neck:      Thyroid: No thyromegaly.   Cardiovascular:      Rate and Rhythm: Normal rate and regular rhythm.      Heart sounds: No murmur heard.  Pulmonary:      Effort: Pulmonary effort is normal. No respiratory distress.      Breath sounds: No wheezing or rales.   Abdominal:      General: Bowel sounds are normal. There is no distension.      Palpations: Abdomen is soft.      Tenderness: There is no abdominal tenderness.      Comments: Drain in place.   Musculoskeletal:      Right lower leg: No edema.      Left lower leg: No edema.   Skin:     General: Skin is warm and dry.      Comments: PICC to right UE, dressing c/d/i           Significant Labs:   A1C:   Recent Labs   Lab 03/02/22  0744 04/30/22  1046   HGBA1C 5.9* 6.0*         Bilirubin:   Recent Labs   Lab 05/06/22  0513 05/15/22  2242 05/17/22  0642 05/19/22  0542 05/20/22  0600   BILITOT 0.6 0.7 0.6 0.5 0.5       CBC:   Recent Labs   Lab 05/18/22  1414 05/19/22  0542 05/20/22  0600   WBC 7.03 5.95 5.96   HGB 7.9* 8.0* 8.3*   HCT 24.5* 24.6* 25.9*    191 212       CMP:   Recent Labs   Lab 05/19/22  0542 05/20/22  0600    138   K 3.9 3.8   CL 97 99   CO2 29 27   * 162*   BUN 55* 47*   CREATININE 1.9* 1.7*   CALCIUM 8.4* 8.5*   PROT 5.8* 6.1   ALBUMIN 2.5* 2.6*   BILITOT 0.5 0.5   ALKPHOS 111 111    AST 48* 40   ALT 22 22   ANIONGAP 12 12   EGFRNONAA 30* 34*       Lab Results   Component Value Date    DDIMER 2.25 (H) 05/15/2022     Lactate 1.3    BNP  Recent Labs   Lab 05/15/22  2242   *         Recent Labs   Lab 22  1613 22  2035 22  0605   POCTGLUCOSE 208* 229* 195*       Troponin: 0.044>0.048>0.043>0.037    TSH:   Recent Labs   Lab 22  0541   TSH 8.559*         Covid negative   Flu negative     Significant Imagin/18 US renal Technically limited study with known renal cysts.  No evidence of obstructive uropathy     CXR Cardiomegaly with mild CHF.     Left lower lobe atelectasis, small left pleural effusion.    LE US -No evidence of deep venous thrombosis in either lower extremity.    5/15 CXR- Increased left-sided pleural effusion along with new airspace opacities in left hemithorax.  Findings suggestive of worsening CHF pattern of pulmonary edema.    EKG Likely ventricular paced rhythm  - morphology suggestive of biventricular   pacing   Abnormal ECG   When compared with ECG of 2022 10:39,   Premature ventricular complexes are no longer Present   Confirmed by Adam Mcmahon MD ([id number]) on 2022 8:11:55 AM      Assessment/Plan:      * (HFpEF) heart failure with preserved ejection fraction  22 Echo-   · The estimated ejection fraction is 55%.  · Indeterminate left ventricular diastolic function.  · Moderate right ventricular enlargement with mildly reduced right ventricular systolic function.  · Mild aortic regurgitation.  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 There is aortic insufficiency present. Prosthetic aortic valve is normal.  · The aortic valve mean gradient is 6 mmHg with a dimensionless index of 0.64.  · Mild mitral regurgitation.  · Mild tricuspid regurgitation.  · There is moderate pulmonary hypertension.       Increased SOB and hypoxia. Diuresis initiated. CIS following. Strict I/O. Low Na diet.    Stop lasix today. Creat rising  and patient not urinating much- check renal u/s today considering fluids     5/19 was started on minimal fluids last night NS at 50ml/hr creat improved 2.1>1.9- hold further diuresis today     5/20 Cont to hold diurese and creat continues to improve. On BB and Isorbide,     Counseling regarding advanced directives and goals of care  Advance Care Planning     Living Will  During this visit, I engaged the patient and family  in the advance care planning process.  The patient and I reviewed the role for advance directives and their purpose in directing future healthcare if the patient's unable to speak for him/herself.  At this point in time, the patient does have full decision-making capacity.  We discussed different extreme health states that he could experience, and reviewed what kind of medical care he would want in those situations.  The patient and family communicated that if he were comatose and had little chance of a meaningful recovery, he would not want machines/life-sustaining treatments used. In addition to the above preference, other important end-of-life issues for the patient include . The patient has completed a living will to reflect these preferences.  I spent a total of 20 minutes engaging the patient in this advance care planning discussion.Discussed with wife in person and daughter via telephone           Today a meeting took place: bedside    Patient Participation: Patient is able to participate     Attendees (Name and  Relationship to patient):also dicussed with wife and daughter    Staff attendees (Name and  Role): Georgia VERMA, xochitl Jules RN case manage   Code Status: DNR; status confirmed/order placed in chart    ACP Documents: Other Documents (specify): living will     Goals of care: The patient and family endorses that what is most important right now is to focus on symptom/pain control, quality of life, even if it means sacrificing a little time and improvement in condition but with limits  to invasive therapies    Accordingly, we have decided that the best plan to meet the patient's goals includes continuing with treatment      Recommendations/Length of ACP   conversation in minutes: 20 minutes      Code Status  In light of the patients advanced and life limiting illness,I engaged the the patient and family in a conversation about the patient's preferences for care  at the very end of life. The patient wishes to have a natural, peaceful death.  Along those lines, the patient does not wish to have CPR or other invasive treatments performed when his heart and/or breathing stops. I communicated to the patient and family that a DNR order would be placed in his medical record to reflect this preference.  I spent a total of 20 minutes engaging the patient in this advance care planning discussion.         Debility  PT consulted .  Per records patient with several hospitalizations in last few months for abdominal wall abscess   - prior to 2 months ago, was still independent, working and running a business  - consulted palliative care to have on board and have OP follow up  · PT>>Gait: Minimal Assistance x ~20 feet with side steps using RW. Decrease stride, dec foot/floor clearance and fatigue easily.      NIK (acute kidney injury)  Monitor BUN/SCr.  Monitor I/Os.  Monitor electrolytes.  Avoid non-steroidal anti-inflammatory medications.  Symptoms and labs suggestive of Acute HF; continue with diuretics, monitor renal fx closely   NIK on CKD      renal US today and hold further diuresis- pt not voiding much no obstruction  vanc dosing per pharm>  5/19 renal u/s yesterday does not show obstruction. Placed in gentle IVF and diuretics held. Creat has improved a smidgen   5/20 with very slow fluids creat improving. Also holding vanc till trough 10> pharmacy dosing    Abdominal wall abscess  Last discharge 5/6  with abdominal drain in place and 4 weeks of vanc, rocephin, flagyl, and diflucan  - follow up with  general surgery for drain and ID for Abx- ( 10 days since discharge)   Consult pharmacy to adjust vanc- monitor     ventral hernia repair with mesh placement ~20 years ago, no complications until 3/2022 with several hospitalizations leading to drain placement and antibiotics since that time, drain removed 4/29/22   - most recent hospitalization began on 4/30/22 at [facility] and was transferred here on 5/4/22 for surgical and IR evaluation  - was initiated on Vanc and Zosyn at [facility]  - switched to Vanc, Cefepime, Flagyl on admission to our facility given renal function   - blood cultures from 4/30/22 no growth to date   - wound cultures from 3/3 and 5/4 NGTD  - general surgery states patient is not good surgical candidate  - underwent US-guided drain placement 5/4  - general surgery added fluconazole for yeast seen on gram stain of wound Cx  - consulted ID for assistance with Abx: discharge with IV vanc, IV ceftriaxone, PO flagyl, and PO diflucan for at least 2 weeks with close ID follow-up .  -vanc dosing per pharmacy but will lower vanc trough goal to 10  Message sent to ID for guidance as today makes 2 weeks from D/c and last notes per ID read 2-4 weeks of therapy needed outpt >> will send message to Dr Brown. Stopped diflucan due to v tach after 2 weeks of treatment. Holding vanc as trough still 16 and creat elevated. Still on rocephin and flagyl    Disorder of prostate  Continue flomax .      Hypothyroidism due to acquired atrophy of thyroid  Continue levothyroxine .  Repeat tsh today as last one was elevated on chart and pt has short run v tach overnight   Increase synthroid to 75mcg>88mcg 5/19     Ventricular tachycardia  Pt had 9 beat run of v-tach. Pt asymptomatic and no complaints of chest pain. Was sleeping soundly at the time.   K+ WNL- add mag > normal and tsh elevated increase synthroid from 75>88  Stop diflucan. Had a few yeast in abscess fungal culture but has had 2 weeks of  that      Long term current use of anticoagulant therapy  Continue xarelto- adjust for renal; recommending decrease to 15mg daily .  Now holding due to anemia/NIK  lovenox renal dosing     Type 2 diabetes mellitus, controlled  HbA1c 6.0 on 4/30/22  - does not appear to be on any home medications   - SSI + POCT glucose while inpatient  - continue to manage with lifstyle, follow up with PCP.         S/P AVR   bioprosthetic valve placed 2011   - continue home xarelto - adjust currently for renal dosing > now on lovenox        Mixed dyslipidemia  Continue pravastatin .      CAD (coronary artery disease)  Continue statin, bb, NOAC   Cards consulted     NOAC held due to NIK> lovenox currently      Essential (primary) hypertension  BP Readings from Last 3 Encounters:   05/20/22 134/60   05/12/22 (!) 129/58   05/11/22 (!) 110/52     Resume home coreg-at lower dose 3.25mg , hold amlodipine & doxazosin  for now .  Dr. Monterroso ordered Imdur, monitor BP       Chronic atrial fibrillation  Maintain BB, rivaroxaban- renal dose  p PPM .  xarelto held due to NIK  Placing on renal dosed lovenox         VTE Risk Mitigation (From admission, onward)         Ordered     enoxaparin injection 80 mg  Every 24 hours (non-standard times)         05/18/22 1256     IP VTE HIGH RISK PATIENT  Once         05/16/22 0419     Place sequential compression device  Until discontinued         05/16/22 0419                Discharge Planning   KANDICE:      Code Status: DNR   Is the patient medically ready for discharge?:     Reason for patient still in hospital (select all that apply): Laboratory test and Treatment  Discharge Plan A: Home with family, Home Health                  Georgia Ann MD  Department of Hospital Medicine   [place] [facility] (3rd Fl)

## 2022-05-20 NOTE — ASSESSMENT & PLAN NOTE
Last discharge 5/6  with abdominal drain in place and 4 weeks of vanc, rocephin, flagyl, and diflucan  - follow up with general surgery for drain and ID for Abx- ( 10 days since discharge)   Consult pharmacy to adjust vanc- monitor     ventral hernia repair with mesh placement ~20 years ago, no complications until 3/2022 with several hospitalizations leading to drain placement and antibiotics since that time, drain removed 4/29/22   - most recent hospitalization began on 4/30/22 at [facility] and was transferred here on 5/4/22 for surgical and IR evaluation  - was initiated on Vanc and Zosyn at [facility]  - switched to Vanc, Cefepime, Flagyl on admission to our facility given renal function   - blood cultures from 4/30/22 no growth to date   - wound cultures from 3/3 and 5/4 NGTD  - general surgery states patient is not good surgical candidate  - underwent US-guided drain placement 5/4  - general surgery added fluconazole for yeast seen on gram stain of wound Cx  - consulted ID for assistance with Abx: discharge with IV vanc, IV ceftriaxone, PO flagyl, and PO diflucan for at least 2 weeks with close ID follow-up .  -vanc dosing per pharmacy but will lower vanc trough goal to 10  Message sent to ID for guidance as today makes 2 weeks from D/c and last notes per ID read 2-4 weeks of therapy needed outpt >> will send message to Dr Brown. Stopped diflucan due to v tach after 2 weeks of treatment. Holding vanc as trough still 16 and creat elevated. Still on rocephin and flagyl

## 2022-05-20 NOTE — ASSESSMENT & PLAN NOTE
PT consulted .  Per records patient with several hospitalizations in last few months for abdominal wall abscess   - prior to 2 months ago, was still independent, working and running a business  - consulted palliative care to have on board and have OP follow up  · PT>>Gait: Minimal Assistance x ~20 feet with side steps using RW. Decrease stride, dec foot/floor clearance and fatigue easily.

## 2022-05-20 NOTE — ASSESSMENT & PLAN NOTE
BP Readings from Last 3 Encounters:   05/20/22 134/60   05/12/22 (!) 129/58   05/11/22 (!) 110/52     Resume home coreg-at lower dose 3.25mg , hold amlodipine & doxazosin  for now .  Dr. Monterroso ordered Imdur, monitor BP

## 2022-05-20 NOTE — ASSESSMENT & PLAN NOTE
Monitor BUN/SCr.  Monitor I/Os.  Monitor electrolytes.  Avoid non-steroidal anti-inflammatory medications.  Symptoms and labs suggestive of Acute HF; continue with diuretics, monitor renal fx closely   NIK on CKD      renal US today and hold further diuresis- pt not voiding much no obstruction  vanc dosing per pharm>  5/19 renal u/s yesterday does not show obstruction. Placed in gentle IVF and diuretics held. Creat has improved a smidgen   5/20 with very slow fluids creat improving. Also holding vanc till trough 10> pharmacy dosing

## 2022-05-20 NOTE — PLAN OF CARE
Problem: Adult Inpatient Plan of Care  Goal: Plan of Care Review  Outcome: Ongoing, Progressing  Goal: Patient-Specific Goal (Individualized)  Outcome: Ongoing, Progressing  Goal: Absence of Hospital-Acquired Illness or Injury  Outcome: Ongoing, Progressing  Goal: Optimal Comfort and Wellbeing  Outcome: Ongoing, Progressing  Goal: Readiness for Transition of Care  Outcome: Ongoing, Progressing     Problem: Diabetes Comorbidity  Goal: Blood Glucose Level Within Targeted Range  Outcome: Ongoing, Progressing     Problem: Adjustment to Illness (Sepsis/Septic Shock)  Goal: Optimal Coping  Outcome: Ongoing, Progressing     Problem: Bleeding (Sepsis/Septic Shock)  Goal: Absence of Bleeding  Outcome: Ongoing, Progressing     Problem: Glycemic Control Impaired (Sepsis/Septic Shock)  Goal: Blood Glucose Level Within Desired Range  Outcome: Ongoing, Progressing     Problem: Infection Progression (Sepsis/Septic Shock)  Goal: Absence of Infection Signs and Symptoms  Outcome: Ongoing, Progressing     Problem: Nutrition Impaired (Sepsis/Septic Shock)  Goal: Optimal Nutrition Intake  Outcome: Ongoing, Progressing     Problem: Fluid Imbalance (Pneumonia)  Goal: Fluid Balance  Outcome: Ongoing, Progressing     Problem: Infection (Pneumonia)  Goal: Resolution of Infection Signs and Symptoms  Outcome: Ongoing, Progressing     Problem: Respiratory Compromise (Pneumonia)  Goal: Effective Oxygenation and Ventilation  Outcome: Ongoing, Progressing     Problem: Infection  Goal: Absence of Infection Signs and Symptoms  Outcome: Ongoing, Progressing     Problem: Impaired Wound Healing  Goal: Optimal Wound Healing  Outcome: Ongoing, Progressing     Problem: Fall Injury Risk  Goal: Absence of Fall and Fall-Related Injury  Outcome: Ongoing, Progressing     Problem: Skin Injury Risk Increased  Goal: Skin Health and Integrity  Outcome: Ongoing, Progressing     Problem: Fluid and Electrolyte Imbalance (Acute Kidney Injury/Impairment)  Goal: Fluid  and Electrolyte Balance  Outcome: Ongoing, Progressing     Problem: Oral Intake Inadequate (Acute Kidney Injury/Impairment)  Goal: Optimal Nutrition Intake  Outcome: Ongoing, Progressing     Problem: Renal Function Impairment (Acute Kidney Injury/Impairment)  Goal: Effective Renal Function  Outcome: Ongoing, Progressing     Problem: Gas Exchange Impaired  Goal: Optimal Gas Exchange  Outcome: Ongoing, Not Progressing     Problem: Adjustment to Illness (Heart Failure)  Goal: Optimal Coping  Outcome: Ongoing, Progressing     Problem: Cardiac Output Decreased (Heart Failure)  Goal: Optimal Cardiac Output  Outcome: Ongoing, Progressing     Problem: Dysrhythmia (Heart Failure)  Goal: Stable Heart Rate and Rhythm  Outcome: Ongoing, Progressing     Problem: Fluid Imbalance (Heart Failure)  Goal: Fluid Balance  Outcome: Ongoing, Progressing     Problem: Functional Ability Impaired (Heart Failure)  Goal: Optimal Functional Ability  Outcome: Ongoing, Progressing     Problem: Oral Intake Inadequate (Heart Failure)  Goal: Optimal Nutrition Intake  Outcome: Ongoing, Progressing     Problem: Respiratory Compromise (Heart Failure)  Goal: Effective Oxygenation and Ventilation  Outcome: Ongoing, Progressing     Problem: Sleep Disordered Breathing (Heart Failure)  Goal: Effective Breathing Pattern During Sleep  Outcome: Ongoing, Progressing

## 2022-05-20 NOTE — PROGRESS NOTES
"[place] - [facility] ([facility])  Cardiology  Progress Note    Patient Name: Eddie Paraad Sr.  MRN: [id number]  Admission Date: 5/15/2022  Hospital Length of Stay: 4 days  Code Status: DNR   Attending Physician: Joseph Redmond MD   Primary Care Physician: Callum Roberts MD  Expected Discharge Date:   Principal Problem:(HFpEF) heart failure with preserved ejection fraction    Subjective:     Hospital Course: 89 yo wm hx PPM s/p generator replacement 3/22, chronic AF on Xarelto, bio-AVR,  chronic diastolic CHF with difficulty maintaining euvolemia due to waxing and waning renal function due in part to abx therapy from abd wall abscess. Renal function lately averaging creatinine 1.5-1.7.   Echo 5/2/22 showed still preserved EF with AI  normally functioning bioprosthetic aortic valve. Negative bubble study.  Presented to ER via EMS with acute onset SOB/AMAYA/CHF. O2 sat low 70's.  Given O2/lasix IV in ER   Troponin chronically mildly elevated, today 0.043.   EKG shows  rhythm    Interval History: Pt is currently lying in bed in no acute distress. His only complaint is a productive cough and "mucus". He is euvolemic on exam. Wife reports penile edema.     ROS        Constitutional: Negative.   HENT: Negative.    Eyes: Negative.    Cardiovascular: Negative for chest pain.   Respiratory: Productive cough  Endocrine: Negative.    Hematologic/Lymphatic: Negative.    Neurological: Negative.    Psychiatric/Behavioral: Negative.    Objective:     Vital Signs (Most Recent):  Temp: 96.6 °F (35.9 °C) (05/20/22 0716)  Pulse: 69 (05/20/22 0716)  Resp: 18 (05/20/22 0716)  BP: 134/60 (05/20/22 0716)  SpO2: (!) 94 % (05/20/22 0823) Vital Signs (24h Range):  Temp:  [96.6 °F (35.9 °C)-99.8 °F (37.7 °C)] 96.6 °F (35.9 °C)  Pulse:  [59-77] 69  Resp:  [18-20] 18  SpO2:  [91 %-96 %] 94 %  BP: (117-134)/(53-61) 134/60     Weight: 79.6 kg (175 lb 7.8 oz)  Body mass index is 29.2 kg/m².    SpO2: (!) 94 %  O2 Device (Oxygen Therapy): nasal " cannula      Intake/Output Summary (Last 24 hours) at 5/20/2022 0858  Last data filed at 5/19/2022 1750  Gross per 24 hour   Intake 1701.99 ml   Output --   Net 1701.99 ml       Lines/Drains/Airways     Peripherally Inserted Central Catheter Line  Duration           PICC Double Lumen 05/12/22 0845 right basilic 8 days          Drain  Duration                Drain/Device  05/04/22 1618 midline umbilical area pigtail 15 days                Physical Exam     Vitals and nursing note reviewed.   Constitutional:       Appearance: Normal appearance.   Cardiovascular:      Rate and Rhythm: Normal rate and regular rhythm.      Pulses: Normal pulses.      Heart sounds: Normal heart sounds.   Abdominal:      General: Abdomen is flat.   Skin:     General: Skin is warm and dry.   Neurological:      General: No focal deficit present.      Mental Status: He is alert and oriented to person, place, and time.     Significant Labs:   BMP:   Recent Labs   Lab 05/19/22  0541 05/19/22  0542 05/20/22  0600   GLU  --  170* 162*   NA  --  138 138   K  --  3.9 3.8   CL  --  97 99   CO2  --  29 27   BUN  --  55* 47*   CREATININE  --  1.9* 1.7*   CALCIUM  --  8.4* 8.5*   MG 2.2  --   --    , CMP   Recent Labs   Lab 05/19/22  0542 05/20/22  0600    138   K 3.9 3.8   CL 97 99   CO2 29 27   * 162*   BUN 55* 47*   CREATININE 1.9* 1.7*   CALCIUM 8.4* 8.5*   PROT 5.8* 6.1   ALBUMIN 2.5* 2.6*   BILITOT 0.5 0.5   ALKPHOS 111 111   AST 48* 40   ALT 22 22   ANIONGAP 12 12   ESTGFRAFRICA 34* 39*   EGFRNONAA 30* 34*   , CBC   Recent Labs   Lab 05/18/22  1414 05/19/22  0542 05/20/22  0600   WBC 7.03 5.95 5.96   HGB 7.9* 8.0* 8.3*   HCT 24.5* 24.6* 25.9*    191 212    and Troponin No results for input(s): TROPONINI in the last 48 hours.    Significant Imaging: X-Ray: CXR: X-Ray Chest 1 View (CXR):   Results for orders placed or performed during the hospital encounter of 05/15/22   X-Ray Chest 1 View    Narrative    EXAMINATION:  XR CHEST  1 VIEW    CLINICAL HISTORY:  Shortness of breath    TECHNIQUE:  Single frontal view of the chest was performed.    COMPARISON:  05/12/2022.    FINDINGS:  The right-sided PICC line tip is unchanged.  There is stable appearance of left-sided AICD.  There are postoperative changes of median sternotomy.    The trachea is unremarkable.  There is stable enlargement of the cardiomediastinal silhouette.  There is no pleural effusion right.  There is increase in the left-sided pleural effusion.  There is mild pulmonary vascular congestion.  There are new airspace opacities in the left hemithorax.  There are degenerative changes in the osseous structures.      Impression    Increased left-sided pleural effusion along with new airspace opacities in left hemithorax.  Findings suggestive of worsening CHF pattern of pulmonary edema.      Electronically signed by: Abisai Maynard MD  Date:    05/15/2022  Time:    23:12    and X-Ray Chest PA and Lateral (CXR): No results found for this visit on 05/15/22.  Assessment and Plan:       Active Diagnoses:    Diagnosis Date Noted POA    PRINCIPAL PROBLEM:  (HFpEF) heart failure with preserved ejection fraction [I50.30] 06/17/2015 Yes    Counseling regarding advanced directives and goals of care [Z71.89] 05/17/2022 Not Applicable    Debility [R53.81] 05/16/2022 Yes    NIK (acute kidney injury) [N17.9] 03/19/2022 Yes    Abdominal wall abscess [L02.211] 03/05/2022 Yes    Disorder of prostate [N42.9] 03/03/2022 Yes    Hypothyroidism due to acquired atrophy of thyroid [E03.4] 01/20/2020 Yes    Ventricular tachycardia [I47.2] 11/28/2017 Yes    Long term current use of anticoagulant therapy [Z79.01] 09/13/2016 Not Applicable    Type 2 diabetes mellitus, controlled [E11.9] 05/21/2014 Yes    S/P AVR [Z95.2] 02/25/2014 Not Applicable    Mixed dyslipidemia [E78.2] 11/07/2012 Yes    Essential (primary) hypertension [I10] 09/20/2012 Yes    CAD (coronary artery disease) [I25.10] 09/20/2012  Yes    Chronic atrial fibrillation [I48.20] 06/29/2012 Yes      Problems Resolved During this Admission:       VTE Risk Mitigation (From admission, onward)         Ordered     enoxaparin injection 80 mg  Every 24 hours (non-standard times)         05/18/22 1256     IP VTE HIGH RISK PATIENT  Once         05/16/22 0419     Place sequential compression device  Until discontinued         05/16/22 0419              Current Facility-Administered Medications   Medication    0.9%  NaCl infusion    carvediloL tablet 3.125 mg    cefTRIAXone (ROCEPHIN) 1 g/50 mL D5W IVPB    dextrose 10% bolus 250 mL    enoxaparin injection 80 mg    ferrous sulfate tablet 1 each    gabapentin capsule 200 mg    glucagon (human recombinant) injection 1 mg    glucose chewable tablet 16 g    glucose chewable tablet 24 g    insulin aspart U-100 pen 0-5 Units    isosorbide mononitrate 24 hr tablet 30 mg    levothyroxine tablet 88 mcg    melatonin tablet 6 mg    metroNIDAZOLE tablet 500 mg    mupirocin 2 % ointment    pantoprazole EC tablet 40 mg    polyethylene glycol packet 17 g    potassium chloride CR tablet 10 mEq    pravastatin tablet 20 mg    senna tablet 1 tablet    senna-docusate 8.6-50 mg per tablet 1 tablet    sodium chloride 0.9% flush 10 mL    tamsulosin 24 hr capsule 0.4 mg    vancomycin - pharmacy to dose          Acute on chronic diastolic CHF, currently euvolemic, leg edema resolved but with some scrotal edema in bed.  Echo 5/22 EF 55%, normal AVR, 2+ TR, 1-2+ MR, 1+ AI, PAP 48  CKD with recent AKIs--waxing and waning; creatinine elevated but on vanc  Anemia  Chronic AF on Xarelto  s/pPPM  Hx TIA  CAD hx PCI Cfx, LAD, MPI 2017 normal  Thrombocytopenia  Abdominal wall abscess; still receiving Vancomycin, s/p Drain        Plan: Pt remains euvolemic on exam, renal function continues to slowly improve. Consider resuming Xarelto given his chronic afib as previously discussed. He is overall stable from a cardiac  standpoint. Please re-consult as needed.     Bijal Brooks NP   Cardiology  [place] [facility] (3rd Fl)    I have personally interviewed and examined this patient face-to-face, and as the physician. Documented the above plan and rendered all medical decision making for this encounter. I have read and agree with the above documentation unless otherwise noted.

## 2022-05-20 NOTE — SUBJECTIVE & OBJECTIVE
Wife at bedside reports increased fatigue, leg weakness  Review of Systems   Constitutional:  Positive for fatigue. Negative for activity change, fever and unexpected weight change.   HENT:  Negative for congestion, ear pain, hearing loss, rhinorrhea and sore throat.    Eyes:  Negative for pain, redness and visual disturbance.   Respiratory:  Negative for cough, shortness of breath and wheezing.    Cardiovascular:  Negative for chest pain, palpitations and leg swelling.   Gastrointestinal:  Negative for abdominal pain, constipation, diarrhea, nausea and vomiting.   Genitourinary:  Negative for decreased urine volume, dysuria, frequency and urgency.   Musculoskeletal:  Negative for back pain, joint swelling and neck pain.   Skin:  Negative for color change, rash and wound.   Neurological:  Positive for weakness. Negative for dizziness, tremors, light-headedness and headaches.   Objective:     Vital Signs (Most Recent):  Temp: 96.6 °F (35.9 °C) (05/20/22 0716)  Pulse: 69 (05/20/22 0716)  Resp: 18 (05/20/22 0716)  BP: 134/60 (05/20/22 0716)  SpO2: (!) 94 % (05/20/22 0823)   Vital Signs (24h Range):  Temp:  [96.6 °F (35.9 °C)-99.8 °F (37.7 °C)] 96.6 °F (35.9 °C)  Pulse:  [59-77] 69  Resp:  [18-20] 18  SpO2:  [91 %-96 %] 94 %  BP: (117-134)/(53-61) 134/60     Weight: 79.6 kg (175 lb 7.8 oz)  Body mass index is 29.2 kg/m².    Physical Exam  Vitals and nursing note reviewed.   Constitutional:       General: He is not in acute distress.     Appearance: He is well-developed.   HENT:      Head: Normocephalic and atraumatic.      Right Ear: External ear normal.      Left Ear: External ear normal.   Eyes:      General:         Right eye: No discharge.         Left eye: No discharge.   Neck:      Thyroid: No thyromegaly.   Cardiovascular:      Rate and Rhythm: Normal rate and regular rhythm.      Heart sounds: No murmur heard.  Pulmonary:      Effort: Pulmonary effort is normal. No respiratory distress.      Breath sounds: No  wheezing or rales.   Abdominal:      General: Bowel sounds are normal. There is no distension.      Palpations: Abdomen is soft.      Tenderness: There is no abdominal tenderness.      Comments: Drain in place.   Musculoskeletal:      Right lower leg: No edema.      Left lower leg: No edema.   Skin:     General: Skin is warm and dry.      Comments: PICC to right UE, dressing c/d/i           Significant Labs:   A1C:   Recent Labs   Lab 22  0744 22  1046   HGBA1C 5.9* 6.0*         Bilirubin:   Recent Labs   Lab 22  0513 05/15/22  2242 22  0642 22  0542 22  0600   BILITOT 0.6 0.7 0.6 0.5 0.5       CBC:   Recent Labs   Lab 22  1414 22  0542 22  0600   WBC 7.03 5.95 5.96   HGB 7.9* 8.0* 8.3*   HCT 24.5* 24.6* 25.9*    191 212       CMP:   Recent Labs   Lab 22  0542 22  0600    138   K 3.9 3.8   CL 97 99   CO2 29 27   * 162*   BUN 55* 47*   CREATININE 1.9* 1.7*   CALCIUM 8.4* 8.5*   PROT 5.8* 6.1   ALBUMIN 2.5* 2.6*   BILITOT 0.5 0.5   ALKPHOS 111 111   AST 48* 40   ALT 22 22   ANIONGAP 12 12   EGFRNONAA 30* 34*       Lab Results   Component Value Date    DDIMER 2.25 (H) 05/15/2022     Lactate 1.3    BNP  Recent Labs   Lab 05/15/22  2242   *         Recent Labs   Lab 22  1613 22  2035 22  0605   POCTGLUCOSE 208* 229* 195*       Troponin: 0.044>0.048>0.043>0.037    TSH:   Recent Labs   Lab 22  0541   TSH 8.559*         Covid negative   Flu negative     Significant Imagin/18 US renal Technically limited study with known renal cysts.  No evidence of obstructive uropathy     CXR Cardiomegaly with mild CHF.     Left lower lobe atelectasis, small left pleural effusion.    LE US -No evidence of deep venous thrombosis in either lower extremity.    5/15 CXR- Increased left-sided pleural effusion along with new airspace opacities in left hemithorax.  Findings suggestive of worsening CHF pattern of  pulmonary edema.    EKG Likely ventricular paced rhythm  - morphology suggestive of biventricular   pacing   Abnormal ECG   When compared with ECG of 30-APR-2022 10:39,   Premature ventricular complexes are no longer Present   Confirmed by Adam Mcmahon MD ([id number]) on 5/16/2022 8:11:55 AM

## 2022-05-20 NOTE — PT/OT/SLP PROGRESS
"Physical Therapy Treatment    Patient Name:  Eddie Parada Sr.   MRN:  [id number]    Recommendations:     Discharge Recommendations:  home health PT, home with home health   Discharge Equipment Recommendations: none   Barriers to discharge: None    Assessment:     Eddie Parada Sr. is a 89 y.o. male admitted with a medical diagnosis of (HFpEF) heart failure with preserved ejection fraction.  He presents with the following impairments/functional limitations:  weakness, impaired self care skills, impaired endurance, impaired functional mobilty, gait instability, impaired cardiopulmonary response to activity, decreased lower extremity function, impaired balance. Patient participated well and  tolerated PT session today. Completed gait functions with portable O2 using RW x ~25 feet. Maintained SPO2 at 97%.    Rehab Prognosis: Fair; patient would benefit from acute skilled PT services to address these deficits and reach maximum level of function.    Recent Surgery: * No surgery found *      Plan:     During this hospitalization, patient to be seen 5 x/week to address the identified rehab impairments via gait training, therapeutic activities, therapeutic exercises and progress toward the following goals:    · Plan of Care Expires:  05/23/22    Subjective     Chief Complaint: No complain   Patient/Family Comments/goals: "To return home".  Pain/Comfort:  · Pain Rating 1: 0/10      Objective:     Communicated with patient and wife prior to session.  Patient found HOB elevated with PICC line, oxygen, IMELDA drain, telemetry upon PT entry to room.     General Precautions: Standard, fall   Orthopedic Precautions:N/A   Braces: N/A  Respiratory Status: Nasal cannula, flow 4 L/min     Functional Mobility:  · Bed Mobility:     · Rolling Left:  modified independence  · Rolling Right: modified independence  · Scooting: supervision  · Supine to Sit: stand by assistance  · Sit to Supine: stand by assistance  · Transfers:     · Sit to " Stand:  contact guard assistance with rolling walker  · Bed to Chair: contact guard assistance with  rolling walker  using  Stand Pivot  · Gait: Minimal / Contact Guard Assistance x ~25 feet with RW and O2 supplement Noted unsteady with shaking legs.      AM-PAC 6 CLICK MOBILITY  Turning over in bed (including adjusting bedclothes, sheets and blankets)?: 4  Sitting down on and standing up from a chair with arms (e.g., wheelchair, bedside commode, etc.): 3  Moving from lying on back to sitting on the side of the bed?: 3  Moving to and from a bed to a chair (including a wheelchair)?: 3  Need to walk in hospital room?: 3  Climbing 3-5 steps with a railing?: 3  Basic Mobility Total Score: 19       Therapeutic Activities and Exercises:   Worked on out of bed activity, standing and transfer activity with RW and gait trng with RW x ~25 feet with min/contact guard assistance.    Patient left up in chair with all lines intact, call button in reach, nursing  notified and wife present..    GOALS:   Multidisciplinary Problems     Physical Therapy Goals        Problem: Physical Therapy    Goal Priority Disciplines Outcome Goal Variances Interventions   Physical Therapy Goal     PT, PT/OT Ongoing, Progressing     Description:   Patient will increase functional independence with mobility by performin. Supine to sit with Modified Independent  2. Sit to supine with Modified Independent  3. Bed to chair transfer with Supervision or Set-up Assistancewith or without rolling walker using Step Transfer TECHNIQUE  4. Gait  x ~100  feet with Supervision or Set-up Assistance with or without rolling walker  5. Lower extremity exercise program x10 reps per handout, with assistance as needed                      Time Tracking:     PT Received On: 22  PT Start Time: 932     PT Stop Time: 950  PT Total Time (min): 18 min     Billable Minutes: Therapeutic Activity 18 mins    Treatment Type: Treatment  PT/PTA: PT            05/20/2022

## 2022-05-21 PROBLEM — J44.9 COPD (CHRONIC OBSTRUCTIVE PULMONARY DISEASE): Status: ACTIVE | Noted: 2022-05-21

## 2022-05-21 PROBLEM — J90 CHRONIC BILATERAL PLEURAL EFFUSIONS: Status: ACTIVE | Noted: 2022-05-21

## 2022-05-21 PROBLEM — J96.11 CHRONIC RESPIRATORY FAILURE WITH HYPOXIA: Status: ACTIVE | Noted: 2022-05-21

## 2022-05-21 LAB
ALBUMIN SERPL BCP-MCNC: 2.7 G/DL (ref 3.5–5.2)
ALLENS TEST: ABNORMAL
ALP SERPL-CCNC: 118 U/L (ref 55–135)
ALT SERPL W/O P-5'-P-CCNC: 21 U/L (ref 10–44)
ANION GAP SERPL CALC-SCNC: 12 MMOL/L (ref 8–16)
AST SERPL-CCNC: 37 U/L (ref 10–40)
BASOPHILS # BLD AUTO: 0.04 K/UL (ref 0–0.2)
BASOPHILS NFR BLD: 0.7 % (ref 0–1.9)
BILIRUB SERPL-MCNC: 0.5 MG/DL (ref 0.1–1)
BUN SERPL-MCNC: 49 MG/DL (ref 10–30)
CALCIUM SERPL-MCNC: 8.7 MG/DL (ref 8.7–10.5)
CHLORIDE SERPL-SCNC: 101 MMOL/L (ref 95–110)
CO2 SERPL-SCNC: 26 MMOL/L (ref 23–29)
CREAT SERPL-MCNC: 1.6 MG/DL (ref 0.5–1.4)
DELSYS: ABNORMAL
DIFFERENTIAL METHOD: ABNORMAL
EOSINOPHIL # BLD AUTO: 0.1 K/UL (ref 0–0.5)
EOSINOPHIL NFR BLD: 1.2 % (ref 0–8)
ERYTHROCYTE [DISTWIDTH] IN BLOOD BY AUTOMATED COUNT: 14.6 % (ref 11.5–14.5)
EST. GFR  (AFRICAN AMERICAN): 42 ML/MIN/1.73 M^2
EST. GFR  (NON AFRICAN AMERICAN): 37 ML/MIN/1.73 M^2
FIO2: 21 (ref 21–100)
GLUCOSE SERPL-MCNC: 161 MG/DL (ref 70–110)
HCO3 UR-SCNC: 30.6 MMOL/L
HCT VFR BLD AUTO: 26.7 % (ref 40–54)
HGB BLD-MCNC: 8.5 G/DL (ref 14–18)
IMM GRANULOCYTES # BLD AUTO: 0.02 K/UL (ref 0–0.04)
IMM GRANULOCYTES NFR BLD AUTO: 0.4 % (ref 0–0.5)
LYMPHOCYTES # BLD AUTO: 1.2 K/UL (ref 1–4.8)
LYMPHOCYTES NFR BLD: 20.4 % (ref 18–48)
MCH RBC QN AUTO: 32.8 PG (ref 27–31)
MCHC RBC AUTO-ENTMCNC: 31.8 G/DL (ref 32–36)
MCV RBC AUTO: 103 FL (ref 82–98)
MONOCYTES # BLD AUTO: 0.9 K/UL (ref 0.3–1)
MONOCYTES NFR BLD: 16.5 % (ref 4–15)
NEUTROPHILS # BLD AUTO: 3.4 K/UL (ref 1.8–7.7)
NEUTROPHILS NFR BLD: 60.8 % (ref 38–73)
NRBC BLD-RTO: 0 /100 WBC
PCO2 BLDA: 44 MMHG (ref 35–45)
PH SMN: 7.45 [PH] (ref 7.35–7.45)
PLATELET # BLD AUTO: 214 K/UL (ref 150–450)
PMV BLD AUTO: 9.8 FL (ref 9.2–12.9)
PO2 BLDA: 49 MMHG (ref 75–100)
POC BE: 6 MMOL/L (ref -2–2)
POC COHB: 3 % (ref 0–3)
POC METHB: 0.5 % (ref 0–1.5)
POC O2HB ARTERIAL: 83.5 % (ref 94–100)
POC SATURATED O2: 86.5 % (ref 90–100)
POC TCO2: 32 MMOL/L
POC THB: 9 G/DL (ref 12–18)
POCT GLUCOSE: 162 MG/DL (ref 70–110)
POCT GLUCOSE: 182 MG/DL (ref 70–110)
POCT GLUCOSE: 204 MG/DL (ref 70–110)
POCT GLUCOSE: 244 MG/DL (ref 70–110)
POTASSIUM SERPL-SCNC: 3.9 MMOL/L (ref 3.5–5.1)
PROT SERPL-MCNC: 6.2 G/DL (ref 6–8.4)
RBC # BLD AUTO: 2.59 M/UL (ref 4.6–6.2)
SITE: ABNORMAL
SODIUM SERPL-SCNC: 139 MMOL/L (ref 136–145)
VANCOMYCIN SERPL-MCNC: 12.8 UG/ML
WBC # BLD AUTO: 5.63 K/UL (ref 3.9–12.7)

## 2022-05-21 PROCEDURE — 99233 PR SUBSEQUENT HOSPITAL CARE,LEVL III: ICD-10-PCS | Mod: ,,, | Performed by: INTERNAL MEDICINE

## 2022-05-21 PROCEDURE — 63600175 PHARM REV CODE 636 W HCPCS: Performed by: FAMILY MEDICINE

## 2022-05-21 PROCEDURE — 85025 COMPLETE CBC W/AUTO DIFF WBC: CPT | Performed by: NURSE PRACTITIONER

## 2022-05-21 PROCEDURE — 80202 ASSAY OF VANCOMYCIN: CPT | Performed by: FAMILY MEDICINE

## 2022-05-21 PROCEDURE — 25000003 PHARM REV CODE 250

## 2022-05-21 PROCEDURE — 63600175 PHARM REV CODE 636 W HCPCS: Performed by: NURSE PRACTITIONER

## 2022-05-21 PROCEDURE — 25000003 PHARM REV CODE 250: Performed by: NURSE PRACTITIONER

## 2022-05-21 PROCEDURE — 27000221 HC OXYGEN, UP TO 24 HOURS

## 2022-05-21 PROCEDURE — 25000003 PHARM REV CODE 250: Performed by: FAMILY MEDICINE

## 2022-05-21 PROCEDURE — 94761 N-INVAS EAR/PLS OXIMETRY MLT: CPT

## 2022-05-21 PROCEDURE — 36600 WITHDRAWAL OF ARTERIAL BLOOD: CPT

## 2022-05-21 PROCEDURE — 80053 COMPREHEN METABOLIC PANEL: CPT | Performed by: NURSE PRACTITIONER

## 2022-05-21 PROCEDURE — 11000001 HC ACUTE MED/SURG PRIVATE ROOM

## 2022-05-21 PROCEDURE — 82803 BLOOD GASES ANY COMBINATION: CPT | Performed by: INTERNAL MEDICINE

## 2022-05-21 PROCEDURE — 99233 SBSQ HOSP IP/OBS HIGH 50: CPT | Mod: ,,, | Performed by: INTERNAL MEDICINE

## 2022-05-21 RX ORDER — SODIUM CHLORIDE 0.9 % (FLUSH) 0.9 %
3 SYRINGE (ML) INJECTION
Status: DISCONTINUED | OUTPATIENT
Start: 2022-05-21 | End: 2022-05-24 | Stop reason: HOSPADM

## 2022-05-21 RX ORDER — LIDOCAINE HYDROCHLORIDE 10 MG/ML
10 INJECTION, SOLUTION EPIDURAL; INFILTRATION; INTRACAUDAL; PERINEURAL
Status: DISPENSED | OUTPATIENT
Start: 2022-05-22 | End: 2022-05-22

## 2022-05-21 RX ORDER — LIDOCAINE HYDROCHLORIDE 10 MG/ML
10 INJECTION, SOLUTION EPIDURAL; INFILTRATION; INTRACAUDAL; PERINEURAL
Status: DISCONTINUED | OUTPATIENT
Start: 2022-05-21 | End: 2022-05-21

## 2022-05-21 RX ADMIN — POLYETHYLENE GLYCOL (3350) 17 G: 17 POWDER, FOR SOLUTION ORAL at 08:05

## 2022-05-21 RX ADMIN — INSULIN ASPART 2 UNITS: 100 INJECTION, SOLUTION INTRAVENOUS; SUBCUTANEOUS at 04:05

## 2022-05-21 RX ADMIN — FERROUS SULFATE TAB 325 MG (65 MG ELEMENTAL FE) 1 EACH: 325 (65 FE) TAB at 08:05

## 2022-05-21 RX ADMIN — METRONIDAZOLE 500 MG: 500 TABLET ORAL at 06:05

## 2022-05-21 RX ADMIN — SODIUM CHLORIDE: 0.9 INJECTION, SOLUTION INTRAVENOUS at 08:05

## 2022-05-21 RX ADMIN — LEVOTHYROXINE SODIUM 88 MCG: 88 TABLET ORAL at 05:05

## 2022-05-21 RX ADMIN — GABAPENTIN 200 MG: 100 CAPSULE ORAL at 09:05

## 2022-05-21 RX ADMIN — CARVEDILOL 3.12 MG: 3.12 TABLET, FILM COATED ORAL at 08:05

## 2022-05-21 RX ADMIN — TAMSULOSIN HYDROCHLORIDE 0.4 MG: 0.4 CAPSULE ORAL at 08:05

## 2022-05-21 RX ADMIN — METRONIDAZOLE 500 MG: 500 TABLET ORAL at 09:05

## 2022-05-21 RX ADMIN — INSULIN ASPART 2 UNITS: 100 INJECTION, SOLUTION INTRAVENOUS; SUBCUTANEOUS at 12:05

## 2022-05-21 RX ADMIN — SENNOSIDES 1 TABLET: 8.6 TABLET, FILM COATED ORAL at 08:05

## 2022-05-21 RX ADMIN — PANTOPRAZOLE SODIUM 40 MG: 40 TABLET, DELAYED RELEASE ORAL at 08:05

## 2022-05-21 RX ADMIN — VANCOMYCIN HYDROCHLORIDE 1250 MG: 1.25 INJECTION, POWDER, LYOPHILIZED, FOR SOLUTION INTRAVENOUS at 08:05

## 2022-05-21 RX ADMIN — CEFTRIAXONE 1 G: 1 INJECTION, SOLUTION INTRAVENOUS at 08:05

## 2022-05-21 RX ADMIN — CARVEDILOL 3.12 MG: 3.12 TABLET, FILM COATED ORAL at 04:05

## 2022-05-21 RX ADMIN — ENOXAPARIN SODIUM 80 MG: 80 INJECTION SUBCUTANEOUS at 03:05

## 2022-05-21 RX ADMIN — ISOSORBIDE MONONITRATE 30 MG: 30 TABLET, EXTENDED RELEASE ORAL at 08:05

## 2022-05-21 RX ADMIN — METRONIDAZOLE 500 MG: 500 TABLET ORAL at 03:05

## 2022-05-21 RX ADMIN — PRAVASTATIN SODIUM 20 MG: 20 TABLET ORAL at 09:05

## 2022-05-21 RX ADMIN — POTASSIUM CHLORIDE 10 MEQ: 750 TABLET, FILM COATED, EXTENDED RELEASE ORAL at 08:05

## 2022-05-21 NOTE — PLAN OF CARE
Problem: Adult Inpatient Plan of Care  Goal: Plan of Care Review  Outcome: Ongoing, Progressing  Goal: Patient-Specific Goal (Individualized)  Outcome: Ongoing, Progressing  Goal: Absence of Hospital-Acquired Illness or Injury  Outcome: Ongoing, Progressing  Goal: Optimal Comfort and Wellbeing  Outcome: Ongoing, Progressing  Goal: Readiness for Transition of Care  Outcome: Ongoing, Progressing     Problem: Diabetes Comorbidity  Goal: Blood Glucose Level Within Targeted Range  Outcome: Ongoing, Progressing     Problem: Adjustment to Illness (Sepsis/Septic Shock)  Goal: Optimal Coping  Outcome: Ongoing, Progressing     Problem: Bleeding (Sepsis/Septic Shock)  Goal: Absence of Bleeding  Outcome: Ongoing, Progressing     Problem: Glycemic Control Impaired (Sepsis/Septic Shock)  Goal: Blood Glucose Level Within Desired Range  Outcome: Ongoing, Progressing     Problem: Infection Progression (Sepsis/Septic Shock)  Goal: Absence of Infection Signs and Symptoms  Outcome: Ongoing, Progressing     Problem: Nutrition Impaired (Sepsis/Septic Shock)  Goal: Optimal Nutrition Intake  Outcome: Ongoing, Progressing     Problem: Fluid Imbalance (Pneumonia)  Goal: Fluid Balance  Outcome: Ongoing, Progressing     Problem: Infection (Pneumonia)  Goal: Resolution of Infection Signs and Symptoms  Outcome: Ongoing, Progressing     Problem: Respiratory Compromise (Pneumonia)  Goal: Effective Oxygenation and Ventilation  Outcome: Ongoing, Progressing     Problem: Infection  Goal: Absence of Infection Signs and Symptoms  Outcome: Ongoing, Progressing     Problem: Impaired Wound Healing  Goal: Optimal Wound Healing  Outcome: Ongoing, Progressing     Problem: Fall Injury Risk  Goal: Absence of Fall and Fall-Related Injury  Outcome: Ongoing, Progressing     Problem: Skin Injury Risk Increased  Goal: Skin Health and Integrity  Outcome: Ongoing, Progressing     Problem: Fluid and Electrolyte Imbalance (Acute Kidney Injury/Impairment)  Goal: Fluid  and Electrolyte Balance  Outcome: Ongoing, Progressing     Problem: Oral Intake Inadequate (Acute Kidney Injury/Impairment)  Goal: Optimal Nutrition Intake  Outcome: Ongoing, Progressing     Problem: Renal Function Impairment (Acute Kidney Injury/Impairment)  Goal: Effective Renal Function  Outcome: Ongoing, Progressing     Problem: Gas Exchange Impaired  Goal: Optimal Gas Exchange  Outcome: Ongoing, Progressing     Problem: Adjustment to Illness (Heart Failure)  Goal: Optimal Coping  Outcome: Ongoing, Progressing     Problem: Cardiac Output Decreased (Heart Failure)  Goal: Optimal Cardiac Output  Outcome: Ongoing, Progressing     Problem: Dysrhythmia (Heart Failure)  Goal: Stable Heart Rate and Rhythm  Outcome: Ongoing, Progressing     Problem: Fluid Imbalance (Heart Failure)  Goal: Fluid Balance  Outcome: Ongoing, Progressing     Problem: Functional Ability Impaired (Heart Failure)  Goal: Optimal Functional Ability  Outcome: Ongoing, Progressing     Problem: Oral Intake Inadequate (Heart Failure)  Goal: Optimal Nutrition Intake  Outcome: Ongoing, Progressing     Problem: Respiratory Compromise (Heart Failure)  Goal: Effective Oxygenation and Ventilation  Outcome: Ongoing, Progressing     Problem: Sleep Disordered Breathing (Heart Failure)  Goal: Effective Breathing Pattern During Sleep  Outcome: Ongoing, Progressing     Thoracentesis scheduled for tomorrow. NPO at midnight. NaCl @ 50ml/hr. Vanc daily. Rocephin daily. Diflucan daily.  4L per NC. BiPAP at night.

## 2022-05-21 NOTE — ASSESSMENT & PLAN NOTE
Advance Care Planning     Living Will  During this visit, I engaged the patient and family  in the advance care planning process.  The patient and I reviewed the role for advance directives and their purpose in directing future healthcare if the patient's unable to speak for him/herself.  At this point in time, the patient does have full decision-making capacity.  We discussed different extreme health states that he could experience, and reviewed what kind of medical care he would want in those situations.  The patient and family communicated that if he were comatose and had little chance of a meaningful recovery, he would not want machines/life-sustaining treatments used. In addition to the above preference, other important end-of-life issues for the patient include . The patient has completed a living will to reflect these preferences.  I spent a total of 20 minutes engaging the patient in this advance care planning discussion.Discussed with wife in person and daughter via telephone           Today a meeting took place: bedside    Patient Participation: Patient is able to participate     Attendees (Name and  Relationship to patient):also dicussed with wife and daughter    Staff attendees (Name and  Role): [place] VERMA, xochitl Jules RN case manage   Code Status: DNR; status confirmed/order placed in chart    ACP Documents: Other Documents (specify): living will     Goals of care: The patient and family endorses that what is most important right now is to focus on symptom/pain control, quality of life, even if it means sacrificing a little time and improvement in condition but with limits to invasive therapies    Accordingly, we have decided that the best plan to meet the patient's goals includes continuing with treatment      Recommendations/Length of ACP   conversation in minutes: 20 minutes      Code Status  In light of the patients advanced and life limiting illness,I engaged the the patient and family in a  conversation about the patient's preferences for care  at the very end of life. The patient wishes to have a natural, peaceful death.  Along those lines, the patient does not wish to have CPR or other invasive treatments performed when his heart and/or breathing stops. I communicated to the patient and family that a DNR order would be placed in his medical record to reflect this preference.  I spent a total of 20 minutes engaging the patient in this advance care planning discussion.         5/21: again addressed goals of care with patient, wife and daughter on the phone. They are discussing wishes.

## 2022-05-21 NOTE — ASSESSMENT & PLAN NOTE
5/2/22 Echo-   · The estimated ejection fraction is 55%.  · Indeterminate left ventricular diastolic function.  · Moderate right ventricular enlargement with mildly reduced right ventricular systolic function.  · Mild aortic regurgitation.  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 There is aortic insufficiency present. Prosthetic aortic valve is normal.  · The aortic valve mean gradient is 6 mmHg with a dimensionless index of 0.64.  · Mild mitral regurgitation.  · Mild tricuspid regurgitation.  · There is moderate pulmonary hypertension.       Increased SOB and hypoxia. Diuresis initiated. CIS following. Strict I/O. Low Na diet.    Stop lasix today. Creat rising and patient not urinating much- check renal u/s today considering fluids     5/19 was started on minimal fluids last night NS at 50ml/hr creat improved 2.1>1.9- hold further diuresis today     5/21 Cont to hold diurese and creat continues to improve. On BB and Isorbide, on 50cc/hr IVF which is minimal  CXR shows large left effusion with atelectasis; maybe thoracentesis would be helpful from a respiratory standpoint.

## 2022-05-21 NOTE — ASSESSMENT & PLAN NOTE
Last discharge 5/6  with abdominal drain in place and 4 weeks of vanc, rocephin, flagyl, and diflucan  - follow up with general surgery for drain and ID for Abx- ( 10 days since discharge)   Consult pharmacy to adjust vanc- monitor     ventral hernia repair with mesh placement ~20 years ago, no complications until 3/2022 with several hospitalizations leading to drain placement and antibiotics since that time, drain removed 4/29/22   - most recent hospitalization began on 4/30/22 at [facility] and was transferred here on 5/4/22 for surgical and IR evaluation  - was initiated on Vanc and Zosyn at [facility]  - switched to Vanc, Cefepime, Flagyl on admission to our facility given renal function   - blood cultures from 4/30/22 no growth to date   - wound cultures from 3/3 and 5/4 NGTD  - general surgery states patient is not good surgical candidate  - underwent US-guided drain placement 5/4  - general surgery added fluconazole for yeast seen on gram stain of wound Cx  - consulted ID for assistance with Abx: discharge with IV vanc, IV ceftriaxone, PO flagyl, and PO diflucan for at least 2 weeks with close ID follow-up .  -vanc dosing per pharmacy but will lower vanc trough goal to 10  Message sent to ID for guidance as today makes 2 weeks from D/c and last notes per ID read 2-4 weeks of therapy needed outpt >> will send message to Dr Brown. Stopped diflucan due to v tach after 2 weeks of treatment. Holding vanc as trough still 16 and creat elevated. Still on rocephin and flagyl    5/21: cont vanc, rocephin and flagyl for now  ID rec repeat CT to see if resolved. If so, can stop antibx. No output from drain. However with Cr still above baseline risk further nephrotoxicity with contrast for CT. Will make final decision on contrasted study tomorrow AM after repeat labs.

## 2022-05-21 NOTE — ASSESSMENT & PLAN NOTE
BP Readings from Last 3 Encounters:   05/21/22 127/61   05/12/22 (!) 129/58   05/11/22 (!) 110/52     Resume home coreg-at lower dose 3.25mg , hold amlodipine & doxazosin  for now .  Dr. Monterroso ordered Imdur, monitor BP

## 2022-05-21 NOTE — PROGRESS NOTES
Pharmacokinetic Assessment Follow Up: IV Vancomycin    Vancomycin serum concentration assessment(s):    The random level was drawn correctly and can be used to guide therapy at this time. The measurement is within the desired definitive target range of 10 to 20 mcg/mL.    Vancomycin Regimen Plan:  Give Vancomycin 15 mg/kg IV once, then   Re-dose when the random level is less than 20 mcg/mL, next level to be drawn at 0700 on 05/22/22    Drug levels (last 3 results):  Recent Labs   Lab Result Units 05/19/22  0801 05/20/22  0600 05/21/22  0545   Vancomycin, Random ug/mL 18.5  --  12.8   Vancomycin-Trough ug/mL  --  16.2  --        Pharmacy will continue to follow and monitor vancomycin.    Please contact pharmacy at extension [phone] for questions regarding this assessment.    Thank you for the consult,   Parisa Lauren       Patient brief summary:  Eddie Parada Sr. is a 89 y.o. male initiated on antimicrobial therapy with IV Vancomycin for treatment of bacteremia    The patient's current regimen is Vancomyvin 1250 mg IV once    Drug Allergies:   Review of patient's allergies indicates:   Allergen Reactions    Ciprofloxacin     Levofloxacin Swelling    Lyrica [pregabalin] Swelling    Unable to assess Other (See Comments)     Soft shell crabs       Actual Body Weight:   79.6 kg    Renal Function:   Estimated Creatinine Clearance: 28 mL/min (A) (based on SCr of 1.6 mg/dL (H)).,     Dialysis Method (if applicable):  N/A    CBC (last 72 hours):  Recent Labs   Lab Result Units 05/18/22  1414 05/19/22  0542 05/20/22  0600 05/21/22  0545   WBC K/uL 7.03 5.95 5.96 5.63   Hemoglobin g/dL 7.9* 8.0* 8.3* 8.5*   Hematocrit % 24.5* 24.6* 25.9* 26.7*   Platelets K/uL 207 191 212 214   Gran % % 66.6 64.5 61.5 60.8   Lymph % % 19.1 20.3 21.6 20.4   Mono % % 12.2 12.9 14.4 16.5*   Eosinophil % % 1.1 1.3 1.3 1.2   Basophil % % 0.6 0.5 0.5 0.7   Differential Method  Automated Automated Automated Automated       Metabolic Panel (last  72 hours):  Recent Labs   Lab Result Units 05/19/22  0541 05/19/22  0542 05/20/22  0600 05/21/22  0545   Sodium mmol/L  --  138 138 139   Potassium mmol/L  --  3.9 3.8 3.9   Chloride mmol/L  --  97 99 101   CO2 mmol/L  --  29 27 26   Glucose mg/dL  --  170* 162* 161*   BUN mg/dL  --  55* 47* 49*   Creatinine mg/dL  --  1.9* 1.7* 1.6*   Albumin g/dL  --  2.5* 2.6* 2.7*   Total Bilirubin mg/dL  --  0.5 0.5 0.5   Alkaline Phosphatase U/L  --  111 111 118   AST U/L  --  48* 40 37   ALT U/L  --  22 22 21   Magnesium mg/dL 2.2  --   --   --        Vancomycin Administrations:  vancomycin given in the last 96 hours        No antibiotic orders with administrations found.                    Microbiologic Results:  Microbiology Results (last 7 days)       Procedure Component Value Units Date/Time    Influenza A & B by Molecular [[id number]] Collected: 05/15/22 2300    Order Status: Completed Specimen: Nasopharyngeal Swab Updated: 05/15/22 2340     Influenza A, Molecular Negative     Influenza B, Molecular Negative     Flu A & B Source Nasal swab

## 2022-05-21 NOTE — ASSESSMENT & PLAN NOTE
Continue levothyroxine .  Repeat tsh today as last one was elevated on chart and pt has short run v tach overnight   Increase synthroid to 75mcg>88mcg 5/19

## 2022-05-21 NOTE — PROGRESS NOTES
[place] - [facility] ([facility])  [facility] Medicine  Progress Note    Patient Name: Eddie Parada Sr.  MRN: [id number]  Patient Class: IP- Inpatient   Admission Date: 5/15/2022  Length of Stay: 5 days  Attending Physician: Joseph Redmond MD  Primary Care Provider: Callum Roberts MD        Subjective:     Principal Problem:(HFpEF) heart failure with preserved ejection fraction        HPI:  Eddie Parada Sr. is a 89 y.o. male with known atrial fibrillation, aortic stenosis s/p repair,  CAD, CHF, COPD, diabetes mellitus type 2, hyperlipidemia,  CVA and ongoing complications associated of an anterior abdominal wall mass secondary to ventral hernia mesh placed ~20 years ago who presented to ER with shortness of breath since just prior to arrival.  While sitting on a sofa at home, he suddenly began short of breath.  Per wife, he has been compliant with all of his home medication.  Endorses leg swelling.   Denies chest pain, diaphoresis.   BNP> 500, CXR demonstrating pulmonary edema, Creat elevated at 2.1- (Baseline creat 1.0-1.7)   D- dimer elevated, LE US negative for DVT, TNI- 0.044>0.048>0.043   H&H 9.1/27.9>8.6>26.2     He is s/p Drain recently removed prior to presentation, with return of symptoms, re-accumulation of fluid. General surgery was consulted who states patient is not a good surgical candidate. Patient underwent US-guided drain placement with IR 5/4, which he tolerated well. Initiated BSA with vanc, cefepime, flagyl, and azithromycin (for atypicals/CAP). Consulted ID for long-term IV Abx who recommends discharging patient on vanc, ceftriaxone, PO flagyl, and PO diflucan for at least 2 weeks and close follow-up with ID for decision making based on clinical course. Because patient has had several hospitalizations in last few months due to complications from abdominal wall abscess, consulted palliative care to have on board and to establish outpatient follow-up. Patient was discharged home with  abdominal drain in place, 4 weeks of vanc, ceftriaxone, PO flagyl, and PO diflucan, and close follow-up with ID, general surgery, palliative care, and home health. Patient remained afebrile and without abdominal pain, leukocytosis, or other issues during hospital stay.          Overview/Hospital Course:  5/17 89 YO WM receiving multiple home abx including vancomycin at home for abdominal abscess, presented with acute SOB, Heart failure, getting lasix 20mg IV q 12, not much diuresis, intake and output reflecting + 270  Noting more edema to arms, below eyes , wife noting more confusion when he wakes up   + NIK on CKD; Creat 2.1; K+ 3.6   O2 sat 93-94% on 3LNC , not on home oxygen,   H&H 8.4/25.6 trending down ; NOAC stopped per cards after dose yesterday   Decreased appetite, Boost ordered,   End of life care discussed ; pt does not have living will .  He will sign living will .  He will be DNR .  I also talked to wife by patients bedside and daughter on phone ;answered all questions.  Spent about 20 minutes  . Later on signed living will     5/18 Pt was admitted with heart failure with elevated BNP and some lower ext swelling. He was given 60 mg IV lasix 5/16 and then 40mg 5/17. He has had fluctuating renal function 2 weeks ago Creat on admit 2.1.>> 2.2 today. BNP was elevated  on admission and is not putting out much urine.Dr Monterroso was consulted. Troponin chronically elevated. Echo 55% mild heart failure and mod pulm htn noted. Holding xarel;to for now due to h/h dropping. 9>8. D dimer was elevated u/s legs no clot     Pt was currently being treated at home for abd wall abscess s/p hernia repair. Per Id was on vanc and rocephin via PICC and PO flagyl and diflucan outpt- continued while here. Still has drain from abd wall abscess, not much drainage  Serosanguinous     5/19 pt was being treated for flash pulm edema with lasix 60mg IV 5/16 and 40mg IV 5/17. Yesterday diuretics were held as creat was elevated and not  improving with diureses. Also noted not making much urine. Renal u/s done with no obstruction noted. Was started on NS at 50ml/hr last night and creat is better this am 2.1>1.9. Looks like his renal fucntion fluctuates but 2 weeks ago had creat 1.0. He remains on vanc and rocephin IV and diflucan and flagyl po per ID from abd abscess (still with drain)- today makes 2 weeks since d/c from [place]. Awaiting guidance on if he will need 2 or 4 weeks of these abx. Had a very short bonnie of v tach last night while sleeping. K+ WNL- mag and tsh pending this am., ambulatory with PT min assist 15ft    5/20 pt is currently being treated for over diuresis. He is on NS @ 50ml/hr. Creat is improving now at 1.6 this am. Also had vanc held for abd abscess. Trough at 16 this am. Due to v tach yesterday diflucan was stopped after 2 weeks. He remains on rocephin and flagyl for the abd abscess.     5/21 slight improvement in Cr. Still on 4L NC and not able to wean. He is feeling a bit more SOB this morning. Repeat CXR with large left effusion. Diflucantstopped after 2 weeks treatment. Remains on Vanc, rocephin and flagyl for abscess. ID recs consider repeat CT abd/pelvis with contrast to see if resolving abscess; no output from drain.   Long discussion this AM with patient, wife and daughter (on phone) about goals of care.  His appetite remains very poor.         Wife at bedside reports increased fatigue, leg weakness  Review of Systems   Constitutional:  Positive for appetite change and fatigue. Negative for activity change, fever and unexpected weight change.   HENT:  Negative for congestion, ear pain, hearing loss, rhinorrhea and sore throat.    Eyes:  Negative for pain, redness and visual disturbance.   Respiratory:  Positive for shortness of breath. Negative for cough and wheezing.    Cardiovascular:  Negative for chest pain, palpitations and leg swelling.   Gastrointestinal:  Negative for abdominal pain, constipation, diarrhea,  nausea and vomiting.   Genitourinary:  Negative for decreased urine volume, dysuria, frequency and urgency.   Musculoskeletal:  Negative for back pain, joint swelling and neck pain.   Skin:  Negative for color change, rash and wound.   Neurological:  Positive for weakness. Negative for dizziness, tremors, light-headedness and headaches.   Objective:     Vital Signs (Most Recent):  Temp: 97.5 °F (36.4 °C) (05/21/22 0717)  Pulse: 60 (05/21/22 0958)  Resp: 20 (05/21/22 0739)  BP: 127/61 (05/21/22 0717)  SpO2: (!) 92 % (05/21/22 0739)   Vital Signs (24h Range):  Temp:  [96.1 °F (35.6 °C)-98.1 °F (36.7 °C)] 97.5 °F (36.4 °C)  Pulse:  [59-98] 60  Resp:  [19-32] 20  SpO2:  [92 %-100 %] 92 %  BP: (115-133)/(55-70) 127/61     Weight: 79.6 kg (175 lb 7.8 oz)  Body mass index is 29.2 kg/m².    Physical Exam  Vitals and nursing note reviewed.   Constitutional:       General: He is not in acute distress.     Appearance: He is well-developed.   HENT:      Head: Normocephalic and atraumatic.      Right Ear: External ear normal.      Left Ear: External ear normal.   Eyes:      General:         Right eye: No discharge.         Left eye: No discharge.   Neck:      Thyroid: No thyromegaly.   Cardiovascular:      Rate and Rhythm: Normal rate and regular rhythm.      Heart sounds: No murmur heard.  Pulmonary:      Effort: Pulmonary effort is normal. No respiratory distress.      Breath sounds: Rales present. No wheezing.      Comments: Decreased left lung  Abdominal:      General: Bowel sounds are normal. There is no distension.      Palpations: Abdomen is soft.      Tenderness: There is no abdominal tenderness.      Comments: Drain in place.   Musculoskeletal:      Right lower leg: No edema.      Left lower leg: No edema.   Skin:     General: Skin is warm and dry.      Comments: PICC to right UE, dressing c/d/i           Significant Labs:   A1C:   Recent Labs   Lab 03/02/22  0744 04/30/22  1046   HGBA1C 5.9* 6.0*         Bilirubin:    Recent Labs   Lab 05/15/22  2242 22  0642 22  0542 22  0600 22  0545   BILITOT 0.7 0.6 0.5 0.5 0.5       CBC:   Recent Labs   Lab 22  0600 22  0545   WBC 5.96 5.63   HGB 8.3* 8.5*   HCT 25.9* 26.7*    214       CMP:   Recent Labs   Lab 22  0600 22  0545    139   K 3.8 3.9   CL 99 101   CO2 27 26   * 161*   BUN 47* 49*   CREATININE 1.7* 1.6*   CALCIUM 8.5* 8.7   PROT 6.1 6.2   ALBUMIN 2.6* 2.7*   BILITOT 0.5 0.5   ALKPHOS 111 118   AST 40 37   ALT 22 21   ANIONGAP 12 12   EGFRNONAA 34* 37*       Lab Results   Component Value Date    DDIMER 2.25 (H) 05/15/2022     Lactate 1.3    BNP  Recent Labs   Lab 05/15/22  2242   *         Recent Labs   Lab 22  1628 22  1906 22  0603   POCTGLUCOSE 223* 235* 162*       Troponin: 0.044>0.048>0.043>0.037    TSH:   Recent Labs   Lab 22  0541   TSH 8.559*       No results for input(s): COLORU, APPEARANCEUA, PHUR, SPECGRAV, PROTEINUA, GLUCUA, KETONESU, BILIRUBINUA, OCCULTUA, NITRITE, UROBILINOGEN, LEUKOCYTESUR, RBCUA, WBCUA, BACTERIA, SQUAMEPITHEL, HYALINECASTS in the last 48 hours.    Invalid input(s): WRIGHTSUR  Covid negative   Flu negative     Significant Imagin/18 US renal Technically limited study with known renal cysts.  No evidence of obstructive uropathy     CXR Cardiomegaly with mild CHF.     Left lower lobe atelectasis, small left pleural effusion.    LE US -No evidence of deep venous thrombosis in either lower extremity.    5/15 CXR- Increased left-sided pleural effusion along with new airspace opacities in left hemithorax.  Findings suggestive of worsening CHF pattern of pulmonary edema.    EKG Likely ventricular paced rhythm  - morphology suggestive of biventricular   pacing   Abnormal ECG   When compared with ECG of 2022 10:39,   Premature ventricular complexes are no longer Present   Confirmed by Adam Mcmahon MD ([id number]) on 2022 8:11:55  AM      Assessment/Plan:      * (HFpEF) heart failure with preserved ejection fraction  5/2/22 Echo-   · The estimated ejection fraction is 55%.  · Indeterminate left ventricular diastolic function.  · Moderate right ventricular enlargement with mildly reduced right ventricular systolic function.  · Mild aortic regurgitation.  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 There is aortic insufficiency present. Prosthetic aortic valve is normal.  · The aortic valve mean gradient is 6 mmHg with a dimensionless index of 0.64.  · Mild mitral regurgitation.  · Mild tricuspid regurgitation.  · There is moderate pulmonary hypertension.       Increased SOB and hypoxia. Diuresis initiated. CIS following. Strict I/O. Low Na diet.    Stop lasix today. Creat rising and patient not urinating much- check renal u/s today considering fluids     5/19 was started on minimal fluids last night NS at 50ml/hr creat improved 2.1>1.9- hold further diuresis today     5/21 Cont to hold diurese and creat continues to improve. On BB and Isorbide, on 50cc/hr IVF which is minimal  CXR shows large left effusion with atelectasis; maybe thoracentesis would be helpful from a respiratory standpoint.      Counseling regarding advanced directives and goals of care  Advance Care Planning     Living Will  During this visit, I engaged the patient and family  in the advance care planning process.  The patient and I reviewed the role for advance directives and their purpose in directing future healthcare if the patient's unable to speak for him/herself.  At this point in time, the patient does have full decision-making capacity.  We discussed different extreme health states that he could experience, and reviewed what kind of medical care he would want in those situations.  The patient and family communicated that if he were comatose and had little chance of a meaningful recovery, he would not want machines/life-sustaining treatments used. In addition to  the above preference, other important end-of-life issues for the patient include . The patient has completed a living will to reflect these preferences.  I spent a total of 20 minutes engaging the patient in this advance care planning discussion.Discussed with wife in person and daughter via telephone           Today a meeting took place: bedside    Patient Participation: Patient is able to participate     Attendees (Name and  Relationship to patient):also dicussed with wife and daughter    Staff attendees (Name and  Role): Georgia VERMA, xochitl Jules RN case manage   Code Status: DNR; status confirmed/order placed in chart    ACP Documents: Other Documents (specify): living will     Goals of care: The patient and family endorses that what is most important right now is to focus on symptom/pain control, quality of life, even if it means sacrificing a little time and improvement in condition but with limits to invasive therapies    Accordingly, we have decided that the best plan to meet the patient's goals includes continuing with treatment      Recommendations/Length of ACP   conversation in minutes: 20 minutes      Code Status  In light of the patients advanced and life limiting illness,I engaged the the patient and family in a conversation about the patient's preferences for care  at the very end of life. The patient wishes to have a natural, peaceful death.  Along those lines, the patient does not wish to have CPR or other invasive treatments performed when his heart and/or breathing stops. I communicated to the patient and family that a DNR order would be placed in his medical record to reflect this preference.  I spent a total of 20 minutes engaging the patient in this advance care planning discussion.        5/21: again addressed goals of care with patient, wife and daughter on the phone. They are discussing wishes.     Debility  PT consulted .  Per records patient with several hospitalizations in last few  months for abdominal wall abscess   - prior to 2 months ago, was still independent, working and running a business  - consulted palliative care to have on board and have OP follow up  · PT>>Gait: Minimal Assistance x ~20 feet with side steps using RW. Decrease stride, dec foot/floor clearance and fatigue easily.      NIK (acute kidney injury)  Monitor BUN/SCr.  Monitor I/Os.  Monitor electrolytes.  Avoid non-steroidal anti-inflammatory medications.  Symptoms and labs suggestive of Acute HF; continue with diuretics, monitor renal fx closely   NIK on CKD      renal US today and hold further diuresis- pt not voiding much no obstruction  vanc dosing per pharm>  5/19 renal u/s yesterday does not show obstruction. Placed in gentle IVF and diuretics held. Creat has improved a smidgen   5/21 with very slow fluids creat improving. Pharmacy dosing vanc.     Abdominal wall abscess  Last discharge 5/6  with abdominal drain in place and 4 weeks of vanc, rocephin, flagyl, and diflucan  - follow up with general surgery for drain and ID for Abx- ( 10 days since discharge)   Consult pharmacy to adjust vanc- monitor     ventral hernia repair with mesh placement ~20 years ago, no complications until 3/2022 with several hospitalizations leading to drain placement and antibiotics since that time, drain removed 4/29/22   - most recent hospitalization began on 4/30/22 at [facility] and was transferred here on 5/4/22 for surgical and IR evaluation  - was initiated on Vanc and Zosyn at [facility]  - switched to Vanc, Cefepime, Flagyl on admission to our facility given renal function   - blood cultures from 4/30/22 no growth to date   - wound cultures from 3/3 and 5/4 NGTD  - general surgery states patient is not good surgical candidate  - underwent US-guided drain placement 5/4  - general surgery added fluconazole for yeast seen on gram stain of wound Cx  - consulted ID for assistance with Abx: discharge with IV vanc, IV  ceftriaxone, PO flagyl, and PO diflucan for at least 2 weeks with close ID follow-up .  -vanc dosing per pharmacy but will lower vanc trough goal to 10  Message sent to ID for guidance as today makes 2 weeks from D/c and last notes per ID read 2-4 weeks of therapy needed outpt >> will send message to Dr Brown. Stopped diflucan due to v tach after 2 weeks of treatment. Holding vanc as trough still 16 and creat elevated. Still on rocephin and flagyl    5/21: cont vanc, rocephin and flagyl for now  ID rec repeat CT to see if resolved. If so, can stop antibx. No output from drain. However with Cr still above baseline risk further nephrotoxicity with contrast for CT. Will make final decision on contrasted study tomorrow AM after repeat labs.     Disorder of prostate  Continue flomax .      Hypothyroidism due to acquired atrophy of thyroid  Continue levothyroxine .  Repeat tsh today as last one was elevated on chart and pt has short run v tach overnight   Increase synthroid to 75mcg>88mcg 5/19     Ventricular tachycardia  Pt had 9 beat run of v-tach. Pt asymptomatic and no complaints of chest pain. Was sleeping soundly at the time.   K+ WNL- add mag > normal and tsh elevated increase synthroid from 75>88  Stop diflucan. Had a few yeast in abscess fungal culture but has had 2 weeks of that      Long term current use of anticoagulant therapy  Continue xarelto- adjust for renal; recommending decrease to 15mg daily .  Now holding due to anemia/NIK  lovenox renal dosing     Type 2 diabetes mellitus, controlled  HbA1c 6.0 on 4/30/22  - does not appear to be on any home medications   - SSI + POCT glucose while inpatient  - continue to manage with lifstyle, follow up with PCP.         S/P AVR   bioprosthetic valve placed 2011   - continue home xarelto - adjust currently for renal dosing > now on lovenox        Mixed dyslipidemia  Continue pravastatin .      CAD (coronary artery disease)  Continue statin, bb, NOAC   Cards  consulted     NOAC held due to NIK> lovenox currently      Essential (primary) hypertension  BP Readings from Last 3 Encounters:   05/21/22 127/61   05/12/22 (!) 129/58   05/11/22 (!) 110/52     Resume home coreg-at lower dose 3.25mg , hold amlodipine & doxazosin  for now .  Dr. Monterroso ordered Imdur, monitor BP       Chronic atrial fibrillation  Maintain BB, rivaroxaban- renal dose  p PPM .  xarelto held due to NIK  Placing on renal dosed lovenox         VTE Risk Mitigation (From admission, onward)         Ordered     enoxaparin injection 80 mg  Every 24 hours (non-standard times)         05/18/22 1256     IP VTE HIGH RISK PATIENT  Once         05/16/22 0419     Place sequential compression device  Until discontinued         05/16/22 0419                Discharge Planning   KANDICE:      Code Status: DNR   Is the patient medically ready for discharge?:     Reason for patient still in hospital (select all that apply): Treatment  Discharge Plan A: Home with family, Home Health                  Georgia Ann MD  Department of Hospital Medicine   [place] [facility] S[facility] (3rd Fl)

## 2022-05-21 NOTE — NURSING
Resting in bed spouse in room no acute distress noted call light within reach bed in low position report given to oncoming nurse.

## 2022-05-21 NOTE — SUBJECTIVE & OBJECTIVE
Wife at bedside reports increased fatigue, leg weakness  Review of Systems   Constitutional:  Positive for appetite change and fatigue. Negative for activity change, fever and unexpected weight change.   HENT:  Negative for congestion, ear pain, hearing loss, rhinorrhea and sore throat.    Eyes:  Negative for pain, redness and visual disturbance.   Respiratory:  Positive for shortness of breath. Negative for cough and wheezing.    Cardiovascular:  Negative for chest pain, palpitations and leg swelling.   Gastrointestinal:  Negative for abdominal pain, constipation, diarrhea, nausea and vomiting.   Genitourinary:  Negative for decreased urine volume, dysuria, frequency and urgency.   Musculoskeletal:  Negative for back pain, joint swelling and neck pain.   Skin:  Negative for color change, rash and wound.   Neurological:  Positive for weakness. Negative for dizziness, tremors, light-headedness and headaches.   Objective:     Vital Signs (Most Recent):  Temp: 97.5 °F (36.4 °C) (05/21/22 0717)  Pulse: 60 (05/21/22 0958)  Resp: 20 (05/21/22 0739)  BP: 127/61 (05/21/22 0717)  SpO2: (!) 92 % (05/21/22 0739)   Vital Signs (24h Range):  Temp:  [96.1 °F (35.6 °C)-98.1 °F (36.7 °C)] 97.5 °F (36.4 °C)  Pulse:  [59-98] 60  Resp:  [19-32] 20  SpO2:  [92 %-100 %] 92 %  BP: (115-133)/(55-70) 127/61     Weight: 79.6 kg (175 lb 7.8 oz)  Body mass index is 29.2 kg/m².    Physical Exam  Vitals and nursing note reviewed.   Constitutional:       General: He is not in acute distress.     Appearance: He is well-developed.   HENT:      Head: Normocephalic and atraumatic.      Right Ear: External ear normal.      Left Ear: External ear normal.   Eyes:      General:         Right eye: No discharge.         Left eye: No discharge.   Neck:      Thyroid: No thyromegaly.   Cardiovascular:      Rate and Rhythm: Normal rate and regular rhythm.      Heart sounds: No murmur heard.  Pulmonary:      Effort: Pulmonary effort is normal. No respiratory  distress.      Breath sounds: Rales present. No wheezing.      Comments: Decreased left lung  Abdominal:      General: Bowel sounds are normal. There is no distension.      Palpations: Abdomen is soft.      Tenderness: There is no abdominal tenderness.      Comments: Drain in place.   Musculoskeletal:      Right lower leg: No edema.      Left lower leg: No edema.   Skin:     General: Skin is warm and dry.      Comments: PICC to right UE, dressing c/d/i           Significant Labs:   A1C:   Recent Labs   Lab 22  0744 22  1046   HGBA1C 5.9* 6.0*         Bilirubin:   Recent Labs   Lab 05/15/22  2242 22  0642 22  0542 22  0600 22  0545   BILITOT 0.7 0.6 0.5 0.5 0.5       CBC:   Recent Labs   Lab 22  0600 22  0545   WBC 5.96 5.63   HGB 8.3* 8.5*   HCT 25.9* 26.7*    214       CMP:   Recent Labs   Lab 22  0600 22  0545    139   K 3.8 3.9   CL 99 101   CO2 27 26   * 161*   BUN 47* 49*   CREATININE 1.7* 1.6*   CALCIUM 8.5* 8.7   PROT 6.1 6.2   ALBUMIN 2.6* 2.7*   BILITOT 0.5 0.5   ALKPHOS 111 118   AST 40 37   ALT 22 21   ANIONGAP 12 12   EGFRNONAA 34* 37*       Lab Results   Component Value Date    DDIMER 2.25 (H) 05/15/2022     Lactate 1.3    BNP  Recent Labs   Lab 05/15/22  2242   *         Recent Labs   Lab 22  1628 22  1906 22  0603   POCTGLUCOSE 223* 235* 162*       Troponin: 0.044>0.048>0.043>0.037    TSH:   Recent Labs   Lab 22  0541   TSH 8.559*       No results for input(s): COLORU, APPEARANCEUA, PHUR, SPECGRAV, PROTEINUA, GLUCUA, KETONESU, BILIRUBINUA, OCCULTUA, NITRITE, UROBILINOGEN, LEUKOCYTESUR, RBCUA, WBCUA, BACTERIA, SQUAMEPITHEL, HYALINECASTS in the last 48 hours.    Invalid input(s): WRIGHTSUR  Covid negative   Flu negative     Significant Imagin/18 US renal Technically limited study with known renal cysts.  No evidence of obstructive uropathy     CXR Cardiomegaly with mild CHF.      Left lower lobe atelectasis, small left pleural effusion.    LE US -No evidence of deep venous thrombosis in either lower extremity.    5/15 CXR- Increased left-sided pleural effusion along with new airspace opacities in left hemithorax.  Findings suggestive of worsening CHF pattern of pulmonary edema.    EKG Likely ventricular paced rhythm  - morphology suggestive of biventricular   pacing   Abnormal ECG   When compared with ECG of 30-APR-2022 10:39,   Premature ventricular complexes are no longer Present   Confirmed by Lisandro GONZALEZ, dAam ([id number]) on 5/16/2022 8:11:55 AM

## 2022-05-21 NOTE — CONSULTS
[facility] - Med Surg (3rd Fl)  Pulmonology  Consult Note    Patient Name: Eddie Parada Sr.  MRN: [id number]  Admission Date: 5/15/2022  Hospital Length of Stay: 5 days  Code Status: DNR  Attending Physician: Joseph Redmond MD  Primary Care Provider: Callum Roberts MD   Principal Problem: (HFpEF) heart failure with preserved ejection fraction    Consults  Subjective:     HPI:  Eddie Parada Sr. is a 89 y.o. year old male that's presents with a chief complaint of SOB and Atrial Fibrillation with Aortic Stenosis Valve Repair.He was a smoker 10 Pack Years .He also has CAD,CHF, COPD,Diabetes Mellitus type 2 ,CVA and ongoing complications associated of an anterior abdominal wall mass secondary to Ventral Hernia Mesh placed 20 years ago. Seen in ER 5/11/2022 and was admitted with shortness of breath since just prior to arrival he was sitting on a sofa at home and suddenly began short of breath.Has leg swelling.Denies chest pain, diaphoresis.  According to his  Wife states he takes all of his home medications.In ER BNP> 500, CXR with pleural effusion L>R and pulmonary edema. Creat elevated at 2.1- (Baseline creat 1.0-1.7) D- dimer elevated, LE showed on US negative for DVT ,H&H 9.1/27.He was s/p Drain in abdomen that was recently removed prior to admission, but had return of symptoms and reaccumulation of fluid. General Surgery was consulted who states patient is not a good surgical candidate. Patient underwent US-guided drain placement which he tolerated well. started on vancomycin, cefepime, flagyl, and azithromycin. Consulted to evaluate Respiratory status.    Past Medical History:   Diagnosis Date    Abdominal fibromatosis     Acute posthemorrhagic anemia 01/09/2018    NIK (acute kidney injury) 01/11/2018    Aortic stenosis 02/25/2014    Atrial fibrillation     Bradycardia     Broken arm     CAD (coronary artery disease)     Cancer     basal cell on nose and melanoma on back    CHF (congestive  heart failure)     COPD (chronic obstructive pulmonary disease)     Diabetes mellitus type II     Encounter for blood transfusion     Food impaction of esophagus, history of 03/03/2022    Hyperlipidemia     Localization-related focal epilepsy with simple partial seizures 03/02/2021    Melanoma     Obesity (BMI 30.0-34.9) 09/13/2016    Obesity, diabetes, and hypertension syndrome 09/13/2016    Osteoporosis     Other dysphagia 05/27/2021    Peripheral vascular disease     Pneumonia of left lower lobe due to infectious organism 04/30/2022    Primary malignant neoplasm of prostate 03/03/2022    S/P ORIF (open reduction internal fixation) fracture 11/27/2017    Sepsis with acute hypoxic respiratory failure     Septic shock 03/03/2022    Stroke     TIA (transient ischemic attack)     Type II diabetes mellitus with peripheral circulatory disorder     Type II or unspecified type diabetes mellitus without mention of complication, not stated as uncontrolled     Unspecified essential hypertension         Past Surgical History:   Procedure Laterality Date    AORTIC VALVE REPLACEMENT      CARDIAC PACEMAKER PLACEMENT  9/28/2012    CARDIAC VALVE REPLACEMENT  11/17/2011    aortic valve-tissue    CHOLECYSTECTOMY      COLONOSCOPY      ESOPHAGOGASTRODUODENOSCOPY N/A 5/27/2021    Procedure: EGD (ESOPHAGOGASTRODUODENOSCOPY);  Surgeon: Gualberto Medrano MD;  Location: [facility];  Service: Endoscopy;  Laterality: N/A;    ESOPHAGOGASTRODUODENOSCOPY N/A 6/22/2021    Procedure: EGD (ESOPHAGOGASTRODUODENOSCOPY);  Surgeon: Gualberto Medrano MD;  Location: [facility];  Service: Endoscopy;  Laterality: N/A;  please call wife(Jessica) with instructions/arrival time.    ESOPHAGOGASTRODUODENOSCOPY (EGD) WITH DILATION  06/22/2021    EYE SURGERY Bilateral     cataract with lens by  at [facility]    HERNIA REPAIR      abdominal    LASIK      UPPER GASTROINTESTINAL ENDOSCOPY  05/28/2021       Prior to  Admission medications    Medication Sig Start Date End Date Taking? Authorizing Provider   acetaminophen (TYLENOL) 500 MG tablet Take 500 mg by mouth every 6 (six) hours as needed for Pain.   Yes Historical Provider   amLODIPine (NORVASC) 10 MG tablet TAKE 1 TABLET BY MOUTH DAILY FOR BLOOD PRESSURE 1/21/22  Yes Callum Roberts MD   carvediloL (COREG) 12.5 MG tablet TAKE 1 TABLET (12.5 MG TOTAL) BY MOUTH 2 (TWO) TIMES DAILY WITH MEALS. 11/23/20  Yes Callum Roberts MD   cefTRIAXone (ROCEPHIN) 1 g/50 mL PgBk IVPB Inject 50 mLs (1 g total) into the vein once daily. 5/6/22 6/3/22 Yes Adonis Pelayo MD   doxazosin (CARDURA) 1 MG tablet Take 1 mg by mouth nightly. 5/11/21  Yes Historical Provider   ferrous sulfate (FEOSOL) 325 mg (65 mg iron) Tab tablet Take 325 mg by mouth once daily at 6am.   Yes Historical Provider   fluconazole (DIFLUCAN) 100 MG tablet Take 1 tablet (100 mg total) by mouth once daily. 5/6/22 6/5/22 Yes Adonis Pelayo MD   furosemide (LASIX) 20 MG tablet Take 1 tablet (20 mg total) by mouth every other day. Hold medication until 3/29  Patient taking differently: Take 20 mg by mouth once daily. 3/29/22  Yes Vinay Montaño MD   gabapentin (NEURONTIN) 100 MG capsule Take 2 capsules (200 mg total) by mouth every evening. 5/24/21 5/19/22 Yes Elaine Eckert MD   levothyroxine (SYNTHROID) 75 MCG tablet Take 1 tablet (75 mcg total) by mouth before breakfast. 7/26/21 7/26/22 Yes Callum Roberts MD   metroNIDAZOLE (FLAGYL) 500 MG tablet Take 1 tablet (500 mg total) by mouth every 8 (eight) hours. 5/6/22 6/3/22 Yes Adonis Pelayo MD   pantoprazole (PROTONIX) 40 MG tablet TAKE ONE TABLET BY MOUTH ONCE A DAY FOR STOMACH PROBLEMS 4/13/22  Yes Callum Roberts MD   polyethylene glycol (GLYCOLAX) 17 gram/dose powder Take 17 g by mouth once daily. 5/7/22 6/6/22 Yes Adonis Pelayo MD   potassium chloride SA (K-DUR,KLOR-CON) 10 MEQ tablet Take 1 tablet (10 mEq total) by mouth once daily. 3/9/22   Yes Callum Roberts MD   pravastatin (PRAVACHOL) 20 MG tablet TAKE 1 TABLET BY MOUTH DAILY FOR CHOLESTROL 21  Yes Callum Roberts MD   rivaroxaban (XARELTO) 20 mg Tab Take one tablet(20mg) by mouth once daily   Yes Historical Provider   tamsulosin (FLOMAX) 0.4 mg Cap Take 1 capsule (0.4 mg total) by mouth once daily. 3/8/22 3/8/23 Yes Ebonie Valdes PA-C   vancomycin HCl (VANCOMYCIN 750 MG/250 ML D5W, READY TO MIX SYSTEM,) Inject 250 mLs (750 mg total) into the vein once daily. 5/6/22 6/3/22 Yes Adonis Pelayo MD   senna (SENOKOT) 8.6 mg tablet Take 1 tablet by mouth once daily. 22  Adonis Pelayo MD   senna-docusate 8.6-50 mg (PERICOLACE) 8.6-50 mg per tablet Take 1 tablet by mouth 2 (two) times daily as needed for Constipation. 3/26/22   Vinay Montaño MD       Social History     Socioeconomic History    Marital status:    Tobacco Use    Smoking status: Former Smoker     Quit date: 1996     Years since quittin.6    Smokeless tobacco: Never Used    Tobacco comment: cigar smoker   Substance and Sexual Activity    Alcohol use: No     Comment: none since     Drug use: No    Sexual activity: Not Currently     Social Determinants of Health     Financial Resource Strain: Low Risk     Difficulty of Paying Living Expenses: Not hard at all   Food Insecurity: No Food Insecurity    Worried About Running Out of Food in the Last Year: Never true    Ran Out of Food in the Last Year: Never true   Transportation Needs: No Transportation Needs    Lack of Transportation (Medical): No    Lack of Transportation (Non-Medical): No   Physical Activity: Inactive    Days of Exercise per Week: 0 days    Minutes of Exercise per Session: 0 min   Stress: No Stress Concern Present    Feeling of Stress : Not at all   Social Connections: Moderately Isolated    Frequency of Communication with Friends and Family: More than three times a week    Frequency of Social Gatherings  with Friends and Family: Three times a week    Attends Zoroastrian Services: Never    Active Member of Clubs or Organizations: No    Attends Club or Organization Meetings: Never    Marital Status:    Housing Stability: Low Risk     Unable to Pay for Housing in the Last Year: No    Number of Places Lived in the Last Year: 1    Unstable Housing in the Last Year: No       Family History   Problem Relation Age of Onset    Hypertension Father     Stroke Father     Alcohol abuse Father     Heart disease Brother     COPD Sister     Cancer Brother         Lung    Diabetes Daughter     No Known Problems Son     COPD Brother     No Known Problems Daughter     No Known Problems Son     Prostate cancer Neg Hx     Kidney disease Neg Hx        Review of patient's allergies indicates:   Allergen Reactions    Ciprofloxacin     Levofloxacin Swelling    Lyrica [pregabalin] Swelling    Unable to assess Other (See Comments)     Soft shell crabs    Allergies have been reviewed.     ROS: ROS    PE:   Vitals:    05/21/22 0958 05/21/22 1100 05/21/22 1143 05/21/22 1200   BP:  129/60     BP Location:  Left arm     Patient Position:  Lying     Pulse: 60 89  (!) 59   Resp:  (!) 24     Temp:  96.7 °F (35.9 °C)     TempSrc:  Oral     SpO2:  98%     Weight:   87.6 kg (193 lb 2 oz)    Height:        Physical Exam    Alert and orientated X 3   HEENT: Head: Normocephalic no trauma                Ears : Normal Pinna No Drainage no Battles sign                Eyes: Vision Unchanged, No conjunctivitis,No drainage                Neck: Supple, No JVD,No Abnormal Carotid Pulsations                Throat: No Erythema, No pus,No Swelling,Mallampati score= 3    Chest: Bilateral wheezing rhonchi opening snaps and skodaic resonance bilateral    Cardiac: paced rhythm and afib or an irregular rhythm : +M = 2/6, No R/H/G  Abdomen: Bowel Sounds are Normal.Soft Abdomen. No organomegaly of Liver,Spleen,or Kidneys   CNS: Non focal and  intact. Cranial nerves 2, 346,8,9,10 and 12 are normal.Norrmal gait.Normal posture.  Extremities: No Clubbing,No Cyanosis with oxygen,Positive mild edema of lower extremities Bilateral  Skin: No Rash, No Ulcerative sores,and No cellulitis of the IV site.    Lab Results   Component Value Date    WBC 5.63 05/21/2022    HGB 8.5 (L) 05/21/2022    HCT 26.7 (L) 05/21/2022     05/21/2022    CHOL 145 03/02/2022    TRIG 113 03/02/2022    HDL 39 (L) 03/02/2022    ALT 21 05/21/2022    AST 37 05/21/2022     05/21/2022    K 3.9 05/21/2022     05/21/2022    CREATININE 1.6 (H) 05/21/2022    BUN 49 (H) 05/21/2022    CO2 26 05/21/2022    TSH 8.559 (H) 05/19/2022    PSA 2.3 02/18/2021    INR 2.4 (H) 03/03/2022    HGBA1C 6.0 (H) 04/30/2022       XR CHEST AP PORTABLE     CLINICAL HISTORY:  continued hypoxia;     TECHNIQUE:  Single frontal view of the chest was performed.     COMPARISON:  05/15/2022.     FINDINGS:  There is again pulmonary hypoinflation.  There is a persistent large left pleural effusion with extensive atelectasis versus infiltrate throughout the left lung.  This does not appear significantly changed over the interval.  Mild right perihilar interstitial prominence consistent with interstitial edema.  This appears increased over the interval.  There is a small right pleural effusion with dependent right lower lobe atelectasis.     Heart size is enlarged.  Prior CABG.  Calcified aortic plaque.  Trachea midline.     Bony thorax intact.  Left axillary pacemaker in place.  Right subclavian PICC line in place.     Impression:     1. Persistent large left pleural effusion with extensive atelectasis versus infiltrate throughout the left lung.  2. Mild underlying congestive heart failure.  3. Prior CABG.  4. Pacemaker.  5. PICC line.        Electronically signed by: Cristian Snow  Date:                                            05/21/2022  Time:                                  ABG now   Start bipap if o2  sat study shows hypoxia on 4 liters or the effusion will continue     Assessment/Plan:     COPD (chronic obstructive pulmonary disease)  10 pack year    Chronic respiratory failure with hypoxia  Patient with Hypoxic Respiratory failure which is Chronic.  he is not on home oxygen. Supplemental oxygen was provided and noted-  .   Signs/symptoms of respiratory failure include- increased work of breathing. Contributing diagnoses includes - COPD Labs and images were reviewed. Patient has  recnt abg. Will treat underlying causes and adjust management of respiratory failure as follows- 4    Counseling regarding advanced directives and goals of care  Advance Care Planning         Type 2 diabetes mellitus, controlled  stable    S/P AVR  Contributing to present Pleural effusion     Chronic atrial fibrillation  Stable HR  Controlled Vent response      Left Pleural Effusion (Multifactorial Etiology)     Thoracentesis in the am hold blood thinners and npo      after midnight     Critical care time spent on the evaluation and treatment of severe organ dysfunction, review of pertinent labs and imaging studies, discussions with consulting providers and discussions with patient/family: 35 minutes.    Thank you for your consult. I will follow-up with patient. Please contact us if you have any additional questions.     Vivek Morris MD  Pulmonology  [facility] - Med Surg (3rd Fl)

## 2022-05-21 NOTE — ASSESSMENT & PLAN NOTE
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  he is not on home oxygen. Supplemental oxygen was provided and noted-  .   Signs/symptoms of respiratory failure include- increased work of breathing. Contributing diagnoses includes - COPD Labs and images were reviewed. Patient has  recnt abg. Will treat underlying causes and adjust management of respiratory failure as follows- 4

## 2022-05-21 NOTE — ASSESSMENT & PLAN NOTE
Monitor BUN/SCr.  Monitor I/Os.  Monitor electrolytes.  Avoid non-steroidal anti-inflammatory medications.  Symptoms and labs suggestive of Acute HF; continue with diuretics, monitor renal fx closely   NIK on CKD      renal US today and hold further diuresis- pt not voiding much no obstruction  vanc dosing per pharm>  5/19 renal u/s yesterday does not show obstruction. Placed in gentle IVF and diuretics held. Creat has improved a smidgen   5/21 with very slow fluids creat improving. Pharmacy dosing vanc.

## 2022-05-22 LAB
ALBUMIN FLD-MCNC: 0.8 G/DL
AMYLASE, BODY FLUID: 7 U/L
ANION GAP SERPL CALC-SCNC: 11 MMOL/L (ref 8–16)
ANION GAP SERPL CALC-SCNC: 12 MMOL/L (ref 8–16)
APPEARANCE FLD: NORMAL
BASOPHILS # BLD AUTO: 0.03 K/UL (ref 0–0.2)
BASOPHILS NFR BLD: 0.5 % (ref 0–1.9)
BODY FLD TYPE: NORMAL
BODY FLUID SOURCE AMYLASE: NORMAL
BODY FLUID SOURCE, LDH: NORMAL
BUN SERPL-MCNC: 43 MG/DL (ref 10–30)
BUN SERPL-MCNC: 43 MG/DL (ref 10–30)
CALCIUM SERPL-MCNC: 8.4 MG/DL (ref 8.7–10.5)
CALCIUM SERPL-MCNC: 8.5 MG/DL (ref 8.7–10.5)
CHLORIDE SERPL-SCNC: 103 MMOL/L (ref 95–110)
CHLORIDE SERPL-SCNC: 104 MMOL/L (ref 95–110)
CO2 SERPL-SCNC: 25 MMOL/L (ref 23–29)
CO2 SERPL-SCNC: 25 MMOL/L (ref 23–29)
COLOR FLD: NORMAL
CREAT SERPL-MCNC: 1.4 MG/DL (ref 0.5–1.4)
CREAT SERPL-MCNC: 1.4 MG/DL (ref 0.5–1.4)
DIFFERENTIAL METHOD: ABNORMAL
EOSINOPHIL # BLD AUTO: 0.1 K/UL (ref 0–0.5)
EOSINOPHIL NFR BLD: 1.1 % (ref 0–8)
ERYTHROCYTE [DISTWIDTH] IN BLOOD BY AUTOMATED COUNT: 14.6 % (ref 11.5–14.5)
EST. GFR  (AFRICAN AMERICAN): 50 ML/MIN/1.73 M^2
EST. GFR  (AFRICAN AMERICAN): 50 ML/MIN/1.73 M^2
EST. GFR  (NON AFRICAN AMERICAN): 43 ML/MIN/1.73 M^2
EST. GFR  (NON AFRICAN AMERICAN): 43 ML/MIN/1.73 M^2
GLUCOSE FLD-MCNC: 168 MG/DL
GLUCOSE SERPL-MCNC: 148 MG/DL (ref 70–110)
GLUCOSE SERPL-MCNC: 152 MG/DL (ref 70–110)
GLUCOSE SERPL-MCNC: 152 MG/DL (ref 70–110)
HCT VFR BLD AUTO: 26.2 % (ref 40–54)
HGB BLD-MCNC: 8.3 G/DL (ref 14–18)
IMM GRANULOCYTES # BLD AUTO: 0.03 K/UL (ref 0–0.04)
IMM GRANULOCYTES NFR BLD AUTO: 0.5 % (ref 0–0.5)
LDH FLD L TO P-CCNC: 62 U/L
LDH SERPL L TO P-CCNC: 250 U/L (ref 110–260)
LYMPHOCYTES # BLD AUTO: 1.1 K/UL (ref 1–4.8)
LYMPHOCYTES NFR BLD: 20.1 % (ref 18–48)
LYMPHOCYTES NFR FLD MANUAL: 60 %
MCH RBC QN AUTO: 32.9 PG (ref 27–31)
MCHC RBC AUTO-ENTMCNC: 31.7 G/DL (ref 32–36)
MCV RBC AUTO: 104 FL (ref 82–98)
MESOTHL CELL NFR FLD MANUAL: 2 %
MONOCYTES # BLD AUTO: 0.9 K/UL (ref 0.3–1)
MONOCYTES NFR BLD: 15.9 % (ref 4–15)
MONOS+MACROS NFR FLD MANUAL: 30 %
NEUTROPHILS # BLD AUTO: 3.4 K/UL (ref 1.8–7.7)
NEUTROPHILS NFR BLD: 61.9 % (ref 38–73)
NEUTROPHILS NFR FLD MANUAL: 10 %
NRBC BLD-RTO: 0 /100 WBC
PLATELET # BLD AUTO: 208 K/UL (ref 150–450)
PMV BLD AUTO: 9.7 FL (ref 9.2–12.9)
POCT GLUCOSE: 159 MG/DL (ref 70–110)
POCT GLUCOSE: 174 MG/DL (ref 70–110)
POCT GLUCOSE: 196 MG/DL (ref 70–110)
POCT GLUCOSE: 227 MG/DL (ref 70–110)
POTASSIUM SERPL-SCNC: 3.9 MMOL/L (ref 3.5–5.1)
POTASSIUM SERPL-SCNC: 3.9 MMOL/L (ref 3.5–5.1)
PROT FLD-MCNC: 1.3 G/DL
PROT SERPL-MCNC: 6.1 G/DL (ref 6–8.4)
RBC # BLD AUTO: 2.52 M/UL (ref 4.6–6.2)
SODIUM SERPL-SCNC: 140 MMOL/L (ref 136–145)
SODIUM SERPL-SCNC: 140 MMOL/L (ref 136–145)
SPECIMEN SOURCE: NORMAL
VANCOMYCIN SERPL-MCNC: 20.1 UG/ML
WBC # BLD AUTO: 5.47 K/UL (ref 3.9–12.7)
WBC # FLD: 115 /CU MM

## 2022-05-22 PROCEDURE — 94660 CPAP INITIATION&MGMT: CPT

## 2022-05-22 PROCEDURE — 25000003 PHARM REV CODE 250: Performed by: NURSE PRACTITIONER

## 2022-05-22 PROCEDURE — 84155 ASSAY OF PROTEIN SERUM: CPT | Performed by: FAMILY MEDICINE

## 2022-05-22 PROCEDURE — 83615 LACTATE (LD) (LDH) ENZYME: CPT | Performed by: FAMILY MEDICINE

## 2022-05-22 PROCEDURE — 88305 TISSUE EXAM BY PATHOLOGIST: ICD-10-PCS | Mod: 26,,, | Performed by: PATHOLOGY

## 2022-05-22 PROCEDURE — 82150 ASSAY OF AMYLASE: CPT | Performed by: FAMILY MEDICINE

## 2022-05-22 PROCEDURE — 88112 CYTOPATH CELL ENHANCE TECH: CPT | Performed by: PATHOLOGY

## 2022-05-22 PROCEDURE — 25000003 PHARM REV CODE 250

## 2022-05-22 PROCEDURE — 80202 ASSAY OF VANCOMYCIN: CPT | Performed by: FAMILY MEDICINE

## 2022-05-22 PROCEDURE — 89051 BODY FLUID CELL COUNT: CPT | Performed by: FAMILY MEDICINE

## 2022-05-22 PROCEDURE — 88112 PR  CYTOPATH, CELL ENHANCE TECH: ICD-10-PCS | Mod: 26,,, | Performed by: PATHOLOGY

## 2022-05-22 PROCEDURE — 25000003 PHARM REV CODE 250: Performed by: FAMILY MEDICINE

## 2022-05-22 PROCEDURE — 63600175 PHARM REV CODE 636 W HCPCS: Performed by: NURSE PRACTITIONER

## 2022-05-22 PROCEDURE — 82945 GLUCOSE OTHER FLUID: CPT | Performed by: FAMILY MEDICINE

## 2022-05-22 PROCEDURE — 80048 BASIC METABOLIC PNL TOTAL CA: CPT | Mod: 91 | Performed by: FAMILY MEDICINE

## 2022-05-22 PROCEDURE — 87206 SMEAR FLUORESCENT/ACID STAI: CPT | Performed by: FAMILY MEDICINE

## 2022-05-22 PROCEDURE — 87070 CULTURE OTHR SPECIMN AEROBIC: CPT | Performed by: FAMILY MEDICINE

## 2022-05-22 PROCEDURE — 87116 MYCOBACTERIA CULTURE: CPT | Performed by: FAMILY MEDICINE

## 2022-05-22 PROCEDURE — 27000221 HC OXYGEN, UP TO 24 HOURS

## 2022-05-22 PROCEDURE — 99233 PR SUBSEQUENT HOSPITAL CARE,LEVL III: ICD-10-PCS | Mod: ,,, | Performed by: INTERNAL MEDICINE

## 2022-05-22 PROCEDURE — 84157 ASSAY OF PROTEIN OTHER: CPT | Performed by: FAMILY MEDICINE

## 2022-05-22 PROCEDURE — 84311 SPECTROPHOTOMETRY: CPT | Performed by: FAMILY MEDICINE

## 2022-05-22 PROCEDURE — 99233 SBSQ HOSP IP/OBS HIGH 50: CPT | Mod: ,,, | Performed by: INTERNAL MEDICINE

## 2022-05-22 PROCEDURE — 99900035 HC TECH TIME PER 15 MIN (STAT)

## 2022-05-22 PROCEDURE — 82042 OTHER SOURCE ALBUMIN QUAN EA: CPT | Performed by: FAMILY MEDICINE

## 2022-05-22 PROCEDURE — 63600175 PHARM REV CODE 636 W HCPCS: Performed by: FAMILY MEDICINE

## 2022-05-22 PROCEDURE — 11000001 HC ACUTE MED/SURG PRIVATE ROOM

## 2022-05-22 PROCEDURE — 83615 LACTATE (LD) (LDH) ENZYME: CPT | Mod: 91 | Performed by: FAMILY MEDICINE

## 2022-05-22 PROCEDURE — 85025 COMPLETE CBC W/AUTO DIFF WBC: CPT | Performed by: FAMILY MEDICINE

## 2022-05-22 PROCEDURE — 88305 TISSUE EXAM BY PATHOLOGIST: CPT | Performed by: PATHOLOGY

## 2022-05-22 PROCEDURE — 87102 FUNGUS ISOLATION CULTURE: CPT | Performed by: FAMILY MEDICINE

## 2022-05-22 PROCEDURE — 27000190 HC CPAP FULL FACE MASK W/VALVE

## 2022-05-22 PROCEDURE — 94760 N-INVAS EAR/PLS OXIMETRY 1: CPT

## 2022-05-22 PROCEDURE — 88112 CYTOPATH CELL ENHANCE TECH: CPT | Mod: 26,,, | Performed by: PATHOLOGY

## 2022-05-22 PROCEDURE — 87205 SMEAR GRAM STAIN: CPT | Performed by: FAMILY MEDICINE

## 2022-05-22 PROCEDURE — 63600175 PHARM REV CODE 636 W HCPCS: Performed by: INTERNAL MEDICINE

## 2022-05-22 PROCEDURE — 88305 TISSUE EXAM BY PATHOLOGIST: CPT | Mod: 26,,, | Performed by: PATHOLOGY

## 2022-05-22 PROCEDURE — 25500020 PHARM REV CODE 255: Performed by: FAMILY MEDICINE

## 2022-05-22 PROCEDURE — 80048 BASIC METABOLIC PNL TOTAL CA: CPT | Performed by: INTERNAL MEDICINE

## 2022-05-22 RX ORDER — ENOXAPARIN SODIUM 100 MG/ML
1 INJECTION SUBCUTANEOUS
Status: DISCONTINUED | OUTPATIENT
Start: 2022-05-22 | End: 2022-05-23

## 2022-05-22 RX ADMIN — GABAPENTIN 200 MG: 100 CAPSULE ORAL at 09:05

## 2022-05-22 RX ADMIN — METRONIDAZOLE 500 MG: 500 TABLET ORAL at 06:05

## 2022-05-22 RX ADMIN — SODIUM CHLORIDE: 0.9 INJECTION, SOLUTION INTRAVENOUS at 07:05

## 2022-05-22 RX ADMIN — PRAVASTATIN SODIUM 20 MG: 20 TABLET ORAL at 09:05

## 2022-05-22 RX ADMIN — ENOXAPARIN SODIUM 80 MG: 80 INJECTION SUBCUTANEOUS at 05:05

## 2022-05-22 RX ADMIN — METRONIDAZOLE 500 MG: 500 TABLET ORAL at 09:05

## 2022-05-22 RX ADMIN — CARVEDILOL 3.12 MG: 3.12 TABLET, FILM COATED ORAL at 05:05

## 2022-05-22 RX ADMIN — CARVEDILOL 3.12 MG: 3.12 TABLET, FILM COATED ORAL at 09:05

## 2022-05-22 RX ADMIN — PANTOPRAZOLE SODIUM 40 MG: 40 TABLET, DELAYED RELEASE ORAL at 09:05

## 2022-05-22 RX ADMIN — IOHEXOL 100 ML: 350 INJECTION, SOLUTION INTRAVENOUS at 02:05

## 2022-05-22 RX ADMIN — FERROUS SULFATE TAB 325 MG (65 MG ELEMENTAL FE) 1 EACH: 325 (65 FE) TAB at 09:05

## 2022-05-22 RX ADMIN — METRONIDAZOLE 500 MG: 500 TABLET ORAL at 02:05

## 2022-05-22 RX ADMIN — POTASSIUM CHLORIDE 10 MEQ: 750 TABLET, FILM COATED, EXTENDED RELEASE ORAL at 09:05

## 2022-05-22 RX ADMIN — SENNOSIDES 1 TABLET: 8.6 TABLET, FILM COATED ORAL at 09:05

## 2022-05-22 RX ADMIN — INSULIN ASPART 2 UNITS: 100 INJECTION, SOLUTION INTRAVENOUS; SUBCUTANEOUS at 05:05

## 2022-05-22 RX ADMIN — VANCOMYCIN HYDROCHLORIDE 1250 MG: 1.25 INJECTION, POWDER, LYOPHILIZED, FOR SOLUTION INTRAVENOUS at 05:05

## 2022-05-22 RX ADMIN — TAMSULOSIN HYDROCHLORIDE 0.4 MG: 0.4 CAPSULE ORAL at 09:05

## 2022-05-22 RX ADMIN — CEFTRIAXONE 1 G: 1 INJECTION, SOLUTION INTRAVENOUS at 09:05

## 2022-05-22 RX ADMIN — LEVOTHYROXINE SODIUM 88 MCG: 88 TABLET ORAL at 06:05

## 2022-05-22 RX ADMIN — ISOSORBIDE MONONITRATE 30 MG: 30 TABLET, EXTENDED RELEASE ORAL at 09:05

## 2022-05-22 RX ADMIN — POLYETHYLENE GLYCOL (3350) 17 G: 17 POWDER, FOR SOLUTION ORAL at 09:05

## 2022-05-22 NOTE — ASSESSMENT & PLAN NOTE
5/2/22 Echo-   · The estimated ejection fraction is 55%.  · Indeterminate left ventricular diastolic function.  · Moderate right ventricular enlargement with mildly reduced right ventricular systolic function.  · Mild aortic regurgitation.  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 There is aortic insufficiency present. Prosthetic aortic valve is normal.  · The aortic valve mean gradient is 6 mmHg with a dimensionless index of 0.64.  · Mild mitral regurgitation.  · Mild tricuspid regurgitation.  · There is moderate pulmonary hypertension.       Increased SOB and hypoxia. Diuresis initiated. CIS following. Strict I/O. Low Na diet.    Stop lasix today. Creat rising and patient not urinating much- check renal u/s today considering fluids     5/19 was started on minimal fluids last night NS at 50ml/hr creat improved 2.1>1.9- hold further diuresis today     5/22 Cont to hold diurese and creat continues to improve. On BB and Isorbide, on 50cc/hr IVF which is minimal  CXR shows large left effusion with atelectasis; Dr. Morris agrees to thoracentesis today which will hopefully improve SOB and oxygen requirement

## 2022-05-22 NOTE — SUBJECTIVE & OBJECTIVE
Wife at bedside reports increased fatigue, leg weakness  Review of Systems   Constitutional:  Positive for appetite change and fatigue. Negative for activity change, fever and unexpected weight change.   HENT:  Negative for congestion, ear pain, hearing loss, rhinorrhea and sore throat.    Eyes:  Negative for pain, redness and visual disturbance.   Respiratory:  Positive for shortness of breath. Negative for cough and wheezing.    Cardiovascular:  Negative for chest pain, palpitations and leg swelling.   Gastrointestinal:  Negative for abdominal pain, constipation, diarrhea, nausea and vomiting.   Genitourinary:  Negative for decreased urine volume, dysuria, frequency and urgency.   Musculoskeletal:  Negative for back pain, joint swelling and neck pain.   Skin:  Negative for color change, rash and wound.   Neurological:  Positive for weakness. Negative for dizziness, tremors, light-headedness and headaches.   Objective:     Vital Signs (Most Recent):  Temp: 96.8 °F (36 °C) (05/22/22 0705)  Pulse: 73 (05/22/22 0800)  Resp: (!) 22 (05/22/22 0705)  BP: 133/63 (05/22/22 0705)  SpO2: 97 % (05/22/22 0741)   Vital Signs (24h Range):  Temp:  [96.5 °F (35.8 °C)-96.9 °F (36.1 °C)] 96.8 °F (36 °C)  Pulse:  [59-89] 73  Resp:  [18-24] 22  SpO2:  [87 %-98 %] 97 %  BP: (125-146)/(59-64) 133/63     Weight: 86.6 kg (190 lb 14.7 oz)  Body mass index is 31.77 kg/m².    Physical Exam  Vitals and nursing note reviewed.   Constitutional:       General: He is not in acute distress.     Appearance: He is well-developed.   HENT:      Head: Normocephalic and atraumatic.      Right Ear: External ear normal.      Left Ear: External ear normal.   Eyes:      General:         Right eye: No discharge.         Left eye: No discharge.   Neck:      Thyroid: No thyromegaly.   Cardiovascular:      Rate and Rhythm: Normal rate and regular rhythm.      Heart sounds: No murmur heard.  Pulmonary:      Effort: Pulmonary effort is normal. No respiratory  distress.      Breath sounds: Rales present. No wheezing.      Comments: Decreased left lung  Abdominal:      General: Bowel sounds are normal. There is no distension.      Palpations: Abdomen is soft.      Tenderness: There is no abdominal tenderness.      Comments: Drain in place.   Musculoskeletal:      Right lower leg: No edema.      Left lower leg: No edema.   Skin:     General: Skin is warm and dry.      Comments: PICC to right UE, dressing c/d/i           Significant Labs:   A1C:   Recent Labs   Lab 22  0744 22  1046   HGBA1C 5.9* 6.0*         Bilirubin:   Recent Labs   Lab 05/15/22  2242 22  0642 22  0542 22  0600 22  0545   BILITOT 0.7 0.6 0.5 0.5 0.5       CBC:   Recent Labs   Lab 22  0545 22  0555   WBC 5.63 5.47   HGB 8.5* 8.3*   HCT 26.7* 26.2*    208       CMP:   Recent Labs   Lab 22  0545 22  0555 22  0556    140 140   K 3.9 3.9 3.9    104 103   CO2 26 25 25   * 152*  152* 148*   BUN 49* 43* 43*   CREATININE 1.6* 1.4 1.4   CALCIUM 8.7 8.5* 8.4*   PROT 6.2 6.1  --    ALBUMIN 2.7*  --   --    BILITOT 0.5  --   --    ALKPHOS 118  --   --    AST 37  --   --    ALT 21  --   --    ANIONGAP 12 11 12   EGFRNONAA 37* 43* 43*       Lab Results   Component Value Date    DDIMER 2.25 (H) 05/15/2022     Lactate 1.3    BNP  Recent Labs   Lab 05/15/22  2242   *         Recent Labs   Lab 22  1619 22  1923 22  0610   POCTGLUCOSE 204* 182* 159*       Troponin: 0.044>0.048>0.043>0.037    TSH:   Recent Labs   Lab 22  0541   TSH 8.559*       No results for input(s): COLORU, APPEARANCEUA, PHUR, SPECGRAV, PROTEINUA, GLUCUA, KETONESU, BILIRUBINUA, OCCULTUA, NITRITE, UROBILINOGEN, LEUKOCYTESUR, RBCUA, WBCUA, BACTERIA, SQUAMEPITHEL, HYALINECASTS in the last 48 hours.    Invalid input(s): WRIGHTSUR  Covid negative   Flu negative     Significant Imagin/18 US renal Technically limited study with known  renal cysts.  No evidence of obstructive uropathy    5/12 CXR Cardiomegaly with mild CHF.     Left lower lobe atelectasis, small left pleural effusion.    LE US -No evidence of deep venous thrombosis in either lower extremity.    5/15 CXR- Increased left-sided pleural effusion along with new airspace opacities in left hemithorax.  Findings suggestive of worsening CHF pattern of pulmonary edema.    EKG Likely ventricular paced rhythm  - morphology suggestive of biventricular   pacing   Abnormal ECG   When compared with ECG of 30-APR-2022 10:39,   Premature ventricular complexes are no longer Present   Confirmed by Adam Mcmahon MD ([id number]) on 5/16/2022 8:11:55 AM

## 2022-05-22 NOTE — OP NOTE
Thoracentesis Procedure Note    Pre-operative Diagnosis:     ICD-10-CM ICD-9-CM   1. Elevated brain natriuretic peptide (BNP) level  R79.89 790.99   2. Shortness of breath  R06.02 786.05   3. Elevated d-dimer  R79.89 790.92   4. NIK (acute kidney injury)  N17.9 584.9   5. Elevated troponin  R77.8 790.6   6. Acute on chronic respiratory failure with hypoxemia  J96.21 518.84   7. Transaminitis  R74.01 790.4   8. Acute hypoxemic respiratory failure  J96.01 518.81   9. (HFpEF) heart failure with preserved ejection fraction  I50.30 428.9       Post-operative Diagnosis:     ICD-10-CM ICD-9-CM   1. Elevated brain natriuretic peptide (BNP) level  R79.89 790.99   2. Shortness of breath  R06.02 786.05   3. Elevated d-dimer  R79.89 790.92   4. NIK (acute kidney injury)  N17.9 584.9   5. Elevated troponin  R77.8 790.6   6. Acute on chronic respiratory failure with hypoxemia  J96.21 518.84   7. Transaminitis  R74.01 790.4   8. Acute hypoxemic respiratory failure  J96.01 518.81   9. (HFpEF) heart failure with preserved ejection fraction  I50.30 428.9       Indications: Left sided Pleural effusion compromising patients breathing status.     Procedure Details     Consent: Informed consent was obtained. Risks of the procedure were discussed including: infection, bleeding, pain, pneumothorax.    Under sterile conditions the patient was positioned. Chlorhexadine solution and sterile drapes were utilized.  1% plain lidocaine was used to anesthetize between the rib space after localized by physical exam. Fluid was obtained after catheter inserted without  difficulty and suction applied with minimal blood loss.  A dressing was applied to the wound and wound care instructions were provided.     Findings  1500 ml of bloody pleural fluid was obtained. A sample was sent to Pathology for cytogenetics, flow, and cell counts, as well as for infection analysis.    Complications:  None; patient tolerated the procedure well.          Condition:  stable    Plan  A follow up chest x-ray was ordered.  Bed Rest for 20 minutes.  Tylenol 650 mg. for pain as needed.    Attending Attestation: I performed the procedure.will get cytology     Await cytology

## 2022-05-22 NOTE — PLAN OF CARE
Problem: Adult Inpatient Plan of Care  Goal: Plan of Care Review  Outcome: Ongoing, Progressing  Goal: Patient-Specific Goal (Individualized)  Outcome: Ongoing, Progressing  Goal: Absence of Hospital-Acquired Illness or Injury  Outcome: Ongoing, Progressing  Goal: Optimal Comfort and Wellbeing  Outcome: Ongoing, Progressing  Goal: Readiness for Transition of Care  Outcome: Ongoing, Progressing     Problem: Diabetes Comorbidity  Goal: Blood Glucose Level Within Targeted Range  Outcome: Ongoing, Progressing     Problem: Adjustment to Illness (Sepsis/Septic Shock)  Goal: Optimal Coping  Outcome: Ongoing, Progressing     Problem: Bleeding (Sepsis/Septic Shock)  Goal: Absence of Bleeding  Outcome: Ongoing, Progressing     Problem: Glycemic Control Impaired (Sepsis/Septic Shock)  Goal: Blood Glucose Level Within Desired Range  Outcome: Ongoing, Progressing     Problem: Infection Progression (Sepsis/Septic Shock)  Goal: Absence of Infection Signs and Symptoms  Outcome: Ongoing, Progressing     Problem: Nutrition Impaired (Sepsis/Septic Shock)  Goal: Optimal Nutrition Intake  Outcome: Ongoing, Progressing     Problem: Fluid Imbalance (Pneumonia)  Goal: Fluid Balance  Outcome: Ongoing, Progressing     Problem: Infection (Pneumonia)  Goal: Resolution of Infection Signs and Symptoms  Outcome: Ongoing, Progressing     Problem: Respiratory Compromise (Pneumonia)  Goal: Effective Oxygenation and Ventilation  Outcome: Ongoing, Progressing     Problem: Infection  Goal: Absence of Infection Signs and Symptoms  Outcome: Ongoing, Progressing     Problem: Impaired Wound Healing  Goal: Optimal Wound Healing  Outcome: Ongoing, Progressing     Problem: Fall Injury Risk  Goal: Absence of Fall and Fall-Related Injury  Outcome: Ongoing, Progressing     Problem: Skin Injury Risk Increased  Goal: Skin Health and Integrity  Outcome: Ongoing, Progressing     Problem: Fluid and Electrolyte Imbalance (Acute Kidney Injury/Impairment)  Goal: Fluid  and Electrolyte Balance  Outcome: Ongoing, Progressing     Problem: Oral Intake Inadequate (Acute Kidney Injury/Impairment)  Goal: Optimal Nutrition Intake  Outcome: Ongoing, Progressing     Problem: Renal Function Impairment (Acute Kidney Injury/Impairment)  Goal: Effective Renal Function  Outcome: Ongoing, Progressing     Problem: Gas Exchange Impaired  Goal: Optimal Gas Exchange  Outcome: Ongoing, Progressing     Problem: Adjustment to Illness (Heart Failure)  Goal: Optimal Coping  Outcome: Ongoing, Progressing     Problem: Cardiac Output Decreased (Heart Failure)  Goal: Optimal Cardiac Output  Outcome: Ongoing, Progressing     Problem: Dysrhythmia (Heart Failure)  Goal: Stable Heart Rate and Rhythm  Outcome: Ongoing, Progressing     Problem: Fluid Imbalance (Heart Failure)  Goal: Fluid Balance  Outcome: Ongoing, Progressing     Problem: Functional Ability Impaired (Heart Failure)  Goal: Optimal Functional Ability  Outcome: Ongoing, Progressing     Problem: Oral Intake Inadequate (Heart Failure)  Goal: Optimal Nutrition Intake  Outcome: Ongoing, Progressing     Problem: Respiratory Compromise (Heart Failure)  Goal: Effective Oxygenation and Ventilation  Outcome: Ongoing, Progressing     Problem: Sleep Disordered Breathing (Heart Failure)  Goal: Effective Breathing Pattern During Sleep  Outcome: Ongoing, Progressing     Thoracentesis done today, removed 1200ml from left lung. Patient placed on continuous BiPAP, patient tolerates well. 4L per NC off of biPAP. Encouraging intake. Boost supplements ordered. POCT AC/HS. ABX therapy continue. CT with contrast done of abdomen, results pending.

## 2022-05-22 NOTE — PROGRESS NOTES
[facility] - Med Surg (3rd Fl)  Pulmonology  Progress Note    Patient Name: Eddie Parada Sr.  MRN: [id number]  Admission Date: 5/15/2022  Hospital Length of Stay: 6 days  Code Status: DNR  Attending Provider: Joseph Redmond MD  Primary Care Provider: Callum Roberts MD   Principal Problem: (HFpEF) heart failure with preserved ejection fraction    Subjective:     Interval History: SOB O2 sat on monitored time 184 min less than 88 on 4 liters     Objective:     Vital Signs (Most Recent):  Temp: 96.8 °F (36 °C) (05/22/22 0705)  Pulse: (!) 59 (05/22/22 0959)  Resp: (!) 22 (05/22/22 0705)  BP: 133/63 (05/22/22 0705)  SpO2: 97 % (05/22/22 0741)   Vital Signs (24h Range):  Temp:  [96.5 °F (35.8 °C)-96.9 °F (36.1 °C)] 96.8 °F (36 °C)  Pulse:  [59-73] 59  Resp:  [18-22] 22  SpO2:  [87 %-97 %] 97 %  BP: (125-146)/(59-64) 133/63     Weight: 86.6 kg (190 lb 14.7 oz)  Body mass index is 31.77 kg/m².      Intake/Output Summary (Last 24 hours) at 5/22/2022 1123  Last data filed at 5/22/2022 0918  Gross per 24 hour   Intake 1590.69 ml   Output 525 ml   Net 1065.69 ml       Physical Exam  Vitals and nursing note reviewed.   Constitutional:       General: He is not in acute distress.     Appearance: Normal appearance. He is well-developed. He is not ill-appearing, toxic-appearing or diaphoretic.   HENT:      Head: Normocephalic and atraumatic.      Jaw: No trismus.      Right Ear: Hearing, tympanic membrane, ear canal and external ear normal.      Left Ear: Hearing, tympanic membrane, ear canal and external ear normal.      Nose: Nose normal. No nasal deformity, mucosal edema or rhinorrhea.      Right Sinus: No maxillary sinus tenderness or frontal sinus tenderness.      Left Sinus: No maxillary sinus tenderness or frontal sinus tenderness.      Mouth/Throat:      Dentition: Normal dentition.      Pharynx: Uvula midline. No posterior oropharyngeal erythema or uvula swelling.   Eyes:      General: Lids are normal. No scleral  icterus.     Conjunctiva/sclera: Conjunctivae normal.      Comments: Sclera clear bilat   Neck:      Trachea: Trachea and phonation normal.   Cardiovascular:      Rate and Rhythm: Normal rate and regular rhythm.      Pulses: Normal pulses.      Heart sounds: Normal heart sounds.   Pulmonary:      Effort: Prolonged expiration and respiratory distress (mild to moderate) present.      Breath sounds: Decreased air movement present. Examination of the right-upper field reveals decreased breath sounds. Examination of the left-upper field reveals decreased breath sounds and rhonchi. Examination of the right-middle field reveals decreased breath sounds and rhonchi. Examination of the left-middle field reveals decreased breath sounds, rhonchi and rales. Examination of the right-lower field reveals decreased breath sounds and rhonchi. Examination of the left-lower field reveals decreased breath sounds, wheezing and rhonchi. Decreased breath sounds, wheezing, rhonchi and rales present.   Abdominal:      General: Bowel sounds are normal. There is no distension.      Palpations: Abdomen is soft.      Tenderness: There is no abdominal tenderness.   Musculoskeletal:         General: No deformity. Normal range of motion.      Cervical back: Full passive range of motion without pain and neck supple.   Skin:     General: Skin is warm and dry.      Coloration: Skin is not pale.   Neurological:      Mental Status: He is alert and oriented to person, place, and time.      Motor: No abnormal muscle tone.      Coordination: Coordination normal.   Psychiatric:         Speech: Speech normal.         Behavior: Behavior normal. Behavior is cooperative.         Thought Content: Thought content normal.         Judgment: Judgment normal.       Vents:       Lines/Drains/Airways       Peripherally Inserted Central Catheter Line  Duration             PICC Double Lumen 05/12/22 0845 right basilic 10 days              Drain  Duration                   Drain/Device  05/04/22 1618 midline umbilical area pigtail 17 days                    Significant Labs:    CBC/Anemia Profile:  Recent Labs   Lab 05/21/22  0545 05/22/22  0555   WBC 5.63 5.47   HGB 8.5* 8.3*   HCT 26.7* 26.2*    208   * 104*   RDW 14.6* 14.6*        Chemistries:  Recent Labs   Lab 05/21/22  0545 05/22/22  0555 05/22/22  0556    140 140   K 3.9 3.9 3.9    104 103   CO2 26 25 25   BUN 49* 43* 43*   CREATININE 1.6* 1.4 1.4   CALCIUM 8.7 8.5* 8.4*   ALBUMIN 2.7*  --   --    PROT 6.2 6.1  --    BILITOT 0.5  --   --    ALKPHOS 118  --   --    ALT 21  --   --    AST 37  --   --        All pertinent labs within the past 24 hours have been reviewed.    Significant Imaging:  I have reviewed all pertinent imaging results/findings within the past 24 hours.    Assessment/Plan:     * (HFpEF) heart failure with preserved ejection fraction  Diastolic issues with hypoalbuminemia causeing pleural effusion will attempt bipap to improve oxygenation     COPD (chronic obstructive pulmonary disease)  10 pack year    Chronic respiratory failure with hypoxia  Patient with Hypoxic Respiratory failure which is Chronic.  he is not on home oxygen. Supplemental oxygen was provided and noted-  .   Signs/symptoms of respiratory failure include- increased work of breathing. Contributing diagnoses includes - COPD Labs and images were reviewed. Patient has  recnt abg. Will treat underlying causes and adjust management of respiratory failure as follows- 4    Counseling regarding advanced directives and goals of care  Advance Care Planning         Pneumonia of left lower lobe due to infectious organism  Probable pleural effusion     NIK (acute kidney injury)  Secondary to age and diuresis    Abdominal wall abscess  Draining     Type 2 diabetes mellitus, controlled  stable    S/P AVR  S/P Valve replacement   Contributing to present Pleural effusion     CAD (coronary artery disease)  stable    Chronic atrial  fibrillation  Stable HR  Controlled Vent response                  Vivek Morris MD  Pulmonology  [facility] - Med Surg (3rd Fl)

## 2022-05-22 NOTE — ASSESSMENT & PLAN NOTE
bioprosthetic valve placed 2011   - continue home xarelto - adjust currently for renal dosing > now on lovenox. GFr improved so start BID dosing today (due to procedure) and if stable resume NOAC

## 2022-05-22 NOTE — ASSESSMENT & PLAN NOTE
Continue xarelto- adjust for renal; recommending decrease to 15mg daily .  Now holding due to anemia/NIK  lovenox renal dosing ---GFR improved so start BID dosing today (due to procedure) and if stable resume NOAC tomorrow

## 2022-05-22 NOTE — PROGRESS NOTES
[facility] ([facility])  [facility] Medicine  Progress Note    Patient Name: Eddie Parada Sr.  MRN: [id number]  Patient Class: IP- Inpatient   Admission Date: 5/15/2022  Length of Stay: 6 days  Attending Physician: Joseph Redmond MD  Primary Care Provider: Callum Roberts MD        Subjective:     Principal Problem:(HFpEF) heart failure with preserved ejection fraction        HPI:  Eddie Parada Sr. is a 89 y.o. male with known atrial fibrillation, aortic stenosis s/p repair,  CAD, CHF, COPD, diabetes mellitus type 2, hyperlipidemia,  CVA and ongoing complications associated of an anterior abdominal wall mass secondary to ventral hernia mesh placed ~20 years ago who presented to ER with shortness of breath since just prior to arrival.  While sitting on a sofa at home, he suddenly began short of breath.  Per wife, he has been compliant with all of his home medication.  Endorses leg swelling.   Denies chest pain, diaphoresis.   BNP> 500, CXR demonstrating pulmonary edema, Creat elevated at 2.1- (Baseline creat 1.0-1.7)   D- dimer elevated, LE US negative for DVT, TNI- 0.044>0.048>0.043   H&H 9.1/27.9>8.6>26.2     He is s/p Drain recently removed prior to presentation, with return of symptoms, re-accumulation of fluid. General surgery was consulted who states patient is not a good surgical candidate. Patient underwent US-guided drain placement with IR 5/4, which he tolerated well. Initiated BSA with vanc, cefepime, flagyl, and azithromycin (for atypicals/CAP). Consulted ID for long-term IV Abx who recommends discharging patient on vanc, ceftriaxone, PO flagyl, and PO diflucan for at least 2 weeks and close follow-up with ID for decision making based on clinical course. Because patient has had several hospitalizations in last few months due to complications from abdominal wall abscess, consulted palliative care to have on board and to establish outpatient follow-up. Patient was discharged home with  abdominal drain in place, 4 weeks of vanc, ceftriaxone, PO flagyl, and PO diflucan, and close follow-up with ID, general surgery, palliative care, and home health. Patient remained afebrile and without abdominal pain, leukocytosis, or other issues during hospital stay.          Overview/Hospital Course:  5/17 89 YO WM receiving multiple home abx including vancomycin at home for abdominal abscess, presented with acute SOB, Heart failure, getting lasix 20mg IV q 12, not much diuresis, intake and output reflecting + 270  Noting more edema to arms, below eyes , wife noting more confusion when he wakes up   + NIK on CKD; Creat 2.1; K+ 3.6   O2 sat 93-94% on 3LNC , not on home oxygen,   H&H 8.4/25.6 trending down ; NOAC stopped per cards after dose yesterday   Decreased appetite, Boost ordered,   End of life care discussed ; pt does not have living will .  He will sign living will .  He will be DNR .  I also talked to wife by patients bedside and daughter on phone ;answered all questions.  Spent about 20 minutes  . Later on signed living will     5/18 Pt was admitted with heart failure with elevated BNP and some lower ext swelling. He was given 60 mg IV lasix 5/16 and then 40mg 5/17. He has had fluctuating renal function 2 weeks ago Creat on admit 2.1.>> 2.2 today. BNP was elevated  on admission and is not putting out much urine.Dr Monterroso was consulted. Troponin chronically elevated. Echo 55% mild heart failure and mod pulm htn noted. Holding xarel;to for now due to h/h dropping. 9>8. D dimer was elevated u/s legs no clot     Pt was currently being treated at home for abd wall abscess s/p hernia repair. Per Id was on vanc and rocephin via PICC and PO flagyl and diflucan outpt- continued while here. Still has drain from abd wall abscess, not much drainage  Serosanguinous     5/19 pt was being treated for flash pulm edema with lasix 60mg IV 5/16 and 40mg IV 5/17. Yesterday diuretics were held as creat was elevated and not  improving with diureses. Also noted not making much urine. Renal u/s done with no obstruction noted. Was started on NS at 50ml/hr last night and creat is better this am 2.1>1.9. Looks like his renal fucntion fluctuates but 2 weeks ago had creat 1.0. He remains on vanc and rocephin IV and diflucan and flagyl po per ID from abd abscess (still with drain)- today makes 2 weeks since d/c from [place]. Awaiting guidance on if he will need 2 or 4 weeks of these abx. Had a very short bonnie of v tach last night while sleeping. K+ WNL- mag and tsh pending this am., ambulatory with PT min assist 15ft    5/20 pt is currently being treated for over diuresis. He is on NS @ 50ml/hr. Creat is improving now at 1.6 this am. Also had vanc held for abd abscess. Trough at 16 this am. Due to v tach yesterday diflucan was stopped after 2 weeks. He remains on rocephin and flagyl for the abd abscess.     5/21 slight improvement in Cr. Still on 4L NC and not able to wean. He is feeling a bit more SOB this morning. Repeat CXR with large left effusion. Diflucantstopped after 2 weeks treatment. Remains on Vanc, rocephin and flagyl for abscess. ID recs consider repeat CT abd/pelvis with contrast to see if resolving abscess; no output from drain.   Long discussion this AM with patient, wife and daughter (on phone) about goals of care.  His appetite remains very poor.     5/22: he reports no changes. Still poor appetite. Still on 4L NC  Dr. Morris plans for thoracentesis today as he did not tolerate diuresis and remains with large left pleural effusion on CXR.  Cr improved--plan for CT abd and pelvis with contrast today per ID recs to see if abscess resolved.         Wife at bedside reports increased fatigue, leg weakness  Review of Systems   Constitutional:  Positive for appetite change and fatigue. Negative for activity change, fever and unexpected weight change.   HENT:  Negative for congestion, ear pain, hearing loss, rhinorrhea and sore throat.     Eyes:  Negative for pain, redness and visual disturbance.   Respiratory:  Positive for shortness of breath. Negative for cough and wheezing.    Cardiovascular:  Negative for chest pain, palpitations and leg swelling.   Gastrointestinal:  Negative for abdominal pain, constipation, diarrhea, nausea and vomiting.   Genitourinary:  Negative for decreased urine volume, dysuria, frequency and urgency.   Musculoskeletal:  Negative for back pain, joint swelling and neck pain.   Skin:  Negative for color change, rash and wound.   Neurological:  Positive for weakness. Negative for dizziness, tremors, light-headedness and headaches.   Objective:     Vital Signs (Most Recent):  Temp: 96.8 °F (36 °C) (05/22/22 0705)  Pulse: 73 (05/22/22 0800)  Resp: (!) 22 (05/22/22 0705)  BP: 133/63 (05/22/22 0705)  SpO2: 97 % (05/22/22 0741)   Vital Signs (24h Range):  Temp:  [96.5 °F (35.8 °C)-96.9 °F (36.1 °C)] 96.8 °F (36 °C)  Pulse:  [59-89] 73  Resp:  [18-24] 22  SpO2:  [87 %-98 %] 97 %  BP: (125-146)/(59-64) 133/63     Weight: 86.6 kg (190 lb 14.7 oz)  Body mass index is 31.77 kg/m².    Physical Exam  Vitals and nursing note reviewed.   Constitutional:       General: He is not in acute distress.     Appearance: He is well-developed.   HENT:      Head: Normocephalic and atraumatic.      Right Ear: External ear normal.      Left Ear: External ear normal.   Eyes:      General:         Right eye: No discharge.         Left eye: No discharge.   Neck:      Thyroid: No thyromegaly.   Cardiovascular:      Rate and Rhythm: Normal rate and regular rhythm.      Heart sounds: No murmur heard.  Pulmonary:      Effort: Pulmonary effort is normal. No respiratory distress.      Breath sounds: Rales present. No wheezing.      Comments: Decreased left lung  Abdominal:      General: Bowel sounds are normal. There is no distension.      Palpations: Abdomen is soft.      Tenderness: There is no abdominal tenderness.      Comments: Drain in place.    Musculoskeletal:      Right lower leg: No edema.      Left lower leg: No edema.   Skin:     General: Skin is warm and dry.      Comments: PICC to right UE, dressing c/d/i           Significant Labs:   A1C:   Recent Labs   Lab 22  0744 22  1046   HGBA1C 5.9* 6.0*         Bilirubin:   Recent Labs   Lab 05/15/22  2242 22  0642 22  0542 22  0600 22  0545   BILITOT 0.7 0.6 0.5 0.5 0.5       CBC:   Recent Labs   Lab 22  0545 22  0555   WBC 5.63 5.47   HGB 8.5* 8.3*   HCT 26.7* 26.2*    208       CMP:   Recent Labs   Lab 22  0545 22  0555 22  0556    140 140   K 3.9 3.9 3.9    104 103   CO2 26 25 25   * 152*  152* 148*   BUN 49* 43* 43*   CREATININE 1.6* 1.4 1.4   CALCIUM 8.7 8.5* 8.4*   PROT 6.2 6.1  --    ALBUMIN 2.7*  --   --    BILITOT 0.5  --   --    ALKPHOS 118  --   --    AST 37  --   --    ALT 21  --   --    ANIONGAP 12 11 12   EGFRNONAA 37* 43* 43*       Lab Results   Component Value Date    DDIMER 2.25 (H) 05/15/2022     Lactate 1.3    BNP  Recent Labs   Lab 05/15/22  2242   *         Recent Labs   Lab 22  1619 22  1923 22  0610   POCTGLUCOSE 204* 182* 159*       Troponin: 0.044>0.048>0.043>0.037    TSH:   Recent Labs   Lab 22  0541   TSH 8.559*       No results for input(s): COLORU, APPEARANCEUA, PHUR, SPECGRAV, PROTEINUA, GLUCUA, KETONESU, BILIRUBINUA, OCCULTUA, NITRITE, UROBILINOGEN, LEUKOCYTESUR, RBCUA, WBCUA, BACTERIA, SQUAMEPITHEL, HYALINECASTS in the last 48 hours.    Invalid input(s): WRIGHTSUR  Covid negative   Flu negative     Significant Imagin/18 US renal Technically limited study with known renal cysts.  No evidence of obstructive uropathy     CXR Cardiomegaly with mild CHF.     Left lower lobe atelectasis, small left pleural effusion.    LE US -No evidence of deep venous thrombosis in either lower extremity.    5/15 CXR- Increased left-sided pleural effusion along  with new airspace opacities in left hemithorax.  Findings suggestive of worsening CHF pattern of pulmonary edema.    EKG Likely ventricular paced rhythm  - morphology suggestive of biventricular   pacing   Abnormal ECG   When compared with ECG of 30-APR-2022 10:39,   Premature ventricular complexes are no longer Present   Confirmed by Adam Mcmahon MD ([id number]) on 5/16/2022 8:11:55 AM      Assessment/Plan:      * (HFpEF) heart failure with preserved ejection fraction  5/2/22 Echo-   · The estimated ejection fraction is 55%.  · Indeterminate left ventricular diastolic function.  · Moderate right ventricular enlargement with mildly reduced right ventricular systolic function.  · Mild aortic regurgitation.  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 There is aortic insufficiency present. Prosthetic aortic valve is normal.  · The aortic valve mean gradient is 6 mmHg with a dimensionless index of 0.64.  · Mild mitral regurgitation.  · Mild tricuspid regurgitation.  · There is moderate pulmonary hypertension.       Increased SOB and hypoxia. Diuresis initiated. CIS following. Strict I/O. Low Na diet.    Stop lasix today. Creat rising and patient not urinating much- check renal u/s today considering fluids     5/19 was started on minimal fluids last night NS at 50ml/hr creat improved 2.1>1.9- hold further diuresis today     5/22 Cont to hold diurese and creat continues to improve. On BB and Isorbide, on 50cc/hr IVF which is minimal  CXR shows large left effusion with atelectasis; Dr. Morris agrees to thoracentesis today which will hopefully improve SOB and oxygen requirement    Counseling regarding advanced directives and goals of care  Advance Care Planning     Living Will  During this visit, I engaged the patient and family  in the advance care planning process.  The patient and I reviewed the role for advance directives and their purpose in directing future healthcare if the patient's unable to speak for him/herself.   At this point in time, the patient does have full decision-making capacity.  We discussed different extreme health states that he could experience, and reviewed what kind of medical care he would want in those situations.  The patient and family communicated that if he were comatose and had little chance of a meaningful recovery, he would not want machines/life-sustaining treatments used. In addition to the above preference, other important end-of-life issues for the patient include . The patient has completed a living will to reflect these preferences.  I spent a total of 20 minutes engaging the patient in this advance care planning discussion.Discussed with wife in person and daughter via telephone           Today a meeting took place: bedside    Patient Participation: Patient is able to participate     Attendees (Name and  Relationship to patient):also dicussed with wife and daughter    Staff attendees (Name and  Role): Georgia VERMA, xochitl Jules RN case manage   Code Status: DNR; status confirmed/order placed in chart    ACP Documents: Other Documents (specify): living will     Goals of care: The patient and family endorses that what is most important right now is to focus on symptom/pain control, quality of life, even if it means sacrificing a little time and improvement in condition but with limits to invasive therapies    Accordingly, we have decided that the best plan to meet the patient's goals includes continuing with treatment      Recommendations/Length of ACP   conversation in minutes: 20 minutes      Code Status  In light of the patients advanced and life limiting illness,I engaged the the patient and family in a conversation about the patient's preferences for care  at the very end of life. The patient wishes to have a natural, peaceful death.  Along those lines, the patient does not wish to have CPR or other invasive treatments performed when his heart and/or breathing stops. I communicated to the  patient and family that a DNR order would be placed in his medical record to reflect this preference.  I spent a total of 20 minutes engaging the patient in this advance care planning discussion.        5/21: again addressed goals of care with patient, wife and daughter on the phone. They are discussing wishes.     Debility  PT consulted .  Per records patient with several hospitalizations in last few months for abdominal wall abscess   - prior to 2 months ago, was still independent, working and running a business  - consulted palliative care to have on board and have OP follow up      NIK (acute kidney injury)  Monitor BUN/SCr.  Monitor I/Os.  Monitor electrolytes.  Avoid non-steroidal anti-inflammatory medications.  Symptoms and labs suggestive of Acute HF; continue with diuretics, monitor renal fx closely   NIK on CKD      renal US today and hold further diuresis- pt not voiding much no obstruction  vanc dosing per pharm>  5/19 renal u/s yesterday does not show obstruction. Placed in gentle IVF and diuretics held. Creat has improved a smidgen     5/22 with very slow fluids creat improving to 1.4 today. Pharmacy dosing vanc. CT with contrast today so repeat labs in AM needed    Abdominal wall abscess  Last discharge 5/6  with abdominal drain in place and 4 weeks of vanc, rocephin, flagyl, and diflucan  - follow up with general surgery for drain and ID for Abx- ( 10 days since discharge)   Consult pharmacy to adjust vanc- monitor     ventral hernia repair with mesh placement ~20 years ago, no complications until 3/2022 with several hospitalizations leading to drain placement and antibiotics since that time, drain removed 4/29/22   - most recent hospitalization began on 4/30/22 at [facility] and was transferred here on 5/4/22 for surgical and IR evaluation  - was initiated on Vanc and Zosyn at [facility]  - switched to Vanc, Cefepime, Flagyl on admission to our facility given renal function   - blood  cultures from 4/30/22 no growth to date   - wound cultures from 3/3 and 5/4 NGTD  - general surgery states patient is not good surgical candidate  - underwent US-guided drain placement 5/4  - general surgery added fluconazole for yeast seen on gram stain of wound Cx  - consulted ID for assistance with Abx: discharge with IV vanc, IV ceftriaxone, PO flagyl, and PO diflucan for at least 2 weeks with close ID follow-up .  -vanc dosing per pharmacy but will lower vanc trough goal to 10  Message sent to ID for guidance as today makes 2 weeks from D/c and last notes per ID read 2-4 weeks of therapy needed outpt >> will send message to Dr Brown. Stopped diflucan due to v tach after 2 weeks of treatment. Holding vanc as trough still 16 and creat elevated. Still on rocephin and flagyl    5/22: ID rec repeat CT scan to see if abscess resolved. If so, can stop antimicrobial therapy. Cr 1.4 today so will proceed with CT with contrast    5/21: cont vanc, rocephin and flagyl for now  ID rec repeat CT to see if resolved. If so, can stop antibx. No output from drain. However with Cr still above baseline risk further nephrotoxicity with contrast for CT. Will make final decision on contrasted study tomorrow AM after repeat labs.     Disorder of prostate  Continue flomax .      Hypothyroidism due to acquired atrophy of thyroid  Continue levothyroxine .  Repeat tsh today as last one was elevated on chart and pt has short run v tach overnight   Increase synthroid to 75mcg>88mcg 5/19     Ventricular tachycardia  Pt had 9 beat run of v-tach. Pt asymptomatic and no complaints of chest pain. Was sleeping soundly at the time.   K+ WNL- add mag > normal and tsh elevated increase synthroid from 75>88  Stop diflucan. Had a few yeast in abscess fungal culture but has had 2 weeks of that      Long term current use of anticoagulant therapy  Continue xarelto- adjust for renal; recommending decrease to 15mg daily .  Now holding due to  anemia/NIK  lovenox renal dosing ---GFR improved so start BID dosing today (due to procedure) and if stable resume NOAC tomorrow    Type 2 diabetes mellitus, controlled  HbA1c 6.0 on 4/30/22  - does not appear to be on any home medications   - SSI + POCT glucose while inpatient  - continue to manage with lifstyle, follow up with PCP.         S/P AVR   bioprosthetic valve placed 2011   - continue home xarelto - adjust currently for renal dosing > now on lovenox. GFr improved so start BID dosing today (due to procedure) and if stable resume NOAC        Mixed dyslipidemia  Continue pravastatin .      CAD (coronary artery disease)  Continue statin, bb, NOAC   Cards consulted     NOAC held due to NIK> lovenox currently but improved today so went to BID dosing. Keeping lovenox in case GFR drops again after contrast load today for CT scan and due to procedure today. If stable can go back to NOAC    Essential (primary) hypertension  BP Readings from Last 3 Encounters:   05/22/22 133/63   05/12/22 (!) 129/58   05/11/22 (!) 110/52     Resume home coreg-at lower dose 3.25mg , hold amlodipine & doxazosin  for now .  Dr. Monterroso ordered Imdur, monitor BP       Chronic atrial fibrillation  Maintain BB, rivaroxaban- renal dose  p PPM .  xarelto held due to NIK   lovenox         VTE Risk Mitigation (From admission, onward)         Ordered     enoxaparin injection 80 mg  Every 24 hours (non-standard times)         05/18/22 1256     IP VTE HIGH RISK PATIENT  Once         05/16/22 0419     Place sequential compression device  Until discontinued         05/16/22 0419                Discharge Planning   KANDICE:      Code Status: DNR   Is the patient medically ready for discharge?:     Reason for patient still in hospital (select all that apply): Treatment  Discharge Plan A: Home with family, Home Health                  Georgia Ann MD  Department of Hospital Medicine   [place] [facility] (3rd Fl)

## 2022-05-22 NOTE — PROGRESS NOTES
Pharmacokinetic Assessment Follow Up: IV Vancomycin    Vancomycin serum concentration assessment(s):    The random level was drawn correctly and can be used to guide therapy at this time. The measurement is within the desired definitive target range of 10 to 20 mcg/mL.    Vancomycin Regimen Plan:  Give Vancomycin 15mg/kg (1250mg) IV once, then  Re-dose when the random level is less than 20 mcg/mL, next level to be drawn at 1600 on 05/23/22    Drug levels (last 3 results):  Recent Labs   Lab Result Units 05/19/22  0801 05/20/22  0600 05/21/22  0545 05/22/22  0556   Vancomycin, Random ug/mL 18.5  --  12.8 20.1   Vancomycin-Trough ug/mL  --  16.2  --   --        Pharmacy will continue to follow and monitor vancomycin.    Please contact pharmacy at extension [phone] for questions regarding this assessment.    Thank you for the consult,   Parisa Lauren       Patient brief summary:  Eddie Parada Sr. is a 89 y.o. male initiated on antimicrobial therapy with IV Vancomycin for treatment of bacteremia    The patient's current regimen is Pulse dose     Drug Allergies:   Review of patient's allergies indicates:   Allergen Reactions    Ciprofloxacin     Levofloxacin Swelling    Lyrica [pregabalin] Swelling    Unable to assess Other (See Comments)     Soft shell crabs       Actual Body Weight:   86.6kg    Renal Function:   Estimated Creatinine Clearance: 33.3 mL/min (based on SCr of 1.4 mg/dL).,     Dialysis Method (if applicable):  N/A    CBC (last 72 hours):  Recent Labs   Lab Result Units 05/20/22  0600 05/21/22  0545 05/22/22  0555   WBC K/uL 5.96 5.63 5.47   Hemoglobin g/dL 8.3* 8.5* 8.3*   Hematocrit % 25.9* 26.7* 26.2*   Platelets K/uL 212 214 208   Gran % % 61.5 60.8 61.9   Lymph % % 21.6 20.4 20.1   Mono % % 14.4 16.5* 15.9*   Eosinophil % % 1.3 1.2 1.1   Basophil % % 0.5 0.7 0.5   Differential Method  Automated Automated Automated       Metabolic Panel (last 72 hours):  Recent Labs   Lab Result Units  05/20/22  0600 05/21/22  0545 05/22/22  0555 05/22/22  0556   Sodium mmol/L 138 139 140 140   Potassium mmol/L 3.8 3.9 3.9 3.9   Chloride mmol/L 99 101 104 103   CO2 mmol/L 27 26 25 25   Glucose mg/dL 162* 161* 152*  152* 148*   BUN mg/dL 47* 49* 43* 43*   Creatinine mg/dL 1.7* 1.6* 1.4 1.4   Albumin g/dL 2.6* 2.7*  --   --    Total Bilirubin mg/dL 0.5 0.5  --   --    Alkaline Phosphatase U/L 111 118  --   --    AST U/L 40 37  --   --    ALT U/L 22 21  --   --        Vancomycin Administrations:  vancomycin given in the last 96 hours                     vancomycin 1.25 g in dextrose 5% 250 mL IVPB (ready to mix) (mg) 1,250 mg New Bag 05/21/22 0853                    Microbiologic Results:  Microbiology Results (last 7 days)       Procedure Component Value Units Date/Time    AFB culture (includes stain) [[id number]]     Order Status: No result Specimen: Pleural Fluid     Culture, Respiratory with Gram Stain [[id number]]     Order Status: No result Specimen: Respiratory from Bronchial Wash     Fungus culture [[id number]]     Order Status: No result Specimen: Pleural Fluid     Gram stain [[id number]]     Order Status: No result Specimen: Body Fluid     Culture, Respiratory with Gram Stain [[id number]]     Order Status: Canceled Specimen: Respiratory from Bronchial Wash     Gram stain [[id number]]     Order Status: Canceled Specimen: Body Fluid     Fungus culture [[id number]]     Order Status: Canceled Specimen: Pleural Fluid     AFB culture (includes stain) [[id number]]     Order Status: Canceled Specimen: Pleural Fluid     Influenza A & B by Molecular [[id number]] Collected: 05/15/22 2300    Order Status: Completed Specimen: Nasopharyngeal Swab Updated: 05/15/22 2340     Influenza A, Molecular Negative     Influenza B, Molecular Negative     Flu A & B Source Nasal swab

## 2022-05-22 NOTE — ASSESSMENT & PLAN NOTE
Continue statin, bb, NOAC   Cards consulted     NOAC held due to NIK> lovenox currently but improved today so went to BID dosing. Keeping lovenox in case GFR drops again after contrast load today for CT scan and due to procedure today. If stable can go back to NOAC

## 2022-05-22 NOTE — ASSESSMENT & PLAN NOTE
Last discharge 5/6  with abdominal drain in place and 4 weeks of vanc, rocephin, flagyl, and diflucan  - follow up with general surgery for drain and ID for Abx- ( 10 days since discharge)   Consult pharmacy to adjust vanc- monitor     ventral hernia repair with mesh placement ~20 years ago, no complications until 3/2022 with several hospitalizations leading to drain placement and antibiotics since that time, drain removed 4/29/22   - most recent hospitalization began on 4/30/22 at [facility] and was transferred here on 5/4/22 for surgical and IR evaluation  - was initiated on Vanc and Zosyn at [facility]  - switched to Vanc, Cefepime, Flagyl on admission to our facility given renal function   - blood cultures from 4/30/22 no growth to date   - wound cultures from 3/3 and 5/4 NGTD  - general surgery states patient is not good surgical candidate  - underwent US-guided drain placement 5/4  - general surgery added fluconazole for yeast seen on gram stain of wound Cx  - consulted ID for assistance with Abx: discharge with IV vanc, IV ceftriaxone, PO flagyl, and PO diflucan for at least 2 weeks with close ID follow-up .  -vanc dosing per pharmacy but will lower vanc trough goal to 10  Message sent to ID for guidance as today makes 2 weeks from D/c and last notes per ID read 2-4 weeks of therapy needed outpt >> will send message to Dr Brown. Stopped diflucan due to v tach after 2 weeks of treatment. Holding vanc as trough still 16 and creat elevated. Still on rocephin and flagyl    5/22: ID rec repeat CT scan to see if abscess resolved. If so, can stop antimicrobial therapy. Cr 1.4 today so will proceed with CT with contrast    5/21: cont vanc, rocephin and flagyl for now  ID rec repeat CT to see if resolved. If so, can stop antibx. No output from drain. However with Cr still above baseline risk further nephrotoxicity with contrast for CT. Will make final decision on contrasted study tomorrow AM after  repeat labs.

## 2022-05-22 NOTE — SUBJECTIVE & OBJECTIVE
Interval History: SOB O2 sat on monitored time 184 min less than 88 on 4 liters     Objective:     Vital Signs (Most Recent):  Temp: 96.8 °F (36 °C) (05/22/22 0705)  Pulse: (!) 59 (05/22/22 0959)  Resp: (!) 22 (05/22/22 0705)  BP: 133/63 (05/22/22 0705)  SpO2: 97 % (05/22/22 0741)   Vital Signs (24h Range):  Temp:  [96.5 °F (35.8 °C)-96.9 °F (36.1 °C)] 96.8 °F (36 °C)  Pulse:  [59-73] 59  Resp:  [18-22] 22  SpO2:  [87 %-97 %] 97 %  BP: (125-146)/(59-64) 133/63     Weight: 86.6 kg (190 lb 14.7 oz)  Body mass index is 31.77 kg/m².      Intake/Output Summary (Last 24 hours) at 5/22/2022 1123  Last data filed at 5/22/2022 0918  Gross per 24 hour   Intake 1590.69 ml   Output 525 ml   Net 1065.69 ml       Physical Exam  Vitals and nursing note reviewed.   Constitutional:       General: He is not in acute distress.     Appearance: Normal appearance. He is well-developed. He is not ill-appearing, toxic-appearing or diaphoretic.   HENT:      Head: Normocephalic and atraumatic.      Jaw: No trismus.      Right Ear: Hearing, tympanic membrane, ear canal and external ear normal.      Left Ear: Hearing, tympanic membrane, ear canal and external ear normal.      Nose: Nose normal. No nasal deformity, mucosal edema or rhinorrhea.      Right Sinus: No maxillary sinus tenderness or frontal sinus tenderness.      Left Sinus: No maxillary sinus tenderness or frontal sinus tenderness.      Mouth/Throat:      Dentition: Normal dentition.      Pharynx: Uvula midline. No posterior oropharyngeal erythema or uvula swelling.   Eyes:      General: Lids are normal. No scleral icterus.     Conjunctiva/sclera: Conjunctivae normal.      Comments: Sclera clear bilat   Neck:      Trachea: Trachea and phonation normal.   Cardiovascular:      Rate and Rhythm: Normal rate and regular rhythm.      Pulses: Normal pulses.      Heart sounds: Normal heart sounds.   Pulmonary:      Effort: Prolonged expiration and respiratory distress (mild to moderate)  present.      Breath sounds: Decreased air movement present. Examination of the right-upper field reveals decreased breath sounds. Examination of the left-upper field reveals decreased breath sounds and rhonchi. Examination of the right-middle field reveals decreased breath sounds and rhonchi. Examination of the left-middle field reveals decreased breath sounds, rhonchi and rales. Examination of the right-lower field reveals decreased breath sounds and rhonchi. Examination of the left-lower field reveals decreased breath sounds, wheezing and rhonchi. Decreased breath sounds, wheezing, rhonchi and rales present.   Abdominal:      General: Bowel sounds are normal. There is no distension.      Palpations: Abdomen is soft.      Tenderness: There is no abdominal tenderness.   Musculoskeletal:         General: No deformity. Normal range of motion.      Cervical back: Full passive range of motion without pain and neck supple.   Skin:     General: Skin is warm and dry.      Coloration: Skin is not pale.   Neurological:      Mental Status: He is alert and oriented to person, place, and time.      Motor: No abnormal muscle tone.      Coordination: Coordination normal.   Psychiatric:         Speech: Speech normal.         Behavior: Behavior normal. Behavior is cooperative.         Thought Content: Thought content normal.         Judgment: Judgment normal.       Vents:       Lines/Drains/Airways       Peripherally Inserted Central Catheter Line  Duration             PICC Double Lumen 05/12/22 0845 right basilic 10 days              Drain  Duration                  Drain/Device  05/04/22 1618 midline umbilical area pigtail 17 days                    Significant Labs:    CBC/Anemia Profile:  Recent Labs   Lab 05/21/22  0545 05/22/22  0555   WBC 5.63 5.47   HGB 8.5* 8.3*   HCT 26.7* 26.2*    208   * 104*   RDW 14.6* 14.6*        Chemistries:  Recent Labs   Lab 05/21/22  0545 05/22/22  0555 05/22/22  0556    140  140   K 3.9 3.9 3.9    104 103   CO2 26 25 25   BUN 49* 43* 43*   CREATININE 1.6* 1.4 1.4   CALCIUM 8.7 8.5* 8.4*   ALBUMIN 2.7*  --   --    PROT 6.2 6.1  --    BILITOT 0.5  --   --    ALKPHOS 118  --   --    ALT 21  --   --    AST 37  --   --        All pertinent labs within the past 24 hours have been reviewed.    Significant Imaging:  I have reviewed all pertinent imaging results/findings within the past 24 hours.

## 2022-05-22 NOTE — ASSESSMENT & PLAN NOTE
BP Readings from Last 3 Encounters:   05/22/22 133/63   05/12/22 (!) 129/58   05/11/22 (!) 110/52     Resume home coreg-at lower dose 3.25mg , hold amlodipine & doxazosin  for now .  Dr. Monterroso ordered Imdur, monitor BP

## 2022-05-22 NOTE — ASSESSMENT & PLAN NOTE
Diastolic issues with hypoalbuminemia causeing pleural effusion will attempt bipap to improve oxygenation

## 2022-05-22 NOTE — RESPIRATORY THERAPY
05/22/22 1300   Patient Assessment/Suction   Level of Consciousness (AVPU) alert   Respiratory Effort Unlabored   Expansion/Accessory Muscles/Retractions no retractions;no use of accessory muscles   Rhythm/Pattern, Respiratory assisted mechanically  (Bipap placed on at this time)   PRE-TX-O2   O2 Device (Oxygen Therapy) (S)  BiPAP   Oxygen Concentration (%) 40   Ready to Wean/Extubation Screen   FIO2<=50 (chart decimal) 0.4   Preset CPAP/BiPAP Settings   Mode Of Delivery BiPAP S/T   $ CPAP/BiPAP Daily Charge BiPAP/CPAP Daily   $ Initial CPAP/BiPAP Setup? Yes   $ Is patient using? Yes   Size of Mask Small   Sized Appropriately? Yes   Equipment Type Trilogy    Airway Device Type small full face mask   Humidifier not applicable   Ipap 15   EPAP (cm H2O) 6   Pressure Support (cm H2O) 9   Set Rate (Breaths/Min) 15   ITime (sec) 1   Rise Time (sec) 1   Patient CPAP/BiPAP Settings   RR Total (Breaths/Min) 21   Tidal Volume (mL) 580   VE Minute Ventilation (L/min) 9.4 L/min   Peak Inspiratory Pressure (cm H2O) 14.5   Total Leak (L/Min) 38.6   Patient Trigger - ST Mode Only (%) 100   CPAP/BiPAP Alarms   Minute Ventilation (L/Min) 1   High RR (breaths/min) 60   Low RR (breaths/min) 5   Apnea (Sec) 30  (disconnect alarm)

## 2022-05-23 ENCOUNTER — DOCUMENT SCAN (OUTPATIENT)
Dept: HOME HEALTH SERVICES | Facility: HOSPITAL | Age: 87
End: 2022-05-23
Payer: MEDICARE

## 2022-05-23 ENCOUNTER — TELEPHONE (OUTPATIENT)
Dept: INTERNAL MEDICINE | Facility: CLINIC | Age: 87
End: 2022-05-23
Payer: MEDICARE

## 2022-05-23 LAB
ALBUMIN SERPL BCP-MCNC: 2.4 G/DL (ref 3.5–5.2)
ALP SERPL-CCNC: 120 U/L (ref 55–135)
ALT SERPL W/O P-5'-P-CCNC: 18 U/L (ref 10–44)
ANION GAP SERPL CALC-SCNC: 13 MMOL/L (ref 8–16)
ANION GAP SERPL CALC-SCNC: 13 MMOL/L (ref 8–16)
AST SERPL-CCNC: 30 U/L (ref 10–40)
BASOPHILS # BLD AUTO: 0.02 K/UL (ref 0–0.2)
BASOPHILS # BLD AUTO: 0.02 K/UL (ref 0–0.2)
BASOPHILS NFR BLD: 0.4 % (ref 0–1.9)
BASOPHILS NFR BLD: 0.4 % (ref 0–1.9)
BILIRUB SERPL-MCNC: 0.5 MG/DL (ref 0.1–1)
BUN SERPL-MCNC: 39 MG/DL (ref 10–30)
BUN SERPL-MCNC: 39 MG/DL (ref 10–30)
CALCIUM SERPL-MCNC: 8.3 MG/DL (ref 8.7–10.5)
CALCIUM SERPL-MCNC: 8.3 MG/DL (ref 8.7–10.5)
CHLORIDE SERPL-SCNC: 104 MMOL/L (ref 95–110)
CHLORIDE SERPL-SCNC: 104 MMOL/L (ref 95–110)
CO2 SERPL-SCNC: 23 MMOL/L (ref 23–29)
CO2 SERPL-SCNC: 23 MMOL/L (ref 23–29)
CREAT SERPL-MCNC: 1.3 MG/DL (ref 0.5–1.4)
CREAT SERPL-MCNC: 1.3 MG/DL (ref 0.5–1.4)
DIFFERENTIAL METHOD: ABNORMAL
DIFFERENTIAL METHOD: ABNORMAL
EOSINOPHIL # BLD AUTO: 0.1 K/UL (ref 0–0.5)
EOSINOPHIL # BLD AUTO: 0.1 K/UL (ref 0–0.5)
EOSINOPHIL NFR BLD: 1.4 % (ref 0–8)
EOSINOPHIL NFR BLD: 1.4 % (ref 0–8)
ERYTHROCYTE [DISTWIDTH] IN BLOOD BY AUTOMATED COUNT: 14.8 % (ref 11.5–14.5)
ERYTHROCYTE [DISTWIDTH] IN BLOOD BY AUTOMATED COUNT: 14.8 % (ref 11.5–14.5)
EST. GFR  (AFRICAN AMERICAN): 54 ML/MIN/1.73 M^2
EST. GFR  (AFRICAN AMERICAN): 54 ML/MIN/1.73 M^2
EST. GFR  (NON AFRICAN AMERICAN): 47 ML/MIN/1.73 M^2
EST. GFR  (NON AFRICAN AMERICAN): 47 ML/MIN/1.73 M^2
GLUCOSE SERPL-MCNC: 138 MG/DL (ref 70–110)
GLUCOSE SERPL-MCNC: 138 MG/DL (ref 70–110)
HCT VFR BLD AUTO: 26.9 % (ref 40–54)
HCT VFR BLD AUTO: 26.9 % (ref 40–54)
HGB BLD-MCNC: 8.5 G/DL (ref 14–18)
HGB BLD-MCNC: 8.5 G/DL (ref 14–18)
IMM GRANULOCYTES # BLD AUTO: 0.03 K/UL (ref 0–0.04)
IMM GRANULOCYTES # BLD AUTO: 0.03 K/UL (ref 0–0.04)
IMM GRANULOCYTES NFR BLD AUTO: 0.6 % (ref 0–0.5)
IMM GRANULOCYTES NFR BLD AUTO: 0.6 % (ref 0–0.5)
LYMPHOCYTES # BLD AUTO: 1 K/UL (ref 1–4.8)
LYMPHOCYTES # BLD AUTO: 1 K/UL (ref 1–4.8)
LYMPHOCYTES NFR BLD: 19.1 % (ref 18–48)
LYMPHOCYTES NFR BLD: 19.1 % (ref 18–48)
MCH RBC QN AUTO: 32.9 PG (ref 27–31)
MCH RBC QN AUTO: 32.9 PG (ref 27–31)
MCHC RBC AUTO-ENTMCNC: 31.6 G/DL (ref 32–36)
MCHC RBC AUTO-ENTMCNC: 31.6 G/DL (ref 32–36)
MCV RBC AUTO: 104 FL (ref 82–98)
MCV RBC AUTO: 104 FL (ref 82–98)
MONOCYTES # BLD AUTO: 0.7 K/UL (ref 0.3–1)
MONOCYTES # BLD AUTO: 0.7 K/UL (ref 0.3–1)
MONOCYTES NFR BLD: 14.1 % (ref 4–15)
MONOCYTES NFR BLD: 14.1 % (ref 4–15)
NEUTROPHILS # BLD AUTO: 3.3 K/UL (ref 1.8–7.7)
NEUTROPHILS # BLD AUTO: 3.3 K/UL (ref 1.8–7.7)
NEUTROPHILS NFR BLD: 64.4 % (ref 38–73)
NEUTROPHILS NFR BLD: 64.4 % (ref 38–73)
NRBC BLD-RTO: 0 /100 WBC
NRBC BLD-RTO: 0 /100 WBC
PLATELET # BLD AUTO: 186 K/UL (ref 150–450)
PLATELET # BLD AUTO: 186 K/UL (ref 150–450)
PMV BLD AUTO: 9.3 FL (ref 9.2–12.9)
PMV BLD AUTO: 9.3 FL (ref 9.2–12.9)
POCT GLUCOSE: 209 MG/DL (ref 70–110)
POCT GLUCOSE: 213 MG/DL (ref 70–110)
POCT GLUCOSE: 216 MG/DL (ref 70–110)
POTASSIUM SERPL-SCNC: 4.1 MMOL/L (ref 3.5–5.1)
POTASSIUM SERPL-SCNC: 4.1 MMOL/L (ref 3.5–5.1)
PROT SERPL-MCNC: 5.7 G/DL (ref 6–8.4)
RBC # BLD AUTO: 2.58 M/UL (ref 4.6–6.2)
RBC # BLD AUTO: 2.58 M/UL (ref 4.6–6.2)
SODIUM SERPL-SCNC: 140 MMOL/L (ref 136–145)
SODIUM SERPL-SCNC: 140 MMOL/L (ref 136–145)
VANCOMYCIN SERPL-MCNC: 23.3 UG/ML
WBC # BLD AUTO: 5.17 K/UL (ref 3.9–12.7)
WBC # BLD AUTO: 5.17 K/UL (ref 3.9–12.7)

## 2022-05-23 PROCEDURE — 94799 UNLISTED PULMONARY SVC/PX: CPT

## 2022-05-23 PROCEDURE — 25000003 PHARM REV CODE 250

## 2022-05-23 PROCEDURE — 99233 PR SUBSEQUENT HOSPITAL CARE,LEVL III: ICD-10-PCS | Mod: ,,, | Performed by: INTERNAL MEDICINE

## 2022-05-23 PROCEDURE — 36415 COLL VENOUS BLD VENIPUNCTURE: CPT | Performed by: FAMILY MEDICINE

## 2022-05-23 PROCEDURE — 11000001 HC ACUTE MED/SURG PRIVATE ROOM

## 2022-05-23 PROCEDURE — 63600175 PHARM REV CODE 636 W HCPCS: Performed by: NURSE PRACTITIONER

## 2022-05-23 PROCEDURE — 85025 COMPLETE CBC W/AUTO DIFF WBC: CPT | Performed by: FAMILY MEDICINE

## 2022-05-23 PROCEDURE — 97530 THERAPEUTIC ACTIVITIES: CPT

## 2022-05-23 PROCEDURE — 25000003 PHARM REV CODE 250: Performed by: NURSE PRACTITIONER

## 2022-05-23 PROCEDURE — 99900035 HC TECH TIME PER 15 MIN (STAT)

## 2022-05-23 PROCEDURE — 27000221 HC OXYGEN, UP TO 24 HOURS

## 2022-05-23 PROCEDURE — 80053 COMPREHEN METABOLIC PANEL: CPT | Performed by: INTERNAL MEDICINE

## 2022-05-23 PROCEDURE — 80202 ASSAY OF VANCOMYCIN: CPT | Performed by: FAMILY MEDICINE

## 2022-05-23 PROCEDURE — 94660 CPAP INITIATION&MGMT: CPT

## 2022-05-23 PROCEDURE — 63600175 PHARM REV CODE 636 W HCPCS: Performed by: INTERNAL MEDICINE

## 2022-05-23 PROCEDURE — 94761 N-INVAS EAR/PLS OXIMETRY MLT: CPT

## 2022-05-23 PROCEDURE — 99233 SBSQ HOSP IP/OBS HIGH 50: CPT | Mod: ,,, | Performed by: INTERNAL MEDICINE

## 2022-05-23 RX ORDER — SUCRALFATE 1 G/10ML
1 SUSPENSION ORAL EVERY 6 HOURS
Status: CANCELLED | OUTPATIENT
Start: 2022-05-23

## 2022-05-23 RX ORDER — PANTOPRAZOLE SODIUM 40 MG/1
TABLET, DELAYED RELEASE ORAL
Qty: 30 TABLET | Refills: 0 | OUTPATIENT
Start: 2022-05-23 | End: 2022-05-24

## 2022-05-23 RX ADMIN — INSULIN ASPART 2 UNITS: 100 INJECTION, SOLUTION INTRAVENOUS; SUBCUTANEOUS at 05:05

## 2022-05-23 RX ADMIN — RIVAROXABAN 15 MG: 15 TABLET, FILM COATED ORAL at 05:05

## 2022-05-23 RX ADMIN — TAMSULOSIN HYDROCHLORIDE 0.4 MG: 0.4 CAPSULE ORAL at 09:05

## 2022-05-23 RX ADMIN — PANTOPRAZOLE SODIUM 40 MG: 40 TABLET, DELAYED RELEASE ORAL at 09:05

## 2022-05-23 RX ADMIN — METRONIDAZOLE 500 MG: 500 TABLET ORAL at 10:05

## 2022-05-23 RX ADMIN — METRONIDAZOLE 500 MG: 500 TABLET ORAL at 02:05

## 2022-05-23 RX ADMIN — SENNOSIDES 1 TABLET: 8.6 TABLET, FILM COATED ORAL at 09:05

## 2022-05-23 RX ADMIN — METRONIDAZOLE 500 MG: 500 TABLET ORAL at 05:05

## 2022-05-23 RX ADMIN — INSULIN ASPART 1 UNITS: 100 INJECTION, SOLUTION INTRAVENOUS; SUBCUTANEOUS at 10:05

## 2022-05-23 RX ADMIN — FERROUS SULFATE TAB 325 MG (65 MG ELEMENTAL FE) 1 EACH: 325 (65 FE) TAB at 09:05

## 2022-05-23 RX ADMIN — LEVOTHYROXINE SODIUM 88 MCG: 88 TABLET ORAL at 05:05

## 2022-05-23 RX ADMIN — POLYETHYLENE GLYCOL (3350) 17 G: 17 POWDER, FOR SOLUTION ORAL at 09:05

## 2022-05-23 RX ADMIN — POTASSIUM CHLORIDE 10 MEQ: 750 TABLET, FILM COATED, EXTENDED RELEASE ORAL at 09:05

## 2022-05-23 RX ADMIN — CEFTRIAXONE 1 G: 1 INJECTION, SOLUTION INTRAVENOUS at 09:05

## 2022-05-23 RX ADMIN — PRAVASTATIN SODIUM 20 MG: 20 TABLET ORAL at 10:05

## 2022-05-23 RX ADMIN — ISOSORBIDE MONONITRATE 30 MG: 30 TABLET, EXTENDED RELEASE ORAL at 09:05

## 2022-05-23 RX ADMIN — GABAPENTIN 200 MG: 100 CAPSULE ORAL at 10:05

## 2022-05-23 RX ADMIN — ENOXAPARIN SODIUM 80 MG: 80 INJECTION SUBCUTANEOUS at 05:05

## 2022-05-23 RX ADMIN — CARVEDILOL 3.12 MG: 3.12 TABLET, FILM COATED ORAL at 05:05

## 2022-05-23 RX ADMIN — SODIUM CHLORIDE: 0.9 INJECTION, SOLUTION INTRAVENOUS at 02:05

## 2022-05-23 RX ADMIN — CARVEDILOL 3.12 MG: 3.12 TABLET, FILM COATED ORAL at 09:05

## 2022-05-23 RX ADMIN — INSULIN ASPART 2 UNITS: 100 INJECTION, SOLUTION INTRAVENOUS; SUBCUTANEOUS at 12:05

## 2022-05-23 NOTE — ASSESSMENT & PLAN NOTE
Pt had 9 beat run of v-tach. Pt asymptomatic and no complaints of chest pain. Was sleeping soundly at the time.   K+ WNL- add mag > normal and tsh elevated increase synthroid from 75>88  Stop diflucan. Had a few yeast in abscess fungal culture but has had 2 weeks of that.

## 2022-05-23 NOTE — ASSESSMENT & PLAN NOTE
Monitor BUN/SCr.  Monitor I/Os.  Monitor electrolytes.  Avoid non-steroidal anti-inflammatory medications.  Symptoms and labs suggestive of Acute HF; continue with diuretics, monitor renal fx closely   NIK on CKD      renal US today and hold further diuresis- pt not voiding much no obstruction  vanc dosing per pharm>  5/19 renal u/s yesterday does not show obstruction. Placed in gentle IVF and diuretics held. Creat has improved a smidgen     5/22 with very slow fluids creat improving to 1.4 today. Pharmacy dosing vanc. CT with contrast today so repeat labs in AM needed.    5/23 creat continues to improve; 1.3

## 2022-05-23 NOTE — ASSESSMENT & PLAN NOTE
Maintain BB, rivaroxaban- renal dose  p PPM .  xarelto held due to NIK   lovenox     5/23 remains on lovenox; transition back to oral .

## 2022-05-23 NOTE — PROGRESS NOTES
[place] [facility] Surg ([facility])  [facility] Medicine  Progress Note    Patient Name: Eddie Parada Sr.  MRN: [id number]  Patient Class: IP- Inpatient   Admission Date: 5/15/2022  Length of Stay: 7 days  Attending Physician: Joseph Redmond MD  Primary Care Provider: Callum Roberts MD        Subjective:     Principal Problem:(HFpEF) heart failure with preserved ejection fraction        HPI:  Eddie Parada Sr. is a 89 y.o. male with known atrial fibrillation, aortic stenosis s/p repair,  CAD, CHF, COPD, diabetes mellitus type 2, hyperlipidemia,  CVA and ongoing complications associated of an anterior abdominal wall mass secondary to ventral hernia mesh placed ~20 years ago who presented to ER with shortness of breath since just prior to arrival.  While sitting on a sofa at home, he suddenly began short of breath.  Per wife, he has been compliant with all of his home medication.  Endorses leg swelling.   Denies chest pain, diaphoresis.   BNP> 500, CXR demonstrating pulmonary edema, Creat elevated at 2.1- (Baseline creat 1.0-1.7)   D- dimer elevated, LE US negative for DVT, TNI- 0.044>0.048>0.043   H&H 9.1/27.9>8.6>26.2     He is s/p Drain recently removed prior to presentation, with return of symptoms, re-accumulation of fluid. General surgery was consulted who states patient is not a good surgical candidate. Patient underwent US-guided drain placement with IR 5/4, which he tolerated well. Initiated BSA with vanc, cefepime, flagyl, and azithromycin (for atypicals/CAP). Consulted ID for long-term IV Abx who recommends discharging patient on vanc, ceftriaxone, PO flagyl, and PO diflucan for at least 2 weeks and close follow-up with ID for decision making based on clinical course. Because patient has had several hospitalizations in last few months due to complications from abdominal wall abscess, consulted palliative care to have on board and to establish outpatient follow-up. Patient was discharged home with  abdominal drain in place, 4 weeks of vanc, ceftriaxone, PO flagyl, and PO diflucan, and close follow-up with ID, general surgery, palliative care, and home health. Patient remained afebrile and without abdominal pain, leukocytosis, or other issues during hospital stay.          Overview/Hospital Course:  5/17 89 YO WM receiving multiple home abx including vancomycin at home for abdominal abscess, presented with acute SOB, Heart failure, getting lasix 20mg IV q 12, not much diuresis, intake and output reflecting + 270  Noting more edema to arms, below eyes , wife noting more confusion when he wakes up   + NIK on CKD; Creat 2.1; K+ 3.6   O2 sat 93-94% on 3LNC , not on home oxygen,   H&H 8.4/25.6 trending down ; NOAC stopped per cards after dose yesterday   Decreased appetite, Boost ordered,   End of life care discussed ; pt does not have living will .  He will sign living will .  He will be DNR .  I also talked to wife by patients bedside and daughter on phone ;answered all questions.  Spent about 20 minutes  . Later on signed living will     5/18 Pt was admitted with heart failure with elevated BNP and some lower ext swelling. He was given 60 mg IV lasix 5/16 and then 40mg 5/17. He has had fluctuating renal function 2 weeks ago Creat on admit 2.1.>> 2.2 today. BNP was elevated  on admission and is not putting out much urine.Dr Monterroso was consulted. Troponin chronically elevated. Echo 55% mild heart failure and mod pulm htn noted. Holding xarel;to for now due to h/h dropping. 9>8. D dimer was elevated u/s legs no clot     Pt was currently being treated at home for abd wall abscess s/p hernia repair. Per Id was on vanc and rocephin via PICC and PO flagyl and diflucan outpt- continued while here. Still has drain from abd wall abscess, not much drainage  Serosanguinous     5/19 pt was being treated for flash pulm edema with lasix 60mg IV 5/16 and 40mg IV 5/17. Yesterday diuretics were held as creat was elevated and not  improving with diureses. Also noted not making much urine. Renal u/s done with no obstruction noted. Was started on NS at 50ml/hr last night and creat is better this am 2.1>1.9. Looks like his renal fucntion fluctuates but 2 weeks ago had creat 1.0. He remains on vanc and rocephin IV and diflucan and flagyl po per ID from abd abscess (still with drain)- today makes 2 weeks since d/c from [place]. Awaiting guidance on if he will need 2 or 4 weeks of these abx. Had a very short bonnie of v tach last night while sleeping. K+ WNL- mag and tsh pending this am., ambulatory with PT min assist 15ft    5/20 pt is currently being treated for over diuresis. He is on NS @ 50ml/hr. Creat is improving now at 1.6 this am. Also had vanc held for abd abscess. Trough at 16 this am. Due to v tach yesterday diflucan was stopped after 2 weeks. He remains on rocephin and flagyl for the abd abscess.     5/21 slight improvement in Cr. Still on 4L NC and not able to wean. He is feeling a bit more SOB this morning. Repeat CXR with large left effusion. Diflucantstopped after 2 weeks treatment. Remains on Vanc, rocephin and flagyl for abscess. ID recs consider repeat CT abd/pelvis with contrast to see if resolving abscess; no output from drain.   Long discussion this AM with patient, wife and daughter (on phone) about goals of care.  His appetite remains very poor.     5/22: he reports no changes. Still poor appetite. Still on 4L NC  Dr. Morris plans for thoracentesis today as he did not tolerate diuresis and remains with large left pleural effusion on CXR.  Cr improved--plan for CT abd and pelvis with contrast today per ID recs to see if abscess resolved.     5/23/22     89 YO WM ongoing complications associated of an anterior abdominal wall mass secondary to ventral hernia mesh placed ~20 years ago who presented to ER with shortness of breath, CHF ; did not tolerate diuresis and remained with large left pleural effusion on CXR, worsening creat.    S/p L thoracentesis yesterday ; 1500 ml of bloody pleural fluid was obtained. A sample was sent to Pathology for cytogenetics, flow, and cell counts, as well as for infection analysis. CT abd pelvis yesterday showing Moderate right and small left pleural effusions with bibasilar dependent atelectasis.  CXR- Mild cardiomegaly.  Prior cardiac surgery with pacemaker in place.  PICC line in position.  Vascular congestion with lower lobe subsegmental atelectasis greater on the left than the right.  The left effusion appears less prominent than on the prior study.  No evidence of pneumothorax.  WBC 5.17, afebrile. O2 sat 95-98% on 4LNC , BIPAP overnight,   Diflucan stopped after 2 weeks treatment( had vtach)  Remains on Vanc, rocephin and flagyl for abscess. Vanc level 20.1 yesterday; Day 18/30 since d/c from [place]. ID recs recommended  repeat CT abd/pelvis with contrast; demonstrating Prior ventral hernia repair with surgical mesh in place with percutaneous drainage catheter within a poorly defined fluid collection surrounding the surgical mesh which is slightly decreased in size over the interval.  Pt sitting up in chair this am, looks much better. Ambulating 25 ft with PT; still has desaturation         Wife at bedside reports increased fatigue, leg weakness  Review of Systems   Constitutional:  Negative for activity change, fever and unexpected weight change.        Looks a little stronger   HENT:  Negative for congestion, ear pain, hearing loss, rhinorrhea and sore throat.    Eyes:  Negative for pain, redness and visual disturbance.   Respiratory:  Positive for shortness of breath. Negative for cough and wheezing.    Cardiovascular:  Negative for chest pain, palpitations and leg swelling.   Gastrointestinal:  Negative for abdominal pain, constipation, diarrhea, nausea and vomiting.   Genitourinary:  Negative for decreased urine volume, dysuria, frequency and urgency.   Musculoskeletal:  Negative for back pain,  joint swelling and neck pain.   Skin:  Negative for color change, rash and wound.   Neurological:  Positive for weakness. Negative for dizziness, tremors, light-headedness and headaches.   Objective:     Vital Signs (Most Recent):  Temp: 97.2 °F (36.2 °C) (05/22/22 2110)  Pulse: 60 (05/23/22 0600)  Resp: 18 (05/23/22 0330)  BP: 112/78 (05/22/22 2110)  SpO2: 95 % (05/23/22 0330)   Vital Signs (24h Range):  Temp:  [96.8 °F (36 °C)-98.1 °F (36.7 °C)] 97.2 °F (36.2 °C)  Pulse:  [59-90] 60  Resp:  [18-28] 18  SpO2:  [93 %-100 %] 95 %  BP: (112-133)/(58-78) 112/78     Weight: 87.5 kg (192 lb 14.4 oz)  Body mass index is 32.1 kg/m².    Physical Exam  Vitals and nursing note reviewed.   Constitutional:       General: He is not in acute distress.     Appearance: He is well-developed.   HENT:      Head: Normocephalic and atraumatic.      Right Ear: External ear normal.      Left Ear: External ear normal.   Eyes:      General:         Right eye: No discharge.         Left eye: No discharge.   Neck:      Thyroid: No thyromegaly.   Cardiovascular:      Rate and Rhythm: Normal rate and regular rhythm.      Heart sounds: No murmur heard.  Pulmonary:      Effort: Pulmonary effort is normal. No respiratory distress.      Breath sounds: Rales present. No wheezing.      Comments: Decreased left lung  Abdominal:      General: Bowel sounds are normal. There is no distension.      Palpations: Abdomen is soft.      Tenderness: There is no abdominal tenderness.      Comments: Drain in place.   Musculoskeletal:      Right lower leg: No edema.      Left lower leg: No edema.   Skin:     General: Skin is warm and dry.      Comments: PICC to right UE, dressing c/d/i           Significant Labs:   A1C:   Recent Labs   Lab 03/02/22  0744 04/30/22  1046   HGBA1C 5.9* 6.0*         Bilirubin:   Recent Labs   Lab 05/17/22  0642 05/19/22  0542 05/20/22  0600 05/21/22  0545 05/23/22  0607   BILITOT 0.6 0.5 0.5 0.5 0.5       CBC:   Recent Labs   Lab  05/22/22  0555 05/23/22  0607   WBC 5.47 5.17  5.17   HGB 8.3* 8.5*  8.5*   HCT 26.2* 26.9*  26.9*    186  186       CMP:   Recent Labs   Lab 05/22/22  0555 05/22/22  0556 05/23/22  0607    140 140  140   K 3.9 3.9 4.1  4.1    103 104  104   CO2 25 25 23  23   *  152* 148* 138*  138*   BUN 43* 43* 39*  39*   CREATININE 1.4 1.4 1.3  1.3   CALCIUM 8.5* 8.4* 8.3*  8.3*   PROT 6.1  --  5.7*   ALBUMIN  --   --  2.4*   BILITOT  --   --  0.5   ALKPHOS  --   --  120   AST  --   --  30   ALT  --   --  18   ANIONGAP 11 12 13  13   EGFRNONAA 43* 43* 47*  47*       Lab Results   Component Value Date    DDIMER 2.25 (H) 05/15/2022     Lactate 1.3    BNP  No results for input(s): BNP, BNPTRIAGEBLO in the last 168 hours.      Recent Labs   Lab 05/22/22  1131 05/22/22  1621 05/22/22  2114   POCTGLUCOSE 174* 227* 196*       Troponin: 0.044>0.048>0.043>0.037    TSH:   Recent Labs   Lab 05/19/22  0541   TSH 8.559*       No results for input(s): COLORU, APPEARANCEUA, PHUR, SPECGRAV, PROTEINUA, GLUCUA, KETONESU, BILIRUBINUA, OCCULTUA, NITRITE, UROBILINOGEN, LEUKOCYTESUR, RBCUA, WBCUA, BACTERIA, SQUAMEPITHEL, HYALINECASTS in the last 48 hours.    Invalid input(s): WRIGHTSUR  Covid negative   Flu negative     Significant Imaging:     CXR 5/22   Mild cardiomegaly.  Prior cardiac surgery with pacemaker in place.  PICC line in position.  Vascular congestion with lower lobe subsegmental atelectasis greater on the left than the right.  The left effusion appears less prominent than on the prior study.  No evidence of pneumothorax.       CT of abd /pelvis 5/21 2022   Moderate right and small left pleural effusions with bibasilar dependent atelectasis.  2. Previous cholecystectomy.  3. Bilateral renal cysts.  4. Extensive diverticulosis.  5. Significant prostatic hypertrophy.  Further evaluation as deemed clinically necessary.  6. Moderate bilateral inguinal hernias with nondilated bowel within the proximal  right inguinal hernia.  7. Prior ventral hernia repair with surgical mesh in place with percutaneous drainage catheter within a poorly defined fluid collection surrounding the surgical mesh which is slightly decreased in size over the interval.  8. Anasarca.  This report was flagged in Epic as abnormal.    5/18 US renal Technically limited study with known renal cysts.  No evidence of obstructive uropathy    5/12 CXR Cardiomegaly with mild CHF.     Left lower lobe atelectasis, small left pleural effusion.    LE US -No evidence of deep venous thrombosis in either lower extremity.    5/15 CXR- Increased left-sided pleural effusion along with new airspace opacities in left hemithorax.  Findings suggestive of worsening CHF pattern of pulmonary edema.    EKG Likely ventricular paced rhythm  - morphology suggestive of biventricular   pacing   Abnormal ECG   When compared with ECG of 30-APR-2022 10:39,   Premature ventricular complexes are no longer Present   Confirmed by Adam Mcmahon MD ([id number]) on 5/16/2022 8:11:55 AM      Assessment/Plan:      * (HFpEF) heart failure with preserved ejection fraction  5/2/22 Echo-   · The estimated ejection fraction is 55%.  · Indeterminate left ventricular diastolic function.  · Moderate right ventricular enlargement with mildly reduced right ventricular systolic function.  · Mild aortic regurgitation.  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 There is aortic insufficiency present. Prosthetic aortic valve is normal.  · The aortic valve mean gradient is 6 mmHg with a dimensionless index of 0.64.  · Mild mitral regurgitation.  · Mild tricuspid regurgitation.  · There is moderate pulmonary hypertension.       Increased SOB and hypoxia. Diuresis initiated. CIS following. Strict I/O. Low Na diet.    Stop lasix today. Creat rising and patient not urinating much- check renal u/s today considering fluids     5/19 was started on minimal fluids last night NS at 50ml/hr creat improved  2.1>1.9- hold further diuresis today     5/22 Cont to hold diurese and creat continues to improve. On BB and Isorbide, on 50cc/hr IVF which is minimal  CXR shows large left effusion with atelectasis; Dr. Morris agrees to thoracentesis today which will hopefully improve SOB and oxygen requirement  5/23 S/p thoracentesis yesterday ; 1500 ml of bloody pleural fluid was obtained.    Counseling regarding advanced directives and goals of care  Advance Care Planning     Living Will  During this visit, I engaged the patient and family  in the advance care planning process.  The patient and I reviewed the role for advance directives and their purpose in directing future healthcare if the patient's unable to speak for him/herself.  At this point in time, the patient does have full decision-making capacity.  We discussed different extreme health states that he could experience, and reviewed what kind of medical care he would want in those situations.  The patient and family communicated that if he were comatose and had little chance of a meaningful recovery, he would not want machines/life-sustaining treatments used. In addition to the above preference, other important end-of-life issues for the patient include . The patient has completed a living will to reflect these preferences.  I spent a total of 20 minutes engaging the patient in this advance care planning discussion.Discussed with wife in person and daughter via telephone           Today a meeting took place: bedside    Patient Participation: Patient is able to participate     Attendees (Name and  Relationship to patient):also dicussed with wife and daughter    Staff attendees (Name and  Role): Georgia VERMA, xochitl Jules RN case manage   Code Status: DNR; status confirmed/order placed in chart    ACP Documents: Other Documents (specify): living will     Goals of care: The patient and family endorses that what is most important right now is to focus on symptom/pain control,  quality of life, even if it means sacrificing a little time and improvement in condition but with limits to invasive therapies    Accordingly, we have decided that the best plan to meet the patient's goals includes continuing with treatment      Recommendations/Length of ACP   conversation in minutes: 20 minutes      Code Status  In light of the patients advanced and life limiting illness,I engaged the the patient and family in a conversation about the patient's preferences for care  at the very end of life. The patient wishes to have a natural, peaceful death.  Along those lines, the patient does not wish to have CPR or other invasive treatments performed when his heart and/or breathing stops. I communicated to the patient and family that a DNR order would be placed in his medical record to reflect this preference.  I spent a total of 20 minutes engaging the patient in this advance care planning discussion.        5/21: again addressed goals of care with patient, wife and daughter on the phone. They are discussing wishes.     Debility  PT consulted .  Per records patient with several hospitalizations in last few months for abdominal wall abscess   - prior to 2 months ago, was still independent, working and running a business  - consulted palliative care to have on board and have OP follow up.      Pneumonia of left lower lobe due to infectious organism        NIK (acute kidney injury)  Monitor BUN/SCr.  Monitor I/Os.  Monitor electrolytes.  Avoid non-steroidal anti-inflammatory medications.  Symptoms and labs suggestive of Acute HF; continue with diuretics, monitor renal fx closely   NIK on CKD      renal US today and hold further diuresis- pt not voiding much no obstruction  vanc dosing per pharm>  5/19 renal u/s yesterday does not show obstruction. Placed in gentle IVF and diuretics held. Creat has improved a smidgen     5/22 with very slow fluids creat improving to 1.4 today. Pharmacy dosing vanc. CT with  contrast today so repeat labs in AM needed.    5/23 creat continues to improve; 1.3    Abdominal wall abscess  Last discharge 5/6  with abdominal drain in place and 4 weeks of vanc, rocephin, flagyl, and diflucan  - follow up with general surgery for drain and ID for Abx- ( 10 days since discharge)   Consult pharmacy to adjust vanc- monitor     ventral hernia repair with mesh placement ~20 years ago, no complications until 3/2022 with several hospitalizations leading to drain placement and antibiotics since that time, drain removed 4/29/22   - most recent hospitalization began on 4/30/22 at [facility] and was transferred here on 5/4/22 for surgical and IR evaluation  - was initiated on Vanc and Zosyn at [facility]  - switched to Vanc, Cefepime, Flagyl on admission to our facility given renal function   - blood cultures from 4/30/22 no growth to date   - wound cultures from 3/3 and 5/4 NGTD  - general surgery states patient is not good surgical candidate  - underwent US-guided drain placement 5/4  - general surgery added fluconazole for yeast seen on gram stain of wound Cx  - consulted ID for assistance with Abx: discharge with IV vanc, IV ceftriaxone, PO flagyl, and PO diflucan for at least 2 weeks with close ID follow-up .  -vanc dosing per pharmacy but will lower vanc trough goal to 10  Message sent to ID for guidance as today makes 2 weeks from D/c and last notes per ID read 2-4 weeks of therapy needed outpt >> will send message to Dr Brown. Stopped diflucan due to v tach after 2 weeks of treatment. Holding vanc as trough still 16 and creat elevated. Still on rocephin and flagyl    5/22: ID rec repeat CT scan to see if abscess resolved. If so, can stop antimicrobial therapy. Cr 1.4 today so will proceed with CT with contrast    5/21: cont vanc, rocephin and flagyl for now  ID rec repeat CT to see if resolved. If so, can stop antibx. No output from drain. However with Cr still above baseline  risk further nephrotoxicity with contrast for CT. Will make final decision on contrasted study tomorrow AM after repeat labs.     5/23 Repeat CT abd pelvis yesterday showing decrease in size of the abscess .    Disorder of prostate  Continue flomax .      Hypothyroidism due to acquired atrophy of thyroid  Continue levothyroxine .  Repeat tsh today as last one was elevated on chart and pt has short run v tach overnight   Increase synthroid to 75mcg>88mcg 5/19 .  Lab Results   Component Value Date    TSH 8.559 (H) 05/19/2022         Ventricular tachycardia  Pt had 9 beat run of v-tach. Pt asymptomatic and no complaints of chest pain. Was sleeping soundly at the time.   K+ WNL- add mag > normal and tsh elevated increase synthroid from 75>88  Stop diflucan. Had a few yeast in abscess fungal culture but has had 2 weeks of that.      Long term current use of anticoagulant therapy  Continue xarelto- adjust for renal; recommending decrease to 15mg daily .  Now holding due to anemia/NIK.  lovenox renal dosing ---GFR improved so start BID dosing today (due to procedure) and if stable resume NOAC tomorrow    Type 2 diabetes mellitus, controlled  HbA1c 6.0 on 4/30/22.  - does not appear to be on any home medications   - SSI + POCT glucose while inpatient  - continue to manage with jermainstkwesi, follow up with PCP.         S/P AVR   bioprosthetic valve placed 2011   - continue home xarelto - adjust currently for renal dosing > now on lovenox. GFr improved so start BID dosing today (due to procedure) and if stable resume NOAC.      5/23  Resume xarelto        Mixed dyslipidemia  Continue pravastatin       CAD (coronary artery disease)  Continue statin, bb, NOAC   Cards consulted     NOAC held due to NIK> lovenox currently but improved today so went to BID dosing. Keeping lovenox in case GFR drops again after contrast load today for CT scan and due to procedure today. If stable can go back to NOAC.    5/23  Resume xarelto    Essential  (primary) hypertension  BP Readings from Last 3 Encounters:   05/22/22 112/78   05/12/22 (!) 129/58   05/11/22 (!) 110/52     Resume home coreg-at lower dose 3.25mg , hold amlodipine & doxazosin  for now .  Dr. Monterroso ordered Imdur, monitor BP .      Chronic atrial fibrillation  Maintain BB, rivaroxaban- renal dose  p PPM .  xarelto held due to NIK   lovenox     5/23 remains on lovenox; transition back to oral .        VTE Risk Mitigation (From admission, onward)         Ordered     rivaroxaban tablet 15 mg  With dinner         05/23/22 0921     IP VTE HIGH RISK PATIENT  Once         05/16/22 0419     Place sequential compression device  Until discontinued         05/16/22 0419                Discharge Planning   KANDICE:      Code Status: DNR   Is the patient medically ready for discharge?:     Reason for patient still in hospital (select all that apply): Treatment  Discharge Plan A: Home with family, Home Health                  Callum Roberts MD  Department of Hospital Medicine   [place] - Med Surg (3rd Fl)

## 2022-05-23 NOTE — NURSING
AM scheduled meds given. Patient sitting up in chair with no distress noted. Oxygen @4 L per NC continued. Wife at side. Call bell in reach. Instructed to call prn. Voiced understanding.

## 2022-05-23 NOTE — PT/OT/SLP PROGRESS
"Physical Therapy Treatment    Patient Name:  Eddie Parada Sr.   MRN:  [id number]    Recommendations:     Discharge Recommendations:  home with home health, home health PT   Discharge Equipment Recommendations: none   Barriers to discharge: None    Assessment:     Eddie Parada Sr. is a 89 y.o. male admitted with a medical diagnosis of (HFpEF) heart failure with preserved ejection fraction.  He presents with the following impairments/functional limitations:  weakness, impaired self care skills, impaired functional mobilty, gait instability, impaired balance, impaired endurance, impaired cardiopulmonary response to activity, decreased lower extremity function. Patient tolerated PT session with increased gait distance using RW and O2 supplement. Monitored SPO2 based line from 98% to 88% but immediately recovered back to 93% after a minute rest. No sign of respiratory distress.    Rehab Prognosis: Fair; patient would benefit from acute skilled PT services to address these deficits and reach maximum level of function.    Recent Surgery: * No surgery found *      Plan:     During this hospitalization, patient to be seen 5 x/week to address the identified rehab impairments via gait training, therapeutic activities, therapeutic exercises and progress toward the following goals:    · Plan of Care Expires:  05/23/22    Subjective     Chief Complaint: Feeling better today  Patient/Family Comments/goals: "To return home".  Pain/Comfort:  · Pain Rating 1: 0/10      Objective:     Communicated with patient and wife  prior to session.  Patient found HOB elevated with PICC line, oxygen, IMELDA drain, telemetry upon PT entry to room.     General Precautions: Standard, fall   Orthopedic Precautions:N/A   Braces:    Respiratory Status: Nasal cannula, flow 4 L/min     Functional Mobility:  · Bed Mobility:     · Rolling Left:  modified independence  · Rolling Right: modified independence  · Scooting: supervision  · Supine to Sit: " supervision  · Transfers:     · Sit to Stand:  supervision with rolling walker  · Bed to Chair: supervision with  rolling walker  using  Step Transfer  · Gait: SBA x ~50 feet with O2 suppelement  using RW.       AM-PAC 6 CLICK MOBILITY  Turning over in bed (including adjusting bedclothes, sheets and blankets)?: 4  Sitting down on and standing up from a chair with arms (e.g., wheelchair, bedside commode, etc.): 3  Moving from lying on back to sitting on the side of the bed?: 4  Moving to and from a bed to a chair (including a wheelchair)?: 3  Need to walk in hospital room?: 3  Climbing 3-5 steps with a railing?: 3  Basic Mobility Total Score: 20       Therapeutic Activities and Exercises:   Worked on safety measures and body sequence nila out of bed activity, standing activity and gait trng with RW x 50 ft with SBA .    Patient left up in chair with all lines intact, call button in reach, nursing  notified and wife present..    GOALS:   Multidisciplinary Problems     Physical Therapy Goals        Problem: Physical Therapy    Goal Priority Disciplines Outcome Goal Variances Interventions   Physical Therapy Goal     PT, PT/OT Ongoing, Progressing     Description:   Patient will increase functional independence with mobility by performin. Supine to sit with Modified Independent  2. Sit to supine with Modified Independent  3. Bed to chair transfer with Supervision or Set-up Assistancewith or without rolling walker using Step Transfer TECHNIQUE  4. Gait  x ~100  feet with Supervision or Set-up Assistance with or without rolling walker  5. Lower extremity exercise program x10 reps per handout, with assistance as needed                      Time Tracking:     PT Received On: 22  PT Start Time: 0840     PT Stop Time: 0900  PT Total Time (min): 20 min     Billable Minutes: Therapeutic Activity 20 mins    Treatment Type: Treatment  PT/PTA: PT           2022

## 2022-05-23 NOTE — PROGRESS NOTES
[place] - Med Surg ([facility])  Cardiology  Progress Note    Patient Name: Eddie Parada Sr.  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 Renal function lately averaging creatinine 1.5-1.7.   Echo 5/2/22 showed still preserved EF with AI  normally functioning bioprosthetic aortic valve. Negative bubble study.  Presented to ER via EMS with acute onset SOB/AMAYA/CHF. O2 sat low 70's.  Given O2/lasix IV in ER   Troponin chronically mildly elevated, today 0.043.   EKG shows  rhythm    Interval History: Patient underwent a left sided thoracenthesis on 5/22 with 1500ml of bloody pleural fluid was obtained. Labs reveal stable chronic anemia and renal function has improved. Blood pressure remains marginally elevated.     ROS   Constitutional: Negative.   HENT: Negative.    Eyes: Negative.    Cardiovascular: Negative for chest pain.   Respiratory: Productive cough  Endocrine: Negative.    Hematologic/Lymphatic: Negative.    Neurological: Negative.    Psychiatric/Behavioral: Negative.    Objective:     Vital Signs (Most Recent):  Temp: 96.1 °F (35.6 °C) (05/23/22 0752)  Pulse: (!) 59 (05/23/22 0800)  Resp: 18 (05/23/22 0330)  BP: (!) 148/67 (05/23/22 0752)  SpO2: (!) 93 % (05/23/22 0752) Vital Signs (24h Range):  Temp:  [96.1 °F (35.6 °C)-98.1 °F (36.7 °C)] 96.1 °F (35.6 °C)  Pulse:  [59-90] 59  Resp:  [18-28] 18  SpO2:  [93 %-100 %] 93 %  BP: (112-148)/(58-78) 148/67     Weight: 87.5 kg (192 lb 14.4 oz)  Body mass index is  32.1 kg/m².    SpO2: (!) 93 %  O2 Device (Oxygen Therapy): nasal cannula      Intake/Output Summary (Last 24 hours) at 5/23/2022 0846  Last data filed at 5/23/2022 0701  Gross per 24 hour   Intake 1764.4 ml   Output 2040 ml   Net -275.6 ml       Lines/Drains/Airways     Peripherally Inserted Central Catheter Line  Duration           PICC Double Lumen 05/12/22 0845 right basilic 11 days          Drain  Duration                Drain/Device  05/04/22 1618 midline umbilical area pigtail 18 days                Physical Exam   Vitals and nursing note reviewed.   Constitutional:       Appearance: Normal appearance.   Cardiovascular:      Rate and Rhythm: Normal rate and regular rhythm.      Pulses: Normal pulses.      Heart sounds: Normal heart sounds.   Abdominal:      General: Abdomen is flat.   Skin:     General: Skin is warm and dry.   Neurological:      General: No focal deficit present.      Mental Status: He is alert and oriented to person, place, and time.        Significant Labs:   CMP   Recent Labs   Lab 05/22/22  0555 05/22/22  0556 05/23/22  0607    140 140  140   K 3.9 3.9 4.1  4.1    103 104  104   CO2 25 25 23  23   *  152* 148* 138*  138*   BUN 43* 43* 39*  39*   CREATININE 1.4 1.4 1.3  1.3   CALCIUM 8.5* 8.4* 8.3*  8.3*   PROT 6.1  --  5.7*   ALBUMIN  --   --  2.4*   BILITOT  --   --  0.5   ALKPHOS  --   --  120   AST  --   --  30   ALT  --   --  18   ANIONGAP 11 12 13  13   ESTGFRAFRICA 50* 50* 54*  54*   EGFRNONAA 43* 43* 47*  47*    and CBC   Recent Labs   Lab 05/22/22  0555 05/23/22  0607   WBC 5.47 5.17  5.17   HGB 8.3* 8.5*  8.5*   HCT 26.2* 26.9*  26.9*    186  186       Significant Imaging: X-Ray: CXR: X-Ray Chest 1 View (CXR):   Results for orders placed or performed during the hospital encounter of 05/15/22   X-Ray Chest 1 View    Narrative    EXAMINATION:  XR CHEST 1 VIEW    CLINICAL HISTORY:  Eval after thoracentesis;.    TECHNIQUE:  Single view chest  dated May 22, 2022.    COMPARISON:  May 21, 2022.    FINDINGS:  Mild cardiomegaly.  Prior cardiac surgery with pacemaker in place.  PICC line in position.  Vascular congestion with lower lobe subsegmental atelectasis greater on the left than the right.  The left effusion appears less prominent than on the prior study.  No evidence of pneumothorax.      Impression    As above.      Electronically signed by: Andrew Ying MD  Date:    05/23/2022  Time:    07:58     Assessment and Plan:       Active Diagnoses:    Diagnosis Date Noted POA    PRINCIPAL PROBLEM:  (HFpEF) heart failure with preserved ejection fraction [I50.30] 06/17/2015 Yes    Counseling regarding advanced directives and goals of care [Z71.89] 05/17/2022 Not Applicable    Debility [R53.81] 05/16/2022 Yes    Pneumonia of left lower lobe due to infectious organism [J18.9] 04/30/2022 Yes    NIK (acute kidney injury) [N17.9] 03/19/2022 Yes    Abdominal wall abscess [L02.211] 03/05/2022 Yes    Disorder of prostate [N42.9] 03/03/2022 Yes    Hypothyroidism due to acquired atrophy of thyroid [E03.4] 01/20/2020 Yes    Ventricular tachycardia [I47.2] 11/28/2017 Yes    Long term current use of anticoagulant therapy [Z79.01] 09/13/2016 Not Applicable    Type 2 diabetes mellitus, controlled [E11.9] 05/21/2014 Yes    S/P AVR [Z95.2] 02/25/2014 Not Applicable    Mixed dyslipidemia [E78.2] 11/07/2012 Yes    Essential (primary) hypertension [I10] 09/20/2012 Yes    CAD (coronary artery disease) [I25.10] 09/20/2012 Yes    Chronic atrial fibrillation [I48.20] 06/29/2012 Yes      Problems Resolved During this Admission:       VTE Risk Mitigation (From admission, onward)         Ordered     enoxaparin injection 80 mg  Every 12 hours (non-standard times)         05/22/22 0948     IP VTE HIGH RISK PATIENT  Once         05/16/22 0419     Place sequential compression device  Until discontinued         05/16/22 0419              Current Facility-Administered  Medications   Medication    0.9%  NaCl infusion    carvediloL tablet 3.125 mg    cefTRIAXone (ROCEPHIN) 1 g/50 mL D5W IVPB    dextrose 10% bolus 250 mL    enoxaparin injection 80 mg    ferrous sulfate tablet 1 each    gabapentin capsule 200 mg    glucagon (human recombinant) injection 1 mg    glucose chewable tablet 16 g    glucose chewable tablet 24 g    insulin aspart U-100 pen 0-5 Units    isosorbide mononitrate 24 hr tablet 30 mg    levothyroxine tablet 88 mcg    melatonin tablet 6 mg    metroNIDAZOLE tablet 500 mg    pantoprazole EC tablet 40 mg    polyethylene glycol packet 17 g    potassium chloride CR tablet 10 mEq    pravastatin tablet 20 mg    senna tablet 1 tablet    senna-docusate 8.6-50 mg per tablet 1 tablet    sodium chloride 0.9% flush 10 mL    sodium chloride 0.9% flush 3 mL    tamsulosin 24 hr capsule 0.4 mg    vancomycin - pharmacy to dose     Stable s/p L thoracentesis  Acute on chronic diastolic CHF, currently euvolemic, leg edema resolved but with some UE edema in bed.  Echo 5/22 EF 55%, normal AVR, 2+ TR, 1-2+ MR, 1+ AI, PAP 48  CKD with recent AKIs--waxing and waning; creatinine elevated but on vanc  Anemia  Chronic AF on Xarelto  s/pPPM  Hx TIA  CAD hx PCI Cfx, LAD, MPI 2017 normal  Thrombocytopenia  Abdominal wall abscess; still receiving Vancomycin, s/p Drain        Plan: Pt remains euvolemic on exam, renal function back to baseline. Consider resuming Xarelto given his chronic afib as previously discussed. He is overall stable from a cardiac standpoint.   Use diuretic PRN   Please re-consult as needed.   Pt now DNR    Barbie Ortiz NP for Dr. Monterroso  Cardiology  [facility] Surg (3rd Fl)    I have personally interviewed and examined this patient face-to-face, and as the physician. Documented the above plan and rendered all medical decision making for this encounter. I have read and agree with the above documentation unless otherwise noted.

## 2022-05-23 NOTE — ASSESSMENT & PLAN NOTE
Last discharge 5/6  with abdominal drain in place and 4 weeks of vanc, rocephin, flagyl, and diflucan  - follow up with general surgery for drain and ID for Abx- ( 10 days since discharge)   Consult pharmacy to adjust vanc- monitor     ventral hernia repair with mesh placement ~20 years ago, no complications until 3/2022 with several hospitalizations leading to drain placement and antibiotics since that time, drain removed 4/29/22   - most recent hospitalization began on 4/30/22 at [facility] and was transferred here on 5/4/22 for surgical and IR evaluation  - was initiated on Vanc and Zosyn at [facility]  - switched to Vanc, Cefepime, Flagyl on admission to our facility given renal function   - blood cultures from 4/30/22 no growth to date   - wound cultures from 3/3 and 5/4 NGTD  - general surgery states patient is not good surgical candidate  - underwent US-guided drain placement 5/4  - general surgery added fluconazole for yeast seen on gram stain of wound Cx  - consulted ID for assistance with Abx: discharge with IV vanc, IV ceftriaxone, PO flagyl, and PO diflucan for at least 2 weeks with close ID follow-up .  -vanc dosing per pharmacy but will lower vanc trough goal to 10  Message sent to ID for guidance as today makes 2 weeks from D/c and last notes per ID read 2-4 weeks of therapy needed outpt >> will send message to Dr Brown. Stopped diflucan due to v tach after 2 weeks of treatment. Holding vanc as trough still 16 and creat elevated. Still on rocephin and flagyl    5/22: ID rec repeat CT scan to see if abscess resolved. If so, can stop antimicrobial therapy. Cr 1.4 today so will proceed with CT with contrast    5/21: cont vanc, rocephin and flagyl for now  ID rec repeat CT to see if resolved. If so, can stop antibx. No output from drain. However with Cr still above baseline risk further nephrotoxicity with contrast for CT. Will make final decision on contrasted study tomorrow AM after  repeat labs.     5/23 Repeat CT abd pelvis yesterday showing decrease in size of the abscess .

## 2022-05-23 NOTE — ASSESSMENT & PLAN NOTE
Continue xarelto- adjust for renal; recommending decrease to 15mg daily .  Now holding due to anemia/NIK.  lovenox renal dosing ---GFR improved so start BID dosing today (due to procedure) and if stable resume NOAC tomorrow

## 2022-05-23 NOTE — PLAN OF CARE
Problem: Adult Inpatient Plan of Care  Goal: Plan of Care Review  Outcome: Ongoing, Progressing  Goal: Patient-Specific Goal (Individualized)  Outcome: Ongoing, Progressing  Goal: Absence of Hospital-Acquired Illness or Injury  Outcome: Ongoing, Progressing  Goal: Optimal Comfort and Wellbeing  Outcome: Ongoing, Progressing  Goal: Readiness for Transition of Care  Outcome: Ongoing, Progressing     Problem: Diabetes Comorbidity  Goal: Blood Glucose Level Within Targeted Range  Outcome: Ongoing, Progressing     Problem: Adjustment to Illness (Sepsis/Septic Shock)  Goal: Optimal Coping  Outcome: Ongoing, Progressing     Problem: Bleeding (Sepsis/Septic Shock)  Goal: Absence of Bleeding  Outcome: Ongoing, Progressing     Problem: Glycemic Control Impaired (Sepsis/Septic Shock)  Goal: Blood Glucose Level Within Desired Range  Outcome: Ongoing, Progressing     Problem: Infection Progression (Sepsis/Septic Shock)  Goal: Absence of Infection Signs and Symptoms  Outcome: Ongoing, Progressing     Problem: Nutrition Impaired (Sepsis/Septic Shock)  Goal: Optimal Nutrition Intake  Outcome: Ongoing, Progressing     Problem: Fluid Imbalance (Pneumonia)  Goal: Fluid Balance  Outcome: Ongoing, Progressing     Problem: Infection (Pneumonia)  Goal: Resolution of Infection Signs and Symptoms  Outcome: Ongoing, Progressing     Problem: Respiratory Compromise (Pneumonia)  Goal: Effective Oxygenation and Ventilation  Outcome: Ongoing, Progressing     Problem: Infection  Goal: Absence of Infection Signs and Symptoms  Outcome: Ongoing, Progressing     Problem: Impaired Wound Healing  Goal: Optimal Wound Healing  Outcome: Ongoing, Progressing     Problem: Fall Injury Risk  Goal: Absence of Fall and Fall-Related Injury  Outcome: Ongoing, Progressing     Problem: Skin Injury Risk Increased  Goal: Skin Health and Integrity  Outcome: Ongoing, Progressing     Problem: Fluid and Electrolyte Imbalance (Acute Kidney Injury/Impairment)  Goal: Fluid  and Electrolyte Balance  Outcome: Ongoing, Progressing     Problem: Oral Intake Inadequate (Acute Kidney Injury/Impairment)  Goal: Optimal Nutrition Intake  Outcome: Ongoing, Progressing     Problem: Renal Function Impairment (Acute Kidney Injury/Impairment)  Goal: Effective Renal Function  Outcome: Ongoing, Progressing     Problem: Gas Exchange Impaired  Goal: Optimal Gas Exchange  Outcome: Ongoing, Progressing     Problem: Adjustment to Illness (Heart Failure)  Goal: Optimal Coping  Outcome: Ongoing, Progressing     Problem: Cardiac Output Decreased (Heart Failure)  Goal: Optimal Cardiac Output  Outcome: Ongoing, Progressing     Problem: Dysrhythmia (Heart Failure)  Goal: Stable Heart Rate and Rhythm  Outcome: Ongoing, Progressing     Problem: Fluid Imbalance (Heart Failure)  Goal: Fluid Balance  Outcome: Ongoing, Progressing

## 2022-05-23 NOTE — SUBJECTIVE & OBJECTIVE
Interval History: much better    Objective:     Vital Signs (Most Recent):  Temp: 96.9 °F (36.1 °C) (05/23/22 1133)  Pulse: 60 (05/23/22 1400)  Resp: 18 (05/23/22 1133)  BP: (!) 121/59 (05/23/22 1133)  SpO2: 99 % (05/23/22 1133)   Vital Signs (24h Range):  Temp:  [96.1 °F (35.6 °C)-97.9 °F (36.6 °C)] 96.9 °F (36.1 °C)  Pulse:  [59-90] 60  Resp:  [18] 18  SpO2:  [93 %-100 %] 99 %  BP: (112-148)/(59-78) 121/59     Weight: 87.5 kg (192 lb 14.4 oz)  Body mass index is 32.1 kg/m².      Intake/Output Summary (Last 24 hours) at 5/23/2022 1418  Last data filed at 5/23/2022 1113  Gross per 24 hour   Intake 1542.13 ml   Output 590 ml   Net 952.13 ml       Physical Exam  Vitals and nursing note reviewed.   Constitutional:       General: He is not in acute distress.     Appearance: Normal appearance. He is well-developed. He is not ill-appearing, toxic-appearing or diaphoretic.   HENT:      Head: Normocephalic and atraumatic.      Jaw: No trismus.      Right Ear: Hearing, tympanic membrane, ear canal and external ear normal.      Left Ear: Hearing, tympanic membrane, ear canal and external ear normal.      Nose: Nose normal. No nasal deformity, mucosal edema or rhinorrhea.      Right Sinus: No maxillary sinus tenderness or frontal sinus tenderness.      Left Sinus: No maxillary sinus tenderness or frontal sinus tenderness.      Mouth/Throat:      Dentition: Normal dentition.      Pharynx: Uvula midline. No posterior oropharyngeal erythema or uvula swelling.   Eyes:      General: Lids are normal. No scleral icterus.     Conjunctiva/sclera: Conjunctivae normal.      Comments: Sclera clear bilat   Neck:      Trachea: Trachea and phonation normal.   Cardiovascular:      Rate and Rhythm: Normal rate and regular rhythm.      Pulses: Normal pulses.      Heart sounds: Normal heart sounds.   Pulmonary:      Effort: Respiratory distress (mild to moderate) present.      Breath sounds: Examination of the left-upper field reveals rhonchi.  Examination of the right-middle field reveals decreased breath sounds. Examination of the left-middle field reveals decreased breath sounds and rhonchi. Examination of the right-lower field reveals decreased breath sounds and rhonchi. Examination of the left-lower field reveals decreased breath sounds and rhonchi. Decreased breath sounds and rhonchi present. No wheezing.   Abdominal:      General: Bowel sounds are normal. There is no distension.      Palpations: Abdomen is soft.      Tenderness: There is no abdominal tenderness.   Musculoskeletal:         General: No deformity. Normal range of motion.      Cervical back: Full passive range of motion without pain and neck supple.   Skin:     General: Skin is warm and dry.      Capillary Refill: Capillary refill takes less than 2 seconds.      Coloration: Skin is not pale.   Neurological:      Mental Status: He is alert and oriented to person, place, and time.      Motor: No abnormal muscle tone.      Coordination: Coordination normal.   Psychiatric:         Speech: Speech normal.         Behavior: Behavior normal. Behavior is cooperative.         Thought Content: Thought content normal.         Judgment: Judgment normal.       Vents:  Oxygen Concentration (%): 40 (05/23/22 0000)    Lines/Drains/Airways       Peripherally Inserted Central Catheter Line  Duration             PICC Double Lumen 05/12/22 0845 right basilic 11 days              Drain  Duration                  Drain/Device  05/04/22 1618 midline umbilical area pigtail 18 days                    Significant Labs:    CBC/Anemia Profile:  Recent Labs   Lab 05/22/22  0555 05/23/22  0607   WBC 5.47 5.17  5.17   HGB 8.3* 8.5*  8.5*   HCT 26.2* 26.9*  26.9*    186  186   * 104*  104*   RDW 14.6* 14.8*  14.8*        Chemistries:  Recent Labs   Lab 05/22/22  0555 05/22/22  0556 05/23/22  0607    140 140  140   K 3.9 3.9 4.1  4.1    103 104  104   CO2 25 25 23  23   BUN 43* 43*  39*  39*   CREATININE 1.4 1.4 1.3  1.3   CALCIUM 8.5* 8.4* 8.3*  8.3*   ALBUMIN  --   --  2.4*   PROT 6.1  --  5.7*   BILITOT  --   --  0.5   ALKPHOS  --   --  120   ALT  --   --  18   AST  --   --  30       All pertinent labs within the past 24 hours have been reviewed.    Significant Imaging:  I have reviewed all pertinent imaging results/findings within the past 24 hours.

## 2022-05-23 NOTE — ASSESSMENT & PLAN NOTE
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  he is not on home oxygen. Supplemental oxygen was provided and noted- Oxygen Concentration (%):  [40] 40.   Signs/symptoms of respiratory failure include- increased work of breathing. Contributing diagnoses includes - COPD Labs and images were reviewed. Patient has  recnt abg. Will treat underlying causes and adjust management of respiratory failure as follows- 4

## 2022-05-23 NOTE — ASSESSMENT & PLAN NOTE
bioprosthetic valve placed 2011   - continue home xarelto - adjust currently for renal dosing > now on lovenox. GFr improved so start BID dosing today (due to procedure) and if stable resume NOAC.      5/23  Resume xarelto

## 2022-05-23 NOTE — ASSESSMENT & PLAN NOTE
Continue levothyroxine .  Repeat tsh today as last one was elevated on chart and pt has short run v tach overnight   Increase synthroid to 75mcg>88mcg 5/19 .  Lab Results   Component Value Date    TSH 8.559 (H) 05/19/2022

## 2022-05-23 NOTE — ASSESSMENT & PLAN NOTE
Continue statin, bb, NOAC   Cards consulted     NOAC held due to NIK> lovenox currently but improved today so went to BID dosing. Keeping lovenox in case GFR drops again after contrast load today for CT scan and due to procedure today. If stable can go back to NOAC.    5/23  Resume xarelto

## 2022-05-23 NOTE — ASSESSMENT & PLAN NOTE
PT consulted .  Per records patient with several hospitalizations in last few months for abdominal wall abscess   - prior to 2 months ago, was still independent, working and running a business  - consulted palliative care to have on board and have OP follow up.

## 2022-05-23 NOTE — PROGRESS NOTES
[place] [facility] ([facility])  Pulmonology  Progress Note    Patient Name: Eddie Parada Sr.  MRN: [id number]  Admission Date: 5/15/2022  Hospital Length of Stay: 7 days  Code Status: DNR  Attending Provider: Joseph Redmond MD  Primary Care Provider: Callum Roberts MD   Principal Problem: (HFpEF) heart failure with preserved ejection fraction    Subjective:     Interval History: much better    Objective:     Vital Signs (Most Recent):  Temp: 96.9 °F (36.1 °C) (05/23/22 1133)  Pulse: 60 (05/23/22 1400)  Resp: 18 (05/23/22 1133)  BP: (!) 121/59 (05/23/22 1133)  SpO2: 99 % (05/23/22 1133)   Vital Signs (24h Range):  Temp:  [96.1 °F (35.6 °C)-97.9 °F (36.6 °C)] 96.9 °F (36.1 °C)  Pulse:  [59-90] 60  Resp:  [18] 18  SpO2:  [93 %-100 %] 99 %  BP: (112-148)/(59-78) 121/59     Weight: 87.5 kg (192 lb 14.4 oz)  Body mass index is 32.1 kg/m².      Intake/Output Summary (Last 24 hours) at 5/23/2022 1418  Last data filed at 5/23/2022 1113  Gross per 24 hour   Intake 1542.13 ml   Output 590 ml   Net 952.13 ml       Physical Exam  Vitals and nursing note reviewed.   Constitutional:       General: He is not in acute distress.     Appearance: Normal appearance. He is well-developed. He is not ill-appearing, toxic-appearing or diaphoretic.   HENT:      Head: Normocephalic and atraumatic.      Jaw: No trismus.      Right Ear: Hearing, tympanic membrane, ear canal and external ear normal.      Left Ear: Hearing, tympanic membrane, ear canal and external ear normal.      Nose: Nose normal. No nasal deformity, mucosal edema or rhinorrhea.      Right Sinus: No maxillary sinus tenderness or frontal sinus tenderness.      Left Sinus: No maxillary sinus tenderness or frontal sinus tenderness.      Mouth/Throat:      Dentition: Normal dentition.      Pharynx: Uvula midline. No posterior oropharyngeal erythema or uvula swelling.   Eyes:      General: Lids are normal. No scleral icterus.     Conjunctiva/sclera: Conjunctivae normal.       Comments: Sclera clear bilat   Neck:      Trachea: Trachea and phonation normal.   Cardiovascular:      Rate and Rhythm: Normal rate and regular rhythm.      Pulses: Normal pulses.      Heart sounds: Normal heart sounds.   Pulmonary:      Effort: Respiratory distress (mild to moderate) present.      Breath sounds: Examination of the left-upper field reveals rhonchi. Examination of the right-middle field reveals decreased breath sounds. Examination of the left-middle field reveals decreased breath sounds and rhonchi. Examination of the right-lower field reveals decreased breath sounds and rhonchi. Examination of the left-lower field reveals decreased breath sounds and rhonchi. Decreased breath sounds and rhonchi present. No wheezing.   Abdominal:      General: Bowel sounds are normal. There is no distension.      Palpations: Abdomen is soft.      Tenderness: There is no abdominal tenderness.   Musculoskeletal:         General: No deformity. Normal range of motion.      Cervical back: Full passive range of motion without pain and neck supple.   Skin:     General: Skin is warm and dry.      Capillary Refill: Capillary refill takes less than 2 seconds.      Coloration: Skin is not pale.   Neurological:      Mental Status: He is alert and oriented to person, place, and time.      Motor: No abnormal muscle tone.      Coordination: Coordination normal.   Psychiatric:         Speech: Speech normal.         Behavior: Behavior normal. Behavior is cooperative.         Thought Content: Thought content normal.         Judgment: Judgment normal.       Vents:  Oxygen Concentration (%): 40 (05/23/22 0000)    Lines/Drains/Airways       Peripherally Inserted Central Catheter Line  Duration             PICC Double Lumen 05/12/22 0845 right basilic 11 days              Drain  Duration                  Drain/Device  05/04/22 1618 midline umbilical area pigtail 18 days                    Significant Labs:    CBC/Anemia Profile:  Recent  Labs   Lab 05/22/22  0555 05/23/22  0607   WBC 5.47 5.17  5.17   HGB 8.3* 8.5*  8.5*   HCT 26.2* 26.9*  26.9*    186  186   * 104*  104*   RDW 14.6* 14.8*  14.8*        Chemistries:  Recent Labs   Lab 05/22/22  0555 05/22/22  0556 05/23/22  0607    140 140  140   K 3.9 3.9 4.1  4.1    103 104  104   CO2 25 25 23  23   BUN 43* 43* 39*  39*   CREATININE 1.4 1.4 1.3  1.3   CALCIUM 8.5* 8.4* 8.3*  8.3*   ALBUMIN  --   --  2.4*   PROT 6.1  --  5.7*   BILITOT  --   --  0.5   ALKPHOS  --   --  120   ALT  --   --  18   AST  --   --  30       All pertinent labs within the past 24 hours have been reviewed.    Significant Imaging:  I have reviewed all pertinent imaging results/findings within the past 24 hours.    Assessment/Plan:     * (HFpEF) heart failure with preserved ejection fraction  Diastolic issues with hypoalbuminemia causeing pleural effusion will attempt bipap to improve oxygenation     COPD (chronic obstructive pulmonary disease)  10 pack year    Chronic respiratory failure with hypoxia  Patient with Hypoxic Respiratory failure which is Chronic.  he is not on home oxygen. Supplemental oxygen was provided and noted- Oxygen Concentration (%):  [40] 40.   Signs/symptoms of respiratory failure include- increased work of breathing. Contributing diagnoses includes - COPD Labs and images were reviewed. Patient has  recnt abg. Will treat underlying causes and adjust management of respiratory failure as follows- 4    Counseling regarding advanced directives and goals of care  Advance Care Planning         NIK (acute kidney injury)  Secondary to age and diuresis    Abdominal wall abscess  Draining     Type 2 diabetes mellitus, controlled  stable    S/P AVR  S/PValve replacement   Contributing to present Pleural effusion     CAD (coronary artery disease)  stable    Chronic atrial fibrillation  Stable HR  Controlled Vent response                  Vivek Morris MD  Pulmonology  [facility]  Med Surg (3rd Fl)

## 2022-05-23 NOTE — ASSESSMENT & PLAN NOTE
HbA1c 6.0 on 4/30/22.  - does not appear to be on any home medications   - SSI + POCT glucose while inpatient  - continue to manage with lifstyle, follow up with PCP.

## 2022-05-23 NOTE — ASSESSMENT & PLAN NOTE
5/2/22 Echo-   · The estimated ejection fraction is 55%.  · Indeterminate left ventricular diastolic function.  · Moderate right ventricular enlargement with mildly reduced right ventricular systolic function.  · Mild aortic regurgitation.  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 There is aortic insufficiency present. Prosthetic aortic valve is normal.  · The aortic valve mean gradient is 6 mmHg with a dimensionless index of 0.64.  · Mild mitral regurgitation.  · Mild tricuspid regurgitation.  · There is moderate pulmonary hypertension.       Increased SOB and hypoxia. Diuresis initiated. CIS following. Strict I/O. Low Na diet.    Stop lasix today. Creat rising and patient not urinating much- check renal u/s today considering fluids     5/19 was started on minimal fluids last night NS at 50ml/hr creat improved 2.1>1.9- hold further diuresis today     5/22 Cont to hold diurese and creat continues to improve. On BB and Isorbide, on 50cc/hr IVF which is minimal  CXR shows large left effusion with atelectasis; Dr. Morris agrees to thoracentesis today which will hopefully improve SOB and oxygen requirement  5/23 S/p thoracentesis yesterday ; 1500 ml of bloody pleural fluid was obtained.

## 2022-05-23 NOTE — TELEPHONE ENCOUNTER
Spoke with patient's daughter. She stated that she has a couple questions that her mother forgot to ask when you saw the patient in the hospital. She is asking if you could give her a call so she can ask you a couple things.     Diana  [phone]

## 2022-05-23 NOTE — PROGRESS NOTES
Pharmacokinetic Assessment Follow Up: IV Vancomycin    Vancomycin serum concentration assessment(s):    The random level was drawn correctly and can be used to guide therapy at this time. The measurement is above the desired definitive target range of 10 to 20 mcg/mL.    Vancomycin Regimen Plan:    Re-dose when the random level is less than 20 mcg/mL, next level to be drawn at 1600 on 5/24/2022.    Drug levels (last 3 results):  Recent Labs   Lab Result Units 05/21/22  0545 05/22/22  0556 05/23/22  1601   Vancomycin, Random ug/mL 12.8 20.1 23.3       Pharmacy will continue to follow and monitor vancomycin.    Please contact pharmacy at extension [phone] for questions regarding this assessment.    Thank you for the consult,   Maria D Guillory       Patient brief summary:  Eddie Parada Sr. is a 89 y.o. male initiated on antimicrobial therapy with IV Vancomycin for treatment of bacteremia    The patient's current regimen is: pulse dosing    Drug Allergies:   Review of patient's allergies indicates:   Allergen Reactions    Ciprofloxacin     Levofloxacin Swelling    Lyrica [pregabalin] Swelling    Unable to assess Other (See Comments)     Soft shell crabs       Actual Body Weight:   87.5 Kg    Renal Function:   Estimated Creatinine Clearance: 36.1 mL/min (based on SCr of 1.3 mg/dL).,     Dialysis Method (if applicable):  N/A    CBC (last 72 hours):  Recent Labs   Lab Result Units 05/21/22  0545 05/22/22  0555 05/23/22  0607   WBC K/uL 5.63 5.47 5.17  5.17   Hemoglobin g/dL 8.5* 8.3* 8.5*  8.5*   Hematocrit % 26.7* 26.2* 26.9*  26.9*   Platelets K/uL 214 208 186  186   Gran % % 60.8 61.9 64.4  64.4   Lymph % % 20.4 20.1 19.1  19.1   Mono % % 16.5* 15.9* 14.1  14.1   Eosinophil % % 1.2 1.1 1.4  1.4   Basophil % % 0.7 0.5 0.4  0.4   Differential Method  Automated Automated Automated  Automated       Metabolic Panel (last 72 hours):  Recent Labs   Lab Result Units 05/21/22  0545 05/22/22  0555 05/22/22  0556  05/22/22  1110 05/23/22  0607   Sodium mmol/L 139 140 140  --  140  140   Potassium mmol/L 3.9 3.9 3.9  --  4.1  4.1   Chloride mmol/L 101 104 103  --  104  104   CO2 mmol/L 26 25 25  --  23  23   Glucose mg/dL 161* 152*  152* 148*  --  138*  138*   Glucose, Fluid mg/dL  --   --   --  168  --    BUN mg/dL 49* 43* 43*  --  39*  39*   Creatinine mg/dL 1.6* 1.4 1.4  --  1.3  1.3   Albumin g/dL 2.7*  --   --   --  2.4*   Total Bilirubin mg/dL 0.5  --   --   --  0.5   Alkaline Phosphatase U/L 118  --   --   --  120   AST U/L 37  --   --   --  30   ALT U/L 21  --   --   --  18       Vancomycin Administrations:  vancomycin given in the last 96 hours                     vancomycin 1.25 g in dextrose 5% 250 mL IVPB (ready to mix) (mg) 1,250 mg New Bag 05/22/22 1711    vancomycin 1.25 g in dextrose 5% 250 mL IVPB (ready to mix) (mg) 1,250 mg New Bag 05/21/22 0853                    Microbiologic Results:  Microbiology Results (last 7 days)       Procedure Component Value Units Date/Time    AFB culture (includes stain) [[id number]] Collected: 05/22/22 1109    Order Status: Completed Specimen: Pleural Fluid Updated: 05/23/22 1425     AFB CULTURE STAIN No acid fast bacilli seen.    Narrative:      Pleural fluid from a thoracentesis    Culture, Body Fluid (Aerobic) w/ GS [[id number]] Collected: 05/22/22 1113    Order Status: Completed Specimen: Pleural Fluid Updated: 05/22/22 2315     Gram Stain Result Rare WBC's      No organisms seen    Narrative:      Pleural fluid    Fungus culture [[id number]] Collected: 05/22/22 1109    Order Status: Sent Specimen: Pleural Fluid Updated: 05/22/22 2050    Culture, Respiratory with Gram Stain [[id number]] Collected: 05/22/22 1113    Order Status: Canceled Specimen: Pleural Fluid from Bronchial Wash     Aerobic culture [[id number]] Collected: 05/22/22 1113    Order Status: Canceled Specimen: Pleural Fluid     Gram stain [[id number]] Collected: 05/22/22 1109    Order Status: Canceled  Specimen: Pleural Fluid     AFB culture (includes stain) [[id number]]     Order Status: Canceled Specimen: Pleural Fluid     Culture, Respiratory with Gram Stain [[id number]]     Order Status: Canceled Specimen: Respiratory from Bronchial Wash     Gram stain [[id number]]     Order Status: Canceled Specimen: Body Fluid     Culture, Respiratory with Gram Stain [[id number]]     Order Status: Canceled Specimen: Respiratory from Bronchial Wash     Gram stain [[id number]]     Order Status: Canceled Specimen: Body Fluid     Fungus culture [[id number]]     Order Status: Canceled Specimen: Pleural Fluid     AFB culture (includes stain) [[id number]]     Order Status: Canceled Specimen: Pleural Fluid

## 2022-05-23 NOTE — ASSESSMENT & PLAN NOTE
BP Readings from Last 3 Encounters:   05/22/22 112/78   05/12/22 (!) 129/58   05/11/22 (!) 110/52     Resume home coreg-at lower dose 3.25mg , hold amlodipine & doxazosin  for now .  Dr. Monterroso ordered Imdur, monitor BP .

## 2022-05-23 NOTE — SUBJECTIVE & OBJECTIVE
Wife at bedside reports increased fatigue, leg weakness  Review of Systems   Constitutional:  Negative for activity change, fever and unexpected weight change.        Looks a little stronger   HENT:  Negative for congestion, ear pain, hearing loss, rhinorrhea and sore throat.    Eyes:  Negative for pain, redness and visual disturbance.   Respiratory:  Positive for shortness of breath. Negative for cough and wheezing.    Cardiovascular:  Negative for chest pain, palpitations and leg swelling.   Gastrointestinal:  Negative for abdominal pain, constipation, diarrhea, nausea and vomiting.   Genitourinary:  Negative for decreased urine volume, dysuria, frequency and urgency.   Musculoskeletal:  Negative for back pain, joint swelling and neck pain.   Skin:  Negative for color change, rash and wound.   Neurological:  Positive for weakness. Negative for dizziness, tremors, light-headedness and headaches.   Objective:     Vital Signs (Most Recent):  Temp: 97.2 °F (36.2 °C) (05/22/22 2110)  Pulse: 60 (05/23/22 0600)  Resp: 18 (05/23/22 0330)  BP: 112/78 (05/22/22 2110)  SpO2: 95 % (05/23/22 0330)   Vital Signs (24h Range):  Temp:  [96.8 °F (36 °C)-98.1 °F (36.7 °C)] 97.2 °F (36.2 °C)  Pulse:  [59-90] 60  Resp:  [18-28] 18  SpO2:  [93 %-100 %] 95 %  BP: (112-133)/(58-78) 112/78     Weight: 87.5 kg (192 lb 14.4 oz)  Body mass index is 32.1 kg/m².    Physical Exam  Vitals and nursing note reviewed.   Constitutional:       General: He is not in acute distress.     Appearance: He is well-developed.   HENT:      Head: Normocephalic and atraumatic.      Right Ear: External ear normal.      Left Ear: External ear normal.   Eyes:      General:         Right eye: No discharge.         Left eye: No discharge.   Neck:      Thyroid: No thyromegaly.   Cardiovascular:      Rate and Rhythm: Normal rate and regular rhythm.      Heart sounds: No murmur heard.  Pulmonary:      Effort: Pulmonary effort is normal. No respiratory distress.       Breath sounds: Rales present. No wheezing.      Comments: Decreased left lung  Abdominal:      General: Bowel sounds are normal. There is no distension.      Palpations: Abdomen is soft.      Tenderness: There is no abdominal tenderness.      Comments: Drain in place.   Musculoskeletal:      Right lower leg: No edema.      Left lower leg: No edema.   Skin:     General: Skin is warm and dry.      Comments: PICC to right UE, dressing c/d/i           Significant Labs:   A1C:   Recent Labs   Lab 03/02/22  0744 04/30/22  1046   HGBA1C 5.9* 6.0*         Bilirubin:   Recent Labs   Lab 05/17/22  0642 05/19/22  0542 05/20/22  0600 05/21/22  0545 05/23/22  0607   BILITOT 0.6 0.5 0.5 0.5 0.5       CBC:   Recent Labs   Lab 05/22/22  0555 05/23/22  0607   WBC 5.47 5.17  5.17   HGB 8.3* 8.5*  8.5*   HCT 26.2* 26.9*  26.9*    186  186       CMP:   Recent Labs   Lab 05/22/22  0555 05/22/22  0556 05/23/22  0607    140 140  140   K 3.9 3.9 4.1  4.1    103 104  104   CO2 25 25 23  23   *  152* 148* 138*  138*   BUN 43* 43* 39*  39*   CREATININE 1.4 1.4 1.3  1.3   CALCIUM 8.5* 8.4* 8.3*  8.3*   PROT 6.1  --  5.7*   ALBUMIN  --   --  2.4*   BILITOT  --   --  0.5   ALKPHOS  --   --  120   AST  --   --  30   ALT  --   --  18   ANIONGAP 11 12 13  13   EGFRNONAA 43* 43* 47*  47*       Lab Results   Component Value Date    DDIMER 2.25 (H) 05/15/2022     Lactate 1.3    BNP  No results for input(s): BNP, BNPTRIAGEBLO in the last 168 hours.      Recent Labs   Lab 05/22/22  1131 05/22/22  1621 05/22/22  2114   POCTGLUCOSE 174* 227* 196*       Troponin: 0.044>0.048>0.043>0.037    TSH:   Recent Labs   Lab 05/19/22  0541   TSH 8.559*       No results for input(s): COLORU, APPEARANCEUA, PHUR, SPECGRAV, PROTEINUA, GLUCUA, KETONESU, BILIRUBINUA, OCCULTUA, NITRITE, UROBILINOGEN, LEUKOCYTESUR, RBCUA, WBCUA, BACTERIA, SQUAMEPITHEL, HYALINECASTS in the last 48 hours.    Invalid input(s): WRIGHTSUR  Covid negative    Flu negative     Significant Imaging:     CXR 5/22   Mild cardiomegaly.  Prior cardiac surgery with pacemaker in place.  PICC line in position.  Vascular congestion with lower lobe subsegmental atelectasis greater on the left than the right.  The left effusion appears less prominent than on the prior study.  No evidence of pneumothorax.       CT of abd /pelvis 5/21 2022   Moderate right and small left pleural effusions with bibasilar dependent atelectasis.  2. Previous cholecystectomy.  3. Bilateral renal cysts.  4. Extensive diverticulosis.  5. Significant prostatic hypertrophy.  Further evaluation as deemed clinically necessary.  6. Moderate bilateral inguinal hernias with nondilated bowel within the proximal right inguinal hernia.  7. Prior ventral hernia repair with surgical mesh in place with percutaneous drainage catheter within a poorly defined fluid collection surrounding the surgical mesh which is slightly decreased in size over the interval.  8. Anasarca.  This report was flagged in Epic as abnormal.    5/18 US renal Technically limited study with known renal cysts.  No evidence of obstructive uropathy    5/12 CXR Cardiomegaly with mild CHF.     Left lower lobe atelectasis, small left pleural effusion.    LE US -No evidence of deep venous thrombosis in either lower extremity.    5/15 CXR- Increased left-sided pleural effusion along with new airspace opacities in left hemithorax.  Findings suggestive of worsening CHF pattern of pulmonary edema.    EKG Likely ventricular paced rhythm  - morphology suggestive of biventricular   pacing   Abnormal ECG   When compared with ECG of 30-APR-2022 10:39,   Premature ventricular complexes are no longer Present   Confirmed by Adam Mcmahon MD ([id number]) on 5/16/2022 8:11:55 AM

## 2022-05-24 VITALS
SYSTOLIC BLOOD PRESSURE: 135 MMHG | TEMPERATURE: 98 F | OXYGEN SATURATION: 97 % | HEART RATE: 58 BPM | DIASTOLIC BLOOD PRESSURE: 62 MMHG | RESPIRATION RATE: 20 BRPM | HEIGHT: 65 IN | WEIGHT: 197.75 LBS | BODY MASS INDEX: 32.95 KG/M2

## 2022-05-24 PROBLEM — J18.9 PNEUMONIA OF LEFT LOWER LOBE DUE TO INFECTIOUS ORGANISM: Status: RESOLVED | Noted: 2022-04-30 | Resolved: 2022-05-24

## 2022-05-24 LAB
BASOPHILS # BLD AUTO: 0.03 K/UL (ref 0–0.2)
BASOPHILS NFR BLD: 0.5 % (ref 0–1.9)
DIFFERENTIAL METHOD: ABNORMAL
EOSINOPHIL # BLD AUTO: 0.1 K/UL (ref 0–0.5)
EOSINOPHIL NFR BLD: 0.9 % (ref 0–8)
ERYTHROCYTE [DISTWIDTH] IN BLOOD BY AUTOMATED COUNT: 14.9 % (ref 11.5–14.5)
HCT VFR BLD AUTO: 26.1 % (ref 40–54)
HGB BLD-MCNC: 8.2 G/DL (ref 14–18)
IMM GRANULOCYTES # BLD AUTO: 0.05 K/UL (ref 0–0.04)
IMM GRANULOCYTES NFR BLD AUTO: 0.9 % (ref 0–0.5)
LYMPHOCYTES # BLD AUTO: 1 K/UL (ref 1–4.8)
LYMPHOCYTES NFR BLD: 18.6 % (ref 18–48)
MCH RBC QN AUTO: 32.9 PG (ref 27–31)
MCHC RBC AUTO-ENTMCNC: 31.4 G/DL (ref 32–36)
MCV RBC AUTO: 105 FL (ref 82–98)
MONOCYTES # BLD AUTO: 0.8 K/UL (ref 0.3–1)
MONOCYTES NFR BLD: 14.4 % (ref 4–15)
NEUTROPHILS # BLD AUTO: 3.5 K/UL (ref 1.8–7.7)
NEUTROPHILS NFR BLD: 64.7 % (ref 38–73)
NRBC BLD-RTO: 0 /100 WBC
PLATELET # BLD AUTO: 181 K/UL (ref 150–450)
PMV BLD AUTO: 9.5 FL (ref 9.2–12.9)
POCT GLUCOSE: 165 MG/DL (ref 70–110)
POCT GLUCOSE: 241 MG/DL (ref 70–110)
RBC # BLD AUTO: 2.49 M/UL (ref 4.6–6.2)
WBC # BLD AUTO: 5.48 K/UL (ref 3.9–12.7)

## 2022-05-24 PROCEDURE — 94799 UNLISTED PULMONARY SVC/PX: CPT

## 2022-05-24 PROCEDURE — 25000003 PHARM REV CODE 250: Performed by: NURSE PRACTITIONER

## 2022-05-24 PROCEDURE — 97530 THERAPEUTIC ACTIVITIES: CPT

## 2022-05-24 PROCEDURE — 63600175 PHARM REV CODE 636 W HCPCS: Performed by: NURSE PRACTITIONER

## 2022-05-24 PROCEDURE — 99900035 HC TECH TIME PER 15 MIN (STAT)

## 2022-05-24 PROCEDURE — 99232 PR SUBSEQUENT HOSPITAL CARE,LEVL II: ICD-10-PCS | Mod: ,,, | Performed by: INTERNAL MEDICINE

## 2022-05-24 PROCEDURE — 25000003 PHARM REV CODE 250

## 2022-05-24 PROCEDURE — 94761 N-INVAS EAR/PLS OXIMETRY MLT: CPT

## 2022-05-24 PROCEDURE — 85025 COMPLETE CBC W/AUTO DIFF WBC: CPT | Performed by: NURSE PRACTITIONER

## 2022-05-24 PROCEDURE — 27000221 HC OXYGEN, UP TO 24 HOURS

## 2022-05-24 PROCEDURE — 99232 SBSQ HOSP IP/OBS MODERATE 35: CPT | Mod: ,,, | Performed by: INTERNAL MEDICINE

## 2022-05-24 RX ORDER — CARVEDILOL 3.12 MG/1
3.12 TABLET ORAL 2 TIMES DAILY WITH MEALS
Qty: 60 TABLET | Refills: 0 | Status: SHIPPED | OUTPATIENT
Start: 2022-05-24

## 2022-05-24 RX ORDER — ISOSORBIDE MONONITRATE 30 MG/1
30 TABLET, EXTENDED RELEASE ORAL DAILY
Qty: 30 TABLET | Refills: 0 | Status: SHIPPED | OUTPATIENT
Start: 2022-05-25 | End: 2023-05-25

## 2022-05-24 RX ORDER — PANTOPRAZOLE SODIUM 40 MG/1
40 TABLET, DELAYED RELEASE ORAL DAILY
Qty: 30 TABLET | Refills: 0 | Status: SHIPPED | OUTPATIENT
Start: 2022-05-25 | End: 2023-05-25

## 2022-05-24 RX ORDER — LEVOTHYROXINE SODIUM 88 UG/1
88 TABLET ORAL
Qty: 30 TABLET | Refills: 0 | Status: SHIPPED | OUTPATIENT
Start: 2022-05-25 | End: 2023-05-25

## 2022-05-24 RX ADMIN — LEVOTHYROXINE SODIUM 88 MCG: 88 TABLET ORAL at 05:05

## 2022-05-24 RX ADMIN — INSULIN ASPART 2 UNITS: 100 INJECTION, SOLUTION INTRAVENOUS; SUBCUTANEOUS at 11:05

## 2022-05-24 RX ADMIN — FERROUS SULFATE TAB 325 MG (65 MG ELEMENTAL FE) 1 EACH: 325 (65 FE) TAB at 09:05

## 2022-05-24 RX ADMIN — PANTOPRAZOLE SODIUM 40 MG: 40 TABLET, DELAYED RELEASE ORAL at 09:05

## 2022-05-24 RX ADMIN — POTASSIUM CHLORIDE 10 MEQ: 750 TABLET, FILM COATED, EXTENDED RELEASE ORAL at 09:05

## 2022-05-24 RX ADMIN — CEFTRIAXONE 1 G: 1 INJECTION, SOLUTION INTRAVENOUS at 09:05

## 2022-05-24 RX ADMIN — TAMSULOSIN HYDROCHLORIDE 0.4 MG: 0.4 CAPSULE ORAL at 09:05

## 2022-05-24 RX ADMIN — METRONIDAZOLE 500 MG: 500 TABLET ORAL at 01:05

## 2022-05-24 RX ADMIN — ISOSORBIDE MONONITRATE 30 MG: 30 TABLET, EXTENDED RELEASE ORAL at 09:05

## 2022-05-24 RX ADMIN — METRONIDAZOLE 500 MG: 500 TABLET ORAL at 05:05

## 2022-05-24 RX ADMIN — CARVEDILOL 3.12 MG: 3.12 TABLET, FILM COATED ORAL at 09:05

## 2022-05-24 NOTE — DISCHARGE SUMMARY
[facility] ([facility])  [facility] Medicine  Discharge Summary      Patient Name: Eddie Parada Sr.  MRN: [id number]  Patient Class: IP- Inpatient  Admission Date: 5/15/2022  Hospital Length of Stay: 8 days  Discharge Date and Time:  05/24/2022 10:51 AM  Attending Physician: Joseph Redmond MD   Discharging Provider: Flor Blanca NP  Primary Care Provider: Callum Roberts MD      HPI:   Eddie Parada Sr. is a 89 y.o. male with known atrial fibrillation, aortic stenosis s/p repair,  CAD, CHF, COPD, diabetes mellitus type 2, hyperlipidemia,  CVA and ongoing complications associated of an anterior abdominal wall mass secondary to ventral hernia mesh placed ~20 years ago who presented to ER with shortness of breath since just prior to arrival.  While sitting on a sofa at home, he suddenly began short of breath.  Per wife, he has been compliant with all of his home medication.  Endorses leg swelling.   Denies chest pain, diaphoresis.   BNP> 500, CXR demonstrating pulmonary edema, Creat elevated at 2.1- (Baseline creat 1.0-1.7)   D- dimer elevated, LE US negative for DVT, TNI- 0.044>0.048>0.043   H&H 9.1/27.9>8.6>26.2     He is s/p Drain recently removed prior to presentation, with return of symptoms, re-accumulation of fluid. General surgery was consulted who states patient is not a good surgical candidate. Patient underwent US-guided drain placement with IR 5/4, which he tolerated well. Initiated BSA with vanc, cefepime, flagyl, and azithromycin (for atypicals/CAP). Consulted ID for long-term IV Abx who recommends discharging patient on vanc, ceftriaxone, PO flagyl, and PO diflucan for at least 2 weeks and close follow-up with ID for decision making based on clinical course. Because patient has had several hospitalizations in last few months due to complications from abdominal wall abscess, consulted palliative care to have on board and to establish outpatient follow-up. Patient was discharged home  with abdominal drain in place, 4 weeks of vanc, ceftriaxone, PO flagyl, and PO diflucan, and close follow-up with ID, general surgery, palliative care, and home health. Patient remained afebrile and without abdominal pain, leukocytosis, or other issues during hospital stay.          * No surgery found *      Hospital Course:   5/17 89 YO WM receiving multiple home abx including vancomycin at home for abdominal abscess, presented with acute SOB, Heart failure, getting lasix 20mg IV q 12, not much diuresis, intake and output reflecting + 270  Noting more edema to arms, below eyes , wife noting more confusion when he wakes up   + NIK on CKD; Creat 2.1; K+ 3.6   O2 sat 93-94% on 3LNC , not on home oxygen,   H&H 8.4/25.6 trending down ; NOAC stopped per cards after dose yesterday   Decreased appetite, Boost ordered,   End of life care discussed ; pt does not have living will .  He will sign living will .  He will be DNR .  I also talked to wife by patients bedside and daughter on phone ;answered all questions.  Spent about 20 minutes  . Later on signed living will     5/18 Pt was admitted with heart failure with elevated BNP and some lower ext swelling. He was given 60 mg IV lasix 5/16 and then 40mg 5/17. He has had fluctuating renal function 2 weeks ago Creat on admit 2.1.>> 2.2 today. BNP was elevated  on admission and is not putting out much urine.Dr Monterroso was consulted. Troponin chronically elevated. Echo 55% mild heart failure and mod pulm htn noted. Holding xarel;to for now due to h/h dropping. 9>8. D dimer was elevated u/s legs no clot     Pt was currently being treated at home for abd wall abscess s/p hernia repair. Per Id was on vanc and rocephin via PICC and PO flagyl and diflucan outpt- continued while here. Still has drain from abd wall abscess, not much drainage  Serosanguinous     5/19 pt was being treated for flash pulm edema with lasix 60mg IV 5/16 and 40mg IV 5/17. Yesterday diuretics were held as creat  was elevated and not improving with diureses. Also noted not making much urine. Renal u/s done with no obstruction noted. Was started on NS at 50ml/hr last night and creat is better this am 2.1>1.9. Looks like his renal fucntion fluctuates but 2 weeks ago had creat 1.0. He remains on vanc and rocephin IV and diflucan and flagyl po per ID from abd abscess (still with drain)- today makes 2 weeks since d/c from [place]. Awaiting guidance on if he will need 2 or 4 weeks of these abx. Had a very short bonnie of v tach last night while sleeping. K+ WNL- mag and tsh pending this am., ambulatory with PT min assist 15ft    5/20 pt is currently being treated for over diuresis. He is on NS @ 50ml/hr. Creat is improving now at 1.6 this am. Also had vanc held for abd abscess. Trough at 16 this am. Due to v tach yesterday diflucan was stopped after 2 weeks. He remains on rocephin and flagyl for the abd abscess.     5/21 slight improvement in Cr. Still on 4L NC and not able to wean. He is feeling a bit more SOB this morning. Repeat CXR with large left effusion. Diflucantstopped after 2 weeks treatment. Remains on Vanc, rocephin and flagyl for abscess. ID recs consider repeat CT abd/pelvis with contrast to see if resolving abscess; no output from drain.   Long discussion this AM with patient, wife and daughter (on phone) about goals of care.  His appetite remains very poor.     5/22: he reports no changes. Still poor appetite. Still on 4L NC  Dr. Morris plans for thoracentesis today as he did not tolerate diuresis and remains with large left pleural effusion on CXR.  Cr improved--plan for CT abd and pelvis with contrast today per ID recs to see if abscess resolved.     5/23/22     89 YO WM ongoing complications associated of an anterior abdominal wall mass secondary to ventral hernia mesh placed ~20 years ago who presented to ER with shortness of breath, CHF ; did not tolerate diuresis and remained with large left pleural effusion on  CXR, worsening creat.   S/p L thoracentesis yesterday ; 1500 ml of bloody pleural fluid was obtained. A sample was sent to Pathology for cytogenetics, flow, and cell counts, as well as for infection analysis. CT abd pelvis yesterday showing Moderate right and small left pleural effusions with bibasilar dependent atelectasis.  CXR- Mild cardiomegaly.  Prior cardiac surgery with pacemaker in place.  PICC line in position.  Vascular congestion with lower lobe subsegmental atelectasis greater on the left than the right.  The left effusion appears less prominent than on the prior study.  No evidence of pneumothorax.  WBC 5.17, afebrile. O2 sat 95-98% on 4LNC , BIPAP overnight,   Diflucan stopped after 2 weeks treatment( had vtach)  Remains on Vanc, rocephin and flagyl for abscess. Vanc level 20.1 yesterday; Day 18/30 since d/c from [place]. ID recs recommended  repeat CT abd/pelvis with contrast; demonstrating Prior ventral hernia repair with surgical mesh in place with percutaneous drainage catheter within a poorly defined fluid collection surrounding the surgical mesh which is slightly decreased in size over the interval.  Pt sitting up in chair this am, looks much better. Ambulating 25 ft with PT; still has desaturation     5/24 pt remains here with abdominal wall abscess. Was on home IV abx therapy and drain. ID rec cont abx until abscess resolved. Repeat CT shows that he still has some abscess though smaller. He continues on vanc, rocephin and flagyl- day 19. He has fluconazole stopped due to non sustained v tach episode. Remains afebrile with no elevated WBC. Renal function has returned to baseline. Has not had lasix since last week. Now starting to swell a little. Dr Mathis following and will add lasix today. Albumin 2.4- not eating much. He was also tapped Sunday for the pleural effusion that was not resolving with diuresis and still requiring supplemental O2. Dr holt following 2 mesothelial cells.     He is doing  "well with PT Ambulated 150ft with RW using stand by assist and supplmental O2.        Goals of Care Treatment Preferences:  Code Status: DNR    Health care agent: Jessica Parada (legal spouse)  Health care agent number: [phone]    Living Will: Yes     What is most important right now is to focus on symptom/pain control, quality of life, even if it means sacrificing a little time, improvement in condition but with limits to invasive therapies.  Accordingly, we have decided that the best plan to meet the patient's goals includes continuing with treatment.      Consults:   Consults (From admission, onward)        Status Ordering Provider     Inpatient consult to Pulmonology  Once        Provider:  Vivek Morris MD    Acknowledged BRYANT PAZ     Inpatient consult to Registered Dietitian/Nutritionist  Once        Provider:  (Not yet assigned)    Completed SHEELA ARMIJO     Pharmacy to dose Vancomycin consult  Once        Provider:  (Not yet assigned)   "And" Linked Group Details    Acknowledged BRYANT PAZ     PGx Consult  Once        Provider:  (Not yet assigned)    Acknowledged LAURA LUCIO     Inpatient consult to Cardiology  Once        Provider:  Ramin Monterroso MD    Acknowledged LAURA LUCIO     IP consult to case management  Once        Provider:  (Not yet assigned)    Acknowledged BRYANT PAZ          * (HFpEF) heart failure with preserved ejection fraction  5/2/22 Echo-   · The estimated ejection fraction is 55%.  · Indeterminate left ventricular diastolic function.  · Moderate right ventricular enlargement with mildly reduced right ventricular systolic function.  · Mild aortic regurgitation.  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 There is aortic insufficiency present. Prosthetic aortic valve is normal.  · The aortic valve mean gradient is 6 mmHg with a dimensionless index of 0.64.  · Mild mitral regurgitation.  · Mild tricuspid regurgitation.  · There is moderate " pulmonary hypertension.       Increased SOB and hypoxia. Diuresis initiated. CIS following. Strict I/O. Low Na diet.    Stop lasix today. Creat rising and patient not urinating much- check renal u/s today considering fluids     5/19 was started on minimal fluids last night NS at 50ml/hr creat improved 2.1>1.9- hold further diuresis today     5/22 Cont to hold diurese and creat continues to improve. On BB and Isorbide, on 50cc/hr IVF which is minimal  CXR shows large left effusion with atelectasis; Dr. Morris agrees to thoracentesis today which will hopefully improve SOB and oxygen requirement  5/23 S/p thoracentesis yesterday ; 1500 ml of bloody pleural fluid was obtained.> cultures are pending  5/24 Pt may need d/c with supplemental O2> down to 3L today sats 97%    Counseling regarding advanced directives and goals of care  Advance Care Planning     Living Will  During this visit, I engaged the patient and family  in the advance care planning process.  The patient and I reviewed the role for advance directives and their purpose in directing future healthcare if the patient's unable to speak for him/herself.  At this point in time, the patient does have full decision-making capacity.  We discussed different extreme health states that he could experience, and reviewed what kind of medical care he would want in those situations.  The patient and family communicated that if he were comatose and had little chance of a meaningful recovery, he would not want machines/life-sustaining treatments used. In addition to the above preference, other important end-of-life issues for the patient include . The patient has completed a living will to reflect these preferences.  I spent a total of 20 minutes engaging the patient in this advance care planning discussion.Discussed with wife in person and daughter via telephone           Today a meeting took place: bedside    Patient Participation: Patient is able to participate      Attendees (Name and  Relationship to patient):also dicussed with wife and daughter    Staff attendees (Name and  Role): Georgia VERMA, xochitl Jules RN case manage   Code Status: DNR; status confirmed/order placed in chart    ACP Documents: Other Documents (specify): living will     Goals of care: The patient and family endorses that what is most important right now is to focus on symptom/pain control, quality of life, even if it means sacrificing a little time and improvement in condition but with limits to invasive therapies    Accordingly, we have decided that the best plan to meet the patient's goals includes continuing with treatment      Recommendations/Length of ACP   conversation in minutes: 20 minutes      Code Status  In light of the patients advanced and life limiting illness,I engaged the the patient and family in a conversation about the patient's preferences for care  at the very end of life. The patient wishes to have a natural, peaceful death.  Along those lines, the patient does not wish to have CPR or other invasive treatments performed when his heart and/or breathing stops. I communicated to the patient and family that a DNR order would be placed in his medical record to reflect this preference.  I spent a total of 20 minutes engaging the patient in this advance care planning discussion.        5/21: again addressed goals of care with patient, wife and daughter on the phone. They are discussing wishes.     5/24 again had a very long discussion with wife, patient and family at bedside. They agree he is stable for d/c and will resume home health with IV abx and Oxygen. F/u PCP within 1 week. Already had general sx f/u for drain next week and palliative care virtual     Debility  PT consulted .  Per records patient with several hospitalizations in last few months for abdominal wall abscess   - prior to 2 months ago, was still independent, working and running a business  - consulted palliative care to  have on board and have OP follow up.    5/24 Gait: SBA x ~150 feet with RW and O2 supplement.      NIK (acute kidney injury)  Monitor BUN/SCr.  Monitor I/Os.  Monitor electrolytes.  Avoid non-steroidal anti-inflammatory medications.  Symptoms and labs suggestive of Acute HF; continue with diuretics, monitor renal fx closely   NIK on CKD      renal US today and hold further diuresis- pt not voiding much no obstruction  vanc dosing per pharm>  5/19 renal u/s yesterday does not show obstruction. Placed in gentle IVF and diuretics held. Creat has improved a smidgen     5/22 with very slow fluids creat improving to 1.4 today. Pharmacy dosing vanc. CT with contrast today so repeat labs in AM needed.    5/23 creat continues to improve; 1.3  5/24 renal back to baseline     Abdominal wall abscess  Last discharge 5/6  with abdominal drain in place and 4 weeks of vanc, rocephin, flagyl, and diflucan  - follow up with general surgery for drain and ID for Abx- ( 10 days since discharge)   Consult pharmacy to adjust vanc- monitor     ventral hernia repair with mesh placement ~20 years ago, no complications until 3/2022 with several hospitalizations leading to drain placement and antibiotics since that time, drain removed 4/29/22   - most recent hospitalization began on 4/30/22 at [facility] and was transferred here on 5/4/22 for surgical and IR evaluation  - was initiated on Vanc and Zosyn at [facility]  - switched to Vanc, Cefepime, Flagyl on admission to our facility given renal function   - blood cultures from 4/30/22 no growth to date   - wound cultures from 3/3 and 5/4 NGTD  - general surgery states patient is not good surgical candidate  - underwent US-guided drain placement 5/4  - general surgery added fluconazole for yeast seen on gram stain of wound Cx  - consulted ID for assistance with Abx: discharge with IV vanc, IV ceftriaxone, PO flagyl, and PO diflucan for at least 2 weeks with close ID follow-up  .  -vanc dosing per pharmacy but will lower vanc trough goal to 10  Message sent to ID for guidance as today makes 2 weeks from D/c and last notes per ID read 2-4 weeks of therapy needed outpt >> will send message to Dr Brown. Stopped diflucan due to v tach after 2 weeks of treatment. Holding vanc as trough still 16 and creat elevated. Still on rocephin and flagyl    5/22: ID rec repeat CT scan to see if abscess resolved. If so, can stop antimicrobial therapy. Cr 1.4 today so will proceed with CT with contrast    5/21: cont vanc, rocephin and flagyl for now  ID rec repeat CT to see if resolved. If so, can stop antibx. No output from drain. However with Cr still above baseline risk further nephrotoxicity with contrast for CT. Will make final decision on contrasted study tomorrow AM after repeat labs.     5/23 Repeat CT abd pelvis yesterday showing decrease in size of the abscess .  5/24 cont vanc, rocephin and flagyl till resolution of abscess per ID- will f/u outpt     Disorder of prostate  Continue flomax .      Hypothyroidism due to acquired atrophy of thyroid  Continue levothyroxine .  Repeat tsh today as last one was elevated on chart and pt has short run v tach overnight   Increase synthroid to 75mcg>88mcg 5/19 .  Lab Results   Component Value Date    TSH 8.559 (H) 05/19/2022         Ventricular tachycardia  Pt had 9 beat run of v-tach. Pt asymptomatic and no complaints of chest pain. Was sleeping soundly at the time.   K+ WNL- add mag > normal and tsh elevated increase synthroid from 75>88  Stop diflucan. Had a few yeast in abscess fungal culture but has had 2 weeks of that.      Long term current use of anticoagulant therapy  Continue xarelto- adjust for renal; recommending decrease to 15mg daily .  Now holding due to anemia/NIK.  lovenox renal dosing ---GFR improved so start BID dosing today (due to procedure) and if stable resume NOAC tomorrow    5/24 resumed xarelto     Type 2 diabetes mellitus,  controlled  HbA1c 6.0 on 4/30/22.  - does not appear to be on any home medications   - SSI + POCT glucose while inpatient  - continue to manage with omayra, follow up with PCP.         S/P AVR   bioprosthetic valve placed 2011   - continue home xarelto - adjust currently for renal dosing > now on lovenox. GFr improved so start BID dosing today (due to procedure) and if stable resume NOAC.      5/23  Resume xarelto        Mixed dyslipidemia  Continue pravastatin       CAD (coronary artery disease)  Continue statin, bb, NOAC   Cards consulted     NOAC held due to NIK> lovenox currently but improved today so went to BID dosing. Keeping lovenox in case GFR drops again after contrast load today for CT scan and due to procedure today. If stable can go back to NOAC.    5/23  Resume xarelto    Essential (primary) hypertension  BP Readings from Last 3 Encounters:   05/24/22 (!) 141/63   05/12/22 (!) 129/58   05/11/22 (!) 110/52     Resume home coreg-at lower dose 3.25mg , hold amlodipine & doxazosin  for now .  Dr. Monterroso ordered Imdur, monitor BP .      Chronic atrial fibrillation  Maintain BB, rivaroxaban- renal dose  p PPM .  xarelto held due to NIK   lovenox     5/23 remains on lovenox; transition back to oral .      Pneumonia of left lower lobe due to infectious organism-resolved as of 5/24/2022          Final Active Diagnoses:    Diagnosis Date Noted POA    PRINCIPAL PROBLEM:  (HFpEF) heart failure with preserved ejection fraction [I50.30] 06/17/2015 Yes    Counseling regarding advanced directives and goals of care [Z71.89] 05/17/2022 Not Applicable    Debility [R53.81] 05/16/2022 Yes    NIK (acute kidney injury) [N17.9] 03/19/2022 Yes    Abdominal wall abscess [L02.211] 03/05/2022 Yes    Disorder of prostate [N42.9] 03/03/2022 Yes    Hypothyroidism due to acquired atrophy of thyroid [E03.4] 01/20/2020 Yes    Ventricular tachycardia [I47.2] 11/28/2017 Yes    Long term current use of anticoagulant therapy  "[Z79.01] 09/13/2016 Not Applicable    Type 2 diabetes mellitus, controlled [E11.9] 05/21/2014 Yes    S/P AVR [Z95.2] 02/25/2014 Not Applicable    Mixed dyslipidemia [E78.2] 11/07/2012 Yes    Essential (primary) hypertension [I10] 09/20/2012 Yes    CAD (coronary artery disease) [I25.10] 09/20/2012 Yes    Chronic atrial fibrillation [I48.20] 06/29/2012 Yes      Problems Resolved During this Admission:    Diagnosis Date Noted Date Resolved POA    Pneumonia of left lower lobe due to infectious organism [J18.9] 04/30/2022 05/24/2022 Yes       Discharged Condition: stable    Disposition: Home-Health Care Svc    Follow Up:   Follow-up Information     Callum Roberts MD. Go on 5/25/2022.    Specialty: Internal Medicine  Why: Hospital Follow-up at 2pm  Contact information:  [address]  [facility] [id number] [phone]                       Patient Instructions:      OXYGEN FOR HOME USE     Order Specific Question Answer Comments   Liter Flow 3    Duration Continuous    Qualifying Test Performed at: Rest    Oxygen saturation: 87    Portable mode: continuous    Route nasal cannula    Device: home concentrator with portable tanks    Length of need (in months): 99 mos    Patient condition with qualifying saturation CHF    Height: 5' 5" (1.651 m)    Weight: 89.7 kg (197 lb 12 oz)    Alternative treatment measures have been tried or considered and deemed clinically ineffective. Yes      Ambulatory referral/consult to Home Health   Referral Priority: Routine Referral Type: Home Health   Referral Reason: Specialty Services Required   Requested Specialty: Home Health Services   Number of Visits Requested: 1       Significant Diagnostic Studies:       Significant Labs:     vanc random 23.3    A1C:   Recent Labs   Lab 03/02/22  0744 04/30/22  1046   HGBA1C 5.9* 6.0*         Bilirubin:   Recent Labs   Lab 05/17/22  0642 05/19/22  0542 05/20/22  0600 05/21/22  0545 05/23/22  0607   BILITOT 0.6 0.5 0.5 0.5 0.5       CBC:   Recent " Labs   Lab 05/23/22  0607 05/24/22  0627   WBC 5.17  5.17 5.48   HGB 8.5*  8.5* 8.2*   HCT 26.9*  26.9* 26.1*     186 181       CMP:   Recent Labs   Lab 05/23/22  0607     140   K 4.1  4.1     104   CO2 23  23   *  138*   BUN 39*  39*   CREATININE 1.3  1.3   CALCIUM 8.3*  8.3*   PROT 5.7*   ALBUMIN 2.4*   BILITOT 0.5   ALKPHOS 120   AST 30   ALT 18   ANIONGAP 13  13   EGFRNONAA 47*  47*       Lab Results   Component Value Date    DDIMER 2.25 (H) 05/15/2022     Lactate 1.3  No results for input(s): BNP, BNPTRIAGEBLO in the last 168 hours.    Recent Labs   Lab 05/23/22  1647 05/23/22  2253 05/24/22  0646   POCTGLUCOSE 209* 213* 165*       Troponin: 0.044>0.048>0.043>0.037    TSH:   Recent Labs   Lab 05/19/22  0541   TSH 8.559*       No results for input(s): COLORU, APPEARANCEUA, PHUR, SPECGRAV, PROTEINUA, GLUCUA, KETONESU, BILIRUBINUA, OCCULTUA, NITRITE, UROBILINOGEN, LEUKOCYTESUR, RBCUA, WBCUA, BACTERIA, SQUAMEPITHEL, HYALINECASTS in the last 48 hours.    Invalid input(s): WRIGHTSUR  Covid negative   Flu negative     5/22   Total protein 6.1      5/22 Pleural fluid   protein 1.3   LD 62  Glucose 168  Amylase 7  Albumin 0.8  AFB culture no acid fast bacillli   Aerobic culture no growth   Fungal culture in process       Significant Imaging:     CXR 5/22   Mild cardiomegaly.  Prior cardiac surgery with pacemaker in place.  PICC line in position.  Vascular congestion with lower lobe subsegmental atelectasis greater on the left than the right.  The left effusion appears less prominent than on the prior study.  No evidence of pneumothorax.       CT of abd /pelvis 5/22/2022   Moderate right and small left pleural effusions with bibasilar dependent atelectasis.  2. Previous cholecystectomy.  3. Bilateral renal cysts.  4. Extensive diverticulosis.  5. Significant prostatic hypertrophy.  Further evaluation as deemed clinically necessary.  6. Moderate bilateral inguinal hernias with  nondilated bowel within the proximal right inguinal hernia.  7. Prior ventral hernia repair with surgical mesh in place with percutaneous drainage catheter within a poorly defined fluid collection surrounding the surgical mesh which is slightly decreased in size over the interval.  8. Anasarca.  This report was flagged in Epic as abnormal.    5/18 US renal Technically limited study with known renal cysts.  No evidence of obstructive uropathy    5/12 CXR Cardiomegaly with mild CHF.     Left lower lobe atelectasis, small left pleural effusion.    LE US -No evidence of deep venous thrombosis in either lower extremity.    5/15 CXR- Increased left-sided pleural effusion along with new airspace opacities in left hemithorax.  Findings suggestive of worsening CHF pattern of pulmonary edema.    EKG Likely ventricular paced rhythm  - morphology suggestive of biventricular   pacing   Abnormal ECG   When compared with ECG of 30-APR-2022 10:39,   Premature ventricular complexes are no longer Present   Confirmed by Adam Mcmahon MD ([id number]) on 5/16/2022 8:11:55 AM    Pending Diagnostic Studies:     Procedure Component Value Units Date/Time    Cholesterol, Body Fluid (Reference Lab) Thorcentesis Fluid [[id number]] Collected: 05/22/22 1110    Order Status: Sent Lab Status: In process Updated: 05/22/22 2003    Specimen: Body Fluid     Creatinine, serum [[id number]]     Order Status: Sent Lab Status: No result     Specimen: Blood     Cytology, Pulmonary [[id number]] Collected: 05/22/22 1118    Order Status: Sent Lab Status: In process Updated: 05/22/22 1350    VANCOMYCIN, TROUGH [[id number]]     Order Status: Sent Lab Status: No result     Specimen: Blood          Medications:  Reconciled Home Medications:      Medication List      START taking these medications    isosorbide mononitrate 30 MG 24 hr tablet  Commonly known as: IMDUR  Take 1 tablet (30 mg total) by mouth once daily.  Start taking on: May 25, 2022        CHANGE how you take  these medications    carvediloL 3.125 MG tablet  Commonly known as: COREG  Take 1 tablet (3.125 mg total) by mouth 2 (two) times daily with meals.  What changed:   · medication strength  · how much to take     furosemide 20 MG tablet  Commonly known as: LASIX  Take 1 tablet (20 mg total) by mouth every other day. Hold medication until 3/29  What changed:   · when to take this  · additional instructions     levothyroxine 88 MCG tablet  Commonly known as: SYNTHROID  Take 1 tablet (88 mcg total) by mouth before breakfast.  Start taking on: May 25, 2022  What changed:   · medication strength  · how much to take     pantoprazole 40 MG tablet  Commonly known as: PROTONIX  Take 1 tablet (40 mg total) by mouth once daily.  Start taking on: May 25, 2022  What changed: See the new instructions.        CONTINUE taking these medications    acetaminophen 500 MG tablet  Commonly known as: TYLENOL  Take 500 mg by mouth every 6 (six) hours as needed for Pain.     cefTRIAXone 1 g/50 mL Pgbk IVPB  Commonly known as: ROCEPHIN  Inject 50 mLs (1 g total) into the vein once daily.     ferrous sulfate 325 mg (65 mg iron) Tab tablet  Commonly known as: FEOSOL  Take 325 mg by mouth once daily at 6am.     gabapentin 100 MG capsule  Commonly known as: NEURONTIN  Take 2 capsules (200 mg total) by mouth every evening.     GAVILAX 17 gram/dose powder  Generic drug: polyethylene glycol  Take 17 g by mouth once daily.     metroNIDAZOLE 500 MG tablet  Commonly known as: FLAGYL  Take 1 tablet (500 mg total) by mouth every 8 (eight) hours.     potassium chloride SA 10 MEQ tablet  Commonly known as: K-DUR,KLOR-CON  Take 1 tablet (10 mEq total) by mouth once daily.     pravastatin 20 MG tablet  Commonly known as: PRAVACHOL  TAKE 1 TABLET BY MOUTH DAILY FOR CHOLESTROL     rivaroxaban 20 mg Tab  Commonly known as: XARELTO  Take one tablet(20mg) by mouth once daily     SENNA LAXATIVE 8.6 mg tablet  Generic drug: senna  Take 1 tablet by mouth once daily.      STOOL SOFTENER-LAXATIVE 8.6-50 mg per tablet  Generic drug: senna-docusate 8.6-50 mg  Take 1 tablet by mouth 2 (two) times daily as needed for Constipation.     tamsulosin 0.4 mg Cap  Commonly known as: FLOMAX  Take 1 capsule (0.4 mg total) by mouth once daily.     VANCOMYCIN 750 MG/250 ML D5W (READY TO MIX SYSTEM)  Inject 250 mLs (750 mg total) into the vein once daily.        STOP taking these medications    amLODIPine 10 MG tablet  Commonly known as: NORVASC     doxazosin 1 MG tablet  Commonly known as: CARDURA     fluconazole 100 MG tablet  Commonly known as: DIFLUCAN     MUCUS RELIEF  mg 12 hr tablet  Generic drug: guaiFENesin            Indwelling Lines/Drains at time of discharge:   Lines/Drains/Airways     Peripherally Inserted Central Catheter Line  Duration           PICC Double Lumen 05/12/22 0845 right basilic 12 days          Drain  Duration                Drain/Device  05/04/22 1618 midline umbilical area pigtail 19 days                Time spent on the discharge of patient: 30 minutes         Flor Blanca NP  Department of Hospital Medicine  [facility] - Med Surg (3rd Fl)

## 2022-05-24 NOTE — SUBJECTIVE & OBJECTIVE
Interval History: awake now breathing improved     Objective:     Vital Signs (Most Recent):  Temp: 97.8 °F (36.6 °C) (05/24/22 0801)  Pulse: 61 (05/24/22 0801)  Resp: (!) 22 (05/24/22 0801)  BP: (!) 141/63 (05/24/22 0801)  SpO2: 97 % (05/24/22 0801)   Vital Signs (24h Range):  Temp:  [96.8 °F (36 °C)-98.6 °F (37 °C)] 97.8 °F (36.6 °C)  Pulse:  [59-77] 61  Resp:  [18-22] 22  SpO2:  [95 %-99 %] 97 %  BP: (118-142)/(59-63) 141/63     Weight: 89.7 kg (197 lb 12 oz)  Body mass index is 32.91 kg/m².      Intake/Output Summary (Last 24 hours) at 5/24/2022 0933  Last data filed at 5/24/2022 0800  Gross per 24 hour   Intake 1687.63 ml   Output 305 ml   Net 1382.63 ml       Physical Exam  Vitals and nursing note reviewed.   Constitutional:       General: He is not in acute distress.     Appearance: Normal appearance. He is well-developed. He is not ill-appearing, toxic-appearing or diaphoretic.   HENT:      Head: Normocephalic and atraumatic.      Jaw: No trismus.      Right Ear: Hearing, tympanic membrane, ear canal and external ear normal.      Left Ear: Hearing, tympanic membrane, ear canal and external ear normal.      Nose: Nose normal. No nasal deformity, mucosal edema or rhinorrhea.      Right Sinus: No maxillary sinus tenderness or frontal sinus tenderness.      Left Sinus: No maxillary sinus tenderness or frontal sinus tenderness.      Mouth/Throat:      Dentition: Normal dentition.      Pharynx: Uvula midline. No posterior oropharyngeal erythema or uvula swelling.   Eyes:      General: Lids are normal. No scleral icterus.     Conjunctiva/sclera: Conjunctivae normal.      Comments: Sclera clear bilat   Neck:      Trachea: Trachea and phonation normal.   Cardiovascular:      Rate and Rhythm: Normal rate and regular rhythm.      Pulses: Normal pulses.      Heart sounds: Normal heart sounds.   Pulmonary:      Effort: Respiratory distress (mild to moderate) present.      Breath sounds: Examination of the right-middle  field reveals decreased breath sounds and rhonchi. Examination of the left-middle field reveals decreased breath sounds and rhonchi. Examination of the right-lower field reveals decreased breath sounds and rhonchi. Examination of the left-lower field reveals decreased breath sounds and rhonchi. Decreased breath sounds and rhonchi present. No wheezing.   Abdominal:      General: Bowel sounds are normal. There is no distension.      Palpations: Abdomen is soft.      Tenderness: There is no abdominal tenderness.   Musculoskeletal:         General: No deformity. Normal range of motion.      Cervical back: Full passive range of motion without pain and neck supple.   Skin:     General: Skin is warm and dry.      Coloration: Skin is not pale.   Neurological:      Mental Status: He is alert and oriented to person, place, and time.      Motor: No abnormal muscle tone.      Coordination: Coordination normal.   Psychiatric:         Speech: Speech normal.         Behavior: Behavior normal. Behavior is cooperative.         Thought Content: Thought content normal.         Judgment: Judgment normal.       Vents:  Oxygen Concentration (%): 40 (05/23/22 0000)    Lines/Drains/Airways       Peripherally Inserted Central Catheter Line  Duration             PICC Double Lumen 05/12/22 0845 right basilic 12 days              Drain  Duration                  Drain/Device  05/04/22 1618 midline umbilical area pigtail 19 days                    Significant Labs:    CBC/Anemia Profile:  Recent Labs   Lab 05/23/22  0607 05/24/22  0627   WBC 5.17  5.17 5.48   HGB 8.5*  8.5* 8.2*   HCT 26.9*  26.9* 26.1*     186 181   *  104* 105*   RDW 14.8*  14.8* 14.9*        Chemistries:  Recent Labs   Lab 05/23/22  0607     140   K 4.1  4.1     104   CO2 23  23   BUN 39*  39*   CREATININE 1.3  1.3   CALCIUM 8.3*  8.3*   ALBUMIN 2.4*   PROT 5.7*   BILITOT 0.5   ALKPHOS 120   ALT 18   AST 30       All pertinent labs  within the past 24 hours have been reviewed.    Significant Imaging:  I have reviewed all pertinent imaging results/findings within the past 24 hours.

## 2022-05-24 NOTE — PLAN OF CARE
05/24/22 1100   Post-Acute Status   Post-Acute Authorization [facility]ME   E Status Pending payor review   Discharge Delays None known at this time       Patient ordered home oxygen for discharge. Order and clinical documentation faxed to [facility] for their review and approval. Awaiting response.

## 2022-05-24 NOTE — RESPIRATORY THERAPY
Home Oxygen Evaluation    Date Performed: 5/24/2022    1) Patient's Home O2 Sat on room air, while at rest: 87%        If O2 sats on room air at rest are 88% or below, patient qualifies. No additional testing needed. Document N/A in steps 2 and 3. If 89% or above, complete steps 2.      2) Patient's O2 Sat on room air while exercising:         If O2 sats on room air while exercising remain 89% or above patient does not qualify, no further testing needed Document N/A in step 3. If O2 sats on room air while exercising are 88% or below, continue to step 3.      3) Patient's O2 Sat while exercising on O2:  at  LPM         (Must show improvement from #2 for patients to qualify)    If O2 sats improve on oxygen, patient qualifies for portable oxygen. If not, the patient does not qualify.

## 2022-05-24 NOTE — SUBJECTIVE & OBJECTIVE
Review of Systems   Constitutional:  Negative for activity change, fever and unexpected weight change.        Looks a little stronger   HENT:  Negative for congestion, ear pain, hearing loss, rhinorrhea and sore throat.    Eyes:  Negative for pain, redness and visual disturbance.   Respiratory:  Positive for shortness of breath. Negative for cough and wheezing.    Cardiovascular:  Negative for chest pain, palpitations and leg swelling.   Gastrointestinal:  Negative for abdominal pain, constipation, diarrhea, nausea and vomiting.   Genitourinary:  Negative for decreased urine volume, dysuria, frequency and urgency.   Musculoskeletal:  Negative for back pain, joint swelling and neck pain.   Skin:  Negative for color change, rash and wound.   Neurological:  Positive for weakness. Negative for dizziness, tremors, light-headedness and headaches.   Objective:     Vital Signs (Most Recent):  Temp: 97.8 °F (36.6 °C) (05/24/22 0801)  Pulse: 61 (05/24/22 0801)  Resp: (!) 22 (05/24/22 0801)  BP: (!) 141/63 (05/24/22 0801)  SpO2: 97 % (05/24/22 0801)   Vital Signs (24h Range):  Temp:  [96.8 °F (36 °C)-98.6 °F (37 °C)] 97.8 °F (36.6 °C)  Pulse:  [59-77] 61  Resp:  [18-22] 22  SpO2:  [95 %-99 %] 97 %  BP: (118-142)/(59-63) 141/63     Weight: 89.7 kg (197 lb 12 oz)  Body mass index is 32.91 kg/m².    Physical Exam  Vitals and nursing note reviewed.   Constitutional:       General: He is not in acute distress.     Appearance: He is well-developed.   HENT:      Head: Normocephalic and atraumatic.      Right Ear: External ear normal.      Left Ear: External ear normal.   Eyes:      General:         Right eye: No discharge.         Left eye: No discharge.   Neck:      Thyroid: No thyromegaly.   Cardiovascular:      Rate and Rhythm: Normal rate and regular rhythm.      Heart sounds: No murmur heard.  Pulmonary:      Effort: Pulmonary effort is normal. No respiratory distress.      Breath sounds: Rales present. No wheezing.       Comments: Decreased left lung  Abdominal:      General: Bowel sounds are normal. There is no distension.      Palpations: Abdomen is soft.      Tenderness: There is no abdominal tenderness.      Comments: Drain in place.   Musculoskeletal:      Right lower leg: No edema.      Left lower leg: No edema.   Skin:     General: Skin is warm and dry.      Comments: PICC to right UE, dressing c/d/i           Significant Labs:     vanc random 23.3    A1C:   Recent Labs   Lab 03/02/22  0744 04/30/22  1046   HGBA1C 5.9* 6.0*         Bilirubin:   Recent Labs   Lab 05/17/22  0642 05/19/22  0542 05/20/22  0600 05/21/22  0545 05/23/22  0607   BILITOT 0.6 0.5 0.5 0.5 0.5       CBC:   Recent Labs   Lab 05/23/22  0607 05/24/22  0627   WBC 5.17  5.17 5.48   HGB 8.5*  8.5* 8.2*   HCT 26.9*  26.9* 26.1*     186 181       CMP:   Recent Labs   Lab 05/23/22  0607     140   K 4.1  4.1     104   CO2 23  23   *  138*   BUN 39*  39*   CREATININE 1.3  1.3   CALCIUM 8.3*  8.3*   PROT 5.7*   ALBUMIN 2.4*   BILITOT 0.5   ALKPHOS 120   AST 30   ALT 18   ANIONGAP 13  13   EGFRNONAA 47*  47*       Lab Results   Component Value Date    DDIMER 2.25 (H) 05/15/2022     Lactate 1.3      Recent Labs   Lab 05/23/22  1647 05/23/22  2253 05/24/22  0646   POCTGLUCOSE 209* 213* 165*       Troponin: 0.044>0.048>0.043>0.037    TSH:   Recent Labs   Lab 05/19/22  0541   TSH 8.559*         Covid negative   Flu negative     5/22   Total protein 6.1      5/22 Pleural fluid   protein 1.3   LD 62  Glucose 168  Amylase 7  Albumin 0.8  AFB culture no acid fast bacillli   Aerobic culture no growth   Fungal culture in process       Significant Imaging:     CXR 5/22   Mild cardiomegaly.  Prior cardiac surgery with pacemaker in place.  PICC line in position.  Vascular congestion with lower lobe subsegmental atelectasis greater on the left than the right.  The left effusion appears less prominent than on the prior study.  No  evidence of pneumothorax.       CT of abd /pelvis 5/22/2022   Moderate right and small left pleural effusions with bibasilar dependent atelectasis.  2. Previous cholecystectomy.  3. Bilateral renal cysts.  4. Extensive diverticulosis.  5. Significant prostatic hypertrophy.  Further evaluation as deemed clinically necessary.  6. Moderate bilateral inguinal hernias with nondilated bowel within the proximal right inguinal hernia.  7. Prior ventral hernia repair with surgical mesh in place with percutaneous drainage catheter within a poorly defined fluid collection surrounding the surgical mesh which is slightly decreased in size over the interval.  8. Anasarca.  This report was flagged in Epic as abnormal.    5/18 US renal Technically limited study with known renal cysts.  No evidence of obstructive uropathy    5/12 CXR Cardiomegaly with mild CHF.     Left lower lobe atelectasis, small left pleural effusion.    LE US -No evidence of deep venous thrombosis in either lower extremity.    5/15 CXR- Increased left-sided pleural effusion along with new airspace opacities in left hemithorax.  Findings suggestive of worsening CHF pattern of pulmonary edema.    EKG Likely ventricular paced rhythm  - morphology suggestive of biventricular   pacing   Abnormal ECG   When compared with ECG of 30-APR-2022 10:39,   Premature ventricular complexes are no longer Present   Confirmed by Adam Mcmahon MD ([id number]) on 5/16/2022 8:11:55 AM

## 2022-05-24 NOTE — PT/OT/SLP PROGRESS
"Physical Therapy Treatment    Patient Name:  Eddie Parada Sr.   MRN:  [id number]    Recommendations:     Discharge Recommendations:  home with home health, home health PT   Discharge Equipment Recommendations: oxygen   Barriers to discharge: None    Assessment:     Eddie Parada Sr. is a 89 y.o. male admitted with a medical diagnosis of (HFpEF) heart failure with preserved ejection fraction.  He presents with the following impairments/functional limitations:  weakness, impaired endurance, impaired self care skills, impaired functional mobilty, gait instability, impaired balance, impaired cardiopulmonary response to activity .Maintained gait distance tolerance  X~50 feet with RW and O2 supplement. SPO2 based line 96% to 88 %. After 1 minute rest recovered back to 93%.    Rehab Prognosis: Fair; patient would benefit from acute skilled PT services to address these deficits and reach maximum level of function.    Recent Surgery: * No surgery found *      Plan:     During this hospitalization, patient to be seen 5 x/week to address the identified rehab impairments via gait training, therapeutic activities, therapeutic exercises and progress toward the following goals:    · Plan of Care Expires:  05/23/22    Subjective     Chief Complaint: No new complain. Participated well with PT today.  Patient/Family Comments/goals: "To do better and to return home".  Pain/Comfort:  · Pain Rating 1: 0/10      Objective:     Communicated with patient and wife prior to session.  Patient found HOB elevated with telemetry, PICC line, oxygen, IMELDA drain upon PT entry to room.     General Precautions: Standard, fall   Orthopedic Precautions:N/A   Braces: N/A  Respiratory Status: Nasal cannula, flow 4 L/min     Functional Mobility:  · Bed Mobility:     · Rolling Left:  independence  · Rolling Right: independence  · Scooting: modified independence  · Supine to Sit: supervision  · Sit to Supine: supervision  · Transfers:     · Sit to " Stand:  supervision with rolling walker  · Bed to Chair: supervision with  rolling walker  using  Step Transfer  · Gait: SBA x ~150 feet with RW and O2 supplement.      AM-PAC 6 CLICK MOBILITY  Turning over in bed (including adjusting bedclothes, sheets and blankets)?: 4  Sitting down on and standing up from a chair with arms (e.g., wheelchair, bedside commode, etc.): 3  Moving from lying on back to sitting on the side of the bed?: 4  Moving to and from a bed to a chair (including a wheelchair)?: 3  Need to walk in hospital room?: 3  Climbing 3-5 steps with a railing?: 3  Basic Mobility Total Score: 20       Therapeutic Activities and Exercises:   Worked on out of bed activity, standing activity, breathing ex and gait trng with RW.    Patient left sitting edge of bed with all lines intact, call button in reach, nursing notified and wife and PCT to assist pt to be clean present..    GOALS:   Multidisciplinary Problems     Physical Therapy Goals        Problem: Physical Therapy    Goal Priority Disciplines Outcome Goal Variances Interventions   Physical Therapy Goal     PT, PT/OT Ongoing, Progressing     Description:   Patient will increase functional independence with mobility by performin. Supine to sit with Modified Independent  2. Sit to supine with Modified Independent  3. Bed to chair transfer with Supervision or Set-up Assistancewith or without rolling walker using Step Transfer TECHNIQUE  4. Gait  x ~100  feet with Supervision or Set-up Assistance with or without rolling walker  5. Lower extremity exercise program x10 reps per handout, with assistance as needed                      Time Tracking:     PT Received On: 22  PT Start Time: 725     PT Stop Time: 740  PT Total Time (min): 15 min     Billable Minutes: Therapeutic Activity 15 min    Treatment Type: Treatment  PT/PTA: PT           2022

## 2022-05-24 NOTE — NURSING
Patient sitting up in chair. Wife at the bedside. Pt denies any complaints at this time. Swelling noted to R arm, pitting edema +2. Swelling also noted to lower extremities; R leg +1, L leg +2.     No signs of acute distress noted. Will continue to monitor.

## 2022-05-24 NOTE — ASSESSMENT & PLAN NOTE
Last discharge 5/6  with abdominal drain in place and 4 weeks of vanc, rocephin, flagyl, and diflucan  - follow up with general surgery for drain and ID for Abx- ( 10 days since discharge)   Consult pharmacy to adjust vanc- monitor     ventral hernia repair with mesh placement ~20 years ago, no complications until 3/2022 with several hospitalizations leading to drain placement and antibiotics since that time, drain removed 4/29/22   - most recent hospitalization began on 4/30/22 at [facility] and was transferred here on 5/4/22 for surgical and IR evaluation  - was initiated on Vanc and Zosyn at [facility]  - switched to Vanc, Cefepime, Flagyl on admission to our facility given renal function   - blood cultures from 4/30/22 no growth to date   - wound cultures from 3/3 and 5/4 NGTD  - general surgery states patient is not good surgical candidate  - underwent US-guided drain placement 5/4  - general surgery added fluconazole for yeast seen on gram stain of wound Cx  - consulted ID for assistance with Abx: discharge with IV vanc, IV ceftriaxone, PO flagyl, and PO diflucan for at least 2 weeks with close ID follow-up .  -vanc dosing per pharmacy but will lower vanc trough goal to 10  Message sent to ID for guidance as today makes 2 weeks from D/c and last notes per ID read 2-4 weeks of therapy needed outpt >> will send message to Dr Brown. Stopped diflucan due to v tach after 2 weeks of treatment. Holding vanc as trough still 16 and creat elevated. Still on rocephin and flagyl    5/22: ID rec repeat CT scan to see if abscess resolved. If so, can stop antimicrobial therapy. Cr 1.4 today so will proceed with CT with contrast    5/21: cont vanc, rocephin and flagyl for now  ID rec repeat CT to see if resolved. If so, can stop antibx. No output from drain. However with Cr still above baseline risk further nephrotoxicity with contrast for CT. Will make final decision on contrasted study tomorrow AM after  repeat labs.     5/23 Repeat CT abd pelvis yesterday showing decrease in size of the abscess .  5/24 cont vanc, rocephin and flagyl till resolution of abscess per ID- will f/u outpt

## 2022-05-24 NOTE — PLAN OF CARE
05/24/22 1515   Post-Acute Status   Post-Acute Authorization [facility]ME   HME Status Set-up Complete/Auth obtained       Ramin Brown with [employer] states it is ok to pull 1 etank set up with 1 extra e tank. CM brought both tanks 2 patient room. Spouse signed both the delivery ticket and rental agreement. CM stressed the importance of calling [employer], number provided to them on yellow paper, when leaving so they can set up delivery of concentrator.   RRT, Sonia notified and she states she will go educate on oxygen tanks.   Ellyn, nurse, notified of the above.

## 2022-05-24 NOTE — ASSESSMENT & PLAN NOTE
Maintain BB, rivaroxaban- renal dose  p PPM .  xarelto held due to INK   lovenox     5/23 remains on lovenox; transition back to oral .

## 2022-05-24 NOTE — PLAN OF CARE
05/24/22 1202   Post-Acute Status   Post-Acute Authorization [facility] Status Referrals Sent   Coverage [employer]       Received call from Jinny with [employer], she states oxygen is approved. Jinny states it was sent to [facility] for disbursement.   YVAN sent secure chat to Ramin with [facility] for approval to remove oxygen from depot. Awaiting response.       1405: Still awaiting approval from [facility] to pull from our depot. YVAN called [employer] office, who states our reviewer will look at it and message me on secure chat.

## 2022-05-24 NOTE — PROGRESS NOTES
[place] - [facility] ([facility])  [facility] Medicine  Progress Note    Patient Name: Eddie Parada Sr.  MRN: [id number]  Patient Class: IP- Inpatient   Admission Date: 5/15/2022  Length of Stay: 8 days  Attending Physician: Joseph Redmond MD  Primary Care Provider: Callum Roberts MD        Subjective:     Principal Problem:(HFpEF) heart failure with preserved ejection fraction        HPI:  Eddie Parada Sr. is a 89 y.o. male with known atrial fibrillation, aortic stenosis s/p repair,  CAD, CHF, COPD, diabetes mellitus type 2, hyperlipidemia,  CVA and ongoing complications associated of an anterior abdominal wall mass secondary to ventral hernia mesh placed ~20 years ago who presented to ER with shortness of breath since just prior to arrival.  While sitting on a sofa at home, he suddenly began short of breath.  Per wife, he has been compliant with all of his home medication.  Endorses leg swelling.   Denies chest pain, diaphoresis.   BNP> 500, CXR demonstrating pulmonary edema, Creat elevated at 2.1- (Baseline creat 1.0-1.7)   D- dimer elevated, LE US negative for DVT, TNI- 0.044>0.048>0.043   H&H 9.1/27.9>8.6>26.2     He is s/p Drain recently removed prior to presentation, with return of symptoms, re-accumulation of fluid. General surgery was consulted who states patient is not a good surgical candidate. Patient underwent US-guided drain placement with IR 5/4, which he tolerated well. Initiated BSA with vanc, cefepime, flagyl, and azithromycin (for atypicals/CAP). Consulted ID for long-term IV Abx who recommends discharging patient on vanc, ceftriaxone, PO flagyl, and PO diflucan for at least 2 weeks and close follow-up with ID for decision making based on clinical course. Because patient has had several hospitalizations in last few months due to complications from abdominal wall abscess, consulted palliative care to have on board and to establish outpatient follow-up. Patient was discharged home with  abdominal drain in place, 4 weeks of vanc, ceftriaxone, PO flagyl, and PO diflucan, and close follow-up with ID, general surgery, palliative care, and home health. Patient remained afebrile and without abdominal pain, leukocytosis, or other issues during hospital stay.          Overview/Hospital Course:  5/17 89 YO WM receiving multiple home abx including vancomycin at home for abdominal abscess, presented with acute SOB, Heart failure, getting lasix 20mg IV q 12, not much diuresis, intake and output reflecting + 270  Noting more edema to arms, below eyes , wife noting more confusion when he wakes up   + NIK on CKD; Creat 2.1; K+ 3.6   O2 sat 93-94% on 3LNC , not on home oxygen,   H&H 8.4/25.6 trending down ; NOAC stopped per cards after dose yesterday   Decreased appetite, Boost ordered,   End of life care discussed ; pt does not have living will .  He will sign living will .  He will be DNR .  I also talked to wife by patients bedside and daughter on phone ;answered all questions.  Spent about 20 minutes  . Later on signed living will     5/18 Pt was admitted with heart failure with elevated BNP and some lower ext swelling. He was given 60 mg IV lasix 5/16 and then 40mg 5/17. He has had fluctuating renal function 2 weeks ago Creat on admit 2.1.>> 2.2 today. BNP was elevated  on admission and is not putting out much urine.Dr Monterroso was consulted. Troponin chronically elevated. Echo 55% mild heart failure and mod pulm htn noted. Holding xarel;to for now due to h/h dropping. 9>8. D dimer was elevated u/s legs no clot     Pt was currently being treated at home for abd wall abscess s/p hernia repair. Per Id was on vanc and rocephin via PICC and PO flagyl and diflucan outpt- continued while here. Still has drain from abd wall abscess, not much drainage  Serosanguinous     5/19 pt was being treated for flash pulm edema with lasix 60mg IV 5/16 and 40mg IV 5/17. Yesterday diuretics were held as creat was elevated and not  improving with diureses. Also noted not making much urine. Renal u/s done with no obstruction noted. Was started on NS at 50ml/hr last night and creat is better this am 2.1>1.9. Looks like his renal fucntion fluctuates but 2 weeks ago had creat 1.0. He remains on vanc and rocephin IV and diflucan and flagyl po per ID from abd abscess (still with drain)- today makes 2 weeks since d/c from [place]. Awaiting guidance on if he will need 2 or 4 weeks of these abx. Had a very short bonnie of v tach last night while sleeping. K+ WNL- mag and tsh pending this am., ambulatory with PT min assist 15ft    5/20 pt is currently being treated for over diuresis. He is on NS @ 50ml/hr. Creat is improving now at 1.6 this am. Also had vanc held for abd abscess. Trough at 16 this am. Due to v tach yesterday diflucan was stopped after 2 weeks. He remains on rocephin and flagyl for the abd abscess.     5/21 slight improvement in Cr. Still on 4L NC and not able to wean. He is feeling a bit more SOB this morning. Repeat CXR with large left effusion. Diflucantstopped after 2 weeks treatment. Remains on Vanc, rocephin and flagyl for abscess. ID recs consider repeat CT abd/pelvis with contrast to see if resolving abscess; no output from drain.   Long discussion this AM with patient, wife and daughter (on phone) about goals of care.  His appetite remains very poor.     5/22: he reports no changes. Still poor appetite. Still on 4L NC  Dr. Morris plans for thoracentesis today as he did not tolerate diuresis and remains with large left pleural effusion on CXR.  Cr improved--plan for CT abd and pelvis with contrast today per ID recs to see if abscess resolved.     5/23/22     89 YO WM ongoing complications associated of an anterior abdominal wall mass secondary to ventral hernia mesh placed ~20 years ago who presented to ER with shortness of breath, CHF ; did not tolerate diuresis and remained with large left pleural effusion on CXR, worsening creat.    S/p L thoracentesis yesterday ; 1500 ml of bloody pleural fluid was obtained. A sample was sent to Pathology for cytogenetics, flow, and cell counts, as well as for infection analysis. CT abd pelvis yesterday showing Moderate right and small left pleural effusions with bibasilar dependent atelectasis.  CXR- Mild cardiomegaly.  Prior cardiac surgery with pacemaker in place.  PICC line in position.  Vascular congestion with lower lobe subsegmental atelectasis greater on the left than the right.  The left effusion appears less prominent than on the prior study.  No evidence of pneumothorax.  WBC 5.17, afebrile. O2 sat 95-98% on 4LNC , BIPAP overnight,   Diflucan stopped after 2 weeks treatment( had vtach)  Remains on Vanc, rocephin and flagyl for abscess. Vanc level 20.1 yesterday; Day 18/30 since d/c from [place]. ID recs recommended  repeat CT abd/pelvis with contrast; demonstrating Prior ventral hernia repair with surgical mesh in place with percutaneous drainage catheter within a poorly defined fluid collection surrounding the surgical mesh which is slightly decreased in size over the interval.  Pt sitting up in chair this am, looks much better. Ambulating 25 ft with PT; still has desaturation     5/24 pt remains here with abdominal wall abscess. Was on home IV abx therapy and drain. ID rec cont abx until abscess resolved. Repeat CT shows that he still has some abscess though smaller. He continues on vanc, rocephin and flagyl- day 19. He has fluconazole stopped due to non sustained v tach episode. Remains afebrile with no elevated WBC. Renal function has returned to baseline. Has not had lasix since last week. Now starting to swell a little. Dr Mathis following and will add lasix today. Albumin 2.4- not eating much. He was also tapped Sunday for the pleural effusion that was not resolving with diuresis and still requiring supplemental O2. Dr holt following 2 mesothelial cells.     He is doing well with PT Ambulated  150ft with RW using stand by assist and supplmental O2.           Review of Systems   Constitutional:  Negative for activity change, fever and unexpected weight change.        Looks a little stronger   HENT:  Negative for congestion, ear pain, hearing loss, rhinorrhea and sore throat.    Eyes:  Negative for pain, redness and visual disturbance.   Respiratory:  Negative for cough, shortness of breath and wheezing.    Cardiovascular:  Negative for chest pain, palpitations and leg swelling.   Gastrointestinal:  Negative for abdominal pain, constipation, diarrhea, nausea and vomiting.   Genitourinary:  Negative for decreased urine volume, dysuria, frequency and urgency.   Musculoskeletal:  Negative for back pain, joint swelling and neck pain.   Skin:  Negative for color change, rash and wound.   Neurological:  Positive for weakness (generalized, doing well with PT). Negative for dizziness, tremors, light-headedness and headaches.   Objective:     Vital Signs (Most Recent):  Temp: 97.8 °F (36.6 °C) (05/24/22 0801)  Pulse: (!) 59 (05/24/22 1000)  Resp: (!) 22 (05/24/22 0801)  BP: (!) 141/63 (05/24/22 0801)  SpO2: 97 % (05/24/22 0801)   Vital Signs (24h Range):  Temp:  [96.8 °F (36 °C)-98.6 °F (37 °C)] 97.8 °F (36.6 °C)  Pulse:  [59-77] 59  Resp:  [18-22] 22  SpO2:  [95 %-99 %] 97 %  BP: (118-142)/(59-63) 141/63     Weight: 89.7 kg (197 lb 12 oz)  Body mass index is 32.91 kg/m².    Physical Exam  Vitals and nursing note reviewed.   Constitutional:       General: He is not in acute distress.     Appearance: He is well-developed.   HENT:      Head: Normocephalic and atraumatic.      Right Ear: External ear normal.      Left Ear: External ear normal.   Eyes:      General:         Right eye: No discharge.         Left eye: No discharge.   Neck:      Thyroid: No thyromegaly.   Cardiovascular:      Rate and Rhythm: Normal rate and regular rhythm.      Heart sounds: No murmur heard.  Pulmonary:      Effort: Pulmonary effort is  normal. No respiratory distress.      Breath sounds: No rales.   Abdominal:      General: Bowel sounds are normal. There is no distension.      Palpations: Abdomen is soft.      Tenderness: There is no abdominal tenderness.      Comments: Drain in place.   Musculoskeletal:      Right lower leg: No edema (trace).      Left lower leg: No edema (trace).   Skin:     General: Skin is warm and dry.      Comments: PICC to right UE, dressing c/d/i           Significant Labs:     vanc random 23.3    A1C:   Recent Labs   Lab 03/02/22  0744 04/30/22  1046   HGBA1C 5.9* 6.0*         Bilirubin:   Recent Labs   Lab 05/17/22  0642 05/19/22  0542 05/20/22  0600 05/21/22  0545 05/23/22  0607   BILITOT 0.6 0.5 0.5 0.5 0.5       CBC:   Recent Labs   Lab 05/23/22  0607 05/24/22  0627   WBC 5.17  5.17 5.48   HGB 8.5*  8.5* 8.2*   HCT 26.9*  26.9* 26.1*     186 181       CMP:   Recent Labs   Lab 05/23/22  0607     140   K 4.1  4.1     104   CO2 23  23   *  138*   BUN 39*  39*   CREATININE 1.3  1.3   CALCIUM 8.3*  8.3*   PROT 5.7*   ALBUMIN 2.4*   BILITOT 0.5   ALKPHOS 120   AST 30   ALT 18   ANIONGAP 13  13   EGFRNONAA 47*  47*       Lab Results   Component Value Date    DDIMER 2.25 (H) 05/15/2022     Lactate 1.3  No results for input(s): BNP, BNPTRIAGEBLO in the last 168 hours.    Recent Labs   Lab 05/23/22  1647 05/23/22  2253 05/24/22  0646   POCTGLUCOSE 209* 213* 165*       Troponin: 0.044>0.048>0.043>0.037    TSH:   Recent Labs   Lab 05/19/22  0541   TSH 8.559*       No results for input(s): COLORU, APPEARANCEUA, PHUR, SPECGRAV, PROTEINUA, GLUCUA, KETONESU, BILIRUBINUA, OCCULTUA, NITRITE, UROBILINOGEN, LEUKOCYTESUR, RBCUA, WBCUA, BACTERIA, SQUAMEPITHEL, HYALINECASTS in the last 48 hours.    Invalid input(s): WRIGHTSUR  Covid negative   Flu negative     5/22   Total protein 6.1      5/22 Pleural fluid   protein 1.3   LD 62  Glucose 168  Amylase 7  Albumin 0.8  AFB culture no acid fast  josselin   Aerobic culture no growth   Fungal culture in process       Significant Imaging:     CXR 5/22   Mild cardiomegaly.  Prior cardiac surgery with pacemaker in place.  PICC line in position.  Vascular congestion with lower lobe subsegmental atelectasis greater on the left than the right.  The left effusion appears less prominent than on the prior study.  No evidence of pneumothorax.       CT of abd /pelvis 5/22/2022   Moderate right and small left pleural effusions with bibasilar dependent atelectasis.  2. Previous cholecystectomy.  3. Bilateral renal cysts.  4. Extensive diverticulosis.  5. Significant prostatic hypertrophy.  Further evaluation as deemed clinically necessary.  6. Moderate bilateral inguinal hernias with nondilated bowel within the proximal right inguinal hernia.  7. Prior ventral hernia repair with surgical mesh in place with percutaneous drainage catheter within a poorly defined fluid collection surrounding the surgical mesh which is slightly decreased in size over the interval.  8. Anasarca.  This report was flagged in Epic as abnormal.    5/18 US renal Technically limited study with known renal cysts.  No evidence of obstructive uropathy    5/12 CXR Cardiomegaly with mild CHF.     Left lower lobe atelectasis, small left pleural effusion.    LE US -No evidence of deep venous thrombosis in either lower extremity.    5/15 CXR- Increased left-sided pleural effusion along with new airspace opacities in left hemithorax.  Findings suggestive of worsening CHF pattern of pulmonary edema.    EKG Likely ventricular paced rhythm  - morphology suggestive of biventricular   pacing   Abnormal ECG   When compared with ECG of 30-APR-2022 10:39,   Premature ventricular complexes are no longer Present   Confirmed by Adam Mcmahon MD ([id number]) on 5/16/2022 8:11:55 AM      Assessment/Plan:      * (HFpEF) heart failure with preserved ejection fraction  5/2/22 Echo-   · The estimated ejection fraction is  55%.  · Indeterminate left ventricular diastolic function.  · Moderate right ventricular enlargement with mildly reduced right ventricular systolic function.  · Mild aortic regurgitation.  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 There is aortic insufficiency present. Prosthetic aortic valve is normal.  · The aortic valve mean gradient is 6 mmHg with a dimensionless index of 0.64.  · Mild mitral regurgitation.  · Mild tricuspid regurgitation.  · There is moderate pulmonary hypertension.       Increased SOB and hypoxia. Diuresis initiated. CIS following. Strict I/O. Low Na diet.    Stop lasix today. Creat rising and patient not urinating much- check renal u/s today considering fluids     5/19 was started on minimal fluids last night NS at 50ml/hr creat improved 2.1>1.9- hold further diuresis today     5/22 Cont to hold diurese and creat continues to improve. On BB and Isorbide, on 50cc/hr IVF which is minimal  CXR shows large left effusion with atelectasis; Dr. Morris agrees to thoracentesis today which will hopefully improve SOB and oxygen requirement  5/23 S/p thoracentesis yesterday ; 1500 ml of bloody pleural fluid was obtained.> cultures are pending  5/24 Pt may need d/c with supplemental O2> down to 3L today sats 97%    Counseling regarding advanced directives and goals of care  Advance Care Planning     Living Will  During this visit, I engaged the patient and family  in the advance care planning process.  The patient and I reviewed the role for advance directives and their purpose in directing future healthcare if the patient's unable to speak for him/herself.  At this point in time, the patient does have full decision-making capacity.  We discussed different extreme health states that he could experience, and reviewed what kind of medical care he would want in those situations.  The patient and family communicated that if he were comatose and had little chance of a meaningful recovery, he would not  want machines/life-sustaining treatments used. In addition to the above preference, other important end-of-life issues for the patient include . The patient has completed a living will to reflect these preferences.  I spent a total of 20 minutes engaging the patient in this advance care planning discussion.Discussed with wife in person and daughter via telephone           Today a meeting took place: bedside    Patient Participation: Patient is able to participate     Attendees (Name and  Relationship to patient):also dicussed with wife and daughter    Staff attendees (Name and  Role): Georgia VERMA, xochitl Jules RN case manage   Code Status: DNR; status confirmed/order placed in chart    ACP Documents: Other Documents (specify): living will     Goals of care: The patient and family endorses that what is most important right now is to focus on symptom/pain control, quality of life, even if it means sacrificing a little time and improvement in condition but with limits to invasive therapies    Accordingly, we have decided that the best plan to meet the patient's goals includes continuing with treatment      Recommendations/Length of ACP   conversation in minutes: 20 minutes      Code Status  In light of the patients advanced and life limiting illness,I engaged the the patient and family in a conversation about the patient's preferences for care  at the very end of life. The patient wishes to have a natural, peaceful death.  Along those lines, the patient does not wish to have CPR or other invasive treatments performed when his heart and/or breathing stops. I communicated to the patient and family that a DNR order would be placed in his medical record to reflect this preference.  I spent a total of 20 minutes engaging the patient in this advance care planning discussion.        5/21: again addressed goals of care with patient, wife and daughter on the phone. They are discussing wishes.     5/24 again had a very long  discussion with wife, patient and family at bedside. They agree he is stable for d/c and will resume home health with IV abx and Oxygen. F/u PCP within 1 week. Already had general sx f/u for drain next week and palliative care virtual     Debility  PT consulted .  Per records patient with several hospitalizations in last few months for abdominal wall abscess   - prior to 2 months ago, was still independent, working and running a business  - consulted palliative care to have on board and have OP follow up.    5/24 Gait: SBA x ~150 feet with RW and O2 supplement.      NIK (acute kidney injury)  Monitor BUN/SCr.  Monitor I/Os.  Monitor electrolytes.  Avoid non-steroidal anti-inflammatory medications.  Symptoms and labs suggestive of Acute HF; continue with diuretics, monitor renal fx closely   NIK on CKD      renal US today and hold further diuresis- pt not voiding much no obstruction  vanc dosing per pharm>  5/19 renal u/s yesterday does not show obstruction. Placed in gentle IVF and diuretics held. Creat has improved a smidgen     5/22 with very slow fluids creat improving to 1.4 today. Pharmacy dosing vanc. CT with contrast today so repeat labs in AM needed.    5/23 creat continues to improve; 1.3  5/24 renal back to baseline     Abdominal wall abscess  Last discharge 5/6  with abdominal drain in place and 4 weeks of vanc, rocephin, flagyl, and diflucan  - follow up with general surgery for drain and ID for Abx- ( 10 days since discharge)   Consult pharmacy to adjust vanc- monitor     ventral hernia repair with mesh placement ~20 years ago, no complications until 3/2022 with several hospitalizations leading to drain placement and antibiotics since that time, drain removed 4/29/22   - most recent hospitalization began on 4/30/22 at [facility] and was transferred here on 5/4/22 for surgical and IR evaluation  - was initiated on Vanc and Zosyn at [facility]  - switched to Vanc, Cefepime, Flagyl on  admission to our facility given renal function   - blood cultures from 4/30/22 no growth to date   - wound cultures from 3/3 and 5/4 NGTD  - general surgery states patient is not good surgical candidate  - underwent US-guided drain placement 5/4  - general surgery added fluconazole for yeast seen on gram stain of wound Cx  - consulted ID for assistance with Abx: discharge with IV vanc, IV ceftriaxone, PO flagyl, and PO diflucan for at least 2 weeks with close ID follow-up .  -vanc dosing per pharmacy but will lower vanc trough goal to 10  Message sent to ID for guidance as today makes 2 weeks from D/c and last notes per ID read 2-4 weeks of therapy needed outpt >> will send message to Dr Brown. Stopped diflucan due to v tach after 2 weeks of treatment. Holding vanc as trough still 16 and creat elevated. Still on rocephin and flagyl    5/22: ID rec repeat CT scan to see if abscess resolved. If so, can stop antimicrobial therapy. Cr 1.4 today so will proceed with CT with contrast    5/21: cont vanc, rocephin and flagyl for now  ID rec repeat CT to see if resolved. If so, can stop antibx. No output from drain. However with Cr still above baseline risk further nephrotoxicity with contrast for CT. Will make final decision on contrasted study tomorrow AM after repeat labs.     5/23 Repeat CT abd pelvis yesterday showing decrease in size of the abscess .  5/24 cont vanc, rocephin and flagyl till resolution of abscess per ID- will f/u outpt     Disorder of prostate  Continue flomax .      Hypothyroidism due to acquired atrophy of thyroid  Continue levothyroxine .  Repeat tsh today as last one was elevated on chart and pt has short run v tach overnight   Increase synthroid to 75mcg>88mcg 5/19 .  Lab Results   Component Value Date    TSH 8.559 (H) 05/19/2022         Ventricular tachycardia  Pt had 9 beat run of v-tach. Pt asymptomatic and no complaints of chest pain. Was sleeping soundly at the time.   K+ WNL- add mag >  normal and tsh elevated increase synthroid from 75>88  Stop diflucan. Had a few yeast in abscess fungal culture but has had 2 weeks of that.      Long term current use of anticoagulant therapy  Continue xarelto- adjust for renal; recommending decrease to 15mg daily .  Now holding due to anemia/NIK.  lovenox renal dosing ---GFR improved so start BID dosing today (due to procedure) and if stable resume NOAC tomorrow    5/24 resumed xarelto     Type 2 diabetes mellitus, controlled  HbA1c 6.0 on 4/30/22.  - does not appear to be on any home medications   - SSI + POCT glucose while inpatient  - continue to manage with lifstyle, follow up with PCP.         S/P AVR   bioprosthetic valve placed 2011   - continue home xarelto - adjust currently for renal dosing > now on lovenox. GFr improved so start BID dosing today (due to procedure) and if stable resume NOAC.      5/23  Resume xarelto        Mixed dyslipidemia  Continue pravastatin       CAD (coronary artery disease)  Continue statin, bb, NOAC   Cards consulted     NOAC held due to NIK> lovenox currently but improved today so went to BID dosing. Keeping lovenox in case GFR drops again after contrast load today for CT scan and due to procedure today. If stable can go back to NOAC.    5/23  Resume xarelto    Essential (primary) hypertension  BP Readings from Last 3 Encounters:   05/24/22 (!) 141/63   05/12/22 (!) 129/58   05/11/22 (!) 110/52     Resume home coreg-at lower dose 3.25mg , hold amlodipine & doxazosin  for now .  Dr. Monterroso ordered Imdur, monitor BP .      Chronic atrial fibrillation  Maintain BB, rivaroxaban- renal dose  p PPM .  xarelto held due to NIK   lovenox     5/23 remains on lovenox; transition back to oral .        VTE Risk Mitigation (From admission, onward)         Ordered     rivaroxaban tablet 15 mg  With dinner         05/23/22 0921     IP VTE HIGH RISK PATIENT  Once         05/16/22 0419     Place sequential compression device  Until discontinued          05/16/22 0419                Discharge Planning   KANDICE: 5/24/2022     Code Status: DNR   Is the patient medically ready for discharge?:     Reason for patient still in hospital (select all that apply): Pending disposition  Discharge Plan A: Home with family, Home Health   Discharge Delays: None known at this time              Georgia Ann MD  Department of Hospital Medicine   [place] [facility] ([facility])

## 2022-05-24 NOTE — RESPIRATORY THERAPY
Patient's wife educated on use, benefits, & safety of oxygen use.  Patient's wife educated on home oxygen portable use with nasal cannula. Instruction pamphlet provided.  Portable oxygen tank, oxygen tank cabrera, oxygen regulator, and oxygen utility wrench provided with education and instruction pamphlet.  Patient's wife stated understanding and provided feedback education on oxygen use.

## 2022-05-24 NOTE — ASSESSMENT & PLAN NOTE
5/2/22 Echo-   · The estimated ejection fraction is 55%.  · Indeterminate left ventricular diastolic function.  · Moderate right ventricular enlargement with mildly reduced right ventricular systolic function.  · Mild aortic regurgitation.  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 There is aortic insufficiency present. Prosthetic aortic valve is normal.  · The aortic valve mean gradient is 6 mmHg with a dimensionless index of 0.64.  · Mild mitral regurgitation.  · Mild tricuspid regurgitation.  · There is moderate pulmonary hypertension.       Increased SOB and hypoxia. Diuresis initiated. CIS following. Strict I/O. Low Na diet.    Stop lasix today. Creat rising and patient not urinating much- check renal u/s today considering fluids     5/19 was started on minimal fluids last night NS at 50ml/hr creat improved 2.1>1.9- hold further diuresis today     5/22 Cont to hold diurese and creat continues to improve. On BB and Isorbide, on 50cc/hr IVF which is minimal  CXR shows large left effusion with atelectasis; Dr. Morris agrees to thoracentesis today which will hopefully improve SOB and oxygen requirement  5/23 S/p thoracentesis yesterday ; 1500 ml of bloody pleural fluid was obtained.> cultures are pending  5/24 Pt may need d/c with supplemental O2> down to 3L today sats 97%

## 2022-05-24 NOTE — ASSESSMENT & PLAN NOTE
Advance Care Planning     Living Will  During this visit, I engaged the patient and family  in the advance care planning process.  The patient and I reviewed the role for advance directives and their purpose in directing future healthcare if the patient's unable to speak for him/herself.  At this point in time, the patient does have full decision-making capacity.  We discussed different extreme health states that he could experience, and reviewed what kind of medical care he would want in those situations.  The patient and family communicated that if he were comatose and had little chance of a meaningful recovery, he would not want machines/life-sustaining treatments used. In addition to the above preference, other important end-of-life issues for the patient include . The patient has completed a living will to reflect these preferences.  I spent a total of 20 minutes engaging the patient in this advance care planning discussion.Discussed with wife in person and daughter via telephone           Today a meeting took place: bedside    Patient Participation: Patient is able to participate     Attendees (Name and  Relationship to patient):also dicussed with wife and daughter    Staff attendees (Name and  Role): Georgia VERMA, xochitl Jules RN case manage   Code Status: DNR; status confirmed/order placed in chart    ACP Documents: Other Documents (specify): living will     Goals of care: The patient and family endorses that what is most important right now is to focus on symptom/pain control, quality of life, even if it means sacrificing a little time and improvement in condition but with limits to invasive therapies    Accordingly, we have decided that the best plan to meet the patient's goals includes continuing with treatment      Recommendations/Length of ACP   conversation in minutes: 20 minutes      Code Status  In light of the patients advanced and life limiting illness,I engaged the the patient and family in a  conversation about the patient's preferences for care  at the very end of life. The patient wishes to have a natural, peaceful death.  Along those lines, the patient does not wish to have CPR or other invasive treatments performed when his heart and/or breathing stops. I communicated to the patient and family that a DNR order would be placed in his medical record to reflect this preference.  I spent a total of 20 minutes engaging the patient in this advance care planning discussion.         5/21: again addressed goals of care with patient, wife and daughter on the phone. They are discussing wishes.     5/24 again had a very long discussion with wife, patient and family at bedside. They agree he is stable for d/c and will resume home health with IV abx and Oxygen. F/u PCP within 1 week. Already had general sx f/u for drain next week and palliative care virtual

## 2022-05-24 NOTE — ASSESSMENT & PLAN NOTE
Continue xarelto- adjust for renal; recommending decrease to 15mg daily .  Now holding due to anemia/NIK.  lovenox renal dosing ---GFR improved so start BID dosing today (due to procedure) and if stable resume NOAC tomorrow    5/24 resumed xarelto

## 2022-05-24 NOTE — PROGRESS NOTES
[place] - Med Surg ([facility])  Cardiology  Progress Note    Patient Name: Eddie Parada Sr.  MRN: [id number]  Admission Date: 5/15/2022  Hospital Length of Stay: 8 days  Code Status: DNR   Attending Physician: Joseph Redmond MD   Primary Care Physician: Callum Roberts MD  Expected Discharge Date:   Principal Problem:(HFpEF) heart failure with preserved ejection fraction    Subjective:     Hospital Course:89 yo wm hx PPM s/p generator replacement 3/22, chronic AF on Xarelto, bio-AVR,  chronic diastolic CHF with difficulty maintaining euvolemia due to waxing and waning renal function due in part to abx therapy from abd wall abscess. Renal function lately averaging creatinine 1.5-1.7.   Echo 5/2/22 showed still preserved EF with AI  normally functioning bioprosthetic aortic valve. Negative bubble study.  Presented to ER via EMS with acute onset SOB/AMAYA/CHF. O2 sat low 70's.  Given O2/lasix IV in ER   Troponin chronically mildly elevated, today 0.043.   EKG shows  rhythm  Left sided thoracenthesis on5/12 - 1500ml of bloody pleural fluid was obtained        Interval History: Labs reveal stable chronic anemia. Blood pressure remains marginally elevated.     ROS   Constitutional: Negative.   HENT: Negative.    Eyes: Negative.    Cardiovascular: Negative for chest pain.   Respiratory: Productive cough  Endocrine: Negative.    Hematologic/Lymphatic: Negative.    Neurological: Negative.    Psychiatric/Behavioral: Negative.     Objective:     Vital Signs (Most Recent):  Temp: 97.8 °F (36.6 °C) (05/24/22 0801)  Pulse: 61 (05/24/22 0801)  Resp: (!) 22 (05/24/22 0801)  BP: (!) 141/63 (05/24/22 0801)  SpO2: 97 % (05/24/22 0801) Vital Signs (24h Range):  Temp:  [96.8 °F (36 °C)-98.6 °F (37 °C)] 97.8 °F (36.6 °C)  Pulse:  [59-77] 61  Resp:  [18-22] 22  SpO2:  [95 %-99 %] 97 %  BP: (118-142)/(59-63) 141/63     Weight: 89.7 kg (197 lb 12 oz)  Body mass index is 32.91 kg/m².    SpO2: 97 %  O2 Device (Oxygen Therapy): nasal  cannula w/ humidification      Intake/Output Summary (Last 24 hours) at 5/24/2022 0825  Last data filed at 5/24/2022 0658  Gross per 24 hour   Intake 1537.63 ml   Output 305 ml   Net 1232.63 ml       Lines/Drains/Airways     Peripherally Inserted Central Catheter Line  Duration           PICC Double Lumen 05/12/22 0845 right basilic 11 days          Drain  Duration                Drain/Device  05/04/22 1618 midline umbilical area pigtail 19 days                Physical Exam   Vitals and nursing note reviewed.   Constitutional:       Appearance: Normal appearance.   Cardiovascular:      Rate and Rhythm: Normal rate and regular rhythm.      Pulses: Normal pulses.      Heart sounds: Normal heart sounds.   Abdominal:      General: Abdomen is flat.   Skin:     General: Skin is warm and dry.   Neurological:      General: No focal deficit present.      Mental Status: He is alert and oriented to person, place, and time.     Significant Labs:   CMP   Recent Labs   Lab 05/23/22  0607     140   K 4.1  4.1     104   CO2 23  23   *  138*   BUN 39*  39*   CREATININE 1.3  1.3   CALCIUM 8.3*  8.3*   PROT 5.7*   ALBUMIN 2.4*   BILITOT 0.5   ALKPHOS 120   AST 30   ALT 18   ANIONGAP 13  13   ESTGFRAFRICA 54*  54*   EGFRNONAA 47*  47*    and CBC   Recent Labs   Lab 05/23/22  0607 05/24/22  0627   WBC 5.17  5.17 5.48   HGB 8.5*  8.5* 8.2*   HCT 26.9*  26.9* 26.1*     186 181       Significant Imaging: X-Ray: CXR: X-Ray Chest 1 View (CXR):   Results for orders placed or performed during the hospital encounter of 05/15/22   X-Ray Chest 1 View    Narrative    EXAMINATION:  XR CHEST 1 VIEW    CLINICAL HISTORY:  Eval after thoracentesis;.    TECHNIQUE:  Single view chest dated May 22, 2022.    COMPARISON:  May 21, 2022.    FINDINGS:  Mild cardiomegaly.  Prior cardiac surgery with pacemaker in place.  PICC line in position.  Vascular congestion with lower lobe subsegmental atelectasis greater on the  left than the right.  The left effusion appears less prominent than on the prior study.  No evidence of pneumothorax.      Impression    As above.      Electronically signed by: Andrew Ying MD  Date:    05/23/2022  Time:    07:58     Assessment and Plan:       Active Diagnoses:    Diagnosis Date Noted POA    PRINCIPAL PROBLEM:  (HFpEF) heart failure with preserved ejection fraction [I50.30] 06/17/2015 Yes    Counseling regarding advanced directives and goals of care [Z71.89] 05/17/2022 Not Applicable    Debility [R53.81] 05/16/2022 Yes    Pneumonia of left lower lobe due to infectious organism [J18.9] 04/30/2022 Yes    NIK (acute kidney injury) [N17.9] 03/19/2022 Yes    Abdominal wall abscess [L02.211] 03/05/2022 Yes    Disorder of prostate [N42.9] 03/03/2022 Yes    Hypothyroidism due to acquired atrophy of thyroid [E03.4] 01/20/2020 Yes    Ventricular tachycardia [I47.2] 11/28/2017 Yes    Long term current use of anticoagulant therapy [Z79.01] 09/13/2016 Not Applicable    Type 2 diabetes mellitus, controlled [E11.9] 05/21/2014 Yes    S/P AVR [Z95.2] 02/25/2014 Not Applicable    Mixed dyslipidemia [E78.2] 11/07/2012 Yes    Essential (primary) hypertension [I10] 09/20/2012 Yes    CAD (coronary artery disease) [I25.10] 09/20/2012 Yes    Chronic atrial fibrillation [I48.20] 06/29/2012 Yes      Problems Resolved During this Admission:       VTE Risk Mitigation (From admission, onward)         Ordered     rivaroxaban tablet 15 mg  With dinner         05/23/22 0921     IP VTE HIGH RISK PATIENT  Once         05/16/22 0419     Place sequential compression device  Until discontinued         05/16/22 0419              Current Facility-Administered Medications   Medication    0.9%  NaCl infusion    carvediloL tablet 3.125 mg    cefTRIAXone (ROCEPHIN) 1 g/50 mL D5W IVPB    dextrose 10% bolus 250 mL    ferrous sulfate tablet 1 each    gabapentin capsule 200 mg    glucagon (human recombinant) injection 1  mg    glucose chewable tablet 16 g    glucose chewable tablet 24 g    insulin aspart U-100 pen 0-5 Units    isosorbide mononitrate 24 hr tablet 30 mg    levothyroxine tablet 88 mcg    melatonin tablet 6 mg    metroNIDAZOLE tablet 500 mg    pantoprazole EC tablet 40 mg    polyethylene glycol packet 17 g    potassium chloride CR tablet 10 mEq    pravastatin tablet 20 mg    rivaroxaban tablet 15 mg    senna tablet 1 tablet    senna-docusate 8.6-50 mg per tablet 1 tablet    sodium chloride 0.9% flush 10 mL    sodium chloride 0.9% flush 3 mL    tamsulosin 24 hr capsule 0.4 mg    vancomycin - pharmacy to dose     Stable s/p L thoracentesis  Acute on chronic diastolic CHF, currently euvolemic, leg edema recurring with some UE edema in bed.  Echo 5/22 EF 55%, normal AVR, 2+ TR, 1-2+ MR, 1+ AI, PAP 48  CKD with recent AKIs--waxing and waning; creatinine elevated but on vanc  Anemia  Chronic AF on Xarelto  s/pPPM  Hx TIA  CAD hx PCI Cfx, LAD, MPI 2017 normal  Thrombocytopenia  Abdominal wall abscess; still receiving Vancomycin, s/p Drain        Plan: +EDEMA on exam, renal function back to baseline,  resuming Xarelto given his chronic afib as previously discussed. He is overall stable from a cardiac standpoint.   give Lasix for edema and monitor BMP closely  Please re-consult as needed.   Pt DNR    Barbie Ortiz, NP for Dr. Monterroso  Cardiology  [place] [facility] Surg (3rd Fl)    I have personally interviewed and examined this patient face-to-face, and as the physician. Documented the above plan and rendered all medical decision making for this encounter. I have read and agree with the above documentation unless otherwise noted.

## 2022-05-24 NOTE — NURSING
Discharge education completed with patient and wife at bedside. All questions answered. Medications delivered to bedside. PICC line dressing change completed today, as well as dressing to abdominal drain.     Pt will be d/c home with PICC line to complete IV abx at home. Pt to resume home abx tomorrow.

## 2022-05-24 NOTE — ASSESSMENT & PLAN NOTE
PT consulted .  Per records patient with several hospitalizations in last few months for abdominal wall abscess   - prior to 2 months ago, was still independent, working and running a business  - consulted palliative care to have on board and have OP follow up.    5/24 Gait: SBA x ~150 feet with RW and O2 supplement.

## 2022-05-24 NOTE — PLAN OF CARE
05/24/22 1137   Medicare Message   Important Message from Medicare regarding Discharge Appeal Rights Given to patient/caregiver;Explained to patient/caregiver;Signed/date by patient/caregiver   Date IMM was signed 05/24/22   Time IMM was signed 1025

## 2022-05-24 NOTE — PLAN OF CARE
05/24/22 1443   Post-Acute Status   Post-Acute Authorization HME;Home Health   HME Status Pending payor review   Home Health Status Pending Payor Review   Hospital Resources/Appts/Education Provided Appointments scheduled and added to AVS   Discharge Delays None known at this time       Patient's spouse requesting a hospital bed for home use. Orders placed. JOELLEN faxed orders and supporting documentation to [facility]. Awaiting response. This will not delay patient's discharge.     Patient also ordered home health to resume with [facility] which was established prior to admission. New home health orders and supporting documentation faxed to [facility] for their review. JOELLEN also contacted [facility] to inform of patient's discharge today. Clinicals and home health orders also faxed to [facility].

## 2022-05-24 NOTE — ASSESSMENT & PLAN NOTE
Monitor BUN/SCr.  Monitor I/Os.  Monitor electrolytes.  Avoid non-steroidal anti-inflammatory medications.  Symptoms and labs suggestive of Acute HF; continue with diuretics, monitor renal fx closely   NIK on CKD      renal US today and hold further diuresis- pt not voiding much no obstruction  vanc dosing per pharm>  5/19 renal u/s yesterday does not show obstruction. Placed in gentle IVF and diuretics held. Creat has improved a smidgen     5/22 with very slow fluids creat improving to 1.4 today. Pharmacy dosing vanc. CT with contrast today so repeat labs in AM needed.    5/23 creat continues to improve; 1.3  5/24 renal back to baseline

## 2022-05-24 NOTE — SUBJECTIVE & OBJECTIVE
Review of Systems   Constitutional:  Negative for activity change, fever and unexpected weight change.        Looks a little stronger   HENT:  Negative for congestion, ear pain, hearing loss, rhinorrhea and sore throat.    Eyes:  Negative for pain, redness and visual disturbance.   Respiratory:  Negative for cough, shortness of breath and wheezing.    Cardiovascular:  Negative for chest pain, palpitations and leg swelling.   Gastrointestinal:  Negative for abdominal pain, constipation, diarrhea, nausea and vomiting.   Genitourinary:  Negative for decreased urine volume, dysuria, frequency and urgency.   Musculoskeletal:  Negative for back pain, joint swelling and neck pain.   Skin:  Negative for color change, rash and wound.   Neurological:  Positive for weakness (generalized, doing well with PT). Negative for dizziness, tremors, light-headedness and headaches.   Objective:     Vital Signs (Most Recent):  Temp: 97.8 °F (36.6 °C) (05/24/22 0801)  Pulse: (!) 59 (05/24/22 1000)  Resp: (!) 22 (05/24/22 0801)  BP: (!) 141/63 (05/24/22 0801)  SpO2: 97 % (05/24/22 0801)   Vital Signs (24h Range):  Temp:  [96.8 °F (36 °C)-98.6 °F (37 °C)] 97.8 °F (36.6 °C)  Pulse:  [59-77] 59  Resp:  [18-22] 22  SpO2:  [95 %-99 %] 97 %  BP: (118-142)/(59-63) 141/63     Weight: 89.7 kg (197 lb 12 oz)  Body mass index is 32.91 kg/m².    Physical Exam  Vitals and nursing note reviewed.   Constitutional:       General: He is not in acute distress.     Appearance: He is well-developed.   HENT:      Head: Normocephalic and atraumatic.      Right Ear: External ear normal.      Left Ear: External ear normal.   Eyes:      General:         Right eye: No discharge.         Left eye: No discharge.   Neck:      Thyroid: No thyromegaly.   Cardiovascular:      Rate and Rhythm: Normal rate and regular rhythm.      Heart sounds: No murmur heard.  Pulmonary:      Effort: Pulmonary effort is normal. No respiratory distress.      Breath sounds: No rales.    Abdominal:      General: Bowel sounds are normal. There is no distension.      Palpations: Abdomen is soft.      Tenderness: There is no abdominal tenderness.      Comments: Drain in place.   Musculoskeletal:      Right lower leg: No edema (trace).      Left lower leg: No edema (trace).   Skin:     General: Skin is warm and dry.      Comments: PICC to right UE, dressing c/d/i           Significant Labs:     vanc random 23.3    A1C:   Recent Labs   Lab 03/02/22  0744 04/30/22  1046   HGBA1C 5.9* 6.0*         Bilirubin:   Recent Labs   Lab 05/17/22  0642 05/19/22  0542 05/20/22  0600 05/21/22  0545 05/23/22  0607   BILITOT 0.6 0.5 0.5 0.5 0.5       CBC:   Recent Labs   Lab 05/23/22  0607 05/24/22  0627   WBC 5.17  5.17 5.48   HGB 8.5*  8.5* 8.2*   HCT 26.9*  26.9* 26.1*     186 181       CMP:   Recent Labs   Lab 05/23/22  0607     140   K 4.1  4.1     104   CO2 23  23   *  138*   BUN 39*  39*   CREATININE 1.3  1.3   CALCIUM 8.3*  8.3*   PROT 5.7*   ALBUMIN 2.4*   BILITOT 0.5   ALKPHOS 120   AST 30   ALT 18   ANIONGAP 13  13   EGFRNONAA 47*  47*       Lab Results   Component Value Date    DDIMER 2.25 (H) 05/15/2022     Lactate 1.3  No results for input(s): BNP, BNPTRIAGEBLO in the last 168 hours.    Recent Labs   Lab 05/23/22  1647 05/23/22  2253 05/24/22  0646   POCTGLUCOSE 209* 213* 165*       Troponin: 0.044>0.048>0.043>0.037    TSH:   Recent Labs   Lab 05/19/22  0541   TSH 8.559*       No results for input(s): COLORU, APPEARANCEUA, PHUR, SPECGRAV, PROTEINUA, GLUCUA, KETONESU, BILIRUBINUA, OCCULTUA, NITRITE, UROBILINOGEN, LEUKOCYTESUR, RBCUA, WBCUA, BACTERIA, SQUAMEPITHEL, HYALINECASTS in the last 48 hours.    Invalid input(s): WRIGHTSUR  Covid negative   Flu negative     5/22   Total protein 6.1      5/22 Pleural fluid   protein 1.3   LD 62  Glucose 168  Amylase 7  Albumin 0.8  AFB culture no acid fast bacillli   Aerobic culture no growth   Fungal culture in process        Significant Imaging:     CXR 5/22   Mild cardiomegaly.  Prior cardiac surgery with pacemaker in place.  PICC line in position.  Vascular congestion with lower lobe subsegmental atelectasis greater on the left than the right.  The left effusion appears less prominent than on the prior study.  No evidence of pneumothorax.       CT of abd /pelvis 5/22/2022   Moderate right and small left pleural effusions with bibasilar dependent atelectasis.  2. Previous cholecystectomy.  3. Bilateral renal cysts.  4. Extensive diverticulosis.  5. Significant prostatic hypertrophy.  Further evaluation as deemed clinically necessary.  6. Moderate bilateral inguinal hernias with nondilated bowel within the proximal right inguinal hernia.  7. Prior ventral hernia repair with surgical mesh in place with percutaneous drainage catheter within a poorly defined fluid collection surrounding the surgical mesh which is slightly decreased in size over the interval.  8. Anasarca.  This report was flagged in Epic as abnormal.    5/18 US renal Technically limited study with known renal cysts.  No evidence of obstructive uropathy    5/12 CXR Cardiomegaly with mild CHF.     Left lower lobe atelectasis, small left pleural effusion.    LE US -No evidence of deep venous thrombosis in either lower extremity.    5/15 CXR- Increased left-sided pleural effusion along with new airspace opacities in left hemithorax.  Findings suggestive of worsening CHF pattern of pulmonary edema.    EKG Likely ventricular paced rhythm  - morphology suggestive of biventricular   pacing   Abnormal ECG   When compared with ECG of 30-APR-2022 10:39,   Premature ventricular complexes are no longer Present   Confirmed by Adam Mcmahon MD ([id number]) on 5/16/2022 8:11:55 AM

## 2022-05-24 NOTE — ASSESSMENT & PLAN NOTE
BP Readings from Last 3 Encounters:   05/24/22 (!) 141/63   05/12/22 (!) 129/58   05/11/22 (!) 110/52     Resume home coreg-at lower dose 3.25mg , hold amlodipine & doxazosin  for now .  Dr. Monterroso ordered Imdur, monitor BP .

## 2022-05-24 NOTE — PROGRESS NOTES
[facility] ([facility])  Pulmonology  Progress Note    Patient Name: Eddie Parada Sr.  MRN: [id number]  Admission Date: 5/15/2022  Hospital Length of Stay: 8 days  Code Status: DNR  Attending Provider: Joseph Redmond MD  Primary Care Provider: Callum Roberts MD   Principal Problem: (HFpEF) heart failure with preserved ejection fraction    Subjective:     Interval History: awake now breathing improved     Objective:     Vital Signs (Most Recent):  Temp: 97.8 °F (36.6 °C) (05/24/22 0801)  Pulse: 61 (05/24/22 0801)  Resp: (!) 22 (05/24/22 0801)  BP: (!) 141/63 (05/24/22 0801)  SpO2: 97 % (05/24/22 0801)   Vital Signs (24h Range):  Temp:  [96.8 °F (36 °C)-98.6 °F (37 °C)] 97.8 °F (36.6 °C)  Pulse:  [59-77] 61  Resp:  [18-22] 22  SpO2:  [95 %-99 %] 97 %  BP: (118-142)/(59-63) 141/63     Weight: 89.7 kg (197 lb 12 oz)  Body mass index is 32.91 kg/m².      Intake/Output Summary (Last 24 hours) at 5/24/2022 0933  Last data filed at 5/24/2022 0800  Gross per 24 hour   Intake 1687.63 ml   Output 305 ml   Net 1382.63 ml       Physical Exam  Vitals and nursing note reviewed.   Constitutional:       General: He is not in acute distress.     Appearance: Normal appearance. He is well-developed. He is not ill-appearing, toxic-appearing or diaphoretic.   HENT:      Head: Normocephalic and atraumatic.      Jaw: No trismus.      Right Ear: Hearing, tympanic membrane, ear canal and external ear normal.      Left Ear: Hearing, tympanic membrane, ear canal and external ear normal.      Nose: Nose normal. No nasal deformity, mucosal edema or rhinorrhea.      Right Sinus: No maxillary sinus tenderness or frontal sinus tenderness.      Left Sinus: No maxillary sinus tenderness or frontal sinus tenderness.      Mouth/Throat:      Dentition: Normal dentition.      Pharynx: Uvula midline. No posterior oropharyngeal erythema or uvula swelling.   Eyes:      General: Lids are normal. No scleral icterus.     Conjunctiva/sclera:  Conjunctivae normal.      Comments: Sclera clear bilat   Neck:      Trachea: Trachea and phonation normal.   Cardiovascular:      Rate and Rhythm: Normal rate and regular rhythm.      Pulses: Normal pulses.      Heart sounds: Normal heart sounds.   Pulmonary:      Effort: Respiratory distress (mild to moderate) present.      Breath sounds: Examination of the right-middle field reveals decreased breath sounds and rhonchi. Examination of the left-middle field reveals decreased breath sounds and rhonchi. Examination of the right-lower field reveals decreased breath sounds and rhonchi. Examination of the left-lower field reveals decreased breath sounds and rhonchi. Decreased breath sounds and rhonchi present. No wheezing.   Abdominal:      General: Bowel sounds are normal. There is no distension.      Palpations: Abdomen is soft.      Tenderness: There is no abdominal tenderness.   Musculoskeletal:         General: No deformity. Normal range of motion.      Cervical back: Full passive range of motion without pain and neck supple.   Skin:     General: Skin is warm and dry.      Coloration: Skin is not pale.   Neurological:      Mental Status: He is alert and oriented to person, place, and time.      Motor: No abnormal muscle tone.      Coordination: Coordination normal.   Psychiatric:         Speech: Speech normal.         Behavior: Behavior normal. Behavior is cooperative.         Thought Content: Thought content normal.         Judgment: Judgment normal.       Vents:  Oxygen Concentration (%): 40 (05/23/22 0000)    Lines/Drains/Airways       Peripherally Inserted Central Catheter Line  Duration             PICC Double Lumen 05/12/22 0845 right basilic 12 days              Drain  Duration                  Drain/Device  05/04/22 1618 midline umbilical area pigtail 19 days                    Significant Labs:    CBC/Anemia Profile:  Recent Labs   Lab 05/23/22  0607 05/24/22  0627   WBC 5.17  5.17 5.48   HGB 8.5*  8.5*  8.2*   HCT 26.9*  26.9* 26.1*     186 181   *  104* 105*   RDW 14.8*  14.8* 14.9*        Chemistries:  Recent Labs   Lab 05/23/22  0607     140   K 4.1  4.1     104   CO2 23  23   BUN 39*  39*   CREATININE 1.3  1.3   CALCIUM 8.3*  8.3*   ALBUMIN 2.4*   PROT 5.7*   BILITOT 0.5   ALKPHOS 120   ALT 18   AST 30       All pertinent labs within the past 24 hours have been reviewed.    Significant Imaging:  I have reviewed all pertinent imaging results/findings within the past 24 hours.    Assessment/Plan:     * (HFpEF) heart failure with preserved ejection fraction  Diastolic issues with hypoalbuminemia causeing pleural effusion will attempt bipap to improve oxygenation     COPD (chronic obstructive pulmonary disease)  10 pack year    Chronic bilateral pleural effusions  Thoracentesis in am     Counseling regarding advanced directives and goals of care  Advance Care Planning         NIK (acute kidney injury)  Secondary to age and diuresis    Abdominal wall abscess  Draining     CAD (coronary artery disease)  stable    Chronic atrial fibrillation  Stable HR  Controlled Vent response                  Vivek Morris MD  Pulmonology  [place] - Med Surg (3rd Fl)

## 2022-05-25 ENCOUNTER — TELEPHONE (OUTPATIENT)
Dept: INTERNAL MEDICINE | Facility: CLINIC | Age: 87
End: 2022-05-25
Payer: MEDICARE

## 2022-05-25 ENCOUNTER — PATIENT OUTREACH (OUTPATIENT)
Dept: ADMINISTRATIVE | Facility: CLINIC | Age: 87
End: 2022-05-25
Payer: MEDICARE

## 2022-05-25 DIAGNOSIS — I50.33 ACUTE ON CHRONIC HEART FAILURE WITH PRESERVED EJECTION FRACTION: Primary | ICD-10-CM

## 2022-05-25 LAB
CHOLEST FLD-MCNC: 13 MG/DL
FINAL PATHOLOGIC DIAGNOSIS: NORMAL
Lab: NORMAL

## 2022-05-25 NOTE — PLAN OF CARE
05/25/22 0723   Post-Acute Status   Post-Acute Authorization HME   HME Status Set-up Complete/Auth obtained   Coverage [facility] with [facility] hospital bed is approved. Auth # [id number], and it has been sent to [facility] for dispersement.

## 2022-05-25 NOTE — TELEPHONE ENCOUNTER
----- Message from Jinny Montes sent at 2022  1:13 PM CDT -----  Regarding: Request to speak to a nurse  Contact: Jenna with [facility]  Eddie Parada Sr.  MRN: [id number]  : 1929  PCP: Callum Roberts  Home Phone      [phone]  Work Phone      Not on file.  Mobile          [phone]      MESSAGE:   Request to speak to a nurse regarding home health visit on 22.  Patient's family initially did not want to discuss hospice services.   Mr. Parada was admitted in to the hospital 10 days ago, and discharged on 22  The family expressed they would like to consult hospice during today's home health visit.   Jenna (home health nurse) will set up a hospice consult. Patient's family has requested [facility].      Phone # [phone]    Pharmacy - [facility] [id number] [facility]

## 2022-05-26 ENCOUNTER — TELEPHONE (OUTPATIENT)
Dept: INTERNAL MEDICINE | Facility: CLINIC | Age: 87
End: 2022-05-26
Payer: MEDICARE

## 2022-05-26 NOTE — TELEPHONE ENCOUNTER
----- Message from Angelia Lim sent at 2022 11:10 AM CDT -----  Contact: jarad-with [facility]  Eddie Hannahlew Toussaint.  MRN: [id number]  : 1929  PCP: Callum Roberts  Home Phone      [phone]  Work Phone      Not on file.  Mobile          [phone]      MESSAGE:   Pt being evaluated for hospice and would like to know if the 7 day course of iv clindamycin is needing to be continued or Dc'd     Phone: [phone]

## 2022-05-27 LAB
BACTERIA FLD AEROBE CULT: NO GROWTH
GRAM STN SPEC: NORMAL
GRAM STN SPEC: NORMAL

## 2022-06-01 ENCOUNTER — TELEPHONE (OUTPATIENT)
Dept: INTERNAL MEDICINE | Facility: CLINIC | Age: 87
End: 2022-06-01
Payer: MEDICARE

## 2022-06-01 NOTE — TELEPHONE ENCOUNTER
----- Message from Dejah Salgado sent at 2022 10:28 AM CDT -----  Contact: wife  Eddie Parada Sr.  MRN: [id number]  : 1929  PCP: Callum Roberts  Home Phone      [phone]  Work Phone      Not on file.  Mobile          [phone]      MESSAGE:   Family called to notify Dr. Roberts that Mr. Morgan has passed away on 2022. Stated they wanted to thank  for taking such good care of him though out all of these years.

## 2022-06-01 NOTE — TELEPHONE ENCOUNTER
I am saddened to hear that . I enjoyed taking care of him all these years   Please let the family know we will miss him .

## 2022-06-07 NOTE — ASSESSMENT & PLAN NOTE
Monitor BUN/SCr.  Monitor I/Os.  Monitor electrolytes.  Avoid non-steroidal anti-inflammatory medications.  Symptoms and labs suggestive of Acute HF; continue with diuretics, monitor renal fx closely   NIK on CKD      renal US today and hold further diuresis- pt not voiding much no obstruction  vanc dosing per pharm>  5/19 renal u/s yesterday does not show obstruction. Placed in gentle IVF and diuretics held. Creat has improved a smidgen     5/22 with very slow fluids creat improving to 1.4 today. Pharmacy dosing vanc. CT with contrast today so repeat labs in AM needed   Benzoyl Peroxide Pregnancy And Lactation Text: This medication is Pregnancy Category C. It is unknown if benzoyl peroxide is excreted in breast milk.

## 2022-06-08 LAB — FUNGUS SPEC CULT: ABNORMAL

## 2022-06-20 LAB — FUNGUS SPEC CULT: NORMAL

## 2022-06-22 LAB
ACID FAST MOD KINY STN SPEC: NORMAL
MYCOBACTERIUM SPEC QL CULT: NORMAL

## 2022-06-27 ENCOUNTER — TELEPHONE (OUTPATIENT)
Dept: INTERNAL MEDICINE | Facility: CLINIC | Age: 87
End: 2022-06-27
Payer: MEDICARE

## 2022-06-27 ENCOUNTER — EXTERNAL HOME HEALTH (OUTPATIENT)
Dept: HOME HEALTH SERVICES | Facility: HOSPITAL | Age: 87
End: 2022-06-27
Payer: MEDICARE

## 2022-06-27 NOTE — TELEPHONE ENCOUNTER
I advised Diana that I do not have access to medical records dating that far back. I suggested that she attempt to contact the doctor that performed the surgery

## 2022-06-27 NOTE — TELEPHONE ENCOUNTER
----- Message from Shyann Monaco sent at 2022  2:27 PM CDT -----  Contact: Diana Parada Sr.  MRN: [id number]  : 1929  PCP: Callum Roberts  Home Phone      [phone]  Work Phone      Not on file.  Mobile          [phone]      MESSAGE: Diana called to let us know that her dad passed away on May 30, 2022. She would to thank Dr Roberts for the good care that he took of her dad.   Family needs to know who the manufacture was of the hernia mesh. The surgery was 15 years ago. Patient had two hernia surgeries one was at an [facility] facility and the other one was at a different hospital. Diana said that the [facility] surgery is the surgery that failed.     Diana [phone]

## 2022-07-05 ENCOUNTER — DOCUMENT SCAN (OUTPATIENT)
Dept: HOME HEALTH SERVICES | Facility: HOSPITAL | Age: 87
End: 2022-07-05
Payer: MEDICARE

## 2022-07-11 LAB
ACID FAST MOD KINY STN SPEC: NORMAL
MYCOBACTERIUM SPEC QL CULT: NORMAL

## 2022-12-22 NOTE — PLAN OF CARE
Pt on RA, no respiratory distress noted. Will continue to monitor.      Oxybutynin Counseling:  I discussed with the patient the risks of oxybutynin including but not limited to skin rash, drowsiness, dry mouth, difficulty urinating, and blurred vision.

## 2023-05-30 NOTE — ASSESSMENT & PLAN NOTE
Wound remains clean and granulating.  There is no sign of soft tissue infection.  Patient was switched to collagen but unable to obtain.  He is still using Mesalt.  Awaiting delivery of collagen dressing  
Detail Level: Zone

## 2023-07-05 NOTE — ASSESSMENT & PLAN NOTE
Continue levothyroxine .  Repeat tsh today as last one was elevated on chart and pt has short run v tach overnight   Increase synthroid to 75mcg>88mcg 5/19    normal gait and station , no tenderness or deformities present

## 2024-04-24 NOTE — HPI
89 yo male with a pmh of CAD, Afib on xarelto, heart failure, pacemaker insertion, CKD, COPD, former smoker, hernia repair, CVA, HTN, PVD, DM2. He presented to [place] ED with AMS and fever 2 days prior. He was brought in by EMS. He was in septic shock. Given 2L NS vancomycin, zosyn, tylenol, ibuprofen, and started on levophed while in the ED. CT abd with large abscess noted in the anterior abdominal wall at the level of prior mesh repair. He was transferred to [facility] for surgery evaluation for abscess drainage. He was alert and oriented on arrival to [place]. BP stable off levophed. He reports he woke up feeling fine and had routine labs done yesterday morning. He then began feeling cold and his body was shaking. His wife reports she took his temp and noted 97.6F. He took a nap and woke up confused. His wife reports his temp was then 104 and he was short of breath. She called EMS at that time. His wife noticed his upper abdomen was distended while he was in the ED. He denies ever having abdominal pain or nausea. Had been eating well. He had not had a bowel movement in 3 days so he took a laxative yesterday morning. He had an abdominal hernia repair with mesh placement about 20 years ago in [place]. Has not had any issues since then. He still drives and runs a business. He is followed by [facility] in [place] and had his pacemaker replaced about 2 weeks ago. He took antibiotics as prescribed at that time.     ID is consulted for antibiotics for his abd abscess. Drain was placed by IR. He has no complaints today and only has pain with deep palpation. On zosyn. Cxs from abscess pending   Chart reviewed.    Normal 8 am cortisol.  Normal response to ACTH stim.    Will call patient to discuss later today.       Latest Reference Range & Units 04/15/24 08:11 04/15/24 08:58 04/15/24 09:28   Adrenocorticotropic Hormone 7.2 - 63.0 pg/mL 18.6     CORTISOL, BASELINE 3.4 - 22.5 mcg/dL 14.2     Cortisol Post Dose 30 Minutes >=19.0 mcg/dL  18.5 (L)    Cortisol Post Dose 60 Minutes >=19.0 mcg/dL   23.6   (L): Data is abnormally low

## 2024-09-05 NOTE — ASSESSMENT & PLAN NOTE
Advance Care Planning     Living Will  During this visit, I engaged the patient and family  in the advance care planning process.  The patient and I reviewed the role for advance directives and their purpose in directing future healthcare if the patient's unable to speak for him/herself.  At this point in time, the patient does have full decision-making capacity.  We discussed different extreme health states that he could experience, and reviewed what kind of medical care he would want in those situations.  The patient and family communicated that if he were comatose and had little chance of a meaningful recovery, he would not want machines/life-sustaining treatments used. In addition to the above preference, other important end-of-life issues for the patient include . The patient has completed a living will to reflect these preferences.  I spent a total of 20 minutes engaging the patient in this advance care planning discussion.Discussed with wife in person and daughter via telephone           Today a meeting took place: bedside    Patient Participation: Patient is able to participate     Attendees (Name and  Relationship to patient):also dicussed with wife and daughter    Staff attendees (Name and  Role): Georgia VERMA, xochitl Jules RN case manage   Code Status: DNR; status confirmed/order placed in chart    ACP Documents: Other Documents (specify): living will     Goals of care: The patient and family endorses that what is most important right now is to focus on symptom/pain control, quality of life, even if it means sacrificing a little time and improvement in condition but with limits to invasive therapies    Accordingly, we have decided that the best plan to meet the patient's goals includes continuing with treatment      Recommendations/Length of ACP   conversation in minutes: 20 minutes      Code Status  In light of the patients advanced and life limiting illness,I engaged the the patient and family in a  conversation about the patient's preferences for care  at the very end of life. The patient wishes to have a natural, peaceful death.  Along those lines, the patient does not wish to have CPR or other invasive treatments performed when his heart and/or breathing stops. I communicated to the patient and family that a DNR order would be placed in his medical record to reflect this preference.  I spent a total of 20 minutes engaging the patient in this advance care planning discussion.         5/21: again addressed goals of care with patient, wife and daughter on the phone. They are discussing wishes.     Delivery

## 2024-10-31 NOTE — ASSESSMENT & PLAN NOTE
- Patient denies trouble eating, takes small bites  - Diet advanced and he is eating without difficulty   50